# Patient Record
Sex: MALE | Race: WHITE | NOT HISPANIC OR LATINO | Employment: OTHER | URBAN - METROPOLITAN AREA
[De-identification: names, ages, dates, MRNs, and addresses within clinical notes are randomized per-mention and may not be internally consistent; named-entity substitution may affect disease eponyms.]

---

## 2017-01-11 ENCOUNTER — APPOINTMENT (OUTPATIENT)
Dept: PREADMISSION TESTING | Facility: HOSPITAL | Age: 67
End: 2017-01-11

## 2017-01-12 ENCOUNTER — OFFICE VISIT (OUTPATIENT)
Dept: INTERNAL MEDICINE | Facility: CLINIC | Age: 67
End: 2017-01-12

## 2017-01-12 VITALS
RESPIRATION RATE: 20 BRPM | WEIGHT: 216 LBS | DIASTOLIC BLOOD PRESSURE: 62 MMHG | BODY MASS INDEX: 32.84 KG/M2 | HEART RATE: 68 BPM | SYSTOLIC BLOOD PRESSURE: 115 MMHG

## 2017-01-12 DIAGNOSIS — E11.65 UNCONTROLLED TYPE 2 DIABETES MELLITUS WITH DIABETIC NEUROPATHY, UNSPECIFIED LONG TERM INSULIN USE STATUS: Primary | Chronic | ICD-10-CM

## 2017-01-12 DIAGNOSIS — E11.40 UNCONTROLLED TYPE 2 DIABETES MELLITUS WITH DIABETIC NEUROPATHY, UNSPECIFIED LONG TERM INSULIN USE STATUS: Primary | Chronic | ICD-10-CM

## 2017-01-12 DIAGNOSIS — M54.12 CERVICAL RADICULOPATHY: ICD-10-CM

## 2017-01-12 PROCEDURE — 99213 OFFICE O/P EST LOW 20 MIN: CPT | Performed by: PHYSICIAN ASSISTANT

## 2017-01-12 NOTE — PROGRESS NOTES
Subjective   Elton Funez is a 66 y.o. male.   Chief Complaint   Patient presents with   • Diabetes   • Hyperlipidemia   • Hypertension     History of Present Illness     Pt here with his wife for follow up prior to having neck surgery.  He has been scheduled for the operation 3 times and was actually scheduled for today, but he awoke yesterday with vomiting (which has now resolved), so it was cancelled.  His blood sugars have also been elevated and the surgeon wanted to have him cleared by someone else for the operation.    He follows with Dr. Colin for diabetes.  He is currently treated with lantus, humalog and tanzeum. Last A1C was 8.8% on 12/21/16.  Before that, his A1C was 8.0%.  He has a follow up scheduled with her on 1/20/17.    The following portions of the patient's history were reviewed and updated as appropriate: allergies, current medications, past family history, past medical history, past social history, past surgical history and problem list.    Review of Systems   Constitutional: Negative.    HENT: Negative.    Respiratory: Negative.    Cardiovascular: Negative.    Gastrointestinal: Positive for vomiting (resolved).   Musculoskeletal: Positive for neck pain.   Psychiatric/Behavioral: Negative.        Objective   Physical Exam   Constitutional: He appears well-developed and well-nourished.   Cardiovascular: Normal rate, regular rhythm and normal heart sounds.    Pulmonary/Chest: Effort normal and breath sounds normal. No respiratory distress.   Abdominal: Soft. Bowel sounds are normal. He exhibits no distension. There is no tenderness.   Psychiatric: He has a normal mood and affect. Judgment normal.   Vitals reviewed.      Assessment/Plan   Elton was seen today for diabetes, hyperlipidemia and hypertension.    Diagnoses and all orders for this visit:    Uncontrolled type 2 diabetes mellitus with diabetic neuropathy, unspecified long term insulin use status    Cervical radiculopathy    Patient was  instructed to follow up with Dr. Colin regarding uncontrolled diabetes and for surgical clearance.

## 2017-01-12 NOTE — MR AVS SNAPSHOT
Elton WARNER Armin   1/12/2017 9:45 AM   Office Visit    Provider:  GONZALO Parish   Department:  [facility] INTERNAL MEDICINE AND PEDIATRICS   Dept Phone:  [phone]                Your Full Care Plan              Your Updated Medication List          This list is accurate as of: 1/12/17 10:41 AM.  Always use your most recent med list.                Albiglutide 50 MG pen-injector   Commonly known as:  TANZEUM   Inject 50 mg under the skin 1 (one) time per week. Indications: didnt tolerate preferred medication Bydureon, worse glycemic control       aspirin 81 MG tablet       atenolol 50 MG tablet   Commonly known as:  TENORMIN   Take 1 tablet by mouth daily.       * B-D UF III MINI PEN NEEDLES 31G X 5 MM misc   Generic drug:  Insulin Pen Needle       * B-D UF III MINI PEN NEEDLES 31G X 5 MM misc   Generic drug:  Insulin Pen Needle   USE TWICE A DAY FOR LANTUS SOLOSTAR       * chlorhexidine 4 % external liquid   Commonly known as:  HIBICLENS   Shower each day with solution for 5 days beginning 5 days before surgery.       * chlorhexidine 4 % external liquid   Commonly known as:  HIBICLENS   Shower each day with solution for 5 days beginning 5 days before surgery.       Co Q10 100 MG capsule       colestipol 1 G tablet   Commonly known as:  COLESTID   TAKE 2 TABLETS TWICE A DAY       docusate calcium 240 MG capsule   Commonly known as:  SURFAK       Flaxseed Oil 1200 MG capsule       fluocinonide 0.05 % cream   Commonly known as:  LIDEX       fluticasone 50 MCG/ACT nasal spray   Commonly known as:  FLONASE       gemfibrozil 600 MG tablet   Commonly known as:  LOPID   TAKE 1 TABLET TWICE A DAY       glucose blood test strip   USE TO TEST BLOOD SUGAR TWICE DAILY AND AS NEEDED       HUMALOG MIX 75/25 KWIKPEN (75-25) 100 UNIT/ML suspension pen-injector   Generic drug:  Insulin Lispro Prot & Lispro       Lancets misc   1 each 2 (two) times a day.       LANTUS SOLOSTAR 100  UNIT/ML injection pen   Generic drug:  Insulin Glargine       lisinopril-hydrochlorothiazide 10-12.5 MG per tablet   Commonly known as:  PRINZIDE,ZESTORETIC   TAKE 1 TABLET DAILY       meloxicam 15 MG tablet   Commonly known as:  MOBIC   TAKE ONE TABLET BY MOUTH DAILY AS NEEDED       MULTIVITAMIN PO       omeprazole 20 MG capsule   Commonly known as:  priLOSEC       sertraline 50 MG tablet   Commonly known as:  ZOLOFT       simvastatin 40 MG tablet   Commonly known as:  ZOCOR   TAKE 1 TABLET DAILY AT BEDTIME       topiramate 25 MG tablet   Commonly known as:  TOPAMAX       traMADol 50 MG tablet   Commonly known as:  ULTRAM   TAKE ONE TABLET BY MOUTH EVERY 6 HOURS AS NEEDED       * Notice:  This list has 4 medication(s) that are the same as other medications prescribed for you. Read the directions carefully, and ask your doctor or other care provider to review them with you.            Instructions     None    Patient Instructions History      [employer] Signup     Our records indicate that you have an active [employer] account.    You can view your After Visit Summary by going to [employer] and logging in with your [employer] username and password.  If you don't have a [employer] username and password but a parent or guardian has access to your record, the parent or guardian should login with their own [employer] username and password and access your record to view the After Visit Summary.    If you have questions, you can email [email or web address] or call [phone] to talk to our [employer] staff.  Remember, [employer] is NOT to be used for urgent needs.  For medical emergencies, dial 911.               Other Info from Your Visit           Your Appointments     Jan 20, 2017 12:00 PM EST   Follow Up with Pau MACK MD   [facility] INTERNAL [facility]ICINE [facility]INOLOGY (--)    [address] [place] [id number] [phone]           Arrive 15 minutes  prior to appointment.            Mar 28, 2017  8:45 AM EDT   Follow Up with Tc Ramirez MD   [facility] INTERNAL MEDICINE AND PEDIATRICS (--)    [address] [address] [phone]           Arrive 15 minutes prior to appointment.              Allergies     Glucophage Xr [Metformin Hcl Er] Allergy Diarrhea    Glucophage XR TB24: Adverse Reaction: Severe GI issues.    Tetracyclines & Related Allergy Nausea Only    Adverse Reaction    Metformin Allergy     Other reaction(s): SEVERE INTESTIONAL ISSUES  Other reaction(s): SEVERE GI ISSUES  Glucophage XR TB24  MetFORMIN HCl TABS      Reason for Visit     Diabetes     Hyperlipidemia     Hypertension           Vital Signs     Blood Pressure Pulse Respirations Weight Body Mass Index Smoking Status    115/62 68 20 216 lb (98 kg) 32.84 kg/m2 Former Smoker

## 2017-01-17 ENCOUNTER — OFFICE VISIT (OUTPATIENT)
Dept: ENDOCRINOLOGY | Facility: CLINIC | Age: 67
End: 2017-01-17

## 2017-01-17 VITALS
SYSTOLIC BLOOD PRESSURE: 110 MMHG | WEIGHT: 217.6 LBS | BODY MASS INDEX: 32.98 KG/M2 | DIASTOLIC BLOOD PRESSURE: 70 MMHG | HEART RATE: 68 BPM | HEIGHT: 68 IN | OXYGEN SATURATION: 96 %

## 2017-01-17 DIAGNOSIS — E11.9 CONTROLLED TYPE 2 DIABETES MELLITUS WITHOUT COMPLICATION, WITH LONG-TERM CURRENT USE OF INSULIN (HCC): ICD-10-CM

## 2017-01-17 DIAGNOSIS — N18.30 CHRONIC KIDNEY DISEASE, STAGE III (MODERATE) (HCC): Chronic | ICD-10-CM

## 2017-01-17 DIAGNOSIS — Z79.4 CONTROLLED TYPE 2 DIABETES MELLITUS WITHOUT COMPLICATION, WITH LONG-TERM CURRENT USE OF INSULIN (HCC): ICD-10-CM

## 2017-01-17 DIAGNOSIS — IMO0002 UNCONTROLLED TYPE 2 DIABETES MELLITUS WITH DIABETIC NEUROPATHY, WITH LONG-TERM CURRENT USE OF INSULIN: ICD-10-CM

## 2017-01-17 DIAGNOSIS — E11.65 UNCONTROLLED TYPE 2 DIABETES MELLITUS WITH DIABETIC NEUROPATHY, UNSPECIFIED LONG TERM INSULIN USE STATUS: Primary | Chronic | ICD-10-CM

## 2017-01-17 DIAGNOSIS — E11.40 UNCONTROLLED TYPE 2 DIABETES MELLITUS WITH DIABETIC NEUROPATHY, UNSPECIFIED LONG TERM INSULIN USE STATUS: Primary | Chronic | ICD-10-CM

## 2017-01-17 PROCEDURE — 99214 OFFICE O/P EST MOD 30 MIN: CPT | Performed by: INTERNAL MEDICINE

## 2017-01-17 NOTE — LETTER
January 17, 2017     Gigi Perales MD  [address] [facility]  [address]  [facility] [address]    Patient: Elton Funez   YOB: 1950   Date of Visit: 1/17/2017       Dear Dr. Diaz MD:    Thank you for referring Elton Funez to me for evaluation. Below are the relevant portions of my assessment and plan of care.      1. Uncontrolled type 2 diabetes mellitus with diabetic neuropathy, unspecified long term insulin use status    2. Chronic kidney disease, stage III (moderate)    3. Uncontrolled type 2 diabetes mellitus with diabetic neuropathy, with long-term current use of insulin      No orders of the defined types were placed in this encounter.    Plan  -transition from premixed insulin and Lantus to U-500  Patient Instructions   Diabetes Treatment Recommendations    [unfilled]                                                                                              Elton Funez 1950       Concentrated U-500 insulin will provide a better delivery of the medication and allow to simplify your regimen.     Start insulin U-500 18 units before breakfast and 18 units before supper.        Change insulin doses every 3 - 4 days until your glucose numbers are < 150  Consistently.     For Breakfast Dose (controls blood sugar until supper)    If before supper blood sugar readings are consistently higher than 150 for 3-4 days, raise breakfast insulin dose by 2 units.    For Supper Dose (controls blood sugar until breakfast)    If before breakfast blood sugar readings are consistently higher than 150 for 3-4 days, raise supper insulin dose by 2  units.    If after 2 weeks, before meal blood sugars are not lower than 150, call the office.    If you are having frequent blood sugars lower than 70, call the office.    Pau Colin MD       -Tanzeum 50 mg was approved (better control compared to Bydureon)        Glucose goals reviewed, which need to improve considering his surgery.  Hyperglycemia is associated with higher infection risk and impaired healing.   Hypoglycemia precautions reviewed and insulin titration instructions given.   Follow-up in 3 weeks with glucose log.     His glycemic control is improved and he should be medically acceptable for surgery from diabetes standpoint.        I will repeat A1C in 3 weeks at his visit.            If you have questions, please do not hesitate to call me. I look forward to following Elton along with you.         Sincerely,        Pau Colin MD        CC: No Recipients

## 2017-01-17 NOTE — PROGRESS NOTES
Chief complaint  Diabetes (F/u for type 2 diabetes, pt stated that he was scheduled to have neck surgery before christmas but was rescheduled due to blood sugars being elevated.  Labs were completed in December 2016.)    Rufina Funez is a 66 y.o. male is here today for follow-up.  Diabetes mellitus type 2, uncontrolled dx in 2000  Diabetic complications: neuropathy  Medications: He stopped glimepiride, Januvia.   metformin in the past caused diarrhea.  Jan 2015 I have started Novolog 70/30 BID in additional Lantus.   He started Tanzeum in Oct and doing well, glucose improved.   Eye care:no retinopathy.   Hypoglycemia: 1 episode occurred during the day. He was symptomatic and treated appropriately.    Nutrition: discussed carb consistent diet 45-60 gm carb per meal. Patient is not following any diet, eats a lot of potatoes.   Monitoring: Have not checked glucose for 3 months  Exercise: recommended 30 min per day exercise. He doesn't exercise because of tingling in the feet.   Foot care and dental care: discussed.  Labs: 3/2016 reviewed      Patient had visit to the ER with right sided epistaxis and he is seeing ENT later today for cauterization. He is not checking glucose and not as compliant with the diet.  He also has severe neck pain and received steroids PO, planned for surgery. The surgery was postponed in December because of high glucose. He started checking more often and now his glucometer showed morning numbers 70-90, afternoon towards 230.      Diabetes   He presents for his follow-up diabetic visit. No MedicAlert identification noted. The initial diagnosis of diabetes was made 15 years ago. Hypoglycemia symptoms include hunger, sweats and tremors. Pertinent negatives for hypoglycemia include no confusion, dizziness, headaches, mood changes, nervousness/anxiousness, pallor, seizures, sleepiness or speech difficulty. Associated symptoms include foot paresthesias. Pertinent negatives for  diabetes include no blurred vision, no chest pain, no fatigue, no foot ulcerations, no polydipsia, no polyphagia, no polyuria, no visual change, no weakness and no weight loss. Hypoglycemia complications include nocturnal hypoglycemia. Pertinent negatives for hypoglycemia complications include no blackouts, no hospitalization, no required assistance and no required glucagon injection. Symptoms are worsening. Diabetic complications include impotence and peripheral neuropathy. Pertinent negatives for diabetic complications include no CVA, heart disease, nephropathy, PVD or retinopathy. Risk factors for coronary artery disease include dyslipidemia, family history, hypertension, obesity and sedentary lifestyle. Current diabetic treatment includes insulin injections and oral agent (triple therapy). He is compliant with treatment most of the time. He is currently taking insulin pre-breakfast and at bedtime. Insulin injections are given by patient. Rotation sites for injection include the abdominal wall, arms and thighs. His weight is fluctuating minimally. He is following a low fiber diet. He has not had a previous visit with a dietitian. He rarely participates in exercise. He monitors blood glucose at home 1-2 x per day. He monitors urine at home <1 x per month. Blood glucose monitoring compliance is inadequate. His home blood glucose trend is fluctuating minimally. His breakfast blood glucose is taken between 5-6 am. His breakfast blood glucose range is generally 70-90 mg/dl. His dinner blood glucose is taken between 6-7 pm. His dinner blood glucose range is generally 140-180 mg/dl. His highest blood glucose is >200 mg/dl. His overall blood glucose range is 180-200 mg/dl. He sees a podiatrist.Eye exam is current.       Medications    Current Outpatient Prescriptions:   •  Albiglutide (TANZEUM) 50 MG pen-injector, Inject 50 mg under the skin 1 (one) time per week. Indications: didnt tolerate preferred medication  Bydureon, worse glycemic control, Disp: 12 each, Rfl: 3  •  aspirin 81 MG tablet, Take 81 mg by mouth Daily., Disp: , Rfl:   •  atenolol (TENORMIN) 50 MG tablet, Take 1 tablet by mouth daily. (Patient taking differently: Take 50 mg by mouth Every Night.), Disp: 90 tablet, Rfl: 1  •  B-D UF III MINI PEN NEEDLES 31G X 5 MM misc, USE TWICE A DAY FOR LANTUS SOLOSTAR, Disp: 100 each, Rfl: 3  •  chlorhexidine (HIBICLENS) 4 % external liquid, Shower each day with solution for 5 days beginning 5 days before surgery., Disp: 120 mL, Rfl: 0  •  Coenzyme Q10 (CO Q10) 100 MG capsule, Take 1 capsule by mouth 2 (Two) Times a Day., Disp: , Rfl:   •  colestipol (COLESTID) 1 G tablet, TAKE 2 TABLETS TWICE A DAY, Disp: 360 tablet, Rfl: 4  •  docusate calcium (SURFAK) 240 MG capsule, Take 240 mg by mouth 2 (Two) Times a Day., Disp: , Rfl:   •  Flaxseed, Linseed, (FLAXSEED OIL) 1200 MG capsule, Take 1 capsule by mouth 2 (two) times a day., Disp: , Rfl:   •  fluocinonide (LIDEX) 0.05 % cream, Apply  topically Every 8 (Eight) Hours. Apply sparingly to affected area(s) twice daily as needed., Disp: , Rfl:   •  fluticasone (FLONASE) 50 MCG/ACT nasal spray, into each nostril As Needed. Use 2 sprays in each nostril once daily as needed., Disp: , Rfl:   •  gemfibrozil (LOPID) 600 MG tablet, TAKE 1 TABLET TWICE A DAY, Disp: 180 tablet, Rfl: 1  •  glucose blood test strip, USE TO TEST BLOOD SUGAR TWICE DAILY AND AS NEEDED, Disp: 200 each, Rfl: 2  •  Insulin Glargine (LANTUS SOLOSTAR) 100 UNIT/ML injection pen, Inject 50 Units under the skin Daily. Inject 50 units each evening., Disp: , Rfl:   •  Insulin Lispro Prot & Lispro (HUMALOG MIX 75/25 KWIKPEN) (75-25) 100 UNIT/ML suspension pen-injector, Inject 110 Units under the skin 2 (Two) Times a Day. 110 units in am and 95 units in pm., Disp: , Rfl:   •  Insulin Pen Needle (B-D UF III MINI PEN NEEDLES) 31G X 5 MM misc, , Disp: , Rfl:   •  Lancets misc, 1 each 2 (two) times a day., Disp: 200 each,  "Rfl: 2  •  lisinopril-hydrochlorothiazide (PRINZIDE,ZESTORETIC) 10-12.5 MG per tablet, TAKE 1 TABLET DAILY, Disp: 90 tablet, Rfl: 1  •  meloxicam (MOBIC) 15 MG tablet, TAKE ONE TABLET BY MOUTH DAILY AS NEEDED, Disp: 90 tablet, Rfl: 0  •  Multiple Vitamins-Minerals (MULTIVITAMIN PO), Take 1 tablet by mouth Daily., Disp: , Rfl:   •  omeprazole (PriLOSEC) 20 MG capsule, Take 20 mg by mouth Daily As Needed., Disp: , Rfl:   •  sertraline (ZOLOFT) 50 MG tablet, Take 50 mg by mouth Daily., Disp: , Rfl:   •  simvastatin (ZOCOR) 40 MG tablet, TAKE 1 TABLET DAILY AT BEDTIME, Disp: 90 tablet, Rfl: 1  •  topiramate (TOPAMAX) 25 MG tablet, Take 25 mg by mouth Daily., Disp: , Rfl:   •  traMADol (ULTRAM) 50 MG tablet, TAKE ONE TABLET BY MOUTH EVERY 6 HOURS AS NEEDED, Disp: 60 tablet, Rfl: 0    PMH  The following portions of the patient's history were reviewed and updated as appropriate: allergies, current medications, past family history, past medical history, past social history, past surgical history and problem list.    Review of systems  Review of Systems   Constitutional: Negative for fatigue and weight loss.   HENT: Positive for nosebleeds (right sided).    Eyes: Negative for blurred vision.   Cardiovascular: Negative for chest pain.   Endocrine: Negative for polydipsia, polyphagia and polyuria.   Genitourinary: Positive for impotence.   Musculoskeletal: Positive for back pain.   Skin: Negative for pallor.   Neurological: Positive for tremors and numbness (tingling of both feet, progressively getting worse, ). Negative for dizziness, seizures, speech difficulty, weakness and headaches.   Psychiatric/Behavioral: Negative for confusion. The patient is not nervous/anxious.    All other systems reviewed and are negative.      Physical exam  Objective   Blood pressure 110/70, pulse 68, height 68\" (172.7 cm), weight 217 lb 9.6 oz (98.7 kg), SpO2 96 %.   Physical Exam   Constitutional: He is oriented to person, place, and time. He " appears well-developed and well-nourished.   Eyes: Conjunctivae are normal.   Neck: No JVD present. Thyromegaly present.   Cardiovascular: Normal rate, regular rhythm and normal heart sounds.    Pulmonary/Chest: Effort normal and breath sounds normal.   Musculoskeletal: Normal range of motion. He exhibits no edema.   Lymphadenopathy:     He has no cervical adenopathy.   Neurological: He is alert and oriented to person, place, and time.   Skin: Skin is warm and dry. No rash noted.   Psychiatric: He has a normal mood and affect. Thought content normal.   Vitals reviewed.        LABS AND IMAGING  Results for orders placed or performed in visit on 12/21/16   MRSA Screen, PCR   Result Value Ref Range    MRSA, PCR Negative Negative   Hemoglobin A1c   Result Value Ref Range    Hemoglobin A1C 8.80 (H) 4.80 - 5.60 %   CBC (No diff)   Result Value Ref Range    WBC 6.59 3.50 - 10.80 10*3/mm3    RBC 4.14 (L) 4.20 - 5.76 10*6/mm3    Hemoglobin 13.8 13.1 - 17.5 g/dL    Hematocrit 38.8 (L) 38.9 - 50.9 %    MCV 93.7 80.0 - 99.0 fL    MCH 33.3 (H) 27.0 - 31.0 pg    MCHC 35.6 32.0 - 36.0 g/dL    RDW 13.1 11.3 - 14.5 %    RDW-SD 44.6 37.0 - 54.0 fl    MPV 12.1 (H) 6.0 - 12.0 fL    Platelets 139 (L) 150 - 450 10*3/mm3   Basic metabolic panel   Result Value Ref Range    Glucose 241 (H) 70 - 100 mg/dL    BUN 26 (H) 9 - 23 mg/dL    Creatinine 1.40 (H) 0.60 - 1.30 mg/dL    Sodium 135 132 - 146 mmol/L    Potassium 4.2 3.5 - 5.5 mmol/L    Chloride 101 99 - 109 mmol/L    CO2 26.0 20.0 - 31.0 mmol/L    Calcium 10.0 8.7 - 10.4 mg/dL    eGFR Non African Amer 51 (L) >60 mL/min/1.73    BUN/Creatinine Ratio 18.6 7.0 - 25.0    Anion Gap 8.0 3.0 - 11.0 mmol/L   Urinalysis w/Culture if Indicated   Result Value Ref Range    Color, UA Yellow Yellow, Straw    Appearance, UA Clear Clear    pH, UA 5.5 5.0 - 8.0    Specific Gravity, UA 1.017 1.001 - 1.030    Glucose,  mg/dL (Trace) (A) Negative    Ketones, UA Negative Negative    Bilirubin, UA  Negative Negative    Blood, UA Negative Negative    Protein, UA Negative Negative    Leuk Esterase, UA Negative Negative    Nitrite, UA Negative Negative    Urobilinogen, UA 0.2 E.U./dL 0.2 - 1.0 E.U./dL           Assessment  Assessment/Plan   1. Uncontrolled type 2 diabetes mellitus with diabetic neuropathy, unspecified long term insulin use status    2. Chronic kidney disease, stage III (moderate)    3. Uncontrolled type 2 diabetes mellitus with diabetic neuropathy, with long-term current use of insulin      No orders of the defined types were placed in this encounter.    Plan  -transition from premixed insulin and Lantus to U-500  Patient Instructions   Diabetes Treatment Recommendations    [unfilled]                                                                                              Elton Funez 1950       Concentrated U-500 insulin will provide a better delivery of the medication and allow to simplify your regimen.     Start insulin U-500 18 units before breakfast and 18 units before supper.        Change insulin doses every 3 - 4 days until your glucose numbers are < 150  Consistently.     For Breakfast Dose (controls blood sugar until supper)    If before supper blood sugar readings are consistently higher than 150 for 3-4 days, raise breakfast insulin dose by 2 units.    For Supper Dose (controls blood sugar until breakfast)    If before breakfast blood sugar readings are consistently higher than 150 for 3-4 days, raise supper insulin dose by 2  units.    If after 2 weeks, before meal blood sugars are not lower than 150, call the office.    If you are having frequent blood sugars lower than 70, call the office.    Pau Colin MD       -Tanzeum 50 mg was approved (better control compared to Bydureon)        Glucose goals reviewed, which need to improve considering his surgery. Hyperglycemia is associated with higher infection risk and impaired healing.   Hypoglycemia precautions reviewed  and insulin titration instructions given.   Follow-up in 3 weeks with glucose log.     His glycemic control is improved and he should be medically acceptable for surgery from diabetes standpoint.

## 2017-01-17 NOTE — LETTER
2017         RE: Elton Funez  :   1950    Dear   :     It was a pleasure to see Mr. Funez at [facility]. Below is my visit summary.            Elton Funez is a 66 y.o. male is here today for follow-up.  Diabetes mellitus type 2, uncontrolled dx in   Diabetic complications: neuropathy  Medications: He stopped glimepiride, Januvia.   metformin in the past caused diarrhea.  2015 I have started Novolog 70/30 BID in additional Lantus.   He started Tanzeum in Oct and doing well, glucose improved.   Eye care:no retinopathy.   Hypoglycemia: 1 episode occurred during the day. He was symptomatic and treated appropriately.    Nutrition: discussed carb consistent diet 45-60 gm carb per meal. Patient is not following any diet, eats a lot of potatoes.   Monitoring: Have not checked glucose for 3 months  Exercise: recommended 30 min per day exercise. He doesn't exercise because of tingling in the feet.   Foot care and dental care: discussed.  Labs: 3/2016 reviewed      Patient had visit to the ER with right sided epistaxis and he is seeing ENT later today for cauterization. He is not checking glucose and not as compliant with the diet.  He also has severe neck pain and received steroids PO, planned for surgery     Diabetes   He presents for his follow-up diabetic visit. No MedicAlert identification noted. The initial diagnosis of diabetes was made 15 years ago. Hypoglycemia symptoms include hunger, sweats and tremors. Pertinent negatives for hypoglycemia include no confusion, dizziness, headaches, mood changes, nervousness/anxiousness, pallor, seizures, sleepiness or speech difficulty. Associated symptoms include foot paresthesias. Pertinent negatives for diabetes include no blurred vision, no chest pain, no fatigue, no foot ulcerations, no polydipsia, no polyphagia, no polyuria, no visual change, no weakness and no weight loss.  Hypoglycemia complications include nocturnal hypoglycemia. Pertinent negatives for hypoglycemia complications include no blackouts, no hospitalization, no required assistance and no required glucagon injection. Symptoms are worsening. Diabetic complications include impotence and peripheral neuropathy. Pertinent negatives for diabetic complications include no CVA, heart disease, nephropathy, PVD or retinopathy. Risk factors for coronary artery disease include dyslipidemia, family history, hypertension, obesity and sedentary lifestyle. Current diabetic treatment includes insulin injections and oral agent (triple therapy). He is compliant with treatment most of the time. He is currently taking insulin pre-breakfast and at bedtime. Insulin injections are given by patient. Rotation sites for injection include the abdominal wall, arms and thighs. His weight is fluctuating minimally. He is following a low fiber diet. He has not had a previous visit with a dietitian. He rarely participates in exercise. He monitors blood glucose at home 1-2 x per day. He monitors urine at home <1 x per month. Blood glucose monitoring compliance is inadequate. His home blood glucose trend is fluctuating minimally. His breakfast blood glucose is taken between 5-6 am. His breakfast blood glucose range is generally 70-90 mg/dl. His dinner blood glucose is taken between 6-7 pm. His dinner blood glucose range is generally 140-180 mg/dl. His highest blood glucose is >200 mg/dl. His overall blood glucose range is 180-200 mg/dl. He sees a podiatrist.Eye exam is current.       Medications    Current Outpatient Prescriptions:   •  Albiglutide (TANZEUM) 50 MG pen-injector, Inject 50 mg under the skin 1 (one) time per week. Indications: didnt tolerate preferred medication Bydureon, worse glycemic control, Disp: 12 each, Rfl: 3  •  aspirin 81 MG tablet, Take 81 mg by mouth Daily., Disp: , Rfl:   •  atenolol (TENORMIN) 50 MG tablet, Take 1 tablet by mouth  daily. (Patient taking differently: Take 50 mg by mouth Every Night.), Disp: 90 tablet, Rfl: 1  •  B-D UF III MINI PEN NEEDLES 31G X 5 MM misc, USE TWICE A DAY FOR LANTUS SOLOSTAR, Disp: 100 each, Rfl: 3  •  chlorhexidine (HIBICLENS) 4 % external liquid, Shower each day with solution for 5 days beginning 5 days before surgery., Disp: 120 mL, Rfl: 0  •  Coenzyme Q10 (CO Q10) 100 MG capsule, Take 1 capsule by mouth 2 (Two) Times a Day., Disp: , Rfl:   •  colestipol (COLESTID) 1 G tablet, TAKE 2 TABLETS TWICE A DAY, Disp: 360 tablet, Rfl: 4  •  docusate calcium (SURFAK) 240 MG capsule, Take 240 mg by mouth 2 (Two) Times a Day., Disp: , Rfl:   •  Flaxseed, Linseed, (FLAXSEED OIL) 1200 MG capsule, Take 1 capsule by mouth 2 (two) times a day., Disp: , Rfl:   •  fluocinonide (LIDEX) 0.05 % cream, Apply  topically Every 8 (Eight) Hours. Apply sparingly to affected area(s) twice daily as needed., Disp: , Rfl:   •  fluticasone (FLONASE) 50 MCG/ACT nasal spray, into each nostril As Needed. Use 2 sprays in each nostril once daily as needed., Disp: , Rfl:   •  gemfibrozil (LOPID) 600 MG tablet, TAKE 1 TABLET TWICE A DAY, Disp: 180 tablet, Rfl: 1  •  glucose blood test strip, USE TO TEST BLOOD SUGAR TWICE DAILY AND AS NEEDED, Disp: 200 each, Rfl: 2  •  Insulin Glargine (LANTUS SOLOSTAR) 100 UNIT/ML injection pen, Inject 50 Units under the skin Daily. Inject 50 units each evening., Disp: , Rfl:   •  Insulin Lispro Prot & Lispro (HUMALOG MIX 75/25 KWIKPEN) (75-25) 100 UNIT/ML suspension pen-injector, Inject 110 Units under the skin 2 (Two) Times a Day. 110 units in am and 95 units in pm., Disp: , Rfl:   •  Insulin Pen Needle (B-D UF III MINI PEN NEEDLES) 31G X 5 MM misc, , Disp: , Rfl:   •  Lancets misc, 1 each 2 (two) times a day., Disp: 200 each, Rfl: 2  •  lisinopril-hydrochlorothiazide (PRINZIDE,ZESTORETIC) 10-12.5 MG per tablet, TAKE 1 TABLET DAILY, Disp: 90 tablet, Rfl: 1  •  meloxicam (MOBIC) 15 MG tablet, TAKE ONE TABLET BY  "MOUTH DAILY AS NEEDED, Disp: 90 tablet, Rfl: 0  •  Multiple Vitamins-Minerals (MULTIVITAMIN PO), Take 1 tablet by mouth Daily., Disp: , Rfl:   •  omeprazole (PriLOSEC) 20 MG capsule, Take 20 mg by mouth Daily As Needed., Disp: , Rfl:   •  sertraline (ZOLOFT) 50 MG tablet, Take 50 mg by mouth Daily., Disp: , Rfl:   •  simvastatin (ZOCOR) 40 MG tablet, TAKE 1 TABLET DAILY AT BEDTIME, Disp: 90 tablet, Rfl: 1  •  topiramate (TOPAMAX) 25 MG tablet, Take 25 mg by mouth Daily., Disp: , Rfl:   •  traMADol (ULTRAM) 50 MG tablet, TAKE ONE TABLET BY MOUTH EVERY 6 HOURS AS NEEDED, Disp: 60 tablet, Rfl: 0    PMH  The following portions of the patient's history were reviewed and updated as appropriate: allergies, current medications, past family history, past medical history, past social history, past surgical history and problem list.    Review of systems  Review of Systems   Constitutional: Negative for fatigue and weight loss.   HENT: Positive for nosebleeds (right sided).    Eyes: Negative for blurred vision.   Cardiovascular: Negative for chest pain.   Endocrine: Negative for polydipsia, polyphagia and polyuria.   Genitourinary: Positive for impotence.   Musculoskeletal: Positive for back pain.   Skin: Negative for pallor.   Neurological: Positive for tremors and numbness (tingling of both feet, progressively getting worse, ). Negative for dizziness, seizures, speech difficulty, weakness and headaches.   Psychiatric/Behavioral: Negative for confusion. The patient is not nervous/anxious.    All other systems reviewed and are negative.      Physical exam      Blood pressure 110/70, pulse 68, height 68\" (172.7 cm), weight 217 lb 9.6 oz (98.7 kg), SpO2 96 %.   Physical Exam   Constitutional: He is oriented to person, place, and time. He appears well-developed and well-nourished.   Eyes: Conjunctivae are normal.   Neck: No JVD present. Thyromegaly present.   Cardiovascular: Normal rate, regular rhythm and normal heart sounds.  "   Pulmonary/Chest: Effort normal and breath sounds normal.   Musculoskeletal: Normal range of motion. He exhibits no edema.    Elton had a diabetic foot exam performed today.   During the foot exam he had a monofilament test performed.    Neurological Sensory Findings -  Altered sharp/dull right ankle/foot discrimination (decresaed vibration sensation bilaterally) and altered sharp/dull left ankle/foot discrimination. No right ankle/foot altered proprioception and no left ankle/foot altered proprioception    Vascular Status -  His exam exhibits right foot vasculature abnormal and no right foot edema. His exam exhibits left foot vasculature abnormal and no left foot edema.   Skin Integrity  -  His right foot skin is intact.   Elton's left foot skin is intact. .  Lymphadenopathy:     He has no cervical adenopathy.   Neurological: He is alert and oriented to person, place, and time.   Skin: Skin is warm and dry. No rash noted.   Psychiatric: He has a normal mood and affect. Thought content normal. reviewed.        LABS AND IMAGING  Results for orders placed or performed in visit on 12/21/16   MRSA Screen, PCR   Result Value Ref Range    MRSA, PCR Negative Negative   Hemoglobin A1c   Result Value Ref Range    Hemoglobin A1C 8.80 (H) 4.80 - 5.60 %   CBC (No diff)   Result Value Ref Range    WBC 6.59 3.50 - 10.80 10*3/mm3    RBC 4.14 (L) 4.20 - 5.76 10*6/mm3    Hemoglobin 13.8 13.1 - 17.5 g/dL    Hematocrit 38.8 (L) 38.9 - 50.9 %    MCV 93.7 80.0 - 99.0 fL    MCH 33.3 (H) 27.0 - 31.0 pg    MCHC 35.6 32.0 - 36.0 g/dL    RDW 13.1 11.3 - 14.5 %    RDW-SD 44.6 37.0 - 54.0 fl    MPV 12.1 (H) 6.0 - 12.0 fL    Platelets 139 (L) 150 - 450 10*3/mm3   Basic metabolic panel   Result Value Ref Range    Glucose 241 (H) 70 - 100 mg/dL    BUN 26 (H) 9 - 23 mg/dL    Creatinine 1.40 (H) 0.60 - 1.30 mg/dL    Sodium 135 132 - 146 mmol/L    Potassium 4.2 3.5 - 5.5 mmol/L    Chloride 101 99 - 109 mmol/L    CO2 26.0 20.0 - 31.0 mmol/L     Calcium 10.0 8.7 - 10.4 mg/dL    eGFR Non African Amer 51 (L) >60 mL/min/1.73    BUN/Creatinine Ratio 18.6 7.0 - 25.0    Anion Gap 8.0 3.0 - 11.0 mmol/L   Urinalysis w/Culture if Indicated   Result Value Ref Range    Color, UA Yellow Yellow, Straw    Appearance, UA Clear Clear    pH, UA 5.5 5.0 - 8.0    Specific Gravity, UA 1.017 1.001 - 1.030    Glucose,  mg/dL (Trace) (A) Negative    Ketones, UA Negative Negative    Bilirubin, UA Negative Negative    Blood, UA Negative Negative    Protein, UA Negative Negative    Leuk Esterase, UA Negative Negative    Nitrite, UA Negative Negative    Urobilinogen, UA 0.2 E.U./dL 0.2 - 1.0 E.U./dL           Assessment      If you have any questions or concerns, please don't hesitate to contact us at [phone].         Sincerely,        Pau Colin MD    CC: No Recipients

## 2017-01-17 NOTE — PATIENT INSTRUCTIONS
Diabetes Treatment Recommendations    [unfilled]                                                                                              Elton Funez 1950       Concentrated U-500 insulin will provide a better delivery of the medication and allow to simplify your regimen.     Start insulin U-500 18 units before breakfast and 18 units before supper.        Change insulin doses every 3 - 4 days until your glucose numbers are < 150  Consistently.     For Breakfast Dose (controls blood sugar until supper)    If before supper blood sugar readings are consistently higher than 150 for 3-4 days, raise breakfast insulin dose by 2 units.    For Supper Dose (controls blood sugar until breakfast)    If before breakfast blood sugar readings are consistently higher than 150 for 3-4 days, raise supper insulin dose by 2  units.    If after 2 weeks, before meal blood sugars are not lower than 150, call the office.    If you are having frequent blood sugars lower than 70, call the office.    Pau Colin MD

## 2017-01-17 NOTE — LETTER
Patient Instructions   Diabetes Treatment Recommendations    [unfilled]                                                                                              Elton TIMMY Armin 1950       Concentrated U-500 insulin will provide a better delivery of the medication and allow to simplify your regimen.     Start insulin U-500 18 units before breakfast and 18 units before supper.        Change insulin doses every 3 - 4 days until your glucose numbers are < 150  Consistently.     For Breakfast Dose (controls blood sugar until supper)    If before supper blood sugar readings are consistently higher than 150 for 3-4 days, raise breakfast insulin dose by 2 units.    For Supper Dose (controls blood sugar until breakfast)    If before breakfast blood sugar readings are consistently higher than 150 for 3-4 days, raise supper insulin dose by 2  units.    If after 2 weeks, before meal blood sugars are not lower than 150, call the office.    If you are having frequent blood sugars lower than 70, call the office.    MD Pau George MD

## 2017-01-17 NOTE — LETTER
January 17, 2017     Gigi Perales MD  [address]  [facility] [address]    Patient: Elton Funez   YOB: 1950   Date of Visit: 1/17/2017       Dear Dr. Diaz MD:    Thank you for referring Elton Funez to me for evaluation. Below are the relevant portions of my assessment and plan of care.      1. Uncontrolled type 2 diabetes mellitus with diabetic neuropathy, unspecified long term insulin use status    2. Chronic kidney disease, stage III (moderate)    3. Uncontrolled type 2 diabetes mellitus with diabetic neuropathy, with long-term current use of insulin      No orders of the defined types were placed in this encounter.    Plan  -premixed insulin Humalog 75/25 100 units in am and 90 units at supper. Titration instructions discussed. Increase by 5 units if steroid are started. Incresae by 5 units if glucose during the day is > 150.   -Lantus 50 units.   -Tanzeum 50 mg was approved (better control compared to Bydureon)  At the next visit I will transition his regimen to concentrated insulin U500.   Glucose goals reviewed, which need to improve considering his surgery. Hyperglycemia is associated with higher infection risk and impaired healing.   Hypoglycemia precautions reviewed and insulin titration instructions given.   Follow-up in 3 months.     17   min  of  30 min face-to-face visit time spent for coordination of care and counselling regarding identified problems as outlined in the objective, assessment and discussion portions of the documentation.                If you have questions, please do not hesitate to call me. I look forward to following Elton along with you.         Sincerely,        Pau Colin MD        CC: No Recipients

## 2017-01-17 NOTE — MR AVS SNAPSHOT
Elton Funez   1/17/2017 10:45 AM   Office Visit    Dept Phone:  [phone]   Encounter #:  [id number]    Provider:  Pau MACK MD   Department:  [facility] MEDICINE AND ENDOCRINOLOGY                Your Full Care Plan              Today's Medication Changes          These changes are accurate as of: 1/17/17 11:36 AM.  If you have any questions, ask your nurse or doctor.               New Medication(s)Ordered:     Insulin Regular Human (Conc) 500 UNIT/ML solution pen-injector CONCENTRATED injection   Commonly known as:  HumuLIN R   20 units twice a day before meals.   Started by:  Pau MACK MD         Medication(s)that have changed:     chlorhexidine 4 % external liquid   Commonly known as:  HIBICLENS   Shower each day with solution for 5 days beginning 5 days before surgery.   What changed:  Another medication with the same name was removed. Continue taking this medication, and follow the directions you see here.   Changed by:  Gigi Perales MD       Insulin Pen Needle 31G X 8 MM misc   Commonly known as:  B-D ULTRAFINE III SHORT PEN   1 each 3 (Three) Times a Day.   What changed:  See the new instructions.   Changed by:  Pau MACK MD            Where to Get Your Medications      These medications were sent to [facility] - [address] [phone] [facility] [phone] [address] [address]     Phone:  [phone]     Albiglutide 50 MG pen-injector    Insulin Pen Needle 31G X 8 MM misc    Insulin Regular Human (Conc) 500 UNIT/ML solution pen-injector CONCENTRATED injection                  Your Updated Medication List          This list is accurate as of: 1/17/17 11:36 AM.  Always use your most recent med list.                Albiglutide 50 MG pen-injector   Commonly known as:  TANZEUM   Inject 50 mg under the skin 1 (One) Time Per Week. Indications: didnt tolerate  preferred medication Bydureon, worse glycemic control       aspirin 81 MG tablet       atenolol 50 MG tablet   Commonly known as:  TENORMIN   Take 1 tablet by mouth daily.       chlorhexidine 4 % external liquid   Commonly known as:  HIBICLENS   Shower each day with solution for 5 days beginning 5 days before surgery.       Co Q10 100 MG capsule       colestipol 1 G tablet   Commonly known as:  COLESTID   TAKE 2 TABLETS TWICE A DAY       docusate calcium 240 MG capsule   Commonly known as:  SURFAK       Flaxseed Oil 1200 MG capsule       fluocinonide 0.05 % cream   Commonly known as:  LIDEX       fluticasone 50 MCG/ACT nasal spray   Commonly known as:  FLONASE       gemfibrozil 600 MG tablet   Commonly known as:  LOPID   TAKE 1 TABLET TWICE A DAY       glucose blood test strip   USE TO TEST BLOOD SUGAR TWICE DAILY AND AS NEEDED       HUMALOG MIX 75/25 KWIKPEN (75-25) 100 UNIT/ML suspension pen-injector   Generic drug:  Insulin Lispro Prot & Lispro       Insulin Pen Needle 31G X 8 MM misc   Commonly known as:  B-D ULTRAFINE III SHORT PEN   1 each 3 (Three) Times a Day.       Insulin Regular Human (Conc) 500 UNIT/ML solution pen-injector CONCENTRATED injection   Commonly known as:  HumuLIN R   20 units twice a day before meals.       Lancets misc   1 each 2 (two) times a day.       LANTUS SOLOSTAR 100 UNIT/ML injection pen   Generic drug:  Insulin Glargine       lisinopril-hydrochlorothiazide 10-12.5 MG per tablet   Commonly known as:  PRINZIDE,ZESTORETIC   TAKE 1 TABLET DAILY       meloxicam 15 MG tablet   Commonly known as:  MOBIC   TAKE ONE TABLET BY MOUTH DAILY AS NEEDED       MULTIVITAMIN PO       omeprazole 20 MG capsule   Commonly known as:  priLOSEC       sertraline 50 MG tablet   Commonly known as:  ZOLOFT       simvastatin 40 MG tablet   Commonly known as:  ZOCOR   TAKE 1 TABLET DAILY AT BEDTIME       topiramate 25 MG tablet   Commonly known as:  TOPAMAX       traMADol 50 MG tablet   Commonly known as:   ULTRAM   TAKE ONE TABLET BY MOUTH EVERY 6 HOURS AS NEEDED               You Were Diagnosed With        Codes Comments    Uncontrolled type 2 diabetes mellitus with diabetic neuropathy, unspecified long term insulin use status    -  Primary ICD-10-CM: E11.40, E11.65  ICD-9-CM: 250.62, 357.2     Chronic kidney disease, stage III (moderate)     ICD-10-CM: N18.3  ICD-9-CM: 585.3     Uncontrolled type 2 diabetes mellitus with diabetic neuropathy, with long-term current use of insulin     ICD-10-CM: E11.40, Z79.4, E11.65  ICD-9-CM: 250.62, 357.2, V58.67     Controlled type 2 diabetes mellitus without complication, with long-term current use of insulin     ICD-10-CM: E11.9, Z79.4  ICD-9-CM: 250.00, V58.67       Instructions    Diabetes Treatment Recommendations    [unfilled]                                                                                              Elton Funez 1950       Concentrated U-500 insulin will provide a better delivery of the medication and allow to simplify your regimen.     Start insulin U-500 18 units before breakfast and 18 units before supper.        Change insulin doses every 3 - 4 days until your glucose numbers are < 150  Consistently.     For Breakfast Dose (controls blood sugar until supper)    If before supper blood sugar readings are consistently higher than 150 for 3-4 days, raise breakfast insulin dose by 2 units.    For Supper Dose (controls blood sugar until breakfast)    If before breakfast blood sugar readings are consistently higher than 150 for 3-4 days, raise supper insulin dose by 2  units.    If after 2 weeks, before meal blood sugars are not lower than 150, call the office.    If you are having frequent blood sugars lower than 70, call the office.    Pau Colin MD     Patient Instructions History      Upcoming Appointments     Visit Type Date Time Department    FOLLOW UP 1/17/2017 10:45 AM [facility]    FOLLOW UP 3/28/2017  8:45 AM LORIN MINER  "     MyChart Signup     Our records indicate that you have an active [facility] account.    You can view your After Visit Summary by going to [employer] and logging in with your [employer] username and password.  If you don't have a [employer] username and password but a parent or guardian has access to your record, the parent or guardian should login with their own Plumzi username and password and access your record to view the After Visit Summary.    If you have questions, you can email [email or web address] or call [phone] to talk to our Plum[employer] staff.  Remember, [employer] is NOT to be used for urgent needs.  For medical emergencies, dial 911.               Other Info from Your Visit           Your Appointments     Mar 28, 2017  8:45 AM EDT   Follow Up with Tc Ramirez MD   [facility] (--)    [address] [address] [phone]           Arrive 15 minutes prior to appointment.              Allergies     Glucophage Xr [Metformin Hcl Er] Allergy Diarrhea    Glucophage XR TB24: Adverse Reaction: Severe GI issues.    Tetracyclines & Related Allergy Nausea Only    Adverse Reaction    Metformin Allergy     Other reaction(s): SEVERE INTESTIONAL ISSUES  Other reaction(s): SEVERE GI ISSUES  Glucophage XR TB24  MetFORMIN HCl TABS      Reason for Visit     Diabetes F/u for type 2 diabetes, pt stated that he was scheduled to have neck surgery before willow but was rescheduled due to blood sugars being elevated.  Labs were completed in December 2016.      Vital Signs     Blood Pressure Pulse Height Weight Oxygen Saturation Body Mass Index    110/70 68 68\" (172.7 cm) 217 lb 9.6 oz (98.7 kg) 96% 33.09 kg/m2    Smoking Status                   Former Smoker           Problems and Diagnoses Noted     Chronic kidney disease, stage III (moderate)    Diabetes with neurologic complications    Uncontrolled " type 2 diabetes mellitus with diabetic neuropathy, with long-term current use of insulin    Controlled type 2 diabetes mellitus without complication, with long-term current use of insulin

## 2017-01-20 RX ORDER — MELOXICAM 15 MG/1
TABLET ORAL
Qty: 90 TABLET | Refills: 0 | Status: SHIPPED | OUTPATIENT
Start: 2017-01-20 | End: 2017-03-18 | Stop reason: HOSPADM

## 2017-01-25 ENCOUNTER — TELEPHONE (OUTPATIENT)
Dept: NEUROSURGERY | Facility: CLINIC | Age: 67
End: 2017-01-25

## 2017-01-25 NOTE — TELEPHONE ENCOUNTER
Provider:  Diaz  Caller:   Patient  Phone #:  [phone]  Surgery:  Trying to schedule?  Surgery Date:  Future  Last visit:   12/23/16    JOSE:         Reason for call:           ----- Message from Elton Funez sent at 1/24/2017  9:18 PM EST -----  Regarding: Non-Urgent Medical Question  Contact: [phone]  Dr. Colin sent a letter stating that you could proceed with surgery on my neck.  Just checking on when that can be rescheduled.

## 2017-01-26 ENCOUNTER — TELEPHONE (OUTPATIENT)
Dept: INTERNAL MEDICINE | Facility: CLINIC | Age: 67
End: 2017-01-26

## 2017-01-26 NOTE — TELEPHONE ENCOUNTER
Please contact Dr. Perales's office and find out what is going on with this.   The patient is anxious to get this scheduled and is having significant ongoing pain.  Tc Ramirez MD  2:02 PM  01/26/17

## 2017-01-26 NOTE — TELEPHONE ENCOUNTER
----- Message from Venita Rivera sent at 1/26/2017  1:51 PM EST -----  Contact: PATIENT   PATIENT CONTACTED DR. GALVAN TO R/S HIS SURGERY. HE HASN'T HEARD ANYTHING BACK. HE JUST WANTED TO LET YOU KNOW. THANKS

## 2017-01-27 DIAGNOSIS — E11.65 UNCONTROLLED TYPE 2 DIABETES MELLITUS WITH DIABETIC NEUROPATHY, UNSPECIFIED LONG TERM INSULIN USE STATUS: Chronic | ICD-10-CM

## 2017-01-27 DIAGNOSIS — E11.40 UNCONTROLLED TYPE 2 DIABETES MELLITUS WITH DIABETIC NEUROPATHY, UNSPECIFIED LONG TERM INSULIN USE STATUS: Chronic | ICD-10-CM

## 2017-01-27 DIAGNOSIS — M54.12 CERVICAL RADICULOPATHY: Primary | ICD-10-CM

## 2017-01-27 RX ORDER — SODIUM CHLORIDE 0.9 % (FLUSH) 0.9 %
1-10 SYRINGE (ML) INJECTION AS NEEDED
Status: CANCELLED | OUTPATIENT
Start: 2017-01-27

## 2017-01-30 RX ORDER — INSULIN GLARGINE 100 [IU]/ML
INJECTION, SOLUTION SUBCUTANEOUS
Qty: 45 ML | Refills: 2 | Status: SHIPPED | OUTPATIENT
Start: 2017-01-30 | End: 2017-07-27

## 2017-02-01 ENCOUNTER — PATIENT MESSAGE (OUTPATIENT)
Dept: NEUROSURGERY | Facility: CLINIC | Age: 67
End: 2017-02-01

## 2017-02-01 RX ORDER — TRAMADOL HYDROCHLORIDE 50 MG/1
50 TABLET ORAL EVERY 6 HOURS PRN
Qty: 60 TABLET | Refills: 0 | Status: SHIPPED | OUTPATIENT
Start: 2017-02-01 | End: 2017-03-01 | Stop reason: HOSPADM

## 2017-02-01 RX ORDER — CHLORHEXIDINE GLUCONATE 4 G/100ML
SOLUTION TOPICAL
Qty: 120 ML | Refills: 0 | Status: SHIPPED | OUTPATIENT
Start: 2017-02-01 | End: 2017-03-01 | Stop reason: HOSPADM

## 2017-02-01 NOTE — TELEPHONE ENCOUNTER
Provider:  MANDY   Caller:     Phone #: AILIN    Surgery: ACDF    Surgery Date:  12/22/16  Last visit:       JOSE:         Reason for call:  Pt requesting RF -

## 2017-02-01 NOTE — TELEPHONE ENCOUNTER
From: Elton Funez  To: Gigi Perales MD  Sent: 2/1/2017 2:22 PM EST  Subject: Prescription Question    I need the refills for TraMadol and Hibiclens from [facility].    Thank you     Elton Funez

## 2017-02-08 ENCOUNTER — TELEPHONE (OUTPATIENT)
Dept: ENDOCRINOLOGY | Facility: CLINIC | Age: 67
End: 2017-02-08

## 2017-02-08 NOTE — TELEPHONE ENCOUNTER
----- Message from Kaylie Dixon sent at 2/8/2017  9:44 AM EST -----  Contact: 30 min February cancellation list   Have you talked to him? I have him on the cancellation list but I saw something in the msg regarding his pump.   ----- Message -----     From: Pau MACK MD     Sent: 2/7/2017  12:27 PM       To: Pau MACK MD, Kaylie Dixon    Please put him on cancellation list for February       ----- Message -----     From: Meghann Mehta MA     Sent: 2/7/2017  10:47 AM       To: Pau MACK MD        ----- Message -----     From: Kaley Santamaria     Sent: 2/7/2017   8:19 AM       To: Meghann Mehta MA    RE: APPT    MR GLOVER CALLED THIS MORNING TO CANCEL HIS APPT DUE TO NAUSEA AND VOMITING. HE HAS SURGERY SCHEDULED ON 2/28 AND WOULD LIKE TO SEE DR RUVALCABA BEFORE THAT TIME. HE WOULD LIKE TO KNOW IF HE CAN BE WORKED IN TO DR RUVALCABA'S SCHEDULE BEFORE THIS SURGERY. HE IS CONCERNED WITH HIS BLOOD SUGAR BEING HIGH BEFORE SURGERY. HE ALSO STATES THAT HE COULD BRING HIS METER BY AT SOME POINT TO GET THE READINGS FROM IT. PLEASE RETURN CALL.      CALL BACK #[phone]

## 2017-02-08 NOTE — TELEPHONE ENCOUNTER
I did call pt but no answer. I left a message asking for him to return my call so that we can get his meter downloaded.

## 2017-02-15 ENCOUNTER — TELEPHONE (OUTPATIENT)
Dept: ENDOCRINOLOGY | Facility: CLINIC | Age: 67
End: 2017-02-15

## 2017-02-15 NOTE — TELEPHONE ENCOUNTER
----- Message from Pau MACK MD sent at 2/14/2017  6:03 PM EST -----  Contact: 30 min February cancellation list   Could you please check if this patient willing to come Feb 15 at 10:30 am?  Abrahan Kilpatrick is in the hospital and there is 45 min opening. This patient will need 30.     ----- Message -----     From: Pau MACK MD     Sent: 2/7/2017  12:27 PM       To: Pau MACK MD, Kaylie Dixon    Please put him on cancellation list for February       ----- Message -----     From: Meghann Mehta MA     Sent: 2/7/2017  10:47 AM       To: Pau MACK MD        ----- Message -----     From: Kaley Santamaria     Sent: 2/7/2017   8:19 AM       To: Meghann Mehta MA    RE: APPT    MR GLOVER CALLED THIS MORNING TO CANCEL HIS APPT DUE TO NAUSEA AND VOMITING. HE HAS SURGERY SCHEDULED ON 2/28 AND WOULD LIKE TO SEE DR RUVALCABA BEFORE THAT TIME. HE WOULD LIKE TO KNOW IF HE CAN BE WORKED IN TO DR RUVALCABA'S SCHEDULE BEFORE THIS SURGERY. HE IS CONCERNED WITH HIS BLOOD SUGAR BEING HIGH BEFORE SURGERY. HE ALSO STATES THAT HE COULD BRING HIS METER BY AT SOME POINT TO GET THE READINGS FROM IT. PLEASE RETURN CALL.      CALL BACK #[phone]

## 2017-02-16 ENCOUNTER — OFFICE VISIT (OUTPATIENT)
Dept: ENDOCRINOLOGY | Facility: CLINIC | Age: 67
End: 2017-02-16

## 2017-02-16 VITALS
HEIGHT: 68 IN | WEIGHT: 219 LBS | OXYGEN SATURATION: 98 % | DIASTOLIC BLOOD PRESSURE: 66 MMHG | BODY MASS INDEX: 33.19 KG/M2 | SYSTOLIC BLOOD PRESSURE: 124 MMHG | HEART RATE: 68 BPM

## 2017-02-16 DIAGNOSIS — E11.65 UNCONTROLLED TYPE 2 DIABETES MELLITUS WITH DIABETIC NEUROPATHY, UNSPECIFIED LONG TERM INSULIN USE STATUS: Chronic | ICD-10-CM

## 2017-02-16 DIAGNOSIS — IMO0002 UNCONTROLLED TYPE 2 DIABETES MELLITUS WITH DIABETIC NEUROPATHY, WITH LONG-TERM CURRENT USE OF INSULIN: Primary | ICD-10-CM

## 2017-02-16 DIAGNOSIS — E11.40 UNCONTROLLED TYPE 2 DIABETES MELLITUS WITH DIABETIC NEUROPATHY, UNSPECIFIED LONG TERM INSULIN USE STATUS: Chronic | ICD-10-CM

## 2017-02-16 DIAGNOSIS — N18.30 CHRONIC KIDNEY DISEASE, STAGE III (MODERATE) (HCC): Chronic | ICD-10-CM

## 2017-02-16 PROCEDURE — 99214 OFFICE O/P EST MOD 30 MIN: CPT | Performed by: INTERNAL MEDICINE

## 2017-02-16 NOTE — PROGRESS NOTES
Chief complaint  Diabetes (F/u for type 2 diabetes, testing 2x qd. Pt stated no new sx or concerns. )    Subjective   Elton Funez is a 66 y.o. male is here today for follow-up.  Diabetes mellitus type 2, uncontrolled dx in 2000  Diabetic complications: neuropathy  Medications: Tanzeum, U-500 insulin, Lantus.     Pat meds: Metformin caused diarrhea in the past.   Eye care:no retinopathy.   Hypoglycemia: in the morning occasionally   Monitors regularly and glucometer reviewed, glucose  is significantly improved. See scanned logbook for details    Nutrition: discussed carb consistent diet 45-60 gm carb per meal. Patient is not following any diet, eats a lot of potatoes.     Exercise: recommended 30 min per day exercise. He doesn't exercise because of tingling in the feet.   Foot care and dental care: discussed.    Patient is scheduled for surgery in Feb 28   He c/o diarrhea.      Diabetes   He presents for his follow-up diabetic visit. He has type 2 diabetes mellitus. No MedicAlert identification noted. The initial diagnosis of diabetes was made 15 years ago. Hypoglycemia symptoms include hunger, sweats and tremors. Pertinent negatives for hypoglycemia include no confusion, dizziness, headaches, mood changes, nervousness/anxiousness, pallor, seizures, sleepiness or speech difficulty. Associated symptoms include foot paresthesias. Pertinent negatives for diabetes include no blurred vision, no chest pain, no fatigue, no foot ulcerations, no polydipsia, no polyphagia, no polyuria, no visual change, no weakness and no weight loss. Hypoglycemia complications include nocturnal hypoglycemia. Pertinent negatives for hypoglycemia complications include no blackouts, no hospitalization, no required assistance and no required glucagon injection. Symptoms are worsening. Diabetic complications include impotence and peripheral neuropathy. Pertinent negatives for diabetic complications include no CVA, heart disease, nephropathy,  PVD or retinopathy. Risk factors for coronary artery disease include dyslipidemia, family history, hypertension, obesity and sedentary lifestyle. Current diabetic treatment includes insulin injections and oral agent (triple therapy). He is compliant with treatment most of the time. He is currently taking insulin pre-breakfast and at bedtime. Insulin injections are given by patient. Rotation sites for injection include the abdominal wall, arms and thighs. His weight is fluctuating minimally. He is following a low fiber diet. He has not had a previous visit with a dietitian. He rarely participates in exercise. He monitors blood glucose at home 1-2 x per day. He monitors urine at home <1 x per month. Blood glucose monitoring compliance is inadequate. His home blood glucose trend is fluctuating minimally. His breakfast blood glucose is taken between 5-6 am. His breakfast blood glucose range is generally 70-90 mg/dl. His dinner blood glucose is taken between 6-7 pm. His dinner blood glucose range is generally 140-180 mg/dl. His highest blood glucose is >200 mg/dl. His overall blood glucose range is 180-200 mg/dl. He sees a podiatrist.Eye exam is current.       Medications    Current Outpatient Prescriptions:   •  Albiglutide (TANZEUM) 50 MG pen-injector, Inject 50 mg under the skin 1 (One) Time Per Week. Indications: didnt tolerate preferred medication Bydureon, worse glycemic control, Disp: 12 each, Rfl: 3  •  aspirin 81 MG tablet, Take 81 mg by mouth Daily., Disp: , Rfl:   •  atenolol (TENORMIN) 50 MG tablet, Take 1 tablet by mouth daily. (Patient taking differently: Take 50 mg by mouth Every Night.), Disp: 90 tablet, Rfl: 1  •  chlorhexidine (HIBICLENS) 4 % external liquid, Shower each day with solution for 5 days beginning 5 days before surgery., Disp: 120 mL, Rfl: 0  •  Coenzyme Q10 (CO Q10) 100 MG capsule, Take 1 capsule by mouth 2 (Two) Times a Day., Disp: , Rfl:   •  colestipol (COLESTID) 1 G tablet, TAKE 2  TABLETS TWICE A DAY, Disp: 360 tablet, Rfl: 4  •  docusate calcium (SURFAK) 240 MG capsule, Take 240 mg by mouth 2 (Two) Times a Day., Disp: , Rfl:   •  Flaxseed, Linseed, (FLAXSEED OIL) 1200 MG capsule, Take 1 capsule by mouth 2 (two) times a day., Disp: , Rfl:   •  fluocinonide (LIDEX) 0.05 % cream, Apply  topically Every 8 (Eight) Hours. Apply sparingly to affected area(s) twice daily as needed., Disp: , Rfl:   •  fluticasone (FLONASE) 50 MCG/ACT nasal spray, into each nostril As Needed. Use 2 sprays in each nostril once daily as needed., Disp: , Rfl:   •  gemfibrozil (LOPID) 600 MG tablet, TAKE 1 TABLET TWICE A DAY, Disp: 180 tablet, Rfl: 1  •  glucose blood test strip, USE TO TEST BLOOD SUGAR TWICE DAILY AND AS NEEDED, Disp: 200 each, Rfl: 2  •  Insulin Pen Needle (B-D ULTRAFINE III SHORT PEN) 31G X 8 MM misc, 1 each 3 (Three) Times a Day., Disp: 300 each, Rfl: 3  •  Insulin Regular Human, Conc, (HumuLIN R) 500 UNIT/ML solution pen-injector CONCENTRATED injection, 90 units twice a day before meals, titrate up as indicated, Disp: 12 pen, Rfl: 3  •  Lancets misc, 1 each 2 (two) times a day., Disp: 200 each, Rfl: 2  •  LANTUS SOLOSTAR 100 UNIT/ML injection pen, INJECT 50 UNITS UNDER THE SKIN EVERY EVENING, Disp: 45 mL, Rfl: 2  •  lisinopril-hydrochlorothiazide (PRINZIDE,ZESTORETIC) 10-12.5 MG per tablet, TAKE 1 TABLET DAILY, Disp: 90 tablet, Rfl: 1  •  meloxicam (MOBIC) 15 MG tablet, TAKE ONE TABLET BY MOUTH DAILY AS NEEDED, Disp: 90 tablet, Rfl: 0  •  Multiple Vitamins-Minerals (MULTIVITAMIN PO), Take 1 tablet by mouth Daily., Disp: , Rfl:   •  omeprazole (PriLOSEC) 20 MG capsule, Take 20 mg by mouth Daily As Needed., Disp: , Rfl:   •  sertraline (ZOLOFT) 50 MG tablet, TAKE ONE TABLET BY MOUTH DAILY, Disp: 90 tablet, Rfl: 3  •  simvastatin (ZOCOR) 40 MG tablet, TAKE 1 TABLET DAILY AT BEDTIME, Disp: 90 tablet, Rfl: 1  •  topiramate (TOPAMAX) 25 MG tablet, Take 25 mg by mouth Daily., Disp: , Rfl:   •  traMADol  "(ULTRAM) 50 MG tablet, Take 1 tablet by mouth Every 6 (Six) Hours As Needed for moderate pain (4-6)., Disp: 60 tablet, Rfl: 0    PMH  The following portions of the patient's history were reviewed and updated as appropriate: allergies, current medications, past family history, past medical history, past social history, past surgical history and problem list.    Review of systems  Review of Systems   Constitutional: Negative for fatigue and weight loss.   HENT: Positive for nosebleeds (right sided).    Eyes: Negative for blurred vision.   Cardiovascular: Negative for chest pain.   Endocrine: Negative for polydipsia, polyphagia and polyuria.   Genitourinary: Positive for impotence.   Musculoskeletal: Positive for back pain.   Skin: Negative for pallor.   Neurological: Positive for tremors and numbness (tingling of both feet, progressively getting worse, ). Negative for dizziness, seizures, speech difficulty, weakness and headaches.   Psychiatric/Behavioral: Negative for confusion. The patient is not nervous/anxious.    All other systems reviewed and are negative.      Physical exam  Objective   Blood pressure 124/66, pulse 68, height 68\" (172.7 cm), weight 219 lb (99.3 kg), SpO2 98 %.   Physical Exam   Constitutional: He is oriented to person, place, and time. He appears well-developed and well-nourished.   Eyes: Conjunctivae are normal.   Neck: No JVD present. Thyromegaly present.   Cardiovascular: Normal rate, regular rhythm and normal heart sounds.    Pulmonary/Chest: Effort normal and breath sounds normal.   Musculoskeletal: Normal range of motion. He exhibits no edema.   Lymphadenopathy:     He has no cervical adenopathy.   Neurological: He is alert and oriented to person, place, and time.   Skin: Skin is warm and dry. No rash noted.   Psychiatric: He has a normal mood and affect. Thought content normal.   Vitals reviewed.        LABS AND IMAGING  Results for orders placed or performed in visit on 12/21/16   MRSA " Screen, PCR   Result Value Ref Range    MRSA, PCR Negative Negative   Hemoglobin A1c   Result Value Ref Range    Hemoglobin A1C 8.80 (H) 4.80 - 5.60 %   CBC (No diff)   Result Value Ref Range    WBC 6.59 3.50 - 10.80 10*3/mm3    RBC 4.14 (L) 4.20 - 5.76 10*6/mm3    Hemoglobin 13.8 13.1 - 17.5 g/dL    Hematocrit 38.8 (L) 38.9 - 50.9 %    MCV 93.7 80.0 - 99.0 fL    MCH 33.3 (H) 27.0 - 31.0 pg    MCHC 35.6 32.0 - 36.0 g/dL    RDW 13.1 11.3 - 14.5 %    RDW-SD 44.6 37.0 - 54.0 fl    MPV 12.1 (H) 6.0 - 12.0 fL    Platelets 139 (L) 150 - 450 10*3/mm3   Basic metabolic panel   Result Value Ref Range    Glucose 241 (H) 70 - 100 mg/dL    BUN 26 (H) 9 - 23 mg/dL    Creatinine 1.40 (H) 0.60 - 1.30 mg/dL    Sodium 135 132 - 146 mmol/L    Potassium 4.2 3.5 - 5.5 mmol/L    Chloride 101 99 - 109 mmol/L    CO2 26.0 20.0 - 31.0 mmol/L    Calcium 10.0 8.7 - 10.4 mg/dL    eGFR Non African Amer 51 (L) >60 mL/min/1.73    BUN/Creatinine Ratio 18.6 7.0 - 25.0    Anion Gap 8.0 3.0 - 11.0 mmol/L   Urinalysis w/Culture if Indicated   Result Value Ref Range    Color, UA Yellow Yellow, Straw    Appearance, UA Clear Clear    pH, UA 5.5 5.0 - 8.0    Specific Gravity, UA 1.017 1.001 - 1.030    Glucose,  mg/dL (Trace) (A) Negative    Ketones, UA Negative Negative    Bilirubin, UA Negative Negative    Blood, UA Negative Negative    Protein, UA Negative Negative    Leuk Esterase, UA Negative Negative    Nitrite, UA Negative Negative    Urobilinogen, UA 0.2 E.U./dL 0.2 - 1.0 E.U./dL           Assessment  Assessment/Plan   1. Uncontrolled type 2 diabetes mellitus with diabetic neuropathy, with long-term current use of insulin    2. Uncontrolled type 2 diabetes mellitus with diabetic neuropathy, unspecified long term insulin use status    3. Chronic kidney disease, stage III (moderate)      No orders of the defined types were placed in this encounter.    Plan  -continue Lantus and U-500 insulin.   -Tanzeum 50 mg was approved (better control  compared to Bydureon)    Patient Instructions   Diabetes Treatment Recommendations    [unfilled]                                                                                              Elton TIMMY Funez 1950       Concentrated U-500 insulin will provide a better delivery of the medication and allow to simplify your regimen.     Start insulin U-500 125 units before breakfast and 110  units before supper.   Lantus 50 units     Surgery instructions:    -continue Lantus at the same dose  -The night before procedure take U-500 insulin 15 units less (95 units)     -the morning of the procedure take 25 units less (100 units) insulin if you glucose > 120, take 35-40 units less if your glucose is <120.           If after 2 weeks, before meal blood sugars are not lower than 150, call the office.    If you are having frequent blood sugars lower than 70, call the office.    Pau Colin MD               Glucose goals reviewed and he achieved the goal glucose required for surgery. Hypoglycemia precautions reviewed and insulin titration instructions given.   Follow-up in 3months with glucose log.     His glycemic control is improved and he should be medically acceptable for surgery from diabetes standpoint.     Labs ordered for next week.

## 2017-02-16 NOTE — PATIENT INSTRUCTIONS
Diabetes Treatment Recommendations    [unfilled]                                                                                              Elton Funez 1950       Concentrated U-500 insulin will provide a better delivery of the medication and allow to simplify your regimen.     Start insulin U-500 125 units before breakfast and 110  units before supper.   Lantus 45 units     Surgery instructions:    -continue Lantus at the same dose 45  -The night before procedure take U-500 insulin 15 units less (95 units)     -the morning of the procedure take 25 units less (100 units) insulin if you glucose > 120, take 35-40 units less if your glucose is <120.           If after 2 weeks, before meal blood sugars are not lower than 150, call the office.    If you are having frequent blood sugars lower than 70, call the office.    Pau Colin MD

## 2017-02-17 ENCOUNTER — TELEPHONE (OUTPATIENT)
Dept: INTERNAL MEDICINE | Facility: CLINIC | Age: 67
End: 2017-02-17

## 2017-02-17 ENCOUNTER — OFFICE VISIT (OUTPATIENT)
Dept: INTERNAL MEDICINE | Facility: CLINIC | Age: 67
End: 2017-02-17

## 2017-02-17 VITALS
HEART RATE: 59 BPM | TEMPERATURE: 97.9 F | SYSTOLIC BLOOD PRESSURE: 118 MMHG | RESPIRATION RATE: 16 BRPM | WEIGHT: 217 LBS | BODY MASS INDEX: 32.99 KG/M2 | DIASTOLIC BLOOD PRESSURE: 68 MMHG | OXYGEN SATURATION: 99 %

## 2017-02-17 DIAGNOSIS — R19.7 DIARRHEA, UNSPECIFIED TYPE: Primary | ICD-10-CM

## 2017-02-17 PROCEDURE — 99213 OFFICE O/P EST LOW 20 MIN: CPT | Performed by: PHYSICIAN ASSISTANT

## 2017-02-17 RX ORDER — SULFAMETHOXAZOLE AND TRIMETHOPRIM 800; 160 MG/1; MG/1
1 TABLET ORAL 2 TIMES DAILY
Qty: 14 TABLET | Refills: 0 | Status: ON HOLD | OUTPATIENT
Start: 2017-02-17 | End: 2017-02-28

## 2017-02-17 NOTE — TELEPHONE ENCOUNTER
Pls let him know, Listeria can be passed without medications.  Sometimes antibiotics are required.  Is that something he was eating 3 weeks ago when these symptoms started?

## 2017-02-17 NOTE — TELEPHONE ENCOUNTER
----- Message from Kiarra Robison sent at 2/17/2017  3:05 PM EST -----  Contact: self  Pt called and stated he was seen today for diarrhea. He stated when he came home from his appt, he had a message from [facility] telling him that a cheese he had bought might contain Listeria. He stated he has already ate the whole pack and wanted to know if this might be causing his symptoms? He can be reached at [phone]      Pharmacy- [facility]

## 2017-02-17 NOTE — PROGRESS NOTES
Subjective   Elton Funez is a 66 y.o. male.   Chief Complaint   Patient presents with   • Diarrhea   • Abdominal Pain     History of Present Illness   Pt complains of diarrhea every couple of days x 3-4 weeks.  Often associated with odorous burp.  Interval constipation with gas.  He takes fiber tablets, sennacot, stool softener.  Has used probiotic.    Has mild cramping.  No hx of irritable bowel.    NO abx in the last month.  Hx of shigellosis.  Scheduled for neck surgery on 3/8.      The following portions of the patient's history were reviewed and updated as appropriate: allergies, current medications and problem list.    Review of Systems   Constitutional: Negative for chills and fever.       Objective   Physical Exam   Constitutional: He appears well-developed and well-nourished.   HENT:   Head: Normocephalic and atraumatic.   Right Ear: External ear normal.   Left Ear: External ear normal.   Eyes: Conjunctivae are normal.   Cardiovascular: Normal rate, regular rhythm and normal heart sounds.  Exam reveals no gallop and no friction rub.    No murmur heard.  Pulmonary/Chest: Effort normal and breath sounds normal.   Abdominal: Normal appearance. Bowel sounds are increased. There is no tenderness.   Psychiatric: He has a normal mood and affect.   Vitals reviewed.      Assessment/Plan   Elton was seen today for diarrhea and abdominal pain.    Diagnoses and all orders for this visit:    Diarrhea, unspecified type  -     Clostridium Difficile EIA; Future  -     Giardia Antigen; Future  -     Rotavirus Antigen, Stool; Future  -     Stool Culture; Future        Pt later called and said [facility] called to tell him that a cheese he has been eating has listeria in it.  I have sent in bactrim for this.

## 2017-02-17 NOTE — TELEPHONE ENCOUNTER
Spoke with pt and yes these symptoms started 3 weeks ago. Pt would like to start the abx. Please send to [facility] on [address].

## 2017-02-20 ENCOUNTER — LAB (OUTPATIENT)
Dept: INTERNAL MEDICINE | Facility: CLINIC | Age: 67
End: 2017-02-20

## 2017-02-20 DIAGNOSIS — R19.7 DIARRHEA, UNSPECIFIED TYPE: ICD-10-CM

## 2017-02-20 PROCEDURE — 87493 C DIFF AMPLIFIED PROBE: CPT | Performed by: PHYSICIAN ASSISTANT

## 2017-02-20 PROCEDURE — 87329 GIARDIA AG IA: CPT | Performed by: PHYSICIAN ASSISTANT

## 2017-02-20 PROCEDURE — 87046 STOOL CULTR AEROBIC BACT EA: CPT | Performed by: PHYSICIAN ASSISTANT

## 2017-02-20 PROCEDURE — 87045 FECES CULTURE AEROBIC BACT: CPT | Performed by: PHYSICIAN ASSISTANT

## 2017-02-20 PROCEDURE — 87425 ROTAVIRUS AG IA: CPT | Performed by: PHYSICIAN ASSISTANT

## 2017-02-21 LAB — RV AG STL QL IA: NEGATIVE

## 2017-02-22 LAB
BACTERIA SPEC AEROBE CULT: NORMAL
G LAMBLIA AG STL QL IA: NEGATIVE

## 2017-02-23 ENCOUNTER — APPOINTMENT (OUTPATIENT)
Dept: PREADMISSION TESTING | Facility: HOSPITAL | Age: 67
End: 2017-02-23

## 2017-02-23 VITALS — WEIGHT: 218.03 LBS | BODY MASS INDEX: 33.04 KG/M2 | HEIGHT: 68 IN

## 2017-02-23 DIAGNOSIS — E11.40 UNCONTROLLED TYPE 2 DIABETES MELLITUS WITH DIABETIC NEUROPATHY, UNSPECIFIED LONG TERM INSULIN USE STATUS: Chronic | ICD-10-CM

## 2017-02-23 DIAGNOSIS — M54.12 CERVICAL RADICULOPATHY: ICD-10-CM

## 2017-02-23 DIAGNOSIS — M54.12 CERVICAL RADICULOPATHY AT C8: ICD-10-CM

## 2017-02-23 DIAGNOSIS — E11.65 UNCONTROLLED TYPE 2 DIABETES MELLITUS WITH DIABETIC NEUROPATHY, UNSPECIFIED LONG TERM INSULIN USE STATUS: Chronic | ICD-10-CM

## 2017-02-23 LAB
ANION GAP SERPL CALCULATED.3IONS-SCNC: 7 MMOL/L (ref 3–11)
BASOPHILS # BLD AUTO: 0.02 10*3/MM3 (ref 0–0.2)
BASOPHILS NFR BLD AUTO: 0.2 % (ref 0–1)
BILIRUB UR QL STRIP: NEGATIVE
BUN BLD-MCNC: 29 MG/DL (ref 9–23)
BUN/CREAT SERPL: 19.3 (ref 7–25)
CALCIUM SPEC-SCNC: 10 MG/DL (ref 8.7–10.4)
CHLORIDE SERPL-SCNC: 106 MMOL/L (ref 99–109)
CLARITY UR: CLEAR
CO2 SERPL-SCNC: 24 MMOL/L (ref 20–31)
COLOR UR: YELLOW
CREAT BLD-MCNC: 1.5 MG/DL (ref 0.6–1.3)
DEPRECATED RDW RBC AUTO: 46.2 FL (ref 37–54)
EOSINOPHIL # BLD AUTO: 0.34 10*3/MM3 (ref 0.1–0.3)
EOSINOPHIL NFR BLD AUTO: 4.1 % (ref 0–3)
ERYTHROCYTE [DISTWIDTH] IN BLOOD BY AUTOMATED COUNT: 13.4 % (ref 11.3–14.5)
GFR SERPL CREATININE-BSD FRML MDRD: 47 ML/MIN/1.73
GLUCOSE BLD-MCNC: 118 MG/DL (ref 70–100)
GLUCOSE UR STRIP-MCNC: NEGATIVE MG/DL
HBA1C MFR BLD: 8 % (ref 4.8–5.6)
HCT VFR BLD AUTO: 43 % (ref 38.9–50.9)
HGB BLD-MCNC: 14.3 G/DL (ref 13.1–17.5)
HGB UR QL STRIP.AUTO: NEGATIVE
IMM GRANULOCYTES # BLD: 0.05 10*3/MM3 (ref 0–0.03)
IMM GRANULOCYTES NFR BLD: 0.6 % (ref 0–0.6)
KETONES UR QL STRIP: NEGATIVE
LEUKOCYTE ESTERASE UR QL STRIP.AUTO: NEGATIVE
LYMPHOCYTES # BLD AUTO: 1.96 10*3/MM3 (ref 0.6–4.8)
LYMPHOCYTES NFR BLD AUTO: 23.8 % (ref 24–44)
MCH RBC QN AUTO: 31.6 PG (ref 27–31)
MCHC RBC AUTO-ENTMCNC: 33.3 G/DL (ref 32–36)
MCV RBC AUTO: 95.1 FL (ref 80–99)
MONOCYTES # BLD AUTO: 0.68 10*3/MM3 (ref 0–1)
MONOCYTES NFR BLD AUTO: 8.3 % (ref 0–12)
NEUTROPHILS # BLD AUTO: 5.17 10*3/MM3 (ref 1.5–8.3)
NEUTROPHILS NFR BLD AUTO: 63 % (ref 41–71)
NITRITE UR QL STRIP: NEGATIVE
PH UR STRIP.AUTO: 5.5 [PH] (ref 5–8)
PLATELET # BLD AUTO: 146 10*3/MM3 (ref 150–450)
PMV BLD AUTO: 11.7 FL (ref 6–12)
POTASSIUM BLD-SCNC: 4.4 MMOL/L (ref 3.5–5.5)
PROT UR QL STRIP: NEGATIVE
RBC # BLD AUTO: 4.52 10*6/MM3 (ref 4.2–5.76)
SODIUM BLD-SCNC: 137 MMOL/L (ref 132–146)
SP GR UR STRIP: 1.02 (ref 1–1.03)
UROBILINOGEN UR QL STRIP: NORMAL
WBC NRBC COR # BLD: 8.22 10*3/MM3 (ref 3.5–10.8)

## 2017-02-23 PROCEDURE — 36415 COLL VENOUS BLD VENIPUNCTURE: CPT

## 2017-02-23 PROCEDURE — 83036 HEMOGLOBIN GLYCOSYLATED A1C: CPT | Performed by: PHYSICIAN ASSISTANT

## 2017-02-23 PROCEDURE — 85025 COMPLETE CBC W/AUTO DIFF WBC: CPT | Performed by: PHYSICIAN ASSISTANT

## 2017-02-23 PROCEDURE — 80048 BASIC METABOLIC PNL TOTAL CA: CPT | Performed by: PHYSICIAN ASSISTANT

## 2017-02-23 PROCEDURE — 81003 URINALYSIS AUTO W/O SCOPE: CPT | Performed by: NEUROLOGICAL SURGERY

## 2017-02-23 RX ORDER — SACCHAROMYCES BOULARDII 250 MG
250 CAPSULE ORAL DAILY
COMMUNITY
End: 2017-03-18 | Stop reason: HOSPADM

## 2017-02-23 RX ORDER — DOCUSATE SODIUM 100 MG/1
100 CAPSULE, LIQUID FILLED ORAL DAILY
COMMUNITY
End: 2017-12-13

## 2017-02-27 ENCOUNTER — ANESTHESIA EVENT (OUTPATIENT)
Dept: PERIOP | Facility: HOSPITAL | Age: 67
End: 2017-02-27

## 2017-02-28 ENCOUNTER — APPOINTMENT (OUTPATIENT)
Dept: GENERAL RADIOLOGY | Facility: HOSPITAL | Age: 67
End: 2017-02-28

## 2017-02-28 ENCOUNTER — ANESTHESIA (OUTPATIENT)
Dept: PERIOP | Facility: HOSPITAL | Age: 67
End: 2017-02-28

## 2017-02-28 PROCEDURE — 25010000002 FENTANYL CITRATE (PF) 100 MCG/2ML SOLUTION: Performed by: NURSE ANESTHETIST, CERTIFIED REGISTERED

## 2017-02-28 PROCEDURE — 25010000002 NEOSTIGMINE PER 0.5 MG: Performed by: NURSE ANESTHETIST, CERTIFIED REGISTERED

## 2017-02-28 PROCEDURE — 25010000002 PHENYLEPHRINE PER 1 ML: Performed by: NURSE ANESTHETIST, CERTIFIED REGISTERED

## 2017-02-28 PROCEDURE — 25010000002 DEXAMETHASONE PER 1 MG: Performed by: NURSE ANESTHETIST, CERTIFIED REGISTERED

## 2017-02-28 PROCEDURE — 76000 FLUOROSCOPY <1 HR PHYS/QHP: CPT

## 2017-02-28 PROCEDURE — 25010000002 PROPOFOL 10 MG/ML EMULSION: Performed by: NURSE ANESTHETIST, CERTIFIED REGISTERED

## 2017-02-28 PROCEDURE — 25010000002 ONDANSETRON PER 1 MG: Performed by: NURSE ANESTHETIST, CERTIFIED REGISTERED

## 2017-02-28 PROCEDURE — 76001 HC FLUORO OVER 1 HOUR: CPT

## 2017-02-28 RX ORDER — GLYCOPYRROLATE 0.2 MG/ML
INJECTION INTRAMUSCULAR; INTRAVENOUS AS NEEDED
Status: DISCONTINUED | OUTPATIENT
Start: 2017-02-28 | End: 2017-02-28 | Stop reason: SURG

## 2017-02-28 RX ORDER — DEXAMETHASONE SODIUM PHOSPHATE 4 MG/ML
INJECTION, SOLUTION INTRA-ARTICULAR; INTRALESIONAL; INTRAMUSCULAR; INTRAVENOUS; SOFT TISSUE AS NEEDED
Status: DISCONTINUED | OUTPATIENT
Start: 2017-02-28 | End: 2017-02-28 | Stop reason: SURG

## 2017-02-28 RX ORDER — LIDOCAINE HYDROCHLORIDE 10 MG/ML
INJECTION, SOLUTION INFILTRATION; PERINEURAL AS NEEDED
Status: DISCONTINUED | OUTPATIENT
Start: 2017-02-28 | End: 2017-02-28 | Stop reason: SURG

## 2017-02-28 RX ORDER — ROCURONIUM BROMIDE 10 MG/ML
INJECTION, SOLUTION INTRAVENOUS AS NEEDED
Status: DISCONTINUED | OUTPATIENT
Start: 2017-02-28 | End: 2017-02-28 | Stop reason: SURG

## 2017-02-28 RX ORDER — ONDANSETRON 2 MG/ML
INJECTION INTRAMUSCULAR; INTRAVENOUS AS NEEDED
Status: DISCONTINUED | OUTPATIENT
Start: 2017-02-28 | End: 2017-02-28 | Stop reason: SURG

## 2017-02-28 RX ORDER — PROPOFOL 10 MG/ML
VIAL (ML) INTRAVENOUS AS NEEDED
Status: DISCONTINUED | OUTPATIENT
Start: 2017-02-28 | End: 2017-02-28 | Stop reason: SURG

## 2017-02-28 RX ORDER — FENTANYL CITRATE 50 UG/ML
INJECTION, SOLUTION INTRAMUSCULAR; INTRAVENOUS AS NEEDED
Status: DISCONTINUED | OUTPATIENT
Start: 2017-02-28 | End: 2017-02-28 | Stop reason: SURG

## 2017-02-28 RX ADMIN — SODIUM CHLORIDE, POTASSIUM CHLORIDE, SODIUM LACTATE AND CALCIUM CHLORIDE: 600; 310; 30; 20 INJECTION, SOLUTION INTRAVENOUS at 11:36

## 2017-02-28 RX ADMIN — PHENYLEPHRINE HYDROCHLORIDE 100 MCG: 10 INJECTION INTRAVENOUS at 12:19

## 2017-02-28 RX ADMIN — PHENYLEPHRINE HYDROCHLORIDE 100 MCG: 10 INJECTION INTRAVENOUS at 12:11

## 2017-02-28 RX ADMIN — PHENYLEPHRINE HYDROCHLORIDE 100 MCG: 10 INJECTION INTRAVENOUS at 12:14

## 2017-02-28 RX ADMIN — PROPOFOL 200 MG: 10 INJECTION, EMULSION INTRAVENOUS at 11:39

## 2017-02-28 RX ADMIN — DEXAMETHASONE SODIUM PHOSPHATE 8 MG: 4 INJECTION, SOLUTION INTRAMUSCULAR; INTRAVENOUS at 12:44

## 2017-02-28 RX ADMIN — PHENYLEPHRINE HYDROCHLORIDE 0.5 MCG/KG/MIN: 10 INJECTION INTRAVENOUS at 12:43

## 2017-02-28 RX ADMIN — LIDOCAINE HYDROCHLORIDE 50 MG: 10 INJECTION, SOLUTION INFILTRATION; PERINEURAL at 11:39

## 2017-02-28 RX ADMIN — FENTANYL CITRATE 50 MCG: 50 INJECTION, SOLUTION INTRAMUSCULAR; INTRAVENOUS at 11:50

## 2017-02-28 RX ADMIN — Medication 3 MG: at 13:46

## 2017-02-28 RX ADMIN — FENTANYL CITRATE 50 MCG: 50 INJECTION, SOLUTION INTRAMUSCULAR; INTRAVENOUS at 14:00

## 2017-02-28 RX ADMIN — ONDANSETRON 4 MG: 2 INJECTION INTRAMUSCULAR; INTRAVENOUS at 13:13

## 2017-02-28 RX ADMIN — ROBINUL 0.2 MG: 0.2 INJECTION INTRAMUSCULAR; INTRAVENOUS at 11:37

## 2017-02-28 RX ADMIN — SODIUM CHLORIDE, POTASSIUM CHLORIDE, SODIUM LACTATE AND CALCIUM CHLORIDE: 600; 310; 30; 20 INJECTION, SOLUTION INTRAVENOUS at 13:04

## 2017-02-28 RX ADMIN — ROCURONIUM BROMIDE 10 MG: 10 INJECTION INTRAVENOUS at 13:00

## 2017-02-28 RX ADMIN — EPHEDRINE SULFATE 10 MG: 50 INJECTION INTRAMUSCULAR; INTRAVENOUS; SUBCUTANEOUS at 12:28

## 2017-02-28 RX ADMIN — ROBINUL 0.4 MG: 0.2 INJECTION INTRAMUSCULAR; INTRAVENOUS at 13:46

## 2017-02-28 RX ADMIN — EPHEDRINE SULFATE 5 MG: 50 INJECTION INTRAMUSCULAR; INTRAVENOUS; SUBCUTANEOUS at 12:34

## 2017-02-28 RX ADMIN — Medication 3 G: at 11:39

## 2017-02-28 RX ADMIN — ROCURONIUM BROMIDE 10 MG: 10 INJECTION INTRAVENOUS at 12:26

## 2017-02-28 RX ADMIN — PHENYLEPHRINE HYDROCHLORIDE 100 MCG: 10 INJECTION INTRAVENOUS at 12:34

## 2017-02-28 RX ADMIN — EPHEDRINE SULFATE 5 MG: 50 INJECTION INTRAMUSCULAR; INTRAVENOUS; SUBCUTANEOUS at 12:18

## 2017-02-28 RX ADMIN — LIDOCAINE HYDROCHLORIDE 50 MG: 10 INJECTION, SOLUTION INFILTRATION; PERINEURAL at 13:45

## 2017-02-28 RX ADMIN — FENTANYL CITRATE 100 MCG: 50 INJECTION, SOLUTION INTRAMUSCULAR; INTRAVENOUS at 11:37

## 2017-02-28 RX ADMIN — ROCURONIUM BROMIDE 30 MG: 10 INJECTION INTRAVENOUS at 11:39

## 2017-03-09 DIAGNOSIS — M54.12 CERVICAL RADICULOPATHY: ICD-10-CM

## 2017-03-09 DIAGNOSIS — M54.2 NECK PAIN ON LEFT SIDE: ICD-10-CM

## 2017-03-09 RX ORDER — CYCLOBENZAPRINE HCL 10 MG
TABLET ORAL
Qty: 90 TABLET | Refills: 0 | Status: SHIPPED | OUTPATIENT
Start: 2017-03-09 | End: 2017-04-26

## 2017-03-15 ENCOUNTER — LAB (OUTPATIENT)
Dept: LAB | Facility: HOSPITAL | Age: 67
End: 2017-03-15

## 2017-03-15 ENCOUNTER — HOSPITAL ENCOUNTER (INPATIENT)
Facility: HOSPITAL | Age: 67
LOS: 2 days | Discharge: HOME | End: 2017-03-18
Attending: EMERGENCY MEDICINE | Admitting: HOSPITALIST

## 2017-03-15 ENCOUNTER — OFFICE VISIT (OUTPATIENT)
Dept: NEUROSURGERY | Facility: CLINIC | Age: 67
End: 2017-03-15

## 2017-03-15 VITALS
HEIGHT: 68 IN | TEMPERATURE: 100.1 F | BODY MASS INDEX: 32.28 KG/M2 | DIASTOLIC BLOOD PRESSURE: 100 MMHG | WEIGHT: 213 LBS | SYSTOLIC BLOOD PRESSURE: 150 MMHG

## 2017-03-15 DIAGNOSIS — IMO0002 UNCONTROLLED TYPE 2 DIABETES MELLITUS WITH DIABETIC NEUROPATHY, WITH LONG-TERM CURRENT USE OF INSULIN: ICD-10-CM

## 2017-03-15 DIAGNOSIS — T81.40XA POST-OPERATIVE INFECTION: Primary | ICD-10-CM

## 2017-03-15 DIAGNOSIS — M54.12 CERVICAL RADICULOPATHY: ICD-10-CM

## 2017-03-15 DIAGNOSIS — M54.12 CERVICAL RADICULOPATHY AT C8: ICD-10-CM

## 2017-03-15 DIAGNOSIS — IMO0002 UNCONTROLLED TYPE 2 DIABETES MELLITUS WITH DIABETIC NEUROPATHY, WITH LONG-TERM CURRENT USE OF INSULIN: Primary | ICD-10-CM

## 2017-03-15 DIAGNOSIS — L03.221 CELLULITIS OF NECK: ICD-10-CM

## 2017-03-15 DIAGNOSIS — L02.11 NECK ABSCESS: ICD-10-CM

## 2017-03-15 LAB
BASOPHILS # BLD AUTO: 0.02 10*3/MM3 (ref 0–0.2)
BASOPHILS NFR BLD AUTO: 0.1 % (ref 0–1)
CRP SERPL-MCNC: 83.8 MG/DL (ref 0–10)
DEPRECATED RDW RBC AUTO: 47.9 FL (ref 37–54)
EOSINOPHIL # BLD AUTO: 0.22 10*3/MM3 (ref 0.1–0.3)
EOSINOPHIL NFR BLD AUTO: 1.2 % (ref 0–3)
ERYTHROCYTE [DISTWIDTH] IN BLOOD BY AUTOMATED COUNT: 13.6 % (ref 11.3–14.5)
ERYTHROCYTE [SEDIMENTATION RATE] IN BLOOD: 53 MM/HR (ref 0–20)
HBA1C MFR BLD: 7.6 % (ref 4.8–5.6)
HCT VFR BLD AUTO: 41 % (ref 38.9–50.9)
HGB BLD-MCNC: 13.3 G/DL (ref 13.1–17.5)
IMM GRANULOCYTES # BLD: 0.08 10*3/MM3 (ref 0–0.03)
IMM GRANULOCYTES NFR BLD: 0.5 % (ref 0–0.6)
LYMPHOCYTES # BLD AUTO: 1.32 10*3/MM3 (ref 0.6–4.8)
LYMPHOCYTES NFR BLD AUTO: 7.4 % (ref 24–44)
MCH RBC QN AUTO: 31.1 PG (ref 27–31)
MCHC RBC AUTO-ENTMCNC: 32.4 G/DL (ref 32–36)
MCV RBC AUTO: 96 FL (ref 80–99)
MONOCYTES # BLD AUTO: 2.1 10*3/MM3 (ref 0–1)
MONOCYTES NFR BLD AUTO: 11.8 % (ref 0–12)
NEUTROPHILS # BLD AUTO: 14 10*3/MM3 (ref 1.5–8.3)
NEUTROPHILS NFR BLD AUTO: 79 % (ref 41–71)
PLATELET # BLD AUTO: 131 10*3/MM3 (ref 150–450)
PMV BLD AUTO: 11.8 FL (ref 6–12)
RBC # BLD AUTO: 4.27 10*6/MM3 (ref 4.2–5.76)
WBC NRBC COR # BLD: 17.74 10*3/MM3 (ref 3.5–10.8)

## 2017-03-15 PROCEDURE — 99284 EMERGENCY DEPT VISIT MOD MDM: CPT

## 2017-03-15 PROCEDURE — 99024 POSTOP FOLLOW-UP VISIT: CPT | Performed by: PHYSICIAN ASSISTANT

## 2017-03-15 RX ORDER — CEPHALEXIN 500 MG/1
500 CAPSULE ORAL 2 TIMES DAILY
Qty: 20 CAPSULE | Refills: 0 | Status: SHIPPED | OUTPATIENT
Start: 2017-03-15 | End: 2017-03-15 | Stop reason: SDUPTHER

## 2017-03-15 RX ORDER — SODIUM CHLORIDE 0.9 % (FLUSH) 0.9 %
10 SYRINGE (ML) INJECTION AS NEEDED
Status: DISCONTINUED | OUTPATIENT
Start: 2017-03-15 | End: 2017-03-16 | Stop reason: SDUPTHER

## 2017-03-15 NOTE — PROGRESS NOTES
"Subjective     Chief Complaint: : Patient is a 65yo M here for his first follow up after posterior foraminotomy at C7-T1 on 2/28/17      History of Present Illness   Elton Funez is a 66 y.o. Poorly controlled diabetic with a prior ACDF who was complaining of left arm pain in the biceps of his forearm.  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  This is his first postop follow up.   He reports that he was doing very well, with complete relief of the arm pain and some change in the numbness of his hand.  Over the last three days, though, he has started having pain in the same distrubution and has felt \"shaky\" in his left hand.  He also notes that starting yesterday, he began to have a small amount of \"clear\" drainage from the top of his incision. This is a new finding.  He has not had any nausea, vomiting, fevers, or other complaints.     The following portions of the patient's history were reviewed and updated as appropriate: allergies, current medications, past family history, past medical history, past social history, past surgical history and problem list.    Family history:   Family History   Problem Relation Age of Onset   • Diabetes Father    • Heart disease Father    • Diabetes Paternal Grandmother        Social history:   Social History     Social History   • Marital status:      Spouse name: N/A   • Number of children: N/A   • Years of education: N/A     Occupational History   • Not on file.     Social History Main Topics   • Smoking status: Former Smoker     Packs/day: 1.00     Years: 5.00     Types: Cigarettes     Quit date: 1976   • Smokeless tobacco: Never Used   • Alcohol use Yes      Comment: socially   • Drug use: No   • Sexual activity: Defer     Other Topics Concern   • Not on file     Social History Narrative       Review of Systems   Constitutional: Negative for diaphoresis, fatigue and fever.   Respiratory: Negative " "for shortness of breath.    Cardiovascular: Negative for chest pain and palpitations.   Gastrointestinal: Negative for abdominal pain, nausea and vomiting.   Musculoskeletal: Positive for myalgias, neck pain and neck stiffness. Negative for back pain.   Neurological: Positive for weakness and numbness. Negative for dizziness, syncope, light-headedness and headaches.   Psychiatric/Behavioral: Negative for confusion.   All other systems reviewed and are negative.      Objective   Blood pressure 150/100, temperature 100.1 °F (37.8 °C), temperature source Temporal Artery , height 68\" (172.7 cm), weight 213 lb (96.6 kg).  Body mass index is 32.39 kg/(m^2).    Physical Exam   Constitutional: He is oriented to person, place, and time. He appears well-developed and well-nourished.   HENT:   Head: Normocephalic and atraumatic.   Eyes: EOM are normal. Pupils are equal, round, and reactive to light.   Neck:   ACDF incision is well healed. Posterior cervical incision is very well healed along the lower 3/4. There is mesh dressing in place, which was removed. The top 2 inches of the incision show erythema without separation or swelling, but a small amount of purulent fluid can be expressed with some effort.  The entire incision is intact with no separation.     Pulmonary/Chest: Effort normal. No respiratory distress.   Musculoskeletal: Normal range of motion.    strength is slightly decreased on the left compared to the right. He has a slight resting tremor of the left hand as well.    Lymphadenopathy:     He has no cervical adenopathy.   Neurological: He is alert and oriented to person, place, and time. He has normal reflexes.     Assessment/Plan  Mr. Funez was started today on Keflex 500mg BID x14 days.    We have sent him for labs today as well.  WBC count is 17.74,   ESR 53,   CRP  83.8,  HA1C is 7.6  He is not in need of pain medication refills  We will plan to see him in one week for follow up, sooner prn. "  .      Elton was seen today for post-op.    Diagnoses and all orders for this visit:    Uncontrolled type 2 diabetes mellitus with diabetic neuropathy, with long-term current use of insulin  -     Sedimentation Rate  -     C-reactive Protein  -     CBC & Differential; Future  -     Hemoglobin A1c; Future    Cervical radiculopathy  -     Sedimentation Rate  -     C-reactive Protein  -     CBC & Differential; Future  -     Hemoglobin A1c; Future    Other orders  -     Discontinue: cephalexin (KEFLEX) 500 MG capsule; Take 1 capsule by mouth 2 (Two) Times a Day.      Return in about 1 week (around 3/22/2017).

## 2017-03-16 ENCOUNTER — ANESTHESIA (OUTPATIENT)
Dept: PERIOP | Facility: HOSPITAL | Age: 67
End: 2017-03-16

## 2017-03-16 ENCOUNTER — APPOINTMENT (OUTPATIENT)
Dept: CT IMAGING | Facility: HOSPITAL | Age: 67
End: 2017-03-16

## 2017-03-16 ENCOUNTER — ANESTHESIA EVENT (OUTPATIENT)
Dept: PERIOP | Facility: HOSPITAL | Age: 67
End: 2017-03-16

## 2017-03-16 PROBLEM — D72.829 LEUKOCYTOSIS: Status: ACTIVE | Noted: 2017-03-16

## 2017-03-16 PROBLEM — L02.11 NECK ABSCESS: Status: ACTIVE | Noted: 2017-03-16

## 2017-03-16 PROBLEM — T81.40XA POST-OPERATIVE INFECTION: Status: ACTIVE | Noted: 2017-03-16

## 2017-03-16 LAB
ALBUMIN SERPL-MCNC: 3.8 G/DL (ref 3.2–4.8)
ALBUMIN SERPL-MCNC: 4.2 G/DL (ref 3.2–4.8)
ALBUMIN/GLOB SERPL: 1.2 G/DL (ref 1.5–2.5)
ALBUMIN/GLOB SERPL: 1.3 G/DL (ref 1.5–2.5)
ALP SERPL-CCNC: 61 U/L (ref 25–100)
ALP SERPL-CCNC: 74 U/L (ref 25–100)
ALT SERPL W P-5'-P-CCNC: 20 U/L (ref 7–40)
ALT SERPL W P-5'-P-CCNC: 25 U/L (ref 7–40)
ANION GAP SERPL CALCULATED.3IONS-SCNC: 10 MMOL/L (ref 3–11)
ANION GAP SERPL CALCULATED.3IONS-SCNC: 10 MMOL/L (ref 3–11)
AST SERPL-CCNC: 29 U/L (ref 0–33)
AST SERPL-CCNC: 36 U/L (ref 0–33)
BASOPHILS # BLD AUTO: 0.03 10*3/MM3 (ref 0–0.2)
BASOPHILS # BLD AUTO: 0.03 10*3/MM3 (ref 0–0.2)
BASOPHILS NFR BLD AUTO: 0.2 % (ref 0–1)
BASOPHILS NFR BLD AUTO: 0.2 % (ref 0–1)
BILIRUB SERPL-MCNC: 0.8 MG/DL (ref 0.3–1.2)
BILIRUB SERPL-MCNC: 1.1 MG/DL (ref 0.3–1.2)
BUN BLD-MCNC: 21 MG/DL (ref 9–23)
BUN BLD-MCNC: 22 MG/DL (ref 9–23)
BUN/CREAT SERPL: 15.7 (ref 7–25)
BUN/CREAT SERPL: 17.5 (ref 7–25)
CALCIUM SPEC-SCNC: 8.8 MG/DL (ref 8.7–10.4)
CALCIUM SPEC-SCNC: 9.3 MG/DL (ref 8.7–10.4)
CHLORIDE SERPL-SCNC: 101 MMOL/L (ref 99–109)
CHLORIDE SERPL-SCNC: 105 MMOL/L (ref 99–109)
CO2 SERPL-SCNC: 19 MMOL/L (ref 20–31)
CO2 SERPL-SCNC: 21 MMOL/L (ref 20–31)
CREAT BLD-MCNC: 1.2 MG/DL (ref 0.6–1.3)
CREAT BLD-MCNC: 1.4 MG/DL (ref 0.6–1.3)
D-LACTATE SERPL-SCNC: 1.7 MMOL/L (ref 0.5–2)
DEPRECATED RDW RBC AUTO: 46.6 FL (ref 37–54)
DEPRECATED RDW RBC AUTO: 47.1 FL (ref 37–54)
EOSINOPHIL # BLD AUTO: 0.23 10*3/MM3 (ref 0.1–0.3)
EOSINOPHIL # BLD AUTO: 0.27 10*3/MM3 (ref 0.1–0.3)
EOSINOPHIL NFR BLD AUTO: 1.4 % (ref 0–3)
EOSINOPHIL NFR BLD AUTO: 1.9 % (ref 0–3)
ERYTHROCYTE [DISTWIDTH] IN BLOOD BY AUTOMATED COUNT: 13.5 % (ref 11.3–14.5)
ERYTHROCYTE [DISTWIDTH] IN BLOOD BY AUTOMATED COUNT: 13.5 % (ref 11.3–14.5)
GFR SERPL CREATININE-BSD FRML MDRD: 51 ML/MIN/1.73
GFR SERPL CREATININE-BSD FRML MDRD: 60 ML/MIN/1.73
GLOBULIN UR ELPH-MCNC: 3.1 GM/DL
GLOBULIN UR ELPH-MCNC: 3.3 GM/DL
GLUCOSE BLD-MCNC: 162 MG/DL (ref 70–100)
GLUCOSE BLD-MCNC: 287 MG/DL (ref 70–100)
GLUCOSE BLDC GLUCOMTR-MCNC: 158 MG/DL (ref 70–130)
GLUCOSE BLDC GLUCOMTR-MCNC: 173 MG/DL (ref 70–130)
GLUCOSE BLDC GLUCOMTR-MCNC: 91 MG/DL (ref 70–130)
GLUCOSE BLDC GLUCOMTR-MCNC: 93 MG/DL (ref 70–130)
HCT VFR BLD AUTO: 34.1 % (ref 38.9–50.9)
HCT VFR BLD AUTO: 37.3 % (ref 38.9–50.9)
HGB BLD-MCNC: 11.2 G/DL (ref 13.1–17.5)
HGB BLD-MCNC: 12.3 G/DL (ref 13.1–17.5)
HOLD SPECIMEN: NORMAL
HOLD SPECIMEN: NORMAL
IMM GRANULOCYTES # BLD: 0.07 10*3/MM3 (ref 0–0.03)
IMM GRANULOCYTES # BLD: 0.07 10*3/MM3 (ref 0–0.03)
IMM GRANULOCYTES NFR BLD: 0.4 % (ref 0–0.6)
IMM GRANULOCYTES NFR BLD: 0.5 % (ref 0–0.6)
LYMPHOCYTES # BLD AUTO: 1.28 10*3/MM3 (ref 0.6–4.8)
LYMPHOCYTES # BLD AUTO: 1.6 10*3/MM3 (ref 0.6–4.8)
LYMPHOCYTES NFR BLD AUTO: 11.2 % (ref 24–44)
LYMPHOCYTES NFR BLD AUTO: 7.8 % (ref 24–44)
MCH RBC QN AUTO: 31.3 PG (ref 27–31)
MCH RBC QN AUTO: 31.5 PG (ref 27–31)
MCHC RBC AUTO-ENTMCNC: 32.8 G/DL (ref 32–36)
MCHC RBC AUTO-ENTMCNC: 33 G/DL (ref 32–36)
MCV RBC AUTO: 95.3 FL (ref 80–99)
MCV RBC AUTO: 95.4 FL (ref 80–99)
MONOCYTES # BLD AUTO: 1.75 10*3/MM3 (ref 0–1)
MONOCYTES # BLD AUTO: 2.01 10*3/MM3 (ref 0–1)
MONOCYTES NFR BLD AUTO: 12.2 % (ref 0–12)
MONOCYTES NFR BLD AUTO: 12.2 % (ref 0–12)
NEUTROPHILS # BLD AUTO: 10.57 10*3/MM3 (ref 1.5–8.3)
NEUTROPHILS # BLD AUTO: 12.88 10*3/MM3 (ref 1.5–8.3)
NEUTROPHILS NFR BLD AUTO: 74 % (ref 41–71)
NEUTROPHILS NFR BLD AUTO: 78 % (ref 41–71)
PLAT MORPH BLD: NORMAL
PLATELET # BLD AUTO: 114 10*3/MM3 (ref 150–450)
PLATELET # BLD AUTO: 122 10*3/MM3 (ref 150–450)
PMV BLD AUTO: 11.4 FL (ref 6–12)
PMV BLD AUTO: 11.5 FL (ref 6–12)
POTASSIUM BLD-SCNC: 3.7 MMOL/L (ref 3.5–5.5)
POTASSIUM BLD-SCNC: 4.1 MMOL/L (ref 3.5–5.5)
PROT SERPL-MCNC: 6.9 G/DL (ref 5.7–8.2)
PROT SERPL-MCNC: 7.5 G/DL (ref 5.7–8.2)
RBC # BLD AUTO: 3.58 10*6/MM3 (ref 4.2–5.76)
RBC # BLD AUTO: 3.91 10*6/MM3 (ref 4.2–5.76)
RBC MORPH BLD: NORMAL
SODIUM BLD-SCNC: 132 MMOL/L (ref 132–146)
SODIUM BLD-SCNC: 134 MMOL/L (ref 132–146)
WBC MORPH BLD: NORMAL
WBC NRBC COR # BLD: 14.29 10*3/MM3 (ref 3.5–10.8)
WBC NRBC COR # BLD: 16.5 10*3/MM3 (ref 3.5–10.8)
WHOLE BLOOD HOLD SPECIMEN: NORMAL
WHOLE BLOOD HOLD SPECIMEN: NORMAL

## 2017-03-16 PROCEDURE — 87116 MYCOBACTERIA CULTURE: CPT | Performed by: NEUROLOGICAL SURGERY

## 2017-03-16 PROCEDURE — 87205 SMEAR GRAM STAIN: CPT | Performed by: NEUROLOGICAL SURGERY

## 2017-03-16 PROCEDURE — 87040 BLOOD CULTURE FOR BACTERIA: CPT | Performed by: EMERGENCY MEDICINE

## 2017-03-16 PROCEDURE — 25010000002 DAPTOMYCIN PER 1 MG: Performed by: PHYSICIAN ASSISTANT

## 2017-03-16 PROCEDURE — 82962 GLUCOSE BLOOD TEST: CPT

## 2017-03-16 PROCEDURE — 87102 FUNGUS ISOLATION CULTURE: CPT | Performed by: NEUROLOGICAL SURGERY

## 2017-03-16 PROCEDURE — 72125 CT NECK SPINE W/O DYE: CPT

## 2017-03-16 PROCEDURE — 25010000002 VANCOMYCIN: Performed by: EMERGENCY MEDICINE

## 2017-03-16 PROCEDURE — 94799 UNLISTED PULMONARY SVC/PX: CPT

## 2017-03-16 PROCEDURE — 87077 CULTURE AEROBIC IDENTIFY: CPT | Performed by: NEUROLOGICAL SURGERY

## 2017-03-16 PROCEDURE — 25010000002 HYDROMORPHONE PER 4 MG: Performed by: NURSE ANESTHETIST, CERTIFIED REGISTERED

## 2017-03-16 PROCEDURE — 87147 CULTURE TYPE IMMUNOLOGIC: CPT | Performed by: NEUROLOGICAL SURGERY

## 2017-03-16 PROCEDURE — 87070 CULTURE OTHR SPECIMN AEROBIC: CPT | Performed by: EMERGENCY MEDICINE

## 2017-03-16 PROCEDURE — 87077 CULTURE AEROBIC IDENTIFY: CPT | Performed by: EMERGENCY MEDICINE

## 2017-03-16 PROCEDURE — 25010000002 DEXAMETHASONE PER 1 MG: Performed by: NURSE ANESTHETIST, CERTIFIED REGISTERED

## 2017-03-16 PROCEDURE — 87205 SMEAR GRAM STAIN: CPT | Performed by: EMERGENCY MEDICINE

## 2017-03-16 PROCEDURE — 22010 I&D P-SPINE C/T/CERV-THOR: CPT | Performed by: NEUROLOGICAL SURGERY

## 2017-03-16 PROCEDURE — 25010000002 FENTANYL CITRATE (PF) 100 MCG/2ML SOLUTION: Performed by: NURSE ANESTHETIST, CERTIFIED REGISTERED

## 2017-03-16 PROCEDURE — 80053 COMPREHEN METABOLIC PANEL: CPT | Performed by: NURSE PRACTITIONER

## 2017-03-16 PROCEDURE — 99223 1ST HOSP IP/OBS HIGH 75: CPT | Performed by: INTERNAL MEDICINE

## 2017-03-16 PROCEDURE — 25010000002 PROPOFOL 10 MG/ML EMULSION: Performed by: NURSE ANESTHETIST, CERTIFIED REGISTERED

## 2017-03-16 PROCEDURE — 99024 POSTOP FOLLOW-UP VISIT: CPT | Performed by: NEUROLOGICAL SURGERY

## 2017-03-16 PROCEDURE — 87015 SPECIMEN INFECT AGNT CONCNTJ: CPT | Performed by: NEUROLOGICAL SURGERY

## 2017-03-16 PROCEDURE — 87186 SC STD MICRODIL/AGAR DIL: CPT | Performed by: NEUROLOGICAL SURGERY

## 2017-03-16 PROCEDURE — 63710000001 INSULIN LISPRO (HUMAN) PER 5 UNITS: Performed by: NURSE PRACTITIONER

## 2017-03-16 PROCEDURE — 87147 CULTURE TYPE IMMUNOLOGIC: CPT | Performed by: EMERGENCY MEDICINE

## 2017-03-16 PROCEDURE — 25010000002 NEOSTIGMINE 10 MG/10ML SOLUTION: Performed by: NURSE ANESTHETIST, CERTIFIED REGISTERED

## 2017-03-16 PROCEDURE — 85007 BL SMEAR W/DIFF WBC COUNT: CPT | Performed by: EMERGENCY MEDICINE

## 2017-03-16 PROCEDURE — 25010000002 ONDANSETRON PER 1 MG: Performed by: NURSE PRACTITIONER

## 2017-03-16 PROCEDURE — 85025 COMPLETE CBC W/AUTO DIFF WBC: CPT | Performed by: EMERGENCY MEDICINE

## 2017-03-16 PROCEDURE — 87070 CULTURE OTHR SPECIMN AEROBIC: CPT | Performed by: NEUROLOGICAL SURGERY

## 2017-03-16 PROCEDURE — 87206 SMEAR FLUORESCENT/ACID STAI: CPT | Performed by: NEUROLOGICAL SURGERY

## 2017-03-16 PROCEDURE — C1713 ANCHOR/SCREW BN/BN,TIS/BN: HCPCS | Performed by: NEUROLOGICAL SURGERY

## 2017-03-16 PROCEDURE — 87075 CULTR BACTERIA EXCEPT BLOOD: CPT | Performed by: NEUROLOGICAL SURGERY

## 2017-03-16 PROCEDURE — 85025 COMPLETE CBC W/AUTO DIFF WBC: CPT | Performed by: NURSE PRACTITIONER

## 2017-03-16 PROCEDURE — 87186 SC STD MICRODIL/AGAR DIL: CPT | Performed by: EMERGENCY MEDICINE

## 2017-03-16 PROCEDURE — 0J9500Z DRAINAGE OF LEFT NECK SUBCUTANEOUS TISSUE AND FASCIA WITH DRAINAGE DEVICE, OPEN APPROACH: ICD-10-PCS | Performed by: NEUROLOGICAL SURGERY

## 2017-03-16 PROCEDURE — 25010000003 CEFAZOLIN IN DEXTROSE 2-4 GM/100ML-% SOLUTION: Performed by: NEUROLOGICAL SURGERY

## 2017-03-16 PROCEDURE — 83605 ASSAY OF LACTIC ACID: CPT | Performed by: EMERGENCY MEDICINE

## 2017-03-16 PROCEDURE — 80053 COMPREHEN METABOLIC PANEL: CPT | Performed by: EMERGENCY MEDICINE

## 2017-03-16 PROCEDURE — 25010000002 VANCOMYCIN PER 500 MG: Performed by: NEUROLOGICAL SURGERY

## 2017-03-16 PROCEDURE — 25010000003 CEFTRIAXONE PER 250 MG: Performed by: EMERGENCY MEDICINE

## 2017-03-16 RX ORDER — ONDANSETRON 2 MG/ML
4 INJECTION INTRAMUSCULAR; INTRAVENOUS EVERY 6 HOURS PRN
Status: DISCONTINUED | OUTPATIENT
Start: 2017-03-16 | End: 2017-03-16 | Stop reason: SDUPTHER

## 2017-03-16 RX ORDER — NICOTINE POLACRILEX 4 MG
15 LOZENGE BUCCAL
Status: DISCONTINUED | OUTPATIENT
Start: 2017-03-16 | End: 2017-03-18 | Stop reason: HOSPADM

## 2017-03-16 RX ORDER — NALOXONE HCL 0.4 MG/ML
0.4 VIAL (ML) INJECTION
Status: DISCONTINUED | OUTPATIENT
Start: 2017-03-16 | End: 2017-03-18 | Stop reason: HOSPADM

## 2017-03-16 RX ORDER — LIDOCAINE HYDROCHLORIDE 10 MG/ML
INJECTION, SOLUTION EPIDURAL; INFILTRATION; INTRACAUDAL; PERINEURAL AS NEEDED
Status: DISCONTINUED | OUTPATIENT
Start: 2017-03-16 | End: 2017-03-16 | Stop reason: SURG

## 2017-03-16 RX ORDER — HYDROCODONE BITARTRATE AND ACETAMINOPHEN 7.5; 325 MG/1; MG/1
1 TABLET ORAL EVERY 4 HOURS PRN
Status: DISCONTINUED | OUTPATIENT
Start: 2017-03-16 | End: 2017-03-18 | Stop reason: HOSPADM

## 2017-03-16 RX ORDER — SODIUM CHLORIDE, SODIUM LACTATE, POTASSIUM CHLORIDE, CALCIUM CHLORIDE 600; 310; 30; 20 MG/100ML; MG/100ML; MG/100ML; MG/100ML
9 INJECTION, SOLUTION INTRAVENOUS CONTINUOUS
Status: DISCONTINUED | OUTPATIENT
Start: 2017-03-16 | End: 2017-03-18 | Stop reason: HOSPADM

## 2017-03-16 RX ORDER — HYDROMORPHONE HYDROCHLORIDE 1 MG/ML
0.5 INJECTION, SOLUTION INTRAMUSCULAR; INTRAVENOUS; SUBCUTANEOUS
Status: DISCONTINUED | OUTPATIENT
Start: 2017-03-16 | End: 2017-03-18 | Stop reason: HOSPADM

## 2017-03-16 RX ORDER — PROMETHAZINE HYDROCHLORIDE 25 MG/ML
12.5 INJECTION, SOLUTION INTRAMUSCULAR; INTRAVENOUS EVERY 6 HOURS PRN
Status: DISCONTINUED | OUTPATIENT
Start: 2017-03-16 | End: 2017-03-18 | Stop reason: HOSPADM

## 2017-03-16 RX ORDER — PROPOFOL 10 MG/ML
VIAL (ML) INTRAVENOUS AS NEEDED
Status: DISCONTINUED | OUTPATIENT
Start: 2017-03-16 | End: 2017-03-16 | Stop reason: SURG

## 2017-03-16 RX ORDER — HYDROCHLOROTHIAZIDE 12.5 MG/1
12.5 TABLET ORAL DAILY
Status: DISCONTINUED | OUTPATIENT
Start: 2017-03-16 | End: 2017-03-18 | Stop reason: HOSPADM

## 2017-03-16 RX ORDER — SODIUM CHLORIDE 0.9 % (FLUSH) 0.9 %
1-10 SYRINGE (ML) INJECTION AS NEEDED
Status: DISCONTINUED | OUTPATIENT
Start: 2017-03-16 | End: 2017-03-18 | Stop reason: HOSPADM

## 2017-03-16 RX ORDER — NEOSTIGMINE METHYLSULFATE 1 MG/ML
INJECTION, SOLUTION INTRAVENOUS AS NEEDED
Status: DISCONTINUED | OUTPATIENT
Start: 2017-03-16 | End: 2017-03-16 | Stop reason: SURG

## 2017-03-16 RX ORDER — SODIUM CHLORIDE 9 MG/ML
125 INJECTION, SOLUTION INTRAVENOUS CONTINUOUS
Status: DISCONTINUED | OUTPATIENT
Start: 2017-03-16 | End: 2017-03-18 | Stop reason: HOSPADM

## 2017-03-16 RX ORDER — PANTOPRAZOLE SODIUM 40 MG/1
40 TABLET, DELAYED RELEASE ORAL
Status: DISCONTINUED | OUTPATIENT
Start: 2017-03-16 | End: 2017-03-18 | Stop reason: HOSPADM

## 2017-03-16 RX ORDER — DEXTROSE MONOHYDRATE 25 G/50ML
25 INJECTION, SOLUTION INTRAVENOUS
Status: DISCONTINUED | OUTPATIENT
Start: 2017-03-16 | End: 2017-03-16 | Stop reason: HOSPADM

## 2017-03-16 RX ORDER — CEFTRIAXONE SODIUM 1 G/50ML
1 INJECTION, SOLUTION INTRAVENOUS ONCE
Status: COMPLETED | OUTPATIENT
Start: 2017-03-16 | End: 2017-03-16

## 2017-03-16 RX ORDER — SODIUM CHLORIDE 9 MG/ML
75 INJECTION, SOLUTION INTRAVENOUS CONTINUOUS
Status: DISCONTINUED | OUTPATIENT
Start: 2017-03-16 | End: 2017-03-18 | Stop reason: HOSPADM

## 2017-03-16 RX ORDER — SENNA AND DOCUSATE SODIUM 50; 8.6 MG/1; MG/1
1 TABLET, FILM COATED ORAL NIGHTLY PRN
Status: DISCONTINUED | OUTPATIENT
Start: 2017-03-16 | End: 2017-03-18 | Stop reason: HOSPADM

## 2017-03-16 RX ORDER — SODIUM CHLORIDE 0.9 % (FLUSH) 0.9 %
1-10 SYRINGE (ML) INJECTION AS NEEDED
Status: DISCONTINUED | OUTPATIENT
Start: 2017-03-16 | End: 2017-03-16 | Stop reason: SDUPTHER

## 2017-03-16 RX ORDER — OXYCODONE HYDROCHLORIDE AND ACETAMINOPHEN 5; 325 MG/1; MG/1
1 TABLET ORAL EVERY 4 HOURS PRN
Status: DISCONTINUED | OUTPATIENT
Start: 2017-03-16 | End: 2017-03-18 | Stop reason: HOSPADM

## 2017-03-16 RX ORDER — ONDANSETRON 4 MG/1
4 TABLET, FILM COATED ORAL EVERY 6 HOURS PRN
Status: DISCONTINUED | OUTPATIENT
Start: 2017-03-16 | End: 2017-03-18 | Stop reason: HOSPADM

## 2017-03-16 RX ORDER — DEXTROSE MONOHYDRATE 25 G/50ML
25 INJECTION, SOLUTION INTRAVENOUS
Status: DISCONTINUED | OUTPATIENT
Start: 2017-03-16 | End: 2017-03-18 | Stop reason: HOSPADM

## 2017-03-16 RX ORDER — GLYCOPYRROLATE 0.2 MG/ML
INJECTION INTRAMUSCULAR; INTRAVENOUS AS NEEDED
Status: DISCONTINUED | OUTPATIENT
Start: 2017-03-16 | End: 2017-03-16 | Stop reason: SURG

## 2017-03-16 RX ORDER — FENTANYL CITRATE 50 UG/ML
50 INJECTION, SOLUTION INTRAMUSCULAR; INTRAVENOUS
Status: DISCONTINUED | OUTPATIENT
Start: 2017-03-16 | End: 2017-03-16 | Stop reason: HOSPADM

## 2017-03-16 RX ORDER — DOCUSATE SODIUM 100 MG/1
100 CAPSULE, LIQUID FILLED ORAL 2 TIMES DAILY PRN
Status: DISCONTINUED | OUTPATIENT
Start: 2017-03-16 | End: 2017-03-18 | Stop reason: HOSPADM

## 2017-03-16 RX ORDER — DOCUSATE SODIUM 100 MG/1
100 CAPSULE, LIQUID FILLED ORAL DAILY
Status: DISCONTINUED | OUTPATIENT
Start: 2017-03-16 | End: 2017-03-18 | Stop reason: HOSPADM

## 2017-03-16 RX ORDER — MAGNESIUM HYDROXIDE 1200 MG/15ML
LIQUID ORAL AS NEEDED
Status: DISCONTINUED | OUTPATIENT
Start: 2017-03-16 | End: 2017-03-16 | Stop reason: HOSPADM

## 2017-03-16 RX ORDER — LIDOCAINE HYDROCHLORIDE 10 MG/ML
1 INJECTION, SOLUTION EPIDURAL; INFILTRATION; INTRACAUDAL; PERINEURAL ONCE
Status: DISCONTINUED | OUTPATIENT
Start: 2017-03-16 | End: 2017-03-16

## 2017-03-16 RX ORDER — ATENOLOL 50 MG/1
50 TABLET ORAL DAILY
Status: DISCONTINUED | OUTPATIENT
Start: 2017-03-16 | End: 2017-03-18 | Stop reason: HOSPADM

## 2017-03-16 RX ORDER — FENTANYL CITRATE 50 UG/ML
INJECTION, SOLUTION INTRAMUSCULAR; INTRAVENOUS AS NEEDED
Status: DISCONTINUED | OUTPATIENT
Start: 2017-03-16 | End: 2017-03-16 | Stop reason: SURG

## 2017-03-16 RX ORDER — CYCLOBENZAPRINE HCL 10 MG
10 TABLET ORAL NIGHTLY
Status: DISCONTINUED | OUTPATIENT
Start: 2017-03-16 | End: 2017-03-18 | Stop reason: HOSPADM

## 2017-03-16 RX ORDER — ONDANSETRON 2 MG/ML
4 INJECTION INTRAMUSCULAR; INTRAVENOUS EVERY 6 HOURS PRN
Status: DISCONTINUED | OUTPATIENT
Start: 2017-03-16 | End: 2017-03-18 | Stop reason: HOSPADM

## 2017-03-16 RX ORDER — CEFTRIAXONE SODIUM 1 G/50ML
1 INJECTION, SOLUTION INTRAVENOUS EVERY 24 HOURS
Status: DISCONTINUED | OUTPATIENT
Start: 2017-03-17 | End: 2017-03-16

## 2017-03-16 RX ORDER — TOPIRAMATE 25 MG/1
25 TABLET ORAL NIGHTLY
Status: DISCONTINUED | OUTPATIENT
Start: 2017-03-16 | End: 2017-03-18 | Stop reason: HOSPADM

## 2017-03-16 RX ORDER — NICOTINE POLACRILEX 4 MG
15 LOZENGE BUCCAL
Status: DISCONTINUED | OUTPATIENT
Start: 2017-03-16 | End: 2017-03-16 | Stop reason: HOSPADM

## 2017-03-16 RX ORDER — DIAZEPAM 5 MG/ML
5 INJECTION, SOLUTION INTRAMUSCULAR; INTRAVENOUS EVERY 4 HOURS PRN
Status: DISCONTINUED | OUTPATIENT
Start: 2017-03-16 | End: 2017-03-18 | Stop reason: HOSPADM

## 2017-03-16 RX ORDER — ATORVASTATIN CALCIUM 40 MG/1
40 TABLET, FILM COATED ORAL DAILY
Status: DISCONTINUED | OUTPATIENT
Start: 2017-03-16 | End: 2017-03-18 | Stop reason: HOSPADM

## 2017-03-16 RX ORDER — DEXAMETHASONE SODIUM PHOSPHATE 4 MG/ML
INJECTION, SOLUTION INTRA-ARTICULAR; INTRALESIONAL; INTRAMUSCULAR; INTRAVENOUS; SOFT TISSUE AS NEEDED
Status: DISCONTINUED | OUTPATIENT
Start: 2017-03-16 | End: 2017-03-16 | Stop reason: SURG

## 2017-03-16 RX ORDER — SACCHAROMYCES BOULARDII 250 MG
250 CAPSULE ORAL DAILY
Status: DISCONTINUED | OUTPATIENT
Start: 2017-03-16 | End: 2017-03-18 | Stop reason: HOSPADM

## 2017-03-16 RX ORDER — LISINOPRIL 10 MG/1
10 TABLET ORAL
Status: DISCONTINUED | OUTPATIENT
Start: 2017-03-16 | End: 2017-03-18 | Stop reason: HOSPADM

## 2017-03-16 RX ORDER — BISACODYL 10 MG
10 SUPPOSITORY, RECTAL RECTAL DAILY PRN
Status: DISCONTINUED | OUTPATIENT
Start: 2017-03-16 | End: 2017-03-18 | Stop reason: HOSPADM

## 2017-03-16 RX ORDER — CEFTRIAXONE SODIUM 2 G/50ML
2 INJECTION, SOLUTION INTRAVENOUS EVERY 24 HOURS
Status: DISCONTINUED | OUTPATIENT
Start: 2017-03-17 | End: 2017-03-17

## 2017-03-16 RX ORDER — ROCURONIUM BROMIDE 10 MG/ML
INJECTION, SOLUTION INTRAVENOUS AS NEEDED
Status: DISCONTINUED | OUTPATIENT
Start: 2017-03-16 | End: 2017-03-16 | Stop reason: SURG

## 2017-03-16 RX ORDER — HYDROMORPHONE HYDROCHLORIDE 1 MG/ML
0.5 INJECTION, SOLUTION INTRAMUSCULAR; INTRAVENOUS; SUBCUTANEOUS
Status: DISCONTINUED | OUTPATIENT
Start: 2017-03-16 | End: 2017-03-16 | Stop reason: HOSPADM

## 2017-03-16 RX ORDER — ACETAMINOPHEN 325 MG/1
650 TABLET ORAL EVERY 4 HOURS PRN
Status: DISCONTINUED | OUTPATIENT
Start: 2017-03-16 | End: 2017-03-18 | Stop reason: HOSPADM

## 2017-03-16 RX ORDER — SODIUM CHLORIDE 0.9 % (FLUSH) 0.9 %
1-10 SYRINGE (ML) INJECTION AS NEEDED
Status: DISCONTINUED | OUTPATIENT
Start: 2017-03-16 | End: 2017-03-16 | Stop reason: HOSPADM

## 2017-03-16 RX ORDER — CEFAZOLIN SODIUM 2 G/100ML
2 INJECTION, SOLUTION INTRAVENOUS ONCE
Status: COMPLETED | OUTPATIENT
Start: 2017-03-16 | End: 2017-03-16

## 2017-03-16 RX ORDER — FAMOTIDINE 20 MG/1
20 TABLET, FILM COATED ORAL ONCE
Status: DISCONTINUED | OUTPATIENT
Start: 2017-03-16 | End: 2017-03-16

## 2017-03-16 RX ADMIN — CYCLOBENZAPRINE HYDROCHLORIDE 10 MG: 10 TABLET, FILM COATED ORAL at 04:20

## 2017-03-16 RX ADMIN — SODIUM CHLORIDE 75 ML/HR: 9 INJECTION, SOLUTION INTRAVENOUS at 18:59

## 2017-03-16 RX ADMIN — SERTRALINE HYDROCHLORIDE 50 MG: 50 TABLET ORAL at 21:06

## 2017-03-16 RX ADMIN — CEFAZOLIN SODIUM 2 G: 2 INJECTION, SOLUTION INTRAVENOUS at 13:01

## 2017-03-16 RX ADMIN — Medication 250 MG: at 09:09

## 2017-03-16 RX ADMIN — INSULIN LISPRO 2 UNITS: 100 INJECTION, SOLUTION INTRAVENOUS; SUBCUTANEOUS at 04:21

## 2017-03-16 RX ADMIN — DAPTOMYCIN 600 MG: 500 INJECTION, POWDER, LYOPHILIZED, FOR SOLUTION INTRAVENOUS at 15:41

## 2017-03-16 RX ADMIN — FENTANYL CITRATE 25 MCG: 50 INJECTION, SOLUTION INTRAMUSCULAR; INTRAVENOUS at 14:15

## 2017-03-16 RX ADMIN — Medication 10 ML: at 12:14

## 2017-03-16 RX ADMIN — FENTANYL CITRATE 25 MCG: 50 INJECTION, SOLUTION INTRAMUSCULAR; INTRAVENOUS at 14:05

## 2017-03-16 RX ADMIN — FENTANYL CITRATE 50 MCG: 50 INJECTION, SOLUTION INTRAMUSCULAR; INTRAVENOUS at 13:06

## 2017-03-16 RX ADMIN — DOCUSATE SODIUM 100 MG: 100 CAPSULE, LIQUID FILLED ORAL at 09:09

## 2017-03-16 RX ADMIN — VANCOMYCIN HYDROCHLORIDE 2000 MG: 1 INJECTION, POWDER, LYOPHILIZED, FOR SOLUTION INTRAVENOUS at 01:14

## 2017-03-16 RX ADMIN — SODIUM CHLORIDE 500 ML: 9 INJECTION, SOLUTION INTRAVENOUS at 00:44

## 2017-03-16 RX ADMIN — PROPOFOL 150 MG: 10 INJECTION, EMULSION INTRAVENOUS at 13:06

## 2017-03-16 RX ADMIN — CYCLOBENZAPRINE HYDROCHLORIDE 10 MG: 10 TABLET, FILM COATED ORAL at 21:06

## 2017-03-16 RX ADMIN — ONDANSETRON 4 MG: 2 INJECTION INTRAMUSCULAR; INTRAVENOUS at 13:55

## 2017-03-16 RX ADMIN — SODIUM CHLORIDE, POTASSIUM CHLORIDE, SODIUM LACTATE AND CALCIUM CHLORIDE 9 ML/HR: 600; 310; 30; 20 INJECTION, SOLUTION INTRAVENOUS at 12:14

## 2017-03-16 RX ADMIN — ATORVASTATIN CALCIUM 40 MG: 40 TABLET, FILM COATED ORAL at 09:09

## 2017-03-16 RX ADMIN — HYDROCODONE BITARTRATE AND ACETAMINOPHEN 1 TABLET: 7.5; 325 TABLET ORAL at 09:16

## 2017-03-16 RX ADMIN — DEXAMETHASONE SODIUM PHOSPHATE 8 MG: 4 INJECTION, SOLUTION INTRAMUSCULAR; INTRAVENOUS at 13:20

## 2017-03-16 RX ADMIN — SODIUM CHLORIDE 125 ML/HR: 9 INJECTION, SOLUTION INTRAVENOUS at 02:34

## 2017-03-16 RX ADMIN — HYDROMORPHONE HYDROCHLORIDE 0.5 MG: 1 INJECTION, SOLUTION INTRAMUSCULAR; INTRAVENOUS; SUBCUTANEOUS at 14:35

## 2017-03-16 RX ADMIN — ROCURONIUM BROMIDE 35 MG: 10 INJECTION INTRAVENOUS at 13:06

## 2017-03-16 RX ADMIN — ROBINUL 0.4 MG: 0.2 INJECTION INTRAMUSCULAR; INTRAVENOUS at 14:14

## 2017-03-16 RX ADMIN — CEFTRIAXONE SODIUM 1 G: 1 INJECTION, SOLUTION INTRAVENOUS at 00:44

## 2017-03-16 RX ADMIN — LIDOCAINE HYDROCHLORIDE 50 MG: 10 INJECTION, SOLUTION EPIDURAL; INFILTRATION; INTRACAUDAL; PERINEURAL at 13:06

## 2017-03-16 RX ADMIN — HYDROCODONE BITARTRATE AND ACETAMINOPHEN 1 TABLET: 7.5; 325 TABLET ORAL at 04:20

## 2017-03-16 RX ADMIN — HYDROMORPHONE HYDROCHLORIDE 0.5 MG: 1 INJECTION, SOLUTION INTRAMUSCULAR; INTRAVENOUS; SUBCUTANEOUS at 14:40

## 2017-03-16 RX ADMIN — HYDROCHLOROTHIAZIDE 12.5 MG: 12.5 TABLET ORAL at 09:09

## 2017-03-16 RX ADMIN — NEOSTIGMINE METHYLSULFATE 3.5 MG: 1 INJECTION, SOLUTION INTRAVENOUS at 14:14

## 2017-03-16 RX ADMIN — PANTOPRAZOLE SODIUM 40 MG: 40 TABLET, DELAYED RELEASE ORAL at 05:20

## 2017-03-16 NOTE — PROGRESS NOTES
A patient seen, examined.  Based on CT of the cervical spine, he has a deep abscess.  This required irrigation and debridement in the operating room.    I discussed the surgical plan with the patient, and obtained informed consent.  We'll proceed expeditiously with irrigation and debridement of his surgical incision.    His risk factors for developing postoperative wound infection are obesity and uncontrolled diabetes

## 2017-03-16 NOTE — PROGRESS NOTES
Continued Stay Note   Denis     Patient Name: Elton Funez  MRN: [id number]  Today's Date: 3/16/2017    Admit Date: 3/15/2017          Discharge Plan       03/16/17 0952    Case Management/Social Work Plan    Plan Home    Patient/Family In Agreement With Plan yes    Additional Comments Spoke with patient at bedside regarding discharge planning. Patient denies use of Home Health.  Patient has Raised Toilets, Grab Bars, Straight Cane and Rolling Walker.  Denies difficulty affording medications.  Patient lives with his wife in a single level full handicap accesible home.  CM to follow for discharge needs.  Patient plans to discharge home via car with family to transport when medically ready.               Discharge Codes     None        Expected Discharge Date and Time     Expected Discharge Date Expected Discharge Time    Mar 21, 2017             Nancy Pan RN

## 2017-03-16 NOTE — CONSULTS
Elton Funez  1950  [id number]    Date of Consult: 3/16/2017    Date of Admission: 3/15/2017      Requesting Provider: MELISSA Rizzo   Evaluating Physician: Eric Root MD    CC:   Chief Complaint   Patient presents with   • Post-op Problem     Patient relates spinal surgery on 2/28/2017 with fever and site drainage x 2 days.     • Fever       Reason for Consultation: Post-op wound infection of cervical spine    History of present illness:     Patient is a 66 y.o.  Yr old male with history of Diabetes mellitus on insulin, hypertension, hyperlipidemia, obesity who had a C7-T1 laminotomy procedure performed by Dr. Perales on February 28, 2017.  He was discharged the next day without immediate postoperative complications.  His wound was healing without concern until Sunday when he developed increasing right-sided neck pain and edema at the surgical site.  He was pressing on his surgical wound and developed clear fluid drainage.  His pain worsened over the next 2 days prompting evaluation at Dr. Perales's office.  He was seen by neurosurgery PA to prescribed Keflex 500 mg by mouth twice a day ×7 days and told to report to the ED with fever.  He developed fever on Wednesday prompting his evaluation.  His temperature reached 101.5°.  He developed increasing spontaneous wound drainage and neck stiffness.  Denies headaches, vision changes, difficulty moving his right upper extremity.  Patient denies any history of MRSA abscesses or lesions on the skin in the past.  He reports a drug intolerance to tetracyclines with GI upset  Since his admission, he was noted to have leukocytosis, fever, elevated inflammatory markers with acute kidney injury of a creatinine of 1.4.  He had imaging performed of the cervical thoracic spine revealing a 7 cm fluid collection with subcutaneous gas.  ID was asked to see the patient for further antibiotic recommendations.    Past Medical History   Diagnosis Date   • Abscess of arm, right     • Abscess of skin of neck    • Acute sinusitis    • ALT (SGPT) level raised    • Arthritis    • Constipation    • CPAP (continuous positive airway pressure) dependence    • Decreased platelet count    • Depression      Resolved: 2015   • Diabetes mellitus    • Elevated AST (SGOT)    • Hyperlipidemia    • Hypertension    • Jaw pain    • Left hip pain    • Migraine      Hx of   • Obesity (BMI 30.0-34.9) 10/7/2016     BMI 31.92   • Obstructive sleep apnea      Hx of   • Quadriceps muscle strain    • Shigellosis    • Sleep apnea      PT TO BRING MASK AND TUBING DAY OF SURGERY   • Wears glasses    • Wears hearing aid      BOTH EARS       Past Surgical History   Procedure Laterality Date   • Back surgery     • Cervical arthrodesis     • Colonoscopy          • Vasectomy     • Cervical laminectomy decompression posterior Left 2017     Procedure: Left C7-T1 CERVICAL FORAMINOTOMY POSTERIOR WITH METRIX;  Surgeon: Gigi Perales MD;  Location: [facility];  Service:        Social History Narrative: Patient is  and lives in [place], [place].  No alcohol, tobacco, illegal drug use.  No history of hepatitis B, C, HIV, tuberculosis.  He is part-time employed working with computers.  No recent sick exposures.  No recent travel outside the country.         family history includes Diabetes in his father and paternal grandmother; Heart disease in his father.  Mother  from complications of non-Hodgkin's lymphoma  Father with CHF    Allergies   Allergen Reactions   • Glucophage Xr [Metformin Hcl Er] Diarrhea     Glucophage XR TB24: Adverse Reaction: Severe GI issues.   • Metformin Nausea And Vomiting     Other reaction(s): SEVERE INTESTIONAL ISSUES  Other reaction(s): SEVERE GI ISSUES    Glucophage XR TB24  MetFORMIN HCl TABS   • Tetracyclines & Related Nausea Only     Adverse Reaction       Medication:  Current Facility-Administered Medications   Medication Dose Route Frequency Provider Last  Rate Last Dose   • acetaminophen (TYLENOL) tablet 650 mg  650 mg Oral Q4H PRN MELISSA Napier       • atenolol (TENORMIN) tablet 50 mg  50 mg Oral Daily MELISSA Napier   Stopped at 03/16/17 0909   • atorvastatin (LIPITOR) tablet 40 mg  40 mg Oral Daily Meghann Browning APRN   40 mg at 03/16/17 0909   • [START ON 3/17/2017] cefTRIAXone (ROCEPHIN) IVPB 2 g  2 g Intravenous Q24H GONZALO Soliman       • cyclobenzaprine (FLEXERIL) tablet 10 mg  10 mg Oral Nightly Meghann Browning APRN   10 mg at 03/16/17 0420   • DAPTOmycin (CUBICIN) 600 mg in sodium chloride 0.9 % 50 mL IVPB  6 mg/kg Intravenous Q24H GONZALO Soliman       • dextrose (D50W) solution 25 g  25 g Intravenous Q15 Min PRN MELISSA Napier       • dextrose (GLUTOSE) oral gel 15 g  15 g Oral Q15 Min PRN MELISSA Napier       • docusate sodium (COLACE) capsule 100 mg  100 mg Oral Daily MELISSA Napier   100 mg at 03/16/17 0909   • glucagon (GLUCAGEN) injection 1 mg  1 mg Subcutaneous Q15 Min PRN MELISSA Napier       • hydrochlorothiazide (HYDRODIURIL) tablet 12.5 mg  12.5 mg Oral Daily MELISSA Napier   12.5 mg at 03/16/17 0909   • HYDROcodone-acetaminophen (NORCO) 7.5-325 MG per tablet 1 tablet  1 tablet Oral Q4H PRN OrianaMELISSA De La Vega   1 tablet at 03/16/17 0916   • insulin lispro (humaLOG) injection 0-7 Units  0-7 Units Subcutaneous 4x Daily AC & at Bedtime MELISSA Napier   2 Units at 03/16/17 0421   • lisinopril (PRINIVIL,ZESTRIL) tablet 10 mg  10 mg Oral Q24H MELISSA Napier   Stopped at 03/16/17 0909   • ondansetron (ZOFRAN) tablet 4 mg  4 mg Oral Q6H PRN MELISSA Napier        Or   • ondansetron (ZOFRAN) injection 4 mg  4 mg Intravenous Q6H PRN MELISSA Napier       • pantoprazole (PROTONIX) EC tablet 40 mg  40 mg Oral Q AM MELISSA Napier   40 mg at 03/16/17 0520   • saccharomyces boulardii (FLORASTOR) capsule 250 mg  250 mg Oral Daily MELISSA Napier   250 mg at 03/16/17 0909   • sertraline  "(ZOLOFT) tablet 50 mg  50 mg Oral Nightly Meghann Nam, APRN   50 mg at 17 042   • sodium chloride 0.9 % flush 1-10 mL  1-10 mL Intravenous PRN Meghann Nam, APRN       • sodium chloride 0.9 % flush 10 mL  10 mL Intravenous PRN Rick F[place], DO       • sodium chloride 0.9 % infusion  125 mL/hr Intravenous Continuous Rick Fetters [place], DO   Stopped at 17 0503   • sodium chloride 0.9 % infusion  75 mL/hr Intravenous Continuous Meghann Nam, APRN 75 mL/hr at 17 042 75 mL/hr at 17   • topiramate (TOPAMAX) tablet 25 mg  25 mg Oral Nightly Meghann Nam, APRN   25 mg at 17       Antibiotics:  CTX 1 g IV Daily   Vanc      Review of Systems  Full 12 point review of systems reviewed and negative except for: Fever, chills, body aches, neck pain, spontaneous wound drainage from cervical site, cervical site tenderness.  Fatigue decreased appetite decreased urine output all other 12 point review systems reviewed and negative.    Physical Exam:   Vital Signs   Visit Vitals   • /66   • Pulse 64   • Temp 98.7 °F (37.1 °C) (Axillary)   • Resp 20   • Ht 68.5\" (174 cm)   • Wt 220 lb (99.8 kg)   • SpO2 95%   • BMI 32.96 kg/m2     Temp (24hrs), Av.1 °F (37.3 °C), Min:98 °F (36.7 °C), Max:101.1 °F (38.4 °C)      GENERAL: Awake and alert, in no acute distress.   HEENT: Normocephalic, atraumatic.  PERRL. EOMI. No conjunctival injection. No icterus. Oropharynx clear without evidence of thrush or exudate. No evidence of peridontal disease.  No nuchal rigidity.  NECK: Surgical incision on the posterior neck with edema at the surgical site superiorly.  Spontaneous drainage with attempted expression. Purulence noted. No odor.  Some limited range of motion of the neck however able to flex extend and rotate.   LYMPH: No cervical, axillary or inguinal lymphadenopathy. No neck masses  HEART: RRR; No murmur, rubs, gallops.   LUNGS: Clear to auscultation bilaterally without wheezing, rales, rhonchi. " Normal respiratory effort.  ABDOMEN: obese, Soft, nontender, nondistended. Positive bowel sounds. No rebound or guarding.   EXT:  No cyanosis, clubbing or edema. Good pulses throughout.  : Normal appearing genitalia without Bond catheter.  MSK: FROM without joint effusions noted  Except decreased ROM of c spine.  SKIN: Posterior cervical postoperative wound with spontaneous drainage and surrounding erythema and edema.  Indurated.   NEURO: Oriented to PPT. No focal deficits.   PSYCHIATRIC: Normal insight and judgement. Cooperative with PE  Peripheral IV clean, dry, intact.    Laboratory Data      Results from last 7 days  Lab Units 03/16/17  0426 03/16/17  0006 03/15/17  1134   WBC 10*3/mm3 14.29* 16.50* 17.74*   HEMOGLOBIN g/dL 11.2* 12.3* 13.3   HEMATOCRIT % 34.1* 37.3* 41.0   PLATELETS 10*3/mm3 114* 122* 131*       Results from last 7 days  Lab Units 03/16/17  0426   SODIUM mmol/L 134   POTASSIUM mmol/L 3.7   CHLORIDE mmol/L 105   TOTAL CO2 mmol/L 19.0*   BUN mg/dL 21   CREATININE mg/dL 1.20   GLUCOSE mg/dL 162*   CALCIUM mg/dL 8.8       Results from last 7 days  Lab Units 03/16/17  0426   ALK PHOS U/L 61   BILIRUBIN mg/dL 0.8   ALT (SGPT) U/L 20   AST (SGOT) U/L 29       Results from last 7 days  Lab Units 03/15/17  1134   SED RATE mm/hr 53*       Results from last 7 days  Lab Units 03/15/17  1134   CRP mg/dL 83.80*       Estimated Creatinine Clearance: 69 mL/min (by C-G formula based on Cr of 1.2).      Microbiology:  Blood culture: NGSF x 2  Wound culture: Pending    Radiology:  Imaging Results (last 24 hours)     Procedure Component Value Units Date/Time    CT Cervical Spine Without Contrast [[id number]]  (Abnormal) Collected:  03/16/17 0119     Updated:  03/16/17 0237    Narrative:         EXAM:  CT Cervical Spine Without Intravenous Contrast.    CLINICAL HISTORY:  66 years old, male; Cellulitis of neck; Infection following a procedure,   initial encounter; Pain; Other: See notes; Prior surgery; Surgery  date: <1   month; Additional info: Post op infection    TECHNIQUE:  Axial computed tomography images of the cervical spine without intravenous   contrast.  This CT exam was performed using one or more of the following dose   reduction techniques:  automated exposure control, adjustment of the mA and/or   kV according to patient size, and/or use of iterative reconstruction technique.    COMPARISON:  CT CERVICAL SPINE WO CONTRAST 11/9/2016 9:11:12 AM    FINDINGS:  Vertebrae:  Degenerative changes of the atlantoaxial articulation.  No acute   fracture.  Discs/spinal canal/neural foramina:  7.4 x 3.7 x 2.1 cm gas and fluid   containing collection within the deep subcutaneous tissues of the posterior   midline neck from approximately the C4-T1 levels.  Multilevel degenerative disc   disease.  Prior discectomy and fusion changes at the C6-7 level.  Soft tissues:  See above.  Vasculature:  Atherosclerotic vascular calcifications.  Lung apices:  Normal as visualized.      Impression:         1.  7.4 x 3.7 x 2.1 cm gas and fluid containing collection within the deep   subcutaneous tissues of the posterior midline neck from approximately the C4-T1   levels.  Findings likely represent soft tissue abscess. Recommend further   evaluation.    2.  Incidental/non-acute findings are described above.      THIS DOCUMENT HAS BEEN ELECTRONICALLY SIGNED BY MICHELLE GONZALES MD            PROBLEM LIST:   Sepsis present on admission  Postoperative cervical spine with cellulitis with underlying abscess with spontaneous drainage  Leukocytosis  Fever  Acute kidney injury  Diabetes mellitus, hyperlipidemia, hypertension  Thrombocytopenia  Obstructive sleep apnea    ASSESSMENT:  Patient is seen today and initial evaluation with postoperative surgical site wound drainage with imaging showing abscess and fluid collection and cervical spine postoperative site. Admitted with sepsis, fever, leukocytosis, REBECA, elevated inflammatory markers. Wound culture  pending.  Patient has been antibiotic modified prior to arrival with cephalexin orally with no improvement. No hx of MRSA.  We'll cover for skin and soft tissue infection with likely organisms being strep and staph including risk for MRSA.  CT scan showing 7.4 x 3.7 x 2.1 cm gas and fluid containing collection within the deep subcutaneous tissues of the posterior midline neck from approximately the C4-T1 levels.  MRI canceled as plans for urgent surgery noted and will review surgical findings but if worsens clinically or + blood cultures recommend MRI of c spine.  No signs or symptoms of meningeal involvement or meningitis at this time.     Agree with aggressive irrigation debridement of abscess and will need aggressive antibiotics postoperatively plan 4-6 weeks of IV therapy to avoid acute discitis or osteomyelitis and therapy will be hopefully pathogen directed based on cultures for now use daptomycin and ceftriaxone with monitoring of labs and cultures.    PLAN:  Discontinue Vancomycin with acute kidney injury and possible chronic kidney disease and diabetes mellitus  Start Dapto 6 mg/kg for MRSA coverage  Continue Rocephin 2 g IV daily   Monitor labs  Follow-up cultures  Follow-up surgical findings  Recommend MRI c spine if has + blood cultures  Will need picc once crt improves.    Dr. Eric Root saw the patient, performed the physical exam, reviewed the laboratory data and guided with the formulation of the above problem list, assessment and treatment plan.     Eric Root MD  3/16/2017

## 2017-03-16 NOTE — ANESTHESIA POSTPROCEDURE EVALUATION
Patient: Elton Funez    Procedure Summary     Date Anesthesia Start Anesthesia Stop Room / Location    03/16/17 1301  [facility] / [facility]       Procedure Diagnosis Surgeon Provider     INCISION AND DRAINAGE OF POSTERIOR CERVICAL WOUND (N/A Spine Cervical) Neck abscess; Post-operative infection  (Neck abscess [L02.11]; Post-operative infection [T81.4XXA]) MD Andry Danielson MD          Anesthesia Type: general  Last vitals  /73 (03/16/17 1420)    Temp 98.2 °F (36.8 °C) (03/16/17 1420)    Pulse 78 (03/16/17 1420)   Resp 18 (03/16/17 1420)    SpO2 95 % (03/16/17 1420)      Post Anesthesia Care and Evaluation    Patient location during evaluation: PACU  Patient participation: complete - patient participated  Level of consciousness: awake and alert  Pain score: 0  Pain management: adequate  Airway patency: patent  Anesthetic complications: No anesthetic complications  PONV Status: none  Cardiovascular status: hemodynamically stable and acceptable  Respiratory status: nonlabored ventilation, acceptable and nasal cannula  Hydration status: acceptable

## 2017-03-16 NOTE — PROGRESS NOTES
IM Update note:    Pt seen and examined briefly. History and medications/allergies reviewed. Wife at bedside.    Overall feeling better. Still febrile, Tm 101 on record here. Pain is adequately controlled. CT neck findings discussed with patient. Questions answered.    PE:  NAD  Neck incision intact, mild erythema/induration and fluctuance present at base of posterior neck.  RRR  CTA  Abd benign  LE neg edema    A/P:    66 year old male s/p left C7-T1 cervical foraminotomy posterior with metrix on 2/28/17 that presents to ED with complaints of neck and fever found to have post-op infection.     Post-op Infection/abscess  - +fever and leukocytosis, pt nontoxic  -Vancomycin and Rocephin  -Awaiting Neurosurgery evDr Diaz saini  -IVF  -Wound culture pending/ID consulted  -NPO until after Neurosurgery consult, may need surgery     Diabetes Mellitus Type 2  -FSBG ACHS  -ss insulin     Hypertension  -Well controlled  -Continue home medication     Hyperlipidemia  -Continue home medication     Chronic Kidney Disease Stage 3  -Currently at baseline      DVT prophylaxis:  -Hold pharmacologics for now pending neuro consult  -Teds/scds     Code Status:Full

## 2017-03-16 NOTE — CONSULTS
"NEUROSURGERY CONSULT    Referring Provider: No ref. provider found  Reason for Consultation: fever, wound drainage.     Patient Care Team:  cT Ramirez MD as PCP - General  Pau MACK MD as Consulting Physician (Endocrinology)  Ari Yanes MD as Consulting Physician (Otolaryngology)  Gigi Perales MD as Consulting Physician (Neurosurgery)  Tc Ramirez MD as Referring Physician (Internal Medicine)    Chief complaint: wound drainage, fever.      Subjective .     History of present illness:    Elton Funez is a 66 y.o. Poorly controlled diabetic with a prior ACDF who was complaining of left arm pain in the biceps of his forearm. 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 He was in the office yesterday for his first postop follow up. He had been doing well with good relief of his arm pain, but had been feeling \"shaky\" and had increasing pain over the three days prior.  His temperature was recorded to be 100.1 in the office.  His incision was well closed, with a very scant amount of drainage near the superior end, which was able to be covered with a regular bandage. There was minimal erythema around this area as well, but pressure could not express more than a tiny amount of drainage.   He was sent for labs, which showed WBC count of 17.74, ESR 53, CRP 83.8, HA1C was 7.6. He was started on Keflex 500mg BID and asked to follow up in one week in the office unless there were changes.  Overnight, his fever reached 101.5.  He went in to the ED and blood and wound cultures were taken. His incision began draining a copious amount of purulent fluid, though it remains well closed.  He was admitted by hospitalist.   CT of the neck shows a 7.4x3.7 gas and fluid collection in the deep subcutaneous tissues.    Today, he reports that he's actually feeling a bit better than he was yesterday.  He has no headache, has some " neck stiffness, especially turning or bending from side to side, but is able to reach chin to chest.  He has no focal weakness of extremities, denies N/V/D/C, chest pain, or shortness of breath.       The following portions of the patient's history were reviewed and updated as appropriate: allergies, current medications, past family history, past medical history, past social history, past surgical history and problem list.    Results Review:   CT neck without contrast done 3/16/17  1. 7.4 x 3.7 x 2.1 cm gas and fluid containing collection within the deep   subcutaneous tissues of the posterior midline neck from approximately the C4-T1   levels. Findings likely represent soft tissue abscess. Recommend further   evaluation.       Results from last 7 days  Lab Units 03/16/17  0426 03/16/17  0006 03/15/17  1134   WBC 10*3/mm3 14.29* 16.50* 17.74*   HEMOGLOBIN g/dL 11.2* 12.3* 13.3   HEMATOCRIT % 34.1* 37.3* 41.0   PLATELETS 10*3/mm3 114* 122* 131*       Results from last 7 days  Lab Units 03/16/17  0426 03/16/17  0006   SODIUM mmol/L 134 132   POTASSIUM mmol/L 3.7 4.1   CHLORIDE mmol/L 105 101   TOTAL CO2 mmol/L 19.0* 21.0   BUN mg/dL 21 22   CREATININE mg/dL 1.20 1.40*   GLUCOSE mg/dL 162* 287*   CALCIUM mg/dL 8.8 9.3     Wound and blood cultures are in process      Review of Systems  Pertinent items are noted in HPI, all other systems reviewed and are negative    History  Past Medical History   Diagnosis Date   • Abscess of arm, right    • Abscess of skin of neck    • Acute sinusitis    • ALT (SGPT) level raised    • Arthritis    • Constipation    • CPAP (continuous positive airway pressure) dependence    • Decreased platelet count    • Depression      Resolved: 1/28/2015   • Diabetes mellitus    • Elevated AST (SGOT)    • Hyperlipidemia    • Hypertension    • Jaw pain    • Left hip pain    • Migraine      Hx of   • Obesity (BMI 30.0-34.9) 10/7/2016     BMI 31.92   • Obstructive sleep apnea      Hx of   • Quadriceps  muscle strain    • Shigellosis    • Sleep apnea      PT TO BRING MASK AND TUBING DAY OF SURGERY   • Wears glasses    • Wears hearing aid      BOTH EARS   ,   Past Surgical History   Procedure Laterality Date   • Back surgery     • Cervical arthrodesis     • Colonoscopy       2012   • Vasectomy     • Cervical laminectomy decompression posterior Left 2/28/2017     Procedure: Left C7-T1 CERVICAL FORAMINOTOMY POSTERIOR WITH METRIX;  Surgeon: Gigi Perales MD;  Location: [facility];  Service:    ,   Family History   Problem Relation Age of Onset   • Diabetes Father    • Heart disease Father    • Diabetes Paternal Grandmother    ,   Social History   Substance Use Topics   • Smoking status: Former Smoker     Packs/day: 1.00     Years: 5.00     Types: Cigarettes     Quit date: 1976   • Smokeless tobacco: Never Used   • Alcohol use Yes      Comment: socially   ,   Prescriptions Prior to Admission   Medication Sig Dispense Refill Last Dose   • Albiglutide (TANZEUM) 50 MG pen-injector Inject 50 mg under the skin 1 (One) Time Per Week. Indications: didnt tolerate preferred medication Bydureon, worse glycemic control (Patient taking differently: Inject 50 mg under the skin 1 (One) Time Per Week. Every Sunday  Indications: didnt tolerate preferred medication Bydureon, worse glycemic control) 12 each 3 Taking   • aspirin 81 MG tablet Take 81 mg by mouth Daily. Last dose 2-19-17   Taking   • atenolol (TENORMIN) 50 MG tablet Take 1 tablet by mouth daily. (Patient taking differently: Take 50 mg by mouth Every Night.) 90 tablet 1 Taking   • Coenzyme Q10 (CO Q 10 PO) Take 200 mg by mouth 2 (Two) Times a Day.   Taking   • colestipol (COLESTID) 1 G tablet TAKE 2 TABLETS TWICE A  tablet 4 Taking   • cyclobenzaprine (FLEXERIL) 10 MG tablet TAKE ONE TABLET BY MOUTH THREE TIMES A DAY AS NEEDED FOR MUSCLE SPASMS 90 tablet 0 Taking   • docusate sodium (COLACE) 100 MG capsule Take 100 mg by mouth Daily.   Taking   •  fluocinonide (LIDEX) 0.05 % cream Apply  topically Every 8 (Eight) Hours. Apply sparingly to affected area(s) twice daily as needed.   Taking   • fluticasone (FLONASE) 50 MCG/ACT nasal spray into each nostril As Needed. Use 2 sprays in each nostril once daily as needed.   Taking   • gemfibrozil (LOPID) 600 MG tablet TAKE 1 TABLET TWICE A  tablet 1 Taking   • glucose blood test strip USE TO TEST BLOOD SUGAR TWICE DAILY AND AS NEEDED 200 each 2 Taking   • Insulin Pen Needle (B-D ULTRAFINE III SHORT PEN) 31G X 8 MM misc 1 each 3 (Three) Times a Day. 300 each 3 Taking   • insulin regular (humuLIN R) 500 UNIT/ML CONCENTRATED injection Inject 110 Units under the skin Every Night.   Taking   • Insulin Regular Human, Conc, (HumuLIN R) 500 UNIT/ML solution pen-injector CONCENTRATED injection 90 units twice a day before meals, titrate up as indicated (Patient taking differently: Inject 125 Units under the skin Every Morning. 90 units twice a day before meals, titrate up as indicated) 12 pen 3 Taking   • Lancets misc 1 each 2 (two) times a day. 200 each 2 Taking   • LANTUS SOLOSTAR 100 UNIT/ML injection pen INJECT 50 UNITS UNDER THE SKIN EVERY EVENING (Patient taking differently: INJECT 45 UNITS UNDER THE SKIN EVERY EVENING) 45 mL 2 Taking   • lisinopril-hydrochlorothiazide (PRINZIDE,ZESTORETIC) 10-12.5 MG per tablet TAKE 1 TABLET DAILY 90 tablet 1 Taking   • meloxicam (MOBIC) 15 MG tablet TAKE ONE TABLET BY MOUTH DAILY AS NEEDED 90 tablet 0 Taking   • Multiple Vitamins-Minerals (MULTIVITAMIN PO) Take 1 tablet by mouth Daily.   Taking   • omeprazole (PriLOSEC) 20 MG capsule Take 20 mg by mouth Daily As Needed (gerd).   Taking   • saccharomyces boulardii (FLORASTOR) 250 MG capsule Take 250 mg by mouth Daily.   Taking   • sertraline (ZOLOFT) 50 MG tablet TAKE ONE TABLET BY MOUTH DAILY 90 tablet 3 Taking   • simvastatin (ZOCOR) 40 MG tablet TAKE 1 TABLET DAILY AT BEDTIME 90 tablet 1 Taking   • topiramate (TOPAMAX) 25 MG  "tablet Take 25 mg by mouth Every Night.   Taking   • Flaxseed, Linseed, (FLAXSEED OIL) 1200 MG capsule Take 1 capsule by mouth 2 (two) times a day.   Taking    and Allergies:  Glucophage xr [metformin hcl er]; Metformin; and Tetracyclines & related    Objective     Vital Signs   Blood pressure 101/66, pulse 64, temperature 98.7 °F (37.1 °C), temperature source Axillary, resp. rate 20, height 68.5\" (174 cm), weight 220 lb (99.8 kg), SpO2 95 %.    Physical Exam:  Patient is awake, alert, and oriented x3. Speech is clear. He is not diaphoretic, flushed, or in distress    CV: RRR  Lungs: regular effort, no respiratory distress    NEUROLOGICAL EXAMINATION:      MENTAL STATUS:  Alert and oriented.  Speech intact.  Recent and remote memory intact.      CRANIAL NERVES:  Cranial nerve II:  Visual fields are full to confrontation.  Cranial nerves III, IV and VI:  PERRLADC.  Extraocular movements are intact.  Nystagmus is not present.  Cranial nerve V:  Facial sensation is intact to light touch.  Cranial nerve VII:  Muscles of facial expression reveal no asymmetry.  Cranial nerve VIII:  Hearing is intact to finger rub bilaterally.  Cranial nerves IX and X:  Palate elevates symmetrically.  Cranial nerve XI:  Shoulder shrug is intact.  Cranial nerve XII:  Tongue is midline without evidence of atrophy or fasciculation.    MOTOR:  Deltoid, biceps, triceps, and  strength intact.  No hand atrophy.  Hip flexion, knee extension, ankle dorsiflexion and plantar flexion intact. No tremor, spasticity, ataxia, or dysmetria.    SENSATION:  Intact to touch upper and lower extremities.  Position sense intact.    Copious purulent drainage on dressing, and pressure on tissues of neck expresses quite a bit of fluid. Incision is erythematous at the top with a small opening.  Lower edge of the incision is clean, dry, and intact with no swelling or erythema.       Assessment/Plan   S/p posterior cervical surgery with new drainage and CT " demonstrated fluid collection in poorly controlled diabetic.   Patient has been kept NPO. He will be taken to the ED today for I&D of cervical wound.    Followed by ID for antibiotics  Close blood sugar control   Hydration: Creatinine 1.4 on admission.     Nadja Bolanos PA-C  03/16/17  10:31 AM

## 2017-03-16 NOTE — OP NOTE
Preoperative diagnosis: Postoperative incisional infection  Postoperative diagnosis: Same    Indication for procedure: This is a 66-year-old man in whom I performed a posterior cervical foraminotomies several weeks ago.  He presented to my clinic yesterday for routine follow-up, where it was discovered that he had some purulent drainage from the superior aspect of his incision.  The incision at that time appeared benign, and he did not have any major constitutional complaints, so he was started on empiric antibiotics and sent home with instructions to come to the emergency department for a fever of greater than 101.5.  Last night, he developed a fever of greater than 101.5, so he represented to the emergency department.  A CT scan of the neck was obtained which demonstrated a fluid and gas collection in the subfascial space, so operative intervention is therefore indicated.    Informed consent for this procedure was obtained from the patient.  He acknowledges risk of bleeding, infection, failure of benefit of the operation, or need for additional procedures.  All questions were answered prior to beginning the procedure.  No guarantees were given or implied.  The patient elects proceed with surgery.    The patient's medical comorbidities which increased his risk of a perioperative infection or uncontrolled diabetes, obesity, and a history of soft tissue skin infections.    Procedure in detail: The patient was identified in the preoperative hold area and brought to the operating suite where he underwent the uneventful induction general endotracheal anesthesia.  Venodyne's were placed by the nursing staff.  The patient was placed in the Harden head knight with 3 points of fixation to 60 pounds of pressure.  He was then gently rotated into the prone position on gel rolls.  Pressure points were inspected and appropriately padded.  The Harden frame was affixed to the bed.  The patient's posterior cervical spine was  then prepped and draped in the usual sterile fashion.    A timeout was performed.  The superior aspect of his incision was then reopened using a #15 blade and Metzenbaum scissors.  Grossly purulent material was then seen to egress.  Superficial cultures were then sent for aerobic and anaerobic.  I then used the Metzenbaum scissors to cut the fascial sutures.  There was a large cavity which was filled with grossly.  Material which was encountered.  A separate deep cervical abscess culture was sent again for both aerobic and anaerobic cultures.  I then opened approximately the superior 50% of the surgical incision.  I explored the cavity under direct visualization and copiously irrigated the wound with bacitracin saline.  I also inserted a suction device into the drain tract which communicated with the abscess cavity.    Approximately 500 mg of vancomycin powder were then laid over the exposed soft tissue in the abscess cavity.  There did not appear to be infection active in the bone.    The fascia was closed using absorbable sutures.  The remainder of the 500 mg of vancomycin were then laid over the exposed soft tissue of the superficial wound.  The skin was closed using a 2-0 nylon suture using vertical mattress stitches.  A 7 flat HOLLEY drain was tunneled out through the previous stab incision and left in the subfascial space.    Sponge, instrument, and needle counts were all correct at the conclusion of the case.  I performed this procedure with the assistance of Nadja Bolanos, physician assistant.

## 2017-03-16 NOTE — ANESTHESIA PROCEDURE NOTES
Airway  Urgency: elective    Airway not difficult    General Information and Staff    Patient location during procedure: OR    Indications and Patient Condition  Indications for airway management: airway protection    Preoxygenated: yes  MILS not maintained throughout  Mask difficulty assessment: 1 - vent by mask    Final Airway Details  Final airway type: endotracheal airway      Successful airway: ETT  Cuffed: yes   Successful intubation technique: direct laryngoscopy  Endotracheal tube insertion site: oral  Blade: Amber  Blade size: #3  ETT size: 7.5 mm  Cormack-Lehane Classification: grade I - full view of glottis  Placement verified by: chest auscultation and capnometry   Measured from: lips  ETT to lips (cm): 22  Number of attempts at approach: 1    Additional Comments  Negative epigastric sounds, Breath sound equal bilaterally with symmetric chest rise and fall

## 2017-03-16 NOTE — ED PROVIDER NOTES
Subjective   HPI Comments: Elton Funez is a 66 y.o.male who presents to the ED with c/o a post op problem. 2 weeks ago he had a C7-T1 cervical foraminotomy posterior with metrix done by Dr. Perales. He has since been improving but 3 days ago began having increasing pain and drainage from his surgical site. 2 days ago his pain continued to worsen and he states that it was similar to when he was just post op and he began having a fever of 101. Yesterday his sx continued and he was seen by Dr. Perales who started him on keflex and told him that if he had a fever above 101.5 to come to the ED. Tonight he continued to have pain and drainage and he checked his temp which was over 101.5, prompting him to come to the ED for evaluation.     Patient is a 66 y.o. male presenting with neck pain.   Neck Pain   Pain location:  Generalized neck  Quality:  Aching  Pain severity:  Moderate  Pain is:  Unable to specify  Onset quality:  Gradual  Duration:  3 days  Timing:  Constant  Chronicity:  New  Context comment:  Surgery 2 weeks ago  Relieved by:  Nothing  Worsened by:  Nothing  Associated symptoms: fever    Associated symptoms: no leg pain, no numbness, no paresis and no tingling        Review of Systems   Constitutional: Positive for fever.   Musculoskeletal: Positive for neck pain.   Skin: Positive for wound.   Neurological: Negative for tingling and numbness.   All other systems reviewed and are negative.      Past Medical History   Diagnosis Date   • Abscess of arm, right    • Abscess of skin of neck    • Acute sinusitis    • ALT (SGPT) level raised    • Arthritis    • Constipation    • CPAP (continuous positive airway pressure) dependence    • Decreased platelet count    • Depression      Resolved: 1/28/2015   • Diabetes mellitus    • Elevated AST (SGOT)    • Hyperlipidemia    • Hypertension    • Jaw pain    • Left hip pain    • Migraine      Hx of   • Obstructive sleep apnea      Hx of   • Quadriceps muscle strain    •  Shigellosis    • Sleep apnea      PT TO BRING MASK AND TUBING DAY OF SURGERY   • Wears glasses    • Wears hearing aid      BOTH EARS       Allergies   Allergen Reactions   • Glucophage Xr [Metformin Hcl Er] Diarrhea     Glucophage XR TB24: Adverse Reaction: Severe GI issues.   • Metformin Nausea And Vomiting     Other reaction(s): SEVERE INTESTIONAL ISSUES  Other reaction(s): SEVERE GI ISSUES    Glucophage XR TB24  MetFORMIN HCl TABS   • Tetracyclines & Related Nausea Only     Adverse Reaction       Past Surgical History   Procedure Laterality Date   • Back surgery     • Cervical arthrodesis     • Colonoscopy       2012   • Vasectomy     • Cervical laminectomy decompression posterior Left 2/28/2017     Procedure: Left C7-T1 CERVICAL FORAMINOTOMY POSTERIOR WITH METRIX;  Surgeon: Gigi Perales MD;  Location: [facility] OR;  Service:        Family History   Problem Relation Age of Onset   • Diabetes Father    • Heart disease Father    • Diabetes Paternal Grandmother        Social History     Social History   • Marital status:      Spouse name: N/A   • Number of children: N/A   • Years of education: N/A     Social History Main Topics   • Smoking status: Former Smoker     Packs/day: 1.00     Years: 5.00     Types: Cigarettes     Quit date: 1976   • Smokeless tobacco: Never Used   • Alcohol use Yes      Comment: socially   • Drug use: No   • Sexual activity: Defer     Other Topics Concern   • None     Social History Narrative         Objective   Physical Exam   Constitutional: He is oriented to person, place, and time. He appears well-developed and well-nourished. No distress.   HENT:   Head: Normocephalic and atraumatic.   Eyes: Conjunctivae are normal. No scleral icterus.   Neck: Neck supple.   erythema over the superior aspect of the incision along with a thin purulent dfischarge. TTP over inflammed skin.    Cardiovascular: Normal rate, regular rhythm and normal heart sounds.    Pulmonary/Chest: Effort  normal and breath sounds normal. No respiratory distress.   Abdominal: Soft. There is no tenderness.   Musculoskeletal: Normal range of motion. He exhibits no edema.   Lymphadenopathy:     He has no cervical adenopathy.   Neurological: He is alert and oriented to person, place, and time.   Skin: Skin is warm and dry. He is not diaphoretic. There is erythema.   Psychiatric: He has a normal mood and affect. His behavior is normal.   Nursing note and vitals reviewed.      Procedures         ED Course  ED Course   Comment By Time   Neurosurgeon paged.  Hernandez Porter 03/16 0114   Dr. Roy discussed with Dr. Stallings who recommends CT C spine noncontrast alongside rocephin, vanc and admission to the hospitalist.  Hernandez Porter 03/16 0114   Dr. Roy discussed with the hospitalist who will admit.  Hernandez Porter 03/16 0125                     [facility]    Final diagnoses:   Post-operative infection   Cellulitis of neck       Documentation assistance provided by triny Porter.  Information recorded by the triny was done at my direction and has been verified and validated by me.     Hernandez Porter  03/16/17 0051       Rcik Roy DO  03/16/17 [id number]

## 2017-03-16 NOTE — H&P
[facility] Medicine Services  HISTORY AND PHYSICAL    Primary Care Physician: Tc Ramirez MD    Subjective     Chief Complaint: Neck pain, fever    History of Present Illness    66 year old male s/p left C7-T1 cervical foraminotomy posterior with metrix on 2/28/17 that presents to ED with acute onset cervical pain and fever. He states that he was doing well post-op then on Sunday he started having mild pain in his left neck and it progressively worsened over the next several days. He also notes drainage from the surgical site.Tuesday he began running a fever, and Wednesday he was seen outpt in Dr Perales's office and prescribed Keflex and instructed to come to ED if fever was greater than 101.5. He continued running a fever reaching 101.5 prompting him to report to the ED. Upon arrival, he continued to be febrile and was found to have leukocytosis. Dr Stallings was on call and requested admission. Hospital Medicine will admit and follow.       Review of Systems   Constitutional: Positive for activity change, chills and fever. Negative for appetite change.   HENT: Negative.    Respiratory: Negative for cough, shortness of breath and wheezing.    Cardiovascular: Negative for chest pain, palpitations and leg swelling.   Gastrointestinal: Negative for abdominal pain, constipation, diarrhea, nausea and vomiting.   Genitourinary: Negative for dysuria and frequency.   Musculoskeletal: Positive for arthralgias, back pain and neck pain.        Pain at left neck and across shoulders   Skin: Positive for wound. Negative for color change and pallor.        Post op cervical wound with drainage from proximal incision area   Neurological: Positive for numbness. Negative for dizziness and weakness.        Baseline numbness and tingling in right hand, no change since surgery   Psychiatric/Behavioral: Negative for confusion. The patient is not nervous/anxious.       Otherwise complete ROS reviewed and  negative except as mentioned in the HPI.      Past Medical History:   Past Medical History   Diagnosis Date   • Abscess of arm, right    • Abscess of skin of neck    • Acute sinusitis    • ALT (SGPT) level raised    • Arthritis    • Constipation    • CPAP (continuous positive airway pressure) dependence    • Decreased platelet count    • Depression      Resolved: 1/28/2015   • Diabetes mellitus    • Elevated AST (SGOT)    • Hyperlipidemia    • Hypertension    • Jaw pain    • Left hip pain    • Migraine      Hx of   • Obstructive sleep apnea      Hx of   • Quadriceps muscle strain    • Shigellosis    • Sleep apnea      PT TO BRING MASK AND TUBING DAY OF SURGERY   • Wears glasses    • Wears hearing aid      BOTH EARS       Past Surgical History:  Past Surgical History   Procedure Laterality Date   • Back surgery     • Cervical arthrodesis     • Colonoscopy       2012   • Vasectomy     • Cervical laminectomy decompression posterior Left 2/28/2017     Procedure: Left C7-T1 CERVICAL FORAMINOTOMY POSTERIOR WITH METRIX;  Surgeon: Gigi Perales MD;  Location: [facility];  Service:        Family History:   Family History   Problem Relation Age of Onset   • Diabetes Father    • Heart disease Father    • Diabetes Paternal Grandmother       Social History:   Social History     Social History   • Marital status:      Spouse name: N/A   • Number of children: N/A   • Years of education: N/A     Occupational History   • Not on file.     Social History Main Topics   • Smoking status: Former Smoker     Packs/day: 1.00     Years: 5.00     Types: Cigarettes     Quit date: 1976   • Smokeless tobacco: Never Used   • Alcohol use Yes      Comment: socially   • Drug use: No   • Sexual activity: Defer     Other Topics Concern   • Not on file     Social History Narrative       Medications:    (Not in a hospital admission)  Allergies:  Allergies   Allergen Reactions   • Glucophage Xr [Metformin Hcl Er] Diarrhea     Glucophage  "XR TB24: Adverse Reaction: Severe GI issues.   • Metformin Nausea And Vomiting     Other reaction(s): SEVERE INTESTIONAL ISSUES  Other reaction(s): SEVERE GI ISSUES    Glucophage XR TB24  MetFORMIN HCl TABS   • Tetracyclines & Related Nausea Only     Adverse Reaction       Objective     Vital Signs:   Visit Vitals   • /74   • Pulse 81   • Temp (!) 101.1 °F (38.4 °C) (Oral)   • Resp 22   • Ht 68.5\" (174 cm)   • Wt 220 lb (99.8 kg)   • SpO2 96%   • BMI 32.96 kg/m2     Physical Exam   Constitutional: He is oriented to person, place, and time. He appears well-developed and well-nourished. No distress.   HENT:   Head: Normocephalic.   Eyes: Pupils are equal, round, and reactive to light.   Neck: Neck supple. No JVD present.   Cardiovascular: Normal rate, regular rhythm, normal heart sounds and intact distal pulses.  Exam reveals no gallop and no friction rub.    No murmur heard.  Pulmonary/Chest: Effort normal and breath sounds normal. He has no wheezes. He has no rales.   Abdominal: Soft. Bowel sounds are normal. He exhibits no distension. There is no tenderness.   Musculoskeletal: Normal range of motion. He exhibits tenderness. He exhibits no edema.   Tenderness in left posterior neck, across shoulders and surrounding incisional area   Neurological: He is alert and oriented to person, place, and time.   Skin: Skin is warm and dry. He is not diaphoretic. There is erythema.   Posterior cervical surgical site with erythema and mild edema. Serosanguinous drainage at proximal incision site.    Psychiatric: He has a normal mood and affect. His behavior is normal. Judgment and thought content normal.   Vitals reviewed.            Results Reviewed:    Results from last 7 days  Lab Units 03/16/17  0006   WBC 10*3/mm3 16.50*   HEMOGLOBIN g/dL 12.3*   PLATELETS 10*3/mm3 122*       Results from last 7 days  Lab Units 03/16/17  0006   SODIUM mmol/L 132   POTASSIUM mmol/L 4.1   TOTAL CO2 mmol/L 21.0   CREATININE mg/dL 1.40* "   GLUCOSE mg/dL 287*   CALCIUM mg/dL 9.3       I have personally reviewed and interpreted the radiology studies and ECG obtained at time of admission.     Assessment / Plan      Assessment & Plan  Principal Problem:    Post-operative infection  Active Problems:    Chronic kidney disease, stage III (moderate)    Gastroesophageal reflux disease without esophagitis    Hyperlipidemia    Hypertension    DM (diabetes mellitus), type 2, uncontrolled w/neurologic complication    Leukocytosis    66 year old male s/p left C7-T1 cervical foraminotomy posterior with metrix on 2/28/17 that presents to ED with complaints of neck and fever found to have post-op infection.    PLAN:  Post-op Infection  -Vancomycin and Rocephin  -Consult Neurosurgery in AM, Dr Perales  -IVF  -Wound culture pending  -CT scan pending  -NPO until after Neurosurgery consult    Leukocytosis with Fever  -Likely secondary to above  -CBC in am    Diabetes Mellitus Type 2  -FSBG ACHS  -ss insulin    Hypertension  -Well controlled  -Continue home medication    Hyperlipidemia  -Continue home medication    Chronic Kidney Disease Stage 3  -Currently at baseline  -BMP in am     DVT prophylaxis:  -Hold pharmacologics for now pending ct scan and neuro consult  -Teds/scds    Code Status:Full     I discussed the patients findings and my recommendations with:patient and primary care team    MELISSA Collins 03/16/17 2:11 AM      Brief Attending Note   I have seen and examined the patient, performing an independent face-to-face diagnostic evaluation   The plan of care reviewed and developed with the advanced practice clinician (APC):   MELISSA Bartlett   Brief Summary Statement/HPI:    67 y/o male who had c7/t1 herniated disk repair by Diaz on 2/28 who presents now after waking up w/ c/o rt neck pain into shoulders w/ purulent discharge and redness from surgical site associated w/ f/c.  Pt placed on keflex 3/15.  No increased nu/ti; states shooting pain he had  pre-op has since subsided.      Attending Physical Exam:   gen; alert, oriented, nad  Heent; perrla, eomi, sl pasty mm  Neck; small amount of purulent discharge w/ ttp and surrounding erythema at surgical site  Ext; no cce; maee  Skin; see above  Neuro; grossly intact  Psych; mood and affect appropriate        Brief Assessment/Plan :  67 y/o male w/ hx of DM, htn, hl who is post op c7/t1 surgical repair here w/ sepsis by criteria w/ probable wound infection (CT pending)  -awaiting CT  -cont abx  -consult Perales a.m.  -diabetes control will be imperative for healing    See above for further detailed assessment and plan developed with [facility] which I have reviewed and/or edited.    I believe this patient requires  INPATIENT status due to the need for care which can only be reasonably provided in an hospital setting such as aggressive/expedited ancillary services and/or consultation services and the necessity for IV medications, close physician monitoring and/or the possible need for procedures.  In such, I feel patient’s risk for adverse outcomes and need for care warrant INPATIENT evaluation and predict the patient’s care encounter to likely last beyond 2 midnights.    Kirsten Cornelius, APRN 03/16/17 2:11 AM

## 2017-03-16 NOTE — BRIEF OP NOTE
CERVICAL DISCECTOMY POSTERIOR FUSION WITH INSTRUMENTATION 1-2 LEVELS  Procedure Note    Elton Funez  3/15/2017 - 3/16/2017    Pre-op Diagnosis:   Neck abscess [L02.11]  Post-operative infection [T81.4XXA]    Post-op Diagnosis:     Post-Op Diagnosis Codes:     * Neck abscess [L02.11]     * Post-operative infection [T81.4XXA]    Procedure/CPT® Codes:      Procedure(s):   INCISION AND DRAINAGE OF POSTERIOR CERVICAL WOUND    Surgeon(s):  Gigi Perales MD    Anesthesia: General    Staff:   Circulator: Rosalba Bullard RN; Sol Hutchinson RN  Scrub Person: Josh Mabry  Nursing Assistant: Dajuan Valentin  Assistant: Nadja Bolanos PA-C    Estimated Blood Loss: 10 mL  Urine Voided: * No values recorded between 3/16/2017  1:01 PM and 3/16/2017  2:01 PM *    Specimens:                  ID Type Source Tests Collected by Time Destination   A :  Bone Spine, Cervical TISSUE EXAM Gigi Perales MD 3/16/2017 1358          Drains:   Drain/Device Site 03/16/17 1346 posterior cervical spine collapsible closed device (Active)       [REMOVED] Drain/Device Site 02/28/17 1310 neck collapsible closed device (Removed)   Removed 03/01/17 0945   Insertion Site clean and dry;dressing intact;no hematoma 3/1/2017  8:42 AM   Drainage Characteristics/Odor serosanguineous 3/1/2017  9:00 AM   Drainage Amount small 3/1/2017  9:00 AM   General output (mL) 20 3/1/2017  9:00 AM       Findings: Grossly purulent drainage was encountered.  Deep abscess extending below the level of the fascia and inferiorly along the entire extent of the surgical exposure.  Status post successful irrigation of surgical field.  Bacitracin irrigation along with 1 g of vancomycin powder was applied    Complications: None      Gigi Perales MD     Date: 3/16/2017  Time: 2:01 PM

## 2017-03-16 NOTE — PLAN OF CARE
Problem: Patient Care Overview (Adult)  Goal: Plan of Care Review  Outcome: Ongoing (interventions implemented as appropriate)  Goal: Adult Individualization and Mutuality  Outcome: Ongoing (interventions implemented as appropriate)  Goal: Discharge Needs Assessment  Outcome: Ongoing (interventions implemented as appropriate)    Problem: Infection, Risk/Actual (Adult)  Goal: Identify Related Risk Factors and Signs and Symptoms  Outcome: Ongoing (interventions implemented as appropriate)  Goal: Infection Prevention/Resolution  Outcome: Ongoing (interventions implemented as appropriate)    Problem: Pain, Acute (Adult)  Goal: Identify Related Risk Factors and Signs and Symptoms  Outcome: Ongoing (interventions implemented as appropriate)  Goal: Acceptable Pain Control/Comfort Level  Outcome: Ongoing (interventions implemented as appropriate)    Problem: Sepsis (Adult)  Goal: Signs and Symptoms of Listed Potential Problems Will be Absent or Manageable (Sepsis)  Outcome: Ongoing (interventions implemented as appropriate)

## 2017-03-16 NOTE — ANESTHESIA PREPROCEDURE EVALUATION
Anesthesia Evaluation     Patient summary reviewed and Nursing notes reviewed   NPO Status: > 8 hours   Airway   Mallampati: III  TM distance: >3 FB  Neck ROM: full  possible difficult intubation  Dental      Pulmonary     breath sounds clear to auscultation  (+) sleep apnea,   Cardiovascular     Rhythm: regular  Rate: normal    (+) hypertension,       Neuro/Psych  (+) headaches,    GI/Hepatic/Renal/Endo    (+) obesity,  chronic renal disease, diabetes mellitus,     Musculoskeletal     Abdominal    Substance History      OB/GYN          Other   (+) arthritis                                 Anesthesia Plan    ASA 3     general     intravenous induction   Anesthetic plan and risks discussed with patient and spouse/significant other.    Plan discussed with CRNA.

## 2017-03-17 ENCOUNTER — TELEPHONE (OUTPATIENT)
Dept: INTERNAL MEDICINE | Facility: CLINIC | Age: 67
End: 2017-03-17

## 2017-03-17 LAB
ANION GAP SERPL CALCULATED.3IONS-SCNC: 4 MMOL/L (ref 3–11)
BASOPHILS # BLD AUTO: 0.01 10*3/MM3 (ref 0–0.2)
BASOPHILS NFR BLD AUTO: 0.1 % (ref 0–1)
BUN BLD-MCNC: 23 MG/DL (ref 9–23)
BUN/CREAT SERPL: 19.2 (ref 7–25)
CALCIUM SPEC-SCNC: 8.9 MG/DL (ref 8.7–10.4)
CHLORIDE SERPL-SCNC: 104 MMOL/L (ref 99–109)
CO2 SERPL-SCNC: 24 MMOL/L (ref 20–31)
CREAT BLD-MCNC: 1.2 MG/DL (ref 0.6–1.3)
DEPRECATED RDW RBC AUTO: 45.6 FL (ref 37–54)
EOSINOPHIL # BLD AUTO: 0.06 10*3/MM3 (ref 0.1–0.3)
EOSINOPHIL NFR BLD AUTO: 0.6 % (ref 0–3)
ERYTHROCYTE [DISTWIDTH] IN BLOOD BY AUTOMATED COUNT: 13.2 % (ref 11.3–14.5)
GFR SERPL CREATININE-BSD FRML MDRD: 60 ML/MIN/1.73
GLUCOSE BLD-MCNC: 246 MG/DL (ref 70–100)
GLUCOSE BLDC GLUCOMTR-MCNC: 269 MG/DL (ref 70–130)
GLUCOSE BLDC GLUCOMTR-MCNC: 271 MG/DL (ref 70–130)
GLUCOSE BLDC GLUCOMTR-MCNC: 277 MG/DL (ref 70–130)
GLUCOSE BLDC GLUCOMTR-MCNC: 280 MG/DL (ref 70–130)
GLUCOSE BLDC GLUCOMTR-MCNC: 282 MG/DL (ref 70–130)
GLUCOSE BLDC GLUCOMTR-MCNC: 289 MG/DL (ref 70–130)
HCT VFR BLD AUTO: 34 % (ref 38.9–50.9)
HGB BLD-MCNC: 11 G/DL (ref 13.1–17.5)
IMM GRANULOCYTES # BLD: 0.04 10*3/MM3 (ref 0–0.03)
IMM GRANULOCYTES NFR BLD: 0.4 % (ref 0–0.6)
LYMPHOCYTES # BLD AUTO: 0.89 10*3/MM3 (ref 0.6–4.8)
LYMPHOCYTES NFR BLD AUTO: 8.2 % (ref 24–44)
MCH RBC QN AUTO: 30.7 PG (ref 27–31)
MCHC RBC AUTO-ENTMCNC: 32.4 G/DL (ref 32–36)
MCV RBC AUTO: 95 FL (ref 80–99)
MONOCYTES # BLD AUTO: 0.81 10*3/MM3 (ref 0–1)
MONOCYTES NFR BLD AUTO: 7.5 % (ref 0–12)
NEUTROPHILS # BLD AUTO: 9.01 10*3/MM3 (ref 1.5–8.3)
NEUTROPHILS NFR BLD AUTO: 83.2 % (ref 41–71)
PLATELET # BLD AUTO: 113 10*3/MM3 (ref 150–450)
PMV BLD AUTO: 11.3 FL (ref 6–12)
POTASSIUM BLD-SCNC: 4.4 MMOL/L (ref 3.5–5.5)
RBC # BLD AUTO: 3.58 10*6/MM3 (ref 4.2–5.76)
SODIUM BLD-SCNC: 132 MMOL/L (ref 132–146)
WBC NRBC COR # BLD: 10.82 10*3/MM3 (ref 3.5–10.8)

## 2017-03-17 PROCEDURE — 02HV33Z INSERTION OF INFUSION DEVICE INTO SUPERIOR VENA CAVA, PERCUTANEOUS APPROACH: ICD-10-PCS | Performed by: INTERNAL MEDICINE

## 2017-03-17 PROCEDURE — 99024 POSTOP FOLLOW-UP VISIT: CPT | Performed by: PHYSICIAN ASSISTANT

## 2017-03-17 PROCEDURE — 80048 BASIC METABOLIC PNL TOTAL CA: CPT | Performed by: PHYSICIAN ASSISTANT

## 2017-03-17 PROCEDURE — 25010000002 DIAZEPAM PER 5 MG: Performed by: NEUROLOGICAL SURGERY

## 2017-03-17 PROCEDURE — 82962 GLUCOSE BLOOD TEST: CPT

## 2017-03-17 PROCEDURE — 94799 UNLISTED PULMONARY SVC/PX: CPT

## 2017-03-17 PROCEDURE — 25010000002 ENOXAPARIN PER 10 MG: Performed by: NEUROLOGICAL SURGERY

## 2017-03-17 PROCEDURE — 85025 COMPLETE CBC W/AUTO DIFF WBC: CPT | Performed by: PHYSICIAN ASSISTANT

## 2017-03-17 PROCEDURE — 99232 SBSQ HOSP IP/OBS MODERATE 35: CPT | Performed by: HOSPITALIST

## 2017-03-17 PROCEDURE — 63710000001 INSULIN DETEMIR PER 5 UNITS: Performed by: HOSPITALIST

## 2017-03-17 PROCEDURE — 63710000001 INSULIN LISPRO (HUMAN) PER 5 UNITS: Performed by: HOSPITALIST

## 2017-03-17 PROCEDURE — 25010000002 DAPTOMYCIN PER 1 MG: Performed by: PHYSICIAN ASSISTANT

## 2017-03-17 PROCEDURE — C1894 INTRO/SHEATH, NON-LASER: HCPCS

## 2017-03-17 RX ORDER — SODIUM CHLORIDE 0.9 % (FLUSH) 0.9 %
10 SYRINGE (ML) INJECTION AS NEEDED
Status: DISCONTINUED | OUTPATIENT
Start: 2017-03-17 | End: 2017-03-18 | Stop reason: HOSPADM

## 2017-03-17 RX ORDER — DEXTROSE MONOHYDRATE 25 G/50ML
25 INJECTION, SOLUTION INTRAVENOUS
Status: DISCONTINUED | OUTPATIENT
Start: 2017-03-17 | End: 2017-03-18 | Stop reason: HOSPADM

## 2017-03-17 RX ORDER — BISACODYL 10 MG
10 SUPPOSITORY, RECTAL RECTAL DAILY PRN
Status: DISCONTINUED | OUTPATIENT
Start: 2017-03-17 | End: 2017-03-17 | Stop reason: SDUPTHER

## 2017-03-17 RX ORDER — NICOTINE POLACRILEX 4 MG
15 LOZENGE BUCCAL
Status: DISCONTINUED | OUTPATIENT
Start: 2017-03-17 | End: 2017-03-18 | Stop reason: HOSPADM

## 2017-03-17 RX ADMIN — ATORVASTATIN CALCIUM 40 MG: 40 TABLET, FILM COATED ORAL at 09:44

## 2017-03-17 RX ADMIN — SODIUM CHLORIDE 125 ML/HR: 9 INJECTION, SOLUTION INTRAVENOUS at 20:04

## 2017-03-17 RX ADMIN — HYDROCHLOROTHIAZIDE 12.5 MG: 12.5 TABLET ORAL at 10:05

## 2017-03-17 RX ADMIN — INSULIN DETEMIR 20 UNITS: 100 INJECTION, SOLUTION SUBCUTANEOUS at 21:07

## 2017-03-17 RX ADMIN — INSULIN LISPRO 4 UNITS: 100 INJECTION, SOLUTION INTRAVENOUS; SUBCUTANEOUS at 21:07

## 2017-03-17 RX ADMIN — SERTRALINE HYDROCHLORIDE 50 MG: 50 TABLET ORAL at 21:06

## 2017-03-17 RX ADMIN — INSULIN DETEMIR 20 UNITS: 100 INJECTION, SOLUTION SUBCUTANEOUS at 09:47

## 2017-03-17 RX ADMIN — DAPTOMYCIN 600 MG: 500 INJECTION, POWDER, LYOPHILIZED, FOR SOLUTION INTRAVENOUS at 12:26

## 2017-03-17 RX ADMIN — CYCLOBENZAPRINE HYDROCHLORIDE 10 MG: 10 TABLET, FILM COATED ORAL at 21:06

## 2017-03-17 RX ADMIN — LISINOPRIL 10 MG: 10 TABLET ORAL at 10:06

## 2017-03-17 RX ADMIN — INSULIN LISPRO 4 UNITS: 100 INJECTION, SOLUTION INTRAVENOUS; SUBCUTANEOUS at 08:28

## 2017-03-17 RX ADMIN — TOPIRAMATE 25 MG: 25 TABLET, FILM COATED ORAL at 09:44

## 2017-03-17 RX ADMIN — INSULIN LISPRO 4 UNITS: 100 INJECTION, SOLUTION INTRAVENOUS; SUBCUTANEOUS at 12:25

## 2017-03-17 RX ADMIN — DIAZEPAM 5 MG: 5 INJECTION, SOLUTION INTRAMUSCULAR; INTRAVENOUS at 12:27

## 2017-03-17 RX ADMIN — DOCUSATE SODIUM 100 MG: 100 CAPSULE, LIQUID FILLED ORAL at 09:44

## 2017-03-17 RX ADMIN — INSULIN LISPRO 4 UNITS: 100 INJECTION, SOLUTION INTRAVENOUS; SUBCUTANEOUS at 18:37

## 2017-03-17 RX ADMIN — Medication 250 MG: at 10:06

## 2017-03-17 RX ADMIN — ENOXAPARIN SODIUM 40 MG: 40 INJECTION SUBCUTANEOUS at 06:00

## 2017-03-17 RX ADMIN — PANTOPRAZOLE SODIUM 40 MG: 40 TABLET, DELAYED RELEASE ORAL at 06:00

## 2017-03-17 NOTE — PROGRESS NOTES
"[facility] Progress Note    Date of Admission: 3/15/2017      Antibiotics:  Dapto and Rocephin     CC:   Chief Complaint   Patient presents with   • Post-op Problem     Patient relates spinal surgery on 2017 with fever and site drainage x 2 days.     • Fever       S: Surgery yesterday. Has some postoperative neck pain and edema.  Surgical drain in place.  Seropurulent drainage and drain.  No fever, chills, sweats.  No nausea, vomiting, diarrhea.  No flatus or bowel movement since surgery.    O:  Visit Vitals   • /64 (BP Location: Left arm, Patient Position: Lying)   • Pulse 66   • Temp 99 °F (37.2 °C) (Oral)   • Resp 18   • Ht 68.5\" (174 cm)   • Wt 220 lb (99.8 kg)   • SpO2 97%   • BMI 32.96 kg/m2     Temp (24hrs), Av.3 °F (36.8 °C), Min:97.7 °F (36.5 °C), Max:99 °F (37.2 °C)      PE:   GENERAL: Awake and alert, in no acute distress.   HEENT: Normocephalic, atraumatic. PERRL. EOMI. No conjunctival injection. No icterus. Oropharynx clear without evidence of thrush or exudate. No evidence of peridontal disease. No nuchal rigidity.  NECK: Surgical incision on the posterior neck with seropurulent drainage on the dressing and surgical drain in place. Some limited range of motion of the neck however able to flex extend and rotate.   LYMPH: No cervical, axillary or inguinal lymphadenopathy. No neck masses  HEART: RRR; No murmur, rubs, gallops.   LUNGS: Clear to auscultation bilaterally without wheezing, rales, rhonchi. Normal respiratory effort.  ABDOMEN: obese, Soft, nontender, nondistended. Positive bowel sounds. No rebound or guarding.   EXT: No cyanosis, clubbing or edema. Good pulses throughout.  : Normal appearing genitalia without Bond catheter.  MSK: FROM without joint effusions noted Except decreased ROM of c spine.  SKIN: No rash or lesions  NEURO: Oriented to PPT. No focal deficits.   PSYCHIATRIC: Normal insight and judgement. Cooperative with PE  Peripheral IV clean, dry, intact.    Laboratory " Data      Results from last 7 days  Lab Units 03/17/17  0446 03/16/17  0426 03/16/17  0006   WBC 10*3/mm3 10.82* 14.29* 16.50*   HEMOGLOBIN g/dL 11.0* 11.2* 12.3*   HEMATOCRIT % 34.0* 34.1* 37.3*   PLATELETS 10*3/mm3 113* 114* 122*       Results from last 7 days  Lab Units 03/17/17  0446   SODIUM mmol/L 132   POTASSIUM mmol/L 4.4   CHLORIDE mmol/L 104   TOTAL CO2 mmol/L 24.0   BUN mg/dL 23   CREATININE mg/dL 1.20   GLUCOSE mg/dL 246*   CALCIUM mg/dL 8.9       Results from last 7 days  Lab Units 03/16/17  0426   ALK PHOS U/L 61   BILIRUBIN mg/dL 0.8   ALT (SGPT) U/L 20   AST (SGOT) U/L 29       Results from last 7 days  Lab Units 03/15/17  1134   SED RATE mm/hr 53*       Results from last 7 days  Lab Units 03/15/17  1134   CRP mg/dL 83.80*       Estimated Creatinine Clearance: 69 mL/min (by C-G formula based on Cr of 1.2).      Microbiology:  Blood culture: NGSF x 2  Wound culture: GPC in groups; Staph aureus  Surgical culture with 3/16/17: Gram positive cocci in pairs and clusters with Staphylococcus aureus and sensitivities pending    Radiology:  Imaging Results (last 24 hours)     ** No results found for the last 24 hours. **          PROBLEM LIST:   Sepsis present on admission  Postoperative cervical spine with cellulitis with underlying abscess with spontaneous drainage   Staph aureus sp  Leukocytosis  Fever  Acute kidney injury  Diabetes mellitus, hyperlipidemia, hypertension  Thrombocytopenia  Obstructive sleep apnea     ASSESSMENT:  Patient is seen today and initial evaluation with postoperative surgical site wound drainage with imaging showing abscess and fluid collection and cervical spine postoperative site. Admitted with sepsis, fever, leukocytosis, REBECA, elevated inflammatory markers. Wound culture pending.  Patient has been antibiotic modified prior to arrival with cephalexin orally with no improvement. No hx of MRSA.  We'll cover for skin and soft tissue infection with likely organisms being strep and  staph including risk for MRSA. CT scan showing 7.4 x 3.7 x 2.1 cm gas and fluid containing collection within the deep subcutaneous tissues of the posterior midline neck from approximately the C4-T1 levels.  MRI canceled as plans for urgent surgery noted and will review surgical findings but if worsens clinically or + blood cultures recommend MRI of c spine. No signs or symptoms of meningeal involvement or meningitis at this time.      Agree with aggressive irrigation debridement of abscess and will need aggressive antibiotics postoperatively plan 6 weeks of IV therapy to avoid acute discitis or osteomyelitis and therapy. Cultures with Staph aureus and will de-escalate to monotherapy with Dapto and f/u results when available. PICC today and hopeful for d/c without drain tomorrow    PLAN:  Continue Dapto 6 mg/kg for MRSA coverage  Discontinue Rocephin 2 g IV daily    Monitor labs  Follow-up cultures  PICC placement today   Hopeful for drain removal and home tomorrow or Sunday.     UM:  Patient will be coming to [facility] for daily antibiotic infusions possible discharge on Saturday Sunday with follow-up with us the next day to begin his infusions    Dr. Eric Root saw the patient, performed the physical exam, reviewed the laboratory data and guided with the formulation of the above problem list, assessment and treatment plan.     Eric Root MD  3/17/2017

## 2017-03-17 NOTE — PROGRESS NOTES
Continued Stay Note   Denis     Patient Name: Elton Funez  MRN: [id number]  Today's Date: 3/17/2017    Admit Date: 3/15/2017          Discharge Plan       03/17/17 9791    Case Management/Social Work Plan    Plan Update discharge plan    Patient/Family In Agreement With Plan yes    Additional Comments Spoke with patient at bedside regarding discharge plan.  Patient hopng to go home over the weekend with infusions at [facility].  Patient denies discharge needs at this time.  Patient plans to discharge home with ABX infusions at [facility] when medically ready.  Patient will be transported via car with family.              Discharge Codes     None        Expected Discharge Date and Time     Expected Discharge Date Expected Discharge Time    Mar 21, 2017             Nancy Pan RN

## 2017-03-17 NOTE — PROGRESS NOTES
"NEUROSURGERY PROGRESS NOTE    Interval History:   Mr. Funez is a 65yo M who had a posterior foraminotomy on 2/28/17. He did well initially, but presented with new pain and fever on 3/16/17.  CT showed a fluid collection in deep subcutaneous tissues and he is POD 1 from a  wound I&D yesterday.  Today, he states that he is feeling much better. The shaky feeling and neck stiffness that he was experiencing are gone.  Tmax was 101.1 overnight, but he has been afebrile since midnight.  He has been up to the bathroom, is eating, and feeling well.  He has needed only muscle relaxant for pain.      Vital Signs  Blood pressure 112/69, pulse 62, temperature 97.7 °F (36.5 °C), temperature source Oral, resp. rate 18, height 68.5\" (174 cm), weight 220 lb (99.8 kg), SpO2 96 %.    Physical Exam:  Pt is awake, alert, and oriented.  He has moderate drainage on his dressing.  HOLLEY drain is in place. Output details below.  He is sitting up in chair, comfortable with no headache or complaints. He is moving all extremities     Results Review:    HOLLEY drain output has been a total of 95ml.  Currently, there is approximately 20ml of purulent, sanguinous fluid in the HOLLEY bulb    I/O last 3 completed shifts:  In: 2100 [I.V.:1550; IV Piggyback:550]  Out: 1405 [Urine:1300; Other:95; Blood:10]       ASSESSMENT/PLAN:  Patient doing well.  May be discharged to home as soon as antibiotic plan is finalized.      Will leave HOLLEY in place for the time being.  This needs to be removed under sterile conditions, please call neurosurgery PA to do this if infectious disease/hospitalist decides to send him home today    He will need a follow up appointment for suture removal on/around March 30 at neurosurgery office. We will continue to follow while he is an outpatient.     Nadja Bolanos PA-C  03/17/17  9:23 AM  "

## 2017-03-17 NOTE — PROGRESS NOTES
[facility] Medicine Services  INPATIENT PROGRESS NOTE    Date of Admission: 3/15/2017  Length of Stay: 1  Primary Care Physician: Tc Ramirez MD    Subjective-- HPI/Events overnight/ROS/CC- Hospital follow visit.  Detailed ROS not detailed as performed below      Patient ambulating around in wallace. Feeling much better after I&D yesterday. No other complaints. L fingers are tingling, but it's chronic and unchanged. Afebrile.    Objective      Temp:  [97.2 °F (36.2 °C)-98.6 °F (37 °C)] 98.2 °F (36.8 °C)  Heart Rate:  [61-80] 62  Resp:  [14-20] 16  BP: (104-130)/(60-74) 104/61    Physical Exam:  Physical Exam    General Assessment: No acute cardiopulmonary distress. Well developed and well nourished.    HEENT: NCAT, PERRL, MMM    Neck: Supple,  Dressing on back of neck intact, HOLLEY drain intact with sero-purulent dranage    CVS: RRR, S1S2 normal, no murmurs    Resp: CTAB, no adventitious sound    Abd: soft, NT, ND, normal BS, no guarding or peritoneal signs    Ext: No edema, both calves are symmetric and NTTP    Neuro: Nonfocal    Skin: W/D/I. No rash.    Psych: Affect is appropriate      Results Review:    I have reviewed the labs, radiology results and diagnostic studies.      Results from last 7 days  Lab Units 03/17/17  0446   WBC 10*3/mm3 10.82*   HEMOGLOBIN g/dL 11.0*   PLATELETS 10*3/mm3 113*       Results from last 7 days  Lab Units 03/17/17  0446   SODIUM mmol/L 132   POTASSIUM mmol/L 4.4   TOTAL CO2 mmol/L 24.0   CREATININE mg/dL 1.20   GLUCOSE mg/dL 246*       Culture Data:  Radiology Data:   Cultures:    BLOOD CULTURE   Date Value Ref Range Status   03/16/2017 No growth at 24 hours  Preliminary   03/16/2017 No growth at 24 hours  Preliminary     WOUND CULTURE   Date Value Ref Range Status   03/16/2017 Light growth (2+) Staphylococcus aureus (A)  Preliminary       I have reviewed the medications.        Assessment/Plan     Assessment/Problem List    66 year old male s/p left  C7-T1 cervical foraminotomy posterior with metrix on 2/28/17 that presents to ED with complaints of neck and fever found to have post-op infection.      Post-op Infection/abscess  - +fever and leukocytosis, pt nontoxic  - s/p I&D with HOLLEY intact, which will be remove just prior to discharge per neuro  - Vancomycin changed to Dapto for MRSA coverage due to renal insufficiency and Rocephin per ID  - Wound culture + light growth of Staph spp        Diabetes Mellitus Type 2 (A1C 7.6)  -FSBG ACHS  -ss insulin, add basal insulin 20 u BID      Hypertension  -Well controlled  -Continue home medication      Hyperlipidemia  -Continue home medication      Chronic Kidney Disease Stage 3  -Currently at baseline      Violeta Go MD   03/17/17   12:06 PM    Please note that portions of this note may have been completed with a voice recognition program. Efforts were made to edit the dictations, but occasionally words are mistranscribed.

## 2017-03-17 NOTE — PLAN OF CARE
Problem: Patient Care Overview (Adult)  Goal: Plan of Care Review  Outcome: Ongoing (interventions implemented as appropriate)  Goal: Adult Individualization and Mutuality  Outcome: Ongoing (interventions implemented as appropriate)  Goal: Discharge Needs Assessment  Outcome: Ongoing (interventions implemented as appropriate)    Problem: Infection, Risk/Actual (Adult)  Goal: Identify Related Risk Factors and Signs and Symptoms  Outcome: Ongoing (interventions implemented as appropriate)  Goal: Infection Prevention/Resolution  Outcome: Ongoing (interventions implemented as appropriate)    Problem: Pain, Acute (Adult)  Goal: Identify Related Risk Factors and Signs and Symptoms  Outcome: Ongoing (interventions implemented as appropriate)  Goal: Acceptable Pain Control/Comfort Level  Outcome: Ongoing (interventions implemented as appropriate)

## 2017-03-18 VITALS
RESPIRATION RATE: 16 BRPM | BODY MASS INDEX: 32.58 KG/M2 | SYSTOLIC BLOOD PRESSURE: 113 MMHG | DIASTOLIC BLOOD PRESSURE: 72 MMHG | OXYGEN SATURATION: 98 % | HEART RATE: 52 BPM | TEMPERATURE: 97.9 F | WEIGHT: 220 LBS | HEIGHT: 69 IN

## 2017-03-18 LAB
ANION GAP SERPL CALCULATED.3IONS-SCNC: 5 MMOL/L (ref 3–11)
BACTERIA FLD CULT: ABNORMAL
BACTERIA FLD CULT: ABNORMAL
BACTERIA SPEC AEROBE CULT: ABNORMAL
BASOPHILS # BLD AUTO: 0.02 10*3/MM3 (ref 0–0.2)
BASOPHILS NFR BLD AUTO: 0.3 % (ref 0–1)
BUN BLD-MCNC: 25 MG/DL (ref 9–23)
BUN/CREAT SERPL: 20.8 (ref 7–25)
CALCIUM SPEC-SCNC: 8.6 MG/DL (ref 8.7–10.4)
CHLORIDE SERPL-SCNC: 106 MMOL/L (ref 99–109)
CO2 SERPL-SCNC: 24 MMOL/L (ref 20–31)
CREAT BLD-MCNC: 1.2 MG/DL (ref 0.6–1.3)
DEPRECATED RDW RBC AUTO: 45.6 FL (ref 37–54)
EOSINOPHIL # BLD AUTO: 0.25 10*3/MM3 (ref 0.1–0.3)
EOSINOPHIL NFR BLD AUTO: 3.5 % (ref 0–3)
ERYTHROCYTE [DISTWIDTH] IN BLOOD BY AUTOMATED COUNT: 13.2 % (ref 11.3–14.5)
GFR SERPL CREATININE-BSD FRML MDRD: 60 ML/MIN/1.73
GLUCOSE BLD-MCNC: 165 MG/DL (ref 70–100)
GLUCOSE BLDC GLUCOMTR-MCNC: 140 MG/DL (ref 70–130)
GLUCOSE BLDC GLUCOMTR-MCNC: 228 MG/DL (ref 70–130)
GRAM STN SPEC: ABNORMAL
HCT VFR BLD AUTO: 30.6 % (ref 38.9–50.9)
HGB BLD-MCNC: 10.2 G/DL (ref 13.1–17.5)
IMM GRANULOCYTES # BLD: 0.02 10*3/MM3 (ref 0–0.03)
IMM GRANULOCYTES NFR BLD: 0.3 % (ref 0–0.6)
LYMPHOCYTES # BLD AUTO: 1.19 10*3/MM3 (ref 0.6–4.8)
LYMPHOCYTES NFR BLD AUTO: 16.8 % (ref 24–44)
MCH RBC QN AUTO: 31.4 PG (ref 27–31)
MCHC RBC AUTO-ENTMCNC: 33.3 G/DL (ref 32–36)
MCV RBC AUTO: 94.2 FL (ref 80–99)
MONOCYTES # BLD AUTO: 0.78 10*3/MM3 (ref 0–1)
MONOCYTES NFR BLD AUTO: 11 % (ref 0–12)
NEUTROPHILS # BLD AUTO: 4.84 10*3/MM3 (ref 1.5–8.3)
NEUTROPHILS NFR BLD AUTO: 68.1 % (ref 41–71)
PLATELET # BLD AUTO: 113 10*3/MM3 (ref 150–450)
PMV BLD AUTO: 11.8 FL (ref 6–12)
POTASSIUM BLD-SCNC: 3.9 MMOL/L (ref 3.5–5.5)
RBC # BLD AUTO: 3.25 10*6/MM3 (ref 4.2–5.76)
SODIUM BLD-SCNC: 135 MMOL/L (ref 132–146)
WBC NRBC COR # BLD: 7.1 10*3/MM3 (ref 3.5–10.8)

## 2017-03-18 PROCEDURE — 99239 HOSP IP/OBS DSCHRG MGMT >30: CPT | Performed by: PHYSICIAN ASSISTANT

## 2017-03-18 PROCEDURE — 94799 UNLISTED PULMONARY SVC/PX: CPT

## 2017-03-18 PROCEDURE — 80048 BASIC METABOLIC PNL TOTAL CA: CPT | Performed by: HOSPITALIST

## 2017-03-18 PROCEDURE — 82962 GLUCOSE BLOOD TEST: CPT

## 2017-03-18 PROCEDURE — 85025 COMPLETE CBC W/AUTO DIFF WBC: CPT | Performed by: HOSPITALIST

## 2017-03-18 PROCEDURE — 99024 POSTOP FOLLOW-UP VISIT: CPT | Performed by: NEUROLOGICAL SURGERY

## 2017-03-18 PROCEDURE — 25010000002 ENOXAPARIN PER 10 MG: Performed by: NEUROLOGICAL SURGERY

## 2017-03-18 PROCEDURE — 63710000001 INSULIN DETEMIR PER 5 UNITS: Performed by: HOSPITALIST

## 2017-03-18 PROCEDURE — 25010000003 CEFTRIAXONE PER 250 MG: Performed by: INTERNAL MEDICINE

## 2017-03-18 RX ORDER — CEFTRIAXONE SODIUM 2 G/50ML
2 INJECTION, SOLUTION INTRAVENOUS EVERY 24 HOURS
Refills: 0
Start: 2017-03-18 | End: 2017-04-26

## 2017-03-18 RX ORDER — ONDANSETRON 4 MG/1
4 TABLET, FILM COATED ORAL EVERY 6 HOURS PRN
Qty: 12 TABLET | Refills: 0 | Status: SHIPPED | OUTPATIENT
Start: 2017-03-18 | End: 2017-04-26

## 2017-03-18 RX ORDER — SACCHAROMYCES BOULARDII 250 MG
250 CAPSULE ORAL DAILY
Qty: 60 CAPSULE | Refills: 2 | Status: SHIPPED | OUTPATIENT
Start: 2017-03-18 | End: 2017-05-31

## 2017-03-18 RX ORDER — CEFTRIAXONE SODIUM 2 G/50ML
2 INJECTION, SOLUTION INTRAVENOUS EVERY 24 HOURS
Status: DISCONTINUED | OUTPATIENT
Start: 2017-03-18 | End: 2017-03-18 | Stop reason: HOSPADM

## 2017-03-18 RX ORDER — FAMOTIDINE 20 MG/1
20 TABLET, FILM COATED ORAL 2 TIMES DAILY
Qty: 60 TABLET | Refills: 1 | Status: SHIPPED | OUTPATIENT
Start: 2017-03-18 | End: 2017-05-31

## 2017-03-18 RX ADMIN — INSULIN LISPRO 3 UNITS: 100 INJECTION, SOLUTION INTRAVENOUS; SUBCUTANEOUS at 12:44

## 2017-03-18 RX ADMIN — ATORVASTATIN CALCIUM 40 MG: 40 TABLET, FILM COATED ORAL at 08:37

## 2017-03-18 RX ADMIN — HYDROCHLOROTHIAZIDE 12.5 MG: 12.5 TABLET ORAL at 08:37

## 2017-03-18 RX ADMIN — INSULIN DETEMIR 25 UNITS: 100 INJECTION, SOLUTION SUBCUTANEOUS at 10:08

## 2017-03-18 RX ADMIN — ENOXAPARIN SODIUM 40 MG: 40 INJECTION SUBCUTANEOUS at 05:52

## 2017-03-18 RX ADMIN — DOCUSATE SODIUM 100 MG: 100 CAPSULE, LIQUID FILLED ORAL at 08:38

## 2017-03-18 RX ADMIN — Medication 250 MG: at 08:38

## 2017-03-18 RX ADMIN — LISINOPRIL 10 MG: 10 TABLET ORAL at 08:37

## 2017-03-18 RX ADMIN — CEFTRIAXONE SODIUM 2 G: 2 INJECTION, SOLUTION INTRAVENOUS at 12:45

## 2017-03-18 RX ADMIN — SODIUM CHLORIDE 75 ML/HR: 9 INJECTION, SOLUTION INTRAVENOUS at 05:08

## 2017-03-18 RX ADMIN — OXYCODONE AND ACETAMINOPHEN 1 TABLET: 5; 325 TABLET ORAL at 06:13

## 2017-03-18 RX ADMIN — TOPIRAMATE 25 MG: 25 TABLET, FILM COATED ORAL at 08:37

## 2017-03-18 RX ADMIN — ATENOLOL 50 MG: 50 TABLET ORAL at 08:37

## 2017-03-18 RX ADMIN — PANTOPRAZOLE SODIUM 40 MG: 40 TABLET, DELAYED RELEASE ORAL at 05:52

## 2017-03-18 NOTE — PLAN OF CARE
Problem: Patient Care Overview (Adult)  Goal: Plan of Care Review  Outcome: Ongoing (interventions implemented as appropriate)  Goal: Adult Individualization and Mutuality  Outcome: Ongoing (interventions implemented as appropriate)  Goal: Discharge Needs Assessment  Outcome: Ongoing (interventions implemented as appropriate)    Problem: Infection, Risk/Actual (Adult)  Goal: Identify Related Risk Factors and Signs and Symptoms  Outcome: Ongoing (interventions implemented as appropriate)  Goal: Infection Prevention/Resolution  Outcome: Ongoing (interventions implemented as appropriate)    Problem: Pain, Acute (Adult)  Goal: Identify Related Risk Factors and Signs and Symptoms  Outcome: Ongoing (interventions implemented as appropriate)  Goal: Acceptable Pain Control/Comfort Level  Outcome: Ongoing (interventions implemented as appropriate)    Problem: Sepsis (Adult)  Goal: Signs and Symptoms of Listed Potential Problems Will be Absent or Manageable (Sepsis)  Outcome: Ongoing (interventions implemented as appropriate)    Problem: Perioperative Period (Adult)  Goal: Signs and Symptoms of Listed Potential Problems Will be Absent or Manageable (Perioperative Period)  Outcome: Ongoing (interventions implemented as appropriate)

## 2017-03-18 NOTE — NURSING NOTE
Changed PICC line dressing to right upper arm. Discharge instructions given to patient. Denies questions or concerns. Patient awaiting transport.

## 2017-03-18 NOTE — PROGRESS NOTES
This is a 66-year-old man who is postoperative day 2 status post a washout of a posterior cervical incision infection.  He reports significant subjective improvement in his constitutional symptoms following his washout.    Vitals:    17 0950   BP: 107/69   Pulse: 68   Resp: 18   Temp: 98.1 °F (36.7 °C)   SpO2: 97%     Temp (24hrs), Av.3 °F (36.8 °C), Min:97.6 °F (36.4 °C), Max:99 °F (37.2 °C)    His surgical incision is clean, dry, and intact.  He has a scant amount of serosanguineous discharge in his HOLLEY drain.    We will discontinue his drain today.  He is cleared for discharge from a neurosurgery standpoint.  My understanding is that infectious disease was waiting for cultures and sensitivities of his wound specimen to grow out.  It looks like these were ultimately reported yesterday at about 2:00, but I will defer to infectious disease in terms of clearing him for discharge.    The event that he is discharged today, I would like for him to follow up with me in clinic this week.

## 2017-03-18 NOTE — DISCHARGE SUMMARY
[facility] Medicine Services  DISCHARGE SUMMARY       Date of Admission: 3/15/2017  Date of Discharge:  3/18/2017  Primary Care Physician: Tc Ramirez MD    Discharge Diagnoses:  Active Hospital Problems (** Indicates Principal Problem)    Diagnosis Date Noted   • **Post-operative infection [T81.4XXA] 03/16/2017   • Leukocytosis [D72.829] 03/16/2017   • Neck abscess [L02.11] 03/16/2017   • DM (diabetes mellitus), type 2, uncontrolled w/neurologic complication [E11.49, E11.65] 10/07/2016   • Chronic kidney disease, stage III (moderate) [N18.3] 07/14/2016   • Gastroesophageal reflux disease without esophagitis [K21.9] 07/14/2016   • Hyperlipidemia [E78.5] 07/14/2016   • Hypertension [I10] 07/14/2016      Resolved Hospital Problems    Diagnosis Date Noted Date Resolved   No resolved problems to display.       Presenting Problem/History of Present Illness  Post-operative infection [T81.4XXA]     History of Present Illness on Day of Discharge:   Resting in bed, wants to go home. Reports feeling much better. No new complaints, denies: fever, chills, HA, dizziness, vision change, CP, SOA, cough, Abd pain, N/V/D.       Hospital Course  Patient is a 66 y.o. male  With PMH CAD and T2DM, s/p left C7-T1 posterior cervical foraminotomies 2/28/17 by MIRTA Champagne , presented to his clinic 3/14/17 with purlent drainage from superior aspect of incision. He was started on keflex and sent home with instructions to come to the emergency department for a fever of greater than 101.5. He presented to the ed 3/15/17 with fever of 101.5 was found to have leukocytosis, wbc 17.7, Cr 1.4. Noted to have postop surgical site wound w/drainage, CT of neck showed abscess with fluid collection at cervical spine postoperative site. Pt was admitted to hospitalist for further evaluation and management. Upon admit, received IV abx, supportive care, NS and ID consulted.    S/p I&D w/HOLLEY drain placement 3/16 per   [facility] Wound cx pending. Abx have been modified per ID during course of stay, currently receiving rocephin.     NS discontinued HOLLEY drain today 3/18/17. From NS standpoint pt ok for dc home.   Pt is to follow up in NS clinic with Dr. Perales this upcoming week, will need to f/u for suture removal around 3/30 per NS in their office. Activity restrictions per NS.       Dr Root, ID recommends Rocephin 2g IV daily planning 6wks IV tx to avoid discitits or OM, monitoring labs and he will f/u cultures as outpatient. Pt clinically improving. Surgery cx + MSSA w/deep space infection. PICC line placed. He recommends pt dc home today and start outpatient infusions with Rocephin tomorrow in [facility] office. Pt is to followup with Dr Root in clinic on Monday. CM has assisted with discharge planning.     T2DM managed with basal and SSI during course of stay, A1c 7.6% good control given pt age. Pt to continue home medication regimen at [place]. CKD stg III, stable, now at baseline Cr 1.2. HTN well controlled on home medications.     Pt is clinically improved, afebrile, normotensive, wbc normal, bcx remain negative.Tolerating po well.   Will give pepcid instead of PPI 2nd to abx and risk of c diff.     Follow up appts as above. Will also need follow with PCP within 1 wk of dc.     Procedures Performed  Procedure(s):   INCISION AND DRAINAGE OF POSTERIOR CERVICAL WOUND       Consults:   Consults     Date and Time Order Name Status Description    3/16/2017 1357 Inpatient Consult to Neurosurgery In process     3/16/2017 1125 Inpatient Consult to Infectious Diseases      3/16/2017 0935 Inpatient Consult to Infectious Diseases Completed           Pertinent Test Results:    Anaerobic Culture [[id number]] (Normal) Collected: 03/16/17 1347       Lab Status: Preliminary result Specimen: Body Fluid from Spine, Cervical Updated: 03/17/17 1439        Culture No anaerobes isolated        Fungus Culture [[id number]] Collected: 03/16/17 1347       Lab Status:  In process Specimen: Body Fluid from Spine, Cervical Updated: 03/16/17 1611       Body Fluid Culture [[id number]] (Abnormal)  Collected: 03/16/17 1347       Lab Status: Final result Specimen: Body Fluid from Spine, Cervical Updated: 03/18/17 1104        BF Culture Light growth (2+) Staphylococcus aureus (A)          This isolate is presumed to be clindamycin resistant based on detection of inducible clindamycin resistance.  Clindamycin may still be effective in some patients.           Gram Stain Result No WBCs or organisms seen          Susceptibility         Staphylococcus aureus         CLYDE         Ceftriaxone <=8  Susceptible         Daptomycin <=0.5  Susceptible         Erythromycin >4  Resistant         Gentamicin <=4  Susceptible         Levofloxacin <=1  Susceptible         Linezolid 4  Susceptible         Oxacillin 0.5  Susceptible         Penicillin G >8  Resistant         Quinupristin + Dalfopristin <=0.5  Susceptible         Rifampin <=1  Susceptible         Tetracycline <=4  Susceptible         Trimethoprim + Sulfamethoxazole <=0.5/9.5  Susceptible         Vancomycin 2  Susceptible                               AFB Culture [[id number]] Collected: 03/16/17 1347       Lab Status: Preliminary result Specimen: Body Fluid from Spine, Cervical Updated: 03/17/17 1237        AFB Stain           No acid fast bacilli seen on concentrated smear       Anaerobic Culture [[id number]] (Normal) Collected: 03/16/17 1347       Lab Status: Preliminary result Specimen: Body Fluid from Spine, Cervical Updated: 03/17/17 1454        Culture No anaerobes isolated        Fungus Culture [[id number]] Collected: 03/16/17 1347       Lab Status: In process Specimen: Body Fluid from Spine, Cervical Updated: 03/16/17 1624       Body Fluid Culture [[id number]] (Abnormal)  Collected: 03/16/17 1347       Lab Status: Final result Specimen: Body Fluid from Spine, Cervical Updated: 03/18/17 1105        BF Culture           Moderate growth (3+)  Staphylococcus aureus (A)         This isolate is presumed to be clindamycin resistant based on detection of inducible clindamycin resistance.  Clindamycin may still be effective in some patients.           Gram Stain Result           Many (4+) Gram positive cocci in pairs and clusters         Many (4+) WBCs seen          Moderate (3+) Red blood cells          Susceptibility         Staphylococcus aureus         CLYDE         Ceftriaxone <=8  Susceptible         Daptomycin 1  Susceptible         Erythromycin >4  Resistant         Gentamicin <=4  Susceptible         Levofloxacin <=1  Susceptible         Linezolid 4  Susceptible         Oxacillin 0.5  Susceptible         Penicillin G >8  Resistant         Quinupristin + Dalfopristin <=0.5  Susceptible         Rifampin <=1  Susceptible         Tetracycline <=4  Susceptible         Trimethoprim + Sulfamethoxazole <=0.5/9.5  Susceptible         Vancomycin 2  Susceptible                               AFB Culture [[id number]] Collected: 03/16/17 1347       Lab Status: Preliminary result Specimen: Body Fluid from Spine, Cervical Updated: 03/17/17 1237        AFB Stain           No acid fast bacilli seen on concentrated smear       Wound Culture [[id number]] (Abnormal)  Collected: 03/16/17 0121       Lab Status: Final result Specimen: Surgical Site from Back, Upper Updated: 03/18/17 1103        Wound Culture Light growth (2+) Staphylococcus aureus (A)          This isolate is presumed to be clindamycin resistant based on detection of inducible clindamycin resistance.  Clindamycin may still be effective in some patients.           Gram Stain Result Moderate (3+) WBCs seen          Rare (1+) Epithelial cells seen          Few (2+) Gram positive cocci in groups          Susceptibility         Staphylococcus aureus         CLYDE         Ceftriaxone <=8  Susceptible         Daptomycin <=0.5  Susceptible         Erythromycin >4  Resistant         Gentamicin <=4  Susceptible          Levofloxacin <=1  Susceptible         Linezolid 4  Susceptible         Oxacillin 0.5  Susceptible         Penicillin G >8  Resistant         Quinupristin + Dalfopristin <=0.5  Susceptible         Rifampin <=1  Susceptible         Tetracycline <=4  Susceptible         Trimethoprim + Sulfamethoxazole <=0.5/9.5  Susceptible         Vancomycin 2  Susceptible                               Blood Culture [[id number]] (Normal) Collected: 03/16/17 0006       Lab Status: Preliminary result Specimen: Blood from Arm, Right Updated: 03/18/17 0101        Blood Culture No growth at 2 days        Blood Culture [[id number]] (Normal) Collected: 03/16/17 0006       Lab Status: Preliminary result Specimen: Blood from Arm, Left Updated: 03/18/17 0101        Blood Culture No growth at 2 days       Results for KERVIN GLOVER (MRN [id number]) as of 3/18/2017 15:28   Ref. Range 3/18/2017 04:08   Glucose Latest Ref Range: 70 - 100 mg/dL 165 (H)   Sodium Latest Ref Range: 132 - 146 mmol/L 135   Potassium Latest Ref Range: 3.5 - 5.5 mmol/L 3.9   CO2 Latest Ref Range: 20.0 - 31.0 mmol/L 24.0   Chloride Latest Ref Range: 99 - 109 mmol/L 106   Anion Gap Latest Ref Range: 3.0 - 11.0 mmol/L 5.0   Creatinine Latest Ref Range: 0.60 - 1.30 mg/dL 1.20   BUN Latest Ref Range: 9 - 23 mg/dL 25 (H)   BUN/Creatinine Ratio Latest Ref Range: 7.0 - 25.0  20.8   Calcium Latest Ref Range: 8.7 - 10.4 mg/dL 8.6 (L)   eGFR Non African Amer Latest Ref Range: >60 mL/min/1.73 60 (L)   WBC Latest Ref Range: 3.50 - 10.80 10*3/mm3 7.10   RBC Latest Ref Range: 4.20 - 5.76 10*6/mm3 3.25 (L)   Hemoglobin Latest Ref Range: 13.1 - 17.5 g/dL 10.2 (L)   Hematocrit Latest Ref Range: 38.9 - 50.9 % 30.6 (L)   RDW Latest Ref Range: 11.3 - 14.5 % 13.2   MCV Latest Ref Range: 80.0 - 99.0 fL 94.2   MCH Latest Ref Range: 27.0 - 31.0 pg 31.4 (H)   MCHC Latest Ref Range: 32.0 - 36.0 g/dL 33.3   MPV Latest Ref Range: 6.0 - 12.0 fL 11.8   Platelets Latest Ref Range: 150 - 450 10*3/mm3 113  (L)   RDW-SD Latest Ref Range: 37.0 - 54.0 fl 45.6   Neutrophil % Latest Ref Range: 41.0 - 71.0 % 68.1   Lymphocyte % Latest Ref Range: 24.0 - 44.0 % 16.8 (L)   Monocyte % Latest Ref Range: 0.0 - 12.0 % 11.0   Eosinophil % Latest Ref Range: 0.0 - 3.0 % 3.5 (H)   Basophil % Latest Ref Range: 0.0 - 1.0 % 0.3   Immature Grans % Latest Ref Range: 0.0 - 0.6 % 0.3   Neutrophils, Absolute Latest Ref Range: 1.50 - 8.30 10*3/mm3 4.84   Lymphocytes, Absolute Latest Ref Range: 0.60 - 4.80 10*3/mm3 1.19   Monocytes, Absolute Latest Ref Range: 0.00 - 1.00 10*3/mm3 0.78   Eosinophils, Absolute Latest Ref Range: 0.10 - 0.30 10*3/mm3 0.25   Basophils, Absolute Latest Ref Range: 0.00 - 0.20 10*3/mm3 0.02   Immature Grans, Absolute Latest Ref Range: 0.00 - 0.03 10*3/mm3 0.02     Imaging Results (most recent)     Procedure Component Value Units Date/Time    CT Cervical Spine Without Contrast [[id number]]  (Abnormal) Collected:  03/16/17 0119     Updated:  03/16/17 0237    Narrative:         EXAM:  CT Cervical Spine Without Intravenous Contrast.    CLINICAL HISTORY:  66 years old, male; Cellulitis of neck; Infection following a procedure,   initial encounter; Pain; Other: See notes; Prior surgery; Surgery date: <1   month; Additional info: Post op infection    TECHNIQUE:  Axial computed tomography images of the cervical spine without intravenous   contrast.  This CT exam was performed using one or more of the following dose   reduction techniques:  automated exposure control, adjustment of the mA and/or   kV according to patient size, and/or use of iterative reconstruction technique.    COMPARISON:  CT CERVICAL SPINE WO CONTRAST 11/9/2016 9:11:12 AM    FINDINGS:  Vertebrae:  Degenerative changes of the atlantoaxial articulation.  No acute   fracture.  Discs/spinal canal/neural foramina:  7.4 x 3.7 x 2.1 cm gas and fluid   containing collection within the deep subcutaneous tissues of the posterior   midline neck from approximately the  "C4-T1 levels.  Multilevel degenerative disc   disease.  Prior discectomy and fusion changes at the C6-7 level.  Soft tissues:  See above.  Vasculature:  Atherosclerotic vascular calcifications.  Lung apices:  Normal as visualized.      Impression:         1.  7.4 x 3.7 x 2.1 cm gas and fluid containing collection within the deep   subcutaneous tissues of the posterior midline neck from approximately the C4-T1   levels.  Findings likely represent soft tissue abscess. Recommend further   evaluation.    2.  Incidental/non-acute findings are described above.      THIS DOCUMENT HAS BEEN ELECTRONICALLY SIGNED BY MICHELLE GONZALES MD        Condition on Discharge:  stable    Physical Exam on Discharge:  Visit Vitals   • /69 (BP Location: Right arm, Patient Position: Lying)   • Pulse 68   • Temp 98.1 °F (36.7 °C) (Oral)   • Resp 18   • Ht 68.5\" (174 cm)   • Wt 220 lb (99.8 kg)   • SpO2 97%   • BMI 32.96 kg/m2     Physical Exam  Temp:  [97.6 °F (36.4 °C)-98.7 °F (37.1 °C)] 97.9 °F (36.6 °C)  Heart Rate:  [52-68] 52  Resp:  [16-18] 16  BP: (102-114)/(65-72) 113/72  Constitutional: no acute distress, awake, alert  Eyes: EOM intact  sclerae anicteric, no conjunctival injection  Neck: supple,  trachea midline  Respiratory: Clear to auscultation bilaterally, nonlabored respirations   Cardiovascular: RRR, no murmurs, rubs, or gallops, palpable pedal pulses bilaterally  Gastrointestinal: Positive bowel sounds, soft, nontender, nondistended  Musculoskeletal: No bilateral ankle edema, no clubbing or cyanosis to bilateral lower extremities  Psychiatric: oriented x 3, appropriate affect, cooperative  Neurologic: Strength symmetric in all extremities, follow commands  Skin: posterior cervical incision covered with clean banadage/Dry/intact; PICC with clean dressing Right UE  Discharge Disposition  Home or Self Care    Discharge Medications   Elton Funez   Home Medication Instructions TRISTEN:[id number]    Printed on:03/18/17 1525 "   Medication Information                      Albiglutide (TANZEUM) 50 MG pen-injector  Inject 50 mg under the skin 1 (One) Time Per Week. Indications: didnt tolerate preferred medication Bydureon, worse glycemic control             aspirin 81 MG tablet  Take 1 tablet by mouth Daily. Last dose 2-19-17 may resume when okay with Dr Perales             atenolol (TENORMIN) 50 MG tablet  Take 1 tablet by mouth daily.             cefTRIAXone (ROCEPHIN) 40 MG/ML IVPB  Infuse 50 mL into a venous catheter Daily. Indications: Skin and Soft Tissue Infection             Coenzyme Q10 (CO Q 10 PO)  Take 200 mg by mouth 2 (Two) Times a Day.             colestipol (COLESTID) 1 G tablet  TAKE 2 TABLETS TWICE A DAY             cyclobenzaprine (FLEXERIL) 10 MG tablet  TAKE ONE TABLET BY MOUTH THREE TIMES A DAY AS NEEDED FOR MUSCLE SPASMS             docusate sodium (COLACE) 100 MG capsule  Take 100 mg by mouth Daily.             famotidine (PEPCID) 20 MG tablet  Take 1 tablet by mouth 2 (Two) Times a Day.             Flaxseed, Linseed, (FLAXSEED OIL) 1200 MG capsule  Take 1 capsule by mouth 2 (two) times a day.             fluocinonide (LIDEX) 0.05 % cream  Apply  topically Every 8 (Eight) Hours. Apply sparingly to affected area(s) twice daily as needed.             fluticasone (FLONASE) 50 MCG/ACT nasal spray  into each nostril As Needed. Use 2 sprays in each nostril once daily as needed.             gemfibrozil (LOPID) 600 MG tablet  TAKE 1 TABLET TWICE A DAY             glucose blood test strip  USE TO TEST BLOOD SUGAR TWICE DAILY AND AS NEEDED             Insulin Pen Needle (B-D ULTRAFINE III SHORT PEN) 31G X 8 MM misc  1 each 3 (Three) Times a Day.             Insulin Regular Human, Conc, (HumuLIN R) 500 UNIT/ML solution pen-injector CONCENTRATED injection  90 units twice a day before meals, titrate up as indicated             Lancets misc  1 each 2 (two) times a day.             LANTUS SOLOSTAR 100 UNIT/ML injection pen  INJECT  50 UNITS UNDER THE SKIN EVERY EVENING             lisinopril-hydrochlorothiazide (PRINZIDE,ZESTORETIC) 10-12.5 MG per tablet  TAKE 1 TABLET DAILY             Multiple Vitamins-Minerals (MULTIVITAMIN PO)  Take 1 tablet by mouth Daily.             ondansetron (ZOFRAN) 4 MG tablet  Take 1 tablet by mouth Every 6 (Six) Hours As Needed for Nausea or Vomiting.             saccharomyces boulardii (FLORASTOR) 250 MG capsule  Take 1 capsule by mouth Daily.             sertraline (ZOLOFT) 50 MG tablet  TAKE ONE TABLET BY MOUTH DAILY             simvastatin (ZOCOR) 40 MG tablet  TAKE 1 TABLET DAILY AT BEDTIME             topiramate (TOPAMAX) 25 MG tablet  Take 25 mg by mouth Every Night.                 Discharge Diet:   Diet Instructions     Diet: Consistent Carbohydrate, Cardiac; Thin Liquids, No Restrictions       Discharge Diet:   Consistent Carbohydrate  Cardiac      Fluid Consistency:  Thin Liquids, No Restrictions                 Discharge Care Plan / Instructions:    Activity at Discharge:   Activity Instructions     Discharge Activity Restrictions       Per Dr Perales                 Follow-up Appointments  Future Appointments  Date Time Provider Department Center   3/22/2017 8:00 AM MELISSA Arthur [facility] None   3/22/2017 11:15 AM Gigi Perales MD [facility] None   3/28/2017 8:45 AM Tc Ramirez MD [facility] None   5/3/2017 1:15 PM Pau MACK MD [facility] None     Additional Instructions for the Follow-ups that You Need to Schedule     Additional Discharge Follow-Up (Specify Provider)    As directed    To:  Dr Perales   Follow Up Details:  Keep appt scheduled Wednesday, sutures to be removed by NS near 3/30/17       Discharge Follow-Up With Specified Provider    As directed    To:  Dr Root   Follow Up Details:  Pt to go to [facility] clinic for IV rocephin daily- starting tomorrow, sees Dr Root on Monday       Discharge Follow-up with PCP    As directed    Follow Up Details:  Keep follow  up appt this Wednesday                 Test Results Pending at Discharge   Order Current Status    Fungus Culture In process    Fungus Culture In process    AFB Culture Preliminary result    AFB Culture Preliminary result    Anaerobic Culture Preliminary result    Anaerobic Culture Preliminary result    Blood Culture Preliminary result    Blood Culture Preliminary result           Casie M Mayne, PA 03/18/17 3:25 PM    Time: Discharge 40 min    Please note that portions of this note may have been completed with a voice recognition program. Efforts were made to edit the dictations, but occasionally words are mistranscribed.

## 2017-03-20 ENCOUNTER — TRANSCRIBE ORDERS (OUTPATIENT)
Dept: LAB | Facility: HOSPITAL | Age: 67
End: 2017-03-20

## 2017-03-20 ENCOUNTER — APPOINTMENT (OUTPATIENT)
Dept: LAB | Facility: HOSPITAL | Age: 67
End: 2017-03-20

## 2017-03-20 ENCOUNTER — TELEPHONE (OUTPATIENT)
Dept: INTERNAL MEDICINE | Facility: CLINIC | Age: 67
End: 2017-03-20

## 2017-03-20 DIAGNOSIS — L03.221 CELLULITIS AND ABSCESS OF NECK: ICD-10-CM

## 2017-03-20 DIAGNOSIS — L02.11 CELLULITIS AND ABSCESS OF NECK: ICD-10-CM

## 2017-03-20 DIAGNOSIS — T81.40XA OTHER POSTOPERATIVE INFECTION: Primary | ICD-10-CM

## 2017-03-20 LAB
ALBUMIN SERPL-MCNC: 4.1 G/DL (ref 3.2–4.8)
ALBUMIN/GLOB SERPL: 1.3 G/DL (ref 1.5–2.5)
ALP SERPL-CCNC: 100 U/L (ref 25–100)
ALT SERPL W P-5'-P-CCNC: 38 U/L (ref 7–40)
ANION GAP SERPL CALCULATED.3IONS-SCNC: 6 MMOL/L (ref 3–11)
AST SERPL-CCNC: 59 U/L (ref 0–33)
BASOPHILS # BLD AUTO: 0.03 10*3/MM3 (ref 0–0.2)
BASOPHILS NFR BLD AUTO: 0.3 % (ref 0–1)
BILIRUB SERPL-MCNC: 0.3 MG/DL (ref 0.3–1.2)
BUN BLD-MCNC: 25 MG/DL (ref 9–23)
BUN/CREAT SERPL: 20.8 (ref 7–25)
CALCIUM SPEC-SCNC: 9.5 MG/DL (ref 8.7–10.4)
CHLORIDE SERPL-SCNC: 107 MMOL/L (ref 99–109)
CO2 SERPL-SCNC: 25 MMOL/L (ref 20–31)
CREAT BLD-MCNC: 1.2 MG/DL (ref 0.6–1.3)
CRP SERPL-MCNC: 41.4 MG/DL (ref 0–10)
DEPRECATED RDW RBC AUTO: 47.3 FL (ref 37–54)
EOSINOPHIL # BLD AUTO: 0.31 10*3/MM3 (ref 0.1–0.3)
EOSINOPHIL NFR BLD AUTO: 3.2 % (ref 0–3)
ERYTHROCYTE [DISTWIDTH] IN BLOOD BY AUTOMATED COUNT: 13.4 % (ref 11.3–14.5)
ERYTHROCYTE [SEDIMENTATION RATE] IN BLOOD: 74 MM/HR (ref 0–20)
GFR SERPL CREATININE-BSD FRML MDRD: 60 ML/MIN/1.73
GLOBULIN UR ELPH-MCNC: 3.2 GM/DL
GLUCOSE BLD-MCNC: 43 MG/DL (ref 70–100)
HCT VFR BLD AUTO: 37.4 % (ref 38.9–50.9)
HGB BLD-MCNC: 12.1 G/DL (ref 13.1–17.5)
IMM GRANULOCYTES # BLD: 0.07 10*3/MM3 (ref 0–0.03)
IMM GRANULOCYTES NFR BLD: 0.7 % (ref 0–0.6)
LYMPHOCYTES # BLD AUTO: 1.26 10*3/MM3 (ref 0.6–4.8)
LYMPHOCYTES NFR BLD AUTO: 13.2 % (ref 24–44)
MCH RBC QN AUTO: 31.2 PG (ref 27–31)
MCHC RBC AUTO-ENTMCNC: 32.4 G/DL (ref 32–36)
MCV RBC AUTO: 96.4 FL (ref 80–99)
MONOCYTES # BLD AUTO: 0.9 10*3/MM3 (ref 0–1)
MONOCYTES NFR BLD AUTO: 9.4 % (ref 0–12)
NEUTROPHILS # BLD AUTO: 6.98 10*3/MM3 (ref 1.5–8.3)
NEUTROPHILS NFR BLD AUTO: 73.2 % (ref 41–71)
PLATELET # BLD AUTO: 150 10*3/MM3 (ref 150–450)
PMV BLD AUTO: 11.5 FL (ref 6–12)
POTASSIUM BLD-SCNC: 4.6 MMOL/L (ref 3.5–5.5)
PROT SERPL-MCNC: 7.3 G/DL (ref 5.7–8.2)
RBC # BLD AUTO: 3.88 10*6/MM3 (ref 4.2–5.76)
SODIUM BLD-SCNC: 138 MMOL/L (ref 132–146)
WBC NRBC COR # BLD: 9.55 10*3/MM3 (ref 3.5–10.8)

## 2017-03-20 PROCEDURE — 85652 RBC SED RATE AUTOMATED: CPT | Performed by: INTERNAL MEDICINE

## 2017-03-20 PROCEDURE — 85025 COMPLETE CBC W/AUTO DIFF WBC: CPT | Performed by: INTERNAL MEDICINE

## 2017-03-20 PROCEDURE — 36415 COLL VENOUS BLD VENIPUNCTURE: CPT | Performed by: INTERNAL MEDICINE

## 2017-03-20 PROCEDURE — 80053 COMPREHEN METABOLIC PANEL: CPT | Performed by: INTERNAL MEDICINE

## 2017-03-20 PROCEDURE — 86140 C-REACTIVE PROTEIN: CPT | Performed by: INTERNAL MEDICINE

## 2017-03-20 NOTE — TELEPHONE ENCOUNTER
----- Message from Lorne Castillo sent at 3/20/2017 11:55 AM EDT -----  ELECTRONIC PRIOR AUTH WAS SENT ON 03/17/2017    CYCLOBENZAPRINE 10 MG    CALLING TO CHECK STATUS    PLEASE FILL OUT AN FAX TO [facility] PAPER

## 2017-03-21 ENCOUNTER — TRANSITIONAL CARE MANAGEMENT TELEPHONE ENCOUNTER (OUTPATIENT)
Dept: INTERNAL MEDICINE | Facility: CLINIC | Age: 67
End: 2017-03-21

## 2017-03-21 ENCOUNTER — OFFICE VISIT (OUTPATIENT)
Dept: NEUROSURGERY | Facility: CLINIC | Age: 67
End: 2017-03-21

## 2017-03-21 VITALS
HEIGHT: 68 IN | SYSTOLIC BLOOD PRESSURE: 112 MMHG | BODY MASS INDEX: 32.43 KG/M2 | DIASTOLIC BLOOD PRESSURE: 74 MMHG | TEMPERATURE: 97.6 F | WEIGHT: 214 LBS

## 2017-03-21 DIAGNOSIS — M54.12 CERVICAL RADICULOPATHY: ICD-10-CM

## 2017-03-21 DIAGNOSIS — T81.40XA POST-OPERATIVE INFECTION: ICD-10-CM

## 2017-03-21 DIAGNOSIS — L02.11 NECK ABSCESS: ICD-10-CM

## 2017-03-21 DIAGNOSIS — Z51.89 VISIT FOR WOUND CHECK: Primary | ICD-10-CM

## 2017-03-21 LAB
BACTERIA SPEC AEROBE CULT: NORMAL
BACTERIA SPEC AEROBE CULT: NORMAL

## 2017-03-21 PROCEDURE — 99024 POSTOP FOLLOW-UP VISIT: CPT | Performed by: PHYSICIAN ASSISTANT

## 2017-03-21 NOTE — PROGRESS NOTES
"Patient: Elton Funez  : 1950  Chart #: [id number]    Date of Service: 2017    CHIEF COMPLAINT: postoperative incisional infection    History of Present Illness Mr. Funez is a 66-year-old gentleman with a history of ACDF in .  More recently he presented with progressive neck and left shoulder and left arm pain. 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  Initially he was doing well, then developed some increased pain and was noted to have purulent drainage from his incision.  He was empirically started on some PO antibiotics and sent home. Later that evening, he developed a fever 101.5 and went to the ER.  CT scan of the neck demonstrated a fluid and gas collection in the subfascial space and patient subsequently underwent I&D on 3/16/2017.  Cultures were positive for methicillin sensitive staph aureous.  Infectious disease is involved.  Patient is receiving IV antibiotics via PICC line and following up with Dr. Root weekly.  He is 5 days post-op today and here for wound check. He denies fevers, drainage, or flu-like symptoms. His pain is well managed; although he is quite sore and stiff especially on the right shoulder area.      The following portions of the patient's history were reviewed and updated as appropriate: allergies, current medications, past family history, past medical history, past social history, past surgical history and problem list.    Review of Systems   Musculoskeletal: Positive for neck pain and neck stiffness.   All other systems reviewed and are negative.      Objective   Vital Signs: Blood pressure 112/74, temperature 97.6 °F (36.4 °C), temperature source Temporal Artery , height 68\" (172.7 cm), weight 214 lb (97.1 kg).  Physical Exam   Skin:   Examination of posterior neck incision reveals nylon sutures in place. There is no evidence of drainage on bandage. No active drainage or dehiscence on " incision. The superior margin of the incision is a bit erythematous. Muscle tightness and some tissue swelling on the dorsum of the neck and right shoulder.  Overall very normal and expected appearance for the post-operative period.  Nylon sutures were left in place and incision was redressed with a dry bandage.     PICC line in right arm.    Vitals reviewed.    Assessment/Plan   Medical Decision Making: Mr. Funez is 5 days status post posterior cervical incision washout.  He was evaluated by infectious disease yesterday and had lab work repeated which I have reviewed. He continues with IV rocephin and weekly follow up with ID.  His incision looks normal for the post-operative period. I advised him to keep it clean and dry. I provided a few bandages.  He will need to have his sutures removed next week; however, if he develops fever or drainage before that time he will need to be evaluated sooner.                Nithya Odell PA-C  Patient Care Team:  Tc Ramirez MD as PCP - General  Pau MACK MD as Consulting Physician (Endocrinology)  Ari Yanes MD as Consulting Physician (Otolaryngology)  Gigi Perales MD as Consulting Physician (Neurosurgery)  Tc Ramirez MD as Referring Physician (Internal Medicine)

## 2017-03-22 ENCOUNTER — TRANSCRIBE ORDERS (OUTPATIENT)
Dept: LAB | Facility: HOSPITAL | Age: 67
End: 2017-03-22

## 2017-03-22 ENCOUNTER — APPOINTMENT (OUTPATIENT)
Dept: LAB | Facility: HOSPITAL | Age: 67
End: 2017-03-22

## 2017-03-22 ENCOUNTER — OFFICE VISIT (OUTPATIENT)
Dept: INTERNAL MEDICINE | Facility: CLINIC | Age: 67
End: 2017-03-22

## 2017-03-22 VITALS
HEART RATE: 76 BPM | WEIGHT: 215 LBS | TEMPERATURE: 98.6 F | DIASTOLIC BLOOD PRESSURE: 72 MMHG | BODY MASS INDEX: 32.69 KG/M2 | SYSTOLIC BLOOD PRESSURE: 124 MMHG | RESPIRATION RATE: 18 BRPM

## 2017-03-22 DIAGNOSIS — L02.11 CELLULITIS AND ABSCESS OF NECK: ICD-10-CM

## 2017-03-22 DIAGNOSIS — T81.40XA POST-OPERATIVE INFECTION: Primary | ICD-10-CM

## 2017-03-22 DIAGNOSIS — D72.823 NEUTROPHILIC LEUKEMOID REACTION: ICD-10-CM

## 2017-03-22 DIAGNOSIS — L73.9 STAPHYLOCOCCUS AUREUS SUPERFICIAL FOLLICULITIS: ICD-10-CM

## 2017-03-22 DIAGNOSIS — L03.221 CELLULITIS AND ABSCESS OF NECK: ICD-10-CM

## 2017-03-22 DIAGNOSIS — IMO0001 WOUND, SURGICAL, INFECTED, SUBSEQUENT ENCOUNTER: Primary | ICD-10-CM

## 2017-03-22 DIAGNOSIS — B95.61 STAPHYLOCOCCUS AUREUS SUPERFICIAL FOLLICULITIS: ICD-10-CM

## 2017-03-22 LAB
ALBUMIN SERPL-MCNC: 4.4 G/DL (ref 3.2–4.8)
ALBUMIN/GLOB SERPL: 1.2 G/DL (ref 1.5–2.5)
ALP SERPL-CCNC: 110 U/L (ref 25–100)
ALT SERPL W P-5'-P-CCNC: 44 U/L (ref 7–40)
ANION GAP SERPL CALCULATED.3IONS-SCNC: 8 MMOL/L (ref 3–11)
AST SERPL-CCNC: 52 U/L (ref 0–33)
BASOPHILS # BLD AUTO: 0.03 10*3/MM3 (ref 0–0.2)
BASOPHILS NFR BLD AUTO: 0.3 % (ref 0–1)
BILIRUB SERPL-MCNC: 0.4 MG/DL (ref 0.3–1.2)
BUN BLD-MCNC: 23 MG/DL (ref 9–23)
BUN/CREAT SERPL: 19.2 (ref 7–25)
CALCIUM SPEC-SCNC: 9.7 MG/DL (ref 8.7–10.4)
CHLORIDE SERPL-SCNC: 104 MMOL/L (ref 99–109)
CO2 SERPL-SCNC: 28 MMOL/L (ref 20–31)
CREAT BLD-MCNC: 1.2 MG/DL (ref 0.6–1.3)
CRP SERPL-MCNC: 19.7 MG/DL (ref 0–10)
DEPRECATED RDW RBC AUTO: 48.6 FL (ref 37–54)
EOSINOPHIL # BLD AUTO: 0.4 10*3/MM3 (ref 0.1–0.3)
EOSINOPHIL NFR BLD AUTO: 4.3 % (ref 0–3)
ERYTHROCYTE [DISTWIDTH] IN BLOOD BY AUTOMATED COUNT: 13.7 % (ref 11.3–14.5)
ERYTHROCYTE [SEDIMENTATION RATE] IN BLOOD: 94 MM/HR (ref 0–20)
GFR SERPL CREATININE-BSD FRML MDRD: 60 ML/MIN/1.73
GLOBULIN UR ELPH-MCNC: 3.6 GM/DL
GLUCOSE BLD-MCNC: 96 MG/DL (ref 70–100)
HCT VFR BLD AUTO: 40.6 % (ref 38.9–50.9)
HGB BLD-MCNC: 13 G/DL (ref 13.1–17.5)
IMM GRANULOCYTES # BLD: 0.16 10*3/MM3 (ref 0–0.03)
IMM GRANULOCYTES NFR BLD: 1.7 % (ref 0–0.6)
LYMPHOCYTES # BLD AUTO: 1.48 10*3/MM3 (ref 0.6–4.8)
LYMPHOCYTES NFR BLD AUTO: 15.9 % (ref 24–44)
MCH RBC QN AUTO: 31.1 PG (ref 27–31)
MCHC RBC AUTO-ENTMCNC: 32 G/DL (ref 32–36)
MCV RBC AUTO: 97.1 FL (ref 80–99)
MONOCYTES # BLD AUTO: 0.95 10*3/MM3 (ref 0–1)
MONOCYTES NFR BLD AUTO: 10.2 % (ref 0–12)
NEUTROPHILS # BLD AUTO: 6.31 10*3/MM3 (ref 1.5–8.3)
NEUTROPHILS NFR BLD AUTO: 67.6 % (ref 41–71)
PLATELET # BLD AUTO: 221 10*3/MM3 (ref 150–450)
PMV BLD AUTO: 11.4 FL (ref 6–12)
POTASSIUM BLD-SCNC: 4.9 MMOL/L (ref 3.5–5.5)
PROT SERPL-MCNC: 8 G/DL (ref 5.7–8.2)
RBC # BLD AUTO: 4.18 10*6/MM3 (ref 4.2–5.76)
SODIUM BLD-SCNC: 140 MMOL/L (ref 132–146)
WBC NRBC COR # BLD: 9.33 10*3/MM3 (ref 3.5–10.8)

## 2017-03-22 PROCEDURE — 85025 COMPLETE CBC W/AUTO DIFF WBC: CPT | Performed by: INTERNAL MEDICINE

## 2017-03-22 PROCEDURE — 99495 TRANSJ CARE MGMT MOD F2F 14D: CPT | Performed by: INTERNAL MEDICINE

## 2017-03-22 PROCEDURE — 80053 COMPREHEN METABOLIC PANEL: CPT | Performed by: INTERNAL MEDICINE

## 2017-03-22 PROCEDURE — 36415 COLL VENOUS BLD VENIPUNCTURE: CPT | Performed by: INTERNAL MEDICINE

## 2017-03-22 PROCEDURE — 85652 RBC SED RATE AUTOMATED: CPT | Performed by: INTERNAL MEDICINE

## 2017-03-22 PROCEDURE — 86140 C-REACTIVE PROTEIN: CPT | Performed by: INTERNAL MEDICINE

## 2017-03-23 LAB
BACTERIA SPEC ANAEROBE CULT: NORMAL
BACTERIA SPEC ANAEROBE CULT: NORMAL

## 2017-03-27 ENCOUNTER — APPOINTMENT (OUTPATIENT)
Dept: LAB | Facility: HOSPITAL | Age: 67
End: 2017-03-27

## 2017-03-27 ENCOUNTER — TRANSCRIBE ORDERS (OUTPATIENT)
Dept: LAB | Facility: HOSPITAL | Age: 67
End: 2017-03-27

## 2017-03-27 DIAGNOSIS — E16.2 HYPOGLYCEMIA, UNSPECIFIED: Primary | ICD-10-CM

## 2017-03-27 LAB
ANION GAP SERPL CALCULATED.3IONS-SCNC: 17 MMOL/L (ref 3–11)
BASOPHILS # BLD AUTO: 0.03 10*3/MM3 (ref 0–0.2)
BASOPHILS NFR BLD AUTO: 0.3 % (ref 0–1)
BUN BLD-MCNC: 29 MG/DL (ref 9–23)
BUN/CREAT SERPL: 22.3 (ref 7–25)
CALCIUM SPEC-SCNC: 10.1 MG/DL (ref 8.7–10.4)
CHLORIDE SERPL-SCNC: 102 MMOL/L (ref 99–109)
CO2 SERPL-SCNC: 25 MMOL/L (ref 20–31)
CREAT BLD-MCNC: 1.3 MG/DL (ref 0.6–1.3)
CRP SERPL-MCNC: 9 MG/DL (ref 0–10)
DEPRECATED RDW RBC AUTO: 46.3 FL (ref 37–54)
EOSINOPHIL # BLD AUTO: 0.29 10*3/MM3 (ref 0.1–0.3)
EOSINOPHIL NFR BLD AUTO: 3.1 % (ref 0–3)
ERYTHROCYTE [DISTWIDTH] IN BLOOD BY AUTOMATED COUNT: 13.5 % (ref 11.3–14.5)
ERYTHROCYTE [SEDIMENTATION RATE] IN BLOOD: 59 MM/HR (ref 0–20)
GFR SERPL CREATININE-BSD FRML MDRD: 55 ML/MIN/1.73
GLUCOSE BLD-MCNC: 130 MG/DL (ref 70–100)
HCT VFR BLD AUTO: 42.5 % (ref 38.9–50.9)
HGB BLD-MCNC: 13.7 G/DL (ref 13.1–17.5)
IMM GRANULOCYTES # BLD: 0.13 10*3/MM3 (ref 0–0.03)
IMM GRANULOCYTES NFR BLD: 1.4 % (ref 0–0.6)
LYMPHOCYTES # BLD AUTO: 1.4 10*3/MM3 (ref 0.6–4.8)
LYMPHOCYTES NFR BLD AUTO: 14.8 % (ref 24–44)
MCH RBC QN AUTO: 30.3 PG (ref 27–31)
MCHC RBC AUTO-ENTMCNC: 32.2 G/DL (ref 32–36)
MCV RBC AUTO: 94 FL (ref 80–99)
MONOCYTES # BLD AUTO: 0.87 10*3/MM3 (ref 0–1)
MONOCYTES NFR BLD AUTO: 9.2 % (ref 0–12)
NEUTROPHILS # BLD AUTO: 6.72 10*3/MM3 (ref 1.5–8.3)
NEUTROPHILS NFR BLD AUTO: 71.2 % (ref 41–71)
PLATELET # BLD AUTO: 237 10*3/MM3 (ref 150–450)
PMV BLD AUTO: 11 FL (ref 6–12)
POTASSIUM BLD-SCNC: 5 MMOL/L (ref 3.5–5.5)
RBC # BLD AUTO: 4.52 10*6/MM3 (ref 4.2–5.76)
SODIUM BLD-SCNC: 144 MMOL/L (ref 132–146)
WBC NRBC COR # BLD: 9.44 10*3/MM3 (ref 3.5–10.8)

## 2017-03-27 PROCEDURE — 86140 C-REACTIVE PROTEIN: CPT | Performed by: INTERNAL MEDICINE

## 2017-03-27 PROCEDURE — 80048 BASIC METABOLIC PNL TOTAL CA: CPT | Performed by: INTERNAL MEDICINE

## 2017-03-27 PROCEDURE — 85652 RBC SED RATE AUTOMATED: CPT | Performed by: INTERNAL MEDICINE

## 2017-03-27 PROCEDURE — 36415 COLL VENOUS BLD VENIPUNCTURE: CPT | Performed by: INTERNAL MEDICINE

## 2017-03-27 PROCEDURE — 85025 COMPLETE CBC W/AUTO DIFF WBC: CPT | Performed by: INTERNAL MEDICINE

## 2017-03-27 RX ORDER — ATENOLOL 50 MG/1
TABLET ORAL
Qty: 90 TABLET | Refills: 1 | Status: SHIPPED | OUTPATIENT
Start: 2017-03-27 | End: 2017-10-15 | Stop reason: SDUPTHER

## 2017-03-29 ENCOUNTER — OFFICE VISIT (OUTPATIENT)
Dept: NEUROSURGERY | Facility: CLINIC | Age: 67
End: 2017-03-29

## 2017-03-29 VITALS
DIASTOLIC BLOOD PRESSURE: 66 MMHG | HEIGHT: 68 IN | SYSTOLIC BLOOD PRESSURE: 118 MMHG | TEMPERATURE: 97.3 F | WEIGHT: 210 LBS | BODY MASS INDEX: 31.83 KG/M2

## 2017-03-29 DIAGNOSIS — L02.11 NECK ABSCESS: Primary | ICD-10-CM

## 2017-03-29 DIAGNOSIS — L24.A9 DRAINAGE FROM WOUND: ICD-10-CM

## 2017-03-29 PROBLEM — T14.8XXA DRAINAGE FROM WOUND: Status: ACTIVE | Noted: 2017-03-29

## 2017-03-29 PROCEDURE — 99024 POSTOP FOLLOW-UP VISIT: CPT | Performed by: PHYSICIAN ASSISTANT

## 2017-03-29 NOTE — PROGRESS NOTES
Subjective     Chief Complaint: : Elton Funez is a 66 y.o. male here for suture removal after wound I&D of infected surgical incision.     History of Present Illness  Mr. Funez is a 66-year-old gentleman with a history of ACDF in 2002. More recently he presented with progressive neck and left shoulder and left arm pain. 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 Initially he was doing well, then developed some increased pain and was noted to have purulent drainage from his incision. He was empirically started on some PO antibiotics and sent home. Later that evening, he developed a fever 101.5 and went to the ER. CT scan of the neck demonstrated a fluid and gas collection in the subfascial space and patient subsequently underwent I&D on 3/16/2017. Cultures were positive for MSSA.    Infectious disease is involved. Patient is receiving IV antibiotics via PICC line with daily infusions of daptomycin and rocephin. He is also following up with Dr. Root weekly.   He states that over the weekend, he had drainage of a significant amount of fluid from his incision.  He has a dressing that was applied yesterday and it also shows signs of drainage on it.  He denies fevers, but says he is having some mildly increased pain in his right shoulder today.     The following portions of the patient's history were reviewed and updated as appropriate: allergies, current medications, past family history, past medical history, past social history, past surgical history and problem list.    Family history:   Family History   Problem Relation Age of Onset   • Diabetes Father    • Heart disease Father    • Diabetes Paternal Grandmother        Social history:   Social History     Social History   • Marital status:      Spouse name: N/A   • Number of children: N/A   • Years of education: N/A     Occupational History   • Not on file.     Social History Main  Topics   • Smoking status: Former Smoker     Packs/day: 1.00     Years: 5.00     Types: Cigarettes     Quit date: 1976   • Smokeless tobacco: Never Used   • Alcohol use Yes      Comment: socially   • Drug use: No   • Sexual activity: Defer     Other Topics Concern   • Not on file     Social History Narrative       Review of Systems   Constitutional: Negative for activity change, appetite change, chills, diaphoresis, fatigue, fever and unexpected weight change.   HENT: Negative for congestion, dental problem, drooling, ear discharge, ear pain, facial swelling, hearing loss, mouth sores, nosebleeds, postnasal drip, rhinorrhea, sinus pressure, sneezing, sore throat, tinnitus, trouble swallowing and voice change.    Eyes: Negative for photophobia, pain, discharge, redness, itching and visual disturbance.   Respiratory: Negative for apnea, cough, choking, chest tightness, shortness of breath, wheezing and stridor.    Cardiovascular: Negative for chest pain, palpitations and leg swelling.   Gastrointestinal: Negative for abdominal distention, abdominal pain, anal bleeding, blood in stool, constipation, diarrhea, nausea, rectal pain and vomiting.   Endocrine: Negative for cold intolerance, heat intolerance, polydipsia, polyphagia and polyuria.   Genitourinary: Negative for decreased urine volume, difficulty urinating, dysuria, enuresis, flank pain, frequency, genital sores, hematuria and urgency.   Musculoskeletal: Negative for arthralgias, back pain, gait problem, joint swelling, myalgias, neck pain and neck stiffness.   Skin: Negative for color change, pallor, rash and wound.   Allergic/Immunologic: Negative for environmental allergies, food allergies and immunocompromised state.   Neurological: Positive for numbness. Negative for dizziness, tremors, seizures, syncope, facial asymmetry, speech difficulty, weakness, light-headedness and headaches.   Hematological: Negative for adenopathy. Does not bruise/bleed easily.  "  Psychiatric/Behavioral: Negative for agitation, behavioral problems, confusion, decreased concentration, dysphoric mood, hallucinations, self-injury, sleep disturbance and suicidal ideas. The patient is not nervous/anxious and is not hyperactive.    All other systems reviewed and are negative.      Objective   Blood pressure 118/66, temperature 97.3 °F (36.3 °C), height 68\" (172.7 cm), weight 210 lb (95.3 kg).  Body mass index is 31.93 kg/(m^2).    Physical Exam   Constitutional: He appears well-developed and well-nourished. No distress.   HENT:   Head: Normocephalic and atraumatic.   Eyes: EOM are normal. Pupils are equal, round, and reactive to light.   Neck: Normal range of motion.   Posterior cervical incision has nylon sutures in place. The top 1/3 of the incision has skin grown over the top of his sutures.  The skin is thin and bulbous in appearance for about 5cm of length at the superior end.  There is erythema extending about 1cm laterally. The rest of the incision is clean, dry and intact with no swelling.    Pulmonary/Chest: Effort normal. No respiratory distress.   Musculoskeletal: Normal range of motion.   Skin: He is not diaphoretic.     Sutures were removed.  About 2cm from the top of the incision, some yellow-tinged fluid was able to be expressed from the incision.  Pressure was applied in the neck muscles lateral to the incision and a copious amount of clear, yellow fluid was able to be expressed.  Pressure was continued until no more fluid was expressed.  A small, 1cm gap was present in the incision.  The entire incisional area was cleaned well with alcohol and steristrips were applied over this opening.  The wound was covered with a sterile absorbent gauze and a coverderm.       Assessment/Plan   Mr. Funez will continue IV antibiotics and weekly labs per infectious disease.   Dr. Perales will see him in the office on 410/17  He will call our office if the drainage continues  We have put in a " referral to PT wound care as well.    His pain was actually improved by removing the extra fluid; he is not in need of pain medication refill at this time.     Elton was seen today for post-op.    Diagnoses and all orders for this visit:    Neck abscess  -     PT Wound Care; Future    Drainage from wound  -     PT Wound Care; Future      Return in about 12 days (around 4/10/2017).

## 2017-03-31 ENCOUNTER — HOSPITAL ENCOUNTER (OUTPATIENT)
Dept: PHYSICAL THERAPY | Facility: HOSPITAL | Age: 67
Setting detail: THERAPIES SERIES
Discharge: HOME | End: 2017-03-31

## 2017-03-31 DIAGNOSIS — L02.11 NECK ABSCESS: ICD-10-CM

## 2017-03-31 DIAGNOSIS — L24.A9 DRAINAGE FROM WOUND: ICD-10-CM

## 2017-03-31 PROCEDURE — G8987 SELF CARE CURRENT STATUS: HCPCS

## 2017-03-31 PROCEDURE — 97597 DBRDMT OPN WND 1ST 20 CM/<: CPT

## 2017-03-31 PROCEDURE — G8988 SELF CARE GOAL STATUS: HCPCS

## 2017-03-31 PROCEDURE — 97162 PT EVAL MOD COMPLEX 30 MIN: CPT

## 2017-04-03 ENCOUNTER — HOSPITAL ENCOUNTER (OUTPATIENT)
Dept: PHYSICAL THERAPY | Facility: HOSPITAL | Age: 67
Setting detail: THERAPIES SERIES
Discharge: HOME | End: 2017-04-03

## 2017-04-03 ENCOUNTER — LAB (OUTPATIENT)
Dept: LAB | Facility: HOSPITAL | Age: 67
End: 2017-04-03

## 2017-04-03 ENCOUNTER — TRANSCRIBE ORDERS (OUTPATIENT)
Dept: LAB | Facility: HOSPITAL | Age: 67
End: 2017-04-03

## 2017-04-03 ENCOUNTER — TELEPHONE (OUTPATIENT)
Dept: NEUROSURGERY | Facility: CLINIC | Age: 67
End: 2017-04-03

## 2017-04-03 DIAGNOSIS — L02.11 CELLULITIS AND ABSCESS OF NECK: ICD-10-CM

## 2017-04-03 DIAGNOSIS — L03.221 CELLULITIS AND ABSCESS OF NECK: ICD-10-CM

## 2017-04-03 DIAGNOSIS — IMO0001 WOUND, SURGICAL, INFECTED, SUBSEQUENT ENCOUNTER: ICD-10-CM

## 2017-04-03 DIAGNOSIS — B95.61 STAPHYLOCOCCUS AUREUS SUPERFICIAL FOLLICULITIS: ICD-10-CM

## 2017-04-03 DIAGNOSIS — E16.2 HYPOGLYCEMIA, UNSPECIFIED: ICD-10-CM

## 2017-04-03 DIAGNOSIS — L73.9 STAPHYLOCOCCUS AUREUS SUPERFICIAL FOLLICULITIS: ICD-10-CM

## 2017-04-03 DIAGNOSIS — E11.39 DIABETIC OPHTHALMOPATHY (HCC): ICD-10-CM

## 2017-04-03 DIAGNOSIS — D69.59 THROMBOCYTOPENIA DUE TO DIMINISHED PLATELET PRODUCTION: ICD-10-CM

## 2017-04-03 DIAGNOSIS — T81.40XA POST-OPERATIVE INFECTION: ICD-10-CM

## 2017-04-03 DIAGNOSIS — N18.30 CHRONIC KIDNEY DISEASE, STAGE III (MODERATE) (HCC): ICD-10-CM

## 2017-04-03 DIAGNOSIS — L24.A9 DRAINAGE FROM WOUND: ICD-10-CM

## 2017-04-03 DIAGNOSIS — IMO0001 WOUND, SURGICAL, INFECTED, SUBSEQUENT ENCOUNTER: Primary | ICD-10-CM

## 2017-04-03 DIAGNOSIS — L02.11 NECK ABSCESS: Primary | ICD-10-CM

## 2017-04-03 LAB
ALBUMIN SERPL-MCNC: 4.5 G/DL (ref 3.2–4.8)
ALBUMIN/GLOB SERPL: 1.3 G/DL (ref 1.5–2.5)
ALP SERPL-CCNC: 94 U/L (ref 25–100)
ALT SERPL W P-5'-P-CCNC: 26 U/L (ref 7–40)
ANION GAP SERPL CALCULATED.3IONS-SCNC: 13 MMOL/L (ref 3–11)
AST SERPL-CCNC: 30 U/L (ref 0–33)
BASOPHILS # BLD AUTO: 0.03 10*3/MM3 (ref 0–0.2)
BASOPHILS NFR BLD AUTO: 0.4 % (ref 0–1)
BILIRUB SERPL-MCNC: 0.3 MG/DL (ref 0.3–1.2)
BUN BLD-MCNC: 31 MG/DL (ref 9–23)
BUN/CREAT SERPL: 25.8 (ref 7–25)
CALCIUM SPEC-SCNC: 10.1 MG/DL (ref 8.7–10.4)
CHLORIDE SERPL-SCNC: 104 MMOL/L (ref 99–109)
CO2 SERPL-SCNC: 22 MMOL/L (ref 20–31)
CREAT BLD-MCNC: 1.2 MG/DL (ref 0.6–1.3)
CRP SERPL-MCNC: 0.27 MG/DL (ref 0–1)
DEPRECATED RDW RBC AUTO: 48 FL (ref 37–54)
EOSINOPHIL # BLD AUTO: 0.27 10*3/MM3 (ref 0.1–0.3)
EOSINOPHIL NFR BLD AUTO: 3.2 % (ref 0–3)
ERYTHROCYTE [DISTWIDTH] IN BLOOD BY AUTOMATED COUNT: 13.7 % (ref 11.3–14.5)
ERYTHROCYTE [SEDIMENTATION RATE] IN BLOOD: 54 MM/HR (ref 0–20)
GFR SERPL CREATININE-BSD FRML MDRD: 60 ML/MIN/1.73
GLOBULIN UR ELPH-MCNC: 3.5 GM/DL
GLUCOSE BLD-MCNC: 147 MG/DL (ref 70–100)
HCT VFR BLD AUTO: 41 % (ref 38.9–50.9)
HGB BLD-MCNC: 13.2 G/DL (ref 13.1–17.5)
IMM GRANULOCYTES # BLD: 0.08 10*3/MM3 (ref 0–0.03)
IMM GRANULOCYTES NFR BLD: 0.9 % (ref 0–0.6)
LYMPHOCYTES # BLD AUTO: 1.61 10*3/MM3 (ref 0.6–4.8)
LYMPHOCYTES NFR BLD AUTO: 19 % (ref 24–44)
MCH RBC QN AUTO: 31 PG (ref 27–31)
MCHC RBC AUTO-ENTMCNC: 32.2 G/DL (ref 32–36)
MCV RBC AUTO: 96.2 FL (ref 80–99)
MONOCYTES # BLD AUTO: 1.12 10*3/MM3 (ref 0–1)
MONOCYTES NFR BLD AUTO: 13.2 % (ref 0–12)
NEUTROPHILS # BLD AUTO: 5.36 10*3/MM3 (ref 1.5–8.3)
NEUTROPHILS NFR BLD AUTO: 63.3 % (ref 41–71)
PLATELET # BLD AUTO: 219 10*3/MM3 (ref 150–450)
PMV BLD AUTO: 11.3 FL (ref 6–12)
POTASSIUM BLD-SCNC: 4.6 MMOL/L (ref 3.5–5.5)
PROT SERPL-MCNC: 8 G/DL (ref 5.7–8.2)
RBC # BLD AUTO: 4.26 10*6/MM3 (ref 4.2–5.76)
SODIUM BLD-SCNC: 139 MMOL/L (ref 132–146)
WBC NRBC COR # BLD: 8.47 10*3/MM3 (ref 3.5–10.8)

## 2017-04-03 PROCEDURE — 80053 COMPREHEN METABOLIC PANEL: CPT | Performed by: INTERNAL MEDICINE

## 2017-04-03 PROCEDURE — 36415 COLL VENOUS BLD VENIPUNCTURE: CPT

## 2017-04-03 PROCEDURE — 86140 C-REACTIVE PROTEIN: CPT | Performed by: INTERNAL MEDICINE

## 2017-04-03 PROCEDURE — 97597 DBRDMT OPN WND 1ST 20 CM/<: CPT

## 2017-04-03 PROCEDURE — 85652 RBC SED RATE AUTOMATED: CPT | Performed by: INTERNAL MEDICINE

## 2017-04-03 PROCEDURE — 85025 COMPLETE CBC W/AUTO DIFF WBC: CPT | Performed by: INTERNAL MEDICINE

## 2017-04-03 NOTE — TELEPHONE ENCOUNTER
Provider:  Diaz  Caller: Kirsten  Time of call: 9AM    Phone #:  [phone]  Surgery:  I&D  Surgery Date: 3/16/17   Last visit:   3/29/17  Next visit: 4/10/17    JOSE:         Reason for call:         Kirsten from [facility] pharmacy called to clarify a medication called in on 3/15 by Tyra (of which there is no documentation). They state that Keflex was called in as 1 po BID for 14 days, quantity 20. However this is conflicting so they want to know if this was supposed to be a 14-day supply (#28) or a 10-day supply (#20)? (Tyra is not in today to review this with)

## 2017-04-03 NOTE — PROGRESS NOTES
Chief Complaint   Patient presents with   • Follow-up     [facility] SURGERY #1 2/28/17 AND SURGERY #2 3/16/17     This patient care was coordinated using transitional care management strategy. The discharge records are available and reviewed.    History of Present Illness      The patient presents to the office for a follow-up visit from a recent hospitalization. The patient was hospitalized from 15-Mar-2017 through 18-Mar-2017 with a post-operative infection s/p left C7-T1 posterior cervical foraminotomies. The records have been received and reviewed. The hospital stay was uncomplicated. His in hospital treatment consisted of intravenous antibiotics.   Consultations were obtained from the Infectious Disease service and Neurosurgery.    The hospital discharge summary was reviewed and is as summarized:    Hospital Course   Patient is a 66 y.o. male With PMH CAD and T2DM, s/p left C7-T1 posterior cervical foraminotomies 2/28/17 by MIRTA Champagne , presented to his clinic 3/14/17 with purlent drainage from superior aspect of incision. He was started on keflex and sent home with instructions to come to the emergency department for a fever of greater than 101.5. He presented to the ed 3/15/17 with fever of 101.5 was found to have leukocytosis, wbc 17.7, Cr 1.4. Noted to have postop surgical site wound w/drainage, CT of neck showed abscess with fluid collection at cervical spine postoperative site. Pt was admitted to hospitalist for further evaluation and management. Upon admit, received IV abx, supportive care, NS and ID consulted.      S/p I&D w/HOLLEY drain placement 3/16 per Dr Perales Wound cx pending. Abx have been modified per ID during course of stay, currently receiving rocephin.       NS discontinued HOLLEY drain on 3/18/17.    ID recommended Rocephin 2g IV daily planning 6wks IV tx to avoid discitits or OM, monitoring labs and he will f/u cultures as outpatient. Pt clinically improved. Surgery cx + MSSA w/deep space infection.  PICC line placed. ID recommended pt dc home on 3/18 and [facility] Pt is to followup with [facility].          Since leaving the hospital he reports that he is improved. He denies abdominal pain, bone pain, chills, a dry cough, a wet cough, decreased appetite, diarrhea, dysuria, fever, a headache, joint pain, myalgias, nausea, neck stiffness, night sweats, a rash or vomiting. He also reports that he does not have chest pain, dyspnea, edema, syncope, blurred vision or palpitations.    Medications      Current Outpatient Prescriptions:   •  Albiglutide (TANZEUM) 50 MG pen-injector, Inject 50 mg under the skin 1 (One) Time Per Week. Indications: didnt tolerate preferred medication Bydureon, worse glycemic control (Patient taking differently: Inject 50 mg under the skin 1 (One) Time Per Week. Every Sunday  Indications: didnt tolerate preferred medication Bydureon, worse glycemic control), Disp: 12 each, Rfl: 3  •  aspirin 81 MG tablet, Take 1 tablet by mouth Daily. Last dose 2-19-17 may resume when okay with Dr Perales, Disp: , Rfl:   •  cefTRIAXone (ROCEPHIN) 40 MG/ML IVPB, Infuse 50 mL into a venous catheter Daily. Indications: Skin and Soft Tissue Infection, Disp: , Rfl: 0  •  Coenzyme Q10 (CO Q 10 PO), Take 200 mg by mouth 2 (Two) Times a Day., Disp: , Rfl:   •  colestipol (COLESTID) 1 G tablet, TAKE 2 TABLETS TWICE A DAY, Disp: 360 tablet, Rfl: 4  •  cyclobenzaprine (FLEXERIL) 10 MG tablet, TAKE ONE TABLET BY MOUTH THREE TIMES A DAY AS NEEDED FOR MUSCLE SPASMS, Disp: 90 tablet, Rfl: 0  •  famotidine (PEPCID) 20 MG tablet, Take 1 tablet by mouth 2 (Two) Times a Day., Disp: 60 tablet, Rfl: 1  •  Flaxseed, Linseed, (FLAXSEED OIL) 1200 MG capsule, Take 1 capsule by mouth 2 (two) times a day., Disp: , Rfl:   •  fluocinonide (LIDEX) 0.05 % cream, Apply  topically Every 8 (Eight) Hours. Apply sparingly to affected area(s) twice daily as needed., Disp: , Rfl:   •   fluticasone (FLONASE) 50 MCG/ACT nasal spray, into each nostril As Needed. Use 2 sprays in each nostril once daily as needed., Disp: , Rfl:   •  gemfibrozil (LOPID) 600 MG tablet, TAKE 1 TABLET TWICE A DAY, Disp: 180 tablet, Rfl: 1  •  glucose blood test strip, USE TO TEST BLOOD SUGAR TWICE DAILY AND AS NEEDED, Disp: 200 each, Rfl: 2  •  Insulin Pen Needle (B-D ULTRAFINE III SHORT PEN) 31G X 8 MM misc, 1 each 3 (Three) Times a Day., Disp: 300 each, Rfl: 3  •  Insulin Regular Human, Conc, (HumuLIN R) 500 UNIT/ML solution pen-injector CONCENTRATED injection, 90 units twice a day before meals, titrate up as indicated (Patient taking differently: Inject 125 Units under the skin Every Morning. 125 UNITS SQ IN AM  UNITS SQ EVENING), Disp: 12 pen, Rfl: 3  •  Lancets misc, 1 each 2 (two) times a day., Disp: 200 each, Rfl: 2  •  LANTUS SOLOSTAR 100 UNIT/ML injection pen, INJECT 50 UNITS UNDER THE SKIN EVERY EVENING (Patient taking differently: INJECT 45 UNITS UNDER THE SKIN EVERY EVENING), Disp: 45 mL, Rfl: 2  •  lisinopril-hydrochlorothiazide (PRINZIDE,ZESTORETIC) 10-12.5 MG per tablet, TAKE 1 TABLET DAILY, Disp: 90 tablet, Rfl: 1  •  Multiple Vitamins-Minerals (MULTIVITAMIN PO), Take 1 tablet by mouth Daily., Disp: , Rfl:   •  ondansetron (ZOFRAN) 4 MG tablet, Take 1 tablet by mouth Every 6 (Six) Hours As Needed for Nausea or Vomiting., Disp: 12 tablet, Rfl: 0  •  saccharomyces boulardii (FLORASTOR) 250 MG capsule, Take 1 capsule by mouth Daily., Disp: 60 capsule, Rfl: 2  •  sertraline (ZOLOFT) 50 MG tablet, TAKE ONE TABLET BY MOUTH DAILY, Disp: 90 tablet, Rfl: 3  •  simvastatin (ZOCOR) 40 MG tablet, TAKE 1 TABLET DAILY AT BEDTIME, Disp: 90 tablet, Rfl: 1  •  topiramate (TOPAMAX) 25 MG tablet, Take 25 mg by mouth Every Night., Disp: , Rfl:   •  atenolol (TENORMIN) 50 MG tablet, TAKE ONE TABLET BY MOUTH DAILY, Disp: 90 tablet, Rfl: 1  •  docusate sodium (COLACE) 100 MG capsule, Take 100 mg by mouth Daily., Disp: ,  Rfl:      Allergies    Allergies   Allergen Reactions   • Glucophage Xr [Metformin Hcl Er] Diarrhea     Glucophage XR TB24: Adverse Reaction: Severe GI issues.   • Metformin Nausea And Vomiting     Other reaction(s): SEVERE INTESTIONAL ISSUES  Other reaction(s): SEVERE GI ISSUES    Glucophage XR TB24  MetFORMIN HCl TABS   • Tetracyclines & Related Nausea Only     Adverse Reaction       Problem List    Patient Active Problem List   Diagnosis   • Chronic kidney disease, stage III (moderate)   • Gastroesophageal reflux disease without esophagitis   • Hyperlipidemia   • Hypertension   • Trigger finger of right hand   • DM (diabetes mellitus), type 2, uncontrolled w/neurologic complication   • Obesity, Class I, BMI 30-34.9   • Actinic keratosis   • FAVIO (obstructive sleep apnea)   • Prurigo nodularis   • History of depression   • History of migraine   • Cervical radiculopathy   • ALT (SGPT) level raised   • Elevated AST (SGOT)   • Uncontrolled type 2 diabetes mellitus with diabetic neuropathy, with long-term current use of insulin   • Post-operative infection   • Leukocytosis   • Neck abscess   • Drainage from wound       Physical Examination    /72 (BP Location: Left arm, Patient Position: Sitting, Cuff Size: Adult)  Pulse 76  Temp 98.6 °F (37 °C) (Temporal Artery )   Resp 18  Wt 215 lb (97.5 kg)  BMI 32.69 kg/m2    HEENT: Head- Normocephalic Atraumatic. Facies- Within normal limits. Pinnas- Normal texture and shape bilaterally. Canals- Normal bilaterally. TMs- Normal bilaterally. Nares- Patent bilaterally. Nasal Septum- is normal. There is no tenderness to palpation over the frontal or maxillary sinuses. Lids- Normal bilaterally. Conjunctiva- Clear bilaterally. Sclera- Anicteric bilaterally. Oropharynx- Moist with no lesions. Tonsils- No enlargement, erythema or exudate.    Neck: Thyroid- non enlarged, symmetric and has no nodules. No bruits are detected. ROM- Normal Range of Motion with no rigidity.    Lymph  Nodes: Cervical- no enlarged lymph nodes noted. Clavicular- Deferred. Axillary- Deferred. Inguinal- Deferred.    Lungs: Auscultation- Clear to auscultation bilaterally. There are no retractions, clubbing or cyanosis. The Expiratory to Inspiratory ratio is equal.    Cardiovascular: There are no carotid bruits. Heart- Normal Rate with Regular rhythm and no murmurs. There are no gallops. There are no rubs. In the lower extremities there is no edema. The upper extremities do not have edema.    Abdomen: Soft, benign, non-tender with no masses, hernias, organomegaly or scars.    Impression and Assessment    Post-Operative Infection.    Plan    Post-Operative Infection Plan: He will follow-up with infectious disease. The patient was instructed to continue the current medications.      Return to Office    The patient was instructed to return for follow-up in 1 month.    The patient was instructed to return sooner if the condition changes, worsens, or doesn't resolve.    Transitional Care Management Certification  I certify that the following are true:  1. Communication was made within 2 business days of discharge.  2. Complexity of Medical Decision Making is moderate.  3. Face to face visit occurred within 4 days.    *Note: 56536 is for high complexity patients with a face to face visit within 7 days of discharge.  19555 is for high complexity patients with a face to face on days 8-14 post discharge or medium complexity with face to face visit within 14 days post discharge.

## 2017-04-03 NOTE — TELEPHONE ENCOUNTER
This is a mute point now as rx was from 3/15/201; today is 4/3/2017.  It is recorded in [facility]'s OV note on 3/15/ that the pt was to have Keflex 500mg po BID for 14 days (so 28 pills).  Again, all of this is irrelevant as pt was hospitalized the next day and was seen by ID who now has him on IV antibiotics.  So, please notify pharmacy to disregard rx.

## 2017-04-03 NOTE — PROGRESS NOTES
Outpatient Rehabilitation - Wound/Debridement Treatment Note   Denis     Patient Name: Elton Funez  : 1950  MRN: [id number]  Today's Date: 4/3/2017                Admit Date: 4/3/2017    Visit Dx:    ICD-10-CM ICD-9-CM   1. Neck abscess L02.11 682.1   2. Drainage from wound T14.8 879.8       Patient Active Problem List   Diagnosis   • Chronic kidney disease, stage III (moderate)   • Gastroesophageal reflux disease without esophagitis   • Hyperlipidemia   • Hypertension   • Trigger finger of right hand   • DM (diabetes mellitus), type 2, uncontrolled w/neurologic complication   • Obesity, Class I, BMI 30-34.9   • Actinic keratosis   • FAVIO (obstructive sleep apnea)   • Prurigo nodularis   • History of depression   • History of migraine   • Cervical radiculopathy   • ALT (SGPT) level raised   • Elevated AST (SGOT)   • Uncontrolled type 2 diabetes mellitus with diabetic neuropathy, with long-term current use of insulin   • Post-operative infection   • Leukocytosis   • Neck abscess   • Drainage from wound        Past Medical History:   Diagnosis Date   • Abscess of arm, right    • Abscess of skin of neck    • Acute sinusitis    • ALT (SGPT) level raised    • Arthritis    • Constipation    • CPAP (continuous positive airway pressure) dependence    • Decreased platelet count    • Depression     Resolved: 2015   • Diabetes mellitus    • Elevated AST (SGOT)    • Hyperlipidemia    • Hypertension    • Jaw pain    • Left hip pain    • Migraine     Hx of   • Obesity (BMI 30.0-34.9) 10/7/2016    BMI 31.92   • Obstructive sleep apnea     Hx of   • Quadriceps muscle strain    • Shigellosis    • Sleep apnea     PT TO BRING MASK AND TUBING DAY OF SURGERY   • Wears glasses    • Wears hearing aid     BOTH EARS        Past Surgical History:   Procedure Laterality Date   • BACK SURGERY     • CERVICAL ARTHRODESIS     • CERVICAL DISCECTOMY POSTERIOR FUSION WITH INSTRUMENTATION N/A 3/16/2017    Procedure:   "INCISION AND DRAINAGE OF POSTERIOR CERVICAL WOUND;  Surgeon: Gigi Perales MD;  Location:  [facility];  Service:    • CERVICAL LAMINECTOMY DECOMPRESSION POSTERIOR Left 2/28/2017    Procedure: Left C7-T1 CERVICAL FORAMINOTOMY POSTERIOR WITH METRIX;  Surgeon: Gigi Perales MD;  Location:  [facility];  Service:    • COLONOSCOPY      2012   • VASECTOMY           EVALUATION        PT Ortho       04/03/17 1000    Subjective Comments    Subjective Comments Drainage continues to be an issue. Spouse changed dressing x3 yesterday  -ES    Pain Assessment    Pain Assessment 0-10  -ES    Pain Score 0  -ES    Post Pain Score 0  -ES    Transfers    Transfers, Sit-Stand [place] independent  -ES    Transfers, Stand-Sit [place] independent  -ES    Transfer, Comment sitting on edge of treatment table  -ES    Gait Assessment/Treatment    Gait, [place] Level independent  -ES      03/31/17 1345    Subjective Comments    Subjective Comments \"It starts to hurt and then if it is drained it doesnt bother me\"  -    Pain Assessment    Pain Assessment Campbell-Baker FACES  -    Campbell-Baker FACES Pain Rating 4  -MF    Pain Intervention(s) Repositioned  -MF    Transfers    Transfers, Sit-Stand [place] independent  -MF    Transfers, Stand-Sit [place] independent  -MF    Transfer, Comment pt seen sitting on edge of strecher.   -MF    Gait Assessment/Treatment    Gait, [place] Level independent  -MF      User Key  (r) = Recorded By, (t) = Taken By, (c) = Cosigned By    Initials Name Provider Type     Tc Javier, PT Physical Therapist    ES Trudi Mitchell, PT Physical Therapist                    [facility] Wound       04/03/17 1000          Wound 03/31/17 1345 posterior neck abscess    Wound - Properties Group Date first assessed: 03/31/17  - Time first assessed: 1345  - Orientation: posterior  -MF Location: neck  - Type: abscess  -MF    Wound WDL WDL  -ES      Dressing Appearance moist " drainage;intact  -ES      Base reddened;yellow;slough  -ES      Periwound Area redness  -ES      Edges open  -ES      Length (cm) 1.5  -ES      Width (cm) 0.5  -ES      Depth (cm) 5.5  -ES      Tunneling [depth (cm)/Location] unable to appreciate entirity of depth due to limited flex in cotton swab/tweezers. Tunneling present however, at 10:00, 2:00 and 6:00  -ES      Drainage Characteristics/Odor serosanguineous;tan;no odor  -ES      Drainage Amount copious  -ES      Wound Cleaning irrigated with;sterile normal saline  -ES      Wound Interventions debrided;other (see comments)   manual expression of drainage with massaging surrounding tissues of upper trap, and incision line  -ES      Dressing low-adherent   mepilex 4x8  -ES      Packing other (see comments)   saline moist hydrofera blue x2 piece (written on outside of dressing) in both left superior and inferior tunneling  -ES      Periwound Care cleansed with pH balanced cleanser   theraworx foam to periwound and instruction for cleaning to spouse  -ES        User Key  (r) = Recorded By, (t) = Taken By, (c) = Cosigned By    Initials Name Provider Type    MF Tc Javier, PT Physical Therapist    ES Trudi Mitchell, PT Physical Therapist            WOUND DEBRIDEMENT  Debridement Site 1  Location- Site 1: post neck   Selective Debridement- Site 1: Wound Surface <20cmsq (~2sqcm)  Instruments- Site 1: tweezers  Excised Tissue Description- Site 1: minimum, slough  Bleeding- Site 1: minimum, held pressure, 1 minute             Recommendation and Plan        PT Assessment/Plan       04/03/17 1000       PT Assessment    Functional Limitations Performance in work activities;Performance in self-care ADL  -ES     Impairments Integumentary integrity  -ES     Assessment Comments Presented with continue copious tan drainage requiring manual expression. Slough minimal though build up predominately at wound edge. The wound base that is visible is beefy red, however, based on  debridement and further assessment of wound, consideration of multiple tunnels is warranted. Will continue with hydrafera for antimicrobial intervention along with changing to a more apsorptive outer dressing to reduce need for exposure of wound daily. Will require further therapy intervention and monitor. Returns to MD office this next week for assessment.  -ES     Please refer to paper survey for additional self-reported information Yes  -ES     Rehab Potential Good  -ES     Patient/caregiver participated in establishment of treatment plan and goals Yes  -ES     Patient would benefit from skilled therapy intervention Yes  -ES     PT Plan    PT Frequency 3x/week  -ES     Physical Therapy Interventions (Optional Details) wound care;patient/family education  -ES     PT Plan Comments debridement with manual drainage, dressing assessment and assessment of tunneling  -ES       User Key  (r) = Recorded By, (t) = Taken By, (c) = Cosigned By    Initials Name Provider Type    ES Trudi Mitchell, PT Physical Therapist          Goals                   Time Calculation: Start Time: 1000    Therapy Charges for Today     Code Description Service Date Service Provider Modifiers Qty    [id number]  NANCY DEBRIDE OPEN WOUND UP TO 20CM 4/3/2017 Trudi Mitchell, PT GP 1                Trudi Mitchell, PT  4/3/2017

## 2017-04-05 ENCOUNTER — HOSPITAL ENCOUNTER (OUTPATIENT)
Dept: PHYSICAL THERAPY | Facility: HOSPITAL | Age: 67
Setting detail: THERAPIES SERIES
Discharge: HOME | End: 2017-04-05

## 2017-04-05 DIAGNOSIS — T81.40XA POST-OPERATIVE INFECTION: ICD-10-CM

## 2017-04-05 DIAGNOSIS — L24.A9 DRAINAGE FROM WOUND: ICD-10-CM

## 2017-04-05 DIAGNOSIS — L02.11 NECK ABSCESS: Primary | ICD-10-CM

## 2017-04-05 PROCEDURE — 97597 DBRDMT OPN WND 1ST 20 CM/<: CPT

## 2017-04-05 NOTE — PROGRESS NOTES
Outpatient Rehabilitation - Wound/Debridement Treatment Note   Denis     Patient Name: Elton Funez  : 1950  MRN: [id number]  Today's Date: 2017                Admit Date: 2017    Visit Dx:    ICD-10-CM ICD-9-CM   1. Neck abscess L02.11 682.1   2. Drainage from wound T14.8 879.8   3. Post-operative infection T81.4XXA 998.59       Patient Active Problem List   Diagnosis   • Chronic kidney disease, stage III (moderate)   • Gastroesophageal reflux disease without esophagitis   • Hyperlipidemia   • Hypertension   • Trigger finger of right hand   • DM (diabetes mellitus), type 2, uncontrolled w/neurologic complication   • Obesity, Class I, BMI 30-34.9   • Actinic keratosis   • FAVIO (obstructive sleep apnea)   • Prurigo nodularis   • History of depression   • History of migraine   • Cervical radiculopathy   • ALT (SGPT) level raised   • Elevated AST (SGOT)   • Uncontrolled type 2 diabetes mellitus with diabetic neuropathy, with long-term current use of insulin   • Post-operative infection   • Leukocytosis   • Neck abscess   • Drainage from wound        Past Medical History:   Diagnosis Date   • Abscess of arm, right    • Abscess of skin of neck    • Acute sinusitis    • ALT (SGPT) level raised    • Arthritis    • Constipation    • CPAP (continuous positive airway pressure) dependence    • Decreased platelet count    • Depression     Resolved: 2015   • Diabetes mellitus    • Elevated AST (SGOT)    • Hyperlipidemia    • Hypertension    • Jaw pain    • Left hip pain    • Migraine     Hx of   • Obesity (BMI 30.0-34.9) 10/7/2016    BMI 31.92   • Obstructive sleep apnea     Hx of   • Quadriceps muscle strain    • Shigellosis    • Sleep apnea     PT TO BRING MASK AND TUBING DAY OF SURGERY   • Wears glasses    • Wears hearing aid     BOTH EARS        Past Surgical History:   Procedure Laterality Date   • BACK SURGERY     • CERVICAL ARTHRODESIS     • CERVICAL DISCECTOMY POSTERIOR FUSION WITH  INSTRUMENTATION N/A 3/16/2017    Procedure:  INCISION AND DRAINAGE OF POSTERIOR CERVICAL WOUND;  Surgeon: Gigi Perales MD;  Location:  [facility];  Service:    • CERVICAL LAMINECTOMY DECOMPRESSION POSTERIOR Left 2/28/2017    Procedure: Left C7-T1 CERVICAL FORAMINOTOMY POSTERIOR WITH METRIX;  Surgeon: Gigi Perales MD;  Location:  [facility];  Service:    • COLONOSCOPY      2012   • VASECTOMY           EVALUATION        PT Ortho       04/05/17 1120    Subjective Comments    Subjective Comments Reports the bandage started to roll up, so his wife had to change it. She was able to remove the large piece of hydrofera, but could not find the smaller piece. Pt reports he had more soreness after the last visit, but it has faded.  -    Subjective Pain    Able to rate subjective pain? yes  -    Pre-Treatment Pain Level 0  -    Post-Treatment Pain Level 2  -    Pain Assessment    Pain Assessment --  -MC    Transfers    Transfers, Sit-Stand [place] independent  -    Transfers, Stand-Sit [place] independent  -    Transfer, Comment seated on treatment table for tx  -    Gait Assessment/Treatment    Gait, [place] Level independent  -      04/03/17 1000    Subjective Comments    Subjective Comments Drainage continues to be an issue. Spouse changed dressing x3 yesterday  -    Pain Assessment    Pain Assessment 0-10  -ES    Pain Score 0  -ES    Post Pain Score 0  -ES    Transfers    Transfers, Sit-Stand [place] independent  -ES    Transfers, Stand-Sit [place] independent  -ES    Transfer, Comment sitting on edge of treatment table  -    Gait Assessment/Treatment    Gait, [place] Level independent  -      User Key  (r) = Recorded By, (t) = Taken By, (c) = Cosigned By    Initials Name Provider Type     Trudi Mitchell, PT Physical Therapist     Aura Pereira, PT Physical Therapist                    [facility] Wound       04/05/17 1120          Wound 03/31/17 1345  posterior neck abscess    Wound - Properties Group Date first assessed: 03/31/17  - Time first assessed: 1345  - Orientation: posterior  - Location: neck  - Type: abscess  -MF    Wound WDL WDL  -      Dressing Appearance moist drainage;intact  -MC      Base reddened;yellow;slough;white   scant white tissue observed in depth  -      Periwound Area redness;intact;dry  -MC      Edges open  -      Drainage Characteristics/Odor serosanguineous;tan;no odor  -      Drainage Amount large  -      Wound Cleaning irrigated with;sterile normal saline;other (see comments)   phase one  -      Wound Interventions debrided;other (see comments)   massage/manual pressure to neck and incision line  -      Dressing Dressing applied;low-adherent   mepilex border secured with hypafix  -      Packing other (see comments)   thin ribbon of hydrofera blue, 1 piece  -      Periwound Care cleansed with pH balanced cleanser;dry periwound area maintained  -        User Key  (r) = Recorded By, (t) = Taken By, (c) = Cosigned By    Initials Name Provider Type     Tc Javier, PT Physical Therapist     Aura Pereira, PT Physical Therapist            WOUND DEBRIDEMENT  Debridement Site 1  Location- Site 1: post neck   Selective Debridement- Site 1: Wound Surface <20cmsq (area debrided about 1 cm2)  Instruments- Site 1: tweezers  Excised Tissue Description- Site 1: minimum, slough (white nonviable slough)  Bleeding- Site 1: minimum, held pressure, 1 minute             Recommendation and Plan        PT Assessment/Plan       04/05/17 1120       PT Assessment    Functional Limitations Performance in work activities;Performance in self-care ADL  -     Impairments Integumentary integrity  -     Assessment Comments Hydrofera blue appears to manage drainage fairly well, with one change of larger piece of hydrofera required since last visit. His wife was unable to remove the smaller piece of hydrofera due to depth  of the wound base, so PT switched to one long piece of hydrofera to attempt to pack recesses the the tunnels. Pt still with large tan/serosanguinous drainage, but appears to be improved since last visit. Depending on MD recommendations, pt may be appropriate for NPWT management of the wound. However, as this area houses extensive neurological and vascular structures, careful consideration from MD would be required before pursuing this course of care. Pt follows up with his MD Monday.  -     Rehab Potential Good  -     Patient/caregiver participated in establishment of treatment plan and goals Yes  -     Patient would benefit from skilled therapy intervention Yes  -     PT Plan    PT Frequency 3x/week  -     Predicted Duration of Therapy Intervention (days/wks) 6-8 weeks  -     Physical Therapy Interventions (Optional Details) patient/family education;wound care  -     PT Plan Comments Debridement with manual drainage, dressing assessment, and tunneling assessment. Encourage pt to inquire about wound vac at next MD appt.  -       User Key  (r) = Recorded By, (t) = Taken By, (c) = Cosigned By    Initials Name Provider Type     Aura Pereira PT Physical Therapist          Goals        PT OP Goals       04/05/17 1120       Time Calculation    PT Goal Re-Cert Due Date 04/30/17  -       User Key  (r) = Recorded By, (t) = Taken By, (c) = Cosigned By    Initials Name Provider Type     Aura Pereira PT Physical Therapist          PT Goal Re-Cert Due Date: 04/30/17            Time Calculation: Start Time: 1120    Therapy Charges for Today     Code Description Service Date Service Provider Modifiers Qty    [id number]  NANCY DEBRIDE OPEN WOUND UP TO 20CM 4/5/2017 Aura Pereira, PT GP 1    [id number]  PT THER SUPP EA 15 MIN 4/5/2017 Aura Pereira, PT GP 1                Aura Pereira PT  4/5/2017

## 2017-04-07 ENCOUNTER — HOSPITAL ENCOUNTER (OUTPATIENT)
Dept: PHYSICAL THERAPY | Facility: HOSPITAL | Age: 67
Setting detail: THERAPIES SERIES
Discharge: HOME | End: 2017-04-07

## 2017-04-07 DIAGNOSIS — L24.A9 DRAINAGE FROM WOUND: ICD-10-CM

## 2017-04-07 DIAGNOSIS — L02.11 NECK ABSCESS: Primary | ICD-10-CM

## 2017-04-07 PROCEDURE — 97110 THERAPEUTIC EXERCISES: CPT

## 2017-04-07 NOTE — PROGRESS NOTES
Outpatient Rehabilitation - Wound/Debridement Treatment Note   Denis     Patient Name: Elton Funez  : 1950  MRN: [id number]  Today's Date: 2017                Admit Date: 2017    Visit Dx:    ICD-10-CM ICD-9-CM   1. Neck abscess L02.11 682.1   2. Drainage from wound T14.8 879.8       Patient Active Problem List   Diagnosis   • Chronic kidney disease, stage III (moderate)   • Gastroesophageal reflux disease without esophagitis   • Hyperlipidemia   • Hypertension   • Trigger finger of right hand   • DM (diabetes mellitus), type 2, uncontrolled w/neurologic complication   • Obesity, Class I, BMI 30-34.9   • Actinic keratosis   • FAVIO (obstructive sleep apnea)   • Prurigo nodularis   • History of depression   • History of migraine   • Cervical radiculopathy   • ALT (SGPT) level raised   • Elevated AST (SGOT)   • Uncontrolled type 2 diabetes mellitus with diabetic neuropathy, with long-term current use of insulin   • Post-operative infection   • Leukocytosis   • Neck abscess   • Drainage from wound        Past Medical History:   Diagnosis Date   • Abscess of arm, right    • Abscess of skin of neck    • Acute sinusitis    • ALT (SGPT) level raised    • Arthritis    • Constipation    • CPAP (continuous positive airway pressure) dependence    • Decreased platelet count    • Depression     Resolved: 2015   • Diabetes mellitus    • Elevated AST (SGOT)    • Hyperlipidemia    • Hypertension    • Jaw pain    • Left hip pain    • Migraine     Hx of   • Obesity (BMI 30.0-34.9) 10/7/2016    BMI 31.92   • Obstructive sleep apnea     Hx of   • Quadriceps muscle strain    • Shigellosis    • Sleep apnea     PT TO BRING MASK AND TUBING DAY OF SURGERY   • Wears glasses    • Wears hearing aid     BOTH EARS        Past Surgical History:   Procedure Laterality Date   • BACK SURGERY     • CERVICAL ARTHRODESIS     • CERVICAL DISCECTOMY POSTERIOR FUSION WITH INSTRUMENTATION N/A 3/16/2017    Procedure:   INCISION AND DRAINAGE OF POSTERIOR CERVICAL WOUND;  Surgeon: Gigi Perales MD;  Location:  [facility];  Service:    • CERVICAL LAMINECTOMY DECOMPRESSION POSTERIOR Left 2/28/2017    Procedure: Left C7-T1 CERVICAL FORAMINOTOMY POSTERIOR WITH METRIX;  Surgeon: Gigi Perales MD;  Location:  [facility];  Service:    • COLONOSCOPY      2012   • VASECTOMY           EVALUATION        PT Ortho       04/07/17 1030    Subjective Comments    Subjective Comments Pt reports they did not have to change the bandage since last tx. Pt feels soreness in his neck, he thinks from drainage buildup.  -MC    Subjective Pain    Able to rate subjective pain? yes  -MC    Pre-Treatment Pain Level 2  -MC    Post-Treatment Pain Level 2  -MC    Transfers    Transfers, Sit-Stand [place] independent  -MC    Transfers, Stand-Sit [place] independent  -MC    Transfer, Comment seated on treatment table for tx  -MC    Gait Assessment/Treatment    Gait, [place] Level independent  -MC      04/05/17 1120    Subjective Comments    Subjective Comments Reports the bandage started to roll up, so his wife had to change it. She was able to remove the large piece of hydrofera, but could not find the smaller piece. Pt reports he had more soreness after the last visit, but it has faded.  -MC    Subjective Pain    Able to rate subjective pain? yes  -MC    Pre-Treatment Pain Level 0  -MC    Post-Treatment Pain Level 2  -MC    Pain Assessment    Pain Assessment --  -MC    Transfers    Transfers, Sit-Stand [place] independent  -MC    Transfers, Stand-Sit [place] independent  -MC    Transfer, Comment seated on treatment table for tx  -MC    Gait Assessment/Treatment    Gait, [place] Level independent  -      User Key  (r) = Recorded By, (t) = Taken By, (c) = Cosigned By    Initials Name Provider Type    BROOK Pereira, PT Physical Therapist                    [facility] Wound       04/07/17 1030          Wound 03/31/17  1345 posterior neck abscess    Wound - Properties Group Date first assessed: 03/31/17  - Time first assessed: 1345  - Orientation: posterior  - Location: neck  - Type: abscess  -MF    Wound WDL WDL  -      Dressing Appearance moist drainage;intact  -MC      Base reddened;yellow;slough;white   scant white tissue observed in depth  -      Periwound Area redness;intact;dry  -MC      Edges open  -      Drainage Characteristics/Odor serosanguineous;tan;no odor  -      Drainage Amount large  -      Wound Cleaning irrigated with;sterile normal saline  -      Wound Interventions other (see comments)   massage to express purulent drainage  -      Dressing Dressing applied;low-adherent   mepilex border secured with hypafix tape  -      Packing other (see comments)   saline-moistened hydrofera blue  -      Periwound Care cleansed with pH balanced cleanser;dry periwound area maintained  -        User Key  (r) = Recorded By, (t) = Taken By, (c) = Cosigned By    Initials Name Provider Type     Tc Javier, PT Physical Therapist     Aura Pereira, PT Physical Therapist            WOUND DEBRIDEMENT                Recommendation and Plan        PT Assessment/Plan       04/07/17 1030       PT Assessment    Functional Limitations Performance in work activities;Performance in self-care ADL  -     Impairments Integumentary integrity  -     Assessment Comments Hydrofera blue/mepilex appears to be managing drainage appropriately. Pt still with large amounts of sticky, purulent drainage PT able to express from depths of wound tunnels without apparent decrease in depth/area. Pt will continue to benefit from skilled PT wound care to continue progress. Pt to discuss treatment options with MDs on Monday.  -     Rehab Potential Good  -     Patient/caregiver participated in establishment of treatment plan and goals Yes  -     Patient would benefit from skilled therapy intervention Yes  -      PT Plan    PT Frequency 2x/week;3x/week  -     Predicted Duration of Therapy Intervention (days/wks) 6-8 weeks  -     Physical Therapy Interventions (Optional Details) patient/family education;wound care  -       User Key  (r) = Recorded By, (t) = Taken By, (c) = Cosigned By    Initials Name Provider Type     Aura Pereira, PT Physical Therapist          Goals        PT OP Goals       04/07/17 1030       Time Calculation    PT Goal Re-Cert Due Date 04/30/17  -       User Key  (r) = Recorded By, (t) = Taken By, (c) = Cosigned By    Initials Name Provider Type     Aura Pereira, PT Physical Therapist          PT Goal Re-Cert Due Date: 04/30/17            Time Calculation: Start Time: 1030  Total Timed Code Minutes- PT: 18 minute(s)    Therapy Charges for Today     Code Description Service Date Service Provider Modifiers Qty    [id number]  PT THER PROC EA 15 MIN 4/7/2017 Aura Pereira, PT GP 1    [id number] HC PT THER SUPP EA 15 MIN 4/7/2017 Aura Pereira, PT GP 1                Aura Pereira, PT  4/7/2017

## 2017-04-10 ENCOUNTER — OFFICE VISIT (OUTPATIENT)
Dept: NEUROSURGERY | Facility: CLINIC | Age: 67
End: 2017-04-10

## 2017-04-10 ENCOUNTER — TRANSCRIBE ORDERS (OUTPATIENT)
Dept: LAB | Facility: HOSPITAL | Age: 67
End: 2017-04-10

## 2017-04-10 ENCOUNTER — LAB (OUTPATIENT)
Dept: LAB | Facility: HOSPITAL | Age: 67
End: 2017-04-10

## 2017-04-10 ENCOUNTER — HOSPITAL ENCOUNTER (OUTPATIENT)
Dept: PHYSICAL THERAPY | Facility: HOSPITAL | Age: 67
Setting detail: THERAPIES SERIES
Discharge: HOME | End: 2017-04-10

## 2017-04-10 VITALS
BODY MASS INDEX: 32.43 KG/M2 | SYSTOLIC BLOOD PRESSURE: 122 MMHG | DIASTOLIC BLOOD PRESSURE: 78 MMHG | HEIGHT: 68 IN | TEMPERATURE: 97.7 F | WEIGHT: 214 LBS

## 2017-04-10 DIAGNOSIS — L03.221 CELLULITIS AND ABSCESS OF NECK: ICD-10-CM

## 2017-04-10 DIAGNOSIS — L02.11 CELLULITIS AND ABSCESS OF NECK: ICD-10-CM

## 2017-04-10 DIAGNOSIS — IMO0001 WOUND, SURGICAL, INFECTED, SUBSEQUENT ENCOUNTER: ICD-10-CM

## 2017-04-10 DIAGNOSIS — M54.12 CERVICAL RADICULOPATHY AT C8: Primary | ICD-10-CM

## 2017-04-10 DIAGNOSIS — S11.90XD OPEN WOUND OF NECK, SUBSEQUENT ENCOUNTER: Primary | ICD-10-CM

## 2017-04-10 DIAGNOSIS — G06.1 INTRASPINAL ABSCESS: ICD-10-CM

## 2017-04-10 DIAGNOSIS — E08.65 DIABETES MELLITUS DUE TO UNDERLYING CONDITION WITH HYPERGLYCEMIA, WITH LONG-TERM CURRENT USE OF INSULIN (HCC): ICD-10-CM

## 2017-04-10 DIAGNOSIS — IMO0001 WOUND, SURGICAL, INFECTED, SUBSEQUENT ENCOUNTER: Primary | ICD-10-CM

## 2017-04-10 DIAGNOSIS — Z79.4 DIABETES MELLITUS DUE TO UNDERLYING CONDITION WITH HYPERGLYCEMIA, WITH LONG-TERM CURRENT USE OF INSULIN (HCC): ICD-10-CM

## 2017-04-10 LAB
ALBUMIN SERPL-MCNC: 4.4 G/DL (ref 3.2–4.8)
ALBUMIN/GLOB SERPL: 1.3 G/DL (ref 1.5–2.5)
ALP SERPL-CCNC: 91 U/L (ref 25–100)
ALT SERPL W P-5'-P-CCNC: 31 U/L (ref 7–40)
ANION GAP SERPL CALCULATED.3IONS-SCNC: 5 MMOL/L (ref 3–11)
AST SERPL-CCNC: 46 U/L (ref 0–33)
BASOPHILS # BLD AUTO: 0.02 10*3/MM3 (ref 0–0.2)
BASOPHILS NFR BLD AUTO: 0.3 % (ref 0–1)
BILIRUB SERPL-MCNC: 0.5 MG/DL (ref 0.3–1.2)
BUN BLD-MCNC: 24 MG/DL (ref 9–23)
BUN/CREAT SERPL: 20 (ref 7–25)
CALCIUM SPEC-SCNC: 10 MG/DL (ref 8.7–10.4)
CHLORIDE SERPL-SCNC: 104 MMOL/L (ref 99–109)
CO2 SERPL-SCNC: 27 MMOL/L (ref 20–31)
CREAT BLD-MCNC: 1.2 MG/DL (ref 0.6–1.3)
CRP SERPL-MCNC: 0.25 MG/DL (ref 0–1)
DEPRECATED RDW RBC AUTO: 49.2 FL (ref 37–54)
EOSINOPHIL # BLD AUTO: 0.31 10*3/MM3 (ref 0.1–0.3)
EOSINOPHIL NFR BLD AUTO: 4.5 % (ref 0–3)
ERYTHROCYTE [DISTWIDTH] IN BLOOD BY AUTOMATED COUNT: 14.1 % (ref 11.3–14.5)
ERYTHROCYTE [SEDIMENTATION RATE] IN BLOOD: 42 MM/HR (ref 0–20)
GFR SERPL CREATININE-BSD FRML MDRD: 60 ML/MIN/1.73
GLOBULIN UR ELPH-MCNC: 3.5 GM/DL
GLUCOSE BLD-MCNC: 192 MG/DL (ref 70–100)
HCT VFR BLD AUTO: 40.6 % (ref 38.9–50.9)
HGB BLD-MCNC: 13 G/DL (ref 13.1–17.5)
IMM GRANULOCYTES # BLD: 0.05 10*3/MM3 (ref 0–0.03)
IMM GRANULOCYTES NFR BLD: 0.7 % (ref 0–0.6)
LYMPHOCYTES # BLD AUTO: 1.1 10*3/MM3 (ref 0.6–4.8)
LYMPHOCYTES NFR BLD AUTO: 16.1 % (ref 24–44)
MCH RBC QN AUTO: 30.8 PG (ref 27–31)
MCHC RBC AUTO-ENTMCNC: 32 G/DL (ref 32–36)
MCV RBC AUTO: 96.2 FL (ref 80–99)
MONOCYTES # BLD AUTO: 0.75 10*3/MM3 (ref 0–1)
MONOCYTES NFR BLD AUTO: 11 % (ref 0–12)
NEUTROPHILS # BLD AUTO: 4.59 10*3/MM3 (ref 1.5–8.3)
NEUTROPHILS NFR BLD AUTO: 67.4 % (ref 41–71)
PLATELET # BLD AUTO: 128 10*3/MM3 (ref 150–450)
PMV BLD AUTO: 11.7 FL (ref 6–12)
POTASSIUM BLD-SCNC: 4.9 MMOL/L (ref 3.5–5.5)
PROT SERPL-MCNC: 7.9 G/DL (ref 5.7–8.2)
RBC # BLD AUTO: 4.22 10*6/MM3 (ref 4.2–5.76)
SODIUM BLD-SCNC: 136 MMOL/L (ref 132–146)
WBC NRBC COR # BLD: 6.82 10*3/MM3 (ref 3.5–10.8)

## 2017-04-10 PROCEDURE — 99024 POSTOP FOLLOW-UP VISIT: CPT | Performed by: PHYSICIAN ASSISTANT

## 2017-04-10 PROCEDURE — 80053 COMPREHEN METABOLIC PANEL: CPT | Performed by: INTERNAL MEDICINE

## 2017-04-10 PROCEDURE — 36415 COLL VENOUS BLD VENIPUNCTURE: CPT

## 2017-04-10 PROCEDURE — 97597 DBRDMT OPN WND 1ST 20 CM/<: CPT

## 2017-04-10 PROCEDURE — 85025 COMPLETE CBC W/AUTO DIFF WBC: CPT | Performed by: INTERNAL MEDICINE

## 2017-04-10 PROCEDURE — 85652 RBC SED RATE AUTOMATED: CPT | Performed by: INTERNAL MEDICINE

## 2017-04-10 PROCEDURE — 86140 C-REACTIVE PROTEIN: CPT | Performed by: INTERNAL MEDICINE

## 2017-04-10 NOTE — PROGRESS NOTES
Outpatient Rehabilitation - Wound/Debridement Treatment Note   Denis     Patient Name: Elton Funez  : 1950  MRN: [id number]  Today's Date: 4/10/2017                Admit Date: 4/10/2017    Visit Dx:    ICD-10-CM ICD-9-CM   1. Open wound of neck, subsequent encounter S11.90XD V58.89     874.8       Patient Active Problem List   Diagnosis   • Chronic kidney disease, stage III (moderate)   • Gastroesophageal reflux disease without esophagitis   • Hyperlipidemia   • Hypertension   • Trigger finger of right hand   • DM (diabetes mellitus), type 2, uncontrolled w/neurologic complication   • Obesity, Class I, BMI 30-34.9   • Actinic keratosis   • FAVIO (obstructive sleep apnea)   • Prurigo nodularis   • History of depression   • History of migraine   • Cervical radiculopathy   • ALT (SGPT) level raised   • Elevated AST (SGOT)   • Uncontrolled type 2 diabetes mellitus with diabetic neuropathy, with long-term current use of insulin   • Post-operative infection   • Leukocytosis   • Neck abscess   • Drainage from wound        Past Medical History:   Diagnosis Date   • Abscess of arm, right    • Abscess of skin of neck    • Acute sinusitis    • ALT (SGPT) level raised    • Arthritis    • Constipation    • CPAP (continuous positive airway pressure) dependence    • Decreased platelet count    • Depression     Resolved: 2015   • Diabetes mellitus    • Elevated AST (SGOT)    • Hyperlipidemia    • Hypertension    • Jaw pain    • Left hip pain    • Migraine     Hx of   • Obesity (BMI 30.0-34.9) 10/7/2016    BMI 31.92   • Obstructive sleep apnea     Hx of   • Quadriceps muscle strain    • Shigellosis    • Sleep apnea     PT TO BRING MASK AND TUBING DAY OF SURGERY   • Wears glasses    • Wears hearing aid     BOTH EARS        Past Surgical History:   Procedure Laterality Date   • BACK SURGERY     • CERVICAL ARTHRODESIS     • CERVICAL DISCECTOMY POSTERIOR FUSION WITH INSTRUMENTATION N/A 3/16/2017    Procedure:   "INCISION AND DRAINAGE OF POSTERIOR CERVICAL WOUND;  Surgeon: Gigi Perales MD;  Location:  [facility];  Service:    • CERVICAL LAMINECTOMY DECOMPRESSION POSTERIOR Left 2/28/2017    Procedure: Left C7-T1 CERVICAL FORAMINOTOMY POSTERIOR WITH METRIX;  Surgeon: Gigi Perales MD;  Location:  [facility];  Service:    • COLONOSCOPY      2012   • VASECTOMY           EVALUATION        PT Ortho       04/10/17 1400    Subjective Comments    Subjective Comments Pt was seen by his surgeon and [facility] MD today.  States he was told his wound was looking better, and he is continuing on IV antibiotics for another 2 weeks.  -JM    Subjective Pain    Able to rate subjective pain? yes  -JM    Pre-Treatment Pain Level 2  -JM    Post-Treatment Pain Level 2  -JM    Transfers    Transfers, Sit-Stand [place] independent  -    Transfers, Stand-Sit [place] independent  -    Transfer, Comment seated in arjo chair for tx.  -JM    Gait Assessment/Treatment    Gait, [place] Level independent  -      User Key  (r) = Recorded By, (t) = Taken By, (c) = Cosigned By    Initials Name Provider Type    KELLEN Yi, PT Physical Therapist                    LDA Wound       04/10/17 1400          Wound 03/31/17 1345 posterior neck abscess    Wound - Properties Group Date first assessed: 03/31/17  -MF Time first assessed: 1345  - Orientation: posterior  -MF Location: neck  -MF Type: abscess  -MF    Wound WDL WDL   min periwound redness, moderate drainage  -JM      Dressing Appearance moist drainage;intact   temporary dressing s/p MD visit, 1/4\" nugauze, covaderm  -JM      Base reddened;yellow;slough;white   scant white tissue observed in depth  -JM      Periwound Area redness;intact;dry   brawny periwound  -JM      Edges open  -JM      Length (cm) 1.5  -JM      Width (cm) 0.8  -JM      Depth (cm) 2.5  -JM      Tunneling [depth (cm)/Location] 1.9cm@12:00; 2.3cm@6:00  -JM      Drainage Characteristics/Odor " "serosanguineous;tan;no odor  -      Drainage Amount large  -      Wound Cleaning cleansed with;other (see comments)   Phase One  -      Wound Interventions wound cleanser (specify)   Phase one, manual pressure to periwound to express drainage  -      Dressing Dressing changed;low-adherent   4\" optifoam gentle, hypafix tape  -      Packing other (see comments)   saline-moistened hydrofera blue cut into ribbon  -      Periwound Care cleansed with pH balanced cleanser;dry periwound area maintained  -        User Key  (r) = Recorded By, (t) = Taken By, (c) = Cosigned By    Initials Name Provider Type     Tc Javier, PT Physical Therapist     Alycia Yi, PT Physical Therapist            WOUND DEBRIDEMENT  Debridement Site 1  Location- Site 1: post neck   Selective Debridement- Site 1: Wound Surface <20cmsq (area debrided about 1 cm2)  Instruments- Site 1: tweezers  Excised Tissue Description- Site 1: scant, slough  Bleeding- Site 1: seeping             Recommendation and Plan        PT Assessment/Plan       04/10/17 1400       PT Assessment    Functional Limitations Performance in work activities;Performance in self-care ADL  -     Impairments Integumentary integrity  -     Assessment Comments Pt with less noticable thick drainage today during PT tx, but had dressing removed and re-packed at MD office twice today.  Visible superficial aspects of wound with clean beefy red tissue, and periwound minimally erythematous/brawny.  Will continue with hydrofera blue bacteriostatic dressing to reduce bacterial load.  Will continue with wound irrigation and dressing management to assist with healing of complex wound.  -     Rehab Potential Good  -     Patient/caregiver participated in establishment of treatment plan and goals Yes  -     Patient would benefit from skilled therapy intervention Yes  -     PT Plan    PT Frequency 2x/week;3x/week  -     Physical Therapy Interventions " (Optional Details) patient/family education;wound care  -       User Key  (r) = Recorded By, (t) = Taken By, (c) = Cosigned By    Initials Name Provider Type    KELLEN Yi, PT Physical Therapist          Goals        PT OP Goals       04/10/17 1400       Time Calculation    PT Goal Re-Cert Due Date 04/30/17  -       User Key  (r) = Recorded By, (t) = Taken By, (c) = Cosigned By    Initials Name Provider Type    KELLEN Yi, PT Physical Therapist          PT Goal Re-Cert Due Date: 04/30/17            Time Calculation: Start Time: 1400    Therapy Charges for Today     Code Description Service Date Service Provider Modifiers Qty    52186009411 HC NANCY DEBRIDE OPEN WOUND UP TO 20CM 4/10/2017 Alycia Yi, PT GP 1    55146994688 HC PT THER SUPP EA 15 MIN 4/10/2017 Alycia Yi, PT GP 1                Alycia iY, PT  4/10/2017

## 2017-04-10 NOTE — PROGRESS NOTES
Subjective     Chief Complaint: Elton Funez is a 66 y.o. male     History of Present Illness   Mr. Funez is a 66-year-old gentleman with a history of ACDF in 2002. More recently he presented with progressive neck and left shoulder and left arm pain. 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 Initially he was doing well, then developed some increased pain and was noted to have purulent drainage from his incision. He was empirically started on some PO antibiotics, but his fever increased and he developed some drainage. CT of the neck showed a fluid/gas collection. I&D done 3/16/17 was positive for MSSA.    Infectious disease is involved. Patient is receiving IV antibiotics via PICC line with daily infusions of daptomycin and rocephin. He is also following up with Dr. Root weekly.   He has been going to wound care for incision and they are packing it.  He has a dressing that was applied yesterday and it also shows signs of drainage on it. He denies fevers, His pain overall is better, except for some right triceps pain and persistent numbness in the two smaller fingers of his right hand.  Otherwise, he is encouraged by his progress and is starting to get back into normal activities.  He notices some weakness/muscle atrophy on the right compared to the left due to long-time disuse.      The following portions of the patient's history were reviewed and updated as appropriate: allergies, current medications, past family history, past medical history, past social history, past surgical history and problem list.    Family history:   Family History   Problem Relation Age of Onset   • Diabetes Father    • Heart disease Father    • Diabetes Paternal Grandmother        Social history:   Social History     Social History   • Marital status:      Spouse name: N/A   • Number of children: N/A   • Years of education: N/A     Occupational History   •  Not on file.     Social History Main Topics   • Smoking status: Former Smoker     Packs/day: 1.00     Years: 5.00     Types: Cigarettes     Quit date: 1976   • Smokeless tobacco: Never Used   • Alcohol use Yes      Comment: socially   • Drug use: No   • Sexual activity: Defer     Other Topics Concern   • Not on file     Social History Narrative       Review of Systems   Constitutional: Negative for activity change, appetite change, chills, diaphoresis, fatigue, fever and unexpected weight change.   HENT: Negative for congestion, dental problem, drooling, ear discharge, ear pain, facial swelling, hearing loss, mouth sores, nosebleeds, postnasal drip, rhinorrhea, sinus pressure, sneezing, sore throat, tinnitus, trouble swallowing and voice change.    Eyes: Negative for photophobia, pain, discharge, redness, itching and visual disturbance.   Respiratory: Negative for apnea, cough, choking, chest tightness, shortness of breath, wheezing and stridor.    Cardiovascular: Negative for chest pain, palpitations and leg swelling.   Gastrointestinal: Negative for abdominal distention, abdominal pain, anal bleeding, blood in stool, constipation, diarrhea, nausea, rectal pain and vomiting.   Endocrine: Negative for cold intolerance, heat intolerance, polydipsia, polyphagia and polyuria.   Genitourinary: Negative for decreased urine volume, difficulty urinating, dysuria, enuresis, flank pain, frequency, genital sores, hematuria and urgency.   Musculoskeletal: Negative for arthralgias, back pain, gait problem, joint swelling, myalgias, neck pain and neck stiffness.   Skin: Negative for color change, pallor, rash and wound.   Allergic/Immunologic: Negative for environmental allergies, food allergies and immunocompromised state.   Neurological: Positive for numbness. Negative for dizziness, tremors, seizures, syncope, facial asymmetry, speech difficulty, weakness, light-headedness and headaches.   Hematological: Negative for  "adenopathy. Does not bruise/bleed easily.   Psychiatric/Behavioral: Negative for agitation, behavioral problems, confusion, decreased concentration, dysphoric mood, hallucinations, self-injury, sleep disturbance and suicidal ideas. The patient is not nervous/anxious and is not hyperactive.    All other systems reviewed and are negative.      Objective   Blood pressure 122/78, temperature 97.7 °F (36.5 °C), height 68\" (172.7 cm), weight 214 lb (97.1 kg).  Body mass index is 32.54 kg/(m^2).    Physical Exam   Constitutional: He is oriented to person, place, and time. He appears well-developed and well-nourished. No distress.   HENT:   Head: Normocephalic and atraumatic.   Eyes: EOM are normal. Pupils are equal, round, and reactive to light.   Neck:   Posterior cervical incision is open along the top 2-3cm. There is wound packing present in the incision.  The rest is healing very well without swelling or erythema.  Gentle  Pressure on the tissues surrounding this opening produced scant serous colored fluid.     Cardiovascular: Normal rate.    Pulmonary/Chest: Effort normal. No respiratory distress.   Abdominal: Soft. Bowel sounds are normal.   Musculoskeletal: Normal range of motion.   Neurological: He is alert and oriented to person, place, and time.   Skin: He is not diaphoretic.     Labs over the last 2 weeks:    4/10/17:  WBC  6.82  ESR 42  CRP 0.25    4/3/17:  WBC 8.47  ESR 54  CRP 8.47    Assessment/Plan  Mr. uFnez is doing well.  As soon as his incision has healed adequately, he will continue with ID, weekly labs, and wound care visits.  He will see us again next week and every two weeks thereafter if he continues on this trend.      Elton was seen today for neck pain.    Diagnoses and all orders for this visit:    Cervical radiculopathy at C8    Diabetes mellitus due to underlying condition with hyperglycemia, with long-term current use of insulin         Return in about 1 week (around 4/17/2017), or next " Wednesday 4/12 with [place].

## 2017-04-12 ENCOUNTER — HOSPITAL ENCOUNTER (OUTPATIENT)
Dept: PHYSICAL THERAPY | Facility: HOSPITAL | Age: 67
Setting detail: THERAPIES SERIES
Discharge: HOME | End: 2017-04-12

## 2017-04-12 DIAGNOSIS — T81.40XA POST-OPERATIVE INFECTION: ICD-10-CM

## 2017-04-12 DIAGNOSIS — L02.11 NECK ABSCESS: ICD-10-CM

## 2017-04-12 DIAGNOSIS — L24.A9 DRAINAGE FROM WOUND: ICD-10-CM

## 2017-04-12 DIAGNOSIS — S11.90XD OPEN WOUND OF NECK, SUBSEQUENT ENCOUNTER: Primary | ICD-10-CM

## 2017-04-12 PROCEDURE — 97597 DBRDMT OPN WND 1ST 20 CM/<: CPT

## 2017-04-12 NOTE — PROGRESS NOTES
Outpatient Rehabilitation - Wound/Debridement Treatment Note   Denis     Patient Name: Elton Funez  : 1950  MRN: [id number]  Today's Date: 2017                Admit Date: 2017    Visit Dx:    ICD-10-CM ICD-9-CM   1. Open wound of neck, subsequent encounter S11.90XD V58.89     874.8   2. Neck abscess L02.11 682.1   3. Drainage from wound T14.8 879.8   4. Post-operative infection T81.4XXA 998.59       Patient Active Problem List   Diagnosis   • Chronic kidney disease, stage III (moderate)   • Gastroesophageal reflux disease without esophagitis   • Hyperlipidemia   • Hypertension   • Trigger finger of right hand   • DM (diabetes mellitus), type 2, uncontrolled w/neurologic complication   • Obesity, Class I, BMI 30-34.9   • Actinic keratosis   • FAVIO (obstructive sleep apnea)   • Prurigo nodularis   • History of depression   • History of migraine   • Cervical radiculopathy   • ALT (SGPT) level raised   • Elevated AST (SGOT)   • Uncontrolled type 2 diabetes mellitus with diabetic neuropathy, with long-term current use of insulin   • Post-operative infection   • Leukocytosis   • Neck abscess   • Drainage from wound        Past Medical History:   Diagnosis Date   • Abscess of arm, right    • Abscess of skin of neck    • Acute sinusitis    • ALT (SGPT) level raised    • Arthritis    • Constipation    • CPAP (continuous positive airway pressure) dependence    • Decreased platelet count    • Depression     Resolved: 2015   • Diabetes mellitus    • Elevated AST (SGOT)    • Hyperlipidemia    • Hypertension    • Jaw pain    • Left hip pain    • Migraine     Hx of   • Obesity (BMI 30.0-34.9) 10/7/2016    BMI 31.92   • Obstructive sleep apnea     Hx of   • Quadriceps muscle strain    • Shigellosis    • Sleep apnea     PT TO BRING MASK AND TUBING DAY OF SURGERY   • Wears glasses    • Wears hearing aid     BOTH EARS        Past Surgical History:   Procedure Laterality Date   • BACK SURGERY     •  CERVICAL ARTHRODESIS     • CERVICAL DISCECTOMY POSTERIOR FUSION WITH INSTRUMENTATION N/A 3/16/2017    Procedure:  INCISION AND DRAINAGE OF POSTERIOR CERVICAL WOUND;  Surgeon: Gigi Perales MD;  Location:  [facility];  Service:    • CERVICAL LAMINECTOMY DECOMPRESSION POSTERIOR Left 2/28/2017    Procedure: Left C7-T1 CERVICAL FORAMINOTOMY POSTERIOR WITH METRIX;  Surgeon: Gigi Perales MD;  Location:  [facility];  Service:    • COLONOSCOPY      2012   • VASECTOMY           EVALUATION        PT Ortho       04/12/17 1115    Subjective Comments    Subjective Comments Pt reports wound getting sore the longer the packing is in.   -    Subjective Pain    Able to rate subjective pain? yes  -    Pre-Treatment Pain Level 3  -    Post-Treatment Pain Level 1  -    Transfers    Transfers, Sit-Stand [place] independent  -    Transfers, Stand-Sit [place] independent  -    Transfer, Comment sitting in chair for tx.   -    Gait Assessment/Treatment    Gait, [place] Level independent  -      04/10/17 1400    Subjective Comments    Subjective Comments Pt was seen by his surgeon and JENNIFER HOLGUIN today.  States he was told his wound was looking better, and he is continuing on IV antibiotics for another 2 weeks.  -    Subjective Pain    Able to rate subjective pain? yes  -    Pre-Treatment Pain Level 2  -    Post-Treatment Pain Level 2  -    Transfers    Transfers, Sit-Stand [place] independent  -    Transfers, Stand-Sit [place] independent  -    Transfer, Comment seated in arjo chair for tx.  -    Gait Assessment/Treatment    Gait, [place] Level independent  -      User Key  (r) = Recorded By, (t) = Taken By, (c) = Cosigned By    Initials Name Provider Type     Tc Javier, PT Physical Therapist    KELLEN Yi, PT Physical Therapist                    [facility] Wound       04/12/17 1115          Wound 03/31/17 1345 posterior neck abscess    Wound -  Properties Group Date first assessed: 03/31/17  -MF Time first assessed: 1345  -MF Orientation: posterior  -MF Location: neck  -MF Type: abscess  -MF    Dressing Appearance moist drainage;intact  -MF      Base reddened;yellow;slough;white  -MF      Periwound Area redness;intact;dry  -MF      Edges open  -MF      Drainage Characteristics/Odor serosanguineous;tan;no odor  -MF      Drainage Amount moderate  -MF      Wound Cleaning irrigated with;sterile normal saline;other (see comments)   phase one  -MF      Dressing other (see comments)   mepilex border   -MF      Packing Incision packed with;other (see comments)   cutimed sorbact   -        User Key  (r) = Recorded By, (t) = Taken By, (c) = Cosigned By    Initials Name Provider Type    LUZ MARINA Javier, PT Physical Therapist            WOUND DEBRIDEMENT  Debridement Site 1  Location- Site 1: post neck   Selective Debridement- Site 1: Wound Surface <20cmsq (~2cm2 total area)  Instruments- Site 1: tweezers  Excised Tissue Description- Site 1: minimum, slough  Bleeding- Site 1: none             Recommendation and Plan        PT Assessment/Plan       04/12/17 1538       PT Assessment    Functional Limitations Performance in work activities;Performance in self-care ADL  -MF     Assessment Comments Pt cont to have moderate drainage, but drainage noted to be significantly thinner today with dressing changes.  PT changed packing to cutimed sorbact to help better wick moisture out of wound while maintaining a bacteriostatic environment.   -     Rehab Potential Good  -MF     Patient/caregiver participated in establishment of treatment plan and goals Yes  -MF     Patient would benefit from skilled therapy intervention Yes  -MF     PT Plan    PT Frequency 2x/week;3x/week  -     Physical Therapy Interventions (Optional Details) wound care;patient/family education  -     PT Plan Comments cont with light debridement prn and dressing management to optimize healing  potential.   -       User Key  (r) = Recorded By, (t) = Taken By, (c) = Cosigned By    Initials Name Provider Type     Tc Javier, PT Physical Therapist          Goals        PT OP Goals       04/12/17 1115       Time Calculation    PT Goal Re-Cert Due Date 04/30/17  -       User Key  (r) = Recorded By, (t) = Taken By, (c) = Cosigned By    Initials Name Provider Type     Tc Javier, PT Physical Therapist          PT Goal Re-Cert Due Date: 04/30/17            Time Calculation: Start Time: 1115  Total Timed Code Minutes- PT: 30 minute(s)    Therapy Charges for Today     Code Description Service Date Service Provider Modifiers Qty    15303422122 HC NANCY DEBRIDE OPEN WOUND UP TO 20CM 4/12/2017 Tc Javier, PT GP 1                Tc Javier, PT  4/12/2017

## 2017-04-13 ENCOUNTER — LAB (OUTPATIENT)
Dept: LAB | Facility: HOSPITAL | Age: 67
End: 2017-04-13

## 2017-04-13 ENCOUNTER — TRANSCRIBE ORDERS (OUTPATIENT)
Dept: LAB | Facility: HOSPITAL | Age: 67
End: 2017-04-13

## 2017-04-13 DIAGNOSIS — D69.59 THROMBOCYTOPENIA DUE TO DIMINISHED PLATELET PRODUCTION: ICD-10-CM

## 2017-04-13 DIAGNOSIS — L03.221 CELLULITIS AND ABSCESS OF NECK: ICD-10-CM

## 2017-04-13 DIAGNOSIS — B95.61 STAPHYLOCOCCUS AUREUS SUPERFICIAL FOLLICULITIS: ICD-10-CM

## 2017-04-13 DIAGNOSIS — L02.11 CELLULITIS AND ABSCESS OF NECK: ICD-10-CM

## 2017-04-13 DIAGNOSIS — IMO0001 WOUND, SURGICAL, INFECTED, SUBSEQUENT ENCOUNTER: Primary | ICD-10-CM

## 2017-04-13 DIAGNOSIS — L73.9 STAPHYLOCOCCUS AUREUS SUPERFICIAL FOLLICULITIS: ICD-10-CM

## 2017-04-13 DIAGNOSIS — IMO0001 WOUND, SURGICAL, INFECTED, SUBSEQUENT ENCOUNTER: ICD-10-CM

## 2017-04-13 LAB
BASOPHILS # BLD AUTO: 0.02 10*3/MM3 (ref 0–0.2)
BASOPHILS NFR BLD AUTO: 0.3 % (ref 0–1)
DEPRECATED RDW RBC AUTO: 50.4 FL (ref 37–54)
EOSINOPHIL # BLD AUTO: 0.32 10*3/MM3 (ref 0.1–0.3)
EOSINOPHIL NFR BLD AUTO: 4.3 % (ref 0–3)
ERYTHROCYTE [DISTWIDTH] IN BLOOD BY AUTOMATED COUNT: 14.2 % (ref 11.3–14.5)
HCT VFR BLD AUTO: 42 % (ref 38.9–50.9)
HGB BLD-MCNC: 13.1 G/DL (ref 13.1–17.5)
IMM GRANULOCYTES # BLD: 0.05 10*3/MM3 (ref 0–0.03)
IMM GRANULOCYTES NFR BLD: 0.7 % (ref 0–0.6)
LYMPHOCYTES # BLD AUTO: 1.41 10*3/MM3 (ref 0.6–4.8)
LYMPHOCYTES NFR BLD AUTO: 18.8 % (ref 24–44)
MCH RBC QN AUTO: 30.3 PG (ref 27–31)
MCHC RBC AUTO-ENTMCNC: 31.2 G/DL (ref 32–36)
MCV RBC AUTO: 97.2 FL (ref 80–99)
MONOCYTES # BLD AUTO: 0.94 10*3/MM3 (ref 0–1)
MONOCYTES NFR BLD AUTO: 12.5 % (ref 0–12)
NEUTROPHILS # BLD AUTO: 4.77 10*3/MM3 (ref 1.5–8.3)
NEUTROPHILS NFR BLD AUTO: 63.4 % (ref 41–71)
PLATELET # BLD AUTO: 152 10*3/MM3 (ref 150–450)
PMV BLD AUTO: 12.5 FL (ref 6–12)
RBC # BLD AUTO: 4.32 10*6/MM3 (ref 4.2–5.76)
WBC NRBC COR # BLD: 7.51 10*3/MM3 (ref 3.5–10.8)

## 2017-04-13 PROCEDURE — 36415 COLL VENOUS BLD VENIPUNCTURE: CPT

## 2017-04-13 PROCEDURE — 85025 COMPLETE CBC W/AUTO DIFF WBC: CPT

## 2017-04-15 ENCOUNTER — HOSPITAL ENCOUNTER (OUTPATIENT)
Dept: PHYSICAL THERAPY | Facility: HOSPITAL | Age: 67
Setting detail: THERAPIES SERIES
Discharge: HOME | End: 2017-04-15

## 2017-04-15 DIAGNOSIS — S11.90XD OPEN WOUND OF NECK, SUBSEQUENT ENCOUNTER: Primary | ICD-10-CM

## 2017-04-15 PROCEDURE — 97110 THERAPEUTIC EXERCISES: CPT

## 2017-04-15 NOTE — PROGRESS NOTES
Outpatient Rehabilitation - Wound/Debridement Treatment Note   Denis     Patient Name: Elton Funez  : 1950  MRN: [id number]  Today's Date: 4/15/2017                Admit Date: 4/15/2017    Visit Dx:    ICD-10-CM ICD-9-CM   1. Open wound of neck, subsequent encounter S11.90XD V58.89     874.8       Patient Active Problem List   Diagnosis   • Chronic kidney disease, stage III (moderate)   • Gastroesophageal reflux disease without esophagitis   • Hyperlipidemia   • Hypertension   • Trigger finger of right hand   • DM (diabetes mellitus), type 2, uncontrolled w/neurologic complication   • Obesity, Class I, BMI 30-34.9   • Actinic keratosis   • FAVIO (obstructive sleep apnea)   • Prurigo nodularis   • History of depression   • History of migraine   • Cervical radiculopathy   • ALT (SGPT) level raised   • Elevated AST (SGOT)   • Uncontrolled type 2 diabetes mellitus with diabetic neuropathy, with long-term current use of insulin   • Post-operative infection   • Leukocytosis   • Neck abscess   • Drainage from wound        Past Medical History:   Diagnosis Date   • Abscess of arm, right    • Abscess of skin of neck    • Acute sinusitis    • ALT (SGPT) level raised    • Arthritis    • Constipation    • CPAP (continuous positive airway pressure) dependence    • Decreased platelet count    • Depression     Resolved: 2015   • Diabetes mellitus    • Elevated AST (SGOT)    • Hyperlipidemia    • Hypertension    • Jaw pain    • Left hip pain    • Migraine     Hx of   • Obesity (BMI 30.0-34.9) 10/7/2016    BMI 31.92   • Obstructive sleep apnea     Hx of   • Quadriceps muscle strain    • Shigellosis    • Sleep apnea     PT TO BRING MASK AND TUBING DAY OF SURGERY   • Wears glasses    • Wears hearing aid     BOTH EARS        Past Surgical History:   Procedure Laterality Date   • BACK SURGERY     • CERVICAL ARTHRODESIS     • CERVICAL DISCECTOMY POSTERIOR FUSION WITH INSTRUMENTATION N/A 3/16/2017    Procedure:   "INCISION AND DRAINAGE OF POSTERIOR CERVICAL WOUND;  Surgeon: Gigi Perales MD;  Location:  [facility];  Service:    • CERVICAL LAMINECTOMY DECOMPRESSION POSTERIOR Left 2/28/2017    Procedure: Left C7-T1 CERVICAL FORAMINOTOMY POSTERIOR WITH METRIX;  Surgeon: Gigi Perales MD;  Location:  [facility];  Service:    • COLONOSCOPY      2012   • VASECTOMY           EVALUATION        PT Ortho       04/15/17 0845    Subjective Comments    Subjective Comments Pt reports his wife changed the bandage Thursday after he mowed the grass. He reports minor soreness.  -MC    Subjective Pain    Able to rate subjective pain? yes  -MC    Pre-Treatment Pain Level 2  -MC    Post-Treatment Pain Level 2  -MC    Transfers    Transfers, Sit-Stand [place] independent  -MC    Transfers, Stand-Sit [place] independent  -MC    Transfer, Comment sitting in chair for tx  -MC    Gait Assessment/Treatment    Gait, [place] Level independent  -MC      User Key  (r) = Recorded By, (t) = Taken By, (c) = Cosigned By    Initials Name Provider Type     Aura Pereira, PT Physical Therapist                    [facility] Wound       04/15/17 0845          Wound 03/31/17 1345 posterior neck abscess    Wound - Properties Group Date first assessed: 03/31/17  - Time first assessed: 1345  - Orientation: posterior  -MF Location: neck  -MF Type: abscess  -MF    Wound WDL ex   moderate periwound irritation under covaderm  -MC      Dressing Appearance moist drainage;intact  -MC      Base reddened;yellow;slough;white  -MC      Periwound Area redness;intact;dry  -MC      Edges open  -MC      Drainage Characteristics/Odor serosanguineous;tan;no odor  -MC      Drainage Amount small  -MC      Wound Cleaning irrigated with;sterile normal saline  -MC      Wound Interventions other (see comments)   manual pressure to express drainage  -MC      Dressing Dressing applied;other (see comments);low-adherent   small qwick pad, 4\" optifoam gentle  " -      Packing Incision packed with;other (see comments)   cutimed sorbact ribbon  -      Periwound Care cleansed with pH balanced cleanser;dry periwound area maintained  -        User Key  (r) = Recorded By, (t) = Taken By, (c) = Cosigned By    Initials Name Provider Type     Tc Javier, PT Physical Therapist     Aura Pereira, PT Physical Therapist            WOUND DEBRIDEMENT                Recommendation and Plan        PT Assessment/Plan       04/15/17 0845       PT Assessment    Functional Limitations Performance in work activities;Performance in self-care ADL  -     Impairments Integumentary integrity  -     Assessment Comments Pt with only small amt of drainage able to be expressed today from depths of wound. Drainage continues to be slightly sticky. Added qwick pad to bandages to attempt to wick moisture away from the skin and protect the periwound.  -     Rehab Potential Good  -     Patient/caregiver participated in establishment of treatment plan and goals Yes  -     Patient would benefit from skilled therapy intervention Yes  -     PT Plan    PT Frequency 2x/week;3x/week  -     Predicted Duration of Therapy Intervention (days/wks) 6-8 weeks  -     Physical Therapy Interventions (Optional Details) patient/family education;wound care  -       User Key  (r) = Recorded By, (t) = Taken By, (c) = Cosigned By    Initials Name Provider Type     Aura Pereira, PT Physical Therapist          Goals        PT OP Goals       04/15/17 0845       Time Calculation    PT Goal Re-Cert Due Date 04/30/17  -       User Key  (r) = Recorded By, (t) = Taken By, (c) = Cosigned By    Initials Name Provider Type     Aura Pereira, PT Physical Therapist          PT Goal Re-Cert Due Date: 04/30/17            Time Calculation: Start Time: 0845    Therapy Charges for Today     Code Description Service Date Service Provider Modifiers Qty    63042420723 HC PT THER PROC EA 15 MIN  4/15/2017 Aura Pereira, PT GP 1                Aura Pereira, PT  4/15/2017

## 2017-04-17 ENCOUNTER — TRANSCRIBE ORDERS (OUTPATIENT)
Dept: LAB | Facility: HOSPITAL | Age: 67
End: 2017-04-17

## 2017-04-17 ENCOUNTER — APPOINTMENT (OUTPATIENT)
Dept: LAB | Facility: HOSPITAL | Age: 67
End: 2017-04-17

## 2017-04-17 ENCOUNTER — HOSPITAL ENCOUNTER (OUTPATIENT)
Dept: PHYSICAL THERAPY | Facility: HOSPITAL | Age: 67
Setting detail: THERAPIES SERIES
Discharge: HOME | End: 2017-04-17

## 2017-04-17 ENCOUNTER — APPOINTMENT (OUTPATIENT)
Dept: PHYSICAL THERAPY | Facility: HOSPITAL | Age: 67
End: 2017-04-17

## 2017-04-17 DIAGNOSIS — N18.30 CHRONIC KIDNEY DISEASE, STAGE III (MODERATE) (HCC): ICD-10-CM

## 2017-04-17 DIAGNOSIS — G06.1 INTRASPINAL ABSCESS: ICD-10-CM

## 2017-04-17 DIAGNOSIS — L73.9 STAPHYLOCOCCUS AUREUS SUPERFICIAL FOLLICULITIS: ICD-10-CM

## 2017-04-17 DIAGNOSIS — L02.11 CELLULITIS AND ABSCESS OF NECK: ICD-10-CM

## 2017-04-17 DIAGNOSIS — B95.61 STAPHYLOCOCCUS AUREUS SUPERFICIAL FOLLICULITIS: ICD-10-CM

## 2017-04-17 DIAGNOSIS — L03.221 CELLULITIS AND ABSCESS OF NECK: ICD-10-CM

## 2017-04-17 DIAGNOSIS — S11.90XD OPEN WOUND OF NECK, SUBSEQUENT ENCOUNTER: Primary | ICD-10-CM

## 2017-04-17 DIAGNOSIS — D69.59 THROMBOCYTOPENIA DUE TO DIMINISHED PLATELET PRODUCTION: ICD-10-CM

## 2017-04-17 DIAGNOSIS — E16.2 HYPOGLYCEMIA, UNSPECIFIED: ICD-10-CM

## 2017-04-17 DIAGNOSIS — I10 ESSENTIAL HYPERTENSION, MALIGNANT: ICD-10-CM

## 2017-04-17 DIAGNOSIS — IMO0001 WOUND, SURGICAL, INFECTED, SUBSEQUENT ENCOUNTER: Primary | ICD-10-CM

## 2017-04-17 DIAGNOSIS — E11.9 DIABETES MELLITUS WITHOUT COMPLICATION (HCC): ICD-10-CM

## 2017-04-17 LAB
ALBUMIN SERPL-MCNC: 4.6 G/DL (ref 3.2–4.8)
ALBUMIN/GLOB SERPL: 1.3 G/DL (ref 1.5–2.5)
ALP SERPL-CCNC: 95 U/L (ref 25–100)
ALT SERPL W P-5'-P-CCNC: 28 U/L (ref 7–40)
ANION GAP SERPL CALCULATED.3IONS-SCNC: 8 MMOL/L (ref 3–11)
AST SERPL-CCNC: 39 U/L (ref 0–33)
BASOPHILS # BLD AUTO: 0.02 10*3/MM3 (ref 0–0.2)
BASOPHILS NFR BLD AUTO: 0.3 % (ref 0–1)
BILIRUB SERPL-MCNC: 0.4 MG/DL (ref 0.3–1.2)
BUN BLD-MCNC: 25 MG/DL (ref 9–23)
BUN/CREAT SERPL: 19.2 (ref 7–25)
CALCIUM SPEC-SCNC: 10.6 MG/DL (ref 8.7–10.4)
CHLORIDE SERPL-SCNC: 105 MMOL/L (ref 99–109)
CO2 SERPL-SCNC: 25 MMOL/L (ref 20–31)
CREAT BLD-MCNC: 1.3 MG/DL (ref 0.6–1.3)
CRP SERPL-MCNC: 0.2 MG/DL (ref 0–1)
DEPRECATED RDW RBC AUTO: 50.6 FL (ref 37–54)
EOSINOPHIL # BLD AUTO: 0.24 10*3/MM3 (ref 0.1–0.3)
EOSINOPHIL NFR BLD AUTO: 4.1 % (ref 0–3)
ERYTHROCYTE [DISTWIDTH] IN BLOOD BY AUTOMATED COUNT: 14.3 % (ref 11.3–14.5)
ERYTHROCYTE [SEDIMENTATION RATE] IN BLOOD: 37 MM/HR (ref 0–20)
GFR SERPL CREATININE-BSD FRML MDRD: 55 ML/MIN/1.73
GLOBULIN UR ELPH-MCNC: 3.6 GM/DL
GLUCOSE BLD-MCNC: 218 MG/DL (ref 70–100)
HCT VFR BLD AUTO: 39.9 % (ref 38.9–50.9)
HGB BLD-MCNC: 12.7 G/DL (ref 13.1–17.5)
IMM GRANULOCYTES # BLD: 0.05 10*3/MM3 (ref 0–0.03)
IMM GRANULOCYTES NFR BLD: 0.9 % (ref 0–0.6)
LYMPHOCYTES # BLD AUTO: 1.16 10*3/MM3 (ref 0.6–4.8)
LYMPHOCYTES NFR BLD AUTO: 19.8 % (ref 24–44)
MCH RBC QN AUTO: 30.8 PG (ref 27–31)
MCHC RBC AUTO-ENTMCNC: 31.8 G/DL (ref 32–36)
MCV RBC AUTO: 96.8 FL (ref 80–99)
MONOCYTES # BLD AUTO: 0.66 10*3/MM3 (ref 0–1)
MONOCYTES NFR BLD AUTO: 11.3 % (ref 0–12)
NEUTROPHILS # BLD AUTO: 3.73 10*3/MM3 (ref 1.5–8.3)
NEUTROPHILS NFR BLD AUTO: 63.6 % (ref 41–71)
PLATELET # BLD AUTO: 140 10*3/MM3 (ref 150–450)
PMV BLD AUTO: 11.7 FL (ref 6–12)
POTASSIUM BLD-SCNC: 5 MMOL/L (ref 3.5–5.5)
PROT SERPL-MCNC: 8.2 G/DL (ref 5.7–8.2)
RBC # BLD AUTO: 4.12 10*6/MM3 (ref 4.2–5.76)
SODIUM BLD-SCNC: 138 MMOL/L (ref 132–146)
WBC NRBC COR # BLD: 5.86 10*3/MM3 (ref 3.5–10.8)

## 2017-04-17 PROCEDURE — 86140 C-REACTIVE PROTEIN: CPT | Performed by: INTERNAL MEDICINE

## 2017-04-17 PROCEDURE — 36415 COLL VENOUS BLD VENIPUNCTURE: CPT | Performed by: INTERNAL MEDICINE

## 2017-04-17 PROCEDURE — 85025 COMPLETE CBC W/AUTO DIFF WBC: CPT | Performed by: INTERNAL MEDICINE

## 2017-04-17 PROCEDURE — 85652 RBC SED RATE AUTOMATED: CPT | Performed by: INTERNAL MEDICINE

## 2017-04-17 PROCEDURE — 80053 COMPREHEN METABOLIC PANEL: CPT | Performed by: INTERNAL MEDICINE

## 2017-04-17 PROCEDURE — 97602 WOUND(S) CARE NON-SELECTIVE: CPT

## 2017-04-17 NOTE — PROGRESS NOTES
Outpatient Rehabilitation - Wound/Debridement Treatment Note   Denis     Patient Name: Elton Funez  : 1950  MRN: [id number]  Today's Date: 2017                Admit Date: 2017    Visit Dx:    ICD-10-CM ICD-9-CM   1. Open wound of neck, subsequent encounter S11.90XD V58.89     874.8       Patient Active Problem List   Diagnosis   • Chronic kidney disease, stage III (moderate)   • Gastroesophageal reflux disease without esophagitis   • Hyperlipidemia   • Hypertension   • Trigger finger of right hand   • DM (diabetes mellitus), type 2, uncontrolled w/neurologic complication   • Obesity, Class I, BMI 30-34.9   • Actinic keratosis   • FAVIO (obstructive sleep apnea)   • Prurigo nodularis   • History of depression   • History of migraine   • Cervical radiculopathy   • ALT (SGPT) level raised   • Elevated AST (SGOT)   • Uncontrolled type 2 diabetes mellitus with diabetic neuropathy, with long-term current use of insulin   • Post-operative infection   • Leukocytosis   • Neck abscess   • Drainage from wound        Past Medical History:   Diagnosis Date   • Abscess of arm, right    • Abscess of skin of neck    • Acute sinusitis    • ALT (SGPT) level raised    • Arthritis    • Constipation    • CPAP (continuous positive airway pressure) dependence    • Decreased platelet count    • Depression     Resolved: 2015   • Diabetes mellitus    • Elevated AST (SGOT)    • Hyperlipidemia    • Hypertension    • Jaw pain    • Left hip pain    • Migraine     Hx of   • Obesity (BMI 30.0-34.9) 10/7/2016    BMI 31.92   • Obstructive sleep apnea     Hx of   • Quadriceps muscle strain    • Shigellosis    • Sleep apnea     PT TO BRING MASK AND TUBING DAY OF SURGERY   • Wears glasses    • Wears hearing aid     BOTH EARS        Past Surgical History:   Procedure Laterality Date   • BACK SURGERY     • CERVICAL ARTHRODESIS     • CERVICAL DISCECTOMY POSTERIOR FUSION WITH INSTRUMENTATION N/A 3/16/2017    Procedure:   INCISION AND DRAINAGE OF POSTERIOR CERVICAL WOUND;  Surgeon: Gigi Perales MD;  Location:  [facility];  Service:    • CERVICAL LAMINECTOMY DECOMPRESSION POSTERIOR Left 2/28/2017    Procedure: Left C7-T1 CERVICAL FORAMINOTOMY POSTERIOR WITH METRIX;  Surgeon: Gigi Perales MD;  Location:  [facility];  Service:    • COLONOSCOPY      2012   • VASECTOMY           EVALUATION        PT Ortho       04/17/17 1330    Subjective Comments    Subjective Comments Pt states he was seen by Infectious Disease today, is continuing on antibiotics for another week.  Pt states his wife reports less drainage from wound.  -    Subjective Pain    Able to rate subjective pain? yes  -    Pre-Treatment Pain Level 1  -    Post-Treatment Pain Level 1  -    Transfers    Transfers, Sit-Stand [place] independent  -    Transfers, Stand-Sit [place] independent  -    Transfer, Comment seated in arjo chair for tx.  -    Gait Assessment/Treatment    Gait, [place] Level independent  -      04/15/17 0845    Subjective Comments    Subjective Comments Pt reports his wife changed the bandage Thursday after he mowed the grass. He reports minor soreness.  -    Subjective Pain    Able to rate subjective pain? yes  -    Pre-Treatment Pain Level 2  -    Post-Treatment Pain Level 2  -    Transfers    Transfers, Sit-Stand [place] independent  -    Transfers, Stand-Sit [place] independent  -    Transfer, Comment sitting in chair for tx  -    Gait Assessment/Treatment    Gait, [place] Level independent  -      User Key  (r) = Recorded By, (t) = Taken By, (c) = Cosigned By    Initials Name Provider Type    BROOK Pereira, PT Physical Therapist    KELLEN Yi, PT Physical Therapist                    [facility] Wound       04/17/17 1330          Wound 03/31/17 1345 posterior neck abscess    Wound - Properties Group Date first assessed: 03/31/17  - Time first assessed: 1345  -  Orientation: posterior  -MF Location: neck  -MF Type: abscess  -MF    Wound WDL ex   scant erythema and edema, much improved  -      Dressing Appearance moist drainage;intact;other (see comments)   packing had been removed at MD office  -      Base reddened;yellow;slough;white;granulating  -      Periwound Area intact;pink;dry  -JM      Edges open  -      Depth (cm) 2.5  -      Drainage Characteristics/Odor serosanguineous;tan;no odor  -      Drainage Amount small  -      Wound Cleaning cleansed with;other (see comments)   phase one  -      Wound Interventions debrided  -      Dressing Dressing changed;low-adherent;foam  -JM      Packing other (see comments)   saline-moistened hydrofera blue  -      Periwound Care cleansed with pH balanced cleanser;dry periwound area maintained  -        User Key  (r) = Recorded By, (t) = Taken By, (c) = Cosigned By    Initials Name Provider Type     Tc Javier, PT Physical Therapist     Alycia Yi, PT Physical Therapist            WOUND DEBRIDEMENT  Debridement Site 1  Location- Site 1: post neck   Selective Debridement- Site 1: Wound Surface <20cmsq (non-selective with flushes and cotton swabs only)             Recommendation and Plan        PT Assessment/Plan       04/17/17 1330       PT Assessment    Functional Limitations Performance in work activities;Performance in self-care ADL  -     Impairments Integumentary integrity  -     Assessment Comments Wound with smaller area today, less drainage, less erythema.  Pt reporting less drainage pooling in wound bed.  Pt making good progress with current tx.  Will continue with follow-up 3x/week this week due to his wife having surgery and unable to assist with dressing changes this week.  -     Rehab Potential Good  -     Patient/caregiver participated in establishment of treatment plan and goals Yes  -     Patient would benefit from skilled therapy intervention Yes  -     PT Plan     PT Frequency 2x/week;3x/week  -     Physical Therapy Interventions (Optional Details) patient/family education;wound care  -       User Key  (r) = Recorded By, (t) = Taken By, (c) = Cosigned By    Initials Name Provider Type    KELLEN Yi, PT Physical Therapist          Goals        PT OP Goals       04/17/17 1330       Time Calculation    PT Goal Re-Cert Due Date 04/30/17  -       User Key  (r) = Recorded By, (t) = Taken By, (c) = Cosigned By    Initials Name Provider Type    KELLEN Yi, PT Physical Therapist          PT Goal Re-Cert Due Date: 04/30/17            Time Calculation: Start Time: 1330    Therapy Charges for Today     Code Description Service Date Service Provider Modifiers Qty    43881415753 HC NONSELECTIVE DEBRIDEMENT 4/17/2017 Alycia Yi, PT GP 1    [id number]  PT THER SUPP EA 15 MIN 4/17/2017 Alycia Yi, PT GP 1                Alycia Yi, PT  4/17/2017

## 2017-04-19 ENCOUNTER — TELEPHONE (OUTPATIENT)
Dept: INTERNAL MEDICINE | Facility: CLINIC | Age: 67
End: 2017-04-19

## 2017-04-19 ENCOUNTER — OFFICE VISIT (OUTPATIENT)
Dept: NEUROSURGERY | Facility: CLINIC | Age: 67
End: 2017-04-19

## 2017-04-19 ENCOUNTER — HOSPITAL ENCOUNTER (OUTPATIENT)
Dept: PHYSICAL THERAPY | Facility: HOSPITAL | Age: 67
Setting detail: THERAPIES SERIES
Discharge: HOME | End: 2017-04-19

## 2017-04-19 VITALS
BODY MASS INDEX: 32.43 KG/M2 | SYSTOLIC BLOOD PRESSURE: 106 MMHG | DIASTOLIC BLOOD PRESSURE: 66 MMHG | TEMPERATURE: 97.7 F | HEIGHT: 68 IN | WEIGHT: 214 LBS

## 2017-04-19 DIAGNOSIS — IMO0002 UNCONTROLLED TYPE 2 DIABETES MELLITUS WITH DIABETIC NEUROPATHY, WITH LONG-TERM CURRENT USE OF INSULIN: Primary | ICD-10-CM

## 2017-04-19 DIAGNOSIS — M54.12 CERVICAL RADICULOPATHY: ICD-10-CM

## 2017-04-19 DIAGNOSIS — S11.90XD OPEN WOUND OF NECK, SUBSEQUENT ENCOUNTER: Primary | ICD-10-CM

## 2017-04-19 DIAGNOSIS — L24.A9 DRAINAGE FROM WOUND: ICD-10-CM

## 2017-04-19 PROCEDURE — 97610 LOW FREQUENCY NON-THERMAL US: CPT

## 2017-04-19 PROCEDURE — 99024 POSTOP FOLLOW-UP VISIT: CPT | Performed by: PHYSICIAN ASSISTANT

## 2017-04-19 RX ORDER — SERTRALINE HYDROCHLORIDE 100 MG/1
100 TABLET, FILM COATED ORAL DAILY
Qty: 30 TABLET | Refills: 5 | Status: SHIPPED | OUTPATIENT
Start: 2017-04-19 | End: 2017-11-17 | Stop reason: SDUPTHER

## 2017-04-19 NOTE — PROGRESS NOTES
Outpatient Rehabilitation - Wound/Debridement Treatment Note   Denis     Patient Name: Elton Funez  : 1950  MRN: [id number]  Today's Date: 2017            Posterior neck:      Admit Date: 2017    Visit Dx:    ICD-10-CM ICD-9-CM   1. Open wound of neck, subsequent encounter S11.90XD V58.89     874.8       Patient Active Problem List   Diagnosis   • Chronic kidney disease, stage III (moderate)   • Gastroesophageal reflux disease without esophagitis   • Hyperlipidemia   • Hypertension   • Trigger finger of right hand   • DM (diabetes mellitus), type 2, uncontrolled w/neurologic complication   • Obesity, Class I, BMI 30-34.9   • Actinic keratosis   • FAVIO (obstructive sleep apnea)   • Prurigo nodularis   • History of depression   • History of migraine   • Cervical radiculopathy   • ALT (SGPT) level raised   • Elevated AST (SGOT)   • Uncontrolled type 2 diabetes mellitus with diabetic neuropathy, with long-term current use of insulin   • Post-operative infection   • Leukocytosis   • Neck abscess   • Drainage from wound        Past Medical History:   Diagnosis Date   • Abscess of arm, right    • Abscess of skin of neck    • Acute sinusitis    • ALT (SGPT) level raised    • Arthritis    • Constipation    • CPAP (continuous positive airway pressure) dependence    • Decreased platelet count    • Depression     Resolved: 2015   • Diabetes mellitus    • Elevated AST (SGOT)    • Hyperlipidemia    • Hypertension    • Jaw pain    • Left hip pain    • Migraine     Hx of   • Obesity (BMI 30.0-34.9) 10/7/2016    BMI 31.92   • Obstructive sleep apnea     Hx of   • Quadriceps muscle strain    • Shigellosis    • Sleep apnea     PT TO BRING MASK AND TUBING DAY OF SURGERY   • Wears glasses    • Wears hearing aid     BOTH EARS        Past Surgical History:   Procedure Laterality Date   • BACK SURGERY     • CERVICAL ARTHRODESIS     • CERVICAL DISCECTOMY POSTERIOR FUSION WITH INSTRUMENTATION N/A 3/16/2017     Procedure:  INCISION AND DRAINAGE OF POSTERIOR CERVICAL WOUND;  Surgeon: Gigi Perales MD;  Location:  [facility];  Service:    • CERVICAL LAMINECTOMY DECOMPRESSION POSTERIOR Left 2/28/2017    Procedure: Left C7-T1 CERVICAL FORAMINOTOMY POSTERIOR WITH METRIX;  Surgeon: Gigi Perales MD;  Location:  [facility];  Service:    • COLONOSCOPY      2012   • VASECTOMY           EVALUATION        PT Ortho       04/19/17 1330    Subjective Comments    Subjective Comments Pt states there is very little drainage from the wound now.  Reports his wife had shoulder surgery today so she will be unable to help him with dressing changes.  -    Subjective Pain    Able to rate subjective pain? yes  -    Pre-Treatment Pain Level 0  -    Post-Treatment Pain Level 0  -JM    Transfers    Transfers, Sit-Stand [place] independent  -    Transfers, Stand-Sit [place] independent  -    Transfer, Comment seated in arjo chair  -    Gait Assessment/Treatment    Gait, [place] Level independent  -      04/17/17 1330    Subjective Comments    Subjective Comments Pt states he was seen by Infectious Disease today, is continuing on antibiotics for another week.  Pt states his wife reports less drainage from wound.  -    Subjective Pain    Able to rate subjective pain? yes  -    Pre-Treatment Pain Level 1  -    Post-Treatment Pain Level 1  -JM    Transfers    Transfers, Sit-Stand [place] independent  -    Transfers, Stand-Sit [place] independent  -    Transfer, Comment seated in arjo chair for tx.  -    Gait Assessment/Treatment    Gait, [place] Level independent  -      User Key  (r) = Recorded By, (t) = Taken By, (c) = Cosigned By    Initials Name Provider Type    KELLEN Yi, PT Physical Therapist                    [facility] Wound       04/19/17 1330          Wound 03/31/17 1345 posterior neck abscess    Wound - Properties Group Date first assessed: 03/31/17  - Time  "first assessed: 1345  - Orientation: posterior  - Location: neck  - Type: abscess  -    Wound WDL ex   scant erythema and edema, much improved  -      Dressing Appearance moist drainage;intact;other (see comments)   packing had been removed at MD office  -      Base granulating;moist;reddened;yellow   scant slough around edges only  -      Periwound Area intact;pink;dry   scant residual erythema  -      Edges open  -      Length (cm) 1.2  -JM      Width (cm) 0.6  -JM      Depth (cm) 2.2  -JM      Undermining [depth (cm)/Location] resolved  -      Drainage Characteristics/Odor serosanguineous;no odor  -      Drainage Amount small  -      Picture taken yes  -      Wound Cleaning cleansed with;other (see comments)   Phase One, cotton swabs to sweep depth  -      Wound Interventions other (see comments)   MIST 8 minutes  -      Dressing Dressing changed;low-adherent;foam   4\" optifoam gentle  -      Packing other (see comments)   saline-moistened hydrofera blue  -      Periwound Care cleansed with pH balanced cleanser;dry periwound area maintained  -        User Key  (r) = Recorded By, (t) = Taken By, (c) = Cosigned By    Initials Name Provider Type     Tc Javier, PT Physical Therapist     Alycia Yi, PT Physical Therapist            WOUND DEBRIDEMENT                Recommendation and Plan        PT Assessment/Plan       04/19/17 1330       PT Assessment    Functional Limitations Performance in work activities;Performance in self-care ADL  -     Impairments Integumentary integrity  -     Assessment Comments Posterior neck wound without undermining today and with decrease in depth and area.  PT initiated MIST to stimulate wound healing, reduce bacterial load and inflammation, and to treat underlying tissues to promote wound closure.  Will continue with MIST 3x/week and dressing management.  -     Rehab Potential Good  -     Patient/caregiver participated in " establishment of treatment plan and goals Yes  -     Patient would benefit from skilled therapy intervention Yes  -     PT Plan    PT Frequency 3x/week  -     Physical Therapy Interventions (Optional Details) wound care  -     PT Plan Comments MIST, dressing management  -       User Key  (r) = Recorded By, (t) = Taken By, (c) = Cosigned By    Initials Name Provider Type    KELLEN Yi, PT Physical Therapist          Goals        PT OP Goals       04/19/17 1330       Time Calculation    PT Goal Re-Cert Due Date 04/30/17  -       User Key  (r) = Recorded By, (t) = Taken By, (c) = Cosigned By    Initials Name Provider Type    KELLEN Yi, PT Physical Therapist          PT Goal Re-Cert Due Date: 04/30/17            Time Calculation: Start Time: 1330    Therapy Charges for Today     Code Description Service Date Service Provider Modifiers Qty    [id number]  PT NLFU MIST 4/19/2017 Alycia Yi, PT GP 1    [id number]  PT THER SUPP EA 15 MIN 4/19/2017 Alycia Yi, PT GP 1                Alycia Yi, PT  4/19/2017

## 2017-04-19 NOTE — TELEPHONE ENCOUNTER
----- Message from Kaley Santamaria sent at 4/19/2017  3:05 PM EDT -----  Contact: PATIENT   RE: SERTRALINE RX    DR ANTOINE,    MR GLOVER CALLED TODAY STATING THAT HIS PHARMACY DOES NOT HAVE THE ORDER FOR SERTRALINE 100 MG. THEY STILL HAVE SERTRALINE 50 MG. MR GLOVER STATES THAT THIS DOSAGE WAS INCREASED DURING HIS LAST OV WITH YOU. [facility].    CALL BACK #[phone]

## 2017-04-19 NOTE — PROGRESS NOTES
"Subjective     Chief Complaint: : Elton Funez is a 66 y.o. male here for follow up after posterior foraminotomy and subsequent MSSA infection.      History of Present Illness  Mr. Funez is a 66-year-old gentleman with a history of ACDF in 2002. More recently he presented with progressive neck and left shoulder and left arm pain. 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 Initially he was doing well, then developed some increased pain and was noted to have purulent drainage from his incision.  I&D was done 3/16/17 was positive for MSSA.  He has been getting IV antibiotics from infectious disease and should be finishing these in the next couple of weeks.  He is being seen weekly with Dr. Root for labs, etc.  He also attends wound care for his incision, which has had an opening at the superior end.  They are packing it, and state that it is no longer tunneling and that the drainage has slowed considerably and is serosanguinous in nature.      From a pain standpoint, he is doing very well.  He has d/c all pain medication and is only very rarely having the triceps spasms on the left that he had been getting. He still has numbness in the two smallest fingers on the left hand, and there is mild muscle atrophy in the left hand and forearm compared to the right side. He has been using a squeeze ball and doing other exercises; we have not yet sent him for formal PT.  He has been working with his PCP and endocrinologist to get better control of his blood sugars. They have made some progress, but his sugars still tend to be high in the afternoon.   Overall, he is feeling hopeful.  He had been rather discouraged, but feels that there is now a \"light at the end of the tunnel\".      The following portions of the patient's history were reviewed and updated as appropriate: allergies, current medications, past family history, past medical history, past social " history, past surgical history and problem list.    Family history:   Family History   Problem Relation Age of Onset   • Diabetes Father    • Heart disease Father    • Diabetes Paternal Grandmother        Social history:   Social History     Social History   • Marital status:      Spouse name: N/A   • Number of children: N/A   • Years of education: N/A     Occupational History   • Not on file.     Social History Main Topics   • Smoking status: Former Smoker     Packs/day: 1.00     Years: 5.00     Types: Cigarettes     Quit date: 1976   • Smokeless tobacco: Never Used   • Alcohol use Yes      Comment: socially   • Drug use: No   • Sexual activity: Defer     Other Topics Concern   • Not on file     Social History Narrative       Review of Systems   Constitutional: Negative for activity change, appetite change, chills, diaphoresis, fatigue, fever and unexpected weight change.   HENT: Negative for congestion, dental problem, drooling, ear discharge, ear pain, facial swelling, hearing loss, mouth sores, nosebleeds, postnasal drip, rhinorrhea, sinus pressure, sneezing, sore throat, tinnitus, trouble swallowing and voice change.    Eyes: Negative for photophobia, pain, discharge, redness, itching and visual disturbance.   Respiratory: Negative for apnea, cough, choking, chest tightness, shortness of breath, wheezing and stridor.    Cardiovascular: Negative for chest pain, palpitations and leg swelling.   Gastrointestinal: Negative for abdominal distention, abdominal pain, anal bleeding, blood in stool, constipation, diarrhea, nausea, rectal pain and vomiting.   Endocrine: Negative for cold intolerance, heat intolerance, polydipsia, polyphagia and polyuria.   Genitourinary: Negative for decreased urine volume, difficulty urinating, dysuria, enuresis, flank pain, frequency, genital sores, hematuria and urgency.   Musculoskeletal: Negative for arthralgias, back pain, gait problem, joint swelling, myalgias, neck pain  "and neck stiffness.   Skin: Negative for color change, pallor, rash and wound.   Allergic/Immunologic: Negative for environmental allergies, food allergies and immunocompromised state.   Neurological: Positive for numbness. Negative for dizziness, tremors, seizures, syncope, facial asymmetry, speech difficulty, weakness, light-headedness and headaches.   Hematological: Negative for adenopathy. Does not bruise/bleed easily.   Psychiatric/Behavioral: Negative for agitation, behavioral problems, confusion, decreased concentration, dysphoric mood, hallucinations, self-injury, sleep disturbance and suicidal ideas. The patient is not nervous/anxious and is not hyperactive.    All other systems reviewed and are negative.      Objective   Blood pressure 106/66, temperature 97.7 °F (36.5 °C), height 68\" (172.7 cm), weight 214 lb (97.1 kg).  Body mass index is 32.54 kg/(m^2).    Physical Exam  Constitutional: He is oriented to person, place, and time. He appears well-developed and well-nourished. No distress.   HENT:   Head: Normocephalic and atraumatic.   Eyes: EOM are normal. Pupils are equal, round, and reactive to light.   Neck:   Posterior cervical incision is open along the top 2cm. There is wound packing present in the incision. The rest is healing very well without swelling or erythema.  Gentle Pressure on the tissues surrounding this opening no longer produced drainage.   Cardiovascular: Normal rate.   Pulmonary/Chest: Effort normal. No respiratory distress.   Abdominal: Soft. Bowel sounds are normal.   Musculoskeletal: Normal range of motion.   Neurological: He is alert and oriented to person, place, and time.   Skin: He is not diaphoretic.       Results from last 7 days  Lab Units 04/17/17  1050 04/13/17  1110   WBC 10*3/mm3 5.86 7.51   HEMOGLOBIN g/dL 12.7* 13.1   HEMATOCRIT % 39.9 42.0   PLATELETS 10*3/mm3 140* 152       Results from last 7 days  Lab Units 04/17/17  1050   SED RATE mm/hr 37*       Results from " last 7 days  Lab Units 04/17/17  1050   CRP mg/dL 0.20       Assessment/Plan   Mr. Funez is doing well.  We would like to get him in for PT as soon as he is done with his infusions.  He will follow up with us in 2 weeks and hopefully will be ready to begin PT at that point.  He will continue with wound care and with ID as well.      Elton was seen today for post-op.    Diagnoses and all orders for this visit:    Uncontrolled type 2 diabetes mellitus with diabetic neuropathy, with long-term current use of insulin    Cervical radiculopathy    Drainage from wound      Return in about 2 weeks (around 5/3/2017).

## 2017-04-21 ENCOUNTER — HOSPITAL ENCOUNTER (OUTPATIENT)
Dept: PHYSICAL THERAPY | Facility: HOSPITAL | Age: 67
Setting detail: THERAPIES SERIES
Discharge: HOME | End: 2017-04-21

## 2017-04-21 DIAGNOSIS — S11.90XD OPEN WOUND OF NECK, SUBSEQUENT ENCOUNTER: Primary | ICD-10-CM

## 2017-04-21 DIAGNOSIS — L02.11 NECK ABSCESS: ICD-10-CM

## 2017-04-21 PROCEDURE — 97610 LOW FREQUENCY NON-THERMAL US: CPT

## 2017-04-21 NOTE — PROGRESS NOTES
Outpatient Rehabilitation - Wound/Debridement Treatment Note   Denis     Patient Name: Elton Funez  : 1950  MRN: [id number]  Today's Date: 2017                Admit Date: 2017    Visit Dx:    ICD-10-CM ICD-9-CM   1. Open wound of neck, subsequent encounter S11.90XD V58.89     874.8   2. Neck abscess L02.11 682.1       Patient Active Problem List   Diagnosis   • Chronic kidney disease, stage III (moderate)   • Gastroesophageal reflux disease without esophagitis   • Hyperlipidemia   • Hypertension   • Trigger finger of right hand   • DM (diabetes mellitus), type 2, uncontrolled w/neurologic complication   • Obesity, Class I, BMI 30-34.9   • Actinic keratosis   • FAVIO (obstructive sleep apnea)   • Prurigo nodularis   • History of depression   • History of migraine   • Cervical radiculopathy   • ALT (SGPT) level raised   • Elevated AST (SGOT)   • Uncontrolled type 2 diabetes mellitus with diabetic neuropathy, with long-term current use of insulin   • Post-operative infection   • Leukocytosis   • Neck abscess   • Drainage from wound        Past Medical History:   Diagnosis Date   • Abscess of arm, right    • Abscess of skin of neck    • Acute sinusitis    • ALT (SGPT) level raised    • Arthritis    • Constipation    • CPAP (continuous positive airway pressure) dependence    • Decreased platelet count    • Depression     Resolved: 2015   • Diabetes mellitus    • Elevated AST (SGOT)    • Hyperlipidemia    • Hypertension    • Jaw pain    • Left hip pain    • Migraine     Hx of   • Obesity (BMI 30.0-34.9) 10/7/2016    BMI 31.92   • Obstructive sleep apnea     Hx of   • Quadriceps muscle strain    • Shigellosis    • Sleep apnea     PT TO BRING MASK AND TUBING DAY OF SURGERY   • Wears glasses    • Wears hearing aid     BOTH EARS        Past Surgical History:   Procedure Laterality Date   • BACK SURGERY     • CERVICAL ARTHRODESIS     • CERVICAL DISCECTOMY POSTERIOR FUSION WITH INSTRUMENTATION  N/A 3/16/2017    Procedure:  INCISION AND DRAINAGE OF POSTERIOR CERVICAL WOUND;  Surgeon: Gigi Perales MD;  Location:  [facility];  Service:    • CERVICAL LAMINECTOMY DECOMPRESSION POSTERIOR Left 2/28/2017    Procedure: Left C7-T1 CERVICAL FORAMINOTOMY POSTERIOR WITH METRIX;  Surgeon: Gigi Perales MD;  Location:  [facility];  Service:    • COLONOSCOPY      2012   • VASECTOMY           EVALUATION        PT Ortho       04/21/17 1040    Subjective Comments    Subjective Comments Pt reports the small optifoams tend to roll up at the tops and bottoms. He has not noted much drainage at all, except some blue/purple from the HFB.  -    Subjective Pain    Able to rate subjective pain? yes  -    Pre-Treatment Pain Level 0  -    Post-Treatment Pain Level 0  -    Transfers    Transfers, Sit-Stand [place] independent  -    Transfers, Stand-Sit [place] independent  -    Transfer, Comment seated on EOB for tx  -    Gait Assessment/Treatment    Gait, [place] Level independent  -      04/19/17 1330    Subjective Comments    Subjective Comments Pt states there is very little drainage from the wound now.  Reports his wife had shoulder surgery today so she will be unable to help him with dressing changes.  -    Subjective Pain    Able to rate subjective pain? yes  -    Pre-Treatment Pain Level 0  -    Post-Treatment Pain Level 0  -    Transfers    Transfers, Sit-Stand [place] independent  -    Transfers, Stand-Sit [place] independent  -    Transfer, Comment seated in arjo chair  -    Gait Assessment/Treatment    Gait, [place] Level independent  -      User Key  (r) = Recorded By, (t) = Taken By, (c) = Cosigned By    Initials Name Provider Type     Aura Pereira, PT Physical Therapist    KELLEN Yi, PT Physical Therapist                    [facility] Wound       04/21/17 1040          Wound 03/31/17 1345 posterior neck abscess    Wound -  "Properties Group Date first assessed: 03/31/17  - Time first assessed: 1345  - Orientation: posterior  - Location: neck  - Type: abscess  -MF    Wound WDL ex   scant erythema and edema, much improved  -      Dressing Appearance moist drainage;intact  -      Base granulating;moist;reddened   no visible slough today  -      Periwound Area intact;pink;dry   scant residual erythema  -      Edges open  -      Drainage Characteristics/Odor serosanguineous;no odor  -      Drainage Amount small  -      Wound Cleaning cleansed with;other (see comments)   phase one  -      Wound Interventions other (see comments)   MIST x8 minutes, swabs to depth  -      Dressing Dressing applied;low-adherent;foam   4\" optifoam gentle secured with hypafix  -      Packing other (see comments)   saline-moistened hydrofera blue  -      Periwound Care cleansed with pH balanced cleanser;dry periwound area maintained  -        User Key  (r) = Recorded By, (t) = Taken By, (c) = Cosigned By    Initials Name Provider Type     Tc Javier, PT Physical Therapist     Aura Pereira, PT Physical Therapist            WOUND DEBRIDEMENT                Recommendation and Plan        PT Assessment/Plan       04/21/17 1040       PT Assessment    Functional Limitations Performance in work activities;Performance in self-care ADL  -     Impairments Integumentary integrity  -     Assessment Comments No slough visible in the wound bed today. Very little drainage noted to the HFB or small optifoam apart from blue/purple from the HFB. Pt appears to be progressing well with the current POC, and will continue to benefit from skilled PT wound care to continue progress.  -     Rehab Potential Good  -     Patient/caregiver participated in establishment of treatment plan and goals Yes  -     Patient would benefit from skilled therapy intervention Yes  -MC     PT Plan    PT Frequency 3x/week  -     Predicted Duration " of Therapy Intervention (days/wks) 6-8 weeks  -     Physical Therapy Interventions (Optional Details) patient/family education;wound care  -       User Key  (r) = Recorded By, (t) = Taken By, (c) = Cosigned By    Initials Name Provider Type    BROOK Pereira, PT Physical Therapist          Goals        PT OP Goals       04/21/17 1040       Time Calculation    PT Goal Re-Cert Due Date 04/30/17  -       User Key  (r) = Recorded By, (t) = Taken By, (c) = Cosigned By    Initials Name Provider Type    BROOK Pereira, PT Physical Therapist          PT Goal Re-Cert Due Date: 04/30/17            Time Calculation: Start Time: 1040    Therapy Charges for Today     Code Description Service Date Service Provider Modifiers Qty    [id number] HC PT NLFU MIST 4/21/2017 Aura Pereira, PT GP 1                Aura Pereira, PT  4/21/2017

## 2017-04-24 ENCOUNTER — APPOINTMENT (OUTPATIENT)
Dept: LAB | Facility: HOSPITAL | Age: 67
End: 2017-04-24

## 2017-04-24 ENCOUNTER — HOSPITAL ENCOUNTER (OUTPATIENT)
Dept: PHYSICAL THERAPY | Facility: HOSPITAL | Age: 67
Setting detail: THERAPIES SERIES
Discharge: HOME | End: 2017-04-24

## 2017-04-24 ENCOUNTER — TRANSCRIBE ORDERS (OUTPATIENT)
Dept: LAB | Facility: HOSPITAL | Age: 67
End: 2017-04-24

## 2017-04-24 DIAGNOSIS — L02.11 CELLULITIS AND ABSCESS OF NECK: ICD-10-CM

## 2017-04-24 DIAGNOSIS — S11.90XD OPEN WOUND OF NECK, SUBSEQUENT ENCOUNTER: Primary | ICD-10-CM

## 2017-04-24 DIAGNOSIS — G06.1 INTRASPINAL ABSCESS: ICD-10-CM

## 2017-04-24 DIAGNOSIS — IMO0001 WOUND, SURGICAL, INFECTED, SUBSEQUENT ENCOUNTER: Primary | ICD-10-CM

## 2017-04-24 DIAGNOSIS — L03.221 CELLULITIS AND ABSCESS OF NECK: ICD-10-CM

## 2017-04-24 LAB
ALBUMIN SERPL-MCNC: 4.8 G/DL (ref 3.2–4.8)
ALBUMIN/GLOB SERPL: 1.3 G/DL (ref 1.5–2.5)
ALP SERPL-CCNC: 83 U/L (ref 25–100)
ALT SERPL W P-5'-P-CCNC: 35 U/L (ref 7–40)
ANION GAP SERPL CALCULATED.3IONS-SCNC: 9 MMOL/L (ref 3–11)
AST SERPL-CCNC: 41 U/L (ref 0–33)
BASOPHILS # BLD AUTO: 0.02 10*3/MM3 (ref 0–0.2)
BASOPHILS NFR BLD AUTO: 0.3 % (ref 0–1)
BILIRUB SERPL-MCNC: 0.7 MG/DL (ref 0.3–1.2)
BUN BLD-MCNC: 26 MG/DL (ref 9–23)
BUN/CREAT SERPL: 21.7 (ref 7–25)
CALCIUM SPEC-SCNC: 9.8 MG/DL (ref 8.7–10.4)
CHLORIDE SERPL-SCNC: 107 MMOL/L (ref 99–109)
CO2 SERPL-SCNC: 23 MMOL/L (ref 20–31)
CREAT BLD-MCNC: 1.2 MG/DL (ref 0.6–1.3)
CRP SERPL-MCNC: 0.08 MG/DL (ref 0–1)
DEPRECATED RDW RBC AUTO: 49.3 FL (ref 37–54)
EOSINOPHIL # BLD AUTO: 0.3 10*3/MM3 (ref 0.1–0.3)
EOSINOPHIL NFR BLD AUTO: 4.7 % (ref 0–3)
ERYTHROCYTE [DISTWIDTH] IN BLOOD BY AUTOMATED COUNT: 14.2 % (ref 11.3–14.5)
ERYTHROCYTE [SEDIMENTATION RATE] IN BLOOD: 46 MM/HR (ref 0–20)
GFR SERPL CREATININE-BSD FRML MDRD: 60 ML/MIN/1.73
GLOBULIN UR ELPH-MCNC: 3.6 GM/DL
GLUCOSE BLD-MCNC: 92 MG/DL (ref 70–100)
HCT VFR BLD AUTO: 44 % (ref 38.9–50.9)
HGB BLD-MCNC: 14.1 G/DL (ref 13.1–17.5)
IMM GRANULOCYTES # BLD: 0.04 10*3/MM3 (ref 0–0.03)
IMM GRANULOCYTES NFR BLD: 0.6 % (ref 0–0.6)
LYMPHOCYTES # BLD AUTO: 1.46 10*3/MM3 (ref 0.6–4.8)
LYMPHOCYTES NFR BLD AUTO: 23.1 % (ref 24–44)
MCH RBC QN AUTO: 30.7 PG (ref 27–31)
MCHC RBC AUTO-ENTMCNC: 32 G/DL (ref 32–36)
MCV RBC AUTO: 95.7 FL (ref 80–99)
MONOCYTES # BLD AUTO: 0.74 10*3/MM3 (ref 0–1)
MONOCYTES NFR BLD AUTO: 11.7 % (ref 0–12)
NEUTROPHILS # BLD AUTO: 3.76 10*3/MM3 (ref 1.5–8.3)
NEUTROPHILS NFR BLD AUTO: 59.6 % (ref 41–71)
PLATELET # BLD AUTO: 152 10*3/MM3 (ref 150–450)
PMV BLD AUTO: 11.9 FL (ref 6–12)
POTASSIUM BLD-SCNC: 4.5 MMOL/L (ref 3.5–5.5)
PROT SERPL-MCNC: 8.4 G/DL (ref 5.7–8.2)
RBC # BLD AUTO: 4.6 10*6/MM3 (ref 4.2–5.76)
SODIUM BLD-SCNC: 139 MMOL/L (ref 132–146)
WBC NRBC COR # BLD: 6.32 10*3/MM3 (ref 3.5–10.8)

## 2017-04-24 PROCEDURE — 97610 LOW FREQUENCY NON-THERMAL US: CPT | Performed by: PHYSICAL THERAPIST

## 2017-04-24 PROCEDURE — 85025 COMPLETE CBC W/AUTO DIFF WBC: CPT | Performed by: INTERNAL MEDICINE

## 2017-04-24 PROCEDURE — G8987 SELF CARE CURRENT STATUS: HCPCS | Performed by: PHYSICAL THERAPIST

## 2017-04-24 PROCEDURE — 36415 COLL VENOUS BLD VENIPUNCTURE: CPT | Performed by: INTERNAL MEDICINE

## 2017-04-24 PROCEDURE — 86140 C-REACTIVE PROTEIN: CPT | Performed by: INTERNAL MEDICINE

## 2017-04-24 PROCEDURE — 80053 COMPREHEN METABOLIC PANEL: CPT | Performed by: INTERNAL MEDICINE

## 2017-04-24 PROCEDURE — G8988 SELF CARE GOAL STATUS: HCPCS | Performed by: PHYSICAL THERAPIST

## 2017-04-24 PROCEDURE — 85652 RBC SED RATE AUTOMATED: CPT | Performed by: INTERNAL MEDICINE

## 2017-04-24 NOTE — PROGRESS NOTES
Outpatient Rehabilitation - Wound/Debridement Progress Note   Denis     Patient Name: Elton Funez  : 1950  MRN: [id number]  Today's Date: 2017                Admit Date: 2017    Visit Dx:    ICD-10-CM ICD-9-CM   1. Open wound of neck, subsequent encounter S11.90XD V58.89     874.8       Patient Active Problem List   Diagnosis   • Chronic kidney disease, stage III (moderate)   • Gastroesophageal reflux disease without esophagitis   • Hyperlipidemia   • Hypertension   • Trigger finger of right hand   • DM (diabetes mellitus), type 2, uncontrolled w/neurologic complication   • Obesity, Class I, BMI 30-34.9   • Actinic keratosis   • FAVIO (obstructive sleep apnea)   • Prurigo nodularis   • History of depression   • History of migraine   • Cervical radiculopathy   • ALT (SGPT) level raised   • Elevated AST (SGOT)   • Uncontrolled type 2 diabetes mellitus with diabetic neuropathy, with long-term current use of insulin   • Post-operative infection   • Leukocytosis   • Neck abscess   • Drainage from wound        Past Medical History:   Diagnosis Date   • Abscess of arm, right    • Abscess of skin of neck    • Acute sinusitis    • ALT (SGPT) level raised    • Arthritis    • Constipation    • CPAP (continuous positive airway pressure) dependence    • Decreased platelet count    • Depression     Resolved: 2015   • Diabetes mellitus    • Elevated AST (SGOT)    • Hyperlipidemia    • Hypertension    • Jaw pain    • Left hip pain    • Migraine     Hx of   • Obesity (BMI 30.0-34.9) 10/7/2016    BMI 31.92   • Obstructive sleep apnea     Hx of   • Quadriceps muscle strain    • Shigellosis    • Sleep apnea     PT TO BRING MASK AND TUBING DAY OF SURGERY   • Wears glasses    • Wears hearing aid     BOTH EARS        Past Surgical History:   Procedure Laterality Date   • BACK SURGERY     • CERVICAL ARTHRODESIS     • CERVICAL DISCECTOMY POSTERIOR FUSION WITH INSTRUMENTATION N/A 3/16/2017    Procedure:   "INCISION AND DRAINAGE OF POSTERIOR CERVICAL WOUND;  Surgeon: Gigi Perales MD;  Location:  [facility];  Service:    • CERVICAL LAMINECTOMY DECOMPRESSION POSTERIOR Left 2/28/2017    Procedure: Left C7-T1 CERVICAL FORAMINOTOMY POSTERIOR WITH METRIX;  Surgeon: Gigi Perales MD;  Location:  [facility];  Service:    • COLONOSCOPY      2012   • VASECTOMY                 PT Ortho       04/24/17 1035    Subjective Comments    Subjective Comments pt reports wife changed the cover dressing but not the packing.  Going to MD next.   -MW    Subjective Pain    Able to rate subjective pain? yes  -MW    Pre-Treatment Pain Level 0  -MW    Post-Treatment Pain Level 0  -MW    Transfers    Transfers, Sit-Stand [place] independent  -MW    Transfers, Stand-Sit [place] independent  -MW    Transfer, Comment seated in chair for tx   -MW    Gait Assessment/Treatment    Gait, [place] Level independent  -MW      User Key  (r) = Recorded By, (t) = Taken By, (c) = Cosigned By    Initials Name Provider Type    MW Suad Anderson, PT Physical Therapist                    [facility] Wound       04/24/17 1035          Wound 03/31/17 1345 posterior neck abscess    Wound - Properties Group Date first assessed: 03/31/17  - Time first assessed: 1345  - Orientation: posterior  -MF Location: neck  -MF Type: abscess  -MF    Wound WDL ex   scant erythema   -MW      Dressing Appearance moist drainage;intact  -MW      Base granulating;moist;reddened   beefy red side walls visible   -MW      Periwound Area intact;pink   min area of erythema at wound edge  -MW      Edges open  -MW      Drainage Characteristics/Odor serosanguineous;no odor  -MW      Drainage Amount small  -MW      Wound Cleaning irrigated with;sterile normal saline;cleansed with;other (see comments)   Phaes one and swabs to sweep depth   -MW      Wound Interventions other (see comments)   MIST x 8 min  -MW      Dressing Dressing applied;low-adherent;foam   4\" " optifoam; provided Hypafix to place after MD appt  -MW      Packing other (see comments)   saline moistened Hydrofera blue foam; lightly packed   -MW      Periwound Care cleansed with pH balanced cleanser;dry periwound area maintained  -        User Key  (r) = Recorded By, (t) = Taken By, (c) = Cosigned By    Initials Name Provider Type     Tc Javier, PT Physical Therapist    YFN Anderson, PT Physical Therapist                          Recommendation and Plan        PT Assessment/Plan       04/24/17 1035       PT Assessment    Functional Limitations Performance in work activities;Performance in self-care ADL  -MW     Impairments Integumentary integrity  -MW     Assessment Comments Pt has been seen by PT wound care for 10 visits. Wound bed is red beefy tissue, with no slough visible at this time. Tunneling is no longer appreciable and depth has decreased approximately 50%. Periwound erythema and edema have also decreased. Goals met or partially met. Expect continued progress with continued PT wound care including MIST to promote blood flow, decrease bioburden and promote healing, as well as selective debridement prn, and dressing management to promote moist, bacteriostatic healing environment.   -MW     Rehab Potential Good  -MW     Patient/caregiver participated in establishment of treatment plan and goals Yes  -MW     Patient would benefit from skilled therapy intervention Yes  -MW     PT Plan    PT Frequency 3x/week;2x/week  -MW     Predicted Duration of Therapy Intervention (days/wks) 4 weeks   -MW     Planned CPT's? PT NANCY DEBRIDE OPEN WOUND UP TO 20 CM: [id number];PT NLFU MIST: [id number]  -MW     Physical Therapy Interventions (Optional Details) wound care;patient/family education  -     PT Plan Comments MIST, dressing management; debridement prn  -MW       User Key  (r) = Recorded By, (t) = Taken By, (c) = Cosigned By    Initials Name Provider Type    YFN Anderson, PT Physical Therapist           Goals        PT OP Goals       04/24/17 1035    PT Short Term Goals    STG Date to Achieve 04/15/17  -MW    STG 1 Decrease wound drainage to minimal as evidence of decreased bacterial load.   -MW    STG 1 Progress Met  -MW    STG 2 decrease depth of wound by 2.0cm as evidence of wound healing.   -MW    STG 2 Progress Met  -MW    STG 2 Progress Comments depth decreased at least 2 cm and tunneling also decreaed.   -MW    STG 3 Pt and family able to demonstrate wound dressing change without issues to allow for independent home management of wound.   -MW    STG 3 Progress Partially Met  -MW    STG 3 Progress Comments Wife with recent shoulder surgery which limits her ability to pack wound.   -MW    Long Term Goals    LTG Date to Achieve 04/30/17  -MW    LTG 1 decrease wound size by 50% as evidence of wound healing.  -MW    LTG 1 Progress Partially Met;Progressing  -MW    LTG 2 Wound to have no s/s of infection to allow for improved wound healing potential.  -MW    LTG 2 Progress Met  -MW    LTG 3 decrease depth of wound by 4.0cm  -MW    LTG 3 Progress Ongoing;Progressing  -MW    Time Calculation    PT Goal Re-Cert Due Date 05/22/17  -MW      User Key  (r) = Recorded By, (t) = Taken By, (c) = Cosigned By    Initials Name Provider Type    YFN Anderson PT Physical Therapist            Time Calculation: Start Time: 1035    Therapy Charges for Today     Code Description Service Date Service Provider Modifiers Qty    [id number]  PT SELFCARE CURRENT 4/24/2017 Suad Anderson, PT GP,  1    [id number] HC PT SELFCARE PROJECTED 4/24/2017 Suad Anderson, PT GP,  1    [id number]  PT NLFU MIST 4/24/2017 Suad Anderson, PT GP 1          PT G-Codes  Outcome Measure Options: BWAT (Marit-Rodriguez Wound Assess Tool)  Score: 25  Functional Limitation: Self care  Self Care Current Status (): At least 20 percent but less than 40 percent impaired, limited or restricted  Self Care Goal Status (): 0 percent  impaired, limited or restricted     Suad Anderson, PT  4/24/2017

## 2017-04-26 ENCOUNTER — OFFICE VISIT (OUTPATIENT)
Dept: INTERNAL MEDICINE | Facility: CLINIC | Age: 67
End: 2017-04-26

## 2017-04-26 ENCOUNTER — HOSPITAL ENCOUNTER (OUTPATIENT)
Dept: PHYSICAL THERAPY | Facility: HOSPITAL | Age: 67
Setting detail: THERAPIES SERIES
Discharge: HOME | End: 2017-04-26

## 2017-04-26 VITALS
DIASTOLIC BLOOD PRESSURE: 70 MMHG | BODY MASS INDEX: 33.3 KG/M2 | RESPIRATION RATE: 18 BRPM | HEART RATE: 68 BPM | TEMPERATURE: 98 F | SYSTOLIC BLOOD PRESSURE: 118 MMHG | WEIGHT: 219 LBS

## 2017-04-26 DIAGNOSIS — K21.9 GASTROESOPHAGEAL REFLUX DISEASE WITHOUT ESOPHAGITIS: Chronic | ICD-10-CM

## 2017-04-26 DIAGNOSIS — S11.90XD OPEN WOUND OF NECK, SUBSEQUENT ENCOUNTER: Primary | ICD-10-CM

## 2017-04-26 DIAGNOSIS — I10 ESSENTIAL HYPERTENSION: Primary | ICD-10-CM

## 2017-04-26 DIAGNOSIS — E78.2 MIXED HYPERLIPIDEMIA: ICD-10-CM

## 2017-04-26 LAB
ALBUMIN SERPL-MCNC: 4.4 G/DL (ref 3.2–4.8)
ALBUMIN/GLOB SERPL: 1.3 G/DL (ref 1.5–2.5)
ALP SERPL-CCNC: 81 U/L (ref 25–100)
ALT SERPL W P-5'-P-CCNC: 30 U/L (ref 7–40)
ANION GAP SERPL CALCULATED.3IONS-SCNC: 11 MMOL/L (ref 3–11)
ARTICHOKE IGE QN: 53 MG/DL (ref 0–130)
AST SERPL-CCNC: 39 U/L (ref 0–33)
BILIRUB SERPL-MCNC: 0.8 MG/DL (ref 0.3–1.2)
BUN BLD-MCNC: 37 MG/DL (ref 9–23)
BUN/CREAT SERPL: 28.5 (ref 7–25)
CALCIUM SPEC-SCNC: 9.4 MG/DL (ref 8.7–10.4)
CHLORIDE SERPL-SCNC: 100 MMOL/L (ref 99–109)
CHOLEST SERPL-MCNC: 110 MG/DL (ref 0–200)
CO2 SERPL-SCNC: 22 MMOL/L (ref 20–31)
CREAT BLD-MCNC: 1.3 MG/DL (ref 0.6–1.3)
GFR SERPL CREATININE-BSD FRML MDRD: 55 ML/MIN/1.73
GLOBULIN UR ELPH-MCNC: 3.5 GM/DL
GLUCOSE BLD-MCNC: 324 MG/DL (ref 70–100)
HDLC SERPL-MCNC: 29 MG/DL (ref 40–60)
POTASSIUM BLD-SCNC: 5 MMOL/L (ref 3.5–5.5)
PROT SERPL-MCNC: 7.9 G/DL (ref 5.7–8.2)
SODIUM BLD-SCNC: 133 MMOL/L (ref 132–146)
TRIGL SERPL-MCNC: 238 MG/DL (ref 0–150)

## 2017-04-26 PROCEDURE — 97610 LOW FREQUENCY NON-THERMAL US: CPT

## 2017-04-26 PROCEDURE — 80053 COMPREHEN METABOLIC PANEL: CPT | Performed by: INTERNAL MEDICINE

## 2017-04-26 PROCEDURE — 80061 LIPID PANEL: CPT | Performed by: INTERNAL MEDICINE

## 2017-04-26 PROCEDURE — 36415 COLL VENOUS BLD VENIPUNCTURE: CPT | Performed by: INTERNAL MEDICINE

## 2017-04-26 PROCEDURE — 99214 OFFICE O/P EST MOD 30 MIN: CPT | Performed by: INTERNAL MEDICINE

## 2017-04-26 NOTE — PROGRESS NOTES
Outpatient Rehabilitation - Wound/Debridement Treatment Note   Denis     Patient Name: Elton Funez  : 1950  MRN: [id number]  Today's Date: 2017                Admit Date: 2017    Visit Dx:    ICD-10-CM ICD-9-CM   1. Open wound of neck, subsequent encounter S11.90XD V58.89     874.8       Patient Active Problem List   Diagnosis   • Chronic kidney disease, stage III (moderate)   • Gastroesophageal reflux disease without esophagitis   • Hyperlipidemia   • Hypertension   • Trigger finger of right hand   • DM (diabetes mellitus), type 2, uncontrolled w/neurologic complication   • Obesity, Class I, BMI 30-34.9   • Actinic keratosis   • FAVIO (obstructive sleep apnea)   • Prurigo nodularis   • History of depression   • History of migraine   • Cervical radiculopathy   • ALT (SGPT) level raised   • Elevated AST (SGOT)   • Uncontrolled type 2 diabetes mellitus with diabetic neuropathy, with long-term current use of insulin   • Post-operative infection   • Leukocytosis   • Neck abscess   • Drainage from wound        Past Medical History:   Diagnosis Date   • Abscess of arm, right    • Abscess of skin of neck    • Acute sinusitis    • ALT (SGPT) level raised    • Arthritis    • Constipation    • CPAP (continuous positive airway pressure) dependence    • Decreased platelet count    • Depression     Resolved: 2015   • Diabetes mellitus    • Elevated AST (SGOT)    • Hyperlipidemia    • Hypertension    • Jaw pain    • Left hip pain    • Migraine     Hx of   • Obesity (BMI 30.0-34.9) 10/7/2016    BMI 31.92   • Obstructive sleep apnea     Hx of   • Quadriceps muscle strain    • Shigellosis    • Sleep apnea     PT TO BRING MASK AND TUBING DAY OF SURGERY   • Wears glasses    • Wears hearing aid     BOTH EARS        Past Surgical History:   Procedure Laterality Date   • BACK SURGERY     • CERVICAL ARTHRODESIS     • CERVICAL DISCECTOMY POSTERIOR FUSION WITH INSTRUMENTATION N/A 3/16/2017    Procedure:   INCISION AND DRAINAGE OF POSTERIOR CERVICAL WOUND;  Surgeon: Gigi Perales MD;  Location:  [facility];  Service:    • CERVICAL LAMINECTOMY DECOMPRESSION POSTERIOR Left 2/28/2017    Procedure: Left C7-T1 CERVICAL FORAMINOTOMY POSTERIOR WITH METRIX;  Surgeon: Gigi Perales MD;  Location:  [facility];  Service:    • COLONOSCOPY      2012   • VASECTOMY           EVALUATION        PT Ortho       04/26/17 1310    Subjective Comments    Subjective Comments Pt reports not noticing any drainage except a little blue/purple from the hydrofera blue. He reports his neck soreness is almost completely gone, and the ID MD switched him to oral abx for 30 days.  -MC    Subjective Pain    Able to rate subjective pain? yes  -MC    Pre-Treatment Pain Level 0  -MC    Post-Treatment Pain Level 0  -MC    Transfers    Transfers, Sit-Stand [place] independent  -MC    Transfers, Stand-Sit [place] independent  -MC    Transfer, Comment seated in chair for tx  -MC    Gait Assessment/Treatment    Gait, [place] Level independent  -MC      04/24/17 1035    Subjective Comments    Subjective Comments pt reports wife changed the cover dressing but not the packing.  Going to MD next.   -MW    Subjective Pain    Able to rate subjective pain? yes  -MW    Pre-Treatment Pain Level 0  -MW    Post-Treatment Pain Level 0  -MW    Transfers    Transfers, Sit-Stand [place] independent  -MW    Transfers, Stand-Sit [place] independent  -MW    Transfer, Comment seated in chair for tx   -MW    Gait Assessment/Treatment    Gait, [place] Level independent  -MW      User Key  (r) = Recorded By, (t) = Taken By, (c) = Cosigned By    Initials Name Provider Type    YFN Anderson, PT Physical Therapist    BROOK Pereira, PT Physical Therapist                    [facility] Wound       04/26/17 1310          Wound 03/31/17 1345 posterior neck abscess    Wound - Properties Group Date first assessed: 03/31/17  - Time first  "assessed: 1345  - Orientation: posterior  - Location: neck  - Type: abscess  -MF    Wound WDL ex   scant erythema   -      Dressing Appearance moist drainage;intact  -      Base granulating;moist;reddened   beefy red side walls visible   -      Periwound Area intact;pink   min area of erythema at wound edge  -      Edges open  -      Length (cm) 0.8  -MC      Width (cm) 0.5  -MC      Depth (cm) 2  -      Drainage Characteristics/Odor serosanguineous;no odor  -      Drainage Amount small  -      Picture taken yes  -      Wound Cleaning irrigated with;other (see comments)   phase one  -      Wound Interventions other (see comments)   MIST 8 minutes, swabs to depth  -      Dressing Dressing applied;low-adherent;foam   4\" optifoam gentle, secured with hypafix  -      Packing other (see comments)   saline-moistened hydrofera blue, lightly packed  -      Periwound Care cleansed with pH balanced cleanser;dry periwound area maintained  -        User Key  (r) = Recorded By, (t) = Taken By, (c) = Cosigned By    Initials Name Provider Type     Tc Javier, PT Physical Therapist     Aura Pereira, PT Physical Therapist            WOUND DEBRIDEMENT                Recommendation and Plan        PT Assessment/Plan       04/26/17 1310       PT Assessment    Functional Limitations Performance in work activities;Performance in self-care ADL  -     Impairments Integumentary integrity  -     Assessment Comments Pt with decreased wound dimensions since last measurement. The walls of the wound continue to be beefy red with very little serosanguinous drainage at this time. Pt appears to be progressing well with the current POC, and will continue to benefit from skilled PT wound care to continue progress.  -     Rehab Potential Good  -     Patient/caregiver participated in establishment of treatment plan and goals Yes  -     Patient would benefit from skilled therapy intervention " Yes  -     PT Plan    PT Frequency 3x/week;2x/week  -     Predicted Duration of Therapy Intervention (days/wks) 4 weeks  -     Physical Therapy Interventions (Optional Details) patient/family education;wound care  -       User Key  (r) = Recorded By, (t) = Taken By, (c) = Cosigned By    Initials Name Provider Type    BROOK Pereira, PT Physical Therapist          Goals        PT OP Goals       04/26/17 1310       Time Calculation    PT Goal Re-Cert Due Date 05/22/17  -       User Key  (r) = Recorded By, (t) = Taken By, (c) = Cosigned By    Initials Name Provider Type    BROOK Pereira, PT Physical Therapist          PT Goal Re-Cert Due Date: 05/22/17            Time Calculation: Start Time: 1310    Therapy Charges for Today     Code Description Service Date Service Provider Modifiers Qty    60581011855  PT NLFU MIST 4/26/2017 Aura Pereira, PT GP 1    [id number]  PT THER SUPP EA 15 MIN 4/26/2017 Aura Pereira, PT GP 1                Aura Pereira, PT  4/26/2017

## 2017-04-26 NOTE — PROGRESS NOTES
Chief Complaint   Patient presents with   • Follow-up     1 MONTH       History of Present Illness      The patient presents for a follow-up related to hyperlipidemia. He is following a low fat diet. He reports that he is not exercising. He is taking gemfibrozil and simvastatin. The patient is taking his medication as prescribed. He reports no medication side effects, including muscle cramps, abdominal pain, headaches or weakness. He denies chest pain, shortness of breath, orthopnea, paroxysmal nocturnal dyspnea, dyspnea on exertion, edema, palpitations or syncope.    The patient is on famotidine for his gastroesophageal reflux. The medication is taken on a regular basis and gives complete relief of the symptoms. He reports no abdominal pain, belching, diarrhea, dysphagia, heartburn, hoarseness, nausea, rectal bleeding, vomiting or weight loss. The GERD has no known aggravating factors.    The patient presents for a follow-up related to hypertension. The patient reports that he has had no headaches or blurred vision. He states that he is taking his medication as prescribed. He is not having medication side effects. He does not check his blood pressures at home.    Medications      Current Outpatient Prescriptions:   •  Albiglutide (TANZEUM) 50 MG pen-injector, Inject 50 mg under the skin 1 (One) Time Per Week. Indications: didnt tolerate preferred medication Bydureon, worse glycemic control (Patient taking differently: Inject 50 mg under the skin 1 (One) Time Per Week. Every Sunday  Indications: didnt tolerate preferred medication Bydureon, worse glycemic control), Disp: 12 each, Rfl: 3  •  aspirin 81 MG tablet, Take 1 tablet by mouth Daily. Last dose 2-19-17 may resume when okay with Dr Perales, Disp: , Rfl:   •  atenolol (TENORMIN) 50 MG tablet, TAKE ONE TABLET BY MOUTH DAILY, Disp: 90 tablet, Rfl: 1  •  Coenzyme Q10 (CO Q 10 PO), Take 200 mg by mouth 2 (Two) Times a Day., Disp: , Rfl:   •  colestipol (COLESTID) 1  G tablet, TAKE 2 TABLETS TWICE A DAY, Disp: 360 tablet, Rfl: 4  •  dicloxacillin (DYNAPEN) 500 MG capsule, Take 1 capsule by mouth 3 (Three) Times a Day., Disp: , Rfl:   •  docusate sodium (COLACE) 100 MG capsule, Take 100 mg by mouth Daily., Disp: , Rfl:   •  famotidine (PEPCID) 20 MG tablet, Take 1 tablet by mouth 2 (Two) Times a Day., Disp: 60 tablet, Rfl: 1  •  Flaxseed, Linseed, (FLAXSEED OIL) 1200 MG capsule, Take 1 capsule by mouth 2 (two) times a day., Disp: , Rfl:   •  fluticasone (FLONASE) 50 MCG/ACT nasal spray, into each nostril As Needed. Use 2 sprays in each nostril once daily as needed., Disp: , Rfl:   •  gemfibrozil (LOPID) 600 MG tablet, TAKE 1 TABLET TWICE A DAY, Disp: 180 tablet, Rfl: 1  •  glucose blood test strip, USE TO TEST BLOOD SUGAR TWICE DAILY AND AS NEEDED, Disp: 200 each, Rfl: 2  •  Insulin Pen Needle (B-D ULTRAFINE III SHORT PEN) 31G X 8 MM misc, 1 each 3 (Three) Times a Day., Disp: 300 each, Rfl: 3  •  Lancets misc, 1 each 2 (two) times a day., Disp: 200 each, Rfl: 2  •  LANTUS SOLOSTAR 100 UNIT/ML injection pen, INJECT 50 UNITS UNDER THE SKIN EVERY EVENING (Patient taking differently: INJECT 45 UNITS UNDER THE SKIN EVERY EVENING), Disp: 45 mL, Rfl: 2  •  lisinopril-hydrochlorothiazide (PRINZIDE,ZESTORETIC) 10-12.5 MG per tablet, TAKE 1 TABLET DAILY, Disp: 90 tablet, Rfl: 1  •  Multiple Vitamins-Minerals (MULTIVITAMIN PO), Take 1 tablet by mouth Daily., Disp: , Rfl:   •  saccharomyces boulardii (FLORASTOR) 250 MG capsule, Take 1 capsule by mouth Daily., Disp: 60 capsule, Rfl: 2  •  sertraline (ZOLOFT) 100 MG tablet, Take 1 tablet by mouth Daily., Disp: 30 tablet, Rfl: 5  •  simvastatin (ZOCOR) 40 MG tablet, TAKE 1 TABLET DAILY AT BEDTIME, Disp: 90 tablet, Rfl: 1  •  topiramate (TOPAMAX) 25 MG tablet, Take 25 mg by mouth Every Night., Disp: , Rfl:   •  Insulin Regular Human, Conc, (HumuLIN R) 500 UNIT/ML solution pen-injector CONCENTRATED injection, 90 units twice a day before meals,  titrate up as indicated (Patient taking differently: Inject 125 Units under the skin Every Morning. 125 UNITS SQ IN AM  UNITS SQ EVENING), Disp: 12 pen, Rfl: 3     Allergies    Allergies   Allergen Reactions   • Glucophage Xr [Metformin Hcl Er] Diarrhea     Glucophage XR TB24: Adverse Reaction: Severe GI issues.   • Metformin Nausea And Vomiting     Other reaction(s): SEVERE INTESTIONAL ISSUES  Other reaction(s): SEVERE GI ISSUES    Glucophage XR TB24  MetFORMIN HCl TABS   • Tetracyclines & Related Nausea Only     Adverse Reaction       Problem List    Patient Active Problem List   Diagnosis   • Chronic kidney disease, stage III (moderate)   • Gastroesophageal reflux disease without esophagitis   • Hyperlipidemia   • Hypertension   • Trigger finger of right hand   • DM (diabetes mellitus), type 2, uncontrolled w/neurologic complication   • Obesity, Class I, BMI 30-34.9   • Actinic keratosis   • FAVIO (obstructive sleep apnea)   • Prurigo nodularis   • History of depression   • History of migraine   • Cervical radiculopathy   • ALT (SGPT) level raised   • Elevated AST (SGOT)   • Uncontrolled type 2 diabetes mellitus with diabetic neuropathy, with long-term current use of insulin   • Post-operative infection   • Leukocytosis   • Neck abscess   • Drainage from wound       Medications, Allergies, Problems List and Past History were reviewed and updated.    Physical Examination    /70 (BP Location: Right arm, Patient Position: Sitting, Cuff Size: Adult)  Pulse 68  Temp 98 °F (36.7 °C) (Temporal Artery )   Resp 18  Wt 219 lb (99.3 kg)  BMI 33.3 kg/m2  HEENT: Head- Normocephalic Atraumatic. Facies- Within normal limits. Pinnas- Normal texture and shape bilaterally. Canals- Normal bilaterally. TMs- Normal bilaterally. Nares- Patent bilaterally. Nasal Septum- is normal. There is no tenderness to palpation over the frontal or maxillary sinuses. Lids- Normal bilaterally. Conjunctiva- Clear bilaterally. Sclera-  Anicteric bilaterally. Oropharynx- Moist with no lesions. Tonsils- No enlargement, erythema or exudate.    Neck: Thyroid- non enlarged, symmetric and has no nodules. No bruits are detected. ROM- Normal Range of Motion with no rigidity.    Lungs: Auscultation- Clear to auscultation bilaterally. There are no retractions, clubbing or cyanosis. The Expiratory to Inspiratory ratio is equal.    Cardiovascular: There are no carotid bruits. Heart- Normal Rate with Regular rhythm and no murmurs. There are no gallops. There are no rubs. In the lower extremities there is no edema. The upper extremities do not have edema.    Abdomen: Soft, benign, non-tender with no masses, hernias, organomegaly or scars.    Impression and Assessment    Hyperlipidemia.     Gastroesophageal Reflux Disease.     Hypertension.     Plan    Gastroesophageal Reflux Disease Plan: The current plan was continued.    Hyperlipidemia Plan: He was instructed to eat a low fat diet. He was encouraged to exercise daily. Weight loss was encouraged. The patient was instructed to continue the current medications.    Hypertension Plan: The current plan was continued.    Elton was seen today for follow-up.    Diagnoses and all orders for this visit:    Essential hypertension  -     Comprehensive Metabolic Panel    Mixed hyperlipidemia  -     Comprehensive Metabolic Panel  -     Lipid Panel    Gastroesophageal reflux disease without esophagitis  -     Comprehensive Metabolic Panel          Return to Office    The patient was instructed to return for follow-up in 1 month.    The patient was instructed to return sooner if the condition changes, worsens, or doesn't resolve.

## 2017-04-27 LAB
FUNGUS WND CULT: NORMAL
FUNGUS WND CULT: NORMAL
MYCOBACTERIUM SPEC CULT: NORMAL
MYCOBACTERIUM SPEC CULT: NORMAL
NIGHT BLUE STAIN TISS: NORMAL
NIGHT BLUE STAIN TISS: NORMAL

## 2017-04-28 ENCOUNTER — HOSPITAL ENCOUNTER (OUTPATIENT)
Dept: PHYSICAL THERAPY | Facility: HOSPITAL | Age: 67
Setting detail: THERAPIES SERIES
Discharge: HOME | End: 2017-04-28

## 2017-04-28 DIAGNOSIS — S11.90XD OPEN WOUND OF NECK, SUBSEQUENT ENCOUNTER: Primary | ICD-10-CM

## 2017-04-28 DIAGNOSIS — L02.11 NECK ABSCESS: ICD-10-CM

## 2017-04-28 PROCEDURE — 97610 LOW FREQUENCY NON-THERMAL US: CPT

## 2017-04-28 NOTE — PROGRESS NOTES
Outpatient Rehabilitation - Wound/Debridement Treatment Note   Denis     Patient Name: Elton Funez  : 1950  MRN: [id number]  Today's Date: 2017                Admit Date: 2017    Visit Dx:    ICD-10-CM ICD-9-CM   1. Open wound of neck, subsequent encounter S11.90XD V58.89     874.8   2. Neck abscess L02.11 682.1       Patient Active Problem List   Diagnosis   • Chronic kidney disease, stage III (moderate)   • Gastroesophageal reflux disease without esophagitis   • Hyperlipidemia   • Hypertension   • Trigger finger of right hand   • DM (diabetes mellitus), type 2, uncontrolled w/neurologic complication   • Obesity, Class I, BMI 30-34.9   • Actinic keratosis   • FAVIO (obstructive sleep apnea)   • Prurigo nodularis   • History of depression   • History of migraine   • Cervical radiculopathy   • ALT (SGPT) level raised   • Elevated AST (SGOT)   • Uncontrolled type 2 diabetes mellitus with diabetic neuropathy, with long-term current use of insulin   • Post-operative infection   • Leukocytosis   • Neck abscess   • Drainage from wound        Past Medical History:   Diagnosis Date   • Abscess of arm, right    • Abscess of skin of neck    • Acute sinusitis    • ALT (SGPT) level raised    • Arthritis    • Constipation    • CPAP (continuous positive airway pressure) dependence    • Decreased platelet count    • Depression     Resolved: 2015   • Diabetes mellitus    • Elevated AST (SGOT)    • Hyperlipidemia    • Hypertension    • Jaw pain    • Left hip pain    • Migraine     Hx of   • Obesity (BMI 30.0-34.9) 10/7/2016    BMI 31.92   • Obstructive sleep apnea     Hx of   • Quadriceps muscle strain    • Shigellosis    • Sleep apnea     PT TO BRING MASK AND TUBING DAY OF SURGERY   • Wears glasses    • Wears hearing aid     BOTH EARS        Past Surgical History:   Procedure Laterality Date   • BACK SURGERY     • CERVICAL ARTHRODESIS     • CERVICAL DISCECTOMY POSTERIOR FUSION WITH INSTRUMENTATION  N/A 3/16/2017    Procedure:  INCISION AND DRAINAGE OF POSTERIOR CERVICAL WOUND;  Surgeon: Gigi Perales MD;  Location:  [facility];  Service:    • CERVICAL LAMINECTOMY DECOMPRESSION POSTERIOR Left 2/28/2017    Procedure: Left C7-T1 CERVICAL FORAMINOTOMY POSTERIOR WITH METRIX;  Surgeon: Gigi Perales MD;  Location:  [facility];  Service:    • COLONOSCOPY      2012   • VASECTOMY           EVALUATION        PT Ortho       04/28/17 1040    Subjective Comments    Subjective Comments No complaints or changes.  -MC    Subjective Pain    Able to rate subjective pain? yes  -MC    Pre-Treatment Pain Level 0  -MC    Post-Treatment Pain Level 0  -MC    Transfers    Transfers, Sit-Stand [place] independent  -MC    Transfers, Stand-Sit [place] independent  -MC    Transfer, Comment seated in chair for tx  -MC    Gait Assessment/Treatment    Gait, [place] Level independent  -MC      04/26/17 1310    Subjective Comments    Subjective Comments Pt reports not noticing any drainage except a little blue/purple from the hydrofera blue. He reports his neck soreness is almost completely gone, and the ID MD switched him to oral abx for 30 days.  -MC    Subjective Pain    Able to rate subjective pain? yes  -MC    Pre-Treatment Pain Level 0  -MC    Post-Treatment Pain Level 0  -MC    Transfers    Transfers, Sit-Stand [place] independent  -MC    Transfers, Stand-Sit [place] independent  -MC    Transfer, Comment seated in chair for tx  -MC    Gait Assessment/Treatment    Gait, [place] Level independent  -      User Key  (r) = Recorded By, (t) = Taken By, (c) = Cosigned By    Initials Name Provider Type    BROOK Pereira, PT Physical Therapist                    [facility] Wound       04/28/17 1040          Wound 03/31/17 1345 posterior neck abscess    Wound - Properties Group Date first assessed: 03/31/17  - Time first assessed: 1345  -MF Orientation: posterior  -MF Location: neck  -MF Type:  "abscess  -    Wound WDL ex   scant erythema   -      Dressing Appearance moist drainage;intact  -      Base granulating;moist;reddened   beefy red side walls visible   -      Periwound Area intact;pink   min area of erythema at wound edge  -      Edges open  -      Drainage Characteristics/Odor serosanguineous;no odor  -      Drainage Amount small  -      Wound Cleaning irrigated with;other (see comments)   phase one  -      Wound Interventions other (see comments)   MIST 8 minutes, swabs to depth  -      Dressing Dressing applied;low-adherent   4\" optifoam gentle, hypafix  -      Packing other (see comments)   saline-moistened hydrofera blue  -      Periwound Care cleansed with pH balanced cleanser;dry periwound area maintained  -        User Key  (r) = Recorded By, (t) = Taken By, (c) = Cosigned By    Initials Name Provider Type     Tc Javier, PT Physical Therapist     Aura Pereira, PT Physical Therapist            WOUND DEBRIDEMENT                Recommendation and Plan        PT Assessment/Plan       04/28/17 1040       PT Assessment    Functional Limitations Performance in work activities;Performance in self-care ADL  -     Impairments Integumentary integrity  -     Assessment Comments Wound bed continues to be red/beefy granulation with only serous drainage with swabs to depth today. Pt continues to progress, and will continue to benefit from skilled PT wound care to continue progress.  -     Rehab Potential Good  -     Patient/caregiver participated in establishment of treatment plan and goals Yes  -     Patient would benefit from skilled therapy intervention Yes  -     PT Plan    PT Frequency 2x/week;3x/week  -     Predicted Duration of Therapy Intervention (days/wks) 4 weeks  -     Physical Therapy Interventions (Optional Details) patient/family education;wound care  -       User Key  (r) = Recorded By, (t) = Taken By, (c) = Cosigned By    " Initials Name Provider Type     Aura Pereira, PT Physical Therapist          Goals        PT OP Goals       04/28/17 1040       Time Calculation    PT Goal Re-Cert Due Date 05/22/17  -       User Key  (r) = Recorded By, (t) = Taken By, (c) = Cosigned By    Initials Name Provider Type     Aura Pereira, PT Physical Therapist          PT Goal Re-Cert Due Date: 05/22/17            Time Calculation: Start Time: 1040    Therapy Charges for Today     Code Description Service Date Service Provider Modifiers Qty    [id number] HC PT NLFU MIST 4/28/2017 Aura Pereira, PT GP 1                Aura Pereira, PT  4/28/2017

## 2017-05-02 ENCOUNTER — HOSPITAL ENCOUNTER (OUTPATIENT)
Dept: PHYSICAL THERAPY | Facility: HOSPITAL | Age: 67
Setting detail: THERAPIES SERIES
Discharge: HOME | End: 2017-05-02

## 2017-05-02 DIAGNOSIS — L02.11 NECK ABSCESS: ICD-10-CM

## 2017-05-02 DIAGNOSIS — S11.90XD OPEN WOUND OF NECK, SUBSEQUENT ENCOUNTER: Primary | ICD-10-CM

## 2017-05-02 PROCEDURE — 97610 LOW FREQUENCY NON-THERMAL US: CPT

## 2017-05-03 ENCOUNTER — OFFICE VISIT (OUTPATIENT)
Dept: ENDOCRINOLOGY | Facility: CLINIC | Age: 67
End: 2017-05-03

## 2017-05-03 ENCOUNTER — OFFICE VISIT (OUTPATIENT)
Dept: NEUROSURGERY | Facility: CLINIC | Age: 67
End: 2017-05-03

## 2017-05-03 VITALS
SYSTOLIC BLOOD PRESSURE: 100 MMHG | TEMPERATURE: 96.7 F | WEIGHT: 223 LBS | DIASTOLIC BLOOD PRESSURE: 70 MMHG | HEIGHT: 68 IN | BODY MASS INDEX: 33.8 KG/M2

## 2017-05-03 VITALS
WEIGHT: 223.6 LBS | HEART RATE: 74 BPM | BODY MASS INDEX: 33.89 KG/M2 | HEIGHT: 68 IN | SYSTOLIC BLOOD PRESSURE: 122 MMHG | DIASTOLIC BLOOD PRESSURE: 78 MMHG | OXYGEN SATURATION: 96 %

## 2017-05-03 DIAGNOSIS — M54.12 CERVICAL RADICULOPATHY: Primary | ICD-10-CM

## 2017-05-03 DIAGNOSIS — R29.898 LEFT HAND WEAKNESS: ICD-10-CM

## 2017-05-03 DIAGNOSIS — E11.40 UNCONTROLLED TYPE 2 DIABETES MELLITUS WITH DIABETIC NEUROPATHY, UNSPECIFIED LONG TERM INSULIN USE STATUS: Primary | Chronic | ICD-10-CM

## 2017-05-03 DIAGNOSIS — E11.65 UNCONTROLLED TYPE 2 DIABETES MELLITUS WITH DIABETIC NEUROPATHY, UNSPECIFIED LONG TERM INSULIN USE STATUS: Primary | Chronic | ICD-10-CM

## 2017-05-03 DIAGNOSIS — IMO0002 UNCONTROLLED TYPE 2 DIABETES MELLITUS WITH DIABETIC NEUROPATHY, WITH LONG-TERM CURRENT USE OF INSULIN: ICD-10-CM

## 2017-05-03 PROCEDURE — 99214 OFFICE O/P EST MOD 30 MIN: CPT | Performed by: INTERNAL MEDICINE

## 2017-05-03 PROCEDURE — 99024 POSTOP FOLLOW-UP VISIT: CPT | Performed by: PHYSICIAN ASSISTANT

## 2017-05-04 ENCOUNTER — HOSPITAL ENCOUNTER (OUTPATIENT)
Dept: PHYSICAL THERAPY | Facility: HOSPITAL | Age: 67
Setting detail: THERAPIES SERIES
Discharge: HOME | End: 2017-05-04

## 2017-05-04 DIAGNOSIS — L02.11 NECK ABSCESS: ICD-10-CM

## 2017-05-04 DIAGNOSIS — S11.90XD OPEN WOUND OF NECK, SUBSEQUENT ENCOUNTER: Primary | ICD-10-CM

## 2017-05-04 PROCEDURE — 97610 LOW FREQUENCY NON-THERMAL US: CPT

## 2017-05-06 ENCOUNTER — HOSPITAL ENCOUNTER (OUTPATIENT)
Dept: PHYSICAL THERAPY | Facility: HOSPITAL | Age: 67
Setting detail: THERAPIES SERIES
Discharge: HOME | End: 2017-05-06

## 2017-05-06 DIAGNOSIS — L24.A9 DRAINAGE FROM WOUND: ICD-10-CM

## 2017-05-06 DIAGNOSIS — L02.11 NECK ABSCESS: ICD-10-CM

## 2017-05-06 DIAGNOSIS — T81.40XA POST-OPERATIVE INFECTION: ICD-10-CM

## 2017-05-06 DIAGNOSIS — S11.90XD OPEN WOUND OF NECK, SUBSEQUENT ENCOUNTER: Primary | ICD-10-CM

## 2017-05-06 PROCEDURE — 97610 LOW FREQUENCY NON-THERMAL US: CPT

## 2017-05-09 ENCOUNTER — HOSPITAL ENCOUNTER (OUTPATIENT)
Dept: PHYSICAL THERAPY | Facility: HOSPITAL | Age: 67
Setting detail: THERAPIES SERIES
Discharge: HOME | End: 2017-05-09

## 2017-05-09 DIAGNOSIS — S11.90XD OPEN WOUND OF NECK, SUBSEQUENT ENCOUNTER: Primary | ICD-10-CM

## 2017-05-09 PROCEDURE — 97610 LOW FREQUENCY NON-THERMAL US: CPT

## 2017-05-09 RX ORDER — GEMFIBROZIL 600 MG/1
TABLET, FILM COATED ORAL
Qty: 180 TABLET | Refills: 1 | Status: SHIPPED | OUTPATIENT
Start: 2017-05-09 | End: 2017-10-11 | Stop reason: SDUPTHER

## 2017-05-11 ENCOUNTER — HOSPITAL ENCOUNTER (OUTPATIENT)
Dept: PHYSICAL THERAPY | Facility: HOSPITAL | Age: 67
Setting detail: THERAPIES SERIES
Discharge: HOME | End: 2017-05-11

## 2017-05-11 DIAGNOSIS — S11.90XD OPEN WOUND OF NECK, SUBSEQUENT ENCOUNTER: Primary | ICD-10-CM

## 2017-05-11 PROCEDURE — 97610 LOW FREQUENCY NON-THERMAL US: CPT

## 2017-05-13 ENCOUNTER — APPOINTMENT (OUTPATIENT)
Dept: PHYSICAL THERAPY | Facility: HOSPITAL | Age: 67
End: 2017-05-13

## 2017-05-16 ENCOUNTER — HOSPITAL ENCOUNTER (OUTPATIENT)
Dept: PHYSICAL THERAPY | Facility: HOSPITAL | Age: 67
Setting detail: THERAPIES SERIES
Discharge: HOME | End: 2017-05-16

## 2017-05-16 ENCOUNTER — TELEPHONE (OUTPATIENT)
Dept: INTERNAL MEDICINE | Facility: CLINIC | Age: 67
End: 2017-05-16

## 2017-05-16 DIAGNOSIS — S11.90XD OPEN WOUND OF NECK, SUBSEQUENT ENCOUNTER: Primary | ICD-10-CM

## 2017-05-16 PROCEDURE — 97610 LOW FREQUENCY NON-THERMAL US: CPT

## 2017-05-16 RX ORDER — LISINOPRIL AND HYDROCHLOROTHIAZIDE 12.5; 1 MG/1; MG/1
1 TABLET ORAL DAILY
Qty: 90 TABLET | Refills: 1 | Status: SHIPPED | OUTPATIENT
Start: 2017-05-16 | End: 2018-01-14 | Stop reason: SDUPTHER

## 2017-05-23 ENCOUNTER — HOSPITAL ENCOUNTER (OUTPATIENT)
Dept: PHYSICAL THERAPY | Facility: HOSPITAL | Age: 67
Setting detail: THERAPIES SERIES
Discharge: HOME | End: 2017-05-23

## 2017-05-23 DIAGNOSIS — S11.90XD OPEN WOUND OF NECK, SUBSEQUENT ENCOUNTER: Primary | ICD-10-CM

## 2017-05-23 PROCEDURE — G8988 SELF CARE GOAL STATUS: HCPCS

## 2017-05-23 PROCEDURE — G8989 SELF CARE D/C STATUS: HCPCS

## 2017-05-23 PROCEDURE — G8987 SELF CARE CURRENT STATUS: HCPCS

## 2017-05-23 PROCEDURE — 97110 THERAPEUTIC EXERCISES: CPT

## 2017-05-31 ENCOUNTER — OFFICE VISIT (OUTPATIENT)
Dept: NEUROSURGERY | Facility: CLINIC | Age: 67
End: 2017-05-31

## 2017-05-31 VITALS
SYSTOLIC BLOOD PRESSURE: 118 MMHG | BODY MASS INDEX: 33.65 KG/M2 | HEIGHT: 68 IN | TEMPERATURE: 96 F | DIASTOLIC BLOOD PRESSURE: 75 MMHG | WEIGHT: 222 LBS

## 2017-05-31 DIAGNOSIS — M54.12 CERVICAL RADICULOPATHY: Primary | ICD-10-CM

## 2017-05-31 PROCEDURE — 99024 POSTOP FOLLOW-UP VISIT: CPT | Performed by: PHYSICIAN ASSISTANT

## 2017-06-01 ENCOUNTER — TELEPHONE (OUTPATIENT)
Dept: ENDOCRINOLOGY | Facility: CLINIC | Age: 67
End: 2017-06-01

## 2017-06-01 DIAGNOSIS — E11.40 UNCONTROLLED TYPE 2 DIABETES MELLITUS WITH DIABETIC NEUROPATHY, UNSPECIFIED LONG TERM INSULIN USE STATUS: Chronic | ICD-10-CM

## 2017-06-01 DIAGNOSIS — E11.40 UNCONTROLLED TYPE 2 DIABETES MELLITUS WITH DIABETIC NEUROPATHY, UNSPECIFIED LONG TERM INSULIN USE STATUS: Primary | Chronic | ICD-10-CM

## 2017-06-01 DIAGNOSIS — E11.65 UNCONTROLLED TYPE 2 DIABETES MELLITUS WITH DIABETIC NEUROPATHY, UNSPECIFIED LONG TERM INSULIN USE STATUS: Primary | Chronic | ICD-10-CM

## 2017-06-01 DIAGNOSIS — E11.65 UNCONTROLLED TYPE 2 DIABETES MELLITUS WITH DIABETIC NEUROPATHY, UNSPECIFIED LONG TERM INSULIN USE STATUS: Chronic | ICD-10-CM

## 2017-06-01 NOTE — TELEPHONE ENCOUNTER
----- Message from Pau MACK MD sent at 5/31/2017  7:45 AM EDT -----  Regarding: FW: Prescription Question  Contact: [phone]      ----- Message -----     From: Elton Funez     Sent: 5/31/2017   5:48 AM       To: Pau MACK MD  Subject: Prescription Question                            I need to have the Bydureon refilled for 3 months.  We were changing from the Tanzeum, because of the expense.    Thank you.    Elton

## 2017-06-12 ENCOUNTER — TELEPHONE (OUTPATIENT)
Dept: ENDOCRINOLOGY | Facility: CLINIC | Age: 67
End: 2017-06-12

## 2017-06-12 DIAGNOSIS — E11.40 UNCONTROLLED TYPE 2 DIABETES MELLITUS WITH DIABETIC NEUROPATHY, UNSPECIFIED LONG TERM INSULIN USE STATUS: Chronic | ICD-10-CM

## 2017-06-12 DIAGNOSIS — E11.65 UNCONTROLLED TYPE 2 DIABETES MELLITUS WITH DIABETIC NEUROPATHY, UNSPECIFIED LONG TERM INSULIN USE STATUS: Chronic | ICD-10-CM

## 2017-06-12 NOTE — TELEPHONE ENCOUNTER
Rx was sent to [facility] and samples have been placed to the side for him to  at his convenience. Pt was notified

## 2017-06-12 NOTE — TELEPHONE ENCOUNTER
PATIENTS BYDUREON NEEDS TO BE SENT THROUGH [facility]. IT IS TOO EXPENSIVE AT [facility]. HE ALSO NEEDS A SAMPLE TO GET THROUGH THE TIME BEFORE HE GETS HIS MEDICATION IN THE MAIL. CALL HIM @ [phone]

## 2017-06-28 RX ORDER — OMEPRAZOLE 20 MG/1
CAPSULE, DELAYED RELEASE ORAL
Qty: 90 CAPSULE | Refills: 1 | Status: SHIPPED | OUTPATIENT
Start: 2017-06-28 | End: 2018-01-15 | Stop reason: SDUPTHER

## 2017-07-17 RX ORDER — MELOXICAM 15 MG/1
TABLET ORAL
Qty: 90 TABLET | Refills: 0 | Status: SHIPPED | OUTPATIENT
Start: 2017-07-17 | End: 2017-11-20 | Stop reason: SDUPTHER

## 2017-07-27 ENCOUNTER — OFFICE VISIT (OUTPATIENT)
Dept: INTERNAL MEDICINE | Facility: CLINIC | Age: 67
End: 2017-07-27

## 2017-07-27 VITALS
SYSTOLIC BLOOD PRESSURE: 140 MMHG | WEIGHT: 226.4 LBS | DIASTOLIC BLOOD PRESSURE: 80 MMHG | BODY MASS INDEX: 34.42 KG/M2 | TEMPERATURE: 97.1 F | RESPIRATION RATE: 18 BRPM | HEART RATE: 62 BPM

## 2017-07-27 DIAGNOSIS — Z00.00 HEALTHCARE MAINTENANCE: ICD-10-CM

## 2017-07-27 DIAGNOSIS — K21.9 GASTROESOPHAGEAL REFLUX DISEASE WITHOUT ESOPHAGITIS: ICD-10-CM

## 2017-07-27 DIAGNOSIS — E78.2 MIXED HYPERLIPIDEMIA: ICD-10-CM

## 2017-07-27 DIAGNOSIS — I10 ESSENTIAL HYPERTENSION: Primary | ICD-10-CM

## 2017-07-27 LAB
ALBUMIN SERPL-MCNC: 4.4 G/DL (ref 3.2–4.8)
ALBUMIN/GLOB SERPL: 1.3 G/DL (ref 1.5–2.5)
ALP SERPL-CCNC: 86 U/L (ref 25–100)
ALT SERPL W P-5'-P-CCNC: 66 U/L (ref 7–40)
ANION GAP SERPL CALCULATED.3IONS-SCNC: 8 MMOL/L (ref 3–11)
ARTICHOKE IGE QN: 75 MG/DL (ref 0–130)
AST SERPL-CCNC: 61 U/L (ref 0–33)
BILIRUB SERPL-MCNC: 0.9 MG/DL (ref 0.3–1.2)
BUN BLD-MCNC: 24 MG/DL (ref 9–23)
BUN/CREAT SERPL: 18.5 (ref 7–25)
CALCIUM SPEC-SCNC: 9.3 MG/DL (ref 8.7–10.4)
CHLORIDE SERPL-SCNC: 103 MMOL/L (ref 99–109)
CHOLEST SERPL-MCNC: 141 MG/DL (ref 0–200)
CO2 SERPL-SCNC: 25 MMOL/L (ref 20–31)
CREAT BLD-MCNC: 1.3 MG/DL (ref 0.6–1.3)
GFR SERPL CREATININE-BSD FRML MDRD: 55 ML/MIN/1.73
GLOBULIN UR ELPH-MCNC: 3.3 GM/DL
GLUCOSE BLD-MCNC: 94 MG/DL (ref 70–100)
HCV AB SER DONR QL: NORMAL
HDLC SERPL-MCNC: 34 MG/DL (ref 40–60)
POTASSIUM BLD-SCNC: 4.3 MMOL/L (ref 3.5–5.5)
PROT SERPL-MCNC: 7.7 G/DL (ref 5.7–8.2)
SODIUM BLD-SCNC: 136 MMOL/L (ref 132–146)
TRIGL SERPL-MCNC: 249 MG/DL (ref 0–150)

## 2017-07-27 PROCEDURE — 99214 OFFICE O/P EST MOD 30 MIN: CPT | Performed by: INTERNAL MEDICINE

## 2017-07-27 PROCEDURE — 86803 HEPATITIS C AB TEST: CPT | Performed by: INTERNAL MEDICINE

## 2017-07-27 PROCEDURE — 36415 COLL VENOUS BLD VENIPUNCTURE: CPT | Performed by: INTERNAL MEDICINE

## 2017-07-27 PROCEDURE — 80061 LIPID PANEL: CPT | Performed by: INTERNAL MEDICINE

## 2017-07-27 PROCEDURE — 80053 COMPREHEN METABOLIC PANEL: CPT | Performed by: INTERNAL MEDICINE

## 2017-07-27 NOTE — PROGRESS NOTES
Chief Complaint   Patient presents with   • Follow-up     3 mo       History of Present Illness      The patient is on omeprazole for his gastroesophageal reflux. The medication is taken on a regular basis and gives complete relief of the symptoms. He reports no abdominal pain, chest pain, diarrhea, dysphagia, heartburn, hoarseness, nausea, rectal bleeding, vomiting or weight loss. The GERD has no known aggravating factors.    The patient presents for a follow-up related to hyperlipidemia. He is following a low fat diet. He reports that he is not exercising. He is taking gemfibrozil and simvastatin. The patient is taking his medication as prescribed. He reports no medication side effects, including muscle cramps, abdominal pain, headaches or weakness. He denies shortness of breath, orthopnea, paroxysmal nocturnal dyspnea, dyspnea on exertion, edema, palpitations or syncope.    The patient presents for a follow-up related to hypertension. The patient reports that he has had no headaches or blurred vision. He states that he is taking his medication as prescribed. He is not having medication side effects. He does not check his blood pressures at home.    Medications      Current Outpatient Prescriptions:   •  aspirin 81 MG tablet, Take 1 tablet by mouth Daily. Last dose 2-19-17 may resume when okay with Dr Perales, Disp: , Rfl:   •  atenolol (TENORMIN) 50 MG tablet, TAKE ONE TABLET BY MOUTH DAILY, Disp: 90 tablet, Rfl: 1  •  Coenzyme Q10 (CO Q 10 PO), Take 300 mg by mouth Daily., Disp: , Rfl:   •  colestipol (COLESTID) 1 G tablet, TAKE 2 TABLETS TWICE A DAY, Disp: 360 tablet, Rfl: 4  •  docusate sodium (COLACE) 100 MG capsule, Take 100 mg by mouth Daily., Disp: , Rfl:   •  Exenatide ER 2 MG pen-injector, Inject 2 mg under the skin 1 (One) Time Per Week., Disp: 12 each, Rfl: 1  •  Flaxseed, Linseed, (FLAXSEED OIL) 1200 MG capsule, Take 1 capsule by mouth 2 (two) times a day., Disp: , Rfl:   •  fluticasone (FLONASE) 50  MCG/ACT nasal spray, into each nostril As Needed. Use 2 sprays in each nostril once daily as needed., Disp: , Rfl:   •  gemfibrozil (LOPID) 600 MG tablet, TAKE 1 TABLET TWICE A DAY, Disp: 180 tablet, Rfl: 1  •  glucose blood test strip, USE TO TEST BLOOD SUGAR TWICE DAILY AND AS NEEDED, Disp: 200 each, Rfl: 2  •  Insulin Pen Needle (B-D ULTRAFINE III SHORT PEN) 31G X 8 MM misc, 1 each 3 (Three) Times a Day., Disp: 300 each, Rfl: 3  •  Insulin Regular Human, Conc, (HumuLIN R) 500 UNIT/ML solution pen-injector CONCENTRATED injection, 90 units twice a day before meals, titrate up as indicated (Patient taking differently: Inject 125 Units under the skin Every Morning. 135 UNITS SQ IN AM  UNITS SQ EVENING), Disp: 12 pen, Rfl: 3  •  Lancets misc, 1 each 2 (two) times a day., Disp: 200 each, Rfl: 2  •  lisinopril-hydrochlorothiazide (PRINZIDE,ZESTORETIC) 10-12.5 MG per tablet, Take 1 tablet by mouth Daily., Disp: 90 tablet, Rfl: 1  •  meloxicam (MOBIC) 15 MG tablet, TAKE ONE TABLET BY MOUTH DAILY AS NEEDED, Disp: 90 tablet, Rfl: 0  •  Multiple Vitamins-Minerals (MULTIVITAMIN PO), Take 1 tablet by mouth Daily., Disp: , Rfl:   •  omeprazole (priLOSEC) 20 MG capsule, TAKE ONE CAPSULE BY MOUTH DAILY AS NEEDED, Disp: 90 capsule, Rfl: 1  •  sertraline (ZOLOFT) 100 MG tablet, Take 1 tablet by mouth Daily., Disp: 30 tablet, Rfl: 5  •  topiramate (TOPAMAX) 25 MG tablet, Take 25 mg by mouth Every Night., Disp: , Rfl:   •  simvastatin (ZOCOR) 40 MG tablet, TAKE 1 TABLET DAILY AT BEDTIME, Disp: 90 tablet, Rfl: 1     Allergies    Allergies   Allergen Reactions   • Glucophage Xr [Metformin Hcl Er] Diarrhea     Glucophage XR TB24: Adverse Reaction: Severe GI issues.   • Metformin Nausea And Vomiting     Other reaction(s): SEVERE INTESTIONAL ISSUES  Other reaction(s): SEVERE GI ISSUES    Glucophage XR TB24  MetFORMIN HCl TABS   • Tetracyclines & Related Nausea Only     Adverse Reaction       Problem List    Patient Active Problem  List   Diagnosis   • Chronic kidney disease, stage III (moderate)   • Gastroesophageal reflux disease without esophagitis   • Hyperlipidemia   • Hypertension   • Trigger finger of right hand   • DM (diabetes mellitus), type 2, uncontrolled w/neurologic complication   • Obesity, Class I, BMI 30-34.9   • Actinic keratosis   • FAVIO (obstructive sleep apnea)   • Prurigo nodularis   • History of depression   • History of migraine   • Cervical radiculopathy   • ALT (SGPT) level raised   • Elevated AST (SGOT)   • Uncontrolled type 2 diabetes mellitus with diabetic neuropathy, with long-term current use of insulin   • Post-operative infection   • Leukocytosis   • Neck abscess   • Drainage from wound   • Left hand weakness       Medications, Allergies, Problems List and Past History were reviewed and updated.    Physical Examination    /80 (BP Location: Left arm, Patient Position: Sitting, Cuff Size: Adult)  Pulse 62  Temp 97.1 °F (36.2 °C) (Temporal Artery )   Resp 18  Wt 226 lb 6.4 oz (103 kg)  BMI 34.42 kg/m2      HEENT: Head- Normocephalic Atraumatic. Facies- Within normal limits. Pinnas- Normal texture and shape bilaterally. Canals- Normal bilaterally. TMs- Normal bilaterally. Nares- Patent bilaterally. Nasal Septum- is normal. There is no tenderness to palpation over the frontal or maxillary sinuses. Lids- Normal bilaterally. Conjunctiva- Clear bilaterally. Sclera- Anicteric bilaterally. Oropharynx- Moist with no lesions. Tonsils- No enlargement, erythema or exudate.    Neck: Thyroid- non enlarged, symmetric and has no nodules. No bruits are detected. ROM- Normal Range of Motion with no rigidity.    Lungs: Auscultation- Clear to auscultation bilaterally. There are no retractions, clubbing or cyanosis. The Expiratory to Inspiratory ratio is equal.    Cardiovascular: There are no carotid bruits. Heart- Normal Rate with Regular rhythm and no murmurs. There are no gallops. There are no rubs. In the lower  extremities there is no edema. The upper extremities do not have edema.    Abdomen: Soft, benign, non-tender with no masses, hernias, organomegaly or scars.    Impression and Assessment    Gastroesophageal Reflux Disease.    Hyperlipidemia.    Essential Hypertension.    Plan    Gastroesophageal Reflux Disease Plan: The current plan was continued.    Hyperlipidemia Plan: He was instructed to eat a low fat diet. He was encouraged to exercise daily. Weight loss was encouraged. The patient was instructed to continue the current medications.    Essential Hypertension Plan: The current plan was continued.    Elton was seen today for follow-up.    Diagnoses and all orders for this visit:    Essential hypertension  -     Comprehensive Metabolic Panel    Gastroesophageal reflux disease without esophagitis  -     Comprehensive Metabolic Panel    Mixed hyperlipidemia  -     Comprehensive Metabolic Panel  -     Lipid Panel    Healthcare maintenance  -     Hepatitis C Antibody        Return to Office    The patient was instructed to return for follow-up in 6 months. Next Visit: Physical.    The patient was instructed to return sooner if the condition changes, worsens, or doesn't resolve.

## 2017-09-27 ENCOUNTER — OFFICE VISIT (OUTPATIENT)
Dept: ENDOCRINOLOGY | Facility: CLINIC | Age: 67
End: 2017-09-27

## 2017-09-27 VITALS
WEIGHT: 221.6 LBS | OXYGEN SATURATION: 97 % | SYSTOLIC BLOOD PRESSURE: 138 MMHG | HEIGHT: 68 IN | BODY MASS INDEX: 33.59 KG/M2 | DIASTOLIC BLOOD PRESSURE: 80 MMHG | HEART RATE: 64 BPM

## 2017-09-27 DIAGNOSIS — IMO0002 UNCONTROLLED TYPE 2 DIABETES MELLITUS WITH DIABETIC NEUROPATHY, WITH LONG-TERM CURRENT USE OF INSULIN: Primary | ICD-10-CM

## 2017-09-27 LAB
GLUCOSE BLDC GLUCOMTR-MCNC: 210 MG/DL (ref 70–130)
HBA1C MFR BLD: 7.4 %

## 2017-09-27 PROCEDURE — 82947 ASSAY GLUCOSE BLOOD QUANT: CPT | Performed by: INTERNAL MEDICINE

## 2017-09-27 PROCEDURE — 83036 HEMOGLOBIN GLYCOSYLATED A1C: CPT | Performed by: INTERNAL MEDICINE

## 2017-09-27 PROCEDURE — 99213 OFFICE O/P EST LOW 20 MIN: CPT | Performed by: INTERNAL MEDICINE

## 2017-09-27 NOTE — PROGRESS NOTES
Chief complaint  Diabetes (F/u for type 2 diabetes, pt stated he has not been checking blood sugars regularly)    Subjective   Elton Funez is a 67 y.o. male is here today for follow-up.  Diabetes mellitus type 2, uncontrolled dx in 2000  Diabetic complications: neuropathy.     Medications: Bydureon, U-500 insulin.      Past meds: Metformin caused diarrhea in the past.   Eye care:no retinopathy.   Hypoglycemia: in the morning occasionally   Monitors regularly and glucometer reviewed, glucose  is significantly improved. See scanned logbook for details. He has hypoglycemia in the morning 65-90 on waking up, then glucose is higher during the day.     Nutrition: discussed carb consistent diet 45-60 gm carb per meal. Patient is not following any diet, eats a lot of potatoes.   Exercise: recommended 30 min per day exercise. He doesn't exercise because of tingling in the feet.   Foot care and dental care: discussed.    He transitioned from Lantus and now takes 135 units in am and 120 in the evening.   Glucose was  before he stopped checking. He      Diabetes   He presents for his follow-up diabetic visit. He has type 2 diabetes mellitus. No MedicAlert identification noted. The initial diagnosis of diabetes was made 15 years ago. Hypoglycemia symptoms include hunger, sweats and tremors. Pertinent negatives for hypoglycemia include no confusion, dizziness, headaches, mood changes, nervousness/anxiousness, pallor, seizures, sleepiness or speech difficulty. Associated symptoms include foot paresthesias. Pertinent negatives for diabetes include no blurred vision, no chest pain, no fatigue, no foot ulcerations, no polydipsia, no polyphagia, no polyuria, no visual change, no weakness and no weight loss. Hypoglycemia complications include nocturnal hypoglycemia. Pertinent negatives for hypoglycemia complications include no blackouts, no hospitalization, no required assistance and no required glucagon injection. Symptoms  are worsening. Diabetic complications include impotence and peripheral neuropathy. Pertinent negatives for diabetic complications include no CVA, heart disease, nephropathy, PVD or retinopathy. Risk factors for coronary artery disease include dyslipidemia, family history, hypertension, obesity and sedentary lifestyle. Current diabetic treatment includes insulin injections and oral agent (triple therapy). He is compliant with treatment most of the time. He is currently taking insulin pre-breakfast and at bedtime. Insulin injections are given by patient. Rotation sites for injection include the abdominal wall, arms and thighs. His weight is fluctuating minimally. He is following a low fiber diet. Meal planning includes avoidance of concentrated sweets. He has not had a previous visit with a dietitian. He rarely participates in exercise. He monitors blood glucose at home 1-2 x per day. He monitors urine at home <1 x per month. Blood glucose monitoring compliance is inadequate. His home blood glucose trend is fluctuating minimally. His breakfast blood glucose is taken between 5-6 am. His breakfast blood glucose range is generally 70-90 mg/dl. His dinner blood glucose is taken between 6-7 pm. His dinner blood glucose range is generally 140-180 mg/dl. His highest blood glucose is >200 mg/dl. His overall blood glucose range is 180-200 mg/dl. He sees a podiatrist.Eye exam is current.       Medications    Current Outpatient Prescriptions:   •  aspirin 81 MG tablet, Take 1 tablet by mouth Daily. Last dose 2-19-17 may resume when okay with Dr Perales, Disp: , Rfl:   •  atenolol (TENORMIN) 50 MG tablet, TAKE ONE TABLET BY MOUTH DAILY, Disp: 90 tablet, Rfl: 1  •  Coenzyme Q10 (CO Q 10 PO), Take 300 mg by mouth Daily., Disp: , Rfl:   •  colestipol (COLESTID) 1 G tablet, TAKE 2 TABLETS TWICE A DAY, Disp: 360 tablet, Rfl: 4  •  docusate sodium (COLACE) 100 MG capsule, Take 100 mg by mouth Daily., Disp: , Rfl:   •  Exenatide ER 2 MG  pen-injector, Inject 2 mg under the skin 1 (One) Time Per Week., Disp: 12 each, Rfl: 1  •  Flaxseed, Linseed, (FLAXSEED OIL) 1200 MG capsule, Take 1 capsule by mouth 2 (two) times a day., Disp: , Rfl:   •  fluticasone (FLONASE) 50 MCG/ACT nasal spray, into each nostril As Needed. Use 2 sprays in each nostril once daily as needed., Disp: , Rfl:   •  gemfibrozil (LOPID) 600 MG tablet, TAKE 1 TABLET TWICE A DAY, Disp: 180 tablet, Rfl: 1  •  glucose blood test strip, USE TO TEST BLOOD SUGAR TWICE DAILY AND AS NEEDED, Disp: 200 each, Rfl: 2  •  Insulin Pen Needle (B-D ULTRAFINE III SHORT PEN) 31G X 8 MM misc, 1 each 3 (Three) Times a Day., Disp: 300 each, Rfl: 3  •  Insulin Regular Human, Conc, (HumuLIN R) 500 UNIT/ML solution pen-injector CONCENTRATED injection, 90 units twice a day before meals, titrate up as indicated (Patient taking differently: Inject 125 Units under the skin Every Morning. 135 UNITS SQ IN AM  UNITS SQ EVENING), Disp: 12 pen, Rfl: 3  •  Lancets misc, 1 each 2 (two) times a day., Disp: 200 each, Rfl: 2  •  lisinopril-hydrochlorothiazide (PRINZIDE,ZESTORETIC) 10-12.5 MG per tablet, Take 1 tablet by mouth Daily., Disp: 90 tablet, Rfl: 1  •  meloxicam (MOBIC) 15 MG tablet, TAKE ONE TABLET BY MOUTH DAILY AS NEEDED, Disp: 90 tablet, Rfl: 0  •  Multiple Vitamins-Minerals (MULTIVITAMIN PO), Take 1 tablet by mouth Daily., Disp: , Rfl:   •  omeprazole (priLOSEC) 20 MG capsule, TAKE ONE CAPSULE BY MOUTH DAILY AS NEEDED, Disp: 90 capsule, Rfl: 1  •  ONE TOUCH ULTRA TEST test strip, USE ONE STRIP TO TEST TWICE A DAY AND AS NEEDED, Disp: 100 each, Rfl: 1  •  sertraline (ZOLOFT) 100 MG tablet, Take 1 tablet by mouth Daily., Disp: 30 tablet, Rfl: 5  •  simvastatin (ZOCOR) 40 MG tablet, TAKE 1 TABLET DAILY AT BEDTIME, Disp: 90 tablet, Rfl: 1  •  topiramate (TOPAMAX) 25 MG tablet, Take 25 mg by mouth Every Night., Disp: , Rfl:     PMH  The following portions of the patient's history were reviewed and updated  "as appropriate: allergies, current medications, past family history, past medical history, past social history, past surgical history and problem list.    Review of systems  Review of Systems   Constitutional: Negative for fatigue and weight loss.   HENT: Positive for nosebleeds (right sided).    Eyes: Negative for blurred vision.   Cardiovascular: Negative for chest pain.   Endocrine: Negative for polydipsia, polyphagia and polyuria.   Genitourinary: Positive for impotence.   Musculoskeletal: Positive for back pain.   Skin: Negative for pallor.   Neurological: Positive for tremors and numbness (tingling of both feet, progressively getting worse, ). Negative for dizziness, seizures, speech difficulty, weakness and headaches.   Psychiatric/Behavioral: Negative for confusion. The patient is not nervous/anxious.    All other systems reviewed and are negative.      Physical exam  Objective   Blood pressure 138/80, pulse 64, height 68\" (172.7 cm), weight 221 lb 9.6 oz (101 kg), SpO2 97 %.   Physical Exam   Constitutional: He is oriented to person, place, and time. He appears well-developed and well-nourished.   Eyes: Conjunctivae are normal.   Neck: No JVD present. Thyromegaly present.   Cardiovascular: Normal rate, regular rhythm and normal heart sounds.    Pulmonary/Chest: Effort normal and breath sounds normal.   Musculoskeletal: Normal range of motion. He exhibits no edema.   Lymphadenopathy:     He has no cervical adenopathy.   Neurological: He is alert and oriented to person, place, and time.   Skin: Skin is warm and dry. No rash noted.   Psychiatric: He has a normal mood and affect. Thought content normal.   Vitals reviewed.        LABS AND IMAGING  Results for orders placed or performed in visit on 09/27/17   POC Glucose Fingerstick   Result Value Ref Range    Glucose 210 (A) 70 - 130 mg/dL   POC Glycosylated Hemoglobin (Hb A1C)   Result Value Ref Range    Hemoglobin A1C 7.4 %     Lab Results   Component Value " Date    HGBA1C 7.4 09/27/2017         Assessment  Assessment/Plan   1. Uncontrolled type 2 diabetes mellitus with diabetic neuropathy, with long-term current use of insulin      Orders Placed This Encounter   Procedures   • POC Glucose Fingerstick   • POC Glycosylated Hemoglobin (Hb A1C)     Plan  - U-500 insulin 135 and 120 Units.   -Bydureon weekly.     There are no Patient Instructions on file for this visit.    Glucose goals reviewed and he achieved the goal glucose required for surgery. Hypoglycemia precautions reviewed and insulin titration instructions given.     Follow-up in 3 months with glucose log.

## 2017-10-11 RX ORDER — GEMFIBROZIL 600 MG/1
TABLET, FILM COATED ORAL
Qty: 180 TABLET | Refills: 1 | Status: SHIPPED | OUTPATIENT
Start: 2017-10-11 | End: 2018-10-20 | Stop reason: SDUPTHER

## 2017-10-16 RX ORDER — ATENOLOL 50 MG/1
TABLET ORAL
Qty: 90 TABLET | Refills: 0 | Status: SHIPPED | OUTPATIENT
Start: 2017-10-16 | End: 2018-01-17 | Stop reason: SDUPTHER

## 2017-10-20 ENCOUNTER — OFFICE VISIT (OUTPATIENT)
Dept: INTERNAL MEDICINE | Facility: CLINIC | Age: 67
End: 2017-10-20

## 2017-10-20 VITALS
WEIGHT: 225 LBS | BODY MASS INDEX: 34.21 KG/M2 | DIASTOLIC BLOOD PRESSURE: 80 MMHG | SYSTOLIC BLOOD PRESSURE: 132 MMHG | HEART RATE: 66 BPM | RESPIRATION RATE: 16 BRPM | TEMPERATURE: 98 F

## 2017-10-20 DIAGNOSIS — Z23 NEED FOR INFLUENZA VACCINATION: ICD-10-CM

## 2017-10-20 DIAGNOSIS — Z00.00 INITIAL MEDICARE ANNUAL WELLNESS VISIT: Primary | ICD-10-CM

## 2017-10-20 DIAGNOSIS — E11.8 TYPE 2 DIABETES MELLITUS WITH COMPLICATION, WITH LONG-TERM CURRENT USE OF INSULIN (HCC): ICD-10-CM

## 2017-10-20 DIAGNOSIS — Z13.6 SCREENING FOR AAA (ABDOMINAL AORTIC ANEURYSM): ICD-10-CM

## 2017-10-20 DIAGNOSIS — Z79.4 TYPE 2 DIABETES MELLITUS WITH COMPLICATION, WITH LONG-TERM CURRENT USE OF INSULIN (HCC): ICD-10-CM

## 2017-10-20 PROCEDURE — G0008 ADMIN INFLUENZA VIRUS VAC: HCPCS | Performed by: NURSE PRACTITIONER

## 2017-10-20 PROCEDURE — G0438 PPPS, INITIAL VISIT: HCPCS | Performed by: NURSE PRACTITIONER

## 2017-10-20 PROCEDURE — 90662 IIV NO PRSV INCREASED AG IM: CPT | Performed by: NURSE PRACTITIONER

## 2017-10-20 NOTE — PROGRESS NOTES
QUICK REFERENCE INFORMATION:  The ABCs of the Annual Wellness Visit    Initial Medicare Wellness Visit    HEALTH RISK ASSESSMENT    1950    Recent Hospitalizations:  Recently treated at the following:  [facility].  Neck surgery subsequent secondary infection      Current Medical Providers:  Patient Care Team:  Tc Ramirez MD as PCP - General  Pau MACK MD as Consulting Physician (Endocrinology)  Ari Yanes MD as Consulting Physician (Otolaryngology)  Gigi Perales MD as Consulting Physician (Neurosurgery)        Smoking Status:  History   Smoking Status   • Former Smoker   • Packs/day: 1.00   • Years: 5.00   • Types: Cigarettes   • Quit date: 1976   Smokeless Tobacco   • Never Used       Alcohol Consumption:  History   Alcohol Use   • Yes     Comment: Socially       Depression Screen:   PHQ-2/PHQ-9 Depression Screening 10/20/2017   Little interest or pleasure in doing things 1   Feeling down, depressed, or hopeless 0   Trouble falling or staying asleep, or sleeping too much -   Feeling tired or having little energy -   Poor appetite or overeating -   Feeling bad about yourself - or that you are a failure or have let yourself or your family down -   Trouble concentrating on things, such as reading the newspaper or watching television -   Moving or speaking so slowly that other people could have noticed. Or the opposite - being so fidgety or restless that you have been moving around a lot more than usual -   Thoughts that you would be better off dead, or of hurting yourself in some way -   Total Score 1   If you checked off any problems, how difficult have these problems made it for you to do your work, take care of things at home, or get along with other people? -       Health Habits and Functional and Cognitive Screening:  Functional & Cognitive Status 10/20/2017   Do you have difficulty preparing food and eating? No   Do you have difficulty bathing yourself? No    Do you have difficulty getting dressed? No   Do you have difficulty using the toilet? No   Do you have difficulty moving around from place to place? No   In the past year have you fallen or experienced a near fall? Yes   Do you need help using the phone?  No   Are you deaf or do you have serious difficulty hearing?  Yes   Do you need help with transportation? No   Do you need help shopping? No   Do you need help preparing meals?  No   Do you need help with housework?  No   Do you need help with laundry? No   Do you need help taking your medications? No   Do you need help managing money? No   Do you have difficulty concentrating, remembering or making decisions? No       Health Habits  Current Diet: Unhealthy Diet  Dental Exam: Up to date  Eye Exam: Not up to date  Exercise (times per week): 2 times per week  Current Exercise Activities Include: Walking      Does the patient have evidence of cognitive impairment? No    Asiprin use counseling: Taking ASA appropriately as indicated  Reviewed risk of aspirin including gastritis and gastrointestinal bleed.  Symptoms could include coffee ground emesis, dark tarry stools, and abdominal pain.  If symptoms occur was advised to contact the office.  Advised to take encteric coated  aspirin with food.       Recent Lab Results:    Visual Acuity:  No exam data present    Age-appropriate Screening Schedule:  Refer to the list below for future screening recommendations based on patient's age, sex and/or medical conditions. Orders for these recommended tests are listed in the plan section. The patient has been provided with a written plan.    Health Maintenance   Topic Date Due   • DIABETIC EYE EXAM  10/18/2016   • URINE MICROALBUMIN  10/18/2016   • DIABETIC FOOT EXAM  01/17/2018   • HEMOGLOBIN A1C  03/27/2018   • LIPID PANEL  07/27/2018   • COLONOSCOPY  10/14/2021   • TDAP/TD VACCINES (2 - Td) 09/19/2023   • INFLUENZA VACCINE  Completed   • PNEUMOCOCCAL VACCINES (65+ LOW/MEDIUM  RISK)  Completed   • ZOSTER VACCINE  Completed        Subjective   History of Present Illness    Elton Funez is a 67 y.o. male who presents for an Annual Wellness Visit.    The following portions of the patient's history were reviewed and updated as appropriate: allergies, current medications, past family history, past medical history, past social history, past surgical history and problem list.    Outpatient Medications Prior to Visit   Medication Sig Dispense Refill   • aspirin 81 MG tablet Take 1 tablet by mouth Daily. Last dose 2-19-17 may resume when okay with Dr Perales     • atenolol (TENORMIN) 50 MG tablet TAKE ONE TABLET BY MOUTH DAILY 90 tablet 0   • Coenzyme Q10 (CO Q 10 PO) Take 300 mg by mouth Daily.     • colestipol (COLESTID) 1 G tablet TAKE 2 TABLETS TWICE A  tablet 4   • docusate sodium (COLACE) 100 MG capsule Take 100 mg by mouth Daily.     • Exenatide ER 2 MG pen-injector Inject 2 mg under the skin 1 (One) Time Per Week. 12 each 1   • Flaxseed, Linseed, (FLAXSEED OIL) 1200 MG capsule Take 1 capsule by mouth 2 (two) times a day.     • fluticasone (FLONASE) 50 MCG/ACT nasal spray into each nostril As Needed. Use 2 sprays in each nostril once daily as needed.     • gemfibrozil (LOPID) 600 MG tablet TAKE 1 TABLET TWICE A  tablet 1   • glucose blood test strip USE TO TEST BLOOD SUGAR TWICE DAILY AND AS NEEDED 200 each 2   • Insulin Pen Needle (B-D ULTRAFINE III SHORT PEN) 31G X 8 MM misc 1 each 3 (Three) Times a Day. 300 each 3   • Insulin Regular Human, Conc, (HumuLIN R) 500 UNIT/ML solution pen-injector CONCENTRATED injection 90 units twice a day before meals, titrate up as indicated (Patient taking differently: Inject 125 Units under the skin Every Morning. 135 UNITS SQ IN AM  UNITS SQ EVENING) 12 pen 3   • Lancets misc 1 each 2 (two) times a day. 200 each 2   • lisinopril-hydrochlorothiazide (PRINZIDE,ZESTORETIC) 10-12.5 MG per tablet Take 1 tablet by mouth Daily. 90 tablet  1   • meloxicam (MOBIC) 15 MG tablet TAKE ONE TABLET BY MOUTH DAILY AS NEEDED 90 tablet 0   • Multiple Vitamins-Minerals (MULTIVITAMIN PO) Take 1 tablet by mouth Daily.     • omeprazole (priLOSEC) 20 MG capsule TAKE ONE CAPSULE BY MOUTH DAILY AS NEEDED 90 capsule 1   • ONE TOUCH ULTRA TEST test strip USE ONE STRIP TO TEST TWICE A DAY AND AS NEEDED 100 each 1   • sertraline (ZOLOFT) 100 MG tablet Take 1 tablet by mouth Daily. 30 tablet 5   • simvastatin (ZOCOR) 40 MG tablet TAKE 1 TABLET DAILY AT BEDTIME 90 tablet 1   • topiramate (TOPAMAX) 25 MG tablet Take 25 mg by mouth Every Night.       No facility-administered medications prior to visit.        Patient Active Problem List   Diagnosis   • Chronic kidney disease, stage III (moderate)   • Gastroesophageal reflux disease without esophagitis   • Hyperlipidemia   • Hypertension   • Trigger finger of right hand   • DM (diabetes mellitus), type 2, uncontrolled w/neurologic complication   • Obesity, Class I, BMI 30-34.9   • Actinic keratosis   • FAVIO (obstructive sleep apnea)   • Prurigo nodularis   • History of depression   • History of migraine   • Cervical radiculopathy   • ALT (SGPT) level raised   • Elevated AST (SGOT)   • Uncontrolled type 2 diabetes mellitus with diabetic neuropathy, with long-term current use of insulin   • Post-operative infection   • Leukocytosis   • Neck abscess   • Drainage from wound   • Left hand weakness   • Initial Medicare annual wellness visit   wears hearing aids  Prostate exam per pt 1/17 due again in 1/18  Advance Care Planning:  has NO advance directive - information provided to the patient today    Identification of Risk Factors:  Risk factors include: weight , unhealthy diet, inactivity, increased fall risk and polypharmacy.    Review of Systems    Compared to one year ago, the patient feels his physical health is worse.  Compared to one year ago, the patient feels his mental health is worse.  Due to recent neck surgery and  outcomes  Objective     Physical Exam     Finger Rub Hearing{Test (right ear):passed  Finger Rub Hearing{Test (left ear):passed        Vitals:    10/20/17 0917   BP: 132/80   BP Location: Right arm   Pulse: 66   Resp: 16   Temp: 98 °F (36.7 °C)   TempSrc: Temporal Artery    Weight: 225 lb (102 kg)   PainSc:   3   PainLoc: Arm       Body mass index is 34.21 kg/(m^2).  Discussed the patient's BMI with him. The BMI is above average; BMI management plan is completed.    Assessment/Plan   Patient Self-Management and Personalized Health Advice  The patient has been provided with information about: diet, exercise, weight management, fall prevention, designing advance directives and supplements and preventive services including:   · Advance directive, Exercise counseling provided, Fall Risk assessment done, Screening for AAA, referral for ultrasound placed, DM ed set up declinese PT for gait .    Visit Diagnoses:    ICD-10-CM ICD-9-CM   1. Initial Medicare annual wellness visit Z00.00 V70.0   2. Need for influenza vaccination Z23 V04.81   3. Type 2 diabetes mellitus with complication, with long-term current use of insulin E11.8 250.90    Z79.4 V58.67   4. Screening for AAA (abdominal aortic aneurysm) Z13.6 V81.2       Orders Placed This Encounter   Procedures   • US aaa screen limited     Standing Status:   Future     Standing Expiration Date:   10/20/2018     Scheduling Instructions:      Z87.89 hx of TA     Order Specific Question:   Reason for Exam:     Answer:   screen   • Flu Vaccine High Dose PF 65YR+   • Ambulatory Referral to Diabetic Education     Referral Priority:   Routine     Referral Type:   Health Education     Referral Reason:   Specialty Services Required     Requested Specialty:   Dietitian     Number of Visits Requested:   1       Outpatient Encounter Prescriptions as of 10/20/2017   Medication Sig Dispense Refill   • aspirin 81 MG tablet Take 1 tablet by mouth Daily. Last dose 2-19-17 may resume when  osmin with Dr Perales     • atenolol (TENORMIN) 50 MG tablet TAKE ONE TABLET BY MOUTH DAILY 90 tablet 0   • Coenzyme Q10 (CO Q 10 PO) Take 300 mg by mouth Daily.     • colestipol (COLESTID) 1 G tablet TAKE 2 TABLETS TWICE A  tablet 4   • docusate sodium (COLACE) 100 MG capsule Take 100 mg by mouth Daily.     • Exenatide ER 2 MG pen-injector Inject 2 mg under the skin 1 (One) Time Per Week. 12 each 1   • Flaxseed, Linseed, (FLAXSEED OIL) 1200 MG capsule Take 1 capsule by mouth 2 (two) times a day.     • fluticasone (FLONASE) 50 MCG/ACT nasal spray into each nostril As Needed. Use 2 sprays in each nostril once daily as needed.     • gemfibrozil (LOPID) 600 MG tablet TAKE 1 TABLET TWICE A  tablet 1   • glucose blood test strip USE TO TEST BLOOD SUGAR TWICE DAILY AND AS NEEDED 200 each 2   • Insulin Pen Needle (B-D ULTRAFINE III SHORT PEN) 31G X 8 MM misc 1 each 3 (Three) Times a Day. 300 each 3   • Insulin Regular Human, Conc, (HumuLIN R) 500 UNIT/ML solution pen-injector CONCENTRATED injection 90 units twice a day before meals, titrate up as indicated (Patient taking differently: Inject 125 Units under the skin Every Morning. 135 UNITS SQ IN AM  UNITS SQ EVENING) 12 pen 3   • Lancets misc 1 each 2 (two) times a day. 200 each 2   • lisinopril-hydrochlorothiazide (PRINZIDE,ZESTORETIC) 10-12.5 MG per tablet Take 1 tablet by mouth Daily. 90 tablet 1   • meloxicam (MOBIC) 15 MG tablet TAKE ONE TABLET BY MOUTH DAILY AS NEEDED 90 tablet 0   • Multiple Vitamins-Minerals (MULTIVITAMIN PO) Take 1 tablet by mouth Daily.     • omeprazole (priLOSEC) 20 MG capsule TAKE ONE CAPSULE BY MOUTH DAILY AS NEEDED 90 capsule 1   • ONE TOUCH ULTRA TEST test strip USE ONE STRIP TO TEST TWICE A DAY AND AS NEEDED 100 each 1   • sertraline (ZOLOFT) 100 MG tablet Take 1 tablet by mouth Daily. 30 tablet 5   • simvastatin (ZOCOR) 40 MG tablet TAKE 1 TABLET DAILY AT BEDTIME 90 tablet 1   • topiramate (TOPAMAX) 25 MG tablet Take 25  mg by mouth Every Night.       No facility-administered encounter medications on file as of 10/20/2017.        Reviewed use of high risk medication in the elderly: yes  Reviewed for potential of harmful drug interactions in the elderly: yes    Follow Up:  Return in about 1 year (around 10/20/2018) for Medicare Wellness subseq.     An After Visit Summary and PPPS with all of these plans were given to the patient.

## 2017-10-20 NOTE — PATIENT INSTRUCTIONS
"Basic Carbohydrate Counting for Diabetes Mellitus  Carbohydrate counting is a method for keeping track of the amount of carbohydrates you eat. Eating carbohydrates naturally increases the level of sugar (glucose) in your blood, so it is important for you to know the amount that is okay for you to have in every meal. Carbohydrate counting helps keep the level of glucose in your blood within normal limits. The amount of carbohydrates allowed is different for every person. A dietitian can help you calculate the amount that is right for you. Once you know the amount of carbohydrates you can have, you can count the carbohydrates in the foods you want to eat.  Carbohydrates are found in the following foods:  · Grains, such as breads and cereals.  · Dried beans and soy products.  · Starchy vegetables, such as potatoes, peas, and corn.  · Fruit and fruit juices.  · Milk and yogurt.  · Sweets and snack foods, such as cake, cookies, candy, chips, soft drinks, and fruit drinks.  CARBOHYDRATE COUNTING  There are two ways to count the carbohydrates in your food. You can use either of the methods or a combination of both.  Reading the \"Nutrition Facts\" on Packaged Food  The \"Nutrition Facts\" is an area that is included on the labels of almost all packaged food and beverages in the [place]. It includes the serving size of that food or beverage and information about the nutrients in each serving of the food, including the grams (g) of carbohydrate per serving.   Decide the number of servings of this food or beverage that you will be able to eat or drink. Multiply that number of servings by the number of grams of carbohydrate that is listed on the label for that serving. The total will be the amount of carbohydrates you will be having when you eat or drink this food or beverage.  Learning Standard Serving Sizes of Food  When you eat food that is not packaged or does not include \"Nutrition Facts\" on the label, you need to " measure the servings in order to count the amount of carbohydrates. A serving of most carbohydrate-rich foods contains about 15 g of carbohydrates. The following list includes serving sizes of carbohydrate-rich foods that provide 15 g of carbohydrate per serving:    · 1 slice of bread (1 oz) or 1 six-inch tortilla.    · ½ of a hamburger bun or English muffin.  · 4-6 crackers.  · ¾ cup unsweetened dry cereal.    · ½ cup hot cereal.  · ? cup rice or pasta.    · ½ cup mashed potatoes or ¼ of a large baked potato.  · 1 cup fresh fruit or one small piece of fruit.    · ½ cup canned or frozen fruit or fruit juice.  · 1 cup milk.  · ? cup plain fat-free yogurt or yogurt sweetened with artificial sweeteners.  · ½ cup cooked dried beans or starchy vegetable, such as peas, corn, or potatoes.    Decide the number of standard-size servings that you will eat. Multiply that number of servings by 15 (the grams of carbohydrates in that serving). For example, if you eat 2 cups of strawberries, you will have eaten 2 servings and 30 g of carbohydrates (2 servings x 15 g = 30 g). For foods such as soups and casseroles, in which more than one food is mixed in, you will need to count the carbohydrates in each food that is included.  EXAMPLE OF CARBOHYDRATE COUNTING  Sample Dinner   · 3 oz chicken breast.  · ? cup of brown rice.  · ½ cup of corn.  · 1 cup milk.    · 1 cup strawberries with sugar-free whipped topping.    Carbohydrate Calculation   Step 1: Identify the foods that contain carbohydrates:   · Rice.    · Corn.    · Milk.    · Strawberries.  Step 2: Calculate the number of servings eaten of each:   · 2 servings of rice.    · 1 serving of corn.    · 1 serving of milk.    · 1 serving of strawberries.  Step 3:  Multiply each of those number of servings by 15 g:   · 2 servings of rice x 15 g = 30 g.    · 1 serving of corn x 15 g = 15 g.    · 1 serving of milk x 15 g = 15 g.    · 1 serving of strawberries x 15 g = 15 g.  Step 4: Add  together all of the amounts to find the total grams of carbohydrates eaten: 30 g + 15 g + 15 g + 15 g = 75 g.     This information is not intended to replace advice given to you by your health care provider. Make sure you discuss any questions you have with your health care provider.     Document Released: 2006 Document Revised: 2016 Document Reviewed: 2014  [employer] Interactive Patient Education © [employer]  Below are four things you can do to prevent falls:   1. Begin an exercise program to improve your leg strength & balance  2. Ask your doctor or pharmacist to review your medicines   3. Get annual eye check-ups & update your eyeglasses  4. Make your home safer by:  · Removing clutter & tripping hazards  · Putting railings on all stairs & adding grab bars in the bathroom  · Having good lighting, especially on stairs    Contact your local community or [facility] for information on exercise, fall prevention programs, or options for improving home safety.  Medicare Wellness  Personal Prevention Plan of Service     Date of Office Visit:  10/20/2017  Encounter Provider:  MELISSA Vail  Place of Service:  [facility]  Patient Name: Elton Funez  :  1950    As part of the Medicare Wellness portion of your visit today, we are providing you with this personalized preventive plan of services (PPPS). This plan is based upon recommendations of the [place] Preventive Services Task Force (USPSTF) and the Advisory Committee on Immunization Practices (ACIP).    This lists the preventive care services that should be considered, and provides dates of when you are due. Items listed as completed are up-to-date and do not require any further intervention.    Health Maintenance   Topic Date Due   • AAA SCREEN (ONE-TIME)  2016   • DIABETIC EYE EXAM  10/18/2016   • URINE MICROALBUMIN  10/18/2016   • DIABETIC FOOT EXAM   01/17/2018   • HEMOGLOBIN A1C  03/27/2018   • LIPID PANEL  07/27/2018   • MEDICARE ANNUAL WELLNESS  10/20/2018   • COLONOSCOPY  10/14/2021   • TDAP/TD VACCINES (2 - Td) 09/19/2023   • HEPATITIS C SCREENING  Completed   • INFLUENZA VACCINE  Completed   • PNEUMOCOCCAL VACCINES (65+ LOW/MEDIUM RISK)  Completed   • ZOSTER VACCINE  Completed       Orders Placed This Encounter   Procedures   • US aaa screen limited     Standing Status:   Future     Standing Expiration Date:   10/20/2018     Scheduling Instructions:      Z87.89 hx of TA     Order Specific Question:   Reason for Exam:     Answer:   screen   • Flu Vaccine High Dose PF 65YR+   • Ambulatory Referral to Diabetic Education     Referral Priority:   Routine     Referral Type:   Health Education     Referral Reason:   Specialty Services Required     Requested Specialty:   Dietitian     Number of Visits Requested:   1       Return in about 1 year (around 10/20/2018) for Medicare Wellness subseq.

## 2017-10-23 ENCOUNTER — TELEPHONE (OUTPATIENT)
Dept: INTERNAL MEDICINE | Facility: CLINIC | Age: 67
End: 2017-10-23

## 2017-10-23 DIAGNOSIS — Z87.891 PERSONAL HISTORY OF SMOKING: Primary | ICD-10-CM

## 2017-10-23 PROBLEM — Z00.00 INITIAL MEDICARE ANNUAL WELLNESS VISIT: Status: ACTIVE | Noted: 2017-10-23

## 2017-10-27 ENCOUNTER — HOSPITAL ENCOUNTER (OUTPATIENT)
Dept: ULTRASOUND IMAGING | Facility: HOSPITAL | Age: 67
Discharge: HOME | End: 2017-10-27
Admitting: NURSE PRACTITIONER

## 2017-10-27 DIAGNOSIS — Z87.891 PERSONAL HISTORY OF SMOKING: ICD-10-CM

## 2017-10-27 PROCEDURE — 76706 US ABDL AORTA SCREEN AAA: CPT

## 2017-11-06 RX ORDER — SIMVASTATIN 40 MG
TABLET ORAL
Qty: 90 TABLET | Refills: 1 | Status: SHIPPED | OUTPATIENT
Start: 2017-11-06 | End: 2018-05-15 | Stop reason: SDUPTHER

## 2017-11-06 NOTE — PROGRESS NOTES
"[facility] Progress Note    Date of Admission: 3/15/2017      Antibiotics:  Dapto and Rocephin     CC:   Chief Complaint   Patient presents with   • Post-op Problem     Patient relates spinal surgery on 2017 with fever and site drainage x 2 days.     • Fever       S: Postop day #2 feeling better with less pain no fevers Surgical drain being removed today.  No nausea vomiting diarrhea    O:  Visit Vitals   • /69 (BP Location: Right arm, Patient Position: Lying)   • Pulse 68   • Temp 98.1 °F (36.7 °C) (Oral)   • Resp 18   • Ht 68.5\" (174 cm)   • Wt 220 lb (99.8 kg)   • SpO2 97%   • BMI 32.96 kg/m2     Temp (24hrs), Av.1 °F (36.7 °C), Min:97.6 °F (36.4 °C), Max:98.7 °F (37.1 °C)      PE:   GENERAL: Awake and alert, in no acute distress.   HEENT: Normocephalic, atraumatic. PERRL. EOMI. No conjunctival injection. No icterus. Oropharynx clear without evidence of thrush or exudate. No evidence of peridontal disease. No nuchal rigidity.  NECK: Surgical incision on the posterior neck with seropurulent drainage on the dressing and surgical drain in place. Some limited range of motion of the neck however able to flex extend and rotate.   LYMPH: No cervical, axillary or inguinal lymphadenopathy. No neck masses  HEART: RRR; No murmur, rubs, gallops.   LUNGS: Clear to auscultation bilaterally without wheezing, rales, rhonchi. Normal respiratory effort.  ABDOMEN: obese, Soft, nontender, nondistended. Positive bowel sounds. No rebound or guarding.   EXT: No cyanosis, clubbing or edema. Good pulses throughout.  MSK: FROM without joint effusions noted Except decreased ROM of c spine.  SKIN: No rash or lesions C-spine incision with serous and was drainage and drain in place   NEURO: Oriented to PPT. No focal deficits.   Right upper extremity PICC in place    Laboratory Data      Results from last 7 days  Lab Units 17  0408 17  0446 17  0426   WBC 10*3/mm3 7.10 10.82* 14.29*   HEMOGLOBIN g/dL 10.2* 11.0* " 11.2*   HEMATOCRIT % 30.6* 34.0* 34.1*   PLATELETS 10*3/mm3 113* 113* 114*       Results from last 7 days  Lab Units 03/18/17  0408   SODIUM mmol/L 135   POTASSIUM mmol/L 3.9   CHLORIDE mmol/L 106   TOTAL CO2 mmol/L 24.0   BUN mg/dL 25*   CREATININE mg/dL 1.20   GLUCOSE mg/dL 165*   CALCIUM mg/dL 8.6*       Results from last 7 days  Lab Units 03/16/17  0426   ALK PHOS U/L 61   BILIRUBIN mg/dL 0.8   ALT (SGPT) U/L 20   AST (SGOT) U/L 29       Results from last 7 days  Lab Units 03/15/17  1134   SED RATE mm/hr 53*       Results from last 7 days  Lab Units 03/15/17  1134   CRP mg/dL 83.80*       Estimated Creatinine Clearance: 69 mL/min (by C-G formula based on Cr of 1.2).      Microbiology:  Blood culture: NGSF x 2  Wound culture: GPC in groups; Methicillin sensitive Staph aureus  Surgical culture with 3/16/17: methicillin sensitive staph aureus  Radiology:  Imaging Results (last 24 hours)     ** No results found for the last 24 hours. **          PROBLEM LIST:   Sepsis present on admission  Postoperative cervical spine with cellulitis with underlying abscess with MSSA  Leukocytosis  Fever  Acute kidney injury  Diabetes mellitus, hyperlipidemia, hypertension  Thrombocytopenia  Obstructive sleep apnea     ASSESSMENT:  Patient is seen today and initial evaluation with postoperative surgical site wound drainage with imaging showing abscess and fluid collection and cervical spine postoperative site. Admitted with sepsis, fever, leukocytosis, REBECA, elevated inflammatory markers. Wound culture pending.  Patient has been antibiotic modified prior to arrival with cephalexin orally with no improvement. No hx of MRSA.  We'll cover for skin and soft tissue infection with likely organisms being strep and staph including risk for MRSA. CT scan showing 7.4 x 3.7 x 2.1 cm gas and fluid containing collection within the deep subcutaneous tissues of the posterior midline neck from approximately the C4-T1 levels.     Agree with  aggressive irrigation debridement of abscess and will need aggressive antibiotics postoperatively plan 6 weeks of IV therapy to avoid acute discitis or osteomyelitis and therapy.     Patient clinically improving after surgery cultures positive for methicillin sensitive staph aureus with deep space infection so will need 4-6 weeks of IV antibiotics and we'll discontinue daptomycin is no MRSA isolated switched to ceftriaxone and convert to outpatient infusion therapy    PLAN:  Rocephin 2 g IV daily    Monitor labs  Follow-up cultures  Home today and start outpatient infusion was ceftriaxone tomorrow in our office     UM:  Patient will be coming to [facility] for daily antibiotic infusions discussed with hospitalist and patient and family today   Follow-up with me on Monday       Eric Root MD  3/18/2017     no

## 2017-11-08 ENCOUNTER — OFFICE VISIT (OUTPATIENT)
Dept: NEUROSURGERY | Facility: CLINIC | Age: 67
End: 2017-11-08

## 2017-11-08 VITALS
WEIGHT: 223.2 LBS | DIASTOLIC BLOOD PRESSURE: 70 MMHG | TEMPERATURE: 96.7 F | BODY MASS INDEX: 33.83 KG/M2 | SYSTOLIC BLOOD PRESSURE: 130 MMHG | HEIGHT: 68 IN

## 2017-11-08 DIAGNOSIS — M54.12 CERVICAL RADICULOPATHY: Primary | ICD-10-CM

## 2017-11-08 PROCEDURE — 99214 OFFICE O/P EST MOD 30 MIN: CPT | Performed by: NEUROLOGICAL SURGERY

## 2017-11-20 RX ORDER — SERTRALINE HYDROCHLORIDE 100 MG/1
TABLET, FILM COATED ORAL
Qty: 90 TABLET | Refills: 4 | Status: SHIPPED | OUTPATIENT
Start: 2017-11-20 | End: 2018-03-05 | Stop reason: SDUPTHER

## 2017-11-20 RX ORDER — MELOXICAM 15 MG/1
TABLET ORAL
Qty: 90 TABLET | Refills: 0 | Status: SHIPPED | OUTPATIENT
Start: 2017-11-20 | End: 2018-02-15 | Stop reason: SDUPTHER

## 2017-11-27 ENCOUNTER — HOSPITAL ENCOUNTER (OUTPATIENT)
Dept: MRI IMAGING | Facility: HOSPITAL | Age: 67
Discharge: HOME | End: 2017-11-27
Attending: NEUROLOGICAL SURGERY | Admitting: NEUROLOGICAL SURGERY

## 2017-11-27 ENCOUNTER — OFFICE VISIT (OUTPATIENT)
Dept: NEUROSURGERY | Facility: CLINIC | Age: 67
End: 2017-11-27

## 2017-11-27 VITALS
HEIGHT: 68 IN | DIASTOLIC BLOOD PRESSURE: 60 MMHG | SYSTOLIC BLOOD PRESSURE: 120 MMHG | TEMPERATURE: 97.4 F | BODY MASS INDEX: 34.04 KG/M2 | WEIGHT: 224.6 LBS

## 2017-11-27 DIAGNOSIS — E11.40 UNCONTROLLED TYPE 2 DIABETES MELLITUS WITH DIABETIC NEUROPATHY, UNSPECIFIED LONG TERM INSULIN USE STATUS: Chronic | ICD-10-CM

## 2017-11-27 DIAGNOSIS — M54.12 CERVICAL RADICULOPATHY: ICD-10-CM

## 2017-11-27 DIAGNOSIS — E11.65 UNCONTROLLED TYPE 2 DIABETES MELLITUS WITH DIABETIC NEUROPATHY, UNSPECIFIED LONG TERM INSULIN USE STATUS: Chronic | ICD-10-CM

## 2017-11-27 DIAGNOSIS — Z86.14 HISTORY OF MRSA INFECTION: ICD-10-CM

## 2017-11-27 DIAGNOSIS — Z98.1 STATUS POST CERVICAL SPINAL FUSION: ICD-10-CM

## 2017-11-27 DIAGNOSIS — M54.12 CERVICAL RADICULOPATHY: Primary | ICD-10-CM

## 2017-11-27 LAB — CREAT BLDA-MCNC: 1.5 MG/DL (ref 0.6–1.3)

## 2017-11-27 PROCEDURE — 0 GADOBENATE DIMEGLUMINE 529 MG/ML SOLUTION: Performed by: NEUROLOGICAL SURGERY

## 2017-11-27 PROCEDURE — A9577 INJ MULTIHANCE: HCPCS | Performed by: NEUROLOGICAL SURGERY

## 2017-11-27 PROCEDURE — 82565 ASSAY OF CREATININE: CPT

## 2017-11-27 PROCEDURE — 99215 OFFICE O/P EST HI 40 MIN: CPT | Performed by: NEUROLOGICAL SURGERY

## 2017-11-27 PROCEDURE — 72156 MRI NECK SPINE W/O & W/DYE: CPT

## 2017-11-27 RX ORDER — EXENATIDE 2 MG/.65ML
INJECTION, SUSPENSION, EXTENDED RELEASE SUBCUTANEOUS
Qty: 4 EACH | Refills: 1 | Status: SHIPPED | OUTPATIENT
Start: 2017-11-27 | End: 2018-02-02 | Stop reason: SDUPTHER

## 2017-11-27 RX ADMIN — GADOBENATE DIMEGLUMINE 10 ML: 529 INJECTION, SOLUTION INTRAVENOUS at 10:00

## 2017-11-27 NOTE — PROGRESS NOTES
Subjective     Chief Complaint: Neck and left arm pain    Patient ID: Elton Funez is a 67 y.o. male is here today for follow-up.    Neurologic Problem   The patient's primary symptoms include focal sensory loss and focal weakness. The patient's pertinent negatives include no altered mental status, clumsiness, loss of balance, memory loss, near-syncope, slurred speech, syncope, visual change or weakness. This is a chronic problem. The current episode started more than 1 year ago. The neurological problem developed gradually. The problem has been gradually worsening since onset. There was left-sided and upper extremity focality noted. Associated symptoms include neck pain. Pertinent negatives include no abdominal pain, auditory change, aura, back pain, bladder incontinence, bowel incontinence, chest pain, confusion, diaphoresis, dizziness, fatigue, fever, headaches, light-headedness, nausea, palpitations, shortness of breath, vertigo or vomiting. Past treatments include bed rest, medication, neck support and position change. The treatment provided mild relief. There is no history of a bleeding disorder, a clotting disorder or a CVA.       This is a 67-year-old man-following for some recurrent cervical radiculopathy.  He had a successful posterior decompression, however after a wound infection with MRSA, his symptoms returned.  He has foraminal stenosis and compression of the C8 nerve root.    The following portions of the patient's history were reviewed and updated as appropriate: allergies, current medications, past family history, past medical history, past social history, past surgical history and problem list.    Family history:   Family History   Problem Relation Age of Onset   • Diabetes Father    • Heart disease Father    • Diabetes Paternal Grandmother    • Hearing loss Mother        Social history:   Social History     Social History   • Marital status:      Spouse name: N/A   • Number of children:  N/A   • Years of education: N/A     Occupational History   • Not on file.     Social History Main Topics   • Smoking status: Former Smoker     Packs/day: 1.00     Years: 5.00     Types: Cigarettes     Quit date: 1976   • Smokeless tobacco: Never Used   • Alcohol use Yes      Comment: Socially   • Drug use: No   • Sexual activity: Not Currently     Partners: Female     Birth control/ protection: Surgical     Other Topics Concern   • Not on file     Social History Narrative       Review of Systems   Constitutional: Negative for activity change, appetite change, chills, diaphoresis, fatigue, fever and unexpected weight change.   HENT: Negative for congestion, dental problem, drooling, ear discharge, ear pain, facial swelling, hearing loss, mouth sores, nosebleeds, postnasal drip, rhinorrhea, sinus pressure, sneezing, sore throat, tinnitus, trouble swallowing and voice change.    Eyes: Negative for photophobia, pain, discharge, redness, itching and visual disturbance.   Respiratory: Negative for apnea, cough, choking, chest tightness, shortness of breath, wheezing and stridor.    Cardiovascular: Negative for chest pain, palpitations, leg swelling and near-syncope.   Gastrointestinal: Negative for abdominal distention, abdominal pain, anal bleeding, blood in stool, bowel incontinence, constipation, diarrhea, nausea, rectal pain and vomiting.   Endocrine: Negative for cold intolerance, heat intolerance, polydipsia, polyphagia and polyuria.   Genitourinary: Negative for bladder incontinence, decreased urine volume, difficulty urinating, dysuria, enuresis, flank pain, frequency, genital sores, hematuria and urgency.   Musculoskeletal: Positive for neck pain. Negative for arthralgias, back pain, gait problem, joint swelling, myalgias and neck stiffness.   Skin: Negative for color change, pallor, rash and wound.   Allergic/Immunologic: Negative for environmental allergies, food allergies and immunocompromised state.  "  Neurological: Positive for focal weakness and numbness. Negative for dizziness, vertigo, tremors, seizures, syncope, facial asymmetry, speech difficulty, weakness, light-headedness, headaches and loss of balance.   Hematological: Negative for adenopathy. Does not bruise/bleed easily.   Psychiatric/Behavioral: Negative for agitation, behavioral problems, confusion, decreased concentration, dysphoric mood, hallucinations, memory loss, self-injury, sleep disturbance and suicidal ideas. The patient is not nervous/anxious and is not hyperactive.        Objective   Blood pressure 120/60, temperature 97.4 °F (36.3 °C), height 68\" (172.7 cm), weight 224 lb 9.6 oz (102 kg).  Body mass index is 34.15 kg/(m^2).    Physical Exam   Constitutional: He is oriented to person, place, and time. He appears well-developed.  Non-toxic appearance.   HENT:   Head: Normocephalic and atraumatic.   Right Ear: Hearing normal.   Left Ear: Hearing normal.   Nose: Nose normal.   Eyes: Conjunctivae, EOM and lids are normal. Pupils are equal, round, and reactive to light.   Neck: No JVD present. Decreased range of motion present.       Cardiovascular: Normal rate and regular rhythm.    Pulses:       Radial pulses are 2+ on the right side, and 2+ on the left side.   Pulmonary/Chest: Effort normal. No stridor. No respiratory distress. He has no wheezes.   Musculoskeletal:        Arms:  5/5 strength in the bilateral upper extremities, with the exception of the left intrinsic hand muscles, which I grade at 4/5    There is thenar and hyperthenar eminence atrophy on the left hand.  There is atrophy of the intrinsic hand muscles.   Neurological: He is alert and oriented to person, place, and time. He has normal reflexes. He displays normal reflexes. No cranial nerve deficit. He exhibits normal muscle tone. GCS eye subscore is 4. GCS verbal subscore is 5. GCS motor subscore is 6.   Reflex Scores:       Tricep reflexes are 2+ on the right side and 2+ on " the left side.       Bicep reflexes are 2+ on the right side and 2+ on the left side.       Brachioradialis reflexes are 2+ on the right side and 2+ on the left side.  Skin: Skin is warm and dry. No rash noted. No erythema.   Psychiatric: He has a normal mood and affect. His behavior is normal. Judgment and thought content normal.   Nursing note and vitals reviewed.        Assessment/Plan     Independent Review of Radiographic Studies:      Available for my review is a MRI of the cervical spine that was performed this morning.  This discloses degenerative disc disease with bilateral, right greater than left neural foraminal stenosis at C5 6.  At C7-T1, there is a large disc fragment which is occupying the lateral recess and neural foramen at C7-T1, greater on the left versus the right.    Medical Decision Making:      This is a 67-year-old man with refractory cervical radiculopathy.    He will probably need a C7 to T1 ACDF as well as a C5 6 ACDF.  This will be a redo procedure, and I advised the patient that he is at increased risk of periprocedural complications.  Specifically, I am worried about recurrent laryngeal nerve injury, esophageal injury, hoarseness, dysphagia, or difficulty speaking/swallowing.  He is having refractory pain, and his symptoms are not able to be controlled with conservative treatment.    He has a history of MRSA infection in a prior anterior and posterior cervical procedures.  We will schedule him tentatively for C5 6 and C7-T1 ACDF on an elective basis in the near future.    He will need to have his vocal cords examined prior to surgery to determine if he has a pre-existing recurrent laryngeal nerve palsy.    Elton was seen today for neck pain.    Diagnoses and all orders for this visit:    Cervical radiculopathy  -     Ambulatory Referral to ENT (Otolaryngology)        No Follow-up on file.           This document signed by FABBY Perales MD November 27, 2017 3:35 PM

## 2017-11-28 DIAGNOSIS — E11.65 UNCONTROLLED TYPE 2 DIABETES MELLITUS WITH DIABETIC NEUROPATHY, UNSPECIFIED LONG TERM INSULIN USE STATUS: Chronic | ICD-10-CM

## 2017-11-28 DIAGNOSIS — R29.898 LEFT HAND WEAKNESS: ICD-10-CM

## 2017-11-28 DIAGNOSIS — E11.40 UNCONTROLLED TYPE 2 DIABETES MELLITUS WITH DIABETIC NEUROPATHY, UNSPECIFIED LONG TERM INSULIN USE STATUS: Chronic | ICD-10-CM

## 2017-11-28 DIAGNOSIS — M54.12 CERVICAL RADICULOPATHY: Primary | ICD-10-CM

## 2017-11-28 RX ORDER — SODIUM CHLORIDE, SODIUM LACTATE, POTASSIUM CHLORIDE, CALCIUM CHLORIDE 600; 310; 30; 20 MG/100ML; MG/100ML; MG/100ML; MG/100ML
100 INJECTION, SOLUTION INTRAVENOUS CONTINUOUS
Status: CANCELLED | OUTPATIENT
Start: 2017-11-28

## 2017-11-28 RX ORDER — SODIUM CHLORIDE 0.9 % (FLUSH) 0.9 %
1-10 SYRINGE (ML) INJECTION AS NEEDED
Status: CANCELLED | OUTPATIENT
Start: 2017-11-28

## 2017-12-01 ENCOUNTER — APPOINTMENT (OUTPATIENT)
Dept: DIABETES SERVICES | Facility: HOSPITAL | Age: 67
End: 2017-12-01

## 2017-12-04 ENCOUNTER — TELEPHONE (OUTPATIENT)
Dept: NEUROSURGERY | Facility: CLINIC | Age: 67
End: 2017-12-04

## 2017-12-04 NOTE — TELEPHONE ENCOUNTER
Patient left message stating he is having surgery Dec. 14, patient said that he was to get an antibiotic prescription called in before hand.  Patient said to call the prescription in to [facility]. Patient is going out of town today.

## 2017-12-04 NOTE — TELEPHONE ENCOUNTER
The antibiotic is an IV vancomycin that he will receive on the day of the case. I spoke with the patient and let him know

## 2017-12-08 ENCOUNTER — OFFICE VISIT (OUTPATIENT)
Dept: NEUROSURGERY | Facility: CLINIC | Age: 67
End: 2017-12-08

## 2017-12-08 VITALS
WEIGHT: 224 LBS | BODY MASS INDEX: 33.95 KG/M2 | TEMPERATURE: 96.6 F | HEIGHT: 68 IN | SYSTOLIC BLOOD PRESSURE: 110 MMHG | DIASTOLIC BLOOD PRESSURE: 70 MMHG

## 2017-12-08 DIAGNOSIS — M54.12 CERVICAL RADICULOPATHY: Primary | ICD-10-CM

## 2017-12-08 PROCEDURE — 99214 OFFICE O/P EST MOD 30 MIN: CPT | Performed by: NEUROLOGICAL SURGERY

## 2017-12-08 RX ORDER — GABAPENTIN 300 MG/1
300 CAPSULE ORAL 3 TIMES DAILY
Qty: 90 CAPSULE | Refills: 5 | OUTPATIENT
Start: 2017-12-08 | End: 2018-01-29

## 2017-12-08 RX ORDER — CHLORHEXIDINE GLUCONATE 4 G/100ML
SOLUTION TOPICAL DAILY PRN
Qty: 118 ML | Refills: 0 | Status: SHIPPED | OUTPATIENT
Start: 2017-12-08 | End: 2017-12-13

## 2017-12-08 NOTE — PROGRESS NOTES
Subjective     Chief Complaint: Left sided C8 radiculopathy    Patient ID: Elton Funez is a 67 y.o. male is here today for follow-up.    Neurologic Problem   The patient's primary symptoms include clumsiness, focal sensory loss, focal weakness and a loss of balance. The patient's pertinent negatives include no altered mental status, memory loss, near-syncope, slurred speech, syncope, visual change or weakness. This is a chronic problem. The current episode started more than 1 year ago. The neurological problem developed gradually. The problem has been gradually worsening since onset. There was left-sided and upper extremity focality noted. Associated symptoms include neck pain. Pertinent negatives include no abdominal pain, auditory change, aura, back pain, bladder incontinence, bowel incontinence, chest pain, confusion, diaphoresis, dizziness, fatigue, fever, headaches, light-headedness, nausea, palpitations, shortness of breath, vertigo or vomiting. Past treatments include bed rest, position change and sleep. The treatment provided no relief. There is no history of a bleeding disorder or a clotting disorder.       This is a 67-year-old man who has a remote history of a C6 7 ACDF.  He presented to my clinic several months ago with a subacute left sided C8 radiculopathy.  He underwent a posterior foraminotomy which resulted in the resolution of his radicular symptoms, however this was complicated by a postoperative wound infection.  His risk factors were obesity and uncontrolled diabetes.  He underwent a washout, and he fortunately made a complete recovery from this episode, however his radicular symptoms have returned.  Follow-up MRI demonstrated a recurrent disc herniation at C7-T1.  He was scheduled for a C7 T1 ACDF, but I wanted to talk to him in clinic one more time to discuss the surgery and reiterate that he is at increased risk of complications due to his prior infection and prior surgeries.    The  following portions of the patient's history were reviewed and updated as appropriate: allergies, current medications, past family history, past medical history, past social history, past surgical history and problem list.    Family history:   Family History   Problem Relation Age of Onset   • Diabetes Father    • Heart disease Father    • Diabetes Paternal Grandmother    • Hearing loss Mother        Social history:   Social History     Social History   • Marital status:      Spouse name: N/A   • Number of children: N/A   • Years of education: N/A     Occupational History   • Not on file.     Social History Main Topics   • Smoking status: Former Smoker     Packs/day: 1.00     Years: 5.00     Types: Cigarettes     Quit date: 1976   • Smokeless tobacco: Never Used   • Alcohol use Yes      Comment: Socially   • Drug use: No   • Sexual activity: Not Currently     Partners: Female     Birth control/ protection: Surgical     Other Topics Concern   • Not on file     Social History Narrative       Review of Systems   Constitutional: Negative for activity change, appetite change, chills, diaphoresis, fatigue, fever and unexpected weight change.   HENT: Negative for congestion, dental problem, drooling, ear discharge, ear pain, facial swelling, hearing loss, mouth sores, nosebleeds, postnasal drip, rhinorrhea, sinus pressure, sneezing, sore throat, tinnitus, trouble swallowing and voice change.    Eyes: Negative for photophobia, pain, discharge, redness, itching and visual disturbance.   Respiratory: Negative for apnea, cough, choking, chest tightness, shortness of breath, wheezing and stridor.    Cardiovascular: Negative for chest pain, palpitations, leg swelling and near-syncope.   Gastrointestinal: Negative for abdominal distention, abdominal pain, anal bleeding, blood in stool, bowel incontinence, constipation, diarrhea, nausea, rectal pain and vomiting.   Endocrine: Negative for cold intolerance, heat intolerance,  "polydipsia, polyphagia and polyuria.   Genitourinary: Negative for bladder incontinence, decreased urine volume, difficulty urinating, dysuria, enuresis, flank pain, frequency, genital sores, hematuria and urgency.   Musculoskeletal: Positive for neck pain. Negative for arthralgias, back pain, gait problem, joint swelling, myalgias and neck stiffness.   Skin: Negative for color change, pallor, rash and wound.   Allergic/Immunologic: Negative for environmental allergies, food allergies and immunocompromised state.   Neurological: Positive for focal weakness, numbness and loss of balance. Negative for dizziness, vertigo, tremors, seizures, syncope, facial asymmetry, speech difficulty, weakness, light-headedness and headaches.   Hematological: Negative for adenopathy. Does not bruise/bleed easily.   Psychiatric/Behavioral: Negative for agitation, behavioral problems, confusion, decreased concentration, dysphoric mood, hallucinations, memory loss, self-injury, sleep disturbance and suicidal ideas. The patient is not nervous/anxious and is not hyperactive.        Objective   Blood pressure 110/70, temperature 96.6 °F (35.9 °C), height 172.7 cm (67.99\"), weight 102 kg (224 lb).  Body mass index is 34.07 kg/(m^2).    Physical Exam      Assessment/Plan     Independent Review of Radiographic Studies:      He has no new imaging for me to review.  His most recent MRI of the cervical spine demonstrates bony fusion of C6 and C7 with a large disc herniation at C7-T1 which is eccentric to the left side causing near-complete foraminal occlusion and compression of the exiting C8 nerve root.    Medical Decision Making:      This is a 67-year-old man with C8 radiculopathy.  He is a candidate for C7-T1 ACDF.    I informed him that he is at increased risk of complications because of his prior surgeries in his medical history.  Specifically, I am very concerned about a recurrent laryngeal nerve injury, and I indicated to him that I " expected him to have some dysfunction which may be permanent as a result of the operation.  He acknowledges the fact that he is at increased risk of procedural complications, and elects to proceed.  We will schedule him for an ACDF next week.    Elton was seen today for neck pain.    Diagnoses and all orders for this visit:    Cervical radiculopathy  -     gabapentin (NEURONTIN) 300 MG capsule; Take 1 capsule by mouth 3 (Three) Times a Day.  -     chlorhexidine (HIBICLENS) 4 % external liquid; Apply  topically Daily As Needed for Wound Care.        No Follow-up on file.           This document signed by FABBY Perales MD December 8, 2017 2:02 PM

## 2017-12-13 ENCOUNTER — APPOINTMENT (OUTPATIENT)
Dept: PREADMISSION TESTING | Facility: HOSPITAL | Age: 67
End: 2017-12-13

## 2017-12-13 VITALS — BODY MASS INDEX: 33.63 KG/M2 | WEIGHT: 227.07 LBS | HEIGHT: 69 IN

## 2017-12-13 DIAGNOSIS — E11.40 UNCONTROLLED TYPE 2 DIABETES MELLITUS WITH DIABETIC NEUROPATHY, UNSPECIFIED LONG TERM INSULIN USE STATUS: Chronic | ICD-10-CM

## 2017-12-13 DIAGNOSIS — E11.65 UNCONTROLLED TYPE 2 DIABETES MELLITUS WITH DIABETIC NEUROPATHY, UNSPECIFIED LONG TERM INSULIN USE STATUS: Chronic | ICD-10-CM

## 2017-12-13 DIAGNOSIS — R29.898 LEFT HAND WEAKNESS: ICD-10-CM

## 2017-12-13 DIAGNOSIS — M54.12 CERVICAL RADICULOPATHY: ICD-10-CM

## 2017-12-13 LAB
ANION GAP SERPL CALCULATED.3IONS-SCNC: 12 MMOL/L (ref 3–11)
BASOPHILS # BLD AUTO: 0.03 10*3/MM3 (ref 0–0.2)
BASOPHILS NFR BLD AUTO: 0.6 % (ref 0–1)
BUN BLD-MCNC: 31 MG/DL (ref 9–23)
BUN/CREAT SERPL: 22.1 (ref 7–25)
CALCIUM SPEC-SCNC: 9.3 MG/DL (ref 8.7–10.4)
CHLORIDE SERPL-SCNC: 101 MMOL/L (ref 99–109)
CO2 SERPL-SCNC: 22 MMOL/L (ref 20–31)
CREAT BLD-MCNC: 1.4 MG/DL (ref 0.6–1.3)
DEPRECATED RDW RBC AUTO: 47 FL (ref 37–54)
EOSINOPHIL # BLD AUTO: 0.15 10*3/MM3 (ref 0–0.3)
EOSINOPHIL NFR BLD AUTO: 3.2 % (ref 0–3)
ERYTHROCYTE [DISTWIDTH] IN BLOOD BY AUTOMATED COUNT: 13.5 % (ref 11.3–14.5)
GFR SERPL CREATININE-BSD FRML MDRD: 50 ML/MIN/1.73
GLUCOSE BLD-MCNC: 166 MG/DL (ref 70–100)
HBA1C MFR BLD: 8.2 % (ref 4.8–5.6)
HCT VFR BLD AUTO: 40.3 % (ref 38.9–50.9)
HGB BLD-MCNC: 13.2 G/DL (ref 13.1–17.5)
IMM GRANULOCYTES # BLD: 0.02 10*3/MM3 (ref 0–0.03)
IMM GRANULOCYTES NFR BLD: 0.4 % (ref 0–0.6)
LYMPHOCYTES # BLD AUTO: 1.01 10*3/MM3 (ref 0.6–4.8)
LYMPHOCYTES NFR BLD AUTO: 21.8 % (ref 24–44)
MCH RBC QN AUTO: 31.6 PG (ref 27–31)
MCHC RBC AUTO-ENTMCNC: 32.8 G/DL (ref 32–36)
MCV RBC AUTO: 96.4 FL (ref 80–99)
MONOCYTES # BLD AUTO: 0.57 10*3/MM3 (ref 0–1)
MONOCYTES NFR BLD AUTO: 12.3 % (ref 0–12)
MRSA DNA SPEC QL NAA+PROBE: POSITIVE
NEUTROPHILS # BLD AUTO: 2.86 10*3/MM3 (ref 1.5–8.3)
NEUTROPHILS NFR BLD AUTO: 61.7 % (ref 41–71)
PLATELET # BLD AUTO: 100 10*3/MM3 (ref 150–450)
PMV BLD AUTO: 12.2 FL (ref 6–12)
POTASSIUM BLD-SCNC: 5.2 MMOL/L (ref 3.5–5.5)
RBC # BLD AUTO: 4.18 10*6/MM3 (ref 4.2–5.76)
SODIUM BLD-SCNC: 135 MMOL/L (ref 132–146)
WBC NRBC COR # BLD: 4.64 10*3/MM3 (ref 3.5–10.8)

## 2017-12-13 PROCEDURE — 93010 ELECTROCARDIOGRAM REPORT: CPT | Performed by: INTERNAL MEDICINE

## 2017-12-14 ENCOUNTER — APPOINTMENT (OUTPATIENT)
Dept: GENERAL RADIOLOGY | Facility: HOSPITAL | Age: 67
End: 2017-12-14

## 2017-12-14 ENCOUNTER — HOSPITAL ENCOUNTER (INPATIENT)
Facility: HOSPITAL | Age: 67
LOS: 1 days | Discharge: HOME | End: 2017-12-15
Attending: NEUROLOGICAL SURGERY | Admitting: NEUROLOGICAL SURGERY

## 2017-12-14 ENCOUNTER — ANESTHESIA EVENT (OUTPATIENT)
Dept: PERIOP | Facility: HOSPITAL | Age: 67
End: 2017-12-14

## 2017-12-14 ENCOUNTER — ANESTHESIA (OUTPATIENT)
Dept: PERIOP | Facility: HOSPITAL | Age: 67
End: 2017-12-14

## 2017-12-14 DIAGNOSIS — M54.12 CERVICAL RADICULOPATHY: ICD-10-CM

## 2017-12-14 DIAGNOSIS — R29.898 LEFT HAND WEAKNESS: ICD-10-CM

## 2017-12-14 PROBLEM — M50.20 HERNIATED CERVICAL DISC: Status: ACTIVE | Noted: 2017-12-14

## 2017-12-14 LAB
GLUCOSE BLDC GLUCOMTR-MCNC: 76 MG/DL (ref 70–130)
GLUCOSE BLDC GLUCOMTR-MCNC: 84 MG/DL (ref 70–130)

## 2017-12-14 PROCEDURE — 76001 HC FLUORO GREATER THAN 1 HOUR: CPT

## 2017-12-14 PROCEDURE — 72040 X-RAY EXAM NECK SPINE 2-3 VW: CPT

## 2017-12-14 PROCEDURE — C1713 ANCHOR/SCREW BN/BN,TIS/BN: HCPCS | Performed by: NEUROLOGICAL SURGERY

## 2017-12-14 PROCEDURE — 0RG40A0 FUSION OF CERVICOTHORACIC VERTEBRAL JOINT WITH INTERBODY FUSION DEVICE, ANTERIOR APPROACH, ANTERIOR COLUMN, OPEN APPROACH: ICD-10-PCS | Performed by: NEUROLOGICAL SURGERY

## 2017-12-14 PROCEDURE — 25010000002 NEOSTIGMINE 10 MG/10ML SOLUTION: Performed by: NURSE ANESTHETIST, CERTIFIED REGISTERED

## 2017-12-14 PROCEDURE — 82962 GLUCOSE BLOOD TEST: CPT

## 2017-12-14 PROCEDURE — 25010000002 FENTANYL CITRATE (PF) 100 MCG/2ML SOLUTION: Performed by: NURSE ANESTHETIST, CERTIFIED REGISTERED

## 2017-12-14 PROCEDURE — C2617 STENT, NON-COR, TEM W/O DEL: HCPCS | Performed by: NEUROLOGICAL SURGERY

## 2017-12-14 PROCEDURE — 25010000002 ONDANSETRON PER 1 MG: Performed by: NURSE ANESTHETIST, CERTIFIED REGISTERED

## 2017-12-14 PROCEDURE — 0RB50ZZ EXCISION OF CERVICOTHORACIC VERTEBRAL DISC, OPEN APPROACH: ICD-10-PCS | Performed by: NEUROLOGICAL SURGERY

## 2017-12-14 PROCEDURE — 99024 POSTOP FOLLOW-UP VISIT: CPT | Performed by: NEUROLOGICAL SURGERY

## 2017-12-14 PROCEDURE — 25010000002 PROPOFOL 10 MG/ML EMULSION: Performed by: NURSE ANESTHETIST, CERTIFIED REGISTERED

## 2017-12-14 PROCEDURE — 76000 FLUOROSCOPY <1 HR PHYS/QHP: CPT

## 2017-12-14 PROCEDURE — 25010000002 VANCOMYCIN: Performed by: PHYSICIAN ASSISTANT

## 2017-12-14 DEVICE — SCRW SKYLINE VAR SD 14MM: Type: IMPLANTABLE DEVICE | Site: SPINE CERVICAL | Status: FUNCTIONAL

## 2017-12-14 DEVICE — DBX PUTTY, 5CC
Type: IMPLANTABLE DEVICE | Site: SPINE CERVICAL | Status: FUNCTIONAL
Brand: DBX®

## 2017-12-14 DEVICE — BENGAL SYSTEM LARGE IMPLANT 5MM, 7 DEGREES
Type: IMPLANTABLE DEVICE | Site: SPINE CERVICAL | Status: FUNCTIONAL
Brand: BENGAL

## 2017-12-14 DEVICE — PLT SKYLINE 1LEVEL 14MM: Type: IMPLANTABLE DEVICE | Site: SPINE CERVICAL | Status: FUNCTIONAL

## 2017-12-14 RX ORDER — MAGNESIUM HYDROXIDE 1200 MG/15ML
LIQUID ORAL AS NEEDED
Status: DISCONTINUED | OUTPATIENT
Start: 2017-12-14 | End: 2017-12-14 | Stop reason: HOSPADM

## 2017-12-14 RX ORDER — OXYCODONE AND ACETAMINOPHEN 7.5; 325 MG/1; MG/1
1 TABLET ORAL EVERY 4 HOURS PRN
Status: DISCONTINUED | OUTPATIENT
Start: 2017-12-14 | End: 2017-12-15 | Stop reason: HOSPADM

## 2017-12-14 RX ORDER — FAMOTIDINE 20 MG/1
20 TABLET, FILM COATED ORAL ONCE
Status: COMPLETED | OUTPATIENT
Start: 2017-12-14 | End: 2017-12-14

## 2017-12-14 RX ORDER — PROMETHAZINE HYDROCHLORIDE 12.5 MG/1
12.5 TABLET ORAL EVERY 6 HOURS PRN
Status: DISCONTINUED | OUTPATIENT
Start: 2017-12-14 | End: 2017-12-15 | Stop reason: HOSPADM

## 2017-12-14 RX ORDER — MONTELUKAST SODIUM 4 MG/1
TABLET, CHEWABLE ORAL
Qty: 360 TABLET | Refills: 1 | Status: SHIPPED | OUTPATIENT
Start: 2017-12-14 | End: 2018-06-18 | Stop reason: SDUPTHER

## 2017-12-14 RX ORDER — GABAPENTIN 300 MG/1
300 CAPSULE ORAL 3 TIMES DAILY
Status: DISCONTINUED | OUTPATIENT
Start: 2017-12-14 | End: 2017-12-15 | Stop reason: HOSPADM

## 2017-12-14 RX ORDER — DIAZEPAM 5 MG/ML
5 INJECTION, SOLUTION INTRAMUSCULAR; INTRAVENOUS EVERY 4 HOURS PRN
Status: DISCONTINUED | OUTPATIENT
Start: 2017-12-14 | End: 2017-12-15 | Stop reason: HOSPADM

## 2017-12-14 RX ORDER — PROMETHAZINE HYDROCHLORIDE 25 MG/ML
6.25 INJECTION, SOLUTION INTRAMUSCULAR; INTRAVENOUS ONCE AS NEEDED
Status: DISCONTINUED | OUTPATIENT
Start: 2017-12-14 | End: 2017-12-14 | Stop reason: HOSPADM

## 2017-12-14 RX ORDER — OXYCODONE HYDROCHLORIDE AND ACETAMINOPHEN 5; 325 MG/1; MG/1
1 TABLET ORAL ONCE AS NEEDED
Status: DISCONTINUED | OUTPATIENT
Start: 2017-12-14 | End: 2017-12-14 | Stop reason: HOSPADM

## 2017-12-14 RX ORDER — PANTOPRAZOLE SODIUM 40 MG/1
40 TABLET, DELAYED RELEASE ORAL EVERY MORNING
Status: DISCONTINUED | OUTPATIENT
Start: 2017-12-15 | End: 2017-12-15 | Stop reason: HOSPADM

## 2017-12-14 RX ORDER — PROMETHAZINE HYDROCHLORIDE 25 MG/1
25 SUPPOSITORY RECTAL ONCE AS NEEDED
Status: DISCONTINUED | OUTPATIENT
Start: 2017-12-14 | End: 2017-12-14 | Stop reason: HOSPADM

## 2017-12-14 RX ORDER — ONDANSETRON 2 MG/ML
4 INJECTION INTRAMUSCULAR; INTRAVENOUS ONCE AS NEEDED
Status: DISCONTINUED | OUTPATIENT
Start: 2017-12-14 | End: 2017-12-14 | Stop reason: HOSPADM

## 2017-12-14 RX ORDER — NALOXONE HCL 0.4 MG/ML
0.4 VIAL (ML) INJECTION
Status: DISCONTINUED | OUTPATIENT
Start: 2017-12-14 | End: 2017-12-15 | Stop reason: HOSPADM

## 2017-12-14 RX ORDER — SODIUM CHLORIDE 9 MG/ML
100 INJECTION, SOLUTION INTRAVENOUS CONTINUOUS
Status: DISCONTINUED | OUTPATIENT
Start: 2017-12-14 | End: 2017-12-15 | Stop reason: HOSPADM

## 2017-12-14 RX ORDER — SODIUM CHLORIDE 0.9 % (FLUSH) 0.9 %
1-10 SYRINGE (ML) INJECTION AS NEEDED
Status: DISCONTINUED | OUTPATIENT
Start: 2017-12-14 | End: 2017-12-15 | Stop reason: HOSPADM

## 2017-12-14 RX ORDER — HYDROMORPHONE HYDROCHLORIDE 1 MG/ML
0.5 INJECTION, SOLUTION INTRAMUSCULAR; INTRAVENOUS; SUBCUTANEOUS
Status: DISCONTINUED | OUTPATIENT
Start: 2017-12-14 | End: 2017-12-14 | Stop reason: HOSPADM

## 2017-12-14 RX ORDER — FENTANYL CITRATE 50 UG/ML
50 INJECTION, SOLUTION INTRAMUSCULAR; INTRAVENOUS
Status: DISCONTINUED | OUTPATIENT
Start: 2017-12-14 | End: 2017-12-14 | Stop reason: HOSPADM

## 2017-12-14 RX ORDER — IPRATROPIUM BROMIDE AND ALBUTEROL SULFATE 2.5; .5 MG/3ML; MG/3ML
3 SOLUTION RESPIRATORY (INHALATION) ONCE AS NEEDED
Status: DISCONTINUED | OUTPATIENT
Start: 2017-12-14 | End: 2017-12-14 | Stop reason: HOSPADM

## 2017-12-14 RX ORDER — ATORVASTATIN CALCIUM 10 MG/1
10 TABLET, FILM COATED ORAL DAILY
Status: DISCONTINUED | OUTPATIENT
Start: 2017-12-14 | End: 2017-12-15 | Stop reason: HOSPADM

## 2017-12-14 RX ORDER — VECURONIUM BROMIDE 1 MG/ML
INJECTION, POWDER, LYOPHILIZED, FOR SOLUTION INTRAVENOUS AS NEEDED
Status: DISCONTINUED | OUTPATIENT
Start: 2017-12-14 | End: 2017-12-14 | Stop reason: SURG

## 2017-12-14 RX ORDER — SERTRALINE HYDROCHLORIDE 100 MG/1
100 TABLET, FILM COATED ORAL NIGHTLY
Status: DISCONTINUED | OUTPATIENT
Start: 2017-12-14 | End: 2017-12-15 | Stop reason: HOSPADM

## 2017-12-14 RX ORDER — GLYCOPYRROLATE 0.2 MG/ML
INJECTION INTRAMUSCULAR; INTRAVENOUS AS NEEDED
Status: DISCONTINUED | OUTPATIENT
Start: 2017-12-14 | End: 2017-12-14 | Stop reason: SURG

## 2017-12-14 RX ORDER — TOPIRAMATE 25 MG/1
25 TABLET ORAL NIGHTLY
Status: DISCONTINUED | OUTPATIENT
Start: 2017-12-14 | End: 2017-12-15 | Stop reason: HOSPADM

## 2017-12-14 RX ORDER — SODIUM CHLORIDE 0.9 % (FLUSH) 0.9 %
1-10 SYRINGE (ML) INJECTION AS NEEDED
Status: DISCONTINUED | OUTPATIENT
Start: 2017-12-14 | End: 2017-12-14 | Stop reason: HOSPADM

## 2017-12-14 RX ORDER — ACETAMINOPHEN 325 MG/1
650 TABLET ORAL ONCE AS NEEDED
Status: DISCONTINUED | OUTPATIENT
Start: 2017-12-14 | End: 2017-12-14 | Stop reason: HOSPADM

## 2017-12-14 RX ORDER — ATENOLOL 50 MG/1
50 TABLET ORAL
Status: DISCONTINUED | OUTPATIENT
Start: 2017-12-15 | End: 2017-12-15 | Stop reason: HOSPADM

## 2017-12-14 RX ORDER — FENTANYL CITRATE 50 UG/ML
INJECTION, SOLUTION INTRAMUSCULAR; INTRAVENOUS AS NEEDED
Status: DISCONTINUED | OUTPATIENT
Start: 2017-12-14 | End: 2017-12-14 | Stop reason: SURG

## 2017-12-14 RX ORDER — ONDANSETRON 2 MG/ML
4 INJECTION INTRAMUSCULAR; INTRAVENOUS EVERY 6 HOURS PRN
Status: DISCONTINUED | OUTPATIENT
Start: 2017-12-14 | End: 2017-12-15 | Stop reason: HOSPADM

## 2017-12-14 RX ORDER — SODIUM CHLORIDE, SODIUM LACTATE, POTASSIUM CHLORIDE, CALCIUM CHLORIDE 600; 310; 30; 20 MG/100ML; MG/100ML; MG/100ML; MG/100ML
100 INJECTION, SOLUTION INTRAVENOUS CONTINUOUS
Status: DISCONTINUED | OUTPATIENT
Start: 2017-12-14 | End: 2017-12-14

## 2017-12-14 RX ORDER — LIDOCAINE HYDROCHLORIDE 10 MG/ML
INJECTION, SOLUTION EPIDURAL; INFILTRATION; INTRACAUDAL; PERINEURAL AS NEEDED
Status: DISCONTINUED | OUTPATIENT
Start: 2017-12-14 | End: 2017-12-14 | Stop reason: SURG

## 2017-12-14 RX ORDER — DEXTROSE MONOHYDRATE 25 G/50ML
25 INJECTION, SOLUTION INTRAVENOUS
Status: DISCONTINUED | OUTPATIENT
Start: 2017-12-14 | End: 2017-12-15 | Stop reason: HOSPADM

## 2017-12-14 RX ORDER — LIDOCAINE HYDROCHLORIDE 10 MG/ML
0.2 INJECTION, SOLUTION EPIDURAL; INFILTRATION; INTRACAUDAL; PERINEURAL ONCE
Status: COMPLETED | OUTPATIENT
Start: 2017-12-14 | End: 2017-12-14

## 2017-12-14 RX ORDER — ACETAMINOPHEN 650 MG/1
650 SUPPOSITORY RECTAL ONCE AS NEEDED
Status: DISCONTINUED | OUTPATIENT
Start: 2017-12-14 | End: 2017-12-14 | Stop reason: HOSPADM

## 2017-12-14 RX ORDER — PROPOFOL 10 MG/ML
VIAL (ML) INTRAVENOUS AS NEEDED
Status: DISCONTINUED | OUTPATIENT
Start: 2017-12-14 | End: 2017-12-14 | Stop reason: SURG

## 2017-12-14 RX ORDER — LIDOCAINE HYDROCHLORIDE AND EPINEPHRINE 5; 5 MG/ML; UG/ML
INJECTION, SOLUTION INFILTRATION; PERINEURAL AS NEEDED
Status: DISCONTINUED | OUTPATIENT
Start: 2017-12-14 | End: 2017-12-14 | Stop reason: HOSPADM

## 2017-12-14 RX ORDER — VANCOMYCIN/0.9 % SOD CHLORIDE 1.5G/250ML
15 PLASTIC BAG, INJECTION (ML) INTRAVENOUS EVERY 12 HOURS
Status: COMPLETED | OUTPATIENT
Start: 2017-12-15 | End: 2017-12-15

## 2017-12-14 RX ORDER — ONDANSETRON 2 MG/ML
INJECTION INTRAMUSCULAR; INTRAVENOUS AS NEEDED
Status: DISCONTINUED | OUTPATIENT
Start: 2017-12-14 | End: 2017-12-14 | Stop reason: SURG

## 2017-12-14 RX ORDER — ROCURONIUM BROMIDE 10 MG/ML
INJECTION, SOLUTION INTRAVENOUS AS NEEDED
Status: DISCONTINUED | OUTPATIENT
Start: 2017-12-14 | End: 2017-12-14 | Stop reason: SURG

## 2017-12-14 RX ORDER — NEOSTIGMINE METHYLSULFATE 1 MG/ML
INJECTION, SOLUTION INTRAVENOUS AS NEEDED
Status: DISCONTINUED | OUTPATIENT
Start: 2017-12-14 | End: 2017-12-14 | Stop reason: SURG

## 2017-12-14 RX ORDER — PROMETHAZINE HYDROCHLORIDE 25 MG/1
25 TABLET ORAL ONCE AS NEEDED
Status: DISCONTINUED | OUTPATIENT
Start: 2017-12-14 | End: 2017-12-14 | Stop reason: HOSPADM

## 2017-12-14 RX ORDER — NICOTINE POLACRILEX 4 MG
15 LOZENGE BUCCAL
Status: DISCONTINUED | OUTPATIENT
Start: 2017-12-14 | End: 2017-12-15 | Stop reason: HOSPADM

## 2017-12-14 RX ADMIN — FENTANYL CITRATE 25 MCG: 50 INJECTION, SOLUTION INTRAMUSCULAR; INTRAVENOUS at 15:23

## 2017-12-14 RX ADMIN — NEOSTIGMINE METHYLSULFATE 3 MG: 1 INJECTION, SOLUTION INTRAVENOUS at 15:22

## 2017-12-14 RX ADMIN — GLYCOPYRROLATE 0.4 MG: 0.2 INJECTION, SOLUTION INTRAMUSCULAR; INTRAVENOUS at 15:22

## 2017-12-14 RX ADMIN — SODIUM CHLORIDE, POTASSIUM CHLORIDE, SODIUM LACTATE AND CALCIUM CHLORIDE 100 ML/HR: 600; 310; 30; 20 INJECTION, SOLUTION INTRAVENOUS at 10:27

## 2017-12-14 RX ADMIN — VECURONIUM BROMIDE 2 MG: 1 INJECTION, POWDER, LYOPHILIZED, FOR SOLUTION INTRAVENOUS at 13:56

## 2017-12-14 RX ADMIN — LIDOCAINE HYDROCHLORIDE 0.2 ML: 10 INJECTION, SOLUTION EPIDURAL; INFILTRATION; INTRACAUDAL; PERINEURAL at 10:50

## 2017-12-14 RX ADMIN — ROCURONIUM BROMIDE 40 MG: 10 INJECTION INTRAVENOUS at 12:54

## 2017-12-14 RX ADMIN — FENTANYL CITRATE 25 MCG: 50 INJECTION, SOLUTION INTRAMUSCULAR; INTRAVENOUS at 15:51

## 2017-12-14 RX ADMIN — ATORVASTATIN CALCIUM 10 MG: 10 TABLET, FILM COATED ORAL at 18:00

## 2017-12-14 RX ADMIN — PROPOFOL 200 MG: 10 INJECTION, EMULSION INTRAVENOUS at 12:54

## 2017-12-14 RX ADMIN — ONDANSETRON 4 MG: 2 INJECTION INTRAMUSCULAR; INTRAVENOUS at 15:16

## 2017-12-14 RX ADMIN — FENTANYL CITRATE 50 MCG: 50 INJECTION, SOLUTION INTRAMUSCULAR; INTRAVENOUS at 12:54

## 2017-12-14 RX ADMIN — LIDOCAINE HYDROCHLORIDE 50 MG: 10 INJECTION, SOLUTION EPIDURAL; INFILTRATION; INTRACAUDAL; PERINEURAL at 12:54

## 2017-12-14 RX ADMIN — SODIUM CHLORIDE 100 ML/HR: 9 INJECTION, SOLUTION INTRAVENOUS at 21:35

## 2017-12-14 RX ADMIN — OXYCODONE HYDROCHLORIDE AND ACETAMINOPHEN 1 TABLET: 7.5; 325 TABLET ORAL at 22:13

## 2017-12-14 RX ADMIN — TOPIRAMATE 25 MG: 25 TABLET, FILM COATED ORAL at 21:33

## 2017-12-14 RX ADMIN — ROCURONIUM BROMIDE 10 MG: 10 INJECTION INTRAVENOUS at 13:16

## 2017-12-14 RX ADMIN — VANCOMYCIN HYDROCHLORIDE 1500 MG: 1 INJECTION, POWDER, LYOPHILIZED, FOR SOLUTION INTRAVENOUS at 12:35

## 2017-12-14 RX ADMIN — FAMOTIDINE 20 MG: 20 TABLET, FILM COATED ORAL at 10:50

## 2017-12-14 RX ADMIN — OXYCODONE HYDROCHLORIDE AND ACETAMINOPHEN 1 TABLET: 7.5; 325 TABLET ORAL at 18:00

## 2017-12-14 RX ADMIN — EPHEDRINE SULFATE 10 MG: 50 INJECTION INTRAMUSCULAR; INTRAVENOUS; SUBCUTANEOUS at 14:37

## 2017-12-14 RX ADMIN — GABAPENTIN 300 MG: 300 CAPSULE ORAL at 21:33

## 2017-12-14 RX ADMIN — EPHEDRINE SULFATE 10 MG: 50 INJECTION INTRAMUSCULAR; INTRAVENOUS; SUBCUTANEOUS at 14:33

## 2017-12-14 NOTE — ANESTHESIA PROCEDURE NOTES
Airway  Urgency: elective    Date/Time: 12/14/2017 12:54 PM  End Time:12/14/2017 12:57 PM  Airway not difficult    General Information and Staff    Patient location during procedure: OR  CRNA: [facility]    Indications and Patient Condition  Indications for airway management: airway protection    Preoxygenated: yes  MILS maintained throughout  Mask difficulty assessment: 2 - vent by mask + OA or adjuvant +/- NMBA    Final Airway Details  Final airway type: endotracheal airway      Successful airway: ETT  Cuffed: yes   Successful intubation technique: video laryngoscopy  Endotracheal tube insertion site: oral  Blade: Amber  Blade size: #4  ETT size: 7.5 mm  Placement verified by: chest auscultation and capnometry   Measured from: lips  ETT to lips (cm): 23  Number of attempts at approach: 1    Additional Comments  Negative epigastric sounds, Breath sound equal bilaterally with symmetric chest rise and fall    Grade I view with glidescope. Neck neutral throughout induction process

## 2017-12-14 NOTE — BRIEF OP NOTE
CERVICAL DISCECTOMY ANTERIOR WITH FUSION  Progress Note    Elton Funez  12/14/2017    Pre-op Diagnosis:   Cervical radiculopathy [M54.12]  Left hand weakness [R29.898]       Post-Op Diagnosis Codes:     * Cervical radiculopathy [M54.12]     * Left hand weakness [R29.898]    Procedure/CPT® Codes:  SC ARTHRODESIS ANT INTERBODY MIN DISCECTOMY, CERVICAL BELOW C2 [83872]    Procedure(s):  CERVICAL DISCECTOMY ANTERIOR FUSION WITH INSTRUMENTATION C7/T1    Surgeon(s):  Gigi Perales MD    Anesthesia: General    Staff:   Circulator: Coby Juan RN  Scrub Person: Sav Langley; Alexander Sanders  Vendor Representative: Justus Tobar  Nursing Assistant: Araceli Zapata  Assistant: Nadja Bolanos PA-C  Orientee: Sadia De Oliveira RN; Beverly Lopez    Estimated Blood Loss: minimal    Urine Voided: * No values recorded between 12/14/2017 12:48 PM and 12/14/2017  3:42 PM *    Specimens:                None      Drains:   Drain/Device Site 12/14/17 cervical spine collapsible closed device 1 (Active)           Findings: Solid bony fusion of the C6 and 7 vertebral bodies.  Status post successful ACDF of C7-T1, with large disc osteophyte complex removed from the left lateral recess.  Unusually difficult procedure due to the patient's body habitus, and spinal level which was instrumented.    Complications: None      Gigi Perales MD     Date: 12/14/2017  Time: 3:42 PM

## 2017-12-14 NOTE — OP NOTE
Pre-operative diagnosis: Cervical radiculopathy  Post-operative diagnosis: Same    Code 22 justification: This procedure took approximately twice as long as normal.  This was primarily due to the fact that the patient's body habitus presented visualization on x-ray the spinal level PLIF level about 66.  Additionally, the C7-T1 interspace enters the chest cavity, and I had to work around the pleura, and the exposure was extremely tedious due to the fact the patient had a prior ACDF at C6 7.  There was an extensive amount of scar tissue.    Procedure in detail: The patient was identified in the preoperative holding area and brought to the operating suite where He was transferred to the operating table. he then underwent the uneventful induction of general, endotracheal anesthesia. Venodynes were placed by the nursing staff. His head was gently extended and a shoulder roll was placed transversely under the shoulders.    The patient had a short neck and a large barrel chest with high riding shoulders.  Using lateral fluoroscopy, I was unable to visualize clearly anything below the C5 6 interspace.  Ultimately, I chose to reopen the patient's prior incision which is just above the clavicular head on the right side.      The operative field was then shaved, prepped, and draped in the usual sterile fashion. A time out was performed and intravenous antibiotics were administered.  The patient has a history of MRSA infection, so vancomycin was used. A curvilinear incision was made over the patient's prior incision just above the clavicular head.  Self-retaining retractors were placed, and sequentially advanced.  There was an extensive amount of scar tissue, and sharp dissection was required to develop the surgical plane.  A small hole was made along the lateral aspect of the jugular vein.  Copious bleeding was encountered, however this was easily controlled with thrombin-soaked Gelfoam.  I was ultimately able to identify the  hole and a pursestring suture with 6-0 Prolene was used to secure the hole and achieved hemostasis.  Dissection was then carried out down through the scar tissue plane which was very dense and adherent.  Bipolar cautery was sparingly used in an effort to protect the recurrent laryngeal nerve.  Blunt dissection was then used to sweep the midline structures off of the longus coli.  The Cloward retractor was then used to hold the esophagus across to the contralateral side.  The Bovie was then used to open the longus coli.    Because I could not visualize anything lower than the C5 6 disc space, I had to extend my exposure cephalad to identify what I thought was C5 6 disc space.  Lateral fluoroscopy confirmed that I had identified the C5 6 interspace.  I then counted down past the C6 7 fusion mass, and ultimately to the C7-T1 interspace.    I used the curettes to explore the C6 7 interspace, and I did confirm solid bony fusion of been achieved here.  The Trimline retractor blades were then placed at the C7-T1 interspace.  A third Trimline blade was used to hold the pleura out of the operative field.  Central pins were then placed in the vertebral bodies at C7 and T1.    The high-speed drill was used to resect the anterior osteophytes off of the inferior aspect of the C7 vertebral body.  A discectomy was then carried out using the high-speed drill, pituitary rongeurs, and curettes.  The discectomy was extremely tedious due to the deep operative cord or, and due to the angle of the C7-T1 disc space.  I ultimately was able to drill through the posterior osteophytes.  The PLL was opened with the sharp Oak.  I resected the PLL and the posterior osteophytes using Kerrison rongeurs.  A large disc osteophyte complex was encountered along the left lateral recess and this was resected using a Kerrison.    After trialing, a 5 mm DePuy titanium interbody device was selected.  The endplates were meticulously prepared using the  Claudia curettes.  The interbody device was packed with DBM.  The device was inserted into the disc space and direct visualization.  Fluoroscopic visualization of the disc space was not possible.  I then affixed an anterior plate with a total of 4 3.5 x 16 mm screws.  PA fluoroscopy demonstrated satisfactory position of the implanted hardware, and confirmed the operative levels.   The wound was copiously irrigated with bacitracin saline.  Several points of oozing from the operative quarter were identified and coagulated using judicious bipolar cautery.  A 7 flat HOLLEY drain was tunneled out through separate stab incision and left in the subcutaneous platysmal plane.  There was no usable platysma to reapproximate due to the extensive scar tissue.  Therefore, the skin was closed in a single layer with 2-0 Vicryl sutures in an interrupted, buried fashion.    Sponge, instrument, and needle counts were all correct at the conclusion of the case.  I performed this procedure with the assistance of Nadja Bolanos, physician assistant.

## 2017-12-14 NOTE — ANESTHESIA PREPROCEDURE EVALUATION
Anesthesia Evaluation     Patient summary reviewed and Nursing notes reviewed   no history of anesthetic complications:  NPO Solid Status: > 8 hours  NPO Liquid Status: > 8 hours     Airway   Mallampati: I  TM distance: <3 FB  Neck ROM: full  possible difficult intubation  Dental - normal exam     Pulmonary - normal exam    breath sounds clear to auscultation  (+) a smoker Former, sleep apnea,   Cardiovascular - normal exam  Exercise tolerance: good (4-7 METS)    ECG reviewed  Rhythm: regular  Rate: normal    (+) hypertension, hyperlipidemia  (-) angina, WARREN      Neuro/Psych  (+) headaches, numbness (left hand from neck; neuropathy bilateral lower extremities from DM), psychiatric history Anxiety and Depression,    GI/Hepatic/Renal/Endo    (+) obesity,  GERD well controlled, diabetes mellitus,   (-) hepatitis, liver disease, no renal disease, hypothyroidism    Musculoskeletal     (+) arthralgias, back pain, neck pain,   Abdominal   (+) obese,    Substance History - negative use     OB/GYN          Other   (+) arthritis       Other Comment: Platelets 100,000 Dr. Perales aware                                  Anesthesia Plan    ASA 3     general   (Labs/studies reviewed  glidescope)  intravenous induction   Anesthetic plan and risks discussed with patient.    Plan discussed with CRNA.

## 2017-12-14 NOTE — ANESTHESIA POSTPROCEDURE EVALUATION
Patient: Elton Funez    Procedure Summary     Date Anesthesia Start Anesthesia Stop Room / Location    12/14/17 1248  [facility] / [facility]       Procedure Diagnosis Surgeon Provider    CERVICAL DISCECTOMY ANTERIOR FUSION WITH INSTRUMENTATION C7/T1 (N/A Spine Cervical) Cervical radiculopathy; Left hand weakness  (Cervical radiculopathy [M54.12]; Left hand weakness [R29.898]) MD Clarisse Danielson MD          Anesthesia Type: general  Last vitals  BP   141/64 (12/14/17 1550)   Temp   97.6 °F (36.4 °C) (12/14/17 1550)   Pulse   74 (12/14/17 1550)   Resp   16 (12/14/17 1550)     SpO2   91 % (12/14/17 1550)     Post Anesthesia Care and Evaluation    Patient location during evaluation: PACU  Patient participation: complete - patient cannot participate  Level of consciousness: responsive to physical stimuli  Pain score: 0  Pain management: adequate  Airway patency: patent  Anesthetic complications: No anesthetic complications    Cardiovascular status: stable  Respiratory status: acceptable, nasal cannula, oral airway and spontaneous ventilation  Hydration status: stable    Comments: Pt transferred to PACU with O2. Vital signs stable. Report to PACU RN and care accepted.

## 2017-12-15 VITALS
RESPIRATION RATE: 16 BRPM | TEMPERATURE: 97 F | OXYGEN SATURATION: 94 % | HEART RATE: 72 BPM | DIASTOLIC BLOOD PRESSURE: 72 MMHG | SYSTOLIC BLOOD PRESSURE: 123 MMHG

## 2017-12-15 LAB
BASOPHILS # BLD AUTO: 0.01 10*3/MM3 (ref 0–0.2)
BASOPHILS NFR BLD AUTO: 0.1 % (ref 0–1)
DEPRECATED RDW RBC AUTO: 48.9 FL (ref 37–54)
EOSINOPHIL # BLD AUTO: 0.2 10*3/MM3 (ref 0–0.3)
EOSINOPHIL NFR BLD AUTO: 2 % (ref 0–3)
ERYTHROCYTE [DISTWIDTH] IN BLOOD BY AUTOMATED COUNT: 13.7 % (ref 11.3–14.5)
GLUCOSE BLDC GLUCOMTR-MCNC: 165 MG/DL (ref 70–130)
GLUCOSE BLDC GLUCOMTR-MCNC: 203 MG/DL (ref 70–130)
HCT VFR BLD AUTO: 40.9 % (ref 38.9–50.9)
HGB BLD-MCNC: 13 G/DL (ref 13.1–17.5)
IMM GRANULOCYTES # BLD: 0.04 10*3/MM3 (ref 0–0.03)
IMM GRANULOCYTES NFR BLD: 0.4 % (ref 0–0.6)
LYMPHOCYTES # BLD AUTO: 0.73 10*3/MM3 (ref 0.6–4.8)
LYMPHOCYTES NFR BLD AUTO: 7.3 % (ref 24–44)
MCH RBC QN AUTO: 31.2 PG (ref 27–31)
MCHC RBC AUTO-ENTMCNC: 31.8 G/DL (ref 32–36)
MCV RBC AUTO: 98.1 FL (ref 80–99)
MONOCYTES # BLD AUTO: 0.95 10*3/MM3 (ref 0–1)
MONOCYTES NFR BLD AUTO: 9.5 % (ref 0–12)
NEUTROPHILS # BLD AUTO: 8.07 10*3/MM3 (ref 1.5–8.3)
NEUTROPHILS NFR BLD AUTO: 80.7 % (ref 41–71)
PLATELET # BLD AUTO: 108 10*3/MM3 (ref 150–450)
PMV BLD AUTO: 12.2 FL (ref 6–12)
RBC # BLD AUTO: 4.17 10*6/MM3 (ref 4.2–5.76)
WBC NRBC COR # BLD: 10 10*3/MM3 (ref 3.5–10.8)

## 2017-12-15 PROCEDURE — 25010000002 VANCOMYCIN 10 G RECONSTITUTED SOLUTION: Performed by: NEUROLOGICAL SURGERY

## 2017-12-15 PROCEDURE — 63710000001 INSULIN REGULAR HUMAN PER 5 UNITS: Performed by: NEUROLOGICAL SURGERY

## 2017-12-15 PROCEDURE — 82962 GLUCOSE BLOOD TEST: CPT

## 2017-12-15 PROCEDURE — 85025 COMPLETE CBC W/AUTO DIFF WBC: CPT | Performed by: NEUROLOGICAL SURGERY

## 2017-12-15 RX ORDER — CEPHALEXIN 500 MG/1
500 CAPSULE ORAL 2 TIMES DAILY
Qty: 20 CAPSULE | Refills: 0 | Status: SHIPPED | OUTPATIENT
Start: 2017-12-15 | End: 2018-01-12

## 2017-12-15 RX ORDER — DIAZEPAM 5 MG/1
5 TABLET ORAL EVERY 8 HOURS PRN
Qty: 45 TABLET | Refills: 0 | Status: SHIPPED | OUTPATIENT
Start: 2017-12-15 | End: 2017-12-29

## 2017-12-15 RX ORDER — OXYCODONE AND ACETAMINOPHEN 7.5; 325 MG/1; MG/1
1-2 TABLET ORAL EVERY 4 HOURS PRN
Qty: 45 TABLET | Refills: 0 | Status: SHIPPED | OUTPATIENT
Start: 2017-12-15 | End: 2018-01-05

## 2017-12-15 RX ADMIN — OXYCODONE HYDROCHLORIDE AND ACETAMINOPHEN 1 TABLET: 7.5; 325 TABLET ORAL at 04:05

## 2017-12-15 RX ADMIN — ATENOLOL 50 MG: 50 TABLET ORAL at 08:20

## 2017-12-15 RX ADMIN — GABAPENTIN 300 MG: 300 CAPSULE ORAL at 08:21

## 2017-12-15 RX ADMIN — PANTOPRAZOLE SODIUM 40 MG: 40 TABLET, DELAYED RELEASE ORAL at 06:23

## 2017-12-15 RX ADMIN — HYDROCHLOROTHIAZIDE: 25 TABLET ORAL at 08:19

## 2017-12-15 RX ADMIN — INSULIN HUMAN 4 UNITS: 100 INJECTION, SOLUTION PARENTERAL at 06:23

## 2017-12-15 RX ADMIN — ATORVASTATIN CALCIUM 10 MG: 10 TABLET, FILM COATED ORAL at 08:21

## 2017-12-15 RX ADMIN — INSULIN HUMAN 2 UNITS: 100 INJECTION, SOLUTION PARENTERAL at 01:19

## 2017-12-15 RX ADMIN — VANCOMYCIN HYDROCHLORIDE 1500 MG: 10 INJECTION, POWDER, LYOPHILIZED, FOR SOLUTION INTRAVENOUS at 01:18

## 2017-12-15 RX ADMIN — SODIUM CHLORIDE 100 ML/HR: 9 INJECTION, SOLUTION INTRAVENOUS at 06:05

## 2017-12-15 NOTE — PROGRESS NOTES
Discharge Planning Assessment  [facility]     Patient Name: Elton Funez  MRN: [id number]  Today's Date: 12/15/2017    Admit Date: 12/14/2017          Discharge Needs Assessment       12/15/17 0739    Living Environment    Lives With spouse    Living Arrangements house    Home Accessibility no concerns   Pt states his home is handicap accessible    Stair Railings at Home none    Type of Financial/Environmental Concern --   Pt states he has insurance to cover his medication and he can afford  the co pays    Transportation Available car;family or friend will provide    Living Environment Comment --   Pt lives with spouse in [facility].    Living Environment    Provides Primary Care For no one    Primary Care Provided By spouse/significant other    Quality Of Family Relationships supportive    Able to Return to Prior Living Arrangements yes    Discharge Needs Assessment    Concerns To Be Addressed no discharge needs identified    Readmission Within The Last 30 Days no previous admission in last 30 days    Equipment Currently Used at Home raised toilet;grab bar;bipap/ cpap;glucometer    Equipment Needed After Discharge none    Discharge Facility/Level Of Care Needs --   Pt plans on going home at discharge.    Discharge Contact Information if Applicable Pooja Funez  (spouse)  - [phone]  - [phone]            Discharge Plan       12/15/17 0746    Case Management/Social Work Plan    Plan Home    Patient/Family In Agreement With Plan yes    Additional Comments CM met with pt in room, pt was independent with ADL's and mobility prior to this admission. Pt still works parttime. Pt states he has insurance to cover his medication and can afford the co pays, he gets his medication filled at [facility].        Discharge Placement     No information found                Demographic Summary       12/15/17 0739    Referral Information    Admission Type inpatient    Arrived From home or  self-care    Referral Source admission list    Reason For Consult discharge planning    Record Reviewed clinical discipline documentation;history and physical    Contact Information    Permission Granted to Share Information With     Primary Care Physician Information    Name Tc Ramirez            Functional Status       12/15/17 0739    Functional Status Prior    Ambulation 0-->independent    Transferring 0-->independent    Toileting 0-->independent    Bathing 0-->independent    Dressing 0-->independent    Eating 0-->independent    Communication 0-->understands/communicates without difficulty    Swallowing 0-->swallows foods/liquids without difficulty    Prior Functional Level Comment independent    IADL    Medications independent    Meal Preparation independent    Housekeeping independent    Laundry independent    Shopping independent    Oral Care independent    IADL Comments independent    Activity Tolerance    Usual Activity Tolerance excellent            Psychosocial     None            Abuse/Neglect     None            Legal     None            Substance Abuse     None            Patient Forms     None          Anna Tanner RN

## 2017-12-15 NOTE — PLAN OF CARE
Problem: Patient Care Overview (Adult)  Goal: Plan of Care Review    12/15/17 0726   Coping/Psychosocial Response Interventions   Plan Of Care Reviewed With patient   Patient Care Overview   Progress improving   Outcome Evaluation   Outcome Summary/Follow up Plan Patient is alert and oriented x4, VSS,voiding spontaneously without difficulty, c/o pain well managed with PRN PO pain medication, pt c/o numbness to left fingers, neck dressing is CDI, HOLLEY total output 20ml. Pt ambulated approx. 300ft with stand by assist x1 gait belt. Dr. Perales following patient       Goal: Discharge Needs Assessment    12/15/17 0726   Discharge Needs Assessment   Concerns To Be Addressed no discharge needs identified   Readmission Within The Last 30 Days no previous admission in last 30 days

## 2017-12-15 NOTE — PLAN OF CARE
Problem: Perioperative Period (Adult)  Goal: Signs and Symptoms of Listed Potential Problems Will be Absent or Manageable (Perioperative Period)  Outcome: Outcome(s) achieved Date Met:  12/15/17    12/15/17 0946   Perioperative Period   Problems Present (Perioperative Period) none

## 2017-12-15 NOTE — DISCHARGE SUMMARY
DISCHARGE SUMMARY    Date of Admission: 12/14/2017    Date of Discharge:  12/15/2017    Discharge Diagnosis: C8 radiculopathy    Procedures Performed:  Procedure(s):  CERVICAL DISCECTOMY ANTERIOR FUSION WITH INSTRUMENTATION C7/T1    Presenting Problem/History of Present Illness  Cervical radiculopathy [M54.12]  Left hand weakness [R29.898]  Herniated cervical disc [M50.20]     Hospital Course  This is a 67-year-old man who has a remote history of a C6 7 ACDF.  He presented earlier this year with a subacute left sided C8 radiculopathy.  He underwent a posterior foraminotomy which resulted in the resolution of his radicular symptoms, however this was complicated by a postoperative wound infection.  His risk factors were obesity and uncontrolled diabetes.  He underwent a washout, and he fortunately made a complete recovery from this episode, however his radicular symptoms have returned.  Follow-up MRI demonstrated a recurrent disc herniation at C7-T1, and he was scheduled for an ACDF at this level.  Surgery was done on 12/15/17. He did well postoperatively and his pain was well controlled.  He was discharged to home on POD 1. Due to his poorly controlled diabetes, we will have him prophylactically take keflex for 10 days postop and see him sooner than the usual 2 weeks for follow up. He will monitor his incision and blood sugar levels closely.    Discharge Medications   Elton Funez   Home Medication Instructions TRISTEN:[id number]    Printed on:12/15/17 2976   Medication Information                      aspirin 81 MG tablet  Take 1 tablet by mouth Daily. Last dose 2-19-17 may resume when okay with Dr Perales             atenolol (TENORMIN) 50 MG tablet  TAKE ONE TABLET BY MOUTH DAILY             BYDUREON 2 MG pen-injector  INJECT 2 MG UNDER THE SKIN ONE TIME PER WEEK             cephalexin (KEFLEX) 500 MG capsule  Take 1 capsule by mouth 2 (Two) Times a Day.             Coenzyme Q10 (CO Q 10 PO)  Take 300 mg by mouth  Daily.             colestipol (COLESTID) 1 g tablet  TAKE 2 TABLETS TWICE A DAY             diazePAM (VALIUM) 5 MG tablet  Take 1 tablet by mouth Every 8 (Eight) Hours As Needed for Anxiety.             Flaxseed, Linseed, (FLAXSEED OIL) 1200 MG capsule  Take 1 capsule by mouth 2 (Two) Times a Day. 1300 mg             fluticasone (FLONASE) 50 MCG/ACT nasal spray  into each nostril As Needed for Allergies. Use 2 sprays in each nostril once daily as needed.             gabapentin (NEURONTIN) 300 MG capsule  Take 1 capsule by mouth 3 (Three) Times a Day.             gemfibrozil (LOPID) 600 MG tablet  TAKE 1 TABLET TWICE A DAY             Insulin Regular Human, Conc, (HumuLIN R) 500 UNIT/ML solution pen-injector CONCENTRATED injection  90 units twice a day before meals, titrate up as indicated             lisinopril-hydrochlorothiazide (PRINZIDE,ZESTORETIC) 10-12.5 MG per tablet  Take 1 tablet by mouth Daily.             meloxicam (MOBIC) 15 MG tablet  TAKE ONE TABLET BY MOUTH DAILY AS NEEDED             Multiple Vitamins-Minerals (MULTIVITAMIN PO)  Take 1 tablet by mouth Daily.             omeprazole (priLOSEC) 20 MG capsule  TAKE ONE CAPSULE BY MOUTH DAILY AS NEEDED             oxyCODONE-acetaminophen (PERCOCET) 7.5-325 MG per tablet  Take 1-2 tablets by mouth Every 4 (Four) Hours As Needed (Pain).             Probiotic Product (PROBIOTIC ADVANCED PO)  Take 1 capsule by mouth Daily.             sertraline (ZOLOFT) 100 MG tablet  TAKE ONE TABLET BY MOUTH DAILY             simvastatin (ZOCOR) 40 MG tablet  TAKE 1 TABLET DAILY AT BEDTIME             topiramate (TOPAMAX) 25 MG tablet  Take 25 mg by mouth Every Night.               Disposition:  Discharge to home today  1) No driving for 2 weeks and no longer taking narcotics.   2) Return to school / work after discharge  3) May shower, no tub bathing or swimming  4) Do not lift / push / pull more than 20 lbs  May remove dressing in 48hrs   Keflex 500mg BID  [id number]    Nadja Bolanos PA-C  12/15/17  1:15 PM

## 2017-12-15 NOTE — PROGRESS NOTES
NEUROSURGERY PROGRESS NOTE    Interval History:   Mr. Funez is POD 1 from a cervical fusion for C8 radiculopathy. He has done well overnight and pain is controlled with oral medication.      Vital Signs  Blood pressure 123/72, pulse 72, temperature 97 °F (36.1 °C), temperature source Temporal Artery , resp. rate 16, SpO2 94 %.    Physical Exam:  Pt is awake, alert and oriented. UE strength and sensation intact. Incision has mild drainage on the dressing, but is currently dry. HOLLEY bulb has scant amount of serous drainage.       Results Review:      Results from last 7 days  Lab Units 12/15/17  0523 12/13/17  1330   WBC 10*3/mm3 10.00 4.64   HEMOGLOBIN g/dL 13.0* 13.2   HEMATOCRIT % 40.9 40.3   PLATELETS 10*3/mm3 108* 100*       Results from last 7 days  Lab Units 12/13/17  1402   SODIUM mmol/L 135   POTASSIUM mmol/L 5.2   CHLORIDE mmol/L 101   CO2 mmol/L 22.0   BUN mg/dL 31*   CREATININE mg/dL 1.40*   GLUCOSE mg/dL 166*   CALCIUM mg/dL 9.3       I/O last 3 completed shifts:  In: 2253 [I.V.:1753; IV Piggyback:500]  Out: 245 [Urine:175; Other:20; Blood:50]      Postop xrays were reviewed by Dr. Perales. Patient will not need a cervical collar at discharge.     ASSESSMENT/PLAN:   Discharge to home today  Follow up  10 days with neurosurgical PA  Rx for keflex at discharge for wound prophylaxis  Urge good blood sugar control     Nadja Bolanos PA-C  12/15/17  9:06 AM

## 2017-12-29 ENCOUNTER — OFFICE VISIT (OUTPATIENT)
Dept: NEUROSURGERY | Facility: CLINIC | Age: 67
End: 2017-12-29

## 2017-12-29 VITALS
BODY MASS INDEX: 33.5 KG/M2 | WEIGHT: 226.2 LBS | DIASTOLIC BLOOD PRESSURE: 72 MMHG | TEMPERATURE: 97.1 F | SYSTOLIC BLOOD PRESSURE: 122 MMHG | HEIGHT: 69 IN

## 2017-12-29 DIAGNOSIS — E11.40 UNCONTROLLED TYPE 2 DIABETES MELLITUS WITH DIABETIC NEUROPATHY, UNSPECIFIED LONG TERM INSULIN USE STATUS: Primary | Chronic | ICD-10-CM

## 2017-12-29 DIAGNOSIS — M54.12 CERVICAL RADICULOPATHY: ICD-10-CM

## 2017-12-29 DIAGNOSIS — E11.65 UNCONTROLLED TYPE 2 DIABETES MELLITUS WITH DIABETIC NEUROPATHY, UNSPECIFIED LONG TERM INSULIN USE STATUS: Primary | Chronic | ICD-10-CM

## 2017-12-29 PROCEDURE — 99024 POSTOP FOLLOW-UP VISIT: CPT | Performed by: PHYSICIAN ASSISTANT

## 2018-01-05 ENCOUNTER — OFFICE VISIT (OUTPATIENT)
Dept: NEUROSURGERY | Facility: CLINIC | Age: 68
End: 2018-01-05

## 2018-01-05 VITALS
BODY MASS INDEX: 33.24 KG/M2 | DIASTOLIC BLOOD PRESSURE: 70 MMHG | WEIGHT: 224.4 LBS | HEIGHT: 69 IN | TEMPERATURE: 96.8 F | SYSTOLIC BLOOD PRESSURE: 104 MMHG

## 2018-01-05 DIAGNOSIS — M50.20 HERNIATED CERVICAL DISC: ICD-10-CM

## 2018-01-05 DIAGNOSIS — E11.40 UNCONTROLLED TYPE 2 DIABETES MELLITUS WITH DIABETIC NEUROPATHY, UNSPECIFIED LONG TERM INSULIN USE STATUS: Primary | Chronic | ICD-10-CM

## 2018-01-05 DIAGNOSIS — E11.65 UNCONTROLLED TYPE 2 DIABETES MELLITUS WITH DIABETIC NEUROPATHY, UNSPECIFIED LONG TERM INSULIN USE STATUS: Primary | Chronic | ICD-10-CM

## 2018-01-05 PROCEDURE — 99024 POSTOP FOLLOW-UP VISIT: CPT | Performed by: PHYSICIAN ASSISTANT

## 2018-01-05 RX ORDER — CYCLOBENZAPRINE HCL 10 MG
10 TABLET ORAL NIGHTLY PRN
COMMUNITY
End: 2020-02-06

## 2018-01-05 NOTE — PROGRESS NOTES
Subjective     Chief Complaint:  Elton Funez is a 67 y.o. male here today for follow up after ACDF on 12/15/17    History of Present Illness  This is a 67-year-old man who has a remote history of a C6 7 ACDF.  He developed new arm pain from a C8 radiculopathy and underwent a posterior foraminotomy this year, with resolution of his pain. However, he did develop a postop wound infection that required I&D and IV antibiotics.   Earlier this fall, his radicular symptoms returned.  Follow-up MRI demonstrated a recurrent disc herniation at C7-T1, and he was scheduled for an ACDF at this level.  Surgery was done on 12/15/17. He did well postoperatively and his pain was well controlled.  He was discharged to home on POD 1. Due to his poorly controlled diabetes, he was started on prophylactic keflex for 10 days postop and he was scheduled for a one-week follow up visit to keep a close eye on his incision. At his first follow up visit last week, his incision was healing as expected.   His hand numbness has improved and he needs pain medication very rarely at this point.  He has one more day left of the Keflex  His incision has been slightly red, but is not swollen or draining.  He denies fever, chills, or increased pain.     The following portions of the patient's history were reviewed and updated as appropriate: allergies, current medications, past family history, past medical history, past social history, past surgical history and problem list.    Family history:   Family History   Problem Relation Age of Onset   • Diabetes Father    • Heart disease Father    • Diabetes Paternal Grandmother    • Hearing loss Mother        Social history:   Social History     Social History   • Marital status:      Spouse name: N/A   • Number of children: N/A   • Years of education: N/A     Occupational History   • Not on file.     Social History Main Topics   • Smoking status: Former Smoker     Packs/day: 1.00     Years: 5.00      Types: Cigarettes     Quit date: 1976   • Smokeless tobacco: Never Used   • Alcohol use Yes      Comment: Socially   • Drug use: No   • Sexual activity: Defer     Other Topics Concern   • Not on file     Social History Narrative       Review of Systems   Constitutional: Negative for activity change, appetite change, chills, diaphoresis, fatigue, fever and unexpected weight change.   HENT: Negative for congestion, dental problem, drooling, ear discharge, ear pain, facial swelling, hearing loss, mouth sores, nosebleeds, postnasal drip, rhinorrhea, sinus pressure, sneezing, sore throat, tinnitus, trouble swallowing and voice change.    Eyes: Negative for photophobia, pain, discharge, redness, itching and visual disturbance.   Respiratory: Negative for apnea, cough, choking, chest tightness, shortness of breath, wheezing and stridor.    Cardiovascular: Negative for chest pain, palpitations and leg swelling.   Gastrointestinal: Negative for abdominal distention, abdominal pain, anal bleeding, blood in stool, constipation, diarrhea, nausea, rectal pain and vomiting.   Endocrine: Negative for cold intolerance, heat intolerance, polydipsia, polyphagia and polyuria.   Genitourinary: Negative for decreased urine volume, difficulty urinating, dysuria, enuresis, flank pain, frequency, genital sores, hematuria and urgency.   Musculoskeletal: Positive for neck pain. Negative for arthralgias, back pain, gait problem, joint swelling, myalgias and neck stiffness.   Skin: Negative for color change, pallor, rash and wound.   Allergic/Immunologic: Negative for environmental allergies, food allergies and immunocompromised state.   Neurological: Positive for numbness. Negative for dizziness, tremors, seizures, syncope, facial asymmetry, speech difficulty, weakness, light-headedness and headaches.   Hematological: Negative for adenopathy. Does not bruise/bleed easily.   Psychiatric/Behavioral: Negative for agitation, behavioral problems,  "confusion, decreased concentration, dysphoric mood, hallucinations, self-injury, sleep disturbance and suicidal ideas. The patient is not nervous/anxious and is not hyperactive.    All other systems reviewed and are negative.      Objective   Blood pressure 104/70, temperature 96.8 °F (36 °C), temperature source Temporal Artery , height 174 cm (68.5\"), weight 102 kg (224 lb 6.4 oz).  Body mass index is 33.62 kg/(m^2).    Physical Exam  Constitutional: He is oriented to person, place, and time. He appears well-developed and well-nourished.   HENT:   Head: Normocephalic and atraumatic.   Eyes: EOM are normal. Pupils are equal, round, and reactive to light.   Neck:   Neck ROM is only mildly limited. Cervical incision is clean and intact. The skin edges appear somewhat dry and erythematous. There is no drainage or swelling  Musculoskeletal: Normal range of motion.   Neurological: He is alert and oriented to person, place, and time. He has normal reflexes.   Skin: Skin is warm and dry.    Assessment/Plan   Mr. Funez is doing well in terms of his arm pain. His incision looks a bit reddened, almost windburned, today, but there is no evidence of swelling, tenderness, or drainage.  We will continue with weekly wound checks for the time being. He will finish his current course of antibiotics. He is urged to check and do his best to control his blood sugars.     Elton was seen today for post-op.    Diagnoses and all orders for this visit:    Uncontrolled type 2 diabetes mellitus with diabetic neuropathy, unspecified long term insulin use status    Herniated cervical disc      Return in about 1 week (around 1/12/2018).        "

## 2018-01-11 PROBLEM — T14.8XXA DRAINAGE FROM WOUND: Status: RESOLVED | Noted: 2017-03-29 | Resolved: 2018-01-11

## 2018-01-11 PROBLEM — L24.A9 DRAINAGE FROM WOUND: Status: RESOLVED | Noted: 2017-03-29 | Resolved: 2018-01-11

## 2018-01-11 PROBLEM — L02.11 NECK ABSCESS: Status: RESOLVED | Noted: 2017-03-16 | Resolved: 2018-01-11

## 2018-01-12 ENCOUNTER — OFFICE VISIT (OUTPATIENT)
Dept: NEUROSURGERY | Facility: CLINIC | Age: 68
End: 2018-01-12

## 2018-01-12 VITALS
WEIGHT: 226 LBS | SYSTOLIC BLOOD PRESSURE: 112 MMHG | DIASTOLIC BLOOD PRESSURE: 62 MMHG | HEIGHT: 69 IN | BODY MASS INDEX: 33.47 KG/M2 | TEMPERATURE: 98.6 F

## 2018-01-12 DIAGNOSIS — R29.898 LEFT HAND WEAKNESS: Primary | ICD-10-CM

## 2018-01-12 DIAGNOSIS — M50.20 HERNIATED CERVICAL DISC: ICD-10-CM

## 2018-01-12 PROCEDURE — 99024 POSTOP FOLLOW-UP VISIT: CPT | Performed by: PHYSICIAN ASSISTANT

## 2018-01-12 NOTE — PROGRESS NOTES
Subjective     Chief Complaint:  Elton Funez is a 67 y.o. male here for follow up and wound check after ACDF on 12/15/17    History of Present Illness  This is a 67-year-old man who has a remote history of a C6 7 ACDF.  He developed new arm pain from a C8 radiculopathy and underwent a posterior foraminotomy this year, with resolution of his pain. However, he did develop a postop wound infection that required I&D and IV antibiotics.   Earlier this fall, his radicular symptoms returned.  Follow-up MRI demonstrated a recurrent disc herniation at C7-T1, and he was scheduled for an ACDF at this level.  Surgery was done on 12/15/17. He did well postoperatively and his pain was well controlled.  He was discharged to home on POD 1. Due to his poorly controlled diabetes, he was started on prophylactic keflex for 10 days postop and he was scheduled for a one-week follow up visit to keep a close eye on his incision. This is his third postop visit.   He has had good resolution of the shooting pain in his left arm. He still has a little residual numbness and tingling in the two smallest fingers on the left. He has muscle atrophy on that side from long disuse. He notes some pain along the medial border of his left scapula, which has been present for some time.     The following portions of the patient's history were reviewed and updated as appropriate: allergies, current medications, past family history, past medical history, past social history, past surgical history and problem list.    Family history:   Family History   Problem Relation Age of Onset   • Diabetes Father    • Heart disease Father    • Diabetes Paternal Grandmother    • Hearing loss Mother        Social history:   Social History     Social History   • Marital status:      Spouse name: N/A   • Number of children: N/A   • Years of education: N/A     Occupational History   • Not on file.     Social History Main Topics   • Smoking status: Former Smoker      Packs/day: 1.00     Years: 5.00     Types: Cigarettes     Quit date: 1976   • Smokeless tobacco: Never Used   • Alcohol use Yes      Comment: Socially   • Drug use: No   • Sexual activity: Defer     Other Topics Concern   • Not on file     Social History Narrative       Review of Systems   Constitutional: Negative for activity change, appetite change, chills, diaphoresis, fatigue, fever and unexpected weight change.   HENT: Negative for congestion, dental problem, drooling, ear discharge, ear pain, facial swelling, hearing loss, mouth sores, nosebleeds, postnasal drip, rhinorrhea, sinus pressure, sneezing, sore throat, tinnitus, trouble swallowing and voice change.    Eyes: Negative for photophobia, pain, discharge, redness, itching and visual disturbance.   Respiratory: Negative for apnea, cough, choking, chest tightness, shortness of breath, wheezing and stridor.    Cardiovascular: Negative for chest pain, palpitations and leg swelling.   Gastrointestinal: Negative for abdominal distention, abdominal pain, anal bleeding, blood in stool, constipation, diarrhea, nausea, rectal pain and vomiting.   Endocrine: Negative for cold intolerance, heat intolerance, polydipsia, polyphagia and polyuria.   Genitourinary: Negative for decreased urine volume, difficulty urinating, dysuria, enuresis, flank pain, frequency, genital sores, hematuria and urgency.   Musculoskeletal: Negative for arthralgias, back pain, gait problem, joint swelling, myalgias, neck pain and neck stiffness.   Skin: Negative for color change, pallor, rash and wound.   Allergic/Immunologic: Negative for environmental allergies, food allergies and immunocompromised state.   Neurological: Negative for dizziness, tremors, seizures, syncope, facial asymmetry, speech difficulty, weakness, light-headedness, numbness and headaches.   Hematological: Negative for adenopathy. Does not bruise/bleed easily.   Psychiatric/Behavioral: Negative for agitation, behavioral  "problems, confusion, decreased concentration, dysphoric mood, hallucinations, self-injury, sleep disturbance and suicidal ideas. The patient is not nervous/anxious and is not hyperactive.        Objective   Blood pressure 112/62, temperature 98.6 °F (37 °C), temperature source Temporal Artery , height 174 cm (68.5\"), weight 103 kg (226 lb).  Body mass index is 33.86 kg/(m^2).    Physical Exam  Constitutional: He is oriented to person, place, and time. He appears well-developed and well-nourished.   HENT:   Head: Normocephalic and atraumatic.   Eyes: EOM are normal. Pupils are equal, round, and reactive to light.   Neck:   Neck ROM is only mildly limited. Cervical incision is clean and intact. The skin edges appear somewhat dry and erythematous. There is no drainage or swelling  Musculoskeletal: Normal range of motion.   Neurological: He is alert and oriented to person, place, and time. He has normal reflexes.   Skin: Skin is warm and dry.    Assessment/Plan   Mr. Funez's incision is healing well.  We will refer him for some PT over the next couple of weeks for hand strengthening and for modalities for the left scapular pain.  We will plan to see him back on as-needed basis going forward.     Elton was seen today for post-op.    Diagnoses and all orders for this visit:    Left hand weakness  -     Ambulatory Referral to Physical Therapy Evaluate and treat    Herniated cervical disc  -     Ambulatory Referral to Physical Therapy Evaluate and treat      Return if symptoms worsen or fail to improve.          "

## 2018-01-15 RX ORDER — LISINOPRIL AND HYDROCHLOROTHIAZIDE 12.5; 1 MG/1; MG/1
TABLET ORAL
Qty: 90 TABLET | Refills: 0 | Status: SHIPPED | OUTPATIENT
Start: 2018-01-15 | End: 2018-04-28 | Stop reason: SDUPTHER

## 2018-01-15 RX ORDER — OMEPRAZOLE 20 MG/1
20 CAPSULE, DELAYED RELEASE ORAL DAILY
Qty: 90 CAPSULE | Refills: 1 | Status: SHIPPED | OUTPATIENT
Start: 2018-01-15 | End: 2018-08-06 | Stop reason: SDUPTHER

## 2018-01-18 RX ORDER — ATENOLOL 50 MG/1
TABLET ORAL
Qty: 90 TABLET | Refills: 1 | Status: SHIPPED | OUTPATIENT
Start: 2018-01-18 | End: 2018-07-28 | Stop reason: SDUPTHER

## 2018-01-24 ENCOUNTER — TELEPHONE (OUTPATIENT)
Dept: NEUROSURGERY | Facility: CLINIC | Age: 68
End: 2018-01-24

## 2018-01-24 ENCOUNTER — OFFICE VISIT (OUTPATIENT)
Dept: NEUROSURGERY | Facility: CLINIC | Age: 68
End: 2018-01-24

## 2018-01-24 VITALS — HEIGHT: 69 IN | WEIGHT: 226.2 LBS | TEMPERATURE: 97.6 F | BODY MASS INDEX: 33.5 KG/M2

## 2018-01-24 DIAGNOSIS — M54.12 CERVICAL RADICULOPATHY: Primary | ICD-10-CM

## 2018-01-24 DIAGNOSIS — E11.40 UNCONTROLLED TYPE 2 DIABETES MELLITUS WITH DIABETIC NEUROPATHY, UNSPECIFIED LONG TERM INSULIN USE STATUS: Chronic | ICD-10-CM

## 2018-01-24 DIAGNOSIS — E11.65 UNCONTROLLED TYPE 2 DIABETES MELLITUS WITH DIABETIC NEUROPATHY, UNSPECIFIED LONG TERM INSULIN USE STATUS: Chronic | ICD-10-CM

## 2018-01-24 PROCEDURE — 99024 POSTOP FOLLOW-UP VISIT: CPT | Performed by: PHYSICIAN ASSISTANT

## 2018-01-24 RX ORDER — SULFAMETHOXAZOLE AND TRIMETHOPRIM 800; 160 MG/1; MG/1
1 TABLET ORAL 2 TIMES DAILY
Qty: 30 TABLET | Refills: 0 | Status: SHIPPED | OUTPATIENT
Start: 2018-01-24 | End: 2018-05-31

## 2018-01-24 NOTE — PROGRESS NOTES
"Subjective     Chief Complaint:  Elton Funez is a 67 y.o. male here for follow up/wound check after ACDF at C7T1 done on 12/15/17    History of Present Illness  Mr. Funez is a 67-year-old man who has a remote history of a C6 7 ACDF.  He presented earlier this year with a subacute left sided C8 radiculopathy.  He underwent a posterior foraminotomy which resulted in the resolution of his radicular symptoms, however this was complicated by a postoperative wound infection.  His risk factors were obesity and uncontrolled diabetes.  He underwent a washout, and he fortunately made a complete recovery from this episode, however his radicular symptoms have returned.  Follow-up MRI demonstrated a recurrent disc herniation at C7-T1, and he was scheduled for an ACDF at this level.  Surgery was done on 12/15/17. He did well postoperatively and his pain was well controlled.  He was discharged to home on POD 1. Due to his poorly controlled diabetes, he was started on prophylactic keflex for 10 days postop and he was scheduled for a one-week follow up visit to keep a close eye on his incision. This had been healing well, and he was last seen on 1/12/18. At that point, his wound was healing well, and he had good pain relief. He was referred to PT for some arm strengthening, but we did feel that weekly visits were still required.    Over the last couple of days, he's noticed some swelling at the lateral end of his incision, and today, it \"broke open\" and released some purulent fluid.  He was asked to come in for a wound check.  His incision is not draining currently, but is more red. He denies fever or chills; he does not have increased neck or arm pain, or stiffness with neck movement.     The following portions of the patient's history were reviewed and updated as appropriate: allergies, current medications, past family history, past medical history, past social history, past surgical history and problem list.    Family " history:   Family History   Problem Relation Age of Onset   • Diabetes Father    • Heart disease Father    • Diabetes Paternal Grandmother    • Hearing loss Mother        Social history:   Social History     Social History   • Marital status:      Spouse name: N/A   • Number of children: N/A   • Years of education: N/A     Occupational History   • Not on file.     Social History Main Topics   • Smoking status: Former Smoker     Packs/day: 1.00     Years: 5.00     Types: Cigarettes     Quit date: 1976   • Smokeless tobacco: Never Used   • Alcohol use Yes      Comment: Socially   • Drug use: No   • Sexual activity: Defer     Other Topics Concern   • Not on file     Social History Narrative       Review of Systems   Constitutional: Negative for activity change, appetite change, chills, diaphoresis, fatigue, fever and unexpected weight change.   HENT: Negative for congestion, dental problem, drooling, ear discharge, ear pain, facial swelling, hearing loss, mouth sores, nosebleeds, postnasal drip, rhinorrhea, sinus pressure, sneezing, sore throat, tinnitus, trouble swallowing and voice change.    Eyes: Negative for photophobia, pain, discharge, redness, itching and visual disturbance.   Respiratory: Negative for apnea, cough, choking, chest tightness, shortness of breath, wheezing and stridor.    Cardiovascular: Negative for chest pain, palpitations and leg swelling.   Gastrointestinal: Negative for abdominal distention, abdominal pain, anal bleeding, blood in stool, constipation, diarrhea, nausea, rectal pain and vomiting.   Endocrine: Negative for cold intolerance, heat intolerance, polydipsia, polyphagia and polyuria.   Genitourinary: Negative for decreased urine volume, difficulty urinating, dysuria, enuresis, flank pain, frequency, genital sores, hematuria and urgency.   Musculoskeletal: Negative for arthralgias, back pain, gait problem, joint swelling, myalgias, neck pain and neck stiffness.   Skin: Positive  "for wound. Negative for color change, pallor and rash.   Allergic/Immunologic: Negative for environmental allergies, food allergies and immunocompromised state.   Neurological: Negative for dizziness, tremors, seizures, syncope, facial asymmetry, speech difficulty, weakness, light-headedness, numbness and headaches.   Hematological: Negative for adenopathy. Does not bruise/bleed easily.   Psychiatric/Behavioral: Negative for agitation, behavioral problems, confusion, decreased concentration, dysphoric mood, hallucinations, self-injury, sleep disturbance and suicidal ideas. The patient is not nervous/anxious and is not hyperactive.        Objective   Temperature 97.6 °F (36.4 °C), temperature source Temporal Artery , height 174 cm (68.5\"), weight 103 kg (226 lb 3.2 oz).  Body mass index is 33.89 kg/(m^2).    Physical Exam  The lateral 3cm of his incision is erythematous and there is a 1cm area in the middle of this that is more swollen and raised than the rest.  With some effort, a very small amount of bloody/purulent material was able to be expressed. The wound was cleaned well with alcohol and betadyne in preparation for a wound culture, but no more drainage was able to be expressed at all.  The medial half of the incision is clean, dry, intact, and very well healed.    Assessment/Plan   We will start Mr. Funez on bactrim.  No culture is able to be obtained today, but he had MSSA after his posterior cervical surgery in May of last year. We will have him follow up in one week, sooner should he begin draining more or become symptomatic.     Diagnoses and all orders for this visit:    Cervical radiculopathy    Uncontrolled type 2 diabetes mellitus with diabetic neuropathy, unspecified long term insulin use status    Other orders  -     sulfamethoxazole-trimethoprim (BACTRIM DS,SEPTRA DS) 800-160 MG per tablet; Take 1 tablet by mouth 2 (Two) Times a Day.      Return in about 1 week (around 1/31/2018).          "

## 2018-01-24 NOTE — TELEPHONE ENCOUNTER
Provider:  Diaz     Surgery:  ACDF C7-T1  Surgery Date:  12/14/17  Last visit:  01/12/18  Next visit:     JOSE:         Reason for call: pt left VM stating he had a knot on his incison that busted last night and a small amount of pus came out.    he wanted to know if Alma Bolanos PA-C wanted him to start back on antibiotic.      -I called pt to get some more info- size, fever .    Awaiting return call.

## 2018-01-29 ENCOUNTER — OFFICE VISIT (OUTPATIENT)
Dept: INTERNAL MEDICINE | Facility: CLINIC | Age: 68
End: 2018-01-29

## 2018-01-29 VITALS
BODY MASS INDEX: 33.33 KG/M2 | DIASTOLIC BLOOD PRESSURE: 74 MMHG | WEIGHT: 225 LBS | HEART RATE: 60 BPM | RESPIRATION RATE: 16 BRPM | SYSTOLIC BLOOD PRESSURE: 118 MMHG | TEMPERATURE: 97.5 F | HEIGHT: 69 IN

## 2018-01-29 DIAGNOSIS — Z12.5 PROSTATE CANCER SCREENING: ICD-10-CM

## 2018-01-29 DIAGNOSIS — Z00.00 PHYSICAL EXAM: Primary | ICD-10-CM

## 2018-01-29 DIAGNOSIS — N40.0 BENIGN PROSTATIC HYPERPLASIA WITHOUT LOWER URINARY TRACT SYMPTOMS: ICD-10-CM

## 2018-01-29 DIAGNOSIS — I10 ESSENTIAL HYPERTENSION: ICD-10-CM

## 2018-01-29 DIAGNOSIS — E11.42 DIABETIC POLYNEUROPATHY ASSOCIATED WITH TYPE 2 DIABETES MELLITUS (HCC): ICD-10-CM

## 2018-01-29 DIAGNOSIS — E78.2 MIXED HYPERLIPIDEMIA: ICD-10-CM

## 2018-01-29 DIAGNOSIS — K21.9 GASTROESOPHAGEAL REFLUX DISEASE WITHOUT ESOPHAGITIS: ICD-10-CM

## 2018-01-29 LAB
A/C: NORMAL
ALBUMIN SERPL-MCNC: 4.4 G/DL (ref 3.2–4.8)
ALBUMIN/GLOB SERPL: 1.5 G/DL (ref 1.5–2.5)
ALP SERPL-CCNC: 92 U/L (ref 25–100)
ALT SERPL W P-5'-P-CCNC: 36 U/L (ref 7–40)
ANION GAP SERPL CALCULATED.3IONS-SCNC: 7 MMOL/L (ref 3–11)
ARTICHOKE IGE QN: 47 MG/DL (ref 0–130)
AST SERPL-CCNC: 40 U/L (ref 0–33)
BILIRUB SERPL-MCNC: 0.6 MG/DL (ref 0.3–1.2)
BUN BLD-MCNC: 30 MG/DL (ref 9–23)
BUN/CREAT SERPL: 16.7 (ref 7–25)
CALCIUM SPEC-SCNC: 9.7 MG/DL (ref 8.7–10.4)
CHLORIDE SERPL-SCNC: 106 MMOL/L (ref 99–109)
CHOLEST SERPL-MCNC: 96 MG/DL (ref 0–200)
CO2 SERPL-SCNC: 24 MMOL/L (ref 20–31)
CREAT BLD-MCNC: 1.8 MG/DL (ref 0.6–1.3)
EXPIRATION DATE: NORMAL
GFR SERPL CREATININE-BSD FRML MDRD: 38 ML/MIN/1.73
GLOBULIN UR ELPH-MCNC: 3 GM/DL
GLUCOSE BLD-MCNC: 130 MG/DL (ref 70–100)
HDLC SERPL-MCNC: 27 MG/DL (ref 40–60)
Lab: NORMAL
POC CREATININE URINE: 200
POC MICROALBUMIN URINE: 30
POTASSIUM BLD-SCNC: 4.8 MMOL/L (ref 3.5–5.5)
PROT SERPL-MCNC: 7.4 G/DL (ref 5.7–8.2)
PSA SERPL-MCNC: 1.06 NG/ML (ref 0–4)
SODIUM BLD-SCNC: 137 MMOL/L (ref 132–146)
TRIGL SERPL-MCNC: 223 MG/DL (ref 0–150)

## 2018-01-29 PROCEDURE — 36415 COLL VENOUS BLD VENIPUNCTURE: CPT | Performed by: INTERNAL MEDICINE

## 2018-01-29 PROCEDURE — 99397 PER PM REEVAL EST PAT 65+ YR: CPT | Performed by: INTERNAL MEDICINE

## 2018-01-29 PROCEDURE — 99212 OFFICE O/P EST SF 10 MIN: CPT | Performed by: INTERNAL MEDICINE

## 2018-01-29 PROCEDURE — G0103 PSA SCREENING: HCPCS | Performed by: INTERNAL MEDICINE

## 2018-01-29 PROCEDURE — 80061 LIPID PANEL: CPT | Performed by: INTERNAL MEDICINE

## 2018-01-29 PROCEDURE — 82044 UR ALBUMIN SEMIQUANTITATIVE: CPT | Performed by: INTERNAL MEDICINE

## 2018-01-29 PROCEDURE — 80053 COMPREHEN METABOLIC PANEL: CPT | Performed by: INTERNAL MEDICINE

## 2018-01-29 NOTE — PROGRESS NOTES
Chief Complaint   Patient presents with   • Annual Exam       History of Present Illness      The patient presents for an established patient physical examination and health maintenance visit. The patient has had a colonoscopy.    The patient presents for a follow-up related to hypertension. The patient reports that he has had no headaches, chest pain, dyspnea, edema, syncope, blurred vision or palpitations. He states that he is taking his medication as prescribed. He is not having medication side effects. He checks his blood pressures at home. The home readings are normal.    The patient presents for a follow-up related to hyperlipidemia. He is following a low fat diet. He reports that he is exercising. He is taking simvastatin. The patient is taking his medication as prescribed. He reports no medication side effects, including muscle cramps, abdominal pain, headaches or weakness. He denies orthopnea, paroxysmal nocturnal dyspnea or dyspnea on exertion.    The patient is on omeprazole for his gastroesophageal reflux. The medication is taken on a regular basis and gives complete relief of the symptoms. He reports dysphagia but he denies abdominal pain, belching, diarrhea, early satiety, heartburn, hoarseness, nausea, odynophagia, rectal bleeding, vomiting or weight loss. The GERD has no known aggravating factors.    The patient presents for a follow-up related to Type 2 Diabetes Mellitus and reports that he checks his blood sugars at home. His sugars are checked daily. The average sugars are in the 100-150 range. He denies hypoglycemic symptoms. He reports numbness, tingling and pain in his feet. The neuropathy is to the mid-calfs. The patient takes his medication as prescribed. He is taking insulin. His injection sites are rotated appropriately. The patient does check his feet daily at home.    Review of Systems    GENERAL- Denies Fever, Chills, Sweats, Fatigue, Weakness or Malaise.    HEENT- Denies Eye Pain, Eye  Drainage, Nasal Discharge, Sore Throat, Ear Pain, Ear Drainage, Decreased Vision, Sinus Pain, Nasal Congestion, Decreased Hearing, Visual Disturbance, Diplopia or Tinnitus.    NECK- Denies Decreased Range of Motion, Stiffness, Thyroid Nodules, Enlarged Lymph Nodes or Goiter.    CARDIOVASCULAR- Denies Claudication.    PULMONARY- Denies Wheezing, Sputum Production, Cough, Hemoptysis or Pleuritic Chest Pain.    GASTROINTESTINAL- Denies Constipation.    GENITOURINARY- Denies Penile Discharge, Erectile Dysfunction, Testicular Mass, Testicular Pain, Hernia, Nocturia, Decreased Urine Stream, Incomplete Voiding, Urinary Frequency, Urinary Urgency, Dysuria or Hematuria.    ENDOCRINE- Denies Cold Intolerance, Depression, Hair Loss, Heat Intolerance, Memory Loss, Polydypsia, Polyphagia, Polyuria, Sleep Disturbance or Weight Gain.    NEUROLOGIC- Denies Seizures, Confusion or Excessive Daytime Sleepiness.    MUSCULOSKELETAL- Denies Joint Pain, Joint Stiffness, Decreased Range of Motion, Joint Swelling or Erythema of Joints.    INTEGUMENTARY- Denies Rash, Lumps, Sores, Itching, Dryness, Color Change, Changes in Hair, Brittle Nails, Discolored Nails, Thickened Nails, Growths or Alopecia.    Medications      Current Outpatient Prescriptions:   •  aspirin 81 MG tablet, Take 1 tablet by mouth Daily. Last dose 2-19-17 may resume when okay with Dr Perales (Patient taking differently: Take 81 mg by mouth Daily. LD 12/9/17 - no stents), Disp: , Rfl:   •  atenolol (TENORMIN) 50 MG tablet, TAKE ONE TABLET BY MOUTH DAILY, Disp: 90 tablet, Rfl: 1  •  BYDUREON 2 MG pen-injector, INJECT 2 MG UNDER THE SKIN ONE TIME PER WEEK (Patient taking differently: INJECT 2 MG UNDER THE SKIN ONE TIME PER WEEK- tuesday), Disp: 4 each, Rfl: 1  •  Coenzyme Q10 (CO Q 10 PO), Take 300 mg by mouth Daily., Disp: , Rfl:   •  colestipol (COLESTID) 1 g tablet, TAKE 2 TABLETS TWICE A DAY, Disp: 360 tablet, Rfl: 1  •  cyclobenzaprine (FLEXERIL) 10 MG tablet, Take 10 mg  by mouth At Night As Needed., Disp: , Rfl:   •  Flaxseed, Linseed, (FLAXSEED OIL) 1200 MG capsule, Take 1 capsule by mouth 2 (Two) Times a Day. 1300 mg, Disp: , Rfl:   •  fluticasone (FLONASE) 50 MCG/ACT nasal spray, into each nostril As Needed for Allergies. Use 2 sprays in each nostril once daily as needed., Disp: , Rfl:   •  gabapentin (NEURONTIN) 300 MG capsule, Take 1 capsule by mouth 3 (Three) Times a Day., Disp: 90 capsule, Rfl: 5  •  gemfibrozil (LOPID) 600 MG tablet, TAKE 1 TABLET TWICE A DAY, Disp: 180 tablet, Rfl: 1  •  Insulin Regular Human, Conc, (HumuLIN R) 500 UNIT/ML solution pen-injector CONCENTRATED injection, 90 units twice a day before meals, titrate up as indicated (Patient taking differently: Inject  under the skin Every Morning. 135 UNITS SQ IN AM  UNITS SQ EVENING), Disp: 12 pen, Rfl: 3  •  lisinopril-hydrochlorothiazide (PRINZIDE,ZESTORETIC) 10-12.5 MG per tablet, TAKE ONE TABLET BY MOUTH DAILY, Disp: 90 tablet, Rfl: 0  •  meloxicam (MOBIC) 15 MG tablet, TAKE ONE TABLET BY MOUTH DAILY AS NEEDED, Disp: 90 tablet, Rfl: 0  •  Multiple Vitamins-Minerals (MULTIVITAMIN PO), Take 1 tablet by mouth Daily., Disp: , Rfl:   •  omeprazole (priLOSEC) 20 MG capsule, Take 1 capsule by mouth Daily., Disp: 90 capsule, Rfl: 1  •  Probiotic Product (PROBIOTIC ADVANCED PO), Take 1 capsule by mouth Daily., Disp: , Rfl:   •  sertraline (ZOLOFT) 100 MG tablet, TAKE ONE TABLET BY MOUTH DAILY, Disp: 90 tablet, Rfl: 4  •  simvastatin (ZOCOR) 40 MG tablet, TAKE 1 TABLET DAILY AT BEDTIME, Disp: 90 tablet, Rfl: 1  •  sulfamethoxazole-trimethoprim (BACTRIM DS,SEPTRA DS) 800-160 MG per tablet, Take 1 tablet by mouth 2 (Two) Times a Day., Disp: 30 tablet, Rfl: 0  •  topiramate (TOPAMAX) 25 MG tablet, Take 25 mg by mouth Every Night., Disp: , Rfl:   •  gabapentin, once-daily, (GRALISE) 600 MG tablet tablet, Take 1 tablet by mouth Daily Before Supper., Disp: 30 tablet, Rfl: 5     Allergies    Allergies   Allergen  "Reactions   • Glucophage Xr [Metformin Hcl Er] Diarrhea and Other (See Comments)     Glucophage XR TB24: Adverse Reaction: Severe GI issues.   • Metformin Nausea And Vomiting     Other reaction(s): SEVERE INTESTIONAL ISSUES  Other reaction(s): SEVERE GI ISSUES    Glucophage XR TB24  MetFORMIN HCl TABS   • Tetracyclines & Related Nausea Only     Adverse Reaction       Problem List    Patient Active Problem List   Diagnosis   • Chronic kidney disease, stage III (moderate)   • Gastroesophageal reflux disease without esophagitis   • Hyperlipidemia   • Hypertension   • Trigger finger of right hand   • DM (diabetes mellitus), type 2, uncontrolled w/neurologic complication   • Obesity, Class I, BMI 30-34.9   • Actinic keratosis   • FAVIO (obstructive sleep apnea)   • Prurigo nodularis   • History of depression   • History of migraine   • Cervical radiculopathy   • ALT (SGPT) level raised   • Elevated AST (SGOT)   • Uncontrolled type 2 diabetes mellitus with diabetic neuropathy, with long-term current use of insulin   • Post-operative infection   • Leukocytosis   • Left hand weakness   • Initial Medicare annual wellness visit   • Herniated cervical disc       Medications, Allergies, Problems List and Past History were reviewed and updated.    Physical Examination    /74 (BP Location: Left arm, Patient Position: Sitting, Cuff Size: Adult)  Pulse 60  Temp 97.5 °F (36.4 °C) (Temporal Artery )   Resp 16  Ht 174 cm (68.5\")  Wt 102 kg (225 lb)  BMI 33.71 kg/m2    HEENT: Head- Normocephalic Atraumatic. Facies- Within normal limits. Pinnas- Normal texture and shape bilaterally. Canals- Normal bilaterally. TMs- Normal bilaterally. Nares- Patent bilaterally. Nasal Septum- is normal. There is no tenderness to palpation over the frontal or maxillary sinuses. Lids- Normal bilaterally. Conjunctiva- Clear bilaterally. Sclera- Anicteric bilaterally. Oropharynx- Moist with no lesions. Tonsils- No enlargement, erythema or " exudate.    Neck: Thyroid- non enlarged, symmetric and has no nodules. No bruits are detected. ROM- Normal Range of Motion with no rigidity.    Lymph Nodes: Cervical- no enlarged lymph nodes noted. Clavicular- Deferred. Axillary- Deferred. Inguinal- Deferred.    Lungs: Auscultation- Clear to auscultation bilaterally. There are no retractions, clubbing or cyanosis. The Expiratory to Inspiratory ratio is equal.    Cardiovascular: There are no carotid bruits. Heart- Normal Rate with Regular rhythm and no murmurs. There are no gallops. There are no rubs. In the lower extremities there is no edema. The upper extremities do not have edema.    Abdomen: Soft, benign, non-tender with no masses, hernias, organomegaly or scars.    Male Genitourinary: The penis is circumcised with testicles found in the scrotum bilaterally. Testicular Size is normal bilaterally. The penis has no anatomic abnormalities.    Rectal: reveals normal external sphincter tone with no external lesions.    Prostate: The prostate gland is enlarged diffusely with no nodules.    Feet: The feet are symmetric with normal wendy landmarks. There is no tenderness to palpation bilaterally. The feet have normal posterior tibial and dorsalis pedis pulses and normal capillary refill bilaterally. The monofilament examination reveals bilateral severe decreased sensation.      The arches are normal bilaterally. There are no skin or nail lesions present. There are no ingrown nails. There are no bunions noted.    The patient has no worrisome or suspicious skin lesions noted.    Impression and Assessment    Normal Physical Examination.    Essential Hypertension.    Hyperlipidemia.    Gastroesophageal Reflux Disease.    Type 2 Diabetes Mellitus.    Diabetic Neuropathy.    Benign Prostatic Hypertrophy.    Plan    Gastroesophageal Reflux Disease Plan: The current plan was continued.    Essential Hypertension Plan: The current plan was continued.    Hyperlipidemia Plan: He  was instructed to eat a low fat diet. He was encouraged to exercise daily. Weight loss was encouraged. The patient was instructed to continue the current medications.    Type 2 Diabetes Mellitus Plan: He will follow-up with his endocrinologist. Diagnosis specific educational materials were provided to the patient.    Benign Prostatic Hypertrophy Plan: Further plans will be made after test results are available.    Diabetic Neuropathy Plan: The medications were adjusted as noted.    Counseling was provided regarding: Dental Visits, Flossing Teeth, Heart Healthy Diet and Seat Belt Utilization.    No screening tests are indicated at this time.       Immunizations Ordered and Administered: None.    Elton was seen today for annual exam.    Diagnoses and all orders for this visit:    Physical exam    Gastroesophageal reflux disease without esophagitis  -     Comprehensive Metabolic Panel  -     Ambulatory referral for Screening EGD    Mixed hyperlipidemia  -     Comprehensive Metabolic Panel  -     Lipid Panel    Essential hypertension  -     Comprehensive Metabolic Panel    Benign prostatic hyperplasia without lower urinary tract symptoms    Diabetic polyneuropathy associated with type 2 diabetes mellitus  -     POC Microalbumin  -     gabapentin, once-daily, (GRALISE) 600 MG tablet tablet; Take 1 tablet by mouth Daily Before Supper.    Prostate cancer screening  -     PSA Screen        Return to Office    The patient was instructed to return for follow-up in 4 months. Next Visit: Follow-up.    The patient was instructed to return sooner if the condition changes, worsens, or doesn't resolve.

## 2018-01-31 ENCOUNTER — OFFICE VISIT (OUTPATIENT)
Dept: NEUROSURGERY | Facility: CLINIC | Age: 68
End: 2018-01-31

## 2018-01-31 VITALS
SYSTOLIC BLOOD PRESSURE: 140 MMHG | TEMPERATURE: 97.8 F | BODY MASS INDEX: 33.27 KG/M2 | DIASTOLIC BLOOD PRESSURE: 75 MMHG | WEIGHT: 224.6 LBS | HEIGHT: 69 IN

## 2018-01-31 DIAGNOSIS — T81.40XD POSTOPERATIVE INFECTION, SUBSEQUENT ENCOUNTER: Primary | ICD-10-CM

## 2018-01-31 PROCEDURE — 99024 POSTOP FOLLOW-UP VISIT: CPT | Performed by: PHYSICIAN ASSISTANT

## 2018-01-31 NOTE — PROGRESS NOTES
Elton Funez  1950 01/31/2018  [id number]    Chief Complaint   Patient presents with   • Post-op       HPI:  The patient is status post anterior cervical discectomy and fusion at C7-T1 on 12/14/2017.  The patient developed a stitch abscess at the lateral aspect of his incision with drainage and is currently on antibiotic therapy.  He returns today for wound evaluation.  The patient reports that he has not had any drainage since Saturday, no fever or chills or achiness.  He is scheduled to undergo an upper GI and possible esophageal dilations.    Past Medical History:   Diagnosis Date   • Abscess of arm, right    • Abscess of skin of neck    • Acute sinusitis    • Allergic Childhood,  Adult    Ragweed. 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    • GERD (gastroesophageal reflux disease)    • HL (hearing loss)    • Hyperlipidemia    • Hypertension    • Infection     MSSA lumbar surgical wound infection 3/2017   • Jaw pain    • Left hip pain    • Low back pain     Surgery 30 yrs L4-5   • Migraine     Hx of   • Obesity (BMI 30.0-34.9) 10/7/2016    BMI 31.92   • Obstructive sleep apnea     Hx of   • Quadriceps muscle strain    • Shigellosis    • Sleep apnea     PT TO BRING MASK AND TUBING DAY OF SURGERY   • 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    • Wears glasses    • Wears hearing aid     BOTH EARS   • Wears hearing aid     bilat ears       Allergies   Allergen Reactions   • Glucophage Xr [Metformin  Hcl Er] Diarrhea and Other (See Comments)     Glucophage XR TB24: Adverse Reaction: Severe GI issues.   • Metformin Nausea And Vomiting     Other reaction(s): SEVERE INTESTIONAL ISSUES  Other reaction(s): SEVERE GI ISSUES    Glucophage XR TB24  MetFORMIN HCl TABS   • Tetracyclines & Related Nausea Only     Adverse Reaction         Current Outpatient Prescriptions:   •  aspirin 81 MG tablet, Take 1 tablet by mouth Daily. Last dose 2-19-17 may resume when okay with Dr Perales (Patient taking differently: Take 81 mg by mouth Daily. LD 12/9/17 - no stents), Disp: , Rfl:   •  atenolol (TENORMIN) 50 MG tablet, TAKE ONE TABLET BY MOUTH DAILY, Disp: 90 tablet, Rfl: 1  •  BYDUREON 2 MG pen-injector, INJECT 2 MG UNDER THE SKIN ONE TIME PER WEEK (Patient taking differently: INJECT 2 MG UNDER THE SKIN ONE TIME PER WEEK- tuesday), Disp: 4 each, Rfl: 1  •  Coenzyme Q10 (CO Q 10 PO), Take 300 mg by mouth Daily., Disp: , Rfl:   •  colestipol (COLESTID) 1 g tablet, TAKE 2 TABLETS TWICE A DAY, Disp: 360 tablet, Rfl: 1  •  cyclobenzaprine (FLEXERIL) 10 MG tablet, Take 10 mg by mouth At Night As Needed., Disp: , Rfl:   •  Flaxseed, Linseed, (FLAXSEED OIL) 1200 MG capsule, Take 1 capsule by mouth 2 (Two) Times a Day. 1300 mg, Disp: , Rfl:   •  fluticasone (FLONASE) 50 MCG/ACT nasal spray, into each nostril As Needed for Allergies. Use 2 sprays in each nostril once daily as needed., Disp: , Rfl:   •  gabapentin, once-daily, (GRALISE) 600 MG tablet tablet, Take 1 tablet by mouth Daily Before Supper., Disp: 30 tablet, Rfl: 5  •  gemfibrozil (LOPID) 600 MG tablet, TAKE 1 TABLET TWICE A DAY, Disp: 180 tablet, Rfl: 1  •  Insulin Regular Human, Conc, (HumuLIN R) 500 UNIT/ML solution pen-injector CONCENTRATED injection, 90 units twice a day before meals, titrate up as indicated (Patient taking differently: Inject  under the skin Every Morning. 135 UNITS SQ IN AM  UNITS SQ EVENING), Disp: 12 pen, Rfl: 3  •  lisinopril-hydrochlorothiazide  (PRINZIDE,ZESTORETIC) 10-12.5 MG per tablet, TAKE ONE TABLET BY MOUTH DAILY, Disp: 90 tablet, Rfl: 0  •  meloxicam (MOBIC) 15 MG tablet, TAKE ONE TABLET BY MOUTH DAILY AS NEEDED, Disp: 90 tablet, Rfl: 0  •  Multiple Vitamins-Minerals (MULTIVITAMIN PO), Take 1 tablet by mouth Daily., Disp: , Rfl:   •  omeprazole (priLOSEC) 20 MG capsule, Take 1 capsule by mouth Daily., Disp: 90 capsule, Rfl: 1  •  Probiotic Product (PROBIOTIC ADVANCED PO), Take 1 capsule by mouth Daily., Disp: , Rfl:   •  sertraline (ZOLOFT) 100 MG tablet, TAKE ONE TABLET BY MOUTH DAILY, Disp: 90 tablet, Rfl: 4  •  simvastatin (ZOCOR) 40 MG tablet, TAKE 1 TABLET DAILY AT BEDTIME, Disp: 90 tablet, Rfl: 1  •  sulfamethoxazole-trimethoprim (BACTRIM DS,SEPTRA DS) 800-160 MG per tablet, Take 1 tablet by mouth 2 (Two) Times a Day., Disp: 30 tablet, Rfl: 0  •  topiramate (TOPAMAX) 25 MG tablet, Take 25 mg by mouth Every Night., Disp: , Rfl:     Social History     Social History   • Marital status:      Spouse name: N/A   • Number of children: N/A   • Years of education: N/A     Social History Main Topics   • Smoking status: Former Smoker     Packs/day: 1.00     Years: 5.00     Types: Cigarettes     Quit date: 1976   • Smokeless tobacco: Never Used   • Alcohol use Yes      Comment: Socially   • Drug use: No   • Sexual activity: Not Currently     Partners: Female     Birth control/ protection: Surgical     Other Topics Concern   • None     Social History Narrative       Family History   Problem Relation Age of Onset   • Diabetes Father    • Heart disease Father    • Diabetes Paternal Grandmother    • Hearing loss Mother    • Arthritis Mother    • Cancer Mother        Review of Systems   Constitutional: Negative for activity change, appetite change, chills, diaphoresis, fatigue, fever and unexpected weight change.   HENT: Negative for congestion, dental problem, drooling, ear discharge, ear pain, facial swelling, hearing loss, mouth sores, nosebleeds,  "postnasal drip, rhinorrhea, sinus pressure, sneezing, sore throat, tinnitus, trouble swallowing and voice change.    Eyes: Negative for photophobia, pain, discharge, redness, itching and visual disturbance.   Respiratory: Negative for apnea, cough, choking, chest tightness, shortness of breath, wheezing and stridor.    Cardiovascular: Negative for chest pain, palpitations and leg swelling.   Gastrointestinal: Negative for abdominal distention, abdominal pain, anal bleeding, blood in stool, constipation, diarrhea, nausea, rectal pain and vomiting.   Endocrine: Negative for cold intolerance, heat intolerance, polydipsia, polyphagia and polyuria.   Genitourinary: Negative for decreased urine volume, difficulty urinating, dysuria, enuresis, flank pain, frequency, genital sores, hematuria and urgency.   Musculoskeletal: Negative for arthralgias, back pain, gait problem, joint swelling, myalgias, neck pain and neck stiffness.   Skin: Negative for color change, pallor, rash and wound.   Allergic/Immunologic: Negative for environmental allergies, food allergies and immunocompromised state.   Neurological: Negative for dizziness, tremors, seizures, syncope, facial asymmetry, speech difficulty, weakness, light-headedness, numbness and headaches.   Hematological: Negative for adenopathy. Does not bruise/bleed easily.   Psychiatric/Behavioral: Negative for agitation, behavioral problems, confusion, decreased concentration, dysphoric mood, hallucinations, self-injury, sleep disturbance and suicidal ideas. The patient is not nervous/anxious and is not hyperactive.        PE:  Physical Exam  /75  Temp 97.8 °F (36.6 °C)  Ht 174 cm (68.5\")  Wt 102 kg (224 lb 9.6 oz)  BMI 33.65 kg/m2    Neurologic Exam  Intact motor, sensory with numbness and tingling in the fourth and fifth digits of the left hand.  He reports that in therapy if he puts a small pillow between his shoulder blades he gets improvement in the tingling in the " fourth and fifth digits.    MDM  The stitch abscess in the lateral cervical incision is stable without drainage for 4 days now and he is currently on antibiotic oral therapy.  He feels well without fever or chills.  I've asked him to monitor the wound that if it were to drain again please call us in the office for bacitracin ointment.  If he continues to do well our plan will be to see him back in the office in 3 weeks.  The patient continues with therapy and we'll continue to monitor how his tingling is in the fourth and fifth digits of the left hand with her future surgical intervention will be needed is problematic given his significant surgical history, whether cervical injections would be beneficial would need to be discussed in the future.    Justine Samaniego PA-C

## 2018-02-02 ENCOUNTER — OFFICE VISIT (OUTPATIENT)
Dept: ENDOCRINOLOGY | Facility: CLINIC | Age: 68
End: 2018-02-02

## 2018-02-02 VITALS
OXYGEN SATURATION: 98 % | DIASTOLIC BLOOD PRESSURE: 78 MMHG | WEIGHT: 224.4 LBS | BODY MASS INDEX: 33.24 KG/M2 | SYSTOLIC BLOOD PRESSURE: 134 MMHG | HEIGHT: 69 IN | HEART RATE: 70 BPM

## 2018-02-02 DIAGNOSIS — E11.65 UNCONTROLLED TYPE 2 DIABETES MELLITUS WITH DIABETIC NEUROPATHY, UNSPECIFIED LONG TERM INSULIN USE STATUS: Chronic | ICD-10-CM

## 2018-02-02 DIAGNOSIS — E11.40 UNCONTROLLED TYPE 2 DIABETES MELLITUS WITH DIABETIC NEUROPATHY, UNSPECIFIED LONG TERM INSULIN USE STATUS: Chronic | ICD-10-CM

## 2018-02-02 DIAGNOSIS — IMO0002 UNCONTROLLED TYPE 2 DIABETES MELLITUS WITH DIABETIC NEUROPATHY, WITH LONG-TERM CURRENT USE OF INSULIN: ICD-10-CM

## 2018-02-02 DIAGNOSIS — E78.2 MIXED HYPERLIPIDEMIA: Primary | Chronic | ICD-10-CM

## 2018-02-02 PROCEDURE — 99214 OFFICE O/P EST MOD 30 MIN: CPT | Performed by: INTERNAL MEDICINE

## 2018-02-02 NOTE — PROGRESS NOTES
Chief complaint  Diabetes (F/u for type 2 diabetes, pt stated he does not check blood sugars regularly ~ fasting readings have been ranging in the 130's)    Subjective   Elton Funez is a 67 y.o. male is here today for follow-up.  Diabetes mellitus type 2, uncontrolled dx in 2000  Diabetic complications: neuropathy.     Medications: Bydureon, U-500 insulin.      Past meds: Metformin caused diarrhea in the past.   Eye care:no retinopathy.   Hypoglycemia: in the morning occasionally   Doesn't check glucose regularly.   Had hypoglycemia episode at night.   Nutrition: discussed carb consistent diet 45-60 gm carb per meal. Patient is not following any diet, eats a lot of potatoes.   Exercise: recommended 30 min per day exercise. He doesn't exercise because of tingling in the feet.   Foot care and dental care: discussed.    He transitioned from Lantus and now takes U-500 insulin 135 units in am and 120 in the evening.        Diabetes   He presents for his follow-up diabetic visit. He has type 2 diabetes mellitus. No MedicAlert identification noted. The initial diagnosis of diabetes was made 15 years ago. Hypoglycemia symptoms include hunger, sweats and tremors. Pertinent negatives for hypoglycemia include no confusion, dizziness, headaches, mood changes, nervousness/anxiousness, pallor, seizures, sleepiness or speech difficulty. Associated symptoms include foot paresthesias. Pertinent negatives for diabetes include no blurred vision, no chest pain, no fatigue, no foot ulcerations, no polydipsia, no polyphagia, no polyuria, no visual change, no weakness and no weight loss. Hypoglycemia complications include nocturnal hypoglycemia. Pertinent negatives for hypoglycemia complications include no blackouts, no hospitalization, no required assistance and no required glucagon injection. Symptoms are worsening. Diabetic complications include impotence and peripheral neuropathy. Pertinent negatives for diabetic complications  include no CVA, heart disease, nephropathy, PVD or retinopathy. Risk factors for coronary artery disease include dyslipidemia, family history, hypertension, obesity and sedentary lifestyle. Current diabetic treatment includes insulin injections and oral agent (triple therapy). He is compliant with treatment most of the time. He is currently taking insulin pre-breakfast and at bedtime. Insulin injections are given by patient. Rotation sites for injection include the abdominal wall, arms and thighs. His weight is fluctuating minimally. He is following a low fiber diet. Meal planning includes avoidance of concentrated sweets. He has not had a previous visit with a dietitian. He rarely participates in exercise. He monitors blood glucose at home 1-2 x per day. He monitors urine at home <1 x per month. Blood glucose monitoring compliance is inadequate. His home blood glucose trend is fluctuating minimally. His breakfast blood glucose is taken between 5-6 am. His breakfast blood glucose range is generally 70-90 mg/dl. His dinner blood glucose is taken between 6-7 pm. His dinner blood glucose range is generally 140-180 mg/dl. His highest blood glucose is >200 mg/dl. His overall blood glucose range is 180-200 mg/dl. He sees a podiatrist.Eye exam is current.       Medications    Current Outpatient Prescriptions:   •  aspirin 81 MG tablet, Take 1 tablet by mouth Daily. Last dose 2-19-17 may resume when okay with Dr Perales (Patient taking differently: Take 81 mg by mouth Daily. LD 12/9/17 - no stents), Disp: , Rfl:   •  atenolol (TENORMIN) 50 MG tablet, TAKE ONE TABLET BY MOUTH DAILY, Disp: 90 tablet, Rfl: 1  •  Coenzyme Q10 (CO Q 10 PO), Take 300 mg by mouth Daily., Disp: , Rfl:   •  colestipol (COLESTID) 1 g tablet, TAKE 2 TABLETS TWICE A DAY, Disp: 360 tablet, Rfl: 1  •  cyclobenzaprine (FLEXERIL) 10 MG tablet, Take 10 mg by mouth At Night As Needed., Disp: , Rfl:   •  exenatide er (BYDUREON) 2 MG pen-injector injection,  Inject 1 pen under the skin 1 (One) Time Per Week., Disp: 12 each, Rfl: 3  •  Flaxseed, Linseed, (FLAXSEED OIL) 1200 MG capsule, Take 1 capsule by mouth 2 (Two) Times a Day. 1300 mg, Disp: , Rfl:   •  fluticasone (FLONASE) 50 MCG/ACT nasal spray, into each nostril As Needed for Allergies. Use 2 sprays in each nostril once daily as needed., Disp: , Rfl:   •  gabapentin, once-daily, (GRALISE) 600 MG tablet tablet, Take 1 tablet by mouth Daily Before Supper., Disp: 30 tablet, Rfl: 5  •  gemfibrozil (LOPID) 600 MG tablet, TAKE 1 TABLET TWICE A DAY, Disp: 180 tablet, Rfl: 1  •  Insulin Regular Human, Conc, (HumuLIN R) 500 UNIT/ML solution pen-injector CONCENTRATED injection, 135 units in the morning and 120 units in the evening., Disp: 12 pen, Rfl: 3  •  lisinopril-hydrochlorothiazide (PRINZIDE,ZESTORETIC) 10-12.5 MG per tablet, TAKE ONE TABLET BY MOUTH DAILY, Disp: 90 tablet, Rfl: 0  •  meloxicam (MOBIC) 15 MG tablet, TAKE ONE TABLET BY MOUTH DAILY AS NEEDED, Disp: 90 tablet, Rfl: 0  •  Multiple Vitamins-Minerals (MULTIVITAMIN PO), Take 1 tablet by mouth Daily., Disp: , Rfl:   •  omeprazole (priLOSEC) 20 MG capsule, Take 1 capsule by mouth Daily., Disp: 90 capsule, Rfl: 1  •  Probiotic Product (PROBIOTIC ADVANCED PO), Take 1 capsule by mouth Daily., Disp: , Rfl:   •  sertraline (ZOLOFT) 100 MG tablet, TAKE ONE TABLET BY MOUTH DAILY, Disp: 90 tablet, Rfl: 4  •  simvastatin (ZOCOR) 40 MG tablet, TAKE 1 TABLET DAILY AT BEDTIME, Disp: 90 tablet, Rfl: 1  •  sulfamethoxazole-trimethoprim (BACTRIM DS,SEPTRA DS) 800-160 MG per tablet, Take 1 tablet by mouth 2 (Two) Times a Day., Disp: 30 tablet, Rfl: 0  •  topiramate (TOPAMAX) 25 MG tablet, Take 25 mg by mouth Every Night., Disp: , Rfl:     PMH  The following portions of the patient's history were reviewed and updated as appropriate: allergies, current medications, past family history, past medical history, past social history, past surgical history and problem list.    Review  "of systems  Review of Systems   Constitutional: Negative for fatigue and weight loss.   HENT: Positive for nosebleeds (right sided).    Eyes: Negative for blurred vision.   Cardiovascular: Negative for chest pain.   Endocrine: Negative for polydipsia, polyphagia and polyuria.   Genitourinary: Positive for impotence.   Musculoskeletal: Positive for back pain.   Skin: Negative for pallor.   Neurological: Positive for tremors and numbness (tingling of both feet, progressively getting worse, ). Negative for dizziness, seizures, speech difficulty, weakness and headaches.   Psychiatric/Behavioral: Negative for confusion. The patient is not nervous/anxious.    All other systems reviewed and are negative.      Physical exam  Objective   Blood pressure 134/78, pulse 70, height 174 cm (68.5\"), weight 102 kg (224 lb 6.4 oz), SpO2 98 %.   Physical Exam   Constitutional: He is oriented to person, place, and time. He appears well-developed and well-nourished.   Eyes: Conjunctivae are normal.   Neck: No JVD present. Thyromegaly present.   Cardiovascular: Normal rate, regular rhythm and normal heart sounds.    Pulmonary/Chest: Effort normal and breath sounds normal.   Musculoskeletal: Normal range of motion. He exhibits no edema.   Lymphadenopathy:     He has no cervical adenopathy.   Neurological: He is alert and oriented to person, place, and time.   Skin: Skin is warm and dry. No rash noted.   Psychiatric: He has a normal mood and affect. Thought content normal.   Vitals reviewed.        LABS AND IMAGING  Results for orders placed or performed in visit on 01/29/18   Comprehensive Metabolic Panel   Result Value Ref Range    Glucose 130 (H) 70 - 100 mg/dL    BUN 30 (H) 9 - 23 mg/dL    Creatinine 1.80 (H) 0.60 - 1.30 mg/dL    Sodium 137 132 - 146 mmol/L    Potassium 4.8 3.5 - 5.5 mmol/L    Chloride 106 99 - 109 mmol/L    CO2 24.0 20.0 - 31.0 mmol/L    Calcium 9.7 8.7 - 10.4 mg/dL    Total Protein 7.4 5.7 - 8.2 g/dL    Albumin 4.40 " 3.20 - 4.80 g/dL    ALT (SGPT) 36 7 - 40 U/L    AST (SGOT) 40 (H) 0 - 33 U/L    Alkaline Phosphatase 92 25 - 100 U/L    Total Bilirubin 0.6 0.3 - 1.2 mg/dL    eGFR Non African Amer 38 (L) >60 mL/min/1.73    Globulin 3.0 gm/dL    A/G Ratio 1.5 1.5 - 2.5 g/dL    BUN/Creatinine Ratio 16.7 7.0 - 25.0    Anion Gap 7.0 3.0 - 11.0 mmol/L   Lipid Panel   Result Value Ref Range    Total Cholesterol 96 0 - 200 mg/dL    Triglycerides 223 (H) 0 - 150 mg/dL    HDL Cholesterol 27 (L) 40 - 60 mg/dL    LDL Cholesterol  47 0 - 130 mg/dL   PSA Screen   Result Value Ref Range    PSA 1.060 0.000 - 4.000 ng/mL   POC Microalbumin   Result Value Ref Range    Microalbumin, Urine 30     Creatinine, Urine 200     A/C <30mg/g NORMAL     Lot Number [id number]     Expiration Date 12-31-18      Lab Results   Component Value Date    HGBA1C 8.20 (H) 12/13/2017         Assessment  Assessment/Plan   1. Mixed hyperlipidemia    2. Uncontrolled type 2 diabetes mellitus with diabetic neuropathy, unspecified long term insulin use status    3. Uncontrolled type 2 diabetes mellitus with diabetic neuropathy, with long-term current use of insulin      No orders of the defined types were placed in this encounter.    Plan  - U-500 insulin 135 and 120 Units.   -Bydureon weekly.     There are no Patient Instructions on file for this visit.    Glucose goals reviewed and he achieved the goal glucose required for surgery. Hypoglycemia precautions reviewed and insulin titration instructions given.     Follow-up in 3 months with glucose log.

## 2018-02-15 RX ORDER — MELOXICAM 15 MG/1
TABLET ORAL
Qty: 90 TABLET | Refills: 0 | Status: SHIPPED | OUTPATIENT
Start: 2018-02-15 | End: 2018-05-24 | Stop reason: SDUPTHER

## 2018-02-19 RX ORDER — PEN NEEDLE, DIABETIC 31 GX5/16"
NEEDLE, DISPOSABLE MISCELLANEOUS
Qty: 270 EACH | Refills: 3 | Status: SHIPPED | OUTPATIENT
Start: 2018-02-19 | End: 2018-06-08

## 2018-02-23 ENCOUNTER — OFFICE VISIT (OUTPATIENT)
Dept: NEUROSURGERY | Facility: CLINIC | Age: 68
End: 2018-02-23

## 2018-02-23 VITALS
WEIGHT: 228 LBS | TEMPERATURE: 97.1 F | HEIGHT: 69 IN | SYSTOLIC BLOOD PRESSURE: 118 MMHG | BODY MASS INDEX: 33.77 KG/M2 | DIASTOLIC BLOOD PRESSURE: 64 MMHG

## 2018-02-23 DIAGNOSIS — M50.20 HERNIATED CERVICAL DISC: Primary | ICD-10-CM

## 2018-02-23 DIAGNOSIS — R29.898 LEFT HAND WEAKNESS: ICD-10-CM

## 2018-02-23 PROCEDURE — 99024 POSTOP FOLLOW-UP VISIT: CPT | Performed by: PHYSICIAN ASSISTANT

## 2018-02-23 NOTE — PROGRESS NOTES
Subjective     Chief Complaint:  Elton Funez is a 67 y.o. male here today for final follow up after ACDF on 12/15/17    History of Present Illness  Mr. Funez 67-year-old man who has a remote history of a C6 7 ACDF.  He developed new arm pain from a C8 radiculopathy and underwent a posterior foraminotomy this year, with resolution of his pain. However, he did develop a postop wound infection that required I&D and IV antibiotics.   Earlier this fall, his radicular symptoms returned.  Follow-up MRI demonstrated a recurrent disc herniation at C7-T1, and he was scheduled for an ACDF at this level.  Surgery was done on 12/15/17. He did well postoperatively and his pain was well controlled.  He was discharged to home on POD 1. Due to his poorly controlled diabetes, he was started on prophylactic keflex for 10 days postop and close incisional follow up. He did well initially, but developed a superficial stitch abscess that manifested around the end of January. He was started on Bactrim and had weekly visits until this resolved. His incision has healed well at this point with no further drainage or redness.  His arm and neck pain have improved considerably. He still has some residual left hand numbness and tingling, but has been working to strengthen that hand with PT and is doing better, with an increase in the muslce mass in that hand, which had atrophy compared to the right before surgery.    He has resumed his normal routine, is not taking pain medication, and is pleased, overall, with his outcome.  He denies any fever or chills.       The following portions of the patient's history were reviewed and updated as appropriate: allergies, current medications, past family history, past medical history, past social history, past surgical history and problem list.    Family history:   Family History   Problem Relation Age of Onset   • Diabetes Father    • Heart disease Father    • Diabetes Paternal Grandmother    •  Hearing loss Mother    • Arthritis Mother    • Cancer Mother        Social history:   Social History     Social History   • Marital status:      Spouse name: N/A   • Number of children: N/A   • Years of education: N/A     Occupational History   • Not on file.     Social History Main Topics   • Smoking status: Former Smoker     Packs/day: 1.00     Years: 5.00     Types: Cigarettes     Quit date: 1976   • Smokeless tobacco: Never Used   • Alcohol use Yes      Comment: Socially   • Drug use: No   • Sexual activity: Not Currently     Partners: Female     Birth control/ protection: Surgical     Other Topics Concern   • Not on file     Social History Narrative       Review of Systems   Constitutional: Negative for activity change, appetite change, chills, diaphoresis, fatigue, fever and unexpected weight change.   HENT: Negative for congestion, dental problem, drooling, ear discharge, ear pain, facial swelling, hearing loss, mouth sores, nosebleeds, postnasal drip, rhinorrhea, sinus pressure, sneezing, sore throat, tinnitus, trouble swallowing and voice change.    Eyes: Negative for photophobia, pain, discharge, redness, itching and visual disturbance.   Respiratory: Negative for apnea, cough, choking, chest tightness, shortness of breath, wheezing and stridor.    Cardiovascular: Negative for chest pain, palpitations and leg swelling.   Gastrointestinal: Negative for abdominal distention, abdominal pain, anal bleeding, blood in stool, constipation, diarrhea, nausea, rectal pain and vomiting.   Endocrine: Negative for cold intolerance, heat intolerance, polydipsia, polyphagia and polyuria.   Genitourinary: Negative for decreased urine volume, difficulty urinating, dysuria, enuresis, flank pain, frequency, genital sores, hematuria and urgency.   Musculoskeletal: Negative for arthralgias, back pain, gait problem, joint swelling, myalgias, neck pain and neck stiffness.   Skin: Negative for color change, pallor, rash  "and wound.   Allergic/Immunologic: Negative for environmental allergies, food allergies and immunocompromised state.   Neurological: Negative for dizziness, tremors, seizures, syncope, facial asymmetry, speech difficulty, weakness, light-headedness, numbness and headaches.   Hematological: Negative for adenopathy. Does not bruise/bleed easily.   Psychiatric/Behavioral: Negative for agitation, behavioral problems, confusion, decreased concentration, dysphoric mood, hallucinations, self-injury, sleep disturbance and suicidal ideas. The patient is not nervous/anxious and is not hyperactive.        Objective   Blood pressure 118/64, temperature 97.1 °F (36.2 °C), temperature source Temporal Artery , height 174 cm (68.5\"), weight 103 kg (228 lb).  Body mass index is 34.16 kg/(m^2).    Physical Exam   Constitutional: He is oriented to person, place, and time. He appears well-developed and well-nourished. No distress.   HENT:   Head: Normocephalic and atraumatic.   Neck: Normal range of motion. Neck supple.   Right anterior cervical incision is clean, dry, and intact.    Pulmonary/Chest: Effort normal. No respiratory distress.   Musculoskeletal: Normal range of motion. He exhibits no edema or deformity.   Some muscle atrophy of the left hand compared to the right. This is improving with PT and exercise.    Neurological: He is alert and oriented to person, place, and time. He has normal reflexes. He displays normal reflexes. No cranial nerve deficit. Coordination normal.       Assessment/Plan   Mr. Funez is doing well. He is released from regular follow ups at this point, though he is encouraged to work on hand strength with PT.  We will see him on an as-needed basis going forward.     Elton was seen today for post-op.    Diagnoses and all orders for this visit:    Herniated cervical disc    Left hand weakness      Return if symptoms worsen or fail to improve.      "

## 2018-03-05 RX ORDER — SERTRALINE HYDROCHLORIDE 100 MG/1
TABLET, FILM COATED ORAL
Qty: 90 TABLET | Refills: 1 | Status: SHIPPED | OUTPATIENT
Start: 2018-03-05 | End: 2018-10-09 | Stop reason: SDUPTHER

## 2018-03-16 RX ORDER — INSULIN HUMAN 500 [IU]/ML
INJECTION, SOLUTION SUBCUTANEOUS
Qty: 36 ML | Refills: 3 | OUTPATIENT
Start: 2018-03-16

## 2018-03-21 NOTE — TELEPHONE ENCOUNTER
PT WOULD LIKE TO KNOW IF WE HAVE ANY SAMPLES OF THE HUMULIN R 500 UNIT PENS. HE ID OUT AND HIS PHARMACY DOES NOT KNOW WHEN THEY WILL BE SENT OFF FOR DELIVERY.

## 2018-03-22 NOTE — TELEPHONE ENCOUNTER
Returned pt's call to see if he wanted for me to send a Rx to his local pharmacy until he received his shipment from [employer] but pt said that he received his shipment yesterday.

## 2018-04-30 RX ORDER — LISINOPRIL AND HYDROCHLOROTHIAZIDE 12.5; 1 MG/1; MG/1
TABLET ORAL
Qty: 90 TABLET | Refills: 0 | Status: SHIPPED | OUTPATIENT
Start: 2018-04-30 | End: 2018-08-23 | Stop reason: SDUPTHER

## 2018-05-15 RX ORDER — SIMVASTATIN 40 MG
TABLET ORAL
Qty: 90 TABLET | Refills: 0 | Status: SHIPPED | OUTPATIENT
Start: 2018-05-15 | End: 2018-08-18 | Stop reason: SDUPTHER

## 2018-05-24 RX ORDER — MELOXICAM 15 MG/1
TABLET ORAL
Qty: 90 TABLET | Refills: 0 | Status: SHIPPED | OUTPATIENT
Start: 2018-05-24 | End: 2018-09-03 | Stop reason: SDUPTHER

## 2018-05-29 ENCOUNTER — TELEPHONE (OUTPATIENT)
Dept: INTERNAL MEDICINE | Facility: CLINIC | Age: 68
End: 2018-05-29

## 2018-05-29 NOTE — TELEPHONE ENCOUNTER
PATIENT IS NEEDING A REFILL ON HIS exenatide er (BYDUREON) 2 MG pen-injector injection     HE STATES THERE IS A NEW VERSION OF THIS MEDS. HE THINKS ITS A B SIZE--NEEDLE. HE THINKS IT IS A SELF INJECTING PEN.     PLEASE ADVISE

## 2018-05-31 ENCOUNTER — OFFICE VISIT (OUTPATIENT)
Dept: INTERNAL MEDICINE | Facility: CLINIC | Age: 68
End: 2018-05-31

## 2018-05-31 VITALS
OXYGEN SATURATION: 98 % | WEIGHT: 229 LBS | HEIGHT: 69 IN | HEART RATE: 80 BPM | SYSTOLIC BLOOD PRESSURE: 122 MMHG | BODY MASS INDEX: 33.92 KG/M2 | DIASTOLIC BLOOD PRESSURE: 70 MMHG

## 2018-05-31 DIAGNOSIS — G47.33 OSA (OBSTRUCTIVE SLEEP APNEA): Chronic | ICD-10-CM

## 2018-05-31 DIAGNOSIS — F32.A DEPRESSION, UNSPECIFIED DEPRESSION TYPE: ICD-10-CM

## 2018-05-31 DIAGNOSIS — E78.5 HYPERLIPIDEMIA, UNSPECIFIED HYPERLIPIDEMIA TYPE: ICD-10-CM

## 2018-05-31 DIAGNOSIS — I10 ESSENTIAL HYPERTENSION: ICD-10-CM

## 2018-05-31 DIAGNOSIS — K21.9 GASTROESOPHAGEAL REFLUX DISEASE WITHOUT ESOPHAGITIS: Primary | ICD-10-CM

## 2018-05-31 LAB
ALBUMIN SERPL-MCNC: 4.5 G/DL (ref 3.2–4.8)
ALBUMIN/GLOB SERPL: 1.5 G/DL (ref 1.5–2.5)
ALP SERPL-CCNC: 116 U/L (ref 25–100)
ALT SERPL W P-5'-P-CCNC: 38 U/L (ref 7–40)
ANION GAP SERPL CALCULATED.3IONS-SCNC: 10 MMOL/L (ref 3–11)
ARTICHOKE IGE QN: 39 MG/DL (ref 0–130)
AST SERPL-CCNC: 51 U/L (ref 0–33)
BILIRUB SERPL-MCNC: 0.4 MG/DL (ref 0.3–1.2)
BUN BLD-MCNC: 31 MG/DL (ref 9–23)
BUN/CREAT SERPL: 20.7 (ref 7–25)
CALCIUM SPEC-SCNC: 9 MG/DL (ref 8.7–10.4)
CHLORIDE SERPL-SCNC: 105 MMOL/L (ref 99–109)
CHOLEST SERPL-MCNC: 99 MG/DL (ref 0–200)
CO2 SERPL-SCNC: 22 MMOL/L (ref 20–31)
CREAT BLD-MCNC: 1.5 MG/DL (ref 0.6–1.3)
GFR SERPL CREATININE-BSD FRML MDRD: 46 ML/MIN/1.73
GLOBULIN UR ELPH-MCNC: 3.1 GM/DL
GLUCOSE BLD-MCNC: 185 MG/DL (ref 70–100)
HDLC SERPL-MCNC: 25 MG/DL (ref 40–60)
POTASSIUM BLD-SCNC: 4.4 MMOL/L (ref 3.5–5.5)
PROT SERPL-MCNC: 7.6 G/DL (ref 5.7–8.2)
SODIUM BLD-SCNC: 137 MMOL/L (ref 132–146)
TRIGL SERPL-MCNC: 380 MG/DL (ref 0–150)
TSH SERPL DL<=0.05 MIU/L-ACNC: 4.46 MIU/ML (ref 0.35–5.35)

## 2018-05-31 PROCEDURE — 36415 COLL VENOUS BLD VENIPUNCTURE: CPT | Performed by: INTERNAL MEDICINE

## 2018-05-31 PROCEDURE — 80061 LIPID PANEL: CPT | Performed by: INTERNAL MEDICINE

## 2018-05-31 PROCEDURE — 99214 OFFICE O/P EST MOD 30 MIN: CPT | Performed by: INTERNAL MEDICINE

## 2018-05-31 PROCEDURE — 80053 COMPREHEN METABOLIC PANEL: CPT | Performed by: INTERNAL MEDICINE

## 2018-05-31 PROCEDURE — 84443 ASSAY THYROID STIM HORMONE: CPT | Performed by: INTERNAL MEDICINE

## 2018-05-31 RX ORDER — DULOXETIN HYDROCHLORIDE 20 MG/1
20 CAPSULE, DELAYED RELEASE ORAL DAILY
Qty: 30 CAPSULE | Refills: 1 | Status: SHIPPED | OUTPATIENT
Start: 2018-05-31 | End: 2018-07-11

## 2018-05-31 RX ORDER — GABAPENTIN 300 MG/1
300 CAPSULE ORAL 3 TIMES DAILY
Qty: 90 CAPSULE | Refills: 2 | Status: SHIPPED | OUTPATIENT
Start: 2018-05-31 | End: 2018-09-21 | Stop reason: SDUPTHER

## 2018-06-08 ENCOUNTER — OFFICE VISIT (OUTPATIENT)
Dept: ENDOCRINOLOGY | Facility: CLINIC | Age: 68
End: 2018-06-08

## 2018-06-08 VITALS
OXYGEN SATURATION: 98 % | SYSTOLIC BLOOD PRESSURE: 134 MMHG | BODY MASS INDEX: 34.16 KG/M2 | DIASTOLIC BLOOD PRESSURE: 68 MMHG | HEART RATE: 63 BPM | WEIGHT: 228 LBS

## 2018-06-08 DIAGNOSIS — IMO0002 UNCONTROLLED TYPE 2 DIABETES MELLITUS WITH DIABETIC NEUROPATHY, WITH LONG-TERM CURRENT USE OF INSULIN: Primary | ICD-10-CM

## 2018-06-08 DIAGNOSIS — N18.30 CHRONIC KIDNEY DISEASE, STAGE III (MODERATE) (HCC): Chronic | ICD-10-CM

## 2018-06-08 DIAGNOSIS — E78.5 HYPERLIPIDEMIA, UNSPECIFIED HYPERLIPIDEMIA TYPE: Chronic | ICD-10-CM

## 2018-06-08 LAB
GLUCOSE BLDC GLUCOMTR-MCNC: 142 MG/DL (ref 70–130)
HBA1C MFR BLD: 8.2 %

## 2018-06-08 PROCEDURE — 82947 ASSAY GLUCOSE BLOOD QUANT: CPT | Performed by: INTERNAL MEDICINE

## 2018-06-08 PROCEDURE — 99214 OFFICE O/P EST MOD 30 MIN: CPT | Performed by: INTERNAL MEDICINE

## 2018-06-08 PROCEDURE — 83036 HEMOGLOBIN GLYCOSYLATED A1C: CPT | Performed by: INTERNAL MEDICINE

## 2018-06-08 RX ORDER — PEN NEEDLE, DIABETIC 30 GX5/16"
1 NEEDLE, DISPOSABLE MISCELLANEOUS 2 TIMES DAILY
Qty: 200 EACH | Refills: 3
Start: 2018-06-08 | End: 2019-08-15 | Stop reason: SDUPTHER

## 2018-06-08 NOTE — PROGRESS NOTES
Chief complaint  Type 2 Diabetes Mellitus (follow up)    Subjective   Elton Funez is a 68 y.o. male is here today for follow-up.  Diabetes mellitus type 2, uncontrolled dx in 2000  Diabetic complications: neuropathy.     Past meds: Metformin caused diarrhea in the past.   Eye care:no retinopathy.   Hypoglycemia: in the morning occasionally   Doesn't check glucose regularly.   Had hypoglycemia episode at night.   Nutrition: discussed carb consistent diet 45-60 gm carb per meal. Patient is not following any diet, eats a lot of potatoes.   Exercise: recommended 30 min per day exercise. He doesn't exercise because of tingling in the feet.   Foot care and dental care: discussed.    Medications: Bydureon, U-500 insulin.    U-500 insulin 135 units in am and 120 in the evening.        Diabetes   He presents for his follow-up diabetic visit. He has type 2 diabetes mellitus. No MedicAlert identification noted. The initial diagnosis of diabetes was made 15 years ago. Hypoglycemia symptoms include hunger, sweats and tremors. Pertinent negatives for hypoglycemia include no confusion, dizziness, headaches, mood changes, nervousness/anxiousness, pallor, seizures, sleepiness or speech difficulty. Associated symptoms include foot paresthesias. Pertinent negatives for diabetes include no blurred vision, no chest pain, no fatigue, no foot ulcerations, no polydipsia, no polyphagia, no polyuria, no visual change, no weakness and no weight loss. Hypoglycemia complications include nocturnal hypoglycemia. Pertinent negatives for hypoglycemia complications include no blackouts, no hospitalization, no required assistance and no required glucagon injection. Symptoms are worsening. Diabetic complications include impotence and peripheral neuropathy. Pertinent negatives for diabetic complications include no CVA, heart disease, nephropathy, PVD or retinopathy. Risk factors for coronary artery disease include dyslipidemia, family history,  hypertension, obesity and sedentary lifestyle. Current diabetic treatment includes insulin injections and oral agent (triple therapy). He is compliant with treatment most of the time. He is currently taking insulin pre-breakfast and at bedtime. Insulin injections are given by patient. Rotation sites for injection include the abdominal wall, arms and thighs. His weight is fluctuating minimally. He is following a low fiber diet. Meal planning includes avoidance of concentrated sweets. He has not had a previous visit with a dietitian. He rarely participates in exercise. He monitors blood glucose at home 1-2 x per day. He monitors urine at home <1 x per month. Blood glucose monitoring compliance is inadequate. His home blood glucose trend is fluctuating minimally. His breakfast blood glucose is taken between 5-6 am. His breakfast blood glucose range is generally 70-90 mg/dl. His dinner blood glucose is taken between 6-7 pm. His dinner blood glucose range is generally 140-180 mg/dl. His overall blood glucose range is 180-200 mg/dl. He sees a podiatrist.Eye exam is current.       Medications    Current Outpatient Prescriptions:   •  aspirin 81 MG tablet, Take 1 tablet by mouth Daily. Last dose 2-19-17 may resume when okay with Dr Perales (Patient taking differently: Take 81 mg by mouth Daily. LD 12/9/17 - no stents), Disp: , Rfl:   •  atenolol (TENORMIN) 50 MG tablet, TAKE ONE TABLET BY MOUTH DAILY, Disp: 90 tablet, Rfl: 1  •  Coenzyme Q10 (CO Q 10 PO), Take 300 mg by mouth Daily., Disp: , Rfl:   •  colestipol (COLESTID) 1 g tablet, TAKE 2 TABLETS TWICE A DAY, Disp: 360 tablet, Rfl: 1  •  cyclobenzaprine (FLEXERIL) 10 MG tablet, Take 10 mg by mouth At Night As Needed., Disp: , Rfl:   •  DULoxetine (CYMBALTA) 20 MG capsule, Take 1 capsule by mouth Daily., Disp: 30 capsule, Rfl: 1  •  exenatide er (BYDUREON BCISE) 2 MG/0.85ML auto-injector injection, Inject 0.85 mL under the skin 1 (One) Time Per Week., Disp: 12 pen, Rfl:  "1  •  Flaxseed, Linseed, (FLAXSEED OIL) 1200 MG capsule, Take 1 capsule by mouth 2 (Two) Times a Day. 1300 mg, Disp: , Rfl:   •  fluticasone (FLONASE) 50 MCG/ACT nasal spray, into each nostril As Needed for Allergies. Use 2 sprays in each nostril once daily as needed., Disp: , Rfl:   •  gabapentin (NEURONTIN) 300 MG capsule, Take 1 capsule by mouth 3 (Three) Times a Day., Disp: 90 capsule, Rfl: 2  •  gemfibrozil (LOPID) 600 MG tablet, TAKE 1 TABLET TWICE A DAY, Disp: 180 tablet, Rfl: 1  •  Insulin Regular Human, Conc, (HumuLIN R) 500 UNIT/ML solution pen-injector CONCENTRATED injection, 135 units in the morning and 120 units in the evening., Disp: 12 pen, Rfl: 3  •  lisinopril-hydrochlorothiazide (PRINZIDE,ZESTORETIC) 10-12.5 MG per tablet, TAKE ONE TABLET BY MOUTH DAILY, Disp: 90 tablet, Rfl: 0  •  meloxicam (MOBIC) 15 MG tablet, TAKE ONE TABLET BY MOUTH DAILY AS NEEDED, Disp: 90 tablet, Rfl: 0  •  Multiple Vitamins-Minerals (MULTIVITAMIN PO), Take 1 tablet by mouth Daily., Disp: , Rfl:   •  omeprazole (priLOSEC) 20 MG capsule, Take 1 capsule by mouth Daily., Disp: 90 capsule, Rfl: 1  •  ONE TOUCH ULTRA TEST test strip, USE TO TEST TWO TIMES A DAY AND AS NEEDED, Disp: 100 each, Rfl: 5  •  Probiotic Product (PROBIOTIC ADVANCED PO), Take 1 capsule by mouth Daily., Disp: , Rfl:   •  sertraline (ZOLOFT) 100 MG tablet, TAKE ONE TABLET BY MOUTH DAILY, Disp: 90 tablet, Rfl: 1  •  simvastatin (ZOCOR) 40 MG tablet, TAKE 1 TABLET DAILY AT BEDTIME, Disp: 90 tablet, Rfl: 0  •  topiramate (TOPAMAX) 25 MG tablet, Take 25 mg by mouth Every Night., Disp: , Rfl:   •  Insulin Pen Needle (PEN NEEDLES 3/16\") 31G X 5 MM misc, 1 each 2 (Two) Times a Day., Disp: 200 each, Rfl: 3    PMH  The following portions of the patient's history were reviewed and updated as appropriate: allergies, current medications, past family history, past medical history, past social history, past surgical history and problem list.    Review of systems  Review " of Systems   Constitutional: Negative for fatigue and weight loss.   HENT: Positive for nosebleeds (right sided).    Eyes: Negative for blurred vision.   Cardiovascular: Negative for chest pain.   Endocrine: Negative for polydipsia, polyphagia and polyuria.   Genitourinary: Positive for impotence.   Musculoskeletal: Positive for back pain.   Skin: Negative for pallor.   Neurological: Positive for tremors and numbness (tingling of both feet, progressively getting worse, ). Negative for dizziness, seizures, speech difficulty, weakness and headaches.   Psychiatric/Behavioral: Negative for confusion. The patient is not nervous/anxious.    All other systems reviewed and are negative.      Physical exam  Objective   Blood pressure 134/68, pulse 63, weight 103 kg (228 lb), SpO2 98 %.   Physical Exam   Constitutional: He is oriented to person, place, and time. He appears well-developed and well-nourished.   Eyes: Conjunctivae are normal.   Neck: No JVD present. Thyromegaly present.   Cardiovascular: Normal rate, regular rhythm and normal heart sounds.    Pulmonary/Chest: Effort normal and breath sounds normal.   Musculoskeletal: Normal range of motion. He exhibits no edema.   Lymphadenopathy:     He has no cervical adenopathy.   Neurological: He is alert and oriented to person, place, and time.   Skin: Skin is warm and dry. No rash noted.   Psychiatric: He has a normal mood and affect. Thought content normal.   Vitals reviewed.        LABS AND IMAGING  Results for orders placed or performed in visit on 06/08/18   POC Glucose Fingerstick   Result Value Ref Range    Glucose 142 (A) 70 - 130 mg/dL   POC Glycosylated Hemoglobin (Hb A1C)   Result Value Ref Range    Hemoglobin A1C 8.2 %       Lab Results   Component Value Date    HGBA1C 8.2 06/08/2018    HGBA1C 8.20 (H) 12/13/2017    HGBA1C 7.4 09/27/2017     Lab Results   Component Value Date    CREATININE 1.50 (H) 05/31/2018       Assessment  Assessment/Plan   1. Uncontrolled  type 2 diabetes mellitus with diabetic neuropathy, with long-term current use of insulin    2. Hyperlipidemia, unspecified hyperlipidemia type    3. Chronic kidney disease, stage III (moderate)      Orders Placed This Encounter   Procedures   • POC Glucose Fingerstick   • POC Glycosylated Hemoglobin (Hb A1C)     Plan  - U-500 insulin 135 and 115 Units.   -Bydureon weekly, instruction on new Bydureon.     There are no Patient Instructions on file for this visit.    Glucose goals reviewed and he achieved the goal glucose required for surgery. Hypoglycemia precautions reviewed and insulin titration instructions given.   Recent labs reviewed. LDL is at goal and renal function is stable.     Uptodate with eye exam.     Follow-up in 3 months with glucose log.

## 2018-06-19 RX ORDER — MONTELUKAST SODIUM 4 MG/1
TABLET, CHEWABLE ORAL
Qty: 360 TABLET | Refills: 1 | Status: SHIPPED | OUTPATIENT
Start: 2018-06-19 | End: 2019-01-22 | Stop reason: SDUPTHER

## 2018-07-03 ENCOUNTER — HOSPITAL ENCOUNTER (OUTPATIENT)
Dept: GENERAL RADIOLOGY | Facility: HOSPITAL | Age: 68
Discharge: HOME | End: 2018-07-03
Admitting: NURSE PRACTITIONER

## 2018-07-03 ENCOUNTER — OFFICE VISIT (OUTPATIENT)
Dept: INTERNAL MEDICINE | Facility: CLINIC | Age: 68
End: 2018-07-03

## 2018-07-03 VITALS
TEMPERATURE: 97.1 F | DIASTOLIC BLOOD PRESSURE: 68 MMHG | BODY MASS INDEX: 33.44 KG/M2 | RESPIRATION RATE: 16 BRPM | SYSTOLIC BLOOD PRESSURE: 122 MMHG | HEART RATE: 70 BPM | WEIGHT: 223.2 LBS

## 2018-07-03 DIAGNOSIS — R10.32 LLQ PAIN: Primary | ICD-10-CM

## 2018-07-03 DIAGNOSIS — R10.32 LLQ PAIN: ICD-10-CM

## 2018-07-03 DIAGNOSIS — K21.9 GASTROESOPHAGEAL REFLUX DISEASE, ESOPHAGITIS PRESENCE NOT SPECIFIED: ICD-10-CM

## 2018-07-03 DIAGNOSIS — R19.7 DIARRHEA, UNSPECIFIED TYPE: ICD-10-CM

## 2018-07-03 PROCEDURE — 74018 RADEX ABDOMEN 1 VIEW: CPT

## 2018-07-03 PROCEDURE — 99213 OFFICE O/P EST LOW 20 MIN: CPT | Performed by: NURSE PRACTITIONER

## 2018-07-03 NOTE — PATIENT INSTRUCTIONS
You were provided a stool collection kit today. Please collect your sample as explained and return the kit as soon as possible.

## 2018-07-03 NOTE — PROGRESS NOTES
Chief Complaint   Patient presents with   • Diarrhea     for past week         Subjective     History of Present Illness   Diarrhea one week in past diarrhea and heartburn with hot brash would happen at same time but this time he has had buurping up that resolved then diarrhea persisted.   LLQ to lower abdomen with pain and cramping. No pmh cdiff or diverticulitis did have h pylori     He has had egd and colonoscopy and noted gastritis    No fever decreased and bg not being checked because of low PO intake.  Eating soft foods. He will go a day or two then a blowout. He is having a lot of gas.      The following portions of the patient's history were reviewed and updated as appropriate: allergies, current medications, past family history, past medical history, past social history, past surgical history and problem list.    Review of Systems   Constitutional: Negative for activity change, appetite change, chills, fatigue and fever.   HENT: Negative for congestion, postnasal drip and sore throat.    Eyes: Negative for visual disturbance.   Respiratory: Negative for cough and shortness of breath.    Cardiovascular: Negative for chest pain, palpitations and leg swelling.   Gastrointestinal: Positive for diarrhea and indigestion. Negative for abdominal pain, constipation, nausea and GERD.   Musculoskeletal: Negative for arthralgias and myalgias.   Skin: Negative for rash.   Allergic/Immunologic: Negative for environmental allergies.   Neurological: Negative for dizziness.   Psychiatric/Behavioral: Negative for sleep disturbance.   All other systems reviewed and are negative.      Objective   Physical Exam   Constitutional: He is oriented to person, place, and time. He appears well-developed and well-nourished.   Neck: No thyromegaly present.   Cardiovascular: Normal rate, regular rhythm and intact distal pulses.    Pulmonary/Chest: Effort normal and breath sounds normal.   Abdominal: Soft. Bowel sounds are normal. He  exhibits no distension and no mass. There is tenderness. There is no rebound and no guarding. No hernia.   Mild tenderness to palpate left lower quadrant.   Musculoskeletal: He exhibits no edema.   Lymphadenopathy:     He has no cervical adenopathy.   Neurological: He is alert and oriented to person, place, and time.   Skin: Skin is warm and dry. Capillary refill takes 2 to 3 seconds.   Psychiatric: He has a normal mood and affect. His behavior is normal.   Nursing note and vitals reviewed.         1. Diarrhea--study for C. difficile fecal leukocytes Giardia stool culture and follow up on his available.  KUB noted normal readings per my read will call formal report patient.  We will check for H. pylori as well.  Follow-up Center symptoms don't begin to improve or worsen.  Viral gastroenteritis likely differential with exacerbated Jadiel.  2 GERD surgery 3. left lower quadrant pain  If symptoms persist consider CT of the abdomen and pelvis.  Increase prilosec to bid 3-5 d   Return if symptoms worsen or fail to improve.  RTC/call  If symptoms worsen  Meds MOA and SE's reviewed and pt v/u

## 2018-07-09 ENCOUNTER — LAB (OUTPATIENT)
Dept: INTERNAL MEDICINE | Facility: CLINIC | Age: 68
End: 2018-07-09

## 2018-07-09 DIAGNOSIS — R10.32 LLQ PAIN: ICD-10-CM

## 2018-07-09 DIAGNOSIS — K21.9 GASTROESOPHAGEAL REFLUX DISEASE, ESOPHAGITIS PRESENCE NOT SPECIFIED: ICD-10-CM

## 2018-07-09 LAB — WBC STL QL MICRO: NORMAL

## 2018-07-09 PROCEDURE — 87209 SMEAR COMPLEX STAIN: CPT | Performed by: NURSE PRACTITIONER

## 2018-07-09 PROCEDURE — 87338 HPYLORI STOOL AG IA: CPT | Performed by: NURSE PRACTITIONER

## 2018-07-09 PROCEDURE — 87205 SMEAR GRAM STAIN: CPT | Performed by: NURSE PRACTITIONER

## 2018-07-09 PROCEDURE — 87329 GIARDIA AG IA: CPT | Performed by: NURSE PRACTITIONER

## 2018-07-09 PROCEDURE — 87177 OVA AND PARASITES SMEARS: CPT | Performed by: NURSE PRACTITIONER

## 2018-07-10 LAB — G LAMBLIA AG STL QL IA: NEGATIVE

## 2018-07-11 ENCOUNTER — OFFICE VISIT (OUTPATIENT)
Dept: INTERNAL MEDICINE | Facility: CLINIC | Age: 68
End: 2018-07-11

## 2018-07-11 VITALS
RESPIRATION RATE: 16 BRPM | DIASTOLIC BLOOD PRESSURE: 78 MMHG | TEMPERATURE: 97.4 F | WEIGHT: 225 LBS | BODY MASS INDEX: 33.71 KG/M2 | HEART RATE: 72 BPM | SYSTOLIC BLOOD PRESSURE: 132 MMHG

## 2018-07-11 DIAGNOSIS — F32.A DEPRESSION, UNSPECIFIED DEPRESSION TYPE: Primary | ICD-10-CM

## 2018-07-11 LAB — H PYLORI AG STL QL IA: NEGATIVE

## 2018-07-11 PROCEDURE — 99213 OFFICE O/P EST LOW 20 MIN: CPT | Performed by: INTERNAL MEDICINE

## 2018-07-11 RX ORDER — DULOXETIN HYDROCHLORIDE 30 MG/1
30 CAPSULE, DELAYED RELEASE ORAL DAILY
Qty: 30 CAPSULE | Refills: 1 | Status: SHIPPED | OUTPATIENT
Start: 2018-07-11 | End: 2018-09-21 | Stop reason: SDUPTHER

## 2018-07-11 NOTE — PROGRESS NOTES
Chief Complaint   Patient presents with   • Follow-up     1 MONTH FOLLOW UP        History of Present Illness    The patient presents for follow-up of depression. He denies currently having depression symptoms. He denies suicidal ideation. His energy level is good. He denies agorophobia. He sleeps well. He is not tearful. He states that his current depression symptoms are stable. He is currently taking a medication. The current medication regimen consists of Cymbalta and sertraline. The patient denies having medication side effects including nausea, headaches, anxiety, increased depression or fatigue.    Review of Systems    CARDIOVASCULAR- Denies Chest Pain, Claudication, Edema, Syncope or Palpitations.    PSYCHIATRIC- Denies Depression, Anxiety, Loneliness or Hopelessness.    Medications      Current Outpatient Prescriptions:   •  aspirin 81 MG tablet, Take 1 tablet by mouth Daily. Last dose 2-19-17 may resume when okay with Dr Perales (Patient taking differently: Take 81 mg by mouth Daily. LD 12/9/17 - no stents), Disp: , Rfl:   •  atenolol (TENORMIN) 50 MG tablet, TAKE ONE TABLET BY MOUTH DAILY, Disp: 90 tablet, Rfl: 1  •  Coenzyme Q10 (CO Q 10 PO), Take 300 mg by mouth Daily., Disp: , Rfl:   •  colestipol (COLESTID) 1 g tablet, TAKE 2 TABLETS TWICE A DAY, Disp: 360 tablet, Rfl: 1  •  cyclobenzaprine (FLEXERIL) 10 MG tablet, Take 10 mg by mouth At Night As Needed., Disp: , Rfl:   •  DULoxetine (CYMBALTA) 20 MG capsule, Take 1 capsule by mouth Daily., Disp: 30 capsule, Rfl: 1  •  exenatide er (BYDUREON BCISE) 2 MG/0.85ML auto-injector injection, Inject 0.85 mL under the skin 1 (One) Time Per Week., Disp: 12 pen, Rfl: 1  •  Flaxseed, Linseed, (FLAXSEED OIL) 1200 MG capsule, Take 1 capsule by mouth 2 (Two) Times a Day. 1300 mg, Disp: , Rfl:   •  fluticasone (FLONASE) 50 MCG/ACT nasal spray, into each nostril As Needed for Allergies. Use 2 sprays in each nostril once daily as needed., Disp: , Rfl:   •  gabapentin  "(NEURONTIN) 300 MG capsule, Take 1 capsule by mouth 3 (Three) Times a Day., Disp: 90 capsule, Rfl: 2  •  gemfibrozil (LOPID) 600 MG tablet, TAKE 1 TABLET TWICE A DAY, Disp: 180 tablet, Rfl: 1  •  Insulin Pen Needle (PEN NEEDLES 3/16\") 31G X 5 MM misc, 1 each 2 (Two) Times a Day., Disp: 200 each, Rfl: 3  •  Insulin Regular Human, Conc, (HumuLIN R) 500 UNIT/ML solution pen-injector CONCENTRATED injection, 135 units in the morning and 120 units in the evening., Disp: 12 pen, Rfl: 3  •  lisinopril-hydrochlorothiazide (PRINZIDE,ZESTORETIC) 10-12.5 MG per tablet, TAKE ONE TABLET BY MOUTH DAILY, Disp: 90 tablet, Rfl: 0  •  meloxicam (MOBIC) 15 MG tablet, TAKE ONE TABLET BY MOUTH DAILY AS NEEDED, Disp: 90 tablet, Rfl: 0  •  Multiple Vitamins-Minerals (MULTIVITAMIN PO), Take 1 tablet by mouth Daily., Disp: , Rfl:   •  omeprazole (priLOSEC) 20 MG capsule, Take 1 capsule by mouth Daily. (Patient taking differently: Take 20 mg by mouth 2 (Two) Times a Day.), Disp: 90 capsule, Rfl: 1  •  ONE TOUCH ULTRA TEST test strip, USE TO TEST TWO TIMES A DAY AND AS NEEDED, Disp: 100 each, Rfl: 5  •  Probiotic Product (PROBIOTIC ADVANCED PO), Take 1 capsule by mouth Daily., Disp: , Rfl:   •  sertraline (ZOLOFT) 100 MG tablet, TAKE ONE TABLET BY MOUTH DAILY, Disp: 90 tablet, Rfl: 1  •  simvastatin (ZOCOR) 40 MG tablet, TAKE 1 TABLET DAILY AT BEDTIME, Disp: 90 tablet, Rfl: 0  •  topiramate (TOPAMAX) 25 MG tablet, Take 25 mg by mouth Every Night., Disp: , Rfl:      Allergies    Allergies   Allergen Reactions   • Glucophage Xr [Metformin Hcl Er] Diarrhea and Other (See Comments)     Glucophage XR TB24: Adverse Reaction: Severe GI issues.   • Metformin Nausea And Vomiting     Other reaction(s): SEVERE INTESTIONAL ISSUES  Other reaction(s): SEVERE GI ISSUES    Glucophage XR TB24  MetFORMIN HCl TABS   • Tetracyclines & Related Nausea Only     Adverse Reaction       Problem List    Patient Active Problem List   Diagnosis   • Chronic kidney disease, " stage III (moderate)   • Gastroesophageal reflux disease without esophagitis   • Hyperlipidemia   • Hypertension   • Trigger finger of right hand   • DM (diabetes mellitus), type 2, uncontrolled w/neurologic complication (CMS/HCC)   • Obesity, Class I, BMI 30-34.9   • Actinic keratosis   • FAVIO (obstructive sleep apnea)   • Prurigo nodularis   • History of depression   • History of migraine   • Cervical radiculopathy   • ALT (SGPT) level raised   • Elevated AST (SGOT)   • Uncontrolled type 2 diabetes mellitus with diabetic neuropathy, with long-term current use of insulin (CMS/[facility])   • Post-operative infection   • Leukocytosis   • Left hand weakness   • Initial Medicare annual wellness visit   • Herniated cervical disc       Medications, Allergies, Problems List and Past History were reviewed and updated.    Physical Examination    /78 (BP Location: Left arm, Patient Position: Sitting, Cuff Size: Adult)   Pulse 72   Temp 97.4 °F (36.3 °C) (Temporal Artery )   Resp 16   Wt 102 kg (225 lb)   BMI 33.71 kg/m²       Psychiatric Examination: The patient appears appropriate, clean and tidy with a level of consciousness that is appropriate and alert. The facial expression is appropriate and he makes good eye contact. The patient is talkative and the speech volume is appropriate. The words are articulated clearly with a normal flow. There are no circumlocutions and the patient does not paraphrase.       The mood is appropriate. There are no abnormal thought processes and the thought content is normal. There are no delusions or hallucinations noted.    Impression and Assessment    Major Depressive Disorder Single Episode Unspecified.    Plan    Major Depressive Disorder Single Episode Unspecified Plan: Will titrate cymbalta to 30 mg daily.    Elton was seen today for follow-up.    Diagnoses and all orders for this visit:    Depression, unspecified depression type  -     DULoxetine (CYMBALTA) 30 MG capsule; Take 1  capsule by mouth Daily.        Return to Office    The patient was instructed to return for follow-up in 1 month.    The patient was instructed to return sooner if the condition changes, worsens, or doesn't resolve.

## 2018-07-12 LAB
O+P SPEC MICRO: NORMAL
O+P STL TRI STN: NORMAL

## 2018-07-17 ENCOUNTER — TELEPHONE (OUTPATIENT)
Dept: INTERNAL MEDICINE | Facility: CLINIC | Age: 68
End: 2018-07-17

## 2018-07-17 NOTE — TELEPHONE ENCOUNTER
----- Message from Brianna Miranda sent at 7/17/2018  9:19 AM EDT -----  [phone]    PTS SYMPTOMS OF DIARRHEA STARTED BACK UP AGAIN TODAY.  WHAT CAN HE DO?    TEMI RAOMN

## 2018-07-18 NOTE — TELEPHONE ENCOUNTER
Patient states that he will return to clinic to  supplies to do the stool culture and will collect it.

## 2018-07-23 ENCOUNTER — CONSULT (OUTPATIENT)
Dept: SLEEP MEDICINE | Facility: HOSPITAL | Age: 68
End: 2018-07-23

## 2018-07-23 VITALS
DIASTOLIC BLOOD PRESSURE: 73 MMHG | SYSTOLIC BLOOD PRESSURE: 139 MMHG | HEIGHT: 69 IN | BODY MASS INDEX: 32.85 KG/M2 | WEIGHT: 221.8 LBS | OXYGEN SATURATION: 96 % | HEART RATE: 77 BPM

## 2018-07-23 DIAGNOSIS — G47.33 OSA (OBSTRUCTIVE SLEEP APNEA): Primary | ICD-10-CM

## 2018-07-23 DIAGNOSIS — G47.21 CIRCADIAN RHYTHM SLEEP DISORDER, DELAYED SLEEP PHASE TYPE: ICD-10-CM

## 2018-07-23 DIAGNOSIS — E66.9 OBESITY, CLASS I, BMI 30-34.9: ICD-10-CM

## 2018-07-23 PROCEDURE — 99204 OFFICE O/P NEW MOD 45 MIN: CPT | Performed by: INTERNAL MEDICINE

## 2018-07-24 NOTE — PROGRESS NOTES
"Subjective   Elton Funez is a 68 y.o. male is being seen for consultation today at the request of Tc Ramirez MD for the evaluation of Difficulties getting to sleep and staying asleep.    History of Present Illness  Patient complains of having \"his days and nights mixed up\" he says he sleeps during the day and stays up at night.  He often doesn't go to sleep until 3 or 4  AM.  He'll nap later in the day.he does some contract computer training.  His next job requires him to keep more standard hours and he wants to change his schedule.he also had a sleep study in 1998 and was told he had sleep apnea.  He has been on CPAP since then.  He had a repeat study in 2013.  He says his pressure is on an 18 cm.  He says machine is working well.  He can't sleep without it.  He does have occasional mask leak.  He's using a fullface mask.  He denies feeling sleepy on awakening he uses it.    With the mask he has no snoring and feels rested.  He has reflux symptoms on medications.  He denies hypnagogic hallucinations sleep paralysis or cataplexy.  He denies kicking or jerking his legs at night.  He says his weight is up about 10 pounds in the past year.    He states he arises between 8:30 and 10 AM.  He will then have breakfast and does computer work at home until he has lunch between noon and 1 PM.  He again works on the computer until about 5 PM he may taken nap then for 2-3 hours and then has dinner at 7 or 8 PM.  He then watches TV or is on his computer in the evenings until he goes to bed at 2 AM to 4 AM.  To sleep fairly quickly.he thinks he awakens about once during the night.  He thinks he gets about 6 hours of sleep  Allergies   Allergen Reactions   • Glucophage Xr [Metformin Hcl Er] Diarrhea and Other (See Comments)     Glucophage XR TB24: Adverse Reaction: Severe GI issues.   • Metformin Nausea And Vomiting     Other reaction(s): SEVERE INTESTIONAL ISSUES  Other reaction(s): SEVERE GI ISSUES    Glucophage XR " "TB24  MetFORMIN HCl TABS   • Tetracyclines & Related Nausea Only     Adverse Reaction          Current Outpatient Prescriptions:   •  aspirin 81 MG tablet, Take 1 tablet by mouth Daily. Last dose 2-19-17 may resume when okay with Dr Perales (Patient taking differently: Take 81 mg by mouth Daily. LD 12/9/17 - no stents), Disp: , Rfl:   •  atenolol (TENORMIN) 50 MG tablet, TAKE ONE TABLET BY MOUTH DAILY, Disp: 90 tablet, Rfl: 1  •  Coenzyme Q10 (CO Q 10 PO), Take 300 mg by mouth Daily., Disp: , Rfl:   •  colestipol (COLESTID) 1 g tablet, TAKE 2 TABLETS TWICE A DAY, Disp: 360 tablet, Rfl: 1  •  cyclobenzaprine (FLEXERIL) 10 MG tablet, Take 10 mg by mouth At Night As Needed., Disp: , Rfl:   •  DULoxetine (CYMBALTA) 30 MG capsule, Take 1 capsule by mouth Daily., Disp: 30 capsule, Rfl: 1  •  exenatide er (BYDUREON BCISE) 2 MG/0.85ML auto-injector injection, Inject 0.85 mL under the skin 1 (One) Time Per Week., Disp: 12 pen, Rfl: 1  •  Flaxseed, Linseed, (FLAXSEED OIL) 1200 MG capsule, Take 1 capsule by mouth 2 (Two) Times a Day. 1300 mg, Disp: , Rfl:   •  fluticasone (FLONASE) 50 MCG/ACT nasal spray, into each nostril As Needed for Allergies. Use 2 sprays in each nostril once daily as needed., Disp: , Rfl:   •  gabapentin (NEURONTIN) 300 MG capsule, Take 1 capsule by mouth 3 (Three) Times a Day., Disp: 90 capsule, Rfl: 2  •  gemfibrozil (LOPID) 600 MG tablet, TAKE 1 TABLET TWICE A DAY, Disp: 180 tablet, Rfl: 1  •  Insulin Pen Needle (PEN NEEDLES 3/16\") 31G X 5 MM misc, 1 each 2 (Two) Times a Day., Disp: 200 each, Rfl: 3  •  Insulin Regular Human, Conc, (HumuLIN R) 500 UNIT/ML solution pen-injector CONCENTRATED injection, 135 units in the morning and 120 units in the evening., Disp: 12 pen, Rfl: 3  •  lisinopril-hydrochlorothiazide (PRINZIDE,ZESTORETIC) 10-12.5 MG per tablet, TAKE ONE TABLET BY MOUTH DAILY, Disp: 90 tablet, Rfl: 0  •  meloxicam (MOBIC) 15 MG tablet, TAKE ONE TABLET BY MOUTH DAILY AS NEEDED, Disp: 90 tablet, " Rfl: 0  •  Multiple Vitamins-Minerals (MULTIVITAMIN PO), Take 1 tablet by mouth Daily., Disp: , Rfl:   •  omeprazole (priLOSEC) 20 MG capsule, Take 1 capsule by mouth Daily. (Patient taking differently: Take 20 mg by mouth 2 (Two) Times a Day.), Disp: 90 capsule, Rfl: 1  •  ONE TOUCH ULTRA TEST test strip, USE TO TEST TWO TIMES A DAY AND AS NEEDED, Disp: 100 each, Rfl: 5  •  Probiotic Product (PROBIOTIC ADVANCED PO), Take 1 capsule by mouth Daily., Disp: , Rfl:   •  sertraline (ZOLOFT) 100 MG tablet, TAKE ONE TABLET BY MOUTH DAILY, Disp: 90 tablet, Rfl: 1  •  simvastatin (ZOCOR) 40 MG tablet, TAKE 1 TABLET DAILY AT BEDTIME, Disp: 90 tablet, Rfl: 0  •  topiramate (TOPAMAX) 25 MG tablet, Take 25 mg by mouth Every Night., Disp: , Rfl:     Smoking status: Former Smoker                                                              Packs/day: 1.00      Years: 5.00         Types: Cigarettes     Quit date: 1976  Smokeless tobacco: Never Used                           History   Alcohol Use   • Yes     Comment: Socially  He estimates one drink per week       Caffeine: He drinks 2 decaf teas per day and may have one cola per week    Past Medical History:   Diagnosis Date   • Abscess of arm, right    • Abscess of skin of neck    • Acute sinusitis    • Allergic Childhood,  Adult    Ragweed. 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    • GERD (gastroesophageal reflux disease)    • HL (hearing loss)    •  Hyperlipidemia    • Hypertension    • Infection     MSSA lumbar surgical wound infection 3/2017   • Jaw pain    • Left hip pain    • Low back pain     Surgery 29 yrs L4-5   • Migraine     Hx of   • Obesity (BMI 30.0-34.9) 10/7/2016    BMI 31.92   • Obstructive sleep apnea     Hx of   • Quadriceps muscle strain    • Shigellosis    • Sleep apnea     PT TO BRING MASK AND TUBING DAY OF SURGERY   • 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    • Wears glasses    • Wears hearing aid     BOTH EARS   • Wears hearing aid     bilat ears       Past Surgical History:   Procedure Laterality Date   • ANTERIOR CERVICAL DISCECTOMY W/ FUSION N/A 12/14/2017    Procedure: CERVICAL DISCECTOMY ANTERIOR FUSION WITH INSTRUMENTATION C7/T1;  Surgeon: Gigi Perales MD;  Location:  [facility];  Service:    • BACK SURGERY     • CERVICAL ARTHRODESIS     • CERVICAL DISCECTOMY POSTERIOR FUSION WITH INSTRUMENTATION N/A 3/16/2017    Procedure:  INCISION AND DRAINAGE OF POSTERIOR CERVICAL WOUND;  Surgeon: Gigi Perales MD;  Location:  [facility];  Service:    • CERVICAL LAMINECTOMY DECOMPRESSION POSTERIOR Left 2/28/2017    Procedure: Left C7-T1 CERVICAL FORAMINOTOMY POSTERIOR WITH METRIX;  Surgeon: Gigi Perales MD;  Location:  [facility];  Service:    • COLONOSCOPY      2012   • ENDOSCOPY     • SPINE SURGERY  29 yrs L4-5    9 yrs C6-7 fused   • VASECTOMY     • WISDOM TOOTH EXTRACTION         Family History   Problem Relation Age of Onset   • Diabetes Father    • Heart disease Father    • Diabetes Paternal Grandmother    • Hearing loss Mother    • Arthritis Mother    • Cancer Mother        The following portions of the patient's history were reviewed and updated as appropriate: allergies, current medications, past family history, past medical history, past social history, past surgical history and problem list.    Review of Systems   Constitutional: Negative.    HENT: Positive for hearing loss, postnasal drip, sinus  "pressure and tinnitus.    Eyes: Negative.    Respiratory: Negative.    Cardiovascular: Negative.    Gastrointestinal: Positive for diarrhea.        He complains of change in bowel habits problems swallowing and frequent heartburn   Endocrine: Positive for polydipsia.   Genitourinary: Negative.    Musculoskeletal: Positive for gait problem and myalgias.   Skin: Negative.    Allergic/Immunologic: Negative.    Neurological: Positive for numbness.   Hematological: Negative.    Psychiatric/Behavioral: Positive for dysphoric mood.   [place] scores 15/24    Objective     /73   Pulse 77   Ht 175.3 cm (69\")   Wt 101 kg (221 lb 12.8 oz)   SpO2 96%   BMI 32.75 kg/m²      Physical Exam   Constitutional: He is oriented to person, place, and time. He appears well-developed and well-nourished.   He is obese.   HENT:   Head: Normocephalic and atraumatic.   He has nasal airway narrowing and Mallampati class to anatomy.   Eyes: Pupils are equal, round, and reactive to light. EOM are normal.   Neck: Normal range of motion.   Cardiovascular: Normal rate, regular rhythm and normal heart sounds.    Pulmonary/Chest: Effort normal and breath sounds normal.   Abdominal: Soft.   Musculoskeletal: Normal range of motion. He exhibits no edema.   Neurological: He is alert and oriented to person, place, and time.   Skin: Skin is warm and dry.   Psychiatric: He has a normal mood and affect. His behavior is normal.   patient is sleep study in November 2013 and titrated to CPAP of 18.  His weight at that time was 223 pounds.    Download from his machine for the past 6 months shows used it percent of the time.  He's used it 8 hours 28 minutes per day.  His CPAP is 18.  His AHI is normal at 0.6.      Assessment/Plan   Elton was seen today for sleeping problem.    Diagnoses and all orders for this visit:    FAVIO (obstructive sleep apnea)  -     Generic Auto PAP Order    Obesity, Class I, BMI 30-34.9    Circadian rhythm sleep disorder, delayed " sleep phase type    patient does have sleep apnea and has his respiratory events under good control with his current pressure settings.  He is happy with his machine we will renew his supplies.we'll continue on these pressures.  He is encouraged to lose weight.  He is encouraged to avoid alcohol and sedatives close to bedtime.  He is encouraged practice lateral position sleep.    The patient also has delayed sleep phase syndrome.  We've discussed measures to improve his sleep hygiene and  methods he can employ to try to advance his bedtime and rise time.  He is to work on these measures.we will plan to see him back in 4 months and we'll reassess situation.  He is to contact us earlier symptoms worsen.         Gelacio Mo MD [facility]  Sleep Medicine  Pulmonary and Critical Care Medicine

## 2018-07-30 ENCOUNTER — TELEPHONE (OUTPATIENT)
Dept: INTERNAL MEDICINE | Facility: CLINIC | Age: 68
End: 2018-07-30

## 2018-07-30 RX ORDER — ATENOLOL 50 MG/1
TABLET ORAL
Qty: 90 TABLET | Refills: 0 | Status: SHIPPED | OUTPATIENT
Start: 2018-07-30 | End: 2018-10-29 | Stop reason: SDUPTHER

## 2018-07-30 NOTE — TELEPHONE ENCOUNTER
----- Message from Josiane Mariano sent at 7/30/2018  2:01 PM EDT -----  Contact: SELF  KERVIN GLOVER HAD A 1M FUP SCHEDULED FOR 8/7/18 BUT IS UNABLE TO MAKE THAT. HE WANTS TO KNOW IF YOU COULD FIT HIM IN SOMETIME ON 8/13/18 OR 8/23/18. HE CAN BE REACHED -[phone]

## 2018-08-06 ENCOUNTER — TELEPHONE (OUTPATIENT)
Dept: INTERNAL MEDICINE | Facility: CLINIC | Age: 68
End: 2018-08-06

## 2018-08-06 RX ORDER — OMEPRAZOLE 20 MG/1
20 CAPSULE, DELAYED RELEASE ORAL 2 TIMES DAILY
Qty: 180 CAPSULE | Refills: 1 | Status: SHIPPED | OUTPATIENT
Start: 2018-08-06 | End: 2018-08-16 | Stop reason: SDUPTHER

## 2018-08-06 NOTE — TELEPHONE ENCOUNTER
Please call in 6 month supply (Either 1 month with RF 5 or 3 months with RF 1).  Tc Ramirez MD  1:08 PM  08/06/18

## 2018-08-06 NOTE — TELEPHONE ENCOUNTER
----- Message from Brianna Miranda sent at 8/6/2018  9:48 AM EDT -----  WIFE-BETITO GLOVERXYAEAMN-[phone]    NEEDS REFILL OF OMEPRAZOLE 20MG 2XDAY.  THIS HAS BEEN INCREASED TO 2X DAY AND HE WAS ONLY TAKING IT 1X DAY.    TEMI RAMON

## 2018-08-07 ENCOUNTER — PRIOR AUTHORIZATION (OUTPATIENT)
Dept: INTERNAL MEDICINE | Facility: CLINIC | Age: 68
End: 2018-08-07

## 2018-08-16 RX ORDER — OMEPRAZOLE 40 MG/1
40 CAPSULE, DELAYED RELEASE ORAL DAILY
Qty: 90 CAPSULE | Refills: 1 | Status: SHIPPED | OUTPATIENT
Start: 2018-08-16 | End: 2018-08-20

## 2018-08-19 ENCOUNTER — TELEPHONE (OUTPATIENT)
Dept: INTERNAL MEDICINE | Facility: CLINIC | Age: 68
End: 2018-08-19

## 2018-08-20 RX ORDER — OMEPRAZOLE 40 MG/1
40 CAPSULE, DELAYED RELEASE ORAL DAILY
Qty: 90 CAPSULE | Refills: 1 | Status: SHIPPED | OUTPATIENT
Start: 2018-08-20 | End: 2019-02-20 | Stop reason: SDUPTHER

## 2018-08-20 RX ORDER — OMEPRAZOLE 40 MG/1
40 CAPSULE, DELAYED RELEASE ORAL DAILY
Qty: 30 CAPSULE | Refills: 5 | Status: SHIPPED | OUTPATIENT
Start: 2018-08-20 | End: 2018-08-20 | Stop reason: SDUPTHER

## 2018-08-20 RX ORDER — SIMVASTATIN 40 MG
TABLET ORAL
Qty: 90 TABLET | Refills: 0 | Status: SHIPPED | OUTPATIENT
Start: 2018-08-20 | End: 2018-10-20 | Stop reason: SDUPTHER

## 2018-08-20 NOTE — TELEPHONE ENCOUNTER
----- Message from Melonie Cornelius sent at 8/16/2018  3:54 PM EDT -----  PATIENT'S WIFE DROPPED FORM OFF STATED THAT OMEPRAZOLE WAS DECLINED BY [facility]. SHE STATED THAT IF IT WAS JUST 1 40 MG TABLET A DAY IT WOULD BE APPROVED.     PLEASE CALL PATIENT AT : [phone]    THANK YOU

## 2018-08-20 NOTE — TELEPHONE ENCOUNTER
Rx only available in system as capsules. I added note to pharmacist that per physician ok to change to tablets. Sent to [facility], called and cancelled as Rx needed to go to [facility]. Resent [facility] to [facility].

## 2018-08-23 ENCOUNTER — OFFICE VISIT (OUTPATIENT)
Dept: INTERNAL MEDICINE | Facility: CLINIC | Age: 68
End: 2018-08-23

## 2018-08-23 VITALS
HEART RATE: 64 BPM | WEIGHT: 225 LBS | BODY MASS INDEX: 33.23 KG/M2 | TEMPERATURE: 97.1 F | DIASTOLIC BLOOD PRESSURE: 76 MMHG | RESPIRATION RATE: 18 BRPM | SYSTOLIC BLOOD PRESSURE: 128 MMHG

## 2018-08-23 DIAGNOSIS — I10 ESSENTIAL HYPERTENSION: Primary | ICD-10-CM

## 2018-08-23 DIAGNOSIS — E11.8 TYPE 2 DIABETES MELLITUS WITH COMPLICATION, WITH LONG-TERM CURRENT USE OF INSULIN (HCC): ICD-10-CM

## 2018-08-23 DIAGNOSIS — K21.9 GASTROESOPHAGEAL REFLUX DISEASE WITHOUT ESOPHAGITIS: ICD-10-CM

## 2018-08-23 DIAGNOSIS — Z79.4 TYPE 2 DIABETES MELLITUS WITH COMPLICATION, WITH LONG-TERM CURRENT USE OF INSULIN (HCC): ICD-10-CM

## 2018-08-23 DIAGNOSIS — E78.5 HYPERLIPIDEMIA, UNSPECIFIED HYPERLIPIDEMIA TYPE: ICD-10-CM

## 2018-08-23 LAB
ALBUMIN SERPL-MCNC: 4.3 G/DL (ref 3.2–4.8)
ALBUMIN/GLOB SERPL: 1.4 G/DL (ref 1.5–2.5)
ALP SERPL-CCNC: 90 U/L (ref 25–100)
ALT SERPL W P-5'-P-CCNC: 60 U/L (ref 7–40)
ANION GAP SERPL CALCULATED.3IONS-SCNC: 10 MMOL/L (ref 3–11)
ARTICHOKE IGE QN: 63 MG/DL (ref 0–130)
AST SERPL-CCNC: 72 U/L (ref 0–33)
BILIRUB SERPL-MCNC: 0.7 MG/DL (ref 0.3–1.2)
BUN BLD-MCNC: 26 MG/DL (ref 9–23)
BUN/CREAT SERPL: 17.7 (ref 7–25)
CALCIUM SPEC-SCNC: 9 MG/DL (ref 8.7–10.4)
CHLORIDE SERPL-SCNC: 102 MMOL/L (ref 99–109)
CHOLEST SERPL-MCNC: 113 MG/DL (ref 0–200)
CO2 SERPL-SCNC: 22 MMOL/L (ref 20–31)
CREAT BLD-MCNC: 1.47 MG/DL (ref 0.6–1.3)
GFR SERPL CREATININE-BSD FRML MDRD: 47 ML/MIN/1.73
GLOBULIN UR ELPH-MCNC: 3 GM/DL
GLUCOSE BLD-MCNC: 370 MG/DL (ref 70–100)
HDLC SERPL-MCNC: 31 MG/DL (ref 40–60)
POTASSIUM BLD-SCNC: 4.8 MMOL/L (ref 3.5–5.5)
PROT SERPL-MCNC: 7.3 G/DL (ref 5.7–8.2)
SODIUM BLD-SCNC: 134 MMOL/L (ref 132–146)
TRIGL SERPL-MCNC: 156 MG/DL (ref 0–150)

## 2018-08-23 PROCEDURE — 80061 LIPID PANEL: CPT | Performed by: INTERNAL MEDICINE

## 2018-08-23 PROCEDURE — 80053 COMPREHEN METABOLIC PANEL: CPT | Performed by: INTERNAL MEDICINE

## 2018-08-23 PROCEDURE — 36415 COLL VENOUS BLD VENIPUNCTURE: CPT | Performed by: INTERNAL MEDICINE

## 2018-08-23 PROCEDURE — 99214 OFFICE O/P EST MOD 30 MIN: CPT | Performed by: INTERNAL MEDICINE

## 2018-08-23 RX ORDER — LISINOPRIL AND HYDROCHLOROTHIAZIDE 12.5; 1 MG/1; MG/1
TABLET ORAL
Qty: 90 TABLET | Refills: 0 | Status: SHIPPED | OUTPATIENT
Start: 2018-08-23 | End: 2018-12-18 | Stop reason: SDUPTHER

## 2018-08-23 NOTE — PATIENT INSTRUCTIONS
Fall Prevention in the Home  Falls can cause injuries. They can happen to people of all ages. There are many things you can do to make your home safe and to help prevent falls.  What can I do on the outside of my home?  · Regularly fix the edges of walkways and driveways and fix any cracks.  · Remove anything that might make you trip as you walk through a door, such as a raised step or threshold.  · Trim any bushes or trees on the path to your home.  · Use bright outdoor lighting.  · Clear any walking paths of anything that might make someone trip, such as rocks or tools.  · Regularly check to see if handrails are loose or broken. Make sure that both sides of any steps have handrails.  · Any raised decks and porches should have guardrails on the edges.  · Have any leaves, snow, or ice cleared regularly.  · Use sand or salt on walking paths during winter.  · Clean up any spills in your garage right away. This includes oil or grease spills.  What can I do in the bathroom?  · Use night lights.  · Install grab bars by the toilet and in the tub and shower. Do not use towel bars as grab bars.  · Use non-skid mats or decals in the tub or shower.  · If you need to sit down in the shower, use a plastic, non-slip stool.  · Keep the floor dry. Clean up any water that spills on the floor as soon as it happens.  · Remove soap buildup in the tub or shower regularly.  · Attach bath mats securely with double-sided non-slip rug tape.  · Do not have throw rugs and other things on the floor that can make you trip.  What can I do in the bedroom?  · Use night lights.  · Make sure that you have a light by your bed that is easy to reach.  · Do not use any sheets or blankets that are too big for your bed. They should not hang down onto the floor.  · Have a firm chair that has side arms. You can use this for support while you get dressed.  · Do not have throw rugs and other things on the floor that can make you trip.  What can I do in the  kitchen?  · Clean up any spills right away.  · Avoid walking on wet floors.  · Keep items that you use a lot in easy-to-reach places.  · If you need to reach something above you, use a strong step stool that has a grab bar.  · Keep electrical cords out of the way.  · Do not use floor polish or wax that makes floors slippery. If you must use wax, use non-skid floor wax.  · Do not have throw rugs and other things on the floor that can make you trip.  What can I do with my stairs?  · Do not leave any items on the stairs.  · Make sure that there are handrails on both sides of the stairs and use them. Fix handrails that are broken or loose. Make sure that handrails are as long as the stairways.  · Check any carpeting to make sure that it is firmly attached to the stairs. Fix any carpet that is loose or worn.  · Avoid having throw rugs at the top or bottom of the stairs. If you do have throw rugs, attach them to the floor with carpet tape.  · Make sure that you have a light switch at the top of the stairs and the bottom of the stairs. If you do not have them, ask someone to add them for you.  What else can I do to help prevent falls?  · Wear shoes that:  ? Do not have high heels.  ? Have rubber bottoms.  ? Are comfortable and fit you well.  ? Are closed at the toe. Do not wear sandals.  · If you use a stepladder:  ? Make sure that it is fully opened. Do not climb a closed stepladder.  ? Make sure that both sides of the stepladder are locked into place.  ? Ask someone to hold it for you, if possible.  · Clearly caio and make sure that you can see:  ? Any grab bars or handrails.  ? First and last steps.  ? Where the edge of each step is.  · Use tools that help you move around (mobility aids) if they are needed. These include:  ? Canes.  ? Walkers.  ? Scooters.  ? Crutches.  · Turn on the lights when you go into a dark area. Replace any light bulbs as soon as they burn out.  · Set up your furniture so you have a clear path.  Avoid moving your furniture around.  · If any of your floors are uneven, fix them.  · If there are any pets around you, be aware of where they are.  · Review your medicines with your doctor. Some medicines can make you feel dizzy. This can increase your chance of falling.  Ask your doctor what other things that you can do to help prevent falls.  This information is not intended to replace advice given to you by your health care provider. Make sure you discuss any questions you have with your health care provider.  Document Released: 10/14/2010 Document Revised: 05/25/2017 Document Reviewed: 01/22/2016  [employer]vier Interactive Patient Education © 2018 [employer]

## 2018-08-23 NOTE — PROGRESS NOTES
Chief Complaint   Patient presents with   • Follow-up     FOLLOW UP CHRONIC MEDICAL PROBLEMS       History of Present Illness      The patient presents for a follow-up related to hyperlipidemia. He is following a low fat diet. He reports that he is exercising. He is taking gemfibrozil and simvastatin. The patient is taking his medication as prescribed. He reports no medication side effects, including muscle cramps, abdominal pain, headaches or weakness. He denies chest pain, shortness of breath, orthopnea, paroxysmal nocturnal dyspnea, dyspnea on exertion, edema, palpitations or syncope.    The patient presents for a follow-up related to hypertension. The patient reports that he has had no headaches or blurred vision. He states that he is taking his medication as prescribed. He is not having medication side effects.    The patient presents for a follow-up related to GERD. The patient is on omeprazole for his gastroesophageal reflux. The medication is taken on a regular basis and gives complete relief of the symptoms. He reports no abdominal pain, belching, diarrhea, dysphagia, early satiety, heartburn, hoarseness, nausea, odynophagia, rectal bleeding, vomiting or weight loss. The GERD has no known aggravating factors.    The patient presents for a follow-up related to Type 2 Diabetes Mellitus and reports that he checks his blood sugars at home. Followed by endocrinology.    Review of Systems    GENERAL/CONSTITUTIONAL- Denies Fever, Chills, Sweats, Fatigue, Weakness or Malaise.    CARDIOVASCULAR- Denies Claudication.    GASTROINTESTINAL- Denies Constipation.    PULMONARY- Denies Wheezing, Sputum Production, Cough, Hemoptysis or Pleuritic Chest Pain.    Medications      Current Outpatient Prescriptions:   •  aspirin 81 MG tablet, Take 1 tablet by mouth Daily. Last dose 2-19-17 may resume when okay with Dr Perales (Patient taking differently: Take 81 mg by mouth Daily. LD 12/9/17 - no stents), Disp: , Rfl:   •  atenolol  "(TENORMIN) 50 MG tablet, TAKE ONE TABLET BY MOUTH DAILY, Disp: 90 tablet, Rfl: 0  •  Coenzyme Q10 (CO Q 10 PO), Take 300 mg by mouth Daily., Disp: , Rfl:   •  colestipol (COLESTID) 1 g tablet, TAKE 2 TABLETS TWICE A DAY, Disp: 360 tablet, Rfl: 1  •  cyclobenzaprine (FLEXERIL) 10 MG tablet, Take 10 mg by mouth At Night As Needed., Disp: , Rfl:   •  DULoxetine (CYMBALTA) 30 MG capsule, Take 1 capsule by mouth Daily., Disp: 30 capsule, Rfl: 1  •  exenatide er (BYDUREON BCISE) 2 MG/0.85ML auto-injector injection, Inject 0.85 mL under the skin 1 (One) Time Per Week., Disp: 12 pen, Rfl: 1  •  Flaxseed, Linseed, (FLAXSEED OIL) 1200 MG capsule, Take 1 capsule by mouth 2 (Two) Times a Day. 1300 mg, Disp: , Rfl:   •  fluticasone (FLONASE) 50 MCG/ACT nasal spray, into each nostril As Needed for Allergies. Use 2 sprays in each nostril once daily as needed., Disp: , Rfl:   •  gabapentin (NEURONTIN) 300 MG capsule, Take 1 capsule by mouth 3 (Three) Times a Day., Disp: 90 capsule, Rfl: 2  •  gemfibrozil (LOPID) 600 MG tablet, TAKE 1 TABLET TWICE A DAY, Disp: 180 tablet, Rfl: 1  •  Insulin Pen Needle (PEN NEEDLES 3/16\") 31G X 5 MM misc, 1 each 2 (Two) Times a Day., Disp: 200 each, Rfl: 3  •  Insulin Regular Human, Conc, (HumuLIN R) 500 UNIT/ML solution pen-injector CONCENTRATED injection, 135 units in the morning and 120 units in the evening., Disp: 12 pen, Rfl: 3  •  lisinopril-hydrochlorothiazide (PRINZIDE,ZESTORETIC) 10-12.5 MG per tablet, TAKE ONE TABLET BY MOUTH DAILY, Disp: 90 tablet, Rfl: 0  •  meloxicam (MOBIC) 15 MG tablet, TAKE ONE TABLET BY MOUTH DAILY AS NEEDED, Disp: 90 tablet, Rfl: 0  •  Multiple Vitamins-Minerals (MULTIVITAMIN PO), Take 1 tablet by mouth Daily., Disp: , Rfl:   •  omeprazole (priLOSEC) 40 MG capsule, Take 1 capsule by mouth Daily., Disp: 90 capsule, Rfl: 1  •  ONE TOUCH ULTRA TEST test strip, USE TO TEST TWO TIMES A DAY AND AS NEEDED, Disp: 100 each, Rfl: 5  •  Probiotic Product (PROBIOTIC ADVANCED " PO), Take 1 capsule by mouth Daily., Disp: , Rfl:   •  sertraline (ZOLOFT) 100 MG tablet, TAKE ONE TABLET BY MOUTH DAILY, Disp: 90 tablet, Rfl: 1  •  simvastatin (ZOCOR) 40 MG tablet, TAKE 1 TABLET DAILY AT BEDTIME, Disp: 90 tablet, Rfl: 0  •  topiramate (TOPAMAX) 25 MG tablet, Take 25 mg by mouth Every Night., Disp: , Rfl:      Allergies    Allergies   Allergen Reactions   • Glucophage Xr [Metformin Hcl Er] Diarrhea and Other (See Comments)     Glucophage XR TB24: Adverse Reaction: Severe GI issues.   • Metformin Nausea And Vomiting     Other reaction(s): SEVERE INTESTIONAL ISSUES  Other reaction(s): SEVERE GI ISSUES    Glucophage XR TB24  MetFORMIN HCl TABS   • Tetracyclines & Related Nausea Only     Adverse Reaction       Problem List    Patient Active Problem List   Diagnosis   • Chronic kidney disease, stage III (moderate)   • Gastroesophageal reflux disease without esophagitis   • Hyperlipidemia   • Hypertension   • Trigger finger of right hand   • DM (diabetes mellitus), type 2, uncontrolled w/neurologic complication (CMS/HCC)   • Obesity, Class I, BMI 30-34.9   • Actinic keratosis   • FAVIO (obstructive sleep apnea)   • Prurigo nodularis   • History of depression   • History of migraine   • Cervical radiculopathy   • ALT (SGPT) level raised   • Elevated AST (SGOT)   • Uncontrolled type 2 diabetes mellitus with diabetic neuropathy, with long-term current use of insulin (CMS/[facility], [facility])   • Post-operative infection   • Leukocytosis   • Left hand weakness   • Initial Medicare annual wellness visit   • Herniated cervical disc   • Circadian rhythm sleep disorder, delayed sleep phase type       Medications, Allergies, Problems List and Past History were reviewed and updated.    Physical Examination    /76 (BP Location: Right arm, Patient Position: Sitting, Cuff Size: Adult)   Pulse 64   Temp 97.1 °F (36.2 °C) (Temporal Artery )   Resp 18   Wt 102 kg (225 lb)   BMI 33.23 kg/m²     HEENT: Head- Normocephalic  Atraumatic. Facies- Within normal limits. Pinnas- Normal texture and shape bilaterally. Canals- Normal bilaterally. TMs- Normal bilaterally. Nares- Patent bilaterally. Nasal Septum- is normal. There is no tenderness to palpation over the frontal or maxillary sinuses. Lids- Normal bilaterally. Conjunctiva- Clear bilaterally. Sclera- Anicteric bilaterally. Oropharynx- Moist with no lesions. Tonsils- No enlargement, erythema or exudate.    Neck: Thyroid- non enlarged, symmetric and has no nodules. No bruits are detected.    Lungs: Auscultation- Clear to auscultation bilaterally. There are no retractions, clubbing or cyanosis. The Expiratory to Inspiratory ratio is equal.    Cardiovascular: There are no carotid bruits. Heart- Normal Rate with Regular rhythm and no murmurs. There are no gallops. There are no rubs. In the lower extremities there is no edema. The upper extremities do not have edema.    Abdomen: Soft, benign, non-tender with no masses, hernias, organomegaly or scars.    Impression and Assessment    Hyperlipidemia.    Essential Hypertension.    Gastroesophageal Reflux Disease.    Type 2 Diabetes Mellitus.    Plan    Gastroesophageal Reflux Disease Plan: The current plan was continued.    Hyperlipidemia Plan: The patient was instructed to exercise daily, eat a low fat diet and continue his medications.    Essential Hypertension Plan: The current plan was continued.    Type 2 Diabetes Mellitus Plan: He will follow-up with his endocrinologist.    Elton was seen today for follow-up.    Diagnoses and all orders for this visit:    Essential hypertension  -     Comprehensive Metabolic Panel    Hyperlipidemia, unspecified hyperlipidemia type  -     Comprehensive Metabolic Panel  -     Lipid Panel    Gastroesophageal reflux disease without esophagitis  -     Comprehensive Metabolic Panel    Type 2 diabetes mellitus with complication, with long-term current use of insulin (CMS/[facility])        Return to Office    The  patient was instructed to return for follow-up in 6 months.    The patient was instructed to return sooner if the condition changes, worsens, or doesn't resolve.

## 2018-09-04 RX ORDER — MELOXICAM 15 MG/1
TABLET ORAL
Qty: 90 TABLET | Refills: 0 | Status: SHIPPED | OUTPATIENT
Start: 2018-09-04 | End: 2018-12-03 | Stop reason: SDUPTHER

## 2018-09-21 DIAGNOSIS — F32.A DEPRESSION, UNSPECIFIED DEPRESSION TYPE: ICD-10-CM

## 2018-09-21 NOTE — TELEPHONE ENCOUNTER
----- Message from [place] sent at 9/21/2018  9:53 AM EDT -----  PATIENT IS REQUESTING REFILL ON GABAPENTIN WITH CHANGES FROM 300MG TO 600MG FOR 90 DAY SUPPLY. PATIENT ALSO REQUESTING REFILL FOR 90 DAY SUPPLY OF CYMBALTA.    PHARMACY: [facility]    CONTACT: [phone]

## 2018-09-23 RX ORDER — GABAPENTIN 300 MG/1
300 CAPSULE ORAL 3 TIMES DAILY
Qty: 90 CAPSULE | Refills: 2 | Status: SHIPPED | OUTPATIENT
Start: 2018-09-23 | End: 2018-09-24

## 2018-09-23 RX ORDER — DULOXETIN HYDROCHLORIDE 30 MG/1
30 CAPSULE, DELAYED RELEASE ORAL DAILY
Qty: 30 CAPSULE | Refills: 1 | Status: SHIPPED | OUTPATIENT
Start: 2018-09-23 | End: 2018-09-24 | Stop reason: SDUPTHER

## 2018-09-24 RX ORDER — GABAPENTIN 400 MG/1
400 CAPSULE ORAL 3 TIMES DAILY
Qty: 270 CAPSULE | Refills: 0 | Status: SHIPPED | OUTPATIENT
Start: 2018-09-24 | End: 2019-01-20 | Stop reason: SDUPTHER

## 2018-09-24 RX ORDER — DULOXETIN HYDROCHLORIDE 30 MG/1
30 CAPSULE, DELAYED RELEASE ORAL DAILY
Qty: 90 CAPSULE | Refills: 1 | Status: SHIPPED | OUTPATIENT
Start: 2018-09-24 | End: 2018-12-03 | Stop reason: SDUPTHER

## 2018-09-24 NOTE — TELEPHONE ENCOUNTER
Spoke with patient, he stated that [facility] supply instead and wondered if the gabapentin could be increased to 600mg? Please advuse.

## 2018-09-24 NOTE — TELEPHONE ENCOUNTER
Sent.  Increased gabapentin to 400 mg TID to start.  600 mg would be a really large increase.  Tc Ramirez MD  8:19 AM  09/24/18

## 2018-10-09 RX ORDER — SERTRALINE HYDROCHLORIDE 100 MG/1
TABLET, FILM COATED ORAL
Qty: 90 TABLET | Refills: 0 | Status: SHIPPED | OUTPATIENT
Start: 2018-10-09 | End: 2019-01-22 | Stop reason: SDUPTHER

## 2018-10-12 ENCOUNTER — OFFICE VISIT (OUTPATIENT)
Dept: INTERNAL MEDICINE | Facility: CLINIC | Age: 68
End: 2018-10-12

## 2018-10-12 VITALS
RESPIRATION RATE: 15 BRPM | DIASTOLIC BLOOD PRESSURE: 72 MMHG | OXYGEN SATURATION: 99 % | HEART RATE: 57 BPM | TEMPERATURE: 97.4 F | BODY MASS INDEX: 33.46 KG/M2 | WEIGHT: 226.6 LBS | SYSTOLIC BLOOD PRESSURE: 120 MMHG

## 2018-10-12 DIAGNOSIS — Z23 NEED FOR INFLUENZA VACCINATION: ICD-10-CM

## 2018-10-12 DIAGNOSIS — J06.9 ACUTE URI: Primary | ICD-10-CM

## 2018-10-12 PROCEDURE — 90662 IIV NO PRSV INCREASED AG IM: CPT | Performed by: PHYSICIAN ASSISTANT

## 2018-10-12 PROCEDURE — 99213 OFFICE O/P EST LOW 20 MIN: CPT | Performed by: PHYSICIAN ASSISTANT

## 2018-10-12 PROCEDURE — G0008 ADMIN INFLUENZA VIRUS VAC: HCPCS | Performed by: PHYSICIAN ASSISTANT

## 2018-10-12 NOTE — PROGRESS NOTES
Chief Complaint   Patient presents with   • URI     x1 week left ear pain, cough, congestion, sneezing       Subjective       History of Present Illness     Elton Funez is a 68 y.o. male. He presents with 1 week history of sore throat and left ear pain. He also has productive cough with mild white mucous, nasal congestion and sneezing. He is taking zyrtec daily. He is not taking flonase. No other meds tried for this issue. Symptoms worsening. He denies fever, chills, itchy or watery eyes, abdominal pain, N/V/D.       The following portions of the patient's history were reviewed and updated as appropriate: allergies, current medications, past social history and problem list.    Allergies   Allergen Reactions   • Glucophage Xr [Metformin Hcl Er] Diarrhea and Other (See Comments)     Glucophage XR TB24: Adverse Reaction: Severe GI issues.   • Metformin Nausea And Vomiting     Other reaction(s): SEVERE INTESTIONAL ISSUES  Other reaction(s): SEVERE GI ISSUES    Glucophage XR TB24  MetFORMIN HCl TABS   • Tetracyclines & Related Nausea Only     Adverse Reaction     Social History   Substance Use Topics   • Smoking status: Former Smoker     Packs/day: 1.00     Years: 5.00     Types: Cigarettes     Quit date: 1976   • Smokeless tobacco: Never Used   • Alcohol use Yes      Comment: Socially         Current Outpatient Prescriptions:   •  aspirin 81 MG tablet, Take 1 tablet by mouth Daily. Last dose 2-19-17 may resume when okay with Dr Perales (Patient taking differently: Take 81 mg by mouth Daily. LD 12/9/17 - no stents), Disp: , Rfl:   •  atenolol (TENORMIN) 50 MG tablet, TAKE ONE TABLET BY MOUTH DAILY, Disp: 90 tablet, Rfl: 0  •  Coenzyme Q10 (CO Q 10 PO), Take 300 mg by mouth Daily., Disp: , Rfl:   •  colestipol (COLESTID) 1 g tablet, TAKE 2 TABLETS TWICE A DAY, Disp: 360 tablet, Rfl: 1  •  cyclobenzaprine (FLEXERIL) 10 MG tablet, Take 10 mg by mouth At Night As Needed., Disp: , Rfl:   •  DULoxetine (CYMBALTA) 30 MG  "capsule, Take 1 capsule by mouth Daily., Disp: 90 capsule, Rfl: 1  •  exenatide er (BYDUREON BCISE) 2 MG/0.85ML auto-injector injection, Inject 0.85 mL under the skin 1 (One) Time Per Week., Disp: 12 pen, Rfl: 1  •  Flaxseed, Linseed, (FLAXSEED OIL) 1200 MG capsule, Take 1 capsule by mouth 2 (Two) Times a Day. 1300 mg, Disp: , Rfl:   •  fluticasone (FLONASE) 50 MCG/ACT nasal spray, into each nostril As Needed for Allergies. Use 2 sprays in each nostril once daily as needed., Disp: , Rfl:   •  gabapentin (NEURONTIN) 400 MG capsule, Take 1 capsule by mouth 3 (Three) Times a Day., Disp: 270 capsule, Rfl: 0  •  gemfibrozil (LOPID) 600 MG tablet, TAKE 1 TABLET TWICE A DAY, Disp: 180 tablet, Rfl: 1  •  Insulin Pen Needle (PEN NEEDLES 3/16\") 31G X 5 MM misc, 1 each 2 (Two) Times a Day., Disp: 200 each, Rfl: 3  •  Insulin Regular Human, Conc, (HumuLIN R) 500 UNIT/ML solution pen-injector CONCENTRATED injection, 135 units in the morning and 120 units in the evening., Disp: 12 pen, Rfl: 3  •  lisinopril-hydrochlorothiazide (PRINZIDE,ZESTORETIC) 10-12.5 MG per tablet, TAKE ONE TABLET BY MOUTH DAILY, Disp: 90 tablet, Rfl: 0  •  meloxicam (MOBIC) 15 MG tablet, TAKE ONE TABLET BY MOUTH DAILY AS NEEDED, Disp: 90 tablet, Rfl: 0  •  Multiple Vitamins-Minerals (MULTIVITAMIN PO), Take 1 tablet by mouth Daily., Disp: , Rfl:   •  omeprazole (priLOSEC) 40 MG capsule, Take 1 capsule by mouth Daily., Disp: 90 capsule, Rfl: 1  •  ONE TOUCH ULTRA TEST test strip, USE TO TEST TWO TIMES A DAY AND AS NEEDED, Disp: 100 each, Rfl: 5  •  Probiotic Product (PROBIOTIC ADVANCED PO), Take 1 capsule by mouth Daily., Disp: , Rfl:   •  sertraline (ZOLOFT) 100 MG tablet, TAKE ONE TABLET BY MOUTH DAILY, Disp: 90 tablet, Rfl: 0  •  simvastatin (ZOCOR) 40 MG tablet, TAKE 1 TABLET DAILY AT BEDTIME, Disp: 90 tablet, Rfl: 0  •  topiramate (TOPAMAX) 25 MG tablet, Take 25 mg by mouth Every Night., Disp: , Rfl:     Review of Systems   Constitutional: Negative for " chills, fatigue and fever.   HENT: Positive for congestion, ear pain, sneezing and sore throat. Negative for trouble swallowing.    Eyes: Negative for pain.   Respiratory: Positive for cough. Negative for shortness of breath and wheezing.    Cardiovascular: Negative for chest pain and palpitations.   Gastrointestinal: Negative for abdominal pain, diarrhea, nausea and vomiting.   Genitourinary: Negative for dysuria and hematuria.   Musculoskeletal: Negative for back pain.   Skin: Negative for rash.   Neurological: Negative for dizziness, syncope, weakness and headache.   Hematological: Does not bruise/bleed easily.   Psychiatric/Behavioral: Negative for depressed mood. The patient is not nervous/anxious.        Objective   Vitals:    10/12/18 0959   BP: 120/72   Pulse: 57   Resp: 15   Temp: 97.4 °F (36.3 °C)   SpO2: 99%     Physical Exam   Constitutional: He appears well-developed and well-nourished.   HENT:   Head: Normocephalic and atraumatic.   Right Ear: Tympanic membrane, external ear and ear canal normal.   Left Ear: Tympanic membrane, external ear and ear canal normal.   Nose: Nose normal.   Mouth/Throat: Mucous membranes are normal. No oral lesions. No posterior oropharyngeal edema or posterior oropharyngeal erythema.   +mild cobblestoning appearance of posterior oropharynx   Eyes: Pupils are equal, round, and reactive to light. Conjunctivae are normal.   Neck: Normal range of motion. Neck supple.   Cardiovascular: Normal rate, regular rhythm and intact distal pulses.    No murmur heard.  Pulmonary/Chest: Effort normal and breath sounds normal. He has no wheezes. He has no rales.   Lymphadenopathy:     He has no cervical adenopathy.   Skin: No rash noted.   Psychiatric: He has a normal mood and affect. His behavior is normal.             Assessment/Plan   Elton was seen today for uri.    Diagnoses and all orders for this visit:    Acute URI    Need for influenza vaccination  -     Fluzone High Dose  =>65Years      Pt advised to begin flonase daily. Has at home.  Advised to take mucinex 1-2x/daily with large glass of water.   Rest and fluids.          Return if symptoms worsen or fail to improve.

## 2018-10-22 RX ORDER — SIMVASTATIN 40 MG
TABLET ORAL
Qty: 90 TABLET | Refills: 1 | Status: SHIPPED | OUTPATIENT
Start: 2018-10-22 | End: 2019-05-25 | Stop reason: SDUPTHER

## 2018-10-22 RX ORDER — GEMFIBROZIL 600 MG/1
TABLET, FILM COATED ORAL
Qty: 180 TABLET | Refills: 1 | Status: SHIPPED | OUTPATIENT
Start: 2018-10-22 | End: 2019-05-11 | Stop reason: SDUPTHER

## 2018-10-23 ENCOUNTER — OFFICE VISIT (OUTPATIENT)
Dept: ENDOCRINOLOGY | Facility: CLINIC | Age: 68
End: 2018-10-23

## 2018-10-23 VITALS
DIASTOLIC BLOOD PRESSURE: 76 MMHG | HEART RATE: 69 BPM | HEIGHT: 69 IN | SYSTOLIC BLOOD PRESSURE: 130 MMHG | OXYGEN SATURATION: 99 % | BODY MASS INDEX: 41.15 KG/M2 | WEIGHT: 277.8 LBS

## 2018-10-23 DIAGNOSIS — IMO0002 DM (DIABETES MELLITUS), TYPE 2, UNCONTROLLED W/NEUROLOGIC COMPLICATION: Chronic | ICD-10-CM

## 2018-10-23 DIAGNOSIS — N18.30 CHRONIC KIDNEY DISEASE, STAGE III (MODERATE) (HCC): Chronic | ICD-10-CM

## 2018-10-23 DIAGNOSIS — IMO0002 UNCONTROLLED TYPE 2 DIABETES MELLITUS WITH DIABETIC NEUROPATHY, WITH LONG-TERM CURRENT USE OF INSULIN: Primary | ICD-10-CM

## 2018-10-23 LAB
GLUCOSE BLDC GLUCOMTR-MCNC: 264 MG/DL (ref 70–130)
HBA1C MFR BLD: 9.7 %

## 2018-10-23 PROCEDURE — 82962 GLUCOSE BLOOD TEST: CPT | Performed by: INTERNAL MEDICINE

## 2018-10-23 PROCEDURE — 99214 OFFICE O/P EST MOD 30 MIN: CPT | Performed by: INTERNAL MEDICINE

## 2018-10-23 PROCEDURE — 83036 HEMOGLOBIN GLYCOSYLATED A1C: CPT | Performed by: INTERNAL MEDICINE

## 2018-10-23 NOTE — PATIENT INSTRUCTIONS
Results for orders placed or performed in visit on 10/23/18   POC Glucose Fingerstick   Result Value Ref Range    Glucose 264 (A) 70 - 130 mg/dL   POC Glycosylated Hemoglobin (Hb A1C)   Result Value Ref Range    Hemoglobin A1C 9.7 %     Change insulin to 85 units with breakfast and lunch, 90 Units with dinner.

## 2018-10-23 NOTE — PROGRESS NOTES
Chief complaint  Diabetes (Follow UP Blood Sugars have been avg 120-200)    Subjective   Elton Funez is a 68 y.o. male is here today for follow-up.  Diabetes mellitus type 2, uncontrolled dx in 2000  Diabetic complications: neuropathy.   Past meds: Metformin caused diarrhea in the past.   Eye care:no retinopathy.   Hypoglycemia: in the morning occasionally   Doesn't check glucose regularly.   Had hypoglycemia episode at night.   Nutrition: discussed carb consistent diet 45-60 gm carb per meal. Patient is not following any diet, eats a lot of potatoes.   Exercise: recommended 30 min per day exercise. He doesn't exercise because of tingling in the feet.   Foot care and dental care: discussed.    Medications: Bydureon, U-500 insulin.    U-500 insulin 135 units in am and 120 in the evening.        Diabetes   He presents for his follow-up diabetic visit. He has type 2 diabetes mellitus. No MedicAlert identification noted. The initial diagnosis of diabetes was made 15 years ago. Hypoglycemia symptoms include hunger, sweats and tremors. Pertinent negatives for hypoglycemia include no confusion, dizziness, headaches, mood changes, nervousness/anxiousness, pallor, seizures, sleepiness or speech difficulty. Associated symptoms include foot paresthesias. Pertinent negatives for diabetes include no blurred vision, no chest pain, no fatigue, no foot ulcerations, no polydipsia, no polyphagia, no polyuria, no visual change, no weakness and no weight loss. Hypoglycemia complications include nocturnal hypoglycemia. Pertinent negatives for hypoglycemia complications include no blackouts, no hospitalization, no required assistance and no required glucagon injection. Symptoms are worsening. Diabetic complications include impotence and peripheral neuropathy. Pertinent negatives for diabetic complications include no CVA, heart disease, nephropathy, PVD or retinopathy. Risk factors for coronary artery disease include dyslipidemia,  family history, hypertension, obesity and sedentary lifestyle. Current diabetic treatment includes insulin injections and oral agent (triple therapy). He is compliant with treatment most of the time. He is currently taking insulin pre-breakfast and at bedtime. Insulin injections are given by patient. Rotation sites for injection include the abdominal wall, arms and thighs. His weight is fluctuating minimally. He is following a low fiber diet. Meal planning includes avoidance of concentrated sweets. He has not had a previous visit with a dietitian. He rarely participates in exercise. He monitors blood glucose at home 1-2 x per day. He monitors urine at home <1 x per month. Blood glucose monitoring compliance is inadequate. His home blood glucose trend is fluctuating minimally. His breakfast blood glucose is taken between 5-6 am. His breakfast blood glucose range is generally 70-90 mg/dl. His dinner blood glucose is taken between 6-7 pm. His dinner blood glucose range is generally 140-180 mg/dl. His highest blood glucose is 180-200 mg/dl. His overall blood glucose range is 180-200 mg/dl. He sees a podiatrist.Eye exam is current.       Medications    Current Outpatient Prescriptions:   •  aspirin 81 MG tablet, Take 1 tablet by mouth Daily. Last dose 2-19-17 may resume when okay with Dr Perales (Patient taking differently: Take 81 mg by mouth Daily. LD 12/9/17 - no stents), Disp: , Rfl:   •  atenolol (TENORMIN) 50 MG tablet, TAKE ONE TABLET BY MOUTH DAILY, Disp: 90 tablet, Rfl: 0  •  Coenzyme Q10 (CO Q 10 PO), Take 300 mg by mouth Daily., Disp: , Rfl:   •  colestipol (COLESTID) 1 g tablet, TAKE 2 TABLETS TWICE A DAY, Disp: 360 tablet, Rfl: 1  •  cyclobenzaprine (FLEXERIL) 10 MG tablet, Take 10 mg by mouth At Night As Needed., Disp: , Rfl:   •  DULoxetine (CYMBALTA) 30 MG capsule, Take 1 capsule by mouth Daily., Disp: 90 capsule, Rfl: 1  •  exenatide er (BYDUREON BCISE) 2 MG/0.85ML auto-injector injection, Inject 0.85 mL  "under the skin 1 (One) Time Per Week., Disp: 12 pen, Rfl: 1  •  Flaxseed, Linseed, (FLAXSEED OIL) 1200 MG capsule, Take 1 capsule by mouth 2 (Two) Times a Day. 1300 mg, Disp: , Rfl:   •  fluticasone (FLONASE) 50 MCG/ACT nasal spray, into each nostril As Needed for Allergies. Use 2 sprays in each nostril once daily as needed., Disp: , Rfl:   •  gabapentin (NEURONTIN) 400 MG capsule, Take 1 capsule by mouth 3 (Three) Times a Day., Disp: 270 capsule, Rfl: 0  •  gemfibrozil (LOPID) 600 MG tablet, TAKE 1 TABLET TWICE A DAY, Disp: 180 tablet, Rfl: 1  •  Insulin Pen Needle (PEN NEEDLES 3/16\") 31G X 5 MM misc, 1 each 2 (Two) Times a Day., Disp: 200 each, Rfl: 3  •  Insulin Regular Human, Conc, (HumuLIN R) 500 UNIT/ML solution pen-injector CONCENTRATED injection, 135 units in the morning and 120 units in the evening., Disp: 12 pen, Rfl: 3  •  lisinopril-hydrochlorothiazide (PRINZIDE,ZESTORETIC) 10-12.5 MG per tablet, TAKE ONE TABLET BY MOUTH DAILY, Disp: 90 tablet, Rfl: 0  •  meloxicam (MOBIC) 15 MG tablet, TAKE ONE TABLET BY MOUTH DAILY AS NEEDED, Disp: 90 tablet, Rfl: 0  •  Multiple Vitamins-Minerals (MULTIVITAMIN PO), Take 1 tablet by mouth Daily., Disp: , Rfl:   •  omeprazole (priLOSEC) 40 MG capsule, Take 1 capsule by mouth Daily., Disp: 90 capsule, Rfl: 1  •  ONE TOUCH ULTRA TEST test strip, USE TO TEST TWO TIMES A DAY AND AS NEEDED, Disp: 100 each, Rfl: 5  •  Probiotic Product (PROBIOTIC ADVANCED PO), Take 1 capsule by mouth Daily., Disp: , Rfl:   •  sertraline (ZOLOFT) 100 MG tablet, TAKE ONE TABLET BY MOUTH DAILY, Disp: 90 tablet, Rfl: 0  •  simvastatin (ZOCOR) 40 MG tablet, TAKE 1 TABLET DAILY AT BEDTIME, Disp: 90 tablet, Rfl: 1  •  topiramate (TOPAMAX) 25 MG tablet, Take 25 mg by mouth Every Night., Disp: , Rfl:     PMH  The following portions of the patient's history were reviewed and updated as appropriate: allergies, current medications, past family history, past medical history, past social history, past " "surgical history and problem list.    Review of systems  Review of Systems   Constitutional: Negative for fatigue and weight loss.   HENT: Positive for nosebleeds (right sided).    Eyes: Negative for blurred vision.   Cardiovascular: Negative for chest pain.   Endocrine: Negative for polydipsia, polyphagia and polyuria.   Genitourinary: Positive for impotence.   Musculoskeletal: Positive for back pain.   Skin: Negative for pallor.   Neurological: Positive for tremors and numbness (tingling of both feet, progressively getting worse, ). Negative for dizziness, seizures, speech difficulty, weakness and headaches.   Psychiatric/Behavioral: Negative for confusion. The patient is not nervous/anxious.    All other systems reviewed and are negative.      Physical exam  Objective   Blood pressure 130/76, pulse 69, height 175.3 cm (69\"), weight 126 kg (277 lb 12.8 oz), SpO2 99 %.   Physical Exam   Constitutional: He is oriented to person, place, and time. He appears well-developed and well-nourished.   Eyes: Conjunctivae are normal.   Neck: No JVD present. Thyromegaly present.   Cardiovascular: Normal rate, regular rhythm and normal heart sounds.    Pulmonary/Chest: Effort normal and breath sounds normal.   Musculoskeletal: Normal range of motion. He exhibits no edema.   Lymphadenopathy:     He has no cervical adenopathy.   Neurological: He is alert and oriented to person, place, and time.   Skin: Skin is warm and dry. No rash noted.   Psychiatric: He has a normal mood and affect. Thought content normal.   Vitals reviewed.        LABS AND IMAGING  Results for orders placed or performed in visit on 10/23/18   POC Glucose Fingerstick   Result Value Ref Range    Glucose 264 (A) 70 - 130 mg/dL   POC Glycosylated Hemoglobin (Hb A1C)   Result Value Ref Range    Hemoglobin A1C 9.7 %       Lab Results   Component Value Date    HGBA1C 9.7 10/23/2018    HGBA1C 8.2 06/08/2018    HGBA1C 8.20 (H) 12/13/2017     Lab Results   Component " Value Date    CREATININE 1.47 (H) 08/23/2018       Assessment  Assessment/Plan   1. Uncontrolled type 2 diabetes mellitus with diabetic neuropathy, with long-term current use of insulin (CMS/[facility])    2. DM (diabetes mellitus), type 2, uncontrolled w/neurologic complication (CMS/[facility])      Orders Placed This Encounter   Procedures   • POC Glucose Fingerstick   • POC Glycosylated Hemoglobin (Hb A1C)     Plan  - U-500 insulin 135 and 120 Units--> CHANGE TO tid 85 units at breakfast and lunch and 90 Units at supper.   -Bydureon weekly, instruction on new Bydureon.    -Discussed dietary change sin details: he was not compliant with diet during his travel   He gained some weight and most likely this is a reason for increased A1C    Patient Instructions     Results for orders placed or performed in visit on 10/23/18   POC Glucose Fingerstick   Result Value Ref Range    Glucose 264 (A) 70 - 130 mg/dL   POC Glycosylated Hemoglobin (Hb A1C)   Result Value Ref Range    Hemoglobin A1C 9.7 %     Change insulin to 85 units with breakfast and lunch, 90 Units with dinner. .           Glucose goals reviewed and he achieved the goal glucose required for surgery. Hypoglycemia precautions reviewed and insulin titration instructions given.        Follow-up in 3 months with glucose log.     17     min  of  25 min face-to-face visit time spent for coordination of care and counselling regarding identified problems as outlined in the objective, assessment and discussion portions of the documentation.

## 2018-10-25 ENCOUNTER — TELEPHONE (OUTPATIENT)
Dept: INTERNAL MEDICINE | Facility: CLINIC | Age: 68
End: 2018-10-25

## 2018-10-25 RX ORDER — FLUTICASONE PROPIONATE 50 MCG
SPRAY, SUSPENSION (ML) NASAL
Qty: 16 G | Refills: 1 | Status: SHIPPED | OUTPATIENT
Start: 2018-10-25 | End: 2019-03-11 | Stop reason: SDUPTHER

## 2018-10-25 NOTE — TELEPHONE ENCOUNTER
----- Message from [place] sent at 10/25/2018 12:18 PM EDT -----  PATIENT IS REQUESTING REFILL ON fluticasone (FLONASE) 50 MCG/ACT nasal spray.    PHARMACY: [facility]    THANK YOU.

## 2018-10-30 RX ORDER — ATENOLOL 50 MG/1
TABLET ORAL
Qty: 90 TABLET | Refills: 1 | Status: SHIPPED | OUTPATIENT
Start: 2018-10-30 | End: 2019-05-02 | Stop reason: SDUPTHER

## 2018-11-06 ENCOUNTER — TELEPHONE (OUTPATIENT)
Dept: INTERNAL MEDICINE | Facility: CLINIC | Age: 68
End: 2018-11-06

## 2018-11-06 ENCOUNTER — OFFICE VISIT (OUTPATIENT)
Dept: INTERNAL MEDICINE | Facility: CLINIC | Age: 68
End: 2018-11-06

## 2018-11-06 VITALS
DIASTOLIC BLOOD PRESSURE: 68 MMHG | HEART RATE: 72 BPM | WEIGHT: 227.4 LBS | SYSTOLIC BLOOD PRESSURE: 120 MMHG | TEMPERATURE: 97.5 F | HEIGHT: 68 IN | RESPIRATION RATE: 20 BRPM | BODY MASS INDEX: 34.46 KG/M2

## 2018-11-06 DIAGNOSIS — R26.81 UNSTABLE GAIT: ICD-10-CM

## 2018-11-06 DIAGNOSIS — Z00.00 MEDICARE ANNUAL WELLNESS VISIT, SUBSEQUENT: Primary | ICD-10-CM

## 2018-11-06 DIAGNOSIS — Z12.11 COLON CANCER SCREENING: ICD-10-CM
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
## 2018-11-06 DIAGNOSIS — IMO0002 DM (DIABETES MELLITUS), TYPE 2, UNCONTROLLED W/NEUROLOGIC COMPLICATION: Chronic | ICD-10-CM

## 2018-11-06 PROCEDURE — G0439 PPPS, SUBSEQ VISIT: HCPCS | Performed by: NURSE PRACTITIONER

## 2018-11-06 PROCEDURE — 99213 OFFICE O/P EST LOW 20 MIN: CPT | Performed by: NURSE PRACTITIONER

## 2018-11-06 NOTE — PROGRESS NOTES
QUICK REFERENCE INFORMATION:  The ABCs of the Annual Wellness Visit    Subsequent Medicare Wellness Visit    HEALTH RISK ASSESSMENT    1950    Recent Hospitalizations:  No hospitalization(s) within the last year..            Current Medical Providers:  Patient Care Team:  Tc Ramirez MD as PCP - Pau Morgan MD as Consulting Physician (Endocrinology)  Ari Yanes MD as Consulting Physician (Otolaryngology)  Gigi Perales MD as Consulting Physician (Neurosurgery)        Smoking Status:  Social History     Tobacco Use   Smoking Status Former Smoker   • Packs/day: 1.00   • Years: 5.00   • Pack years: 5.00   • Types: Cigarettes   • Last attempt to quit:    • Years since quittin.9   Smokeless Tobacco Never Used       Alcohol Consumption:  Social History     Substance and Sexual Activity   Alcohol Use Yes    Comment: Socially       Depression Screen:   PHQ-2/PHQ-9 Depression Screening 2018   Little interest or pleasure in doing things 0   Feeling down, depressed, or hopeless 0   Trouble falling or staying asleep, or sleeping too much -   Feeling tired or having little energy -   Poor appetite or overeating -   Feeling bad about yourself - or that you are a failure or have let yourself or your family down -   Trouble concentrating on things, such as reading the newspaper or watching television -   Moving or speaking so slowly that other people could have noticed. Or the opposite - being so fidgety or restless that you have been moving around a lot more than usual -   Thoughts that you would be better off dead, or of hurting yourself in some way -   Total Score 0   If you checked off any problems, how difficult have these problems made it for you to do your work, take care of things at home, or get along with other people? -       Health Habits and Functional and Cognitive Screening:  Functional & Cognitive Status 2018   Do you have difficulty preparing  food and eating? No   Do you have difficulty bathing yourself, getting dressed or grooming yourself? No   Do you have difficulty using the toilet? No   Do you have difficulty moving around from place to place? Yes   Do you have trouble with steps or getting out of a bed or a chair? No   In the past year have you fallen or experienced a near fall? Yes   Current Diet Unhealthy Diet   Dental Exam Up to date   Eye Exam Up to date   Exercise (times per week) 0 times per week   Current Exercise Activities Include Walking   Do you need help using the phone?  No   Are you deaf or do you have serious difficulty hearing?  Yes   Do you need help with transportation? No   Do you need help shopping? No   Do you need help preparing meals?  No   Do you need help with housework?  No   Do you need help with laundry? No   Do you need help taking your medications? No   Do you need help managing money? No   Do you ever drive or ride in a car without wearing a seat belt? No   Have you felt unusual stress, anger or loneliness in the last month? No   Who do you live with? Spouse   If you need help, do you have trouble finding someone available to you? No   Have you been bothered in the last four weeks by sexual problems? Yes   Do you have difficulty concentrating, remembering or making decisions? No           Does the patient have evidence of cognitive impairment? No    Aspirin use counseling: Taking ASA appropriately as indicated  Reviewed risk of aspirin including gastritis and gastrointestinal bleed.  Symptoms could include coffee ground emesis, dark tarry stools, and abdominal pain.  If symptoms occur was advised to contact the office.  Advised to take encteric coated  aspirin with food.       Recent Lab Results:  CMP:  Lab Results   Component Value Date     (H) 07/14/2015    BUN 26 (H) 08/23/2018    CREATININE 1.47 (H) 08/23/2018    EGFRIFNONA 47 (L) 08/23/2018    EGFRIFAFRI 66 07/14/2015    BCR 17.7 08/23/2018      08/23/2018    K 4.8 08/23/2018    CO2 22.0 08/23/2018    CALCIUM 9.0 08/23/2018    PROTENTOTREF 7.7 07/14/2015    ALBUMIN 4.30 08/23/2018    LABGLOBREF 3.1 07/14/2015    LABIL2 1.5 07/14/2015    BILITOT 0.7 08/23/2018    ALKPHOS 90 08/23/2018    AST 72 (H) 08/23/2018    ALT 60 (H) 08/23/2018     Lipid Panel:  Lab Results   Component Value Date    CHOL 113 08/23/2018    TRIG 156 (H) 08/23/2018    HDL 31 (L) 08/23/2018    LDLHDL 1.50 07/27/2016     HbA1c:  Lab Results   Component Value Date    HGBA1C 9.7 10/23/2018       Visual Acuity:  No exam data present    Age-appropriate Screening Schedule:  Refer to the list below for future screening recommendations based on patient's age, sex and/or medical conditions. Orders for these recommended tests are listed in the plan section. The patient has been provided with a written plan.    Health Maintenance   Topic Date Due   • ZOSTER VACCINE (2 of 3) 12/10/2015   • DIABETIC EYE EXAM  01/04/2019   • DIABETIC FOOT EXAM  01/29/2019   • URINE MICROALBUMIN  01/29/2019   • HEMOGLOBIN A1C  04/23/2019   • LIPID PANEL  08/23/2019   • COLONOSCOPY  10/14/2021   • TDAP/TD VACCINES (2 - Td) 09/19/2023   • INFLUENZA VACCINE  Completed   • PNEUMOCOCCAL VACCINES (65+ LOW/MEDIUM RISK)  Completed        Subjective   History of Present Illness    Elton Funez is a 68 y.o. male who presents for an Subsequent Wellness Visit.  Losing balance and turns falls gait issue. Does have trifocals which  May interfered. Fells last week over a flag and was trying to hold onto 2 cars and thinks he needs gait therapy for falls.     The following portions of the patient's history were reviewed and updated as appropriate: allergies, current medications, past family history, past medical history, past social history, past surgical history and problem list.    Outpatient Medications Prior to Visit   Medication Sig Dispense Refill   • aspirin 81 MG tablet Take 1 tablet by mouth Daily. Last dose 2-19-17 may  "resume when okay with Dr Perales (Patient taking differently: Take 81 mg by mouth Daily. LD 12/9/17 - no stents)     • atenolol (TENORMIN) 50 MG tablet TAKE ONE TABLET BY MOUTH DAILY 90 tablet 1   • Coenzyme Q10 (CO Q 10 PO) Take 300 mg by mouth Daily.     • colestipol (COLESTID) 1 g tablet TAKE 2 TABLETS TWICE A  tablet 1   • cyclobenzaprine (FLEXERIL) 10 MG tablet Take 10 mg by mouth At Night As Needed.     • DULoxetine (CYMBALTA) 30 MG capsule Take 1 capsule by mouth Daily. 90 capsule 1   • exenatide er (BYDUREON BCISE) 2 MG/0.85ML auto-injector injection Inject 0.85 mL under the skin 1 (One) Time Per Week. 12 pen 1   • Flaxseed, Linseed, (FLAXSEED OIL) 1200 MG capsule Take 1 capsule by mouth 2 (Two) Times a Day. 1300 mg     • fluticasone (FLONASE) 50 MCG/ACT nasal spray Use 2 sprays in each nostril once daily as needed. 16 g 1   • gabapentin (NEURONTIN) 400 MG capsule Take 1 capsule by mouth 3 (Three) Times a Day. 270 capsule 0   • gemfibrozil (LOPID) 600 MG tablet TAKE 1 TABLET TWICE A  tablet 1   • Insulin Pen Needle (PEN NEEDLES 3/16\") 31G X 5 MM misc 1 each 2 (Two) Times a Day. 200 each 3   • Insulin Regular Human, Conc, (HumuLIN R) 500 UNIT/ML solution pen-injector CONCENTRATED injection 135 units in the morning and 120 units in the evening. (Patient taking differently: 90 units in the morning, 95 at lunch,  and 90 units in the evening.) 12 pen 3   • lisinopril-hydrochlorothiazide (PRINZIDE,ZESTORETIC) 10-12.5 MG per tablet TAKE ONE TABLET BY MOUTH DAILY 90 tablet 0   • meloxicam (MOBIC) 15 MG tablet TAKE ONE TABLET BY MOUTH DAILY AS NEEDED 90 tablet 0   • Multiple Vitamins-Minerals (MULTIVITAMIN PO) Take 1 tablet by mouth Daily.     • omeprazole (priLOSEC) 40 MG capsule Take 1 capsule by mouth Daily. 90 capsule 1   • ONE TOUCH ULTRA TEST test strip USE TO TEST TWO TIMES A DAY AND AS NEEDED 100 each 5   • Probiotic Product (PROBIOTIC ADVANCED PO) Take 1 capsule by mouth Daily.     • sertraline " (ZOLOFT) 100 MG tablet TAKE ONE TABLET BY MOUTH DAILY 90 tablet 0   • simvastatin (ZOCOR) 40 MG tablet TAKE 1 TABLET DAILY AT BEDTIME 90 tablet 1   • topiramate (TOPAMAX) 25 MG tablet Take 25 mg by mouth Every Night.       No facility-administered medications prior to visit.        Patient Active Problem List   Diagnosis   • Chronic kidney disease, stage III (moderate) (CMS/[facility])   • Gastroesophageal reflux disease without esophagitis   • Hyperlipidemia   • Hypertension   • Trigger finger of right hand   • DM (diabetes mellitus), type 2, uncontrolled w/neurologic complication (CMS/[facility])   • Obesity, Class I, BMI 30-34.9   • Actinic keratosis   • FAVIO (obstructive sleep apnea)   • Prurigo nodularis   • History of depression   • History of migraine   • Cervical radiculopathy   • ALT (SGPT) level raised   • Elevated AST (SGOT)   • Uncontrolled type 2 diabetes mellitus with diabetic neuropathy, with long-term current use of insulin (CMS/[facility])   • Post-operative infection   • Leukocytosis   • Left hand weakness   • Initial Medicare annual wellness visit   • Herniated cervical disc   • Circadian rhythm sleep disorder, delayed sleep phase type       Advance Care Planning:  has NO advance directive - information provided to the patient today    Identification of Risk Factors:  Risk factors include: weight , increased fall risk and polypharmacy.    Review of Systems   Constitutional: Negative for activity change, appetite change, chills, diaphoresis, fatigue and fever.   HENT: Negative for congestion, sinus pressure, sore throat and trouble swallowing.    Gastrointestinal: Negative for abdominal pain, constipation, nausea and vomiting.   Endocrine: Negative for polydipsia, polyphagia and polyuria.   Genitourinary: Negative for difficulty urinating, dysuria and flank pain.   Musculoskeletal: Negative for arthralgias.   Skin: Negative for rash.   Allergic/Immunologic: Negative for environmental allergies.   Neurological: Negative  "for dizziness and headaches.        Complaint of unsteady gait   Psychiatric/Behavioral: Negative for sleep disturbance.   All other systems reviewed and are negative.      Compared to one year ago, the patient feels his physical health is the same.  Compared to one year ago, the patient feels his mental health is better.    Objective     Physical Exam   Constitutional: He is oriented to person, place, and time. He appears well-developed and well-nourished.   HENT:   Head: Normocephalic and atraumatic.   TMs clear   Neck: No thyromegaly present.   Cardiovascular: Normal rate and regular rhythm.   Pulmonary/Chest: Effort normal and breath sounds normal.   Musculoskeletal: He exhibits no edema.   Lymphadenopathy:     He has no cervical adenopathy.   Neurological: He is alert and oriented to person, place, and time.   Skin: Skin is warm and dry. Capillary refill takes 2 to 3 seconds.   Psychiatric: He has a normal mood and affect. His behavior is normal.   Nursing note and vitals reviewed.      Vitals:    11/06/18 0925   BP: 120/68   BP Location: Right arm   Patient Position: Sitting   Cuff Size: Adult   Pulse: 72   Resp: 20   Temp: 97.5 °F (36.4 °C)   TempSrc: Temporal Artery    Weight: 103 kg (227 lb 6.4 oz)   Height: 172.7 cm (68\")       Patient's Body mass index is 34.58 kg/m². BMI is above normal parameters. Recommendations include: educational material and exercise counseling.    Finger Rub Hearing{Test (right ear):failed  Finger Rub Hearing{Test (left ear):failed  Aids for hearing in place today        Assessment/Plan   Patient Self-Management and Personalized Health Advice  The patient has been provided with information about: diet, fall prevention, designing advance directives and supplements and preventive services including:   · Colorectal cancer screening, colonoscopy referral placed, Exercise counseling provided, Fall Risk assessment done, Fall Risk plan of care done, Zostavax vaccine (Herpes " Zoster).    Visit Diagnoses:    ICD-10-CM ICD-9-CM   1. Medicare annual wellness visit, subsequent Z00.00 V70.0   2. DM (diabetes mellitus), type 2, uncontrolled w/neurologic complication (CMS/HCC) E11.49 250.62    E11.65    3. Colon cancer screening Z12.11 V76.51   4. Unstable gait R26.81 781.2   5. Fall, initial encounter W19.XXXA E888.9       Orders Placed This Encounter   Procedures   • Ambulatory Referral For Screening Colonoscopy     Referral Priority:   Routine     Referral Type:   Diagnostic Medical     Referral Reason:   Specialty Services Required     Number of Visits Requested:   1   • Ambulatory Referral to Diabetic Education     Referral Priority:   Routine     Referral Type:   Health Education     Referral Reason:   Specialty Services Required     Requested Specialty:   Dietitian     Number of Visits Requested:   1   • Ambulatory Referral to Physical Therapy Evaluate and treat     Referral Priority:   Routine     Referral Type:   Therapy     Referral Reason:   Specialty Services Required     Requested Specialty:   Physical Therapy     Number of Visits Requested:   1       Outpatient Encounter Medications as of 11/6/2018   Medication Sig Dispense Refill   • aspirin 81 MG tablet Take 1 tablet by mouth Daily. Last dose 2-19-17 may resume when okay with Dr Perales (Patient taking differently: Take 81 mg by mouth Daily. LD 12/9/17 - no stents)     • atenolol (TENORMIN) 50 MG tablet TAKE ONE TABLET BY MOUTH DAILY 90 tablet 1   • Coenzyme Q10 (CO Q 10 PO) Take 300 mg by mouth Daily.     • colestipol (COLESTID) 1 g tablet TAKE 2 TABLETS TWICE A  tablet 1   • cyclobenzaprine (FLEXERIL) 10 MG tablet Take 10 mg by mouth At Night As Needed.     • DULoxetine (CYMBALTA) 30 MG capsule Take 1 capsule by mouth Daily. 90 capsule 1   • exenatide er (BYDUREON BCISE) 2 MG/0.85ML auto-injector injection Inject 0.85 mL under the skin 1 (One) Time Per Week. 12 pen 1   • Flaxseed, Linseed, (FLAXSEED OIL) 1200 MG capsule  "Take 1 capsule by mouth 2 (Two) Times a Day. 1300 mg     • fluticasone (FLONASE) 50 MCG/ACT nasal spray Use 2 sprays in each nostril once daily as needed. 16 g 1   • gabapentin (NEURONTIN) 400 MG capsule Take 1 capsule by mouth 3 (Three) Times a Day. 270 capsule 0   • gemfibrozil (LOPID) 600 MG tablet TAKE 1 TABLET TWICE A  tablet 1   • Insulin Pen Needle (PEN NEEDLES 3/16\") 31G X 5 MM misc 1 each 2 (Two) Times a Day. 200 each 3   • Insulin Regular Human, Conc, (HumuLIN R) 500 UNIT/ML solution pen-injector CONCENTRATED injection 135 units in the morning and 120 units in the evening. (Patient taking differently: 90 units in the morning, 95 at lunch,  and 90 units in the evening.) 12 pen 3   • lisinopril-hydrochlorothiazide (PRINZIDE,ZESTORETIC) 10-12.5 MG per tablet TAKE ONE TABLET BY MOUTH DAILY 90 tablet 0   • meloxicam (MOBIC) 15 MG tablet TAKE ONE TABLET BY MOUTH DAILY AS NEEDED 90 tablet 0   • Multiple Vitamins-Minerals (MULTIVITAMIN PO) Take 1 tablet by mouth Daily.     • omeprazole (priLOSEC) 40 MG capsule Take 1 capsule by mouth Daily. 90 capsule 1   • ONE TOUCH ULTRA TEST test strip USE TO TEST TWO TIMES A DAY AND AS NEEDED 100 each 5   • Probiotic Product (PROBIOTIC ADVANCED PO) Take 1 capsule by mouth Daily.     • sertraline (ZOLOFT) 100 MG tablet TAKE ONE TABLET BY MOUTH DAILY 90 tablet 0   • simvastatin (ZOCOR) 40 MG tablet TAKE 1 TABLET DAILY AT BEDTIME 90 tablet 1   • topiramate (TOPAMAX) 25 MG tablet Take 25 mg by mouth Every Night.       No facility-administered encounter medications on file as of 11/6/2018.    Falls can be multi-factorial including decreased senses such as decrease in his hearing.  Physical therapy to assist in regards to unsteady gait.    Reviewed use of high risk medication in the elderly: yes  Reviewed for potential of harmful drug interactions in the elderly: yes    Follow Up:  Return in about 1 year (around 11/6/2019) for Medicare Wellness subseq.     An After Visit " Summary and PPPS with all of these plans were given to the patient.

## 2018-11-06 NOTE — PATIENT INSTRUCTIONS
MyPlate from [employer]  The general, healthful diet is based on the 2010 Dietary Guidelines for Americans. The amount of food you need to eat from each food group depends on your age, sex, and level of physical activity and can be individualized by a dietitian. Go to ChooseMyPlate.gov for more information.  What do I need to know about the MyPlate plan?  · Enjoy your food, but eat less.  · Avoid oversized portions.  ? ½ of your plate should include fruits and vegetables.  ? ¼ of your plate should be grains.  ? ¼ of your plate should be protein.  Grains  · Make at least half of your grains whole grains.  · For a 2,000 calorie daily food plan, eat 6 oz every day.  · 1 oz is about 1 slice bread, 1 cup cereal, or ½ cup cooked rice, cereal, or pasta.  Vegetables  · Make half your plate fruits and vegetables.  · For a 2,000 calorie daily food plan, eat 2½ cups every day.  · 1 cup is about 1 cup raw or cooked vegetables or vegetable juice or 2 cups raw leafy greens.  Fruits  · Make half your plate fruits and vegetables.  · For a 2,000 calorie daily food plan, eat 2 cups every day.  · 1 cup is about 1 cup fruit or 100% fruit juice or ½ cup dried fruit.  Protein  · For a 2,000 calorie daily food plan, eat 5½ oz every day.  · 1 oz is about 1 oz meat, poultry, or fish, ¼ cup cooked beans, 1 egg, 1 Tbsp peanut butter, or ½ oz nuts or seeds.  Dairy  · Switch to fat-free or low-fat (1%) milk.  · For a 2,000 calorie daily food plan, eat 3 cups every day.  · 1 cup is about 1 cup milk or yogurt or soy milk (soy beverage), 1½ oz natural cheese, or 2 oz processed cheese.  Fats, Oils, and Empty Calories  · Only small amounts of oils are recommended.  · Empty calories are calories from solid fats or added sugars.  · Compare sodium in foods like soup, bread, and frozen meals. Choose the foods with lower numbers.  · Drink water instead of sugary drinks.  What foods can I eat?  Grains  Whole grains such as whole wheat, quinoa, millet, and  bulgur. Bread, rolls, and pasta made from whole grains. Brown or wild rice. Hot or cold cereals made from whole grains and without added sugar.  Vegetables  All fresh vegetables, especially fresh red, dark green, or orange vegetables. Peas and beans. Low-sodium frozen or canned vegetables prepared without added salt. Low-sodium vegetable juices.  Fruits  All fresh, frozen, and dried fruits. Canned fruit packed in water or fruit juice without added sugar. Fruit juices without added sugar.  Meats and Other Protein Sources  Boiled, baked, or grilled lean meat trimmed of fat. Skinless poultry. Fresh seafood and shellfish. Canned seafood packed in water. Unsalted nuts and unsalted nut butters. Tofu. Dried beans and pea. Eggs.  Dairy  Low-fat or fat-free milk, yogurt, and cheeses.  Sweets and Desserts  Frozen desserts made from low-fat milk.  Fats and Oils  Olive, peanut, and canola oils and margarine. Salad dressing and mayonnaise made from these oils.  Other  Soups and casseroles made from allowed ingredients and without added fat or salt.  The items listed above may not be a complete list of recommended foods or beverages. Contact your dietitian for more options.  What foods are not recommended?  Grains  Sweetened, low-fiber cereals. Packaged baked goods. Snack crackers and chips. Cheese crackers, butter crackers, and biscuits. Frozen waffles, sweet breads, doughnuts, pastries, packaged baking mixes, pancakes, cakes, and cookies.  Vegetables  Regular canned or frozen vegetables or vegetables prepared with salt. Canned tomatoes. Canned tomato sauce. Fried vegetables. Vegetables in cream sauce or cheese sauce.  Fruits  Fruits packed in syrup or made with added sugar.  Meats and Other Protein Sources  Marbled or fatty meats such as ribs. Poultry with skin. Fried meats, poultry, eggs, or fish. Sausages, hot dogs, and deli meats such as pastrami, bologna, or salami.  Dairy  Whole milk, cream, cheeses made from whole milk,  sour cream. Ice cream or yogurt made from whole milk or with added sugar.  Beverages  For adults, no more than one alcoholic drink per day. Regular soft drinks or other sugary beverages. Juice drinks.  Sweets and Desserts  Sugary or fatty desserts, candy, and other sweets.  Fats and Oils  Solid shortening or partially hydrogenated oils. Solid margarine. Margarine that contains trans fats. Butter.  The items listed above may not be a complete list of foods and beverages to avoid. Contact your dietitian for more information.  This information is not intended to replace advice given to you by your health care provider. Make sure you discuss any questions you have with your health care provider.  Document Released: 2009 Document Revised: 2017 Document Reviewed: 2014  [employer] Interactive Patient Education © 2018 [employer]    Medicare Wellness  Personal Prevention Plan of Service     Date of Office Visit:  2018  Encounter Provider:  MELISSA Vail  Place of Service:  [facility]  Patient Name: Elton Funez  :  1950    As part of the Medicare Wellness portion of your visit today, we are providing you with this personalized preventive plan of services (PPPS). This plan is based upon recommendations of the [place] Preventive Services Task Force (USPSTF) and the Advisory Committee on Immunization Practices (ACIP).    This lists the preventive care services that should be considered, and provides dates of when you are due. Items listed as completed are up-to-date and do not require any further intervention.    Health Maintenance   Topic Date Due   • ZOSTER VACCINE (2 of 3) 12/10/2015   • DIABETIC EYE EXAM  2019   • DIABETIC FOOT EXAM  2019   • URINE MICROALBUMIN  2019   • HEMOGLOBIN A1C  2019   • LIPID PANEL  2019   • MEDICARE ANNUAL WELLNESS  2019   • COLONOSCOPY  10/14/2021   • TDAP/TD  VACCINES (2 - Td) 09/19/2023   • HEPATITIS C SCREENING  Completed   • INFLUENZA VACCINE  Completed   • PNEUMOCOCCAL VACCINES (65+ LOW/MEDIUM RISK)  Completed   • AAA SCREEN (ONE-TIME)  Completed       Orders Placed This Encounter   Procedures   • Ambulatory Referral For Screening Colonoscopy     Referral Priority:   Routine     Referral Type:   Diagnostic Medical     Referral Reason:   Specialty Services Required     Number of Visits Requested:   1   • Ambulatory Referral to Diabetic Education     Referral Priority:   Routine     Referral Type:   Health Education     Referral Reason:   Specialty Services Required     Requested Specialty:   Dietitian     Number of Visits Requested:   1   • Ambulatory Referral to Physical Therapy Evaluate and treat     Referral Priority:   Routine     Referral Type:   Therapy     Referral Reason:   Specialty Services Required     Requested Specialty:   Physical Therapy     Number of Visits Requested:   1       Return in about 1 year (around 11/6/2019) for Medicare Wellness subseq.

## 2018-11-26 ENCOUNTER — OFFICE VISIT (OUTPATIENT)
Dept: SLEEP MEDICINE | Facility: HOSPITAL | Age: 68
End: 2018-11-26

## 2018-11-26 VITALS
HEIGHT: 68 IN | WEIGHT: 233 LBS | OXYGEN SATURATION: 93 % | BODY MASS INDEX: 35.31 KG/M2 | SYSTOLIC BLOOD PRESSURE: 146 MMHG | DIASTOLIC BLOOD PRESSURE: 74 MMHG | HEART RATE: 104 BPM

## 2018-11-26 DIAGNOSIS — G47.33 OSA (OBSTRUCTIVE SLEEP APNEA): Primary | ICD-10-CM

## 2018-11-26 DIAGNOSIS — G47.21 CIRCADIAN RHYTHM SLEEP DISORDER, DELAYED SLEEP PHASE TYPE: ICD-10-CM

## 2018-11-26 PROCEDURE — 99213 OFFICE O/P EST LOW 20 MIN: CPT | Performed by: NURSE PRACTITIONER

## 2018-11-26 NOTE — PATIENT INSTRUCTIONS

## 2018-11-26 NOTE — PROGRESS NOTES
Subjective: Sleeping Problem        Chief Complaint:   Chief Complaint   Patient presents with   • Sleeping Problem       HPI:    Elton Funez is a 68 y.o. male here for follow-up of sleep disturbance.  Patient was seen here for consult with difficulty getting and staying to sleep.  He did have a sleep study in 1998 that showed obstructive sleep apnea and did initiate CPAP at that time.  2013 he underwent a repeat sleep study and started BiPAP therapy.  He does well with this.  He feels rested upon awakening and goes through the day doing daily tasks and working part-time from home.  His [place] score is 14/24.  Last visit he was taught some sleep hygiene and methods to advance his sleep time.  His main problem is he gets up in the morning feels refreshed goes all day long and comes home at night and at 6 PM takes a 4 hour nap.  He will then not go back to sleep until 2 or 3:00 in the morning.        Current medications are:   Current Outpatient Medications:   •  aspirin 81 MG tablet, Take 1 tablet by mouth Daily. Last dose 2-19-17 may resume when okay with Dr Perales (Patient taking differently: Take 81 mg by mouth Daily. LD 12/9/17 - no stents), Disp: , Rfl:   •  atenolol (TENORMIN) 50 MG tablet, TAKE ONE TABLET BY MOUTH DAILY, Disp: 90 tablet, Rfl: 1  •  Coenzyme Q10 (CO Q 10 PO), Take 300 mg by mouth Daily., Disp: , Rfl:   •  colestipol (COLESTID) 1 g tablet, TAKE 2 TABLETS TWICE A DAY, Disp: 360 tablet, Rfl: 1  •  cyclobenzaprine (FLEXERIL) 10 MG tablet, Take 10 mg by mouth At Night As Needed., Disp: , Rfl:   •  DULoxetine (CYMBALTA) 30 MG capsule, Take 1 capsule by mouth Daily., Disp: 90 capsule, Rfl: 1  •  exenatide er (BYDUREON BCISE) 2 MG/0.85ML auto-injector injection, Inject 0.85 mL under the skin 1 (One) Time Per Week., Disp: 12 pen, Rfl: 1  •  Flaxseed, Linseed, (FLAXSEED OIL) 1200 MG capsule, Take 1 capsule by mouth 2 (Two) Times a Day. 1300 mg, Disp: , Rfl:   •  fluticasone (FLONASE) 50 MCG/ACT  "nasal spray, Use 2 sprays in each nostril once daily as needed., Disp: 16 g, Rfl: 1  •  gabapentin (NEURONTIN) 400 MG capsule, Take 1 capsule by mouth 3 (Three) Times a Day., Disp: 270 capsule, Rfl: 0  •  gemfibrozil (LOPID) 600 MG tablet, TAKE 1 TABLET TWICE A DAY, Disp: 180 tablet, Rfl: 1  •  Insulin Pen Needle (PEN NEEDLES 3/16\") 31G X 5 MM misc, 1 each 2 (Two) Times a Day., Disp: 200 each, Rfl: 3  •  Insulin Regular Human, Conc, (HumuLIN R) 500 UNIT/ML solution pen-injector CONCENTRATED injection, 135 units in the morning and 120 units in the evening. (Patient taking differently: 275 Units. 90 units in the morning, 95 at lunch,  and 90 units in the evening.), Disp: 12 pen, Rfl: 3  •  lisinopril-hydrochlorothiazide (PRINZIDE,ZESTORETIC) 10-12.5 MG per tablet, TAKE ONE TABLET BY MOUTH DAILY, Disp: 90 tablet, Rfl: 0  •  meloxicam (MOBIC) 15 MG tablet, TAKE ONE TABLET BY MOUTH DAILY AS NEEDED, Disp: 90 tablet, Rfl: 0  •  Multiple Vitamins-Minerals (MULTIVITAMIN PO), Take 1 tablet by mouth Daily., Disp: , Rfl:   •  omeprazole (priLOSEC) 40 MG capsule, Take 1 capsule by mouth Daily., Disp: 90 capsule, Rfl: 1  •  ONE TOUCH ULTRA TEST test strip, USE TO TEST TWO TIMES A DAY AND AS NEEDED, Disp: 100 each, Rfl: 5  •  Probiotic Product (PROBIOTIC ADVANCED PO), Take 1 capsule by mouth Daily., Disp: , Rfl:   •  sertraline (ZOLOFT) 100 MG tablet, TAKE ONE TABLET BY MOUTH DAILY, Disp: 90 tablet, Rfl: 0  •  simvastatin (ZOCOR) 40 MG tablet, TAKE 1 TABLET DAILY AT BEDTIME, Disp: 90 tablet, Rfl: 1  •  topiramate (TOPAMAX) 25 MG tablet, Take 25 mg by mouth Every Night., Disp: , Rfl: .      The patient's relevant past medical, surgical, family and social history were reviewed and updated in Epic as appropriate.       Review of Systems   HENT: Positive for hearing loss, postnasal drip, sinus pressure, tinnitus and trouble swallowing.    Respiratory: Positive for apnea.    Gastrointestinal: Positive for diarrhea.        Reflux "   Endocrine: Positive for polydipsia.   Musculoskeletal: Positive for gait problem and myalgias.   Neurological: Positive for numbness.   Psychiatric/Behavioral: Positive for dysphoric mood and sleep disturbance.   All other systems reviewed and are negative.        Objective:    Physical Exam   Constitutional: He is oriented to person, place, and time. He appears well-developed and well-nourished.   HENT:   Head: Normocephalic and atraumatic.   Mouth/Throat: Oropharynx is clear and moist.   Mallampati 2 anatomy   Eyes: Conjunctivae are normal.   Neck: Neck supple. No thyromegaly present.   Cardiovascular: Normal rate and regular rhythm.   Pulmonary/Chest: Effort normal and breath sounds normal.   Lymphadenopathy:     He has no cervical adenopathy.   Neurological: He is alert and oriented to person, place, and time.   Skin: Skin is warm and dry.   Psychiatric: He has a normal mood and affect. His behavior is normal. Judgment and thought content normal.   Nursing note and vitals reviewed.    Compliance has been reviewed with patient.  His minimum EPAP at 12 maximum EPAP of 12 no pressure support.  His greater than 4 hour use at 88.9%.  AHI controlled 2.5.    ASSESSMENT/PLAN    Elton was seen today for sleeping problem.    Diagnoses and all orders for this visit:    FAVIO (obstructive sleep apnea)  -     CPAP Therapy    Circadian rhythm sleep disorder, delayed sleep phase type            1. Counseled patient regarding multimodal approach with healthy nutrition, healthy sleep, regular physical activity, social activities, counseling, and medications. Encouraged to practice lateral sleep position. Avoid alcohol and sedatives close to bedtime.  2. Have spoke in depth to patient regarding his nap around 5 or 6:00 PM daily.  Until we get this stopped he will never get his sleep pattern under control.  Instead of sitting and watching television I have encourage patient to do some form of physical activity even though this may  be slight.  He can walk around his house or go to the mall and walk around it several times.  As before, his sleep will not improve until this nap is cut from his day.  He does verbalize understanding.  3.   4.   I Put him back in one year with the advice to please return sooner if his sleep problem does not get better after cutting out the nap.    I have reviewed the results of my evaluation and impression and discussed my recommendations in detail with the patient.      Signed by  MELISSA Muniz    November 26, 2018      CC: Tc Ramirez MD          No ref. provider found

## 2018-11-30 ENCOUNTER — HOSPITAL ENCOUNTER (OUTPATIENT)
Dept: DIABETES SERVICES | Facility: HOSPITAL | Age: 68
Setting detail: RECURRING SERIES
Discharge: HOME | End: 2018-11-30

## 2018-11-30 PROCEDURE — G0109 DIAB MANAGE TRN IND/GROUP: HCPCS | Performed by: DIETITIAN, REGISTERED

## 2018-12-03 DIAGNOSIS — F32.A DEPRESSION, UNSPECIFIED DEPRESSION TYPE: ICD-10-CM

## 2018-12-03 RX ORDER — DULOXETIN HYDROCHLORIDE 30 MG/1
30 CAPSULE, DELAYED RELEASE ORAL DAILY
Qty: 90 CAPSULE | Refills: 1 | Status: SHIPPED | OUTPATIENT
Start: 2018-12-03 | End: 2019-11-07 | Stop reason: SDUPTHER

## 2018-12-04 RX ORDER — EXENATIDE 2 MG/.85ML
INJECTION, SUSPENSION, EXTENDED RELEASE SUBCUTANEOUS
Qty: 4 PEN | Refills: 3 | Status: SHIPPED | OUTPATIENT
Start: 2018-12-04 | End: 2019-05-01 | Stop reason: SDUPTHER

## 2018-12-04 RX ORDER — MELOXICAM 15 MG/1
TABLET ORAL
Qty: 90 TABLET | Refills: 1 | Status: SHIPPED | OUTPATIENT
Start: 2018-12-04 | End: 2019-06-09 | Stop reason: SDUPTHER

## 2018-12-18 ENCOUNTER — TELEPHONE (OUTPATIENT)
Dept: INTERNAL MEDICINE | Facility: CLINIC | Age: 68
End: 2018-12-18

## 2018-12-18 RX ORDER — LISINOPRIL AND HYDROCHLOROTHIAZIDE 12.5; 1 MG/1; MG/1
TABLET ORAL
Qty: 90 TABLET | Refills: 1 | Status: SHIPPED | OUTPATIENT
Start: 2018-12-18 | End: 2019-06-25 | Stop reason: SDUPTHER

## 2018-12-18 NOTE — TELEPHONE ENCOUNTER
PATIENT IS SCHEDULED TO HAVE COLONOSCOPY ON Friday. HE HAS TO GO WITHOUT FOOD ON Thursday. HE IS CALLING TO SEE WHAT HE IS SUPPOSE TO DO ABOUT HIS INSULIN MEDICATIONS DURING THIS TIME OF PREPARING FOR COLONOSCOPY AND DAY OF COLONOSCOPY. YOU CAN REACH PATIENT BACK [phone]

## 2018-12-20 NOTE — TELEPHONE ENCOUNTER
Take 20 units less (65 and lunch and breakfast and 70 units at supper). Take 1/2 a dose on the morning of colonoscopy. Call if glucose is low or too high

## 2019-01-04 RX ORDER — INSULIN HUMAN 500 [IU]/ML
INJECTION, SOLUTION SUBCUTANEOUS
Qty: 36 ML | Refills: 3 | Status: SHIPPED | OUTPATIENT
Start: 2019-01-04 | End: 2019-07-12 | Stop reason: SDUPTHER

## 2019-01-07 RX ORDER — PEN NEEDLE, DIABETIC 30 GX3/16"
1 NEEDLE, DISPOSABLE MISCELLANEOUS 3 TIMES DAILY
Qty: 300 EACH | Refills: 1 | Status: SHIPPED | OUTPATIENT
Start: 2019-01-07 | End: 2019-02-25

## 2019-01-10 ENCOUNTER — APPOINTMENT (OUTPATIENT)
Dept: DIABETES SERVICES | Facility: HOSPITAL | Age: 69
End: 2019-01-10

## 2019-01-21 RX ORDER — GABAPENTIN 400 MG/1
400 CAPSULE ORAL 3 TIMES DAILY
Qty: 270 CAPSULE | Refills: 0 | Status: SHIPPED | OUTPATIENT
Start: 2019-01-21 | End: 2019-04-23 | Stop reason: SDUPTHER

## 2019-01-22 RX ORDER — SERTRALINE HYDROCHLORIDE 100 MG/1
TABLET, FILM COATED ORAL
Qty: 90 TABLET | Refills: 1 | Status: SHIPPED | OUTPATIENT
Start: 2019-01-22 | End: 2019-07-28 | Stop reason: SDUPTHER

## 2019-01-22 RX ORDER — MONTELUKAST SODIUM 4 MG/1
TABLET, CHEWABLE ORAL
Qty: 360 TABLET | Refills: 1 | Status: SHIPPED | OUTPATIENT
Start: 2019-01-22 | End: 2019-07-28 | Stop reason: SDUPTHER

## 2019-01-28 RX ORDER — OMEPRAZOLE 40 MG/1
CAPSULE, DELAYED RELEASE ORAL
Qty: 90 CAPSULE | Refills: 1 | OUTPATIENT
Start: 2019-01-28

## 2019-02-13 ENCOUNTER — HOSPITAL ENCOUNTER (OUTPATIENT)
Dept: DIABETES SERVICES | Facility: HOSPITAL | Age: 69
Setting detail: RECURRING SERIES
Discharge: HOME | End: 2019-02-13

## 2019-02-20 ENCOUNTER — TELEPHONE (OUTPATIENT)
Dept: INTERNAL MEDICINE | Facility: CLINIC | Age: 69
End: 2019-02-20

## 2019-02-20 RX ORDER — OMEPRAZOLE 40 MG/1
40 CAPSULE, DELAYED RELEASE ORAL DAILY
Qty: 90 CAPSULE | Refills: 1 | Status: SHIPPED | OUTPATIENT
Start: 2019-02-20 | End: 2019-08-13 | Stop reason: SDUPTHER

## 2019-02-25 ENCOUNTER — OFFICE VISIT (OUTPATIENT)
Dept: INTERNAL MEDICINE | Facility: CLINIC | Age: 69
End: 2019-02-25

## 2019-02-25 VITALS
BODY MASS INDEX: 34.4 KG/M2 | RESPIRATION RATE: 16 BRPM | HEART RATE: 68 BPM | HEIGHT: 68 IN | SYSTOLIC BLOOD PRESSURE: 142 MMHG | DIASTOLIC BLOOD PRESSURE: 76 MMHG | WEIGHT: 227 LBS | TEMPERATURE: 97.3 F

## 2019-02-25 DIAGNOSIS — Z00.00 PHYSICAL EXAM: Primary | ICD-10-CM

## 2019-02-25 DIAGNOSIS — K21.9 GASTROESOPHAGEAL REFLUX DISEASE WITHOUT ESOPHAGITIS: ICD-10-CM

## 2019-02-25 DIAGNOSIS — E78.5 HYPERLIPIDEMIA, UNSPECIFIED HYPERLIPIDEMIA TYPE: ICD-10-CM

## 2019-02-25 DIAGNOSIS — Z12.5 PROSTATE CANCER SCREENING: ICD-10-CM

## 2019-02-25 DIAGNOSIS — I10 ESSENTIAL HYPERTENSION: ICD-10-CM

## 2019-02-25 LAB
ALBUMIN SERPL-MCNC: 4.6 G/DL (ref 3.2–4.8)
ALBUMIN/GLOB SERPL: 1.7 G/DL (ref 1.5–2.5)
ALP SERPL-CCNC: 113 U/L (ref 25–100)
ALT SERPL W P-5'-P-CCNC: 41 U/L (ref 7–40)
ANION GAP SERPL CALCULATED.3IONS-SCNC: 7 MMOL/L (ref 3–11)
ARTICHOKE IGE QN: 60 MG/DL (ref 0–130)
AST SERPL-CCNC: 41 U/L (ref 0–33)
BASOPHILS # BLD AUTO: 0.02 10*3/MM3 (ref 0–0.2)
BASOPHILS NFR BLD AUTO: 0.4 % (ref 0–1)
BILIRUB BLD-MCNC: NEGATIVE MG/DL
BILIRUB SERPL-MCNC: 0.6 MG/DL (ref 0.3–1.2)
BUN BLD-MCNC: 23 MG/DL (ref 9–23)
BUN/CREAT SERPL: 16.2 (ref 7–25)
CALCIUM SPEC-SCNC: 9.7 MG/DL (ref 8.7–10.4)
CHLORIDE SERPL-SCNC: 105 MMOL/L (ref 99–109)
CHOLEST SERPL-MCNC: 120 MG/DL (ref 0–200)
CLARITY, POC: CLEAR
CO2 SERPL-SCNC: 25 MMOL/L (ref 20–31)
COLOR UR: YELLOW
CREAT BLD-MCNC: 1.42 MG/DL (ref 0.6–1.3)
DEPRECATED RDW RBC AUTO: 46.8 FL (ref 37–54)
EOSINOPHIL # BLD AUTO: 0.16 10*3/MM3 (ref 0–0.3)
EOSINOPHIL NFR BLD AUTO: 3 % (ref 0–3)
ERYTHROCYTE [DISTWIDTH] IN BLOOD BY AUTOMATED COUNT: 13.3 % (ref 11.3–14.5)
EXPIRATION DATE: ABNORMAL
GFR SERPL CREATININE-BSD FRML MDRD: 49 ML/MIN/1.73
GLOBULIN UR ELPH-MCNC: 2.7 GM/DL
GLUCOSE BLD-MCNC: 151 MG/DL (ref 70–100)
GLUCOSE UR STRIP-MCNC: ABNORMAL MG/DL
HCT VFR BLD AUTO: 38.2 % (ref 38.9–50.9)
HDLC SERPL-MCNC: 29 MG/DL (ref 40–60)
HGB BLD-MCNC: 12.5 G/DL (ref 13.1–17.5)
IMM GRANULOCYTES # BLD AUTO: 0.03 10*3/MM3 (ref 0–0.05)
IMM GRANULOCYTES NFR BLD AUTO: 0.6 % (ref 0–0.6)
KETONES UR QL: ABNORMAL
LEUKOCYTE EST, POC: NEGATIVE
LYMPHOCYTES # BLD AUTO: 1.37 10*3/MM3 (ref 0.6–4.8)
LYMPHOCYTES NFR BLD AUTO: 25.7 % (ref 24–44)
Lab: ABNORMAL
MCH RBC QN AUTO: 31.8 PG (ref 27–31)
MCHC RBC AUTO-ENTMCNC: 32.7 G/DL (ref 32–36)
MCV RBC AUTO: 97.2 FL (ref 80–99)
MONOCYTES # BLD AUTO: 0.77 10*3/MM3 (ref 0–1)
MONOCYTES NFR BLD AUTO: 14.4 % (ref 0–12)
NEUTROPHILS # BLD AUTO: 2.98 10*3/MM3 (ref 1.5–8.3)
NEUTROPHILS NFR BLD AUTO: 55.9 % (ref 41–71)
NITRITE UR-MCNC: NEGATIVE MG/ML
PH UR: 6 [PH] (ref 5–8)
PLATELET # BLD AUTO: 114 10*3/MM3 (ref 150–450)
PMV BLD AUTO: 12.5 FL (ref 6–12)
POTASSIUM BLD-SCNC: 4.4 MMOL/L (ref 3.5–5.5)
PROT SERPL-MCNC: 7.3 G/DL (ref 5.7–8.2)
PROT UR STRIP-MCNC: ABNORMAL MG/DL
PSA SERPL-MCNC: 1.65 NG/ML (ref 0–4)
RBC # BLD AUTO: 3.93 10*6/MM3 (ref 4.2–5.76)
RBC # UR STRIP: NEGATIVE /UL
SODIUM BLD-SCNC: 137 MMOL/L (ref 132–146)
SP GR UR: 1.03 (ref 1–1.03)
TRIGL SERPL-MCNC: 175 MG/DL (ref 0–150)
TSH SERPL DL<=0.05 MIU/L-ACNC: 3.78 MIU/ML (ref 0.35–5.35)
UROBILINOGEN UR QL: NORMAL
WBC NRBC COR # BLD: 5.33 10*3/MM3 (ref 3.5–10.8)

## 2019-02-25 PROCEDURE — 80061 LIPID PANEL: CPT | Performed by: INTERNAL MEDICINE

## 2019-02-25 PROCEDURE — 84443 ASSAY THYROID STIM HORMONE: CPT | Performed by: INTERNAL MEDICINE

## 2019-02-25 PROCEDURE — 80053 COMPREHEN METABOLIC PANEL: CPT | Performed by: INTERNAL MEDICINE

## 2019-02-25 PROCEDURE — G0103 PSA SCREENING: HCPCS | Performed by: INTERNAL MEDICINE

## 2019-02-25 PROCEDURE — 99397 PER PM REEVAL EST PAT 65+ YR: CPT | Performed by: INTERNAL MEDICINE

## 2019-02-25 PROCEDURE — 81003 URINALYSIS AUTO W/O SCOPE: CPT | Performed by: INTERNAL MEDICINE

## 2019-02-25 PROCEDURE — 85025 COMPLETE CBC W/AUTO DIFF WBC: CPT | Performed by: INTERNAL MEDICINE

## 2019-02-25 PROCEDURE — 36415 COLL VENOUS BLD VENIPUNCTURE: CPT | Performed by: INTERNAL MEDICINE

## 2019-02-25 NOTE — PROGRESS NOTES
Chief Complaint   Patient presents with   • Annual Exam       History of Present Illness    The patient presents for an established patient physical examination and health maintenance visit.    The patient presents for a follow-up related to hyperlipidemia. He is following a low fat diet. He reports that he is exercising. He is taking gemfibrozil and simvastatin. The patient is taking his medication as prescribed. He reports no medication side effects, including muscle cramps, abdominal pain, headaches or weakness. He denies chest pain, shortness of breath, orthopnea, paroxysmal nocturnal dyspnea, dyspnea on exertion, edema, palpitations or syncope.    The patient presents for a follow-up related to GERD. The patient is on omeprazole for his gastroesophageal reflux. The medication is taken on a regular basis and gives complete relief of the symptoms. He reports no abdominal pain, belching, diarrhea, dysphagia, early satiety, heartburn, hoarseness, nausea, odynophagia, rectal bleeding, vomiting or weight loss. The GERD has no known aggravating factors.    The patient presents for a follow-up related to hypertension. The patient reports that he has had no headaches or blurred vision. He states that he is taking his medication as prescribed. He is not having medication side effects.    Review of Systems    CONSTITUTIONAL- Denies Fever, Chills, Sweats, Fatigue, Weakness or Malaise.    NECK- Denies Decreased Range of Motion, Stiffness, Thyroid Nodules, Enlarged Lymph Nodes or Goiter.    EYES- Denies Eye Pain, Eye Drainage, Eye Redness, Eye Discharge, Decreased Vision, Visual Disturbance, Diplopia, Visual Loss or Swollen Eyelid.    HENT- Denies Nasal Discharge, Sore Throat, Ear Pain, Ear Drainage, Sinus Pain, Nasal Congestion, Decreased Hearing or Tinnitus.    PULMONARY- Denies Wheezing, Sputum Production, Cough, Hemoptysis or Pleuritic Chest Pain.    GASTROINTESTINAL- Denies Constipation.    GENITOURINARY- Denies Penile  Discharge, Erectile Dysfunction, Testicular Mass, Testicular Pain, Hernia, Nocturia, Decreased Urine Stream, Incomplete Voiding, Urinary Frequency, Urinary Urgency, Dysuria or Hematuria.    CARDIOVASCULAR- Denies Claudication or Irregular Heart Beat.    ENDOCRINE- Denies Cold Intolerance, Depression, Hair Loss, Heat Intolerance, Memory Loss, Polydypsia, Polyphagia, Polyuria, Sleep Disturbance or Weight Gain.    NEUROLOGIC- Denies Seizures, Confusion or Excessive Daytime Sleepiness.    MUSCULOSKELETAL- Denies Joint Pain, Joint Stiffness, Decreased Range of Motion, Joint Swelling or Erythema of Joints.    INTEGUMENTARY- Denies Rash, Lumps, Sores, Itching, Dryness, Color Change, Changes in Hair, Brittle Nails, Discolored Nails, Thickened Nails, Growths or Alopecia.    Medications      Current Outpatient Medications:   •  aspirin 81 MG tablet, Take 1 tablet by mouth Daily. Last dose 2-19-17 may resume when okay with Dr Perales (Patient taking differently: Take 81 mg by mouth Daily.), Disp: , Rfl:   •  atenolol (TENORMIN) 50 MG tablet, TAKE ONE TABLET BY MOUTH DAILY, Disp: 90 tablet, Rfl: 1  •  BYDUREON BCISE 2 MG/0.85ML auto-injector injection, INJECT 2 MG (0.85 ML) UNDER THE SKIN ONE TIME PER WEEK, Disp: 4 pen, Rfl: 3  •  Coenzyme Q10 (CO Q 10 PO), Take 300 mg by mouth Daily., Disp: , Rfl:   •  colestipol (COLESTID) 1 g tablet, TAKE 2 TABLETS TWICE A DAY, Disp: 360 tablet, Rfl: 1  •  cyclobenzaprine (FLEXERIL) 10 MG tablet, Take 10 mg by mouth At Night As Needed., Disp: , Rfl:   •  DULoxetine (CYMBALTA) 30 MG capsule, Take 1 capsule by mouth Daily., Disp: 90 capsule, Rfl: 1  •  Flaxseed, Linseed, (FLAXSEED OIL) 1200 MG capsule, Take 1 capsule by mouth 2 (Two) Times a Day. 1300 mg, Disp: , Rfl:   •  fluticasone (FLONASE) 50 MCG/ACT nasal spray, Use 2 sprays in each nostril once daily as needed., Disp: 16 g, Rfl: 1  •  gabapentin (NEURONTIN) 400 MG capsule, Take 1 capsule by mouth 3 (Three) Times a Day., Disp: 270  "capsule, Rfl: 0  •  gemfibrozil (LOPID) 600 MG tablet, TAKE 1 TABLET TWICE A DAY, Disp: 180 tablet, Rfl: 1  •  HUMULIN R U-500 KWIKPEN 500 UNIT/ML solution pen-injector CONCENTRATED injection, INJECT 135 UNITS IN THE MORNING  UNITS IN THE EVENING (Patient taking differently: TAKE 90 UNITS SQ IN AM, 90 UNITS AT LUNCH AND 95 UNITS AT HS.), Disp: 36 mL, Rfl: 3  •  Insulin Pen Needle (PEN NEEDLES 3/16\") 31G X 5 MM misc, 1 each 2 (Two) Times a Day., Disp: 200 each, Rfl: 3  •  lisinopril-hydrochlorothiazide (PRINZIDE,ZESTORETIC) 10-12.5 MG per tablet, TAKE ONE TABLET BY MOUTH DAILY, Disp: 90 tablet, Rfl: 1  •  meloxicam (MOBIC) 15 MG tablet, TAKE ONE TABLET BY MOUTH DAILY AS NEEDED, Disp: 90 tablet, Rfl: 1  •  Multiple Vitamins-Minerals (MULTIVITAMIN PO), Take 1 tablet by mouth Daily., Disp: , Rfl:   •  omeprazole (priLOSEC) 40 MG capsule, Take 1 capsule by mouth Daily., Disp: 90 capsule, Rfl: 1  •  ONE TOUCH ULTRA TEST test strip, USE TO TEST TWO TIMES A DAY AND AS NEEDED, Disp: 100 each, Rfl: 5  •  Probiotic Product (PROBIOTIC ADVANCED PO), Take 1 capsule by mouth Daily., Disp: , Rfl:   •  sertraline (ZOLOFT) 100 MG tablet, TAKE ONE TABLET BY MOUTH DAILY, Disp: 90 tablet, Rfl: 1  •  simvastatin (ZOCOR) 40 MG tablet, TAKE 1 TABLET DAILY AT BEDTIME, Disp: 90 tablet, Rfl: 1  •  topiramate (TOPAMAX) 25 MG tablet, Take 25 mg by mouth Every Night., Disp: , Rfl:      Allergies    Allergies   Allergen Reactions   • Glucophage Xr [Metformin Hcl Er] Diarrhea and Other (See Comments)     Glucophage XR TB24: Adverse Reaction: Severe GI issues.   • Metformin Nausea And Vomiting     Other reaction(s): SEVERE INTESTIONAL ISSUES  Other reaction(s): SEVERE GI ISSUES    Glucophage XR TB24  MetFORMIN HCl TABS   • Tetracyclines & Related Nausea Only     Adverse Reaction       Problem List    Patient Active Problem List   Diagnosis   • Chronic kidney disease, stage III (moderate) (CMS/HCC)   • Gastroesophageal reflux disease without " "esophagitis   • Hyperlipidemia   • Hypertension   • Trigger finger of right hand   • DM (diabetes mellitus), type 2, uncontrolled w/neurologic complication (CMS/HCC)   • Obesity, Class I, BMI 30-34.9   • Actinic keratosis   • FAVIO (obstructive sleep apnea)   • Prurigo nodularis   • History of depression   • History of migraine   • Cervical radiculopathy   • ALT (SGPT) level raised   • Elevated AST (SGOT)   • Uncontrolled type 2 diabetes mellitus with diabetic neuropathy, with long-term current use of insulin (CMS/[facility])   • Post-operative infection   • Leukocytosis   • Left hand weakness   • Initial Medicare annual wellness visit   • Herniated cervical disc   • Circadian rhythm sleep disorder, delayed sleep phase type       Medications, Allergies, Problems List and Past History were reviewed and updated.    Physical Examination    /76 (BP Location: Left arm, Patient Position: Sitting, Cuff Size: Adult)   Pulse 68   Temp 97.3 °F (36.3 °C) (Temporal)   Resp 16   Ht 172.7 cm (68\")   Wt 103 kg (227 lb)   BMI 34.52 kg/m²     HEENT: Head- Normocephalic Atraumatic. Facies- Within normal limits. Pinnas- Normal texture and shape bilaterally. Canals- Normal bilaterally. TMs- Normal bilaterally. Nares- Patent bilaterally. Nasal Septum- is normal. There is no tenderness to palpation over the frontal or maxillary sinuses. Lids- Normal bilaterally. Conjunctiva- Clear bilaterally. Sclera- Anicteric bilaterally. Oropharynx- Moist with no lesions. Tonsils- No enlargement, erythema or exudate.    Neck: Thyroid- non enlarged, symmetric and has no nodules. No bruits are detected. ROM- Normal Range of Motion with no rigidity.    Lungs: Auscultation- Clear to auscultation bilaterally. There are no retractions, clubbing or cyanosis. The Expiratory to Inspiratory ratio is equal.    Lymph Nodes: Cervical- no enlarged lymph nodes noted. Clavicular- Deferred. Axillary- Deferred. Inguinal- Deferred.    Cardiovascular: There are no " carotid bruits. Heart- Normal Rate with Regular rhythm and no murmurs. There are no gallops. There are no rubs. In the lower extremities there is no edema. The upper extremities do not have edema.    Abdomen: Soft, benign, non-tender with no masses, hernias, organomegaly or scars.    Male Genitourinary: The penis is circumcised with testicles found in the scrotum bilaterally. Testicular Size is normal bilaterally. The penis has no anatomic abnormalities.    Rectal: reveals normal external sphincter tone with no external lesions.    Prostate: The prostate gland is symmetric and smooth with no nodularity.    Dermatologic: The patient has no worrisome or suspicious skin lesions noted.    Impression and Assessment    Normal Physical Examination.    Encounter for Screening for Malignant Neoplasm of the Prostate.    Hyperlipidemia.    Gastroesophageal Reflux Disease.    Essential Hypertension.    Plan    Gastroesophageal Reflux Disease Plan: The current plan was continued.    Hyperlipidemia Plan: The patient was instructed to exercise daily, eat a low fat diet and continue his medications.    Essential Hypertension Plan: The current plan was continued.    Counseling was provided regarding: Adequate Aerobic Exercise, Dental Visits, Flossing Teeth and Heart Healthy Diet.    The following was ordered for screening and health maintenance: PSA.       Immunizations Ordered and Administered: None.    Elton was seen today for annual exam.    Diagnoses and all orders for this visit:    Physical exam    Gastroesophageal reflux disease without esophagitis  -     Comprehensive Metabolic Panel  -     CBC & Differential    Essential hypertension  -     Comprehensive Metabolic Panel  -     TSH  -     POC Urinalysis Dipstick, Automated    Hyperlipidemia, unspecified hyperlipidemia type  -     Comprehensive Metabolic Panel  -     Lipid Panel    Prostate cancer screening  -     PSA Screen        Return to Office    The patient was  instructed to return for follow-up in 6 months.    The patient was instructed to return sooner if the condition changes, worsens, or does not resolve.

## 2019-03-11 ENCOUNTER — TELEPHONE (OUTPATIENT)
Dept: ENDOCRINOLOGY | Facility: CLINIC | Age: 69
End: 2019-03-11

## 2019-03-11 RX ORDER — FLUTICASONE PROPIONATE 50 MCG
SPRAY, SUSPENSION (ML) NASAL
Qty: 16 G | Refills: 1 | Status: SHIPPED | OUTPATIENT
Start: 2019-03-11 | End: 2020-05-13 | Stop reason: SDUPTHER

## 2019-03-11 NOTE — TELEPHONE ENCOUNTER
PATIENT IS HAVING TO CX APPT FOR 3/19, HE HAS TO GO OUT OF TOWN, YOU HAVE NOTHING AVAILABLE UNTIL AUGUST. DO YOU THINK YOU CAN FIT HIM IN SOMEWHERE, PLEASE LET PT KNOW. [phone]

## 2019-04-23 RX ORDER — GABAPENTIN 400 MG/1
400 CAPSULE ORAL 3 TIMES DAILY
Qty: 270 CAPSULE | Refills: 0 | Status: SHIPPED | OUTPATIENT
Start: 2019-04-23 | End: 2019-07-18

## 2019-05-02 DIAGNOSIS — F32.A DEPRESSION, UNSPECIFIED DEPRESSION TYPE: ICD-10-CM

## 2019-05-02 RX ORDER — ATENOLOL 50 MG/1
TABLET ORAL
Qty: 90 TABLET | Refills: 1 | Status: SHIPPED | OUTPATIENT
Start: 2019-05-02 | End: 2019-11-07 | Stop reason: SDUPTHER

## 2019-05-02 RX ORDER — DULOXETIN HYDROCHLORIDE 30 MG/1
CAPSULE, DELAYED RELEASE ORAL
Qty: 90 CAPSULE | Refills: 1 | Status: SHIPPED | OUTPATIENT
Start: 2019-05-02 | End: 2019-07-12 | Stop reason: SDUPTHER

## 2019-05-13 RX ORDER — GEMFIBROZIL 600 MG/1
TABLET, FILM COATED ORAL
Qty: 180 TABLET | Refills: 1 | Status: SHIPPED | OUTPATIENT
Start: 2019-05-13 | End: 2019-12-02 | Stop reason: SDUPTHER

## 2019-05-28 RX ORDER — SIMVASTATIN 40 MG
TABLET ORAL
Qty: 90 TABLET | Refills: 1 | Status: SHIPPED | OUTPATIENT
Start: 2019-05-28 | End: 2019-12-02 | Stop reason: SDUPTHER

## 2019-06-10 RX ORDER — MELOXICAM 15 MG/1
TABLET ORAL
Qty: 90 TABLET | Refills: 1 | Status: SHIPPED | OUTPATIENT
Start: 2019-06-10 | End: 2019-12-19

## 2019-06-26 RX ORDER — LISINOPRIL AND HYDROCHLOROTHIAZIDE 12.5; 1 MG/1; MG/1
TABLET ORAL
Qty: 90 TABLET | Refills: 1 | Status: SHIPPED | OUTPATIENT
Start: 2019-06-26 | End: 2020-01-22

## 2019-06-28 PROBLEM — E11.3292 MILD NONPROLIFERATIVE DIABETIC RETINOPATHY OF LEFT EYE WITHOUT MACULAR EDEMA ASSOCIATED WITH TYPE 2 DIABETES MELLITUS: Status: ACTIVE | Noted: 2019-06-28

## 2019-07-08 ENCOUNTER — TELEPHONE (OUTPATIENT)
Dept: INTERNAL MEDICINE | Facility: CLINIC | Age: 69
End: 2019-07-08

## 2019-07-08 NOTE — TELEPHONE ENCOUNTER
PATIENT WILL BE OUT OF TOWN DURING HIS APPT AND WOULD LIKE TO KNOW IF THERE IS ANY WAY HE CAN MOVE THIS APPT UP AS WILL BE OUT OF TOWN FOR AN EXTENDED TIME. HE WILL BE LEAVING July 29.     CALL BACK [phone]

## 2019-07-12 ENCOUNTER — OFFICE VISIT (OUTPATIENT)
Dept: ENDOCRINOLOGY | Facility: CLINIC | Age: 69
End: 2019-07-12

## 2019-07-12 VITALS
DIASTOLIC BLOOD PRESSURE: 80 MMHG | HEART RATE: 67 BPM | BODY MASS INDEX: 34.67 KG/M2 | SYSTOLIC BLOOD PRESSURE: 120 MMHG | OXYGEN SATURATION: 98 % | WEIGHT: 228 LBS

## 2019-07-12 DIAGNOSIS — E11.42 DIABETIC PERIPHERAL NEUROPATHY ASSOCIATED WITH TYPE 2 DIABETES MELLITUS (HCC): ICD-10-CM

## 2019-07-12 DIAGNOSIS — E11.65 UNCONTROLLED TYPE 2 DIABETES MELLITUS WITH HYPERGLYCEMIA (HCC): Primary | ICD-10-CM

## 2019-07-12 LAB
ALBUMIN SERPL-MCNC: 4.4 G/DL (ref 3.5–5.2)
ALBUMIN UR-MCNC: 21 MG/L
ALBUMIN/GLOB SERPL: 1.2 G/DL
ALP SERPL-CCNC: 91 U/L (ref 39–117)
ALT SERPL W P-5'-P-CCNC: 40 U/L (ref 1–41)
ANION GAP SERPL CALCULATED.3IONS-SCNC: 16.8 MMOL/L (ref 5–15)
AST SERPL-CCNC: 54 U/L (ref 1–40)
BASOPHILS # BLD AUTO: 0.05 10*3/MM3 (ref 0–0.2)
BASOPHILS NFR BLD AUTO: 0.7 % (ref 0–1.5)
BILIRUB SERPL-MCNC: 0.6 MG/DL (ref 0.2–1.2)
BUN BLD-MCNC: 29 MG/DL (ref 8–23)
BUN/CREAT SERPL: 20.7 (ref 7–25)
CALCIUM SPEC-SCNC: 10.8 MG/DL (ref 8.6–10.5)
CHLORIDE SERPL-SCNC: 100 MMOL/L (ref 98–107)
CHOLEST SERPL-MCNC: 111 MG/DL (ref 0–200)
CO2 SERPL-SCNC: 20.2 MMOL/L (ref 22–29)
CREAT BLD-MCNC: 1.4 MG/DL (ref 0.76–1.27)
CREAT UR-MCNC: 129.2 MG/DL
DEPRECATED RDW RBC AUTO: 51.4 FL (ref 37–54)
EOSINOPHIL # BLD AUTO: 0.16 10*3/MM3 (ref 0–0.4)
EOSINOPHIL NFR BLD AUTO: 2.2 % (ref 0.3–6.2)
ERYTHROCYTE [DISTWIDTH] IN BLOOD BY AUTOMATED COUNT: 14.3 % (ref 12.3–15.4)
GFR SERPL CREATININE-BSD FRML MDRD: 50 ML/MIN/1.73
GLOBULIN UR ELPH-MCNC: 3.7 GM/DL
GLUCOSE BLD-MCNC: 149 MG/DL (ref 65–99)
GLUCOSE BLDC GLUCOMTR-MCNC: 155 MG/DL (ref 70–130)
HBA1C MFR BLD: 8.3 %
HCT VFR BLD AUTO: 38.9 % (ref 37.5–51)
HDLC SERPL-MCNC: 31 MG/DL (ref 40–60)
HGB BLD-MCNC: 12.1 G/DL (ref 13–17.7)
LDLC SERPL CALC-MCNC: 35 MG/DL (ref 0–100)
LDLC/HDLC SERPL: 1.12 {RATIO}
LYMPHOCYTES # BLD AUTO: 1.27 10*3/MM3 (ref 0.7–3.1)
LYMPHOCYTES NFR BLD AUTO: 17.6 % (ref 19.6–45.3)
MCH RBC QN AUTO: 31.5 PG (ref 26.6–33)
MCHC RBC AUTO-ENTMCNC: 31.1 G/DL (ref 31.5–35.7)
MCV RBC AUTO: 101.3 FL (ref 79–97)
MICROALBUMIN/CREAT UR: 162.5 MG/G
MONOCYTES # BLD AUTO: 1.08 10*3/MM3 (ref 0.1–0.9)
MONOCYTES NFR BLD AUTO: 15 % (ref 5–12)
NEUTROPHILS # BLD AUTO: 4.58 10*3/MM3 (ref 1.7–7)
NEUTROPHILS NFR BLD AUTO: 63.7 % (ref 42.7–76)
PLATELET # BLD AUTO: 104 10*3/MM3 (ref 140–450)
PMV BLD AUTO: 13.3 FL (ref 6–12)
POTASSIUM BLD-SCNC: 4.7 MMOL/L (ref 3.5–5.2)
PROT SERPL-MCNC: 8.1 G/DL (ref 6–8.5)
RBC # BLD AUTO: 3.84 10*6/MM3 (ref 4.14–5.8)
SODIUM BLD-SCNC: 137 MMOL/L (ref 136–145)
TRIGL SERPL-MCNC: 227 MG/DL (ref 0–150)
TSH SERPL DL<=0.05 MIU/L-ACNC: 3.65 MIU/ML (ref 0.27–4.2)
VLDLC SERPL-MCNC: 45.4 MG/DL (ref 5–40)
WBC NRBC COR # BLD: 7.2 10*3/MM3 (ref 3.4–10.8)

## 2019-07-12 PROCEDURE — 83036 HEMOGLOBIN GLYCOSYLATED A1C: CPT | Performed by: PHYSICIAN ASSISTANT

## 2019-07-12 PROCEDURE — 80061 LIPID PANEL: CPT | Performed by: PHYSICIAN ASSISTANT

## 2019-07-12 PROCEDURE — 85025 COMPLETE CBC W/AUTO DIFF WBC: CPT | Performed by: PHYSICIAN ASSISTANT

## 2019-07-12 PROCEDURE — 80053 COMPREHEN METABOLIC PANEL: CPT | Performed by: PHYSICIAN ASSISTANT

## 2019-07-12 PROCEDURE — 82570 ASSAY OF URINE CREATININE: CPT | Performed by: PHYSICIAN ASSISTANT

## 2019-07-12 PROCEDURE — 82947 ASSAY GLUCOSE BLOOD QUANT: CPT | Performed by: PHYSICIAN ASSISTANT

## 2019-07-12 PROCEDURE — 99214 OFFICE O/P EST MOD 30 MIN: CPT | Performed by: PHYSICIAN ASSISTANT

## 2019-07-12 PROCEDURE — 84443 ASSAY THYROID STIM HORMONE: CPT | Performed by: PHYSICIAN ASSISTANT

## 2019-07-12 PROCEDURE — 82043 UR ALBUMIN QUANTITATIVE: CPT | Performed by: PHYSICIAN ASSISTANT

## 2019-07-12 NOTE — PROGRESS NOTES
Chief complaint  Follow-up (DM 2)    Subjective   Elton Funez is a 69 y.o. male is here today for follow-up.  Diabetes mellitus type 2, uncontrolled dx in 2000  Diabetic complications: neuropathy.   Past meds: Metformin caused diarrhea in the past.   Eye care:no retinopathy.   Hypoglycemia: in the morning occasionally   Doesn't check glucose regularly.   Had hypoglycemia episode at night.   Nutrition: discussed carb consistent diet 45-60 gm carb per meal. Patient is not following any diet, eats a lot of potatoes.   Exercise: recommended 30 min per day exercise. He doesn't exercise because of tingling in the feet.   Foot care and dental care: discussed.    Medications: Bydureon, U-500 insulin.      Not seen since 10/2018.  He has increased his U-500 insulin to 100U TID AC.   Check BS about once a week with reported -300.  He denies sx of hypoglycemia.   Neuropathy- mostly controlled with gabapentin, rx by PCP       Diabetes   He presents for his follow-up diabetic visit. He has type 2 diabetes mellitus. No MedicAlert identification noted. The initial diagnosis of diabetes was made 15 years ago. Hypoglycemia symptoms include hunger, sweats and tremors. Pertinent negatives for hypoglycemia include no confusion, dizziness, headaches, mood changes, nervousness/anxiousness, pallor, seizures, sleepiness or speech difficulty. Associated symptoms include foot paresthesias. Pertinent negatives for diabetes include no blurred vision, no chest pain, no fatigue, no foot ulcerations, no polydipsia, no polyphagia, no polyuria, no visual change, no weakness and no weight loss. Hypoglycemia complications include nocturnal hypoglycemia. Pertinent negatives for hypoglycemia complications include no blackouts, no hospitalization, no required assistance and no required glucagon injection. Symptoms are worsening. Diabetic complications include impotence and peripheral neuropathy. Pertinent negatives for diabetic complications  include no CVA, heart disease, nephropathy, PVD or retinopathy. Risk factors for coronary artery disease include dyslipidemia, family history, hypertension, obesity and sedentary lifestyle. Current diabetic treatment includes insulin injections and oral agent (triple therapy). He is compliant with treatment most of the time. He is currently taking insulin pre-breakfast and at bedtime. Insulin injections are given by patient. Rotation sites for injection include the abdominal wall, arms and thighs. His weight is fluctuating minimally. He is following a low fiber diet. Meal planning includes avoidance of concentrated sweets. He has not had a previous visit with a dietitian. He rarely participates in exercise. He monitors blood glucose at home 1-2 x per day. He monitors urine at home <1 x per month. Blood glucose monitoring compliance is inadequate. His home blood glucose trend is fluctuating minimally. His breakfast blood glucose is taken between 5-6 am. His breakfast blood glucose range is generally 70-90 mg/dl. His dinner blood glucose is taken between 6-7 pm. His dinner blood glucose range is generally 140-180 mg/dl. His overall blood glucose range is 180-200 mg/dl. He sees a podiatrist.Eye exam is current.       Medications    Current Outpatient Medications:   •  aspirin 81 MG tablet, Take 1 tablet by mouth Daily. Last dose 2-19-17 may resume when okay with Dr Perales (Patient taking differently: Take 81 mg by mouth Daily.), Disp: , Rfl:   •  atenolol (TENORMIN) 50 MG tablet, TAKE ONE TABLET BY MOUTH DAILY, Disp: 90 tablet, Rfl: 1  •  Coenzyme Q10 (CO Q 10 PO), Take 300 mg by mouth Daily., Disp: , Rfl:   •  colestipol (COLESTID) 1 g tablet, TAKE 2 TABLETS TWICE A DAY, Disp: 360 tablet, Rfl: 1  •  cyclobenzaprine (FLEXERIL) 10 MG tablet, Take 10 mg by mouth At Night As Needed., Disp: , Rfl:   •  DULoxetine (CYMBALTA) 30 MG capsule, Take 1 capsule by mouth Daily., Disp: 90 capsule, Rfl: 1  •  exenatide er (BYDUREON  "BCISE) 2 MG/0.85ML auto-injector injection, Inject 0.85 under skin once per week, Disp: 12 pen, Rfl: 3  •  Flaxseed, Linseed, (FLAXSEED OIL) 1200 MG capsule, Take 1 capsule by mouth 2 (Two) Times a Day. 1300 mg, Disp: , Rfl:   •  fluticasone (FLONASE) 50 MCG/ACT nasal spray, USE 2 SPRAYS IN EACH NOSTRIL ONCE DAILY AS NEEDED, Disp: 16 g, Rfl: 1  •  gabapentin (NEURONTIN) 400 MG capsule, Take 1 capsule by mouth 3 (Three) Times a Day., Disp: 270 capsule, Rfl: 0  •  gemfibrozil (LOPID) 600 MG tablet, TAKE 1 TABLET TWICE A DAY, Disp: 180 tablet, Rfl: 1  •  Insulin Pen Needle (PEN NEEDLES 3/16\") 31G X 5 MM misc, 1 each 2 (Two) Times a Day., Disp: 200 each, Rfl: 3  •  Insulin Regular Human, Conc, (HUMULIN R U-500 KWIKPEN) 500 UNIT/ML solution pen-injector CONCENTRATED injection, Inject sc 100u before breakfast/lunch and 105u before dinner, Disp: 20 pen, Rfl: 3  •  lisinopril-hydrochlorothiazide (PRINZIDE,ZESTORETIC) 10-12.5 MG per tablet, TAKE ONE TABLET BY MOUTH DAILY, Disp: 90 tablet, Rfl: 1  •  meloxicam (MOBIC) 15 MG tablet, TAKE ONE TABLET BY MOUTH DAILY AS NEEDED, Disp: 90 tablet, Rfl: 1  •  Multiple Vitamins-Minerals (MULTIVITAMIN PO), Take 1 tablet by mouth Daily., Disp: , Rfl:   •  omeprazole (priLOSEC) 40 MG capsule, Take 1 capsule by mouth Daily., Disp: 90 capsule, Rfl: 1  •  ONE TOUCH ULTRA TEST test strip, USE TWO TIMES A DAY AS NEEDED, Disp: 200 each, Rfl: 3  •  Probiotic Product (PROBIOTIC ADVANCED PO), Take 1 capsule by mouth Daily., Disp: , Rfl:   •  sertraline (ZOLOFT) 100 MG tablet, TAKE ONE TABLET BY MOUTH DAILY, Disp: 90 tablet, Rfl: 1  •  simvastatin (ZOCOR) 40 MG tablet, TAKE 1 TABLET DAILY AT BEDTIME, Disp: 90 tablet, Rfl: 1  •  DULoxetine (CYMBALTA) 30 MG capsule, TAKE 1 CAPSULE DAILY, Disp: 90 capsule, Rfl: 1    PMH  The following portions of the patient's history were reviewed and updated as appropriate: allergies, current medications, past family history, past medical history, past social " history, past surgical history and problem list.    Review of systems  Review of Systems   Constitutional: Negative for fatigue and weight loss.   HENT: Positive for nosebleeds (right sided).    Eyes: Negative for blurred vision.   Cardiovascular: Negative for chest pain.   Endocrine: Negative for polydipsia, polyphagia and polyuria.   Genitourinary: Positive for impotence.   Musculoskeletal: Positive for back pain.   Skin: Negative for pallor.   Neurological: Positive for tremors and numbness (tingling of both feet, progressively getting worse, ). Negative for dizziness, seizures, speech difficulty, weakness and headaches.   Psychiatric/Behavioral: Negative for confusion. The patient is not nervous/anxious.    All other systems reviewed and are negative.      Physical exam  Objective   Blood pressure 120/80, pulse 67, weight 103 kg (228 lb), SpO2 98 %.   Physical Exam   Constitutional: He is oriented to person, place, and time. He appears well-developed and well-nourished.   Eyes: Conjunctivae are normal.   Neck: No JVD present. Thyromegaly present.   Cardiovascular: Normal rate, regular rhythm and normal heart sounds.   Pulmonary/Chest: Effort normal and breath sounds normal.   Musculoskeletal: Normal range of motion. He exhibits no edema.    Elton had a diabetic foot exam performed today.   During the foot exam he had a monofilament test performed.    Neurological Sensory Findings -  Altered sharp/dull right ankle/foot discrimination and altered sharp/dull left ankle/foot discrimination.  Vascular Status -  His right foot exhibits normal foot vasculature  and no edema. His left foot exhibits normal foot vasculature  and no edema.  Skin Integrity  -  His right foot skin is intact. He has callous right foot.  He has no right foot onychomycosis and no right foot ulcer.His left foot skin is intact.He has callous left foot. He has no left foot onychomycosis and no left foot ulcer..  Lymphadenopathy:     He has no  cervical adenopathy.   Neurological: He is alert and oriented to person, place, and time.   Skin: Skin is warm and dry. No rash noted.   Psychiatric: He has a normal mood and affect. Thought content normal.   Vitals reviewed.        LABS AND IMAGING  Results for orders placed or performed in visit on 07/12/19   POC Glucose Fingerstick   Result Value Ref Range    Glucose 155 (A) 70 - 130 mg/dL   POC Glycosylated Hemoglobin (Hb A1C)   Result Value Ref Range    Hemoglobin A1C 8.3 %       Lab Results   Component Value Date    HGBA1C 8.3 07/12/2019    HGBA1C 9.7 10/23/2018    HGBA1C 8.2 06/08/2018     Lab Results   Component Value Date    CREATININE 1.42 (H) 02/25/2019       Assessment  Assessment/Plan   1. Uncontrolled type 2 diabetes mellitus with hyperglycemia (CMS/[facility])    2. Diabetic peripheral neuropathy associated with type 2 diabetes mellitus (CMS/[facility])      Orders Placed This Encounter   Procedures   • Microalbumin / Creatinine Urine Ratio - Urine, Clean Catch   • Comprehensive Metabolic Panel   • Lipid Panel   • TSH   • CBC Auto Differential   • POC Glucose Fingerstick   • POC Glycosylated Hemoglobin (Hb A1C)   • CBC & Differential     A1C 8.3, down from 9.7 10/2018    Plan  -Continue U-500 insulin 100U before breakfast and lunch, increase to 105u before dinner (biggest meal)  -Discussed difficult to adjust insulin without bs readings.   -Bydureon weekly- continue  -Discussed dietary changes  -Discussed CGM- would need BS log with QID checks for insurance to improve  -Continue gabapentin for neuropathy, rx by PCP    There are no Patient Instructions on file for this visit.     Follow-up in 3-4 months with Dr. Colin with glucose log.     17     min  of  25 min face-to-face visit time spent for coordination of care and counselling regarding identified problems as outlined in the objective, assessment and discussion portions of the documentation.

## 2019-07-17 ENCOUNTER — TELEPHONE (OUTPATIENT)
Dept: INTERNAL MEDICINE | Facility: CLINIC | Age: 69
End: 2019-07-17

## 2019-07-17 DIAGNOSIS — E11.65 UNCONTROLLED TYPE 2 DIABETES MELLITUS WITH HYPERGLYCEMIA (HCC): ICD-10-CM

## 2019-07-17 NOTE — PROGRESS NOTES
I have reviewed the notes, assessments, and/or procedures performed by Portillo Nuñez PA-C, I concur with her/his documentation of Elton Funez

## 2019-07-17 NOTE — TELEPHONE ENCOUNTER
PATIENT CALLED STATING THAT BYDUREON AND HUMULIN NEED TO BE SENT TO [facility] INSTEAD OF LOCAL PHARMACY.

## 2019-07-18 ENCOUNTER — TELEPHONE (OUTPATIENT)
Dept: INTERNAL MEDICINE | Facility: CLINIC | Age: 69
End: 2019-07-18

## 2019-07-18 RX ORDER — GABAPENTIN 800 MG/1
800 TABLET ORAL 2 TIMES DAILY
Qty: 180 TABLET | Refills: 1 | Status: SHIPPED | OUTPATIENT
Start: 2019-07-18 | End: 2019-10-28 | Stop reason: SDUPTHER

## 2019-07-18 NOTE — TELEPHONE ENCOUNTER
Regarding: Prescription Question  Contact: [phone]  ----- Message from Alanis Garner MA sent at 7/18/2019  8:07 AM EDT -----       ----- Message from Elton Funez to Tc Ramirez MD sent at 7/18/2019  1:07 AM -----   I am taking 400 mg of Gabapentin 3 times a day.  Can my prescription be changed to 800 mg 2 times a day?  I am feeling a little more pain in my feet due to diabetic neuropathy and wonder if the extra might help.  If so please send a new script to [facility], otherwise renew my old prescription to them.  My refill is due soon.    Thanks  Elton Funez

## 2019-07-29 ENCOUNTER — TELEPHONE (OUTPATIENT)
Dept: URGENT CARE | Facility: CLINIC | Age: 69
End: 2019-07-29

## 2019-07-29 RX ORDER — SERTRALINE HYDROCHLORIDE 100 MG/1
TABLET, FILM COATED ORAL
Qty: 90 TABLET | Refills: 1 | Status: SHIPPED | OUTPATIENT
Start: 2019-07-29 | End: 2020-03-20

## 2019-07-29 RX ORDER — FLURBIPROFEN SODIUM 0.3 MG/ML
SOLUTION/ DROPS OPHTHALMIC
Refills: 1 | OUTPATIENT
Start: 2019-07-29

## 2019-07-29 RX ORDER — MONTELUKAST SODIUM 4 MG/1
TABLET, CHEWABLE ORAL
Qty: 360 TABLET | Refills: 1 | Status: SHIPPED | OUTPATIENT
Start: 2019-07-29 | End: 2019-09-03 | Stop reason: SDUPTHER

## 2019-07-31 ENCOUNTER — TELEPHONE (OUTPATIENT)
Dept: URGENT CARE | Facility: CLINIC | Age: 69
End: 2019-07-31

## 2019-08-01 ENCOUNTER — TELEPHONE (OUTPATIENT)
Dept: URGENT CARE | Facility: CLINIC | Age: 69
End: 2019-08-01

## 2019-08-13 RX ORDER — OMEPRAZOLE 40 MG/1
CAPSULE, DELAYED RELEASE ORAL
Qty: 90 CAPSULE | Refills: 1 | Status: SHIPPED | OUTPATIENT
Start: 2019-08-13 | End: 2020-04-06

## 2019-08-15 ENCOUNTER — TELEPHONE (OUTPATIENT)
Dept: INTERNAL MEDICINE | Facility: CLINIC | Age: 69
End: 2019-08-15

## 2019-08-15 RX ORDER — PEN NEEDLE, DIABETIC 30 GX5/16"
1 NEEDLE, DISPOSABLE MISCELLANEOUS 2 TIMES DAILY
Qty: 200 EACH | Refills: 3
Start: 2019-08-15 | End: 2019-08-23 | Stop reason: SDUPTHER

## 2019-08-23 ENCOUNTER — TELEPHONE (OUTPATIENT)
Dept: INTERNAL MEDICINE | Facility: CLINIC | Age: 69
End: 2019-08-23

## 2019-08-23 DIAGNOSIS — E78.5 HYPERLIPIDEMIA, UNSPECIFIED HYPERLIPIDEMIA TYPE: ICD-10-CM

## 2019-08-23 DIAGNOSIS — Z79.4 TYPE 2 DIABETES MELLITUS WITH COMPLICATION, WITH LONG-TERM CURRENT USE OF INSULIN (HCC): ICD-10-CM

## 2019-08-23 DIAGNOSIS — E11.8 TYPE 2 DIABETES MELLITUS WITH COMPLICATION, WITH LONG-TERM CURRENT USE OF INSULIN (HCC): ICD-10-CM

## 2019-08-23 DIAGNOSIS — I10 ESSENTIAL HYPERTENSION: Primary | ICD-10-CM

## 2019-08-23 RX ORDER — PEN NEEDLE, DIABETIC 30 GX5/16"
1 NEEDLE, DISPOSABLE MISCELLANEOUS 2 TIMES DAILY
Qty: 200 EACH | Refills: 3
Start: 2019-08-23 | End: 2019-09-17 | Stop reason: SDUPTHER

## 2019-08-23 NOTE — TELEPHONE ENCOUNTER
Patient rescheduled his 6 month follow up appointment with fasting labs to 10/8/19 at 2:00pm and wants to come in that morning for fasting labs. Asked that Dr. Ramirez go ahead and put in order for those labs. Thank you.

## 2019-09-03 RX ORDER — MONTELUKAST SODIUM 4 MG/1
2 TABLET, CHEWABLE ORAL 2 TIMES DAILY
Qty: 360 TABLET | Refills: 1 | Status: SHIPPED | OUTPATIENT
Start: 2019-09-03 | End: 2020-06-29

## 2019-09-17 RX ORDER — PEN NEEDLE, DIABETIC 30 GX5/16"
NEEDLE, DISPOSABLE MISCELLANEOUS
Qty: 300 EACH | Refills: 0
Start: 2019-09-17 | End: 2019-09-19 | Stop reason: SDUPTHER

## 2019-09-17 NOTE — TELEPHONE ENCOUNTER
RX Refill request from [facility] for pen needles. Rx E-Scripted with notice. Last refill without appt

## 2019-09-19 RX ORDER — PEN NEEDLE, DIABETIC 30 GX5/16"
NEEDLE, DISPOSABLE MISCELLANEOUS
Qty: 300 EACH | Refills: 2
Start: 2019-09-19 | End: 2019-10-02 | Stop reason: SDUPTHER

## 2019-10-02 ENCOUNTER — TELEPHONE (OUTPATIENT)
Dept: INTERNAL MEDICINE | Facility: CLINIC | Age: 69
End: 2019-10-02

## 2019-10-02 RX ORDER — PEN NEEDLE, DIABETIC 30 GX5/16"
NEEDLE, DISPOSABLE MISCELLANEOUS
Qty: 300 EACH | Refills: 2
Start: 2019-10-02 | End: 2019-10-02 | Stop reason: SDUPTHER

## 2019-10-02 RX ORDER — PEN NEEDLE, DIABETIC 30 GX5/16"
NEEDLE, DISPOSABLE MISCELLANEOUS
Qty: 300 EACH | Refills: 2 | Status: SHIPPED | OUTPATIENT
Start: 2019-10-02 | End: 2020-06-29

## 2019-10-02 NOTE — TELEPHONE ENCOUNTER
"Attempted to contact no answer, no voicemail. Rx has now been sent to [facility], the computer was set on \"no print\" instead on \"normal\" therefore it truly never got sent  "

## 2019-10-02 NOTE — TELEPHONE ENCOUNTER
PATIENT RETURNED CALL. CONFIRMED WITH PATIENT THAT THIS RX REQUEST HAS BEEN SENT TODAY TO [facility].

## 2019-10-29 RX ORDER — GABAPENTIN 800 MG/1
800 TABLET ORAL 2 TIMES DAILY
Qty: 180 TABLET | Refills: 1 | Status: SHIPPED | OUTPATIENT
Start: 2019-10-29 | End: 2020-02-04 | Stop reason: SDUPTHER

## 2019-11-07 ENCOUNTER — OFFICE VISIT (OUTPATIENT)
Dept: INTERNAL MEDICINE | Facility: CLINIC | Age: 69
End: 2019-11-07

## 2019-11-07 VITALS
HEART RATE: 64 BPM | TEMPERATURE: 97.5 F | HEIGHT: 69 IN | SYSTOLIC BLOOD PRESSURE: 120 MMHG | OXYGEN SATURATION: 93 % | WEIGHT: 231 LBS | DIASTOLIC BLOOD PRESSURE: 68 MMHG | BODY MASS INDEX: 34.21 KG/M2 | RESPIRATION RATE: 16 BRPM

## 2019-11-07 DIAGNOSIS — F32.A DEPRESSION, UNSPECIFIED DEPRESSION TYPE: ICD-10-CM

## 2019-11-07 DIAGNOSIS — Z23 IMMUNIZATION DUE: ICD-10-CM

## 2019-11-07 DIAGNOSIS — Z00.00 MEDICARE ANNUAL WELLNESS VISIT, SUBSEQUENT: Primary | ICD-10-CM

## 2019-11-07 PROCEDURE — G0439 PPPS, SUBSEQ VISIT: HCPCS | Performed by: NURSE PRACTITIONER

## 2019-11-07 PROCEDURE — G0008 ADMIN INFLUENZA VIRUS VAC: HCPCS | Performed by: NURSE PRACTITIONER

## 2019-11-07 PROCEDURE — 90653 IIV ADJUVANT VACCINE IM: CPT | Performed by: NURSE PRACTITIONER

## 2019-11-07 RX ORDER — DULOXETIN HYDROCHLORIDE 30 MG/1
CAPSULE, DELAYED RELEASE ORAL
Qty: 90 CAPSULE | Refills: 1 | Status: SHIPPED | OUTPATIENT
Start: 2019-11-07 | End: 2020-05-14

## 2019-11-07 RX ORDER — ATENOLOL 50 MG/1
TABLET ORAL
Qty: 90 TABLET | Refills: 1 | Status: SHIPPED | OUTPATIENT
Start: 2019-11-07 | End: 2020-06-01

## 2019-11-21 ENCOUNTER — OFFICE VISIT (OUTPATIENT)
Dept: ENDOCRINOLOGY | Facility: CLINIC | Age: 69
End: 2019-11-21

## 2019-11-21 VITALS
HEART RATE: 64 BPM | DIASTOLIC BLOOD PRESSURE: 76 MMHG | BODY MASS INDEX: 34.11 KG/M2 | WEIGHT: 230.3 LBS | SYSTOLIC BLOOD PRESSURE: 122 MMHG | HEIGHT: 69 IN | OXYGEN SATURATION: 98 %

## 2019-11-21 DIAGNOSIS — E11.65 UNCONTROLLED TYPE 2 DIABETES MELLITUS WITH HYPERGLYCEMIA (HCC): ICD-10-CM

## 2019-11-21 DIAGNOSIS — E11.42 DIABETIC PERIPHERAL NEUROPATHY ASSOCIATED WITH TYPE 2 DIABETES MELLITUS (HCC): ICD-10-CM

## 2019-11-21 DIAGNOSIS — Z79.4 LONG TERM CURRENT USE OF INSULIN (HCC): ICD-10-CM

## 2019-11-21 DIAGNOSIS — IMO0002 DM (DIABETES MELLITUS), TYPE 2, UNCONTROLLED W/NEUROLOGIC COMPLICATION: Primary | Chronic | ICD-10-CM

## 2019-11-21 LAB
ALBUMIN SERPL-MCNC: 4.5 G/DL (ref 3.5–5.2)
ALBUMIN/GLOB SERPL: 1.2 G/DL
ALP SERPL-CCNC: 68 U/L (ref 39–117)
ALT SERPL W P-5'-P-CCNC: 35 U/L (ref 1–41)
ANION GAP SERPL CALCULATED.3IONS-SCNC: 15.3 MMOL/L (ref 5–15)
AST SERPL-CCNC: 52 U/L (ref 1–40)
BILIRUB SERPL-MCNC: 0.5 MG/DL (ref 0.2–1.2)
BUN BLD-MCNC: 29 MG/DL (ref 8–23)
BUN/CREAT SERPL: 20.7 (ref 7–25)
CALCIUM SPEC-SCNC: 9.5 MG/DL (ref 8.6–10.5)
CHLORIDE SERPL-SCNC: 102 MMOL/L (ref 98–107)
CHOLEST SERPL-MCNC: 113 MG/DL (ref 0–200)
CO2 SERPL-SCNC: 23.7 MMOL/L (ref 22–29)
CREAT BLD-MCNC: 1.4 MG/DL (ref 0.76–1.27)
GFR SERPL CREATININE-BSD FRML MDRD: 50 ML/MIN/1.73
GLOBULIN UR ELPH-MCNC: 3.9 GM/DL
GLUCOSE BLD-MCNC: 88 MG/DL (ref 65–99)
GLUCOSE BLDC GLUCOMTR-MCNC: 93 MG/DL (ref 70–130)
HBA1C MFR BLD: 8 %
HDLC SERPL-MCNC: 30 MG/DL (ref 40–60)
LDLC SERPL CALC-MCNC: 46 MG/DL (ref 0–100)
LDLC/HDLC SERPL: 1.55 {RATIO}
POTASSIUM BLD-SCNC: 4.8 MMOL/L (ref 3.5–5.2)
PROT SERPL-MCNC: 8.4 G/DL (ref 6–8.5)
SODIUM BLD-SCNC: 141 MMOL/L (ref 136–145)
TRIGL SERPL-MCNC: 183 MG/DL (ref 0–150)
VLDLC SERPL-MCNC: 36.6 MG/DL (ref 5–40)

## 2019-11-21 PROCEDURE — 80061 LIPID PANEL: CPT | Performed by: INTERNAL MEDICINE

## 2019-11-21 PROCEDURE — 80053 COMPREHEN METABOLIC PANEL: CPT | Performed by: INTERNAL MEDICINE

## 2019-11-21 PROCEDURE — 83036 HEMOGLOBIN GLYCOSYLATED A1C: CPT | Performed by: INTERNAL MEDICINE

## 2019-11-21 PROCEDURE — 82947 ASSAY GLUCOSE BLOOD QUANT: CPT | Performed by: INTERNAL MEDICINE

## 2019-11-21 PROCEDURE — 99214 OFFICE O/P EST MOD 30 MIN: CPT | Performed by: INTERNAL MEDICINE

## 2019-11-21 RX ORDER — FLASH GLUCOSE SCANNING READER
1 EACH MISCELLANEOUS ONCE
Qty: 1 DEVICE | Refills: 0 | Status: SHIPPED | OUTPATIENT
Start: 2019-11-21 | End: 2019-11-21

## 2019-11-21 RX ORDER — FLASH GLUCOSE SENSOR
1 KIT MISCELLANEOUS
Qty: 2 EACH | Refills: 6 | Status: SHIPPED | OUTPATIENT
Start: 2019-11-21 | End: 2022-05-18 | Stop reason: ALTCHOICE

## 2019-11-21 NOTE — PATIENT INSTRUCTIONS
Results for orders placed or performed in visit on 11/21/19   POC Glucose Fingerstick   Result Value Ref Range    Glucose 93 70 - 130 mg/dL   POC Glycosylated Hemoglobin (Hb A1C)   Result Value Ref Range    Hemoglobin A1C 8.0 %     Change insulin to 130 units with breakfast 130 units supper and  90 Units with evening snack.     Check glucose before your afternoon insulin dose and if its > 300, move this dose to before lunch and move your evening dose to before dinner: 100 -130-130.

## 2019-11-22 NOTE — PROGRESS NOTES
Chief complaint  Diabetes (Follow UP)    Subjective   Elton Funez is a 69 y.o. male is here today for follow-up  Diabetes mellitus type 2, uncontrolled dx in 2000  Diabetic complications: neuropathy.   Past meds: Metformin caused diarrhea in the past.   Eye care:no retinopathy.   Hypoglycemia: in the morning occasionally   Started testing 4 times daily   Had hypoglycemia episode at night.   Nutrition: discussed carb consistent diet 45-60 gm carb per meal. Patient is not following any diet, eats a lot of potatoes.   Exercise: recommended 30 min per day exercise. He doesn't exercise because of tingling in the feet.   Foot care and dental care: discussed.     Medications: Bydureon, U-500 insulin TID      He has changed insulin administration time and he feels that it is working better. Log book reviewed: morning numbers are lower, he gives first dose at breakfast, 2nd between lunch and dinner, 3d at night. He has frequent morning lows, variable sugars otherwise.     Medications    Current Outpatient Medications:   •  atenolol (TENORMIN) 50 MG tablet, TAKE ONE TABLET BY MOUTH DAILY, Disp: 90 tablet, Rfl: 1  •  Coenzyme Q10 (CO Q 10 PO), Take 300 mg by mouth Daily., Disp: , Rfl:   •  colestipol (COLESTID) 1 g tablet, Take 2 tablets by mouth 2 (Two) Times a Day., Disp: 360 tablet, Rfl: 1  •  cyclobenzaprine (FLEXERIL) 10 MG tablet, Take 10 mg by mouth At Night As Needed., Disp: , Rfl:   •  DULoxetine (CYMBALTA) 30 MG capsule, TAKE 1 CAPSULE DAILY, Disp: 90 capsule, Rfl: 1  •  exenatide er (BYDUREON BCISE) 2 MG/0.85ML auto-injector injection, Inject 0.85 under skin once per week, Disp: 12 pen, Rfl: 3  •  Flaxseed, Linseed, (FLAXSEED OIL) 1200 MG capsule, Take 1 capsule by mouth 2 (Two) Times a Day. 1300 mg, Disp: , Rfl:   •  fluticasone (FLONASE) 50 MCG/ACT nasal spray, USE 2 SPRAYS IN EACH NOSTRIL ONCE DAILY AS NEEDED, Disp: 16 g, Rfl: 1  •  gabapentin (NEURONTIN) 800 MG tablet, Take 1 tablet by mouth 2 (Two) Times a  "Day., Disp: 180 tablet, Rfl: 1  •  Insulin Pen Needle (PEN NEEDLES 3/16\") 31G X 5 MM misc, Test three times daily, Disp: 300 each, Rfl: 2  •  Insulin Regular Human, Conc, (HUMULIN R U-500 KWIKPEN) 500 UNIT/ML solution pen-injector CONCENTRATED injection, Inject sc 130u before breakfast and 130 in the afternoon  and 90 u before bedtime snack, Disp: 20 pen, Rfl: 3  •  lisinopril-hydrochlorothiazide (PRINZIDE,ZESTORETIC) 10-12.5 MG per tablet, TAKE ONE TABLET BY MOUTH DAILY, Disp: 90 tablet, Rfl: 1  •  meloxicam (MOBIC) 15 MG tablet, TAKE ONE TABLET BY MOUTH DAILY AS NEEDED, Disp: 90 tablet, Rfl: 1  •  Multiple Vitamins-Minerals (MULTIVITAMIN PO), Take 1 tablet by mouth Daily., Disp: , Rfl:   •  omeprazole (priLOSEC) 40 MG capsule, TAKE ONE CAPSULE BY MOUTH DAILY, Disp: 90 capsule, Rfl: 1  •  Probiotic Product (PROBIOTIC ADVANCED PO), Take 1 capsule by mouth Daily., Disp: , Rfl:   •  sertraline (ZOLOFT) 100 MG tablet, TAKE ONE TABLET BY MOUTH DAILY, Disp: 90 tablet, Rfl: 1  •  aspirin 81 MG tablet, Take 1 tablet by mouth Daily. Last dose 2-19-17 may resume when okay with Dr Perales (Patient taking differently: Take 81 mg by mouth Daily.), Disp: , Rfl:   •  Continuous Blood Gluc Sensor (FREESTYLE MESFIN 14 DAY SENSOR) [facility] [place] 1 each Every 14 (Fourteen) Days., Disp: 2 each, Rfl: 6  •  gemfibrozil (LOPID) 600 MG tablet, TAKE 1 TABLET TWICE A DAY, Disp: 180 tablet, Rfl: 1  •  glucose blood (ONE TOUCH ULTRA TEST) test strip, Test FOUR times daily, Disp: 200 each, Rfl: 3  •  simvastatin (ZOCOR) 40 MG tablet, TAKE 1 TABLET DAILY AT BEDTIME, Disp: 90 tablet, Rfl: 1  •  traZODone (DESYREL) 50 MG tablet, 1-2 po qhs prn, Disp: 60 tablet, Rfl: 3    PMH  The following portions of the patient's history were reviewed and updated as appropriate: allergies, current medications, past family history, past medical history, past social history, past surgical history and problem list.    Review of systems  Review of Systems   Constitutional: " "Positive for fatigue.   Musculoskeletal: Positive for back pain.   Neurological: Positive for numbness (and tingling).   All other systems reviewed and are negative.      Physical exam  Objective   Blood pressure 122/76, pulse 64, height 175.3 cm (69\"), weight 104 kg (230 lb 4.8 oz), SpO2 98 %. Body mass index is 34.01 kg/m².  Physical Exam   Constitutional: He is oriented to person, place, and time. He appears well-developed and well-nourished.   HENT:   Head: Normocephalic and atraumatic.   Eyes: Conjunctivae are normal.   Neck: No thyromegaly present.   Cardiovascular: Normal rate, regular rhythm, normal heart sounds and intact distal pulses.   Pulmonary/Chest: Effort normal and breath sounds normal.   Musculoskeletal: He exhibits no edema.   Lymphadenopathy:     He has no cervical adenopathy.   Neurological: He is alert and oriented to person, place, and time.   Psychiatric: He has a normal mood and affect. Thought content normal.         LABS AND IMAGING  Office Visit on 11/21/2019   Component Date Value Ref Range Status   • Glucose 11/21/2019 93  70 - 130 mg/dL Final   • Hemoglobin A1C 11/21/2019 8.0  % Final   • Glucose 11/21/2019 88  65 - 99 mg/dL Final   • BUN 11/21/2019 29* 8 - 23 mg/dL Final   • Creatinine 11/21/2019 1.40* 0.76 - 1.27 mg/dL Final   • Sodium 11/21/2019 141  136 - 145 mmol/L Final   • Potassium 11/21/2019 4.8  3.5 - 5.2 mmol/L Final   • Chloride 11/21/2019 102  98 - 107 mmol/L Final   • CO2 11/21/2019 23.7  22.0 - 29.0 mmol/L Final   • Calcium 11/21/2019 9.5  8.6 - 10.5 mg/dL Final   • Total Protein 11/21/2019 8.4  6.0 - 8.5 g/dL Final   • Albumin 11/21/2019 4.50  3.50 - 5.20 g/dL Final   • ALT (SGPT) 11/21/2019 35  1 - 41 U/L Final   • AST (SGOT) 11/21/2019 52* 1 - 40 U/L Final   • Alkaline Phosphatase 11/21/2019 68  39 - 117 U/L Final   • Total Bilirubin 11/21/2019 0.5  0.2 - 1.2 mg/dL Final   • eGFR Non African Amer 11/21/2019 50* >60 mL/min/1.73 Final   • Globulin 11/21/2019 3.9  gm/dL " Final   • A/G Ratio 11/21/2019 1.2  g/dL Final   • BUN/Creatinine Ratio 11/21/2019 20.7  7.0 - 25.0 Final   • Anion Gap 11/21/2019 15.3* 5.0 - 15.0 mmol/L Final   • Total Cholesterol 11/21/2019 113  0 - 200 mg/dL Final   • Triglycerides 11/21/2019 183* 0 - 150 mg/dL Final   • HDL Cholesterol 11/21/2019 30* 40 - 60 mg/dL Final   • LDL Cholesterol  11/21/2019 46  0 - 100 mg/dL Final   • VLDL Cholesterol 11/21/2019 36.6  5 - 40 mg/dL Final   • LDL/HDL Ratio 11/21/2019 1.55   Final           Assessment  Assessment/Plan   1. DM (diabetes mellitus), type 2, uncontrolled w/neurologic complication (CMS/[facility])    2. Uncontrolled type 2 diabetes mellitus with hyperglycemia (CMS/[facility])    3. Diabetic peripheral neuropathy associated with type 2 diabetes mellitus (CMS/[facility])    4. Long term current use of insulin (CMS/[facility])        Plan  Patient Instructions     Change insulin to 130 units with breakfast 130 units supper and  90 Units with evening snack.     Check glucose before your afternoon insulin dose and if its > 200, move this dose to before lunch and move your evening dose to before dinner: 100 -130-130.    Discussed CGM and he might be agood candidate for Freestyle Peggy. Referral sent.     Fasting labs today - LDL is at goal, TG elevated. Renal function is mildly abnormal. GFR is 50.

## 2019-11-26 ENCOUNTER — OFFICE VISIT (OUTPATIENT)
Dept: SLEEP MEDICINE | Facility: HOSPITAL | Age: 69
End: 2019-11-26

## 2019-11-26 VITALS
SYSTOLIC BLOOD PRESSURE: 116 MMHG | WEIGHT: 224 LBS | DIASTOLIC BLOOD PRESSURE: 66 MMHG | HEIGHT: 69 IN | OXYGEN SATURATION: 96 % | HEART RATE: 73 BPM | BODY MASS INDEX: 33.18 KG/M2

## 2019-11-26 DIAGNOSIS — G47.33 OSA (OBSTRUCTIVE SLEEP APNEA): Primary | ICD-10-CM

## 2019-11-26 DIAGNOSIS — F51.04 PSYCHOPHYSIOLOGICAL INSOMNIA: ICD-10-CM

## 2019-11-26 PROCEDURE — 99213 OFFICE O/P EST LOW 20 MIN: CPT | Performed by: NURSE PRACTITIONER

## 2019-11-26 RX ORDER — TRAZODONE HYDROCHLORIDE 50 MG/1
TABLET ORAL
Qty: 60 TABLET | Refills: 3 | Status: SHIPPED | OUTPATIENT
Start: 2019-11-26 | End: 2020-03-22

## 2019-11-26 NOTE — PROGRESS NOTES
Chief Complaint:   Chief Complaint   Patient presents with   • Follow-up       HPI:    Elton Funez is a 69 y.o. male here for follow-up of sleep apnea.  Patient was last seen 11/26/2018.  Patient states he is doing well with BiPAP therapy.  Patient is sleeping 7 hours nightly and is refreshed upon awakening.  Patient states he is occasionally tired but not so much so that he will need a nap.  [place] score is 12/24.  Patient has no concerns today he does wish to continue.  Patient states he does have his days and nights mixed up.  Patient will go to sleep at 4:30 AM and will awaken around noon.  Patient does wish to get on a regular sleep schedule as this is becoming annoying 2 PM and his wife.  Patient would like to try medication.      Current medications are:   Current Outpatient Medications:   •  aspirin 81 MG tablet, Take 1 tablet by mouth Daily. Last dose 2-19-17 may resume when okay with Dr Perales (Patient taking differently: Take 81 mg by mouth Daily.), Disp: , Rfl:   •  atenolol (TENORMIN) 50 MG tablet, TAKE ONE TABLET BY MOUTH DAILY, Disp: 90 tablet, Rfl: 1  •  Coenzyme Q10 (CO Q 10 PO), Take 300 mg by mouth Daily., Disp: , Rfl:   •  colestipol (COLESTID) 1 g tablet, Take 2 tablets by mouth 2 (Two) Times a Day., Disp: 360 tablet, Rfl: 1  •  Continuous Blood Gluc Sensor (FREESTYLE MESFIN 14 DAY SENSOR) misc, 1 each Every 14 (Fourteen) Days., Disp: 2 each, Rfl: 6  •  cyclobenzaprine (FLEXERIL) 10 MG tablet, Take 10 mg by mouth At Night As Needed., Disp: , Rfl:   •  DULoxetine (CYMBALTA) 30 MG capsule, TAKE 1 CAPSULE DAILY, Disp: 90 capsule, Rfl: 1  •  exenatide er (BYDUREON BCISE) 2 MG/0.85ML auto-injector injection, Inject 0.85 under skin once per week, Disp: 12 pen, Rfl: 3  •  Flaxseed, Linseed, (FLAXSEED OIL) 1200 MG capsule, Take 1 capsule by mouth 2 (Two) Times a Day. 1300 mg, Disp: , Rfl:   •  fluticasone (FLONASE) 50 MCG/ACT nasal spray, USE 2 SPRAYS IN EACH NOSTRIL ONCE DAILY AS NEEDED, Disp:  "16 g, Rfl: 1  •  gabapentin (NEURONTIN) 800 MG tablet, Take 1 tablet by mouth 2 (Two) Times a Day., Disp: 180 tablet, Rfl: 1  •  gemfibrozil (LOPID) 600 MG tablet, TAKE 1 TABLET TWICE A DAY, Disp: 180 tablet, Rfl: 1  •  Insulin Pen Needle (PEN NEEDLES 3/16\") 31G X 5 MM misc, Test three times daily, Disp: 300 each, Rfl: 2  •  Insulin Regular Human, Conc, (HUMULIN R U-500 KWIKPEN) 500 UNIT/ML solution pen-injector CONCENTRATED injection, Inject sc 130u before breakfast and 130 in the afternoon  and 90 u before bedtime snack, Disp: 20 pen, Rfl: 3  •  lisinopril-hydrochlorothiazide (PRINZIDE,ZESTORETIC) 10-12.5 MG per tablet, TAKE ONE TABLET BY MOUTH DAILY, Disp: 90 tablet, Rfl: 1  •  meloxicam (MOBIC) 15 MG tablet, TAKE ONE TABLET BY MOUTH DAILY AS NEEDED, Disp: 90 tablet, Rfl: 1  •  Multiple Vitamins-Minerals (MULTIVITAMIN PO), Take 1 tablet by mouth Daily., Disp: , Rfl:   •  omeprazole (priLOSEC) 40 MG capsule, TAKE ONE CAPSULE BY MOUTH DAILY, Disp: 90 capsule, Rfl: 1  •  ONE TOUCH ULTRA TEST test strip, USE TWO TIMES A DAY AS NEEDED, Disp: 200 each, Rfl: 3  •  Probiotic Product (PROBIOTIC ADVANCED PO), Take 1 capsule by mouth Daily., Disp: , Rfl:   •  sertraline (ZOLOFT) 100 MG tablet, TAKE ONE TABLET BY MOUTH DAILY, Disp: 90 tablet, Rfl: 1  •  simvastatin (ZOCOR) 40 MG tablet, TAKE 1 TABLET DAILY AT BEDTIME, Disp: 90 tablet, Rfl: 1  •  traZODone (DESYREL) 50 MG tablet, 1-2 po qhs prn, Disp: 60 tablet, Rfl: 3.      The patient's relevant past medical, surgical, family and social history were reviewed and updated in Epic as appropriate.       Review of Systems   HENT: Positive for tinnitus and trouble swallowing.    Eyes: Positive for visual disturbance.   Respiratory: Positive for apnea.    Gastrointestinal:        Heartburn   Endocrine: Positive for polydipsia.   Musculoskeletal: Positive for arthralgias, gait problem, myalgias and neck pain.   Neurological: Positive for numbness.   Psychiatric/Behavioral: Positive " for dysphoric mood and sleep disturbance.   All other systems reviewed and are negative.        Objective:    Physical Exam   Constitutional: He is oriented to person, place, and time. He appears well-developed and well-nourished.   HENT:   Head: Normocephalic and atraumatic.   Mouth/Throat: Oropharynx is clear and moist.   Class 2 airway   Eyes: Conjunctivae are normal.   Neck: Neck supple. No thyromegaly present.   Cardiovascular: Normal rate and regular rhythm.   Pulmonary/Chest: Effort normal and breath sounds normal.   Lymphadenopathy:     He has no cervical adenopathy.   Neurological: He is alert and oriented to person, place, and time.   Skin: Skin is warm and dry.   Psychiatric: He has a normal mood and affect. His behavior is normal. Judgment and thought content normal.   Nursing note and vitals reviewed.  86/90 days of use.  Greater than 4-hour use 91.2%.  9% EPAP 12.  Average breath rate 12.4.  Average minute ventilation 6.7.  AHI 2.7.  Download reviewed with patient.      ASSESSMENT/PLAN    Elton was seen today for follow-up.    Diagnoses and all orders for this visit:    FAVIO (obstructive sleep apnea)  -     CPAP Therapy    Psychophysiological insomnia  -     traZODone (DESYREL) 50 MG tablet; 1-2 po qhs prn            1. Counseled patient regarding multimodal approach with healthy nutrition, healthy sleep, regular physical activity, social activities, counseling, and medications. Encouraged to practice lateral  sleep position. Avoid alcohol and sedatives close to bedtime.  2. Refill supplies x1 year.  3. Trazodone 50 mg 1-2 p.o. nightly as needed #60 with 3 refills.  I will see patient back in 8 weeks to reassess.    I have reviewed the results of my evaluation and impression and discussed my recommendations in detail with the patient.      Signed by  Elina Alarcon, APRN    November 26, 2019      CC: Tc Ramirez MD          No ref. provider found

## 2019-12-02 RX ORDER — SIMVASTATIN 40 MG
TABLET ORAL
Qty: 90 TABLET | Refills: 1 | Status: SHIPPED | OUTPATIENT
Start: 2019-12-02 | End: 2020-06-08

## 2019-12-02 RX ORDER — GEMFIBROZIL 600 MG/1
TABLET, FILM COATED ORAL
Qty: 180 TABLET | Refills: 1 | Status: SHIPPED | OUTPATIENT
Start: 2019-12-02 | End: 2020-06-08

## 2019-12-04 PROBLEM — Z79.4 LONG TERM CURRENT USE OF INSULIN: Status: ACTIVE | Noted: 2019-12-04

## 2019-12-04 PROBLEM — Z79.4 ENCOUNTER FOR LONG-TERM (CURRENT) USE OF INSULIN: Status: ACTIVE | Noted: 2017-10-23

## 2019-12-05 ENCOUNTER — TELEPHONE (OUTPATIENT)
Dept: INTERNAL MEDICINE | Facility: CLINIC | Age: 69
End: 2019-12-05

## 2019-12-05 NOTE — TELEPHONE ENCOUNTER
Retruned [facility] call. They need Dr. Colin to physically sign her office note. Once signed I will refax back to them

## 2019-12-05 NOTE — TELEPHONE ENCOUNTER
Simeon gaspar/ [facility] is needing to speak with you about the request for the anibal that you faxed over. He is needing further information in order to get the anibal approved by medicare.      [phone]

## 2019-12-19 RX ORDER — MELOXICAM 15 MG/1
TABLET ORAL
Qty: 90 TABLET | Refills: 0 | Status: SHIPPED | OUTPATIENT
Start: 2019-12-19 | End: 2020-03-20

## 2020-01-22 RX ORDER — LISINOPRIL AND HYDROCHLOROTHIAZIDE 12.5; 1 MG/1; MG/1
TABLET ORAL
Qty: 90 TABLET | Refills: 1 | Status: SHIPPED | OUTPATIENT
Start: 2020-01-22 | End: 2020-08-24

## 2020-02-05 RX ORDER — GABAPENTIN 800 MG/1
800 TABLET ORAL 2 TIMES DAILY
Qty: 180 TABLET | Refills: 1 | Status: SHIPPED | OUTPATIENT
Start: 2020-02-05 | End: 2020-08-22 | Stop reason: SDUPTHER

## 2020-02-06 ENCOUNTER — OFFICE VISIT (OUTPATIENT)
Dept: INTERNAL MEDICINE | Facility: CLINIC | Age: 70
End: 2020-02-06

## 2020-02-06 ENCOUNTER — HOSPITAL ENCOUNTER (OUTPATIENT)
Dept: GENERAL RADIOLOGY | Facility: HOSPITAL | Age: 70
Discharge: HOME | End: 2020-02-06
Admitting: INTERNAL MEDICINE

## 2020-02-06 VITALS
TEMPERATURE: 98.8 F | DIASTOLIC BLOOD PRESSURE: 72 MMHG | RESPIRATION RATE: 18 BRPM | WEIGHT: 233 LBS | SYSTOLIC BLOOD PRESSURE: 138 MMHG | HEART RATE: 72 BPM | BODY MASS INDEX: 34.41 KG/M2

## 2020-02-06 DIAGNOSIS — R06.02 SHORT OF BREATH ON EXERTION: ICD-10-CM

## 2020-02-06 DIAGNOSIS — R07.9 CHEST PAIN, UNSPECIFIED TYPE: ICD-10-CM

## 2020-02-06 DIAGNOSIS — M54.2 NECK PAIN: ICD-10-CM

## 2020-02-06 DIAGNOSIS — G89.29 CHRONIC LEFT SHOULDER PAIN: ICD-10-CM

## 2020-02-06 DIAGNOSIS — I10 ESSENTIAL HYPERTENSION: ICD-10-CM

## 2020-02-06 DIAGNOSIS — M25.512 CHRONIC LEFT SHOULDER PAIN: ICD-10-CM

## 2020-02-06 DIAGNOSIS — R20.2 PARESTHESIA: ICD-10-CM

## 2020-02-06 DIAGNOSIS — E78.5 HYPERLIPIDEMIA, UNSPECIFIED HYPERLIPIDEMIA TYPE: ICD-10-CM

## 2020-02-06 DIAGNOSIS — Z79.4 TYPE 2 DIABETES MELLITUS WITH COMPLICATION, WITH LONG-TERM CURRENT USE OF INSULIN (HCC): Primary | ICD-10-CM

## 2020-02-06 DIAGNOSIS — R26.81 GAIT INSTABILITY: ICD-10-CM

## 2020-02-06 DIAGNOSIS — E11.8 TYPE 2 DIABETES MELLITUS WITH COMPLICATION, WITH LONG-TERM CURRENT USE OF INSULIN (HCC): Primary | ICD-10-CM

## 2020-02-06 LAB
A/C: NORMAL
BILIRUB BLD-MCNC: NEGATIVE MG/DL
CLARITY, POC: CLEAR
COLOR UR: YELLOW
EXPIRATION DATE: ABNORMAL
EXPIRATION DATE: NORMAL
GLUCOSE UR STRIP-MCNC: ABNORMAL MG/DL
KETONES UR QL: NEGATIVE
LEUKOCYTE EST, POC: NEGATIVE
Lab: ABNORMAL
Lab: NORMAL
NITRITE UR-MCNC: NEGATIVE MG/ML
PH UR: 5 [PH] (ref 5–8)
POC CREATININE URINE: NORMAL
POC MICROALBUMIN URINE: NORMAL
PROT UR STRIP-MCNC: NEGATIVE MG/DL
RBC # UR STRIP: NEGATIVE /UL
SP GR UR: 1.02 (ref 1–1.03)
UROBILINOGEN UR QL: NORMAL

## 2020-02-06 PROCEDURE — 36415 COLL VENOUS BLD VENIPUNCTURE: CPT | Performed by: INTERNAL MEDICINE

## 2020-02-06 PROCEDURE — 73030 X-RAY EXAM OF SHOULDER: CPT

## 2020-02-06 PROCEDURE — 71046 X-RAY EXAM CHEST 2 VIEWS: CPT

## 2020-02-06 PROCEDURE — 80061 LIPID PANEL: CPT | Performed by: INTERNAL MEDICINE

## 2020-02-06 PROCEDURE — 72040 X-RAY EXAM NECK SPINE 2-3 VW: CPT

## 2020-02-06 PROCEDURE — 81003 URINALYSIS AUTO W/O SCOPE: CPT | Performed by: INTERNAL MEDICINE

## 2020-02-06 PROCEDURE — 80053 COMPREHEN METABOLIC PANEL: CPT | Performed by: INTERNAL MEDICINE

## 2020-02-06 PROCEDURE — 84443 ASSAY THYROID STIM HORMONE: CPT | Performed by: INTERNAL MEDICINE

## 2020-02-06 PROCEDURE — 85025 COMPLETE CBC W/AUTO DIFF WBC: CPT | Performed by: INTERNAL MEDICINE

## 2020-02-06 PROCEDURE — 84439 ASSAY OF FREE THYROXINE: CPT | Performed by: INTERNAL MEDICINE

## 2020-02-06 PROCEDURE — 82044 UR ALBUMIN SEMIQUANTITATIVE: CPT | Performed by: INTERNAL MEDICINE

## 2020-02-06 PROCEDURE — 99214 OFFICE O/P EST MOD 30 MIN: CPT | Performed by: INTERNAL MEDICINE

## 2020-02-06 PROCEDURE — 83036 HEMOGLOBIN GLYCOSYLATED A1C: CPT | Performed by: INTERNAL MEDICINE

## 2020-02-07 LAB
ALBUMIN SERPL-MCNC: 4.4 G/DL (ref 3.5–5.2)
ALBUMIN/GLOB SERPL: 1.4 G/DL
ALP SERPL-CCNC: 74 U/L (ref 39–117)
ALT SERPL W P-5'-P-CCNC: 33 U/L (ref 1–41)
ANION GAP SERPL CALCULATED.3IONS-SCNC: 13 MMOL/L (ref 5–15)
AST SERPL-CCNC: 43 U/L (ref 1–40)
BASOPHILS # BLD AUTO: 0.04 10*3/MM3 (ref 0–0.2)
BASOPHILS NFR BLD AUTO: 0.8 % (ref 0–1.5)
BILIRUB SERPL-MCNC: 0.3 MG/DL (ref 0.2–1.2)
BUN BLD-MCNC: 30 MG/DL (ref 8–23)
BUN/CREAT SERPL: 21.1 (ref 7–25)
CALCIUM SPEC-SCNC: 9.5 MG/DL (ref 8.6–10.5)
CHLORIDE SERPL-SCNC: 100 MMOL/L (ref 98–107)
CHOLEST SERPL-MCNC: 112 MG/DL (ref 0–200)
CO2 SERPL-SCNC: 23 MMOL/L (ref 22–29)
CREAT BLD-MCNC: 1.42 MG/DL (ref 0.76–1.27)
DEPRECATED RDW RBC AUTO: 43.5 FL (ref 37–54)
EOSINOPHIL # BLD AUTO: 0.19 10*3/MM3 (ref 0–0.4)
EOSINOPHIL NFR BLD AUTO: 3.6 % (ref 0.3–6.2)
ERYTHROCYTE [DISTWIDTH] IN BLOOD BY AUTOMATED COUNT: 12.9 % (ref 12.3–15.4)
GFR SERPL CREATININE-BSD FRML MDRD: 49 ML/MIN/1.73
GLOBULIN UR ELPH-MCNC: 3.1 GM/DL
GLUCOSE BLD-MCNC: 198 MG/DL (ref 65–99)
HBA1C MFR BLD: 7.8 % (ref 4.8–5.6)
HCT VFR BLD AUTO: 31.4 % (ref 37.5–51)
HDLC SERPL-MCNC: 32 MG/DL (ref 40–60)
HGB BLD-MCNC: 10.6 G/DL (ref 13–17.7)
IMM GRANULOCYTES # BLD AUTO: 0.04 10*3/MM3 (ref 0–0.05)
IMM GRANULOCYTES NFR BLD AUTO: 0.8 % (ref 0–0.5)
LDLC SERPL CALC-MCNC: 43 MG/DL (ref 0–100)
LDLC/HDLC SERPL: 1.34 {RATIO}
LYMPHOCYTES # BLD AUTO: 0.9 10*3/MM3 (ref 0.7–3.1)
LYMPHOCYTES NFR BLD AUTO: 17.1 % (ref 19.6–45.3)
MCH RBC QN AUTO: 31.5 PG (ref 26.6–33)
MCHC RBC AUTO-ENTMCNC: 33.8 G/DL (ref 31.5–35.7)
MCV RBC AUTO: 93.2 FL (ref 79–97)
MONOCYTES # BLD AUTO: 0.63 10*3/MM3 (ref 0.1–0.9)
MONOCYTES NFR BLD AUTO: 12 % (ref 5–12)
NEUTROPHILS # BLD AUTO: 3.45 10*3/MM3 (ref 1.7–7)
NEUTROPHILS NFR BLD AUTO: 65.7 % (ref 42.7–76)
NRBC BLD AUTO-RTO: 0 /100 WBC (ref 0–0.2)
PLATELET # BLD AUTO: 101 10*3/MM3 (ref 140–450)
PMV BLD AUTO: 13.4 FL (ref 6–12)
POTASSIUM BLD-SCNC: 5.2 MMOL/L (ref 3.5–5.2)
PROT SERPL-MCNC: 7.5 G/DL (ref 6–8.5)
RBC # BLD AUTO: 3.37 10*6/MM3 (ref 4.14–5.8)
SODIUM BLD-SCNC: 136 MMOL/L (ref 136–145)
T4 FREE SERPL-MCNC: 0.7 NG/DL (ref 0.93–1.7)
TRIGL SERPL-MCNC: 185 MG/DL (ref 0–150)
TSH SERPL DL<=0.05 MIU/L-ACNC: 4.57 UIU/ML (ref 0.27–4.2)
VLDLC SERPL-MCNC: 37 MG/DL (ref 5–40)
WBC NRBC COR # BLD: 5.25 10*3/MM3 (ref 3.4–10.8)

## 2020-02-11 ENCOUNTER — HOSPITAL ENCOUNTER (OUTPATIENT)
Dept: MRI IMAGING | Facility: HOSPITAL | Age: 70
Discharge: HOME | End: 2020-02-11
Admitting: INTERNAL MEDICINE

## 2020-02-11 DIAGNOSIS — M54.2 NECK PAIN: ICD-10-CM

## 2020-02-11 DIAGNOSIS — R20.2 PARESTHESIA: ICD-10-CM

## 2020-02-11 PROCEDURE — A9577 INJ MULTIHANCE: HCPCS | Performed by: INTERNAL MEDICINE

## 2020-02-11 PROCEDURE — 72156 MRI NECK SPINE W/O & W/DYE: CPT

## 2020-02-11 PROCEDURE — 0 GADOBENATE DIMEGLUMINE 529 MG/ML SOLUTION: Performed by: INTERNAL MEDICINE

## 2020-02-11 RX ADMIN — GADOBENATE DIMEGLUMINE 20 ML: 529 INJECTION, SOLUTION INTRAVENOUS at 14:17

## 2020-02-13 ENCOUNTER — TELEPHONE (OUTPATIENT)
Dept: INTERNAL MEDICINE | Facility: CLINIC | Age: 70
End: 2020-02-13

## 2020-02-13 DIAGNOSIS — R07.9 CHEST PAIN, UNSPECIFIED TYPE: ICD-10-CM

## 2020-02-13 DIAGNOSIS — R06.02 SHORT OF BREATH ON EXERTION: Primary | ICD-10-CM

## 2020-02-13 NOTE — TELEPHONE ENCOUNTER
VALE WITH [facility] CALLED AND STATED THAT THEY CAN DO A PEER TO PEER TO SEE IF ANYTHING ELSE CAN BE DONE TO APPROVE THE PT'S STRESS TEST. VALE CAN BE REACHED -[phone] OPTION 3. REFERENCE NUMBER FOR PEER TO PEER IS [id number]

## 2020-02-14 NOTE — TELEPHONE ENCOUNTER
Ms. Hylton called on behave of [facility]-they are requesting clinical documentation to support the request of this procedure [phone] Option 3. Fax: [phone] Case #: [id number] Ms. Hylton states this will need to be completed on 2-

## 2020-02-16 PROCEDURE — 93000 ELECTROCARDIOGRAM COMPLETE: CPT | Performed by: INTERNAL MEDICINE

## 2020-02-17 NOTE — TELEPHONE ENCOUNTER
reached pt at [phone]-updated him on denial and cardio referral    See referral for further communications

## 2020-02-18 RX ORDER — LEVOTHYROXINE SODIUM 0.05 MG/1
50 TABLET ORAL DAILY
Qty: 90 TABLET | Refills: 1 | Status: SHIPPED | OUTPATIENT
Start: 2020-02-18 | End: 2020-03-26 | Stop reason: SDUPTHER

## 2020-02-19 DIAGNOSIS — R20.2 PARESTHESIA: ICD-10-CM

## 2020-02-19 DIAGNOSIS — M48.02 SPINAL STENOSIS OF CERVICAL REGION: ICD-10-CM

## 2020-02-19 DIAGNOSIS — M54.2 NECK PAIN: Primary | ICD-10-CM

## 2020-03-20 DIAGNOSIS — F51.04 PSYCHOPHYSIOLOGICAL INSOMNIA: ICD-10-CM

## 2020-03-20 RX ORDER — MELOXICAM 15 MG/1
TABLET ORAL
Qty: 90 TABLET | Refills: 0 | Status: SHIPPED | OUTPATIENT
Start: 2020-03-20 | End: 2020-06-29

## 2020-03-20 RX ORDER — SERTRALINE HYDROCHLORIDE 100 MG/1
TABLET, FILM COATED ORAL
Qty: 90 TABLET | Refills: 0 | Status: SHIPPED | OUTPATIENT
Start: 2020-03-20 | End: 2020-07-30

## 2020-03-22 RX ORDER — TRAZODONE HYDROCHLORIDE 50 MG/1
TABLET ORAL
Qty: 60 TABLET | Refills: 2 | Status: SHIPPED | OUTPATIENT
Start: 2020-03-22 | End: 2021-02-25

## 2020-03-26 ENCOUNTER — TELEMEDICINE (OUTPATIENT)
Dept: ENDOCRINOLOGY | Facility: CLINIC | Age: 70
End: 2020-03-26

## 2020-03-26 DIAGNOSIS — E11.65 UNCONTROLLED TYPE 2 DIABETES MELLITUS WITH HYPERGLYCEMIA (HCC): ICD-10-CM

## 2020-03-26 PROCEDURE — 99213 OFFICE O/P EST LOW 20 MIN: CPT | Performed by: INTERNAL MEDICINE

## 2020-03-26 RX ORDER — LEVOTHYROXINE SODIUM 0.05 MG/1
50 TABLET ORAL DAILY
Qty: 90 TABLET | Refills: 1 | Status: SHIPPED | OUTPATIENT
Start: 2020-03-26 | End: 2021-02-25 | Stop reason: SDUPTHER

## 2020-03-26 NOTE — PROGRESS NOTES
You have chosen to receive care through a telehealth visit.  Do you consent to use a video/audio connection for your medical care today? Yes    Chief complaint  Diabetes mellitus type 2.     Subjective   Elton Funez is a 69 y.o. male is here today for follow-up  Diabetes mellitus type 2, uncontrolled dx in 2000  Diabetic complications: neuropathy.   Past meds: Metformin caused diarrhea in the past.   Eye care:no retinopathy.   Hypoglycemia: in the morning occasionally   Started testing 4 times daily.   Had hypoglycemia episode at night.   Nutrition: discussed carb consistent diet 45-60 gm carb per meal. Patient is not following any diet, eats a lot of potatoes.   Exercise: recommended 30 min per day exercise. He doesn't exercise because of tingling in the feet.   Foot care and dental care: discussed.     Medications: Bydureon, U-500 insulin TID  . His sensor summary was reviewed over the video call.   He has frequent hypoglycemia in the morning. After breakfast numbers are better.         Medications    Current Outpatient Medications:   •  amoxicillin-clavulanate (Augmentin) 875-125 MG per tablet, Take 1 tablet by mouth 2 (Two) Times a Day., Disp: 20 tablet, Rfl: 0  •  aspirin 81 MG tablet, Take 1 tablet by mouth Daily. Last dose 2-19-17 may resume when okay with Dr Perales (Patient taking differently: Take 81 mg by mouth Daily.), Disp: , Rfl:   •  atenolol (TENORMIN) 50 MG tablet, TAKE ONE TABLET BY MOUTH DAILY, Disp: 90 tablet, Rfl: 1  •  Coenzyme Q10 (CO Q 10 PO), Take 300 mg by mouth Daily., Disp: , Rfl:   •  colestipol (COLESTID) 1 g tablet, Take 2 tablets by mouth 2 (Two) Times a Day., Disp: 360 tablet, Rfl: 1  •  Continuous Blood Gluc Sensor (FREESTYLE MESFIN 14 DAY SENSOR) misc, 1 each Every 14 (Fourteen) Days., Disp: 2 each, Rfl: 6  •  DULoxetine (CYMBALTA) 30 MG capsule, TAKE 1 CAPSULE DAILY, Disp: 90 capsule, Rfl: 1  •  exenatide er (Bydureon BCise) 2 MG/0.85ML auto-injector injection, Inject 0.85 mL  "under the skin into the appropriate area as directed 1 (One) Time Per Week. Inject 0.85 under skin once per week, Disp: 12 pen, Rfl: 3  •  Flaxseed, Linseed, (FLAXSEED OIL) 1200 MG capsule, Take 1 capsule by mouth 2 (Two) Times a Day. 1300 mg, Disp: , Rfl:   •  fluticasone (FLONASE) 50 MCG/ACT nasal spray, USE 2 SPRAYS IN EACH NOSTRIL ONCE DAILY AS NEEDED, Disp: 16 g, Rfl: 1  •  gabapentin (NEURONTIN) 800 MG tablet, Take 1 tablet by mouth 2 (Two) Times a Day., Disp: 180 tablet, Rfl: 1  •  gemfibrozil (LOPID) 600 MG tablet, TAKE 1 TABLET TWICE A DAY, Disp: 180 tablet, Rfl: 1  •  glucose blood (ONE TOUCH ULTRA TEST) test strip, Test FOUR times daily, Disp: 200 each, Rfl: 3  •  Insulin Pen Needle (PEN NEEDLES 3/16\") 31G X 5 MM misc, Test three times daily, Disp: 300 each, Rfl: 2  •  Insulin Regular Human, Conc, (HumuLIN R U-500 KwikPen) 500 UNIT/ML solution pen-injector CONCENTRATED injection, Inject sc 140 u before breakfast and 130 in the afternoon  and 40-60 u before bedtime snack, Disp: 20 pen, Rfl: 3  •  levothyroxine (SYNTHROID, LEVOTHROID) 50 MCG tablet, Take 1 tablet by mouth Daily., Disp: 90 tablet, Rfl: 1  •  lisinopril-hydrochlorothiazide (PRINZIDE,ZESTORETIC) 10-12.5 MG per tablet, TAKE ONE TABLET BY MOUTH DAILY, Disp: 90 tablet, Rfl: 1  •  meloxicam (MOBIC) 15 MG tablet, TAKE ONE TABLET BY MOUTH DAILY AS NEEDED, Disp: 90 tablet, Rfl: 0  •  Multiple Vitamins-Minerals (MULTIVITAMIN PO), Take 1 tablet by mouth Daily., Disp: , Rfl:   •  omeprazole (priLOSEC) 40 MG capsule, TAKE ONE CAPSULE BY MOUTH DAILY, Disp: 90 capsule, Rfl: 1  •  sertraline (ZOLOFT) 100 MG tablet, TAKE ONE TABLET BY MOUTH DAILY, Disp: 90 tablet, Rfl: 0  •  simvastatin (ZOCOR) 40 MG tablet, TAKE 1 TABLET DAILY AT BEDTIME, Disp: 90 tablet, Rfl: 1  •  traZODone (DESYREL) 50 MG tablet, TAKE ONE TO TWO TABLETS BY MOUTH EVERY NIGHT AT BEDTIME AS NEEDED, Disp: 60 tablet, Rfl: 2    PMH  The following portions of the patient's history were reviewed " and updated as appropriate: allergies, current medications, past family history, past medical history, past social history, past surgical history and problem list.    Review of systems  Review of Systems   Constitutional: Positive for fatigue.   Musculoskeletal: Positive for back pain.   Neurological: Positive for numbness (and tingling).   All other systems reviewed and are negative.            LABS AND IMAGING  No visits with results within 1 Month(s) from this visit.   Latest known visit with results is:   Office Visit on 02/06/2020   Component Date Value Ref Range Status   • Glucose 02/06/2020 198* 65 - 99 mg/dL Final   • BUN 02/06/2020 30* 8 - 23 mg/dL Final   • Creatinine 02/06/2020 1.42* 0.76 - 1.27 mg/dL Final   • Sodium 02/06/2020 136  136 - 145 mmol/L Final   • Potassium 02/06/2020 5.2  3.5 - 5.2 mmol/L Final   • Chloride 02/06/2020 100  98 - 107 mmol/L Final   • CO2 02/06/2020 23.0  22.0 - 29.0 mmol/L Final   • Calcium 02/06/2020 9.5  8.6 - 10.5 mg/dL Final   • Total Protein 02/06/2020 7.5  6.0 - 8.5 g/dL Final   • Albumin 02/06/2020 4.40  3.50 - 5.20 g/dL Final   • ALT (SGPT) 02/06/2020 33  1 - 41 U/L Final   • AST (SGOT) 02/06/2020 43* 1 - 40 U/L Final   • Alkaline Phosphatase 02/06/2020 74  39 - 117 U/L Final   • Total Bilirubin 02/06/2020 0.3  0.2 - 1.2 mg/dL Final   • eGFR Non African Amer 02/06/2020 49* >60 mL/min/1.73 Final   • Globulin 02/06/2020 3.1  gm/dL Final   • A/G Ratio 02/06/2020 1.4  g/dL Final   • BUN/Creatinine Ratio 02/06/2020 21.1  7.0 - 25.0 Final   • Anion Gap 02/06/2020 13.0  5.0 - 15.0 mmol/L Final   • Hemoglobin A1C 02/06/2020 7.80* 4.80 - 5.60 % Final   • Total Cholesterol 02/06/2020 112  0 - 200 mg/dL Final   • Triglycerides 02/06/2020 185* 0 - 150 mg/dL Final   • HDL Cholesterol 02/06/2020 32* 40 - 60 mg/dL Final   • LDL Cholesterol  02/06/2020 43  0 - 100 mg/dL Final   • VLDL Cholesterol 02/06/2020 37  5 - 40 mg/dL Final   • LDL/HDL Ratio 02/06/2020 1.34   Final   • TSH  02/06/2020 4.570* 0.270 - 4.200 uIU/mL Final   • Free T4 02/06/2020 0.70* 0.93 - 1.70 ng/dL Final   • Color 02/06/2020 Yellow  Yellow, Straw, Dark Yellow, Venita Final   • Clarity, UA 02/06/2020 Clear  Clear Final   • Specific Gravity  02/06/2020 1.020  1.005 - 1.030 Final   • pH, Urine 02/06/2020 5.0  5.0 - 8.0 Final   • Leukocytes 02/06/2020 Negative  Negative Final   • Nitrite, UA 02/06/2020 Negative  Negative Final   • Protein, POC 02/06/2020 Negative  Negative mg/dL Final   • Glucose, UA 02/06/2020 100 mg/dL* Negative, 1000 mg/dL (3+) mg/dL Final   • Ketones, UA 02/06/2020 Negative  Negative Final   • Urobilinogen, UA 02/06/2020 Normal  Normal Final   • Bilirubin 02/06/2020 Negative  Negative Final   • Blood, UA 02/06/2020 Negative  Negative Final   • Lot Number 02/06/2020 [id number]   Final   • Expiration Date 02/06/2020 09/30/2020   Final   • Microalbumin, Urine 02/06/2020 80mg/l   Final   • Creatinine, Urine 02/06/2020 50mg/dl   Final   • A/C 02/06/2020 30 - 300 mg/g   Final    Abnormal   • Lot Number 02/06/2020 [id number]   Final   • Expiration Date 02/06/2020 10/31/2020   Final   • WBC 02/06/2020 5.25  3.40 - 10.80 10*3/mm3 Final   • RBC 02/06/2020 3.37* 4.14 - 5.80 10*6/mm3 Final   • Hemoglobin 02/06/2020 10.6* 13.0 - 17.7 g/dL Final   • Hematocrit 02/06/2020 31.4* 37.5 - 51.0 % Final   • MCV 02/06/2020 93.2  79.0 - 97.0 fL Final   • MCH 02/06/2020 31.5  26.6 - 33.0 pg Final   • MCHC 02/06/2020 33.8  31.5 - 35.7 g/dL Final   • RDW 02/06/2020 12.9  12.3 - 15.4 % Final   • RDW-SD 02/06/2020 43.5  37.0 - 54.0 fl Final   • MPV 02/06/2020 13.4* 6.0 - 12.0 fL Final   • Platelets 02/06/2020 101* 140 - 450 10*3/mm3 Final   • Neutrophil % 02/06/2020 65.7  42.7 - 76.0 % Final   • Lymphocyte % 02/06/2020 17.1* 19.6 - 45.3 % Final   • Monocyte % 02/06/2020 12.0  5.0 - 12.0 % Final   • Eosinophil % 02/06/2020 3.6  0.3 - 6.2 % Final   • Basophil % 02/06/2020 0.8  0.0 - 1.5 % Final   • Immature Grans % 02/06/2020 0.8* 0.0 -  0.5 % Final   • Neutrophils, Absolute 02/06/2020 3.45  1.70 - 7.00 10*3/mm3 Final   • Lymphocytes, Absolute 02/06/2020 0.90  0.70 - 3.10 10*3/mm3 Final   • Monocytes, Absolute 02/06/2020 0.63  0.10 - 0.90 10*3/mm3 Final   • Eosinophils, Absolute 02/06/2020 0.19  0.00 - 0.40 10*3/mm3 Final   • Basophils, Absolute 02/06/2020 0.04  0.00 - 0.20 10*3/mm3 Final   • Immature Grans, Absolute 02/06/2020 0.04  0.00 - 0.05 10*3/mm3 Final   • nRBC 02/06/2020 0.0  0.0 - 0.2 /100 WBC Final           Assessment  Assessment/Plan   1. Uncontrolled type 2 diabetes mellitus with hyperglycemia (CMS/[facility])        Plan  Patient Instructions     Change insulin to 130 units with breakfast at 1 pm. 130 units supper 6-8 pm  and  40-60 Units with evening snack.     Continue Freestyle anibal.     Recent labs reviewed.     Follow-up in 3-4 months

## 2020-03-27 ENCOUNTER — TELEPHONE (OUTPATIENT)
Dept: ENDOCRINOLOGY | Facility: CLINIC | Age: 70
End: 2020-03-27

## 2020-03-31 ENCOUNTER — TELEPHONE (OUTPATIENT)
Dept: INTERNAL MEDICINE | Facility: CLINIC | Age: 70
End: 2020-03-31

## 2020-03-31 RX ORDER — AMOXICILLIN AND CLAVULANATE POTASSIUM 875; 125 MG/1; MG/1
1 TABLET, FILM COATED ORAL 2 TIMES DAILY
Qty: 20 TABLET | Refills: 0 | Status: SHIPPED | OUTPATIENT
Start: 2020-03-31 | End: 2020-06-02

## 2020-03-31 NOTE — TELEPHONE ENCOUNTER
Let him know that I've sent in augmentin.  If symptoms worsen, don't improve, develops a fever needs to be seen.   Tc Ramirez MD  13:23  03/31/20

## 2020-03-31 NOTE — TELEPHONE ENCOUNTER
PT CALLED AND STATED HE IS HAVING AN SINUS INFECTION. HE STATED THAT THE ROOF OF THIS MOUTH IS BURNING, EAR DRAINAGE. HE WOULD LIKE TO SEE IF HE CAN GET SOMETHING CALLED IN TO THE PHARMACY.     SENT TO THE [facility] IN CHART.     PLEASE ADVISE PT -[phone].

## 2020-04-06 RX ORDER — OMEPRAZOLE 40 MG/1
CAPSULE, DELAYED RELEASE ORAL
Qty: 90 CAPSULE | Refills: 1 | Status: SHIPPED | OUTPATIENT
Start: 2020-04-06 | End: 2020-12-02

## 2020-05-13 DIAGNOSIS — F32.A DEPRESSION, UNSPECIFIED DEPRESSION TYPE: ICD-10-CM

## 2020-05-14 RX ORDER — FLUTICASONE PROPIONATE 50 MCG
SPRAY, SUSPENSION (ML) NASAL
Qty: 16 G | Refills: 1 | Status: SHIPPED | OUTPATIENT
Start: 2020-05-14 | End: 2021-05-13

## 2020-05-14 RX ORDER — DULOXETIN HYDROCHLORIDE 30 MG/1
CAPSULE, DELAYED RELEASE ORAL
Qty: 90 CAPSULE | Refills: 3 | Status: SHIPPED | OUTPATIENT
Start: 2020-05-14 | End: 2021-06-09

## 2020-06-01 RX ORDER — ATENOLOL 50 MG/1
TABLET ORAL
Qty: 90 TABLET | Refills: 1 | Status: SHIPPED | OUTPATIENT
Start: 2020-06-01 | End: 2021-01-12 | Stop reason: SDUPTHER

## 2020-06-02 ENCOUNTER — OFFICE VISIT (OUTPATIENT)
Dept: INTERNAL MEDICINE | Facility: CLINIC | Age: 70
End: 2020-06-02

## 2020-06-02 VITALS
TEMPERATURE: 97.7 F | HEART RATE: 72 BPM | DIASTOLIC BLOOD PRESSURE: 64 MMHG | WEIGHT: 222 LBS | BODY MASS INDEX: 32.78 KG/M2 | SYSTOLIC BLOOD PRESSURE: 128 MMHG | RESPIRATION RATE: 18 BRPM

## 2020-06-02 DIAGNOSIS — E78.5 HYPERLIPIDEMIA, UNSPECIFIED HYPERLIPIDEMIA TYPE: ICD-10-CM

## 2020-06-02 DIAGNOSIS — R53.83 FATIGUE, UNSPECIFIED TYPE: ICD-10-CM

## 2020-06-02 DIAGNOSIS — E03.9 HYPOTHYROIDISM, UNSPECIFIED TYPE: ICD-10-CM

## 2020-06-02 DIAGNOSIS — I10 ESSENTIAL HYPERTENSION: Primary | ICD-10-CM

## 2020-06-02 DIAGNOSIS — K21.9 GASTROESOPHAGEAL REFLUX DISEASE WITHOUT ESOPHAGITIS: ICD-10-CM

## 2020-06-02 LAB
ALBUMIN SERPL-MCNC: 4.5 G/DL (ref 3.5–5.2)
ALBUMIN/GLOB SERPL: 1.4 G/DL
ALP SERPL-CCNC: 71 U/L (ref 39–117)
ALT SERPL W P-5'-P-CCNC: 26 U/L (ref 1–41)
ANION GAP SERPL CALCULATED.3IONS-SCNC: 12.1 MMOL/L (ref 5–15)
AST SERPL-CCNC: 31 U/L (ref 1–40)
BASOPHILS # BLD AUTO: 0.03 10*3/MM3 (ref 0–0.2)
BASOPHILS NFR BLD AUTO: 0.6 % (ref 0–1.5)
BILIRUB SERPL-MCNC: 0.4 MG/DL (ref 0.2–1.2)
BUN BLD-MCNC: 22 MG/DL (ref 8–23)
BUN/CREAT SERPL: 14.8 (ref 7–25)
CALCIUM SPEC-SCNC: 9.2 MG/DL (ref 8.6–10.5)
CHLORIDE SERPL-SCNC: 104 MMOL/L (ref 98–107)
CHOLEST SERPL-MCNC: 94 MG/DL (ref 0–200)
CO2 SERPL-SCNC: 21.9 MMOL/L (ref 22–29)
CREAT BLD-MCNC: 1.49 MG/DL (ref 0.76–1.27)
DEPRECATED RDW RBC AUTO: 44.1 FL (ref 37–54)
EOSINOPHIL # BLD AUTO: 0.12 10*3/MM3 (ref 0–0.4)
EOSINOPHIL NFR BLD AUTO: 2.4 % (ref 0.3–6.2)
ERYTHROCYTE [DISTWIDTH] IN BLOOD BY AUTOMATED COUNT: 13.4 % (ref 12.3–15.4)
GFR SERPL CREATININE-BSD FRML MDRD: 46 ML/MIN/1.73
GLOBULIN UR ELPH-MCNC: 3.2 GM/DL
GLUCOSE BLD-MCNC: 112 MG/DL (ref 65–99)
HCT VFR BLD AUTO: 31.6 % (ref 37.5–51)
HDLC SERPL-MCNC: 29 MG/DL (ref 40–60)
HGB BLD-MCNC: 10.8 G/DL (ref 13–17.7)
IMM GRANULOCYTES # BLD AUTO: 0.04 10*3/MM3 (ref 0–0.05)
IMM GRANULOCYTES NFR BLD AUTO: 0.8 % (ref 0–0.5)
LDLC SERPL CALC-MCNC: 11 MG/DL (ref 0–100)
LDLC/HDLC SERPL: 0.39 {RATIO}
LYMPHOCYTES # BLD AUTO: 1.03 10*3/MM3 (ref 0.7–3.1)
LYMPHOCYTES NFR BLD AUTO: 20.3 % (ref 19.6–45.3)
MCH RBC QN AUTO: 30.9 PG (ref 26.6–33)
MCHC RBC AUTO-ENTMCNC: 34.2 G/DL (ref 31.5–35.7)
MCV RBC AUTO: 90.3 FL (ref 79–97)
MONOCYTES # BLD AUTO: 0.62 10*3/MM3 (ref 0.1–0.9)
MONOCYTES NFR BLD AUTO: 12.2 % (ref 5–12)
NEUTROPHILS # BLD AUTO: 3.23 10*3/MM3 (ref 1.7–7)
NEUTROPHILS NFR BLD AUTO: 63.7 % (ref 42.7–76)
NRBC BLD AUTO-RTO: 0 /100 WBC (ref 0–0.2)
PLATELET # BLD AUTO: 127 10*3/MM3 (ref 140–450)
PMV BLD AUTO: 12.6 FL (ref 6–12)
POTASSIUM BLD-SCNC: 4.5 MMOL/L (ref 3.5–5.2)
PROT SERPL-MCNC: 7.7 G/DL (ref 6–8.5)
RBC # BLD AUTO: 3.5 10*6/MM3 (ref 4.14–5.8)
SODIUM BLD-SCNC: 138 MMOL/L (ref 136–145)
T4 FREE SERPL-MCNC: 1.05 NG/DL (ref 0.93–1.7)
TRIGL SERPL-MCNC: 268 MG/DL (ref 0–150)
TSH SERPL DL<=0.05 MIU/L-ACNC: 1.45 UIU/ML (ref 0.27–4.2)
VLDLC SERPL-MCNC: 53.6 MG/DL (ref 5–40)
WBC NRBC COR # BLD: 5.07 10*3/MM3 (ref 3.4–10.8)

## 2020-06-02 PROCEDURE — 80061 LIPID PANEL: CPT | Performed by: INTERNAL MEDICINE

## 2020-06-02 PROCEDURE — 99214 OFFICE O/P EST MOD 30 MIN: CPT | Performed by: INTERNAL MEDICINE

## 2020-06-02 PROCEDURE — 84439 ASSAY OF FREE THYROXINE: CPT | Performed by: INTERNAL MEDICINE

## 2020-06-02 PROCEDURE — 84443 ASSAY THYROID STIM HORMONE: CPT | Performed by: INTERNAL MEDICINE

## 2020-06-02 PROCEDURE — 85025 COMPLETE CBC W/AUTO DIFF WBC: CPT | Performed by: INTERNAL MEDICINE

## 2020-06-02 PROCEDURE — 36415 COLL VENOUS BLD VENIPUNCTURE: CPT | Performed by: INTERNAL MEDICINE

## 2020-06-02 PROCEDURE — 80053 COMPREHEN METABOLIC PANEL: CPT | Performed by: INTERNAL MEDICINE

## 2020-06-02 NOTE — PROGRESS NOTES
Chief Complaint   Patient presents with   • Follow-up     4 month follow up chronic medical problems       History of Present Illness      The patient presents for a follow-up related to hypertension. The patient reports that he has had no headaches, chest pain, dyspnea, edema, syncope, blurred vision or palpitations. He states that he is taking his medication as prescribed. He is not having medication side effects.    The patient presents for a follow-up related to hyperlipidemia. He is not following a low fat diet. He reports that he is not exercising. He is taking simvastatin. The patient is taking his medication as prescribed. He reports no medication side effects, including muscle cramps, abdominal pain, headaches or weakness. He denies orthopnea or paroxysmal nocturnal dyspnea.    The patient presents for a follow-up related to hypothyroidism. He reports that he is fatigued. He reports no hair loss, heat intolerance, cold intolerance, diarrhea, constipation or sweats. He is taking his medication as prescribed.    The patient reports that his fatigue is stable. The patient reports no dry cough, wet cough, wheezing, fever, facial pain, eye drainage, ear pain, ear drainage, nausea, vomiting, myalgias, sore throat, abdominal pain, decreased appetite, chills or rectal bleeding. He denies polyuria, polydypsia or polyphagia. He does not exercise regularly.    Review of Systems    CONSTITUTIONAL- Denies Unexplained Weight Loss, Sweats or Weakness.    HENT- Denies Sinus Pain, Nasal Congestion, Decreased Hearing or Tinnitus.    GASTROINTESTINAL- Denies Heartburn.    PULMONARY- Denies Sputum Production, Cough, Hemoptysis or Pleuritic Chest Pain.    CARDIOVASCULAR- Denies Claudication or Irregular Heart Beat.    Medications      Current Outpatient Medications:   •  aspirin 81 MG tablet, Take 1 tablet by mouth Daily. Last dose 2-19-17 may resume when okay with Dr Perales (Patient taking differently: Take 81 mg by mouth  "Daily.), Disp: , Rfl:   •  atenolol (TENORMIN) 50 MG tablet, TAKE ONE TABLET BY MOUTH DAILY, Disp: 90 tablet, Rfl: 1  •  Coenzyme Q10 (CO Q 10 PO), Take 300 mg by mouth Daily., Disp: , Rfl:   •  colestipol (COLESTID) 1 g tablet, Take 2 tablets by mouth 2 (Two) Times a Day., Disp: 360 tablet, Rfl: 1  •  Continuous Blood Gluc Sensor (FREESTYLE MESFIN 14 DAY SENSOR) misc, 1 each Every 14 (Fourteen) Days., Disp: 2 each, Rfl: 6  •  DULoxetine (CYMBALTA) 30 MG capsule, TAKE 1 CAPSULE DAILY, Disp: 90 capsule, Rfl: 3  •  exenatide er (Bydureon BCise) 2 MG/0.85ML auto-injector injection, Inject 0.85 mL under the skin into the appropriate area as directed 1 (One) Time Per Week. Inject 0.85 under skin once per week, Disp: 12 pen, Rfl: 3  •  Flaxseed, Linseed, (FLAXSEED OIL) 1200 MG capsule, Take 1 capsule by mouth 2 (Two) Times a Day. 1300 mg, Disp: , Rfl:   •  fluticasone (FLONASE) 50 MCG/ACT nasal spray, Use daily PRN, Disp: 16 g, Rfl: 1  •  gabapentin (NEURONTIN) 800 MG tablet, Take 1 tablet by mouth 2 (Two) Times a Day., Disp: 180 tablet, Rfl: 1  •  gemfibrozil (LOPID) 600 MG tablet, TAKE 1 TABLET TWICE A DAY, Disp: 180 tablet, Rfl: 1  •  glucose blood (ONE TOUCH ULTRA TEST) test strip, Test FOUR times daily, Disp: 200 each, Rfl: 3  •  Insulin Pen Needle (PEN NEEDLES 3/16\") 31G X 5 MM misc, Test three times daily, Disp: 300 each, Rfl: 2  •  Insulin Regular Human, Conc, (HumuLIN R U-500 KwikPen) 500 UNIT/ML solution pen-injector CONCENTRATED injection, Inject sc 140 u before breakfast and 130 in the afternoon  and 40-60 u before bedtime snack, Disp: 20 pen, Rfl: 3  •  levothyroxine (SYNTHROID, LEVOTHROID) 50 MCG tablet, Take 1 tablet by mouth Daily., Disp: 90 tablet, Rfl: 1  •  lisinopril-hydrochlorothiazide (PRINZIDE,ZESTORETIC) 10-12.5 MG per tablet, TAKE ONE TABLET BY MOUTH DAILY, Disp: 90 tablet, Rfl: 1  •  meloxicam (MOBIC) 15 MG tablet, TAKE ONE TABLET BY MOUTH DAILY AS NEEDED, Disp: 90 tablet, Rfl: 0  •  Multiple " Vitamins-Minerals (MULTIVITAMIN PO), Take 1 tablet by mouth Daily., Disp: , Rfl:   •  omeprazole (priLOSEC) 40 MG capsule, TAKE ONE CAPSULE BY MOUTH DAILY, Disp: 90 capsule, Rfl: 1  •  sertraline (ZOLOFT) 100 MG tablet, TAKE ONE TABLET BY MOUTH DAILY, Disp: 90 tablet, Rfl: 0  •  simvastatin (ZOCOR) 40 MG tablet, TAKE 1 TABLET DAILY AT BEDTIME, Disp: 90 tablet, Rfl: 1  •  traZODone (DESYREL) 50 MG tablet, TAKE ONE TO TWO TABLETS BY MOUTH EVERY NIGHT AT BEDTIME AS NEEDED, Disp: 60 tablet, Rfl: 2     Allergies    Allergies   Allergen Reactions   • Glucophage Xr [Metformin Hcl Er] Diarrhea and Other (See Comments)     Glucophage XR TB24: Adverse Reaction: Severe GI issues.   • Metformin Nausea And Vomiting     Other reaction(s): SEVERE INTESTIONAL ISSUES  Other reaction(s): SEVERE GI ISSUES    Glucophage XR TB24  MetFORMIN HCl TABS   • Tetracyclines & Related Nausea Only     Adverse Reaction       Problem List    Patient Active Problem List   Diagnosis   • Chronic kidney disease, stage III (moderate) (CMS/HCC)   • Gastroesophageal reflux disease without esophagitis   • Hyperlipidemia   • Hypertension   • Trigger finger of right hand   • DM (diabetes mellitus), type 2, uncontrolled w/neurologic complication (CMS/HCC)   • Obesity, Class I, BMI 30-34.9   • Actinic keratosis   • FAVIO (obstructive sleep apnea)   • Prurigo nodularis   • History of depression   • History of migraine   • Cervical radiculopathy   • ALT (SGPT) level raised   • Elevated AST (SGOT)   • Uncontrolled type 2 diabetes mellitus with diabetic neuropathy, with long-term current use of insulin (CMS/HCC)   • Post-operative infection   • Leukocytosis   • Left hand weakness   • Encounter for long-term (current) use of insulin (CMS/HCC)   • Herniated cervical disc   • Circadian rhythm sleep disorder, delayed sleep phase type   • 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    Physical Examination    /64 (BP Location: Left arm, Patient Position: Sitting, Cuff Size: Adult)   Pulse 72   Temp 97.7 °F (36.5 °C) (Temporal)   Resp 18   Wt 101 kg (222 lb)   BMI 32.78 kg/m²     HEENT: Head- Normocephalic Atraumatic. Facies- Within normal limits. Pinnas- Normal texture and shape bilaterally. Canals- Normal bilaterally. TMs- Normal bilaterally. Nares- Patent bilaterally. Nasal Septum- is normal. There is no tenderness to palpation over the frontal or maxillary sinuses. Lids- Normal bilaterally. Conjunctiva- Clear bilaterally. Sclera- Anicteric bilaterally. Oropharynx- Moist with no lesions.    Neck: Thyroid- non enlarged, symmetric and has no nodules. No bruits are detected.    Lungs: Auscultation- Clear to auscultation bilaterally. There are no retractions, clubbing or cyanosis. The Expiratory to Inspiratory ratio is equal.    Lymph Nodes: Cervical- no enlarged lymph nodes noted.    Cardiovascular: There are no carotid bruits. Heart- Normal Rate with Regular rhythm and no murmurs. There are no gallops. There are no rubs. In the lower extremities there is no edema. The upper extremities do not have edema.    Abdomen: Soft, benign, non-tender with no masses, hernias, organomegaly or scars.    Impression and Assessment    Essential Hypertension.    Hyperlipidemia.    Hypothyroidism.    Fatigue.    Plan    Essential Hypertension Plan: The patient was instructed to exercise daily, eat a low fat diet and continue his medications.    Hyperlipidemia Plan: The patient was instructed to exercise daily, eat a low fat diet and continue his medications.    Hypothyroidism Plan: The current plan was continued.    Fatigue Plan:  The condition is stable. No change is needed in the current plan.    Elton was seen today for follow-up.    Diagnoses and all orders for this visit:    Essential hypertension  -     CBC & Differential  -     Comprehensive Metabolic Panel  -     CBC Auto Differential    Hyperlipidemia, unspecified hyperlipidemia type  -     Comprehensive Metabolic Panel  -     Lipid Panel    Gastroesophageal reflux disease without esophagitis    Hypothyroidism, unspecified type  -     TSH  -     T4, Free    Fatigue, unspecified type  -     CBC & Differential  -     Comprehensive Metabolic Panel  -     CBC Auto Differential        Return to Office    The patient was instructed to return for follow-up in 4 months.    The patient was instructed to return sooner if the condition changes, worsens, or does not resolve.

## 2020-06-08 RX ORDER — SIMVASTATIN 40 MG
TABLET ORAL
Qty: 90 TABLET | Refills: 3 | Status: SHIPPED | OUTPATIENT
Start: 2020-06-08 | End: 2021-07-06

## 2020-06-08 RX ORDER — GEMFIBROZIL 600 MG/1
TABLET, FILM COATED ORAL
Qty: 180 TABLET | Refills: 3 | Status: SHIPPED | OUTPATIENT
Start: 2020-06-08 | End: 2021-08-18

## 2020-06-29 RX ORDER — MONTELUKAST SODIUM 4 MG/1
TABLET, CHEWABLE ORAL
Qty: 360 TABLET | Refills: 1 | Status: SHIPPED | OUTPATIENT
Start: 2020-06-29 | End: 2021-02-08

## 2020-06-29 RX ORDER — MELOXICAM 15 MG/1
TABLET ORAL
Qty: 90 TABLET | Refills: 0 | Status: SHIPPED | OUTPATIENT
Start: 2020-06-29 | End: 2020-11-06

## 2020-07-08 ENCOUNTER — OFFICE VISIT (OUTPATIENT)
Dept: ENDOCRINOLOGY | Facility: CLINIC | Age: 70
End: 2020-07-08

## 2020-07-08 VITALS
BODY MASS INDEX: 32.76 KG/M2 | SYSTOLIC BLOOD PRESSURE: 120 MMHG | WEIGHT: 221.2 LBS | OXYGEN SATURATION: 99 % | HEART RATE: 69 BPM | HEIGHT: 69 IN | DIASTOLIC BLOOD PRESSURE: 64 MMHG

## 2020-07-08 DIAGNOSIS — IMO0002 DM (DIABETES MELLITUS), TYPE 2, UNCONTROLLED W/NEUROLOGIC COMPLICATION: Primary | Chronic | ICD-10-CM

## 2020-07-08 DIAGNOSIS — E78.5 HYPERLIPIDEMIA, UNSPECIFIED HYPERLIPIDEMIA TYPE: Chronic | ICD-10-CM

## 2020-07-08 DIAGNOSIS — Z79.4 LONG TERM CURRENT USE OF INSULIN (HCC): ICD-10-CM

## 2020-07-08 DIAGNOSIS — E11.3292 MILD NONPROLIFERATIVE DIABETIC RETINOPATHY OF LEFT EYE WITHOUT MACULAR EDEMA ASSOCIATED WITH TYPE 2 DIABETES MELLITUS (HCC): ICD-10-CM

## 2020-07-08 DIAGNOSIS — I10 ESSENTIAL HYPERTENSION: Chronic | ICD-10-CM

## 2020-07-08 LAB
EXPIRATION DATE: ABNORMAL
EXPIRATION DATE: NORMAL
GLUCOSE BLDC GLUCOMTR-MCNC: 157 MG/DL (ref 70–130)
HBA1C MFR BLD: 7.6 %
Lab: ABNORMAL
Lab: NORMAL

## 2020-07-08 PROCEDURE — 99214 OFFICE O/P EST MOD 30 MIN: CPT | Performed by: INTERNAL MEDICINE

## 2020-07-08 PROCEDURE — 83036 HEMOGLOBIN GLYCOSYLATED A1C: CPT | Performed by: INTERNAL MEDICINE

## 2020-07-08 PROCEDURE — 95251 CONT GLUC MNTR ANALYSIS I&R: CPT | Performed by: INTERNAL MEDICINE

## 2020-07-08 RX ORDER — IPRATROPIUM BROMIDE 42 UG/1
2 SPRAY, METERED NASAL DAILY
COMMUNITY
Start: 2020-06-24 | End: 2021-09-28 | Stop reason: SDUPTHER

## 2020-07-08 RX ORDER — SIMVASTATIN 10 MG
TABLET ORAL
COMMUNITY
End: 2020-07-08 | Stop reason: DRUGHIGH

## 2020-07-08 RX ORDER — MELOXICAM 10 MG/1
CAPSULE ORAL
COMMUNITY
End: 2020-07-08 | Stop reason: DRUGHIGH

## 2020-07-08 NOTE — PROGRESS NOTES
Chief complaint  Diabetes (Follow Up:  Recent neck surgery)    Subjective   Elton Funez is a 70 y.o. male is here today for follow-up  Diabetes mellitus type 2, uncontrolled dx in 2000  Diabetic complications: neuropathy.   Past meds: Metformin caused diarrhea in the past.   Eye care:no retinopathy.   Hypoglycemia: continue to have hypoglycemia at 4-5 am.   Monitoring - freestyle anibal reviewed.   Nutrition: discussed carb consistent diet 45-60 gm carb per meal. Patient is not following any diet, eats a lot of potatoes, pasta meal at 11 pm.  Exercise: recommended 30 min per day exercise. He doesn't exercise because of tingling in the feet.   Foot care and dental care: discussed.   Had recent neck surgery. Doing well.    Medications: Bydureon, U-500 insulin TID.       Medications    Current Outpatient Medications:   •  aspirin 81 MG tablet, Take 1 tablet by mouth Daily. Last dose 2-19-17 may resume when okay with Dr Perales (Patient taking differently: Take 81 mg by mouth Daily.), Disp: , Rfl:   •  atenolol (TENORMIN) 50 MG tablet, TAKE ONE TABLET BY MOUTH DAILY, Disp: 90 tablet, Rfl: 1  •  Coenzyme Q10 (CO Q 10 PO), Take 300 mg by mouth Daily., Disp: , Rfl:   •  colestipol (COLESTID) 1 g tablet, TAKE 2 TABLETS TWICE A DAY, Disp: 360 tablet, Rfl: 1  •  Continuous Blood Gluc Sensor (FREESTYLE ANIBAL 14 DAY SENSOR) misc, 1 each Every 14 (Fourteen) Days., Disp: 2 each, Rfl: 6  •  DULoxetine (CYMBALTA) 30 MG capsule, TAKE 1 CAPSULE DAILY, Disp: 90 capsule, Rfl: 3  •  exenatide er (Bydureon BCise) 2 MG/0.85ML auto-injector injection, Inject 0.85 mL under the skin into the appropriate area as directed 1 (One) Time Per Week. Inject 0.85 under skin once per week, Disp: 12 pen, Rfl: 3  •  Flaxseed, Linseed, (FLAXSEED OIL) 1200 MG capsule, Take 1 capsule by mouth 2 (Two) Times a Day. 1300 mg, Disp: , Rfl:   •  fluticasone (FLONASE) 50 MCG/ACT nasal spray, Use daily PRN, Disp: 16 g, Rfl: 1  •  gabapentin (NEURONTIN) 800 MG  tablet, Take 1 tablet by mouth 2 (Two) Times a Day., Disp: 180 tablet, Rfl: 1  •  gemfibrozil (LOPID) 600 MG tablet, TAKE 1 TABLET TWICE A DAY, Disp: 180 tablet, Rfl: 3  •  Insulin Pen Needle (B-D UF III MINI PEN NEEDLES) 31G X 5 MM misc, USE THREE TIMES A DAY, Disp: 300 each, Rfl: 1  •  Insulin Regular Human, Conc, (HumuLIN R U-500 KwikPen) 500 UNIT/ML solution pen-injector CONCENTRATED injection, Inject sc 140 u before breakfast and 130 in the afternoon  and 40-60 u before bedtime snack, Disp: 20 pen, Rfl: 3  •  ipratropium (ATROVENT) 0.06 % nasal spray, , Disp: , Rfl:   •  levothyroxine (SYNTHROID, LEVOTHROID) 50 MCG tablet, Take 1 tablet by mouth Daily., Disp: 90 tablet, Rfl: 1  •  lisinopril-hydrochlorothiazide (PRINZIDE,ZESTORETIC) 10-12.5 MG per tablet, TAKE ONE TABLET BY MOUTH DAILY, Disp: 90 tablet, Rfl: 1  •  meloxicam (MOBIC) 15 MG tablet, TAKE ONE TABLET BY MOUTH DAILY AS NEEDED, Disp: 90 tablet, Rfl: 0  •  Multiple Vitamins-Minerals (MULTIVITAMIN PO), Take 1 tablet by mouth Daily., Disp: , Rfl:   •  omeprazole (priLOSEC) 40 MG capsule, TAKE ONE CAPSULE BY MOUTH DAILY, Disp: 90 capsule, Rfl: 1  •  sertraline (ZOLOFT) 100 MG tablet, TAKE ONE TABLET BY MOUTH DAILY, Disp: 90 tablet, Rfl: 0  •  simvastatin (ZOCOR) 40 MG tablet, TAKE 1 TABLET DAILY AT BEDTIME, Disp: 90 tablet, Rfl: 3  •  traZODone (DESYREL) 50 MG tablet, TAKE ONE TO TWO TABLETS BY MOUTH EVERY NIGHT AT BEDTIME AS NEEDED, Disp: 60 tablet, Rfl: 2  •  glucose blood (ONE TOUCH ULTRA TEST) test strip, Test FOUR times daily, Disp: 200 each, Rfl: 3    PMH  The following portions of the patient's history were reviewed and updated as appropriate: allergies, current medications, past family history, past medical history, past social history, past surgical history and problem list.    Review of systems  Review of Systems   Constitutional: Positive for fatigue.   Musculoskeletal: Positive for neck pain.   All other systems reviewed and are  "negative.      Physical exam  Objective   Blood pressure 120/64, pulse 69, height 175.3 cm (69\"), weight 100 kg (221 lb 3.2 oz), SpO2 99 %. Body mass index is 32.67 kg/m².  Physical Exam   Constitutional: He is oriented to person, place, and time. He appears well-developed and well-nourished.   HENT:   Head: Normocephalic and atraumatic.   Eyes: Conjunctivae are normal.   Cardiovascular: Normal rate, regular rhythm, normal heart sounds and intact distal pulses.   Pulmonary/Chest: Effort normal and breath sounds normal.   Musculoskeletal: He exhibits no edema.   Neurological: He is alert and oriented to person, place, and time.   Psychiatric: He has a normal mood and affect. Thought content normal.         LABS AND IMAGING  Office Visit on 07/08/2020   Component Date Value Ref Range Status   • Glucose 07/08/2020 157* 70 - 130 mg/dL Final   • Lot Number 07/08/2020 [id number]   Final   • Expiration Date 07/08/2020 12/24/2020   Final   • Hemoglobin A1C 07/08/2020 7.6  % Final   • Lot Number 07/08/2020 [id number]   Final   • Expiration Date 07/08/2020 01/27/2022   Final           Assessment  Assessment/Plan   1. DM (diabetes mellitus), type 2, uncontrolled w/neurologic complication (CMS/[facility])    2. Mild nonproliferative diabetic retinopathy of left eye without macular edema associated with type 2 diabetes mellitus (CMS/[facility])    3. Hyperlipidemia, unspecified hyperlipidemia type    4. Essential hypertension    5. Long term current use of insulin (CMS/[facility])        Plan  Patient Instructions    I have advised to decresae the dose of late night meal insulin. CGM analyzed and he has hyperglycemia after 6 pm meal and then hypoglycemia at 4 am. Thi is related to his midnight insulin dose.   Change insulin to 130 units with breakfast at 1 pm. 150 units supper 6-8 pm  and  up to 20 Units with evening snack.   Hypoglycemia precautions reviewed.   Recent lab results from the hospital discussed with pt      Continue Freestyle anibal. "      Recent labs reviewed.      Follow-up in 3-4 months

## 2020-07-30 RX ORDER — SERTRALINE HYDROCHLORIDE 100 MG/1
TABLET, FILM COATED ORAL
Qty: 90 TABLET | Refills: 1 | Status: SHIPPED | OUTPATIENT
Start: 2020-07-30 | End: 2021-04-14

## 2020-08-23 RX ORDER — GABAPENTIN 800 MG/1
800 TABLET ORAL 2 TIMES DAILY
Qty: 180 TABLET | Refills: 1 | Status: SHIPPED | OUTPATIENT
Start: 2020-08-23 | End: 2020-11-11

## 2020-08-24 RX ORDER — LISINOPRIL AND HYDROCHLOROTHIAZIDE 12.5; 1 MG/1; MG/1
TABLET ORAL
Qty: 90 TABLET | Refills: 1 | Status: SHIPPED | OUTPATIENT
Start: 2020-08-24 | End: 2021-03-08

## 2020-10-21 ENCOUNTER — FLU SHOT (OUTPATIENT)
Dept: INTERNAL MEDICINE | Facility: CLINIC | Age: 70
End: 2020-10-21

## 2020-10-21 DIAGNOSIS — Z23 NEED FOR INFLUENZA VACCINATION: ICD-10-CM

## 2020-10-21 PROCEDURE — G0008 ADMIN INFLUENZA VIRUS VAC: HCPCS | Performed by: INTERNAL MEDICINE

## 2020-10-21 PROCEDURE — 90694 VACC AIIV4 NO PRSRV 0.5ML IM: CPT | Performed by: INTERNAL MEDICINE

## 2020-11-05 ENCOUNTER — TELEPHONE (OUTPATIENT)
Dept: ENDOCRINOLOGY | Facility: CLINIC | Age: 70
End: 2020-11-05

## 2020-11-05 NOTE — TELEPHONE ENCOUNTER
Returned [employer] called advised that document was faxed on 10/20/20  She asked that I refax to: [phone]

## 2020-11-05 NOTE — TELEPHONE ENCOUNTER
[facility] [facility] IS CALLING ABOUT A REQUEST THEY SENT TO US FOR A PRESCRIPTION FOR FREESTYLE MESFIN SENSORS. THEY ARE CALLING TO GET STATUS AND THE PRESCRIPTION FOR MESFIN SENSORS. PHONE NUMBER -[phone]

## 2020-11-06 RX ORDER — MELOXICAM 15 MG/1
TABLET ORAL
Qty: 90 TABLET | Refills: 0 | Status: SHIPPED | OUTPATIENT
Start: 2020-11-06 | End: 2021-02-26

## 2020-11-10 ENCOUNTER — OFFICE VISIT (OUTPATIENT)
Dept: ENDOCRINOLOGY | Facility: CLINIC | Age: 70
End: 2020-11-10

## 2020-11-10 VITALS
HEART RATE: 61 BPM | BODY MASS INDEX: 33.61 KG/M2 | WEIGHT: 226.9 LBS | HEIGHT: 69 IN | SYSTOLIC BLOOD PRESSURE: 110 MMHG | DIASTOLIC BLOOD PRESSURE: 64 MMHG | OXYGEN SATURATION: 98 %

## 2020-11-10 DIAGNOSIS — IMO0002 UNCONTROLLED TYPE 2 DIABETES MELLITUS WITH DIABETIC NEUROPATHY, WITH LONG-TERM CURRENT USE OF INSULIN: ICD-10-CM

## 2020-11-10 DIAGNOSIS — E03.9 HYPOTHYROIDISM, ADULT: ICD-10-CM

## 2020-11-10 DIAGNOSIS — IMO0002 DM (DIABETES MELLITUS), TYPE 2, UNCONTROLLED W/NEUROLOGIC COMPLICATION: Primary | Chronic | ICD-10-CM

## 2020-11-10 DIAGNOSIS — E11.3292 MILD NONPROLIFERATIVE DIABETIC RETINOPATHY OF LEFT EYE WITHOUT MACULAR EDEMA ASSOCIATED WITH TYPE 2 DIABETES MELLITUS (HCC): ICD-10-CM

## 2020-11-10 LAB
EXPIRATION DATE: NORMAL
EXPIRATION DATE: NORMAL
GLUCOSE BLDC GLUCOMTR-MCNC: 105 MG/DL (ref 70–130)
HBA1C MFR BLD: 8.6 %
Lab: NORMAL
Lab: NORMAL

## 2020-11-10 PROCEDURE — 83036 HEMOGLOBIN GLYCOSYLATED A1C: CPT | Performed by: INTERNAL MEDICINE

## 2020-11-10 PROCEDURE — 99214 OFFICE O/P EST MOD 30 MIN: CPT | Performed by: INTERNAL MEDICINE

## 2020-11-10 PROCEDURE — 95251 CONT GLUC MNTR ANALYSIS I&R: CPT | Performed by: INTERNAL MEDICINE

## 2020-11-10 NOTE — PROGRESS NOTES
Chief complaint  Diabetes (Follow up)    Subjective   Elton Funez is a 70 y.o. male is here today for follow-up  Diabetes mellitus type 2, uncontrolled dx in 2000  Diabetic complications: neuropathy.   Past meds: Metformin caused diarrhea in the past.   Eye care:no retinopathy.   Hypoglycemia: continue to have hypoglycemia at 4-5 am. Midnight numbers are 300-400    Monitoring - freestyle anibal reviewed. He is staying up at night and sleeps from 4 am till 1 pm.   He gives the dose of insulin at 1 pm before first meal and a dose at 8 pm before dinner meal. Then he has a snacking at night - pretzel and sandwiches.   Nutrition: discussed carb consistent diet 45-60 gm carb per meal. Patient is not following any diet, eats a lot of potatoes, pasta meal at 11 pm.  Exercise: recommended 30 min per day exercise. He doesn't exercise because of tingling in the feet.   Foot care and dental care: discussed.   Had recent neck surgery. Doing well.    Medications: Bydureon, U-500 insulin BID (1 pm and 8 pm)      Medications    Current Outpatient Medications:   •  aspirin 81 MG tablet, Take 1 tablet by mouth Daily. Last dose 2-19-17 may resume when okay with Dr Perales (Patient taking differently: Take 81 mg by mouth Daily.), Disp: , Rfl:   •  atenolol (TENORMIN) 50 MG tablet, TAKE ONE TABLET BY MOUTH DAILY, Disp: 90 tablet, Rfl: 1  •  Coenzyme Q10 (CO Q 10 PO), Take 300 mg by mouth Daily., Disp: , Rfl:   •  colestipol (COLESTID) 1 g tablet, TAKE 2 TABLETS TWICE A DAY, Disp: 360 tablet, Rfl: 1  •  Continuous Blood Gluc Sensor (FREESTYLE ANIBAL 14 DAY SENSOR) [facility] [place] [facility], 1 each Every 14 (Fourteen) Days., Disp: 2 each, Rfl: 6  •  DULoxetine (CYMBALTA) 30 MG capsule, TAKE 1 CAPSULE DAILY, Disp: 90 capsule, Rfl: 3  •  exenatide er (Bydureon BCise) 2 MG/0.85ML auto-injector injection, Inject 0.85 mL under the skin into the appropriate area as directed 1 (One) Time Per Week. Inject 0.85 under skin once per week, Disp: 12 pen, Rfl: 3  •   Flaxseed, Linseed, (FLAXSEED OIL) 1200 MG capsule, Take 1 capsule by mouth 2 (Two) Times a Day. 1300 mg, Disp: , Rfl:   •  fluticasone (FLONASE) 50 MCG/ACT nasal spray, Use daily PRN, Disp: 16 g, Rfl: 1  •  gabapentin (Neurontin) 800 MG tablet, Take 1 tablet by mouth 2 (Two) Times a Day., Disp: 180 tablet, Rfl: 1  •  gemfibrozil (LOPID) 600 MG tablet, TAKE 1 TABLET TWICE A DAY, Disp: 180 tablet, Rfl: 3  •  glucose blood (ONE TOUCH ULTRA TEST) test strip, Test FOUR times daily, Disp: 200 each, Rfl: 3  •  Insulin Pen Needle (B-D UF III MINI PEN NEEDLES) 31G X 5 MM misc, USE THREE TIMES A DAY, Disp: 300 each, Rfl: 1  •  Insulin Regular Human, Conc, (HumuLIN R U-500 KwikPen) 500 UNIT/ML solution pen-injector CONCENTRATED injection, Inject sc 140 u before breakfast and 130 in the afternoon  and 40-60 u before bedtime snack, Disp: 20 pen, Rfl: 3  •  ipratropium (ATROVENT) 0.06 % nasal spray, , Disp: , Rfl:   •  levothyroxine (SYNTHROID, LEVOTHROID) 50 MCG tablet, Take 1 tablet by mouth Daily., Disp: 90 tablet, Rfl: 1  •  lisinopril-hydrochlorothiazide (PRINZIDE,ZESTORETIC) 10-12.5 MG per tablet, TAKE ONE TABLET BY MOUTH DAILY, Disp: 90 tablet, Rfl: 1  •  meloxicam (MOBIC) 15 MG tablet, TAKE ONE TABLET BY MOUTH DAILY AS NEEDED, Disp: 90 tablet, Rfl: 0  •  Multiple Vitamins-Minerals (MULTIVITAMIN PO), Take 1 tablet by mouth Daily., Disp: , Rfl:   •  omeprazole (priLOSEC) 40 MG capsule, TAKE ONE CAPSULE BY MOUTH DAILY, Disp: 90 capsule, Rfl: 1  •  sertraline (ZOLOFT) 100 MG tablet, TAKE ONE TABLET BY MOUTH DAILY, Disp: 90 tablet, Rfl: 1  •  simvastatin (ZOCOR) 40 MG tablet, TAKE 1 TABLET DAILY AT BEDTIME, Disp: 90 tablet, Rfl: 3  •  traZODone (DESYREL) 50 MG tablet, TAKE ONE TO TWO TABLETS BY MOUTH EVERY NIGHT AT BEDTIME AS NEEDED, Disp: 60 tablet, Rfl: 2    PMH  The following portions of the patient's history were reviewed and updated as appropriate: allergies, current medications, past family history, past medical history,  "past social history, past surgical history and problem list.    Review of systems  Review of Systems   Constitutional: Positive for fatigue.   Musculoskeletal: Positive for neck pain.   Neurological: Positive for numbness.   All other systems reviewed and are negative.      Physical exam  Objective   Blood pressure 110/64, pulse 61, height 175.3 cm (69\"), weight 103 kg (226 lb 14.4 oz), SpO2 98 %. Body mass index is 33.51 kg/m².  Physical Exam   Constitutional: He is oriented to person, place, and time. He appears well-developed and well-nourished.   HENT:   Head: Normocephalic and atraumatic.   Eyes: Conjunctivae are normal.   Cardiovascular: Normal rate, regular rhythm, normal heart sounds and normal pulses.   Pulmonary/Chest: Effort normal and breath sounds normal.    Elton had a diabetic foot exam performed today.   During the foot exam he had a monofilament test performed.    Neurological Sensory Findings -  Altered sharp/dull right ankle/foot discrimination and altered sharp/dull left ankle/foot discrimination.  Vascular Status -  His right foot exhibits normal foot vasculature  and no edema. His left foot exhibits normal foot vasculature  and no edema.  Skin Integrity  -  His right foot skin is intact.His left foot skin is intact..  Neurological: He is alert and oriented to person, place, and time.   Psychiatric: Thought content normal.         LABS AND IMAGING  Office Visit on 11/10/2020   Component Date Value Ref Range Status   • Glucose 11/10/2020 105  70 - 130 mg/dL Final   • Lot Number 11/10/2020 [id number]   Final   • Expiration Date 11/10/2020 06/02/2021   Final   • Hemoglobin A1C 11/10/2020 8.6  % Final   • Lot Number 11/10/2020 [id number]   Final   • Expiration Date 11/10/2020 08/17/2022   Final           Assessment  Assessment/Plan   1. DM (diabetes mellitus), type 2, uncontrolled w/neurologic complication (CMS/[facility])    2. Uncontrolled type 2 diabetes mellitus with diabetic neuropathy, with long-term " current use of insulin (CMS/[facility])    3. 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 Hypothyroidism, adult        Plan  Patient Instructions    CGM analyzed and he has hyperglycemia after 6 pm meal and then hypoglycemia at 4 am. He has discontinued midnight insulin dose and still has hypoglycemia. I have advised to watch the carb content of mighttime snacks and change pretzels to the healthier options.   Change insulin to 150 units with breakfast at 1 pm. 160 units supper 6-8 pm  if hyperglycemia after dinner continued.   Hypoglycemia precautions reviewed.      Continue Freestyle anibal.    Neuropathy sx uncotnrolled, advised to increase gabapentin dose to TID. He will discuss with Dr Ramirez tomorrow.     Cont levothyroxine 50, labs are scheduled for tomorrow.      Follow-up in 3-4 months

## 2020-11-10 NOTE — PATIENT INSTRUCTIONS
1. Try to change nighttime snacks to less carb options - popcorn, cheese or open sandwich/ham and cheese would be better than pretzel.   2. If midnight numbers are still 300-400 after the snack change, increase your evening dose of insulin by 10 units (160 units).  3. Call the office if you still have a low glucose at 4 am   Results for orders placed or performed in visit on 11/10/20   POC Glucose, Blood    Specimen: Blood   Result Value Ref Range    Glucose 105 70 - 130 mg/dL    Lot Number [id number]     Expiration Date 06/02/2021    POC Glycosylated Hemoglobin (Hb A1C)    Specimen: Blood   Result Value Ref Range    Hemoglobin A1C 8.6 %    Lot Number [id number]     Expiration Date 08/17/2022

## 2020-11-11 ENCOUNTER — OFFICE VISIT (OUTPATIENT)
Dept: INTERNAL MEDICINE | Facility: CLINIC | Age: 70
End: 2020-11-11

## 2020-11-11 VITALS
HEART RATE: 68 BPM | BODY MASS INDEX: 34.71 KG/M2 | HEIGHT: 68 IN | TEMPERATURE: 96.9 F | SYSTOLIC BLOOD PRESSURE: 138 MMHG | RESPIRATION RATE: 20 BRPM | DIASTOLIC BLOOD PRESSURE: 76 MMHG | WEIGHT: 229 LBS

## 2020-11-11 DIAGNOSIS — E03.9 HYPOTHYROIDISM, UNSPECIFIED TYPE: ICD-10-CM

## 2020-11-11 DIAGNOSIS — K21.9 GASTROESOPHAGEAL REFLUX DISEASE WITHOUT ESOPHAGITIS: ICD-10-CM

## 2020-11-11 DIAGNOSIS — I10 ESSENTIAL HYPERTENSION: ICD-10-CM

## 2020-11-11 DIAGNOSIS — E11.42 DIABETIC PERIPHERAL NEUROPATHY ASSOCIATED WITH TYPE 2 DIABETES MELLITUS (HCC): ICD-10-CM

## 2020-11-11 DIAGNOSIS — E78.5 HYPERLIPIDEMIA, UNSPECIFIED HYPERLIPIDEMIA TYPE: ICD-10-CM

## 2020-11-11 DIAGNOSIS — Z00.00 MEDICARE ANNUAL WELLNESS VISIT, SUBSEQUENT: Primary | ICD-10-CM

## 2020-11-11 LAB
ALBUMIN SERPL-MCNC: 4.5 G/DL (ref 3.5–5.2)
ALBUMIN/GLOB SERPL: 1.3 G/DL
ALP SERPL-CCNC: 77 U/L (ref 39–117)
ALT SERPL W P-5'-P-CCNC: 35 U/L (ref 1–41)
ANION GAP SERPL CALCULATED.3IONS-SCNC: 7.8 MMOL/L (ref 5–15)
AST SERPL-CCNC: 49 U/L (ref 1–40)
BASOPHILS # BLD AUTO: 0.03 10*3/MM3 (ref 0–0.2)
BASOPHILS NFR BLD AUTO: 0.5 % (ref 0–1.5)
BILIRUB SERPL-MCNC: 0.5 MG/DL (ref 0–1.2)
BUN SERPL-MCNC: 35 MG/DL (ref 8–23)
BUN/CREAT SERPL: 20.8 (ref 7–25)
CALCIUM SPEC-SCNC: 8.8 MG/DL (ref 8.6–10.5)
CHLORIDE SERPL-SCNC: 103 MMOL/L (ref 98–107)
CHOLEST SERPL-MCNC: 110 MG/DL (ref 0–200)
CO2 SERPL-SCNC: 24.2 MMOL/L (ref 22–29)
CREAT SERPL-MCNC: 1.68 MG/DL (ref 0.76–1.27)
DEPRECATED RDW RBC AUTO: 47.5 FL (ref 37–54)
EOSINOPHIL # BLD AUTO: 0.17 10*3/MM3 (ref 0–0.4)
EOSINOPHIL NFR BLD AUTO: 2.8 % (ref 0.3–6.2)
ERYTHROCYTE [DISTWIDTH] IN BLOOD BY AUTOMATED COUNT: 14.4 % (ref 12.3–15.4)
GFR SERPL CREATININE-BSD FRML MDRD: 40 ML/MIN/1.73
GLOBULIN UR ELPH-MCNC: 3.5 GM/DL
GLUCOSE SERPL-MCNC: 147 MG/DL (ref 65–99)
HCT VFR BLD AUTO: 31.6 % (ref 37.5–51)
HDLC SERPL-MCNC: 31 MG/DL (ref 40–60)
HGB BLD-MCNC: 10.2 G/DL (ref 13–17.7)
LDLC SERPL CALC-MCNC: 39 MG/DL (ref 0–100)
LDLC/HDLC SERPL: 0.85 {RATIO}
LYMPHOCYTES # BLD AUTO: 1.08 10*3/MM3 (ref 0.7–3.1)
LYMPHOCYTES NFR BLD AUTO: 17.9 % (ref 19.6–45.3)
MCH RBC QN AUTO: 29.1 PG (ref 26.6–33)
MCHC RBC AUTO-ENTMCNC: 32.3 G/DL (ref 31.5–35.7)
MCV RBC AUTO: 90 FL (ref 79–97)
MONOCYTES # BLD AUTO: 0.74 10*3/MM3 (ref 0.1–0.9)
MONOCYTES NFR BLD AUTO: 12.3 % (ref 5–12)
NEUTROPHILS NFR BLD AUTO: 3.97 10*3/MM3 (ref 1.7–7)
NEUTROPHILS NFR BLD AUTO: 66 % (ref 42.7–76)
PLATELET # BLD AUTO: 110 10*3/MM3 (ref 140–450)
PMV BLD AUTO: 12.9 FL (ref 6–12)
POTASSIUM SERPL-SCNC: 4.6 MMOL/L (ref 3.5–5.2)
PROT SERPL-MCNC: 8 G/DL (ref 6–8.5)
RBC # BLD AUTO: 3.51 10*6/MM3 (ref 4.14–5.8)
SODIUM SERPL-SCNC: 135 MMOL/L (ref 136–145)
T4 FREE SERPL-MCNC: 0.72 NG/DL (ref 0.93–1.7)
TRIGL SERPL-MCNC: 263 MG/DL (ref 0–150)
TSH SERPL DL<=0.05 MIU/L-ACNC: 2.88 UIU/ML (ref 0.27–4.2)
VLDLC SERPL-MCNC: 40 MG/DL (ref 5–40)
WBC # BLD AUTO: 6.02 10*3/MM3 (ref 3.4–10.8)

## 2020-11-11 PROCEDURE — 84443 ASSAY THYROID STIM HORMONE: CPT | Performed by: INTERNAL MEDICINE

## 2020-11-11 PROCEDURE — 84439 ASSAY OF FREE THYROXINE: CPT | Performed by: INTERNAL MEDICINE

## 2020-11-11 PROCEDURE — G0439 PPPS, SUBSEQ VISIT: HCPCS | Performed by: INTERNAL MEDICINE

## 2020-11-11 PROCEDURE — 36415 COLL VENOUS BLD VENIPUNCTURE: CPT | Performed by: INTERNAL MEDICINE

## 2020-11-11 PROCEDURE — 80061 LIPID PANEL: CPT | Performed by: INTERNAL MEDICINE

## 2020-11-11 PROCEDURE — 99213 OFFICE O/P EST LOW 20 MIN: CPT | Performed by: INTERNAL MEDICINE

## 2020-11-11 PROCEDURE — 80053 COMPREHEN METABOLIC PANEL: CPT | Performed by: INTERNAL MEDICINE

## 2020-11-11 PROCEDURE — 85025 COMPLETE CBC W/AUTO DIFF WBC: CPT | Performed by: INTERNAL MEDICINE

## 2020-11-11 RX ORDER — GABAPENTIN 800 MG/1
800 TABLET ORAL 3 TIMES DAILY
Qty: 270 TABLET | Refills: 1 | Status: SHIPPED | OUTPATIENT
Start: 2020-11-11 | End: 2021-02-08

## 2020-11-11 NOTE — PROGRESS NOTES
Chief Complaint   Patient presents with   • Medicare Wellness-subsequent       History of Present Illness      The patient presents for a follow-up related to hypertension. The patient reports that he has had no headaches, chest pain, dyspnea, edema, syncope, blurred vision or palpitations. He states that he is taking his medication as prescribed. He is not having medication side effects.    The patient presents for a follow-up related to hyperlipidemia. He is not following a low fat diet. He reports that he is not exercising. He is taking gemfibrozil and simvastatin. The patient is taking his medication as prescribed. He reports no medication side effects, including muscle cramps, abdominal pain, headaches or weakness. He denies orthopnea, paroxysmal nocturnal dyspnea or dyspnea on exertion.    The patient presents for a follow-up related to GERD. The patient is on omeprazole for his gastroesophageal reflux. The medication is taken on a regular basis and gives complete relief of the symptoms. He reports no abdominal pain, belching, diarrhea, dysphagia, early satiety, heartburn, hoarseness, nausea, odynophagia, rectal bleeding, vomiting or weight loss. The GERD has no known aggravating factors.    The patient presents for a follow-up related to hypothyroidism. He reports that he does not have as much energy as usual. He reports no hair loss, heat intolerance, cold intolerance, constipation or sweats. He is taking his medication as prescribed.    The patient presents for a follow-up of peripheral neuropathy which is manifested by numbness, tingling and pain of the feet. The symptoms have been present for longer than one year.       The neuropathy is worsened. He is taking gabapentin which is partially effective at relieving the symptoms. The medication has been well tolerated.    Review of Systems    CONSTITUTIONAL- Denies Sweats, Weakness or Malaise.    HENT- Denies Sinus Pain, Nasal Congestion, Decreased Hearing or  Tinnitus.    PULMONARY- Denies Sputum Production, Cough, Hemoptysis or Pleuritic Chest Pain.    CARDIOVASCULAR- Denies Claudication or Irregular Heart Beat.    Medications      Current Outpatient Medications:   •  aspirin 81 MG tablet, Take 1 tablet by mouth Daily. Last dose 2-19-17 may resume when okay with Dr Perales (Patient taking differently: Take 81 mg by mouth Daily.), Disp: , Rfl:   •  atenolol (TENORMIN) 50 MG tablet, TAKE ONE TABLET BY MOUTH DAILY, Disp: 90 tablet, Rfl: 1  •  Coenzyme Q10 (CO Q 10 PO), Take 300 mg by mouth Daily., Disp: , Rfl:   •  colestipol (COLESTID) 1 g tablet, TAKE 2 TABLETS TWICE A DAY, Disp: 360 tablet, Rfl: 1  •  Continuous Blood Gluc Sensor (FREESTYLE MESFIN 14 DAY SENSOR) misc, 1 each Every 14 (Fourteen) Days., Disp: 2 each, Rfl: 6  •  DULoxetine (CYMBALTA) 30 MG capsule, TAKE 1 CAPSULE DAILY, Disp: 90 capsule, Rfl: 3  •  exenatide er (Bydureon BCise) 2 MG/0.85ML auto-injector injection, Inject 0.85 mL under the skin into the appropriate area as directed 1 (One) Time Per Week. Inject 0.85 under skin once per week, Disp: 12 pen, Rfl: 3  •  Flaxseed, Linseed, (FLAXSEED OIL) 1200 MG capsule, Take 1 capsule by mouth 2 (Two) Times a Day. 1300 mg, Disp: , Rfl:   •  fluticasone (FLONASE) 50 MCG/ACT nasal spray, Use daily PRN, Disp: 16 g, Rfl: 1  •  gabapentin (Neurontin) 800 MG tablet, Take 1 tablet by mouth 3 (Three) Times a Day., Disp: 270 tablet, Rfl: 1  •  gemfibrozil (LOPID) 600 MG tablet, TAKE 1 TABLET TWICE A DAY, Disp: 180 tablet, Rfl: 3  •  glucose blood (ONE TOUCH ULTRA TEST) test strip, Test FOUR times daily, Disp: 200 each, Rfl: 3  •  Insulin Pen Needle (B-D UF III MINI PEN NEEDLES) 31G X 5 MM misc, USE THREE TIMES A DAY, Disp: 300 each, Rfl: 1  •  Insulin Regular Human, Conc, (HumuLIN R U-500 KwikPen) 500 UNIT/ML solution pen-injector CONCENTRATED injection, Inject sc 140 u before breakfast and 130 in the afternoon  and 40-60 u before bedtime snack (Patient taking  differently: Inject sc 150u before breakfast and the afternoon  and 40-60 u before bedtime snack), Disp: 20 pen, Rfl: 3  •  ipratropium (ATROVENT) 0.06 % nasal spray, 2 sprays into the nostril(s) as directed by provider Daily., Disp: , Rfl:   •  levothyroxine (SYNTHROID, LEVOTHROID) 50 MCG tablet, Take 1 tablet by mouth Daily., Disp: 90 tablet, Rfl: 1  •  lisinopril-hydrochlorothiazide (PRINZIDE,ZESTORETIC) 10-12.5 MG per tablet, TAKE ONE TABLET BY MOUTH DAILY, Disp: 90 tablet, Rfl: 1  •  meloxicam (MOBIC) 15 MG tablet, TAKE ONE TABLET BY MOUTH DAILY AS NEEDED, Disp: 90 tablet, Rfl: 0  •  Multiple Vitamins-Minerals (MULTIVITAMIN PO), Take 1 tablet by mouth Daily., Disp: , Rfl:   •  omeprazole (priLOSEC) 40 MG capsule, TAKE ONE CAPSULE BY MOUTH DAILY, Disp: 90 capsule, Rfl: 1  •  sertraline (ZOLOFT) 100 MG tablet, TAKE ONE TABLET BY MOUTH DAILY, Disp: 90 tablet, Rfl: 1  •  simvastatin (ZOCOR) 40 MG tablet, TAKE 1 TABLET DAILY AT BEDTIME, Disp: 90 tablet, Rfl: 3  •  traZODone (DESYREL) 50 MG tablet, TAKE ONE TO TWO TABLETS BY MOUTH EVERY NIGHT AT BEDTIME AS NEEDED, Disp: 60 tablet, Rfl: 2     Allergies    Allergies   Allergen Reactions   • Glucophage Xr [Metformin Hcl Er] Diarrhea and Other (See Comments)     Glucophage XR TB24: Adverse Reaction: Severe GI issues.   • Metformin Nausea And Vomiting     Other reaction(s): SEVERE INTESTIONAL ISSUES  Other reaction(s): SEVERE GI ISSUES    Glucophage XR TB24  MetFORMIN HCl TABS   • Tetracyclines & Related Nausea Only     Adverse Reaction       Problem List    Patient Active Problem List   Diagnosis   • Chronic kidney disease, stage III (moderate)   • Gastroesophageal reflux disease without esophagitis   • Hyperlipidemia   • Hypertension   • Trigger finger of right hand   • DM (diabetes mellitus), type 2, uncontrolled w/neurologic complication (CMS/HCC)   • Obesity, Class I, BMI 30-34.9   • Actinic keratosis   • FAVIO (obstructive sleep apnea)   • Prurigo nodularis   •  "History of depression   • History of migraine   • Cervical radiculopathy   • ALT (SGPT) level raised   • Elevated AST (SGOT)   • Uncontrolled type 2 diabetes mellitus with diabetic neuropathy, with long-term current use of insulin (CMS/HCC)   • Post-operative infection   • Leukocytosis   • Left hand weakness   • Encounter for long-term (current) use of insulin (CMS/HCC)   • Herniated cervical disc   • Circadian rhythm sleep disorder, delayed sleep phase type   • 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    Physical Examination    /76 (BP Location: Right arm, Patient Position: Sitting, Cuff Size: Adult)   Pulse 68   Temp 96.9 °F (36.1 °C) (Infrared)   Resp 20   Ht 172.7 cm (68\")   Wt 104 kg (229 lb)   BMI 34.82 kg/m²     HEENT: Head- Normocephalic Atraumatic. Facies- Within normal limits. Lids- Normal bilaterally. Conjunctiva- Clear bilaterally. Sclera- Anicteric bilaterally. Oropharynx- Moist with no lesions. Tonsils- No enlargement, erythema or exudate.    Neck: Thyroid- non enlarged, symmetric and has no nodules. No bruits are detected. ROM- Normal Range of Motion with no rigidity.    Lungs: Auscultation- Clear to auscultation bilaterally. There are no retractions, clubbing or cyanosis. The Expiratory to Inspiratory ratio is equal.    Lymph Nodes: Cervical- no enlarged lymph nodes noted.    Cardiovascular: There are no carotid bruits. Heart- Normal Rate with Regular rhythm and no murmurs. There are no gallops. There are no rubs. In the lower extremities there is no edema. The upper extremities do not have " edema.    Abdomen: Soft, benign, non-tender with no masses, hernias, organomegaly or scars.    Impression and Assessment    Essential Hypertension.    Hyperlipidemia.    Gastroesophageal Reflux Disease.    Hypothyroidism.    Diabetic Neuropathy.    Plan    Gastroesophageal Reflux Disease Plan: The current plan was continued.    Essential Hypertension Plan: The current plan was continued.    Hyperlipidemia Plan: He was instructed to eat a low fat diet. He was encouraged to exercise daily. Weight loss was encouraged. The patient was instructed to continue the current medications.    Hypothyroidism Plan: The current plan was continued.    Diabetic Neuropathy Plan: The medications were changed as noted.    Diagnoses and all orders for this visit:    1. Medicare annual wellness visit, subsequent (Primary)    2. Essential hypertension  -     Comprehensive Metabolic Panel  -     CBC & Differential  -     CBC Auto Differential    3. Hyperlipidemia, unspecified hyperlipidemia type  -     Comprehensive Metabolic Panel  -     Lipid Panel    4. Gastroesophageal reflux disease without esophagitis  -     CBC & Differential  -     CBC Auto Differential    5. Hypothyroidism, unspecified type  -     TSH  -     T4, Free    6. Diabetic peripheral neuropathy associated with type 2 diabetes mellitus (CMS/HCC)  -     gabapentin (Neurontin) 800 MG tablet; Take 1 tablet by mouth 3 (Three) Times a Day.  Dispense: 270 tablet; Refill: 1        Return to Office    The patient was instructed to return for follow-up in 6 months.    The patient was instructed to return sooner if the condition changes, worsens, or does not resolve.

## 2020-11-11 NOTE — PROGRESS NOTES
The ABCs of the Annual Wellness Visit  Subsequent Medicare Wellness Visit    Chief Complaint   Patient presents with   • Medicare Wellness-subsequent       Subjective   History of Present Illness:  Elton Funez is a 70 y.o. male who presents for a Subsequent Medicare Wellness Visit.    HEALTH RISK ASSESSMENT    Recent Hospitalizations:  Recently treated at the following:  Other: [facility]    Current Medical Providers:  Patient Care Team:  Tc Ramirez MD as PCP - General  Pau Colin MD as Consulting Physician (Endocrinology)  Ari Yanes MD as Consulting Physician (Otolaryngology)  Gigi Perales MD as Consulting Physician (Neurosurgery)    Smoking Status:  Social History     Tobacco Use   Smoking Status Former Smoker   • Packs/day: 1.00   • Years: 5.00   • Pack years: 5.00   • Types: Cigarettes   • Quit date:    • Years since quittin.8   Smokeless Tobacco Never Used       Alcohol Consumption:  Social History     Substance and Sexual Activity   Alcohol Use Yes   • Frequency: 2-4 times a month    Comment: Socially       Depression Screen:   PHQ-2/PHQ-9 Depression Screening 2020   Little interest or pleasure in doing things 0   Feeling down, depressed, or hopeless 0   Trouble falling or staying asleep, or sleeping too much -   Feeling tired or having little energy -   Poor appetite or overeating -   Feeling bad about yourself - or that you are a failure or have let yourself or your family down -   Trouble concentrating on things, such as reading the newspaper or watching television -   Moving or speaking so slowly that other people could have noticed. Or the opposite - being so fidgety or restless that you have been moving around a lot more than usual -   Thoughts that you would be better off dead, or of hurting yourself in some way -   Total Score 0   If you checked off any problems, how difficult have these problems made it for you to do your work, take  care of things at home, or get along with other people? -       Fall Risk Screen:  MORGAN Fall Risk Assessment was completed, and patient is at HIGH risk for falls. Assessment completed on:11/11/2020    Health Habits and Functional and Cognitive Screening:  Functional & Cognitive Status 11/11/2020   Do you have difficulty preparing food and eating? No   Do you have difficulty bathing yourself, getting dressed or grooming yourself? No   Do you have difficulty using the toilet? No   Do you have difficulty moving around from place to place? No   Do you have trouble with steps or getting out of a bed or a chair? No   Current Diet Diabetic Diet   Dental Exam Not up to date   Eye Exam Up to date   Exercise (times per week) 0 times per week   Current Exercise Activities Include None   Do you need help using the phone?  No   Are you deaf or do you have serious difficulty hearing?  Yes   Do you need help with transportation? No   Do you need help shopping? No   Do you need help preparing meals?  No   Do you need help with housework?  No   Do you need help with laundry? No   Do you need help taking your medications? No   Do you need help managing money? No   Do you ever drive or ride in a car without wearing a seat belt? No   Have you felt unusual stress, anger or loneliness in the last month? No   Who do you live with? Spouse   If you need help, do you have trouble finding someone available to you? No   Have you been bothered in the last four weeks by sexual problems? No   Do you have difficulty concentrating, remembering or making decisions? No         Does the patient have evidence of cognitive impairment? No    Asprin use counseling:Taking ASA appropriately as indicated    Age-appropriate Screening Schedule:  Refer to the list below for future screening recommendations based on patient's age, sex and/or medical conditions. Orders for these recommended tests are listed in the plan section. The patient has been provided  with a written plan.    Health Maintenance   Topic Date Due   • DIABETIC EYE EXAM  01/02/2021   • URINE MICROALBUMIN  02/06/2021   • HEMOGLOBIN A1C  05/10/2021   • LIPID PANEL  06/02/2021   • DIABETIC FOOT EXAM  11/10/2021   • TDAP/TD VACCINES (3 - Td) 09/19/2023   • COLONOSCOPY  01/08/2029   • INFLUENZA VACCINE  Completed   • ZOSTER VACCINE  Completed          The following portions of the patient's history were reviewed and updated as appropriate: allergies, current medications, past family history, past medical history, past social history, past surgical history and problem list.    Outpatient Medications Prior to Visit   Medication Sig Dispense Refill   • aspirin 81 MG tablet Take 1 tablet by mouth Daily. Last dose 2-19-17 may resume when okay with Dr Perales (Patient taking differently: Take 81 mg by mouth Daily.)     • atenolol (TENORMIN) 50 MG tablet TAKE ONE TABLET BY MOUTH DAILY 90 tablet 1   • Coenzyme Q10 (CO Q 10 PO) Take 300 mg by mouth Daily.     • colestipol (COLESTID) 1 g tablet TAKE 2 TABLETS TWICE A  tablet 1   • Continuous Blood Gluc Sensor (FREESTYLE MESFIN 14 DAY SENSOR) misc 1 each Every 14 (Fourteen) Days. 2 each 6   • DULoxetine (CYMBALTA) 30 MG capsule TAKE 1 CAPSULE DAILY 90 capsule 3   • exenatide er (Bydureon BCise) 2 MG/0.85ML auto-injector injection Inject 0.85 mL under the skin into the appropriate area as directed 1 (One) Time Per Week. Inject 0.85 under skin once per week 12 pen 3   • Flaxseed, Linseed, (FLAXSEED OIL) 1200 MG capsule Take 1 capsule by mouth 2 (Two) Times a Day. 1300 mg     • fluticasone (FLONASE) 50 MCG/ACT nasal spray Use daily PRN 16 g 1   • gabapentin (Neurontin) 800 MG tablet Take 1 tablet by mouth 2 (Two) Times a Day. 180 tablet 1   • gemfibrozil (LOPID) 600 MG tablet TAKE 1 TABLET TWICE A  tablet 3   • glucose blood (ONE TOUCH ULTRA TEST) test strip Test FOUR times daily 200 each 3   • Insulin Pen Needle (B-D UF III MINI PEN NEEDLES) 31G X 5 MM misc  USE THREE TIMES A  each 1   • Insulin Regular Human, Conc, (HumuLIN R U-500 KwikPen) 500 UNIT/ML solution pen-injector CONCENTRATED injection Inject sc 140 u before breakfast and 130 in the afternoon  and 40-60 u before bedtime snack (Patient taking differently: Inject sc 150u before breakfast and the afternoon  and 40-60 u before bedtime snack) 20 pen 3   • ipratropium (ATROVENT) 0.06 % nasal spray 2 sprays into the nostril(s) as directed by provider Daily.     • levothyroxine (SYNTHROID, LEVOTHROID) 50 MCG tablet Take 1 tablet by mouth Daily. 90 tablet 1   • lisinopril-hydrochlorothiazide (PRINZIDE,ZESTORETIC) 10-12.5 MG per tablet TAKE ONE TABLET BY MOUTH DAILY 90 tablet 1   • meloxicam (MOBIC) 15 MG tablet TAKE ONE TABLET BY MOUTH DAILY AS NEEDED 90 tablet 0   • Multiple Vitamins-Minerals (MULTIVITAMIN PO) Take 1 tablet by mouth Daily.     • omeprazole (priLOSEC) 40 MG capsule TAKE ONE CAPSULE BY MOUTH DAILY 90 capsule 1   • sertraline (ZOLOFT) 100 MG tablet TAKE ONE TABLET BY MOUTH DAILY 90 tablet 1   • simvastatin (ZOCOR) 40 MG tablet TAKE 1 TABLET DAILY AT BEDTIME 90 tablet 3   • traZODone (DESYREL) 50 MG tablet TAKE ONE TO TWO TABLETS BY MOUTH EVERY NIGHT AT BEDTIME AS NEEDED 60 tablet 2     No facility-administered medications prior to visit.        Patient Active Problem List   Diagnosis   • Chronic kidney disease, stage III (moderate)   • Gastroesophageal reflux disease without esophagitis   • Hyperlipidemia   • Hypertension   • Trigger finger of right hand   • DM (diabetes mellitus), type 2, uncontrolled w/neurologic complication (CMS/HCC)   • Obesity, Class I, BMI 30-34.9   • Actinic keratosis   • FAVIO (obstructive sleep apnea)   • Prurigo nodularis   • History of depression   • History of migraine   • Cervical radiculopathy   • ALT (SGPT) level raised   • Elevated AST (SGOT)   • Uncontrolled type 2 diabetes mellitus with diabetic neuropathy, with long-term current use of insulin (CMS/HCC)   •  "Post-operative infection   • Leukocytosis   • Left hand weakness   • Encounter for long-term (current) use of insulin (CMS/[facility])   • Herniated cervical disc   • Circadian rhythm sleep disorder, delayed sleep phase type   • Mild nonproliferative diabetic retinopathy of left eye without macular edema associated with type 2 diabetes mellitus (CMS/[facility])   • Diabetic peripheral neuropathy associated with type 2 diabetes mellitus (CMS/[facility])   • Long term current use of insulin (CMS/[facility])   • Type 2 diabetes mellitus without complication (CMS/[facility])   • Hypothyroidism, adult       Advanced Care Planning:  ACP discussion was held with the patient during this visit. Patient does not have an advance directive, information provided.    Review of Systems    Compared to one year ago, the patient feels his physical health is the same.  Compared to one year ago, the patient feels his mental health is the same.    Reviewed chart for potential of high risk medication in the elderly: yes  Reviewed chart for potential of harmful drug interactions in the elderly:yes    Objective         Vitals:    11/11/20 0920   BP: 138/76   BP Location: Right arm   Patient Position: Sitting   Cuff Size: Adult   Pulse: 68   Resp: 20   Temp: 96.9 °F (36.1 °C)   TempSrc: Infrared   Weight: 104 kg (229 lb)   Height: 172.7 cm (68\")   PainSc:   2   PainLoc: Neck       Body mass index is 34.82 kg/m².  Discussed the patient's BMI with him. The BMI is above average; BMI management plan is completed.    Physical Exam     Finger Rub Hearing{Test (right ear):passed  Finger Rub Hearing{Test (left ear):failed        Lab Results   Component Value Date    HGBA1C 8.6 11/10/2020        Assessment/Plan   Medicare Risks and Personalized Health Plan  CMS Preventative Services Quick Reference  Advance Directive Discussion  Obesity/Overweight     The above risks/problems have been discussed with the patient.  Pertinent information has been shared with the patient in the After " Visit Summary.  Follow up plans and orders are seen below in the Assessment/Plan Section.    Diagnoses and all orders for this visit:    1. Medicare annual wellness visit, subsequent (Primary)    2. Essential hypertension  -     Comprehensive Metabolic Panel  -     CBC & Differential  -     CBC Auto Differential    3. Hyperlipidemia, unspecified hyperlipidemia type  -     Comprehensive Metabolic Panel  -     Lipid Panel    4. Gastroesophageal reflux disease without esophagitis  -     CBC & Differential  -     CBC Auto Differential    5. Hypothyroidism, unspecified type  -     TSH  -     T4, Free    6. Diabetic peripheral neuropathy associated with type 2 diabetes mellitus (CMS/[facility])  -     gabapentin (Neurontin) 800 MG tablet; Take 1 tablet by mouth 3 (Three) Times a Day.  Dispense: 270 tablet; Refill: 1            An After Visit Summary and PPPS were given to the patient.

## 2020-11-11 NOTE — PATIENT INSTRUCTIONS
Medicare Wellness  Personal Prevention Plan of Service     Date of Office Visit:  2020  Encounter Provider:  Tc Ramirez MD  Place of Service:  [facility]  Patient Name: Elton Funez  :  1950    As part of the Medicare Wellness portion of your visit today, we are providing you with this personalized preventive plan of services (PPPS). This plan is based upon recommendations of the [place] Preventive Services Task Force (USPSTF) and the Advisory Committee on Immunization Practices (ACIP).    This lists the preventive care services that should be considered, and provides dates of when you are due. Items listed as completed are up-to-date and do not require any further intervention.    Health Maintenance   Topic Date Due   • ANNUAL WELLNESS VISIT  2020   • DIABETIC EYE EXAM  2021   • URINE MICROALBUMIN  2021   • HEMOGLOBIN A1C  05/10/2021   • LIPID PANEL  2021   • DIABETIC FOOT EXAM  11/10/2021   • TDAP/TD VACCINES (3 - Td) 2023   • COLONOSCOPY  2029   • HEPATITIS C SCREENING  Completed   • INFLUENZA VACCINE  Completed   • Pneumococcal Vaccine 65+  Completed   • AAA SCREEN (ONE-TIME)  Completed   • ZOSTER VACCINE  Completed       No orders of the defined types were placed in this encounter.      No follow-ups on file.          Advance Care Planning and Advance Directives     You make decisions on a daily basis - decisions about where you want to live, your career, your home, your life. Perhaps one of the most important decisions you face is your choice for future medical care. Take time to talk with your family and your healthcare team and start planning today.  Advance Care Planning is a process that can help you:  · Understand possible future healthcare decisions in light of your own experiences  · Reflect on those decision in light of your goals and values  · Discuss your decisions with those  closest to you and the healthcare professionals that care for you  · Make a plan by creating a document that reflects your wishes    Surrogate Decision Maker  In the event of a medical emergency, which has left you unable to communicate or to make your own decisions, you would need someone to make decisions for you.  It is important to discuss your preferences for medical treatment with this person while you are in good health.     Qualities of a surrogate decision maker:  • Willing to take on this role and responsibility  • Knows what you want for future medical care  • Willing to follow your wishes even if they don't agree with them  • Able to make difficult medical decisions under stressful circumstances    Advance Directives  These are legal documents you can create that will guide your healthcare team and decision maker(s) when needed. These documents can be stored in the electronic medical record.    · Living Will - a legal document to guide your care if you have a terminal condition or a serious illness and are unable to communicate. States vary by statute in document names/types, but most forms may include one or more of the following:        -  Directions regarding life-prolonging treatments        -  Directions regarding artificially provided nutrition/hydration        -  Choosing a healthcare decision maker        -  Direction regarding organ/tissue donation    · Durable Power of  for Healthcare - this document names an -in-fact to make medical decisions for you, but it may also allow this person to make personal and financial decisions for you. Please seek the advice of an  if you need this type of document.    **Advance Directives are not required and no one may discriminate against you if you do not sign one.    Medical Orders  Many states allow specific forms/orders signed by your physician to record your wishes for medical treatment in your current state of health. This form,  signed in personal communication with your physician, addresses resuscitation and other medical interventions that you may or may not want.      For more information or to schedule a time with a [facility] Advance Care Planning Facilitator contact: [facility] or call [phone] and someone will contact you directly.    Fall Prevention in the Home, Adult  Falls can cause injuries. They can happen to people of all ages. There are many things you can do to make your home safe and to help prevent falls. Ask for help when making these changes, if needed.  What actions can I take to prevent falls?  General Instructions  · Use good lighting in all rooms. Replace any light bulbs that burn out.  · Turn on the lights when you go into a dark area. Use night-lights.  · Keep items that you use often in easy-to-reach places. Lower the shelves around your home if necessary.  · Set up your furniture so you have a clear path. Avoid moving your furniture around.  · Do not have throw rugs and other things on the floor that can make you trip.  · Avoid walking on wet floors.  · If any of your floors are uneven, fix them.  · Add color or contrast paint or tape to clearly caio and help you see:  ? Any grab bars or handrails.  ? First and last steps of stairways.  ? Where the edge of each step is.  · If you use a stepladder:  ? Make sure that it is fully opened. Do not climb a closed stepladder.  ? Make sure that both sides of the stepladder are locked into place.  ? Ask someone to hold the stepladder for you while you use it.  · If there are any pets around you, be aware of where they are.  What can I do in the bathroom?         · Keep the floor dry. Clean up any water that spills onto the floor as soon as it happens.  · Remove soap buildup in the tub or shower regularly.  · Use non-skid mats or decals on the floor of the tub or shower.  · Attach bath mats securely with double-sided, non-slip rug tape.  · If you need to  sit down in the shower, use a plastic, non-slip stool.  · Install grab bars by the toilet and in the tub and shower. Do not use towel bars as grab bars.  What can I do in the bedroom?  · Make sure that you have a light by your bed that is easy to reach.  · Do not use any sheets or blankets that are too big for your bed. They should not hang down onto the floor.  · Have a firm chair that has side arms. You can use this for support while you get dressed.  What can I do in the kitchen?  · Clean up any spills right away.  · If you need to reach something above you, use a strong step stool that has a grab bar.  · Keep electrical cords out of the way.  · Do not use floor polish or wax that makes floors slippery. If you must use wax, use non-skid floor wax.  What can I do with my stairs?  · Do not leave any items on the stairs.  · Make sure that you have a light switch at the top of the stairs and the bottom of the stairs. If you do not have them, ask someone to add them for you.  · Make sure that there are handrails on both sides of the stairs, and use them. Fix handrails that are broken or loose. Make sure that handrails are as long as the stairways.  · Install non-slip stair treads on all stairs in your home.  · Avoid having throw rugs at the top or bottom of the stairs. If you do have throw rugs, attach them to the floor with carpet tape.  · Choose a carpet that does not hide the edge of the steps on the stairway.  · Check any carpeting to make sure that it is firmly attached to the stairs. Fix any carpet that is loose or worn.  What can I do on the outside of my home?  · Use bright outdoor lighting.  · Regularly fix the edges of walkways and driveways and fix any cracks.  · Remove anything that might make you trip as you walk through a door, such as a raised step or threshold.  · Trim any bushes or trees on the path to your home.  · Regularly check to see if handrails are loose or broken. Make sure that both sides of  any steps have handrails.  · Install guardrails along the edges of any raised decks and porches.  · Clear walking paths of anything that might make someone trip, such as tools or rocks.  · Have any leaves, snow, or ice cleared regularly.  · Use sand or salt on walking paths during winter.  · Clean up any spills in your garage right away. This includes grease or oil spills.  What other actions can I take?  · Wear shoes that:  ? Have a low heel. Do not wear high heels.  ? Have rubber bottoms.  ? Are comfortable and fit you well.  ? Are closed at the toe. Do not wear open-toe sandals.  · Use tools that help you move around (mobility aids) if they are needed. These include:  ? Canes.  ? Walkers.  ? Scooters.  ? Crutches.  · Review your medicines with your doctor. Some medicines can make you feel dizzy. This can increase your chance of falling.  Ask your doctor what other things you can do to help prevent falls.  Where to find more information  · Centers for Disease Control and Prevention, STEADI: https://cdc.gov  · National [place] on Aging: https://co5tylc.valdemar.nih.gov  Contact a doctor if:  · You are afraid of falling at home.  · You feel weak, drowsy, or dizzy at home.  · You fall at home.  Summary  · There are many simple things that you can do to make your home safe and to help prevent falls.  · Ways to make your home safe include removing tripping hazards and installing grab bars in the bathroom.  · Ask for help when making these changes in your home.  This information is not intended to replace advice given to you by your health care provider. Make sure you discuss any questions you have with your health care provider.  Document Released: 10/14/2010 Document Revised: 04/09/2020 Document Reviewed: 08/02/2018  Elsevier Patient Education © 2020 Elsevier Inc.      Heart-Healthy Eating Plan  Heart-healthy meal planning includes:  · Eating less unhealthy fats.  · Eating more healthy fats.  · Making other changes in  your diet.  Talk with your doctor or a diet specialist (dietitian) to create an eating plan that is right for you.  What is my plan?  Your doctor may recommend an eating plan that includes:  · Total fat: ______% or less of total calories a day.  · Saturated fat: ______% or less of total calories a day.  · Cholesterol: less than _________mg a day.  What are tips for following this plan?  Cooking  Avoid frying your food. Try to bake, boil, grill, or broil it instead. You can also reduce fat by:  · Removing the skin from poultry.  · Removing all visible fats from meats.  · Steaming vegetables in water or broth.  Meal planning    · At meals, divide your plate into four equal parts:  ? Fill one-half of your plate with vegetables and green salads.  ? Fill one-fourth of your plate with whole grains.  ? Fill one-fourth of your plate with lean protein foods.  · Eat 4-5 servings of vegetables per day. A serving of vegetables is:  ? 1 cup of raw or cooked vegetables.  ? 2 cups of raw leafy greens.  · Eat 4-5 servings of fruit per day. A serving of fruit is:  ? 1 medium whole fruit.  ? ¼ cup of dried fruit.  ? ½ cup of fresh, frozen, or canned fruit.  ? ½ cup of 100% fruit juice.  · Eat more foods that have soluble fiber. These are apples, broccoli, carrots, beans, peas, and barley. Try to get 20-30 g of fiber per day.  · Eat 4-5 servings of nuts, legumes, and seeds per week:  ? 1 serving of dried beans or legumes equals ½ cup after being cooked.  ? 1 serving of nuts is ¼ cup.  ? 1 serving of seeds equals 1 tablespoon.  General information  · Eat more home-cooked food. Eat less restaurant, buffet, and fast food.  · Limit or avoid alcohol.  · Limit foods that are high in starch and sugar.  · Avoid fried foods.  · Lose weight if you are overweight.  · Keep track of how much salt (sodium) you eat. This is important if you have high blood pressure. Ask your doctor to tell you more about this.  · Try to add vegetarian meals each  week.  Fats  · Choose healthy fats. These include olive oil and canola oil, flaxseeds, walnuts, almonds, and seeds.  · Eat more omega-3 fats. These include salmon, mackerel, sardines, tuna, flaxseed oil, and ground flaxseeds. Try to eat fish at least 2 times each week.  · Check food labels. Avoid foods with trans fats or high amounts of saturated fat.  · Limit saturated fats.  ? These are often found in animal products, such as meats, butter, and cream.  ? These are also found in plant foods, such as palm oil, palm kernel oil, and coconut oil.  · Avoid foods with partially hydrogenated oils in them. These have trans fats. Examples are stick margarine, some tub margarines, cookies, crackers, and other baked goods.  What foods can I eat?  Fruits  All fresh, canned (in natural juice), or frozen fruits.  Vegetables  Fresh or frozen vegetables (raw, steamed, roasted, or grilled). Green salads.  Grains  Most grains. Choose whole wheat and whole grains most of the time. Rice and pasta, including brown rice and pastas made with whole wheat.  Meats and other proteins  Lean, well-trimmed beef, veal, pork, and lamb. Chicken and turkey without skin. All fish and shellfish. Wild duck, rabbit, pheasant, and venison. Egg whites or low-cholesterol egg substitutes. Dried beans, peas, lentils, and tofu. Seeds and most nuts.  Dairy  Low-fat or nonfat cheeses, including ricotta and mozzarella. Skim or 1% milk that is liquid, powdered, or evaporated. Buttermilk that is made with low-fat milk. Nonfat or low-fat yogurt.  Fats and oils  Non-hydrogenated (trans-free) margarines. Vegetable oils, including soybean, sesame, sunflower, olive, peanut, safflower, corn, canola, and cottonseed. Salad dressings or mayonnaise made with a vegetable oil.  Beverages  Mineral water. Coffee and tea. Diet carbonated beverages.  Sweets and desserts  Sherbet, gelatin, and fruit ice. Small amounts of dark chocolate.  Limit all sweets and desserts.  Seasonings  and condiments  All seasonings and condiments.  The items listed above may not be a complete list of foods and drinks you can eat. Contact a dietitian for more options.  What foods should I avoid?  Fruits  Canned fruit in heavy syrup. Fruit in cream or butter sauce. Fried fruit. Limit coconut.  Vegetables  Vegetables cooked in cheese, cream, or butter sauce. Fried vegetables.  Grains  Breads that are made with saturated or trans fats, oils, or whole milk. Croissants. Sweet rolls. Donuts. High-fat crackers, such as cheese crackers.  Meats and other proteins  Fatty meats, such as hot dogs, ribs, sausage, villar, rib-eye roast or steak. High-fat deli meats, such as salami and bologna. Caviar. Domestic duck and goose. Organ meats, such as liver.  Dairy  Cream, sour cream, cream cheese, and creamed cottage cheese. Whole-milk cheeses. Whole or 2% milk that is liquid, evaporated, or condensed. Whole buttermilk. Cream sauce or high-fat cheese sauce. Yogurt that is made from whole milk.  Fats and oils  Meat fat, or shortening. Cocoa butter, hydrogenated oils, palm oil, coconut oil, palm kernel oil. Solid fats and shortenings, including villar fat, salt pork, lard, and butter. Nondairy cream substitutes. Salad dressings with cheese or sour cream.  Beverages  Regular sodas and juice drinks with added sugar.  Sweets and desserts  Frosting. Pudding. Cookies. Cakes. Pies. Milk chocolate or white chocolate. Buttered syrups. Full-fat ice cream or ice cream drinks.  The items listed above may not be a complete list of foods and drinks to avoid. Contact a dietitian for more information.  Summary  · Heart-healthy meal planning includes eating less unhealthy fats, eating more healthy fats, and making other changes in your diet.  · Eat a balanced diet. This includes fruits and vegetables, low-fat or nonfat dairy, lean protein, nuts and legumes, whole grains, and heart-healthy oils and fats.  This information is not intended to replace  advice given to you by your health care provider. Make sure you discuss any questions you have with your health care provider.  Document Released: 06/18/2013 Document Revised: 02/21/2019 Document Reviewed: 01/25/2019  Elsevier Patient Education © 2020 Elsevier Inc.

## 2020-12-02 RX ORDER — OMEPRAZOLE 40 MG/1
CAPSULE, DELAYED RELEASE ORAL
Qty: 90 CAPSULE | Refills: 1 | Status: SHIPPED | OUTPATIENT
Start: 2020-12-02 | End: 2021-06-09

## 2021-01-12 RX ORDER — ATENOLOL 50 MG/1
50 TABLET ORAL DAILY
Qty: 90 TABLET | Refills: 1 | Status: SHIPPED | OUTPATIENT
Start: 2021-01-12 | End: 2021-09-03 | Stop reason: HOSPADM

## 2021-01-12 NOTE — TELEPHONE ENCOUNTER
Caller: [facility] [address] - [phone] [place] [phone] FX    Relationship: Pharmacy    Best call back number: [phone]    Medication needed:   Requested Prescriptions     Pending Prescriptions Disp Refills   • atenolol (TENORMIN) 50 MG tablet 90 tablet 1     Sig: Take 1 tablet by mouth Daily.       When do you need the refill by:01/12/2021  What details did the patient provide when requesting the medication: Pharmacy is having system issues and no longer has the refill request. They are asking for the request to be resent  Does the patient have less than a 3 day supply:  [x] Yes  [] No    What is the patient's [facility] [address] [phone] [facility] [phone] FX

## 2021-02-05 DIAGNOSIS — E11.42 DIABETIC PERIPHERAL NEUROPATHY ASSOCIATED WITH TYPE 2 DIABETES MELLITUS (HCC): ICD-10-CM

## 2021-02-08 RX ORDER — MONTELUKAST SODIUM 4 MG/1
TABLET, CHEWABLE ORAL
Qty: 360 TABLET | Refills: 3 | Status: SHIPPED | OUTPATIENT
Start: 2021-02-08 | End: 2022-03-16 | Stop reason: SDUPTHER

## 2021-02-08 RX ORDER — GABAPENTIN 800 MG/1
TABLET ORAL
Qty: 180 TABLET | Refills: 1 | Status: SHIPPED | OUTPATIENT
Start: 2021-02-08 | End: 2021-04-27 | Stop reason: SDUPTHER

## 2021-02-10 ENCOUNTER — OFFICE VISIT (OUTPATIENT)
Dept: INTERNAL MEDICINE | Facility: CLINIC | Age: 71
End: 2021-02-10

## 2021-02-10 VITALS
HEART RATE: 84 BPM | TEMPERATURE: 98.2 F | SYSTOLIC BLOOD PRESSURE: 146 MMHG | RESPIRATION RATE: 18 BRPM | BODY MASS INDEX: 35.12 KG/M2 | WEIGHT: 231 LBS | DIASTOLIC BLOOD PRESSURE: 78 MMHG

## 2021-02-10 DIAGNOSIS — L72.3 SEBACEOUS CYST: Primary | ICD-10-CM

## 2021-02-10 PROCEDURE — 99213 OFFICE O/P EST LOW 20 MIN: CPT | Performed by: INTERNAL MEDICINE

## 2021-02-10 RX ORDER — MOXIFLOXACIN 5 MG/ML
SOLUTION/ DROPS OPHTHALMIC DAILY
COMMUNITY
Start: 2021-01-12 | End: 2021-02-25

## 2021-02-10 RX ORDER — PREDNISOLONE ACETATE 10 MG/ML
SUSPENSION/ DROPS OPHTHALMIC DAILY
COMMUNITY
Start: 2021-01-04 | End: 2021-02-25

## 2021-02-10 RX ORDER — DOXYCYCLINE 100 MG/1
1 TABLET ORAL DAILY
COMMUNITY
Start: 2021-02-03 | End: 2021-02-25

## 2021-02-10 RX ORDER — ERYTHROMYCIN 5 MG/G
OINTMENT OPHTHALMIC NIGHTLY
COMMUNITY
Start: 2021-02-06 | End: 2021-02-25

## 2021-02-10 RX ORDER — SULFAMETHOXAZOLE AND TRIMETHOPRIM 800; 160 MG/1; MG/1
1 TABLET ORAL 2 TIMES DAILY
Qty: 20 TABLET | Refills: 0 | Status: SHIPPED | OUTPATIENT
Start: 2021-02-10 | End: 2021-02-25

## 2021-02-10 RX ORDER — VALACYCLOVIR HYDROCHLORIDE 500 MG/1
1 TABLET, FILM COATED ORAL DAILY
COMMUNITY
Start: 2021-01-19 | End: 2021-05-13

## 2021-02-10 RX ORDER — TIZANIDINE 4 MG/1
1 TABLET ORAL 2 TIMES DAILY PRN
COMMUNITY
Start: 2021-01-14 | End: 2023-02-27 | Stop reason: SDUPTHER

## 2021-02-13 NOTE — PROGRESS NOTES
Chief Complaint   Patient presents with   • Abscess on chest wall       History of Present Illness    He presents for an initial evaluation with a boil on the right chest. This has been present for one month. The condition is painful, enlarging and oozing. There are no exposures to people with similar lesions. No prior treatment has been administered.    Review of Systems    CONSTITUTIONAL- Denies Unexplained Weight Loss, Fever, Chills, Sweats, Fatigue, Weakness or Malaise.    PULMONARY- Denies Wheezing, Dyspnea, Sputum Production, Cough, Hemoptysis or Pleuritic Chest Pain.    CARDIOVASCULAR- Denies Chest Pain, Claudication, Edema, Syncope, Palpitations or Irregular Heart Beat.    Medications      Current Outpatient Medications:   •  aspirin 81 MG tablet, Take 1 tablet by mouth Daily. Last dose 2-19-17 may resume when okay with Dr Perales (Patient taking differently: Take 81 mg by mouth Daily.), Disp: , Rfl:   •  atenolol (TENORMIN) 50 MG tablet, Take 1 tablet by mouth Daily., Disp: 90 tablet, Rfl: 1  •  Coenzyme Q10 (CO Q 10 PO), Take 300 mg by mouth Daily., Disp: , Rfl:   •  colestipol (COLESTID) 1 g tablet, TAKE 2 TABLETS TWICE A DAY, Disp: 360 tablet, Rfl: 3  •  Continuous Blood Gluc Sensor (FREESTYLE MESFIN 14 DAY SENSOR) mis, 1 each Every 14 (Fourteen) Days., Disp: 2 each, Rfl: 6  •  doxycycline (ADOXA) 100 MG tablet, Take 1 tablet by mouth Daily., Disp: , Rfl:   •  DULoxetine (CYMBALTA) 30 MG capsule, TAKE 1 CAPSULE DAILY, Disp: 90 capsule, Rfl: 3  •  erythromycin (ROMYCIN) 5 MG/GM ophthalmic ointment, Administer  into the left eye Every Night., Disp: , Rfl:   •  exenatide er (Bydureon BCise) 2 MG/0.85ML auto-injector injection, Inject 0.85 mL under the skin into the appropriate area as directed 1 (One) Time Per Week. Inject 0.85 under skin once per week, Disp: 12 pen, Rfl: 3  •  Flaxseed, Linseed, (FLAXSEED OIL) 1200 MG capsule, Take 1 capsule by mouth 2 (Two) Times a Day. 1300 mg, Disp: , Rfl:   •   fluticasone (FLONASE) 50 MCG/ACT nasal spray, Use daily PRN, Disp: 16 g, Rfl: 1  •  gabapentin (NEURONTIN) 800 MG tablet, TAKE 1 TABLET TWICE A DAY (Patient taking differently: Take 800 mg by mouth 3 (Three) Times a Day.), Disp: 180 tablet, Rfl: 1  •  gemfibrozil (LOPID) 600 MG tablet, TAKE 1 TABLET TWICE A DAY, Disp: 180 tablet, Rfl: 3  •  glucose blood (ONE TOUCH ULTRA TEST) test strip, Test FOUR times daily, Disp: 200 each, Rfl: 3  •  Insulin Pen Needle (B-D UF III MINI PEN NEEDLES) 31G X 5 MM misc, USE THREE TIMES A DAY, Disp: 300 each, Rfl: 1  •  Insulin Regular Human, Conc, (HumuLIN R U-500 KwikPen) 500 UNIT/ML solution pen-injector CONCENTRATED injection, Inject sc 140 u before breakfast and 130 in the afternoon  and 40-60 u before bedtime snack (Patient taking differently: Inject sc 150u before breakfast and the afternoon  and 40-60 u before bedtime snack), Disp: 20 pen, Rfl: 3  •  ipratropium (ATROVENT) 0.06 % nasal spray, 2 sprays into the nostril(s) as directed by provider Daily., Disp: , Rfl:   •  levothyroxine (SYNTHROID, LEVOTHROID) 50 MCG tablet, Take 1 tablet by mouth Daily., Disp: 90 tablet, Rfl: 1  •  lisinopril-hydrochlorothiazide (PRINZIDE,ZESTORETIC) 10-12.5 MG per tablet, TAKE ONE TABLET BY MOUTH DAILY, Disp: 90 tablet, Rfl: 1  •  meloxicam (MOBIC) 15 MG tablet, TAKE ONE TABLET BY MOUTH DAILY AS NEEDED, Disp: 90 tablet, Rfl: 0  •  moxifloxacin (VIGAMOX) 0.5 % ophthalmic solution, Administer  into the left eye Daily., Disp: , Rfl:   •  Multiple Vitamins-Minerals (MULTIVITAMIN PO), Take 1 tablet by mouth Daily., Disp: , Rfl:   •  omeprazole (priLOSEC) 40 MG capsule, TAKE ONE CAPSULE BY MOUTH DAILY, Disp: 90 capsule, Rfl: 1  •  prednisoLONE acetate (PRED FORTE) 1 % ophthalmic suspension, Administer  into the left eye Daily., Disp: , Rfl:   •  sertraline (ZOLOFT) 100 MG tablet, TAKE ONE TABLET BY MOUTH DAILY, Disp: 90 tablet, Rfl: 1  •  simvastatin (ZOCOR) 40 MG tablet, TAKE 1 TABLET DAILY AT  BEDTIME, Disp: 90 tablet, Rfl: 3  •  tiZANidine (ZANAFLEX) 4 MG tablet, Take 1 tablet by mouth 2 (Two) Times a Day As Needed., Disp: , Rfl:   •  valACYclovir (VALTREX) 500 MG tablet, Take 1 tablet by mouth Daily., Disp: , Rfl:   •  traZODone (DESYREL) 50 MG tablet, TAKE ONE TO TWO TABLETS BY MOUTH EVERY NIGHT AT BEDTIME AS NEEDED, Disp: 60 tablet, Rfl: 2     Allergies    Allergies   Allergen Reactions   • Glucophage Xr [Metformin Hcl Er] Diarrhea and Other (See Comments)     Glucophage XR TB24: Adverse Reaction: Severe GI issues.   • Metformin Nausea And Vomiting     Other reaction(s): SEVERE INTESTIONAL ISSUES  Other reaction(s): SEVERE GI ISSUES    Glucophage XR TB24  MetFORMIN HCl TABS   • Tetracyclines & Related Nausea Only     Adverse Reaction       Problem List    Patient Active Problem List   Diagnosis   • Chronic kidney disease, stage III (moderate) (CMS/HCC)   • Gastroesophageal reflux disease without esophagitis   • Hyperlipidemia   • Hypertension   • Trigger finger of right hand   • DM (diabetes mellitus), type 2, uncontrolled w/neurologic complication (CMS/HCC)   • Obesity, Class I, BMI 30-34.9   • Actinic keratosis   • FAVIO (obstructive sleep apnea)   • Prurigo nodularis   • History of depression   • History of migraine   • Cervical radiculopathy   • ALT (SGPT) level raised   • Elevated AST (SGOT)   • Uncontrolled type 2 diabetes mellitus with diabetic neuropathy, with long-term current use of insulin (CMS/HCC)   • Post-operative infection   • Leukocytosis   • Left hand weakness   • Encounter for long-term (current) use of insulin (CMS/HCC)   • Herniated cervical disc   • Circadian rhythm sleep disorder, delayed sleep phase type   • 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    Physical Examination    /78 (BP Location: Left arm, Patient Position: Sitting, Cuff Size: Adult)   Pulse 84   Temp 98.2 °F (36.8 °C) (Infrared)   Resp 18   Wt 105 kg (231 lb)   BMI 35.12 kg/m²       Dermatologic: The patient has a boil on the right chest. The boil is deep red and purple. The affected skin is nodular and the condition is noted to be annular.    Impression and Assessment    Sebaceous Cyst.    Plan    Sebaceous Cyst Plan: Skin care was discussed with the patient. A medication will be added as noted below. Recheck in 1-2 weeks.    Diagnoses and all orders for this visit:    1. Sebaceous cyst (Primary)  -     sulfamethoxazole-trimethoprim (Bactrim DS) 800-160 MG per tablet; Take 1 tablet by mouth 2 (Two) Times a Day.  Dispense: 20 tablet; Refill: 0  -     mupirocin (Bactroban) 2 % ointment; Apply  topically to the appropriate area as directed 3 (Three) Times a Day.  Dispense: 30 g; Refill: 0        Return to Office    The patient was instructed to return for follow-up in 2 weeks. The patient was instructed to return sooner if the condition changes, worsens, or does not resolve.

## 2021-02-24 NOTE — TELEPHONE ENCOUNTER
Last Office Visit: 02/10/2021  Next Office Visit: 03/03/2021    Labs completed in past 6 months? yes  Labs completed in past year? yes    Last Refill Date: 11/06/2020  Quantity: 90  Refills: 0     Pharmacy:   [facility] [address] [place] [address] - [phone]  - [phone] FX   Phone:  [phone]   Fax:  [phone]   Address:  [address] [place] [address]

## 2021-02-25 ENCOUNTER — OFFICE VISIT (OUTPATIENT)
Dept: ENDOCRINOLOGY | Facility: CLINIC | Age: 71
End: 2021-02-25

## 2021-02-25 VITALS
TEMPERATURE: 97.7 F | WEIGHT: 230.3 LBS | BODY MASS INDEX: 34.9 KG/M2 | OXYGEN SATURATION: 98 % | DIASTOLIC BLOOD PRESSURE: 76 MMHG | HEIGHT: 68 IN | SYSTOLIC BLOOD PRESSURE: 130 MMHG | HEART RATE: 84 BPM

## 2021-02-25 DIAGNOSIS — E11.65 UNCONTROLLED TYPE 2 DIABETES MELLITUS WITH HYPERGLYCEMIA (HCC): ICD-10-CM

## 2021-02-25 DIAGNOSIS — IMO0002 DM (DIABETES MELLITUS), TYPE 2, UNCONTROLLED W/NEUROLOGIC COMPLICATION: Primary | Chronic | ICD-10-CM

## 2021-02-25 DIAGNOSIS — E03.9 HYPOTHYROIDISM, ADULT: ICD-10-CM

## 2021-02-25 LAB
EXPIRATION DATE: ABNORMAL
EXPIRATION DATE: NORMAL
GLUCOSE BLDC GLUCOMTR-MCNC: 158 MG/DL (ref 70–130)
HBA1C MFR BLD: 8.5 %
Lab: ABNORMAL
Lab: NORMAL

## 2021-02-25 PROCEDURE — 99214 OFFICE O/P EST MOD 30 MIN: CPT | Performed by: INTERNAL MEDICINE

## 2021-02-25 PROCEDURE — 95251 CONT GLUC MNTR ANALYSIS I&R: CPT | Performed by: INTERNAL MEDICINE

## 2021-02-25 PROCEDURE — 83036 HEMOGLOBIN GLYCOSYLATED A1C: CPT | Performed by: INTERNAL MEDICINE

## 2021-02-25 RX ORDER — LEVOTHYROXINE SODIUM 0.05 MG/1
50 TABLET ORAL DAILY
Qty: 90 TABLET | Refills: 3 | Status: SHIPPED | OUTPATIENT
Start: 2021-02-25 | End: 2022-02-02 | Stop reason: SDUPTHER

## 2021-02-25 RX ORDER — INSULIN HUMAN 500 [IU]/ML
INJECTION, SOLUTION SUBCUTANEOUS
Qty: 20 PEN | Refills: 3 | Status: SHIPPED | OUTPATIENT
Start: 2021-02-25 | End: 2022-02-02 | Stop reason: SDUPTHER

## 2021-02-26 RX ORDER — MELOXICAM 15 MG/1
TABLET ORAL
Qty: 90 TABLET | Refills: 0 | Status: SHIPPED | OUTPATIENT
Start: 2021-02-26 | End: 2021-05-28

## 2021-03-03 ENCOUNTER — OFFICE VISIT (OUTPATIENT)
Dept: INTERNAL MEDICINE | Facility: CLINIC | Age: 71
End: 2021-03-03

## 2021-03-03 VITALS
DIASTOLIC BLOOD PRESSURE: 88 MMHG | RESPIRATION RATE: 18 BRPM | WEIGHT: 230 LBS | BODY MASS INDEX: 34.97 KG/M2 | SYSTOLIC BLOOD PRESSURE: 156 MMHG | TEMPERATURE: 98 F | HEART RATE: 72 BPM

## 2021-03-03 DIAGNOSIS — L72.3 SEBACEOUS CYST: Primary | ICD-10-CM

## 2021-03-03 PROCEDURE — 99212 OFFICE O/P EST SF 10 MIN: CPT | Performed by: INTERNAL MEDICINE

## 2021-03-03 NOTE — PROGRESS NOTES
Chief Complaint   Patient presents with   • Follow-up     2 WEEK FOLLOW UP CYST ON CHEST       History of Present Illness    He presents for a follow-up with a boil on his chest. This has been present for three months. The condition is painful and improved. There are no exposures to people with similar lesions. Treatment has been administered at our office. The prior treatment consisted of oral antibiotics. There is no evidence of infection.    Review of Systems    CONSTITUTIONAL- Denies Unexplained Weight Loss, Fever, Chills, Sweats, Fatigue, Weakness or Malaise.    Medications      Current Outpatient Medications:   •  aspirin 81 MG tablet, Take 1 tablet by mouth Daily. Last dose 2-19-17 may resume when okay with Dr Perales (Patient taking differently: Take 81 mg by mouth Daily.), Disp: , Rfl:   •  atenolol (TENORMIN) 50 MG tablet, Take 1 tablet by mouth Daily., Disp: 90 tablet, Rfl: 1  •  Coenzyme Q10 (CO Q 10 PO), Take 300 mg by mouth Daily., Disp: , Rfl:   •  colestipol (COLESTID) 1 g tablet, TAKE 2 TABLETS TWICE A DAY, Disp: 360 tablet, Rfl: 3  •  Continuous Blood Gluc Sensor (FREESTYLE MESFIN 14 DAY SENSOR) misc, 1 each Every 14 (Fourteen) Days., Disp: 2 each, Rfl: 6  •  DULoxetine (CYMBALTA) 30 MG capsule, TAKE 1 CAPSULE DAILY, Disp: 90 capsule, Rfl: 3  •  Flaxseed, Linseed, (FLAXSEED OIL) 1200 MG capsule, Take 1 capsule by mouth 2 (Two) Times a Day. 1300 mg, Disp: , Rfl:   •  fluticasone (FLONASE) 50 MCG/ACT nasal spray, Use daily PRN, Disp: 16 g, Rfl: 1  •  gabapentin (NEURONTIN) 800 MG tablet, TAKE 1 TABLET TWICE A DAY (Patient taking differently: Take 800 mg by mouth 3 (Three) Times a Day.), Disp: 180 tablet, Rfl: 1  •  gemfibrozil (LOPID) 600 MG tablet, TAKE 1 TABLET TWICE A DAY, Disp: 180 tablet, Rfl: 3  •  glucose blood (ONE TOUCH ULTRA TEST) test strip, Test FOUR times daily, Disp: 200 each, Rfl: 3  •  Insulin Pen Needle (B-D UF III MINI PEN NEEDLES) 31G X 5 MM misc, USE THREE TIMES A DAY, Disp: 300  each, Rfl: 1  •  Insulin Regular Human, Conc, (HumuLIN R U-500 KwikPen) 500 UNIT/ML solution pen-injector CONCENTRATED injection, Inject sc 150 u before breakfast at 1 pm and 150 at dinner, Disp: 20 pen, Rfl: 3  •  ipratropium (ATROVENT) 0.06 % nasal spray, 2 sprays into the nostril(s) as directed by provider Daily., Disp: , Rfl:   •  levothyroxine (SYNTHROID, LEVOTHROID) 50 MCG tablet, Take 1 tablet by mouth Daily., Disp: 90 tablet, Rfl: 3  •  lisinopril-hydrochlorothiazide (PRINZIDE,ZESTORETIC) 10-12.5 MG per tablet, TAKE ONE TABLET BY MOUTH DAILY, Disp: 90 tablet, Rfl: 1  •  meloxicam (MOBIC) 15 MG tablet, TAKE ONE TABLET BY MOUTH DAILY AS NEEDED, Disp: 90 tablet, Rfl: 0  •  Multiple Vitamins-Minerals (MULTIVITAMIN PO), Take 1 tablet by mouth Daily., Disp: , Rfl:   •  mupirocin (Bactroban) 2 % ointment, Apply  topically to the appropriate area as directed 3 (Three) Times a Day., Disp: 30 g, Rfl: 0  •  omeprazole (priLOSEC) 40 MG capsule, TAKE ONE CAPSULE BY MOUTH DAILY, Disp: 90 capsule, Rfl: 1  •  sertraline (ZOLOFT) 100 MG tablet, TAKE ONE TABLET BY MOUTH DAILY, Disp: 90 tablet, Rfl: 1  •  simvastatin (ZOCOR) 40 MG tablet, TAKE 1 TABLET DAILY AT BEDTIME, Disp: 90 tablet, Rfl: 3  •  tiZANidine (ZANAFLEX) 4 MG tablet, Take 1 tablet by mouth 2 (Two) Times a Day As Needed., Disp: , Rfl:   •  valACYclovir (VALTREX) 500 MG tablet, Take 1 tablet by mouth Daily., Disp: , Rfl:      Allergies    Allergies   Allergen Reactions   • Glucophage Xr [Metformin Hcl Er] Diarrhea and Other (See Comments)     Glucophage XR TB24: Adverse Reaction: Severe GI issues.   • Metformin Nausea And Vomiting     Other reaction(s): SEVERE INTESTIONAL ISSUES  Other reaction(s): SEVERE GI ISSUES    Glucophage XR TB24  MetFORMIN HCl TABS   • Tetracyclines & Related Nausea Only     Adverse Reaction       Problem List    Patient Active Problem List   Diagnosis   • Chronic kidney disease, stage III (moderate) (CMS/HCC)   • Gastroesophageal reflux  disease without esophagitis   • Hyperlipidemia   • Hypertension   • Trigger finger of right hand   • DM (diabetes mellitus), type 2, uncontrolled w/neurologic complication (CMS/HCC)   • Obesity, Class I, BMI 30-34.9   • Actinic keratosis   • FAVIO (obstructive sleep apnea)   • Prurigo nodularis   • History of depression   • History of migraine   • Cervical radiculopathy   • ALT (SGPT) level raised   • Elevated AST (SGOT)   • Uncontrolled type 2 diabetes mellitus with diabetic neuropathy, with long-term current use of insulin (CMS/HCC)   • Post-operative infection   • Leukocytosis   • Left hand weakness   • Encounter for long-term (current) use of insulin (CMS/HCC)   • Herniated cervical disc   • Circadian rhythm sleep disorder, delayed sleep phase type   • 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    Physical Examination    /88 (BP Location: Left arm, Patient Position: Sitting, Cuff Size: Adult)   Pulse 72   Temp 98 °F (36.7 °C) (Infrared)   Resp 18   Wt 104 kg (230 lb)   BMI 34.97 kg/m²     Dermatologic: The patient has a boil on his chest. The boil is deep red and purple. The affected skin is nodular and the condition is noted to be well demarcated. It is 6x7 cm.    Impression and Assessment    Sebaceous Cyst.    Plan    Sebaceous Cyst Plan: He was referred to surgery.    Diagnoses and all orders for this visit:    1. Sebaceous cyst (Primary)  -     Ambulatory Referral to General Surgery        Return to Office    The patient was instructed to return for follow-up at the next scheduled visit. The patient was instructed  to return sooner if the condition changes, worsens, or does not resolve.

## 2021-03-08 RX ORDER — LISINOPRIL AND HYDROCHLOROTHIAZIDE 12.5; 1 MG/1; MG/1
TABLET ORAL
Qty: 90 TABLET | Refills: 1 | Status: SHIPPED | OUTPATIENT
Start: 2021-03-08 | End: 2021-09-03 | Stop reason: HOSPADM

## 2021-04-14 RX ORDER — SERTRALINE HYDROCHLORIDE 100 MG/1
TABLET, FILM COATED ORAL
Qty: 90 TABLET | Refills: 1 | Status: SHIPPED | OUTPATIENT
Start: 2021-04-14 | End: 2021-11-01

## 2021-04-19 ENCOUNTER — APPOINTMENT (OUTPATIENT)
Dept: PREADMISSION TESTING | Facility: HOSPITAL | Age: 71
End: 2021-04-19

## 2021-04-19 ENCOUNTER — PRE-ADMISSION TESTING (OUTPATIENT)
Dept: PREADMISSION TESTING | Facility: HOSPITAL | Age: 71
End: 2021-04-19

## 2021-04-19 LAB
ANION GAP SERPL CALCULATED.3IONS-SCNC: 11 MMOL/L (ref 5–15)
BUN SERPL-MCNC: 35 MG/DL (ref 8–23)
BUN/CREAT SERPL: 22.2 (ref 7–25)
CALCIUM SPEC-SCNC: 9 MG/DL (ref 8.6–10.5)
CHLORIDE SERPL-SCNC: 104 MMOL/L (ref 98–107)
CO2 SERPL-SCNC: 21 MMOL/L (ref 22–29)
CREAT SERPL-MCNC: 1.58 MG/DL (ref 0.76–1.27)
DEPRECATED RDW RBC AUTO: 52.2 FL (ref 37–54)
ERYTHROCYTE [DISTWIDTH] IN BLOOD BY AUTOMATED COUNT: 14.7 % (ref 12.3–15.4)
GFR SERPL CREATININE-BSD FRML MDRD: 43 ML/MIN/1.73
GLUCOSE SERPL-MCNC: 208 MG/DL (ref 65–99)
HCT VFR BLD AUTO: 28.8 % (ref 37.5–51)
HGB BLD-MCNC: 9 G/DL (ref 13–17.7)
MCH RBC QN AUTO: 30.1 PG (ref 26.6–33)
MCHC RBC AUTO-ENTMCNC: 31.3 G/DL (ref 31.5–35.7)
MCV RBC AUTO: 96.3 FL (ref 79–97)
PLATELET # BLD AUTO: 96 10*3/MM3 (ref 140–450)
PMV BLD AUTO: 12.7 FL (ref 6–12)
POTASSIUM SERPL-SCNC: 4.3 MMOL/L (ref 3.5–5.2)
QT INTERVAL: 358 MS
QTC INTERVAL: 423 MS
RBC # BLD AUTO: 2.99 10*6/MM3 (ref 4.14–5.8)
SARS-COV-2 RNA PNL SPEC NAA+PROBE: NOT DETECTED
SODIUM SERPL-SCNC: 136 MMOL/L (ref 136–145)
WBC # BLD AUTO: 5.1 10*3/MM3 (ref 3.4–10.8)

## 2021-04-19 PROCEDURE — U0004 COV-19 TEST NON-CDC HGH THRU: HCPCS

## 2021-04-19 PROCEDURE — 85027 COMPLETE CBC AUTOMATED: CPT

## 2021-04-19 PROCEDURE — 93005 ELECTROCARDIOGRAM TRACING: CPT

## 2021-04-19 PROCEDURE — 36415 COLL VENOUS BLD VENIPUNCTURE: CPT

## 2021-04-19 PROCEDURE — C9803 HOPD COVID-19 SPEC COLLECT: HCPCS

## 2021-04-19 PROCEDURE — 80048 BASIC METABOLIC PNL TOTAL CA: CPT

## 2021-04-19 PROCEDURE — 93010 ELECTROCARDIOGRAM REPORT: CPT | Performed by: INTERNAL MEDICINE

## 2021-04-19 NOTE — PAT
An arrival time for procedure was not given during PAT visit. If patient had any questions or concerns about their arrival time, they were instructed to contact their surgeon/physician.  Additionally, if the patient referred to an arrival time that was acquired from their my chart account, patient was encouraged to verify that time with their surgeon/physician.  NO arrival times given in Pre Admission Testing Department.    Patient was not told what to do about his am insulin on Wednesday.  Instructed him to ask his surgeon's office when they call tomorrow regarding his surgery arrival time.  If they did not know what to do about his insulin they I instructed him to hold that am of surgery unless the surgeon's office says differently.    Patient sees Dr Patel every six months. Cardiac clearance requested but no necessary since patient is having a local/mac.

## 2021-04-21 ENCOUNTER — LAB REQUISITION (OUTPATIENT)
Dept: LAB | Facility: HOSPITAL | Age: 71
End: 2021-04-21

## 2021-04-21 DIAGNOSIS — L72.3 SEBACEOUS CYST: ICD-10-CM

## 2021-04-21 PROCEDURE — 88304 TISSUE EXAM BY PATHOLOGIST: CPT | Performed by: SURGERY

## 2021-04-22 LAB
CYTO UR: NORMAL
LAB AP CASE REPORT: NORMAL
LAB AP CLINICAL INFORMATION: NORMAL
PATH REPORT.FINAL DX SPEC: NORMAL
PATH REPORT.GROSS SPEC: NORMAL

## 2021-04-27 DIAGNOSIS — E11.42 DIABETIC PERIPHERAL NEUROPATHY ASSOCIATED WITH TYPE 2 DIABETES MELLITUS (HCC): ICD-10-CM

## 2021-04-27 RX ORDER — GABAPENTIN 800 MG/1
800 TABLET ORAL 3 TIMES DAILY
Qty: 270 TABLET | Refills: 1 | Status: SHIPPED | OUTPATIENT
Start: 2021-04-27 | End: 2021-11-30 | Stop reason: SDUPTHER

## 2021-05-13 ENCOUNTER — OFFICE VISIT (OUTPATIENT)
Dept: INTERNAL MEDICINE | Facility: CLINIC | Age: 71
End: 2021-05-13

## 2021-05-13 VITALS
HEART RATE: 84 BPM | TEMPERATURE: 97.3 F | DIASTOLIC BLOOD PRESSURE: 74 MMHG | WEIGHT: 232 LBS | BODY MASS INDEX: 35.16 KG/M2 | SYSTOLIC BLOOD PRESSURE: 132 MMHG | HEIGHT: 68 IN

## 2021-05-13 DIAGNOSIS — I10 ESSENTIAL HYPERTENSION: ICD-10-CM

## 2021-05-13 DIAGNOSIS — E11.8 TYPE 2 DIABETES MELLITUS WITH COMPLICATION, WITH LONG-TERM CURRENT USE OF INSULIN (HCC): ICD-10-CM

## 2021-05-13 DIAGNOSIS — E78.5 HYPERLIPIDEMIA, UNSPECIFIED HYPERLIPIDEMIA TYPE: ICD-10-CM

## 2021-05-13 DIAGNOSIS — E03.9 HYPOTHYROIDISM, UNSPECIFIED TYPE: ICD-10-CM

## 2021-05-13 DIAGNOSIS — K21.9 GASTROESOPHAGEAL REFLUX DISEASE WITHOUT ESOPHAGITIS: ICD-10-CM

## 2021-05-13 DIAGNOSIS — Z00.00 PHYSICAL EXAM: Primary | ICD-10-CM

## 2021-05-13 DIAGNOSIS — Z79.4 TYPE 2 DIABETES MELLITUS WITH COMPLICATION, WITH LONG-TERM CURRENT USE OF INSULIN (HCC): ICD-10-CM

## 2021-05-13 LAB
A/C: NORMAL
BASOPHILS # BLD AUTO: 0.02 10*3/MM3 (ref 0–0.2)
BASOPHILS NFR BLD AUTO: 0.4 % (ref 0–1.5)
BILIRUB BLD-MCNC: NEGATIVE MG/DL
CHOLEST SERPL-MCNC: 118 MG/DL (ref 0–200)
CLARITY, POC: CLEAR
COLOR UR: YELLOW
DEPRECATED RDW RBC AUTO: 47 FL (ref 37–54)
EOSINOPHIL # BLD AUTO: 0.2 10*3/MM3 (ref 0–0.4)
EOSINOPHIL NFR BLD AUTO: 3.8 % (ref 0.3–6.2)
ERYTHROCYTE [DISTWIDTH] IN BLOOD BY AUTOMATED COUNT: 13.7 % (ref 12.3–15.4)
EXPIRATION DATE: ABNORMAL
EXPIRATION DATE: NORMAL
GLUCOSE UR STRIP-MCNC: ABNORMAL MG/DL
HBA1C MFR BLD: 8.77 % (ref 4.8–5.6)
HCT VFR BLD AUTO: 28.9 % (ref 37.5–51)
HDLC SERPL-MCNC: 28 MG/DL (ref 40–60)
HGB BLD-MCNC: 9.1 G/DL (ref 13–17.7)
KETONES UR QL: NEGATIVE
LDLC SERPL CALC-MCNC: 47 MG/DL (ref 0–100)
LDLC/HDLC SERPL: 1.24 {RATIO}
LEUKOCYTE EST, POC: NEGATIVE
LYMPHOCYTES # BLD AUTO: 1.02 10*3/MM3 (ref 0.7–3.1)
LYMPHOCYTES NFR BLD AUTO: 19.4 % (ref 19.6–45.3)
Lab: ABNORMAL
Lab: NORMAL
MCH RBC QN AUTO: 29.6 PG (ref 26.6–33)
MCHC RBC AUTO-ENTMCNC: 31.5 G/DL (ref 31.5–35.7)
MCV RBC AUTO: 94.1 FL (ref 79–97)
MONOCYTES # BLD AUTO: 0.54 10*3/MM3 (ref 0.1–0.9)
MONOCYTES NFR BLD AUTO: 10.2 % (ref 5–12)
NEUTROPHILS NFR BLD AUTO: 3.47 10*3/MM3 (ref 1.7–7)
NEUTROPHILS NFR BLD AUTO: 65.8 % (ref 42.7–76)
NITRITE UR-MCNC: NEGATIVE MG/ML
PH UR: 5 [PH] (ref 5–8)
PLATELET # BLD AUTO: 111 10*3/MM3 (ref 140–450)
PMV BLD AUTO: 13.3 FL (ref 6–12)
POC CREATININE URINE: 100
POC MICROALBUMIN URINE: 150
PROT UR STRIP-MCNC: NEGATIVE MG/DL
RBC # BLD AUTO: 3.07 10*6/MM3 (ref 4.14–5.8)
RBC # UR STRIP: NEGATIVE /UL
SP GR UR: 1.02 (ref 1–1.03)
TRIGL SERPL-MCNC: 276 MG/DL (ref 0–150)
UROBILINOGEN UR QL: NORMAL
VLDLC SERPL-MCNC: 43 MG/DL (ref 5–40)
WBC # BLD AUTO: 5.27 10*3/MM3 (ref 3.4–10.8)

## 2021-05-13 PROCEDURE — 80053 COMPREHEN METABOLIC PANEL: CPT | Performed by: INTERNAL MEDICINE

## 2021-05-13 PROCEDURE — 99397 PER PM REEVAL EST PAT 65+ YR: CPT | Performed by: INTERNAL MEDICINE

## 2021-05-13 PROCEDURE — 82044 UR ALBUMIN SEMIQUANTITATIVE: CPT | Performed by: INTERNAL MEDICINE

## 2021-05-13 PROCEDURE — 84439 ASSAY OF FREE THYROXINE: CPT | Performed by: INTERNAL MEDICINE

## 2021-05-13 PROCEDURE — 36415 COLL VENOUS BLD VENIPUNCTURE: CPT | Performed by: INTERNAL MEDICINE

## 2021-05-13 PROCEDURE — 80061 LIPID PANEL: CPT | Performed by: INTERNAL MEDICINE

## 2021-05-13 PROCEDURE — 84443 ASSAY THYROID STIM HORMONE: CPT | Performed by: INTERNAL MEDICINE

## 2021-05-13 PROCEDURE — 81003 URINALYSIS AUTO W/O SCOPE: CPT | Performed by: INTERNAL MEDICINE

## 2021-05-13 PROCEDURE — 85025 COMPLETE CBC W/AUTO DIFF WBC: CPT | Performed by: INTERNAL MEDICINE

## 2021-05-13 PROCEDURE — 83036 HEMOGLOBIN GLYCOSYLATED A1C: CPT | Performed by: INTERNAL MEDICINE

## 2021-05-13 NOTE — PROGRESS NOTES
Chief Complaint   Patient presents with   • Annual Exam       History of Present Illness      The patient presents for an established patient physical examination and health maintenance visit.    The patient presents for a follow-up related to Type 2 Diabetes Mellitus. He checks his blood sugars at home. His sugars are checked daily. The average sugars are in the 100-150 range. He denies hypoglycemic symptoms. The patient denies polyuria, polydypsia or polyphagia. He reports no symptoms of neuropathy. The patient takes his medication as prescribed. He is taking insulin. His injection sites are rotated appropriately. The patient does check his feet daily at home. He denies chest pain, shortness of breath, orthopnea, paroxysmal nocturnal dyspnea, dyspnea on exertion, edema, palpitations or syncope.    The patient presents for a follow-up related to hyperlipidemia. He is following a low fat diet. He reports that he is exercising. He is taking gemfibrozil and simvastatin. The patient is taking his medication as prescribed. He reports no medication side effects, including muscle cramps, abdominal pain, headaches or weakness.    The patient presents for a follow-up related to hypertension. The patient reports that he has had no headaches or blurred vision. He states that he is taking his medication as prescribed. He is not having medication side effects.    The patient presents for a follow-up related to GERD. The patient is on omeprazole for his gastroesophageal reflux. The medication is taken on a regular basis and gives complete relief of the symptoms. He reports no abdominal pain, belching, diarrhea, dysphagia, early satiety, heartburn, hoarseness, nausea, odynophagia, rectal bleeding, vomiting or weight loss. The GERD has no known aggravating factors.    The patient presents for a follow-up related to hypothyroidism. He reports a good energy level. He reports no hair loss, heat intolerance, cold intolerance,  constipation or sweats. He is taking his medication as prescribed.    Review of Systems    CONSTITUTIONAL- Denies Fever, Chills, Sweats, Weakness or Malaise.    NECK- Denies Decreased Range of Motion, Stiffness, Thyroid Nodules, Enlarged Lymph Nodes or Goiter.    EYES- Denies Eye Pain, Eye Drainage, Eye Redness, Eye Discharge, Decreased Vision, Visual Disturbance, Diplopia, Visual Loss or Swollen Eyelid.    HENT- Denies Nasal Discharge, Sore Throat, Ear Pain, Ear Drainage, Sinus Pain, Nasal Congestion, Decreased Hearing or Tinnitus.    PULMONARY- Denies Wheezing, Sputum Production, Cough, Hemoptysis or Pleuritic Chest Pain.    GENITOURINARY- Denies Penile Discharge, Erectile Dysfunction, Testicular Mass, Testicular Pain, Hernia, Nocturia, Decreased Urine Stream, Incomplete Voiding, Urinary Frequency, Urinary Urgency, Dysuria or Hematuria.    CARDIOVASCULAR- Denies Claudication or Irregular Heart Beat.    ENDOCRINE- Denies Depression, Memory Loss, Sleep Disturbance or Weight Gain.    NEUROLOGIC- Denies Seizures, Confusion or Excessive Daytime Sleepiness.    MUSCULOSKELETAL- Denies Joint Pain, Joint Stiffness, Decreased Range of Motion, Joint Swelling or Erythema of Joints.    INTEGUMENTARY- Denies Rash, Lumps, Sores, Itching, Dryness, Color Change, Changes in Hair, Brittle Nails, Discolored Nails, Thickened Nails, Growths or Alopecia.    Medications      Current Outpatient Medications:   •  aspirin 81 MG tablet, Take 1 tablet by mouth Daily. Last dose 2-19-17 may resume when okay with Dr Perales (Patient taking differently: Take 81 mg by mouth Daily.), Disp: , Rfl:   •  atenolol (TENORMIN) 50 MG tablet, Take 1 tablet by mouth Daily., Disp: 90 tablet, Rfl: 1  •  Coenzyme Q10 (CO Q 10 PO), Take 300 mg by mouth Daily., Disp: , Rfl:   •  colestipol (COLESTID) 1 g tablet, TAKE 2 TABLETS TWICE A DAY, Disp: 360 tablet, Rfl: 3  •  Continuous Blood Gluc Sensor (FREESTYLE MESFIN 14 DAY SENSOR) [facility], 1 each Every 14 (Fourteen)  Days., Disp: 2 each, Rfl: 6  •  DULoxetine (CYMBALTA) 30 MG capsule, TAKE 1 CAPSULE DAILY, Disp: 90 capsule, Rfl: 3  •  Flaxseed, Linseed, (FLAXSEED OIL) 1200 MG capsule, Take 1 capsule by mouth 2 (Two) Times a Day. 1300 mg, Disp: , Rfl:   •  gabapentin (NEURONTIN) 800 MG tablet, Take 1 tablet by mouth 3 (Three) Times a Day., Disp: 270 tablet, Rfl: 1  •  gemfibrozil (LOPID) 600 MG tablet, TAKE 1 TABLET TWICE A DAY, Disp: 180 tablet, Rfl: 3  •  glucose blood (ONE TOUCH ULTRA TEST) test strip, Test FOUR times daily, Disp: 200 each, Rfl: 3  •  Insulin Pen Needle (B-D UF III MINI PEN NEEDLES) 31G X 5 MM misc, USE THREE TIMES A DAY, Disp: 300 each, Rfl: 1  •  Insulin Regular Human, Conc, (HumuLIN R U-500 KwikPen) 500 UNIT/ML solution pen-injector CONCENTRATED injection, Inject sc 150 u before breakfast at 1 pm and 150 at dinner, Disp: 20 pen, Rfl: 3  •  ipratropium (ATROVENT) 0.06 % nasal spray, 2 sprays into the nostril(s) as directed by provider Daily., Disp: , Rfl:   •  levothyroxine (SYNTHROID, LEVOTHROID) 50 MCG tablet, Take 1 tablet by mouth Daily., Disp: 90 tablet, Rfl: 3  •  lisinopril-hydrochlorothiazide (PRINZIDE,ZESTORETIC) 10-12.5 MG per tablet, TAKE ONE TABLET BY MOUTH DAILY, Disp: 90 tablet, Rfl: 1  •  meloxicam (MOBIC) 15 MG tablet, TAKE ONE TABLET BY MOUTH DAILY AS NEEDED, Disp: 90 tablet, Rfl: 0  •  Multiple Vitamins-Minerals (MULTIVITAMIN PO), Take 1 tablet by mouth Daily., Disp: , Rfl:   •  omeprazole (priLOSEC) 40 MG capsule, TAKE ONE CAPSULE BY MOUTH DAILY, Disp: 90 capsule, Rfl: 1  •  sertraline (ZOLOFT) 100 MG tablet, TAKE ONE TABLET BY MOUTH DAILY, Disp: 90 tablet, Rfl: 1  •  simvastatin (ZOCOR) 40 MG tablet, TAKE 1 TABLET DAILY AT BEDTIME, Disp: 90 tablet, Rfl: 3  •  tiZANidine (ZANAFLEX) 4 MG tablet, Take 1 tablet by mouth 2 (Two) Times a Day As Needed., Disp: , Rfl:      Allergies    Allergies   Allergen Reactions   • Glucophage Xr [Metformin Hcl Er] Diarrhea and Other (See Comments)      "Glucophage XR TB24: Adverse Reaction: Severe GI issues.   • Metformin Nausea And Vomiting     Other reaction(s): SEVERE INTESTIONAL ISSUES  Other reaction(s): SEVERE GI ISSUES    Glucophage XR TB24  MetFORMIN HCl TABS   • Tetracyclines & Related Nausea Only     Adverse Reaction       Problem List    Patient Active Problem List   Diagnosis   • Chronic kidney disease, stage III (moderate) (CMS/HCC)   • Gastroesophageal reflux disease without esophagitis   • Hyperlipidemia   • Hypertension   • Trigger finger of right hand   • DM (diabetes mellitus), type 2, uncontrolled w/neurologic complication (CMS/HCC)   • Obesity, Class I, BMI 30-34.9   • Actinic keratosis   • FAVIO (obstructive sleep apnea)   • Prurigo nodularis   • History of depression   • History of migraine   • Cervical radiculopathy   • ALT (SGPT) level raised   • Elevated AST (SGOT)   • Uncontrolled type 2 diabetes mellitus with diabetic neuropathy, with long-term current use of insulin (CMS/HCC)   • Post-operative infection   • Leukocytosis   • Left hand weakness   • Encounter for long-term (current) use of insulin (CMS/HCC)   • Herniated cervical disc   • Circadian rhythm sleep disorder, delayed sleep phase type   • 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    Physical Examination    /74   Pulse 84   Temp 97.3 °F (36.3 °C) (Infrared)   Ht 172.7 cm (68\")   Wt 105 kg (232 lb)   BMI 35.28 kg/m²     HEENT: Head- Normocephalic Atraumatic. Facies- Within normal limits. Pinnas- Normal texture and shape bilaterally. Canals- Normal bilaterally. TMs- Normal bilaterally. " Nares- Patent bilaterally. Nasal Septum- is normal. There is no tenderness to palpation over the frontal or maxillary sinuses. Lids- Normal bilaterally. Conjunctiva- Clear bilaterally. Sclera- Anicteric bilaterally. Oropharynx- Moist with no lesions. Tonsils- No enlargement, erythema or exudate.    Neck: Thyroid- non enlarged, symmetric and has no nodules. No bruits are detected. ROM- Normal Range of Motion with no rigidity.    Lungs: Auscultation- Clear to auscultation bilaterally. There are no retractions, clubbing or cyanosis. The Expiratory to Inspiratory ratio is equal.    Lymph Nodes: Cervical- no enlarged lymph nodes noted. Clavicular- no enlarged supraclavicular lymph nodes noted. Axillary- no enlarged axillary lymph nodes noted. Inguinal- no enlarged inguinal lymph nodes noted.    Cardiovascular: There are no carotid bruits. Heart- Normal Rate with Regular rhythm and no murmurs. There are no gallops. There are no rubs. In the lower extremities there is no edema. The upper extremities do not have edema. He has a large wound in his mid chest from a sebaceous cyst removal.    Abdomen: Soft, benign, non-tender with no masses, hernias, organomegaly or scars.    Male Genitourinary: The penis is circumcised with testicles found in the scrotum bilaterally. Testicular Size is normal bilaterally. The penis has no anatomic abnormalities.    Rectal: reveals normal external sphincter tone with no external lesions.    Prostate: The prostate gland is symmetric and smooth with no nodularity.    Feet: The feet are symmetric with normal wendy landmarks. There is no tenderness to palpation bilaterally. The feet have normal posterior tibial and dorsalis pedis pulses and normal capillary refill bilaterally. The arches are normal bilaterally. There are no skin or nail lesions present. There are no ingrown nails. There are no bunions noted.    Dermatologic: The patient has no worrisome or suspicious skin lesions  noted.    Impression and Assessment    Normal Physical Examination.    Encounter for Screening for Malignant Neoplasm of the Prostate.    Type 2 Diabetes Mellitus without complication with insulin usage.    Hyperlipidemia.    Essential Hypertension.    Gastroesophageal Reflux Disease.    Hypothyroidism.    Plan    Gastroesophageal Reflux Disease Plan: The patient was instructed to continue the current medications.    Hyperlipidemia Plan: The patient was instructed to exercise daily, eat a low fat diet and continue his medications.    Essential Hypertension Plan: The current plan was continued.    Type 2 Diabetes Mellitus without complication with insulin usage Plan: The patient was instructed to continue the current medications.    Hypothyroidism Plan: The patient was instructed to continue the current medications.    Counseling was provided regarding: Adequate Aerobic Exercise, Dental Visits, Flossing Teeth and Heart Healthy Diet.    The following was ordered for screening and health maintenance: CBC w Automated Diff, CMP, Lipid Profile, PSA, TSH and U/A.       Immunizations Ordered and Administered: None.    Diagnoses and all orders for this visit:    1. Physical exam (Primary)    2. Type 2 diabetes mellitus with complication, with long-term current use of insulin (CMS/[facility])  -     Comprehensive Metabolic Panel  -     Hemoglobin A1c  -     POC Microalbumin    3. Essential hypertension  -     CBC & Differential  -     Comprehensive Metabolic Panel  -     POC Urinalysis Dipstick, Automated    4. Hyperlipidemia, unspecified hyperlipidemia type  -     Comprehensive Metabolic Panel  -     Lipid Panel    5. Gastroesophageal reflux disease without esophagitis    6. Hypothyroidism, unspecified type  -     TSH  -     T4, Free        Return to Office    The patient was instructed to return for follow-up in 4 months. The patient was instructed to return sooner if the condition changes, worsens, or does not resolve.

## 2021-05-14 LAB
ALBUMIN SERPL-MCNC: 4.2 G/DL (ref 3.5–5.2)
ALBUMIN/GLOB SERPL: 1.3 G/DL
ALP SERPL-CCNC: 94 U/L (ref 39–117)
ALT SERPL W P-5'-P-CCNC: 37 U/L (ref 1–41)
ANION GAP SERPL CALCULATED.3IONS-SCNC: 12.3 MMOL/L (ref 5–15)
AST SERPL-CCNC: 42 U/L (ref 1–40)
BILIRUB SERPL-MCNC: 0.6 MG/DL (ref 0–1.2)
BUN SERPL-MCNC: 33 MG/DL (ref 8–23)
BUN/CREAT SERPL: 21.7 (ref 7–25)
CALCIUM SPEC-SCNC: 9.5 MG/DL (ref 8.6–10.5)
CHLORIDE SERPL-SCNC: 100 MMOL/L (ref 98–107)
CO2 SERPL-SCNC: 21.7 MMOL/L (ref 22–29)
CREAT SERPL-MCNC: 1.52 MG/DL (ref 0.76–1.27)
GFR SERPL CREATININE-BSD FRML MDRD: 45 ML/MIN/1.73
GLOBULIN UR ELPH-MCNC: 3.3 GM/DL
GLUCOSE SERPL-MCNC: 298 MG/DL (ref 65–99)
POTASSIUM SERPL-SCNC: 4.9 MMOL/L (ref 3.5–5.2)
PROT SERPL-MCNC: 7.5 G/DL (ref 6–8.5)
SODIUM SERPL-SCNC: 134 MMOL/L (ref 136–145)
T4 FREE SERPL-MCNC: 1.29 NG/DL (ref 0.93–1.7)
TSH SERPL DL<=0.05 MIU/L-ACNC: 6.92 UIU/ML (ref 0.27–4.2)

## 2021-05-20 ENCOUNTER — OFFICE VISIT (OUTPATIENT)
Dept: ENDOCRINOLOGY | Facility: CLINIC | Age: 71
End: 2021-05-20

## 2021-05-20 VITALS
BODY MASS INDEX: 35.64 KG/M2 | HEART RATE: 72 BPM | OXYGEN SATURATION: 98 % | SYSTOLIC BLOOD PRESSURE: 130 MMHG | HEIGHT: 68 IN | WEIGHT: 235.2 LBS | DIASTOLIC BLOOD PRESSURE: 72 MMHG

## 2021-05-20 DIAGNOSIS — E03.9 HYPOTHYROIDISM, ADULT: ICD-10-CM

## 2021-05-20 DIAGNOSIS — IMO0002 DM (DIABETES MELLITUS), TYPE 2, UNCONTROLLED W/NEUROLOGIC COMPLICATION: Primary | ICD-10-CM

## 2021-05-20 DIAGNOSIS — E11.42 DIABETIC PERIPHERAL NEUROPATHY ASSOCIATED WITH TYPE 2 DIABETES MELLITUS (HCC): ICD-10-CM

## 2021-05-20 LAB
EXPIRATION DATE: ABNORMAL
GLUCOSE BLDC GLUCOMTR-MCNC: 136 MG/DL (ref 70–130)
Lab: ABNORMAL

## 2021-05-20 PROCEDURE — 99214 OFFICE O/P EST MOD 30 MIN: CPT | Performed by: INTERNAL MEDICINE

## 2021-05-20 PROCEDURE — 95251 CONT GLUC MNTR ANALYSIS I&R: CPT | Performed by: INTERNAL MEDICINE

## 2021-05-20 NOTE — PROGRESS NOTES
.Chief complaint  Diabetes (Follow UP) and Hypothyroidism    Subjective   Elton Funez is a 70 y.o. male is here today for follow-up  Diabetes mellitus type 2, uncontrolled dx in 2000  Diabetic complications: neuropathy.   Past meds: Metformin caused diarrhea in the past.   Eye care:no retinopathy.   Hypoglycemia: continue to have hypoglycemia at 4-5 am. Midnight numbers are 300-400  Monitoring - freestyle anibal reviewed. He is staying up at night and sleeps from 4 am till 1 pm.   He gives the dose of insulin at 1 pm before first meal and a dose at 6 pm before dinner meal.   Nutrition: discussed carb consistent diet 45-60 gm carb per meal. Patient is not following any diet, eats a lot of potatoes, pasta meal at 11 pm.  Exercise: recommended 30 min per day exercise. He doesn't exercise because of tingling in the feet.   Foot care and dental care: discussed.     Medications: Bydureon, U-500 insulin BID (1 pm and 8 pm)    Hypothyroidism - he often forgets to take levothyroxine frequently. Last TSH is 6.9       Medications    Current Outpatient Medications:   •  aspirin 81 MG tablet, Take 1 tablet by mouth Daily. Last dose 2-19-17 may resume when okay with Dr Perales (Patient taking differently: Take 81 mg by mouth Daily.), Disp: , Rfl:   •  atenolol (TENORMIN) 50 MG tablet, Take 1 tablet by mouth Daily., Disp: 90 tablet, Rfl: 1  •  Coenzyme Q10 (CO Q 10 PO), Take 300 mg by mouth Daily., Disp: , Rfl:   •  colestipol (COLESTID) 1 g tablet, TAKE 2 TABLETS TWICE A DAY, Disp: 360 tablet, Rfl: 3  •  Continuous Blood Gluc Sensor (FREESTYLE ANIBAL 14 DAY SENSOR) misc, 1 each Every 14 (Fourteen) Days., Disp: 2 each, Rfl: 6  •  DULoxetine (CYMBALTA) 30 MG capsule, TAKE 1 CAPSULE DAILY, Disp: 90 capsule, Rfl: 3  •  Flaxseed, Linseed, (FLAXSEED OIL) 1200 MG capsule, Take 1 capsule by mouth 2 (Two) Times a Day. 1300 mg, Disp: , Rfl:   •  gabapentin (NEURONTIN) 800 MG tablet, Take 1 tablet by mouth 3 (Three) Times a Day., Disp: 270  "tablet, Rfl: 1  •  gemfibrozil (LOPID) 600 MG tablet, TAKE 1 TABLET TWICE A DAY, Disp: 180 tablet, Rfl: 3  •  Insulin Pen Needle (B-D UF III MINI PEN NEEDLES) 31G X 5 MM misc, USE THREE TIMES A DAY, Disp: 300 each, Rfl: 1  •  Insulin Regular Human, Conc, (HumuLIN R U-500 KwikPen) 500 UNIT/ML solution pen-injector CONCENTRATED injection, Inject sc 150 u before breakfast at 1 pm and 150 at dinner, Disp: 20 pen, Rfl: 3  •  ipratropium (ATROVENT) 0.06 % nasal spray, 2 sprays into the nostril(s) as directed by provider Daily., Disp: , Rfl:   •  levothyroxine (SYNTHROID, LEVOTHROID) 50 MCG tablet, Take 1 tablet by mouth Daily., Disp: 90 tablet, Rfl: 3  •  lisinopril-hydrochlorothiazide (PRINZIDE,ZESTORETIC) 10-12.5 MG per tablet, TAKE ONE TABLET BY MOUTH DAILY, Disp: 90 tablet, Rfl: 1  •  meloxicam (MOBIC) 15 MG tablet, TAKE ONE TABLET BY MOUTH DAILY AS NEEDED, Disp: 90 tablet, Rfl: 0  •  Multiple Vitamins-Minerals (MULTIVITAMIN PO), Take 1 tablet by mouth Daily., Disp: , Rfl:   •  omeprazole (priLOSEC) 40 MG capsule, TAKE ONE CAPSULE BY MOUTH DAILY, Disp: 90 capsule, Rfl: 1  •  sertraline (ZOLOFT) 100 MG tablet, TAKE ONE TABLET BY MOUTH DAILY, Disp: 90 tablet, Rfl: 1  •  simvastatin (ZOCOR) 40 MG tablet, TAKE 1 TABLET DAILY AT BEDTIME, Disp: 90 tablet, Rfl: 3  •  tiZANidine (ZANAFLEX) 4 MG tablet, Take 1 tablet by mouth 2 (Two) Times a Day As Needed., Disp: , Rfl:     Review of systems  Review of Systems   Constitutional: Positive for fatigue.   Musculoskeletal: Positive for neck pain.   Neurological: Positive for numbness.   All other systems reviewed and are negative.      Physical exam  Objective   Blood pressure 130/72, pulse 72, height 172.7 cm (68\"), weight 107 kg (235 lb 3.2 oz), SpO2 98 %. Body mass index is 35.76 kg/m².  Physical Exam   Constitutional: He is oriented to person, place, and time. He appears well-developed and well-nourished.   HENT:   Head: Normocephalic and atraumatic.   Eyes: Conjunctivae are " normal.   Neck: No thyromegaly present.   Cardiovascular: Normal rate, regular rhythm and normal heart sounds.   Pulmonary/Chest: Effort normal and breath sounds normal.   Lymphadenopathy:     He has no cervical adenopathy.   Neurological: He is alert and oriented to person, place, and time.   Psychiatric: Thought content normal.         LABS AND IMAGING  Office Visit on 05/20/2021   Component Date Value Ref Range Status   • Glucose 05/20/2021 136* 70 - 130 mg/dL Final   • Lot Number 05/20/2021 [id number]   Final   • Expiration Date 05/20/2021 12/15/2021   Final   Office Visit on 05/13/2021   Component Date Value Ref Range Status   • Glucose 05/13/2021 298* 65 - 99 mg/dL Final   • BUN 05/13/2021 33* 8 - 23 mg/dL Final   • Creatinine 05/13/2021 1.52* 0.76 - 1.27 mg/dL Final   • Sodium 05/13/2021 134* 136 - 145 mmol/L Final   • Potassium 05/13/2021 4.9  3.5 - 5.2 mmol/L Final   • Chloride 05/13/2021 100  98 - 107 mmol/L Final   • CO2 05/13/2021 21.7* 22.0 - 29.0 mmol/L Final   • Calcium 05/13/2021 9.5  8.6 - 10.5 mg/dL Final   • Total Protein 05/13/2021 7.5  6.0 - 8.5 g/dL Final   • Albumin 05/13/2021 4.20  3.50 - 5.20 g/dL Final   • ALT (SGPT) 05/13/2021 37  1 - 41 U/L Final   • AST (SGOT) 05/13/2021 42* 1 - 40 U/L Final   • Alkaline Phosphatase 05/13/2021 94  39 - 117 U/L Final   • Total Bilirubin 05/13/2021 0.6  0.0 - 1.2 mg/dL Final   • eGFR Non African Amer 05/13/2021 45* >60 mL/min/1.73 Final   • Globulin 05/13/2021 3.3  gm/dL Final   • A/G Ratio 05/13/2021 1.3  g/dL Final   • BUN/Creatinine Ratio 05/13/2021 21.7  7.0 - 25.0 Final   • Anion Gap 05/13/2021 12.3  5.0 - 15.0 mmol/L Final   • Hemoglobin A1C 05/13/2021 8.77* 4.80 - 5.60 % Final   • Total Cholesterol 05/13/2021 118  0 - 200 mg/dL Final   • Triglycerides 05/13/2021 276* 0 - 150 mg/dL Final   • HDL Cholesterol 05/13/2021 28* 40 - 60 mg/dL Final   • LDL Cholesterol  05/13/2021 47  0 - 100 mg/dL Final   • VLDL Cholesterol 05/13/2021 43* 5 - 40 mg/dL Final    • LDL/HDL Ratio 05/13/2021 1.24   Final   • TSH 05/13/2021 6.920* 0.270 - 4.200 uIU/mL Final   • Free T4 05/13/2021 1.29  0.93 - 1.70 ng/dL Final   • Color 05/13/2021 Yellow  Yellow, Straw, Dark Yellow, Venita Final   • Clarity, UA 05/13/2021 Clear  Clear Final   • Specific Gravity  05/13/2021 1.020  1.005 - 1.030 Final   • pH, Urine 05/13/2021 5.0  5.0 - 8.0 Final   • Leukocytes 05/13/2021 Negative  Negative Final   • Nitrite, UA 05/13/2021 Negative  Negative Final   • Protein, POC 05/13/2021 Negative  Negative mg/dL Final   • Glucose, UA 05/13/2021 250 mg/dL* Negative, 1000 mg/dL (3+) mg/dL Final   • Ketones, UA 05/13/2021 Negative  Negative Final   • Urobilinogen, UA 05/13/2021 Normal  Normal Final   • Bilirubin 05/13/2021 Negative  Negative Final   • Blood, UA 05/13/2021 Negative  Negative Final   • Lot Number 05/13/2021 [id number]   Final   • Expiration Date 05/13/2021 11-30-21   Final   • Microalbumin, Urine 05/13/2021 150   Final   • Creatinine, Urine 05/13/2021 100   Final   • A/C 05/13/2021 >300mg/g HIGH ABNORMAL   Final   • Lot Number 05/13/2021 [id number]   Final   • Expiration Date 05/13/2021 5-31-22   Final   • WBC 05/13/2021 5.27  3.40 - 10.80 10*3/mm3 Final   • RBC 05/13/2021 3.07* 4.14 - 5.80 10*6/mm3 Final   • Hemoglobin 05/13/2021 9.1* 13.0 - 17.7 g/dL Final   • Hematocrit 05/13/2021 28.9* 37.5 - 51.0 % Final   • MCV 05/13/2021 94.1  79.0 - 97.0 fL Final   • MCH 05/13/2021 29.6  26.6 - 33.0 pg Final   • MCHC 05/13/2021 31.5  31.5 - 35.7 g/dL Final   • RDW 05/13/2021 13.7  12.3 - 15.4 % Final   • RDW-SD 05/13/2021 47.0  37.0 - 54.0 fl Final   • MPV 05/13/2021 13.3* 6.0 - 12.0 fL Final   • Platelets 05/13/2021 111* 140 - 450 10*3/mm3 Final   • Neutrophil % 05/13/2021 65.8  42.7 - 76.0 % Final   • Lymphocyte % 05/13/2021 19.4* 19.6 - 45.3 % Final   • Monocyte % 05/13/2021 10.2  5.0 - 12.0 % Final   • Eosinophil % 05/13/2021 3.8  0.3 - 6.2 % Final   • Basophil % 05/13/2021 0.4  0.0 - 1.5 % Final   •  Neutrophils, Absolute 05/13/2021 3.47  1.70 - 7.00 10*3/mm3 Final   • Lymphocytes, Absolute 05/13/2021 1.02  0.70 - 3.10 10*3/mm3 Final   • Monocytes, Absolute 05/13/2021 0.54  0.10 - 0.90 10*3/mm3 Final   • Eosinophils, Absolute 05/13/2021 0.20  0.00 - 0.40 10*3/mm3 Final   • Basophils, Absolute 05/13/2021 0.02  0.00 - 0.20 10*3/mm3 Final   Lab Requisition on 04/21/2021   Component Date Value Ref Range Status   • Case Report 04/21/2021    Final                    Value:Surgical Pathology Report                         Case: [id number]                                  Authorizing Provider:  Nam Amato MD Collected:           04/21/2021 11:37 AM          Ordering Location:     [facility]   Received:            04/21/2021 01:51 PM                                 LABORATORY                                                                   Pathologist:           Audi Gilbert MD                                                       Specimen:    Chest, right chest                                                                        • Clinical Information 04/21/2021    Final                    Value:This result contains rich text formatting which cannot be displayed here.   • Final Diagnosis 04/21/2021    Final                    Value:This result contains rich text formatting which cannot be displayed here.   • Gross Description 04/21/2021    Final                    Value:This result contains rich text formatting which cannot be displayed here.   • Microscopic Description 04/21/2021    Final                    Value:This result contains rich text formatting which cannot be displayed here.           Assessment  Assessment/Plan   1. DM (diabetes mellitus), type 2, uncontrolled w/neurologic complication (CMS/HCC)    2. Hypothyroidism, adult    3. Diabetic peripheral neuropathy associated with type 2 diabetes mellitus (CMS/[facility])        Plan      CGM analyzed and he has hyperglycemia after 6  pm meal and then hypoglycemia at 4 -6 am. He has discontinued midnight insulin dose and still has hypoglycemia. Change insulin to 150 units with breakfast at 1 pm. 155 units supper 6-8 pm  if hyperglycemia after dinner continued. Advised to have protein snack instead of carbohydrate at bedtime.    Hypoglycemia precautions reviewed.      Continue Freestyle anibal.    Neuropathy sx uncotnrolled, advised to increase gabapentin dose to TID.   Cont levothyroxine 50, TSH is 6.9, likely due to skipping pills.   - continue same dose and encourage compliance.   Follow-up in 3-4 months

## 2021-05-28 RX ORDER — MELOXICAM 15 MG/1
TABLET ORAL
Qty: 90 TABLET | Refills: 0 | Status: SHIPPED | OUTPATIENT
Start: 2021-05-28 | End: 2021-09-03 | Stop reason: HOSPADM

## 2021-06-09 DIAGNOSIS — F32.A DEPRESSION, UNSPECIFIED DEPRESSION TYPE: ICD-10-CM

## 2021-06-10 RX ORDER — OMEPRAZOLE 40 MG/1
CAPSULE, DELAYED RELEASE ORAL
Qty: 90 CAPSULE | Refills: 1 | Status: SHIPPED | OUTPATIENT
Start: 2021-06-10 | End: 2021-10-04 | Stop reason: SDUPTHER

## 2021-06-10 RX ORDER — DULOXETIN HYDROCHLORIDE 30 MG/1
CAPSULE, DELAYED RELEASE ORAL
Qty: 90 CAPSULE | Refills: 1 | Status: SHIPPED | OUTPATIENT
Start: 2021-06-10 | End: 2021-09-03 | Stop reason: HOSPADM

## 2021-06-25 DIAGNOSIS — E11.65 UNCONTROLLED TYPE 2 DIABETES MELLITUS WITH HYPERGLYCEMIA (HCC): ICD-10-CM

## 2021-06-27 RX ORDER — EXENATIDE 2 MG/.85ML
INJECTION, SUSPENSION, EXTENDED RELEASE SUBCUTANEOUS
Qty: 10.2 ML | Refills: 3 | Status: SHIPPED | OUTPATIENT
Start: 2021-06-27 | End: 2022-11-14

## 2021-06-27 NOTE — TELEPHONE ENCOUNTER
LOV for chronic condition  1/13/2021  NOV 9/14/2021     Problem: Bleeding:  Goal: Will show no signs and symptoms of excessive bleeding  Description: Will show no signs and symptoms of excessive bleeding  Outcome: Met This Shift     Problem: Pain:  Goal: Control of acute pain  Description: Control of acute pain  Outcome: Met This Shift

## 2021-07-06 RX ORDER — SIMVASTATIN 40 MG
TABLET ORAL
Qty: 90 TABLET | Refills: 1 | Status: SHIPPED | OUTPATIENT
Start: 2021-07-06 | End: 2022-01-04

## 2021-07-08 NOTE — INTERVAL H&P NOTE
Pre-Op H&P (See Recent Office Note Attached)    Chief complaint: LUE numbness/tingling/pain    Review of Systems:  General ROS:  no fever, chills, + neck rashes from surgical scrub (+pruritis/burning), No change since last office visit  Cardiovascular ROS: no chest pain or dyspnea on exertion  Respiratory ROS: no cough, shortness of breath, or wheezing      Immunization History:   Influenza:  Yes 2017  Pneumococcal:  Yes < 5 years  Tetanus:  unknown    Vital Signs:  /86 (BP Location: Right arm, Patient Position: Lying)  Pulse 60  Temp 97.8 °F (36.6 °C) (Temporal Artery )   Resp 16  SpO2 96%    Physical Exam:    CV:  S1S2 regular rate and rhythm, no murmur               Resp:  Clear to auscultation; respirations regular, even and unlabored               Skin:  Scattered maculopapular rash on anterior neck area.  No drainage    Results Review:    I reviewed the patient's new clinical results.     Plts in PAT were 100.      Cancer Staging (if applicable)  Cancer Patient: __ yes _x_no __unknown; If yes, clinical stage T:__ N:__M:__, stage group or __N/A    Suad Stokes, APRN  12/14/2017   10:31 AM       normal...

## 2021-07-29 ENCOUNTER — TELEPHONE (OUTPATIENT)
Dept: ENDOCRINOLOGY | Facility: CLINIC | Age: 71
End: 2021-07-29

## 2021-07-29 NOTE — TELEPHONE ENCOUNTER
[employer] CALLED STATING THAT THE OFFICE NOTES THAT WERE RECEIVED WERE NOT SIGNED BY THE PROVIDER. PLEASE SIGN AND RETURN AS SOON AS POSSIBLE.

## 2021-08-18 RX ORDER — GEMFIBROZIL 600 MG/1
TABLET, FILM COATED ORAL
Qty: 180 TABLET | Refills: 3 | Status: SHIPPED | OUTPATIENT
Start: 2021-08-18 | End: 2022-08-30

## 2021-08-29 ENCOUNTER — APPOINTMENT (OUTPATIENT)
Dept: GENERAL RADIOLOGY | Facility: HOSPITAL | Age: 71
End: 2021-08-29

## 2021-08-29 ENCOUNTER — HOSPITAL ENCOUNTER (INPATIENT)
Facility: HOSPITAL | Age: 71
LOS: 4 days | Discharge: HOME | End: 2021-09-03
Attending: EMERGENCY MEDICINE | Admitting: FAMILY MEDICINE

## 2021-08-29 DIAGNOSIS — E11.42 DIABETIC PERIPHERAL NEUROPATHY ASSOCIATED WITH TYPE 2 DIABETES MELLITUS (HCC): ICD-10-CM

## 2021-08-29 DIAGNOSIS — N18.9 ACUTE ON CHRONIC RENAL INSUFFICIENCY: ICD-10-CM

## 2021-08-29 DIAGNOSIS — E11.3292 MILD NONPROLIFERATIVE DIABETIC RETINOPATHY OF LEFT EYE WITHOUT MACULAR EDEMA ASSOCIATED WITH TYPE 2 DIABETES MELLITUS (HCC): ICD-10-CM

## 2021-08-29 DIAGNOSIS — M54.12 CERVICAL RADICULOPATHY: ICD-10-CM

## 2021-08-29 DIAGNOSIS — R29.898 LEFT HAND WEAKNESS: ICD-10-CM

## 2021-08-29 DIAGNOSIS — M65.321 TRIGGER INDEX FINGER OF RIGHT HAND: ICD-10-CM

## 2021-08-29 DIAGNOSIS — R42 LIGHTHEADEDNESS: ICD-10-CM

## 2021-08-29 DIAGNOSIS — R06.00 DYSPNEA, UNSPECIFIED TYPE: ICD-10-CM

## 2021-08-29 DIAGNOSIS — N28.9 ACUTE ON CHRONIC RENAL INSUFFICIENCY: ICD-10-CM

## 2021-08-29 DIAGNOSIS — E66.9 OBESITY, CLASS I, BMI 30-34.9: ICD-10-CM

## 2021-08-29 DIAGNOSIS — K92.2 ACUTE GI BLEEDING: Primary | ICD-10-CM

## 2021-08-29 DIAGNOSIS — D64.9 SYMPTOMATIC ANEMIA: ICD-10-CM

## 2021-08-29 DIAGNOSIS — K62.5 RECTAL BLEEDING: ICD-10-CM

## 2021-08-29 DIAGNOSIS — D50.0 CHRONIC BLOOD LOSS ANEMIA: ICD-10-CM

## 2021-08-29 DIAGNOSIS — D64.9 ANEMIA, UNSPECIFIED TYPE: ICD-10-CM

## 2021-08-29 DIAGNOSIS — E87.5 ACUTE HYPERKALEMIA: ICD-10-CM

## 2021-08-29 LAB
ALBUMIN SERPL-MCNC: 3.9 G/DL (ref 3.5–5.2)
ALBUMIN/GLOB SERPL: 1.2 G/DL
ALP SERPL-CCNC: 99 U/L (ref 39–117)
ALT SERPL W P-5'-P-CCNC: 17 U/L (ref 1–41)
ANION GAP SERPL CALCULATED.3IONS-SCNC: 12 MMOL/L (ref 5–15)
AST SERPL-CCNC: 22 U/L (ref 1–40)
BASOPHILS # BLD AUTO: 0.02 10*3/MM3 (ref 0–0.2)
BASOPHILS NFR BLD AUTO: 0.3 % (ref 0–1.5)
BILIRUB SERPL-MCNC: 0.4 MG/DL (ref 0–1.2)
BUN SERPL-MCNC: 46 MG/DL (ref 8–23)
BUN/CREAT SERPL: 25.1 (ref 7–25)
CALCIUM SPEC-SCNC: 8.6 MG/DL (ref 8.6–10.5)
CHLORIDE SERPL-SCNC: 103 MMOL/L (ref 98–107)
CO2 SERPL-SCNC: 17 MMOL/L (ref 22–29)
CREAT SERPL-MCNC: 1.83 MG/DL (ref 0.76–1.27)
DEPRECATED RDW RBC AUTO: 52 FL (ref 37–54)
EOSINOPHIL # BLD AUTO: 0.19 10*3/MM3 (ref 0–0.4)
EOSINOPHIL NFR BLD AUTO: 2.6 % (ref 0.3–6.2)
ERYTHROCYTE [DISTWIDTH] IN BLOOD BY AUTOMATED COUNT: 15.9 % (ref 12.3–15.4)
GFR SERPL CREATININE-BSD FRML MDRD: 37 ML/MIN/1.73
GLOBULIN UR ELPH-MCNC: 3.2 GM/DL
GLUCOSE SERPL-MCNC: 331 MG/DL (ref 65–99)
HCT VFR BLD AUTO: 19.7 % (ref 37.5–51)
HGB BLD-MCNC: 5.7 G/DL (ref 13–17.7)
HOLD SPECIMEN: NORMAL
HOLD SPECIMEN: NORMAL
IMM GRANULOCYTES # BLD AUTO: 0.06 10*3/MM3 (ref 0–0.05)
IMM GRANULOCYTES NFR BLD AUTO: 0.8 % (ref 0–0.5)
LYMPHOCYTES # BLD AUTO: 0.74 10*3/MM3 (ref 0.7–3.1)
LYMPHOCYTES NFR BLD AUTO: 10.3 % (ref 19.6–45.3)
MCH RBC QN AUTO: 26.3 PG (ref 26.6–33)
MCHC RBC AUTO-ENTMCNC: 28.9 G/DL (ref 31.5–35.7)
MCV RBC AUTO: 90.8 FL (ref 79–97)
MONOCYTES # BLD AUTO: 0.77 10*3/MM3 (ref 0.1–0.9)
MONOCYTES NFR BLD AUTO: 10.7 % (ref 5–12)
NEUTROPHILS NFR BLD AUTO: 5.4 10*3/MM3 (ref 1.7–7)
NEUTROPHILS NFR BLD AUTO: 75.3 % (ref 42.7–76)
NRBC BLD AUTO-RTO: 0 /100 WBC (ref 0–0.2)
NT-PROBNP SERPL-MCNC: 352.9 PG/ML (ref 0–900)
PLATELET # BLD AUTO: 97 10*3/MM3 (ref 140–450)
PMV BLD AUTO: 12.4 FL (ref 6–12)
POTASSIUM SERPL-SCNC: 5.8 MMOL/L (ref 3.5–5.2)
PROT SERPL-MCNC: 7.1 G/DL (ref 6–8.5)
RBC # BLD AUTO: 2.17 10*6/MM3 (ref 4.14–5.8)
SODIUM SERPL-SCNC: 132 MMOL/L (ref 136–145)
WBC # BLD AUTO: 7.18 10*3/MM3 (ref 3.4–10.8)
WHOLE BLOOD HOLD SPECIMEN: NORMAL
WHOLE BLOOD HOLD SPECIMEN: NORMAL

## 2021-08-29 PROCEDURE — 83540 ASSAY OF IRON: CPT | Performed by: NURSE PRACTITIONER

## 2021-08-29 PROCEDURE — 82728 ASSAY OF FERRITIN: CPT | Performed by: NURSE PRACTITIONER

## 2021-08-29 PROCEDURE — 86706 HEP B SURFACE ANTIBODY: CPT | Performed by: PHYSICIAN ASSISTANT

## 2021-08-29 PROCEDURE — 93005 ELECTROCARDIOGRAM TRACING: CPT

## 2021-08-29 PROCEDURE — 85025 COMPLETE CBC W/AUTO DIFF WBC: CPT

## 2021-08-29 PROCEDURE — 82105 ALPHA-FETOPROTEIN SERUM: CPT | Performed by: PHYSICIAN ASSISTANT

## 2021-08-29 PROCEDURE — 99284 EMERGENCY DEPT VISIT MOD MDM: CPT

## 2021-08-29 PROCEDURE — 84484 ASSAY OF TROPONIN QUANT: CPT | Performed by: EMERGENCY MEDICINE

## 2021-08-29 PROCEDURE — 93005 ELECTROCARDIOGRAM TRACING: CPT | Performed by: EMERGENCY MEDICINE

## 2021-08-29 PROCEDURE — 85045 AUTOMATED RETICULOCYTE COUNT: CPT | Performed by: NURSE PRACTITIONER

## 2021-08-29 PROCEDURE — 83605 ASSAY OF LACTIC ACID: CPT | Performed by: INTERNAL MEDICINE

## 2021-08-29 PROCEDURE — 71045 X-RAY EXAM CHEST 1 VIEW: CPT

## 2021-08-29 PROCEDURE — 84466 ASSAY OF TRANSFERRIN: CPT | Performed by: NURSE PRACTITIONER

## 2021-08-29 PROCEDURE — 83880 ASSAY OF NATRIURETIC PEPTIDE: CPT

## 2021-08-29 PROCEDURE — 82010 KETONE BODYS QUAN: CPT | Performed by: INTERNAL MEDICINE

## 2021-08-29 PROCEDURE — 80053 COMPREHEN METABOLIC PANEL: CPT

## 2021-08-29 PROCEDURE — 82607 VITAMIN B-12: CPT | Performed by: NURSE PRACTITIONER

## 2021-08-29 PROCEDURE — 82746 ASSAY OF FOLIC ACID SERUM: CPT | Performed by: NURSE PRACTITIONER

## 2021-08-29 RX ORDER — SODIUM CHLORIDE 0.9 % (FLUSH) 0.9 %
10 SYRINGE (ML) INJECTION AS NEEDED
Status: DISCONTINUED | OUTPATIENT
Start: 2021-08-29 | End: 2021-09-03 | Stop reason: HOSPADM

## 2021-08-30 ENCOUNTER — ANESTHESIA (OUTPATIENT)
Dept: TELEMETRY | Facility: HOSPITAL | Age: 71
End: 2021-08-30

## 2021-08-30 ENCOUNTER — APPOINTMENT (OUTPATIENT)
Dept: CARDIOLOGY | Facility: HOSPITAL | Age: 71
End: 2021-08-30

## 2021-08-30 ENCOUNTER — APPOINTMENT (OUTPATIENT)
Dept: CT IMAGING | Facility: HOSPITAL | Age: 71
End: 2021-08-30

## 2021-08-30 ENCOUNTER — ANESTHESIA EVENT (OUTPATIENT)
Dept: TELEMETRY | Facility: HOSPITAL | Age: 71
End: 2021-08-30

## 2021-08-30 ENCOUNTER — APPOINTMENT (OUTPATIENT)
Dept: GENERAL RADIOLOGY | Facility: HOSPITAL | Age: 71
End: 2021-08-30

## 2021-08-30 PROBLEM — R77.8 ELEVATED TROPONIN: Status: ACTIVE | Noted: 2021-08-30

## 2021-08-30 PROBLEM — R79.89 ELEVATED TROPONIN: Status: ACTIVE | Noted: 2021-08-30

## 2021-08-30 PROBLEM — K92.2 ACUTE GI BLEEDING: Status: ACTIVE | Noted: 2021-08-30

## 2021-08-30 PROBLEM — E87.5 HYPERKALEMIA: Status: ACTIVE | Noted: 2021-08-30

## 2021-08-30 PROBLEM — D64.9 SYMPTOMATIC ANEMIA: Status: ACTIVE | Noted: 2021-08-30

## 2021-08-30 LAB
ABO GROUP BLD: NORMAL
ABO GROUP BLD: NORMAL
ALPHA-FETOPROTEIN: 2.38 NG/ML (ref 0–8.3)
ANION GAP SERPL CALCULATED.3IONS-SCNC: 12 MMOL/L (ref 5–15)
APTT PPP: 26.7 SECONDS (ref 22–39)
B-OH-BUTYR SERPL-SCNC: 0.18 MMOL/L (ref 0.02–0.27)
BASOPHILS # BLD AUTO: 0.02 10*3/MM3 (ref 0–0.2)
BASOPHILS NFR BLD AUTO: 0.3 % (ref 0–1.5)
BH CV ECHO MEAS - AO ROOT AREA (BSA CORRECTED): 1.4
BH CV ECHO MEAS - AO ROOT AREA: 7 CM^2
BH CV ECHO MEAS - AO ROOT DIAM: 3 CM
BH CV ECHO MEAS - ASC AORTA: 3.5 CM
BH CV ECHO MEAS - BSA(HAYCOCK): 2.3 M^2
BH CV ECHO MEAS - BSA: 2.2 M^2
BH CV ECHO MEAS - BZI_BMI: 36.5 KILOGRAMS/M^2
BH CV ECHO MEAS - BZI_METRIC_HEIGHT: 172 CM
BH CV ECHO MEAS - BZI_METRIC_WEIGHT: 108 KG
BH CV ECHO MEAS - EDV(CUBED): 125 ML
BH CV ECHO MEAS - EDV(MOD-SP2): 131 ML
BH CV ECHO MEAS - EDV(MOD-SP4): 169 ML
BH CV ECHO MEAS - EDV(TEICH): 118.2 ML
BH CV ECHO MEAS - EF(CUBED): 79 %
BH CV ECHO MEAS - EF(MOD-BP): 56.8 %
BH CV ECHO MEAS - EF(MOD-SP2): 49 %
BH CV ECHO MEAS - EF(MOD-SP4): 62.5 %
BH CV ECHO MEAS - EF(TEICH): 71.1 %
BH CV ECHO MEAS - ESV(CUBED): 26.2 ML
BH CV ECHO MEAS - ESV(MOD-SP2): 66.8 ML
BH CV ECHO MEAS - ESV(MOD-SP4): 63.4 ML
BH CV ECHO MEAS - ESV(TEICH): 34.1 ML
BH CV ECHO MEAS - FS: 40.6 %
BH CV ECHO MEAS - IVS/LVPW: 0.8
BH CV ECHO MEAS - IVSD: 0.92 CM
BH CV ECHO MEAS - LA DIMENSION: 3.5 CM
BH CV ECHO MEAS - LA/AO: 1.2
BH CV ECHO MEAS - LAD MAJOR: 5.5 CM
BH CV ECHO MEAS - LAT PEAK E' VEL: 11.6 CM/SEC
BH CV ECHO MEAS - LATERAL E/E' RATIO: 10.7
BH CV ECHO MEAS - LV DIASTOLIC VOL/BSA (35-75): 77 ML/M^2
BH CV ECHO MEAS - LV MASS(C)D: 191.3 GRAMS
BH CV ECHO MEAS - LV MASS(C)DI: 87.2 GRAMS/M^2
BH CV ECHO MEAS - LV MAX PG: 5.2 MMHG
BH CV ECHO MEAS - LV MEAN PG: 2.1 MMHG
BH CV ECHO MEAS - LV SYSTOLIC VOL/BSA (12-30): 28.9 ML/M^2
BH CV ECHO MEAS - LV V1 MAX: 113.8 CM/SEC
BH CV ECHO MEAS - LV V1 MEAN: 65.3 CM/SEC
BH CV ECHO MEAS - LV V1 VTI: 27.8 CM
BH CV ECHO MEAS - LVIDD: 5 CM
BH CV ECHO MEAS - LVIDS: 3 CM
BH CV ECHO MEAS - LVLD AP2: 9.8 CM
BH CV ECHO MEAS - LVLD AP4: 9.4 CM
BH CV ECHO MEAS - LVLS AP2: 8.6 CM
BH CV ECHO MEAS - LVLS AP4: 8.4 CM
BH CV ECHO MEAS - LVOT AREA (M): 2.3 CM^2
BH CV ECHO MEAS - LVOT AREA: 2.2 CM^2
BH CV ECHO MEAS - LVOT DIAM: 1.7 CM
BH CV ECHO MEAS - LVPWD: 1.2 CM
BH CV ECHO MEAS - MED PEAK E' VEL: 9 CM/SEC
BH CV ECHO MEAS - MEDIAL E/E' RATIO: 13.7
BH CV ECHO MEAS - MV A MAX VEL: 80.7 CM/SEC
BH CV ECHO MEAS - MV DEC SLOPE: 666 CM/SEC^2
BH CV ECHO MEAS - MV DEC TIME: 0.18 SEC
BH CV ECHO MEAS - MV E MAX VEL: 124 CM/SEC
BH CV ECHO MEAS - MV E/A: 1.5
BH CV ECHO MEAS - MV MAX PG: 9.1 MMHG
BH CV ECHO MEAS - MV MEAN PG: 2.9 MMHG
BH CV ECHO MEAS - MV P1/2T MAX VEL: 156.9 CM/SEC
BH CV ECHO MEAS - MV P1/2T: 69 MSEC
BH CV ECHO MEAS - MV V2 MAX: 150.8 CM/SEC
BH CV ECHO MEAS - MV V2 MEAN: 77 CM/SEC
BH CV ECHO MEAS - MV V2 VTI: 41.8 CM
BH CV ECHO MEAS - MVA P1/2T LCG: 1.4 CM^2
BH CV ECHO MEAS - MVA(P1/2T): 3.2 CM^2
BH CV ECHO MEAS - MVA(VTI): 1.4 CM^2
BH CV ECHO MEAS - PA ACC TIME: 0.17 SEC
BH CV ECHO MEAS - PA PR(ACCEL): 2.9 MMHG
BH CV ECHO MEAS - SI(CUBED): 45 ML/M^2
BH CV ECHO MEAS - SI(LVOT): 27.6 ML/M^2
BH CV ECHO MEAS - SI(MOD-SP2): 29.3 ML/M^2
BH CV ECHO MEAS - SI(MOD-SP4): 48.1 ML/M^2
BH CV ECHO MEAS - SI(TEICH): 38.3 ML/M^2
BH CV ECHO MEAS - SV(CUBED): 98.8 ML
BH CV ECHO MEAS - SV(LVOT): 60.5 ML
BH CV ECHO MEAS - SV(MOD-SP2): 64.2 ML
BH CV ECHO MEAS - SV(MOD-SP4): 105.6 ML
BH CV ECHO MEAS - SV(TEICH): 84.1 ML
BH CV ECHO MEAS - TAPSE (>1.6): 2.6 CM
BH CV ECHO MEAS - TR MAX PG: 34 MMHG
BH CV ECHO MEAS - TR MAX VEL: 291.1 CM/SEC
BH CV ECHO MEASUREMENTS AVERAGE E/E' RATIO: 12.04
BH CV VAS BP LEFT ARM: NORMAL MMHG
BH CV XLRA - RV BASE: 4 CM
BH CV XLRA - RV LENGTH: 7.5 CM
BH CV XLRA - RV MID: 2.5 CM
BH CV XLRA - TDI S': 16.8 CM/SEC
BILIRUB UR QL STRIP: NEGATIVE
BLD GP AB SCN SERPL QL: NEGATIVE
BUN SERPL-MCNC: 45 MG/DL (ref 8–23)
BUN/CREAT SERPL: 25.7 (ref 7–25)
CALCIUM SPEC-SCNC: 8.8 MG/DL (ref 8.6–10.5)
CHLORIDE SERPL-SCNC: 106 MMOL/L (ref 98–107)
CLARITY UR: CLEAR
CO2 SERPL-SCNC: 17 MMOL/L (ref 22–29)
COLOR UR: ABNORMAL
CREAT SERPL-MCNC: 1.75 MG/DL (ref 0.76–1.27)
D-LACTATE SERPL-SCNC: 1.6 MMOL/L (ref 0.5–2)
D-LACTATE SERPL-SCNC: 2.5 MMOL/L (ref 0.5–2)
DEPRECATED RDW RBC AUTO: 55 FL (ref 37–54)
DEVELOPER EXPIRATION DATE: 0
DEVELOPER EXPIRATION DATE: 224
DEVELOPER LOT NUMBER: 0
DEVELOPER LOT NUMBER: NORMAL
EOSINOPHIL # BLD AUTO: 0.17 10*3/MM3 (ref 0–0.4)
EOSINOPHIL NFR BLD AUTO: 2.6 % (ref 0.3–6.2)
ERYTHROCYTE [DISTWIDTH] IN BLOOD BY AUTOMATED COUNT: 17.6 % (ref 12.3–15.4)
EXPIRATION DATE: 224
EXPIRATION DATE: ABNORMAL
FECAL OCCULT BLOOD SCREEN, POC: NEGATIVE
FECAL OCCULT BLOOD SCREEN, POC: POSITIVE
FERRITIN SERPL-MCNC: 8.69 NG/ML (ref 30–400)
FOLATE SERPL-MCNC: >20 NG/ML (ref 4.78–24.2)
GFR SERPL CREATININE-BSD FRML MDRD: 39 ML/MIN/1.73
GLUCOSE BLDC GLUCOMTR-MCNC: 199 MG/DL (ref 70–130)
GLUCOSE BLDC GLUCOMTR-MCNC: 254 MG/DL (ref 70–130)
GLUCOSE BLDC GLUCOMTR-MCNC: 256 MG/DL (ref 70–130)
GLUCOSE BLDC GLUCOMTR-MCNC: 275 MG/DL (ref 70–130)
GLUCOSE BLDC GLUCOMTR-MCNC: 363 MG/DL (ref 70–130)
GLUCOSE SERPL-MCNC: 220 MG/DL (ref 65–99)
GLUCOSE UR STRIP-MCNC: ABNORMAL MG/DL
HBA1C MFR BLD: 7.6 % (ref 4.8–5.6)
HBV SURFACE AB SER RIA-ACNC: NORMAL
HCT VFR BLD AUTO: 22.2 % (ref 37.5–51)
HCT VFR BLD AUTO: 23.1 % (ref 37.5–51)
HCT VFR BLD AUTO: 23.9 % (ref 37.5–51)
HCT VFR BLD AUTO: 26.4 % (ref 37.5–51)
HGB BLD-MCNC: 6.5 G/DL (ref 13–17.7)
HGB BLD-MCNC: 7 G/DL (ref 13–17.7)
HGB BLD-MCNC: 7.1 G/DL (ref 13–17.7)
HGB BLD-MCNC: 7.6 G/DL (ref 13–17.7)
HGB UR QL STRIP.AUTO: NEGATIVE
HOLD SPECIMEN: NORMAL
IMM GRANULOCYTES # BLD AUTO: 0.06 10*3/MM3 (ref 0–0.05)
IMM GRANULOCYTES NFR BLD AUTO: 0.9 % (ref 0–0.5)
INR PPP: 1.14 (ref 0.85–1.16)
IRON 24H UR-MRATE: 27 MCG/DL (ref 59–158)
IRON 24H UR-MRATE: 27 MCG/DL (ref 59–158)
IRON SATN MFR SERPL: 4 % (ref 20–50)
KETONES UR QL STRIP: NEGATIVE
LEFT ATRIUM VOLUME INDEX: 22.8 ML/M^2
LEFT ATRIUM VOLUME: 50.1 ML
LEUKOCYTE ESTERASE UR QL STRIP.AUTO: NEGATIVE
LYMPHOCYTES # BLD AUTO: 0.81 10*3/MM3 (ref 0.7–3.1)
LYMPHOCYTES NFR BLD AUTO: 12.3 % (ref 19.6–45.3)
Lab: ABNORMAL
Lab: NORMAL
MAXIMAL PREDICTED HEART RATE: 148 BPM
MCH RBC QN AUTO: 25.4 PG (ref 26.6–33)
MCHC RBC AUTO-ENTMCNC: 29.3 G/DL (ref 31.5–35.7)
MCV RBC AUTO: 86.7 FL (ref 79–97)
MONOCYTES # BLD AUTO: 0.94 10*3/MM3 (ref 0.1–0.9)
MONOCYTES NFR BLD AUTO: 14.2 % (ref 5–12)
NEGATIVE CONTROL: NEGATIVE
NEGATIVE CONTROL: NEGATIVE
NEUTROPHILS NFR BLD AUTO: 4.6 10*3/MM3 (ref 1.7–7)
NEUTROPHILS NFR BLD AUTO: 69.7 % (ref 42.7–76)
NITRITE UR QL STRIP: NEGATIVE
NRBC BLD AUTO-RTO: 0 /100 WBC (ref 0–0.2)
NT-PROBNP SERPL-MCNC: 487.2 PG/ML (ref 0–900)
PH UR STRIP.AUTO: 5.5 [PH] (ref 5–8)
PLATELET # BLD AUTO: 89 10*3/MM3 (ref 140–450)
PMV BLD AUTO: 12.4 FL (ref 6–12)
POSITIVE CONTROL: POSITIVE
POSITIVE CONTROL: POSITIVE
POTASSIUM SERPL-SCNC: 5.5 MMOL/L (ref 3.5–5.2)
POTASSIUM SERPL-SCNC: 5.9 MMOL/L (ref 3.5–5.2)
PROT UR QL STRIP: ABNORMAL
PROTHROMBIN TIME: 14.2 SECONDS (ref 11.4–14.4)
RBC # BLD AUTO: 2.56 10*6/MM3 (ref 4.14–5.8)
RETICS # AUTO: 0.09 10*6/MM3 (ref 0.02–0.13)
RETICS/RBC NFR AUTO: 4.3 % (ref 0.7–1.9)
RH BLD: NEGATIVE
RH BLD: NEGATIVE
SARS-COV-2 RDRP RESP QL NAA+PROBE: NORMAL
SODIUM SERPL-SCNC: 135 MMOL/L (ref 136–145)
SP GR UR STRIP: 1.01 (ref 1–1.03)
STRESS TARGET HR: 126 BPM
T&S EXPIRATION DATE: NORMAL
TIBC SERPL-MCNC: 754 MCG/DL (ref 298–536)
TRANSFERRIN SERPL-MCNC: 506 MG/DL (ref 200–360)
TROPONIN T SERPL-MCNC: 0.03 NG/ML (ref 0–0.03)
UROBILINOGEN UR QL STRIP: ABNORMAL
VIT B12 BLD-MCNC: 614 PG/ML (ref 211–946)
WBC # BLD AUTO: 6.6 10*3/MM3 (ref 3.4–10.8)

## 2021-08-30 PROCEDURE — 86923 COMPATIBILITY TEST ELECTRIC: CPT

## 2021-08-30 PROCEDURE — 83036 HEMOGLOBIN GLYCOSYLATED A1C: CPT | Performed by: NURSE PRACTITIONER

## 2021-08-30 PROCEDURE — P9016 RBC LEUKOCYTES REDUCED: HCPCS

## 2021-08-30 PROCEDURE — 93306 TTE W/DOPPLER COMPLETE: CPT

## 2021-08-30 PROCEDURE — 85610 PROTHROMBIN TIME: CPT | Performed by: NURSE PRACTITIONER

## 2021-08-30 PROCEDURE — 82962 GLUCOSE BLOOD TEST: CPT

## 2021-08-30 PROCEDURE — 99222 1ST HOSP IP/OBS MODERATE 55: CPT | Performed by: PHYSICIAN ASSISTANT

## 2021-08-30 PROCEDURE — 86900 BLOOD TYPING SEROLOGIC ABO: CPT

## 2021-08-30 PROCEDURE — 82270 OCCULT BLOOD FECES: CPT | Performed by: EMERGENCY MEDICINE

## 2021-08-30 PROCEDURE — 85730 THROMBOPLASTIN TIME PARTIAL: CPT | Performed by: NURSE PRACTITIONER

## 2021-08-30 PROCEDURE — 99223 1ST HOSP IP/OBS HIGH 75: CPT | Performed by: INTERNAL MEDICINE

## 2021-08-30 PROCEDURE — 87635 SARS-COV-2 COVID-19 AMP PRB: CPT | Performed by: EMERGENCY MEDICINE

## 2021-08-30 PROCEDURE — 63710000001 INSULIN REGULAR HUMAN PER 5 UNITS: Performed by: INTERNAL MEDICINE

## 2021-08-30 PROCEDURE — 86850 RBC ANTIBODY SCREEN: CPT | Performed by: EMERGENCY MEDICINE

## 2021-08-30 PROCEDURE — 85025 COMPLETE CBC W/AUTO DIFF WBC: CPT | Performed by: NURSE PRACTITIONER

## 2021-08-30 PROCEDURE — 74176 CT ABD & PELVIS W/O CONTRAST: CPT

## 2021-08-30 PROCEDURE — 83605 ASSAY OF LACTIC ACID: CPT | Performed by: INTERNAL MEDICINE

## 2021-08-30 PROCEDURE — 36430 TRANSFUSION BLD/BLD COMPNT: CPT

## 2021-08-30 PROCEDURE — 63710000001 INSULIN LISPRO (HUMAN) PER 5 UNITS: Performed by: NURSE PRACTITIONER

## 2021-08-30 PROCEDURE — 94640 AIRWAY INHALATION TREATMENT: CPT

## 2021-08-30 PROCEDURE — 85014 HEMATOCRIT: CPT | Performed by: NURSE PRACTITIONER

## 2021-08-30 PROCEDURE — 94799 UNLISTED PULMONARY SVC/PX: CPT

## 2021-08-30 PROCEDURE — 25010000002 FUROSEMIDE PER 20 MG: Performed by: INTERNAL MEDICINE

## 2021-08-30 PROCEDURE — 86900 BLOOD TYPING SEROLOGIC ABO: CPT | Performed by: EMERGENCY MEDICINE

## 2021-08-30 PROCEDURE — 80048 BASIC METABOLIC PNL TOTAL CA: CPT | Performed by: NURSE PRACTITIONER

## 2021-08-30 PROCEDURE — 81003 URINALYSIS AUTO W/O SCOPE: CPT | Performed by: INTERNAL MEDICINE

## 2021-08-30 PROCEDURE — 86901 BLOOD TYPING SEROLOGIC RH(D): CPT | Performed by: EMERGENCY MEDICINE

## 2021-08-30 PROCEDURE — 63710000001 INSULIN DETEMIR PER 5 UNITS: Performed by: INTERNAL MEDICINE

## 2021-08-30 PROCEDURE — 84132 ASSAY OF SERUM POTASSIUM: CPT | Performed by: INTERNAL MEDICINE

## 2021-08-30 PROCEDURE — 85018 HEMOGLOBIN: CPT | Performed by: NURSE PRACTITIONER

## 2021-08-30 PROCEDURE — 83880 ASSAY OF NATRIURETIC PEPTIDE: CPT | Performed by: INTERNAL MEDICINE

## 2021-08-30 PROCEDURE — 71045 X-RAY EXAM CHEST 1 VIEW: CPT

## 2021-08-30 PROCEDURE — 84484 ASSAY OF TROPONIN QUANT: CPT | Performed by: NURSE PRACTITIONER

## 2021-08-30 PROCEDURE — 86901 BLOOD TYPING SEROLOGIC RH(D): CPT

## 2021-08-30 RX ORDER — DIPHENOXYLATE HYDROCHLORIDE AND ATROPINE SULFATE 2.5; .025 MG/1; MG/1
1 TABLET ORAL DAILY
Status: DISCONTINUED | OUTPATIENT
Start: 2021-08-30 | End: 2021-08-31

## 2021-08-30 RX ORDER — IPRATROPIUM BROMIDE AND ALBUTEROL SULFATE 2.5; .5 MG/3ML; MG/3ML
3 SOLUTION RESPIRATORY (INHALATION)
Status: DISCONTINUED | OUTPATIENT
Start: 2021-08-30 | End: 2021-09-02

## 2021-08-30 RX ORDER — ATENOLOL 50 MG/1
50 TABLET ORAL DAILY
Status: DISCONTINUED | OUTPATIENT
Start: 2021-08-30 | End: 2021-09-02

## 2021-08-30 RX ORDER — SODIUM CHLORIDE 9 MG/ML
75 INJECTION, SOLUTION INTRAVENOUS CONTINUOUS
Status: DISCONTINUED | OUTPATIENT
Start: 2021-08-30 | End: 2021-08-30

## 2021-08-30 RX ORDER — DULOXETIN HYDROCHLORIDE 30 MG/1
30 CAPSULE, DELAYED RELEASE ORAL DAILY
Status: DISCONTINUED | OUTPATIENT
Start: 2021-08-30 | End: 2021-09-03

## 2021-08-30 RX ORDER — PANTOPRAZOLE SODIUM 40 MG/1
40 TABLET, DELAYED RELEASE ORAL
Status: DISCONTINUED | OUTPATIENT
Start: 2021-08-30 | End: 2021-08-30

## 2021-08-30 RX ORDER — SODIUM CHLORIDE 0.9 % (FLUSH) 0.9 %
10 SYRINGE (ML) INJECTION AS NEEDED
Status: DISCONTINUED | OUTPATIENT
Start: 2021-08-30 | End: 2021-09-03 | Stop reason: HOSPADM

## 2021-08-30 RX ORDER — ACETAMINOPHEN 325 MG/1
650 TABLET ORAL EVERY 4 HOURS PRN
Status: DISCONTINUED | OUTPATIENT
Start: 2021-08-30 | End: 2021-08-30

## 2021-08-30 RX ORDER — DEXTROSE MONOHYDRATE 25 G/50ML
25 INJECTION, SOLUTION INTRAVENOUS
Status: DISCONTINUED | OUTPATIENT
Start: 2021-08-30 | End: 2021-09-03 | Stop reason: HOSPADM

## 2021-08-30 RX ORDER — IPRATROPIUM BROMIDE 42 UG/1
2 SPRAY, METERED NASAL DAILY
Status: DISCONTINUED | OUTPATIENT
Start: 2021-08-30 | End: 2021-09-03 | Stop reason: HOSPADM

## 2021-08-30 RX ORDER — LEVOTHYROXINE SODIUM 0.05 MG/1
50 TABLET ORAL
Status: DISCONTINUED | OUTPATIENT
Start: 2021-08-30 | End: 2021-09-03 | Stop reason: HOSPADM

## 2021-08-30 RX ORDER — NICOTINE POLACRILEX 4 MG
15 LOZENGE BUCCAL
Status: DISCONTINUED | OUTPATIENT
Start: 2021-08-30 | End: 2021-09-03 | Stop reason: HOSPADM

## 2021-08-30 RX ORDER — HYDROXYZINE HYDROCHLORIDE 25 MG/1
25 TABLET, FILM COATED ORAL 3 TIMES DAILY PRN
Status: DISCONTINUED | OUTPATIENT
Start: 2021-08-30 | End: 2021-09-03 | Stop reason: HOSPADM

## 2021-08-30 RX ORDER — ATORVASTATIN CALCIUM 20 MG/1
20 TABLET, FILM COATED ORAL DAILY
Status: DISCONTINUED | OUTPATIENT
Start: 2021-08-30 | End: 2021-09-03 | Stop reason: HOSPADM

## 2021-08-30 RX ORDER — GABAPENTIN 300 MG/1
300 CAPSULE ORAL EVERY 8 HOURS SCHEDULED
Status: DISCONTINUED | OUTPATIENT
Start: 2021-08-30 | End: 2021-09-03 | Stop reason: HOSPADM

## 2021-08-30 RX ORDER — GUAIFENESIN/DEXTROMETHORPHAN 100-10MG/5
5 SYRUP ORAL EVERY 4 HOURS PRN
Status: DISCONTINUED | OUTPATIENT
Start: 2021-08-30 | End: 2021-09-03 | Stop reason: HOSPADM

## 2021-08-30 RX ORDER — ACETAMINOPHEN 650 MG/1
650 SUPPOSITORY RECTAL EVERY 4 HOURS PRN
Status: DISCONTINUED | OUTPATIENT
Start: 2021-08-30 | End: 2021-08-30

## 2021-08-30 RX ORDER — FUROSEMIDE 10 MG/ML
20 INJECTION INTRAMUSCULAR; INTRAVENOUS ONCE
Status: COMPLETED | OUTPATIENT
Start: 2021-08-30 | End: 2021-08-30

## 2021-08-30 RX ORDER — SERTRALINE HYDROCHLORIDE 100 MG/1
100 TABLET, FILM COATED ORAL DAILY
Status: DISCONTINUED | OUTPATIENT
Start: 2021-08-30 | End: 2021-09-03 | Stop reason: HOSPADM

## 2021-08-30 RX ORDER — SODIUM CHLORIDE 0.9 % (FLUSH) 0.9 %
10 SYRINGE (ML) INJECTION EVERY 12 HOURS SCHEDULED
Status: DISCONTINUED | OUTPATIENT
Start: 2021-08-30 | End: 2021-09-03 | Stop reason: HOSPADM

## 2021-08-30 RX ORDER — ACETAMINOPHEN 160 MG/5ML
650 SOLUTION ORAL EVERY 4 HOURS PRN
Status: DISCONTINUED | OUTPATIENT
Start: 2021-08-30 | End: 2021-08-30

## 2021-08-30 RX ORDER — ASPIRIN 81 MG/1
81 TABLET, CHEWABLE ORAL DAILY
COMMUNITY

## 2021-08-30 RX ORDER — GABAPENTIN 400 MG/1
800 CAPSULE ORAL EVERY 8 HOURS SCHEDULED
Status: DISCONTINUED | OUTPATIENT
Start: 2021-08-30 | End: 2021-08-30

## 2021-08-30 RX ADMIN — GUAIFENESIN AND DEXTROMETHORPHAN 5 ML: 100; 10 SYRUP ORAL at 19:17

## 2021-08-30 RX ADMIN — GABAPENTIN 300 MG: 300 CAPSULE ORAL at 14:56

## 2021-08-30 RX ADMIN — FUROSEMIDE 20 MG: 10 INJECTION, SOLUTION INTRAMUSCULAR; INTRAVENOUS at 14:55

## 2021-08-30 RX ADMIN — INSULIN LISPRO 2 UNITS: 100 INJECTION, SOLUTION INTRAVENOUS; SUBCUTANEOUS at 12:23

## 2021-08-30 RX ADMIN — HYDROXYZINE HYDROCHLORIDE 25 MG: 25 TABLET, FILM COATED ORAL at 14:56

## 2021-08-30 RX ADMIN — INSULIN DETEMIR 10 UNITS: 100 INJECTION, SOLUTION SUBCUTANEOUS at 12:22

## 2021-08-30 RX ADMIN — INSULIN LISPRO 4 UNITS: 100 INJECTION, SOLUTION INTRAVENOUS; SUBCUTANEOUS at 18:02

## 2021-08-30 RX ADMIN — SODIUM CHLORIDE 1000 ML: 9 INJECTION, SOLUTION INTRAVENOUS at 01:28

## 2021-08-30 RX ADMIN — INSULIN LISPRO 2 UNITS: 100 INJECTION, SOLUTION INTRAVENOUS; SUBCUTANEOUS at 09:39

## 2021-08-30 RX ADMIN — MULTIVITAMIN TABLET 1 TABLET: TABLET at 09:26

## 2021-08-30 RX ADMIN — INSULIN DETEMIR 10 UNITS: 100 INJECTION, SOLUTION SUBCUTANEOUS at 20:13

## 2021-08-30 RX ADMIN — INSULIN HUMAN 7 UNITS: 100 INJECTION, SOLUTION PARENTERAL at 02:42

## 2021-08-30 RX ADMIN — ATENOLOL 50 MG: 50 TABLET ORAL at 09:26

## 2021-08-30 RX ADMIN — LEVOTHYROXINE SODIUM 50 MCG: 50 TABLET ORAL at 05:08

## 2021-08-30 RX ADMIN — GABAPENTIN 300 MG: 300 CAPSULE ORAL at 20:12

## 2021-08-30 RX ADMIN — IPRATROPIUM BROMIDE AND ALBUTEROL SULFATE 3 ML: 2.5; .5 SOLUTION RESPIRATORY (INHALATION) at 15:51

## 2021-08-30 RX ADMIN — SERTRALINE HYDROCHLORIDE 100 MG: 100 TABLET ORAL at 09:26

## 2021-08-30 RX ADMIN — ATORVASTATIN CALCIUM 20 MG: 20 TABLET, FILM COATED ORAL at 09:26

## 2021-08-30 RX ADMIN — SODIUM ZIRCONIUM CYCLOSILICATE 10 G: 10 POWDER, FOR SUSPENSION ORAL at 15:02

## 2021-08-30 RX ADMIN — SODIUM BICARBONATE 25 MEQ: 84 INJECTION INTRAVENOUS at 02:43

## 2021-08-30 RX ADMIN — GABAPENTIN 300 MG: 300 CAPSULE ORAL at 05:08

## 2021-08-30 RX ADMIN — DULOXETINE HYDROCHLORIDE 30 MG: 30 CAPSULE, DELAYED RELEASE ORAL at 09:26

## 2021-08-30 RX ADMIN — IPRATROPIUM BROMIDE AND ALBUTEROL SULFATE 3 ML: 2.5; .5 SOLUTION RESPIRATORY (INHALATION) at 19:31

## 2021-08-30 RX ADMIN — SODIUM CHLORIDE, PRESERVATIVE FREE 10 ML: 5 INJECTION INTRAVENOUS at 20:12

## 2021-08-31 ENCOUNTER — ANESTHESIA EVENT (OUTPATIENT)
Dept: GASTROENTEROLOGY | Facility: HOSPITAL | Age: 71
End: 2021-08-31

## 2021-08-31 ENCOUNTER — ANESTHESIA (OUTPATIENT)
Dept: GASTROENTEROLOGY | Facility: HOSPITAL | Age: 71
End: 2021-08-31

## 2021-08-31 PROBLEM — D50.0 CHRONIC BLOOD LOSS ANEMIA: Status: ACTIVE | Noted: 2021-08-29

## 2021-08-31 PROBLEM — K62.5 RECTAL BLEEDING: Status: ACTIVE | Noted: 2021-08-29

## 2021-08-31 LAB
ANION GAP SERPL CALCULATED.3IONS-SCNC: 10 MMOL/L (ref 5–15)
BH BB BLOOD EXPIRATION DATE: NORMAL
BH BB BLOOD TYPE BARCODE: 9500
BH BB DISPENSE STATUS: NORMAL
BH BB PRODUCT CODE: NORMAL
BH BB UNIT NUMBER: NORMAL
BUN SERPL-MCNC: 36 MG/DL (ref 8–23)
BUN/CREAT SERPL: 21.4 (ref 7–25)
CALCIUM SPEC-SCNC: 8.6 MG/DL (ref 8.6–10.5)
CHLORIDE SERPL-SCNC: 106 MMOL/L (ref 98–107)
CO2 SERPL-SCNC: 21 MMOL/L (ref 22–29)
CREAT SERPL-MCNC: 1.68 MG/DL (ref 0.76–1.27)
CROSSMATCH INTERPRETATION: NORMAL
DEPRECATED RDW RBC AUTO: 62.2 FL (ref 37–54)
ERYTHROCYTE [DISTWIDTH] IN BLOOD BY AUTOMATED COUNT: 20 % (ref 12.3–15.4)
GFR SERPL CREATININE-BSD FRML MDRD: 40 ML/MIN/1.73
GLUCOSE BLDC GLUCOMTR-MCNC: 206 MG/DL (ref 70–130)
GLUCOSE BLDC GLUCOMTR-MCNC: 207 MG/DL (ref 70–130)
GLUCOSE BLDC GLUCOMTR-MCNC: 213 MG/DL (ref 70–130)
GLUCOSE BLDC GLUCOMTR-MCNC: 222 MG/DL (ref 70–130)
GLUCOSE BLDC GLUCOMTR-MCNC: 247 MG/DL (ref 70–130)
GLUCOSE BLDC GLUCOMTR-MCNC: 252 MG/DL (ref 70–130)
GLUCOSE BLDC GLUCOMTR-MCNC: 260 MG/DL (ref 70–130)
GLUCOSE BLDC GLUCOMTR-MCNC: 299 MG/DL (ref 70–130)
GLUCOSE BLDC GLUCOMTR-MCNC: 322 MG/DL (ref 70–130)
GLUCOSE SERPL-MCNC: 252 MG/DL (ref 65–99)
HAV IGM SERPL QL IA: NORMAL
HBV CORE IGM SERPL QL IA: NORMAL
HBV SURFACE AG SERPL QL IA: NORMAL
HCT VFR BLD AUTO: 23.1 % (ref 37.5–51)
HCT VFR BLD AUTO: 24.3 % (ref 37.5–51)
HCT VFR BLD AUTO: 27 % (ref 37.5–51)
HCV AB SER DONR QL: NORMAL
HGB BLD-MCNC: 6.7 G/DL (ref 13–17.7)
HGB BLD-MCNC: 7.2 G/DL (ref 13–17.7)
HGB BLD-MCNC: 7.7 G/DL (ref 13–17.7)
MCH RBC QN AUTO: 25 PG (ref 26.6–33)
MCHC RBC AUTO-ENTMCNC: 29 G/DL (ref 31.5–35.7)
MCV RBC AUTO: 86.2 FL (ref 79–97)
NT-PROBNP SERPL-MCNC: 621.6 PG/ML (ref 0–900)
PLATELET # BLD AUTO: 84 10*3/MM3 (ref 140–450)
PMV BLD AUTO: 12.1 FL (ref 6–12)
POTASSIUM SERPL-SCNC: 5.3 MMOL/L (ref 3.5–5.2)
POTASSIUM SERPL-SCNC: 5.4 MMOL/L (ref 3.5–5.2)
RBC # BLD AUTO: 2.68 10*6/MM3 (ref 4.14–5.8)
SODIUM SERPL-SCNC: 137 MMOL/L (ref 136–145)
UNIT  ABO: NORMAL
UNIT  RH: NORMAL
WBC # BLD AUTO: 7.97 10*3/MM3 (ref 3.4–10.8)

## 2021-08-31 PROCEDURE — 0DJ08ZZ INSPECTION OF UPPER INTESTINAL TRACT, VIA NATURAL OR ARTIFICIAL OPENING ENDOSCOPIC: ICD-10-PCS | Performed by: INTERNAL MEDICINE

## 2021-08-31 PROCEDURE — 36430 TRANSFUSION BLD/BLD COMPNT: CPT

## 2021-08-31 PROCEDURE — 80048 BASIC METABOLIC PNL TOTAL CA: CPT | Performed by: INTERNAL MEDICINE

## 2021-08-31 PROCEDURE — 85027 COMPLETE CBC AUTOMATED: CPT | Performed by: INTERNAL MEDICINE

## 2021-08-31 PROCEDURE — 25010000002 FUROSEMIDE PER 20 MG: Performed by: INTERNAL MEDICINE

## 2021-08-31 PROCEDURE — 86708 HEPATITIS A ANTIBODY: CPT | Performed by: PHYSICIAN ASSISTANT

## 2021-08-31 PROCEDURE — 94799 UNLISTED PULMONARY SVC/PX: CPT

## 2021-08-31 PROCEDURE — 99232 SBSQ HOSP IP/OBS MODERATE 35: CPT | Performed by: INTERNAL MEDICINE

## 2021-08-31 PROCEDURE — 82962 GLUCOSE BLOOD TEST: CPT

## 2021-08-31 PROCEDURE — 63710000001 INSULIN LISPRO (HUMAN) PER 5 UNITS: Performed by: NURSE PRACTITIONER

## 2021-08-31 PROCEDURE — 63710000001 INSULIN DETEMIR PER 5 UNITS: Performed by: INTERNAL MEDICINE

## 2021-08-31 PROCEDURE — 86900 BLOOD TYPING SEROLOGIC ABO: CPT

## 2021-08-31 PROCEDURE — 84132 ASSAY OF SERUM POTASSIUM: CPT | Performed by: INTERNAL MEDICINE

## 2021-08-31 PROCEDURE — 63710000001 INSULIN REGULAR HUMAN PER 5 UNITS: Performed by: INTERNAL MEDICINE

## 2021-08-31 PROCEDURE — 85014 HEMATOCRIT: CPT | Performed by: NURSE PRACTITIONER

## 2021-08-31 PROCEDURE — 80074 ACUTE HEPATITIS PANEL: CPT | Performed by: PHYSICIAN ASSISTANT

## 2021-08-31 PROCEDURE — 43235 EGD DIAGNOSTIC BRUSH WASH: CPT | Performed by: INTERNAL MEDICINE

## 2021-08-31 PROCEDURE — 97162 PT EVAL MOD COMPLEX 30 MIN: CPT

## 2021-08-31 PROCEDURE — P9016 RBC LEUKOCYTES REDUCED: HCPCS

## 2021-08-31 PROCEDURE — 63710000001 INSULIN LISPRO (HUMAN) PER 5 UNITS: Performed by: INTERNAL MEDICINE

## 2021-08-31 PROCEDURE — 85018 HEMOGLOBIN: CPT | Performed by: INTERNAL MEDICINE

## 2021-08-31 PROCEDURE — 83880 ASSAY OF NATRIURETIC PEPTIDE: CPT | Performed by: INTERNAL MEDICINE

## 2021-08-31 PROCEDURE — 25010000002 PROPOFOL 10 MG/ML EMULSION: Performed by: NURSE ANESTHETIST, CERTIFIED REGISTERED

## 2021-08-31 PROCEDURE — 85014 HEMATOCRIT: CPT | Performed by: INTERNAL MEDICINE

## 2021-08-31 PROCEDURE — 85018 HEMOGLOBIN: CPT | Performed by: NURSE PRACTITIONER

## 2021-08-31 PROCEDURE — 97116 GAIT TRAINING THERAPY: CPT

## 2021-08-31 RX ORDER — PEG-3350, SODIUM SULFATE, SODIUM CHLORIDE, POTASSIUM CHLORIDE, SODIUM ASCORBATE AND ASCORBIC ACID 7.5-2.691G
1000 KIT ORAL
Status: COMPLETED | OUTPATIENT
Start: 2021-08-31 | End: 2021-08-31

## 2021-08-31 RX ORDER — PROPOFOL 10 MG/ML
VIAL (ML) INTRAVENOUS AS NEEDED
Status: DISCONTINUED | OUTPATIENT
Start: 2021-08-31 | End: 2021-08-31 | Stop reason: SURG

## 2021-08-31 RX ORDER — ONDANSETRON 2 MG/ML
4 INJECTION INTRAMUSCULAR; INTRAVENOUS ONCE AS NEEDED
Status: DISCONTINUED | OUTPATIENT
Start: 2021-08-31 | End: 2021-08-31 | Stop reason: HOSPADM

## 2021-08-31 RX ORDER — FUROSEMIDE 10 MG/ML
20 INJECTION INTRAMUSCULAR; INTRAVENOUS ONCE
Status: COMPLETED | OUTPATIENT
Start: 2021-08-31 | End: 2021-08-31

## 2021-08-31 RX ORDER — IPRATROPIUM BROMIDE AND ALBUTEROL SULFATE 2.5; .5 MG/3ML; MG/3ML
3 SOLUTION RESPIRATORY (INHALATION) ONCE AS NEEDED
Status: DISCONTINUED | OUTPATIENT
Start: 2021-08-31 | End: 2021-08-31 | Stop reason: HOSPADM

## 2021-08-31 RX ORDER — PANTOPRAZOLE SODIUM 40 MG/10ML
40 INJECTION, POWDER, LYOPHILIZED, FOR SOLUTION INTRAVENOUS 2 TIMES DAILY
Status: DISCONTINUED | OUTPATIENT
Start: 2021-08-31 | End: 2021-09-03

## 2021-08-31 RX ORDER — SODIUM CHLORIDE 9 MG/ML
9 INJECTION, SOLUTION INTRAVENOUS CONTINUOUS
Status: DISCONTINUED | OUTPATIENT
Start: 2021-08-31 | End: 2021-09-03 | Stop reason: HOSPADM

## 2021-08-31 RX ORDER — SODIUM BICARBONATE 650 MG/1
650 TABLET ORAL 2 TIMES DAILY
Status: DISCONTINUED | OUTPATIENT
Start: 2021-08-31 | End: 2021-09-03 | Stop reason: HOSPADM

## 2021-08-31 RX ORDER — LIDOCAINE HYDROCHLORIDE 10 MG/ML
INJECTION, SOLUTION EPIDURAL; INFILTRATION; INTRACAUDAL; PERINEURAL AS NEEDED
Status: DISCONTINUED | OUTPATIENT
Start: 2021-08-31 | End: 2021-08-31 | Stop reason: SURG

## 2021-08-31 RX ORDER — PEG-3350, SODIUM SULFATE, SODIUM CHLORIDE, POTASSIUM CHLORIDE, SODIUM ASCORBATE AND ASCORBIC ACID 7.5-2.691G
1000 KIT ORAL
Status: COMPLETED | OUTPATIENT
Start: 2021-09-01 | End: 2021-09-01

## 2021-08-31 RX ADMIN — INSULIN HUMAN 5 UNITS: 100 INJECTION, SOLUTION PARENTERAL at 11:46

## 2021-08-31 RX ADMIN — SODIUM CHLORIDE, PRESERVATIVE FREE 10 ML: 5 INJECTION INTRAVENOUS at 20:44

## 2021-08-31 RX ADMIN — DULOXETINE HYDROCHLORIDE 30 MG: 30 CAPSULE, DELAYED RELEASE ORAL at 08:05

## 2021-08-31 RX ADMIN — FUROSEMIDE 20 MG: 10 INJECTION INTRAMUSCULAR; INTRAVENOUS at 08:19

## 2021-08-31 RX ADMIN — ATORVASTATIN CALCIUM 20 MG: 20 TABLET, FILM COATED ORAL at 08:06

## 2021-08-31 RX ADMIN — ATENOLOL 50 MG: 50 TABLET ORAL at 08:06

## 2021-08-31 RX ADMIN — INSULIN LISPRO 5 UNITS: 100 INJECTION, SOLUTION INTRAVENOUS; SUBCUTANEOUS at 19:08

## 2021-08-31 RX ADMIN — PANTOPRAZOLE SODIUM 40 MG: 40 INJECTION, POWDER, FOR SOLUTION INTRAVENOUS at 14:38

## 2021-08-31 RX ADMIN — SODIUM CHLORIDE 9 ML/HR: 9 INJECTION, SOLUTION INTRAVENOUS at 14:40

## 2021-08-31 RX ADMIN — LEVOTHYROXINE SODIUM 50 MCG: 50 TABLET ORAL at 05:17

## 2021-08-31 RX ADMIN — INSULIN DETEMIR 10 UNITS: 100 INJECTION, SOLUTION SUBCUTANEOUS at 08:07

## 2021-08-31 RX ADMIN — PANTOPRAZOLE SODIUM 40 MG: 40 INJECTION, POWDER, FOR SOLUTION INTRAVENOUS at 20:45

## 2021-08-31 RX ADMIN — GABAPENTIN 300 MG: 300 CAPSULE ORAL at 05:17

## 2021-08-31 RX ADMIN — POLYETHYLENE GLYCOL 3350, SODIUM SULFATE, SODIUM CHLORIDE, POTASSIUM CHLORIDE, ASCORBIC ACID, SODIUM ASCORBATE 1000 ML: KIT at 20:25

## 2021-08-31 RX ADMIN — LIDOCAINE HYDROCHLORIDE 100 MG: 10 INJECTION, SOLUTION EPIDURAL; INFILTRATION; INTRACAUDAL; PERINEURAL at 15:28

## 2021-08-31 RX ADMIN — SERTRALINE HYDROCHLORIDE 100 MG: 100 TABLET ORAL at 08:06

## 2021-08-31 RX ADMIN — INSULIN DETEMIR 15 UNITS: 100 INJECTION, SOLUTION SUBCUTANEOUS at 20:45

## 2021-08-31 RX ADMIN — GABAPENTIN 300 MG: 300 CAPSULE ORAL at 20:44

## 2021-08-31 RX ADMIN — SODIUM ZIRCONIUM CYCLOSILICATE 10 G: 10 POWDER, FOR SUSPENSION ORAL at 20:45

## 2021-08-31 RX ADMIN — IPRATROPIUM BROMIDE AND ALBUTEROL SULFATE 3 ML: 2.5; .5 SOLUTION RESPIRATORY (INHALATION) at 19:07

## 2021-08-31 RX ADMIN — SODIUM BICARBONATE 650 MG TABLET 650 MG: at 20:44

## 2021-08-31 RX ADMIN — SODIUM ZIRCONIUM CYCLOSILICATE 10 G: 10 POWDER, FOR SUSPENSION ORAL at 06:19

## 2021-08-31 RX ADMIN — PROPOFOL 150 MG: 10 INJECTION, EMULSION INTRAVENOUS at 15:28

## 2021-08-31 RX ADMIN — INSULIN LISPRO 4 UNITS: 100 INJECTION, SOLUTION INTRAVENOUS; SUBCUTANEOUS at 20:45

## 2021-08-31 RX ADMIN — SODIUM CHLORIDE, PRESERVATIVE FREE 10 ML: 5 INJECTION INTRAVENOUS at 14:39

## 2021-08-31 NOTE — ANESTHESIA PREPROCEDURE EVALUATION
Anesthesia Evaluation     Patient summary reviewed and Nursing notes reviewed                Airway   Mallampati: I  TM distance: >3 FB  Neck ROM: full  No difficulty expected  Dental - normal exam     Pulmonary - normal exam   (+) sleep apnea,   Cardiovascular - normal exam    (+) hypertension, hyperlipidemia,       Neuro/Psych  (+) headaches, numbness, psychiatric history Depression,     GI/Hepatic/Renal/Endo    (+) obesity,  GERD, GI bleeding , renal disease CRI, diabetes mellitus,     Musculoskeletal     (+) neck pain,   Abdominal  - normal exam    Bowel sounds: normal.   Substance History - negative use     OB/GYN negative ob/gyn ROS         Other   arthritis,                      Anesthesia Plan    ASA 3     general   (Propofol)  intravenous induction     Anesthetic plan, all risks, benefits, and alternatives have been provided, discussed and informed consent has been obtained with: patient.    Plan discussed with CRNA.

## 2021-09-01 ENCOUNTER — ANESTHESIA (OUTPATIENT)
Dept: GASTROENTEROLOGY | Facility: HOSPITAL | Age: 71
End: 2021-09-01

## 2021-09-01 LAB
ANION GAP SERPL CALCULATED.3IONS-SCNC: 12 MMOL/L (ref 5–15)
BASOPHILS # BLD AUTO: 0.02 10*3/MM3 (ref 0–0.2)
BASOPHILS NFR BLD AUTO: 0.3 % (ref 0–1.5)
BH BB BLOOD EXPIRATION DATE: NORMAL
BH BB BLOOD TYPE BARCODE: 9500
BH BB DISPENSE STATUS: NORMAL
BH BB PRODUCT CODE: NORMAL
BH BB UNIT NUMBER: NORMAL
BUN SERPL-MCNC: 28 MG/DL (ref 8–23)
BUN/CREAT SERPL: 16.9 (ref 7–25)
CALCIUM SPEC-SCNC: 9.2 MG/DL (ref 8.6–10.5)
CHLORIDE SERPL-SCNC: 103 MMOL/L (ref 98–107)
CO2 SERPL-SCNC: 21 MMOL/L (ref 22–29)
CREAT SERPL-MCNC: 1.66 MG/DL (ref 0.76–1.27)
CROSSMATCH INTERPRETATION: NORMAL
DEPRECATED RDW RBC AUTO: 58.6 FL (ref 37–54)
EOSINOPHIL # BLD AUTO: 0.13 10*3/MM3 (ref 0–0.4)
EOSINOPHIL NFR BLD AUTO: 2 % (ref 0.3–6.2)
ERYTHROCYTE [DISTWIDTH] IN BLOOD BY AUTOMATED COUNT: 19.3 % (ref 12.3–15.4)
GFR SERPL CREATININE-BSD FRML MDRD: 41 ML/MIN/1.73
GLUCOSE BLDC GLUCOMTR-MCNC: 181 MG/DL (ref 70–130)
GLUCOSE BLDC GLUCOMTR-MCNC: 212 MG/DL (ref 70–130)
GLUCOSE BLDC GLUCOMTR-MCNC: 223 MG/DL (ref 70–130)
GLUCOSE BLDC GLUCOMTR-MCNC: 225 MG/DL (ref 70–130)
GLUCOSE BLDC GLUCOMTR-MCNC: 227 MG/DL (ref 70–130)
GLUCOSE BLDC GLUCOMTR-MCNC: 266 MG/DL (ref 70–130)
GLUCOSE SERPL-MCNC: 230 MG/DL (ref 65–99)
HAV AB SER QL IA: POSITIVE
HCT VFR BLD AUTO: 25 % (ref 37.5–51)
HCT VFR BLD AUTO: 27 % (ref 37.5–51)
HCT VFR BLD AUTO: 28.1 % (ref 37.5–51)
HGB BLD-MCNC: 7.5 G/DL (ref 13–17.7)
HGB BLD-MCNC: 7.8 G/DL (ref 13–17.7)
HGB BLD-MCNC: 8.3 G/DL (ref 13–17.7)
IMM GRANULOCYTES # BLD AUTO: 0.04 10*3/MM3 (ref 0–0.05)
IMM GRANULOCYTES NFR BLD AUTO: 0.6 % (ref 0–0.5)
LYMPHOCYTES # BLD AUTO: 0.63 10*3/MM3 (ref 0.7–3.1)
LYMPHOCYTES NFR BLD AUTO: 9.8 % (ref 19.6–45.3)
MCH RBC QN AUTO: 25.3 PG (ref 26.6–33)
MCHC RBC AUTO-ENTMCNC: 29.5 G/DL (ref 31.5–35.7)
MCV RBC AUTO: 85.7 FL (ref 79–97)
MONOCYTES # BLD AUTO: 0.9 10*3/MM3 (ref 0.1–0.9)
MONOCYTES NFR BLD AUTO: 13.9 % (ref 5–12)
NEUTROPHILS NFR BLD AUTO: 4.74 10*3/MM3 (ref 1.7–7)
NEUTROPHILS NFR BLD AUTO: 73.4 % (ref 42.7–76)
NRBC BLD AUTO-RTO: 0 /100 WBC (ref 0–0.2)
NT-PROBNP SERPL-MCNC: 447.6 PG/ML (ref 0–900)
PLATELET # BLD AUTO: 86 10*3/MM3 (ref 140–450)
PMV BLD AUTO: 11.9 FL (ref 6–12)
POTASSIUM SERPL-SCNC: 4.3 MMOL/L (ref 3.5–5.2)
POTASSIUM SERPL-SCNC: 5.1 MMOL/L (ref 3.5–5.2)
RBC # BLD AUTO: 3.28 10*6/MM3 (ref 4.14–5.8)
SODIUM SERPL-SCNC: 136 MMOL/L (ref 136–145)
UNIT  ABO: NORMAL
UNIT  RH: NORMAL
WBC # BLD AUTO: 6.46 10*3/MM3 (ref 3.4–10.8)

## 2021-09-01 PROCEDURE — 83880 ASSAY OF NATRIURETIC PEPTIDE: CPT | Performed by: INTERNAL MEDICINE

## 2021-09-01 PROCEDURE — 94799 UNLISTED PULMONARY SVC/PX: CPT

## 2021-09-01 PROCEDURE — 85025 COMPLETE CBC W/AUTO DIFF WBC: CPT | Performed by: INTERNAL MEDICINE

## 2021-09-01 PROCEDURE — 63710000001 INSULIN DETEMIR PER 5 UNITS: Performed by: INTERNAL MEDICINE

## 2021-09-01 PROCEDURE — 25010000002 PROPOFOL 10 MG/ML EMULSION

## 2021-09-01 PROCEDURE — 85018 HEMOGLOBIN: CPT | Performed by: INTERNAL MEDICINE

## 2021-09-01 PROCEDURE — 0DJD8ZZ INSPECTION OF LOWER INTESTINAL TRACT, VIA NATURAL OR ARTIFICIAL OPENING ENDOSCOPIC: ICD-10-PCS | Performed by: INTERNAL MEDICINE

## 2021-09-01 PROCEDURE — 82962 GLUCOSE BLOOD TEST: CPT

## 2021-09-01 PROCEDURE — 99232 SBSQ HOSP IP/OBS MODERATE 35: CPT | Performed by: INTERNAL MEDICINE

## 2021-09-01 PROCEDURE — 84132 ASSAY OF SERUM POTASSIUM: CPT | Performed by: ANESTHESIOLOGY

## 2021-09-01 PROCEDURE — 45378 DIAGNOSTIC COLONOSCOPY: CPT | Performed by: INTERNAL MEDICINE

## 2021-09-01 PROCEDURE — 63710000001 INSULIN LISPRO (HUMAN) PER 5 UNITS: Performed by: INTERNAL MEDICINE

## 2021-09-01 PROCEDURE — 80048 BASIC METABOLIC PNL TOTAL CA: CPT | Performed by: INTERNAL MEDICINE

## 2021-09-01 PROCEDURE — 85014 HEMATOCRIT: CPT | Performed by: INTERNAL MEDICINE

## 2021-09-01 RX ORDER — LIDOCAINE HYDROCHLORIDE 10 MG/ML
INJECTION, SOLUTION EPIDURAL; INFILTRATION; INTRACAUDAL; PERINEURAL AS NEEDED
Status: DISCONTINUED | OUTPATIENT
Start: 2021-09-01 | End: 2021-09-01 | Stop reason: SURG

## 2021-09-01 RX ORDER — SODIUM CHLORIDE, SODIUM LACTATE, POTASSIUM CHLORIDE, CALCIUM CHLORIDE 600; 310; 30; 20 MG/100ML; MG/100ML; MG/100ML; MG/100ML
INJECTION, SOLUTION INTRAVENOUS CONTINUOUS PRN
Status: DISCONTINUED | OUTPATIENT
Start: 2021-09-01 | End: 2021-09-01 | Stop reason: SURG

## 2021-09-01 RX ORDER — MIDAZOLAM HYDROCHLORIDE 1 MG/ML
0.5 INJECTION INTRAMUSCULAR; INTRAVENOUS
Status: DISCONTINUED | OUTPATIENT
Start: 2021-09-01 | End: 2021-09-03

## 2021-09-01 RX ORDER — SODIUM CHLORIDE 0.9 % (FLUSH) 0.9 %
10 SYRINGE (ML) INJECTION AS NEEDED
Status: DISCONTINUED | OUTPATIENT
Start: 2021-09-01 | End: 2021-09-03

## 2021-09-01 RX ORDER — LIDOCAINE HYDROCHLORIDE 10 MG/ML
0.5 INJECTION, SOLUTION EPIDURAL; INFILTRATION; INTRACAUDAL; PERINEURAL ONCE AS NEEDED
Status: DISCONTINUED | OUTPATIENT
Start: 2021-09-01 | End: 2021-09-03

## 2021-09-01 RX ORDER — ONDANSETRON 2 MG/ML
4 INJECTION INTRAMUSCULAR; INTRAVENOUS ONCE AS NEEDED
Status: DISCONTINUED | OUTPATIENT
Start: 2021-09-01 | End: 2021-09-01 | Stop reason: HOSPADM

## 2021-09-01 RX ORDER — SODIUM CHLORIDE, SODIUM LACTATE, POTASSIUM CHLORIDE, CALCIUM CHLORIDE 600; 310; 30; 20 MG/100ML; MG/100ML; MG/100ML; MG/100ML
9 INJECTION, SOLUTION INTRAVENOUS CONTINUOUS
Status: DISCONTINUED | OUTPATIENT
Start: 2021-09-01 | End: 2021-09-03

## 2021-09-01 RX ORDER — IPRATROPIUM BROMIDE AND ALBUTEROL SULFATE 2.5; .5 MG/3ML; MG/3ML
3 SOLUTION RESPIRATORY (INHALATION) ONCE AS NEEDED
Status: DISCONTINUED | OUTPATIENT
Start: 2021-09-01 | End: 2021-09-01 | Stop reason: HOSPADM

## 2021-09-01 RX ORDER — PROPOFOL 10 MG/ML
VIAL (ML) INTRAVENOUS AS NEEDED
Status: DISCONTINUED | OUTPATIENT
Start: 2021-09-01 | End: 2021-09-01 | Stop reason: SURG

## 2021-09-01 RX ORDER — SODIUM CHLORIDE 0.9 % (FLUSH) 0.9 %
10 SYRINGE (ML) INJECTION EVERY 12 HOURS SCHEDULED
Status: DISCONTINUED | OUTPATIENT
Start: 2021-09-01 | End: 2021-09-03

## 2021-09-01 RX ADMIN — POLYETHYLENE GLYCOL 3350, SODIUM SULFATE, SODIUM CHLORIDE, POTASSIUM CHLORIDE, ASCORBIC ACID, SODIUM ASCORBATE 1000 ML: KIT at 03:52

## 2021-09-01 RX ADMIN — IPRATROPIUM BROMIDE AND ALBUTEROL SULFATE 3 ML: 2.5; .5 SOLUTION RESPIRATORY (INHALATION) at 14:16

## 2021-09-01 RX ADMIN — SODIUM CHLORIDE, PRESERVATIVE FREE 10 ML: 5 INJECTION INTRAVENOUS at 10:58

## 2021-09-01 RX ADMIN — PANTOPRAZOLE SODIUM 40 MG: 40 INJECTION, POWDER, FOR SOLUTION INTRAVENOUS at 08:04

## 2021-09-01 RX ADMIN — SODIUM BICARBONATE 650 MG TABLET 650 MG: at 10:56

## 2021-09-01 RX ADMIN — INSULIN LISPRO 3 UNITS: 100 INJECTION, SOLUTION INTRAVENOUS; SUBCUTANEOUS at 18:11

## 2021-09-01 RX ADMIN — SODIUM BICARBONATE 650 MG TABLET 650 MG: at 21:06

## 2021-09-01 RX ADMIN — INSULIN DETEMIR 15 UNITS: 100 INJECTION, SOLUTION SUBCUTANEOUS at 21:04

## 2021-09-01 RX ADMIN — PANTOPRAZOLE SODIUM 40 MG: 40 INJECTION, POWDER, FOR SOLUTION INTRAVENOUS at 21:07

## 2021-09-01 RX ADMIN — SODIUM CHLORIDE, POTASSIUM CHLORIDE, SODIUM LACTATE AND CALCIUM CHLORIDE: 600; 310; 30; 20 INJECTION, SOLUTION INTRAVENOUS at 09:18

## 2021-09-01 RX ADMIN — GABAPENTIN 300 MG: 300 CAPSULE ORAL at 21:07

## 2021-09-01 RX ADMIN — LIDOCAINE HYDROCHLORIDE 50 MG: 10 INJECTION, SOLUTION EPIDURAL; INFILTRATION; INTRACAUDAL; PERINEURAL at 09:19

## 2021-09-01 RX ADMIN — IPRATROPIUM BROMIDE AND ALBUTEROL SULFATE 3 ML: 2.5; .5 SOLUTION RESPIRATORY (INHALATION) at 06:35

## 2021-09-01 RX ADMIN — DULOXETINE HYDROCHLORIDE 30 MG: 30 CAPSULE, DELAYED RELEASE ORAL at 10:56

## 2021-09-01 RX ADMIN — IPRATROPIUM BROMIDE AND ALBUTEROL SULFATE 3 ML: 2.5; .5 SOLUTION RESPIRATORY (INHALATION) at 19:42

## 2021-09-01 RX ADMIN — GABAPENTIN 300 MG: 300 CAPSULE ORAL at 15:24

## 2021-09-01 RX ADMIN — SERTRALINE HYDROCHLORIDE 100 MG: 100 TABLET ORAL at 10:56

## 2021-09-01 RX ADMIN — SODIUM CHLORIDE, PRESERVATIVE FREE 10 ML: 5 INJECTION INTRAVENOUS at 21:07

## 2021-09-01 RX ADMIN — ATENOLOL 50 MG: 50 TABLET ORAL at 10:56

## 2021-09-01 RX ADMIN — INSULIN DETEMIR 15 UNITS: 100 INJECTION, SOLUTION SUBCUTANEOUS at 10:51

## 2021-09-01 RX ADMIN — SODIUM CHLORIDE, PRESERVATIVE FREE 10 ML: 5 INJECTION INTRAVENOUS at 21:08

## 2021-09-01 RX ADMIN — PROPOFOL 50 MG: 10 INJECTION, EMULSION INTRAVENOUS at 09:22

## 2021-09-01 RX ADMIN — INSULIN LISPRO 4 UNITS: 100 INJECTION, SOLUTION INTRAVENOUS; SUBCUTANEOUS at 21:05

## 2021-09-01 RX ADMIN — INSULIN LISPRO 2 UNITS: 100 INJECTION, SOLUTION INTRAVENOUS; SUBCUTANEOUS at 12:45

## 2021-09-01 RX ADMIN — PROPOFOL 100 MCG/KG/MIN: 10 INJECTION, EMULSION INTRAVENOUS at 09:22

## 2021-09-01 RX ADMIN — ATORVASTATIN CALCIUM 20 MG: 20 TABLET, FILM COATED ORAL at 10:56

## 2021-09-01 NOTE — PLAN OF CARE
Problem: Obstructive Sleep Apnea Risk or Actual (Comorbidity Management)  Goal: Unobstructed Breathing During Sleep  Outcome: Ongoing, Progressing     Problem: Adult Inpatient Plan of Care  Goal: Plan of Care Review  Outcome: Ongoing, Progressing  Goal: Patient-Specific Goal (Individualized)  Outcome: Ongoing, Progressing  Goal: Absence of Hospital-Acquired Illness or Injury  Outcome: Ongoing, Progressing  Goal: Optimal Comfort and Wellbeing  Outcome: Ongoing, Progressing  Goal: Readiness for Transition of Care  Outcome: Ongoing, Progressing   Goal Outcome Evaluation:

## 2021-09-01 NOTE — PLAN OF CARE
Goal Outcome Evaluation:  Plan of Care Reviewed With: patient        Progress: no change  Outcome Summary: VSS. back and forth between 2L NC and 6L O2 on CPAP. several liquid BMs after taking bowel prep. pt transferring to bedside commode. POC reviewed

## 2021-09-01 NOTE — ANESTHESIA POSTPROCEDURE EVALUATION
Patient: Elton Funez    Procedure Summary     Date: 09/01/21 Room / Location:  [facility]    Anesthesia Start: 0919 Anesthesia Stop:     Procedure: COLONOSCOPY (N/A ) Diagnosis:       Chronic blood loss anemia      Rectal bleeding      (Chronic blood loss anemia [D50.0])      (Rectal bleeding [K62.5])    Surgeons: Sharif García MD Provider: Abrahan Mccullough MD    Anesthesia Type: general ASA Status: 3          Anesthesia Type: general    Vitals  Vitals Value Taken Time   /64 09/01/21 0948   Temp 98.1 °F (36.7 °C) 09/01/21 0948   Pulse 71 09/01/21 0948   Resp 20 09/01/21 0948   SpO2 98 % 09/01/21 0948           Post Anesthesia Care and Evaluation    Patient location during evaluation: PACU  Patient participation: complete - patient participated  Level of consciousness: awake and alert  Pain management: adequate  Airway patency: patent  Anesthetic complications: No anesthetic complications  PONV Status: none  Cardiovascular status: hemodynamically stable and acceptable  Respiratory status: nonlabored ventilation, acceptable and nasal cannula  Hydration status: acceptable

## 2021-09-02 LAB
ANION GAP SERPL CALCULATED.3IONS-SCNC: 11 MMOL/L (ref 5–15)
BUN SERPL-MCNC: 31 MG/DL (ref 8–23)
BUN/CREAT SERPL: 19.9 (ref 7–25)
CALCIUM SPEC-SCNC: 8.7 MG/DL (ref 8.6–10.5)
CHLORIDE SERPL-SCNC: 101 MMOL/L (ref 98–107)
CO2 SERPL-SCNC: 21 MMOL/L (ref 22–29)
CREAT SERPL-MCNC: 1.56 MG/DL (ref 0.76–1.27)
DEPRECATED RDW RBC AUTO: 58.4 FL (ref 37–54)
ERYTHROCYTE [DISTWIDTH] IN BLOOD BY AUTOMATED COUNT: 19 % (ref 12.3–15.4)
GFR SERPL CREATININE-BSD FRML MDRD: 44 ML/MIN/1.73
GLUCOSE BLDC GLUCOMTR-MCNC: 158 MG/DL (ref 70–130)
GLUCOSE BLDC GLUCOMTR-MCNC: 220 MG/DL (ref 70–130)
GLUCOSE BLDC GLUCOMTR-MCNC: 229 MG/DL (ref 70–130)
GLUCOSE BLDC GLUCOMTR-MCNC: 248 MG/DL (ref 70–130)
GLUCOSE SERPL-MCNC: 193 MG/DL (ref 65–99)
HCT VFR BLD AUTO: 25.4 % (ref 37.5–51)
HGB BLD-MCNC: 7.4 G/DL (ref 13–17.7)
IRON 24H UR-MRATE: 36 MCG/DL (ref 59–158)
IRON SATN MFR SERPL: 6 % (ref 20–50)
MCH RBC QN AUTO: 25.6 PG (ref 26.6–33)
MCHC RBC AUTO-ENTMCNC: 29.1 G/DL (ref 31.5–35.7)
MCV RBC AUTO: 87.9 FL (ref 79–97)
PLATELET # BLD AUTO: 77 10*3/MM3 (ref 140–450)
PMV BLD AUTO: 12.1 FL (ref 6–12)
POTASSIUM SERPL-SCNC: 4.1 MMOL/L (ref 3.5–5.2)
RBC # BLD AUTO: 2.89 10*6/MM3 (ref 4.14–5.8)
SODIUM SERPL-SCNC: 133 MMOL/L (ref 136–145)
TIBC SERPL-MCNC: 557 MCG/DL (ref 298–536)
TRANSFERRIN SERPL-MCNC: 374 MG/DL (ref 200–360)
WBC # BLD AUTO: 4.82 10*3/MM3 (ref 3.4–10.8)

## 2021-09-02 PROCEDURE — 97110 THERAPEUTIC EXERCISES: CPT

## 2021-09-02 PROCEDURE — 83540 ASSAY OF IRON: CPT | Performed by: INTERNAL MEDICINE

## 2021-09-02 PROCEDURE — 99232 SBSQ HOSP IP/OBS MODERATE 35: CPT | Performed by: INTERNAL MEDICINE

## 2021-09-02 PROCEDURE — 85027 COMPLETE CBC AUTOMATED: CPT | Performed by: INTERNAL MEDICINE

## 2021-09-02 PROCEDURE — 80048 BASIC METABOLIC PNL TOTAL CA: CPT | Performed by: INTERNAL MEDICINE

## 2021-09-02 PROCEDURE — 84466 ASSAY OF TRANSFERRIN: CPT | Performed by: INTERNAL MEDICINE

## 2021-09-02 PROCEDURE — 63710000001 INSULIN DETEMIR PER 5 UNITS: Performed by: INTERNAL MEDICINE

## 2021-09-02 PROCEDURE — 25010000002 NA FERRIC GLUC CPLX PER 12.5 MG: Performed by: INTERNAL MEDICINE

## 2021-09-02 PROCEDURE — 63710000001 INSULIN LISPRO (HUMAN) PER 5 UNITS: Performed by: INTERNAL MEDICINE

## 2021-09-02 PROCEDURE — 82962 GLUCOSE BLOOD TEST: CPT

## 2021-09-02 PROCEDURE — 97116 GAIT TRAINING THERAPY: CPT

## 2021-09-02 PROCEDURE — 99232 SBSQ HOSP IP/OBS MODERATE 35: CPT | Performed by: NURSE PRACTITIONER

## 2021-09-02 RX ORDER — CARVEDILOL 3.12 MG/1
3.12 TABLET ORAL EVERY 12 HOURS SCHEDULED
Status: DISCONTINUED | OUTPATIENT
Start: 2021-09-03 | End: 2021-09-03 | Stop reason: HOSPADM

## 2021-09-02 RX ORDER — IPRATROPIUM BROMIDE AND ALBUTEROL SULFATE 2.5; .5 MG/3ML; MG/3ML
3 SOLUTION RESPIRATORY (INHALATION) EVERY 6 HOURS PRN
Status: DISCONTINUED | OUTPATIENT
Start: 2021-09-02 | End: 2021-09-03 | Stop reason: HOSPADM

## 2021-09-02 RX ADMIN — ATORVASTATIN CALCIUM 20 MG: 20 TABLET, FILM COATED ORAL at 09:05

## 2021-09-02 RX ADMIN — SODIUM BICARBONATE 650 MG TABLET 650 MG: at 20:15

## 2021-09-02 RX ADMIN — SODIUM BICARBONATE 650 MG TABLET 650 MG: at 09:05

## 2021-09-02 RX ADMIN — ATENOLOL 50 MG: 50 TABLET ORAL at 09:05

## 2021-09-02 RX ADMIN — SODIUM CHLORIDE, PRESERVATIVE FREE 10 ML: 5 INJECTION INTRAVENOUS at 20:15

## 2021-09-02 RX ADMIN — INSULIN LISPRO 5 UNITS: 100 INJECTION, SOLUTION INTRAVENOUS; SUBCUTANEOUS at 12:31

## 2021-09-02 RX ADMIN — INSULIN DETEMIR 20 UNITS: 100 INJECTION, SOLUTION SUBCUTANEOUS at 10:46

## 2021-09-02 RX ADMIN — PANTOPRAZOLE SODIUM 40 MG: 40 INJECTION, POWDER, FOR SOLUTION INTRAVENOUS at 20:14

## 2021-09-02 RX ADMIN — SERTRALINE HYDROCHLORIDE 100 MG: 100 TABLET ORAL at 09:05

## 2021-09-02 RX ADMIN — INSULIN LISPRO 3 UNITS: 100 INJECTION, SOLUTION INTRAVENOUS; SUBCUTANEOUS at 12:31

## 2021-09-02 RX ADMIN — INSULIN LISPRO 5 UNITS: 100 INJECTION, SOLUTION INTRAVENOUS; SUBCUTANEOUS at 09:06

## 2021-09-02 RX ADMIN — DULOXETINE HYDROCHLORIDE 30 MG: 30 CAPSULE, DELAYED RELEASE ORAL at 09:05

## 2021-09-02 RX ADMIN — GABAPENTIN 300 MG: 300 CAPSULE ORAL at 05:47

## 2021-09-02 RX ADMIN — INSULIN LISPRO 2 UNITS: 100 INJECTION, SOLUTION INTRAVENOUS; SUBCUTANEOUS at 18:43

## 2021-09-02 RX ADMIN — INSULIN DETEMIR 20 UNITS: 100 INJECTION, SOLUTION SUBCUTANEOUS at 20:15

## 2021-09-02 RX ADMIN — INSULIN LISPRO 2 UNITS: 100 INJECTION, SOLUTION INTRAVENOUS; SUBCUTANEOUS at 09:07

## 2021-09-02 RX ADMIN — IPRATROPIUM BROMIDE 2 SPRAY: 42 SPRAY NASAL at 09:07

## 2021-09-02 RX ADMIN — LEVOTHYROXINE SODIUM 50 MCG: 50 TABLET ORAL at 05:47

## 2021-09-02 RX ADMIN — INSULIN LISPRO 3 UNITS: 100 INJECTION, SOLUTION INTRAVENOUS; SUBCUTANEOUS at 20:15

## 2021-09-02 RX ADMIN — GABAPENTIN 300 MG: 300 CAPSULE ORAL at 12:31

## 2021-09-02 RX ADMIN — SODIUM CHLORIDE 125 MG: 9 INJECTION, SOLUTION INTRAVENOUS at 23:20

## 2021-09-02 RX ADMIN — PANTOPRAZOLE SODIUM 40 MG: 40 INJECTION, POWDER, FOR SOLUTION INTRAVENOUS at 09:05

## 2021-09-02 RX ADMIN — INSULIN LISPRO 5 UNITS: 100 INJECTION, SOLUTION INTRAVENOUS; SUBCUTANEOUS at 18:44

## 2021-09-02 RX ADMIN — GABAPENTIN 300 MG: 300 CAPSULE ORAL at 21:42

## 2021-09-02 NOTE — PROGRESS NOTES
"GI Daily Progress Note  Subjective:    Chief Complaint: Follow-up anemia    Patient resting in bed no acute distress.  Does not endorse any further evidence of gastrointestinal blood loss.  No new or worsening GI symptoms reported.  Denies pain.    Objective:    /67   Pulse 71   Temp 98.2 °F (36.8 °C) (Oral)   Resp 18   Ht 172.7 cm (68\")   Wt 105 kg (230 lb 12.8 oz)   SpO2 91%   BMI 35.09 kg/m²     Physical Exam  Vitals and nursing note reviewed.   Constitutional:       General: He is not in acute distress.     Appearance: Normal appearance. He is obese. He is not ill-appearing or toxic-appearing.   HENT:      Head: Normocephalic and atraumatic.   Eyes:      General: No scleral icterus.     Extraocular Movements: Extraocular movements intact.      Conjunctiva/sclera: Conjunctivae normal.      Pupils: Pupils are equal, round, and reactive to light.   Cardiovascular:      Rate and Rhythm: Normal rate and regular rhythm.      Pulses: Normal pulses.      Heart sounds: Normal heart sounds.   Pulmonary:      Effort: Pulmonary effort is normal. No respiratory distress.      Breath sounds: Normal breath sounds.   Abdominal:      General: Abdomen is flat. Bowel sounds are normal. There is no distension.      Palpations: Abdomen is soft. There is no mass.      Tenderness: There is no abdominal tenderness. There is no guarding or rebound.      Hernia: No hernia is present.   Skin:     General: Skin is warm and dry.      Capillary Refill: Capillary refill takes less than 2 seconds.      Coloration: Skin is pale. Skin is not jaundiced.   Neurological:      General: No focal deficit present.      Mental Status: He is alert and oriented to person, place, and time.   Psychiatric:         Mood and Affect: Mood normal.         Behavior: Behavior normal.         Thought Content: Thought content normal.         Judgment: Judgment normal.         Lab  I have personally reviewed most recent cardiac tracings, lab results and " radiology images and interpretations and agree with findings.    Lab Results   Component Value Date    WBC 4.82 09/02/2021    HGB 7.4 (L) 09/02/2021    HGB 7.8 (L) 09/01/2021    HGB 8.3 (L) 09/01/2021    MCV 87.9 09/02/2021    PLT 77 (L) 09/02/2021    INR 1.14 08/30/2021       Lab Results   Component Value Date    GLUCOSE 193 (H) 09/02/2021    BUN 31 (H) 09/02/2021    CREATININE 1.56 (H) 09/02/2021    EGFRIFNONA 44 (L) 09/02/2021    EGFRIFAFRI 66 07/14/2015    BCR 19.9 09/02/2021     (L) 09/02/2021    K 4.1 09/02/2021    CO2 21.0 (L) 09/02/2021    CALCIUM 8.7 09/02/2021    PROTENTOTREF 7.7 07/14/2015    ALBUMIN 3.90 08/29/2021    ALKPHOS 99 08/29/2021    BILITOT 0.4 08/29/2021    ALT 17 08/29/2021    AST 22 08/29/2021     Colonoscopy on 9/1/2021  >>> Colonoscopy: Exam to cecum normal.  Noted internal and external hemorrhoids.    Esophagogastroduodenoscopy on 8/31/2021  >>> EGD shows 4 columns of small grade 2 varices without high risk stigmata of bleeding.  There is mild portal gastropathy appreciated.  Normal duodenum.  Results for KERVIN GLOVER (MRN [id number]) as of 9/2/2021 15:27   Ref. Range 9/2/2021 05:13   Iron Latest Ref Range: 59 - 158 mcg/dL 36 (L)   Iron Saturation Latest Ref Range: 20 - 50 % 6 (L)   Transferrin Latest Ref Range: 200 - 360 mg/dL 374 (H)   TIBC Latest Ref Range: 298 - 536 mcg/dL 557 (H)   Results for KERVIN GLOVER (MRN [id number]) as of 9/2/2021 15:27   Ref. Range 8/31/2021 06:21   Hep A Total Ab Latest Ref Range: Negative  Positive (A)     Assessment:      Symptomatic anemia    Chronic kidney disease, stage III (moderate) (CMS/HCC)    Gastroesophageal reflux disease without esophagitis    Hyperlipidemia    Hypertension    DM (diabetes mellitus), type 2, uncontrolled w/neurologic complication (CMS/HCC)    FAVIO (obstructive sleep apnea)    Hypothyroidism, adult    Hyperkalemia    Elevated troponin    Acute GI bleeding    Chronic blood loss anemia    Rectal bleeding    1. Acute GI  bleed with melena, related to portal hypertensive gastropathy, hemoglobin stable at 7.4  2. Acute blood loss anemia, stable  3. Liver cirrhosis without ascites.  MELD-Na is 19.   Newly diagnosed.   Suspect JEROME.     4. Thrombocytopenia   5. Iron deficiency   6. REBECA on CKD   7. Hyperkalemia, improved    Plan:  Patient doing well following colonoscopy yesterday.  Suspect cause of patient's anemia and melanotic stools is portal hypertensive gastropathy which was appreciated on recent EGD.  >>> Patient is currently on atenolol; will transition to Coreg to decrease portal pressures given portal hypertensive gastropathy.  >>> Discontinue Cymbalta in setting of advanced liver disease  >>> Discontinue meloxicam in setting of gastrointestinal bleeding   -May use Tylenol for mild to moderate pain  >>> Instructed to follow a high-protein/low salt diet.  >>> Is immune to hepatitis A; hep B serology pending  >>> AFP pending    Patient will need to follow-up in outpatient gastroenterology office for establishment of care and further management of his liver cirrhosis; tentatively for 4 to 6 weeks after discharge.    Deniz Lindsey, MELISSA  09/02/21  15:24 EDT

## 2021-09-02 NOTE — CASE MANAGEMENT/SOCIAL WORK
Continued Stay Note  [facility]     Patient Name: Elton Funez  MRN: [id number]  Today's Date: 9/2/2021    Admit Date: 8/29/2021    Discharge Plan     Row Name 09/02/21 1723       Plan    Plan  Home with Family    Patient/Family in Agreement with Plan  unable to assess    Plan Comments  Mr. Funez is sleeping when I stopped by his room today.  Per MDR, he is approaching medical readiness for discharge likely in the next 1-2 days.  PT/OT recommend discharge home with assistance and possibly outpatient PT.  CM will cont to follow his plan of care and discuss OPPT with Mr. Funez in the morning.    Final Discharge Disposition Code  01 - home or self-care        Discharge Codes    No documentation.       Expected Discharge Date and Time     Expected Discharge Date Expected Discharge Time    Sep 3, 2021             Nely Henderson RN

## 2021-09-02 NOTE — PROGRESS NOTES
[facility] Medicine Services  PROGRESS NOTE    Patient Name: Elton Funez  : 1950  MRN: [id number]    Date of Admission: 2021  Primary Care Physician: Tc Ramirez MD    Subjective   Subjective     CC:  Shortness of breath    HPI:  Breathing and strength improved. Nosebleed earlier, now abated. Eating well.    ROS:  Gen- No fevers, chills  CV- No chest pain, palpitations  Resp- No cough, dyspnea  GI- No N/V/D, abd pain        Objective   Objective     Vital Signs:   Temp:  [97.7 °F (36.5 °C)-98.4 °F (36.9 °C)] 98.3 °F (36.8 °C)  Heart Rate:  [57-89] 60  Resp:  [14-20] 20  BP: (116-137)/(50-64) 116/59  Flow (L/min):  [2-6] 2     Physical Exam:  Constitutional - no acute distress, nontoxic, sitting up on the edge of the bed eating  HEENT-NCAT, mucous membranes moist  CV-RRR, S1 S2 normal, no m/r/g  Resp-CTAB, no wheezes, rhonchi or rales  Abd-soft, nontender, nondistended, normoactive bowel sounds  Ext-No lower extremity cyanosis, clubbing or edema bilaterally  Neuro-alert and oriented, speech clear, moves all extremities   Psych-normal affect   Skin- No rash on exposed UE or LE bilaterally      Results Reviewed:  LAB RESULTS:      Lab 21  1743 21  0612 21  2334 21  1837 21  0751 21  0619 21  0619 21  1513 21  0958 21  0405 21  0307 21  2229 21   WBC  --  6.46  --   --   --   --  7.97  --  6.60  --   --   --  7.18   HEMOGLOBIN 7.8* 8.3* 7.5* 7.7* 7.2*   < > 6.7*   < > 6.5*  --   --    < > 5.7*   HEMATOCRIT 27.0* 28.1* 25.0* 27.0* 24.3*   < > 23.1*   < > 22.2*  --   --    < > 19.7*   PLATELETS  --  86*  --   --   --   --  84*  --  89*  --   --   --  97*   NEUTROS ABS  --  4.74  --   --   --   --   --   --  4.60  --   --   --  5.40   IMMATURE GRANS (ABS)  --  0.04  --   --   --   --   --   --  0.06*  --   --   --  0.06*   LYMPHS ABS  --  0.63*  --   --   --   --   --   --  0.81  --   --    --  0.74   MONOS ABS  --  0.90  --   --   --   --   --   --  0.94*  --   --   --  0.77   EOS ABS  --  0.13  --   --   --   --   --   --  0.17  --   --   --  0.19   MCV  --  85.7  --   --   --   --  86.2  --  86.7  --   --   --  90.8   LACTATE  --   --   --   --   --   --   --   --   --  1.6  --   --  2.5*   PROTIME  --   --   --   --   --   --   --   --   --   --  14.2  --   --    APTT  --   --   --   --   --   --   --   --   --   --  26.7  --   --     < > = values in this interval not displayed.         Lab 09/01/21  1038 09/01/21  0613 08/31/21  1201 08/31/21  0619 08/30/21  1931 08/30/21  0958 08/30/21  0958 08/29/21 2229 08/29/21 2229   SODIUM  --  136  --  137  --   --  135*  --  132*   POTASSIUM 4.3 5.1 5.3* 5.4* 5.5*   < > 5.9*   < > 5.8*   CHLORIDE  --  103  --  106  --   --  106  --  103   CO2  --  21.0*  --  21.0*  --   --  17.0*  --  17.0*   ANION GAP  --  12.0  --  10.0  --   --  12.0  --  12.0   BUN  --  28*  --  36*  --   --  45*  --  46*   CREATININE  --  1.66*  --  1.68*  --   --  1.75*  --  1.83*   GLUCOSE  --  230*  --  252*  --   --  220*  --  331*   CALCIUM  --  9.2  --  8.6  --   --  8.8  --  8.6   HEMOGLOBIN A1C  --   --   --   --   --   --  7.60*  --   --     < > = values in this interval not displayed.         Lab 08/29/21  2229   TOTAL PROTEIN 7.1   ALBUMIN 3.90   GLOBULIN 3.2   ALT (SGPT) 17   AST (SGOT) 22   BILIRUBIN 0.4   ALK PHOS 99         Lab 09/01/21  0613 08/31/21  0619 08/30/21  1513 08/30/21  0958 08/30/21  0307 08/29/21  2229   PROBNP 447.6 621.6  --  487.2  --  352.9   TROPONIN T  --   --  0.031* 0.026  --  0.033*   PROTIME  --   --   --   --  14.2  --    INR  --   --   --   --  1.14  --              Lab 08/30/21  0053 08/30/21  0012 08/30/21  0012 08/29/21 2229   IRON  --   --   --  27*  27*   IRON SATURATION  --   --   --  4*   TIBC  --   --   --  754*   TRANSFERRIN  --   --   --  506*   FERRITIN  --   --   --  8.69*   FOLATE  --   --   --  >20.00   VITAMIN B 12  --   --    --  614   ABO TYPING O   < > O  --    RH TYPING Negative   < > Negative  --    ANTIBODY SCREEN  --   --  Negative  --     < > = values in this interval not displayed.         Brief Urine Lab Results  (Last result in the past 365 days)      Color   Clarity   Blood   Leuk Est   Nitrite   Protein   CREAT   Urine HCG        08/30/21 0411 Dark Yellow Clear Negative Negative Negative Trace               Microbiology Results Abnormal     Procedure Component Value - Date/Time    COVID PRE-OP / PRE-PROCEDURE SCREENING ORDER (NO ISOLATION) - Swab, Nasopharynx [[id number]]  (Normal) Collected: 08/30/21 0016    Lab Status: Final result Specimen: Swab from Nasopharynx Updated: 08/30/21 0109    Narrative:      The following orders were created for panel order COVID PRE-OP / PRE-PROCEDURE SCREENING ORDER (NO ISOLATION) - Swab, Nasopharynx.  Procedure                               Abnormality         Status                     ---------                               -----------         ------                     COVID-19, [facility]BBOTT IN-HOUS...[[id number]]  Normal              Final result                 Please view results for these tests on the individual orders.    COVID-19, [facility]BBOTT IN-HOUSE,NASAL Swab (NO TRANSPORT MEDIA) 2 HR TAT - Swab, Nasopharynx [[id number]]  (Normal) Collected: 08/30/21 0016    Lab Status: Final result Specimen: Swab from Nasopharynx Updated: 08/30/21 0109     COVID19 Presumptive Negative    Narrative:      Fact sheet for providers: https://www.fda.gov/media/066822/download     Fact sheet for patients: https://www.fda.gov/media/309390/download    Test performed by PCR.  If inconsistent with clinical signs and symptoms patient should be tested with different authorized molecular test.          No radiology results from the last 24 hrs    Results for orders placed during the hospital encounter of 08/29/21    Adult Transthoracic Echo Complete W/ Cont if Necessary Per Protocol    Interpretation Summary  · Left  ventricular ejection fraction appears to be 56 - 60%.  · Left ventricular diastolic dysfunction is noted.  · No pericardial effusion      I have reviewed the medications:  Scheduled Meds:atenolol, 50 mg, Oral, Daily  atorvastatin, 20 mg, Oral, Daily  DULoxetine, 30 mg, Oral, Daily  gabapentin, 300 mg, Oral, Q8H  [START ON 9/2/2021] insulin detemir, 20 Units, Subcutaneous, Q12H  insulin detemir, 5 Units, Subcutaneous, Once  insulin lispro, 0-7 Units, Subcutaneous, 4x Daily With Meals & Nightly  [START ON 9/2/2021] insulin lispro, 5 Units, Subcutaneous, TID With Meals  ipratropium, 2 spray, Each Nare, Daily  ipratropium-albuterol, 3 mL, Nebulization, 4x Daily - RT  levothyroxine, 50 mcg, Oral, Q AM  pantoprazole, 40 mg, Intravenous, BID  sertraline, 100 mg, Oral, Daily  sodium bicarbonate, 650 mg, Oral, BID  sodium chloride, 10 mL, Intravenous, Q12H  sodium chloride, 10 mL, Intravenous, Q12H      Continuous Infusions:lactated ringers, 9 mL/hr  sodium chloride, 9 mL/hr, Last Rate: Stopped (09/01/21 0957)      PRN Meds:.dextrose  •  dextrose  •  glucagon (human recombinant)  •  guaiFENesin-dextromethorphan  •  hydrOXYzine  •  lidocaine PF 1%  •  midazolam  •  sodium chloride  •  sodium chloride  •  sodium chloride    Assessment/Plan   Assessment & Plan     Active Hospital Problems    Diagnosis  POA   • **Symptomatic anemia [D64.9]  Yes   • Hyperkalemia [E87.5]  Yes   • Elevated troponin [R77.8]  Yes   • Acute GI bleeding [K92.2]  Yes   • Chronic blood loss anemia [D50.0]  Unknown   • Rectal bleeding [K62.5]  Unknown   • Hypothyroidism, adult [E03.9]  Yes   • DM (diabetes mellitus), type 2, uncontrolled w/neurologic complication (CMS/HCC) [E11.49, E11.65]  Yes   • FAVIO (obstructive sleep apnea) [G47.33]  Yes   • Chronic kidney disease, stage III (moderate) (CMS/HCC) [N18.30]  Yes   • Gastroesophageal reflux disease without esophagitis [K21.9]  Yes   • Hyperlipidemia [E78.5]  Yes   • Hypertension [I10]  Yes      Resolved  Hospital Problems   No resolved problems to display.        Brief Hospital Course to date:  Elton Funez is a 71 y.o. male with history of DMII, depression, peripheral neuropatly, hypothyroid, HTN, Dyslipidemia, Chronc pain, CKD, GERD, anemia and obesity presents with worsening exertional dyspnea. Mr Funez has been suffering from shortness of breath for over a year, but this acutely worsened over the past several days. Concurrent dark stools noted with worsening shortness of breath.    Shortness of breath  - acute worsening likely secondary to profound anemia.   - Echo reveals normal EF with diastolic dsyfunction  - patient may need further workup for dyspnea if this persists after anemia corrected  - lasix prn    Anemia, iron deficient  - iron sat 4%, hemoglobin 5.7 at admission, 6.7 this am -- transfuse again this morning with lasix x 1  - PPI  - received 3 units PRBCs total  - hold NSAIDS, discussed discontinuation with patient (taking meloxicam at home)  - upper endoscopy with no active bleeding, colonoscopy showed internal and external hemorrhoids -- suspect bleeding due to portal gastropathy  ----begin coreg  ----continue PPI  ----oral iron supp at dc    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 Patient says he doesn't regularly follow a diabetic diet.  A1c 7.6  - increase levemir to 20 units bid, add prandial insulin scheduled  - check 2 am glucose    FAVIO  - home cpap at bedside    Neuropathy    Hypothyroid    HTN  - holding ACEi due to hyperkalemia    CKDIII  - bmp am    Lung nodule  - 5mm, discussed with patient and family, followup imaging with PCP per Fleishner criteria    DVT prophylaxis: mechanical, mobilize  Mechanical DVT prophylaxis orders are present.       AM-PAC 6 Clicks Score (PT): 18 (09/01/21  1400)    Disposition: I expect the patient to be discharged TBD    CODE STATUS:   Code Status and Medical Interventions:   Ordered at: 08/30/21 0213     Level Of Support Discussed With:    Patient     Code Status:    CPR     Medical Interventions (Level of Support Prior to Arrest):    Full       Wilfredo Pepper MD  09/01/21

## 2021-09-02 NOTE — THERAPY TREATMENT NOTE
Patient Name: Elton Funez  : 1950    MRN: [id number]                              Today's Date: 2021       Admit Date: 2021    Visit Dx:     ICD-10-CM ICD-9-CM   1. Acute GI bleeding  K92.2 578.9   2. Anemia, unspecified type  D64.9 285.9   3. Lightheadedness  R42 780.4   4. Dyspnea, unspecified type  R06.00 786.09   5. Acute on chronic renal insufficiency  N28.9 593.9    N18.9 585.9   6. Acute hyperkalemia  E87.5 276.7   7. Symptomatic anemia  D64.9 285.9   8. Chronic blood loss anemia  D50.0 280.0   9. Rectal bleeding  K62.5 569.3     Patient Active Problem List   Diagnosis   • Chronic kidney disease, stage III (moderate) (CMS/HCC)   • Gastroesophageal reflux disease without esophagitis   • Hyperlipidemia   • Hypertension   • Trigger finger of right hand   • DM (diabetes mellitus), type 2, uncontrolled w/neurologic complication (CMS/HCC)   • Obesity, Class I, BMI 30-34.9   • Actinic keratosis   • FAVIO (obstructive sleep apnea)   • Prurigo nodularis   • History of depression   • History of migraine   • Cervical radiculopathy   • ALT (SGPT) level raised   • Elevated AST (SGOT)   • Uncontrolled type 2 diabetes mellitus with diabetic neuropathy, with long-term current use of insulin (CMS/HCC)   • Post-operative infection   • Leukocytosis   • Left hand weakness   • Encounter for long-term (current) use of insulin (CMS/HCC)   • Herniated cervical disc   • Circadian rhythm sleep disorder, delayed sleep phase type   • Mild nonproliferative diabetic retinopathy of left eye without macular edema associated with type 2 diabetes mellitus (CMS/HCC)   • Diabetic peripheral neuropathy associated with type 2 diabetes mellitus (CMS/HCC)   • Long term current use of insulin (CMS/HCC)   • Type 2 diabetes mellitus without complication (CMS/HCC)   • Hypothyroidism, adult   • Symptomatic anemia   • Hyperkalemia   • Elevated troponin   • Acute GI bleeding   • Chronic blood loss anemia   • Rectal bleeding     Past  Medical History:   Diagnosis Date   • Abscess of arm, right    • Abscess of skin of neck    • Acute sinusitis    • Allergic Childhood,  Adult    Ragweed. 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    • GERD (gastroesophageal reflux disease)    • HL (hearing loss)    • Hyperlipidemia    • Hypertension    • Hypothyroidism, adult 11/10/2020   • Infection     MSSA lumbar surgical wound infection 3/2017   • Jaw pain    • Left hip pain    • Low back pain     Surgery 30 yrs L4-5   • Migraine     Hx of   • Obesity 10/7/2016   • Obesity (BMI 30.0-34.9) 10/7/2016    BMI 31.92   • Obstructive sleep apnea     Hx of   • Quadriceps muscle strain    • Shigellosis    • Shigellosis    • Sleep apnea     PT TO BRING MASK AND TUBING DAY OF SURGERY   • Symptomatic anemia 8/30/2021   • 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    • Wears glasses    • Wears hearing aid     BOTH EARS   • Wears hearing aid     bilat ears     Past Surgical History:   Procedure Laterality Date   • ANTERIOR CERVICAL DISCECTOMY W/ FUSION N/A 12/14/2017    Procedure: CERVICAL DISCECTOMY ANTERIOR FUSION WITH INSTRUMENTATION C7/T1;  Surgeon: Gigi Perales MD;  Location: [facility] OR;  Service:    • BACK SURGERY     • CERVICAL ARTHRODESIS     • CERVICAL DISCECTOMY POSTERIOR FUSION WITH INSTRUMENTATION N/A 3/16/2017    Procedure:  INCISION AND DRAINAGE OF POSTERIOR CERVICAL WOUND;  Surgeon: Gigi Perales MD;  Location:   [facility] OR;  Service:    • CERVICAL LAMINECTOMY DECOMPRESSION POSTERIOR Left 2/28/2017    Procedure: Left C7-T1 CERVICAL FORAMINOTOMY POSTERIOR WITH METRIX;  Surgeon: Gigi Perales MD;  Location:  [facility] OR;  Service:    • COLONOSCOPY      2012   • COLONOSCOPY N/A 9/1/2021    Procedure: COLONOSCOPY;  Surgeon: Sharif García MD;  Location:  [facility];  Service: Gastroenterology;  Laterality: N/A;   • CYST REMOVAL  04/2021   • ENDOSCOPY     • ENDOSCOPY N/A 8/31/2021    Procedure: ESOPHAGOGASTRODUODENOSCOPY;  Surgeon: Brunner, Mark I, MD;  Location:  [facility];  Service: Gastroenterology;  Laterality: N/A;   • SPINE SURGERY  29 yrs L4-5    9 yrs C6-7 fused   • VASECTOMY     • WISDOM TOOTH EXTRACTION       General Information     Row Name 09/02/21 1531          Physical Therapy Time and Intention    Document Type  therapy note (daily note)  -     Mode of Treatment  physical therapy  -     Row Name 09/02/21 1531          General Information    Patient Profile Reviewed  yes  -     Existing Precautions/Restrictions  fall;oxygen therapy device and L/min  -     Row Name 09/02/21 1531          Cognition    Orientation Status (Cognition)  oriented x 3  -     Row Name 09/02/21 1531          Safety Issues, Functional Mobility    Impairments Affecting Function (Mobility)  balance;endurance/activity tolerance;shortness of breath  -       User Key  (r) = Recorded By, (t) = Taken By, (c) = Cosigned By    Initials Name Provider Type     Sonia Sawant PT Physical Therapist        Mobility     Row Name 09/02/21 1531          Bed Mobility    Bed Mobility  supine-sit;sit-supine  -HP     Supine-Sit Person (Bed Mobility)  modified independence  -HP     Sit-Supine Person (Bed Mobility)  modified independence  -HP     Assistive Device (Bed Mobility)  head of bed elevated;bed rails  -     Comment (Bed Mobility)  Pt performed bed mobility Mod I with use of bed rails and HOB elevated.  -      Row Name 09/02/21 1531          Transfers    Comment (Transfers)  Pt performed STS x2 with CGA for safety and SPC  -     Row Name 09/02/21 1531          Sit-Stand Transfer    Sit-Stand Rowan (Transfers)  contact guard;1 person assist  -     Assistive Device (Sit-Stand Transfers)  cane, straight  -HP     Row Name 09/02/21 1531          Gait/Stairs (Locomotion)    Rowan Level (Gait)  contact guard;1 person assist  -HP     Assistive Device (Gait)  cane, straight  -HP     Distance in Feet (Gait)  350  -     Deviations/Abnormal Patterns (Gait)  bilateral deviations;base of support, wide;slava decreased;gait speed decreased;stride length decreased  -     Comment (Gait/Stairs)  Pt amb 230'+120' with SPC, CGA, on RA, and with one sitting rest break. O2 saturation dropped to 89% on RA and recovered to 95% within one minute of static sitting. Pt reported one episode of lightheadedness and mild LOB requiring a standing rest break for approximately one minute to recover.  -       User Key  (r) = Recorded By, (t) = Taken By, (c) = Cosigned By    Initials Name Provider Type     Sonia Sawant, JOIE Physical Therapist        Obj/Interventions     Row Name 09/02/21 1542          Motor Skills    Motor Skills  therapeutic exercise  -     Therapeutic Exercise  hip;knee;ankle  -[facility] Name 09/02/21 1542          Hip (Therapeutic Exercise)    Hip (Therapeutic Exercise)  strengthening exercise  -     Hip Strengthening (Therapeutic Exercise)  bilateral;marching while seated;10 repetitions  -[facility] Name 09/02/21 1542          Knee (Therapeutic Exercise)    Knee (Therapeutic Exercise)  strengthening exercise  -     Knee Strengthening (Therapeutic Exercise)  bilateral;LAQ (long arc quad);10 repetitions  -[facility] Name 09/02/21 1542          Ankle (Therapeutic Exercise)    Ankle (Therapeutic Exercise)  strengthening exercise  -     Ankle Strengthening (Therapeutic Exercise)   bilateral;dorsiflexion;plantarflexion;10 repetitions  -     Row Name 09/02/21 1542          Balance    Balance Assessment  sitting static balance;sitting dynamic balance;standing static balance;standing dynamic balance  -     Static Sitting Balance  WFL;unsupported;sitting, edge of bed  -     Dynamic Sitting Balance  WFL;unsupported;sitting, edge of bed  -     Static Standing Balance  WFL;standing;supported  -     Dynamic Standing Balance  mild impairment;supported;standing  -     Balance Interventions  occupation based/functional task;sitting;standing;sit to stand  -     Comment, Balance  one episode of mild LOB and lightheadedness while ambulating  -       User Key  (r) = Recorded By, (t) = Taken By, (c) = Cosigned By    Initials Name Provider Type    Sonia Grace PT Physical Therapist        Goals/Plan    No documentation.       Clinical Impression     [facility] Name 09/02/21 1543          Pain    Additional Documentation  Pain Scale: Numbers Pre/Post-Treatment (Group)  -[facility] Name 09/02/21 1543          Pain Scale: Numbers Pre/Post-Treatment    Pretreatment Pain Rating  0/10 - no pain  -     Posttreatment Pain Rating  0/10 - no pain  -[facility] Name 09/02/21 1543          Plan of Care Review    Plan of Care Reviewed With  patient;spouse  -     Progress  improving  -     Outcome Summary  Pt demonstrated improved progress with increased ambulation distance this session. Pt amb 230'+120' with SPC, CGA, on RA, and with one sitting rest break. O2 saturation dropped to 88% on RA and recovered to 95% within one minute of static sitting. Pt reported one episode of lightheadedness and mild LOB requiring a standing rest break for approximately one minute to recover. Continue with PT POC as pt tolerates.  -     Row Name 09/02/21 1543          Vital Signs    Pre Systolic BP Rehab  128  -HP     Pre Treatment Diastolic BP  86  -HP     Pretreatment Heart Rate (beats/min)  61  -HP     Posttreatment  Heart Rate (beats/min)  60  -HP     Pre SpO2 (%)  96  -HP     O2 Delivery Pre Treatment  other (see comments) CPAP @ 2L  -HP     Intra SpO2 (%)  87 range % on RA  -HP     O2 Delivery Intra Treatment  room air  -HP     Post SpO2 (%)  92  -HP     O2 Delivery Post Treatment  room air  -HP     Pre Patient Position  Supine  -HP     Intra Patient Position  Standing  -HP     Post Patient Position  Supine  -HP     Row Name 09/02/21 1543          Positioning and Restraints    Pre-Treatment Position  in bed  -HP     Post Treatment Position  bed  -HP     In Bed  supine;fowlers;call light within reach;encouraged to call for assist;with family/caregiver;legs elevated  -HP       User Key  (r) = Recorded By, (t) = Taken By, (c) = Cosigned By    Initials Name Provider Type    Sonia Grace PT Physical Therapist        Outcome Measures     Row Name 09/02/21 1546          How much help from another person do you currently need...    Turning from your back to your side while in flat bed without using bedrails?  4  -HP     Moving from lying on back to sitting on the side of a flat bed without bedrails?  4  -HP     Moving to and from a bed to a chair (including a wheelchair)?  3  -HP     Standing up from a chair using your arms (e.g., wheelchair, bedside chair)?  3  -HP     Climbing 3-5 steps with a railing?  3  -HP     To walk in hospital room?  3  -HP     AM-PAC 6 Clicks Score (PT)  20  -     Row Name 09/02/21 1546          Functional Assessment    Outcome Measure Options  AM-PAC 6 Clicks Basic Mobility (PT)  -       User Key  (r) = Recorded By, (t) = Taken By, (c) = Cosigned By    Initials Name Provider Type    Sonia Grace PT Physical Therapist                       Physical Therapy Education                 Title: PT OT SLP Therapies (Done)     Topic: Physical Therapy (Done)     Point: Mobility training (Done)     Learning Progress Summary           Patient Acceptance, E,D, VU,NR by  at 9/2/2021 5607     Acceptance, E,D, VU,NR by HP at 8/31/2021 1147   Family Acceptance, E,D, VU,NR by HP at 9/2/2021 1546    Acceptance, E,D, VU,NR by HP at 8/31/2021 1147                   Point: Home exercise program (Done)     Learning Progress Summary           Patient Acceptance, E,D, VU,NR by HP at 9/2/2021 1546   Family Acceptance, E,D, VU,NR by HP at 9/2/2021 1546                   Point: Body mechanics (Done)     Learning Progress Summary           Patient Acceptance, E,D, VU,NR by HP at 9/2/2021 1546    Acceptance, E,D, VU,NR by HP at 8/31/2021 1147   Family Acceptance, E,D, VU,NR by HP at 9/2/2021 1546    Acceptance, E,D, VU,NR by HP at 8/31/2021 1147                   Point: Precautions (Done)     Learning Progress Summary           Patient Acceptance, E,D, VU,NR by HP at 9/2/2021 1546    Acceptance, E,D, VU,NR by HP at 8/31/2021 1147   Family Acceptance, E,D, VU,NR by HP at 9/2/2021 1546    Acceptance, E,D, VU,NR by HP at 8/31/2021 1147                               User Key     Initials Effective Dates Name Provider Type Discipline     06/01/21 -  Sonia Sawant, PT Physical Therapist PT              PT Recommendation and Plan  Planned Therapy Interventions (PT): balance training, bed mobility training, gait training, home exercise program, patient/family education, strengthening, stretching, transfer training  Plan of Care Reviewed With: patient, spouse  Progress: improving  Outcome Summary: Pt demonstrated improved progress with increased ambulation distance this session. Pt amb 230'+120' with SPC, CGA, on RA, and with one sitting rest break. O2 saturation dropped to 88% on RA and recovered to 95% within one minute of static sitting. Pt reported one episode of lightheadedness and mild LOB requiring a standing rest break for approximately one minute to recover. Continue with PT POC as pt tolerates.     Time Calculation:   PT Charges     Row Name 09/02/21 1500             Time Calculation    Start Time  1500  -       PT Received On  09/02/21  -HP         Time Calculation- PT    Total Timed Code Minutes- PT  26 minute(s)  -HP         Timed Charges    44407 - PT Therapeutic Exercise Minutes  10  -HP      73458 - Gait Training Minutes   16  -HP         Total Minutes    Timed Charges Total Minutes  26  -HP       Total Minutes  26  -HP        User Key  (r) = Recorded By, (t) = Taken By, (c) = Cosigned By    Initials Name Provider Type     Sonia Sawant, PT Physical Therapist        Therapy Charges for Today     Code Description Service Date Service Provider Modifiers Qty    10413869784 HC PT THER PROC EA 15 MIN 9/2/2021 Sonia Sawant, PT GP 1    76702895078 HC GAIT TRAINING EA 15 MIN 9/2/2021 Sonia Sawant, PT GP 1          PT G-Codes  Outcome Measure Options: AM-PAC 6 Clicks Basic Mobility (PT)  AM-PAC 6 Clicks Score (PT): 20    Sonia Sawant, PT  9/2/2021

## 2021-09-02 NOTE — PLAN OF CARE
Goal Outcome Evaluation:  Plan of Care Reviewed With: patient, spouse        Progress: improving  Outcome Summary: Pt demonstrated improved progress with increased ambulation distance this session. Pt amb 230'+120' with SPC, CGA, on RA, and with one sitting rest break. O2 saturation dropped to 88% on RA and recovered to 95% within one minute of static sitting. Pt reported one episode of lightheadedness and mild LOB requiring a standing rest break for approximately one minute to recover. Continue with PT POC as pt tolerates.

## 2021-09-02 NOTE — PLAN OF CARE
VSS, NSR, 2l NC, SiPAP at night. UOP adequate. Last BM 8/31. No complaints of pain or n/v. Patient slept well throughout the night. Will continue to monitor.     Problem: Obstructive Sleep Apnea Risk or Actual (Comorbidity Management)  Goal: Unobstructed Breathing During Sleep  Outcome: Ongoing, Progressing     Problem: Adult Inpatient Plan of Care  Goal: Plan of Care Review  Outcome: Ongoing, Progressing  Goal: Patient-Specific Goal (Individualized)  Outcome: Ongoing, Progressing  Goal: Absence of Hospital-Acquired Illness or Injury  Outcome: Ongoing, Progressing  Intervention: Identify and Manage Fall Risk  Recent Flowsheet Documentation  Taken 9/2/2021 0400 by Vianca Villa, RN  Safety Promotion/Fall Prevention:   activity supervised   assistive device/personal items within reach   clutter free environment maintained   fall prevention program maintained   nonskid shoes/slippers when out of bed   room organization consistent   safety round/check completed  Taken 9/2/2021 0200 by Vianca Villa, RN  Safety Promotion/Fall Prevention:   activity supervised   assistive device/personal items within reach   clutter free environment maintained   fall prevention program maintained   nonskid shoes/slippers when out of bed   room organization consistent   safety round/check completed  Taken 9/2/2021 0000 by Vianca Villa, RN  Safety Promotion/Fall Prevention:   activity supervised   assistive device/personal items within reach   clutter free environment maintained   fall prevention program maintained   nonskid shoes/slippers when out of bed   safety round/check completed   room organization consistent  Taken 9/1/2021 2200 by Vianca Villa, RN  Safety Promotion/Fall Prevention:   activity supervised   assistive device/personal items within reach   clutter free environment maintained   fall prevention program maintained   nonskid shoes/slippers when out of bed   room organization consistent   safety round/check completed  Taken  9/1/2021 2000 by Vianca Villa RN  Safety Promotion/Fall Prevention:   activity supervised   assistive device/personal items within reach   clutter free environment maintained   fall prevention program maintained   nonskid shoes/slippers when out of bed   room organization consistent   safety round/check completed  Intervention: Prevent Skin Injury  Recent Flowsheet Documentation  Taken 9/2/2021 0400 by Vianca Villa RN  Body Position: position changed independently  Skin Protection:   incontinence pads utilized   transparent dressing maintained   tubing/devices free from skin contact  Taken 9/2/2021 0200 by Vianca Villa RN  Body Position: position changed independently  Skin Protection:   adhesive use limited   incontinence pads utilized   transparent dressing maintained   tubing/devices free from skin contact  Taken 9/2/2021 0000 by Vianca Villa RN  Body Position: position changed independently  Skin Protection:   adhesive use limited   incontinence pads utilized   transparent dressing maintained   tubing/devices free from skin contact  Taken 9/1/2021 2200 by Vianca Villa RN  Body Position: position changed independently  Skin Protection:   incontinence pads utilized   adhesive use limited   tubing/devices free from skin contact   transparent dressing maintained  Taken 9/1/2021 2000 by Vianca Villa RN  Body Position: position changed independently  Skin Protection:   adhesive use limited   incontinence pads utilized   transparent dressing maintained   tubing/devices free from skin contact  Intervention: Prevent Infection  Recent Flowsheet Documentation  Taken 9/2/2021 0400 by Vianca Villa RN  Infection Prevention:   rest/sleep promoted   personal protective equipment utilized   hand hygiene promoted   equipment surfaces disinfected  Taken 9/2/2021 0200 by Vianca Villa RN  Infection Prevention:   rest/sleep promoted   personal protective equipment utilized   hand hygiene promoted   equipment surfaces  disinfected  Taken 9/2/2021 0000 by Vianca Villa, RN  Infection Prevention:   rest/sleep promoted   equipment surfaces disinfected   hand hygiene promoted   personal protective equipment utilized  Taken 9/1/2021 2200 by Vianca Villa RN  Infection Prevention:   rest/sleep promoted   personal protective equipment utilized   hand hygiene promoted   equipment surfaces disinfected  Taken 9/1/2021 2000 by Vianca Villa, RN  Infection Prevention:   rest/sleep promoted   personal protective equipment utilized   hand hygiene promoted   equipment surfaces disinfected  Goal: Optimal Comfort and Wellbeing  Outcome: Ongoing, Progressing  Intervention: Provide Person-Centered Care  Recent Flowsheet Documentation  Taken 9/1/2021 2000 by Vianca Villa, RN  Trust Relationship/Rapport:   care explained   choices provided   emotional support provided   empathic listening provided   questions answered   reassurance provided   thoughts/feelings acknowledged   questions encouraged  Goal: Readiness for Transition of Care  Outcome: Ongoing, Progressing   Goal Outcome Evaluation:

## 2021-09-03 ENCOUNTER — READMISSION MANAGEMENT (OUTPATIENT)
Dept: CALL CENTER | Facility: HOSPITAL | Age: 71
End: 2021-09-03

## 2021-09-03 VITALS
BODY MASS INDEX: 34.17 KG/M2 | DIASTOLIC BLOOD PRESSURE: 65 MMHG | WEIGHT: 225.5 LBS | TEMPERATURE: 98 F | OXYGEN SATURATION: 96 % | HEART RATE: 56 BPM | SYSTOLIC BLOOD PRESSURE: 133 MMHG | HEIGHT: 68 IN | RESPIRATION RATE: 18 BRPM

## 2021-09-03 PROBLEM — K75.81 LIVER CIRRHOSIS SECONDARY TO NASH: Chronic | Status: ACTIVE | Noted: 2021-09-03

## 2021-09-03 PROBLEM — K76.6 PORTAL HYPERTENSIVE GASTROPATHY: Chronic | Status: ACTIVE | Noted: 2021-09-03

## 2021-09-03 PROBLEM — R77.8 ELEVATED TROPONIN: Status: RESOLVED | Noted: 2021-08-30 | Resolved: 2021-09-03

## 2021-09-03 PROBLEM — E87.5 HYPERKALEMIA: Status: RESOLVED | Noted: 2021-08-30 | Resolved: 2021-09-03

## 2021-09-03 PROBLEM — K74.60 CIRRHOSIS OF LIVER: Chronic | Status: ACTIVE | Noted: 2021-09-03

## 2021-09-03 PROBLEM — R79.89 ELEVATED TROPONIN: Status: RESOLVED | Noted: 2021-08-30 | Resolved: 2021-09-03

## 2021-09-03 PROBLEM — D62 ACUTE BLOOD LOSS ANEMIA: Status: RESOLVED | Noted: 2021-09-03 | Resolved: 2021-09-03

## 2021-09-03 PROBLEM — K31.89 PORTAL HYPERTENSIVE GASTROPATHY: Status: ACTIVE | Noted: 2021-09-03

## 2021-09-03 PROBLEM — K75.81 LIVER CIRRHOSIS SECONDARY TO NASH (HCC): Status: ACTIVE | Noted: 2021-09-03

## 2021-09-03 PROBLEM — K92.2 ACUTE GI BLEEDING: Status: RESOLVED | Noted: 2021-08-30 | Resolved: 2021-09-03

## 2021-09-03 PROBLEM — K31.89 PORTAL HYPERTENSIVE GASTROPATHY: Chronic | Status: ACTIVE | Noted: 2021-09-03

## 2021-09-03 PROBLEM — E03.9 HYPOTHYROIDISM, ADULT: Chronic | Status: ACTIVE | Noted: 2020-11-10

## 2021-09-03 PROBLEM — K74.60 CIRRHOSIS OF LIVER: Status: ACTIVE | Noted: 2021-09-03

## 2021-09-03 PROBLEM — D62 ACUTE BLOOD LOSS ANEMIA: Status: ACTIVE | Noted: 2021-09-03

## 2021-09-03 PROBLEM — K76.6 PORTAL HYPERTENSIVE GASTROPATHY: Status: ACTIVE | Noted: 2021-09-03

## 2021-09-03 PROBLEM — D64.9 SYMPTOMATIC ANEMIA: Status: RESOLVED | Noted: 2021-08-30 | Resolved: 2021-09-03

## 2021-09-03 PROBLEM — D50.0 CHRONIC BLOOD LOSS ANEMIA: Chronic | Status: ACTIVE | Noted: 2021-08-29

## 2021-09-03 LAB
ANION GAP SERPL CALCULATED.3IONS-SCNC: 12 MMOL/L (ref 5–15)
BUN SERPL-MCNC: 30 MG/DL (ref 8–23)
BUN/CREAT SERPL: 20.5 (ref 7–25)
CALCIUM SPEC-SCNC: 8.8 MG/DL (ref 8.6–10.5)
CHLORIDE SERPL-SCNC: 104 MMOL/L (ref 98–107)
CO2 SERPL-SCNC: 19 MMOL/L (ref 22–29)
CREAT SERPL-MCNC: 1.46 MG/DL (ref 0.76–1.27)
DEPRECATED RDW RBC AUTO: 57.7 FL (ref 37–54)
ERYTHROCYTE [DISTWIDTH] IN BLOOD BY AUTOMATED COUNT: 19.2 % (ref 12.3–15.4)
GFR SERPL CREATININE-BSD FRML MDRD: 47 ML/MIN/1.73
GLUCOSE BLDC GLUCOMTR-MCNC: 173 MG/DL (ref 70–130)
GLUCOSE BLDC GLUCOMTR-MCNC: 258 MG/DL (ref 70–130)
GLUCOSE SERPL-MCNC: 165 MG/DL (ref 65–99)
HCT VFR BLD AUTO: 28.1 % (ref 37.5–51)
HGB BLD-MCNC: 8 G/DL (ref 13–17.7)
MCH RBC QN AUTO: 25.3 PG (ref 26.6–33)
MCHC RBC AUTO-ENTMCNC: 28.5 G/DL (ref 31.5–35.7)
MCV RBC AUTO: 88.9 FL (ref 79–97)
PLATELET # BLD AUTO: 75 10*3/MM3 (ref 140–450)
PMV BLD AUTO: 11.3 FL (ref 6–12)
POTASSIUM SERPL-SCNC: 4.2 MMOL/L (ref 3.5–5.2)
QT INTERVAL: 388 MS
QTC INTERVAL: 412 MS
RBC # BLD AUTO: 3.16 10*6/MM3 (ref 4.14–5.8)
SODIUM SERPL-SCNC: 135 MMOL/L (ref 136–145)
WBC # BLD AUTO: 4.44 10*3/MM3 (ref 3.4–10.8)

## 2021-09-03 PROCEDURE — 85027 COMPLETE CBC AUTOMATED: CPT | Performed by: INTERNAL MEDICINE

## 2021-09-03 PROCEDURE — 82962 GLUCOSE BLOOD TEST: CPT

## 2021-09-03 PROCEDURE — 99232 SBSQ HOSP IP/OBS MODERATE 35: CPT | Performed by: PHYSICIAN ASSISTANT

## 2021-09-03 PROCEDURE — 99239 HOSP IP/OBS DSCHRG MGMT >30: CPT | Performed by: FAMILY MEDICINE

## 2021-09-03 PROCEDURE — 63710000001 INSULIN DETEMIR PER 5 UNITS: Performed by: INTERNAL MEDICINE

## 2021-09-03 PROCEDURE — 63710000001 INSULIN LISPRO (HUMAN) PER 5 UNITS: Performed by: INTERNAL MEDICINE

## 2021-09-03 PROCEDURE — 80048 BASIC METABOLIC PNL TOTAL CA: CPT | Performed by: INTERNAL MEDICINE

## 2021-09-03 RX ORDER — PANTOPRAZOLE SODIUM 40 MG/1
40 TABLET, DELAYED RELEASE ORAL
Status: DISCONTINUED | OUTPATIENT
Start: 2021-09-04 | End: 2021-09-03 | Stop reason: HOSPADM

## 2021-09-03 RX ORDER — DULOXETIN HYDROCHLORIDE 20 MG/1
20 CAPSULE, DELAYED RELEASE ORAL DAILY
Status: DISCONTINUED | OUTPATIENT
Start: 2021-09-04 | End: 2021-09-03 | Stop reason: HOSPADM

## 2021-09-03 RX ORDER — DULOXETIN HYDROCHLORIDE 20 MG/1
20 CAPSULE, DELAYED RELEASE ORAL DAILY
Qty: 14 CAPSULE | Refills: 0 | Status: SHIPPED | OUTPATIENT
Start: 2021-09-04 | End: 2021-12-15

## 2021-09-03 RX ORDER — SPIRONOLACTONE 25 MG/1
25 TABLET ORAL DAILY
Qty: 30 TABLET | Refills: 1 | Status: SHIPPED | OUTPATIENT
Start: 2021-09-03 | End: 2021-10-04 | Stop reason: SDUPTHER

## 2021-09-03 RX ORDER — CARVEDILOL 6.25 MG/1
6.25 TABLET ORAL EVERY 12 HOURS SCHEDULED
Qty: 60 TABLET | Refills: 1 | Status: SHIPPED | OUTPATIENT
Start: 2021-09-03 | End: 2021-09-28 | Stop reason: SDUPTHER

## 2021-09-03 RX ORDER — LISINOPRIL 5 MG/1
5 TABLET ORAL DAILY
Qty: 30 TABLET | Refills: 1 | Status: SHIPPED | OUTPATIENT
Start: 2021-09-03 | End: 2021-09-28 | Stop reason: SDUPTHER

## 2021-09-03 RX ADMIN — LEVOTHYROXINE SODIUM 50 MCG: 50 TABLET ORAL at 05:17

## 2021-09-03 RX ADMIN — INSULIN LISPRO 5 UNITS: 100 INJECTION, SOLUTION INTRAVENOUS; SUBCUTANEOUS at 12:25

## 2021-09-03 RX ADMIN — ATORVASTATIN CALCIUM 20 MG: 20 TABLET, FILM COATED ORAL at 08:03

## 2021-09-03 RX ADMIN — DULOXETINE HYDROCHLORIDE 30 MG: 30 CAPSULE, DELAYED RELEASE ORAL at 08:03

## 2021-09-03 RX ADMIN — GABAPENTIN 300 MG: 300 CAPSULE ORAL at 05:18

## 2021-09-03 RX ADMIN — SODIUM CHLORIDE, PRESERVATIVE FREE 10 ML: 5 INJECTION INTRAVENOUS at 08:03

## 2021-09-03 RX ADMIN — INSULIN LISPRO 2 UNITS: 100 INJECTION, SOLUTION INTRAVENOUS; SUBCUTANEOUS at 08:03

## 2021-09-03 RX ADMIN — CARVEDILOL 3.12 MG: 3.12 TABLET, FILM COATED ORAL at 08:03

## 2021-09-03 RX ADMIN — SERTRALINE HYDROCHLORIDE 100 MG: 100 TABLET ORAL at 08:03

## 2021-09-03 RX ADMIN — SODIUM CHLORIDE, PRESERVATIVE FREE 10 ML: 5 INJECTION INTRAVENOUS at 08:07

## 2021-09-03 RX ADMIN — SODIUM BICARBONATE 650 MG TABLET 650 MG: at 08:03

## 2021-09-03 RX ADMIN — INSULIN LISPRO 5 UNITS: 100 INJECTION, SOLUTION INTRAVENOUS; SUBCUTANEOUS at 08:03

## 2021-09-03 RX ADMIN — PANTOPRAZOLE SODIUM 40 MG: 40 INJECTION, POWDER, FOR SOLUTION INTRAVENOUS at 08:03

## 2021-09-03 RX ADMIN — IPRATROPIUM BROMIDE 2 SPRAY: 42 SPRAY NASAL at 08:06

## 2021-09-03 RX ADMIN — INSULIN LISPRO 4 UNITS: 100 INJECTION, SOLUTION INTRAVENOUS; SUBCUTANEOUS at 12:25

## 2021-09-03 RX ADMIN — INSULIN DETEMIR 20 UNITS: 100 INJECTION, SOLUTION SUBCUTANEOUS at 08:04

## 2021-09-03 NOTE — PROGRESS NOTES
"GI Daily Progress Note  Subjective:    Chief Complaint:  Follow up GI bleed     Mr. Funez denies any further melena.   No acute complaints.      Objective:    /66 (BP Location: Right arm, Patient Position: Sitting)   Pulse 62   Temp 98.4 °F (36.9 °C) (Oral)   Resp 16   Ht 172.7 cm (68\")   Wt 102 kg (225 lb 8 oz)   SpO2 98%   BMI 34.29 kg/m²     Physical Exam  Constitutional:       General: He is not in acute distress.  Cardiovascular:      Rate and Rhythm: Normal rate and regular rhythm.   Pulmonary:      Effort: Pulmonary effort is normal.   Abdominal:      General: Bowel sounds are normal.      Palpations: Abdomen is soft.      Tenderness: There is no abdominal tenderness.   Neurological:      Mental Status: He is alert and oriented to person, place, and time.   Psychiatric:         Behavior: Behavior normal.         Lab  Lab Results   Component Value Date    WBC 4.44 09/03/2021    HGB 8.0 (L) 09/03/2021    HGB 7.4 (L) 09/02/2021    HGB 7.8 (L) 09/01/2021    MCV 88.9 09/03/2021    PLT 75 (L) 09/03/2021    INR 1.14 08/30/2021       Lab Results   Component Value Date    GLUCOSE 165 (H) 09/03/2021    BUN 30 (H) 09/03/2021    CREATININE 1.46 (H) 09/03/2021    EGFRIFNONA 47 (L) 09/03/2021    EGFRIFAFRI 66 07/14/2015    BCR 20.5 09/03/2021     (L) 09/03/2021    K 4.2 09/03/2021    CO2 19.0 (L) 09/03/2021    CALCIUM 8.8 09/03/2021    PROTENTOTREF 7.7 07/14/2015    ALBUMIN 3.90 08/29/2021    ALKPHOS 99 08/29/2021    BILITOT 0.4 08/29/2021    ALT 17 08/29/2021    AST 22 08/29/2021      Ref. Range 8/29/2021 22:29   ALPHA-FETOPROTEIN Latest Ref Range: 0 - 8.3 ng/mL 2.38      Ref. Range 8/31/2021 06:21   Hep A Total Ab Latest Ref Range: Negative  Positive (A)   He is immune to hepatitis A.     Assessment:    1. Acute GI bleed with melena, related to portal hypertensive gastropathy, hemoglobin stable.  2. Acute blood loss anemia, stable  3. Liver cirrhosis without ascites.  MELD-Na is 19.   Newly " diagnosed.   Suspect JEROME.     4. Thrombocytopenia   5. Iron deficiency   6. REBECA on CKD     Plan:    >>> Increase Coreg to 6.25 mg BID   >>> Change IV Protonix to once daily PO   >>> Cymbalta is contraindicated in cirrhosis.   He is currently on 30 mg daily for the last year.   Recommend decreasing dose to 20 mg daily for gradual taper to discontinuation over the next 4 weeks.    >>> Encourage high protein diet (1.2 gm/kg).  His goal is 120 gm protein daily     Okay from a GI standpoint for discharge home today.   Outpatient follow up with [facility] in 4-6 weeks.      GONZALO Machuca  09/03/21  09:03 EDT

## 2021-09-03 NOTE — CASE MANAGEMENT/SOCIAL WORK
Case Management Discharge Note      Final Note: I have met with Mr. Funez ang his wife to discuss today's possible discharge.  He states that he will be returning home and that his wife will provide his transportation.  Mr. Funez is agreeable to outpatient PT and requests a referral be submitted to [facility].  I have submitted this referral and verified with [facility] the fax # [phone].  Mr. Funez denies having any other discharge needs at this time.         Selected Continued Care - Admitted Since 8/29/2021     Destination    No services have been selected for the patient.              Durable Medical Equipment    No services have been selected for the patient.              Dialysis/Infusion    No services have been selected for the patient.              Home Medical Care    No services have been selected for the patient.              Therapy     Service Provider Selected Services Address Phone Fax Patient Preferred    [facility] [place]  [facility] [address] [place] [facility] [address] [phone] --          Community Resources    No services have been selected for the patient.              Community & DME    No services have been selected for the patient.                  Transportation Services  Private: Car    Final Discharge Disposition Code: 01 - home or self-care

## 2021-09-03 NOTE — OUTREACH NOTE
Prep Survey      Responses   [facility] facility patient discharged from?  [place]   Is LACE score < 7 ?  No   Emergency Room discharge w/ pulse ox?  No   Eligibility  [facility]   Date of Admission  08/29/21   Date of Discharge  09/03/21   Discharge Disposition  Home or Self Care   Discharge diagnosis  Symptomatic anemia  Acute GI bleeding   Does the patient have one of the following disease processes/diagnoses(primary or secondary)?  Other   Does the patient have Home health ordered?  No   Is there a DME ordered?  No   Prep survey completed?  Yes          Floridalma Siegel RN

## 2021-09-03 NOTE — PLAN OF CARE
Problem: Obstructive Sleep Apnea Risk or Actual (Comorbidity Management)  Goal: Unobstructed Breathing During Sleep  Outcome: Ongoing, Progressing  Intervention: Monitor and Manage Obstructive Sleep Apnea  Description: Use a validated screening tool to identify risk for obstructive sleep apnea (FAVIO).  Implement continuous pulse oximetry to detect apnea-related oxygen desaturation events.  Encourage a nonsupine sleep position to prevent airway collapse (e.g., side-lying, head of bed elevated).  Minimize use of sedative, opioid and benzodiazepine medication that may contribute to respiratory depression.  Provide oxygen therapy during sleep to reduce hypoxemia.  Continue use of home prescribed continuous positive airway pressure during sleep; utilize home device and settings if available.  Implement continuous positive airway pressure during sleep for high-risk obstructive sleep apnea patients or patients not compliant with home therapy (auto-titrating may improve tolerance).  Facilitate referral following discharge if diagnosis has not been confirmed by polysomnograph.  Flowsheets (Taken 9/3/2021 0042)  NPPV/CPAP Maintenance: home PAP equipment/settings used     Problem: Adult Inpatient Plan of Care  Goal: Absence of Hospital-Acquired Illness or Injury  Intervention: Identify and Manage Fall Risk  Description: Perform standard risk assessment with a validated tool or comprehensive approach appropriate to the patient on admission; reassess fall risk frequently, with change in status or transfer to another level of care.  Communicate fall injury risk to interprofessional healthcare team.  Determine need for increased observation, equipment and environmental modification, such as low bed and signage, as well as supportive, nonskid footwear.  Adjust safety measures to individual developmental age, stage and identified risk factors.  Reinforce the importance of safety and physical activity with patient and  family.  Perform regular intentional rounding to assess need for position change, pain assessment, personal needs, including assistance with toileting.  Recent Flowsheet Documentation  Taken 9/3/2021 0000 by Josh Castillo RN  Safety Promotion/Fall Prevention:   activity supervised   assistive device/personal items within reach   clutter free environment maintained   elopement precautions   fall prevention program maintained   nonskid shoes/slippers when out of bed   safety round/check completed  Taken 9/2/2021 2200 by Josh Castillo RN  Safety Promotion/Fall Prevention:   activity supervised   assistive device/personal items within reach   clutter free environment maintained   elopement precautions   fall prevention program maintained   nonskid shoes/slippers when out of bed   safety round/check completed  Taken 9/2/2021 2000 by Josh Castillo RN  Safety Promotion/Fall Prevention:   activity supervised   assistive device/personal items within reach   clutter free environment maintained   elopement precautions   fall prevention program maintained   nonskid shoes/slippers when out of bed   safety round/check completed  Intervention: Prevent Skin Injury  Description: Assess skin risk on admission and at regular intervals throughout hospital stay.  Keep all areas of skin (especially folds) clean and dry.  Maintain adequate skin hydration.  Relieve and redistribute pressure and protect bony prominences; implement measures based on patient-specific risk factors.  Match turning and repositioning schedule to clinical condition.  Encourage weight shift frequently; assist with reposition if unable to complete independently.  Float heels off bed. Avoid pressure on the Achilles tendon.  Keep skin free from extended contact with medical devices.  Use aids (e.g., slide boards, mechanical lift) during transfer.  Recent Flowsheet Documentation  Taken 9/3/2021 0000 by Josh Castillo RN  Body Position: position changed independently  Taken  9/2/2021 2200 by Josh Castillo RN  Body Position: position changed independently  Taken 9/2/2021 2000 by Josh Castillo RN  Body Position: position changed independently  Skin Protection:   adhesive use limited   incontinence pads utilized   protective footwear used   skin-to-skin areas padded   tubing/devices free from skin contact  Intervention: Prevent and Manage VTE (venous thromboembolism) Risk  Description: Assess for VTE risk.  Encourage/assist with early ambulation.  Initiate and maintain compression or other therapy, as indicated based on identified risk in accordance with organizational protocol/provider order.  Encourage both active and passive leg exercises while in bed, if unable to ambulate.  Recent Flowsheet Documentation  Taken 9/2/2021 2000 by Josh Castillo RN  VTE Prevention/Management:   bilateral   dorsiflexion/plantar flexion performed   bleeding risk factor(s) identified  Intervention: Prevent Infection  Description: Maintain skin and mucous membrane integrity; promote hand, oral and pulmonary hygiene.  Optimize fluid balance, nutrition, sleep and glycemic control to maximize infection resistance.  Identify potential sources of infection early to prevent or mitigate progression of infection (e.g., wound, lines, devices).  Evaluate ongoing need for invasive devices; remove promptly when no longer indicated.  Recent Flowsheet Documentation  Taken 9/2/2021 2000 by Josh Castillo RN  Infection Prevention:   environmental surveillance performed   hand hygiene promoted   rest/sleep promoted   single patient room provided  Goal: Optimal Comfort and Wellbeing  Intervention: Provide Person-Centered Care  Description: Use a family-focused approach to care.  Develop trust and rapport by proactively providing information, encouraging questions, addressing concerns and offering reassurance.  Acknowledge emotional response to hospitalization.  Recognize and utilize personal coping strategies.  Honor spiritual and  cultural preferences.  Recent Flowsheet Documentation  Taken 9/2/2021 2000 by Josh Castillo, RN  Trust Relationship/Rapport:   care explained   choices provided   emotional support provided   empathic listening provided   questions answered   questions encouraged   reassurance provided   thoughts/feelings acknowledged   Goal Outcome Evaluation:

## 2021-09-03 NOTE — PROGRESS NOTES
[facility] Medicine Services  PROGRESS NOTE    Patient Name: Elton Funez  : 1950  MRN: [id number]    Date of Admission: 2021  Primary Care Physician: Tc Ramirez MD    Subjective   Subjective     CC:  Shortness of breath    HPI:  Feeling better, no further melena    ROS:  Gen- No fevers, chills  CV- No chest pain, palpitations  Resp- No cough, dyspnea  GI- No N/V/D, abd pain          Objective   Objective     Vital Signs:   Temp:  [98.2 °F (36.8 °C)-98.5 °F (36.9 °C)] 98.5 °F (36.9 °C)  Heart Rate:  [60-71] 66  Resp:  [18] 18  BP: (113-129)/(58-72) 129/72  Flow (L/min):  [2-6] 2     Physical Exam:  Constitutional - no acute distress, nontoxic, in bed  HEENT-NCAT, mucous membranes moist  CV-RRR, S1 S2 normal, no m/r/g  Resp-CTAB, no wheezes, rhonchi or rales  Abd-soft, nontender, nondistended, normoactive bowel sounds  Ext-No lower extremity cyanosis, clubbing or edema bilaterally  Neuro-alert and oriented, speech clear, moves all extremities   Psych-normal affect   Skin- No rash on exposed UE or LE bilaterally        Results Reviewed:  LAB RESULTS:      Lab 21  0513 21  1743 21  0612 21  2334 21  1837 21  0751 21  0619 21  1513 21  0958 21  0405 21  0307 21  2229 21  2229   WBC 4.82  --  6.46  --   --   --  7.97  --  6.60  --   --   --  7.18   HEMOGLOBIN 7.4* 7.8* 8.3* 7.5* 7.7*   < > 6.7*   < > 6.5*  --   --    < > 5.7*   HEMATOCRIT 25.4* 27.0* 28.1* 25.0* 27.0*   < > 23.1*   < > 22.2*  --   --    < > 19.7*   PLATELETS 77*  --  86*  --   --   --  84*  --  89*  --   --   --  97*   NEUTROS ABS  --   --  4.74  --   --   --   --   --  4.60  --   --   --  5.40   IMMATURE GRANS (ABS)  --   --  0.04  --   --   --   --   --  0.06*  --   --   --  0.06*   LYMPHS ABS  --   --  0.63*  --   --   --   --   --  0.81  --   --   --  0.74   MONOS ABS  --   --  0.90  --   --   --   --   --  0.94*  --   --    --  0.77   EOS ABS  --   --  0.13  --   --   --   --   --  0.17  --   --   --  0.19   MCV 87.9  --  85.7  --   --   --  86.2  --  86.7  --   --   --  90.8   LACTATE  --   --   --   --   --   --   --   --   --  1.6  --   --  2.5*   PROTIME  --   --   --   --   --   --   --   --   --   --  14.2  --   --    APTT  --   --   --   --   --   --   --   --   --   --  26.7  --   --     < > = values in this interval not displayed.         Lab 09/02/21  0513 09/01/21  1038 09/01/21  0613 08/31/21  1201 08/31/21  0619 08/30/21  1931 08/30/21  0958 08/29/21 2229 08/29/21 2229   SODIUM 133*  --  136  --  137  --  135*  --  132*   POTASSIUM 4.1 4.3 5.1 5.3* 5.4*   < > 5.9*   < > 5.8*   CHLORIDE 101  --  103  --  106  --  106  --  103   CO2 21.0*  --  21.0*  --  21.0*  --  17.0*  --  17.0*   ANION GAP 11.0  --  12.0  --  10.0  --  12.0  --  12.0   BUN 31*  --  28*  --  36*  --  45*  --  46*   CREATININE 1.56*  --  1.66*  --  1.68*  --  1.75*  --  1.83*   GLUCOSE 193*  --  230*  --  252*  --  220*  --  331*   CALCIUM 8.7  --  9.2  --  8.6  --  8.8  --  8.6   HEMOGLOBIN A1C  --   --   --   --   --   --  7.60*  --   --     < > = values in this interval not displayed.         Lab 08/29/21 2229   TOTAL PROTEIN 7.1   ALBUMIN 3.90   GLOBULIN 3.2   ALT (SGPT) 17   AST (SGOT) 22   BILIRUBIN 0.4   ALK PHOS 99         Lab 09/01/21  0613 08/31/21  0619 08/30/21  1513 08/30/21  0958 08/30/21  0307 08/29/21 2229   PROBNP 447.6 621.6  --  487.2  --  352.9   TROPONIN T  --   --  0.031* 0.026  --  0.033*   PROTIME  --   --   --   --  14.2  --    INR  --   --   --   --  1.14  --              Lab 09/02/21  0513 08/30/21  0053 08/30/21  0012 08/30/21  0012 08/29/21 2229 08/29/21 2229   IRON 36*  --   --   --    < > 27*  27*   IRON SATURATION 6*  --   --   --    < > 4*   TIBC 557*  --   --   --    < > 754*   TRANSFERRIN 374*  --   --   --    < > 506*   FERRITIN  --   --   --   --   --  8.69*   FOLATE  --   --   --   --   --  >20.00   VITAMIN B  12  --   --   --   --   --  614   ABO TYPING  --  O   < > O  --   --    RH TYPING  --  Negative   < > Negative  --   --    ANTIBODY SCREEN  --   --   --  Negative  --   --     < > = values in this interval not displayed.         Brief Urine Lab Results  (Last result in the past 365 days)      Color   Clarity   Blood   Leuk Est   Nitrite   Protein   CREAT   Urine HCG        08/30/21 0411 Dark Yellow Clear Negative Negative Negative Trace               Microbiology Results Abnormal     Procedure Component Value - Date/Time    COVID PRE-OP / PRE-PROCEDURE SCREENING ORDER (NO ISOLATION) - Swab, Nasopharynx [[id number]]  (Normal) Collected: 08/30/21 0016    Lab Status: Final result Specimen: Swab from Nasopharynx Updated: 08/30/21 0109    Narrative:      The following orders were created for panel order COVID PRE-OP / PRE-PROCEDURE SCREENING ORDER (NO ISOLATION) - Swab, Nasopharynx.  Procedure                               Abnormality         Status                     ---------                               -----------         ------                     COVID-19, ABBOTT IN-HOUS...[[id number]]  Normal              Final result                 Please view results for these tests on the individual orders.    COVID-19, ABBOTT IN-HOUSE,NASAL Swab (NO TRANSPORT MEDIA) 2 HR TAT - Swab, Nasopharynx [[id number]]  (Normal) Collected: 08/30/21 0016    Lab Status: Final result Specimen: Swab from Nasopharynx Updated: 08/30/21 0109     COVID19 Presumptive Negative    Narrative:      Fact sheet for providers: https://www.fda.gov/media/624347/download     Fact sheet for patients: https://www.fda.gov/media/811388/download    Test performed by PCR.  If inconsistent with clinical signs and symptoms patient should be tested with different authorized molecular test.          No radiology results from the last 24 hrs    Results for orders placed during the hospital encounter of 08/29/21    Adult Transthoracic Echo Complete W/ Cont if Necessary  Per Protocol    Interpretation Summary  · Left ventricular ejection fraction appears to be 56 - 60%.  · Left ventricular diastolic dysfunction is noted.  · No pericardial effusion      I have reviewed the medications:  Scheduled Meds:atenolol, 50 mg, Oral, Daily  atorvastatin, 20 mg, Oral, Daily  DULoxetine, 30 mg, Oral, Daily  gabapentin, 300 mg, Oral, Q8H  insulin detemir, 20 Units, Subcutaneous, Q12H  insulin detemir, 5 Units, Subcutaneous, Once  insulin lispro, 0-7 Units, Subcutaneous, 4x Daily With Meals & Nightly  insulin lispro, 5 Units, Subcutaneous, TID With Meals  ipratropium, 2 spray, Each Nare, Daily  levothyroxine, 50 mcg, Oral, Q AM  pantoprazole, 40 mg, Intravenous, BID  sertraline, 100 mg, Oral, Daily  sodium bicarbonate, 650 mg, Oral, BID  sodium chloride, 10 mL, Intravenous, Q12H  sodium chloride, 10 mL, Intravenous, Q12H      Continuous Infusions:lactated ringers, 9 mL/hr  sodium chloride, 9 mL/hr, Last Rate: Stopped (09/01/21 0957)      PRN Meds:.dextrose  •  dextrose  •  glucagon (human recombinant)  •  guaiFENesin-dextromethorphan  •  hydrOXYzine  •  ipratropium-albuterol  •  lidocaine PF 1%  •  midazolam  •  sodium chloride  •  sodium chloride  •  sodium chloride    Assessment/Plan   Assessment & Plan     Active Hospital Problems    Diagnosis  POA   • **Symptomatic anemia [D64.9]  Yes   • Hyperkalemia [E87.5]  Yes   • Elevated troponin [R77.8]  Yes   • Acute GI bleeding [K92.2]  Yes   • Chronic blood loss anemia [D50.0]  Unknown   • Rectal bleeding [K62.5]  Unknown   • Hypothyroidism, adult [E03.9]  Yes   • DM (diabetes mellitus), type 2, uncontrolled w/neurologic complication (CMS/HCC) [E11.49, E11.65]  Yes   • FAVIO (obstructive sleep apnea) [G47.33]  Yes   • Chronic kidney disease, stage III (moderate) (CMS/[facility]) [N18.30]  Yes   • Gastroesophageal reflux disease without esophagitis [K21.9]  Yes   • Hyperlipidemia [E78.5]  Yes   • Hypertension [I10]  Yes      Resolved Hospital Problems   No  resolved problems to display.        Brief Hospital Course to date:  Elton Funez is a 71 y.o. male with history of DMII, depression, peripheral neuropatly, hypothyroid, HTN, Dyslipidemia, Chronc pain, CKD, GERD, anemia and obesity presents with worsening exertional dyspnea. Mr Funez has been suffering from shortness of breath for over a year, but this acutely worsened over the past several days. Concurrent dark stools noted with worsening shortness of breath.    Shortness of breath  - acute worsening likely secondary to profound anemia.   - Echo reveals normal EF with diastolic dsyfunction  - patient may need further workup for dyspnea if this persists after anemia corrected  - lasix prn    Anemia, iron deficient  - iron sat 6%, will give IV iron  - PPI  - received 3 units PRBCs total  - hold NSAIDS, discussed discontinuation with patient (taking meloxicam at home)  - upper endoscopy with no active bleeding, colonoscopy showed internal and external hemorrhoids -- suspect bleeding due to portal gastropathy  ----stop atenolol, begin coreg 3.125 BID and titrate up as tolerated to decrease portal pressures  ----continue PPI  ----oral iron supp at dc    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 Patient says he doesn't regularly follow a diabetic diet.  A1c 7.6  - levemir to 20 units bid, prandial insulin scheduled    FAVIO  - home cpap    Neuropathy    Hypothyroid    HTN  - holding ACEi due to hyperkalemia and borderline BP    CKDIII    Lung nodule  - 5mm, discussed with patient and family, followup imaging with PCP per Fleishner criteria    DVT prophylaxis: mechanical, mobilize  Mechanical DVT prophylaxis orders are present.       AM-PAC 6 Clicks Score (PT): 20 (09/02/21 2000)    Disposition: I expect the patient to be discharged  home likely am    CODE STATUS:   Code Status and Medical Interventions:   Ordered at: 08/30/21 0213     Level Of Support Discussed With:    Patient     Code Status:    CPR     Medical Interventions (Level of Support Prior to Arrest):    Full       Wilfredo Pepper MD  09/02/21

## 2021-09-03 NOTE — ED PROVIDER NOTES
Subjective   71-year-old male presents for evaluation of lightheadedness and shortness of breath.  The patient reports that he has slowly developed increased lightheadedness and generalized fatigue for the last 3 days.  He also reports feeling very short of breath and having chest pain with any activity.  He also states that he has had black appearing stool for the last 3 days.  The patient states he had a previous stress test that was nonrevealing.  He denies fever, body aches, or chills.  He denies any cough in association with the shortness of breath.  Activity exacerbates the shortness of breath and he reported chest pain.  He denies abdominal pain.  No reported diarrhea.  He feels his oral fluid intake has been good.  He denies any new or different medications.  No previous history of GI bleed in the past.  Does not take any anticoagulation.  No other acute complaints.          Review of Systems   Constitutional: Positive for fatigue. Negative for chills and fever.   HENT: Negative for congestion, ear pain, postnasal drip, sinus pressure and sore throat.    Eyes: Negative for pain, redness and visual disturbance.   Respiratory: Positive for shortness of breath. Negative for cough and chest tightness.    Cardiovascular: Positive for chest pain. Negative for palpitations and leg swelling.   Gastrointestinal: Positive for blood in stool. Negative for abdominal pain, anal bleeding, diarrhea, nausea and vomiting.   Endocrine: Negative for polydipsia and polyuria.   Genitourinary: Negative for difficulty urinating, dysuria, frequency and urgency.   Musculoskeletal: Negative for arthralgias, back pain and neck pain.   Skin: Negative for pallor and rash.   Allergic/Immunologic: Negative for environmental allergies and immunocompromised state.   Neurological: Positive for light-headedness. Negative for dizziness, weakness and headaches.   Hematological: Negative for adenopathy.   Psychiatric/Behavioral: Negative for  confusion, self-injury and suicidal ideas. The patient is not nervous/anxious.    All other systems reviewed and are negative.      Past Medical History:   Diagnosis Date   • Abscess of arm, right    • Abscess of skin of neck    • Acute sinusitis    • Allergic Childhood,  Adult    Ragweed. 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    • GERD (gastroesophageal reflux disease)    • HL (hearing loss)    • Hyperlipidemia    • Hypertension    • Hypothyroidism, adult 11/10/2020   • Infection     MSSA lumbar surgical wound infection 3/2017   • Jaw pain    • Left hip pain    • Low back pain     Surgery 29 yrs L4-5   • Migraine     Hx of   • Obesity 10/7/2016   • Obesity (BMI 30.0-34.9) 10/7/2016    BMI 31.92   • Obstructive sleep apnea     Hx of   • Quadriceps muscle strain    • Shigellosis    • Shigellosis    • Sleep apnea     PT TO BRING MASK AND TUBING DAY OF SURGERY   • Symptomatic anemia 8/30/2021   • 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    • Wears glasses    • Wears hearing aid     BOTH EARS   • Wears hearing aid     bilat ears       Allergies   Allergen Reactions   • Glucophage Xr [Metformin Hcl Er] Diarrhea and Other (See Comments)     Glucophage XR TB24: Adverse Reaction: Severe GI issues.   • Metformin Nausea And Vomiting     Other reaction(s): SEVERE INTESTIONAL ISSUES  Other reaction(s): SEVERE GI ISSUES    Glucophage XR TB24  MetFORMIN HCl TABS   • Tetracyclines & Related Nausea  Only     Adverse Reaction       Past Surgical History:   Procedure Laterality Date   • ANTERIOR CERVICAL DISCECTOMY W/ FUSION N/A 2017    Procedure: CERVICAL DISCECTOMY ANTERIOR FUSION WITH INSTRUMENTATION C7/T1;  Surgeon: Gigi Perales MD;  Location:  [facility];  Service:    • BACK SURGERY     • CERVICAL ARTHRODESIS     • CERVICAL DISCECTOMY POSTERIOR FUSION WITH INSTRUMENTATION N/A 3/16/2017    Procedure:  INCISION AND DRAINAGE OF POSTERIOR CERVICAL WOUND;  Surgeon: Gigi Perales MD;  Location:  [facility];  Service:    • CERVICAL LAMINECTOMY DECOMPRESSION POSTERIOR Left 2017    Procedure: Left C7-T1 CERVICAL FORAMINOTOMY POSTERIOR WITH METRIX;  Surgeon: Gigi Perales MD;  Location:  [facility];  Service:    • COLONOSCOPY         • COLONOSCOPY N/A 2021    Procedure: COLONOSCOPY;  Surgeon: Sharif García MD;  Location:  [facility];  Service: Gastroenterology;  Laterality: N/A;   • CYST REMOVAL  2021   • ENDOSCOPY     • ENDOSCOPY N/A 2021    Procedure: ESOPHAGOGASTRODUODENOSCOPY;  Surgeon: Brunner, Mark I, MD;  Location:  [facility];  Service: Gastroenterology;  Laterality: N/A;   • SPINE SURGERY  29 yrs L4-5    9 yrs C6-7 fused   • VASECTOMY     • WISDOM TOOTH EXTRACTION         Family History   Problem Relation Age of Onset   • Diabetes Father    • Heart disease Father    • Hyperlipidemia Father    • Stroke Father    • Diabetes Paternal Grandmother    • Hearing loss Mother    • Arthritis Mother    • Cancer Mother        Social History     Socioeconomic History   • Marital status:      Spouse name: Not on file   • Number of children: Not on file   • Years of education: Not on file   • Highest education level: Not on file   Tobacco Use   • Smoking status: Former Smoker     Packs/day: 1.00     Years: 5.00     Pack years: 5.00     Types: Cigarettes     Quit date:      Years since quittin.7   • Smokeless tobacco: Never Used   Substance  and Sexual Activity   • Alcohol use: Yes     Comment: Socially   • Drug use: No   • Sexual activity: Not Currently     Partners: Female     Birth control/protection: Surgical           Objective   Physical Exam  Vitals and nursing note reviewed.   Constitutional:       General: He is not in acute distress.     Appearance: Normal appearance. He is well-developed. He is ill-appearing. He is not toxic-appearing or diaphoretic.   HENT:      Head: Normocephalic and atraumatic.      Right Ear: External ear normal.      Left Ear: External ear normal.      Nose: Nose normal.   Eyes:      General: Lids are normal.      Pupils: Pupils are equal, round, and reactive to light.   Neck:      Trachea: No tracheal deviation.   Cardiovascular:      Rate and Rhythm: Normal rate and regular rhythm.      Pulses: No decreased pulses.      Heart sounds: Normal heart sounds. No murmur heard.   No friction rub. No gallop.    Pulmonary:      Effort: Pulmonary effort is normal. No respiratory distress.      Breath sounds: Normal breath sounds. No decreased breath sounds, wheezing, rhonchi or rales.   Abdominal:      General: Bowel sounds are normal.      Palpations: Abdomen is soft.      Tenderness: There is no abdominal tenderness. There is no guarding or rebound.   Genitourinary:     Rectum: Guaiac result positive. External hemorrhoid (no bleeding) present. No tenderness or anal fissure.       Musculoskeletal:         General: No deformity. Normal range of motion.      Cervical back: Normal range of motion and neck supple.   Lymphadenopathy:      Cervical: No cervical adenopathy.   Skin:     General: Skin is warm and dry.      Coloration: Skin is pale.      Findings: No rash.   Neurological:      Mental Status: He is alert and oriented to person, place, and time.      Cranial Nerves: No cranial nerve deficit.      Sensory: No sensory deficit.   Psychiatric:         Speech: Speech normal.         Behavior: Behavior normal.         Thought  Content: Thought content normal.         Judgment: Judgment normal.         Procedures           ED Course                                           MDM  Number of Diagnoses or Management Options  Acute GI bleeding: new and requires workup  Acute hyperkalemia: new and requires workup  Acute on chronic renal insufficiency: new and requires workup  Anemia, unspecified type: new and requires workup  Dyspnea, unspecified type: new and requires workup  Lightheadedness: new and requires workup  Diagnosis management comments: The patient is noted to be anemic in association with an acute GI bleed.  He also has an elevated troponin and has symptoms including chest pain or shortness of breath in association with the anemia.    Type and screen ordered, 1 unit of packed red blood cells will be transfused.    I discussed the patient with the hospitalist, who will admit.           Amount and/or Complexity of Data Reviewed  Clinical lab tests: ordered and reviewed  Tests in the radiology section of CPT®: reviewed and ordered  Decide to obtain previous medical records or to obtain history from someone other than the patient: yes  Review and summarize past medical records: yes  Discuss the patient with other providers: yes  Independent visualization of images, tracings, or specimens: yes        Final diagnoses:   Acute GI bleeding   Anemia, unspecified type   Lightheadedness   Dyspnea, unspecified type   Acute on chronic renal insufficiency   Acute hyperkalemia       ED Disposition  ED Disposition     ED Disposition Condition Comment    Decision to Admit  Level of Care: Telemetry [5]   Diagnosis: Acute GI bleeding [506100]   Admitting Physician: STACI MYERS [[id number]]   Attending Physician: STACI MYERS [[id number]]   Isolate for COVID?: No [0]   Certification: I Certify That Inpatient Hospital Services Are Medically Necessary For Greater Than 2 Midnights            No follow-up provider specified.       Medication List      ASK your  doctor about these medications    aspirin 81 MG chewable tablet  Ask about: Which instructions should I use?             Misty Song MD  09/03/21 [id number]

## 2021-09-03 NOTE — DISCHARGE PLACEMENT REQUEST
"Case management  [phone]    Elton Glover (71 y.o. 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)   Code Status: CPR    Ht: 172.7 cm (68\")   Wt: 102 kg (225 lb 8 oz)    Admission Cmt: None   Principal Problem: Symptomatic anemia [D64.9]                 Active Insurance as of 2021     Primary Coverage     Payor Plan Insurance Group Employer/Plan Group    UNITED HEALTHCARE MEDICARE REPLACEMENT UNITED [employer]ALTHCARE MEDICARE REPLACEMENT [id number]     Payor Plan Address Payor Plan Phone Number Payor Plan Fax Number Effective Dates    [address] [address]   2016 - None Entered    [facility] [id number]       Subscriber Name Subscriber Birth Date Member ID       ELTON GLOVER 1950 [id number]                 Emergency Contacts      (Rel.) Home Phone Work Phone Mobile Phone    Pooja Glover (Spouse) [phone] -- [phone]        [address] [address]  Phone:  [phone]  Fax:  [phone] Date: Sep 3, 2021      Ambulatory Referral to Physical Therapy Evaluate and treat     Patient:  Elton Glover MRN:  [id number]   [address] [id number] :  1950  SSN:    Phone: [phone] Sex:  M      INSURANCE PAYOR PLAN GROUP # SUBSCRIBER ID   Primary:    [place] [employer] MEDICARE REPLACEMENT [id number] "      Referring Provider Information:  LEVI ESTRADA Phone: [phone] Fax: [phone]      Referral Information:   # Visits:  1 Referral Type: Physical Therapy [AE1]   Urgency:  Routine Referral Reason: Specialty Services Required   Start Date: Sep 3, 2021 End Date:  To be determined by Insurer   Diagnosis: Diabetic peripheral neuropathy associated with type 2 diabetes mellitus (CMS/[facility]) (E11.42 [ICD-10-CM] 250.60,357.2 [ICD-9-CM])  Mild nonproliferative diabetic retinopathy of left eye without macular edema associated with type 2 diabetes mellitus (CMS/HCC) (E11.3292 [ICD-10-CM] 250.50,362.04 [ICD-9-CM])  Left hand weakness (R29.898 [ICD-10-CM] 728.87 [ICD-9-CM])  Cervical radiculopathy (M54.12 [ICD-10-CM] 723.4 [ICD-9-CM])  Obesity, Class I, BMI 30-34.9 (E66.9 [ICD-10-CM] 278.00 [ICD-9-CM])  Trigger index finger of right hand (M65.321 [ICD-10-CM] 727.03 [ICD-9-CM])      Refer to Dept:   Refer to Provider:   Refer to Facility:       Specialty needed: Evaluate and treat     This document serves as a request of services and does not constitute Insurance authorization or approval of services.  To determine eligibility, please contact the members Insurance carrier to verify and review coverage.     If you have medical questions regarding this request for services. Please contact [address] at [phone] during normal business hours.       Authorizing Provider:Levi Estrada MD  Authorizing Provider's NPI: [id number]  Order Entered By: Nely Henderson RN 9/3/2021 12:03 PM     Electronically signed by: Levi Estrada MD 9/3/2021 12:03 PM               History & Physical      Cari Harkins MD at 21 0147              [facility] Medicine Services  HISTORY AND PHYSICAL    Patient Name: Elton Funez  : 1950  MRN: [id number]  Primary Care Physician: Tc Ramirez MD  Date of admission: 2021      Subjective   Subjective     Chief Complaint:  Worsening  exertional dyspnea.    HPI:  Elton Funez is a 71 y.o. male  to ED who has somewhat chronic exertional dyspnea, presented to ED with  -Worsening exertional dyspnea over last 4 days along with some lightheadedness fatigue.  -Denies any associated complaint of chest pain, pedal edema or PND symptoms.  -Does complain of neda bleeding per rectum-at least 2 times over last 4 days, likely secondary to his hemorrhoids along with some dark stools.  -Does not complain of any overt abdominal pain or reflux symptoms.  -Has been on aspirin 81 mg and meloxicam 50 mg daily.  No known history of GI bleed, last endoscope and colonoscopy approximately 2 years ago.    -Does have a dry cough, do not complain of any wheezing, no complaint of exertional chest pain,    No complaints of orthopnea, PND, chest pain,cough, wheezing.  No complaints of fever, chills, sore throat,   No complaints of bleeding per  , or dysuria or decreased urine output.  Denies constipation, diarrhea, N/V, change in appetite.  No new joint pain, or skin rash.  No focal weakness, speech changes or vision changes.  No dysuria.        In ed pt got NS 1 liter,  Current COVID Risks are:  [] Fever []  Cough [] Shortness of breath [] Fatigue [] Change in taste or smell    [] Exposure to COVID positive patient  [] High risk facility   []  NONE  COVID VACCINE status    The patient has a COVID HM Topic on their chart, and they are fully vaccinated.      Review of Systems   Complete ROS done, which is negative except as above and HPI.  Old records reviewed and summarized in PM hx      Personal History     Past Medical History:   Diagnosis Date     Sleep apnea  Mood ds-Zoloft 100 mg p.o. daily, Cymbalta 30 mg p.o. daily  DM2-takes aspirin 81 mg for primary prophylaxis, Humulin R 500 units/mL, 150 units before breakfast, 150 units before dinner  Neuropathy-Neurontin 800 mg every 8  Hypothyroid-Synthroid 50 mcg  HLP-Zocor, Lopid  HTN-lisinopril/HCTZ 10--12.5 mg p.o.  daily, atenolol 50 mg p.o. daily  Chronic pain/back pain/neck pain-Mobic 15 mg as needed, Zanaflex 4 mg every 12  Morbidly [id number]-13  CKD  Anemia  GERD-omeprazole 40 mg p.o. daily    Past Surgical History:   Procedure Laterality Date   • ANTERIOR CERVICAL DISCECTOMY W/ FUSION N/A 12/14/2017    Procedure: CERVICAL DISCECTOMY ANTERIOR FUSION WITH INSTRUMENTATION C7/T1;  Surgeon: Gigi Perales MD;  Location:  [facility];  Service:    • BACK SURGERY     • CERVICAL ARTHRODESIS     • CERVICAL DISCECTOMY POSTERIOR FUSION WITH INSTRUMENTATION N/A 3/16/2017    Procedure:  INCISION AND DRAINAGE OF POSTERIOR CERVICAL WOUND;  Surgeon: Gigi Perales MD;  Location:  [facility];  Service:    • CERVICAL LAMINECTOMY DECOMPRESSION POSTERIOR Left 2/28/2017    Procedure: Left C7-T1 CERVICAL FORAMINOTOMY POSTERIOR WITH METRIX;  Surgeon: Gigi Perales MD;  Location:  [facility];  Service:    • COLONOSCOPY      2012   • CYST REMOVAL  04/2021   • ENDOSCOPY     • SPINE SURGERY  30 yrs L4-5    9 yrs C6-7 fused   • VASECTOMY     • WISDOM TOOTH EXTRACTION         Family History: family history includes Arthritis in his mother; Cancer in his mother; Diabetes in his father and paternal grandmother; Hearing loss in his mother; Heart disease in his father; Hyperlipidemia in his father; Stroke in his father. Otherwise pertinent FHx was reviewed and unremarkable.     Social History:  reports that he quit smoking about 45 years ago. His smoking use included cigarettes. He has a 5.00 pack-year smoking history. He has never used smokeless tobacco. He reports current alcohol use. He reports that he does not use drugs.      Medications:  Available home medication information reviewed.  (Not in a hospital admission)      Allergies   Allergen Reactions   • Glucophage Xr [Metformin Hcl Er] Diarrhea and Other (See Comments)     Glucophage XR TB24: Adverse Reaction: Severe GI issues.   • Metformin Nausea And Vomiting     Other  reaction(s): SEVERE INTESTIONAL ISSUES  Other reaction(s): SEVERE GI ISSUES    Glucophage XR TB24  MetFORMIN HCl TABS   • Tetracyclines & Related Nausea Only     Adverse Reaction       Objective   Objective     Vital Signs:   Temp:  [98.2 °F (36.8 °C)] 98.2 °F (36.8 °C)  Heart Rate:  [61-70] 61  Resp:  [20] 20-92% on room air, 100% on 4 L nasal cannula.  BP: (116-124)/(41-67) 119/65        Physical Exam     GENERAL-slightly distressed, obese  RS- CTA-BL, ,  No wheezing , no crackles, good effort.  CVS- s1s2 Regular, no murmur.  ABD- soft, nontender,  distended, no organomegaly.  EXT- no edema.  NEURO- AAO-3, power 5/5 in all ext, no gross sensory deficit, cranial nerves intact.  EYES- Conjunctivae are normal. Pupils are equal, round, and reactive to light. No scleral icterus.   ENT- no external ear nose lesions, mucosa dry.  NECK- No JVD present. No tracheal deviation present. No thyromegaly present,No cervical lymphadenopathy.  JOINTS/MSK- no deformity, no swelling.  SKIN- no rash , warm to touch.  PSYCHIATRIC- Normal mood and affect. Behavior is normal. Thought content normal.     Results Reviewed:  I have personally reviewed current lab and radiology data.    Results from last 7 days   Lab Units 08/29/21  2229   WBC 10*3/mm3 7.18   HEMOGLOBIN g/dL 5.7*   HEMATOCRIT % 19.7*   PLATELETS 10*3/mm3 97*     Results from last 7 days   Lab Units 08/29/21  2229   SODIUM mmol/L 132*   POTASSIUM mmol/L 5.8*   CHLORIDE mmol/L 103   CO2 mmol/L 17.0*   BUN mg/dL 46*   CREATININE mg/dL 1.83*   GLUCOSE mg/dL 331*   CALCIUM mg/dL 8.6   ALT (SGPT) U/L 17   AST (SGOT) U/L 22   TROPONIN T ng/mL 0.033*   PROBNP pg/mL 352.9     Estimated Creatinine Clearance: 41.8 mL/min (A) (by C-G formula based on SCr of 1.83 mg/dL (H)).  Brief Urine Lab Results  (Last result in the past 365 days)      Color   Clarity   Blood   Leuk Est   Nitrite   Protein   CREAT   Urine HCG        05/13/21 1128             100       05/13/21 1128 Yellow Clear  Negative Negative Negative Negative             Imaging Results (Last 24 Hours)     Procedure Component Value Units Date/Time    CT Abdomen Pelvis Without Contrast [[id number]] Collected: 08/30/21 0143     Updated: 08/30/21 0145    Narrative:      CT Abdomen Pelvis WO    INDICATION:   GI bleed and anemia.    TECHNIQUE:   CT of the abdomen and pelvis without IV contrast. Coronal and sagittal reconstructions were obtained.  Radiation dose reduction techniques included automated exposure control or exposure modulation based on body size. Count of known CT and cardiac nuc  med studies performed in previous 12 months: 0.     COMPARISON:   None available.    FINDINGS:  Abdomen: Included lung bases are clear. 5 mm subpleural noncalcified nodule in the right middle lobe on series 2 image 4. Fleischner recommendations below.    There is no effusion. Mild atherosclerotic change of the aorta. The spleen is enlarged and measures nearly 17 cm. Adrenal glands are unremarkable and the pancreas demonstrates a few punctate calcifications likely reflecting sequela of prior bouts of  pancreatitis. Trace fluid adjacent to the tail the pancreas but no additional stigmata of acute pancreatitis. Laboratory data would be complimentary. Uncomplicated cholelithiasis. The liver is cirrhotic. The kidneys are nonobstructed. There is no  radiopaque stone. Stomach not well distended or evaluated. No adenopathy. Trace fluid in the paracolic gutters bilaterally.    Pelvis: Negative bladder and prostate gland is negative. No drainable fluid collection. Minimal fluid tracking towards the pelvis. The bowel is nonobstructed. Normal appendix. There is no inguinal adenopathy or fluid collection. No suspicious bone  lesion.  negative.      Impression:        1. Cirrhosis and splenomegaly. There is a trace amount of fluid in the paracolic gutters tracking towards the pelvis, some of which is adjacent to the pancreatic tail. This is favored to be  related to cirrhosis rather than acute pancreatitis but should  be correlated with laboratory data.  2. Uncomplicated cholelithiasis.  3. Normal appendix.  4. 5 mm noncalcified nodule in the right middle lobe. Fleischner recommendations below.    Management recommendation: Based on current published guidelines, uncomplicated pulmonary nodules less than 6 mm do not require CT follow-up. In high risk patients, follow-up CT in 12 months is optional. (Fleischner Society guidelines, 2017).          Signer Name: Audi William MD   Signed: 8/30/2021 1:43 AM   Workstation Name: [id number]    [facility]    XR Chest 1 View [[id number]] Collected: 08/30/21 0007     Updated: 08/30/21 0009    Narrative:      CR Chest 1 Vw    INDICATION:   Shortness of air today.     COMPARISON:    Chest 2/6/2020    FINDINGS:  Single portable AP view(s) of the chest.  Stable cardiomegaly. Mediastinal contours are normal. The lungs are clear. No pneumothorax or pleural effusion.      Impression:      Stable cardiomegaly. No other acute chest findings.    Signer Name: Doron Morris MD   Signed: 8/30/2021 12:07 AM   Workstation Name: [employer]    [facility]        Results for orders placed in visit on 02/25/20    SCANNED - ECHOCARDIOGRAM        Cxr-cardiomegaly, no acute changes.    Assessment/Plan   Active Hospital Problems    Diagnosis  POA   • **Symptomatic anemia [D64.9]  Yes   • Hyperkalemia [E87.5]  Yes   • Elevated troponin [R77.8]  Yes   • Hypothyroidism, adult [E03.9]  Yes   • DM (diabetes mellitus), type 2, uncontrolled w/neurologic complication (CMS/HCC) [E11.49, E11.65]  Yes     Increase premixed insulin Humalog 75/25 100 units in am and 90 units at supper. Titration instructions discussed.  Lantus 46 units. titrating down instructions given by 2-5 units depending on the am sugars.   Bydureon restarted. CMP showed normal CR Cl.   Hypoglycemia precautions reviewed and insulin  titration instructions given.   A1C 7.5History of diabetes mellitus    After much deliberation, we have finally found that Insurance will cover: Vicoza, Bygardenia, & Byruyrean. Is changing to Humalog also, and NOT novolog. PCP did A1c. was 7.5 Fasting have been below 100   iabetes mellitus type 2, uncontrolled dx in 2000  Diabetic complications: neuropathy  Medications: He stopped glimepiride, Januvia.   metformin in the past caused diarrhea.  Jan 2015 I have started Novolog 70/30 BID. He continued Lantus.   He started Tanzeum in Oct and doing well, glucose improved.   Eye care:no retinopathy.   Hypoglycemia: none  Nutrition: discussed carb consistent diet 45-60 gm carb per meal. Patient is not following any diet, eats a lot of potatoes.   Monitoring: meter downloaded and scanned to chart.   Exercise: recommended 30 min per day exercise. He doesn’t exercise because of tingling in the feet.   Foot care and dental care: discussed.  Labs:     Glucose 100-200 improved significantly, he has lost 15 lbs. Started exercising on the bike. C/o tinglin and numbness of the feet and hands.        • FAVIO (obstructive sleep apnea) [G47.33]  Yes     Hx of.     • Chronic kidney disease, stage III (moderate) (CMS/HCC) [N18.30]  Yes   • Gastroesophageal reflux disease without esophagitis [K21.9]  Yes   • Hyperlipidemia [E78.5]  Yes     Test Name Result Flag Reference   Cholesterol 106     HDL 30     Triglycerides 204     LDL 35     LDL/HDL Ratio 1.2     History of hyperlipidemia      • Hypertension [I10]  Yes      History of hypertension         Assessment & Plan     Symptomatic anemia-with complaint of dark stools/hemorrhoidal bleed  -Hemoglobin 6, MCV 90, RDW 16-we'll transfuse blood, anemia work-up added. Getting 2 units prbc.  -?  Upper GI bleed/stool occult +, no active bleeding hemorrhoids noted per Ed attending rectal exam.-Protonix 40 twice daily, GI consult  -CT abdomen pelvis-cirrhosis/splenomegaly, uncomplicated cholelithiasis, 5  mm lung nodule which needs to be followed up.  -Liver cirrhosis on the CT scan likely new diagnosis, hopefully GI can give us further input.    Chronic thrombocytopenia-with platelets of 97, will add on coags.  -Likely secondary to underlying liver disease/splenomegaly  -Continue to monitor.    Elevated troponin-0.03, proBNP 352, EKG-sinus rhythm 68 bpm, Q waves in lead III, aVF,  poor R wave progression in anterior leads similar to old EKG. recent stress test with Dr. Patel which was negative, will trend of the troponin    Sleep apnea-continue CPAP, advised patient to bring home CPAP.      Mood ds-Zoloft 100 mg p.o. daily, Cymbalta 30 mg p.o. daily    DM2-takes aspirin 81 mg for primary prophylaxis, Humulin R 500 units/mL, 150 units before breakfast, 150 units before dinner  Blood sugar-331, bicarb 17, anion gap around 17  -Fingerstick coverage, patient is n.p.o. therefore long-acting insulin is not added currently, got 7 units regular insulin for hyperkalemia-close fingerstick monitoring, as far patient can tell lately he's blood sugar has been running more on the lower side no overt hypoglycemia episode    Neuropathy-Neurontin 800 mg every 8-dose renally    Hypothyroid-Synthroid 50 mcg    HLP-Zocor, Lopid, colestipol    HTN-lisinopril/HCTZ 10--12.5 mg p.o. daily, atenolol 50 mg p.o. daily  -Hold lisinopril/HCTZ secondary to REBECA, borderline blood pressure.    Chronic pain/back pain/neck pain-Mobic 15 mg as needed, Zanaflex 4 mg every 12-DC Mobic    Morbidly utike-HYR-32    CKD stage III, baseline creatinine around 1.6, BUN/creatinine today 46/1.8  -Already got 1 L of fluid, recheck labs in a.m.    Hyponatremia-corrected sodium 136, already got 1 L of fluid.    Hyperkalemia-without any EKG changes, will treat with insulin, bicarb, , repeat the labs    Anemia-baseline hemoglobin of 10-as above    GERD-omeprazole 40 mg p.o. daily-as above    DVT prophylaxis:    -TEDs/SCDs  -Lovenox -will hold 2nd to GI  bleed.    CODE STATUS:    Code Status and Medical Interventions:   Ordered at: 21 0213     Level Of Support Discussed With:    Patient     Code Status:    CPR     Medical Interventions (Level of Support Prior to Arrest):    Full         Admission Status:  I believe this patient meets inpatient criteria secondary to GI bleed.        Electronically signed by Cari Harkins MD at 21 0602          Physical Therapy Notes (most recent note)      Sonia Sawant, PT at 21 1500  Version 1 of          Patient Name: Elton Funez  : 1950    MRN: [id number]                              Today's Date: 2021       Admit Date: 2021    Visit Dx:     ICD-10-CM ICD-9-CM   1. Acute GI bleeding  K92.2 578.9   2. Anemia, unspecified type  D64.9 285.9   3. Lightheadedness  R42 780.4   4. Dyspnea, unspecified type  R06.00 786.09   5. Acute on chronic renal insufficiency  N28.9 593.9    N18.9 585.9   6. Acute hyperkalemia  E87.5 276.7   7. Symptomatic anemia  D64.9 285.9   8. Chronic blood loss anemia  D50.0 280.0   9. Rectal bleeding  K62.5 569.3     Patient Active Problem List   Diagnosis   • Chronic kidney disease, stage III (moderate) (CMS/HCC)   • Gastroesophageal reflux disease without esophagitis   • Hyperlipidemia   • Hypertension   • Trigger finger of right hand   • DM (diabetes mellitus), type 2, uncontrolled w/neurologic complication (CMS/HCC)   • Obesity, Class I, BMI 30-34.9   • Actinic keratosis   • FAVIO (obstructive sleep apnea)   • Prurigo nodularis   • History of depression   • History of migraine   • Cervical radiculopathy   • ALT (SGPT) level raised   • Elevated AST (SGOT)   • Uncontrolled type 2 diabetes mellitus with diabetic neuropathy, with long-term current use of insulin (CMS/HCC)   • Post-operative infection   • Leukocytosis   • Left hand weakness   • Encounter for long-term (current) use of insulin (CMS/HCC)   • Herniated cervical disc   • Circadian rhythm sleep disorder,  delayed sleep phase type   • Mild nonproliferative diabetic retinopathy of left eye without macular edema associated with type 2 diabetes mellitus (CMS/[facility])   • Diabetic peripheral neuropathy associated with type 2 diabetes mellitus (CMS/HCC)   • Long term current use of insulin (CMS/[facility])   • Type 2 diabetes mellitus without complication (CMS/[facility])   • Hypothyroidism, adult   • Symptomatic anemia   • Hyperkalemia   • Elevated troponin   • Acute GI bleeding   • Chronic blood loss anemia   • Rectal bleeding     Past Medical History:   Diagnosis Date   • Abscess of arm, right    • Abscess of skin of neck    • Acute sinusitis    • Allergic Childhood,  Adult    Ragweed. 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    • GERD (gastroesophageal reflux disease)    • HL (hearing loss)    • Hyperlipidemia    • Hypertension    • Hypothyroidism, adult 11/10/2020   • Infection     MSSA lumbar surgical wound infection 3/2017   • Jaw pain    • Left hip pain    • Low back pain     Surgery 29 yrs L4-5   • Migraine     Hx of   • Obesity 10/7/2016   • Obesity (BMI 30.0-34.9) 10/7/2016    BMI 31.92   • Obstructive sleep apnea     Hx of   • Quadriceps muscle strain    • Shigellosis    • Shigellosis    • Sleep apnea     PT TO BRING MASK AND TUBING DAY OF SURGERY   • Symptomatic anemia 8/30/2021   • 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    • Wears glasses    • Wears hearing aid     BOTH EARS   • Wears hearing aid      bilat ears     Past Surgical History:   Procedure Laterality Date   • ANTERIOR CERVICAL DISCECTOMY W/ FUSION N/A 12/14/2017    Procedure: CERVICAL DISCECTOMY ANTERIOR FUSION WITH INSTRUMENTATION C7/T1;  Surgeon: Gigi Perales MD;  Location:  [facility];  Service:    • BACK SURGERY     • CERVICAL ARTHRODESIS     • CERVICAL DISCECTOMY POSTERIOR FUSION WITH INSTRUMENTATION N/A 3/16/2017    Procedure:  INCISION AND DRAINAGE OF POSTERIOR CERVICAL WOUND;  Surgeon: Gigi Perales MD;  Location:  [facility];  Service:    • CERVICAL LAMINECTOMY DECOMPRESSION POSTERIOR Left 2/28/2017    Procedure: Left C7-T1 CERVICAL FORAMINOTOMY POSTERIOR WITH METRIX;  Surgeon: Gigi Perales MD;  Location:  [facility];  Service:    • COLONOSCOPY      2012   • COLONOSCOPY N/A 9/1/2021    Procedure: COLONOSCOPY;  Surgeon: Sharif García MD;  Location:  [facility];  Service: Gastroenterology;  Laterality: N/A;   • CYST REMOVAL  04/2021   • ENDOSCOPY     • ENDOSCOPY N/A 8/31/2021    Procedure: ESOPHAGOGASTRODUODENOSCOPY;  Surgeon: Brunner, Mark I, MD;  Location:  [facility];  Service: Gastroenterology;  Laterality: N/A;   • SPINE SURGERY  29 yrs L4-5    9 yrs C6-7 fused   • VASECTOMY     • WISDOM TOOTH EXTRACTION       General Information     Row Name 09/02/21 1531          Physical Therapy Time and Intention    Document Type  therapy note (daily note)  -     Mode of Treatment  physical therapy  -     Row Name 09/02/21 1531          General Information    Patient Profile Reviewed  yes  -     Existing Precautions/Restrictions  fall;oxygen therapy device and L/min  -     Row Name 09/02/21 1531          Cognition    Orientation Status (Cognition)  oriented x 3  -     Row Name 09/02/21 1531          Safety Issues, Functional Mobility    Impairments Affecting Function (Mobility)  balance;endurance/activity tolerance;shortness of breath  -       User Key  (r) = Recorded By, (t) = Taken By, (c) =  Cosigned By    Initials Name Provider Type     Sonia Sawant PT Physical Therapist        Mobility     Row Name 09/02/21 1531          Bed Mobility    Bed Mobility  supine-sit;sit-supine  -     Supine-Sit [place] (Bed Mobility)  modified independence  -HP     Sit-Supine [place] (Bed Mobility)  modified independence  -HP     Assistive Device (Bed Mobility)  head of bed elevated;bed rails  -     Comment (Bed Mobility)  Pt performed bed mobility Mod I with use of bed rails and HOB elevated.  -     Row Name 09/02/21 1531          Transfers    Comment (Transfers)  Pt performed STS x2 with CGA for safety and SPC  -     Row Name 09/02/21 1531          Sit-Stand Transfer    Sit-Stand [place] (Transfers)  contact guard;1 person assist  -     Assistive Device (Sit-Stand Transfers)  cane, straight  -     Row Name 09/02/21 1531          Gait/Stairs (Locomotion)    [place] Level (Gait)  contact guard;1 person assist  -     Assistive Device (Gait)  cane, straight  -     Distance in Feet (Gait)  350  -     Deviations/Abnormal Patterns (Gait)  bilateral deviations;base of support, wide;slava decreased;gait speed decreased;stride length decreased  -     Comment (Gait/Stairs)  Pt amb 230'+120' with SPC, CGA, on RA, and with one sitting rest break. O2 saturation dropped to 89% on RA and recovered to 95% within one minute of static sitting. Pt reported one episode of lightheadedness and mild LOB requiring a standing rest break for approximately one minute to recover.  -       User Key  (r) = Recorded By, (t) = Taken By, (c) = Cosigned By    Initials Name Provider Type     Sonia Sawant PT Physical Therapist        Obj/Interventions     Row Name 09/02/21 1542          Motor Skills    Motor Skills  therapeutic exercise  -     Therapeutic Exercise  hip;knee;ankle  -     Row Name 09/02/21 1542          Hip (Therapeutic Exercise)    Hip (Therapeutic Exercise)  strengthening exercise   -     Hip Strengthening (Therapeutic Exercise)  bilateral;marching while seated;10 repetitions  -[facility] Name 09/02/21 1542          Knee (Therapeutic Exercise)    Knee (Therapeutic Exercise)  strengthening exercise  -     Knee Strengthening (Therapeutic Exercise)  bilateral;LAQ (long arc quad);10 repetitions  -[facility] Name 09/02/21 1542          Ankle (Therapeutic Exercise)    Ankle (Therapeutic Exercise)  strengthening exercise  -     Ankle Strengthening (Therapeutic Exercise)  bilateral;dorsiflexion;plantarflexion;10 repetitions  -[facility] Name 09/02/21 1542          Balance    Balance Assessment  sitting static balance;sitting dynamic balance;standing static balance;standing dynamic balance  -     Static Sitting Balance  WFL;unsupported;sitting, edge of bed  -     Dynamic Sitting Balance  WFL;unsupported;sitting, edge of bed  -     Static Standing Balance  WFL;standing;supported  -     Dynamic Standing Balance  mild impairment;supported;standing  -     Balance Interventions  occupation based/functional task;sitting;standing;sit to stand  -     Comment, Balance  one episode of mild LOB and lightheadedness while ambulating  -       User Key  (r) = Recorded By, (t) = Taken By, (c) = Cosigned By    Initials Name Provider Type     Sonia Sawant, PT Physical Therapist        Goals/Plan    No documentation.       Clinical Impression     [facility] Name 09/02/21 1543          Pain    Additional Documentation  Pain Scale: Numbers Pre/Post-Treatment (Group)  -[facility] Name 09/02/21 1548          Pain Scale: Numbers Pre/Post-Treatment    Pretreatment Pain Rating  0/10 - no pain  -     Posttreatment Pain Rating  0/10 - no pain  -[facility] Name 09/02/21 1541          Plan of Care Review    Plan of Care Reviewed With  patient;spouse  -     Progress  improving  -     Outcome Summary  Pt demonstrated improved progress with increased ambulation distance this session. Pt amb 230'+120' with SPC,  CGA, on RA, and with one sitting rest break. O2 saturation dropped to 88% on RA and recovered to 95% within one minute of static sitting. Pt reported one episode of lightheadedness and mild LOB requiring a standing rest break for approximately one minute to recover. Continue with PT POC as pt tolerates.  -HP     Row Name 09/02/21 1543          Vital Signs    Pre Systolic BP Rehab  128  -HP     Pre Treatment Diastolic BP  86  -HP     Pretreatment Heart Rate (beats/min)  61  -HP     Posttreatment Heart Rate (beats/min)  60  -HP     Pre SpO2 (%)  96  -HP     O2 Delivery Pre Treatment  other (see comments) CPAP @ 2L  -HP     Intra SpO2 (%)  87 range % on RA  -HP     O2 Delivery Intra Treatment  room air  -HP     Post SpO2 (%)  92  -HP     O2 Delivery Post Treatment  room air  -HP     Pre Patient Position  Supine  -HP     Intra Patient Position  Standing  -HP     Post Patient Position  Supine  -HP     Row Name 09/02/21 1543          Positioning and Restraints    Pre-Treatment Position  in bed  -HP     Post Treatment Position  bed  -HP     In Bed  supine;fowlers;call light within reach;encouraged to call for assist;with family/caregiver;legs elevated  -HP       User Key  (r) = Recorded By, (t) = Taken By, (c) = Cosigned By    Initials Name Provider Type    HP Sonia Sawant, PT Physical Therapist        Outcome Measures     Row Name 09/02/21 1546          How much help from another person do you currently need...    Turning from your back to your side while in flat bed without using bedrails?  4  -HP     Moving from lying on back to sitting on the side of a flat bed without bedrails?  4  -HP     Moving to and from a bed to a chair (including a wheelchair)?  3  -HP     Standing up from a chair using your arms (e.g., wheelchair, bedside chair)?  3  -HP     Climbing 3-5 steps with a railing?  3  -HP     To walk in hospital room?  3  -HP     AM-PAC 6 Clicks Score (PT)  20  -HP     Row Name 09/02/21 154           Functional Assessment    Outcome Measure Options  AM-PAC 6 Clicks Basic Mobility (PT)  -HP       User Key  (r) = Recorded By, (t) = Taken By, (c) = Cosigned By    Initials Name Provider Type     Sonia Sawant PT Physical Therapist                       Physical Therapy Education                 Title: PT OT SLP Therapies (Done)     Topic: Physical Therapy (Done)     Point: Mobility training (Done)     Learning Progress Summary           Patient Acceptance, E,D, VU,NR by  at 9/2/2021 1546    Acceptance, E,D, VU,NR by  at 8/31/2021 1147   Family Acceptance, E,D, VU,NR by  at 9/2/2021 1546    Acceptance, E,D, VU,NR by  at 8/31/2021 1147                   Point: Home exercise program (Done)     Learning Progress Summary           Patient Acceptance, E,D, VU,NR by  at 9/2/2021 1546   Family Acceptance, E,D, VU,NR by  at 9/2/2021 1546                   Point: Body mechanics (Done)     Learning Progress Summary           Patient Acceptance, E,D, VU,NR by  at 9/2/2021 1546    Acceptance, E,D, VU,NR by  at 8/31/2021 1147   Family Acceptance, E,D, VU,NR by  at 9/2/2021 1546    Acceptance, E,D, VU,NR by  at 8/31/2021 1147                   Point: Precautions (Done)     Learning Progress Summary           Patient Acceptance, E,D, VU,NR by  at 9/2/2021 1546    Acceptance, E,D, VU,NR by  at 8/31/2021 1147   Family Acceptance, E,D, VU,NR by  at 9/2/2021 1546    Acceptance, E,D, VU,NR by  at 8/31/2021 1147                               User Key     Initials Effective Dates Name Provider Type Discipline     06/01/21 -  Sonia Sawant PT Physical Therapist PT              PT Recommendation and Plan  Planned Therapy Interventions (PT): balance training, bed mobility training, gait training, home exercise program, patient/family education, strengthening, stretching, transfer training  Plan of Care Reviewed With: patient, spouse  Progress: improving  Outcome Summary: Pt demonstrated improved progress  with increased ambulation distance this session. Pt amb 230'+120' with SPC, CGA, on RA, and with one sitting rest break. O2 saturation dropped to 88% on RA and recovered to 95% within one minute of static sitting. Pt reported one episode of lightheadedness and mild LOB requiring a standing rest break for approximately one minute to recover. Continue with PT POC as pt tolerates.     Time Calculation:   PT Charges     Row Name 09/02/21 1500             Time Calculation    Start Time  1500  -HP      PT Received On  09/02/21  -HP         Time Calculation- PT    Total Timed Code Minutes- PT  26 minute(s)  -HP         Timed Charges    34290 - PT Therapeutic Exercise Minutes  10  -HP      88128 - Gait Training Minutes   16  -HP         Total Minutes    Timed Charges Total Minutes  26  -HP       Total Minutes  26  -HP        User Key  (r) = Recorded By, (t) = Taken By, (c) = Cosigned By    Initials Name Provider Type     Sonia Sawant, PT Physical Therapist        Therapy Charges for Today     Code Description Service Date Service Provider Modifiers Qty    37697652200 HC PT THER PROC EA 15 MIN 9/2/2021 Sonia Sawant, PT GP 1    31191694383 HC GAIT TRAINING EA 15 MIN 9/2/2021 Sonia Sawant, PT GP 1          PT G-Codes  Outcome Measure Options: AM-PAC 6 Clicks Basic Mobility (PT)  AM-PAC 6 Clicks Score (PT): 20    Sonia Sawant PT  9/2/2021      Electronically signed by Sonia Sawant PT at 09/02/21 8247       Occupational Therapy Notes (most recent note)    No notes exist for this encounter.

## 2021-09-03 NOTE — PLAN OF CARE
Problem: Obstructive Sleep Apnea Risk or Actual (Comorbidity Management)  Goal: Unobstructed Breathing During Sleep  Outcome: Adequate for Care Transition     Problem: Adult Inpatient Plan of Care  Goal: Plan of Care Review  Outcome: Adequate for Care Transition  Goal: Patient-Specific Goal (Individualized)  Outcome: Adequate for Care Transition  Goal: Absence of Hospital-Acquired Illness or Injury  Outcome: Adequate for Care Transition  Intervention: Identify and Manage Fall Risk  Recent Flowsheet Documentation  Taken 9/3/2021 1213 by Bianca Larsen RN  Safety Promotion/Fall Prevention:   activity supervised   fall prevention program maintained   nonskid shoes/slippers when out of bed   room organization consistent   safety round/check completed   toileting scheduled  Taken 9/3/2021 1002 by Bianca Larsen RN  Safety Promotion/Fall Prevention:   activity supervised   fall prevention program maintained   nonskid shoes/slippers when out of bed   room organization consistent   safety round/check completed   toileting scheduled  Taken 9/3/2021 0800 by Bianca Larsen RN  Safety Promotion/Fall Prevention:   activity supervised   fall prevention program maintained   nonskid shoes/slippers when out of bed   room organization consistent   safety round/check completed   toileting scheduled  Intervention: Prevent Skin Injury  Recent Flowsheet Documentation  Taken 9/3/2021 0800 by Bianca Larsen, RN  Skin Protection:   adhesive use limited   incontinence pads utilized  Goal: Optimal Comfort and Wellbeing  Outcome: Adequate for Care Transition  Intervention: Provide Person-Centered Care  Recent Flowsheet Documentation  Taken 9/3/2021 0800 by Bianca Larsen RN  Trust Relationship/Rapport:   care explained   choices provided   emotional support provided   empathic listening provided   questions answered   questions encouraged   reassurance provided   thoughts/feelings acknowledged  Goal: Readiness for Transition of  Care  Outcome: Adequate for Care Transition   Goal Outcome Evaluation:

## 2021-09-03 NOTE — DISCHARGE SUMMARY
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 (CMS/HCC) [K75.81, K74.60]  Yes   • Portal hypertensive gastropathy (CMS/HCC) [K76.6, K31.89]  Yes   • Chronic blood loss anemia [D50.0]  Yes   • Hypothyroidism, adult [E03.9]  Yes   • DM (diabetes mellitus), type 2, uncontrolled w/neurologic complication (CMS/HCC) [E11.49, E11.65]  Yes   • FAVIO (obstructive sleep apnea) [G47.33]  Yes   • Chronic kidney disease, stage III (moderate) (CMS/HCC) [N18.30]  Yes   • Gastroesophageal reflux disease without esophagitis [K21.9]  Yes   • Hyperlipidemia [E78.5]  Yes   • Hypertension [I10]  Yes      Resolved Hospital Problems    Diagnosis Date Resolved POA   • **Symptomatic anemia [D64.9] 2021 Yes   • Acute blood loss anemia [D62] 2021 Yes   • Hyperkalemia [E87.5] 2021 Yes   • Elevated troponin [R77.8] 2021 Yes   • Acute GI bleeding [K92.2] 2021 Yes          Hospital Course:  Elton Funez is a 71 y.o. male with history of DMII, depression, peripheral neuropatly, hypothyroid, HTN, Dyslipidemia, Chronc pain, CKD, GERD, anemia and obesity presents with worsening exertional dyspnea. Mr Funez has been suffering from shortness of breath for over a year, but this acutely worsened over the past several days. Concurrent dark stools noted with worsening shortness of breath.     Symptomatic anemia (Shortness of breath)  ABL anemia due to UGIB  Portal HTN gastropathy  - Echo reveals normal EF with diastolic  dsyfunction  - EGD = no active bleeding -- suspect bleeding due to portal gastropathy  - Colonoscopy = nonbleeding internal and external hemorrhoids  - stopped atenolol, begin coreg 3.125 BID and titrate up as tolerated to decrease portal pressures  - continue PPI     Chronic anemia, iron deficient  - iron sat 6%, s/p IV iron  - PPI  - received 3 units PRBCs total  - NO NSAIDS, discussed discontinuation with patient (D/C home meloxicam)  - oral iron supplement Rx at [facility]    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 Patient says he doesn't regularly follow a diabetic diet.  A1c 7.6     Lung nodule  - 5mm, discussed with patient and family, followup imaging with PCP per Fleishner criteria         Day of Discharge     HPI:   Guillermina PO well, no further GIB, no presyncope/SOA.  Hgb stable.      Vital Signs:   Temp:  [98 °F (36.7 °C)-98.5 °F (36.9 °C)] 98 °F (36.7 °C)  Heart Rate:  [54-70] 56  Resp:  [16-18] 18  BP: (129-169)/(65-83) 133/65     Physical Exam:  Constitutional: No acute distress, awake, alert, nontoxic, centrally overweight body habitus  Respiratory: Clear to auscultation bilaterally, good effort, nonlabored respirations   Cardiovascular: RRR  Gastrointestinal: Soft, nondistended  Musculoskeletal: No peripheral edema, normal muscle tone for age  Psychiatric: Appropriate affect, good insight and judgement, cooperative      Pertinent  and/or Most Recent Results     LAB RESULTS:      Lab 09/03/21  0502 09/02/21  0513 09/01/21  1743 09/01/21  0612 08/31/21  2334 08/31/21  0751 08/31/21  0619 08/30/21  1513 08/30/21  0958 08/30/21  0405 08/30/21  0307 08/29/21  2229 08/29/21  2229   WBC 4.44 4.82  --  6.46  --   --  7.97  --  6.60  --   --    < > 7.18   HEMOGLOBIN 8.0* 7.4* 7.8* 8.3* 7.5*   < > 6.7*   < > 6.5*  --   --    < > 5.7*    HEMATOCRIT 28.1* 25.4* 27.0* 28.1* 25.0*   < > 23.1*   < > 22.2*  --   --    < > 19.7*   PLATELETS 75* 77*  --  86*  --   --  84*  --  89*  --   --    < > 97*   NEUTROS ABS  --   --   --  4.74  --   --   --   --  4.60  --   --   --  5.40   IMMATURE GRANS (ABS)  --   --   --  0.04  --   --   --   --  0.06*  --   --   --  0.06*   LYMPHS ABS  --   --   --  0.63*  --   --   --   --  0.81  --   --   --  0.74   MONOS ABS  --   --   --  0.90  --   --   --   --  0.94*  --   --   --  0.77   EOS ABS  --   --   --  0.13  --   --   --   --  0.17  --   --   --  0.19   MCV 88.9 87.9  --  85.7  --   --  86.2  --  86.7  --   --    < > 90.8   LACTATE  --   --   --   --   --   --   --   --   --  1.6  --   --  2.5*   PROTIME  --   --   --   --   --   --   --   --   --   --  14.2  --   --    APTT  --   --   --   --   --   --   --   --   --   --  26.7  --   --     < > = values in this interval not displayed.         Lab 09/03/21  0502 09/02/21  0513 09/01/21  1038 09/01/21  0613 08/31/21  1201 08/31/21  0619 08/31/21  0619 08/30/21  1931 08/30/21  0958   SODIUM 135* 133*  --  136  --   --  137  --  135*   POTASSIUM 4.2 4.1 4.3 5.1 5.3*   < > 5.4*   < > 5.9*   CHLORIDE 104 101  --  103  --   --  106  --  106   CO2 19.0* 21.0*  --  21.0*  --   --  21.0*  --  17.0*   ANION GAP 12.0 11.0  --  12.0  --   --  10.0  --  12.0   BUN 30* 31*  --  28*  --   --  36*  --  45*   CREATININE 1.46* 1.56*  --  1.66*  --   --  1.68*  --  1.75*   GLUCOSE 165* 193*  --  230*  --   --  252*  --  220*   CALCIUM 8.8 8.7  --  9.2  --   --  8.6  --  8.8   HEMOGLOBIN A1C  --   --   --   --   --   --   --   --  7.60*    < > = values in this interval not displayed.         Lab 08/29/21 2229   TOTAL PROTEIN 7.1   ALBUMIN 3.90   GLOBULIN 3.2   ALT (SGPT) 17   AST (SGOT) 22   BILIRUBIN 0.4   ALK PHOS 99         Lab 09/01/21  0613 08/31/21  0619 08/30/21  1513 08/30/21  0958 08/30/21  0307 08/29/21  2229   PROBNP 447.6 621.6  --  487.2  --  352.9   TROPONIN T  --    --  0.031* 0.026  --  0.033*   PROTIME  --   --   --   --  14.2  --    INR  --   --   --   --  1.14  --              Lab 09/02/21  0513 08/30/21  0053 08/30/21  0012 08/30/21  0012 08/29/21 2229 08/29/21 2229   IRON 36*  --   --   --    < > 27*  27*   IRON SATURATION 6*  --   --   --    < > 4*   TIBC 557*  --   --   --    < > 754*   TRANSFERRIN 374*  --   --   --    < > 506*   FERRITIN  --   --   --   --   --  8.69*   FOLATE  --   --   --   --   --  >20.00   VITAMIN B 12  --   --   --   --   --  614   ABO TYPING  --  O   < > O  --   --    RH TYPING  --  Negative   < > Negative  --   --    ANTIBODY SCREEN  --   --   --  Negative  --   --     < > = values in this interval not displayed.         Brief Urine Lab Results  (Last result in the past 365 days)      Color   Clarity   Blood   Leuk Est   Nitrite   Protein   CREAT   Urine HCG        08/30/21 0411 Dark Yellow Clear Negative Negative Negative Trace             Microbiology Results (last 10 days)     Procedure Component Value - Date/Time    COVID PRE-OP / PRE-PROCEDURE SCREENING ORDER (NO ISOLATION) - Swab, Nasopharynx [[id number]]  (Normal) Collected: 08/30/21 0016    Lab Status: Final result Specimen: Swab from Nasopharynx Updated: 08/30/21 0109    Narrative:      The following orders were created for panel order COVID PRE-OP / PRE-PROCEDURE SCREENING ORDER (NO ISOLATION) - Swab, Nasopharynx.  Procedure                               Abnormality         Status                     ---------                               -----------         ------                     COVID-19, ABBOTT IN-HOUS...[[id number]]  Normal              Final result                 Please view results for these tests on the individual orders.    COVID-19, ABBOTT IN-HOUSE,NASAL Swab (NO TRANSPORT MEDIA) 2 HR TAT - Swab, Nasopharynx [[id number]]  (Normal) Collected: 08/30/21 0016    Lab Status: Final result Specimen: Swab from Nasopharynx Updated: 08/30/21 0109     COVID19 Presumptive Negative     Narrative:      Fact sheet for providers: https://www.fda.gov/media/822915/download     Fact sheet for patients: https://www.fda.gov/media/693298/download    Test performed by PCR.  If inconsistent with clinical signs and symptoms patient should be tested with different authorized molecular test.          Adult Transthoracic Echo Complete W/ Cont if Necessary Per Protocol    Result Date: 8/30/2021  · Left ventricular ejection fraction appears to be 56 - 60%. · Left ventricular diastolic dysfunction is noted. · No pericardial effusion      CT Abdomen Pelvis Without Contrast    Result Date: 8/30/2021  CT Abdomen Pelvis WO INDICATION: GI bleed and anemia. TECHNIQUE: CT of the abdomen and pelvis without IV contrast. Coronal and sagittal reconstructions were obtained.  Radiation dose reduction techniques included automated exposure control or exposure modulation based on body size. Count of known CT and cardiac nuc med studies performed in previous 12 months: 0. COMPARISON: None available. FINDINGS: Abdomen: Included lung bases are clear. 5 mm subpleural noncalcified nodule in the right middle lobe on series 2 image 4. Fleischner recommendations below. There is no effusion. Mild atherosclerotic change of the aorta. The spleen is enlarged and measures nearly 17 cm. Adrenal glands are unremarkable and the pancreas demonstrates a few punctate calcifications likely reflecting sequela of prior bouts of pancreatitis. Trace fluid adjacent to the tail the pancreas but no additional stigmata of acute pancreatitis. Laboratory data would be complimentary. Uncomplicated cholelithiasis. The liver is cirrhotic. The kidneys are nonobstructed. There is no radiopaque stone. Stomach not well distended or evaluated. No adenopathy. Trace fluid in the paracolic gutters bilaterally. Pelvis: Negative bladder and prostate gland is negative. No drainable fluid collection. Minimal fluid tracking towards the pelvis. The bowel is nonobstructed.  Normal appendix. There is no inguinal adenopathy or fluid collection. No suspicious bone lesion.  negative.     1. Cirrhosis and splenomegaly. There is a trace amount of fluid in the paracolic gutters tracking towards the pelvis, some of which is adjacent to the pancreatic tail. This is favored to be related to cirrhosis rather than acute pancreatitis but should be correlated with laboratory data. 2. Uncomplicated cholelithiasis. 3. Normal appendix. 4. 5 mm noncalcified nodule in the right middle lobe. Fleischner recommendations below. Management recommendation: Based on current published guidelines, uncomplicated pulmonary nodules less than 6 mm do not require CT follow-up. In high risk patients, follow-up CT in 12 months is optional. (Fleischner Society guidelines, 2017). Signer Name: Audi William MD  Signed: 8/30/2021 1:43 AM  Workstation Name: [email or web address]  [facility]    XR Chest 1 View    Result Date: 9/1/2021  EXAMINATION: XR CHEST 1 VW-08/30/2021:  INDICATION: SOA; K92.2-Gastrointestinal hemorrhage, unspecified; D64.9-Anemia, unspecified; R42-Dizziness and giddiness; R06.00-Dyspnea, unspecified; N28.9-Disorder of kidney and ureter, unspecified; N18.9-Chronic kidney disease, unspecified; E87.5-Hyperkalemia; D64.9-Anemia, unspecified.  COMPARISON: 08/29/2021.  FINDINGS: Portable chest reveals the heart to be enlarged with increased pulmonary vascularity bilaterally. The pulmonary vascularity has worsened in the interval. Degenerative changes seen within the spine. No definite pleural effusion or pneumothorax.      Enlargement of the heart with increased pulmonary vascularity bilaterally.  D:  08/30/2021 E:  08/30/2021  This report was finalized on 9/1/2021 5:01 PM by Dr. Pauly Triplett MD.      XR Chest 1 View    Result Date: 8/30/2021  CR Chest 1 Vw INDICATION: Shortness of air today. COMPARISON:  Chest 2/6/2020 FINDINGS: Single portable AP view(s) of the chest.  Stable  cardiomegaly. Mediastinal contours are normal. The lungs are clear. No pneumothorax or pleural effusion.     Stable cardiomegaly. No other acute chest findings. Signer Name: Doron Morris MD  Signed: 8/30/2021 12:07 AM  Workstation Name: [id number]  [facility]      Results for orders placed in visit on 02/25/20    SCANNED VASCULAR STUDIES      Results for orders placed in visit on 02/25/20    SCANNED VASCULAR STUDIES      Results for orders placed during the hospital encounter of 08/29/21    Adult Transthoracic Echo Complete W/ Cont if Necessary Per Protocol    Interpretation Summary  · Left ventricular ejection fraction appears to be 56 - 60%.  · Left ventricular diastolic dysfunction is noted.  · No pericardial effusion      Discharge Details        Discharge Medications      New Medications      Instructions Start Date   carvedilol 6.25 MG tablet  Commonly known as: COREG   6.25 mg, Oral, Every 12 Hours Scheduled      lisinopril 5 MG tablet  Commonly known as: PRINIVIL,ZESTRIL   5 mg, Oral, Daily      spironolactone 25 MG tablet  Commonly known as: Aldactone   25 mg, Oral, Daily         Changes to Medications      Instructions Start Date   DULoxetine 20 MG capsule  Commonly known as: CYMBALTA  What changed:   medication strength  how much to take   20 mg, Oral, Daily   Start Date: September 4, 2021        Continue These Medications      Instructions Start Date   aspirin 81 MG chewable tablet   81 mg, Oral, Daily      Bydureon BCise 2 MG/0.85ML auto-injector injection  Generic drug: exenatide er   INJECT 0.85 ML UNDER THE SKIN INTO THE APPROPRIATE AREA AS DIRECTED ONE TIME PER WEEK.      CO Q 10 PO   300 mg, Oral, Daily      colestipol 1 g tablet  Commonly known as: COLESTID   TAKE 2 TABLETS TWICE A DAY      Flaxseed Oil 1200 MG capsule   1 capsule, Oral, 2 Times Daily, 1300 mg      FreeStyle Winston 14 Day Sensor misc   1 each, Does not apply, Every 14 Days      gabapentin 800 MG  tablet  Commonly known as: NEURONTIN   800 mg, Oral, 3 Times Daily      gemfibrozil 600 MG tablet  Commonly known as: LOPID   TAKE 1 TABLET TWICE A DAY      HumuLIN R U-500 KwikPen 500 UNIT/ML solution pen-injector CONCENTRATED injection  Generic drug: Insulin Regular Human (Conc)   Inject sc 150 u before breakfast at 1 pm and 150 at dinner      Insulin Pen Needle 31G X 5 MM misc  Commonly known as: B-D UF III MINI PEN NEEDLES   USE THREE TIMES A DAY      ipratropium 0.06 % nasal spray  Commonly known as: ATROVENT   2 sprays, Nasal, Daily      levothyroxine 50 MCG tablet  Commonly known as: SYNTHROID, LEVOTHROID   50 mcg, Oral, Daily      multivitamin tablet tablet  Commonly known as: THERAGRAN   1 tablet, Oral, Daily      omeprazole 40 MG capsule  Commonly known as: priLOSEC   TAKE ONE CAPSULE BY MOUTH DAILY      sertraline 100 MG tablet  Commonly known as: ZOLOFT   TAKE ONE TABLET BY MOUTH DAILY      simvastatin 40 MG tablet  Commonly known as: ZOCOR   TAKE 1 TABLET DAILY AT BEDTIME      tiZANidine 4 MG tablet  Commonly known as: ZANAFLEX   1 tablet, Oral, 2 Times Daily PRN         Stop These Medications    atenolol 50 MG tablet  Commonly known as: TENORMIN     lisinopril-hydrochlorothiazide 10-12.5 MG per tablet  Commonly known as: PRINZIDE,ZESTORETIC     meloxicam 15 MG tablet  Commonly known as: MOBIC            Allergies   Allergen Reactions   • Glucophage Xr [Metformin Hcl Er] Diarrhea and Other (See Comments)     Glucophage XR TB24: Adverse Reaction: Severe GI issues.   • Metformin Nausea And Vomiting     Other reaction(s): SEVERE INTESTIONAL ISSUES  Other reaction(s): SEVERE GI ISSUES    Glucophage XR TB24  MetFORMIN HCl TABS   • Tetracyclines & Related Nausea Only     Adverse Reaction         Discharge Disposition:  Home or Self Care    Diet:  Hospital:  Diet Order   Procedures   • Diet Regular; Low Sodium; 2,000 mg Na          CODE STATUS:    Code Status and Medical Interventions:   Ordered at: 08/30/21  0213     Level Of Support Discussed With:    Patient     Code Status:    CPR     Medical Interventions (Level of Support Prior to Arrest):    Full       Future Appointments   Date Time Provider Department Center   9/14/2021 10:00 AM Tc Ramirez MD [facility]   9/22/2021  9:30 AM Pau Colin MD [facility]       Additional Instructions for the Follow-ups that You Need to Schedule     Ambulatory Referral to Physical Therapy Evaluate and treat   As directed      Specialty needed: Evaluate and treat         Discharge Follow-up with PCP   As directed       Currently Documented PCP:    Tc Ramirez MD    PCP Phone Number:    [phone]     Follow Up Details: within 2 weeks -- Transiton of Care -- new dx JEROME cirrhosis, new meds, s/p EMPERATRIZ Estrada MD  09/03/21      Time Spent on Discharge:  I spent  40  minutes on this discharge activity which included: face-to-face encounter with the patient, reviewing the data in the system, coordination of the care with the nursing staff as well as consultants, documentation, and entering orders.

## 2021-09-06 ENCOUNTER — TRANSITIONAL CARE MANAGEMENT TELEPHONE ENCOUNTER (OUTPATIENT)
Dept: CALL CENTER | Facility: HOSPITAL | Age: 71
End: 2021-09-06

## 2021-09-06 NOTE — OUTREACH NOTE
Call Center TCM Note      Responses   [facility] patient discharged from?  [place]   Does the patient have one of the following disease processes/diagnoses(primary or secondary)?  Other   TCM attempt successful?  No [Verbal release Pooja and Jada ]   Unsuccessful attempts  Attempt 1          Venita Carr RN    9/6/2021, 09:50 EDT

## 2021-09-06 NOTE — OUTREACH NOTE
Call Center TCM Note      Responses   [facility] patient discharged from?  [place]   Does the patient have one of the following disease processes/diagnoses(primary or secondary)?  Other   TCM attempt successful?  Yes   Call start time  0959   Call end time  1001   Discharge diagnosis  Symptomatic anemia  Acute GI bleeding   Is patient permission given to speak with other caregiver?  Yes   List who call center can speak with  daughter    Person spoke with today (if not patient) and relationship  daughter    Meds reviewed with patient/caregiver?  Yes   Is the patient having any side effects they believe may be caused by any medication additions or changes?  No   Does the patient have all medications ordered at discharge?  Yes   Is the patient taking all medications as directed (includes completed medication regime)?  Yes   Does the patient have a primary care provider?   Yes   Does the patient have an appointment with their PCP within 7 days of discharge?  Greater than 7 days   Comments regarding PCP  hosp dc [facility] apt on 9/14/21 with PCP    What is preventing the patient from scheduling follow up appointments within 7 days of discharge?  Earlier appointment not available   Nursing Interventions  Verified appointment date/time/provider   Psychosocial issues?  No   Did the patient receive a copy of their discharge instructions?  Yes   Nursing interventions  Reviewed instructions with patient   What is the patient's perception of their health status since discharge?  Improving   Is the patient/caregiver able to teach back signs and symptoms related to disease process for when to call PCP?  Yes   Is the patient/caregiver able to teach back signs and symptoms related to disease process for when to call 911?  Yes   Is the patient/caregiver able to teach back the hierarchy of who to call/visit for symptoms/problems? PCP, Specialist, Home health nurse, Urgent Care, ED, 911  Yes   If the patient is a current smoker, are  they able to teach back resources for cessation?  Not a smoker   TCM call completed?  Yes          Venita Carr RN    9/6/2021, 10:01 EDT

## 2021-09-09 ENCOUNTER — TELEPHONE (OUTPATIENT)
Dept: INTERNAL MEDICINE | Facility: CLINIC | Age: 71
End: 2021-09-09

## 2021-09-09 NOTE — TELEPHONE ENCOUNTER
S/W pt wife, Pooja, states pt has hospital follow up on 9/14 but that she feels that his blood count is dropping and really didn't want to wait til Tuesday.  States he is just weak and pale but that he did get up and go out to eat tonight. Expl Dr Rodriguez is gone for the day but will forward message. Verb apprec.

## 2021-09-09 NOTE — TELEPHONE ENCOUNTER
Caller: Pooja Funez    Relationship: Emergency Contact    Best call back number: [phone]     What orders are you requesting (i.e. lab or imaging): LABS    In what timeframe would the patient need to come in: TOMORROW    Where will you receive your lab/imaging services: [facility]    Additional notes: PATIENT WIFE STATES THAT SHE WOULD LIKE TO HAVE HIS LABS DRAWN TOMORROW, IF POSSIBLE, TO SEE HOW HIS LEVELS ARE DOING.            GROUP THERAPY PROGRESS NOTE    The patient Trace marie 49 y.o. male is participating in Reflections Group    Group time: 30 minutes    Personal goal for participation: To discuss the daily goals    Goal orientation: personal    Group therapy participation: passive    Therapeutic interventions reviewed and discussed:  Yes    Impression of participation: [address]  1/6/2018  9:46 PM

## 2021-09-10 ENCOUNTER — DOCUMENTATION (OUTPATIENT)
Dept: INTERNAL MEDICINE | Facility: CLINIC | Age: 71
End: 2021-09-10

## 2021-09-10 ENCOUNTER — LAB (OUTPATIENT)
Dept: INTERNAL MEDICINE | Facility: CLINIC | Age: 71
End: 2021-09-10

## 2021-09-10 ENCOUNTER — LAB (OUTPATIENT)
Dept: LAB | Facility: HOSPITAL | Age: 71
End: 2021-09-10

## 2021-09-10 DIAGNOSIS — D64.9 ANEMIA, UNSPECIFIED TYPE: ICD-10-CM

## 2021-09-10 DIAGNOSIS — D64.9 ANEMIA, UNSPECIFIED TYPE: Primary | ICD-10-CM

## 2021-09-10 LAB
BASOPHILS # BLD AUTO: 0.02 10*3/MM3 (ref 0–0.2)
BASOPHILS NFR BLD AUTO: 0.4 % (ref 0–1.5)
DEPRECATED RDW RBC AUTO: 55.8 FL (ref 37–54)
EOSINOPHIL # BLD AUTO: 0.17 10*3/MM3 (ref 0–0.4)
EOSINOPHIL NFR BLD AUTO: 3.3 % (ref 0.3–6.2)
ERYTHROCYTE [DISTWIDTH] IN BLOOD BY AUTOMATED COUNT: 17.8 % (ref 12.3–15.4)
HCT VFR BLD AUTO: 28.9 % (ref 37.5–51)
HGB BLD-MCNC: 8.8 G/DL (ref 13–17.7)
IMM GRANULOCYTES # BLD AUTO: 0.05 10*3/MM3 (ref 0–0.05)
IMM GRANULOCYTES NFR BLD AUTO: 1 % (ref 0–0.5)
LYMPHOCYTES # BLD AUTO: 0.76 10*3/MM3 (ref 0.7–3.1)
LYMPHOCYTES NFR BLD AUTO: 14.5 % (ref 19.6–45.3)
MCH RBC QN AUTO: 26.1 PG (ref 26.6–33)
MCHC RBC AUTO-ENTMCNC: 30.4 G/DL (ref 31.5–35.7)
MCV RBC AUTO: 85.8 FL (ref 79–97)
MONOCYTES # BLD AUTO: 0.43 10*3/MM3 (ref 0.1–0.9)
MONOCYTES NFR BLD AUTO: 8.2 % (ref 5–12)
NEUTROPHILS NFR BLD AUTO: 3.8 10*3/MM3 (ref 1.7–7)
NEUTROPHILS NFR BLD AUTO: 72.6 % (ref 42.7–76)
NRBC BLD AUTO-RTO: 0 /100 WBC (ref 0–0.2)
PLATELET # BLD AUTO: 121 10*3/MM3 (ref 140–450)
PMV BLD AUTO: 12.8 FL (ref 6–12)
RBC # BLD AUTO: 3.37 10*6/MM3 (ref 4.14–5.8)
WBC # BLD AUTO: 5.23 10*3/MM3 (ref 3.4–10.8)

## 2021-09-10 PROCEDURE — 85025 COMPLETE CBC W/AUTO DIFF WBC: CPT | Performed by: INTERNAL MEDICINE

## 2021-09-13 ENCOUNTER — READMISSION MANAGEMENT (OUTPATIENT)
Dept: CALL CENTER | Facility: HOSPITAL | Age: 71
End: 2021-09-13

## 2021-09-13 NOTE — OUTREACH NOTE
Medical Week 2 Survey      Responses   [facility] patient discharged from?  [place]   Does the patient have one of the following disease processes/diagnoses(primary or secondary)?  Other   Week 2 attempt successful?  No   Unsuccessful attempts  Attempt 1          Sherri Beavers RN

## 2021-09-14 ENCOUNTER — LAB (OUTPATIENT)
Dept: LAB | Facility: HOSPITAL | Age: 71
End: 2021-09-14

## 2021-09-14 ENCOUNTER — OFFICE VISIT (OUTPATIENT)
Dept: INTERNAL MEDICINE | Facility: CLINIC | Age: 71
End: 2021-09-14

## 2021-09-14 VITALS
SYSTOLIC BLOOD PRESSURE: 138 MMHG | DIASTOLIC BLOOD PRESSURE: 62 MMHG | WEIGHT: 221 LBS | HEART RATE: 76 BPM | BODY MASS INDEX: 33.6 KG/M2 | TEMPERATURE: 97.5 F | RESPIRATION RATE: 18 BRPM

## 2021-09-14 DIAGNOSIS — D50.9 IRON DEFICIENCY ANEMIA, UNSPECIFIED IRON DEFICIENCY ANEMIA TYPE: ICD-10-CM

## 2021-09-14 DIAGNOSIS — I10 ESSENTIAL HYPERTENSION: Primary | ICD-10-CM

## 2021-09-14 DIAGNOSIS — K21.9 GASTROESOPHAGEAL REFLUX DISEASE WITHOUT ESOPHAGITIS: ICD-10-CM

## 2021-09-14 DIAGNOSIS — E78.5 HYPERLIPIDEMIA, UNSPECIFIED HYPERLIPIDEMIA TYPE: ICD-10-CM

## 2021-09-14 DIAGNOSIS — E03.9 HYPOTHYROIDISM, UNSPECIFIED TYPE: ICD-10-CM

## 2021-09-14 DIAGNOSIS — Z12.11 SCREENING FOR COLON CANCER: Primary | ICD-10-CM

## 2021-09-14 LAB
ALBUMIN SERPL-MCNC: 4.5 G/DL (ref 3.5–5.2)
ALBUMIN/GLOB SERPL: 1.3 G/DL
ALP SERPL-CCNC: 100 U/L (ref 39–117)
ALT SERPL W P-5'-P-CCNC: 23 U/L (ref 1–41)
ANION GAP SERPL CALCULATED.3IONS-SCNC: 14.4 MMOL/L (ref 5–15)
AST SERPL-CCNC: 31 U/L (ref 1–40)
BASOPHILS # BLD AUTO: 0.06 10*3/MM3 (ref 0–0.2)
BASOPHILS NFR BLD AUTO: 0.7 % (ref 0–1.5)
BILIRUB SERPL-MCNC: 0.5 MG/DL (ref 0–1.2)
BUN SERPL-MCNC: 34 MG/DL (ref 8–23)
BUN/CREAT SERPL: 20.4 (ref 7–25)
CALCIUM SPEC-SCNC: 9.6 MG/DL (ref 8.6–10.5)
CHLORIDE SERPL-SCNC: 99 MMOL/L (ref 98–107)
CO2 SERPL-SCNC: 20.6 MMOL/L (ref 22–29)
CREAT SERPL-MCNC: 1.67 MG/DL (ref 0.76–1.27)
DEPRECATED RDW RBC AUTO: 56.6 FL (ref 37–54)
EOSINOPHIL # BLD AUTO: 0.26 10*3/MM3 (ref 0–0.4)
EOSINOPHIL NFR BLD AUTO: 3.2 % (ref 0.3–6.2)
ERYTHROCYTE [DISTWIDTH] IN BLOOD BY AUTOMATED COUNT: 18.3 % (ref 12.3–15.4)
FERRITIN SERPL-MCNC: 68.8 NG/ML (ref 30–400)
GFR SERPL CREATININE-BSD FRML MDRD: 41 ML/MIN/1.73
GLOBULIN UR ELPH-MCNC: 3.6 GM/DL
GLUCOSE SERPL-MCNC: 258 MG/DL (ref 65–99)
HCT VFR BLD AUTO: 32.3 % (ref 37.5–51)
HGB BLD-MCNC: 9.8 G/DL (ref 13–17.7)
IRON 24H UR-MRATE: 107 MCG/DL (ref 59–158)
IRON SATN MFR SERPL: 17 % (ref 20–50)
LYMPHOCYTES # BLD AUTO: 1.32 10*3/MM3 (ref 0.7–3.1)
LYMPHOCYTES NFR BLD AUTO: 16 % (ref 19.6–45.3)
MCH RBC QN AUTO: 26 PG (ref 26.6–33)
MCHC RBC AUTO-ENTMCNC: 30.3 G/DL (ref 31.5–35.7)
MCV RBC AUTO: 85.7 FL (ref 79–97)
MONOCYTES # BLD AUTO: 0.75 10*3/MM3 (ref 0.1–0.9)
MONOCYTES NFR BLD AUTO: 9.1 % (ref 5–12)
NEUTROPHILS NFR BLD AUTO: 5.7 10*3/MM3 (ref 1.7–7)
NEUTROPHILS NFR BLD AUTO: 69.3 % (ref 42.7–76)
PLATELET # BLD AUTO: 229 10*3/MM3 (ref 140–450)
PMV BLD AUTO: 11.3 FL (ref 6–12)
POTASSIUM SERPL-SCNC: 5.5 MMOL/L (ref 3.5–5.2)
PROT SERPL-MCNC: 8.1 G/DL (ref 6–8.5)
RBC # BLD AUTO: 3.77 10*6/MM3 (ref 4.14–5.8)
SODIUM SERPL-SCNC: 134 MMOL/L (ref 136–145)
T4 FREE SERPL-MCNC: 0.91 NG/DL (ref 0.93–1.7)
TIBC SERPL-MCNC: 647 MCG/DL (ref 298–536)
TRANSFERRIN SERPL-MCNC: 434 MG/DL (ref 200–360)
TSH SERPL DL<=0.05 MIU/L-ACNC: 1.91 UIU/ML (ref 0.27–4.2)
WBC # BLD AUTO: 8.23 10*3/MM3 (ref 3.4–10.8)

## 2021-09-14 PROCEDURE — 80053 COMPREHEN METABOLIC PANEL: CPT | Performed by: INTERNAL MEDICINE

## 2021-09-14 PROCEDURE — 99214 OFFICE O/P EST MOD 30 MIN: CPT | Performed by: INTERNAL MEDICINE

## 2021-09-14 PROCEDURE — 83540 ASSAY OF IRON: CPT | Performed by: INTERNAL MEDICINE

## 2021-09-14 PROCEDURE — 84439 ASSAY OF FREE THYROXINE: CPT | Performed by: INTERNAL MEDICINE

## 2021-09-14 PROCEDURE — 84466 ASSAY OF TRANSFERRIN: CPT | Performed by: INTERNAL MEDICINE

## 2021-09-14 PROCEDURE — 84443 ASSAY THYROID STIM HORMONE: CPT | Performed by: INTERNAL MEDICINE

## 2021-09-14 PROCEDURE — 82728 ASSAY OF FERRITIN: CPT | Performed by: INTERNAL MEDICINE

## 2021-09-14 PROCEDURE — 85025 COMPLETE CBC W/AUTO DIFF WBC: CPT | Performed by: INTERNAL MEDICINE

## 2021-09-14 RX ORDER — SODIUM, POTASSIUM,MAG SULFATES 17.5-3.13G
1 SOLUTION, RECONSTITUTED, ORAL ORAL TAKE AS DIRECTED
Qty: 354 ML | Refills: 0 | Status: SHIPPED | OUTPATIENT
Start: 2021-09-14 | End: 2021-09-14

## 2021-09-14 NOTE — PROGRESS NOTES
Chief Complaint   Patient presents with   • Hospital Follow Up Visit     [facility] 8/29-9/3, GI BLEED TRANSFUSED WITH 3 UNITS        History of Present Illness    The patient presents for a follow-up related to hypertension. The patient reports that he has had no headaches, chest pain, dyspnea, edema, syncope, blurred vision or palpitations. He states that he is taking his medication as prescribed. He is not having medication side effects.    The patient presents for a follow-up related to GERD. The patient is on omeprazole for his gastroesophageal reflux. The medication is taken on a regular basis and gives complete relief of the symptoms. He reports no abdominal pain, belching, diarrhea, dysphagia, early satiety, heartburn, hoarseness, nausea, odynophagia, rectal bleeding, vomiting or weight loss. The GERD has no known aggravating factors.    The patient presents for a follow-up related to hypothyroidism. He reports a good energy level. He reports no hair loss, heat intolerance, cold intolerance, constipation or sweats. He is taking his medication as prescribed.    The patient presents for a follow-up related to hyperlipidemia. He is following a low fat diet. He reports that he is exercising. He is taking gemfibrozil and simvastatin. The patient is taking his medication as prescribed. He reports no medication side effects, including muscle cramps, abdominal pain, headaches or weakness. He denies orthopnea, paroxysmal nocturnal dyspnea or dyspnea on exertion.    The patient presents for a follow-up related to iron deficiency anemia. There are no reports of blood loss. The patient has no symptoms of a dry cough, a wet cough, wheezing, fever, myalgias, sweats, decreased appetite, chills or paresthesias. The patient's energy level is normal.    Review of Systems    CONSTITUTIONAL- Denies Weakness or Malaise.    PULMONARY- Denies Sputum Production, Cough, Hemoptysis or Pleuritic Chest Pain.    CARDIOVASCULAR- Denies  Claudication or Irregular Heart Beat.    Medications      Current Outpatient Medications:   •  aspirin 81 MG chewable tablet, Chew 81 mg Daily., Disp: , Rfl:   •  Bydureon BCise 2 MG/0.85ML auto-injector injection, INJECT 0.85 ML UNDER THE SKIN INTO THE APPROPRIATE AREA AS DIRECTED ONE TIME PER WEEK., Disp: 10.2 mL, Rfl: 3  •  carvedilol (COREG) 6.25 MG tablet, Take 1 tablet by mouth Every 12 (Twelve) Hours., Disp: 60 tablet, Rfl: 1  •  Coenzyme Q10 (CO Q 10 PO), Take 300 mg by mouth Daily., Disp: , Rfl:   •  colestipol (COLESTID) 1 g tablet, TAKE 2 TABLETS TWICE A DAY, Disp: 360 tablet, Rfl: 3  •  Continuous Blood Gluc Sensor (FREESTYLE MESFIN 14 DAY SENSOR) misc, 1 each Every 14 (Fourteen) Days., Disp: 2 each, Rfl: 6  •  DULoxetine (CYMBALTA) 20 MG capsule, Take 1 capsule by mouth Daily for 14 days., Disp: 14 capsule, Rfl: 0  •  Flaxseed, Linseed, (FLAXSEED OIL) 1200 MG capsule, Take 1 capsule by mouth 2 (Two) Times a Day. 1300 mg, Disp: , Rfl:   •  gabapentin (NEURONTIN) 800 MG tablet, Take 1 tablet by mouth 3 (Three) Times a Day., Disp: 270 tablet, Rfl: 1  •  gemfibrozil (LOPID) 600 MG tablet, TAKE 1 TABLET TWICE A DAY, Disp: 180 tablet, Rfl: 3  •  Insulin Pen Needle (B-D UF III MINI PEN NEEDLES) 31G X 5 MM misc, USE THREE TIMES A DAY, Disp: 300 each, Rfl: 1  •  Insulin Regular Human, Conc, (HumuLIN R U-500 KwikPen) 500 UNIT/ML solution pen-injector CONCENTRATED injection, Inject sc 150 u before breakfast at 1 pm and 150 at dinner (Patient taking differently: Inject sc 150 u BETWEEN breakfast and1 pm and 150 u  at dinner with glucose check and about 30-50 units at hs.), Disp: 20 pen, Rfl: 3  •  ipratropium (ATROVENT) 0.06 % nasal spray, 2 sprays into the nostril(s) as directed by provider Daily., Disp: , Rfl:   •  levothyroxine (SYNTHROID, LEVOTHROID) 50 MCG tablet, Take 1 tablet by mouth Daily., Disp: 90 tablet, Rfl: 3  •  lisinopril (PRINIVIL,ZESTRIL) 5 MG tablet, Take 1 tablet by mouth Daily., Disp: 30 tablet,  Rfl: 1  •  Multiple Vitamins-Minerals (MULTIVITAMIN PO), Take 1 tablet by mouth Daily., Disp: , Rfl:   •  omeprazole (priLOSEC) 40 MG capsule, TAKE ONE CAPSULE BY MOUTH DAILY, Disp: 90 capsule, Rfl: 1  •  sertraline (ZOLOFT) 100 MG tablet, TAKE ONE TABLET BY MOUTH DAILY, Disp: 90 tablet, Rfl: 1  •  simvastatin (ZOCOR) 40 MG tablet, TAKE 1 TABLET DAILY AT BEDTIME, Disp: 90 tablet, Rfl: 1  •  spironolactone (Aldactone) 25 MG tablet, Take 1 tablet by mouth Daily., Disp: 30 tablet, Rfl: 1  •  tiZANidine (ZANAFLEX) 4 MG tablet, Take 1 tablet by mouth 2 (Two) Times a Day As Needed., Disp: , Rfl:   •  Iron, Ferrous Sulfate, 325 (65 Fe) MG tablet, Take 325 mg by mouth 2 (two) times a day., Disp: 30 tablet, Rfl: 2     Allergies    Allergies   Allergen Reactions   • Glucophage Xr [Metformin Hcl Er] Diarrhea and Other (See Comments)     Glucophage XR TB24: Adverse Reaction: Severe GI issues.   • Metformin Nausea And Vomiting     Other reaction(s): SEVERE INTESTIONAL ISSUES  Other reaction(s): SEVERE GI ISSUES    Glucophage XR TB24  MetFORMIN HCl TABS   • Tetracyclines & Related Nausea Only     Adverse Reaction       Problem List    Patient Active Problem List   Diagnosis   • Chronic kidney disease, stage III (moderate) (CMS/HCC)   • Gastroesophageal reflux disease without esophagitis   • Hyperlipidemia   • Hypertension   • Trigger finger of right hand   • DM (diabetes mellitus), type 2, uncontrolled w/neurologic complication (CMS/HCC)   • Obesity, Class I, BMI 30-34.9   • Actinic keratosis   • FAVIO (obstructive sleep apnea)   • Prurigo nodularis   • History of depression   • History of migraine   • Cervical radiculopathy   • ALT (SGPT) level raised   • Elevated AST (SGOT)   • Uncontrolled type 2 diabetes mellitus with diabetic neuropathy, with long-term current use of insulin (CMS/HCC)   • Post-operative infection   • Leukocytosis   • Left hand weakness   • Encounter for long-term (current) use of insulin (CMS/HCC)   •  Herniated cervical disc   • Circadian rhythm sleep disorder, delayed sleep phase type   • Mild nonproliferative diabetic retinopathy of left eye without macular edema associated with type 2 diabetes mellitus (CMS/HCC)   • Diabetic peripheral neuropathy associated with type 2 diabetes mellitus (CMS/HCC)   • Long term current use of insulin (CMS/HCC)   • Type 2 diabetes mellitus without complication (CMS/HCC)   • Hypothyroidism, adult   • Chronic blood loss anemia   • 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    Physical Examination    /62 (BP Location: Right arm, Patient Position: Sitting, Cuff Size: Adult)   Pulse 76   Temp 97.5 °F (36.4 °C) (Infrared)   Resp 18   Wt 100 kg (221 lb)   BMI 33.60 kg/m²     HEENT: Facies- Within normal limits. Lids- Normal bilaterally. Conjunctiva- Clear bilaterally. Sclera- Anicteric bilaterally.    Neck: Thyroid- non enlarged, symmetric and has no nodules. No bruits are detected.    Lungs: Auscultation- Clear to auscultation bilaterally. There are no retractions, clubbing or cyanosis. The Expiratory to Inspiratory ratio is equal.    Lymph Nodes: Cervical- no enlarged lymph nodes noted. Clavicular- Deferred. Axillary- Deferred. Inguinal- Deferred.    Cardiovascular: There are no carotid bruits. Heart- Normal Rate with Regular rhythm and no murmurs. There are no gallops. There are no rubs. In the lower extremities there is no edema. The upper extremities do not have edema.    Abdomen: Soft, benign, non-tender with no masses, hernias, organomegaly or scars.    Impression and Assessment    Essential Hypertension.    Gastroesophageal Reflux Disease.    Hypothyroidism.    Hyperlipidemia.    Iron Deficiency Anemia.    Plan    Gastroesophageal Reflux Disease Plan: The patient was instructed to continue the current medications.    Essential  Hypertension Plan: The current plan was continued.    Hyperlipidemia Plan: The patient was instructed to exercise daily, eat a low fat diet and continue his medications.    Hypothyroidism Plan: The patient was instructed to continue the current medications.    Iron Deficiency Anemia Plan: Medication was added as noted.    Diagnoses and all orders for this visit:    1. Essential hypertension (Primary)  -     Comprehensive Metabolic Panel    2. Hyperlipidemia, unspecified hyperlipidemia type  -     Comprehensive Metabolic Panel    3. Gastroesophageal reflux disease without esophagitis    4. Hypothyroidism, unspecified type  -     TSH  -     T4, Free    5. Iron deficiency anemia, unspecified iron deficiency anemia type  -     CBC & Differential  -     Ferritin  -     Iron Profile  -     Iron, Ferrous Sulfate, 325 (65 Fe) MG tablet; Take 325 mg by mouth 2 (two) times a day.  Dispense: 30 tablet; Refill: 2        Return to Office    The patient was instructed to return for follow-up in 3 months. The patient was instructed to return sooner if the condition changes, worsens, or does not resolve.

## 2021-09-16 ENCOUNTER — READMISSION MANAGEMENT (OUTPATIENT)
Dept: CALL CENTER | Facility: HOSPITAL | Age: 71
End: 2021-09-16

## 2021-09-16 NOTE — OUTREACH NOTE
Medical Week 2 Survey      Responses   [facility] patient discharged from?  [place]   Does the patient have one of the following disease processes/diagnoses(primary or secondary)?  Other   Week 2 attempt successful?  Yes   Call start time  1406   Discharge diagnosis  Symptomatic anemia  Acute GI bleeding   Call end time  1410   Person spoke with today (if not patient) and relationship  patient   Meds reviewed with patient/caregiver?  Yes   Is the patient having any side effects they believe may be caused by any medication additions or changes?  No   Is the patient taking all medications as directed (includes completed medication regime)?  Yes   Does the patient have a primary care provider?   Yes   Does the patient have an appointment with their PCP within 7 days of discharge?  Yes   Comments regarding PCP  [facility] on 9/14/21 with PCP    Has the patient kept scheduled appointments due by today?  Yes   Comments  patient has had labwork done   Psychosocial issues?  No   Did the patient receive a copy of their discharge instructions?  Yes   Nursing interventions  Reviewed instructions with patient   What is the patient's perception of their health status since discharge?  Improving   Is the patient/caregiver able to teach back signs and symptoms related to disease process for when to call PCP?  Yes   Is the patient/caregiver able to teach back signs and symptoms related to disease process for when to call 911?  Yes   Is the patient/caregiver able to teach back the hierarchy of who to call/visit for symptoms/problems? PCP, Specialist, Home health nurse, Urgent Care, ED, 911  Yes   If the patient is a current smoker, are they able to teach back resources for cessation?  Not a smoker   Additional teach back comments  Pt is checking BP daily reports WNL   Week 2 Call Completed?  Yes   Wrap up additional comments  Pt reports he is doing very well.  He denies needs.          Katerin Root RN

## 2021-09-28 RX ORDER — CARVEDILOL 6.25 MG/1
6.25 TABLET ORAL EVERY 12 HOURS SCHEDULED
Qty: 180 TABLET | Refills: 1 | Status: SHIPPED | OUTPATIENT
Start: 2021-09-28 | End: 2022-04-06

## 2021-09-28 RX ORDER — LISINOPRIL 5 MG/1
5 TABLET ORAL DAILY
Qty: 90 TABLET | Refills: 1 | Status: SHIPPED | OUTPATIENT
Start: 2021-09-28 | End: 2021-12-15

## 2021-09-28 RX ORDER — IPRATROPIUM BROMIDE 42 UG/1
2 SPRAY, METERED NASAL DAILY
Qty: 45 ML | Refills: 1 | Status: SHIPPED | OUTPATIENT
Start: 2021-09-28 | End: 2022-05-19

## 2021-09-29 ENCOUNTER — OFFICE VISIT (OUTPATIENT)
Dept: ENDOCRINOLOGY | Facility: CLINIC | Age: 71
End: 2021-09-29

## 2021-09-29 ENCOUNTER — LAB (OUTPATIENT)
Dept: LAB | Facility: HOSPITAL | Age: 71
End: 2021-09-29

## 2021-09-29 VITALS
WEIGHT: 221.5 LBS | HEIGHT: 68 IN | DIASTOLIC BLOOD PRESSURE: 80 MMHG | OXYGEN SATURATION: 99 % | BODY MASS INDEX: 33.57 KG/M2 | SYSTOLIC BLOOD PRESSURE: 122 MMHG | HEART RATE: 89 BPM

## 2021-09-29 DIAGNOSIS — E87.5 HYPERKALEMIA: ICD-10-CM

## 2021-09-29 DIAGNOSIS — E03.9 HYPOTHYROIDISM, ADULT: Chronic | ICD-10-CM

## 2021-09-29 DIAGNOSIS — E11.65 TYPE 2 DIABETES MELLITUS WITH HYPERGLYCEMIA, UNSPECIFIED WHETHER LONG TERM INSULIN USE (HCC): ICD-10-CM

## 2021-09-29 DIAGNOSIS — IMO0002 DM (DIABETES MELLITUS), TYPE 2, UNCONTROLLED W/NEUROLOGIC COMPLICATION: Primary | Chronic | ICD-10-CM

## 2021-09-29 LAB
ANION GAP SERPL CALCULATED.3IONS-SCNC: 13.6 MMOL/L (ref 5–15)
BUN SERPL-MCNC: 23 MG/DL (ref 8–23)
BUN/CREAT SERPL: 15.1 (ref 7–25)
CALCIUM SPEC-SCNC: 9.5 MG/DL (ref 8.6–10.5)
CHLORIDE SERPL-SCNC: 106 MMOL/L (ref 98–107)
CO2 SERPL-SCNC: 19.4 MMOL/L (ref 22–29)
CREAT SERPL-MCNC: 1.52 MG/DL (ref 0.76–1.27)
EXPIRATION DATE: ABNORMAL
EXPIRATION DATE: NORMAL
GFR SERPL CREATININE-BSD FRML MDRD: 45 ML/MIN/1.73
GLUCOSE BLDC GLUCOMTR-MCNC: 186 MG/DL (ref 70–130)
GLUCOSE SERPL-MCNC: 167 MG/DL (ref 65–99)
HBA1C MFR BLD: 7.1 %
Lab: ABNORMAL
Lab: NORMAL
POTASSIUM SERPL-SCNC: 5.6 MMOL/L (ref 3.5–5.2)
SODIUM SERPL-SCNC: 139 MMOL/L (ref 136–145)

## 2021-09-29 PROCEDURE — 95251 CONT GLUC MNTR ANALYSIS I&R: CPT | Performed by: INTERNAL MEDICINE

## 2021-09-29 PROCEDURE — 83036 HEMOGLOBIN GLYCOSYLATED A1C: CPT | Performed by: INTERNAL MEDICINE

## 2021-09-29 PROCEDURE — 3051F HG A1C>EQUAL 7.0%<8.0%: CPT | Performed by: INTERNAL MEDICINE

## 2021-09-29 PROCEDURE — 99214 OFFICE O/P EST MOD 30 MIN: CPT | Performed by: INTERNAL MEDICINE

## 2021-09-29 PROCEDURE — 80048 BASIC METABOLIC PNL TOTAL CA: CPT | Performed by: INTERNAL MEDICINE

## 2021-10-04 RX ORDER — OMEPRAZOLE 40 MG/1
40 CAPSULE, DELAYED RELEASE ORAL DAILY
Qty: 90 CAPSULE | Refills: 1 | Status: SHIPPED | OUTPATIENT
Start: 2021-10-04 | End: 2022-04-06

## 2021-10-04 RX ORDER — SPIRONOLACTONE 25 MG/1
25 TABLET ORAL DAILY
Qty: 90 TABLET | Refills: 1 | Status: SHIPPED | OUTPATIENT
Start: 2021-10-04 | End: 2022-04-06

## 2021-10-04 RX ORDER — FLURBIPROFEN SODIUM 0.3 MG/ML
SOLUTION/ DROPS OPHTHALMIC
Qty: 300 EACH | Refills: 3 | Status: SHIPPED | OUTPATIENT
Start: 2021-10-04 | End: 2022-10-25

## 2021-10-05 ENCOUNTER — TELEPHONE (OUTPATIENT)
Dept: ENDOCRINOLOGY | Facility: CLINIC | Age: 71
End: 2021-10-05

## 2021-10-05 DIAGNOSIS — IMO0002 DM (DIABETES MELLITUS), TYPE 2, UNCONTROLLED W/NEUROLOGIC COMPLICATION: ICD-10-CM

## 2021-10-05 DIAGNOSIS — N18.31 STAGE 3A CHRONIC KIDNEY DISEASE (HCC): Primary | ICD-10-CM

## 2021-10-13 ENCOUNTER — OFFICE VISIT (OUTPATIENT)
Dept: INTERNAL MEDICINE | Facility: CLINIC | Age: 71
End: 2021-10-13

## 2021-10-13 ENCOUNTER — LAB (OUTPATIENT)
Dept: LAB | Facility: HOSPITAL | Age: 71
End: 2021-10-13

## 2021-10-13 VITALS
BODY MASS INDEX: 34.36 KG/M2 | RESPIRATION RATE: 18 BRPM | SYSTOLIC BLOOD PRESSURE: 132 MMHG | HEART RATE: 72 BPM | TEMPERATURE: 97.8 F | WEIGHT: 226 LBS | DIASTOLIC BLOOD PRESSURE: 68 MMHG

## 2021-10-13 DIAGNOSIS — I10 ESSENTIAL HYPERTENSION: ICD-10-CM

## 2021-10-13 DIAGNOSIS — D50.9 IRON DEFICIENCY ANEMIA, UNSPECIFIED IRON DEFICIENCY ANEMIA TYPE: Primary | ICD-10-CM

## 2021-10-13 DIAGNOSIS — Z23 IMMUNIZATION DUE: ICD-10-CM

## 2021-10-13 DIAGNOSIS — D50.9 IRON DEFICIENCY ANEMIA, UNSPECIFIED IRON DEFICIENCY ANEMIA TYPE: ICD-10-CM

## 2021-10-13 LAB
ANION GAP SERPL CALCULATED.3IONS-SCNC: 11.9 MMOL/L (ref 5–15)
BASOPHILS # BLD AUTO: 0.02 10*3/MM3 (ref 0–0.2)
BASOPHILS NFR BLD AUTO: 0.4 % (ref 0–1.5)
BUN SERPL-MCNC: 24 MG/DL (ref 8–23)
BUN/CREAT SERPL: 16.8 (ref 7–25)
CALCIUM SPEC-SCNC: 9.2 MG/DL (ref 8.6–10.5)
CHLORIDE SERPL-SCNC: 105 MMOL/L (ref 98–107)
CO2 SERPL-SCNC: 20.1 MMOL/L (ref 22–29)
CREAT SERPL-MCNC: 1.43 MG/DL (ref 0.76–1.27)
DEPRECATED RDW RBC AUTO: 68.4 FL (ref 37–54)
EOSINOPHIL # BLD AUTO: 0.23 10*3/MM3 (ref 0–0.4)
EOSINOPHIL NFR BLD AUTO: 4.8 % (ref 0.3–6.2)
ERYTHROCYTE [DISTWIDTH] IN BLOOD BY AUTOMATED COUNT: 20.9 % (ref 12.3–15.4)
FERRITIN SERPL-MCNC: 62.3 NG/ML (ref 30–400)
GFR SERPL CREATININE-BSD FRML MDRD: 49 ML/MIN/1.73
GLUCOSE SERPL-MCNC: 169 MG/DL (ref 65–99)
HCT VFR BLD AUTO: 28.3 % (ref 37.5–51)
HGB BLD-MCNC: 9 G/DL (ref 13–17.7)
IMM GRANULOCYTES # BLD AUTO: 0.05 10*3/MM3 (ref 0–0.05)
IMM GRANULOCYTES NFR BLD AUTO: 1 % (ref 0–0.5)
IRON 24H UR-MRATE: 120 MCG/DL (ref 59–158)
IRON SATN MFR SERPL: 20 % (ref 20–50)
LYMPHOCYTES # BLD AUTO: 0.67 10*3/MM3 (ref 0.7–3.1)
LYMPHOCYTES NFR BLD AUTO: 13.8 % (ref 19.6–45.3)
MCH RBC QN AUTO: 29.1 PG (ref 26.6–33)
MCHC RBC AUTO-ENTMCNC: 31.8 G/DL (ref 31.5–35.7)
MCV RBC AUTO: 91.6 FL (ref 79–97)
MONOCYTES # BLD AUTO: 0.58 10*3/MM3 (ref 0.1–0.9)
MONOCYTES NFR BLD AUTO: 12 % (ref 5–12)
NEUTROPHILS NFR BLD AUTO: 3.29 10*3/MM3 (ref 1.7–7)
NEUTROPHILS NFR BLD AUTO: 68 % (ref 42.7–76)
NRBC BLD AUTO-RTO: 0 /100 WBC (ref 0–0.2)
PLATELET # BLD AUTO: 105 10*3/MM3 (ref 140–450)
PMV BLD AUTO: 12.1 FL (ref 6–12)
POTASSIUM SERPL-SCNC: 5.2 MMOL/L (ref 3.5–5.2)
RBC # BLD AUTO: 3.09 10*6/MM3 (ref 4.14–5.8)
SODIUM SERPL-SCNC: 137 MMOL/L (ref 136–145)
TIBC SERPL-MCNC: 608 MCG/DL (ref 298–536)
TRANSFERRIN SERPL-MCNC: 408 MG/DL (ref 200–360)
WBC # BLD AUTO: 4.84 10*3/MM3 (ref 3.4–10.8)

## 2021-10-13 PROCEDURE — 85025 COMPLETE CBC W/AUTO DIFF WBC: CPT

## 2021-10-13 PROCEDURE — 80048 BASIC METABOLIC PNL TOTAL CA: CPT

## 2021-10-13 PROCEDURE — 83540 ASSAY OF IRON: CPT

## 2021-10-13 PROCEDURE — 82728 ASSAY OF FERRITIN: CPT

## 2021-10-13 PROCEDURE — G0008 ADMIN INFLUENZA VIRUS VAC: HCPCS | Performed by: INTERNAL MEDICINE

## 2021-10-13 PROCEDURE — 99214 OFFICE O/P EST MOD 30 MIN: CPT | Performed by: INTERNAL MEDICINE

## 2021-10-13 PROCEDURE — 36415 COLL VENOUS BLD VENIPUNCTURE: CPT | Performed by: INTERNAL MEDICINE

## 2021-10-13 PROCEDURE — 90662 IIV NO PRSV INCREASED AG IM: CPT | Performed by: INTERNAL MEDICINE

## 2021-10-13 PROCEDURE — 84466 ASSAY OF TRANSFERRIN: CPT

## 2021-10-13 NOTE — PROGRESS NOTES
Chief Complaint   Patient presents with   • Follow-up     1 MONTH FOLLOW UP HYPERTENSION       History of Present Illness      The patient presents for a follow-up related to anemia. There is a history of blood loss from the GI tract. The patient has fatigue but does not have a dry cough, a wet cough, wheezing, fever, a headache, nausea, vomiting, diarrhea, myalgias, abdominal pain, sweats, weight loss, decreased appetite or chills. The patient's energy level is decreased. There are no reports of chest pain, shortness of breath, palpitations or syncope.    The patient presents for a follow-up related to hypertension. The patient reports that he has had no edema or blurred vision. He states that he is taking his medication as prescribed. He is not having medication side effects.    Review of Systems    CONSTITUTIONAL- Denies Weakness.    GASTROINTESTINAL- Denies Blood per Rectum or Heartburn.    PULMONARY- Denies Sputum Production, Cough, Hemoptysis or Pleuritic Chest Pain.    CARDIOVASCULAR- Denies Claudication or Irregular Heart Beat.    Medications      Current Outpatient Medications:   •  aspirin 81 MG chewable tablet, Chew 81 mg Daily., Disp: , Rfl:   •  Bydureon BCise 2 MG/0.85ML auto-injector injection, INJECT 0.85 ML UNDER THE SKIN INTO THE APPROPRIATE AREA AS DIRECTED ONE TIME PER WEEK., Disp: 10.2 mL, Rfl: 3  •  carvedilol (COREG) 6.25 MG tablet, Take 1 tablet by mouth Every 12 (Twelve) Hours., Disp: 180 tablet, Rfl: 1  •  Coenzyme Q10 (CO Q 10 PO), Take 300 mg by mouth Daily., Disp: , Rfl:   •  colestipol (COLESTID) 1 g tablet, TAKE 2 TABLETS TWICE A DAY, Disp: 360 tablet, Rfl: 3  •  Continuous Blood Gluc Sensor (FREESTYLE MESFIN 14 DAY SENSOR) misc, 1 each Every 14 (Fourteen) Days., Disp: 2 each, Rfl: 6  •  Flaxseed, Linseed, (FLAXSEED OIL) 1200 MG capsule, Take 1 capsule by mouth 2 (Two) Times a Day. 1300 mg, Disp: , Rfl:   •  gabapentin (NEURONTIN) 800 MG tablet, Take 1 tablet by mouth 3 (Three) Times a  Day., Disp: 270 tablet, Rfl: 1  •  gemfibrozil (LOPID) 600 MG tablet, TAKE 1 TABLET TWICE A DAY, Disp: 180 tablet, Rfl: 3  •  Insulin Pen Needle (B-D UF III MINI PEN NEEDLES) 31G X 5 MM misc, USE THREE TIMES A DAY, Disp: 300 each, Rfl: 3  •  Insulin Regular Human, Conc, (HumuLIN R U-500 KwikPen) 500 UNIT/ML solution pen-injector CONCENTRATED injection, Inject sc 150 u before breakfast at 1 pm and 150 at dinner (Patient taking differently: Inject sc 150 u BETWEEN breakfast and1 pm and 150 u  at dinner with glucose check and about 30-50 units at hs.), Disp: 20 pen, Rfl: 3  •  ipratropium (ATROVENT) 0.06 % nasal spray, 2 sprays into the nostril(s) as directed by provider Daily., Disp: 45 mL, Rfl: 1  •  Iron, Ferrous Sulfate, 325 (65 Fe) MG tablet, Take 325 mg by mouth 2 (two) times a day., Disp: 30 tablet, Rfl: 2  •  levothyroxine (SYNTHROID, LEVOTHROID) 50 MCG tablet, Take 1 tablet by mouth Daily., Disp: 90 tablet, Rfl: 3  •  Multiple Vitamins-Minerals (MULTIVITAMIN PO), Take 1 tablet by mouth Daily., Disp: , Rfl:   •  omeprazole (priLOSEC) 40 MG capsule, Take 1 capsule by mouth Daily., Disp: 90 capsule, Rfl: 1  •  sertraline (ZOLOFT) 100 MG tablet, TAKE ONE TABLET BY MOUTH DAILY, Disp: 90 tablet, Rfl: 1  •  simvastatin (ZOCOR) 40 MG tablet, TAKE 1 TABLET DAILY AT BEDTIME, Disp: 90 tablet, Rfl: 1  •  spironolactone (Aldactone) 25 MG tablet, Take 1 tablet by mouth Daily., Disp: 90 tablet, Rfl: 1  •  tiZANidine (ZANAFLEX) 4 MG tablet, Take 1 tablet by mouth 2 (Two) Times a Day As Needed., Disp: , Rfl:   •  DULoxetine (CYMBALTA) 20 MG capsule, Take 1 capsule by mouth Daily for 14 days., Disp: 14 capsule, Rfl: 0  •  lisinopril (PRINIVIL,ZESTRIL) 5 MG tablet, Take 1 tablet by mouth Daily., Disp: 90 tablet, Rfl: 1     Allergies    Allergies   Allergen Reactions   • Glucophage Xr [Metformin Hcl Er] Diarrhea and Other (See Comments)     Glucophage XR TB24: Adverse Reaction: Severe GI issues.   • Metformin Nausea And Vomiting      Other reaction(s): SEVERE INTESTIONAL ISSUES  Other reaction(s): SEVERE GI ISSUES    Glucophage XR TB24  MetFORMIN HCl TABS   • Tetracyclines & Related Nausea Only     Adverse Reaction       Problem List    Patient Active Problem List   Diagnosis   • Chronic kidney disease, stage III (moderate) (CMS/HCC)   • Gastroesophageal reflux disease without esophagitis   • Hyperlipidemia   • Hypertension   • Trigger finger of right hand   • DM (diabetes mellitus), type 2, uncontrolled w/neurologic complication (HCC)   • Obesity, Class I, BMI 30-34.9   • Actinic keratosis   • FAVIO (obstructive sleep apnea)   • Prurigo nodularis   • History of depression   • History of migraine   • Cervical radiculopathy   • ALT (SGPT) level raised   • Elevated AST (SGOT)   • Uncontrolled type 2 diabetes mellitus with diabetic neuropathy, with long-term current use of insulin (HCC)   • Post-operative infection   • Leukocytosis   • Left hand weakness   • Encounter for long-term (current) use of insulin (HCC)   • Herniated cervical disc   • Circadian rhythm sleep disorder, delayed sleep phase type   • Mild nonproliferative diabetic retinopathy of left eye without macular edema associated with type 2 diabetes mellitus (HCC)   • Diabetic peripheral neuropathy associated with type 2 diabetes mellitus (HCC)   • Long term current use of insulin (HCC)   • Type 2 diabetes mellitus with hyperglycemia (HCC)   • Hypothyroidism, adult   • Chronic blood loss anemia   • 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    Physical Examination    /68 (BP Location: Right arm, Patient Position: Sitting, Cuff Size: Adult)   Pulse 72   Temp 97.8 °F (36.6 °C) (Infrared)   Resp 18   Wt 103 kg (226 lb)   BMI 34.36 kg/m²     HEENT: Facies- Within normal limits. Lids- Normal bilaterally. Conjunctiva- Clear bilaterally. Sclera- Anicteric  bilaterally.    Neck: Thyroid- non enlarged, symmetric and has no nodules. No bruits are detected.    Lungs: Auscultation- Clear to auscultation bilaterally. There are no retractions, clubbing or cyanosis. The Expiratory to Inspiratory ratio is equal.    Cardiovascular: There are no carotid bruits. Heart- Normal Rate with Regular rhythm and no murmurs. There are no gallops. There are no rubs. In the lower extremities there is no edema. The upper extremities do not have edema.    Abdomen: Soft, benign, non-tender with no masses, hernias, organomegaly or scars.    Impression and Assessment    Iron Deficiency Anemia.    Essential Hypertension.    Plan    Essential Hypertension Plan: The patient was instructed to continue the current medications.    Iron Deficiency Anemia Plan: The patient was instructed to continue the current medications.    Diagnoses and all orders for this visit:    1. Iron deficiency anemia, unspecified iron deficiency anemia type (Primary)  -     CBC & Differential; Future  -     Ferritin; Future  -     Iron Profile; Future    2. Essential hypertension  -     Basic Metabolic Panel; Future    3. Immunization due  -     Fluzone High-Dose 65+yrs          Return to Office    The patient was instructed to return for follow-up in 2 months. The patient was instructed to return sooner if the condition changes, worsens, or does not resolve.

## 2021-10-31 DIAGNOSIS — D50.9 IRON DEFICIENCY ANEMIA, UNSPECIFIED IRON DEFICIENCY ANEMIA TYPE: ICD-10-CM

## 2021-11-01 RX ORDER — FERROUS SULFATE 325(65) MG
TABLET ORAL
Qty: 180 TABLET | Refills: 1 | Status: SHIPPED | OUTPATIENT
Start: 2021-11-01 | End: 2022-05-16

## 2021-11-01 RX ORDER — SERTRALINE HYDROCHLORIDE 100 MG/1
TABLET, FILM COATED ORAL
Qty: 90 TABLET | Refills: 1 | Status: SHIPPED | OUTPATIENT
Start: 2021-11-01 | End: 2022-07-01

## 2021-11-05 ENCOUNTER — TELEPHONE (OUTPATIENT)
Dept: ENDOCRINOLOGY | Facility: CLINIC | Age: 71
End: 2021-11-05

## 2021-11-05 DIAGNOSIS — N18.31 STAGE 3A CHRONIC KIDNEY DISEASE (HCC): Primary | ICD-10-CM

## 2021-11-05 DIAGNOSIS — IMO0002 DM (DIABETES MELLITUS), TYPE 2, UNCONTROLLED W/NEUROLOGIC COMPLICATION: ICD-10-CM

## 2021-11-05 NOTE — TELEPHONE ENCOUNTER
PT CALLED STATING HE IS TO HAVE LABS DRAWN AFTER SOPPING A MED. HE REQUESTED WE ADD LAB ORDERS. PLEASE AND THANK YOU

## 2021-11-11 ENCOUNTER — LAB (OUTPATIENT)
Dept: ENDOCRINOLOGY | Facility: CLINIC | Age: 71
End: 2021-11-11

## 2021-11-11 ENCOUNTER — LAB (OUTPATIENT)
Dept: LAB | Facility: HOSPITAL | Age: 71
End: 2021-11-11

## 2021-11-11 DIAGNOSIS — IMO0002 DM (DIABETES MELLITUS), TYPE 2, UNCONTROLLED W/NEUROLOGIC COMPLICATION: ICD-10-CM

## 2021-11-11 DIAGNOSIS — N18.31 STAGE 3A CHRONIC KIDNEY DISEASE (HCC): ICD-10-CM

## 2021-11-11 PROCEDURE — 36415 COLL VENOUS BLD VENIPUNCTURE: CPT

## 2021-11-11 PROCEDURE — 80048 BASIC METABOLIC PNL TOTAL CA: CPT

## 2021-11-12 LAB
ANION GAP SERPL CALCULATED.3IONS-SCNC: 11.1 MMOL/L (ref 5–15)
BUN SERPL-MCNC: 26 MG/DL (ref 8–23)
BUN/CREAT SERPL: 18.6 (ref 7–25)
CALCIUM SPEC-SCNC: 9.6 MG/DL (ref 8.6–10.5)
CHLORIDE SERPL-SCNC: 102 MMOL/L (ref 98–107)
CO2 SERPL-SCNC: 22.9 MMOL/L (ref 22–29)
CREAT SERPL-MCNC: 1.4 MG/DL (ref 0.76–1.27)
GFR SERPL CREATININE-BSD FRML MDRD: 50 ML/MIN/1.73
GLUCOSE SERPL-MCNC: 338 MG/DL (ref 65–99)
POTASSIUM SERPL-SCNC: 4.7 MMOL/L (ref 3.5–5.2)
SODIUM SERPL-SCNC: 136 MMOL/L (ref 136–145)

## 2021-11-29 DIAGNOSIS — E11.42 DIABETIC PERIPHERAL NEUROPATHY ASSOCIATED WITH TYPE 2 DIABETES MELLITUS (HCC): ICD-10-CM

## 2021-11-30 DIAGNOSIS — E11.42 DIABETIC PERIPHERAL NEUROPATHY ASSOCIATED WITH TYPE 2 DIABETES MELLITUS (HCC): ICD-10-CM

## 2021-11-30 RX ORDER — GABAPENTIN 800 MG/1
TABLET ORAL
Qty: 270 TABLET | Refills: 1 | OUTPATIENT
Start: 2021-11-30

## 2021-11-30 RX ORDER — GABAPENTIN 800 MG/1
800 TABLET ORAL 3 TIMES DAILY
Qty: 270 TABLET | Refills: 1 | Status: SHIPPED | OUTPATIENT
Start: 2021-11-30 | End: 2022-05-18 | Stop reason: SDUPTHER

## 2021-12-15 ENCOUNTER — OFFICE VISIT (OUTPATIENT)
Dept: INTERNAL MEDICINE | Facility: CLINIC | Age: 71
End: 2021-12-15

## 2021-12-15 VITALS
SYSTOLIC BLOOD PRESSURE: 130 MMHG | DIASTOLIC BLOOD PRESSURE: 70 MMHG | WEIGHT: 222 LBS | BODY MASS INDEX: 33.75 KG/M2 | RESPIRATION RATE: 18 BRPM | HEART RATE: 68 BPM | TEMPERATURE: 98.2 F

## 2021-12-15 DIAGNOSIS — K21.9 GASTROESOPHAGEAL REFLUX DISEASE WITHOUT ESOPHAGITIS: ICD-10-CM

## 2021-12-15 DIAGNOSIS — I10 ESSENTIAL HYPERTENSION: Primary | ICD-10-CM

## 2021-12-15 DIAGNOSIS — D50.9 IRON DEFICIENCY ANEMIA, UNSPECIFIED IRON DEFICIENCY ANEMIA TYPE: ICD-10-CM

## 2021-12-15 DIAGNOSIS — E78.5 HYPERLIPIDEMIA, UNSPECIFIED HYPERLIPIDEMIA TYPE: ICD-10-CM

## 2021-12-15 DIAGNOSIS — E03.9 HYPOTHYROIDISM, UNSPECIFIED TYPE: ICD-10-CM

## 2021-12-15 LAB
ALBUMIN SERPL-MCNC: 4.5 G/DL (ref 3.5–5.2)
ALBUMIN/GLOB SERPL: 1.1 G/DL
ALP SERPL-CCNC: 78 U/L (ref 39–117)
ALT SERPL W P-5'-P-CCNC: 35 U/L (ref 1–41)
ANION GAP SERPL CALCULATED.3IONS-SCNC: 14.4 MMOL/L (ref 5–15)
AST SERPL-CCNC: 50 U/L (ref 1–40)
BILIRUB SERPL-MCNC: 0.6 MG/DL (ref 0–1.2)
BUN SERPL-MCNC: 23 MG/DL (ref 8–23)
BUN/CREAT SERPL: 19.3 (ref 7–25)
CALCIUM SPEC-SCNC: 10.2 MG/DL (ref 8.6–10.5)
CHLORIDE SERPL-SCNC: 100 MMOL/L (ref 98–107)
CO2 SERPL-SCNC: 22.6 MMOL/L (ref 22–29)
CREAT SERPL-MCNC: 1.19 MG/DL (ref 0.76–1.27)
GFR SERPL CREATININE-BSD FRML MDRD: 60 ML/MIN/1.73
GLOBULIN UR ELPH-MCNC: 4.1 GM/DL
GLUCOSE SERPL-MCNC: 141 MG/DL (ref 65–99)
IRON 24H UR-MRATE: 179 MCG/DL (ref 59–158)
IRON SATN MFR SERPL: 27 % (ref 20–50)
POTASSIUM SERPL-SCNC: 4.6 MMOL/L (ref 3.5–5.2)
PROT SERPL-MCNC: 8.6 G/DL (ref 6–8.5)
SODIUM SERPL-SCNC: 137 MMOL/L (ref 136–145)
TIBC SERPL-MCNC: 662 MCG/DL (ref 298–536)
TRANSFERRIN SERPL-MCNC: 444 MG/DL (ref 200–360)

## 2021-12-15 PROCEDURE — 83540 ASSAY OF IRON: CPT | Performed by: INTERNAL MEDICINE

## 2021-12-15 PROCEDURE — 84439 ASSAY OF FREE THYROXINE: CPT | Performed by: INTERNAL MEDICINE

## 2021-12-15 PROCEDURE — 82728 ASSAY OF FERRITIN: CPT | Performed by: INTERNAL MEDICINE

## 2021-12-15 PROCEDURE — 36415 COLL VENOUS BLD VENIPUNCTURE: CPT | Performed by: INTERNAL MEDICINE

## 2021-12-15 PROCEDURE — 84466 ASSAY OF TRANSFERRIN: CPT | Performed by: INTERNAL MEDICINE

## 2021-12-15 PROCEDURE — 85025 COMPLETE CBC W/AUTO DIFF WBC: CPT | Performed by: INTERNAL MEDICINE

## 2021-12-15 PROCEDURE — 99214 OFFICE O/P EST MOD 30 MIN: CPT | Performed by: INTERNAL MEDICINE

## 2021-12-15 PROCEDURE — 80053 COMPREHEN METABOLIC PANEL: CPT | Performed by: INTERNAL MEDICINE

## 2021-12-15 PROCEDURE — 84443 ASSAY THYROID STIM HORMONE: CPT | Performed by: INTERNAL MEDICINE

## 2021-12-15 NOTE — PROGRESS NOTES
Chief Complaint   Patient presents with   • Follow-up     2 month follow up chronic medical problems       History of Present Illness      The patient presents for a follow-up related to hypertension. The patient reports that he has had no headaches, chest pain, dyspnea, edema, syncope, blurred vision or palpitations. He states that he is taking his medication as prescribed. He is not having medication side effects.    The patient presents for a follow-up related to hyperlipidemia. He is following a low fat diet. He reports that he is not exercising. He is taking gemfibrozil and simvastatin. The patient is taking his medication as prescribed. He reports no medication side effects, including muscle cramps, abdominal pain, headaches or weakness. He denies orthopnea, paroxysmal nocturnal dyspnea or dyspnea on exertion.    The patient presents for a follow-up related to GERD. The patient is on omeprazole for his gastroesophageal reflux. The medication is taken on a regular basis and gives complete relief of the symptoms. He reports no abdominal pain, belching, diarrhea, dysphagia, early satiety, heartburn, hoarseness, nausea, odynophagia, rectal bleeding, vomiting or weight loss. The GERD has no known aggravating factors.    The patient presents for a follow-up related to iron deficiency anemia. There are no reports of blood loss. The patient has no symptoms of a dry cough, a wet cough, wheezing, fever, myalgias, sweats, decreased appetite, chills, fatigue or paresthesias. The patient's energy level is normal.    The patient presents for a follow-up related to hypothyroidism. He reports a good energy level. He reports no hair loss, heat intolerance, cold intolerance, constipation or sweats. He is taking his medication as prescribed.    Review of Systems    CONSTITUTIONAL- Denies Weakness or Malaise.    PULMONARY- Denies Sputum Production, Cough, Hemoptysis or Pleuritic Chest Pain.    CARDIOVASCULAR- Denies Claudication  or Irregular Heart Beat.    Medications      Current Outpatient Medications:   •  aspirin 81 MG chewable tablet, Chew 81 mg Daily., Disp: , Rfl:   •  Bydureon BCise 2 MG/0.85ML auto-injector injection, INJECT 0.85 ML UNDER THE SKIN INTO THE APPROPRIATE AREA AS DIRECTED ONE TIME PER WEEK., Disp: 10.2 mL, Rfl: 3  •  carvedilol (COREG) 6.25 MG tablet, Take 1 tablet by mouth Every 12 (Twelve) Hours., Disp: 180 tablet, Rfl: 1  •  Coenzyme Q10 (CO Q 10 PO), Take 300 mg by mouth Daily., Disp: , Rfl:   •  colestipol (COLESTID) 1 g tablet, TAKE 2 TABLETS TWICE A DAY, Disp: 360 tablet, Rfl: 3  •  Continuous Blood Gluc Sensor (FREESTYLE MESFIN 14 DAY SENSOR) mis, 1 each Every 14 (Fourteen) Days., Disp: 2 each, Rfl: 6  •  FeroSul 325 (65 Fe) MG tablet, TAKE ONE TABLET BY MOUTH TWICE A DAY, Disp: 180 tablet, Rfl: 1  •  Flaxseed, Linseed, (FLAXSEED OIL) 1200 MG capsule, Take 1 capsule by mouth 2 (Two) Times a Day. 1300 mg, Disp: , Rfl:   •  gabapentin (NEURONTIN) 800 MG tablet, Take 1 tablet by mouth 3 (Three) Times a Day., Disp: 270 tablet, Rfl: 1  •  gemfibrozil (LOPID) 600 MG tablet, TAKE 1 TABLET TWICE A DAY, Disp: 180 tablet, Rfl: 3  •  Insulin Pen Needle (B-D UF III MINI PEN NEEDLES) 31G X 5 MM misc, USE THREE TIMES A DAY, Disp: 300 each, Rfl: 3  •  Insulin Regular Human, Conc, (HumuLIN R U-500 KwikPen) 500 UNIT/ML solution pen-injector CONCENTRATED injection, Inject sc 150 u before breakfast at 1 pm and 150 at dinner (Patient taking differently: Inject sc 150 u BETWEEN breakfast and1 pm and 150 u  at dinner with glucose check and about 30-50 units at hs.), Disp: 20 pen, Rfl: 3  •  ipratropium (ATROVENT) 0.06 % nasal spray, 2 sprays into the nostril(s) as directed by provider Daily., Disp: 45 mL, Rfl: 1  •  levothyroxine (SYNTHROID, LEVOTHROID) 50 MCG tablet, Take 1 tablet by mouth Daily., Disp: 90 tablet, Rfl: 3  •  Multiple Vitamins-Minerals (MULTIVITAMIN PO), Take 1 tablet by mouth Daily., Disp: , Rfl:   •  omeprazole  (priLOSEC) 40 MG capsule, Take 1 capsule by mouth Daily., Disp: 90 capsule, Rfl: 1  •  sertraline (ZOLOFT) 100 MG tablet, TAKE ONE TABLET BY MOUTH DAILY, Disp: 90 tablet, Rfl: 1  •  simvastatin (ZOCOR) 40 MG tablet, TAKE 1 TABLET DAILY AT BEDTIME, Disp: 90 tablet, Rfl: 1  •  spironolactone (Aldactone) 25 MG tablet, Take 1 tablet by mouth Daily., Disp: 90 tablet, Rfl: 1  •  tiZANidine (ZANAFLEX) 4 MG tablet, Take 1 tablet by mouth 2 (Two) Times a Day As Needed., Disp: , Rfl:      Allergies    Allergies   Allergen Reactions   • Glucophage Xr [Metformin Hcl Er] Diarrhea and Other (See Comments)     Glucophage XR TB24: Adverse Reaction: Severe GI issues.   • Metformin Nausea And Vomiting     Other reaction(s): SEVERE INTESTIONAL ISSUES  Other reaction(s): SEVERE GI ISSUES    Glucophage XR TB24  MetFORMIN HCl TABS   • Tetracyclines & Related Nausea Only     Adverse Reaction       Problem List    Patient Active Problem List   Diagnosis   • Chronic kidney disease, stage III (moderate) (CMS/HCC)   • Gastroesophageal reflux disease without esophagitis   • Hyperlipidemia   • Hypertension   • Trigger finger of right hand   • DM (diabetes mellitus), type 2, uncontrolled w/neurologic complication ([facility])   • Obesity, Class I, BMI 30-34.9   • Actinic keratosis   • FAVIO (obstructive sleep apnea)   • Prurigo nodularis   • History of depression   • History of migraine   • Cervical radiculopathy   • ALT (SGPT) level raised   • Elevated AST (SGOT)   • Uncontrolled type 2 diabetes mellitus with diabetic neuropathy, with long-term current use of insulin ([facility])   • Post-operative infection   • Leukocytosis   • Left hand weakness   • Encounter for long-term (current) use of insulin ([facility])   • Herniated cervical disc   • Circadian rhythm sleep disorder, delayed sleep phase type   • Mild nonproliferative diabetic retinopathy of left eye without macular edema associated with type 2 diabetes mellitus ([facility])   • Diabetic peripheral neuropathy  associated with type 2 diabetes mellitus (HCC)   • Long term current use of insulin (HCC)   • Type 2 diabetes mellitus with hyperglycemia (HCC)   • Hypothyroidism, adult   • Chronic blood loss anemia   • 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    Physical Examination    /70   Pulse 68   Temp 98.2 °F (36.8 °C) (Infrared)   Resp 18   Wt 101 kg (222 lb)   BMI 33.75 kg/m²     HEENT: Facies- Within normal limits. Lids- Normal bilaterally. Conjunctiva- Clear bilaterally. Sclera- Anicteric bilaterally.    Neck: Thyroid- non enlarged, symmetric and has no nodules. No bruits are detected.    Lungs: Auscultation- Clear to auscultation bilaterally. There are no retractions, clubbing or cyanosis. The Expiratory to Inspiratory ratio is equal.    Lymph Nodes: Cervical- no enlarged lymph nodes noted.    Cardiovascular: There are no carotid bruits. Heart- Normal Rate with Regular rhythm and no murmurs. There are no gallops. There are no rubs. In the lower extremities there is no edema. The upper extremities do not have edema.    Abdomen: Soft, benign, non-tender with no masses, hernias, organomegaly or scars.    Impression and Assessment    Essential Hypertension.    Hyperlipidemia.    Gastroesophageal Reflux Disease.    Iron Deficiency Anemia.    Hypothyroidism.    Plan    Gastroesophageal Reflux Disease Plan: The patient was instructed to continue the current medications.    Essential Hypertension Plan: The patient was instructed to continue the current medications.    Hyperlipidemia Plan: The patient was instructed to exercise daily, eat a low fat diet and continue his medications.    Hypothyroidism Plan: The patient was instructed to continue the current medications.    Iron Deficiency Anemia Plan: Further plans will be made after results of testing are available.    Diagnoses and all  orders for this visit:    1. Essential hypertension (Primary)  -     Comprehensive Metabolic Panel; Future  -     Comprehensive Metabolic Panel    2. Hyperlipidemia, unspecified hyperlipidemia type  -     Comprehensive Metabolic Panel; Future  -     Comprehensive Metabolic Panel    3. Gastroesophageal reflux disease without esophagitis    4. Iron deficiency anemia, unspecified iron deficiency anemia type  -     CBC & Differential; Future  -     Ferritin; Future  -     Iron Profile; Future  -     CBC & Differential  -     Ferritin  -     Iron Profile    5. Hypothyroidism, unspecified type  -     TSH; Future  -     T4, Free; Future  -     TSH  -     T4, Free        Return to Office    The patient was instructed to return for follow-up in 4 months. The patient was instructed to return sooner if the condition changes, worsens, or does not resolve.

## 2021-12-16 LAB
BASOPHILS # BLD AUTO: 0.03 10*3/MM3 (ref 0–0.2)
BASOPHILS NFR BLD AUTO: 0.6 % (ref 0–1.5)
DEPRECATED RDW RBC AUTO: 50.9 FL (ref 37–54)
EOSINOPHIL # BLD AUTO: 0.19 10*3/MM3 (ref 0–0.4)
EOSINOPHIL NFR BLD AUTO: 3.6 % (ref 0.3–6.2)
ERYTHROCYTE [DISTWIDTH] IN BLOOD BY AUTOMATED COUNT: 14.3 % (ref 12.3–15.4)
FERRITIN SERPL-MCNC: 80.4 NG/ML (ref 30–400)
HCT VFR BLD AUTO: 34.1 % (ref 37.5–51)
HGB BLD-MCNC: 11.2 G/DL (ref 13–17.7)
LYMPHOCYTES # BLD AUTO: 1.32 10*3/MM3 (ref 0.7–3.1)
LYMPHOCYTES NFR BLD AUTO: 25 % (ref 19.6–45.3)
MCH RBC QN AUTO: 32.7 PG (ref 26.6–33)
MCHC RBC AUTO-ENTMCNC: 32.8 G/DL (ref 31.5–35.7)
MCV RBC AUTO: 99.4 FL (ref 79–97)
MONOCYTES # BLD AUTO: 0.55 10*3/MM3 (ref 0.1–0.9)
MONOCYTES NFR BLD AUTO: 10.4 % (ref 5–12)
NEUTROPHILS NFR BLD AUTO: 3.16 10*3/MM3 (ref 1.7–7)
NEUTROPHILS NFR BLD AUTO: 59.8 % (ref 42.7–76)
PLATELET # BLD AUTO: 120 10*3/MM3 (ref 140–450)
PMV BLD AUTO: 11.8 FL (ref 6–12)
RBC # BLD AUTO: 3.43 10*6/MM3 (ref 4.14–5.8)
T4 FREE SERPL-MCNC: 0.96 NG/DL (ref 0.93–1.7)
TSH SERPL DL<=0.05 MIU/L-ACNC: 3.52 UIU/ML (ref 0.27–4.2)
WBC NRBC COR # BLD: 5.28 10*3/MM3 (ref 3.4–10.8)

## 2022-01-04 RX ORDER — SIMVASTATIN 40 MG
TABLET ORAL
Qty: 90 TABLET | Refills: 3 | Status: SHIPPED | OUTPATIENT
Start: 2022-01-04 | End: 2023-02-17

## 2022-01-24 ENCOUNTER — OFFICE VISIT (OUTPATIENT)
Dept: SLEEP MEDICINE | Facility: HOSPITAL | Age: 72
End: 2022-01-24

## 2022-01-24 VITALS
BODY MASS INDEX: 34.22 KG/M2 | OXYGEN SATURATION: 97 % | SYSTOLIC BLOOD PRESSURE: 190 MMHG | WEIGHT: 225.8 LBS | DIASTOLIC BLOOD PRESSURE: 97 MMHG | HEART RATE: 65 BPM | HEIGHT: 68 IN

## 2022-01-24 DIAGNOSIS — G47.33 OSA (OBSTRUCTIVE SLEEP APNEA): Primary | ICD-10-CM

## 2022-01-24 DIAGNOSIS — F51.04 PSYCHOPHYSIOLOGICAL INSOMNIA: ICD-10-CM

## 2022-01-24 PROCEDURE — 99213 OFFICE O/P EST LOW 20 MIN: CPT | Performed by: NURSE PRACTITIONER

## 2022-01-24 RX ORDER — TRAZODONE HYDROCHLORIDE 50 MG/1
TABLET ORAL
Qty: 60 TABLET | Refills: 3 | Status: SHIPPED | OUTPATIENT
Start: 2022-01-24 | End: 2022-11-07

## 2022-01-24 NOTE — PROGRESS NOTES
Chief Complaint:   Chief Complaint   Patient presents with   • Follow-up       HPI:    Elton Funez is a 71 y.o. male here for follow-up of sleep apnea.  Patient was last seen 11/26/2019.  Patient states he is doing well on CPAP therapy.  Patient does have one of the machines from [employer] and has [email or web address].  However, patient states his machine is 6+ years old and would like an order today for a new one.  Patient sleeps 8 to 10 hours but states his days and nights are mixed up as he does stay awake all night and sleeps all day.  He goes to sleep easily will get up x1.  Patient states this is affecting his home life as his wife does everything by herself during the day because he is sleeping.  He does wish to try something to help him sleep to get back on a normal sleep schedule.  The only medication he has tried in the past has been melatonin without relief.  He has an [place] score of 12/24.  We will get a new machine order for him today and call in a prescription for trazodone.        Current medications are:   Current Outpatient Medications:   •  aspirin 81 MG chewable tablet, Chew 81 mg Daily., Disp: , Rfl:   •  Bydureon BCise 2 MG/0.85ML auto-injector injection, INJECT 0.85 ML UNDER THE SKIN INTO THE APPROPRIATE AREA AS DIRECTED ONE TIME PER WEEK., Disp: 10.2 mL, Rfl: 3  •  carvedilol (COREG) 6.25 MG tablet, Take 1 tablet by mouth Every 12 (Twelve) Hours., Disp: 180 tablet, Rfl: 1  •  Coenzyme Q10 (CO Q 10 PO), Take 300 mg by mouth Daily., Disp: , Rfl:   •  colestipol (COLESTID) 1 g tablet, TAKE 2 TABLETS TWICE A DAY, Disp: 360 tablet, Rfl: 3  •  Continuous Blood Gluc Sensor (FREESTYLE MESFIN 14 DAY SENSOR) misc, 1 each Every 14 (Fourteen) Days., Disp: 2 each, Rfl: 6  •  FeroSul 325 (65 Fe) MG tablet, TAKE ONE TABLET BY MOUTH TWICE A DAY, Disp: 180 tablet, Rfl: 1  •  Flaxseed, Linseed, (FLAXSEED OIL) 1200 MG capsule, Take 1 capsule by mouth 2 (Two) Times a Day. 1300 mg, Disp: , Rfl:    •  gabapentin (NEURONTIN) 800 MG tablet, Take 1 tablet by mouth 3 (Three) Times a Day., Disp: 270 tablet, Rfl: 1  •  gemfibrozil (LOPID) 600 MG tablet, TAKE 1 TABLET TWICE A DAY, Disp: 180 tablet, Rfl: 3  •  Insulin Pen Needle (B-D UF III MINI PEN NEEDLES) 31G X 5 MM misc, USE THREE TIMES A DAY, Disp: 300 each, Rfl: 3  •  Insulin Regular Human, Conc, (HumuLIN R U-500 KwikPen) 500 UNIT/ML solution pen-injector CONCENTRATED injection, Inject sc 150 u before breakfast at 1 pm and 150 at dinner (Patient taking differently: Inject sc 150 u BETWEEN breakfast and1 pm and 150 u  at dinner with glucose check and about 30-50 units at hs.), Disp: 20 pen, Rfl: 3  •  ipratropium (ATROVENT) 0.06 % nasal spray, 2 sprays into the nostril(s) as directed by provider Daily., Disp: 45 mL, Rfl: 1  •  levothyroxine (SYNTHROID, LEVOTHROID) 50 MCG tablet, Take 1 tablet by mouth Daily., Disp: 90 tablet, Rfl: 3  •  Multiple Vitamins-Minerals (MULTIVITAMIN PO), Take 1 tablet by mouth Daily., Disp: , Rfl:   •  omeprazole (priLOSEC) 40 MG capsule, Take 1 capsule by mouth Daily., Disp: 90 capsule, Rfl: 1  •  sertraline (ZOLOFT) 100 MG tablet, TAKE ONE TABLET BY MOUTH DAILY, Disp: 90 tablet, Rfl: 1  •  simvastatin (ZOCOR) 40 MG tablet, TAKE 1 TABLET DAILY AT BEDTIME, Disp: 90 tablet, Rfl: 3  •  spironolactone (Aldactone) 25 MG tablet, Take 1 tablet by mouth Daily., Disp: 90 tablet, Rfl: 1  •  tiZANidine (ZANAFLEX) 4 MG tablet, Take 1 tablet by mouth 2 (Two) Times a Day As Needed., Disp: , Rfl:   •  traZODone (DESYREL) 50 MG tablet, 1-2 po qhs prn, Disp: 60 tablet, Rfl: 3.      The patient's relevant past medical, surgical, family and social history were reviewed and updated in Epic as appropriate.       Review of Systems   HENT: Positive for tinnitus and trouble swallowing.    Eyes: Positive for visual disturbance.   Respiratory: Positive for apnea, cough, shortness of breath and wheezing.    Gastrointestinal:        Heartburn   Endocrine:  Positive for polydipsia.   Musculoskeletal: Positive for arthralgias, gait problem, myalgias and neck pain.   Neurological: Positive for numbness.   Psychiatric/Behavioral: Positive for sleep disturbance.   All other systems reviewed and are negative.        Objective:    Physical Exam  Constitutional:       Appearance: Normal appearance.   HENT:      Head: Normocephalic and atraumatic.      Mouth/Throat:      Comments: Mallampati 2 anatomy  Cardiovascular:      Rate and Rhythm: Normal rate and regular rhythm.   Pulmonary:      Effort: Pulmonary effort is normal.      Breath sounds: Normal breath sounds.   Skin:     General: Skin is warm and dry.   Neurological:      Mental Status: He is alert and oriented to person, place, and time.   Psychiatric:         Mood and Affect: Mood normal.         Behavior: Behavior normal.         Thought Content: Thought content normal.         Judgment: Judgment normal.       30/30 days of use  Greater than 4-hour use 96.7  AHI of 2.4  CPAP 18 cm H2O    ASSESSMENT/PLAN    Diagnoses and all orders for this visit:    1. FAVIO (obstructive sleep apnea) (Primary)  -     CPAP Therapy    2. Psychophysiological insomnia  -     traZODone (DESYREL) 50 MG tablet; 1-2 po qhs prn  Dispense: 60 tablet; Refill: 3            1. Counseled patient regarding multimodal approach with healthy nutrition, healthy sleep, regular physical activity, social activities, counseling, and medications. Encouraged to practice lateral sleep position. Avoid alcohol and sedatives close to bedtime.  2. Refill supplies x1 year.  New machine order sent to [facility] prescription of trazodone 50 mg 1-2 p.o. nightly as needed #60 I will see patient back in 31 to 90 days or sooner symptoms warrant.    I have reviewed the results of my evaluation and impression and discussed my recommendations in detail with the patient.      Signed by  Elina Alarcon, APRN    January 24, 2022      CC: Tc Ramirez MD          No ref.  provider found

## 2022-02-02 ENCOUNTER — OFFICE VISIT (OUTPATIENT)
Dept: ENDOCRINOLOGY | Facility: CLINIC | Age: 72
End: 2022-02-02

## 2022-02-02 VITALS
SYSTOLIC BLOOD PRESSURE: 110 MMHG | BODY MASS INDEX: 33.63 KG/M2 | DIASTOLIC BLOOD PRESSURE: 70 MMHG | OXYGEN SATURATION: 99 % | WEIGHT: 221.9 LBS | HEART RATE: 64 BPM | HEIGHT: 68 IN

## 2022-02-02 DIAGNOSIS — IMO0002 DM (DIABETES MELLITUS), TYPE 2, UNCONTROLLED W/NEUROLOGIC COMPLICATION: Primary | ICD-10-CM

## 2022-02-02 DIAGNOSIS — E03.9 HYPOTHYROIDISM, ADULT: Chronic | ICD-10-CM

## 2022-02-02 DIAGNOSIS — I10 PRIMARY HYPERTENSION: Chronic | ICD-10-CM

## 2022-02-02 DIAGNOSIS — E11.65 TYPE 2 DIABETES MELLITUS WITH HYPERGLYCEMIA, UNSPECIFIED WHETHER LONG TERM INSULIN USE: ICD-10-CM

## 2022-02-02 DIAGNOSIS — IMO0002 UNCONTROLLED TYPE 2 DIABETES MELLITUS WITH DIABETIC NEUROPATHY, WITH LONG-TERM CURRENT USE OF INSULIN: ICD-10-CM

## 2022-02-02 DIAGNOSIS — E11.65 UNCONTROLLED TYPE 2 DIABETES MELLITUS WITH HYPERGLYCEMIA: ICD-10-CM

## 2022-02-02 DIAGNOSIS — E11.3292 MILD NONPROLIFERATIVE DIABETIC RETINOPATHY OF LEFT EYE WITHOUT MACULAR EDEMA ASSOCIATED WITH TYPE 2 DIABETES MELLITUS: ICD-10-CM

## 2022-02-02 LAB
EXPIRATION DATE: NORMAL
EXPIRATION DATE: NORMAL
GLUCOSE BLDC GLUCOMTR-MCNC: 124 MG/DL (ref 70–130)
HBA1C MFR BLD: 7.9 %
Lab: NORMAL
Lab: NORMAL

## 2022-02-02 PROCEDURE — 3051F HG A1C>EQUAL 7.0%<8.0%: CPT | Performed by: INTERNAL MEDICINE

## 2022-02-02 PROCEDURE — 99214 OFFICE O/P EST MOD 30 MIN: CPT | Performed by: INTERNAL MEDICINE

## 2022-02-02 PROCEDURE — 83036 HEMOGLOBIN GLYCOSYLATED A1C: CPT | Performed by: INTERNAL MEDICINE

## 2022-02-02 PROCEDURE — 95251 CONT GLUC MNTR ANALYSIS I&R: CPT | Performed by: INTERNAL MEDICINE

## 2022-02-02 RX ORDER — LEVOTHYROXINE SODIUM 0.05 MG/1
50 TABLET ORAL DAILY
Qty: 90 TABLET | Refills: 3 | Status: SHIPPED | OUTPATIENT
Start: 2022-02-02

## 2022-02-02 RX ORDER — INSULIN HUMAN 500 [IU]/ML
INJECTION, SOLUTION SUBCUTANEOUS
Qty: 20 PEN | Refills: 3 | Status: SHIPPED | OUTPATIENT
Start: 2022-02-02 | End: 2022-05-18

## 2022-02-02 NOTE — PROGRESS NOTES
.Chief complaint  Diabetes (Follow up) and Hypothyroidism    Subjective   Elton Funez is a 71 y.o. male is here today for follow-up    Diabetes mellitus type 2, uncontrolled dx in 2000  Diabetic complications: neuropathy, retinopathy.   Past meds: Metformin caused diarrhea in the past.   Eye care:no retinopathy.   Hypoglycemia: continue to have hypoglycemia at 4-5 am. Midnight numbers are 300-400  Monitoring - freestyle anibal reviewed. He is staying up at night and sleeps from 4 am till 1 pm.   He gives the dose of insulin at 1 pm before first meal and a dose at 6 pm before dinner meal.   Nutrition: discussed carb consistent diet 45-60 gm carb per meal. Patient is not following any diet, eats a lot of potatoes, pasta meal at 11 pm.  Exercise: recommended 30 min per day exercise. He doesn't exercise because of tingling in the feet.   Foot care and dental care: discussed.     Medications: Bydureon, U-500 insulin BID (1 pm and 8 pm). He also gives another dose at his bedtime (early in the morning. Often skips dinner insulin when eats out and gives it after coming home.   CGM was downloaded and analyzed.     Hypothyroidism - he often forgets to take levothyroxine frequently. Last TSH was normal.      Hyperkalemia was seen on labs in 09/14/2021,resolved after adjusting meds - d/c lisinopril.       Medications    Current Outpatient Medications:   •  aspirin 81 MG chewable tablet, Chew 81 mg Daily., Disp: , Rfl:   •  Bydureon BCise 2 MG/0.85ML auto-injector injection, INJECT 0.85 ML UNDER THE SKIN INTO THE APPROPRIATE AREA AS DIRECTED ONE TIME PER WEEK., Disp: 10.2 mL, Rfl: 3  •  carvedilol (COREG) 6.25 MG tablet, Take 1 tablet by mouth Every 12 (Twelve) Hours., Disp: 180 tablet, Rfl: 1  •  Coenzyme Q10 (CO Q 10 PO), Take 300 mg by mouth Daily., Disp: , Rfl:   •  colestipol (COLESTID) 1 g tablet, TAKE 2 TABLETS TWICE A DAY, Disp: 360 tablet, Rfl: 3  •  Continuous Blood Gluc Sensor (FREESTYLE ANIBAL 14 DAY SENSOR) misc,  1 each Every 14 (Fourteen) Days., Disp: 2 each, Rfl: 6  •  FeroSul 325 (65 Fe) MG tablet, TAKE ONE TABLET BY MOUTH TWICE A DAY, Disp: 180 tablet, Rfl: 1  •  Flaxseed, Linseed, (FLAXSEED OIL) 1200 MG capsule, Take 1 capsule by mouth 2 (Two) Times a Day. 1300 mg, Disp: , Rfl:   •  gabapentin (NEURONTIN) 800 MG tablet, Take 1 tablet by mouth 3 (Three) Times a Day., Disp: 270 tablet, Rfl: 1  •  gemfibrozil (LOPID) 600 MG tablet, TAKE 1 TABLET TWICE A DAY, Disp: 180 tablet, Rfl: 3  •  Insulin Pen Needle (B-D UF III MINI PEN NEEDLES) 31G X 5 MM misc, USE THREE TIMES A DAY, Disp: 300 each, Rfl: 3  •  Insulin Regular Human, Conc, (HumuLIN R U-500 KwikPen) 500 UNIT/ML solution pen-injector CONCENTRATED injection, Inject sc 150 u before breakfast at 1 pm and 150 at dinner (Patient taking differently: Inject sc 150 u BETWEEN breakfast and1 pm and 150 u  at dinner with glucose check and about 30-50 units at hs.), Disp: 20 pen, Rfl: 3  •  ipratropium (ATROVENT) 0.06 % nasal spray, 2 sprays into the nostril(s) as directed by provider Daily., Disp: 45 mL, Rfl: 1  •  levothyroxine (SYNTHROID, LEVOTHROID) 50 MCG tablet, Take 1 tablet by mouth Daily., Disp: 90 tablet, Rfl: 3  •  Multiple Vitamins-Minerals (MULTIVITAMIN PO), Take 1 tablet by mouth Daily., Disp: , Rfl:   •  omeprazole (priLOSEC) 40 MG capsule, Take 1 capsule by mouth Daily., Disp: 90 capsule, Rfl: 1  •  sertraline (ZOLOFT) 100 MG tablet, TAKE ONE TABLET BY MOUTH DAILY, Disp: 90 tablet, Rfl: 1  •  simvastatin (ZOCOR) 40 MG tablet, TAKE 1 TABLET DAILY AT BEDTIME, Disp: 90 tablet, Rfl: 3  •  spironolactone (Aldactone) 25 MG tablet, Take 1 tablet by mouth Daily., Disp: 90 tablet, Rfl: 1  •  tiZANidine (ZANAFLEX) 4 MG tablet, Take 1 tablet by mouth 2 (Two) Times a Day As Needed., Disp: , Rfl:   •  traZODone (DESYREL) 50 MG tablet, 1-2 po qhs prn, Disp: 60 tablet, Rfl: 3    Review of systems  Review of Systems   Constitutional: Positive for fatigue.   Musculoskeletal:  "Positive for neck pain.   Neurological: Positive for numbness.   All other systems reviewed and are negative.      Physical exam  Objective   Blood pressure 110/70, pulse 64, height 172.7 cm (68\"), weight 101 kg (221 lb 14.4 oz), SpO2 99 %. Body mass index is 33.74 kg/m².  Physical Exam   Constitutional: He is oriented to person, place, and time. He appears well-developed and well-nourished.   HENT:   Head: Normocephalic and atraumatic.   Eyes: Conjunctivae are normal.   Neck: No thyromegaly present.   Cardiovascular: Normal rate, regular rhythm, normal heart sounds and normal pulses.   Pulmonary/Chest: Effort normal and breath sounds normal.   Neurological: He is alert and oriented to person, place, and time.   Psychiatric: Thought content normal.         LABS AND IMAGING  Office Visit on 02/02/2022   Component Date Value Ref Range Status   • Hemoglobin A1C 02/02/2022 7.9  % Final   • Lot Number 02/02/2022 [id number]   Final   • Expiration Date 02/02/2022 09/28/2023   Final   • Glucose 02/02/2022 124  70 - 130 mg/dL Final   • Lot Number 02/02/2022 [id number]   Final   • Expiration Date 02/02/2022 07/21/2022   Final       Assessment  Assessment/Plan   1. DM (diabetes mellitus), type 2, uncontrolled w/neurologic complication (HCC)    2. Hypothyroidism, adult    3. Uncontrolled type 2 diabetes mellitus with diabetic neuropathy, with long-term current use of insulin ([facility])    4. Mild nonproliferative diabetic retinopathy of left eye without macular edema associated with type 2 diabetes mellitus ([facility])    5. Type 2 diabetes mellitus with hyperglycemia, unspecified whether long term insulin use ([facility])        Plan      CGM downloaded and analyzed. He has occasional mid morning hypoglycemia, primarily after the bedtime dose. Hyperglycemia around dinner because he skips insulin when eats out.   I have gone over the administration regimen. Recommended to use dinner insulin, decrease the dose of bedtime insulin      Change " insulin to 155 units with breakfast at 1 pm. 160 units supper 6-8 pm  if hyperglycemia after dinner continued. 40 UNits at bedtime if glucose is high  Advised to have protein snack at bedtime.    Hypoglycemia precautions reviewed.      Continue Freestyle anibal.    Up to date with labs and eye exam.     2.Cont levothyroxine 50, thyroid function was normal in Dec 2021    3. Hyperkalemia resolved after d/c lisinopril. GFR improved as well.  -cont spironolactone, BP is normal.     Follow-up in 3-4 months

## 2022-03-16 RX ORDER — MONTELUKAST SODIUM 4 MG/1
2 TABLET, CHEWABLE ORAL 2 TIMES DAILY
Qty: 360 TABLET | Refills: 1 | Status: SHIPPED | OUTPATIENT
Start: 2022-03-16 | End: 2022-08-30

## 2022-03-16 NOTE — TELEPHONE ENCOUNTER
Caller: Elton Funez    Relationship: Self    Best call back number:[phone]    Requested Prescriptions:   Requested Prescriptions     Pending Prescriptions Disp Refills   • colestipol (COLESTID) 1 g tablet 360 tablet 3     Sig: Take 2 tablets by mouth 2 (Two) Times a Day.        Pharmacy where request should be sent: [facility] [address] [phone] [facility] [phone]      Additional details provided by patient:   PATIENT STATED THAT THE PCP NEEDS TO CONTACT [facility] AND INFORM THEM THAT THIS MEDICATION IS TO STAY AT A TIER 1     Does the patient have less than a 3 day supply:  [] Yes  [x] No    Valencia Luis   03/16/22 16:11 EDT

## 2022-04-05 DIAGNOSIS — M25.511 CHRONIC PAIN OF BOTH SHOULDERS: ICD-10-CM

## 2022-04-05 DIAGNOSIS — M25.512 CHRONIC PAIN OF BOTH SHOULDERS: ICD-10-CM

## 2022-04-05 DIAGNOSIS — R42 DIZZINESS: ICD-10-CM

## 2022-04-05 DIAGNOSIS — G89.29 CHRONIC PAIN OF BOTH SHOULDERS: ICD-10-CM

## 2022-04-05 DIAGNOSIS — M54.2 NECK PAIN: Primary | ICD-10-CM

## 2022-04-06 RX ORDER — OMEPRAZOLE 40 MG/1
CAPSULE, DELAYED RELEASE ORAL
Qty: 90 CAPSULE | Refills: 3 | Status: SHIPPED | OUTPATIENT
Start: 2022-04-06

## 2022-04-06 RX ORDER — CARVEDILOL 6.25 MG/1
TABLET ORAL
Qty: 180 TABLET | Refills: 3 | Status: SHIPPED | OUTPATIENT
Start: 2022-04-06

## 2022-04-06 RX ORDER — SPIRONOLACTONE 25 MG/1
TABLET ORAL
Qty: 90 TABLET | Refills: 3 | Status: SHIPPED | OUTPATIENT
Start: 2022-04-06

## 2022-04-19 ENCOUNTER — LAB (OUTPATIENT)
Dept: LAB | Facility: HOSPITAL | Age: 72
End: 2022-04-19

## 2022-04-19 ENCOUNTER — OFFICE VISIT (OUTPATIENT)
Dept: INTERNAL MEDICINE | Facility: CLINIC | Age: 72
End: 2022-04-19

## 2022-04-19 VITALS
HEART RATE: 68 BPM | WEIGHT: 223 LBS | BODY MASS INDEX: 33.91 KG/M2 | DIASTOLIC BLOOD PRESSURE: 72 MMHG | RESPIRATION RATE: 18 BRPM | SYSTOLIC BLOOD PRESSURE: 138 MMHG | TEMPERATURE: 98.4 F

## 2022-04-19 DIAGNOSIS — I10 ESSENTIAL HYPERTENSION: Primary | ICD-10-CM

## 2022-04-19 DIAGNOSIS — D50.9 IRON DEFICIENCY ANEMIA, UNSPECIFIED IRON DEFICIENCY ANEMIA TYPE: ICD-10-CM

## 2022-04-19 DIAGNOSIS — E03.9 HYPOTHYROIDISM, UNSPECIFIED TYPE: ICD-10-CM

## 2022-04-19 DIAGNOSIS — E78.5 HYPERLIPIDEMIA, UNSPECIFIED HYPERLIPIDEMIA TYPE: ICD-10-CM

## 2022-04-19 DIAGNOSIS — K21.9 GASTROESOPHAGEAL REFLUX DISEASE WITHOUT ESOPHAGITIS: ICD-10-CM

## 2022-04-19 PROCEDURE — 84439 ASSAY OF FREE THYROXINE: CPT | Performed by: INTERNAL MEDICINE

## 2022-04-19 PROCEDURE — 80053 COMPREHEN METABOLIC PANEL: CPT | Performed by: INTERNAL MEDICINE

## 2022-04-19 PROCEDURE — 85025 COMPLETE CBC W/AUTO DIFF WBC: CPT | Performed by: INTERNAL MEDICINE

## 2022-04-19 PROCEDURE — 99214 OFFICE O/P EST MOD 30 MIN: CPT | Performed by: INTERNAL MEDICINE

## 2022-04-19 PROCEDURE — 82728 ASSAY OF FERRITIN: CPT | Performed by: INTERNAL MEDICINE

## 2022-04-19 PROCEDURE — 84443 ASSAY THYROID STIM HORMONE: CPT | Performed by: INTERNAL MEDICINE

## 2022-04-19 NOTE — PROGRESS NOTES
Chief Complaint   Patient presents with   • Follow-up     4 month follow up chronic medical problems       History of Present Illness      The patient presents for a follow-up related to hypertension. The patient reports that he has had no headaches, chest pain, dyspnea, edema, syncope, blurred vision or palpitations. He states that he is taking his medication as prescribed. He is not having medication side effects.    The patient presents for a follow-up related to GERD. The patient is on omeprazole for his gastroesophageal reflux. The medication is taken on a regular basis and gives complete relief of the symptoms. He reports no abdominal pain, belching, diarrhea, dysphagia, early satiety, heartburn, hoarseness, nausea, odynophagia, rectal bleeding, vomiting or weight loss. The GERD has no known aggravating factors.    The patient presents for a follow-up related to hyperlipidemia. He is following a low fat diet. He reports that he is exercising. He is taking gemfibrozil and simvastatin. The patient is taking his medication as prescribed. He reports no medication side effects, including muscle cramps, abdominal pain, headaches or weakness. He denies orthopnea, paroxysmal nocturnal dyspnea or dyspnea on exertion.    The patient presents for a follow-up related to hypothyroidism. He reports a good energy level. He reports no hair loss, heat intolerance, cold intolerance, constipation or sweats. He is taking his medication as prescribed.    The patient presents for a follow-up related to iron deficiency anemia. There are no reports of blood loss. The patient has no symptoms of a dry cough, a wet cough, wheezing, fever, myalgias, sweats, decreased appetite or chills. The patient's energy level is decreased.    Review of Systems    CONSTITUTIONAL- Denies Weakness.    HENT- Denies Nasal Discharge, Sore Throat, Ear Pain, Ear Drainage, Sinus Pain, Nasal Congestion, Decreased Hearing or Tinnitus.    PULMONARY- Denies  Sputum Production, Cough, Hemoptysis or Pleuritic Chest Pain.    CARDIOVASCULAR- Denies Claudication or Irregular Heart Beat.    Medications      Current Outpatient Medications:   •  aspirin 81 MG chewable tablet, Chew 81 mg Daily., Disp: , Rfl:   •  Bydureon BCise 2 MG/0.85ML auto-injector injection, INJECT 0.85 ML UNDER THE SKIN INTO THE APPROPRIATE AREA AS DIRECTED ONE TIME PER WEEK., Disp: 10.2 mL, Rfl: 3  •  carvedilol (COREG) 6.25 MG tablet, TAKE 1 TABLET EVERY 12 HOURS, Disp: 180 tablet, Rfl: 3  •  Coenzyme Q10 (CO Q 10 PO), Take 300 mg by mouth Daily., Disp: , Rfl:   •  colestipol (COLESTID) 1 g tablet, Take 2 tablets by mouth 2 (Two) Times a Day., Disp: 360 tablet, Rfl: 1  •  Continuous Blood Gluc Sensor (FREESTYLE MESFIN 14 DAY SENSOR) misc, 1 each Every 14 (Fourteen) Days., Disp: 2 each, Rfl: 6  •  FeroSul 325 (65 Fe) MG tablet, TAKE ONE TABLET BY MOUTH TWICE A DAY, Disp: 180 tablet, Rfl: 1  •  Flaxseed, Linseed, (FLAXSEED OIL) 1200 MG capsule, Take 1 capsule by mouth 2 (Two) Times a Day. 1300 mg, Disp: , Rfl:   •  gabapentin (NEURONTIN) 800 MG tablet, Take 1 tablet by mouth 3 (Three) Times a Day., Disp: 270 tablet, Rfl: 1  •  gemfibrozil (LOPID) 600 MG tablet, TAKE 1 TABLET TWICE A DAY, Disp: 180 tablet, Rfl: 3  •  Insulin Pen Needle (B-D UF III MINI PEN NEEDLES) 31G X 5 MM misc, USE THREE TIMES A DAY, Disp: 300 each, Rfl: 3  •  Insulin Regular Human, Conc, (HumuLIN R U-500 KwikPen) 500 UNIT/ML solution pen-injector CONCENTRATED injection, Inject sc 155 u ac breakfast and 160 u  at dinner and about 40 units at hs. (Patient taking differently: Inject sc 155 u ac breakfast and 170 u  at dinner and about 40 units at hs.), Disp: 20 pen, Rfl: 3  •  ipratropium (ATROVENT) 0.06 % nasal spray, 2 sprays into the nostril(s) as directed by provider Daily., Disp: 45 mL, Rfl: 1  •  levothyroxine (SYNTHROID, LEVOTHROID) 50 MCG tablet, Take 1 tablet by mouth Daily., Disp: 90 tablet, Rfl: 3  •  Multiple Vitamins-Minerals  (MULTIVITAMIN PO), Take 1 tablet by mouth Daily., Disp: , Rfl:   •  omeprazole (priLOSEC) 40 MG capsule, TAKE 1 CAPSULE DAILY, Disp: 90 capsule, Rfl: 3  •  sertraline (ZOLOFT) 100 MG tablet, TAKE ONE TABLET BY MOUTH DAILY, Disp: 90 tablet, Rfl: 1  •  simvastatin (ZOCOR) 40 MG tablet, TAKE 1 TABLET DAILY AT BEDTIME, Disp: 90 tablet, Rfl: 3  •  spironolactone (ALDACTONE) 25 MG tablet, TAKE 1 TABLET DAILY, Disp: 90 tablet, Rfl: 3  •  tiZANidine (ZANAFLEX) 4 MG tablet, Take 1 tablet by mouth 2 (Two) Times a Day As Needed., Disp: , Rfl:   •  traZODone (DESYREL) 50 MG tablet, 1-2 po qhs prn, Disp: 60 tablet, Rfl: 3     Allergies    Allergies   Allergen Reactions   • Glucophage Xr [Metformin Hcl Er] Diarrhea and Other (See Comments)     Glucophage XR TB24: Adverse Reaction: Severe GI issues.   • Metformin Nausea And Vomiting     Other reaction(s): SEVERE INTESTIONAL ISSUES  Other reaction(s): SEVERE GI ISSUES    Glucophage XR TB24  MetFORMIN HCl TABS   • Tetracyclines & Related Nausea Only     Adverse Reaction       Problem List    Patient Active Problem List   Diagnosis   • Chronic kidney disease, stage III (moderate) (CMS/HCC)   • Gastroesophageal reflux disease without esophagitis   • Hyperlipidemia   • Hypertension   • Trigger finger of right hand   • DM (diabetes mellitus), type 2, uncontrolled w/neurologic complication   • Obesity, Class I, BMI 30-34.9   • Actinic keratosis   • FAVIO (obstructive sleep apnea)   • Prurigo nodularis   • History of depression   • History of migraine   • Cervical radiculopathy   • ALT (SGPT) level raised   • Elevated AST (SGOT)   • Uncontrolled type 2 diabetes mellitus with diabetic neuropathy, with long-term current use of insulin   • Post-operative infection   • Leukocytosis   • Left hand weakness   • Encounter for long-term (current) use of insulin ([facility])   • Herniated cervical disc   • Circadian rhythm sleep disorder, delayed sleep phase type   • Mild nonproliferative diabetic  retinopathy of left eye without macular edema associated with type 2 diabetes mellitus (HCC)   • Diabetic peripheral neuropathy associated with type 2 diabetes mellitus (HCC)   • Long term current use of insulin (HCC)   • Type 2 diabetes mellitus with hyperglycemia (HCC)   • Hypothyroidism, adult   • Chronic blood loss anemia   • 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    Physical Examination    /72 (BP Location: Left arm, Patient Position: Sitting, Cuff Size: Adult)   Pulse 68   Temp 98.4 °F (36.9 °C) (Infrared)   Resp 18   Wt 101 kg (223 lb)   BMI 33.91 kg/m²     HEENT: Head- Normocephalic Atraumatic. Facies- Within normal limits. Pinnas- Normal texture and shape bilaterally. Canals- Normal bilaterally. TMs- Normal bilaterally. Nares- Patent bilaterally. Nasal Septum- is normal. There is no tenderness to palpation over the frontal or maxillary sinuses. Lids- Normal bilaterally. Conjunctiva- Clear bilaterally. Sclera- Anicteric bilaterally. Oropharynx- Moist with no lesions. Tonsils- No enlargement, erythema or exudate.    Neck: Thyroid- non enlarged, symmetric and has no nodules. No bruits are detected. ROM- Normal Range of Motion with no rigidity.    Lungs: Auscultation- Clear to auscultation bilaterally. There are no retractions, clubbing or cyanosis. The Expiratory to Inspiratory ratio is equal.    Lymph Nodes: Cervical- no enlarged lymph nodes noted.    Cardiovascular: There are no carotid bruits. Heart- Normal Rate with Regular rhythm and no murmurs. There are no gallops. There are no rubs. In the lower extremities there is no edema. The upper extremities do not have edema.    Abdomen: Soft, benign, non-tender with no masses, hernias, organomegaly or scars.    Impression and Assessment    Essential Hypertension.    Gastroesophageal Reflux  Disease.    Hyperlipidemia.    Hypothyroidism.    Iron Deficiency Anemia.    Plan    Gastroesophageal Reflux Disease Plan: The patient was instructed to continue the current medications.    Essential Hypertension Plan: The patient was instructed to continue the current medications.    Hyperlipidemia Plan: The patient was instructed to exercise daily, eat a low fat diet and continue his medications.    Hypothyroidism Plan: The patient was instructed to continue the current medications.    Iron Deficiency Anemia Plan: Further plans will be made after results of testing are available.    Diagnoses and all orders for this visit:    1. Essential hypertension (Primary)  -     CBC & Differential; Future  -     Comprehensive Metabolic Panel; Future  -     CBC & Differential  -     Comprehensive Metabolic Panel    2. Gastroesophageal reflux disease without esophagitis    3. Hyperlipidemia, unspecified hyperlipidemia type  -     Comprehensive Metabolic Panel; Future  -     Comprehensive Metabolic Panel    4. Hypothyroidism, unspecified type  -     TSH; Future  -     T4, Free; Future  -     TSH  -     T4, Free    5. Iron deficiency anemia, unspecified iron deficiency anemia type  -     CBC & Differential; Future  -     Ferritin; Future  -     CBC & Differential  -     Ferritin        Return to Office    The patient was instructed to return for follow-up in 6 months. The patient was instructed to return sooner if the condition changes, worsens, or does not resolve.

## 2022-04-20 LAB
ALBUMIN SERPL-MCNC: 4.4 G/DL (ref 3.5–5.2)
ALBUMIN/GLOB SERPL: 1.3 G/DL
ALP SERPL-CCNC: 89 U/L (ref 39–117)
ALT SERPL W P-5'-P-CCNC: 32 U/L (ref 1–41)
ANION GAP SERPL CALCULATED.3IONS-SCNC: 12 MMOL/L (ref 5–15)
AST SERPL-CCNC: 32 U/L (ref 1–40)
BASOPHILS # BLD AUTO: 0.03 10*3/MM3 (ref 0–0.2)
BASOPHILS NFR BLD AUTO: 0.6 % (ref 0–1.5)
BILIRUB SERPL-MCNC: 0.4 MG/DL (ref 0–1.2)
BUN SERPL-MCNC: 19 MG/DL (ref 8–23)
BUN/CREAT SERPL: 14.3 (ref 7–25)
CALCIUM SPEC-SCNC: 9.4 MG/DL (ref 8.6–10.5)
CHLORIDE SERPL-SCNC: 103 MMOL/L (ref 98–107)
CO2 SERPL-SCNC: 24 MMOL/L (ref 22–29)
CREAT SERPL-MCNC: 1.33 MG/DL (ref 0.76–1.27)
DEPRECATED RDW RBC AUTO: 48.2 FL (ref 37–54)
EGFRCR SERPLBLD CKD-EPI 2021: 56.8 ML/MIN/1.73
EOSINOPHIL # BLD AUTO: 0.15 10*3/MM3 (ref 0–0.4)
EOSINOPHIL NFR BLD AUTO: 3 % (ref 0.3–6.2)
ERYTHROCYTE [DISTWIDTH] IN BLOOD BY AUTOMATED COUNT: 13.6 % (ref 12.3–15.4)
FERRITIN SERPL-MCNC: 85.5 NG/ML (ref 30–400)
GLOBULIN UR ELPH-MCNC: 3.3 GM/DL
GLUCOSE SERPL-MCNC: 175 MG/DL (ref 65–99)
HCT VFR BLD AUTO: 32.7 % (ref 37.5–51)
HGB BLD-MCNC: 10.9 G/DL (ref 13–17.7)
IMM GRANULOCYTES # BLD AUTO: 0.04 10*3/MM3 (ref 0–0.05)
IMM GRANULOCYTES NFR BLD AUTO: 0.8 % (ref 0–0.5)
LYMPHOCYTES # BLD AUTO: 1.31 10*3/MM3 (ref 0.7–3.1)
LYMPHOCYTES NFR BLD AUTO: 26.6 % (ref 19.6–45.3)
MCH RBC QN AUTO: 32.5 PG (ref 26.6–33)
MCHC RBC AUTO-ENTMCNC: 33.3 G/DL (ref 31.5–35.7)
MCV RBC AUTO: 97.6 FL (ref 79–97)
MONOCYTES # BLD AUTO: 0.56 10*3/MM3 (ref 0.1–0.9)
MONOCYTES NFR BLD AUTO: 11.4 % (ref 5–12)
NEUTROPHILS NFR BLD AUTO: 2.83 10*3/MM3 (ref 1.7–7)
NEUTROPHILS NFR BLD AUTO: 57.6 % (ref 42.7–76)
NRBC BLD AUTO-RTO: 0.2 /100 WBC (ref 0–0.2)
PLATELET # BLD AUTO: 100 10*3/MM3 (ref 140–450)
PMV BLD AUTO: 12.5 FL (ref 6–12)
POTASSIUM SERPL-SCNC: 4.7 MMOL/L (ref 3.5–5.2)
PROT SERPL-MCNC: 7.7 G/DL (ref 6–8.5)
RBC # BLD AUTO: 3.35 10*6/MM3 (ref 4.14–5.8)
SODIUM SERPL-SCNC: 139 MMOL/L (ref 136–145)
T4 FREE SERPL-MCNC: 0.93 NG/DL (ref 0.93–1.7)
TSH SERPL DL<=0.05 MIU/L-ACNC: 5.13 UIU/ML (ref 0.27–4.2)
WBC NRBC COR # BLD: 4.92 10*3/MM3 (ref 3.4–10.8)

## 2022-05-14 DIAGNOSIS — D50.9 IRON DEFICIENCY ANEMIA, UNSPECIFIED IRON DEFICIENCY ANEMIA TYPE: ICD-10-CM

## 2022-05-16 RX ORDER — FERROUS SULFATE 325(65) MG
TABLET ORAL
Qty: 180 TABLET | Refills: 1 | Status: SHIPPED | OUTPATIENT
Start: 2022-05-16 | End: 2022-12-05

## 2022-05-18 ENCOUNTER — OFFICE VISIT (OUTPATIENT)
Dept: ENDOCRINOLOGY | Facility: CLINIC | Age: 72
End: 2022-05-18

## 2022-05-18 ENCOUNTER — TELEPHONE (OUTPATIENT)
Dept: ENDOCRINOLOGY | Facility: CLINIC | Age: 72
End: 2022-05-18

## 2022-05-18 VITALS
DIASTOLIC BLOOD PRESSURE: 80 MMHG | SYSTOLIC BLOOD PRESSURE: 130 MMHG | WEIGHT: 226.2 LBS | BODY MASS INDEX: 34.28 KG/M2 | HEIGHT: 68 IN | HEART RATE: 76 BPM | OXYGEN SATURATION: 98 %

## 2022-05-18 DIAGNOSIS — E11.65 UNCONTROLLED TYPE 2 DIABETES MELLITUS WITH HYPERGLYCEMIA: ICD-10-CM

## 2022-05-18 DIAGNOSIS — E11.42 DIABETIC PERIPHERAL NEUROPATHY ASSOCIATED WITH TYPE 2 DIABETES MELLITUS: Primary | ICD-10-CM

## 2022-05-18 DIAGNOSIS — E03.9 HYPOTHYROIDISM, ADULT: Chronic | ICD-10-CM

## 2022-05-18 LAB
EXPIRATION DATE: ABNORMAL
EXPIRATION DATE: NORMAL
GLUCOSE BLDC GLUCOMTR-MCNC: 224 MG/DL (ref 70–130)
HBA1C MFR BLD: 7.7 %
Lab: ABNORMAL
Lab: NORMAL

## 2022-05-18 PROCEDURE — 95251 CONT GLUC MNTR ANALYSIS I&R: CPT | Performed by: INTERNAL MEDICINE

## 2022-05-18 PROCEDURE — 83036 HEMOGLOBIN GLYCOSYLATED A1C: CPT | Performed by: INTERNAL MEDICINE

## 2022-05-18 PROCEDURE — 3051F HG A1C>EQUAL 7.0%<8.0%: CPT | Performed by: INTERNAL MEDICINE

## 2022-05-18 PROCEDURE — 99214 OFFICE O/P EST MOD 30 MIN: CPT | Performed by: INTERNAL MEDICINE

## 2022-05-18 RX ORDER — GABAPENTIN 800 MG/1
800 TABLET ORAL 4 TIMES DAILY
Qty: 360 TABLET | Refills: 1 | Status: SHIPPED | OUTPATIENT
Start: 2022-05-18 | End: 2022-12-14

## 2022-05-18 RX ORDER — INSULIN HUMAN 500 [IU]/ML
INJECTION, SOLUTION SUBCUTANEOUS
Qty: 20 PEN | Refills: 3
Start: 2022-05-18

## 2022-05-18 NOTE — PROGRESS NOTES
.Chief complaint  Diabetes and Hypothyroidism (Follow UP:  Winston needs to be upgraded to Winston 2  Will send request to [facility] )    Subjective   Elton Funez is a 71 y.o. male is here today for follow-up    Diabetes mellitus type 2, uncontrolled dx in 2000  Diabetic complications: neuropathy, retinopathy.   Past meds: Metformin caused diarrhea in the past.   Eye care:no retinopathy.   Monitoring - freestyle winston reviewed.   Foot care and dental care: discussed.     Medications: Bydureon, U-500 insulin BID. He has change dthe schedule eto getting up in the morning and sleeping at night, changed his schedule and gives insulin at 8-9 am and at 6 pm.   CGM was downloaded and analyzed.     Hypothyroidism - he often forgets to take levothyroxine. Last TSH was 5.1     Hyperkalemia was seen on labs in 09/14/2021,resolved after adjusting meds - d/c lisinopril.     Patient reported severe numbness and pain in the feet, gabapentin is not controlling the symptoms well.     Medications    Current Outpatient Medications:   •  aspirin 81 MG chewable tablet, Chew 81 mg Daily., Disp: , Rfl:   •  Bydureon BCise 2 MG/0.85ML auto-injector injection, INJECT 0.85 ML UNDER THE SKIN INTO THE APPROPRIATE AREA AS DIRECTED ONE TIME PER WEEK., Disp: 10.2 mL, Rfl: 3  •  carvedilol (COREG) 6.25 MG tablet, TAKE 1 TABLET EVERY 12 HOURS, Disp: 180 tablet, Rfl: 3  •  Coenzyme Q10 (CO Q 10 PO), Take 300 mg by mouth Daily., Disp: , Rfl:   •  colestipol (COLESTID) 1 g tablet, Take 2 tablets by mouth 2 (Two) Times a Day., Disp: 360 tablet, Rfl: 1  •  Continuous Blood Gluc  (FreeStyle Winston 2 Cuthbert) device, 1 Device Every 14 (Fourteen) Days., Disp: 1 each, Rfl: 0  •  Continuous Blood Gluc Sensor (FreeStyle Winston 2 Sensor) misc, 1 each Every 14 (Fourteen) Days., Disp: 6 each, Rfl: 3  •  FeroSul 325 (65 Fe) MG tablet, TAKE ONE TABLET BY MOUTH TWICE A DAY, Disp: 180 tablet, Rfl: 1  •  Flaxseed, Linseed, (FLAXSEED OIL) 1200 MG capsule, Take 1  "capsule by mouth 2 (Two) Times a Day. 1300 mg, Disp: , Rfl:   •  gabapentin (NEURONTIN) 800 MG tablet, Take 1 tablet by mouth 3 (Three) Times a Day., Disp: 270 tablet, Rfl: 1  •  gemfibrozil (LOPID) 600 MG tablet, TAKE 1 TABLET TWICE A DAY, Disp: 180 tablet, Rfl: 3  •  Insulin Pen Needle (B-D UF III MINI PEN NEEDLES) 31G X 5 MM misc, USE THREE TIMES A DAY, Disp: 300 each, Rfl: 3  •  Insulin Regular Human, Conc, (HumuLIN R U-500 KwikPen) 500 UNIT/ML solution pen-injector CONCENTRATED injection, Inject sc 155 u ac breakfast and 160 u  at dinner and about 40 units at hs. (Patient taking differently: Inject sc 155 u ac breakfast and 170 u  at dinner and about 40 units at hs.), Disp: 20 pen, Rfl: 3  •  ipratropium (ATROVENT) 0.06 % nasal spray, 2 sprays into the nostril(s) as directed by provider Daily., Disp: 45 mL, Rfl: 1  •  levothyroxine (SYNTHROID, LEVOTHROID) 50 MCG tablet, Take 1 tablet by mouth Daily., Disp: 90 tablet, Rfl: 3  •  Multiple Vitamins-Minerals (MULTIVITAMIN PO), Take 1 tablet by mouth Daily., Disp: , Rfl:   •  omeprazole (priLOSEC) 40 MG capsule, TAKE 1 CAPSULE DAILY, Disp: 90 capsule, Rfl: 3  •  sertraline (ZOLOFT) 100 MG tablet, TAKE ONE TABLET BY MOUTH DAILY, Disp: 90 tablet, Rfl: 1  •  simvastatin (ZOCOR) 40 MG tablet, TAKE 1 TABLET DAILY AT BEDTIME, Disp: 90 tablet, Rfl: 3  •  spironolactone (ALDACTONE) 25 MG tablet, TAKE 1 TABLET DAILY, Disp: 90 tablet, Rfl: 3  •  tiZANidine (ZANAFLEX) 4 MG tablet, Take 1 tablet by mouth 2 (Two) Times a Day As Needed., Disp: , Rfl:   •  traZODone (DESYREL) 50 MG tablet, 1-2 po qhs prn, Disp: 60 tablet, Rfl: 3    Review of systems  Review of Systems   Constitutional: Positive for fatigue.   Musculoskeletal: Positive for arthralgias, back pain and neck pain.   Neurological: Positive for numbness.   All other systems reviewed and are negative.      Physical exam  Objective   Blood pressure 130/80, pulse 76, height 172.7 cm (68\"), weight 103 kg (226 lb 3.2 oz), " SpO2 98 %. Body mass index is 34.39 kg/m².  Physical Exam   Constitutional: He is oriented to person, place, and time. He appears well-developed and well-nourished.   HENT:   Head: Normocephalic and atraumatic.   Eyes: Conjunctivae are normal.   Neck: No thyromegaly present.   Cardiovascular: Normal rate, regular rhythm, normal heart sounds and normal pulses.   Pulmonary/Chest: Effort normal and breath sounds normal.   Neurological: He is alert and oriented to person, place, and time.   Psychiatric: Thought content normal.         LABS AND IMAGING  Office Visit on 05/18/2022   Component Date Value Ref Range Status   • Hemoglobin A1C 05/18/2022 7.7  % Final   • Lot Number 05/18/2022 [id number]   Final   • Expiration Date 05/18/2022 01/17/2024   Final   • Glucose 05/18/2022 224 (A) 70 - 130 mg/dL Final   • Lot Number 05/18/2022 [id number]   Final   • Expiration Date 05/18/2022 11/10/2022   Final   Office Visit on 04/19/2022   Component Date Value Ref Range Status   • Glucose 04/19/2022 175 (A) 65 - 99 mg/dL Final   • BUN 04/19/2022 19  8 - 23 mg/dL Final   • Creatinine 04/19/2022 1.33 (A) 0.76 - 1.27 mg/dL Final   • Sodium 04/19/2022 139  136 - 145 mmol/L Final   • Potassium 04/19/2022 4.7  3.5 - 5.2 mmol/L Final   • Chloride 04/19/2022 103  98 - 107 mmol/L Final   • CO2 04/19/2022 24.0  22.0 - 29.0 mmol/L Final   • Calcium 04/19/2022 9.4  8.6 - 10.5 mg/dL Final   • Total Protein 04/19/2022 7.7  6.0 - 8.5 g/dL Final   • Albumin 04/19/2022 4.40  3.50 - 5.20 g/dL Final   • ALT (SGPT) 04/19/2022 32  1 - 41 U/L Final   • AST (SGOT) 04/19/2022 32  1 - 40 U/L Final   • Alkaline Phosphatase 04/19/2022 89  39 - 117 U/L Final   • Total Bilirubin 04/19/2022 0.4  0.0 - 1.2 mg/dL Final   • Globulin 04/19/2022 3.3  gm/dL Final   • A/G Ratio 04/19/2022 1.3  g/dL Final   • BUN/Creatinine Ratio 04/19/2022 14.3  7.0 - 25.0 Final   • Anion Gap 04/19/2022 12.0  5.0 - 15.0 mmol/L Final   • eGFR 04/19/2022 56.8 (A) >60.0 mL/min/1.73 Final     National Kidney Foundation and American Society of Nephrology (ASN) Task Force recommended calculation based on the Chronic Kidney Disease Epidemiology Collaboration (CKD-EPI) equation refit without adjustment for race.   • TSH 04/19/2022 5.130 (A) 0.270 - 4.200 uIU/mL Final   • Free T4 04/19/2022 0.93  0.93 - 1.70 ng/dL Final   • Ferritin 04/19/2022 85.50  30.00 - 400.00 ng/mL Final   • WBC 04/19/2022 4.92  3.40 - 10.80 10*3/mm3 Final   • RBC 04/19/2022 3.35 (A) 4.14 - 5.80 10*6/mm3 Final   • Hemoglobin 04/19/2022 10.9 (A) 13.0 - 17.7 g/dL Final   • Hematocrit 04/19/2022 32.7 (A) 37.5 - 51.0 % Final   • MCV 04/19/2022 97.6 (A) 79.0 - 97.0 fL Final   • MCH 04/19/2022 32.5  26.6 - 33.0 pg Final   • MCHC 04/19/2022 33.3  31.5 - 35.7 g/dL Final   • RDW 04/19/2022 13.6  12.3 - 15.4 % Final   • RDW-SD 04/19/2022 48.2  37.0 - 54.0 fl Final   • MPV 04/19/2022 12.5 (A) 6.0 - 12.0 fL Final   • Platelets 04/19/2022 100 (A) 140 - 450 10*3/mm3 Final   • Neutrophil % 04/19/2022 57.6  42.7 - 76.0 % Final   • Lymphocyte % 04/19/2022 26.6  19.6 - 45.3 % Final   • Monocyte % 04/19/2022 11.4  5.0 - 12.0 % Final   • Eosinophil % 04/19/2022 3.0  0.3 - 6.2 % Final   • Basophil % 04/19/2022 0.6  0.0 - 1.5 % Final   • Immature Grans % 04/19/2022 0.8 (A) 0.0 - 0.5 % Final   • Neutrophils, Absolute 04/19/2022 2.83  1.70 - 7.00 10*3/mm3 Final   • Lymphocytes, Absolute 04/19/2022 1.31  0.70 - 3.10 10*3/mm3 Final   • Monocytes, Absolute 04/19/2022 0.56  0.10 - 0.90 10*3/mm3 Final   • Eosinophils, Absolute 04/19/2022 0.15  0.00 - 0.40 10*3/mm3 Final   • Basophils, Absolute 04/19/2022 0.03  0.00 - 0.20 10*3/mm3 Final   • Immature Grans, Absolute 04/19/2022 0.04  0.00 - 0.05 10*3/mm3 Final   • nRBC 04/19/2022 0.2  0.0 - 0.2 /100 WBC Final       Assessment  Assessment & Plan   1. Diabetic peripheral neuropathy associated with type 2 diabetes mellitus (HCC)    2. Hypothyroidism, adult        Plan      CGM downloaded and analyzed. He has morning  hyperglycemia, afternoon hypoglycemia. Insulin dose adjusted to match the new schedule.      Patient Instructions     Insulin U-500  1.Change insulin to 160 units in the morning, 150 units in the evening.  Continue to use 40 units at late night snack.     2. If you continue to have low glucose in the evening and high middle of the day -- change to 165 u in the morning 145 units in the evening.     Results for orders placed or performed in visit on 05/18/22   POC Glycosylated Hemoglobin (Hb A1C)    Specimen: Blood   Result Value Ref Range    Hemoglobin A1C 7.7 %    Lot Number [id number]     Expiration Date 01/17/2024    POC Glucose, Blood    Specimen: Blood   Result Value Ref Range    Glucose 224 (A) 70 - 130 mg/dL    Lot Number [id number]     Expiration Date 11/10/2022      Hypoglycemia precautions reviewed.      Continue Freestyle anibal.    Up to date with labs and eye exam.     2.Cont levothyroxine 50, thyroid function was normal in 4/2022    3. BP is normal.    -cont spironolactone, BP is normal.     Follow-up in 3-4 months

## 2022-05-18 NOTE — TELEPHONE ENCOUNTER
Faxed documents to [facility] advising that PT needs to be upgraded to Freestyle Winston 2

## 2022-05-18 NOTE — PATIENT INSTRUCTIONS
Insulin U-500  1.Change insulin to 160 units in the morning, 150 units in the evening.  Continue to use 40 units at late night snack.     2. If you continue to have low glucose in the evening and high middle of the day -- change to 165 u in the morning 145 units in the evening.     Results for orders placed or performed in visit on 05/18/22   POC Glycosylated Hemoglobin (Hb A1C)    Specimen: Blood   Result Value Ref Range    Hemoglobin A1C 7.7 %    Lot Number [id number]     Expiration Date 01/17/2024    POC Glucose, Blood    Specimen: Blood   Result Value Ref Range    Glucose 224 (A) 70 - 130 mg/dL    Lot Number [id number]     Expiration Date 11/10/2022

## 2022-05-19 RX ORDER — IPRATROPIUM BROMIDE 42 UG/1
SPRAY, METERED NASAL
Qty: 45 ML | Refills: 1 | Status: SHIPPED | OUTPATIENT
Start: 2022-05-19

## 2022-06-30 ENCOUNTER — TELEPHONE (OUTPATIENT)
Dept: INTERNAL MEDICINE | Facility: CLINIC | Age: 72
End: 2022-06-30

## 2022-06-30 DIAGNOSIS — R06.02 SHORTNESS OF BREATH: ICD-10-CM

## 2022-06-30 DIAGNOSIS — R20.2 PARESTHESIA: Primary | ICD-10-CM

## 2022-07-01 RX ORDER — SERTRALINE HYDROCHLORIDE 100 MG/1
TABLET, FILM COATED ORAL
Qty: 90 TABLET | Refills: 1 | Status: SHIPPED | OUTPATIENT
Start: 2022-07-01 | End: 2023-02-27 | Stop reason: SDUPTHER

## 2022-07-11 NOTE — TELEPHONE ENCOUNTER
He states he is having shortness of breath is is wondering if his CBC and hemoglobin is low and could he have that blood drawn also .  He states he feels like he felt the last time his labs were abnormal    Notified for him to come in for B6/ B12 and folate also

## 2022-07-13 ENCOUNTER — LAB (OUTPATIENT)
Dept: INTERNAL MEDICINE | Facility: CLINIC | Age: 72
End: 2022-07-13

## 2022-07-13 ENCOUNTER — LAB (OUTPATIENT)
Dept: LAB | Facility: HOSPITAL | Age: 72
End: 2022-07-13

## 2022-07-13 DIAGNOSIS — R20.2 PARESTHESIA: ICD-10-CM

## 2022-07-13 DIAGNOSIS — R06.02 SHORTNESS OF BREATH: ICD-10-CM

## 2022-07-13 LAB
BASOPHILS # BLD AUTO: 0.02 10*3/MM3 (ref 0–0.2)
BASOPHILS NFR BLD AUTO: 0.4 % (ref 0–1.5)
DEPRECATED RDW RBC AUTO: 49.7 FL (ref 37–54)
EOSINOPHIL # BLD AUTO: 0.16 10*3/MM3 (ref 0–0.4)
EOSINOPHIL NFR BLD AUTO: 3.3 % (ref 0.3–6.2)
ERYTHROCYTE [DISTWIDTH] IN BLOOD BY AUTOMATED COUNT: 13.5 % (ref 12.3–15.4)
HCT VFR BLD AUTO: 33.5 % (ref 37.5–51)
HGB BLD-MCNC: 11.2 G/DL (ref 13–17.7)
IMM GRANULOCYTES # BLD AUTO: 0.02 10*3/MM3 (ref 0–0.05)
IMM GRANULOCYTES NFR BLD AUTO: 0.4 % (ref 0–0.5)
LYMPHOCYTES # BLD AUTO: 0.77 10*3/MM3 (ref 0.7–3.1)
LYMPHOCYTES NFR BLD AUTO: 16 % (ref 19.6–45.3)
MCH RBC QN AUTO: 33.7 PG (ref 26.6–33)
MCHC RBC AUTO-ENTMCNC: 33.4 G/DL (ref 31.5–35.7)
MCV RBC AUTO: 100.9 FL (ref 79–97)
MONOCYTES # BLD AUTO: 0.46 10*3/MM3 (ref 0.1–0.9)
MONOCYTES NFR BLD AUTO: 9.6 % (ref 5–12)
NEUTROPHILS NFR BLD AUTO: 3.38 10*3/MM3 (ref 1.7–7)
NEUTROPHILS NFR BLD AUTO: 70.3 % (ref 42.7–76)
NRBC BLD AUTO-RTO: 0 /100 WBC (ref 0–0.2)
PLATELET # BLD AUTO: 92 10*3/MM3 (ref 140–450)
PMV BLD AUTO: 12.4 FL (ref 6–12)
RBC # BLD AUTO: 3.32 10*6/MM3 (ref 4.14–5.8)
WBC NRBC COR # BLD: 4.81 10*3/MM3 (ref 3.4–10.8)

## 2022-07-13 PROCEDURE — 82746 ASSAY OF FOLIC ACID SERUM: CPT | Performed by: INTERNAL MEDICINE

## 2022-07-13 PROCEDURE — 82607 VITAMIN B-12: CPT | Performed by: INTERNAL MEDICINE

## 2022-07-13 PROCEDURE — 84207 ASSAY OF VITAMIN B-6: CPT | Performed by: INTERNAL MEDICINE

## 2022-07-13 PROCEDURE — 85025 COMPLETE CBC W/AUTO DIFF WBC: CPT | Performed by: INTERNAL MEDICINE

## 2022-07-14 LAB
FOLATE SERPL-MCNC: 17.2 NG/ML (ref 4.78–24.2)
VIT B12 BLD-MCNC: 643 PG/ML (ref 211–946)

## 2022-07-18 LAB — VIT B6 SERPL-MCNC: 12.8 UG/L (ref 3.4–65.2)

## 2022-08-08 ENCOUNTER — LAB (OUTPATIENT)
Dept: LAB | Facility: HOSPITAL | Age: 72
End: 2022-08-08

## 2022-08-08 ENCOUNTER — TRANSCRIBE ORDERS (OUTPATIENT)
Dept: LAB | Facility: HOSPITAL | Age: 72
End: 2022-08-08

## 2022-08-08 DIAGNOSIS — E55.9 AVITAMINOSIS D: ICD-10-CM

## 2022-08-08 DIAGNOSIS — G60.8 HEREDITARY SENSORY NEUROPATHY: Primary | ICD-10-CM

## 2022-08-08 DIAGNOSIS — G60.8 HEREDITARY SENSORY NEUROPATHY: ICD-10-CM

## 2022-08-08 DIAGNOSIS — E51.9 THIAMIN DEFICIENCY: ICD-10-CM

## 2022-08-08 PROCEDURE — 84425 ASSAY OF VITAMIN B-1: CPT

## 2022-08-08 PROCEDURE — 36415 COLL VENOUS BLD VENIPUNCTURE: CPT

## 2022-08-08 PROCEDURE — 82306 VITAMIN D 25 HYDROXY: CPT

## 2022-08-09 LAB — 25(OH)D3 SERPL-MCNC: 25.3 NG/ML (ref 30–100)

## 2022-08-12 LAB — VIT B1 BLD-SCNC: 104.2 NMOL/L (ref 66.5–200)

## 2022-08-30 RX ORDER — MONTELUKAST SODIUM 4 MG/1
TABLET, CHEWABLE ORAL
Qty: 360 TABLET | Refills: 3 | Status: SHIPPED | OUTPATIENT
Start: 2022-08-30

## 2022-08-30 RX ORDER — GEMFIBROZIL 600 MG/1
TABLET, FILM COATED ORAL
Qty: 180 TABLET | Refills: 3 | Status: SHIPPED | OUTPATIENT
Start: 2022-08-30

## 2022-09-22 ENCOUNTER — OFFICE VISIT (OUTPATIENT)
Dept: ENDOCRINOLOGY | Facility: CLINIC | Age: 72
End: 2022-09-22

## 2022-09-22 VITALS
DIASTOLIC BLOOD PRESSURE: 72 MMHG | OXYGEN SATURATION: 96 % | HEART RATE: 61 BPM | HEIGHT: 68 IN | BODY MASS INDEX: 34.54 KG/M2 | WEIGHT: 227.9 LBS | SYSTOLIC BLOOD PRESSURE: 124 MMHG

## 2022-09-22 DIAGNOSIS — E11.65 TYPE 2 DIABETES MELLITUS WITH HYPERGLYCEMIA, UNSPECIFIED WHETHER LONG TERM INSULIN USE: Primary | ICD-10-CM

## 2022-09-22 DIAGNOSIS — E03.9 HYPOTHYROIDISM, ADULT: Chronic | ICD-10-CM

## 2022-09-22 LAB
EXPIRATION DATE: ABNORMAL
EXPIRATION DATE: NORMAL
GLUCOSE BLDC GLUCOMTR-MCNC: 168 MG/DL (ref 70–130)
HBA1C MFR BLD: 7.6 %
Lab: ABNORMAL
Lab: NORMAL

## 2022-09-22 PROCEDURE — 95251 CONT GLUC MNTR ANALYSIS I&R: CPT | Performed by: INTERNAL MEDICINE

## 2022-09-22 PROCEDURE — 99214 OFFICE O/P EST MOD 30 MIN: CPT | Performed by: INTERNAL MEDICINE

## 2022-09-22 PROCEDURE — 3051F HG A1C>EQUAL 7.0%<8.0%: CPT | Performed by: INTERNAL MEDICINE

## 2022-09-22 PROCEDURE — 83036 HEMOGLOBIN GLYCOSYLATED A1C: CPT | Performed by: INTERNAL MEDICINE

## 2022-09-22 NOTE — PROGRESS NOTES
.Chief complaint  Diabetes and Hypothyroidism (Follow UP)    Subjective   Elton Funez is a 72 y.o. male is here today for follow-up    Diabetes mellitus type 2, uncontrolled dx in 2000  Diabetic complications: neuropathy, retinopathy.   Past meds: Metformin caused diarrhea in the past.   Eye care:no retinopathy.   Monitoring - freestyle winston reviewed.   Foot care and dental care: discussed.     Medications: Bydureon, U-500 insulin BID. He has an unusual schedule and stays up at night and sleeps in the afternoon.   CGM was downloaded and analyzed.     Hypothyroidism - he often forgets to take levothyroxine.      Hyperkalemia was seen on labs in 09/14/2021,resolved after adjusting meds - d/c lisinopril.     Patient reported severe numbness and pain in the feet, gabapentin is not controlling the symptoms well.  Podiatrist started alpha lipoic acid and B1 vitamin.     Medications    Current Outpatient Medications:   •  aspirin 81 MG chewable tablet, Chew 81 mg Daily., Disp: , Rfl:   •  Bydureon BCise 2 MG/0.85ML auto-injector injection, INJECT 0.85 ML UNDER THE SKIN INTO THE APPROPRIATE AREA AS DIRECTED ONE TIME PER WEEK., Disp: 10.2 mL, Rfl: 3  •  carvedilol (COREG) 6.25 MG tablet, TAKE 1 TABLET EVERY 12 HOURS, Disp: 180 tablet, Rfl: 3  •  Coenzyme Q10 (CO Q 10 PO), Take 300 mg by mouth Daily., Disp: , Rfl:   •  colestipol (COLESTID) 1 g tablet, TAKE 2 TABLETS TWICE A DAY, Disp: 360 tablet, Rfl: 3  •  Continuous Blood Gluc  (FreeStyle Winston 2 [place]) device, 1 Device Every 14 (Fourteen) Days., Disp: 1 each, Rfl: 0  •  Continuous Blood Gluc Sensor (FreeStyle Winston 2 Sensor) misc, 1 each Every 14 (Fourteen) Days., Disp: 6 each, Rfl: 3  •  FeroSul 325 (65 Fe) MG tablet, TAKE ONE TABLET BY MOUTH TWICE A DAY, Disp: 180 tablet, Rfl: 1  •  Flaxseed, Linseed, (FLAXSEED OIL) 1200 MG capsule, Take 1 capsule by mouth 2 (Two) Times a Day. 1300 mg, Disp: , Rfl:   •  gabapentin (NEURONTIN) 800 MG tablet, Take 1 tablet  "by mouth 4 (Four) Times a Day., Disp: 360 tablet, Rfl: 1  •  gemfibrozil (LOPID) 600 MG tablet, TAKE 1 TABLET TWICE A DAY, Disp: 180 tablet, Rfl: 3  •  Insulin Pen Needle (B-D UF III MINI PEN NEEDLES) 31G X 5 MM misc, USE THREE TIMES A DAY, Disp: 300 each, Rfl: 3  •  Insulin Regular Human, Conc, (HumuLIN R U-500 KwikPen) 500 UNIT/ML solution pen-injector CONCENTRATED injection, Inject sc 160 u ac breakfast and 150 u  at dinner and about 40 units at hs., Disp: 20 pen, Rfl: 3  •  ipratropium (ATROVENT) 0.06 % nasal spray, USE 2 SPRAYS IN EACH NOSTRIL AS DIRECTED BY PROVIDER DAILY, Disp: 45 mL, Rfl: 1  •  levothyroxine (SYNTHROID, LEVOTHROID) 50 MCG tablet, Take 1 tablet by mouth Daily., Disp: 90 tablet, Rfl: 3  •  Multiple Vitamins-Minerals (MULTIVITAMIN PO), Take 1 tablet by mouth Daily., Disp: , Rfl:   •  omeprazole (priLOSEC) 40 MG capsule, TAKE 1 CAPSULE DAILY, Disp: 90 capsule, Rfl: 3  •  sertraline (ZOLOFT) 100 MG tablet, TAKE ONE TABLET BY MOUTH DAILY, Disp: 90 tablet, Rfl: 1  •  simvastatin (ZOCOR) 40 MG tablet, TAKE 1 TABLET DAILY AT BEDTIME, Disp: 90 tablet, Rfl: 3  •  spironolactone (ALDACTONE) 25 MG tablet, TAKE 1 TABLET DAILY, Disp: 90 tablet, Rfl: 3  •  tiZANidine (ZANAFLEX) 4 MG tablet, Take 1 tablet by mouth 2 (Two) Times a Day As Needed., Disp: , Rfl:   •  traZODone (DESYREL) 50 MG tablet, 1-2 po qhs prn, Disp: 60 tablet, Rfl: 3    Review of systems  Review of Systems   Constitutional: Positive for fatigue.   Musculoskeletal: Positive for arthralgias, back pain and neck pain.   Neurological: Positive for numbness.   All other systems reviewed and are negative.      Physical exam  Objective   Blood pressure 124/72, pulse 61, height 172.7 cm (68\"), weight 103 kg (227 lb 14.4 oz), SpO2 96 %. Body mass index is 34.65 kg/m².  Physical Exam   Constitutional: He is oriented to person, place, and time. He appears well-developed and well-nourished.   HENT:   Head: Normocephalic and atraumatic.   Eyes: " Conjunctivae are normal.   Neck: No thyromegaly present.   Cardiovascular: Normal rate, regular rhythm, normal heart sounds and normal pulses.   Pulmonary/Chest: Effort normal and breath sounds normal.   Neurological: He is alert and oriented to person, place, and time.   Psychiatric: Thought content normal.         LABS AND IMAGING  Office Visit on 09/22/2022   Component Date Value Ref Range Status   • Hemoglobin A1C 09/22/2022 7.6  % Final   • Lot Number 09/22/2022 [id number]   Final   • Expiration Date 09/22/2022 06/14/2024   Final   • Glucose 09/22/2022 168 (A) 70 - 130 mg/dL Final   • Lot Number 09/22/2022 [id number]   Final   • Expiration Date 09/22/2022 03/28/2023   Final       Assessment  Assessment & Plan   1. Type 2 diabetes mellitus with hyperglycemia, unspecified whether long term insulin use (HCC)    2. Hypothyroidism, adult        Plan      CGM downloaded and analyzed. He has variable glucose levels.  Insulin dose adjusted to match the new schedule.      Patient Instructions     Insulin U-500  1.Change insulin to 180 units in the morning, 150 units in the evening.        Results for orders placed or performed in visit on 09/22/22   POC Glycosylated Hemoglobin (Hb A1C)    Specimen: Blood   Result Value Ref Range    Hemoglobin A1C 7.6 %    Lot Number [id number]     Expiration Date 06/14/2024    POC Glucose, Blood    Specimen: Blood   Result Value Ref Range    Glucose 168 (A) 70 - 130 mg/dL    Lot Number [id number]     Expiration Date 03/28/2023      Hypoglycemia precautions reviewed.      Continue Freestyle anibal.    Up to date with labs and eye exam.     2.Cont levothyroxine 50, thyroid function was normal in 4/2022. Repeat with next lab draw.     3. BP is normal.    -cont spironolactone, BP is normal.     Follow-up in 3-4 months

## 2022-09-22 NOTE — PATIENT INSTRUCTIONS
Insulin U-500  1.Change insulin to 180 units in the afternoon, 150 units at night.        Results for orders placed or performed in visit on 09/22/22   POC Glycosylated Hemoglobin (Hb A1C)    Specimen: Blood   Result Value Ref Range    Hemoglobin A1C 7.6 %    Lot Number [id number]     Expiration Date 06/14/2024    POC Glucose, Blood    Specimen: Blood   Result Value Ref Range    Glucose 168 (A) 70 - 130 mg/dL    Lot Number [id number]     Expiration Date 03/28/2023

## 2022-10-05 ENCOUNTER — OFFICE VISIT (OUTPATIENT)
Dept: SLEEP MEDICINE | Facility: HOSPITAL | Age: 72
End: 2022-10-05

## 2022-10-05 VITALS
OXYGEN SATURATION: 96 % | HEART RATE: 68 BPM | SYSTOLIC BLOOD PRESSURE: 141 MMHG | WEIGHT: 231.6 LBS | HEIGHT: 68 IN | DIASTOLIC BLOOD PRESSURE: 75 MMHG | BODY MASS INDEX: 35.1 KG/M2

## 2022-10-05 DIAGNOSIS — G47.21 CIRCADIAN RHYTHM SLEEP DISORDER, DELAYED SLEEP PHASE TYPE: Primary | ICD-10-CM

## 2022-10-05 DIAGNOSIS — G47.33 OSA (OBSTRUCTIVE SLEEP APNEA): Chronic | ICD-10-CM

## 2022-10-05 PROCEDURE — 99214 OFFICE O/P EST MOD 30 MIN: CPT | Performed by: NURSE PRACTITIONER

## 2022-10-05 RX ORDER — ERGOCALCIFEROL 1.25 MG/1
CAPSULE ORAL
COMMUNITY
Start: 2022-09-22

## 2022-10-05 RX ORDER — TEMAZEPAM 15 MG/1
15 CAPSULE ORAL NIGHTLY PRN
Qty: 30 CAPSULE | Refills: 1 | Status: SHIPPED | OUTPATIENT
Start: 2022-10-05 | End: 2022-12-05

## 2022-10-05 NOTE — PROGRESS NOTES
Chief Complaint:   Chief Complaint   Patient presents with   • Follow-up       HPI:    Elton Funez is a 72 y.o. male here for follow-up of sleep apnea and insomnia.  Patient was last seen 1/24/2022.  Patient's machine at that time was 6+ years old and did need an order for a new machine which we did.  Patient's insurance requires a new sleep study before that can be received.  Will order the HST today as well as new machine order.  He states he does use CPAP nightly and we were unable to get a download today.  He sleeps 8 to 10 hours a day and then is awake most of the night.  Patient does wish to get his sleep back to normal so he can spend time with his wife.  Patient has tried melatonin to help go to sleep in the past as well as trazodone without relief.  We will move forward today with Restoril.  He has an [place] score of 13/24.  Patient is to stay awake all day and take Restoril approximately 1 hour before bedtime.        Current medications are:   Current Outpatient Medications:   •  aspirin 81 MG chewable tablet, Chew 81 mg Daily., Disp: , Rfl:   •  Bydureon BCise 2 MG/0.85ML auto-injector injection, INJECT 0.85 ML UNDER THE SKIN INTO THE APPROPRIATE AREA AS DIRECTED ONE TIME PER WEEK., Disp: 10.2 mL, Rfl: 3  •  carvedilol (COREG) 6.25 MG tablet, TAKE 1 TABLET EVERY 12 HOURS, Disp: 180 tablet, Rfl: 3  •  Coenzyme Q10 (CO Q 10 PO), Take 300 mg by mouth Daily., Disp: , Rfl:   •  colestipol (COLESTID) 1 g tablet, TAKE 2 TABLETS TWICE A DAY, Disp: 360 tablet, Rfl: 3  •  Continuous Blood Gluc  (FreeStyle Winston 2 [place]) device, 1 Device Every 14 (Fourteen) Days., Disp: 1 each, Rfl: 0  •  Continuous Blood Gluc Sensor (FreeStyle Winston 2 Sensor) misc, 1 each Every 14 (Fourteen) Days., Disp: 6 each, Rfl: 3  •  FeroSul 325 (65 Fe) MG tablet, TAKE ONE TABLET BY MOUTH TWICE A DAY, Disp: 180 tablet, Rfl: 1  •  Flaxseed, Linseed, (FLAXSEED OIL) 1200 MG capsule, Take 1 capsule by mouth 2 (Two) Times a Day. 1300  mg, Disp: , Rfl:   •  gabapentin (NEURONTIN) 800 MG tablet, Take 1 tablet by mouth 4 (Four) Times a Day., Disp: 360 tablet, Rfl: 1  •  gemfibrozil (LOPID) 600 MG tablet, TAKE 1 TABLET TWICE A DAY, Disp: 180 tablet, Rfl: 3  •  Insulin Pen Needle (B-D UF III MINI PEN NEEDLES) 31G X 5 MM misc, USE THREE TIMES A DAY, Disp: 300 each, Rfl: 3  •  Insulin Regular Human, Conc, (HumuLIN R U-500 KwikPen) 500 UNIT/ML solution pen-injector CONCENTRATED injection, Inject sc 160 u ac breakfast and 150 u  at dinner and about 40 units at hs., Disp: 20 pen, Rfl: 3  •  ipratropium (ATROVENT) 0.06 % nasal spray, USE 2 SPRAYS IN EACH NOSTRIL AS DIRECTED BY PROVIDER DAILY, Disp: 45 mL, Rfl: 1  •  levothyroxine (SYNTHROID, LEVOTHROID) 50 MCG tablet, Take 1 tablet by mouth Daily., Disp: 90 tablet, Rfl: 3  •  Multiple Vitamins-Minerals (MULTIVITAMIN PO), Take 1 tablet by mouth Daily., Disp: , Rfl:   •  omeprazole (priLOSEC) 40 MG capsule, TAKE 1 CAPSULE DAILY, Disp: 90 capsule, Rfl: 3  •  sertraline (ZOLOFT) 100 MG tablet, TAKE ONE TABLET BY MOUTH DAILY, Disp: 90 tablet, Rfl: 1  •  simvastatin (ZOCOR) 40 MG tablet, TAKE 1 TABLET DAILY AT BEDTIME, Disp: 90 tablet, Rfl: 3  •  spironolactone (ALDACTONE) 25 MG tablet, TAKE 1 TABLET DAILY, Disp: 90 tablet, Rfl: 3  •  tiZANidine (ZANAFLEX) 4 MG tablet, Take 1 tablet by mouth 2 (Two) Times a Day As Needed., Disp: , Rfl:   •  traZODone (DESYREL) 50 MG tablet, 1-2 po qhs prn, Disp: 60 tablet, Rfl: 3  •  vitamin D (ERGOCALCIFEROL) 1.25 MG (19944 UT) capsule capsule, , Disp: , Rfl:   •  temazepam (RESTORIL) 15 MG capsule, Take 1 capsule by mouth At Night As Needed for Sleep., Disp: 30 capsule, Rfl: 1.      The patient's relevant past medical, surgical, family and social history were reviewed and updated in Epic as appropriate.       Review of Systems   HENT: Positive for tinnitus and trouble swallowing.    Eyes: Positive for visual disturbance.   Respiratory: Positive for apnea, cough, shortness of  "breath and wheezing.    Gastrointestinal:        Heartburn   Endocrine: Positive for polydipsia.   Musculoskeletal: Positive for arthralgias, gait problem, myalgias and neck pain.   Neurological: Positive for numbness.   Psychiatric/Behavioral: Positive for sleep disturbance.   All other systems reviewed and are negative.        Objective:  /75   Pulse 68   Ht 172.7 cm (68\")   Wt 105 kg (231 lb 9.6 oz)   SpO2 96%   BMI 35.21 kg/m²     Physical Exam  Vitals reviewed.   Constitutional:       Appearance: Normal appearance.   HENT:      Head: Normocephalic and atraumatic.      Mouth/Throat:      Comments: Mallampati 2 anatomy  Cardiovascular:      Rate and Rhythm: Bradycardia present.   Pulmonary:      Effort: Pulmonary effort is normal.      Breath sounds: Normal breath sounds.   Skin:     General: Skin is warm and dry.   Neurological:      Mental Status: He is oriented to person, place, and time.   Psychiatric:         Mood and Affect: Mood normal.         Behavior: Behavior normal.         Thought Content: Thought content normal.         Judgment: Judgment normal.         CPAP Report  No report today    The patient continues to use and benefit from CPAP therapy.    ASSESSMENT/PLAN    Diagnoses and all orders for this visit:    1. Circadian rhythm sleep disorder, delayed sleep phase type (Primary)  -     temazepam (RESTORIL) 15 MG capsule; Take 1 capsule by mouth At Night As Needed for Sleep.  Dispense: 30 capsule; Refill: 1    2. FAVIO (obstructive sleep apnea)  -     PAP Therapy  -     Home Sleep Study; Future        1. Counseled patient regarding multimodal approach with healthy nutrition, healthy sleep, regular physical activity, social activities, counseling, and medications. Encouraged to practice lateral sleep position. Avoid alcohol and sedatives close to bedtime.  2. Stevie is up-to-date and compliant we will try Restoril 15 mg and see patient back in 8 weeks.  If he is going to stay on this " medication he will need a controlled substance contract as well as UDS.  Refill supplies today with order for new machine as well as order for HST.  The day patient receives his new machine he will call us for 31 to 90-day appointment.      I have reviewed the results of my evaluation and impression and discussed my recommendations in detail with the patient.      Signed by  Elina Alarcon, MELISSA    October 5, 2022      CC: Tc Ramirez MD         No ref. provider found

## 2022-10-25 RX ORDER — FLURBIPROFEN SODIUM 0.3 MG/ML
SOLUTION/ DROPS OPHTHALMIC
Qty: 270 EACH | Refills: 1 | Status: SHIPPED | OUTPATIENT
Start: 2022-10-25

## 2022-11-07 ENCOUNTER — OFFICE VISIT (OUTPATIENT)
Dept: INTERNAL MEDICINE | Facility: CLINIC | Age: 72
End: 2022-11-07

## 2022-11-07 ENCOUNTER — LAB (OUTPATIENT)
Dept: LAB | Facility: HOSPITAL | Age: 72
End: 2022-11-07

## 2022-11-07 VITALS
SYSTOLIC BLOOD PRESSURE: 134 MMHG | BODY MASS INDEX: 35.16 KG/M2 | HEART RATE: 76 BPM | WEIGHT: 232 LBS | HEIGHT: 68 IN | DIASTOLIC BLOOD PRESSURE: 76 MMHG | TEMPERATURE: 97.8 F | RESPIRATION RATE: 20 BRPM

## 2022-11-07 DIAGNOSIS — I10 ESSENTIAL HYPERTENSION: ICD-10-CM

## 2022-11-07 DIAGNOSIS — K21.9 GASTROESOPHAGEAL REFLUX DISEASE WITHOUT ESOPHAGITIS: ICD-10-CM

## 2022-11-07 DIAGNOSIS — Z00.00 PHYSICAL EXAM: ICD-10-CM

## 2022-11-07 DIAGNOSIS — Z12.5 PROSTATE CANCER SCREENING: ICD-10-CM

## 2022-11-07 DIAGNOSIS — E78.5 HYPERLIPIDEMIA, UNSPECIFIED HYPERLIPIDEMIA TYPE: ICD-10-CM

## 2022-11-07 DIAGNOSIS — E03.9 HYPOTHYROIDISM, UNSPECIFIED TYPE: ICD-10-CM

## 2022-11-07 DIAGNOSIS — Z23 IMMUNIZATION DUE: ICD-10-CM

## 2022-11-07 DIAGNOSIS — Z00.00 MEDICARE ANNUAL WELLNESS VISIT, SUBSEQUENT: Primary | ICD-10-CM

## 2022-11-07 LAB
BACTERIA UR QL AUTO: NORMAL /HPF
BILIRUB UR QL STRIP: NEGATIVE
CHOLEST SERPL-MCNC: 120 MG/DL (ref 0–200)
CLARITY UR: CLEAR
COLOR UR: ABNORMAL
GLUCOSE UR STRIP-MCNC: ABNORMAL MG/DL
HDLC SERPL-MCNC: 32 MG/DL (ref 40–60)
HGB UR QL STRIP.AUTO: NEGATIVE
HYALINE CASTS UR QL AUTO: NORMAL /LPF
KETONES UR QL STRIP: NEGATIVE
LDLC SERPL CALC-MCNC: 55 MG/DL (ref 0–100)
LDLC/HDLC SERPL: 1.5 {RATIO}
LEUKOCYTE ESTERASE UR QL STRIP.AUTO: NEGATIVE
NITRITE UR QL STRIP: NEGATIVE
PH UR STRIP.AUTO: 5.5 [PH] (ref 5–8)
PROT UR QL STRIP: ABNORMAL
PSA SERPL-MCNC: 1.66 NG/ML (ref 0–4)
RBC # UR STRIP: NORMAL /HPF
REF LAB TEST METHOD: NORMAL
SP GR UR STRIP: 1.02 (ref 1–1.03)
SQUAMOUS #/AREA URNS HPF: NORMAL /HPF
TRIGL SERPL-MCNC: 200 MG/DL (ref 0–150)
UROBILINOGEN UR QL STRIP: ABNORMAL
VLDLC SERPL-MCNC: 33 MG/DL (ref 5–40)
WBC # UR STRIP: NORMAL /HPF

## 2022-11-07 PROCEDURE — G0103 PSA SCREENING: HCPCS

## 2022-11-07 PROCEDURE — 80061 LIPID PANEL: CPT

## 2022-11-07 PROCEDURE — 90662 IIV NO PRSV INCREASED AG IM: CPT | Performed by: INTERNAL MEDICINE

## 2022-11-07 PROCEDURE — 85025 COMPLETE CBC W/AUTO DIFF WBC: CPT

## 2022-11-07 PROCEDURE — G0008 ADMIN INFLUENZA VIRUS VAC: HCPCS | Performed by: INTERNAL MEDICINE

## 2022-11-07 PROCEDURE — 1170F FXNL STATUS ASSESSED: CPT | Performed by: INTERNAL MEDICINE

## 2022-11-07 PROCEDURE — 99397 PER PM REEVAL EST PAT 65+ YR: CPT | Performed by: INTERNAL MEDICINE

## 2022-11-07 PROCEDURE — G0439 PPPS, SUBSEQ VISIT: HCPCS | Performed by: INTERNAL MEDICINE

## 2022-11-07 PROCEDURE — 81001 URINALYSIS AUTO W/SCOPE: CPT

## 2022-11-07 PROCEDURE — 1159F MED LIST DOCD IN RCRD: CPT | Performed by: INTERNAL MEDICINE

## 2022-11-07 PROCEDURE — 80053 COMPREHEN METABOLIC PANEL: CPT

## 2022-11-07 PROCEDURE — 0124A PR ADM SARSCOV2 30MCG/0.3ML BST: CPT | Performed by: INTERNAL MEDICINE

## 2022-11-07 PROCEDURE — 1126F AMNT PAIN NOTED NONE PRSNT: CPT | Performed by: INTERNAL MEDICINE

## 2022-11-07 PROCEDURE — 91312 COVID-19 (PFIZER) BIVALENT BOOSTER 12+YRS: CPT | Performed by: INTERNAL MEDICINE

## 2022-11-07 NOTE — PROGRESS NOTES
Chief Complaint   Patient presents with   • Medicare Wellness-subsequent       History of Present Illness    The patient presents for an established patient physical examination and health maintenance visit.    The patient presents for a follow-up related to hypertension. The patient reports that he has had no headaches, chest pain, dyspnea, edema, syncope, blurred vision or palpitations. He states that he is taking his medication as prescribed. He is not having medication side effects.    The patient presents for a follow-up related to hypothyroidism. He reports a good energy level. He reports no hair loss, heat intolerance, cold intolerance, diarrhea, constipation or sweats. He is taking his medication as prescribed.    The patient presents for a follow-up related to GERD. The patient is on omeprazole for his gastroesophageal reflux. The medication is taken on a regular basis and gives complete relief of the symptoms. He reports no abdominal pain, belching, dysphagia, early satiety, heartburn, hoarseness, nausea, odynophagia, rectal bleeding, vomiting or weight loss. The GERD has no known aggravating factors.    The patient presents for a follow-up related to hyperlipidemia. He is following a low fat diet. He reports that he is not exercising. He is taking simvastatin. The patient is taking his medication as prescribed. He reports no medication side effects, including muscle cramps, abdominal pain, headaches or weakness. He denies orthopnea, paroxysmal nocturnal dyspnea or dyspnea on exertion.    Review of Systems    CONSTITUTIONAL- Denies Fever, Chills, Sweats, Weakness or Malaise.    NECK- Denies Decreased Range of Motion, Stiffness, Thyroid Nodules, Enlarged Lymph Nodes or Goiter.    EYES- Denies Eye Pain, Eye Drainage, Eye Redness, Eye Discharge, Decreased Vision, Visual Disturbance, Diplopia, Visual Loss or Swollen Eyelid.    HENT- Denies Nasal Discharge, Sore Throat, Ear Pain, Ear Drainage, Sinus Pain, Nasal  Congestion, Decreased Hearing or Tinnitus.    PULMONARY- Denies Wheezing, Sputum Production, Cough, Hemoptysis or Pleuritic Chest Pain.    GENITOURINARY- Denies Penile Discharge, Erectile Dysfunction, Testicular Mass, Testicular Pain, Hernia, Nocturia, Decreased Urine Stream, Incomplete Voiding, Urinary Frequency, Urinary Urgency, Dysuria or Hematuria.    CARDIOVASCULAR- Denies Claudication or Irregular Heart Beat.    ENDOCRINE- Denies Depression, Memory Loss, Polydypsia, Polyphagia, Polyuria, Sleep Disturbance or Weight Gain.    NEUROLOGIC- Denies Seizures, Confusion or Excessive Daytime Sleepiness.    MUSCULOSKELETAL- Denies Joint Pain, Joint Stiffness, Decreased Range of Motion, Joint Swelling or Erythema of Joints.    INTEGUMENTARY- Denies Rash, Lumps, Sores, Itching, Dryness, Color Change, Changes in Hair, Brittle Nails, Discolored Nails, Thickened Nails, Growths or Alopecia.    Medications      Current Outpatient Medications:   •  Alpha-Lipoic Acid 600 MG tablet, Take  by mouth Daily., Disp: , Rfl:   •  aspirin 81 MG chewable tablet, Chew 81 mg Daily., Disp: , Rfl:   •  B Complex Vitamins (VITAMIN B COMPLEX PO), Take  by mouth Daily., Disp: , Rfl:   •  B-D UF III MINI PEN NEEDLES 31G X 5 MM misc, USE THREE TIMES A DAY, Disp: 270 each, Rfl: 1  •  Bydureon BCise 2 MG/0.85ML auto-injector injection, INJECT 0.85 ML UNDER THE SKIN INTO THE APPROPRIATE AREA AS DIRECTED ONE TIME PER WEEK., Disp: 10.2 mL, Rfl: 3  •  carvedilol (COREG) 6.25 MG tablet, TAKE 1 TABLET EVERY 12 HOURS, Disp: 180 tablet, Rfl: 3  •  Coenzyme Q10 (CO Q 10 PO), Take 300 mg by mouth Daily., Disp: , Rfl:   •  colestipol (COLESTID) 1 g tablet, TAKE 2 TABLETS TWICE A DAY, Disp: 360 tablet, Rfl: 3  •  Continuous Blood Gluc  (FreeStyle Winston 2 [place]) device, 1 Device Every 14 (Fourteen) Days., Disp: 1 each, Rfl: 0  •  Continuous Blood Gluc Sensor (FreeStyle Winston 2 Sensor) misc, 1 each Every 14 (Fourteen) Days., Disp: 6 each, Rfl: 3  •   FeroSul 325 (65 Fe) MG tablet, TAKE ONE TABLET BY MOUTH TWICE A DAY, Disp: 180 tablet, Rfl: 1  •  Flaxseed, Linseed, (FLAXSEED OIL) 1200 MG capsule, Take 1 capsule by mouth 2 (Two) Times a Day. 1300 mg, Disp: , Rfl:   •  gabapentin (NEURONTIN) 800 MG tablet, Take 1 tablet by mouth 4 (Four) Times a Day., Disp: 360 tablet, Rfl: 1  •  gemfibrozil (LOPID) 600 MG tablet, TAKE 1 TABLET TWICE A DAY, Disp: 180 tablet, Rfl: 3  •  Insulin Regular Human, Conc, (HumuLIN R U-500 KwikPen) 500 UNIT/ML solution pen-injector CONCENTRATED injection, Inject sc 160 u ac breakfast and 150 u  at dinner and about 40 units at hs. (Patient taking differently: Inject sc 160 u ac breakfast and 180 u  at dinner and about 40 units at hs.), Disp: 20 pen, Rfl: 3  •  ipratropium (ATROVENT) 0.06 % nasal spray, USE 2 SPRAYS IN EACH NOSTRIL AS DIRECTED BY PROVIDER DAILY, Disp: 45 mL, Rfl: 1  •  levothyroxine (SYNTHROID, LEVOTHROID) 50 MCG tablet, Take 1 tablet by mouth Daily., Disp: 90 tablet, Rfl: 3  •  Multiple Vitamins-Minerals (MULTIVITAMIN PO), Take 1 tablet by mouth Daily., Disp: , Rfl:   •  omeprazole (priLOSEC) 40 MG capsule, TAKE 1 CAPSULE DAILY, Disp: 90 capsule, Rfl: 3  •  sertraline (ZOLOFT) 100 MG tablet, TAKE ONE TABLET BY MOUTH DAILY, Disp: 90 tablet, Rfl: 1  •  simvastatin (ZOCOR) 40 MG tablet, TAKE 1 TABLET DAILY AT BEDTIME, Disp: 90 tablet, Rfl: 3  •  spironolactone (ALDACTONE) 25 MG tablet, TAKE 1 TABLET DAILY, Disp: 90 tablet, Rfl: 3  •  temazepam (RESTORIL) 15 MG capsule, Take 1 capsule by mouth At Night As Needed for Sleep., Disp: 30 capsule, Rfl: 1  •  tiZANidine (ZANAFLEX) 4 MG tablet, Take 1 tablet by mouth 2 (Two) Times a Day As Needed., Disp: , Rfl:   •  vitamin D (ERGOCALCIFEROL) 1.25 MG (33645 UT) capsule capsule, Take  by mouth Every 7 (Seven) Days., Disp: , Rfl:      Allergies    Allergies   Allergen Reactions   • Glucophage Xr [Metformin Hcl Er] Diarrhea and Other (See Comments)     Glucophage XR TB24: Adverse  "Reaction: Severe GI issues.   • Metformin Nausea And Vomiting     Other reaction(s): SEVERE INTESTIONAL ISSUES  Other reaction(s): SEVERE GI ISSUES    Glucophage XR TB24  MetFORMIN HCl TABS   • Tetracyclines & Related Nausea Only     Adverse Reaction       Problem List    Patient Active Problem List   Diagnosis   • Chronic kidney disease, stage III (moderate) (CMS/HCC)   • Gastroesophageal reflux disease without esophagitis   • Hyperlipidemia   • Hypertension   • Trigger finger of right hand   • DM (diabetes mellitus), type 2, uncontrolled w/neurologic complication   • Obesity, Class I, BMI 30-34.9   • Actinic keratosis   • FAVIO (obstructive sleep apnea)   • Prurigo nodularis   • History of depression   • History of migraine   • Cervical radiculopathy   • ALT (SGPT) level raised   • Elevated AST (SGOT)   • Uncontrolled type 2 diabetes mellitus with diabetic neuropathy, with long-term current use of insulin   • Post-operative infection   • Leukocytosis   • Left hand weakness   • Encounter for long-term (current) use of insulin (HCC)   • Herniated cervical disc   • Circadian rhythm sleep disorder, delayed sleep phase type   • Mild nonproliferative diabetic retinopathy of left eye without macular edema associated with type 2 diabetes mellitus (HCC)   • Diabetic peripheral neuropathy associated with type 2 diabetes mellitus (HCC)   • Long term current use of insulin (HCC)   • Type 2 diabetes mellitus with hyperglycemia (HCC)   • Hypothyroidism, adult   • Chronic blood loss anemia   • 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    Physical Examination    /76   Pulse 76   Temp 97.8 °F (36.6 °C) (Infrared)   Resp 20   Ht 173.4 cm (68.25\")   Wt 105 kg (232 lb)   BMI 35.02 kg/m²     HEENT: Head- Normocephalic Atraumatic. Facies- Within normal limits. Pinnas- Normal texture and shape " bilaterally. Canals- Normal bilaterally. TMs- Normal bilaterally. Nares- Patent bilaterally. Nasal Septum- is normal. There is no tenderness to palpation over the frontal or maxillary sinuses. Lids- Normal bilaterally. Conjunctiva- Clear bilaterally. Sclera- Anicteric bilaterally. Oropharynx- Moist with no lesions. Tonsils- No enlargement, erythema or exudate.    Neck: Thyroid- non enlarged, symmetric and has no nodules. No bruits are detected. ROM- Normal Range of Motion with no rigidity.    Lungs: Auscultation- Clear to auscultation bilaterally. There are no retractions, clubbing or cyanosis. The Expiratory to Inspiratory ratio is equal.    Lymph Nodes: Cervical- no enlarged lymph nodes noted. Clavicular- no enlarged supraclavicular lymph nodes noted. Axillary- no enlarged axillary lymph nodes noted. Inguinal- no enlarged inguinal lymph nodes noted.    Cardiovascular: There are no carotid bruits. Heart- Normal Rate with Regular rhythm and no murmurs. There are no gallops. There are no rubs. In the lower extremities there is no edema. The upper extremities do not have edema.    Abdomen: Soft, benign, non-tender with no masses, hernias, organomegaly or scars.    Male Genitourinary: The penis is circumcised with testicles found in the scrotum bilaterally. Testicular Size is normal bilaterally. The penis has no anatomic abnormalities.    Rectal: reveals normal external sphincter tone with no external lesions.    Prostate: The prostate gland is symmetric and smooth with no nodularity.    Dermatologic: The patient has no worrisome or suspicious skin lesions noted.    Impression and Assessment    Normal Physical Examination.    Encounter for Screening for Malignant Neoplasm of the Prostate.    Encounter for Immunization Administration.    Essential Hypertension.    Hypothyroidism.    Gastroesophageal Reflux Disease.    Hyperlipidemia.    Plan    Gastroesophageal Reflux Disease Plan: The patient was instructed to continue  the current medications.    Essential Hypertension Plan: The patient was instructed to continue the current medications.    Hyperlipidemia Plan: The patient was instructed to exercise daily, eat a low fat diet and continue his medications.    Hypothyroidism Plan: The patient was instructed to continue the current medications.    Counseling was provided regarding: Adequate Aerobic Exercise, Dental Visits, Flossing Teeth, Heart Healthy Diet and Weight Lose.    The following was ordered for screening and health maintenance: PSA.    Counseled regarding immunizations and applicable VIS given.    Immunizations Ordered and Administered: Fluzone High Dose 65+ years and Covid 19.    Diagnoses and all orders for this visit:    1. Medicare annual wellness visit, subsequent (Primary)    2. Physical exam    3. Essential hypertension  -     CBC & Differential; Future  -     Comprehensive Metabolic Panel; Future  -     Urinalysis With Microscopic If Indicated (No Culture) - Urine, Clean Catch; Future    4. Gastroesophageal reflux disease without esophagitis    5. Hypothyroidism, unspecified type    6. Hyperlipidemia, unspecified hyperlipidemia type  -     Comprehensive Metabolic Panel; Future  -     Lipid Panel; Future    7. Prostate cancer screening  -     PSA Screen; Future    8. Immunization due  -     Fluzone High-Dose 65+yrs  -     COVID-19 Bivalent Booster (Pfizer) 12+yrs        Return to Office     The patient was instructed to return for follow-up in 6 months. The patient was instructed to return sooner if the condition changes, worsens, or does not resolve.

## 2022-11-07 NOTE — PROGRESS NOTES
The ABCs of the Annual Wellness Visit  Subsequent Medicare Wellness Visit    Chief Complaint   Patient presents with   • Medicare Wellness-subsequent      Subjective    History of Present Illness:  Elton Funez is a 72 y.o. male who presents for a Subsequent Medicare Wellness Visit.    The following portions of the patient's history were reviewed and   updated as appropriate: allergies, current medications, past family history, past medical history, past social history, past surgical history and problem list.    Compared to one year ago, the patient feels his physical   health is the same.    Compared to one year ago, the patient feels his mental   health is the same.    Recent Hospitalizations:  He was not admitted to the hospital during the last year.       Current Medical Providers:  Patient Care Team:  Tc Ramirez MD as PCP - General  Pau Colin MD as Consulting Physician (Endocrinology)  Ari Yanes MD as Consulting Physician (Otolaryngology)  Gigi Perales MD as Consulting Physician (Neurosurgery)    Outpatient Medications Prior to Visit   Medication Sig Dispense Refill   • Alpha-Lipoic Acid 600 MG tablet Take  by mouth Daily.     • aspirin 81 MG chewable tablet Chew 81 mg Daily.     • B Complex Vitamins (VITAMIN B COMPLEX PO) Take  by mouth Daily.     • B-D UF III MINI PEN NEEDLES 31G X 5 MM misc USE THREE TIMES A  each 1   • Bydureon BCise 2 MG/0.85ML auto-injector injection INJECT 0.85 ML UNDER THE SKIN INTO THE APPROPRIATE AREA AS DIRECTED ONE TIME PER WEEK. 10.2 mL 3   • carvedilol (COREG) 6.25 MG tablet TAKE 1 TABLET EVERY 12 HOURS 180 tablet 3   • Coenzyme Q10 (CO Q 10 PO) Take 300 mg by mouth Daily.     • colestipol (COLESTID) 1 g tablet TAKE 2 TABLETS TWICE A  tablet 3   • Continuous Blood Gluc  (FreeStyle Winston 2 Peosta) device 1 Device Every 14 (Fourteen) Days. 1 each 0   • Continuous Blood Gluc Sensor (FreeStyle Winston 2 Sensor) misc 1  each Every 14 (Fourteen) Days. 6 each 3   • FeroSul 325 (65 Fe) MG tablet TAKE ONE TABLET BY MOUTH TWICE A  tablet 1   • Flaxseed, Linseed, (FLAXSEED OIL) 1200 MG capsule Take 1 capsule by mouth 2 (Two) Times a Day. 1300 mg     • gabapentin (NEURONTIN) 800 MG tablet Take 1 tablet by mouth 4 (Four) Times a Day. 360 tablet 1   • gemfibrozil (LOPID) 600 MG tablet TAKE 1 TABLET TWICE A  tablet 3   • Insulin Regular Human, Conc, (HumuLIN R U-500 KwikPen) 500 UNIT/ML solution pen-injector CONCENTRATED injection Inject sc 160 u ac breakfast and 150 u  at dinner and about 40 units at hs. (Patient taking differently: Inject sc 160 u ac breakfast and 180 u  at dinner and about 40 units at hs.) 20 pen 3   • ipratropium (ATROVENT) 0.06 % nasal spray USE 2 SPRAYS IN EACH NOSTRIL AS DIRECTED BY PROVIDER DAILY 45 mL 1   • levothyroxine (SYNTHROID, LEVOTHROID) 50 MCG tablet Take 1 tablet by mouth Daily. 90 tablet 3   • Multiple Vitamins-Minerals (MULTIVITAMIN PO) Take 1 tablet by mouth Daily.     • omeprazole (priLOSEC) 40 MG capsule TAKE 1 CAPSULE DAILY 90 capsule 3   • sertraline (ZOLOFT) 100 MG tablet TAKE ONE TABLET BY MOUTH DAILY 90 tablet 1   • simvastatin (ZOCOR) 40 MG tablet TAKE 1 TABLET DAILY AT BEDTIME 90 tablet 3   • spironolactone (ALDACTONE) 25 MG tablet TAKE 1 TABLET DAILY 90 tablet 3   • temazepam (RESTORIL) 15 MG capsule Take 1 capsule by mouth At Night As Needed for Sleep. 30 capsule 1   • tiZANidine (ZANAFLEX) 4 MG tablet Take 1 tablet by mouth 2 (Two) Times a Day As Needed.     • vitamin D (ERGOCALCIFEROL) 1.25 MG (21078 UT) capsule capsule Take  by mouth Every 7 (Seven) Days.     • traZODone (DESYREL) 50 MG tablet 1-2 po qhs prn 60 tablet 3     No facility-administered medications prior to visit.       No opioid medication identified on active medication list. I have reviewed chart for other potential  high risk medication/s and harmful drug interactions in the elderly.          Aspirin is on  active medication list. Aspirin use is indicated based on review of current medical condition/s. Pros and cons of this therapy have been discussed today. Benefits of this medication outweigh potential harm.  Patient has been encouraged to continue taking this medication.  .      Patient Active Problem List   Diagnosis   • Chronic kidney disease, stage III (moderate) (CMS/HCC)   • Gastroesophageal reflux disease without esophagitis   • Hyperlipidemia   • Hypertension   • Trigger finger of right hand   • DM (diabetes mellitus), type 2, uncontrolled w/neurologic complication   • Obesity, Class I, BMI 30-34.9   • Actinic keratosis   • FAVIO (obstructive sleep apnea)   • Prurigo nodularis   • History of depression   • History of migraine   • Cervical radiculopathy   • ALT (SGPT) level raised   • Elevated AST (SGOT)   • Uncontrolled type 2 diabetes mellitus with diabetic neuropathy, with long-term current use of insulin   • Post-operative infection   • Leukocytosis   • Left hand weakness   • Encounter for long-term (current) use of insulin (HCC)   • Herniated cervical disc   • Circadian rhythm sleep disorder, delayed sleep phase type   • Mild nonproliferative diabetic retinopathy of left eye without macular edema associated with type 2 diabetes mellitus (HCC)   • Diabetic peripheral neuropathy associated with type 2 diabetes mellitus (HCC)   • Long term current use of insulin (HCC)   • Type 2 diabetes mellitus with hyperglycemia (HCC)   • Hypothyroidism, adult   • Chronic blood loss anemia   • 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  ACP discussion was held with the patient during this visit. Patient has an advance directive (not in EMR), copy requested.          Objective    Vitals:    11/07/22 1120   BP: 148/76   BP Location: Left arm   Patient Position: Sitting   Cuff Size: Adult   Pulse: 76   Resp: 20   Temp: 97.8 °F  "(36.6 °C)   TempSrc: Infrared   Weight: 105 kg (232 lb)   Height: 173.4 cm (68.25\")   PainSc: 0-No pain     Estimated body mass index is 35.02 kg/m² as calculated from the following:    Height as of this encounter: 173.4 cm (68.25\").    Weight as of this encounter: 105 kg (232 lb).    Class 2 Severe Obesity (BMI >=35 and <=39.9). Obesity-related health conditions include the following: hypertension, diabetes mellitus and dyslipidemias. Obesity is unchanged. BMI is is above average; BMI management plan is completed. We discussed portion control and increasing exercise.      Does the patient have evidence of cognitive impairment? No    Physical Exam     Finger Rub Hearing{Test (right ear):passed  Finger Rub Hearing{Test (left ear):passed      Lab Results   Component Value Date    HGBA1C 7.6 2022            HEALTH RISK ASSESSMENT    Smoking Status:  Social History     Tobacco Use   Smoking Status Former   • Packs/day: 1.00   • Years: 5.00   • Pack years: 5.00   • Types: Cigarettes   • Quit date:    • Years since quittin.8   Smokeless Tobacco Never     Alcohol Consumption:  Social History     Substance and Sexual Activity   Alcohol Use Yes    Comment: Socially     Fall Risk Screen:    STEADI Fall Risk Assessment was completed, and patient is at HIGH risk for falls. Assessment completed on:2022    Depression Screening:  PHQ-2/PHQ-9 Depression Screening 2022   Retired Total Score -   Little Interest or Pleasure in Doing Things 0-->not at all   Feeling Down, Depressed or Hopeless 0-->not at all   PHQ-9: Brief Depression Severity Measure Score 0       Health Habits and Functional and Cognitive Screening:  Functional & Cognitive Status 2022   Do you have difficulty preparing food and eating? No   Do you have difficulty bathing yourself, getting dressed or grooming yourself? No   Do you have difficulty using the toilet? No   Do you have difficulty moving around from place to place? No   Do you " have trouble with steps or getting out of a bed or a chair? Yes   Current Diet Diabetic Diet   Dental Exam Up to date   Eye Exam Up to date   Exercise (times per week) 0 times per week   Current Exercises Include No Regular Exercise   Current Exercise Activities Include -   Do you need help using the phone?  No   Are you deaf or do you have serious difficulty hearing?  Yes   Do you need help with transportation? No   Do you need help shopping? No   Do you need help preparing meals?  No   Do you need help with housework?  Yes   Do you need help with laundry? Yes   Do you need help taking your medications? No   Do you need help managing money? No   Do you ever drive or ride in a car without wearing a seat belt? No   Have you felt unusual stress, anger or loneliness in the last month? No   Who do you live with? Spouse   If you need help, do you have trouble finding someone available to you? No   Have you been bothered in the last four weeks by sexual problems? Yes   Do you have difficulty concentrating, remembering or making decisions? Yes       Age-appropriate Screening Schedule:  Refer to the list below for future screening recommendations based on patient's age, sex and/or medical conditions. Orders for these recommended tests are listed in the plan section. The patient has been provided with a written plan.    Health Maintenance   Topic Date Due   • DIABETIC FOOT EXAM  02/25/2022   • LIPID PANEL  05/13/2022   • URINE MICROALBUMIN  05/13/2022   • INFLUENZA VACCINE  08/01/2022   • HEMOGLOBIN A1C  03/22/2023   • DIABETIC EYE EXAM  06/23/2023   • TDAP/TD VACCINES (4 - Td or Tdap) 09/19/2023   • ZOSTER VACCINE  Completed              Assessment & Plan   CMS Preventative Services Quick Reference  Risk Factors Identified During Encounter  Immunizations Discussed/Encouraged (specific Immunizations; Hepatitis B Vaccine/Series, Influenza and COVID19  Obesity/Overweight   The above risks/problems have been discussed with the  patient.  Follow up actions/plans if indicated are seen below in the Assessment/Plan Section.  Pertinent information has been shared with the patient in the After Visit Summary.    Diagnoses and all orders for this visit:    1. Medicare annual wellness visit, subsequent (Primary)    2. Physical exam    3. Essential hypertension  -     CBC & Differential; Future  -     Comprehensive Metabolic Panel; Future  -     Urinalysis With Microscopic If Indicated (No Culture) - Urine, Clean Catch; Future    4. Gastroesophageal reflux disease without esophagitis    5. Hypothyroidism, unspecified type    6. Hyperlipidemia, unspecified hyperlipidemia type  -     Comprehensive Metabolic Panel; Future  -     Lipid Panel; Future    7. Prostate cancer screening  -     PSA Screen; Future    8. Immunization due  -     Fluzone High-Dose 65+yrs  -     COVID-19 Bivalent Booster (Pfizer) 12+yrs

## 2022-11-07 NOTE — PATIENT INSTRUCTIONS
Medicare Wellness  Personal Prevention Plan of Service     Date of Office Visit:    Encounter Provider:  Tc Ramirez MD  Place of Service:  [facility]  Patient Name: Elton Funez  :  1950    As part of the Medicare Wellness portion of your visit today, we are providing you with this personalized preventive plan of services (PPPS). This plan is based upon recommendations of the [place] Preventive Services Task Force (USPSTF) and the Advisory Committee on Immunization Practices (ACIP).    This lists the preventive care services that should be considered, and provides dates of when you are due. Items listed as completed are up-to-date and do not require any further intervention.    Health Maintenance   Topic Date Due    Hepatitis B (1 of 3 - Risk 3-dose series) Never done    ANNUAL WELLNESS VISIT  2021    LIPID PANEL  2022    URINE MICROALBUMIN  2022    INFLUENZA VACCINE  2022    COVID-19 Vaccine (5 - Booster for Pfizer series) 2022    HEMOGLOBIN A1C  2023    DIABETIC EYE EXAM  2023    TDAP/TD VACCINES (4 - Td or Tdap) 2023    DIABETIC FOOT EXAM  2023    COLORECTAL CANCER SCREENING  2031    HEPATITIS C SCREENING  Completed    Pneumococcal Vaccine 65+  Completed    AAA SCREEN (ONE-TIME)  Completed    ZOSTER VACCINE  Completed       Orders Placed This Encounter   Procedures    Fluzone High-Dose 65+yrs    COVID-19 Bivalent Booster (Pfizer) 12+yrs    Comprehensive Metabolic Panel     Standing Status:   Future     Standing Expiration Date:   2023     Order Specific Question:   Release to patient     Answer:   Routine Release    Lipid Panel     Standing Status:   Future     Standing Expiration Date:   2023    Urinalysis With Microscopic If Indicated (No Culture) - Urine, Clean Catch     Standing Status:   Future     Standing Expiration Date:   2023     Order Specific  Question:   Release to patient     Answer:   Routine Release    PSA Screen     Standing Status:   Future     Standing Expiration Date:   11/8/2023     Order Specific Question:   Release to patient     Answer:   Routine Release    CBC & Differential     Standing Status:   Future     Standing Expiration Date:   11/8/2023     Order Specific Question:   Manual Differential     Answer:   No       Return in about 6 months (around 5/7/2023) for Follow-up.        Heart-Healthy Eating Plan  Heart-healthy meal planning includes:  Eating less unhealthy fats.  Eating more healthy fats.  Making other changes in your diet.  Talk with your doctor or a diet specialist (dietitian) to create an eating plan that is right for you.  What is my plan?  Your doctor may recommend an eating plan that includes:  Total fat: ______% or less of total calories a day.  Saturated fat: ______% or less of total calories a day.  Cholesterol: less than _________mg a day.  What are tips for following this plan?  Cooking  Avoid frying your food. Try to bake, boil, grill, or broil it instead. You can also reduce fat by:  Removing the skin from poultry.  Removing all visible fats from meats.  Steaming vegetables in water or broth.  Meal planning    At meals, divide your plate into four equal parts:  Fill one-half of your plate with vegetables and green salads.  Fill one-fourth of your plate with whole grains.  Fill one-fourth of your plate with lean protein foods.  Eat 4-5 servings of vegetables per day. A serving of vegetables is:  1 cup of raw or cooked vegetables.  2 cups of raw leafy greens.  Eat 4-5 servings of fruit per day. A serving of fruit is:  1 medium whole fruit.  ¼ cup of dried fruit.  ½ cup of fresh, frozen, or canned fruit.  ½ cup of 100% fruit juice.  Eat more foods that have soluble fiber. These are apples, broccoli, carrots, beans, peas, and barley. Try to get 20-30 g of fiber per day.  Eat 4-5 servings of nuts, legumes, and seeds per  week:  1 serving of dried beans or legumes equals ½ cup after being cooked.  1 serving of nuts is ¼ cup.  1 serving of seeds equals 1 tablespoon.  General information  Eat more home-cooked food. Eat less restaurant, buffet, and fast food.  Limit or avoid alcohol.  Limit foods that are high in starch and sugar.  Avoid fried foods.  Lose weight if you are overweight.  Keep track of how much salt (sodium) you eat. This is important if you have high blood pressure. Ask your doctor to tell you more about this.  Try to add vegetarian meals each week.  Fats  Choose healthy fats. These include olive oil and canola oil, flaxseeds, walnuts, almonds, and seeds.  Eat more omega-3 fats. These include salmon, mackerel, sardines, tuna, flaxseed oil, and ground flaxseeds. Try to eat fish at least 2 times each week.  Check food labels. Avoid foods with trans fats or high amounts of saturated fat.  Limit saturated fats.  These are often found in animal products, such as meats, butter, and cream.  These are also found in plant foods, such as palm oil, palm kernel oil, and coconut oil.  Avoid foods with partially hydrogenated oils in them. These have trans fats. Examples are stick margarine, some tub margarines, cookies, crackers, and other baked goods.  What foods can I eat?  Fruits  All fresh, canned (in natural juice), or frozen fruits.  Vegetables  Fresh or frozen vegetables (raw, steamed, roasted, or grilled). Green salads.  Grains  Most grains. Choose whole wheat and whole grains most of the time. Rice and pasta, including brown rice and pastas made with whole wheat.  Meats and other proteins  Lean, well-trimmed beef, veal, pork, and lamb. Chicken and turkey without skin. All fish and shellfish. Wild duck, rabbit, pheasant, and venison. Egg whites or low-cholesterol egg substitutes. Dried beans, peas, lentils, and tofu. Seeds and most nuts.  Dairy  Low-fat or nonfat cheeses, including ricotta and mozzarella. Skim or 1% milk that  is liquid, powdered, or evaporated. Buttermilk that is made with low-fat milk. Nonfat or low-fat yogurt.  Fats and oils  Non-hydrogenated (trans-free) margarines. Vegetable oils, including soybean, sesame, sunflower, olive, peanut, safflower, corn, canola, and cottonseed. Salad dressings or mayonnaise made with a vegetable oil.  Beverages  Mineral water. Coffee and tea. Diet carbonated beverages.  Sweets and desserts  Sherbet, gelatin, and fruit ice. Small amounts of dark chocolate.  Limit all sweets and desserts.  Seasonings and condiments  All seasonings and condiments.  The items listed above may not be a complete list of foods and drinks you can eat. Contact a dietitian for more options.  What foods should I avoid?  Fruits  Canned fruit in heavy syrup. Fruit in cream or butter sauce. Fried fruit. Limit coconut.  Vegetables  Vegetables cooked in cheese, cream, or butter sauce. Fried vegetables.  Grains  Breads that are made with saturated or trans fats, oils, or whole milk. Croissants. Sweet rolls. Donuts. High-fat crackers, such as cheese crackers.  Meats and other proteins  Fatty meats, such as hot dogs, ribs, sausage, villar, rib-eye roast or steak. High-fat deli meats, such as salami and bologna. Caviar. Domestic duck and goose. Organ meats, such as liver.  Dairy  Cream, sour cream, cream cheese, and creamed cottage cheese. Whole-milk cheeses. Whole or 2% milk that is liquid, evaporated, or condensed. Whole buttermilk. Cream sauce or high-fat cheese sauce. Yogurt that is made from whole milk.  Fats and oils  Meat fat, or shortening. Cocoa butter, hydrogenated oils, palm oil, coconut oil, palm kernel oil. Solid fats and shortenings, including villar fat, salt pork, lard, and butter. Nondairy cream substitutes. Salad dressings with cheese or sour cream.  Beverages  Regular sodas and juice drinks with added sugar.  Sweets and desserts  Frosting. Pudding. Cookies. Cakes. Pies. Milk chocolate or white chocolate.  Buttered syrups. Full-fat ice cream or ice cream drinks.  The items listed above may not be a complete list of foods and drinks to avoid. Contact a dietitian for more information.  Summary  Heart-healthy meal planning includes eating less unhealthy fats, eating more healthy fats, and making other changes in your diet.  Eat a balanced diet. This includes fruits and vegetables, low-fat or nonfat dairy, lean protein, nuts and legumes, whole grains, and heart-healthy oils and fats.  This information is not intended to replace advice given to you by your health care provider. Make sure you discuss any questions you have with your health care provider.  Document Revised: 04/28/2022 Document Reviewed: 04/28/2022  [email or web address] Patient Education © 2022 [employer]    Advance Care Planning and Advance Directives     You make decisions on a daily basis - decisions about where you want to live, your career, your home, your life. Perhaps one of the most important decisions you face is your choice for future medical care. Take time to talk with your family and your healthcare team and start planning today.  Advance Care Planning is a process that can help you:  Understand possible future healthcare decisions in light of your own experiences  Reflect on those decision in light of your goals and values  Discuss your decisions with those closest to you and the healthcare professionals that care for you  Make a plan by creating a document that reflects your wishes    Surrogate Decision Maker  In the event of a medical emergency, which has left you unable to communicate or to make your own decisions, you would need someone to make decisions for you.  It is important to discuss your preferences for medical treatment with this person while you are in good health.     Qualities of a surrogate decision maker:  Willing to take on this role and responsibility  Knows what you want for future medical care  Willing to follow your wishes even if  they don't agree with them  Able to make difficult medical decisions under stressful circumstances    Advance Directives  These are legal documents you can create that will guide your healthcare team and decision maker(s) when needed. These documents can be stored in the electronic medical record.    Living Will - a legal document to guide your care if you have a terminal condition or a serious illness and are unable to communicate. States vary by statute in document names/types, but most forms may include one or more of the following:        -  Directions regarding life-prolonging treatments        -  Directions regarding artificially provided nutrition/hydration        -  Choosing a healthcare decision maker        -  Direction regarding organ/tissue donation    Durable Power of  for Healthcare - this document names an -in-fact to make medical decisions for you, but it may also allow this person to make personal and financial decisions for you. Please seek the advice of an  if you need this type of document.    **Advance Directives are not required and no one may discriminate against you if you do not sign one.    Medical Orders  Many states allow specific forms/orders signed by your physician to record your wishes for medical treatment in your current state of health. This form, signed in personal communication with your physician, addresses resuscitation and other medical interventions that you may or may not want.      For more information or to schedule a time with a [facility] Advance Care Planning Facilitator contact: [email or web address] or call [phone] and someone will contact you directly.

## 2022-11-08 LAB
ALBUMIN SERPL-MCNC: 4.3 G/DL (ref 3.5–5.2)
ALBUMIN/GLOB SERPL: 1.2 G/DL
ALP SERPL-CCNC: 71 U/L (ref 39–117)
ALT SERPL W P-5'-P-CCNC: 35 U/L (ref 1–41)
ANION GAP SERPL CALCULATED.3IONS-SCNC: 14.7 MMOL/L (ref 5–15)
AST SERPL-CCNC: 39 U/L (ref 1–40)
BASOPHILS # BLD AUTO: 0.02 10*3/MM3 (ref 0–0.2)
BASOPHILS NFR BLD AUTO: 0.4 % (ref 0–1.5)
BILIRUB SERPL-MCNC: 0.5 MG/DL (ref 0–1.2)
BUN SERPL-MCNC: 31 MG/DL (ref 8–23)
BUN/CREAT SERPL: 20.1 (ref 7–25)
CALCIUM SPEC-SCNC: 9.9 MG/DL (ref 8.6–10.5)
CHLORIDE SERPL-SCNC: 100 MMOL/L (ref 98–107)
CO2 SERPL-SCNC: 21.3 MMOL/L (ref 22–29)
CREAT SERPL-MCNC: 1.54 MG/DL (ref 0.76–1.27)
DEPRECATED RDW RBC AUTO: 45.8 FL (ref 37–54)
EGFRCR SERPLBLD CKD-EPI 2021: 47.3 ML/MIN/1.73
EOSINOPHIL # BLD AUTO: 0.16 10*3/MM3 (ref 0–0.4)
EOSINOPHIL NFR BLD AUTO: 3.2 % (ref 0.3–6.2)
ERYTHROCYTE [DISTWIDTH] IN BLOOD BY AUTOMATED COUNT: 12.8 % (ref 12.3–15.4)
GLOBULIN UR ELPH-MCNC: 3.6 GM/DL
GLUCOSE SERPL-MCNC: 170 MG/DL (ref 65–99)
HCT VFR BLD AUTO: 33.3 % (ref 37.5–51)
HGB BLD-MCNC: 11.2 G/DL (ref 13–17.7)
IMM GRANULOCYTES # BLD AUTO: 0.02 10*3/MM3 (ref 0–0.05)
IMM GRANULOCYTES NFR BLD AUTO: 0.4 % (ref 0–0.5)
LYMPHOCYTES # BLD AUTO: 0.91 10*3/MM3 (ref 0.7–3.1)
LYMPHOCYTES NFR BLD AUTO: 18.3 % (ref 19.6–45.3)
MCH RBC QN AUTO: 32.5 PG (ref 26.6–33)
MCHC RBC AUTO-ENTMCNC: 33.6 G/DL (ref 31.5–35.7)
MCV RBC AUTO: 96.5 FL (ref 79–97)
MONOCYTES # BLD AUTO: 0.63 10*3/MM3 (ref 0.1–0.9)
MONOCYTES NFR BLD AUTO: 12.7 % (ref 5–12)
NEUTROPHILS NFR BLD AUTO: 3.24 10*3/MM3 (ref 1.7–7)
NEUTROPHILS NFR BLD AUTO: 65 % (ref 42.7–76)
NRBC BLD AUTO-RTO: 0 /100 WBC (ref 0–0.2)
PLATELET # BLD AUTO: 76 10*3/MM3 (ref 140–450)
PMV BLD AUTO: 13 FL (ref 6–12)
POTASSIUM SERPL-SCNC: 4.5 MMOL/L (ref 3.5–5.2)
PROT SERPL-MCNC: 7.9 G/DL (ref 6–8.5)
RBC # BLD AUTO: 3.45 10*6/MM3 (ref 4.14–5.8)
SODIUM SERPL-SCNC: 136 MMOL/L (ref 136–145)
WBC NRBC COR # BLD: 4.98 10*3/MM3 (ref 3.4–10.8)

## 2022-11-10 ENCOUNTER — HOSPITAL ENCOUNTER (OUTPATIENT)
Dept: SLEEP MEDICINE | Facility: HOSPITAL | Age: 72
Discharge: HOME | End: 2022-11-10
Admitting: NURSE PRACTITIONER

## 2022-11-10 VITALS — BODY MASS INDEX: 35.16 KG/M2 | WEIGHT: 232 LBS | HEIGHT: 68 IN

## 2022-11-10 DIAGNOSIS — G47.33 OSA (OBSTRUCTIVE SLEEP APNEA): ICD-10-CM

## 2022-11-10 DIAGNOSIS — E11.65 UNCONTROLLED TYPE 2 DIABETES MELLITUS WITH HYPERGLYCEMIA: ICD-10-CM

## 2022-11-10 PROCEDURE — 95800 SLP STDY UNATTENDED: CPT | Performed by: INTERNAL MEDICINE

## 2022-11-10 PROCEDURE — 95800 SLP STDY UNATTENDED: CPT

## 2022-11-14 RX ORDER — EXENATIDE 2 MG/.85ML
INJECTION, SUSPENSION, EXTENDED RELEASE SUBCUTANEOUS
Qty: 10.2 ML | Refills: 3 | Status: SHIPPED | OUTPATIENT
Start: 2022-11-14

## 2022-11-15 DIAGNOSIS — G47.33 OSA (OBSTRUCTIVE SLEEP APNEA): Primary | Chronic | ICD-10-CM

## 2022-11-26 NOTE — PROGRESS NOTES
.Chief complaint  Diabetes (Follow UP) and Hypothyroidism    Subjective   Elton Funez is a 71 y.o. male is here today for follow-up  Diabetes mellitus type 2, uncontrolled dx in 2000  Diabetic complications: neuropathy.   Past meds: Metformin caused diarrhea in the past.   Eye care:no retinopathy.   Hypoglycemia: continue to have hypoglycemia at 4-5 am. Midnight numbers are 300-400  Monitoring - freestyle anibal reviewed. He is staying up at night and sleeps from 4 am till 1 pm.   He gives the dose of insulin at 1 pm before first meal and a dose at 6 pm before dinner meal.   Nutrition: discussed carb consistent diet 45-60 gm carb per meal. Patient is not following any diet, eats a lot of potatoes, pasta meal at 11 pm.  Exercise: recommended 30 min per day exercise. He doesn't exercise because of tingling in the feet.   Foot care and dental care: discussed.     Medications: Bydureon, U-500 insulin BID (1 pm and 8 pm)    Hypothyroidism - he often forgets to take levothyroxine frequently. Last TSH was normal.      Hyperkalemia was seen on labs in 09/14/2021, it was elevated in the hospital before.   He was admitted for anemia and received blood transfusion     Medications    Current Outpatient Medications:   •  aspirin 81 MG chewable tablet, Chew 81 mg Daily., Disp: , Rfl:   •  Bydureon BCise 2 MG/0.85ML auto-injector injection, INJECT 0.85 ML UNDER THE SKIN INTO THE APPROPRIATE AREA AS DIRECTED ONE TIME PER WEEK., Disp: 10.2 mL, Rfl: 3  •  carvedilol (COREG) 6.25 MG tablet, Take 1 tablet by mouth Every 12 (Twelve) Hours., Disp: 180 tablet, Rfl: 1  •  Coenzyme Q10 (CO Q 10 PO), Take 300 mg by mouth Daily., Disp: , Rfl:   •  colestipol (COLESTID) 1 g tablet, TAKE 2 TABLETS TWICE A DAY, Disp: 360 tablet, Rfl: 3  •  Continuous Blood Gluc Sensor (FREESTYLE ANIBAL 14 DAY SENSOR) misc, 1 each Every 14 (Fourteen) Days., Disp: 2 each, Rfl: 6  •  Flaxseed, Linseed, (FLAXSEED OIL) 1200 MG capsule, Take 1 capsule by mouth 2  Patient still comfortable per RN, rare contractions.  Will start oral misoprostol for cervical ripening.    MD Delano   (Two) Times a Day. 1300 mg, Disp: , Rfl:   •  gabapentin (NEURONTIN) 800 MG tablet, Take 1 tablet by mouth 3 (Three) Times a Day., Disp: 270 tablet, Rfl: 1  •  gemfibrozil (LOPID) 600 MG tablet, TAKE 1 TABLET TWICE A DAY, Disp: 180 tablet, Rfl: 3  •  Insulin Pen Needle (B-D UF III MINI PEN NEEDLES) 31G X 5 MM misc, USE THREE TIMES A DAY, Disp: 300 each, Rfl: 1  •  Insulin Regular Human, Conc, (HumuLIN R U-500 KwikPen) 500 UNIT/ML solution pen-injector CONCENTRATED injection, Inject sc 150 u before breakfast at 1 pm and 150 at dinner (Patient taking differently: Inject sc 150 u BETWEEN breakfast and1 pm and 150 u  at dinner with glucose check and about 30-50 units at hs.), Disp: 20 pen, Rfl: 3  •  ipratropium (ATROVENT) 0.06 % nasal spray, 2 sprays into the nostril(s) as directed by provider Daily., Disp: 45 mL, Rfl: 1  •  Iron, Ferrous Sulfate, 325 (65 Fe) MG tablet, Take 325 mg by mouth 2 (two) times a day., Disp: 30 tablet, Rfl: 2  •  levothyroxine (SYNTHROID, LEVOTHROID) 50 MCG tablet, Take 1 tablet by mouth Daily., Disp: 90 tablet, Rfl: 3  •  lisinopril (PRINIVIL,ZESTRIL) 5 MG tablet, Take 1 tablet by mouth Daily., Disp: 90 tablet, Rfl: 1  •  Multiple Vitamins-Minerals (MULTIVITAMIN PO), Take 1 tablet by mouth Daily., Disp: , Rfl:   •  omeprazole (priLOSEC) 40 MG capsule, TAKE ONE CAPSULE BY MOUTH DAILY, Disp: 90 capsule, Rfl: 1  •  sertraline (ZOLOFT) 100 MG tablet, TAKE ONE TABLET BY MOUTH DAILY, Disp: 90 tablet, Rfl: 1  •  simvastatin (ZOCOR) 40 MG tablet, TAKE 1 TABLET DAILY AT BEDTIME, Disp: 90 tablet, Rfl: 1  •  spironolactone (Aldactone) 25 MG tablet, Take 1 tablet by mouth Daily., Disp: 30 tablet, Rfl: 1  •  tiZANidine (ZANAFLEX) 4 MG tablet, Take 1 tablet by mouth 2 (Two) Times a Day As Needed., Disp: , Rfl:   •  DULoxetine (CYMBALTA) 20 MG capsule, Take 1 capsule by mouth Daily for 14 days., Disp: 14 capsule, Rfl: 0    Review of systems  Review of Systems   Constitutional: Positive for fatigue.  "  Musculoskeletal: Positive for neck pain.   Neurological: Positive for numbness.   All other systems reviewed and are negative.      Physical exam  Objective   Blood pressure 122/80, pulse 89, height 172.7 cm (68\"), weight 100 kg (221 lb 8 oz), SpO2 99 %. Body mass index is 33.68 kg/m².  Physical Exam   Constitutional: He is oriented to person, place, and time. He appears well-developed and well-nourished.   HENT:   Head: Normocephalic and atraumatic.   Eyes: Conjunctivae are normal.   Neck: No thyromegaly present.   Cardiovascular: Normal rate, regular rhythm and normal heart sounds.   Pulmonary/Chest: Effort normal and breath sounds normal.   Lymphadenopathy:     He has no cervical adenopathy.   Neurological: He is alert and oriented to person, place, and time.   Psychiatric: Thought content normal.         LABS AND IMAGING  Office Visit on 09/29/2021   Component Date Value Ref Range Status   • Hemoglobin A1C 09/29/2021 7.1  % Final   • Lot Number 09/29/2021 [id number]   Final   • Expiration Date 09/29/2021 06/01/2023   Final   • Glucose 09/29/2021 186* 70 - 130 mg/dL Final   • Lot Number 09/29/2021 [id number]   Final   • Expiration Date 09/29/2021 04/14/2022   Final   Office Visit on 09/14/2021   Component Date Value Ref Range Status   • Glucose 09/14/2021 258* 65 - 99 mg/dL Final   • BUN 09/14/2021 34* 8 - 23 mg/dL Final   • Creatinine 09/14/2021 1.67* 0.76 - 1.27 mg/dL Final   • Sodium 09/14/2021 134* 136 - 145 mmol/L Final   • Potassium 09/14/2021 5.5* 3.5 - 5.2 mmol/L Final   • Chloride 09/14/2021 99  98 - 107 mmol/L Final   • CO2 09/14/2021 20.6* 22.0 - 29.0 mmol/L Final   • Calcium 09/14/2021 9.6  8.6 - 10.5 mg/dL Final   • Total Protein 09/14/2021 8.1  6.0 - 8.5 g/dL Final   • Albumin 09/14/2021 4.50  3.50 - 5.20 g/dL Final   • ALT (SGPT) 09/14/2021 23  1 - 41 U/L Final   • AST (SGOT) 09/14/2021 31  1 - 40 U/L Final   • Alkaline Phosphatase 09/14/2021 100  39 - 117 U/L Final   • Total Bilirubin 09/14/2021 " 0.5  0.0 - 1.2 mg/dL Final   • eGFR Non  Amer 09/14/2021 41* >60 mL/min/1.73 Final   • Globulin 09/14/2021 3.6  gm/dL Final   • A/G Ratio 09/14/2021 1.3  g/dL Final   • BUN/Creatinine Ratio 09/14/2021 20.4  7.0 - 25.0 Final   • Anion Gap 09/14/2021 14.4  5.0 - 15.0 mmol/L Final   • Ferritin 09/14/2021 68.80  30.00 - 400.00 ng/mL Final   • Iron 09/14/2021 107  59 - 158 mcg/dL Final   • Iron Saturation 09/14/2021 17* 20 - 50 % Final   • Transferrin 09/14/2021 434* 200 - 360 mg/dL Final   • TIBC 09/14/2021 647* 298 - 536 mcg/dL Final   • TSH 09/14/2021 1.910  0.270 - 4.200 uIU/mL Final   • Free T4 09/14/2021 0.91* 0.93 - 1.70 ng/dL Final   • WBC 09/14/2021 8.23  3.40 - 10.80 10*3/mm3 Final   • RBC 09/14/2021 3.77* 4.14 - 5.80 10*6/mm3 Final   • Hemoglobin 09/14/2021 9.8* 13.0 - 17.7 g/dL Final   • Hematocrit 09/14/2021 32.3* 37.5 - 51.0 % Final   • MCV 09/14/2021 85.7  79.0 - 97.0 fL Final   • MCH 09/14/2021 26.0* 26.6 - 33.0 pg Final   • MCHC 09/14/2021 30.3* 31.5 - 35.7 g/dL Final   • RDW 09/14/2021 18.3* 12.3 - 15.4 % Final   • RDW-SD 09/14/2021 56.6* 37.0 - 54.0 fl Final   • MPV 09/14/2021 11.3  6.0 - 12.0 fL Final   • Platelets 09/14/2021 229  140 - 450 10*3/mm3 Final   • Neutrophil % 09/14/2021 69.3  42.7 - 76.0 % Final   • Lymphocyte % 09/14/2021 16.0* 19.6 - 45.3 % Final   • Monocyte % 09/14/2021 9.1  5.0 - 12.0 % Final   • Eosinophil % 09/14/2021 3.2  0.3 - 6.2 % Final   • Basophil % 09/14/2021 0.7  0.0 - 1.5 % Final   • Neutrophils, Absolute 09/14/2021 5.70  1.70 - 7.00 10*3/mm3 Final   • Lymphocytes, Absolute 09/14/2021 1.32  0.70 - 3.10 10*3/mm3 Final   • Monocytes, Absolute 09/14/2021 0.75  0.10 - 0.90 10*3/mm3 Final   • Eosinophils, Absolute 09/14/2021 0.26  0.00 - 0.40 10*3/mm3 Final   • Basophils, Absolute 09/14/2021 0.06  0.00 - 0.20 10*3/mm3 Final   Lab on 09/10/2021   Component Date Value Ref Range Status   • WBC 09/10/2021 5.23  3.40 - 10.80 10*3/mm3 Final   • RBC 09/10/2021 3.37* 4.14 - 5.80  10*6/mm3 Final   • Hemoglobin 09/10/2021 8.8* 13.0 - 17.7 g/dL Final   • Hematocrit 09/10/2021 28.9* 37.5 - 51.0 % Final   • MCV 09/10/2021 85.8  79.0 - 97.0 fL Final   • MCH 09/10/2021 26.1* 26.6 - 33.0 pg Final   • MCHC 09/10/2021 30.4* 31.5 - 35.7 g/dL Final   • RDW 09/10/2021 17.8* 12.3 - 15.4 % Final   • RDW-SD 09/10/2021 55.8* 37.0 - 54.0 fl Final   • MPV 09/10/2021 12.8* 6.0 - 12.0 fL Final   • Platelets 09/10/2021 121* 140 - 450 10*3/mm3 Final   • Neutrophil % 09/10/2021 72.6  42.7 - 76.0 % Final   • Lymphocyte % 09/10/2021 14.5* 19.6 - 45.3 % Final   • Monocyte % 09/10/2021 8.2  5.0 - 12.0 % Final   • Eosinophil % 09/10/2021 3.3  0.3 - 6.2 % Final   • Basophil % 09/10/2021 0.4  0.0 - 1.5 % Final   • Immature Grans % 09/10/2021 1.0* 0.0 - 0.5 % Final   • Neutrophils, Absolute 09/10/2021 3.80  1.70 - 7.00 10*3/mm3 Final   • Lymphocytes, Absolute 09/10/2021 0.76  0.70 - 3.10 10*3/mm3 Final   • Monocytes, Absolute 09/10/2021 0.43  0.10 - 0.90 10*3/mm3 Final   • Eosinophils, Absolute 09/10/2021 0.17  0.00 - 0.40 10*3/mm3 Final   • Basophils, Absolute 09/10/2021 0.02  0.00 - 0.20 10*3/mm3 Final   • Immature Grans, Absolute 09/10/2021 0.05  0.00 - 0.05 10*3/mm3 Final   • nRBC 09/10/2021 0.0  0.0 - 0.2 /100 WBC Final   No results displayed because visit has over 200 results.              Assessment  Assessment/Plan   1. DM (diabetes mellitus), type 2, uncontrolled w/neurologic complication (CMS/[facility])    2. Hypothyroidism, adult    3. Type 2 diabetes mellitus with hyperglycemia, unspecified whether long term insulin use ([facility])    4. Hyperkalemia        Plan      CGM downloaded and analyzed     Change insulin to 155 units with breakfast at 1 pm. 160 units supper 6-8 pm  if hyperglycemia after dinner continued.   Advised to have protein snack at bedtime.    Hypoglycemia precautions reviewed.      Continue Freestyle anibal.    Neuropathy sx uncotnrolled, advised to increase gabapentin dose to TID.     2.Cont levothyroxine  50, thyroid function is normal.     3. Hyperkalemia on recent labs. Repeat BMP today   -consider holding lisinopril or spironolactone if high potassium persists     Follow-up in 3-4 months

## 2022-12-05 DIAGNOSIS — D50.9 IRON DEFICIENCY ANEMIA, UNSPECIFIED IRON DEFICIENCY ANEMIA TYPE: ICD-10-CM

## 2022-12-05 DIAGNOSIS — G47.21 CIRCADIAN RHYTHM SLEEP DISORDER, DELAYED SLEEP PHASE TYPE: ICD-10-CM

## 2022-12-05 RX ORDER — FERROUS SULFATE 325(65) MG
TABLET ORAL
Qty: 180 TABLET | Refills: 1 | Status: SHIPPED | OUTPATIENT
Start: 2022-12-05 | End: 2023-02-27 | Stop reason: SDUPTHER

## 2022-12-05 RX ORDER — TEMAZEPAM 15 MG/1
CAPSULE ORAL
Qty: 30 CAPSULE | Refills: 2 | Status: SHIPPED | OUTPATIENT
Start: 2022-12-05 | End: 2023-01-06

## 2022-12-13 DIAGNOSIS — E11.42 DIABETIC PERIPHERAL NEUROPATHY ASSOCIATED WITH TYPE 2 DIABETES MELLITUS: ICD-10-CM

## 2022-12-14 RX ORDER — GABAPENTIN 800 MG/1
TABLET ORAL
Qty: 360 TABLET | Refills: 1 | Status: SHIPPED | OUTPATIENT
Start: 2022-12-14

## 2023-01-01 NOTE — ANESTHESIA PREPROCEDURE EVALUATION
Anesthesia Evaluation                  Airway   Mallampati: I  TM distance: >3 FB  Neck ROM: full  No difficulty expected  Dental      Pulmonary    (+) sleep apnea,   Cardiovascular     ECG reviewed    (+) hypertension, hyperlipidemia,       Neuro/Psych  (+) numbness, psychiatric history,     GI/Hepatic/Renal/Endo    (+) obesity,  GERD, GI bleeding , renal disease, diabetes mellitus,     Musculoskeletal     (+) neck pain,   Abdominal    Substance History      OB/GYN          Other   arthritis,                      Anesthesia Plan    ASA 3     general     intravenous induction     Anesthetic plan, all risks, benefits, and alternatives have been provided, discussed and informed consent has been obtained with: patient.    Plan discussed with CRNA.      
Statement Selected

## 2023-01-06 ENCOUNTER — TELEPHONE (OUTPATIENT)
Dept: INTERNAL MEDICINE | Facility: CLINIC | Age: 73
End: 2023-01-06
Payer: MEDICARE

## 2023-01-06 DIAGNOSIS — U07.1 COVID-19 VIRUS INFECTION: Primary | ICD-10-CM

## 2023-01-06 RX ORDER — NIRMATRELVIR AND RITONAVIR 150-100 MG
2 KIT ORAL 2 TIMES DAILY
Qty: 20 TABLET | Refills: 0 | Status: SHIPPED | OUTPATIENT
Start: 2023-01-06 | End: 2023-01-11

## 2023-01-06 NOTE — TELEPHONE ENCOUNTER
Caller: Pooja Funez    Relationship to patient: Emergency Contact    Best call back number: [phone]    Date of exposure: N/A    Date of positive COVID19 test: 01/05/23    Date if possible COVID19 exposure: N/A    COVID19 symptoms: SORE THROAT, FATIGUE    Date of initial quarantine: 01/05/23    Additional information or concerns:     What is the patients preferred pharmacy: [facility]

## 2023-01-06 NOTE — TELEPHONE ENCOUNTER
Called patient to discuss recent COVID-19 infection.  His wife was sick earlier in the week and diagnosed with COVID.  He began having symptoms yesterday of headache, congestion and fatigue.  He tested positive with at home testing yesterday.  He denies any trouble breathing and reports that his pulse ox at home has been greater than 95%.    I reviewed his most recent labs from 11/7/2022 notable for GFR of 47.3.  We discussed the risk and benefits of treatment with Paxlovid patient agreed to start this medication.  We will dose reduce him to 150 mg / 100 mg of Paxlovid twice daily for 5 days. Counseled patient to hold simvastatin while taking this medication and not resume until 1 week after completing therapy.  Patient voiced understanding.    We discussed red flag symptoms and return precautions.    Dr. Eisenberg

## 2023-02-06 ENCOUNTER — OFFICE VISIT (OUTPATIENT)
Dept: SLEEP MEDICINE | Facility: HOSPITAL | Age: 73
End: 2023-02-06
Payer: MEDICARE

## 2023-02-06 VITALS
OXYGEN SATURATION: 99 % | DIASTOLIC BLOOD PRESSURE: 72 MMHG | HEIGHT: 68 IN | HEART RATE: 62 BPM | WEIGHT: 230.1 LBS | BODY MASS INDEX: 34.87 KG/M2 | SYSTOLIC BLOOD PRESSURE: 164 MMHG

## 2023-02-06 DIAGNOSIS — G47.33 OSA (OBSTRUCTIVE SLEEP APNEA): Primary | ICD-10-CM

## 2023-02-06 DIAGNOSIS — F51.04 PSYCHOPHYSIOLOGICAL INSOMNIA: ICD-10-CM

## 2023-02-06 DIAGNOSIS — G47.21 CIRCADIAN RHYTHM SLEEP DISORDER, DELAYED SLEEP PHASE TYPE: ICD-10-CM

## 2023-02-06 PROCEDURE — 99213 OFFICE O/P EST LOW 20 MIN: CPT | Performed by: NURSE PRACTITIONER

## 2023-02-06 RX ORDER — TEMAZEPAM 15 MG/1
15 CAPSULE ORAL NIGHTLY PRN
Qty: 30 CAPSULE | Refills: 2 | Status: SHIPPED | OUTPATIENT
Start: 2023-02-06

## 2023-02-06 NOTE — PROGRESS NOTES
Chief Complaint:   Chief Complaint   Patient presents with   • Follow-up       HPI:    Elton Funez is a 72 y.o. male here for follow-up of sleep apnea.  Patient was last seen 10/5/2022.  Patient did need a new machine at that time and did need to have a new study to requalify.  Patient had a sleep study 11/10/2022 that showed severe obstructive sleep apnea and he did receive his new machine.  Patient states he is doing very well.  He continues to do well with Restoril 15 mg.  Patient will take this and be able to sleep within 1 hour he has been able to get 6 to 8 hours of sleep getting up anywhere from 0-1 time in the night.  Patient has an [place] score of 11/24.  Patient has no side effects from his medication and does need a refill.  Patient has no complaints or concerns regarding CPAP and wishes to continue therapy.        Current medications are:   Current Outpatient Medications:   •  Alpha-Lipoic Acid 600 MG tablet, Take  by mouth Daily., Disp: , Rfl:   •  aspirin 81 MG chewable tablet, Chew 81 mg Daily., Disp: , Rfl:   •  B Complex Vitamins (VITAMIN B COMPLEX PO), Take  by mouth Daily., Disp: , Rfl:   •  B-D UF III MINI PEN NEEDLES 31G X 5 MM misc, USE THREE TIMES A DAY, Disp: 270 each, Rfl: 1  •  Bydureon BCise 2 MG/0.85ML auto-injector injection, INJECT 0.85 ML UNDER THE SKIN INTO THE APPROPRIATE AREA AS DIRECTED ONE TIME PER WEEK., Disp: 10.2 mL, Rfl: 3  •  carvedilol (COREG) 6.25 MG tablet, TAKE 1 TABLET EVERY 12 HOURS, Disp: 180 tablet, Rfl: 3  •  Coenzyme Q10 (CO Q 10 PO), Take 300 mg by mouth Daily., Disp: , Rfl:   •  colestipol (COLESTID) 1 g tablet, TAKE 2 TABLETS TWICE A DAY, Disp: 360 tablet, Rfl: 3  •  Continuous Blood Gluc  (FreeStyle [place] 2 [place]) device, 1 Device Every 14 (Fourteen) Days., Disp: 1 each, Rfl: 0  •  Continuous Blood Gluc Sensor (FreeStyle Winston 2 Sensor) misc, 1 each Every 14 (Fourteen) Days., Disp: 6 each, Rfl: 3  •  FeroSul 325 (65 Fe) MG tablet, TAKE ONE TABLET  BY MOUTH TWICE A DAY, Disp: 180 tablet, Rfl: 1  •  Flaxseed, Linseed, (FLAXSEED OIL) 1200 MG capsule, Take 1 capsule by mouth 2 (Two) Times a Day. 1300 mg, Disp: , Rfl:   •  gabapentin (NEURONTIN) 800 MG tablet, TAKE 1 TABLET FOUR TIMES A DAY, Disp: 360 tablet, Rfl: 1  •  gemfibrozil (LOPID) 600 MG tablet, TAKE 1 TABLET TWICE A DAY, Disp: 180 tablet, Rfl: 3  •  Insulin Regular Human, Conc, (HumuLIN R U-500 KwikPen) 500 UNIT/ML solution pen-injector CONCENTRATED injection, Inject sc 160 u ac breakfast and 150 u  at dinner and about 40 units at hs. (Patient taking differently: Inject sc 160 u ac breakfast and 180 u  at dinner and about 40 units at hs.), Disp: 20 pen, Rfl: 3  •  ipratropium (ATROVENT) 0.06 % nasal spray, USE 2 SPRAYS IN EACH NOSTRIL AS DIRECTED BY PROVIDER DAILY, Disp: 45 mL, Rfl: 1  •  levothyroxine (SYNTHROID, LEVOTHROID) 50 MCG tablet, Take 1 tablet by mouth Daily., Disp: 90 tablet, Rfl: 3  •  Multiple Vitamins-Minerals (MULTIVITAMIN PO), Take 1 tablet by mouth Daily., Disp: , Rfl:   •  omeprazole (priLOSEC) 40 MG capsule, TAKE 1 CAPSULE DAILY, Disp: 90 capsule, Rfl: 3  •  sertraline (ZOLOFT) 100 MG tablet, TAKE ONE TABLET BY MOUTH DAILY, Disp: 90 tablet, Rfl: 1  •  simvastatin (ZOCOR) 40 MG tablet, TAKE 1 TABLET DAILY AT BEDTIME, Disp: 90 tablet, Rfl: 3  •  spironolactone (ALDACTONE) 25 MG tablet, TAKE 1 TABLET DAILY, Disp: 90 tablet, Rfl: 3  •  tiZANidine (ZANAFLEX) 4 MG tablet, Take 1 tablet by mouth 2 (Two) Times a Day As Needed., Disp: , Rfl:   •  vitamin D (ERGOCALCIFEROL) 1.25 MG (88893 UT) capsule capsule, Take  by mouth Every 7 (Seven) Days., Disp: , Rfl:   •  temazepam (Restoril) 15 MG capsule, Take 1 capsule by mouth At Night As Needed for Sleep., Disp: 30 capsule, Rfl: 2.      The patient's relevant past medical, surgical, family and social history were reviewed and updated in Epic as appropriate.       Review of Systems   HENT: Positive for tinnitus and trouble swallowing.    Eyes:  "Positive for visual disturbance.   Respiratory: Positive for apnea, cough, shortness of breath and wheezing.    Gastrointestinal:        Heartburn   Musculoskeletal: Positive for arthralgias, gait problem, myalgias and neck pain.   Neurological: Positive for numbness.   All other systems reviewed and are negative.        Objective:    Physical Exam  Constitutional:       Appearance: Normal appearance.   HENT:      Head: Normocephalic and atraumatic.      Mouth/Throat:      Comments: Class II airway  Cardiovascular:      Rate and Rhythm: Regular rhythm. Bradycardia present.   Pulmonary:      Effort: Pulmonary effort is normal.      Breath sounds: Normal breath sounds.   Skin:     General: Skin is warm and dry.   Neurological:      Mental Status: He is alert and oriented to person, place, and time.   Psychiatric:         Mood and Affect: Mood normal.         Behavior: Behavior normal.         Thought Content: Thought content normal.         Judgment: Judgment normal.     /72   Pulse 62   Ht 172.7 cm (68\")   Wt 104 kg (230 lb 1.6 oz)   SpO2 99%   BMI 34.99 kg/m²       CPAP Report    66/66 dates of use  Greater than 4-hour use 97%  Setting 8-18  AHI of 2.5  95th percentile pressure 14.1  The patient continues to use and benefit from CPAP therapy.    ASSESSMENT/PLAN    Diagnoses and all orders for this visit:    1. FAVIO (obstructive sleep apnea) (Primary)  -     PAP Therapy    2. Circadian rhythm sleep disorder, delayed sleep phase type    3. Psychophysiological insomnia  -     temazepam (Restoril) 15 MG capsule; Take 1 capsule by mouth At Night As Needed for Sleep.  Dispense: 30 capsule; Refill: 2        1. Counseled patient regarding multimodal approach with healthy nutrition, healthy sleep, regular physical activity, social activities, counseling, and medications. Encouraged to practice lateral sleep position. Avoid alcohol and sedatives close to bedtime.  2.     Refill supplies x1 year.  Restoril 15 mg at " bedtime as needed #30 with 2 refills Stevie is up-to-date and compliant I will see patient back in 4 months or sooner if symptoms warrant.  I have reviewed the results of my evaluation and impression and discussed my recommendations in detail with the patient.      Signed by  Elina Alarcon, MELISSA    February 6, 2023      CC: Tc Ramirez MD         No ref. provider found

## 2023-02-07 ENCOUNTER — OFFICE VISIT (OUTPATIENT)
Dept: ENDOCRINOLOGY | Facility: CLINIC | Age: 73
End: 2023-02-07
Payer: MEDICARE

## 2023-02-07 VITALS
BODY MASS INDEX: 34.97 KG/M2 | WEIGHT: 230 LBS | OXYGEN SATURATION: 97 % | HEART RATE: 72 BPM | SYSTOLIC BLOOD PRESSURE: 132 MMHG | DIASTOLIC BLOOD PRESSURE: 80 MMHG

## 2023-02-07 DIAGNOSIS — E11.65 TYPE 2 DIABETES MELLITUS WITH HYPERGLYCEMIA, UNSPECIFIED WHETHER LONG TERM INSULIN USE: Primary | ICD-10-CM

## 2023-02-07 DIAGNOSIS — E03.9 HYPOTHYROIDISM, ADULT: Chronic | ICD-10-CM

## 2023-02-07 LAB
EXPIRATION DATE: ABNORMAL
EXPIRATION DATE: NORMAL
GLUCOSE BLDC GLUCOMTR-MCNC: 171 MG/DL (ref 70–130)
HBA1C MFR BLD: 7.4 %
Lab: ABNORMAL
Lab: NORMAL

## 2023-02-07 PROCEDURE — 3051F HG A1C>EQUAL 7.0%<8.0%: CPT | Performed by: INTERNAL MEDICINE

## 2023-02-07 PROCEDURE — 99214 OFFICE O/P EST MOD 30 MIN: CPT | Performed by: INTERNAL MEDICINE

## 2023-02-07 PROCEDURE — 83036 HEMOGLOBIN GLYCOSYLATED A1C: CPT | Performed by: INTERNAL MEDICINE

## 2023-02-07 PROCEDURE — 95251 CONT GLUC MNTR ANALYSIS I&R: CPT | Performed by: INTERNAL MEDICINE

## 2023-02-07 NOTE — PATIENT INSTRUCTIONS
Results for orders placed or performed in visit on 02/07/23   POC Glycosylated Hemoglobin (Hb A1C)    Specimen: Blood   Result Value Ref Range    Hemoglobin A1C 7.4 %    Lot Number [id number]     Expiration Date 11/03/2024    POC Glucose, Blood    Specimen: Blood   Result Value Ref Range    Glucose 171 (A) 70 - 130 mg/dL    Lot Number [id number]     Expiration Date 08/25/2023      Please check if Ozempic, Mounjaro or Trulicity is covered. I would like to switch Bydureon to one of those.

## 2023-02-07 NOTE — PROGRESS NOTES
.Chief complaint  Diabetes and Hypothyroidism (Follow Up)    Subjective   Elton Funez is a 72 y.o. male is here today for follow-up    Diabetes mellitus type 2, uncontrolled dx in 2000  Diabetic complications: neuropathy, retinopathy.   Past meds: Metformin caused diarrhea in the past.   Eye care:no retinopathy.   Monitoring - freestyle winston reviewed.   Foot care and dental care: discussed.     Medications: Bydureon, U-500 insulin BID-TID. He has an unusual schedule and stays up at night and sleeps in the afternoon. CGM was downloaded and analyzed.     Hypothyroidism - on levothyroxine.      Glucose is increased     Medications    Current Outpatient Medications:   •  Alpha-Lipoic Acid 600 MG tablet, Take  by mouth Daily., Disp: , Rfl:   •  aspirin 81 MG chewable tablet, Chew 81 mg Daily., Disp: , Rfl:   •  B Complex Vitamins (VITAMIN B COMPLEX PO), Take  by mouth Daily., Disp: , Rfl:   •  B-D UF III MINI PEN NEEDLES 31G X 5 MM misc, USE THREE TIMES A DAY, Disp: 270 each, Rfl: 1  •  Bydureon BCise 2 MG/0.85ML auto-injector injection, INJECT 0.85 ML UNDER THE SKIN INTO THE APPROPRIATE AREA AS DIRECTED ONE TIME PER WEEK., Disp: 10.2 mL, Rfl: 3  •  carvedilol (COREG) 6.25 MG tablet, TAKE 1 TABLET EVERY 12 HOURS, Disp: 180 tablet, Rfl: 3  •  Coenzyme Q10 (CO Q 10 PO), Take 300 mg by mouth Daily., Disp: , Rfl:   •  colestipol (COLESTID) 1 g tablet, TAKE 2 TABLETS TWICE A DAY, Disp: 360 tablet, Rfl: 3  •  Continuous Blood Gluc  (FreeStyle Winston 2 [place]) device, 1 Device Every 14 (Fourteen) Days., Disp: 1 each, Rfl: 0  •  Continuous Blood Gluc Sensor (FreeStyle Winston 2 Sensor) misc, 1 each Every 14 (Fourteen) Days., Disp: 6 each, Rfl: 3  •  FeroSul 325 (65 Fe) MG tablet, TAKE ONE TABLET BY MOUTH TWICE A DAY, Disp: 180 tablet, Rfl: 1  •  Flaxseed, Linseed, (FLAXSEED OIL) 1200 MG capsule, Take 1 capsule by mouth 2 (Two) Times a Day. 1300 mg, Disp: , Rfl:   •  gabapentin (NEURONTIN) 800 MG tablet, TAKE 1 TABLET  FOUR TIMES A DAY, Disp: 360 tablet, Rfl: 1  •  gemfibrozil (LOPID) 600 MG tablet, TAKE 1 TABLET TWICE A DAY, Disp: 180 tablet, Rfl: 3  •  Insulin Regular Human, Conc, (HumuLIN R U-500 KwikPen) 500 UNIT/ML solution pen-injector CONCENTRATED injection, Inject sc 160 u ac breakfast and 150 u  at dinner and about 40 units at hs. (Patient taking differently: Inject sc 160 u ac breakfast and 180 u  at dinner and about 40 units at hs.), Disp: 20 pen, Rfl: 3  •  ipratropium (ATROVENT) 0.06 % nasal spray, USE 2 SPRAYS IN EACH NOSTRIL AS DIRECTED BY PROVIDER DAILY, Disp: 45 mL, Rfl: 1  •  levothyroxine (SYNTHROID, LEVOTHROID) 50 MCG tablet, Take 1 tablet by mouth Daily., Disp: 90 tablet, Rfl: 3  •  Multiple Vitamins-Minerals (MULTIVITAMIN PO), Take 1 tablet by mouth Daily., Disp: , Rfl:   •  omeprazole (priLOSEC) 40 MG capsule, TAKE 1 CAPSULE DAILY, Disp: 90 capsule, Rfl: 3  •  sertraline (ZOLOFT) 100 MG tablet, TAKE ONE TABLET BY MOUTH DAILY, Disp: 90 tablet, Rfl: 1  •  simvastatin (ZOCOR) 40 MG tablet, TAKE 1 TABLET DAILY AT BEDTIME, Disp: 90 tablet, Rfl: 3  •  spironolactone (ALDACTONE) 25 MG tablet, TAKE 1 TABLET DAILY, Disp: 90 tablet, Rfl: 3  •  temazepam (Restoril) 15 MG capsule, Take 1 capsule by mouth At Night As Needed for Sleep., Disp: 30 capsule, Rfl: 2  •  tiZANidine (ZANAFLEX) 4 MG tablet, Take 1 tablet by mouth 2 (Two) Times a Day As Needed., Disp: , Rfl:   •  vitamin D (ERGOCALCIFEROL) 1.25 MG (84094 UT) capsule capsule, Take  by mouth Every 7 (Seven) Days., Disp: , Rfl:     Review of systems  Review of Systems   Constitutional: Positive for fatigue.   Musculoskeletal: Positive for arthralgias, back pain and neck pain.   Neurological: Positive for numbness.   All other systems reviewed and are negative.      Physical exam  Objective   Blood pressure 132/80, pulse 72, weight 104 kg (230 lb), SpO2 97 %. Body mass index is 34.97 kg/m².  Physical Exam   Constitutional: He is oriented to person, place, and time. He  appears well-developed and well-nourished.   HENT:   Head: Normocephalic and atraumatic.   Eyes: Conjunctivae are normal.   Neck: No thyromegaly present.   Cardiovascular: Normal rate, regular rhythm, normal heart sounds and normal pulses.   Pulmonary/Chest: Effort normal and breath sounds normal.   Neurological: He is alert and oriented to person, place, and time.   Psychiatric: Thought content normal.         LABS AND IMAGING  Office Visit on 02/07/2023   Component Date Value Ref Range Status   • Hemoglobin A1C 02/07/2023 7.4  % Final   • Lot Number 02/07/2023 [id number]   Final   • Expiration Date 02/07/2023 11/03/2024   Final   • Glucose 02/07/2023 171 (A)  70 - 130 mg/dL Final   • Lot Number 02/07/2023 [id number]   Final   • Expiration Date 02/07/2023 08/25/2023   Final       Assessment  Assessment & Plan   1. Type 2 diabetes mellitus with hyperglycemia, unspecified whether long term insulin use (HCC)    2. Hypothyroidism, adult        Plan      CGM downloaded and analyzed. He has variable glucose levels with hyperglycemia at night. He stays up at night and sleeps during the day.  Insulin dose adjusted to match the new schedule.      Patient Instructions       Results for orders placed or performed in visit on 02/07/23   POC Glycosylated Hemoglobin (Hb A1C)    Specimen: Blood   Result Value Ref Range    Hemoglobin A1C 7.4 %    Lot Number [id number]     Expiration Date 11/03/2024    POC Glucose, Blood    Specimen: Blood   Result Value Ref Range    Glucose 171 (A) 70 - 130 mg/dL    Lot Number [id number]     Expiration Date 08/25/2023      Please check if Ozempic, Mounjaro or Trulicity is covered. I would like to switch Bydureon to one of those.     Hypoglycemia precautions reviewed.     I would like to make a change in GLP-1 agonist. Change to ozempic or Mounjaro may result in improved insulin resistance.      2.Cont levothyroxine 50, thyroid function ordered with next lab draw.     3. BP is normal.    -cont  spironolactone, BP is normal.     Follow-up in 3-4 months

## 2023-02-17 RX ORDER — SIMVASTATIN 40 MG
TABLET ORAL
Qty: 90 TABLET | Refills: 3 | Status: SHIPPED | OUTPATIENT
Start: 2023-02-17

## 2023-02-21 ENCOUNTER — HOSPITAL ENCOUNTER (OUTPATIENT)
Dept: GENERAL RADIOLOGY | Facility: HOSPITAL | Age: 73
Discharge: HOME | End: 2023-02-21
Admitting: STUDENT IN AN ORGANIZED HEALTH CARE EDUCATION/TRAINING PROGRAM
Payer: MEDICARE

## 2023-02-21 ENCOUNTER — OFFICE VISIT (OUTPATIENT)
Dept: INTERNAL MEDICINE | Facility: CLINIC | Age: 73
End: 2023-02-21
Payer: MEDICARE

## 2023-02-21 VITALS
SYSTOLIC BLOOD PRESSURE: 130 MMHG | BODY MASS INDEX: 35.6 KG/M2 | RESPIRATION RATE: 20 BRPM | DIASTOLIC BLOOD PRESSURE: 70 MMHG | OXYGEN SATURATION: 98 % | HEART RATE: 92 BPM | TEMPERATURE: 98.4 F | WEIGHT: 234.13 LBS

## 2023-02-21 DIAGNOSIS — N60.01 SOLITARY CYST OF RIGHT BREAST: ICD-10-CM

## 2023-02-21 DIAGNOSIS — N18.31 STAGE 3A CHRONIC KIDNEY DISEASE: Chronic | ICD-10-CM

## 2023-02-21 DIAGNOSIS — N63.0 BREAST MASS IN MALE: ICD-10-CM

## 2023-02-21 DIAGNOSIS — U09.9 POST-COVID CHRONIC SHORTNESS OF BREATH: ICD-10-CM

## 2023-02-21 DIAGNOSIS — R06.02 POST-COVID CHRONIC SHORTNESS OF BREATH: ICD-10-CM

## 2023-02-21 DIAGNOSIS — I35.0 AORTIC VALVE STENOSIS, ETIOLOGY OF CARDIAC VALVE DISEASE UNSPECIFIED: ICD-10-CM

## 2023-02-21 DIAGNOSIS — R06.02 SHORTNESS OF BREATH: ICD-10-CM

## 2023-02-21 DIAGNOSIS — R06.02 SHORTNESS OF BREATH: Primary | ICD-10-CM

## 2023-02-21 LAB
ALBUMIN SERPL-MCNC: 4.3 G/DL (ref 3.5–5.2)
ANION GAP SERPL CALCULATED.3IONS-SCNC: 12.1 MMOL/L (ref 5–15)
BASOPHILS # BLD AUTO: 0.02 10*3/MM3 (ref 0–0.2)
BASOPHILS NFR BLD AUTO: 0.5 % (ref 0–1.5)
BUN SERPL-MCNC: 22 MG/DL (ref 8–23)
BUN/CREAT SERPL: 14.8 (ref 7–25)
CALCIUM SPEC-SCNC: 9.3 MG/DL (ref 8.6–10.5)
CHLORIDE SERPL-SCNC: 107 MMOL/L (ref 98–107)
CO2 SERPL-SCNC: 19.9 MMOL/L (ref 22–29)
CREAT SERPL-MCNC: 1.49 MG/DL (ref 0.76–1.27)
DEPRECATED RDW RBC AUTO: 46.4 FL (ref 37–54)
EGFRCR SERPLBLD CKD-EPI 2021: 49.2 ML/MIN/1.73
EOSINOPHIL # BLD AUTO: 0.12 10*3/MM3 (ref 0–0.4)
EOSINOPHIL NFR BLD AUTO: 2.9 % (ref 0.3–6.2)
ERYTHROCYTE [DISTWIDTH] IN BLOOD BY AUTOMATED COUNT: 13.5 % (ref 12.3–15.4)
GLUCOSE SERPL-MCNC: 169 MG/DL (ref 65–99)
HCT VFR BLD AUTO: 31.2 % (ref 37.5–51)
HGB BLD-MCNC: 10.6 G/DL (ref 13–17.7)
IMM GRANULOCYTES # BLD AUTO: 0.02 10*3/MM3 (ref 0–0.05)
IMM GRANULOCYTES NFR BLD AUTO: 0.5 % (ref 0–0.5)
LYMPHOCYTES # BLD AUTO: 0.7 10*3/MM3 (ref 0.7–3.1)
LYMPHOCYTES NFR BLD AUTO: 16.7 % (ref 19.6–45.3)
MCH RBC QN AUTO: 32.3 PG (ref 26.6–33)
MCHC RBC AUTO-ENTMCNC: 34 G/DL (ref 31.5–35.7)
MCV RBC AUTO: 95.1 FL (ref 79–97)
MONOCYTES # BLD AUTO: 0.49 10*3/MM3 (ref 0.1–0.9)
MONOCYTES NFR BLD AUTO: 11.7 % (ref 5–12)
NEUTROPHILS NFR BLD AUTO: 2.85 10*3/MM3 (ref 1.7–7)
NEUTROPHILS NFR BLD AUTO: 67.7 % (ref 42.7–76)
NRBC BLD AUTO-RTO: 0 /100 WBC (ref 0–0.2)
NT-PROBNP SERPL-MCNC: 69.7 PG/ML (ref 0–900)
PHOSPHATE SERPL-MCNC: 4 MG/DL (ref 2.5–4.5)
PLATELET # BLD AUTO: 91 10*3/MM3 (ref 140–450)
PMV BLD AUTO: 13 FL (ref 6–12)
POTASSIUM SERPL-SCNC: 4.2 MMOL/L (ref 3.5–5.2)
RBC # BLD AUTO: 3.28 10*6/MM3 (ref 4.14–5.8)
SODIUM SERPL-SCNC: 139 MMOL/L (ref 136–145)
WBC NRBC COR # BLD: 4.2 10*3/MM3 (ref 3.4–10.8)

## 2023-02-21 PROCEDURE — 3051F HG A1C>EQUAL 7.0%<8.0%: CPT | Performed by: STUDENT IN AN ORGANIZED HEALTH CARE EDUCATION/TRAINING PROGRAM

## 2023-02-21 PROCEDURE — 85025 COMPLETE CBC W/AUTO DIFF WBC: CPT | Performed by: STUDENT IN AN ORGANIZED HEALTH CARE EDUCATION/TRAINING PROGRAM

## 2023-02-21 PROCEDURE — 71046 X-RAY EXAM CHEST 2 VIEWS: CPT

## 2023-02-21 PROCEDURE — 83880 ASSAY OF NATRIURETIC PEPTIDE: CPT | Performed by: STUDENT IN AN ORGANIZED HEALTH CARE EDUCATION/TRAINING PROGRAM

## 2023-02-21 PROCEDURE — 80069 RENAL FUNCTION PANEL: CPT | Performed by: STUDENT IN AN ORGANIZED HEALTH CARE EDUCATION/TRAINING PROGRAM

## 2023-02-21 PROCEDURE — 36415 COLL VENOUS BLD VENIPUNCTURE: CPT | Performed by: STUDENT IN AN ORGANIZED HEALTH CARE EDUCATION/TRAINING PROGRAM

## 2023-02-21 PROCEDURE — 99214 OFFICE O/P EST MOD 30 MIN: CPT | Performed by: STUDENT IN AN ORGANIZED HEALTH CARE EDUCATION/TRAINING PROGRAM

## 2023-02-21 NOTE — PROGRESS NOTES
"    Follow Up Office Visit      Date: 2023   Patient Name: Elton Funez  : 1950   MRN: [id number]     Chief Complaint:    Chief Complaint   Patient presents with   • Breathing Problem       History of Present Illness: Elton Funez is a 72 y.o. male with complex past medical history as below who is here today for \"breathing issues\". Has history of FAVIO.    HPI  The patient reports he has been having trouble breathing intermittently. He states it occurs sometimes when he lays down or sitting up. He notes it happens during activity. He reports in 2021 having difficulty breathing requiring hospitalization. He reports being diagnosed with anemia and  he received blood transfusions. He denies acute bleeding except for the occasional hemorrhoids will bleed. He denies coughing and chest pain. No exertional chest pain. The patient states he has occasional wheezing. The patient notes he had a increase in severity since having COVID-19 other then wheezing. He denies waking in the middle of the night with shortness of breath. The patient reports he does wear a CPAP at night. He denies swelling to his legs. He states being a smoker from [phone]. He denies having COPD, asthma or lung problems other then allergies to ragweed. He denies noticing anything making it worse. He denies using a inhaler other than a nose spray. He notes having a colonoscopy and endoscopy. He denies having a pulmonary function test.    Breast mass  The denies family history of breast cancer. The wife states the patient had a mammogram before. He notes having mastitis previously.      Subjective      Review of Systems:   Review of Systems    I have reviewed the patients family history, social history, past medical history, past surgical history and have updated it as appropriate.     Medications:     Current Outpatient Medications:   •  Alpha-Lipoic Acid 600 MG tablet, Take  by mouth Daily., Disp: , Rfl:   •  aspirin 81 MG chewable " tablet, Chew 81 mg Daily., Disp: , Rfl:   •  B Complex Vitamins (VITAMIN B COMPLEX PO), Take  by mouth Daily., Disp: , Rfl:   •  B-D UF III MINI PEN NEEDLES 31G X 5 MM misc, USE THREE TIMES A DAY, Disp: 270 each, Rfl: 1  •  Bydureon BCise 2 MG/0.85ML auto-injector injection, INJECT 0.85 ML UNDER THE SKIN INTO THE APPROPRIATE AREA AS DIRECTED ONE TIME PER WEEK., Disp: 10.2 mL, Rfl: 3  •  carvedilol (COREG) 6.25 MG tablet, TAKE 1 TABLET EVERY 12 HOURS, Disp: 180 tablet, Rfl: 3  •  Coenzyme Q10 (CO Q 10 PO), Take 300 mg by mouth Daily., Disp: , Rfl:   •  colestipol (COLESTID) 1 g tablet, TAKE 2 TABLETS TWICE A DAY, Disp: 360 tablet, Rfl: 3  •  Continuous Blood Gluc  (FreeStyle [place] 2 [place]) device, 1 Device Every 14 (Fourteen) Days., Disp: 1 each, Rfl: 0  •  Continuous Blood Gluc Sensor (FreeStyle Winston 2 Sensor) misc, 1 each Every 14 (Fourteen) Days., Disp: 6 each, Rfl: 3  •  FeroSul 325 (65 Fe) MG tablet, TAKE ONE TABLET BY MOUTH TWICE A DAY, Disp: 180 tablet, Rfl: 1  •  Flaxseed, Linseed, (FLAXSEED OIL) 1200 MG capsule, Take 1 capsule by mouth 2 (Two) Times a Day. 1300 mg, Disp: , Rfl:   •  gabapentin (NEURONTIN) 800 MG tablet, TAKE 1 TABLET FOUR TIMES A DAY, Disp: 360 tablet, Rfl: 1  •  gemfibrozil (LOPID) 600 MG tablet, TAKE 1 TABLET TWICE A DAY, Disp: 180 tablet, Rfl: 3  •  Insulin Regular Human, Conc, (HumuLIN R U-500 KwikPen) 500 UNIT/ML solution pen-injector CONCENTRATED injection, Inject sc 160 u ac breakfast and 150 u  at dinner and about 40 units at hs. (Patient taking differently: Inject sc 160 u ac breakfast and 180 u  at dinner and about 40 units at hs.), Disp: 20 pen, Rfl: 3  •  ipratropium (ATROVENT) 0.06 % nasal spray, USE 2 SPRAYS IN EACH NOSTRIL AS DIRECTED BY PROVIDER DAILY, Disp: 45 mL, Rfl: 1  •  levothyroxine (SYNTHROID, LEVOTHROID) 50 MCG tablet, Take 1 tablet by mouth Daily., Disp: 90 tablet, Rfl: 3  •  Multiple Vitamins-Minerals (MULTIVITAMIN PO), Take 1 tablet by mouth Daily.,  Disp: , Rfl:   •  omeprazole (priLOSEC) 40 MG capsule, TAKE 1 CAPSULE DAILY, Disp: 90 capsule, Rfl: 3  •  sertraline (ZOLOFT) 100 MG tablet, TAKE ONE TABLET BY MOUTH DAILY, Disp: 90 tablet, Rfl: 1  •  simvastatin (ZOCOR) 40 MG tablet, TAKE 1 TABLET DAILY AT BEDTIME, Disp: 90 tablet, Rfl: 3  •  spironolactone (ALDACTONE) 25 MG tablet, TAKE 1 TABLET DAILY, Disp: 90 tablet, Rfl: 3  •  temazepam (Restoril) 15 MG capsule, Take 1 capsule by mouth At Night As Needed for Sleep., Disp: 30 capsule, Rfl: 2  •  tiZANidine (ZANAFLEX) 4 MG tablet, Take 1 tablet by mouth 2 (Two) Times a Day As Needed., Disp: , Rfl:   •  vitamin D (ERGOCALCIFEROL) 1.25 MG (63282 UT) capsule capsule, Take  by mouth Every 7 (Seven) Days., Disp: , Rfl:     Allergies:   Allergies   Allergen Reactions   • Glucophage Xr [Metformin Hcl Er] Diarrhea and Other (See Comments)     Glucophage XR TB24: Adverse Reaction: Severe GI issues.   • Metformin Nausea And Vomiting     Other reaction(s): SEVERE INTESTIONAL ISSUES  Other reaction(s): SEVERE GI ISSUES    Glucophage XR TB24  MetFORMIN HCl TABS   • Tetracyclines & Related Nausea Only     Adverse Reaction       Objective     Physical Exam: Please see above  Vital Signs:   Vitals:    02/21/23 1044   BP: 130/70   BP Location: Left arm   Patient Position: Sitting   Cuff Size: Adult   Pulse: 92   Resp: 20   Temp: 98.4 °F (36.9 °C)   TempSrc: Temporal   SpO2: 98%   Weight: 106 kg (234 lb 2 oz)   PainSc: 0-No pain     Body mass index is 35.6 kg/m².    Physical Exam  Vitals reviewed.   Constitutional:       General: He is not in acute distress.     Appearance: Normal appearance. He is obese. He is not ill-appearing or toxic-appearing.   HENT:      Head: Normocephalic and atraumatic.      Right Ear: External ear normal.      Left Ear: External ear normal.   Eyes:      General: No scleral icterus.        Right eye: No discharge.         Left eye: No discharge.      Extraocular Movements: Extraocular movements intact.       Pupils: Pupils are equal, round, and reactive to light.   Neck:      Comments: No JVD or hepatojugular reflux. Murmur radiates to right carotid  Cardiovascular:      Rate and Rhythm: Normal rate and regular rhythm.      Pulses: Normal pulses.      Heart sounds: Murmur (3/6 systolic flow murmur presetn at RUSB crescendo decrescendo) heard.   Pulmonary:      Effort: Pulmonary effort is normal. No respiratory distress.      Breath sounds: Normal breath sounds. No wheezing, rhonchi or rales.   Chest:   Breasts:     Right: Mass (2 cm well circumscribed cyst over right breast) and tenderness present. No bleeding, inverted nipple, nipple discharge or skin change.      Left: No bleeding, inverted nipple, nipple discharge or skin change.       Abdominal:      General: Abdomen is flat. Bowel sounds are normal. There is no distension.      Palpations: Abdomen is soft.      Tenderness: There is no abdominal tenderness. There is no guarding.   Musculoskeletal:         General: No swelling or deformity. Normal range of motion.      Cervical back: Normal range of motion and neck supple. No rigidity or tenderness.   Lymphadenopathy:      Cervical: No cervical adenopathy.   Skin:     General: Skin is warm and dry.      Capillary Refill: Capillary refill takes less than 2 seconds.      Coloration: Skin is not jaundiced or pale.   Neurological:      General: No focal deficit present.      Mental Status: He is alert and oriented to person, place, and time. Mental status is at baseline.   Psychiatric:         Mood and Affect: Mood normal.         Behavior: Behavior normal.         Judgment: Judgment normal.         Procedures    Results:   Labs:   Hemoglobin A1C   Date Value Ref Range Status   02/07/2023 7.4 % Final   08/30/2021 7.60 (H) 4.80 - 5.60 % Final     TSH   Date Value Ref Range Status   04/19/2022 5.130 (H) 0.270 - 4.200 uIU/mL Final        Imaging:   No valid procedures specified.     Assessment / Plan       Assessment/Plan:   Problem List Items Addressed This Visit        Cardiac and Vasculature    Aortic valve stenosis    Overview     Notable 3/6 murmur appreciated at RUSB concerning for AS (not noted on prior echo). Having symptoms of exertional dyspnea which raises concern for symptomatic AS         Current Assessment & Plan      Ordered echocardiogram.         Relevant Orders    Adult Transthoracic Echo Complete W/ Cont if Necessary Per Protocol       Genitourinary and Reproductive     Chronic kidney disease, stage III (moderate) (CMS/HCC) (Chronic)    Relevant Orders    Renal Function Panel    Breast mass in male    Overview     Reports normal mammogram in past. Has history of mastitis         Current Assessment & Plan     Exam with well circumscribed 2-2.5cm mass located at approximately 11 o'clock in the breast tissue. No skin changes, nipple discharge, erythema or warmth. Will obtain mammogram to evaluate         Relevant Orders    Mammo diagnostic digital tomosynthesis left w CAD       Pulmonary and Pneumonias    Shortness of breath - Primary    Overview     Unclear eitology. Previously similar symptoms with symptomatic anemia. Has history of HFpEF (diastolic dysfunction) but no signs of fluid overload. No crackles or wheezing on exam. Murmur raising suspicion for symptomatic AS. Also recent covid raises concern for post covid syndrome.         Current Assessment & Plan     Will r/o anemia with CBC, obtain BNP to eval for fluid overload. CXR to evaluate lung fields. Echo pending to determine eitology of murmur and see if there are signs of worsening heart failure.         Relevant Orders    CBC w AUTO Differential    BNP    XR Chest PA & Lateral    Adult Transthoracic Echo Complete W/ Cont if Necessary Per Protocol   Other Visit Diagnoses     Post-COVID chronic shortness of breath        Solitary cyst of right breast        Relevant Orders    Mammo diagnostic digital tomosynthesis left w CAD      If  symptoms do not improve and work-up above is without abnormality will consider PFT's or CT chest in future.      Follow Up:   Return if symptoms worsen or fail to improve.    Gume Eisenberg MD  [facility]    Transcribed from ambient dictation for Gume Eisenberg MD by Dahlia Snell.  02/21/23   12:54 EST    Patient or patient representative verbalized consent to the visit recording.  I have personally performed the services described in this document as transcribed by the above individual, and it is both accurate and complete.

## 2023-02-21 NOTE — ASSESSMENT & PLAN NOTE
Will r/o anemia with CBC, obtain BNP to eval for fluid overload. CXR to evaluate lung fields. Echo pending to determine eitology of murmur and see if there are signs of worsening heart failure.

## 2023-02-21 NOTE — ASSESSMENT & PLAN NOTE
Exam with well circumscribed 2-2.5cm mass located at approximately 11 o'clock in the breast tissue. No skin changes, nipple discharge, erythema or warmth. Will obtain mammogram to evaluate

## 2023-02-21 NOTE — PROGRESS NOTES
"Chief Complaint  Breathing Problem    Subjective    {Problem List  Visit Diagnosis   Encounters  Notes  Medications  Labs  Result Review Imaging  Media :23}    Elton Funez presents to [facility]  History of Present Illness       The patient reports he has been having trouble breathing intermittently. He states it occurs sometimes when he lays down or sitting up. He notes it happens during activity. He reports in 9/2021 having difficulty breathing requiring hospitalization. He reports being diagnosed with anemia and  he received blood. He denies bleeding except for the occasional hemorrhoids will bleed. He denies coughing and chest pain. The patient states he has occasional wheezing. The patient notes he had a increase in severity since having COVID-19 other then wheezing. He denies waking in the middle of the night with shortness of breath. The patient reports he does wear a CPAP at night. He denies swelling to his legs. He states being a smoker from [id number]. He denies having COPD, asthma or lung problems other then allergies to ragweed. He denies noticing anything making it worse. He denies using a inhaler other than a nose spray. He notes having a colonoscopy and endoscopy. He denies having a pulmonary function test.    Breast mass  The denies family history of breast cancer. The wife states the patient had a mammogram before. He notes having mastitis previously.        Objective   Vital Signs:  /70 (BP Location: Left arm, Patient Position: Sitting, Cuff Size: Adult)   Pulse 92   Temp 98.4 °F (36.9 °C) (Temporal)   Resp 20   Wt 106 kg (234 lb 2 oz)   SpO2 98%   BMI 35.60 kg/m²   Estimated body mass index is 35.6 kg/m² as calculated from the following:    Height as of 2/6/23: 172.7 cm (68\").    Weight as of this encounter: 106 kg (234 lb 2 oz).       {Class 2 Severe Obesity (BMI >=35 and <=39.9. (Optional):27810}      Physical " Exam  Cardiovascular:      Rate and Rhythm: Normal rate and regular rhythm.      Heart sounds: Murmur heard.   Pulmonary:      Breath sounds: Normal breath sounds. No wheezing.   Musculoskeletal:         General: No swelling.        Result Review :{Labs  Result Review  Imaging  Med Tab  Media  Procedures  :23}  {The following data was reviewed by (Optional):50602}  {Ambulatory Labs (Optional):33915}  {Data reviewed (Optional):68845:::1}             Assessment and Plan {CC Problem List  Visit Diagnosis   ROS  Review (Popup)  Health Maintenance  Quality  BestPractice  Medications  SmartSets  SnapShot Encounters  Media :23}  Diagnoses and all orders for this visit:    1. Shortness of breath (Primary)  -     CBC w AUTO Differential; Future  -     BNP; Future  -     XR Chest PA & Lateral; Future  -     Adult Transthoracic Echo Complete W/ Cont if Necessary Per Protocol; Future  -     CBC w AUTO Differential  -     BNP    2. Stage 3a chronic kidney disease (HCC)  -     Renal Function Panel; Future  -     Renal Function Panel    3. Post-COVID chronic shortness of breath    4. Aortic valve stenosis, etiology of cardiac valve disease unspecified  -     Adult Transthoracic Echo Complete W/ Cont if Necessary Per Protocol; Future    5. Breast mass in male  -     Mammo diagnostic digital tomosynthesis left w CAD; Future    6. Solitary cyst of right breast  -     Mammo diagnostic digital tomosynthesis left w CAD; Future      Advised patient if symptoms worsen to include dizziness, increased shortness of breath, or chest pain to go to      the ER.      {Time Spent (Optional):37209}  Follow Up {Instructions Charge Capture  Follow-up Communications :23}  Return if symptoms worsen or fail to improve.  Patient was given instructions and counseling regarding his condition or for health maintenance advice. Please see specific information pulled into the AVS if appropriate.     Transcribed from ambient dictation for  Gume Eisenberg MD by Dahlia Snell.  02/21/23   12:54 EST    Patient or patient representative verbalized consent to the visit recording.  I have personally performed the services described in this document as transcribed by the above individual, and it is both accurate and complete.

## 2023-02-27 DIAGNOSIS — N63.0 BREAST MASS IN MALE: Primary | ICD-10-CM

## 2023-02-27 DIAGNOSIS — N60.01 SOLITARY CYST OF RIGHT BREAST: ICD-10-CM

## 2023-02-27 DIAGNOSIS — D50.9 IRON DEFICIENCY ANEMIA, UNSPECIFIED IRON DEFICIENCY ANEMIA TYPE: ICD-10-CM

## 2023-02-27 RX ORDER — SERTRALINE HYDROCHLORIDE 100 MG/1
100 TABLET, FILM COATED ORAL DAILY
Qty: 90 TABLET | Refills: 1 | Status: SHIPPED | OUTPATIENT
Start: 2023-02-27

## 2023-02-27 RX ORDER — TIZANIDINE 4 MG/1
4 TABLET ORAL 2 TIMES DAILY PRN
Qty: 180 TABLET | Refills: 0 | Status: SHIPPED | OUTPATIENT
Start: 2023-02-27

## 2023-02-27 RX ORDER — FERROUS SULFATE 325(65) MG
1 TABLET ORAL 2 TIMES DAILY
Qty: 180 TABLET | Refills: 1 | Status: SHIPPED | OUTPATIENT
Start: 2023-02-27

## 2023-03-06 DIAGNOSIS — N63.0 BREAST MASS IN MALE: Primary | ICD-10-CM

## 2023-03-06 DIAGNOSIS — N60.01 SOLITARY CYST OF RIGHT BREAST: ICD-10-CM

## 2023-03-20 ENCOUNTER — HOSPITAL ENCOUNTER (OUTPATIENT)
Dept: CARDIOLOGY | Facility: HOSPITAL | Age: 73
Discharge: HOME | End: 2023-03-20
Admitting: STUDENT IN AN ORGANIZED HEALTH CARE EDUCATION/TRAINING PROGRAM
Payer: MEDICARE

## 2023-03-20 VITALS
BODY MASS INDEX: 35.42 KG/M2 | HEIGHT: 68 IN | WEIGHT: 233.69 LBS | SYSTOLIC BLOOD PRESSURE: 133 MMHG | DIASTOLIC BLOOD PRESSURE: 74 MMHG

## 2023-03-20 DIAGNOSIS — R06.02 SHORTNESS OF BREATH: ICD-10-CM

## 2023-03-20 DIAGNOSIS — I35.0 AORTIC VALVE STENOSIS, ETIOLOGY OF CARDIAC VALVE DISEASE UNSPECIFIED: ICD-10-CM

## 2023-03-20 LAB
ASCENDING AORTA: 3.7 CM
BH CV ECHO MEAS - AO MAX PG: 9.3 MMHG
BH CV ECHO MEAS - AO MEAN PG: 5 MMHG
BH CV ECHO MEAS - AO ROOT AREA (BSA CORRECTED): 1.7 CM2
BH CV ECHO MEAS - AO ROOT DIAM: 3.6 CM
BH CV ECHO MEAS - AO V2 MAX: 152 CM/SEC
BH CV ECHO MEAS - AO V2 VTI: 27.7 CM
BH CV ECHO MEAS - AVA(I,D): 2.7 CM2
BH CV ECHO MEAS - EDV(CUBED): 127.1 ML
BH CV ECHO MEAS - EDV(MOD-SP2): 117 ML
BH CV ECHO MEAS - EDV(MOD-SP4): 128 ML
BH CV ECHO MEAS - EF(MOD-BP): 60.9 %
BH CV ECHO MEAS - EF(MOD-SP2): 62.6 %
BH CV ECHO MEAS - EF(MOD-SP4): 58.4 %
BH CV ECHO MEAS - ESV(CUBED): 14.2 ML
BH CV ECHO MEAS - ESV(MOD-SP2): 43.7 ML
BH CV ECHO MEAS - ESV(MOD-SP4): 53.2 ML
BH CV ECHO MEAS - FS: 51.8 %
BH CV ECHO MEAS - IVS/LVPW: 0.92 CM
BH CV ECHO MEAS - IVSD: 0.87 CM
BH CV ECHO MEAS - LA DIMENSION: 3.6 CM
BH CV ECHO MEAS - LAT PEAK E' VEL: 9.5 CM/SEC
BH CV ECHO MEAS - LV MASS(C)D: 159.9 GRAMS
BH CV ECHO MEAS - LV MAX PG: 7.2 MMHG
BH CV ECHO MEAS - LV MEAN PG: 3.5 MMHG
BH CV ECHO MEAS - LV V1 MAX: 134.5 CM/SEC
BH CV ECHO MEAS - LV V1 VTI: 25.2 CM
BH CV ECHO MEAS - LVIDD: 5 CM
BH CV ECHO MEAS - LVIDS: 2.42 CM
BH CV ECHO MEAS - LVOT AREA: 2.9 CM2
BH CV ECHO MEAS - LVOT DIAM: 1.93 CM
BH CV ECHO MEAS - LVPWD: 0.94 CM
BH CV ECHO MEAS - MED PEAK E' VEL: 6.9 CM/SEC
BH CV ECHO MEAS - MV A MAX VEL: 112.3 CM/SEC
BH CV ECHO MEAS - MV DEC SLOPE: 459.4 CM/SEC2
BH CV ECHO MEAS - MV DEC TIME: 0.31 MSEC
BH CV ECHO MEAS - MV E MAX VEL: 101.5 CM/SEC
BH CV ECHO MEAS - MV E/A: 0.9
BH CV ECHO MEAS - MV P1/2T: 76.1 MSEC
BH CV ECHO MEAS - MVA(P1/2T): 2.9 CM2
BH CV ECHO MEAS - PA ACC TIME: 0.12 SEC
BH CV ECHO MEAS - PA PR(ACCEL): 26.7 MMHG
BH CV ECHO MEAS - RAP SYSTOLE: 3 MMHG
BH CV ECHO MEAS - RVSP: 16 MMHG
BH CV ECHO MEAS - SV(LVOT): 73.9 ML
BH CV ECHO MEAS - SV(MOD-SP2): 73.3 ML
BH CV ECHO MEAS - SV(MOD-SP4): 74.8 ML
BH CV ECHO MEAS - TAPSE (>1.6): 3.6 CM
BH CV ECHO MEAS - TR MAX PG: 13.4 MMHG
BH CV ECHO MEAS - TR MAX VEL: 183.2 CM/SEC
BH CV ECHO MEASUREMENTS AVERAGE E/E' RATIO: 12.38
BH CV XLRA - RV BASE: 3.4 CM
BH CV XLRA - RV LENGTH: 6.8 CM
BH CV XLRA - RV MID: 2.5 CM
BH CV XLRA - TDI S': 15 CM/SEC
LEFT ATRIUM VOLUME INDEX: 31.7 ML/M2
MAXIMAL PREDICTED HEART RATE: 147 BPM
STRESS TARGET HR: 125 BPM

## 2023-03-20 PROCEDURE — 93306 TTE W/DOPPLER COMPLETE: CPT | Performed by: INTERNAL MEDICINE

## 2023-03-20 PROCEDURE — 93306 TTE W/DOPPLER COMPLETE: CPT

## 2023-03-28 ENCOUNTER — OFFICE VISIT (OUTPATIENT)
Dept: INTERNAL MEDICINE | Facility: CLINIC | Age: 73
End: 2023-03-28
Payer: MEDICARE

## 2023-03-28 VITALS
SYSTOLIC BLOOD PRESSURE: 140 MMHG | WEIGHT: 229.13 LBS | HEART RATE: 70 BPM | TEMPERATURE: 98.4 F | OXYGEN SATURATION: 95 % | RESPIRATION RATE: 20 BRPM | BODY MASS INDEX: 34.85 KG/M2 | DIASTOLIC BLOOD PRESSURE: 74 MMHG

## 2023-03-28 DIAGNOSIS — J01.90 ACUTE BACTERIAL SINUSITIS: Primary | ICD-10-CM

## 2023-03-28 DIAGNOSIS — B96.89 ACUTE BACTERIAL SINUSITIS: Primary | ICD-10-CM

## 2023-03-28 DIAGNOSIS — J02.9 SORE THROAT: ICD-10-CM

## 2023-03-28 DIAGNOSIS — H66.002 NON-RECURRENT ACUTE SUPPURATIVE OTITIS MEDIA OF LEFT EAR WITHOUT SPONTANEOUS RUPTURE OF TYMPANIC MEMBRANE: ICD-10-CM

## 2023-03-28 LAB
EXPIRATION DATE: NORMAL
EXPIRATION DATE: NORMAL
FLUAV AG UPPER RESP QL IA.RAPID: NOT DETECTED
FLUBV AG UPPER RESP QL IA.RAPID: NOT DETECTED
INTERNAL CONTROL: NORMAL
INTERNAL CONTROL: NORMAL
Lab: NORMAL
Lab: NORMAL
S PYO AG THROAT QL: NEGATIVE
SARS-COV-2 AG UPPER RESP QL IA.RAPID: NOT DETECTED

## 2023-03-28 PROCEDURE — 87880 STREP A ASSAY W/OPTIC: CPT | Performed by: STUDENT IN AN ORGANIZED HEALTH CARE EDUCATION/TRAINING PROGRAM

## 2023-03-28 PROCEDURE — 1159F MED LIST DOCD IN RCRD: CPT | Performed by: STUDENT IN AN ORGANIZED HEALTH CARE EDUCATION/TRAINING PROGRAM

## 2023-03-28 PROCEDURE — 99213 OFFICE O/P EST LOW 20 MIN: CPT | Performed by: STUDENT IN AN ORGANIZED HEALTH CARE EDUCATION/TRAINING PROGRAM

## 2023-03-28 PROCEDURE — 3078F DIAST BP <80 MM HG: CPT | Performed by: STUDENT IN AN ORGANIZED HEALTH CARE EDUCATION/TRAINING PROGRAM

## 2023-03-28 PROCEDURE — 1160F RVW MEDS BY RX/DR IN RCRD: CPT | Performed by: STUDENT IN AN ORGANIZED HEALTH CARE EDUCATION/TRAINING PROGRAM

## 2023-03-28 PROCEDURE — 3077F SYST BP >= 140 MM HG: CPT | Performed by: STUDENT IN AN ORGANIZED HEALTH CARE EDUCATION/TRAINING PROGRAM

## 2023-03-28 PROCEDURE — 3051F HG A1C>EQUAL 7.0%<8.0%: CPT | Performed by: STUDENT IN AN ORGANIZED HEALTH CARE EDUCATION/TRAINING PROGRAM

## 2023-03-28 PROCEDURE — 87428 SARSCOV & INF VIR A&B AG IA: CPT | Performed by: STUDENT IN AN ORGANIZED HEALTH CARE EDUCATION/TRAINING PROGRAM

## 2023-03-28 RX ORDER — AMOXICILLIN AND CLAVULANATE POTASSIUM 875; 125 MG/1; MG/1
1 TABLET, FILM COATED ORAL 2 TIMES DAILY
Qty: 7 TABLET | Refills: 0 | Status: SHIPPED | OUTPATIENT
Start: 2023-03-28 | End: 2023-03-30 | Stop reason: SDUPTHER

## 2023-03-28 NOTE — PROGRESS NOTES
Follow Up Office Visit      Date: 2023   Patient Name: Elton Funez  : 1950   MRN: [id number]     Chief Complaint:    Chief Complaint   Patient presents with   • Sore Throat   • Earache   • Sinus Problem       History of Present Illness: Elton Funez is a 72 y.o. male who is here today for sore throat, earache, and sinus congestion.     HPI    Earache  He reports that his left ear started bothering him on 2023. He endorses intermittent pain. He denies any drainage. He wears hearing aids. He denies any recent swimming.     Sinus congestion  He has left sided sinus congestion that has been present for a while. Occasionally, he has pain under his left eye. He reports that he feels pain in his gums. He denies any fever, chills, or being around anyone who is sick. He endorses sinus drainage that is light green or yellow in color. He is currently using a nasal spray.     Sore throat  He endorses left sided throat pain. He saw the dentist on 2023 for a cleaning. He denies any dyspnea, productive cough, nausea, or vomiting. He has mild diarrhea. He has taken amoxicillin in the past. He reports having similar symptoms every fall or spring. He denies currently taking anything containing decongestants. His blood pressure is 140/74 mmHg today.     Subjective      Review of Systems:   Review of Systems    I have reviewed the patients family history, social history, past medical history, past surgical history and have updated it as appropriate.     Medications:     Current Outpatient Medications:   •  Alpha-Lipoic Acid 600 MG tablet, Take  by mouth Daily., Disp: , Rfl:   •  aspirin 81 MG chewable tablet, Chew 1 tablet Daily., Disp: , Rfl:   •  B Complex Vitamins (VITAMIN B COMPLEX PO), Take  by mouth Daily., Disp: , Rfl:   •  B-D UF III MINI PEN NEEDLES 31G X 5 MM misc, USE THREE TIMES A DAY, Disp: 270 each, Rfl: 1  •  Bydureon BCise 2 MG/0.85ML auto-injector injection, INJECT 0.85 ML UNDER THE  SKIN INTO THE APPROPRIATE AREA AS DIRECTED ONE TIME PER WEEK., Disp: 10.2 mL, Rfl: 3  •  carvedilol (COREG) 6.25 MG tablet, TAKE 1 TABLET EVERY 12 HOURS, Disp: 180 tablet, Rfl: 3  •  Coenzyme Q10 (CO Q 10 PO), Take 300 mg by mouth Daily., Disp: , Rfl:   •  colestipol (COLESTID) 1 g tablet, TAKE 2 TABLETS TWICE A DAY, Disp: 360 tablet, Rfl: 3  •  Continuous Blood Gluc  (FreeStyle [place] 2 [place]) device, 1 Device Every 14 (Fourteen) Days., Disp: 1 each, Rfl: 0  •  Continuous Blood Gluc Sensor (FreeStyle [place] 2 Sensor) misc, 1 each Every 14 (Fourteen) Days., Disp: 6 each, Rfl: 3  •  ferrous sulfate (FeroSul) 325 (65 FE) MG tablet, Take 1 tablet by mouth 2 (Two) Times a Day., Disp: 180 tablet, Rfl: 1  •  Flaxseed, Linseed, (FLAXSEED OIL) 1200 MG capsule, Take 1 capsule by mouth 2 (Two) Times a Day. 1300 mg, Disp: , Rfl:   •  gabapentin (NEURONTIN) 800 MG tablet, TAKE 1 TABLET FOUR TIMES A DAY, Disp: 360 tablet, Rfl: 1  •  gemfibrozil (LOPID) 600 MG tablet, TAKE 1 TABLET TWICE A DAY, Disp: 180 tablet, Rfl: 3  •  Insulin Regular Human, Conc, (HumuLIN R U-500 KwikPen) 500 UNIT/ML solution pen-injector CONCENTRATED injection, Inject sc 160 u ac breakfast and 150 u  at dinner and about 40 units at hs. (Patient taking differently: Inject sc 160 u ac breakfast and 180 u  at dinner and about 40 units at hs.), Disp: 20 pen, Rfl: 3  •  ipratropium (ATROVENT) 0.06 % nasal spray, USE 2 SPRAYS IN EACH NOSTRIL AS DIRECTED BY PROVIDER DAILY, Disp: 45 mL, Rfl: 1  •  levothyroxine (SYNTHROID, LEVOTHROID) 50 MCG tablet, Take 1 tablet by mouth Daily., Disp: 90 tablet, Rfl: 3  •  Multiple Vitamins-Minerals (MULTIVITAMIN PO), Take 1 tablet by mouth Daily., Disp: , Rfl:   •  omeprazole (priLOSEC) 40 MG capsule, TAKE 1 CAPSULE DAILY, Disp: 90 capsule, Rfl: 3  •  sertraline (ZOLOFT) 100 MG tablet, Take 1 tablet by mouth Daily., Disp: 90 tablet, Rfl: 1  •  simvastatin (ZOCOR) 40 MG tablet, TAKE 1 TABLET DAILY AT BEDTIME, Disp: 90  tablet, Rfl: 3  •  spironolactone (ALDACTONE) 25 MG tablet, TAKE 1 TABLET DAILY, Disp: 90 tablet, Rfl: 3  •  temazepam (Restoril) 15 MG capsule, Take 1 capsule by mouth At Night As Needed for Sleep., Disp: 30 capsule, Rfl: 2  •  tiZANidine (ZANAFLEX) 4 MG tablet, Take 1 tablet by mouth 2 (Two) Times a Day As Needed for Muscle Spasms., Disp: 180 tablet, Rfl: 0  •  vitamin D (ERGOCALCIFEROL) 1.25 MG (98604 UT) capsule capsule, Take  by mouth Every 7 (Seven) Days., Disp: , Rfl:   •  amoxicillin-clavulanate (Augmentin) 875-125 MG per tablet, Take 1 tablet by mouth 2 (Two) Times a Day., Disp: 7 tablet, Rfl: 0    Allergies:   Allergies   Allergen Reactions   • Glucophage Xr [Metformin Hcl Er] Diarrhea and Other (See Comments)     Glucophage XR TB24: Adverse Reaction: Severe GI issues.   • Metformin Nausea And Vomiting     Other reaction(s): SEVERE INTESTIONAL ISSUES  Other reaction(s): SEVERE GI ISSUES    Glucophage XR TB24  MetFORMIN HCl TABS   • Tetracyclines & Related Nausea Only     Adverse Reaction       Objective     Physical Exam: Please see above  Vital Signs:   Vitals:    03/28/23 1339 03/28/23 1413   BP: 150/90 140/74   BP Location: Left arm Left arm   Patient Position: Sitting Sitting   Cuff Size: Adult Large Adult   Pulse: 70    Resp: 20    Temp: 98.4 °F (36.9 °C)    TempSrc: Temporal    SpO2: 95%    Weight: 104 kg (229 lb 2 oz)    PainSc:   4    PainLoc: Ear      Body mass index is 34.85 kg/m².    Physical Exam  Vitals reviewed.   Constitutional:       General: He is not in acute distress.     Appearance: Normal appearance. He is not ill-appearing or toxic-appearing.   HENT:      Head: Normocephalic and atraumatic.      Right Ear: Tympanic membrane and external ear normal.      Left Ear: External ear normal.      Ears:      Comments: Left tm erythematous and distorted light reflex. Pain with manipulation of tragus. No discharge or significant erythema of external canal     Nose: Congestion present.       Comments: Pain to palpation of left maxillary sinus     Mouth/Throat:      Mouth: Mucous membranes are moist.      Pharynx: No oropharyngeal exudate or posterior oropharyngeal erythema.      Comments: Post nasal drip  Eyes:      General: No scleral icterus.        Right eye: No discharge.         Left eye: No discharge.      Extraocular Movements: Extraocular movements intact.      Pupils: Pupils are equal, round, and reactive to light.   Cardiovascular:      Rate and Rhythm: Normal rate and regular rhythm.      Pulses: Normal pulses.      Heart sounds: Normal heart sounds.   Pulmonary:      Effort: Pulmonary effort is normal. No respiratory distress.      Breath sounds: Normal breath sounds.   Abdominal:      General: Abdomen is flat. There is no distension.      Palpations: Abdomen is soft.   Musculoskeletal:      Cervical back: Normal range of motion. No rigidity or tenderness.   Lymphadenopathy:      Cervical: No cervical adenopathy.   Skin:     General: Skin is warm and dry.      Capillary Refill: Capillary refill takes less than 2 seconds.   Neurological:      General: No focal deficit present.      Mental Status: He is alert and oriented to person, place, and time. Mental status is at baseline.   Psychiatric:         Mood and Affect: Mood normal.         Behavior: Behavior normal.         Judgment: Judgment normal.         Procedures    Results:   Labs:   Hemoglobin A1C   Date Value Ref Range Status   02/07/2023 7.4 % Final   08/30/2021 7.60 (H) 4.80 - 5.60 % Final     TSH   Date Value Ref Range Status   04/19/2022 5.130 (H) 0.270 - 4.200 uIU/mL Final        Imaging:   No valid procedures specified.     Assessment / Plan      Assessment/Plan:   Problem List Items Addressed This Visit    None  Visit Diagnoses     Acute bacterial sinusitis    -  Primary    Relevant Medications    amoxicillin-clavulanate (Augmentin) 875-125 MG per tablet    Sore throat        Relevant Orders    POCT SARS-CoV-2 Antigen ELISE + Flu  (Completed)    POC Rapid Strep A (Completed)    Non-recurrent acute suppurative otitis media of left ear without spontaneous rupture of tympanic membrane        Relevant Medications    amoxicillin-clavulanate (Augmentin) 875-125 MG per tablet            Follow Up:   Return if symptoms worsen or fail to improve.    Gume Eisenberg MD  [facility] Sh[facility] Rivas    Transcribed from ambient dictation for Gume Eisenberg MD by Winnie Estrada.  03/28/23   14:40 EDT    Patient or patient representative verbalized consent to the visit recording.  I have personally performed the services described in this document as transcribed by the above individual, and it is both accurate and complete.

## 2023-03-30 DIAGNOSIS — H66.002 NON-RECURRENT ACUTE SUPPURATIVE OTITIS MEDIA OF LEFT EAR WITHOUT SPONTANEOUS RUPTURE OF TYMPANIC MEMBRANE: ICD-10-CM

## 2023-03-30 DIAGNOSIS — J01.90 ACUTE BACTERIAL SINUSITIS: ICD-10-CM

## 2023-03-30 DIAGNOSIS — B96.89 ACUTE BACTERIAL SINUSITIS: ICD-10-CM

## 2023-03-30 RX ORDER — AMOXICILLIN AND CLAVULANATE POTASSIUM 875; 125 MG/1; MG/1
1 TABLET, FILM COATED ORAL 2 TIMES DAILY
Qty: 20 TABLET | Refills: 0 | Status: SHIPPED | OUTPATIENT
Start: 2023-03-30

## 2023-04-17 DIAGNOSIS — E11.65 UNCONTROLLED TYPE 2 DIABETES MELLITUS WITH HYPERGLYCEMIA: ICD-10-CM

## 2023-04-18 RX ORDER — INSULIN HUMAN 500 [IU]/ML
INJECTION, SOLUTION SUBCUTANEOUS
Qty: 60 ML | Refills: 0 | Status: SHIPPED | OUTPATIENT
Start: 2023-04-18

## 2023-04-18 RX ORDER — LEVOTHYROXINE SODIUM 0.05 MG/1
TABLET ORAL
Qty: 90 TABLET | Refills: 0 | Status: SHIPPED | OUTPATIENT
Start: 2023-04-18

## 2023-04-19 ENCOUNTER — HOSPITAL ENCOUNTER (OUTPATIENT)
Dept: ULTRASOUND IMAGING | Facility: HOSPITAL | Age: 73
Discharge: HOME | End: 2023-04-19
Payer: MEDICARE

## 2023-04-19 ENCOUNTER — HOSPITAL ENCOUNTER (OUTPATIENT)
Dept: MAMMOGRAPHY | Facility: HOSPITAL | Age: 73
Discharge: HOME | End: 2023-04-19
Payer: MEDICARE

## 2023-04-19 DIAGNOSIS — N63.0 BREAST MASS IN MALE: ICD-10-CM

## 2023-04-19 DIAGNOSIS — N60.01 SOLITARY CYST OF RIGHT BREAST: ICD-10-CM

## 2023-04-19 PROCEDURE — A4648 IMPLANTABLE TISSUE MARKER: HCPCS

## 2023-04-19 PROCEDURE — 0 LIDOCAINE 1 % SOLUTION: Performed by: RADIOLOGY

## 2023-04-19 PROCEDURE — 77066 DX MAMMO INCL CAD BI: CPT

## 2023-04-19 PROCEDURE — 76642 ULTRASOUND BREAST LIMITED: CPT

## 2023-04-19 PROCEDURE — 88305 TISSUE EXAM BY PATHOLOGIST: CPT | Performed by: STUDENT IN AN ORGANIZED HEALTH CARE EDUCATION/TRAINING PROGRAM

## 2023-04-19 PROCEDURE — G0279 TOMOSYNTHESIS, MAMMO: HCPCS

## 2023-04-19 PROCEDURE — 88360 TUMOR IMMUNOHISTOCHEM/MANUAL: CPT | Performed by: STUDENT IN AN ORGANIZED HEALTH CARE EDUCATION/TRAINING PROGRAM

## 2023-04-19 RX ORDER — LIDOCAINE HYDROCHLORIDE 10 MG/ML
5 INJECTION, SOLUTION INFILTRATION; PERINEURAL ONCE
Status: COMPLETED | OUTPATIENT
Start: 2023-04-19 | End: 2023-04-19

## 2023-04-19 RX ORDER — LIDOCAINE HYDROCHLORIDE AND EPINEPHRINE 10; 10 MG/ML; UG/ML
5 INJECTION, SOLUTION INFILTRATION; PERINEURAL ONCE
Status: COMPLETED | OUTPATIENT
Start: 2023-04-19 | End: 2023-04-19

## 2023-04-19 RX ADMIN — Medication 1 ML: at 12:18

## 2023-04-19 RX ADMIN — LIDOCAINE HYDROCHLORIDE,EPINEPHRINE BITARTRATE 1 ML: 10; .01 INJECTION, SOLUTION INFILTRATION; PERINEURAL at 12:18

## 2023-04-19 NOTE — PROGRESS NOTES
Alert and orientated. Denies discomfort, no active bleeding, steri-strips intact, gauze dressing applied., ACE wrap used for support. Cold packs given. Verbalizes and demonstrates understanding of post-care instructions, written copy given.

## 2023-04-21 ENCOUNTER — TELEPHONE (OUTPATIENT)
Dept: MAMMOGRAPHY | Facility: HOSPITAL | Age: 73
End: 2023-04-21
Payer: MEDICARE

## 2023-04-21 LAB
CYTO UR: NORMAL
LAB AP CASE REPORT: NORMAL
LAB AP CLINICAL INFORMATION: NORMAL
LAB AP DIAGNOSIS COMMENT: NORMAL
LAB AP SPECIAL STAINS: NORMAL
PATH REPORT.FINAL DX SPEC: NORMAL
PATH REPORT.GROSS SPEC: NORMAL

## 2023-04-21 NOTE — TELEPHONE ENCOUNTER
Attempted to notify patient of pathology results and recommendation.  A message was left on his voicemail to return my call.

## 2023-04-24 ENCOUNTER — TELEPHONE (OUTPATIENT)
Dept: MAMMOGRAPHY | Facility: HOSPITAL | Age: 73
End: 2023-04-24
Payer: MEDICARE

## 2023-04-24 ENCOUNTER — PATIENT OUTREACH (OUTPATIENT)
Dept: OTHER | Facility: HOSPITAL | Age: 73
End: 2023-04-24
Payer: MEDICARE

## 2023-04-24 DIAGNOSIS — C50.921 MALIGNANT NEOPLASM OF RIGHT BREAST IN MALE, ESTROGEN RECEPTOR POSITIVE, UNSPECIFIED SITE OF BREAST: Primary | ICD-10-CM

## 2023-04-24 DIAGNOSIS — C50.929 MALIGNANT NEOPLASM OF MALE BREAST, UNSPECIFIED ESTROGEN RECEPTOR STATUS, UNSPECIFIED LATERALITY, UNSPECIFIED SITE OF BREAST: Primary | ICD-10-CM

## 2023-04-24 DIAGNOSIS — Z17.0 MALIGNANT NEOPLASM OF RIGHT BREAST IN MALE, ESTROGEN RECEPTOR POSITIVE, UNSPECIFIED SITE OF BREAST: Primary | ICD-10-CM

## 2023-04-24 NOTE — TELEPHONE ENCOUNTER
Patient notified of surgical consult appointment with Dr TIMMY Ramirez on 5/3/23 @ 7567. Patient given office contact & location information; told to bring photo ID, insurance information, list of prescription & OTC medications. Patient may be accompanied by family member or friend for support.

## 2023-04-24 NOTE — TELEPHONE ENCOUNTER
Referring provider notified via Epic basket message  pathology returned as cancer and patient will be notified. Patient notified of pathology results and recommendation. Verbalizes understanding. Denies discomfort.. Denies signs and symptoms of infection.     Patient desires Dr TIMMY Ramirez for surgical consult. Patient will be notified of appointment. Patient verbalized understanding.    Reviewed what would be discussed at surgical consult visit, including detailed explanation of pathology report & imaging reports; treatment options & pros/cons, availability of breast nurse navigator. Patient encouraged to call back or contact breast nurse navigator with any questions or concerns. Patient verbalized understanding.    Patient information sent to genetics pool/navigator for evaluation & possible referral for genetic counseling. Breast cancer information packet offered and accepted.

## 2023-04-26 ENCOUNTER — TELEPHONE (OUTPATIENT)
Dept: INTERNAL MEDICINE | Facility: CLINIC | Age: 73
End: 2023-04-26
Payer: MEDICARE

## 2023-04-26 NOTE — TELEPHONE ENCOUNTER
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 No answer, left hippa compliant voice message offering our assistance in any way possible and encouraging to call with any questions or concerns. Patient has follow up with PCP, Dr. Ramirez on 5/8/23 and has miah scheduled with [facility] on 5/2/23 per [facility] messages.    Dr. Eisenberg

## 2023-05-02 ENCOUNTER — TRANSCRIBE ORDERS (OUTPATIENT)
Dept: PHYSICAL THERAPY | Facility: HOSPITAL | Age: 73
End: 2023-05-02
Payer: MEDICARE

## 2023-05-02 DIAGNOSIS — C50.922 MALIGNANT NEOPLASM OF LEFT MALE BREAST, UNSPECIFIED ESTROGEN RECEPTOR STATUS, UNSPECIFIED SITE OF BREAST: Primary | ICD-10-CM

## 2023-05-03 ENCOUNTER — HOSPITAL ENCOUNTER (OUTPATIENT)
Dept: PHYSICAL THERAPY | Facility: HOSPITAL | Age: 73
Setting detail: THERAPIES SERIES
Discharge: HOME | End: 2023-05-03
Payer: MEDICARE

## 2023-05-03 ENCOUNTER — LAB (OUTPATIENT)
Dept: LAB | Facility: HOSPITAL | Age: 73
End: 2023-05-03
Payer: MEDICARE

## 2023-05-03 ENCOUNTER — CLINICAL SUPPORT (OUTPATIENT)
Dept: GENETICS | Facility: HOSPITAL | Age: 73
End: 2023-05-03
Payer: MEDICARE

## 2023-05-03 VITALS — HEIGHT: 68 IN | BODY MASS INDEX: 34.71 KG/M2 | WEIGHT: 229 LBS

## 2023-05-03 DIAGNOSIS — Z17.0 MALIGNANT NEOPLASM OF BREAST IN MALE, ESTROGEN RECEPTOR POSITIVE, UNSPECIFIED LATERALITY, UNSPECIFIED SITE OF BREAST: Primary | ICD-10-CM

## 2023-05-03 DIAGNOSIS — N63.0 BREAST MASS IN MALE: ICD-10-CM

## 2023-05-03 DIAGNOSIS — C50.921 MALIGNANT NEOPLASM OF RIGHT MALE BREAST, UNSPECIFIED ESTROGEN RECEPTOR STATUS, UNSPECIFIED SITE OF BREAST: Primary | ICD-10-CM

## 2023-05-03 DIAGNOSIS — Z13.79 GENETIC TESTING: Primary | ICD-10-CM

## 2023-05-03 DIAGNOSIS — C50.929 MALIGNANT NEOPLASM OF BREAST IN MALE, ESTROGEN RECEPTOR POSITIVE, UNSPECIFIED LATERALITY, UNSPECIFIED SITE OF BREAST: Primary | ICD-10-CM

## 2023-05-03 PROCEDURE — 96040: CPT | Performed by: GENETIC COUNSELOR, MS

## 2023-05-03 NOTE — PROGRESS NOTES
Elton Funez, a 72-year-old male, was seen for genetic counseling due to a personal and family history of breast cancer.  Mr. Funez was recently diagnosed with an ER/AR+, HER2- breast cancer at age 72.  Mr. Funez is scheduled to meet with Dr. Ramirez today for surgical consultation. Mr. Funez was interested in discussing his risks for a hereditary cancer syndrome. Mr. Funez opted to pursue comprehensive testing via the CancerNext panel ordered through [employer] which includes BRCA1/2 and 34 additional genes associated with an increased risk of breast cancer or other cancers (APC, MARCUS, AXIN2, BARD1, BMPR1A, BRCA1, BRCA2, BRIP1, CDH1, CDK4, CDKN2A, CHEK2, DICER1, EPCAM, GREM1, HOXB13, MLH1, MRE11A, MSH2, MSH3, MSH6, MUTYH, NBN, NF1, NTHL1, PALB2, PMS2, POLD1, POLE, PTEN, RAD51C, RAD51D, RECQL, SMAD4, SMARCA4, STK11, and TP53). Results from the high/moderate risk breast cancer genes are expected in 10 days, and results from the remainder of the panel are expected in 2-3 weeks.  Blood draw was coordinated at [facility] today.     PERTINENT FAMILY HISTORY: (See attached pedigree)     Mother: Lymphoma, late 70s  Pat. Aunt: Unknown type of cancer    RISK ASSESSMENT:  Mr. Funez’s personal history of male breast cancer raises the possibility of a hereditary cancer syndrome.  He clearly meets NCCN guidelines criteria for BRCA1/2 testing given his personal history of male breast cancer.  We discussed that testing is typically completed via a multi-gene panel testing which tests for multiple genes associated with hereditary cancer risk.  The additional genes included have varying degrees of risk associated, and some have also been associated with increased risks for other cancers.      GENETIC COUNSELING (35 minutes):  We reviewed the family history information in detail.  Cases of cancer follow three general patterns: sporadic, familial, and hereditary.  While most breast cancer is sporadic, some cases  appear to occur in family clusters.  These cases are said to be familial and account for 10-20% of breast cancer cases.  Familial cases may be due to a combination of shared genes and environmental factors among family members.  In even fewer cases (5-10%), the risk for cancer is inherited, and the genes responsible for the increased cancer risk are known.      Family histories typical of hereditary cancer syndromes usually include multiple first- and second-degree relatives diagnosed with cancer types that define a syndrome.  These cases tend to be diagnosed at younger-than-expected ages and can be bilateral or multifocal.  The cancer in these families follows an autosomal dominant inheritance pattern, which indicates the likely presence of a mutation in a cancer susceptibility gene.  Children and siblings of an individual known to carry a mutation have a 50% chance of inheriting that mutation, thereby inheriting the increased risk to develop cancer.  These mutations can be passed down from the maternal or the paternal lineage.    Hereditary breast cancer accounts for 5-10% of all cases of breast cancer.  A significant proportion of hereditary breast cancer can be attributed to mutations in the BRCA1 and BRCA2 genes.  Mutations in these genes confer an increased risk for breast cancer, ovarian cancer, male breast cancer, prostate cancer and pancreatic cancer.  There are other genes that contribute to risk for breast cancer and other cancers, and genetic testing is typically completed through a multigene panel.  In order to get as much information as possible regarding potential risks to himself and his relatives, Mr. Funez opted to pursue testing through a comprehensive multigene panel evaluating 36 genes associated with hereditary cancer risk.        Genetic Testing:  The risks, benefits and limitations of genetic testing and implications for clinical management following testing were reviewed.  DNA test results  can influence decisions regarding screening, prevention and surgical management.  Genetic testing can have significant psychological implications for both individuals and families.  Also discussed was the possibility of employment and insurance discrimination based on genetic test results and the laws in place to prevent this (JOES), as well as the limitations of these laws.     The implications of a positive or negative test result were discussed.  We also discussed the possibility that, in some cases, genetic test results may be ambiguous due to the identification of a genetic variant of uncertain significance (VUS).  These variants may or may not be associated with an increased cancer risk. The limited value of negative test results was emphasized, particularly given the significant population risk for breast cancer and the existence of other cancer susceptibility genes.  Given the family history, a negative test result would not eliminate all breast cancer risk to his family members, although the risk would not be as high as it would with positive genetic testing.      PLAN:  Mr. Funez will be contacted by telephone as soon as results are available. In the meantime, he is welcome to contact us with any questions or concerns.       Ebonie Louie MS, [facility], [facility]  Licensed Certified Genetic Counselor

## 2023-05-04 ENCOUNTER — PATIENT OUTREACH (OUTPATIENT)
Dept: ONCOLOGY | Facility: CLINIC | Age: 73
End: 2023-05-04
Payer: MEDICARE

## 2023-05-04 DIAGNOSIS — Z17.0 MALIGNANT NEOPLASM OF RIGHT BREAST IN FEMALE, ESTROGEN RECEPTOR POSITIVE, UNSPECIFIED SITE OF BREAST: Primary | ICD-10-CM

## 2023-05-04 DIAGNOSIS — C50.911 MALIGNANT NEOPLASM OF RIGHT BREAST IN FEMALE, ESTROGEN RECEPTOR POSITIVE, UNSPECIFIED SITE OF BREAST: Primary | ICD-10-CM

## 2023-05-08 ENCOUNTER — OFFICE VISIT (OUTPATIENT)
Dept: INTERNAL MEDICINE | Facility: CLINIC | Age: 73
End: 2023-05-08
Payer: MEDICARE

## 2023-05-08 VITALS
WEIGHT: 225 LBS | BODY MASS INDEX: 34.21 KG/M2 | HEART RATE: 84 BPM | SYSTOLIC BLOOD PRESSURE: 124 MMHG | RESPIRATION RATE: 18 BRPM | TEMPERATURE: 98.6 F | DIASTOLIC BLOOD PRESSURE: 70 MMHG

## 2023-05-08 DIAGNOSIS — C50.121 MALIGNANT NEOPLASM OF CENTRAL PORTION OF RIGHT BREAST IN MALE, ESTROGEN RECEPTOR POSITIVE: ICD-10-CM

## 2023-05-08 DIAGNOSIS — E11.42 TYPE 2 DIABETES MELLITUS WITH DIABETIC POLYNEUROPATHY, WITH LONG-TERM CURRENT USE OF INSULIN: ICD-10-CM

## 2023-05-08 DIAGNOSIS — I10 PRIMARY HYPERTENSION: ICD-10-CM

## 2023-05-08 DIAGNOSIS — Z79.4 TYPE 2 DIABETES MELLITUS WITH DIABETIC POLYNEUROPATHY, WITH LONG-TERM CURRENT USE OF INSULIN: ICD-10-CM

## 2023-05-08 DIAGNOSIS — N18.31 STAGE 3A CHRONIC KIDNEY DISEASE: Primary | ICD-10-CM

## 2023-05-08 DIAGNOSIS — Z17.0 MALIGNANT NEOPLASM OF CENTRAL PORTION OF RIGHT BREAST IN MALE, ESTROGEN RECEPTOR POSITIVE: ICD-10-CM

## 2023-05-08 DIAGNOSIS — D63.8 ANEMIA OF CHRONIC DISEASE: Primary | ICD-10-CM

## 2023-05-08 DIAGNOSIS — N18.31 STAGE 3A CHRONIC KIDNEY DISEASE: ICD-10-CM

## 2023-05-08 PROBLEM — M50.20 HERNIATED CERVICAL DISC: Status: RESOLVED | Noted: 2017-12-14 | Resolved: 2023-05-08

## 2023-05-08 PROBLEM — R06.02 SHORTNESS OF BREATH: Status: RESOLVED | Noted: 2023-02-21 | Resolved: 2023-05-08

## 2023-05-08 PROBLEM — D72.829 LEUKOCYTOSIS: Status: RESOLVED | Noted: 2017-03-16 | Resolved: 2023-05-08

## 2023-05-08 PROBLEM — H25.813 COMBINED FORMS OF AGE-RELATED CATARACT OF BOTH EYES: Status: ACTIVE | Noted: 2017-12-20

## 2023-05-08 PROBLEM — I51.89 GRADE I DIASTOLIC DYSFUNCTION: Status: ACTIVE | Noted: 2023-02-21

## 2023-05-08 PROBLEM — C50.912 BREAST CANCER, LEFT BREAST: Status: ACTIVE | Noted: 2023-05-08

## 2023-05-08 PROBLEM — R29.898 LEFT HAND WEAKNESS: Status: RESOLVED | Noted: 2017-05-03 | Resolved: 2023-05-08

## 2023-05-08 PROBLEM — T81.40XA POST-OPERATIVE INFECTION: Status: RESOLVED | Noted: 2017-03-16 | Resolved: 2023-05-08

## 2023-05-08 PROBLEM — F32.5 MAJOR DEPRESSIVE DISORDER WITH SINGLE EPISODE, IN FULL REMISSION: Status: ACTIVE | Noted: 2023-05-08

## 2023-05-08 PROBLEM — N63.0 BREAST MASS IN MALE: Status: RESOLVED | Noted: 2023-02-21 | Resolved: 2023-05-08

## 2023-05-08 PROBLEM — H25.819 COMBINED FORM OF SENILE CATARACT: Status: ACTIVE | Noted: 2017-12-20

## 2023-05-08 RX ORDER — IPRATROPIUM BROMIDE 42 UG/1
SPRAY, METERED NASAL
Qty: 45 ML | Refills: 1 | Status: SHIPPED | OUTPATIENT
Start: 2023-05-08

## 2023-05-08 RX ORDER — CARVEDILOL 6.25 MG/1
TABLET ORAL
Qty: 180 TABLET | Refills: 1 | Status: SHIPPED | OUTPATIENT
Start: 2023-05-08

## 2023-05-08 NOTE — PROGRESS NOTES
Subjective   Chief complaint: follow up on chronic conditions  Independent historian(s): Patient.   used: N/A.    HPI:  Mr Funez is here for routine follow up.    Mr Funez recently had a biopsy confirmatory for ER+ breast cancer of the right breast. He states he was told he is stage II because the mass is >2 cm. He is scheduled for surgery on the 23rd after which he will then see an oncologist for likely radiation and chemotherapy. He has good support system with his family in the area. He states the painfulness of the lump is no longer there but he can still easily feel the hard lump. Denies breast discoloration or nipple discharge.    He continues to take his insulin. He takes 160 units in the morning, 180 units at dinner time, and 40 units in the evening. He reports his AM fasting sugars have ranged in 170s which for him is lower than it has been in the past. He denies unintentional weight loss or appetite changes. He sees endocrinology in 2 days. He also has an upcoming appointment with his podiatrist. He continues to use his cane for fall prevention especially as he cannot feel his feet.    Pt continues to take his omeprazole. He reports a pill getting stuck in his throat the other day so took a couple extra tums which resolved his throat pain. Back to usual daily omeprazole. Denies nausea, vomiting, abdominal pain, chest pain, palpitations, diarrhea, constipation.    ROS:  Refer to HPI above for pertinent positives and negatives.    Personally reviewed past medical history, allergies, medications, surgical history, social history, and family history. Any pertinent findings or changes can be found in the HPI above.     Objective   /70 (BP Location: Left arm, Patient Position: Sitting, Cuff Size: Adult)   Pulse 84   Temp 98.6 °F (37 °C) (Infrared)   Resp 18   Wt 102 kg (225 lb)   BMI 34.21 kg/m²   Physical exam:  General: well-appearing, obese  Neuro: awake, alert  HEENT: normocephalic,  atraumatic, conjunctivae moist and pink  Cardiac: audible S1 and S2, soft systolic murmur, RRR  Respiratory: no accessory muscle use, lungs CTAB  GI/abdomen: soft, not distended, non tender, normoactive BS  MSK: no lower extremity edema, hard fixed 12 o'clock nontender right breast mass  Skin: warm, dry, no rash; feet are clean without any wounds     Assessment & Plan   Summary: Elton Funez is a 72 y.o. male with IDDM2 with neuropathy/retinopathy/nephropathy, CKD IIIa, anemia of chronic disease, HTN, HLD, NAFLD, hypothyroidism, and recently diagnosed ER+ right-sided stage II breast cancer.    Newly diagnosed stage II ER+ right-sided breast cancer  - Surgical intervention scheduled for May 23.  - Tentative plan for post-op radiation and hormone therapy with oncology  - Had echo in February 2023. Grade I diastolic dysfunction.    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 Last A1c 7.4%, goal <7.0%. No hypoglycemic episodes.  PLAN  - Has endocrinology appointment in 2 days.  - Advised on discussing insulin plan with them especially given possible chemo effects.  - Eye exam up to date.  - Foot exam showed no lesions this encounter. Has podiatry appointment later this week.  - Continue gabapentin. Continue insulin per endocrinology management.    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 Stable. Controlled. Baseline Scr 1.3-1.4, Hgb 10-11.  PLAN  - Referral to nephrology for preventative care, possible IV iron infusion vs epo.  - Continue oral iron.  - Avoid NSAIDs.    Obesity  BMI 33. Class I.  PLAN  - Weight loss counseling provided. Challenged him to start daily walks and to cut out refined sugars from diet. Advised on benefits to blood sugar and cancer recovery.    Chronic, stable, controlled conditions  # Dyslipidemia: at goal LDL <70, continue simvastatin, gemfibrozil, colestipol, baby aspirin  #  GERD: continue omeprazole  # Depression: continue sertraline  # Hypothyroidism: continue levothyroxine  # Essential HTN: at goal <120/80, continue carvedilol      Gabby Kahler, MD  Med Peds PGY3    Attending Note:   Patient was personally seen and examined by me.  I obtained and corroborated the history and examination.  Discussed with patient in detail.  Encouraged diet and exercise.    Diagnoses and all orders for this visit:    1. Anemia of chronic disease (Primary)  -     Ambulatory Referral to Nephrology    2. Type 2 diabetes mellitus with diabetic polyneuropathy, with long-term current use of insulin    3. Stage 3a chronic kidney disease  -     Ambulatory Referral to Nephrology    4. Malignant neoplasm of central portion of right breast in male, estrogen receptor positive    5. Primary hypertension    BMI is >= 30 and <35. (Class 1 Obesity). The following options were offered after discussion;: weight loss educational material (shared in after visit summary), exercise counseling/recommendations and nutrition counseling/recommendations

## 2023-05-09 ENCOUNTER — TRANSCRIBE ORDERS (OUTPATIENT)
Dept: ADMINISTRATIVE | Facility: HOSPITAL | Age: 73
End: 2023-05-09
Payer: MEDICARE

## 2023-05-09 DIAGNOSIS — C50.821 MALIGNANT NEOPLASM OF OVERLAPPING SITES OF RIGHT MALE BREAST, UNSPECIFIED ESTROGEN RECEPTOR STATUS: Primary | ICD-10-CM

## 2023-05-10 ENCOUNTER — OFFICE VISIT (OUTPATIENT)
Dept: ENDOCRINOLOGY | Facility: CLINIC | Age: 73
End: 2023-05-10
Payer: MEDICARE

## 2023-05-10 VITALS
OXYGEN SATURATION: 95 % | HEIGHT: 68 IN | WEIGHT: 223 LBS | SYSTOLIC BLOOD PRESSURE: 130 MMHG | HEART RATE: 76 BPM | DIASTOLIC BLOOD PRESSURE: 72 MMHG | BODY MASS INDEX: 33.8 KG/M2

## 2023-05-10 DIAGNOSIS — E11.65 UNCONTROLLED TYPE 2 DIABETES MELLITUS WITH HYPERGLYCEMIA: Primary | ICD-10-CM

## 2023-05-10 DIAGNOSIS — E78.5 DYSLIPIDEMIA, GOAL LDL BELOW 70: ICD-10-CM

## 2023-05-10 LAB
EXPIRATION DATE: ABNORMAL
EXPIRATION DATE: NORMAL
GLUCOSE BLDC GLUCOMTR-MCNC: 234 MG/DL (ref 70–130)
HBA1C MFR BLD: 7.8 %
Lab: ABNORMAL
Lab: NORMAL

## 2023-05-10 PROCEDURE — 1159F MED LIST DOCD IN RCRD: CPT | Performed by: INTERNAL MEDICINE

## 2023-05-10 PROCEDURE — 3075F SYST BP GE 130 - 139MM HG: CPT | Performed by: INTERNAL MEDICINE

## 2023-05-10 PROCEDURE — 1160F RVW MEDS BY RX/DR IN RCRD: CPT | Performed by: INTERNAL MEDICINE

## 2023-05-10 PROCEDURE — 3078F DIAST BP <80 MM HG: CPT | Performed by: INTERNAL MEDICINE

## 2023-05-10 PROCEDURE — 3051F HG A1C>EQUAL 7.0%<8.0%: CPT | Performed by: INTERNAL MEDICINE

## 2023-05-10 RX ORDER — SEMAGLUTIDE 1.34 MG/ML
1 INJECTION, SOLUTION SUBCUTANEOUS WEEKLY
Qty: 9 ML | Refills: 3 | Status: SHIPPED | OUTPATIENT
Start: 2023-05-10

## 2023-05-10 NOTE — PROGRESS NOTES
Chief complaint  Diabetes    Subjective   Elton Funez is a 72 y.o. male is here today for follow-up    Diabetes mellitus type 2, uncontrolled dx in 2000  Diabetic complications: neuropathy, retinopathy.   Past meds: Metformin caused diarrhea in the past.   Eye care:no retinopathy.   Monitoring - freestyle winston reviewed.   Foot care and dental care: discussed.     Medications: Bydureon, U-500 insulin BID-TID. He has an unusual schedule and stays up at night and sleeps in the afternoon. CGM was downloaded and analyzed.     Hypothyroidism - on levothyroxine.      Patient was recently diagnosed with the breast cancer - planned for the surgery on May 23.       Medications    Current Outpatient Medications:   •  Alpha-Lipoic Acid 600 MG tablet, Take  by mouth Daily., Disp: , Rfl:   •  aspirin 81 MG chewable tablet, Chew 1 tablet Daily., Disp: , Rfl:   •  B Complex Vitamins (VITAMIN B COMPLEX PO), Take  by mouth Daily., Disp: , Rfl:   •  B-D UF III MINI PEN NEEDLES 31G X 5 MM misc, USE THREE TIMES A DAY, Disp: 270 each, Rfl: 1  •  carvedilol (COREG) 6.25 MG tablet, TAKE 1 TABLET EVERY 12 HOURS, Disp: 180 tablet, Rfl: 1  •  Coenzyme Q10 (CO Q 10 PO), Take 300 mg by mouth Daily., Disp: , Rfl:   •  colestipol (COLESTID) 1 g tablet, TAKE 2 TABLETS TWICE A DAY, Disp: 360 tablet, Rfl: 3  •  Continuous Blood Gluc  (FreeStyle Winston 2 [place] Plane) device, 1 Device Every 14 (Fourteen) Days., Disp: 1 each, Rfl: 0  •  Continuous Blood Gluc Sensor (FreeStyle Winston 2 Sensor) misc, 1 each Every 14 (Fourteen) Days., Disp: 6 each, Rfl: 3  •  ferrous sulfate (FeroSul) 325 (65 FE) MG tablet, Take 1 tablet by mouth 2 (Two) Times a Day., Disp: 180 tablet, Rfl: 1  •  Flaxseed, Linseed, (FLAXSEED OIL) 1200 MG capsule, Take 1 capsule by mouth 2 (Two) Times a Day. 1300 mg, Disp: , Rfl:   •  gabapentin (NEURONTIN) 800 MG tablet, TAKE 1 TABLET FOUR TIMES A DAY, Disp: 360 tablet, Rfl: 1  •  gemfibrozil (LOPID) 600 MG tablet, TAKE 1 TABLET  "TWICE A DAY, Disp: 180 tablet, Rfl: 3  •  Insulin Regular Human, Conc, (HumuLIN R U-500 KwikPen) 500 UNIT/ML solution pen-injector CONCENTRATED injection, Inject sc 160 u ac breakfast and 180 u  at dinner and about 40 units at hs., Disp: 60 mL, Rfl: 0  •  ipratropium (ATROVENT) 0.06 % nasal spray, USE 2 SPRAYS IN EACH NOSTRIL AS DIRECTED BY PROVIDER DAILY, Disp: 45 mL, Rfl: 1  •  levothyroxine (SYNTHROID, LEVOTHROID) 50 MCG tablet, TAKE 1 TABLET DAILY, Disp: 90 tablet, Rfl: 0  •  Multiple Vitamins-Minerals (MULTIVITAMIN PO), Take 1 tablet by mouth Daily., Disp: , Rfl:   •  omeprazole (priLOSEC) 40 MG capsule, TAKE 1 CAPSULE DAILY, Disp: 90 capsule, Rfl: 3  •  sertraline (ZOLOFT) 100 MG tablet, Take 1 tablet by mouth Daily., Disp: 90 tablet, Rfl: 1  •  simvastatin (ZOCOR) 40 MG tablet, TAKE 1 TABLET DAILY AT BEDTIME, Disp: 90 tablet, Rfl: 3  •  spironolactone (ALDACTONE) 25 MG tablet, TAKE 1 TABLET DAILY, Disp: 90 tablet, Rfl: 3  •  temazepam (Restoril) 15 MG capsule, Take 1 capsule by mouth At Night As Needed for Sleep., Disp: 30 capsule, Rfl: 2  •  tiZANidine (ZANAFLEX) 4 MG tablet, Take 1 tablet by mouth 2 (Two) Times a Day As Needed for Muscle Spasms., Disp: 180 tablet, Rfl: 0  •  vitamin D (ERGOCALCIFEROL) 1.25 MG (24761 UT) capsule capsule, Take  by mouth Every 7 (Seven) Days., Disp: , Rfl:   •  Semaglutide, 1 MG/DOSE, (Ozempic, 1 MG/DOSE,) 4 MG/3ML solution pen-injector, Inject 1 mg under the skin into the appropriate area as directed 1 (One) Time Per Week., Disp: 9 mL, Rfl: 3    Review of systems  Review of Systems   Constitutional: Positive for fatigue.   Musculoskeletal: Positive for arthralgias, back pain and neck pain.   Neurological: Positive for numbness.   All other systems reviewed and are negative.      Physical exam  Objective   Blood pressure 130/72, pulse 76, height 172.7 cm (68\"), weight 101 kg (223 lb), SpO2 95 %. Body mass index is 33.91 kg/m².  Physical Exam   Constitutional: He is oriented to " person, place, and time. He appears well-developed and well-nourished.   HENT:   Head: Normocephalic and atraumatic.   Eyes: Conjunctivae are normal.   Neck: No thyromegaly present.   Cardiovascular: Normal rate, regular rhythm, normal heart sounds and normal pulses.   Pulmonary/Chest: Effort normal and breath sounds normal.   Neurological: He is alert and oriented to person, place, and time.   Psychiatric: Thought content normal.         LABS AND IMAGING  Office Visit on 05/10/2023   Component Date Value Ref Range Status   • Hemoglobin A1C 05/10/2023 7.8  % Final   • Lot Number 05/10/2023 [id number]   Final   • Expiration Date 05/10/2023 01-   Final   • Glucose 05/10/2023 234 (A)  70 - 130 mg/dL Final   • Lot Number 05/10/2023 [id number]   Final   • Expiration Date 05/10/2023 11-   Final   Hospital Outpatient Visit on 04/19/2023   Component Date Value Ref Range Status   • Case Report 04/19/2023    Final                    Value:Surgical Pathology Report                         Case: [id number]                                  Authorizing Provider:  Bertha Bean MD           Collected:           04/19/2023 12:03 PM          Ordering Location:     [facility]   Received:            04/19/2023 01:03 PM                                 [id number]                                                                                  ULTRASOUND                                                                   Pathologist:           Harley Marin MD                                                        Specimen:    Breast, Right, right 12:00 palpable worrisome mass, male patient                          • Clinical Information 04/19/2023    Final                    Value:This result contains rich text formatting which cannot be displayed here.   • Final Diagnosis 04/19/2023    Final                    Value:This result contains rich text formatting which cannot be displayed here.   •  Comment 04/19/2023    Final                    Value:This result contains rich text formatting which cannot be displayed here.   • Gross Description 04/19/2023    Final                    Value:This result contains rich text formatting which cannot be displayed here.   • Special Stains 04/19/2023    Final                    Value:This result contains rich text formatting which cannot be displayed here.   • Microscopic Description 04/19/2023    Final                    Value:This result contains rich text formatting which cannot be displayed here.       Assessment  Assessment & Plan   1. Uncontrolled type 2 diabetes mellitus with hyperglycemia    2. Dyslipidemia, goal LDL below 70        Plan      CGM downloaded and analyzed. He has variable glucose levels with hyperglycemia after meals. He stays up at night and sleeps during the day.  Insulin dose adjusted to match the new schedule.   He has high insulin requirements: on average takes 250-300 units per day. He may need a higher done of insulin inpatient if he has to spend longer time in the hospital   I would recommend inpatient regimen as follows:    Basal insulin 100 Units nightly  Meal insulin 50 Units tid  Correction 5 units for every 50 points over 150.        There are no Patient Instructions on file for this visit.  Hypoglycemia precautions reviewed.     I would like to make a change in GLP-1 agonist. He is currently on Bydureon and it is not as effective. Will change to Ozempic and increase the dose to 1 mg and 2 mg in a few months.     2.Cont levothyroxine 50 mcg, thyroid function ordered with next lab draw.     3. BP is normal.    -cont spironolactone, BP is normal.     Follow-up in 3-4 months

## 2023-05-12 ENCOUNTER — TELEPHONE (OUTPATIENT)
Dept: GENETICS | Facility: HOSPITAL | Age: 73
End: 2023-05-12
Payer: MEDICARE

## 2023-05-15 ENCOUNTER — TELEPHONE (OUTPATIENT)
Dept: ENDOCRINOLOGY | Facility: CLINIC | Age: 73
End: 2023-05-15
Payer: MEDICARE

## 2023-05-15 ENCOUNTER — DOCUMENTATION (OUTPATIENT)
Dept: GENETICS | Facility: HOSPITAL | Age: 73
End: 2023-05-15
Payer: MEDICARE

## 2023-05-15 RX ORDER — SPIRONOLACTONE 25 MG/1
TABLET ORAL
Qty: 90 TABLET | Refills: 3 | Status: SHIPPED | OUTPATIENT
Start: 2023-05-15

## 2023-05-15 NOTE — TELEPHONE ENCOUNTER
Patient returned my call and I disclosed his negative CancerNext genetic testing results. Informed patient these results would be on [employer] and sent to Dr. MURILLO. Patient would also like a copy mailed to him.

## 2023-05-15 NOTE — PROGRESS NOTES
Elton Funez, a 72-year-old male, was seen for genetic counseling due to a personal history of breast cancer.  Mr. Funez was recently diagnosed with an ER/NM+, HER2- breast cancer at age 72.  Mr. Funez was interested in discussing his risks for a hereditary cancer syndrome. Mr. Funez opted to pursue comprehensive testing via the CancerNext panel ordered through [employer] which includes BRCA1/2 and 34 additional genes associated with an increased risk of breast cancer or other cancers (APC, MARCUS, AXIN2, BARD1, BMPR1A, BRCA1, BRCA2, BRIP1, CDH1, CDK4, CDKN2A, CHEK2, DICER1, EPCAM, GREM1, HOXB13, MLH1, MRE11A, MSH2, MSH3, MSH6, MUTYH, NBN, NF1, NTHL1, PALB2, PMS2, POLD1, POLE, PTEN, RAD51C, RAD51D, RECQL, SMAD4, SMARCA4, STK11, and TP53). Genetic testing was negative by DNA sequencing and rearrangement testing for pathogenic germline mutations in the BRCA1/2 genes and 34 additional genes included on the CancerNext Panel (see attached results). These normal results were discussed with Mr. Funez by telephone on 5/15/23.    PERTINENT FAMILY HISTORY: (See attached pedigree)     Mother: Lymphoma, late 70s  Pat. Aunt: Unknown type of cancer    RISK ASSESSMENT:  Mr. Funez’s personal history of male breast cancer raises the possibility of a hereditary cancer syndrome.  He clearly meets NCCN guidelines criteria for BRCA1/2 testing given his personal history of male breast cancer.  We discussed that testing is typically completed via a multi-gene panel testing which tests for multiple genes associated with hereditary cancer risk. The additional genes included have varying degrees of risk associated, and some have also been associated with increased risks for other cancers.      GENETIC COUNSELING:  We reviewed the family history information in detail.  Cases of cancer follow three general patterns: sporadic, familial, and hereditary.  While most breast cancer is sporadic, some cases appear to occur in family  clusters.  These cases are said to be familial and account for 10-20% of breast cancer cases.  Familial cases may be due to a combination of shared genes and environmental factors among family members.  In even fewer cases (5-10%), the risk for cancer is inherited, and the genes responsible for the increased cancer risk are known.      Family histories typical of hereditary cancer syndromes usually include multiple first- and second-degree relatives diagnosed with cancer types that define a syndrome.  These cases tend to be diagnosed at younger-than-expected ages and can be bilateral or multifocal.  The cancer in these families follows an autosomal dominant inheritance pattern, which indicates the likely presence of a mutation in a cancer susceptibility gene.  Children and siblings of an individual known to carry a mutation have a 50% chance of inheriting that mutation, thereby inheriting the increased risk to develop cancer.  These mutations can be passed down from the maternal or the paternal lineage.    Hereditary breast cancer accounts for 5-10% of all cases of breast cancer.  A significant proportion of hereditary breast cancer can be attributed to mutations in the BRCA1 and BRCA2 genes.  Mutations in these genes confer an increased risk for breast cancer, ovarian cancer, male breast cancer, prostate cancer and pancreatic cancer.  There are other genes that contribute to risk for breast cancer and other cancers, and genetic testing is typically completed through a multigene panel.  In order to get as much information as possible regarding potential risks to himself and his relatives, Mr. Funez opted to pursue testing through a comprehensive multigene panel evaluating 36 genes associated with hereditary cancer risk.        Genetic Testing:  The risks, benefits and limitations of genetic testing and implications for clinical management following testing were reviewed.  DNA test results can influence decisions  regarding screening, prevention and surgical management.  Genetic testing can have significant psychological implications for both individuals and families.  Also discussed was the possibility of employment and insurance discrimination based on genetic test results and the laws in place to prevent this (JOSE), as well as the limitations of these laws.     The implications of a positive or negative test result were discussed.  We also discussed the possibility that, in some cases, genetic test results may be ambiguous due to the identification of a genetic variant of uncertain significance (VUS).  These variants may or may not be associated with an increased cancer risk. The limited value of negative test results was emphasized, particularly given the significant population risk for breast cancer and the existence of other cancer susceptibility genes.  Given the family history, a negative test result would not eliminate all breast cancer risk to his family members, although the risk would not be as high as it would with positive genetic testing.      TEST RESULTS:  Genetic testing was negative by sequencing, deletion/duplication testing for germline mutations in BRCA1/2 and the additional 34 genes on the CancerNext panel.  The negative result greatly lowers the risk of a hereditary cancer syndrome.      CANCER PREVENTION:  Despite the negative genetic test results, Mr. Funez’s female relatives may have a somewhat increased lifetime risk for breast cancer based on family history.  Female relatives could have a risk assessment performed using a family history-based model, such as the Tyrer-Cuzick model, to determine their individual risks.  According to an American Cancer Society expert panel, annual breast MRI should be offered to women whose lifetime risk of breast cancer is 20-25 percent or more, also starting by age 40 or earlier if indicated by family history.      PLAN:  Genetic counseling remains available for  Mr. Funez.  If Mr. Funez has any questions or concerns he is welcome to call us at [phone].    Ebonie Louie MS, [facility] [place], [facility]  Licensed Certified Genetic Counselor     Cc: Elton Ramirez MD

## 2023-05-15 NOTE — TELEPHONE ENCOUNTER
[facility] STATES THAT OZEMPIC IS NOT COVERED, BYDUREON AND TRULICITY ARE COVERED BUT PATIENT WAS CHANGED TO OZEMPIC FROM BYDUREON DUE TO INEFFECTIVENESS.     CALL BACK [phone] REF# [id number]

## 2023-05-16 ENCOUNTER — TELEPHONE (OUTPATIENT)
Dept: ENDOCRINOLOGY | Facility: CLINIC | Age: 73
End: 2023-05-16
Payer: MEDICARE

## 2023-05-16 NOTE — TELEPHONE ENCOUNTER
BENSON WITH [facility] IS CALLING ABOUT OZEMPIC 4 MG PEN, THERE IS A COVERAGE ISSUES WITH PATIENTS INSURANCE WHICH IS NOT COVERING OZEMPIC. THE ALTERNATIVES ARE METFORMIN HCL ER TABS, BYETTA PENS OR TRULICITY. THEY NEED TO SEE WHAT DR. RUVALCABA WANTS TO DO ABOUT COVERAGE ISSUES. PHONE NUMBER [phone] REF#[id number]

## 2023-05-17 ENCOUNTER — APPOINTMENT (OUTPATIENT)
Dept: GENERAL RADIOLOGY | Facility: HOSPITAL | Age: 73
End: 2023-05-17
Payer: MEDICARE

## 2023-05-17 ENCOUNTER — APPOINTMENT (OUTPATIENT)
Dept: CT IMAGING | Facility: HOSPITAL | Age: 73
End: 2023-05-17
Payer: MEDICARE

## 2023-05-17 ENCOUNTER — HOSPITAL ENCOUNTER (EMERGENCY)
Facility: HOSPITAL | Age: 73
Discharge: HOME | End: 2023-05-18
Attending: EMERGENCY MEDICINE
Payer: MEDICARE

## 2023-05-17 DIAGNOSIS — K75.81 LIVER CIRRHOSIS SECONDARY TO NASH: ICD-10-CM

## 2023-05-17 DIAGNOSIS — Z86.39 HISTORY OF HYPOTHYROIDISM: ICD-10-CM

## 2023-05-17 DIAGNOSIS — K74.60 LIVER CIRRHOSIS SECONDARY TO NASH: ICD-10-CM

## 2023-05-17 DIAGNOSIS — Z86.39 HISTORY OF HYPERLIPIDEMIA: ICD-10-CM

## 2023-05-17 DIAGNOSIS — Z86.39 HISTORY OF DIABETES MELLITUS: ICD-10-CM

## 2023-05-17 DIAGNOSIS — Z87.19 HISTORY OF GASTROESOPHAGEAL REFLUX (GERD): ICD-10-CM

## 2023-05-17 DIAGNOSIS — Z87.448 HISTORY OF CHRONIC KIDNEY DISEASE: ICD-10-CM

## 2023-05-17 DIAGNOSIS — Z85.3 HISTORY OF BREAST CANCER: ICD-10-CM

## 2023-05-17 DIAGNOSIS — Z86.79 HISTORY OF HYPERTENSION: ICD-10-CM

## 2023-05-17 DIAGNOSIS — R10.9 RIGHT SIDED ABDOMINAL PAIN: Primary | ICD-10-CM

## 2023-05-17 LAB
ALBUMIN SERPL-MCNC: 4.3 G/DL (ref 3.5–5.2)
ALBUMIN/GLOB SERPL: 1.1 G/DL
ALP SERPL-CCNC: 82 U/L (ref 39–117)
ALT SERPL W P-5'-P-CCNC: 26 U/L (ref 1–41)
ANION GAP SERPL CALCULATED.3IONS-SCNC: 15 MMOL/L (ref 5–15)
AST SERPL-CCNC: 35 U/L (ref 1–40)
B PARAPERT DNA SPEC QL NAA+PROBE: NOT DETECTED
B PERT DNA SPEC QL NAA+PROBE: NOT DETECTED
BASOPHILS # BLD AUTO: 0.01 10*3/MM3 (ref 0–0.2)
BASOPHILS NFR BLD AUTO: 0.1 % (ref 0–1.5)
BILIRUB SERPL-MCNC: 0.8 MG/DL (ref 0–1.2)
BUN SERPL-MCNC: 28 MG/DL (ref 8–23)
BUN/CREAT SERPL: 17.7 (ref 7–25)
C PNEUM DNA NPH QL NAA+NON-PROBE: NOT DETECTED
CALCIUM SPEC-SCNC: 8.8 MG/DL (ref 8.6–10.5)
CHLORIDE SERPL-SCNC: 102 MMOL/L (ref 98–107)
CO2 SERPL-SCNC: 19 MMOL/L (ref 22–29)
CREAT SERPL-MCNC: 1.58 MG/DL (ref 0.76–1.27)
D-LACTATE SERPL-SCNC: 2 MMOL/L (ref 0.5–2)
DEPRECATED RDW RBC AUTO: 49.5 FL (ref 37–54)
EGFRCR SERPLBLD CKD-EPI 2021: 45.9 ML/MIN/1.73
EOSINOPHIL # BLD AUTO: 0.05 10*3/MM3 (ref 0–0.4)
EOSINOPHIL NFR BLD AUTO: 0.5 % (ref 0.3–6.2)
ERYTHROCYTE [DISTWIDTH] IN BLOOD BY AUTOMATED COUNT: 13.7 % (ref 12.3–15.4)
FLUAV SUBTYP SPEC NAA+PROBE: NOT DETECTED
FLUBV RNA ISLT QL NAA+PROBE: NOT DETECTED
GLOBULIN UR ELPH-MCNC: 3.8 GM/DL
GLUCOSE SERPL-MCNC: 239 MG/DL (ref 65–99)
HADV DNA SPEC NAA+PROBE: NOT DETECTED
HCOV 229E RNA SPEC QL NAA+PROBE: NOT DETECTED
HCOV HKU1 RNA SPEC QL NAA+PROBE: NOT DETECTED
HCOV NL63 RNA SPEC QL NAA+PROBE: NOT DETECTED
HCOV OC43 RNA SPEC QL NAA+PROBE: NOT DETECTED
HCT VFR BLD AUTO: 38.9 % (ref 37.5–51)
HGB BLD-MCNC: 12.3 G/DL (ref 13–17.7)
HMPV RNA NPH QL NAA+NON-PROBE: NOT DETECTED
HOLD SPECIMEN: NORMAL
HPIV1 RNA ISLT QL NAA+PROBE: NOT DETECTED
HPIV2 RNA SPEC QL NAA+PROBE: NOT DETECTED
HPIV3 RNA NPH QL NAA+PROBE: NOT DETECTED
HPIV4 P GENE NPH QL NAA+PROBE: NOT DETECTED
IMM GRANULOCYTES # BLD AUTO: 0.05 10*3/MM3 (ref 0–0.05)
IMM GRANULOCYTES NFR BLD AUTO: 0.5 % (ref 0–0.5)
LIPASE SERPL-CCNC: 65 U/L (ref 13–60)
LYMPHOCYTES # BLD AUTO: 0.8 10*3/MM3 (ref 0.7–3.1)
LYMPHOCYTES NFR BLD AUTO: 7.3 % (ref 19.6–45.3)
M PNEUMO IGG SER IA-ACNC: NOT DETECTED
MCH RBC QN AUTO: 31.4 PG (ref 26.6–33)
MCHC RBC AUTO-ENTMCNC: 31.6 G/DL (ref 31.5–35.7)
MCV RBC AUTO: 99.2 FL (ref 79–97)
MONOCYTES # BLD AUTO: 1.09 10*3/MM3 (ref 0.1–0.9)
MONOCYTES NFR BLD AUTO: 10 % (ref 5–12)
NEUTROPHILS NFR BLD AUTO: 8.93 10*3/MM3 (ref 1.7–7)
NEUTROPHILS NFR BLD AUTO: 81.6 % (ref 42.7–76)
NRBC BLD AUTO-RTO: 0 /100 WBC (ref 0–0.2)
NT-PROBNP SERPL-MCNC: 101.6 PG/ML (ref 0–900)
PLATELET # BLD AUTO: 100 10*3/MM3 (ref 140–450)
PMV BLD AUTO: 12.4 FL (ref 6–12)
POTASSIUM SERPL-SCNC: 4.9 MMOL/L (ref 3.5–5.2)
PROCALCITONIN SERPL-MCNC: 0.34 NG/ML (ref 0–0.25)
PROT SERPL-MCNC: 8.1 G/DL (ref 6–8.5)
RBC # BLD AUTO: 3.92 10*6/MM3 (ref 4.14–5.8)
RHINOVIRUS RNA SPEC NAA+PROBE: NOT DETECTED
RSV RNA NPH QL NAA+NON-PROBE: NOT DETECTED
SARS-COV-2 RNA NPH QL NAA+NON-PROBE: NOT DETECTED
SODIUM SERPL-SCNC: 136 MMOL/L (ref 136–145)
TROPONIN T SERPL HS-MCNC: 34 NG/L
TROPONIN T SERPL HS-MCNC: 40 NG/L
WBC NRBC COR # BLD: 10.93 10*3/MM3 (ref 3.4–10.8)
WHOLE BLOOD HOLD COAG: NORMAL
WHOLE BLOOD HOLD SPECIMEN: NORMAL

## 2023-05-17 PROCEDURE — 83605 ASSAY OF LACTIC ACID: CPT | Performed by: PHYSICIAN ASSISTANT

## 2023-05-17 PROCEDURE — 83880 ASSAY OF NATRIURETIC PEPTIDE: CPT | Performed by: EMERGENCY MEDICINE

## 2023-05-17 PROCEDURE — 84145 PROCALCITONIN (PCT): CPT | Performed by: PHYSICIAN ASSISTANT

## 2023-05-17 PROCEDURE — 80053 COMPREHEN METABOLIC PANEL: CPT

## 2023-05-17 PROCEDURE — 84484 ASSAY OF TROPONIN QUANT: CPT | Performed by: PHYSICIAN ASSISTANT

## 2023-05-17 PROCEDURE — 0202U NFCT DS 22 TRGT SARS-COV-2: CPT | Performed by: PHYSICIAN ASSISTANT

## 2023-05-17 PROCEDURE — 74176 CT ABD & PELVIS W/O CONTRAST: CPT

## 2023-05-17 PROCEDURE — 84484 ASSAY OF TROPONIN QUANT: CPT | Performed by: EMERGENCY MEDICINE

## 2023-05-17 PROCEDURE — 36415 COLL VENOUS BLD VENIPUNCTURE: CPT

## 2023-05-17 PROCEDURE — 93005 ELECTROCARDIOGRAM TRACING: CPT

## 2023-05-17 PROCEDURE — 93005 ELECTROCARDIOGRAM TRACING: CPT | Performed by: EMERGENCY MEDICINE

## 2023-05-17 PROCEDURE — 71045 X-RAY EXAM CHEST 1 VIEW: CPT

## 2023-05-17 PROCEDURE — 99284 EMERGENCY DEPT VISIT MOD MDM: CPT

## 2023-05-17 PROCEDURE — 85025 COMPLETE CBC W/AUTO DIFF WBC: CPT

## 2023-05-17 PROCEDURE — 83690 ASSAY OF LIPASE: CPT | Performed by: PHYSICIAN ASSISTANT

## 2023-05-17 RX ORDER — SODIUM CHLORIDE 0.9 % (FLUSH) 0.9 %
10 SYRINGE (ML) INJECTION AS NEEDED
Status: DISCONTINUED | OUTPATIENT
Start: 2023-05-17 | End: 2023-05-18 | Stop reason: HOSPADM

## 2023-05-18 ENCOUNTER — PRIOR AUTHORIZATION (OUTPATIENT)
Dept: ENDOCRINOLOGY | Facility: CLINIC | Age: 73
End: 2023-05-18
Payer: MEDICARE

## 2023-05-18 VITALS
HEART RATE: 65 BPM | RESPIRATION RATE: 22 BRPM | TEMPERATURE: 99 F | OXYGEN SATURATION: 99 % | DIASTOLIC BLOOD PRESSURE: 85 MMHG | BODY MASS INDEX: 33.34 KG/M2 | WEIGHT: 220 LBS | HEIGHT: 68 IN | SYSTOLIC BLOOD PRESSURE: 160 MMHG

## 2023-05-18 LAB
QT INTERVAL: 356 MS
QTC INTERVAL: 386 MS

## 2023-05-18 NOTE — TELEPHONE ENCOUNTER
He was on alternative meds before and is changed to Ozempic which worked better for him with no side effects. I would attempt to complete PA

## 2023-05-19 RX ORDER — DULAGLUTIDE 4.5 MG/.5ML
4.5 INJECTION, SOLUTION SUBCUTANEOUS WEEKLY
Qty: 6 ML | Refills: 1 | Status: SHIPPED | OUTPATIENT
Start: 2023-05-19

## 2023-05-19 NOTE — TELEPHONE ENCOUNTER
He wa son Bydureon and it was not effective. We can send Trulicity 4.5 mg weekly and please let patient know that we have to switch because of insurance coverage.

## 2023-05-22 ENCOUNTER — ANESTHESIA EVENT (OUTPATIENT)
Dept: PERIOP | Facility: HOSPITAL | Age: 73
End: 2023-05-22
Payer: MEDICARE

## 2023-05-22 RX ORDER — SODIUM CHLORIDE 9 MG/ML
40 INJECTION, SOLUTION INTRAVENOUS AS NEEDED
Status: CANCELLED | OUTPATIENT
Start: 2023-05-22

## 2023-05-22 RX ORDER — FAMOTIDINE 10 MG/ML
20 INJECTION, SOLUTION INTRAVENOUS ONCE
Status: CANCELLED | OUTPATIENT
Start: 2023-05-22 | End: 2023-05-22

## 2023-05-22 RX ORDER — SODIUM CHLORIDE 0.9 % (FLUSH) 0.9 %
10 SYRINGE (ML) INJECTION EVERY 12 HOURS SCHEDULED
Status: CANCELLED | OUTPATIENT
Start: 2023-05-22

## 2023-05-22 RX ORDER — SODIUM CHLORIDE 0.9 % (FLUSH) 0.9 %
10 SYRINGE (ML) INJECTION AS NEEDED
Status: CANCELLED | OUTPATIENT
Start: 2023-05-22

## 2023-05-22 NOTE — TELEPHONE ENCOUNTER
. Unfortunately, Ozempic Inj 4mg/3ml is not covered by your plan benefits.  Your Medicare Advantage (MA) plan’s drug coverage is limited to only the drugs covered under your  Part B medical benefit. This plan does not include Part D prescription drug coverage. Please refer to  your Evidence of Coverage (EOC) for more information.  If you have a separate commercial pharmacy benefit or Medicare Part D benefit managed by another  plan, please contact them directly for information about how to get your prescription drug benefit

## 2023-05-23 ENCOUNTER — ANESTHESIA (OUTPATIENT)
Dept: PERIOP | Facility: HOSPITAL | Age: 73
End: 2023-05-23
Payer: MEDICARE

## 2023-05-23 ENCOUNTER — HOSPITAL ENCOUNTER (OUTPATIENT)
Dept: NUCLEAR MEDICINE | Facility: HOSPITAL | Age: 73
Discharge: HOME | End: 2023-05-23
Payer: MEDICARE

## 2023-05-23 ENCOUNTER — ANESTHESIA EVENT CONVERTED (OUTPATIENT)
Dept: ANESTHESIOLOGY | Facility: HOSPITAL | Age: 73
End: 2023-05-23
Payer: MEDICARE

## 2023-05-23 ENCOUNTER — HOSPITAL ENCOUNTER (OUTPATIENT)
Facility: HOSPITAL | Age: 73
Discharge: HOME | End: 2023-05-24
Attending: SURGERY | Admitting: SURGERY
Payer: MEDICARE

## 2023-05-23 DIAGNOSIS — C50.821 MALIGNANT NEOPLASM OF OVERLAPPING SITES OF RIGHT BREAST IN MALE, ESTROGEN RECEPTOR POSITIVE: Primary | ICD-10-CM

## 2023-05-23 DIAGNOSIS — Z17.0 MALIGNANT NEOPLASM OF OVERLAPPING SITES OF RIGHT BREAST IN MALE, ESTROGEN RECEPTOR POSITIVE: Primary | ICD-10-CM

## 2023-05-23 DIAGNOSIS — C50.821 MALIGNANT NEOPLASM OF OVERLAPPING SITES OF RIGHT MALE BREAST, UNSPECIFIED ESTROGEN RECEPTOR STATUS: ICD-10-CM

## 2023-05-23 DIAGNOSIS — C80.1 DUCTAL CARCINOMA: ICD-10-CM

## 2023-05-23 PROBLEM — C50.811 MALIGNANT NEOPLASM OF OVERLAPPING SITES OF RIGHT FEMALE BREAST: Status: ACTIVE | Noted: 2023-05-23

## 2023-05-23 LAB
GLUCOSE BLDC GLUCOMTR-MCNC: 228 MG/DL (ref 70–130)
GLUCOSE BLDC GLUCOMTR-MCNC: 250 MG/DL (ref 70–130)
GLUCOSE BLDC GLUCOMTR-MCNC: 258 MG/DL (ref 70–130)
GLUCOSE BLDC GLUCOMTR-MCNC: 387 MG/DL (ref 70–130)
GLUCOSE BLDC GLUCOMTR-MCNC: 430 MG/DL (ref 70–130)

## 2023-05-23 PROCEDURE — 82948 REAGENT STRIP/BLOOD GLUCOSE: CPT

## 2023-05-23 PROCEDURE — 25010000002 ONDANSETRON PER 1 MG: Performed by: ANESTHESIOLOGY

## 2023-05-23 PROCEDURE — 63710000001 SPIRONOLACTONE 25 MG TABLET: Performed by: SURGERY

## 2023-05-23 PROCEDURE — 63710000001 SERTRALINE 100 MG TABLET: Performed by: SURGERY

## 2023-05-23 PROCEDURE — 94660 CPAP INITIATION&MGMT: CPT

## 2023-05-23 PROCEDURE — A9270 NON-COVERED ITEM OR SERVICE: HCPCS | Performed by: SURGERY

## 2023-05-23 PROCEDURE — A9270 NON-COVERED ITEM OR SERVICE: HCPCS

## 2023-05-23 PROCEDURE — 63710000001 INSULIN DETEMIR PER 5 UNITS: Performed by: SURGERY

## 2023-05-23 PROCEDURE — 88307 TISSUE EXAM BY PATHOLOGIST: CPT | Performed by: SURGERY

## 2023-05-23 PROCEDURE — 63710000001 GABAPENTIN 400 MG CAPSULE: Performed by: SURGERY

## 2023-05-23 PROCEDURE — 25010000002 CEFAZOLIN IN DEXTROSE 2-4 GM/100ML-% SOLUTION: Performed by: SURGERY

## 2023-05-23 PROCEDURE — 88331 PATH CONSLTJ SURG 1 BLK 1SPC: CPT | Performed by: SURGERY

## 2023-05-23 PROCEDURE — 63710000001 ACETAMINOPHEN 500 MG TABLET: Performed by: SURGERY

## 2023-05-23 PROCEDURE — 25010000002 HYDROMORPHONE 1 MG/ML SOLUTION: Performed by: SURGERY

## 2023-05-23 PROCEDURE — 88333 PATH CONSLTJ SURG CYTO XM 1: CPT | Performed by: SURGERY

## 2023-05-23 PROCEDURE — 38792 RA TRACER ID OF SENTINL NODE: CPT

## 2023-05-23 PROCEDURE — 63710000001 INSULIN LISPRO (HUMAN) PER 5 UNITS

## 2023-05-23 PROCEDURE — 63710000001 OXYCODONE 5 MG TABLET

## 2023-05-23 PROCEDURE — 0 TECHNETIUM FILTERED SULFUR COLLOID: Performed by: SURGERY

## 2023-05-23 PROCEDURE — 88334 PATH CONSLTJ SURG CYTO XM EA: CPT | Performed by: SURGERY

## 2023-05-23 PROCEDURE — A9541 TC99M SULFUR COLLOID: HCPCS | Performed by: SURGERY

## 2023-05-23 PROCEDURE — 25010000002 DEXAMETHASONE SODIUM PHOSPHATE 10 MG/ML SOLUTION: Performed by: ANESTHESIOLOGY

## 2023-05-23 PROCEDURE — 25010000002 FENTANYL CITRATE (PF) 50 MCG/ML SOLUTION

## 2023-05-23 PROCEDURE — 63710000001 CARVEDILOL 6.25 MG TABLET: Performed by: SURGERY

## 2023-05-23 PROCEDURE — 63710000001 OXYCODONE 5 MG TABLET: Performed by: SURGERY

## 2023-05-23 PROCEDURE — 25010000002 SUGAMMADEX 200 MG/2ML SOLUTION: Performed by: ANESTHESIOLOGY

## 2023-05-23 PROCEDURE — 63710000001 INSULIN LISPRO (HUMAN) PER 5 UNITS: Performed by: SURGERY

## 2023-05-23 PROCEDURE — 25010000002 PROPOFOL 10 MG/ML EMULSION: Performed by: ANESTHESIOLOGY

## 2023-05-23 PROCEDURE — 94799 UNLISTED PULMONARY SVC/PX: CPT

## 2023-05-23 PROCEDURE — 25010000002 FENTANYL CITRATE (PF) 100 MCG/2ML SOLUTION: Performed by: ANESTHESIOLOGY

## 2023-05-23 RX ORDER — ACETAMINOPHEN 500 MG
1000 TABLET ORAL EVERY 6 HOURS
Status: DISCONTINUED | OUTPATIENT
Start: 2023-05-23 | End: 2023-05-24 | Stop reason: HOSPADM

## 2023-05-23 RX ORDER — TEMAZEPAM 15 MG/1
15 CAPSULE ORAL NIGHTLY PRN
Status: DISCONTINUED | OUTPATIENT
Start: 2023-05-23 | End: 2023-05-24 | Stop reason: HOSPADM

## 2023-05-23 RX ORDER — ENALAPRILAT 2.5 MG/2ML
1.25 INJECTION INTRAVENOUS EVERY 6 HOURS PRN
Status: DISCONTINUED | OUTPATIENT
Start: 2023-05-23 | End: 2023-05-24 | Stop reason: HOSPADM

## 2023-05-23 RX ORDER — CARVEDILOL 6.25 MG/1
6.25 TABLET ORAL EVERY 12 HOURS
Status: DISCONTINUED | OUTPATIENT
Start: 2023-05-23 | End: 2023-05-24 | Stop reason: HOSPADM

## 2023-05-23 RX ORDER — OXYCODONE HYDROCHLORIDE 5 MG/1
TABLET ORAL
Status: COMPLETED
Start: 2023-05-23 | End: 2023-05-23

## 2023-05-23 RX ORDER — PANTOPRAZOLE SODIUM 40 MG/1
40 TABLET, DELAYED RELEASE ORAL
Status: DISCONTINUED | OUTPATIENT
Start: 2023-05-24 | End: 2023-05-24 | Stop reason: HOSPADM

## 2023-05-23 RX ORDER — FENTANYL CITRATE 50 UG/ML
50 INJECTION, SOLUTION INTRAMUSCULAR; INTRAVENOUS
Status: DISCONTINUED | OUTPATIENT
Start: 2023-05-23 | End: 2023-05-23 | Stop reason: HOSPADM

## 2023-05-23 RX ORDER — SODIUM CHLORIDE, SODIUM LACTATE, POTASSIUM CHLORIDE, CALCIUM CHLORIDE 600; 310; 30; 20 MG/100ML; MG/100ML; MG/100ML; MG/100ML
INJECTION, SOLUTION INTRAVENOUS CONTINUOUS PRN
Status: DISCONTINUED | OUTPATIENT
Start: 2023-05-23 | End: 2023-05-23 | Stop reason: SURG

## 2023-05-23 RX ORDER — EPHEDRINE SULFATE 50 MG/ML
5 INJECTION, SOLUTION INTRAVENOUS ONCE AS NEEDED
Status: DISCONTINUED | OUTPATIENT
Start: 2023-05-23 | End: 2023-05-23 | Stop reason: HOSPADM

## 2023-05-23 RX ORDER — INSULIN LISPRO 100 [IU]/ML
3 INJECTION, SOLUTION INTRAVENOUS; SUBCUTANEOUS ONCE
Status: COMPLETED | OUTPATIENT
Start: 2023-05-23 | End: 2023-05-23

## 2023-05-23 RX ORDER — INSULIN LISPRO 100 [IU]/ML
1-200 INJECTION, SOLUTION INTRAVENOUS; SUBCUTANEOUS AS NEEDED
Status: DISCONTINUED | OUTPATIENT
Start: 2023-05-23 | End: 2023-05-24 | Stop reason: HOSPADM

## 2023-05-23 RX ORDER — FENTANYL CITRATE 50 UG/ML
INJECTION, SOLUTION INTRAMUSCULAR; INTRAVENOUS
Status: COMPLETED
Start: 2023-05-23 | End: 2023-05-23

## 2023-05-23 RX ORDER — NICOTINE POLACRILEX 4 MG
15 LOZENGE BUCCAL
Status: DISCONTINUED | OUTPATIENT
Start: 2023-05-23 | End: 2023-05-24 | Stop reason: HOSPADM

## 2023-05-23 RX ORDER — ROCURONIUM BROMIDE 10 MG/ML
INJECTION, SOLUTION INTRAVENOUS AS NEEDED
Status: DISCONTINUED | OUTPATIENT
Start: 2023-05-23 | End: 2023-05-23 | Stop reason: SURG

## 2023-05-23 RX ORDER — SODIUM CHLORIDE, SODIUM LACTATE, POTASSIUM CHLORIDE, CALCIUM CHLORIDE 600; 310; 30; 20 MG/100ML; MG/100ML; MG/100ML; MG/100ML
9 INJECTION, SOLUTION INTRAVENOUS CONTINUOUS
Status: DISCONTINUED | OUTPATIENT
Start: 2023-05-23 | End: 2023-05-24 | Stop reason: HOSPADM

## 2023-05-23 RX ORDER — ONDANSETRON 2 MG/ML
4 INJECTION INTRAMUSCULAR; INTRAVENOUS ONCE AS NEEDED
Status: DISCONTINUED | OUTPATIENT
Start: 2023-05-23 | End: 2023-05-23 | Stop reason: HOSPADM

## 2023-05-23 RX ORDER — MELOXICAM 15 MG/1
15 TABLET ORAL ONCE
Status: COMPLETED | OUTPATIENT
Start: 2023-05-23 | End: 2023-05-23

## 2023-05-23 RX ORDER — NALOXONE HCL 0.4 MG/ML
0.1 VIAL (ML) INJECTION
Status: DISCONTINUED | OUTPATIENT
Start: 2023-05-23 | End: 2023-05-24 | Stop reason: HOSPADM

## 2023-05-23 RX ORDER — DEXAMETHASONE SODIUM PHOSPHATE 10 MG/ML
INJECTION, SOLUTION INTRAMUSCULAR; INTRAVENOUS
Status: COMPLETED | OUTPATIENT
Start: 2023-05-23 | End: 2023-05-23

## 2023-05-23 RX ORDER — PROPOFOL 10 MG/ML
VIAL (ML) INTRAVENOUS AS NEEDED
Status: DISCONTINUED | OUTPATIENT
Start: 2023-05-23 | End: 2023-05-23 | Stop reason: SURG

## 2023-05-23 RX ORDER — MELOXICAM 15 MG/1
15 TABLET ORAL DAILY
Status: DISCONTINUED | OUTPATIENT
Start: 2023-05-24 | End: 2023-05-24 | Stop reason: HOSPADM

## 2023-05-23 RX ORDER — SPIRONOLACTONE 25 MG/1
25 TABLET ORAL DAILY
Status: DISCONTINUED | OUTPATIENT
Start: 2023-05-23 | End: 2023-05-24 | Stop reason: HOSPADM

## 2023-05-23 RX ORDER — INSULIN LISPRO 100 [IU]/ML
INJECTION, SOLUTION INTRAVENOUS; SUBCUTANEOUS
Status: COMPLETED
Start: 2023-05-23 | End: 2023-05-23

## 2023-05-23 RX ORDER — ONDANSETRON 2 MG/ML
4 INJECTION INTRAMUSCULAR; INTRAVENOUS EVERY 6 HOURS PRN
Status: DISCONTINUED | OUTPATIENT
Start: 2023-05-23 | End: 2023-05-24 | Stop reason: HOSPADM

## 2023-05-23 RX ORDER — LIDOCAINE HYDROCHLORIDE 10 MG/ML
INJECTION, SOLUTION EPIDURAL; INFILTRATION; INTRACAUDAL; PERINEURAL AS NEEDED
Status: DISCONTINUED | OUTPATIENT
Start: 2023-05-23 | End: 2023-05-23 | Stop reason: SURG

## 2023-05-23 RX ORDER — NALOXONE HCL 0.4 MG/ML
0.4 VIAL (ML) INJECTION AS NEEDED
Status: DISCONTINUED | OUTPATIENT
Start: 2023-05-23 | End: 2023-05-23 | Stop reason: HOSPADM

## 2023-05-23 RX ORDER — OXYCODONE HYDROCHLORIDE 5 MG/1
5 TABLET ORAL EVERY 4 HOURS PRN
Status: DISCONTINUED | OUTPATIENT
Start: 2023-05-23 | End: 2023-05-24 | Stop reason: HOSPADM

## 2023-05-23 RX ORDER — FENTANYL CITRATE 50 UG/ML
INJECTION, SOLUTION INTRAMUSCULAR; INTRAVENOUS AS NEEDED
Status: DISCONTINUED | OUTPATIENT
Start: 2023-05-23 | End: 2023-05-23 | Stop reason: SURG

## 2023-05-23 RX ORDER — ACETAMINOPHEN 500 MG
1000 TABLET ORAL ONCE
Status: COMPLETED | OUTPATIENT
Start: 2023-05-23 | End: 2023-05-23

## 2023-05-23 RX ORDER — EPHEDRINE SULFATE 50 MG/ML
INJECTION INTRAVENOUS AS NEEDED
Status: DISCONTINUED | OUTPATIENT
Start: 2023-05-23 | End: 2023-05-23 | Stop reason: SURG

## 2023-05-23 RX ORDER — CEFAZOLIN SODIUM 2 G/100ML
2 INJECTION, SOLUTION INTRAVENOUS ONCE
Status: COMPLETED | OUTPATIENT
Start: 2023-05-23 | End: 2023-05-23

## 2023-05-23 RX ORDER — SERTRALINE HYDROCHLORIDE 100 MG/1
100 TABLET, FILM COATED ORAL DAILY
Status: DISCONTINUED | OUTPATIENT
Start: 2023-05-23 | End: 2023-05-24 | Stop reason: HOSPADM

## 2023-05-23 RX ORDER — SODIUM CHLORIDE, SODIUM LACTATE, POTASSIUM CHLORIDE, CALCIUM CHLORIDE 600; 310; 30; 20 MG/100ML; MG/100ML; MG/100ML; MG/100ML
100 INJECTION, SOLUTION INTRAVENOUS CONTINUOUS
Status: DISCONTINUED | OUTPATIENT
Start: 2023-05-23 | End: 2023-05-23

## 2023-05-23 RX ORDER — MIDAZOLAM HYDROCHLORIDE 1 MG/ML
0.5 INJECTION INTRAMUSCULAR; INTRAVENOUS
Status: DISCONTINUED | OUTPATIENT
Start: 2023-05-23 | End: 2023-05-23 | Stop reason: HOSPADM

## 2023-05-23 RX ORDER — LEVOTHYROXINE SODIUM 0.05 MG/1
50 TABLET ORAL DAILY
Status: DISCONTINUED | OUTPATIENT
Start: 2023-05-24 | End: 2023-05-24 | Stop reason: HOSPADM

## 2023-05-23 RX ORDER — PREGABALIN 75 MG/1
75 CAPSULE ORAL ONCE
Status: COMPLETED | OUTPATIENT
Start: 2023-05-23 | End: 2023-05-23

## 2023-05-23 RX ORDER — DIPHENHYDRAMINE HYDROCHLORIDE 50 MG/ML
12.5 INJECTION INTRAMUSCULAR; INTRAVENOUS EVERY 6 HOURS PRN
Status: DISCONTINUED | OUTPATIENT
Start: 2023-05-23 | End: 2023-05-24 | Stop reason: HOSPADM

## 2023-05-23 RX ORDER — PROMETHAZINE HYDROCHLORIDE 12.5 MG/1
6.25 SUPPOSITORY RECTAL EVERY 6 HOURS PRN
Status: DISCONTINUED | OUTPATIENT
Start: 2023-05-23 | End: 2023-05-24 | Stop reason: HOSPADM

## 2023-05-23 RX ORDER — CEFAZOLIN SODIUM 2 G/100ML
2 INJECTION, SOLUTION INTRAVENOUS EVERY 8 HOURS
Status: COMPLETED | OUTPATIENT
Start: 2023-05-23 | End: 2023-05-24

## 2023-05-23 RX ORDER — INSULIN LISPRO 100 [IU]/ML
1-200 INJECTION, SOLUTION INTRAVENOUS; SUBCUTANEOUS
Status: DISCONTINUED | OUTPATIENT
Start: 2023-05-23 | End: 2023-05-24 | Stop reason: HOSPADM

## 2023-05-23 RX ORDER — PROMETHAZINE HYDROCHLORIDE 12.5 MG/1
6.25 TABLET ORAL EVERY 6 HOURS PRN
Status: DISCONTINUED | OUTPATIENT
Start: 2023-05-23 | End: 2023-05-24 | Stop reason: HOSPADM

## 2023-05-23 RX ORDER — MAGNESIUM HYDROXIDE 1200 MG/15ML
LIQUID ORAL AS NEEDED
Status: DISCONTINUED | OUTPATIENT
Start: 2023-05-23 | End: 2023-05-23 | Stop reason: HOSPADM

## 2023-05-23 RX ORDER — ONDANSETRON 2 MG/ML
INJECTION INTRAMUSCULAR; INTRAVENOUS AS NEEDED
Status: DISCONTINUED | OUTPATIENT
Start: 2023-05-23 | End: 2023-05-23 | Stop reason: SURG

## 2023-05-23 RX ORDER — LIDOCAINE HYDROCHLORIDE 10 MG/ML
0.5 INJECTION, SOLUTION EPIDURAL; INFILTRATION; INTRACAUDAL; PERINEURAL ONCE AS NEEDED
Status: COMPLETED | OUTPATIENT
Start: 2023-05-23 | End: 2023-05-23

## 2023-05-23 RX ORDER — GABAPENTIN 400 MG/1
800 CAPSULE ORAL EVERY 8 HOURS SCHEDULED
Status: DISCONTINUED | OUTPATIENT
Start: 2023-05-23 | End: 2023-05-24 | Stop reason: HOSPADM

## 2023-05-23 RX ORDER — DEXTROSE MONOHYDRATE 25 G/50ML
10-50 INJECTION, SOLUTION INTRAVENOUS
Status: DISCONTINUED | OUTPATIENT
Start: 2023-05-23 | End: 2023-05-24 | Stop reason: HOSPADM

## 2023-05-23 RX ORDER — BUPIVACAINE HYDROCHLORIDE 2.5 MG/ML
INJECTION, SOLUTION EPIDURAL; INFILTRATION; INTRACAUDAL
Status: COMPLETED | OUTPATIENT
Start: 2023-05-23 | End: 2023-05-23

## 2023-05-23 RX ORDER — SODIUM CHLORIDE 9 MG/ML
50 INJECTION, SOLUTION INTRAVENOUS CONTINUOUS
Status: DISCONTINUED | OUTPATIENT
Start: 2023-05-23 | End: 2023-05-24

## 2023-05-23 RX ORDER — DEXAMETHASONE SODIUM PHOSPHATE 10 MG/ML
INJECTION, SOLUTION INTRAMUSCULAR; INTRAVENOUS AS NEEDED
Status: DISCONTINUED | OUTPATIENT
Start: 2023-05-23 | End: 2023-05-23 | Stop reason: SURG

## 2023-05-23 RX ORDER — FAMOTIDINE 20 MG/1
20 TABLET, FILM COATED ORAL ONCE
Status: COMPLETED | OUTPATIENT
Start: 2023-05-23 | End: 2023-05-23

## 2023-05-23 RX ADMIN — CEFAZOLIN SODIUM 2 G: 2 INJECTION, SOLUTION INTRAVENOUS at 12:17

## 2023-05-23 RX ADMIN — SUGAMMADEX 200 MG: 100 INJECTION, SOLUTION INTRAVENOUS at 13:09

## 2023-05-23 RX ADMIN — INSULIN LISPRO 7 UNITS: 100 INJECTION, SOLUTION INTRAVENOUS; SUBCUTANEOUS at 20:33

## 2023-05-23 RX ADMIN — FAMOTIDINE 20 MG: 20 TABLET, FILM COATED ORAL at 11:17

## 2023-05-23 RX ADMIN — SODIUM CHLORIDE, POTASSIUM CHLORIDE, SODIUM LACTATE AND CALCIUM CHLORIDE: 600; 310; 30; 20 INJECTION, SOLUTION INTRAVENOUS at 12:08

## 2023-05-23 RX ADMIN — OXYCODONE 5 MG: 5 TABLET ORAL at 15:09

## 2023-05-23 RX ADMIN — SPIRONOLACTONE 25 MG: 25 TABLET ORAL at 17:02

## 2023-05-23 RX ADMIN — SODIUM CHLORIDE 50 ML/HR: 9 INJECTION, SOLUTION INTRAVENOUS at 17:03

## 2023-05-23 RX ADMIN — OXYCODONE 5 MG: 5 TABLET ORAL at 21:47

## 2023-05-23 RX ADMIN — TECHNETIUM TC 99M SULFUR COLLOID 1 DOSE: KIT at 12:20

## 2023-05-23 RX ADMIN — SERTRALINE 100 MG: 100 TABLET, FILM COATED ORAL at 17:02

## 2023-05-23 RX ADMIN — HYDROMORPHONE HYDROCHLORIDE 0.3 MG: 1 INJECTION, SOLUTION INTRAMUSCULAR; INTRAVENOUS; SUBCUTANEOUS at 17:02

## 2023-05-23 RX ADMIN — PROPOFOL 25 MCG/KG/MIN: 10 INJECTION, EMULSION INTRAVENOUS at 12:17

## 2023-05-23 RX ADMIN — ONDANSETRON 4 MG: 2 INJECTION INTRAMUSCULAR; INTRAVENOUS at 13:03

## 2023-05-23 RX ADMIN — FENTANYL CITRATE 100 MCG: 50 INJECTION, SOLUTION INTRAMUSCULAR; INTRAVENOUS at 12:12

## 2023-05-23 RX ADMIN — LIDOCAINE HYDROCHLORIDE 0.5 ML: 10 INJECTION, SOLUTION EPIDURAL; INFILTRATION; INTRACAUDAL; PERINEURAL at 11:10

## 2023-05-23 RX ADMIN — INSULIN LISPRO 3 UNITS: 100 INJECTION, SOLUTION INTRAVENOUS; SUBCUTANEOUS at 15:22

## 2023-05-23 RX ADMIN — MELOXICAM 15 MG: 15 TABLET ORAL at 11:17

## 2023-05-23 RX ADMIN — BUPIVACAINE HYDROCHLORIDE 30 ML: 2.5 INJECTION, SOLUTION EPIDURAL; INFILTRATION; INTRACAUDAL; PERINEURAL at 12:10

## 2023-05-23 RX ADMIN — FENTANYL CITRATE 50 MCG: 50 INJECTION, SOLUTION INTRAMUSCULAR; INTRAVENOUS at 14:01

## 2023-05-23 RX ADMIN — ACETAMINOPHEN 1000 MG: 500 TABLET ORAL at 11:10

## 2023-05-23 RX ADMIN — EPHEDRINE SULFATE 10 MG: 50 INJECTION INTRAVENOUS at 12:29

## 2023-05-23 RX ADMIN — INSULIN LISPRO 7 UNITS: 100 INJECTION, SOLUTION INTRAVENOUS; SUBCUTANEOUS at 18:56

## 2023-05-23 RX ADMIN — DEXAMETHASONE SODIUM PHOSPHATE 8 MG: 10 INJECTION, SOLUTION INTRAMUSCULAR; INTRAVENOUS at 12:16

## 2023-05-23 RX ADMIN — EPHEDRINE SULFATE 10 MG: 50 INJECTION INTRAVENOUS at 12:54

## 2023-05-23 RX ADMIN — PREGABALIN 75 MG: 75 CAPSULE ORAL at 11:10

## 2023-05-23 RX ADMIN — ROCURONIUM BROMIDE 50 MG: 10 SOLUTION INTRAVENOUS at 12:12

## 2023-05-23 RX ADMIN — GABAPENTIN 800 MG: 400 CAPSULE ORAL at 17:02

## 2023-05-23 RX ADMIN — PROPOFOL 300 MG: 10 INJECTION, EMULSION INTRAVENOUS at 12:12

## 2023-05-23 RX ADMIN — ACETAMINOPHEN 1000 MG: 500 TABLET ORAL at 23:51

## 2023-05-23 RX ADMIN — INSULIN DETEMIR 26 UNITS: 100 INJECTION, SOLUTION SUBCUTANEOUS at 20:33

## 2023-05-23 RX ADMIN — ACETAMINOPHEN 1000 MG: 500 TABLET ORAL at 18:56

## 2023-05-23 RX ADMIN — DEXAMETHASONE SODIUM PHOSPHATE 2 MG: 10 INJECTION, SOLUTION INTRAMUSCULAR; INTRAVENOUS at 12:10

## 2023-05-23 RX ADMIN — ENALAPRILAT 1.25 MG: 1.25 INJECTION INTRAVENOUS at 21:07

## 2023-05-23 RX ADMIN — SODIUM CHLORIDE, POTASSIUM CHLORIDE, SODIUM LACTATE AND CALCIUM CHLORIDE 9 ML/HR: 600; 310; 30; 20 INJECTION, SOLUTION INTRAVENOUS at 11:17

## 2023-05-23 RX ADMIN — GABAPENTIN 800 MG: 400 CAPSULE ORAL at 21:47

## 2023-05-23 RX ADMIN — LIDOCAINE HYDROCHLORIDE 50 MG: 10 INJECTION, SOLUTION EPIDURAL; INFILTRATION; INTRACAUDAL; PERINEURAL at 12:12

## 2023-05-23 RX ADMIN — CEFAZOLIN SODIUM 2 G: 2 INJECTION, SOLUTION INTRAVENOUS at 20:01

## 2023-05-23 RX ADMIN — CARVEDILOL 6.25 MG: 6.25 TABLET, FILM COATED ORAL at 20:01

## 2023-05-23 NOTE — BRIEF OP NOTE
BREAST MASTECTOMY WITH SENTINEL NODE BIOPSY  Progress Note    Elton Funez  5/23/2023    Pre-op Diagnosis:   Right breast cancer at 12 o'clock position       Post-Op Diagnosis Codes:     * Malignant neoplasm of overlapping sites of right female breast [C50.811]    Procedure/CPT® Codes:        Procedure(s):  RIGHT BREAST MASTECTOMY WITH RIGHT SENTINEL NODE BIOPSY              Surgeon(s):  Joseph Ramirez MD    Anesthesia: General    Staff:   Circulator: Tay Cordova RN; Maggie De Leon RN  Scrub Person: Prerna Santa RN; Dorina Kasper  Nursing Assistant: Iman Gregg PCT  Assistant: Tuan Potter PA  Assistant: Tuan Potter PA      Estimated Blood Loss: 25 mL    Urine Voided: * No values recorded between 5/23/2023 12:08 PM and 5/23/2023  1:13 PM *    Specimens:                Specimens     ID Source Type Tests Collected By Collected At Frozen?    A [place] Lymph Node Tissue · TISSUE PATHOLOGY EXAM   Joseph Ramirez MD 5/23/23 1239 Yes    Description: Right [place] Lymph Node    B Breast, Right Tissue · TISSUE PATHOLOGY EXAM   Joseph Ramirez MD 5/23/23 1240 No    Description: suture is lateral    This specimen was not marked as sent.                Drains:   Closed/Suction Drain 1 Right Breast Bulb 15 Fr. (Active)       Findings: 2 right sentinel lymph nodes negative for macrometastases        Complications: None    Assistant: Tuan Potter PA  was responsible for performing the following activities: Retraction, Suction, Suturing, Closing and Placing Dressing and their skilled assistance was necessary for the success of this case.    Joseph Ramirez MD     Date: 5/23/2023  Time: 13:16 EDT

## 2023-05-23 NOTE — ANESTHESIA POSTPROCEDURE EVALUATION
Patient: Elton Funez    Procedure Summary     Date: 05/23/23 Room / Location:  [facility] /  [facility]    Anesthesia Start: 1207 Anesthesia Stop: 1329    Procedure: RIGHT BREAST MASTECTOMY WITH RIGHT SENTINEL NODE BIOPSY (Right: Breast) Diagnosis: Malignant neoplasm of overlapping sites of right female breast    Surgeons: Joseph Ramirez MD Provider: Arnaldo Luciano MD    Anesthesia Type: general ASA Status: 3          Anesthesia Type: general    Vitals  Vitals Value Taken Time   /49    Temp 97.5    Pulse 65 05/23/23 1328   Resp 15    SpO2 94 % 05/23/23 1328   Vitals shown include unvalidated device data.        Post Anesthesia Care and Evaluation    Patient location during evaluation: PACU  Patient participation: complete - patient participated  Level of consciousness: sleepy but conscious  Pain score: 0  Pain management: adequate    Airway patency: patent  Anesthetic complications: No anesthetic complications  PONV Status: none  Cardiovascular status: hemodynamically stable and acceptable  Respiratory status: nonlabored ventilation, acceptable, nasal cannula and oral airway  Hydration status: acceptable

## 2023-05-23 NOTE — INTERVAL H&P NOTE
"[facility] Pre-op    Full history and physical note from office is attached.    /81 (BP Location: Right arm, Patient Position: Lying)   Pulse 59   Temp 97.2 °F (36.2 °C) (Temporal)   Resp 18   Ht 172.7 cm (68\")   Wt 102 kg (224 lb)   SpO2 97%   BMI 34.06 kg/m²       LAB Results:  Lab Results   Component Value Date    WBC 10.93 (H) 05/17/2023    HGB 12.3 (L) 05/17/2023    HCT 38.9 05/17/2023    MCV 99.2 (H) 05/17/2023     (L) 05/17/2023    NEUTROABS 8.93 (H) 05/17/2023    GLUCOSE 239 (H) 05/17/2023    BUN 28 (H) 05/17/2023    CREATININE 1.58 (H) 05/17/2023    EGFRIFNONA 60 (L) 12/15/2021    EGFRIFAFRI 66 07/14/2015     05/17/2023    K 4.9 05/17/2023     05/17/2023    CO2 19.0 (L) 05/17/2023    PHOS 4.0 02/21/2023    CALCIUM 8.8 05/17/2023    ALBUMIN 4.3 05/17/2023    AST 35 05/17/2023    ALT 26 05/17/2023    BILITOT 0.8 05/17/2023    PTT 26.7 08/30/2021    INR 1.14 08/30/2021     Final Diagnosis   RIGHT BREAST, 12:00, ULTRASOUND-GUIDED BIOPSY:  Invasive ductal carcinoma, intermediate grade.  Estrogen receptor and progesterone receptor positive by immunohistochemistry.  HER2/saturnino negative by immunohistochemistry.   Electronically signed by Harley Marin MD on 4/21/2023 at 1029   Comment    Biopsy show an invasive tumor with minimal tubule formation (3), moderate nuclear pleomorphism (2) and low mitotic activity (1) for a modified Trinh-Garcia score 6/9.   Gross Description    1. Breast, Right.  Received in formalin labeled right breast ultrasound biopsy is a 0.8 x 0.6 x 0.2 cm aggregate of yellow-pink fibrofatty breast tissue fragments placed in formalin at 1203 on 4/19/2023, now submitted in block 1A.  The cold ischemic time is less than 60 minutes, total time in formalin is greater than 6 and less than 72 hours.  HDM      Special Stains    IMMUNOHISTOCHEMICAL RESULTS (IHC):  Estrogen Receptor            RESULT: Positive        90%          3+ (INTENSITY SCORE) "   Progesterone Receptor     RESULT: Positive        66%          3+ (INTENSITY SCORE)   Her2/León oncoprotein       RESULT: Negative        33%          1+ (INTENSITY SCORE)     Cancer Staging (if applicable)  Cancer Patient: _x_ yes __no __unknown__N/A; If yes, clinical stage II, T:_2_ N:_0_M:_0_, stage group or __N/A      Impression: Stage II (T2, N0, M0) IDC right breast; ER/HI+ HER2-      Plan: RIGHT BREAST MASTECTOMY WITH RIGHT SENTINEL NODE BIOPSY      Amy Marion, APRN   5/23/2023   10:51 EDT

## 2023-05-23 NOTE — ANESTHESIA PROCEDURE NOTES
Peripheral Block      Patient reassessed immediately prior to procedure    Patient location during procedure: OR  Reason for block: at surgeon's request and post-op pain management  Performed by  Anesthesiologist: Arnaldo Luciano MD  Preanesthetic Checklist  Completed: patient identified, IV checked, site marked, risks and benefits discussed, surgical consent, monitors and equipment checked, pre-op evaluation and timeout performed  Prep:  Pt Position: supine  Sterile barriers:cap, gloves, mask and washed/disinfected hands  Prep: ChloraPrep  Patient monitoring: blood pressure monitoring, continuous pulse oximetry and EKG  Procedure  Performed under: general  Guidance:ultrasound guided and landmark technique  Images:still images obtained, printed/placed on chart    Laterality:right  Block Type:PECS I and PECS II  Injection Technique:single-shot  Needle Type:short-bevel  Needle Gauge:20 G  Resistance on Injection: none    Medications Used: bupivacaine PF (MARCAINE) 0.25 % injection - Injection   30 mL - 5/23/2023 12:10:00 PM  dexamethasone sodium phosphate injection - Injection   2 mg - 5/23/2023 12:10:00 PM      Medications  Preservative Free Saline:10ml  Comment:Block Injection:         Post Assessment  Injection Assessment: negative aspiration for heme, incremental injection and no paresthesia on injection  Patient Tolerance:comfortable throughout block  Complications:no  Additional Notes  Interpectoral-Pectoserratus Plane   A high-frequency linear transducer, with sterile cover, was placed medial to the coracoid process in the paramedian sagittal plane. The transducer was moved caudally to the 4th rib and rotated slightly to allow an in-plane needle trajectory from medial to lateral. Pectoralis Major Muscle (PMM), Pectoralis Minor Muscle (PmM), Thoracoacromial Artery, Ribs, and Pleura were identified under ultrasound. The insertion site was prepped in sterile fashion and then localized with 2-5 ml of 1%  "Lidocaine. Using ultrasound-guidance, a 20-gauge B-Patel 4\" Ultraplex 360 non-stimulating echogenic needle was advanced in plane until the tip of the needle was in the fascial plane between the PMM and PmM, lateral to the Thoracoacromial Artery. 1-3ml of preservative free normal saline was used to hydro-dissect the fascial planes. After the fascial plane was verified, 10ml local anesthetic (LA) was injected for Interpectoral fascial plane block. The needle was continued along the same path to the level of the 4th rib below PmM.  Initially preservative free normal saline was used to confirm needle position and then 20 ml of LA was injected for Pectoserratus fascial plane block. Aspiration every 5 ml to prevent intravascular injection. Injection was completed with negative aspiration of blood and negative intravascular injection. Injection pressures were normal with minimal resistance.             "

## 2023-05-23 NOTE — ED PROVIDER NOTES
EMERGENCY DEPARTMENT ENCOUNTER    Pt Name: Elton Fnuez  MRN: [id number]  Pt :   1950  Room Number:    Date of encounter:  2023  PCP: Tc Ramirez MD  ED Provider: GONZALO Lane    Historian:  Patient    HPI:  Chief Complaint: Abdominal Pain    Context: Elton Funez is a 72 y.o. male who presents to the ED c/o pain in his epigastric region that radiates to his ribs.  He reports the pain is worse on his right side.  He states that he has been somewhat nauseous.  He denies vomiting.  He shares that he is eating normally.  He is having regular bowel movements.  He denies any urinary complaints.  He shares that he is scheduled for an upcoming breast maxillectomy with the lymph node biopsy and wanted to make sure that his symptoms warrant something else that would prevent him from having his surgical procedure.  He denies any specific aggravating or alleviating factors.  Reports no additional associated symptoms on exam.  HPI     REVIEW OF SYSTEMS  A chief complaint appropriate review of systems was completed and is negative except as noted in the HPI.     PAST MEDICAL HISTORY  Past Medical History:   Diagnosis Date   • Abdominal pain 2023    RUQ; SEEN IN ED AT [facility]; DC'D HOME   • Abscess of arm, right    • Abscess of skin of neck    • Acute sinusitis    • ALT (SGPT) level raised    • Arthritis    • BPH (benign prostatic hypertrophy)    • Breast cancer 2023    right   • Cirrhosis of liver 2021    JEROME   • CKD (chronic kidney disease)    • Colon polyp    • Constipation    • DDD (degenerative disc disease), cervical    • Decreased platelet count    • Depression     Resolved: 2015   • Elevated AST (SGOT)    • Erectile dysfunction    • GERD (gastroesophageal reflux disease)    • History of transfusion     [facility]; 3 UNITS; NO REACTION   • Hyperlipidemia    • Hypertension    • Hypothyroidism, adult 11/10/2020   • Infection     MSSA lumbar surgical wound infection 3/2017    • Jaw pain    • Left hip pain    • Low back pain     Surgery 30 yrs L4-5   • Migraine     Hx of   • Obesity (BMI 30.0-34.9) 10/07/2016    BMI 31.92   • Obstructive sleep apnea     CPAP   • Portal hypertensive gastropathy 09/03/2021   • Shigellosis    • Sleep apnea     PT TO BRING MASK AND TUBING DAY OF SURGERY   • Symptomatic anemia 08/30/2021   • Type 2 diabetes mellitus    • 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    • Wears glasses    • Wears hearing aid     BOTH EARS       PAST SURGICAL HISTORY  Past Surgical History:   Procedure Laterality Date   • ANTERIOR CERVICAL DISCECTOMY W/ FUSION N/A 12/14/2017    Procedure: CERVICAL DISCECTOMY ANTERIOR FUSION WITH INSTRUMENTATION C7/T1;  Surgeon: Gigi Perales MD;  Location:  [facility];  Service:    • CERVICAL ARTHRODESIS     • CERVICAL DISCECTOMY POSTERIOR FUSION WITH INSTRUMENTATION N/A 03/16/2017    Procedure:  INCISION AND DRAINAGE OF POSTERIOR CERVICAL WOUND;  Surgeon: Gigi Perales MD;  Location:  [facility];  Service:    • CERVICAL LAMINECTOMY DECOMPRESSION POSTERIOR Left 02/28/2017    Procedure: Left C7-T1 CERVICAL FORAMINOTOMY POSTERIOR WITH METRIX;  Surgeon: Gigi Perales MD;  Location:  [facility];  Service:    • COLONOSCOPY N/A 09/01/2021    Procedure: COLONOSCOPY;  Surgeon: Sharif García MD;  Location:  [facility];  Service: Gastroenterology;  Laterality: N/A;   • CYST REMOVAL  04/2021   • ENDOSCOPY N/A 08/31/2021    Procedure: ESOPHAGOGASTRODUODENOSCOPY;  Surgeon: Brunner, Mark I, MD;  Location:  [facility];  Service: Gastroenterology;  Laterality: N/A;   • VASECTOMY     • WISDOM TOOTH EXTRACTION         FAMILY HISTORY  Family History   Problem Relation Age of Onset   • Diabetes Father    • Heart disease Father         Cardiomyopathy   • Hyperlipidemia Father    • Stroke Father    • Hypertension Father    • Diabetes Paternal Grandmother    • Hearing loss Mother    • Arthritis Mother    • Cancer Mother    • Heart  disease Mother    • Thyroid disease Mother        SOCIAL HISTORY  Social History     Socioeconomic History   • Marital status:    Tobacco Use   • Smoking status: Former     Packs/day: 0.50     Years: 4.00     Pack years: 2.00     Types: Cigarettes     Quit date: 1976     Years since quittin.4   • Smokeless tobacco: Never   Vaping Use   • Vaping Use: Never used   Substance and Sexual Activity   • Alcohol use: Yes     Comment: Socially   • Drug use: Never   • Sexual activity: Not Currently     Partners: Female     Birth control/protection: Vasectomy       ALLERGIES  Glucophage xr [metformin hcl er], Metformin, Tetracyclines & related, and Chlorhexidine    PHYSICAL EXAM  Physical Exam  GENERAL:   Appears in no acute distress.  HENT: Nares patent.  EYES: No scleral icterus.  CV: Regular rhythm, regular rate.  RESPIRATORY: Normal effort.  No audible wheezes, rales or rhonchi.  ABDOMEN: Soft, upper right-sided abdominal tenderness.  No rebound or guarding.  No peritoneal signs.  Nonacute abdomen.  MUSCULOSKELETAL: No deformities.   NEURO: Alert, moves all extremities, follows commands.  SKIN: Warm, dry, no rash visualized.    I have reviewed the triage vital signs and nursing notes.    LAB RESULTS  Results for orders placed or performed during the hospital encounter of 23   Respiratory Panel PCR w/COVID-19(SARS-CoV-2) [facility] In-House, NP Swab in UTM/VTM, 3-4 HR TAT - Swab, Nasopharynx    Specimen: Nasopharynx; Swab   Result Value Ref Range    ADENOVIRUS, PCR Not Detected Not Detected    Coronavirus 229E Not Detected Not Detected    Coronavirus HKU1 Not Detected Not Detected    Coronavirus NL63 Not Detected Not Detected    Coronavirus OC43 Not Detected Not Detected    COVID19 Not Detected Not Detected - Ref. Range    Human Metapneumovirus Not Detected Not Detected    Human Rhinovirus/Enterovirus Not Detected Not Detected    Influenza A PCR Not Detected Not Detected    Influenza B  PCR Not Detected Not Detected    Parainfluenza Virus 1 Not Detected Not Detected    Parainfluenza Virus 2 Not Detected Not Detected    Parainfluenza Virus 3 Not Detected Not Detected    Parainfluenza Virus 4 Not Detected Not Detected    RSV, PCR Not Detected Not Detected    Bordetella pertussis pcr Not Detected Not Detected    Bordetella parapertussis PCR Not Detected Not Detected    Chlamydophila pneumoniae PCR Not Detected Not Detected    Mycoplasma pneumo by PCR Not Detected Not Detected   Comprehensive Metabolic Panel    Specimen: Blood   Result Value Ref Range    Glucose 239 (H) 65 - 99 mg/dL    BUN 28 (H) 8 - 23 mg/dL    Creatinine 1.58 (H) 0.76 - 1.27 mg/dL    Sodium 136 136 - 145 mmol/L    Potassium 4.9 3.5 - 5.2 mmol/L    Chloride 102 98 - 107 mmol/L    CO2 19.0 (L) 22.0 - 29.0 mmol/L    Calcium 8.8 8.6 - 10.5 mg/dL    Total Protein 8.1 6.0 - 8.5 g/dL    Albumin 4.3 3.5 - 5.2 g/dL    ALT (SGPT) 26 1 - 41 U/L    AST (SGOT) 35 1 - 40 U/L    Alkaline Phosphatase 82 39 - 117 U/L    Total Bilirubin 0.8 0.0 - 1.2 mg/dL    Globulin 3.8 gm/dL    A/G Ratio 1.1 g/dL    BUN/Creatinine Ratio 17.7 7.0 - 25.0    Anion Gap 15.0 5.0 - 15.0 mmol/L    eGFR 45.9 (L) >60.0 mL/min/1.73   BNP    Specimen: Blood   Result Value Ref Range    proBNP 101.6 0.0 - 900.0 pg/mL   Single High Sensitivity Troponin T    Specimen: Blood   Result Value Ref Range    HS Troponin T 40 (H) <15 ng/L   CBC Auto Differential    Specimen: Blood   Result Value Ref Range    WBC 10.93 (H) 3.40 - 10.80 10*3/mm3    RBC 3.92 (L) 4.14 - 5.80 10*6/mm3    Hemoglobin 12.3 (L) 13.0 - 17.7 g/dL    Hematocrit 38.9 37.5 - 51.0 %    MCV 99.2 (H) 79.0 - 97.0 fL    MCH 31.4 26.6 - 33.0 pg    MCHC 31.6 31.5 - 35.7 g/dL    RDW 13.7 12.3 - 15.4 %    RDW-SD 49.5 37.0 - 54.0 fl    MPV 12.4 (H) 6.0 - 12.0 fL    Platelets 100 (L) 140 - 450 10*3/mm3    Neutrophil % 81.6 (H) 42.7 - 76.0 %    Lymphocyte % 7.3 (L) 19.6 - 45.3 %    Monocyte % 10.0 5.0 - 12.0 %    Eosinophil %  0.5 0.3 - 6.2 %    Basophil % 0.1 0.0 - 1.5 %    Immature Grans % 0.5 0.0 - 0.5 %    Neutrophils, Absolute 8.93 (H) 1.70 - 7.00 10*3/mm3    Lymphocytes, Absolute 0.80 0.70 - 3.10 10*3/mm3    Monocytes, Absolute 1.09 (H) 0.10 - 0.90 10*3/mm3    Eosinophils, Absolute 0.05 0.00 - 0.40 10*3/mm3    Basophils, Absolute 0.01 0.00 - 0.20 10*3/mm3    Immature Grans, Absolute 0.05 0.00 - 0.05 10*3/mm3    nRBC 0.0 0.0 - 0.2 /100 WBC   Lactic Acid, Plasma    Specimen: Blood   Result Value Ref Range    Lactate 2.0 0.5 - 2.0 mmol/L   Procalcitonin    Specimen: Blood   Result Value Ref Range    Procalcitonin 0.34 (H) 0.00 - 0.25 ng/mL   Single High Sensitivity Troponin T    Specimen: Arm, Right; Blood   Result Value Ref Range    HS Troponin T 34 (H) <15 ng/L   Lipase    Specimen: Blood   Result Value Ref Range    Lipase 65 (H) 13 - 60 U/L   ECG 12 Lead ED Triage Standing Order; SOA   Result Value Ref Range    QT Interval 356 ms    QTC Interval 386 ms   Green Top (Gel)   Result Value Ref Range    Extra Tube Hold for add-ons.    Lavender Top   Result Value Ref Range    Extra Tube hold for add-on    Gold Top - SST   Result Value Ref Range    Extra Tube Hold for add-ons.    Gray Top   Result Value Ref Range    Extra Tube Hold for add-ons.    Light Blue Top   Result Value Ref Range    Extra Tube Hold for add-ons.        If labs were ordered, I independently reviewed the results and considered them in treating the patient.    RADIOLOGY  CT Abdomen Pelvis Without Contrast   Final Result   Impression:      1. No acute findings are seen within the abdomen or pelvis.   2. The gallbladder is mildly distended, which could be related to a fasting state. No pericholecystic inflammation is seen. Question single tiny gallstone. Gallbladder ultrasound may prove helpful for further evaluation.   3. Advanced cirrhotic liver morphology, and hepatosplenomegaly, similar to the 8/30/2021 examination. No ascites is seen.   4. Noncalcified nodules in the  right middle lobe and right lower lobe are stable since 8/30/2021, most in keeping with benign findings.   5. Dense coronary artery calcifications. Correlate with cardiac history.                  Electronically Signed: Bertha Jones     5/17/2023 10:53 PM EDT     Workstation ID: [id number]      XR Chest 1 View   Final Result   Impression:   No acute cardiopulmonary findings.         Electronically Signed: Kiel Ann     5/17/2023 7:59 PM EDT     Workstation ID: [id number]        [x] Radiologist's Report Reviewed:  I ordered and independently reviewed the above noted radiographic studies.  See radiologist's dictation for official interpretation.      PROCEDURES    Procedures    ECG 12 Lead ED Triage Standing Order; SOA   Final Result   Test Reason : EDMD   Blood Pressure :   */*   mmHG   Vent. Rate :  71 BPM     Atrial Rate :  71 BPM      P-R Int : 188 ms          QRS Dur :  82 ms       QT Int : 356 ms       P-R-T Axes :  29 -19  24 degrees      QTc Int : 386 ms      Sinus rhythm with premature atrial complexes   Septal infarct (cited on or before 29-AUG-2021)   Abnormal ECG   When compared with ECG of 29-AUG-2021 22:32,   premature atrial complexes are now present   Confirmed by NELY HILL MD ([id number]) on 5/18/2023 12:06:35 AM      Referred By:            Confirmed By: NELY HILL MD          MEDICATIONS GIVEN IN ER    Medications - No data to display    MEDICAL DECISION MAKING, PROGRESS, and CONSULTS   Medical Decision Making  History of breast cancer: chronic illness or injury  History of chronic kidney disease: chronic illness or injury  History of diabetes mellitus: chronic illness or injury  History of gastroesophageal reflux (GERD): chronic illness or injury  History of hyperlipidemia: chronic illness or injury  History of hypertension: chronic illness or injury  History of hypothyroidism: chronic illness or injury  Liver cirrhosis secondary to JEROME: chronic illness or injury  Right sided abdominal pain:  complicated acute illness or injury  Amount and/or Complexity of Data Reviewed  Labs: ordered.  Radiology: ordered.  ECG/medicine tests: ordered.          All labs have been independently reviewed by me.  All radiology studies have been reviewed by me and the radiologist dictating the report.  All EKG's have been independently viewed by me.    [] Discussed with radiology regarding test interpretation:    Discussion below represents my analysis of pertinent findings related to patient's condition, differential diagnosis, treatment plan and final disposition.    Differential diagnosis:  The differential diagnosis associated with the patient's presentation includes: Dyspepsia, viral gastroenteritis, constipation, medication side effect, irritable bowel syndrome, abdominal wall pain, gallbladder disease, pancreatitis, diverticulitis, appendicitis, kidney stone, UTI, hernia, DKA, food poisoning, pneumonia, mesenteric lymphadenitis, hepatitis, bowel obstruction, testicular torsion, orchitis, epididymitis, inguinal hernia or other emergent cause.    Additional sources  Discussed/ obtained information from independent historians:   [x] Spouse  [] Parent  [] Family member  [] Friend  [] EMS   [] Other:  External (non-ED) record review:   [] Inpatient record:   [x] Office record: Oncology notes from April 24, 2023 reviewed demonstrating patient's diagnosis of malignant neoplasm of the right breast   [] Outpatient record:   [] Prior Outpatient labs:   [] Prior Outpatient radiology:   [x] Primary Care record: Primary care visit from May 8, 2023 confirming patient's past medical history of anemia, diabetes, chronic kidney disease, hypertension and breast cancer   [] Outside ED record:   [] Other:   Patient's care impacted by:   [x] Diabetes  [x] Hypertension  [x] Hyperlipidemia  [x] Hypothyroidism   [] Coronary Artery Disease   [] COPD   [x] Cancer   [x] Obesity  [] GERD   [] Tobacco Abuse   [] Substance Abuse    [] Anxiety   []  Depression   [] Other:   Care significantly affected by Social Determinants of Health (housing and economic circumstances, unemployment)    [] Yes     [x] No   If yes, Patient's care significantly limited by  Social Determinants of Health including:   [] Inadequate housing   [] Low income   [] Alcoholism and drug addiction in family   [] Problems related to primary support group   [] Unemployment   [] Problems related to employment   [] Other Social Determinants of Health:     Shared decision making: I had a discussion with the patient/family regarding diagnosis, diagnostic results, treatment plan, and medications.  The patient/family indicated understanding of these instructions.  I spent adequate time at the bedside preceding discharge necessary to personally discuss the aftercare instructions, giving patient education, providing explanations of the results of our evaluations/findings, and my decision making to assure that the patient/family understand the plan of care.  Time was allotted to answer questions at that time and throughout the ED course.  Emphasis was placed on timely follow-up after discharge.  I also discussed the potential for the development of an acute emergent condition requiring further evaluation, admission, or even surgical intervention. I discussed that we found nothing during the visit today indicating the need for further workup, admission, or the presence of an unstable medical condition.  I encouraged the patient to return to the emergency department immediately for ANY concerns, worsening, new complaints, or if symptoms persist and unable to seek follow-up in a timely fashion.  The patient/family expressed understanding and agreement with this plan.  The patient will follow-up with primary care for reevaluation.     Orders placed during this visit:  Orders Placed This Encounter   Procedures   • COVID PRE-OP / PRE-PROCEDURE SCREENING ORDER (NO ISOLATION) - Swab, Nasopharynx   •  Respiratory Panel PCR w/COVID-19(SARS-CoV-2) [facility] In-House, NP Swab in UTM/VTM, 3-4 HR TAT - Swab, Nasopharynx   • XR Chest 1 View   • CT Abdomen Pelvis Without Contrast   • Ackerly Draw   • Comprehensive Metabolic Panel   • BNP   • Single High Sensitivity Troponin T   • CBC Auto Differential   • Lactic Acid, Plasma   • Procalcitonin   • Single High Sensitivity Troponin T   • Lipase   • Undress & Gown   • Continuous Pulse Oximetry   • ECG 12 Lead ED Triage Standing Order; SOA   • CBC & Differential   • Green Top (Gel)   • Lavender Top   • Gold Top - SST   • Gray Top   • Light Blue Top       ED Course:    ED Course as of 05/23/23 1602   Thu May 18, 2023   0007 Case reviewed with Dr. Celaya who was in agreement with disposition plan of discharge and follow up as scheduled.  [JG]   0010 Patient presents to ED with complaints of abdominal pain. Abdominal exam without peritoneal signs. No evidence of acute abdomen at this time. Well appearing. Low suspicion for acute hepatobiliary disease (includng acute cholecystitis), acute pancreatitis, PUD (including perforation), acute infectious processes (pneumonia, hepatitis, pyelonephritis), acute appendicitis, bowel obstruction or viscus perforation. Presentation not consistent with other acute, emergent causes of abdominal pain at this time. Labs/urinalysis obtained and reviewed demonstrate no acute or emergent abnormalities.  Nasopharyngeal respiratory panel negative.Imaging of chest demonstrated no acute cardiopulmonary process.  CT scan of abdomen and pelvis demonstrates no acute findings in the abdomen or pelvis.  Work-up reviewed with attending Dr. Celaya, disposition plan reviewed with patient and family at bedside who are agreeable to plan of care.  At time of discharge disposition patient is afebrile, nontoxic appearing, vital signs stable and able to maintain O2 sats of 99% on room air. Patient will be discharged home with symptomatic care and  outpatient follow up.  [JG]      ED Course User Index  [JG] Audi Box PA            DIAGNOSIS  Final diagnoses:   Right sided abdominal pain   History of breast cancer   Liver cirrhosis secondary to JEROME   History of chronic kidney disease   History of gastroesophageal reflux (GERD)   History of hypertension   History of hyperlipidemia   History of hypothyroidism   History of diabetes mellitus       DISPOSITION    ED Disposition     ED Disposition   Discharge    Condition   Stable    Comment   --             Please note that portions of this document were completed with voice recognition software.      Audi Box PA  05/23/23 [id number]

## 2023-05-23 NOTE — ANESTHESIA PROCEDURE NOTES
Airway  Urgency: elective    Date/Time: 5/23/2023 12:13 PM  Airway not difficult    General Information and Staff    Patient location during procedure: OR  SRNA: Meka Deleon SRNA  Indications and Patient Condition  Indications for airway management: airway protection    Preoxygenated: yes  MILS not maintained throughout  Mask difficulty assessment: 1 - vent by mask    Final Airway Details  Final airway type: endotracheal airway      Successful airway: ETT  Cuffed: yes   Successful intubation technique: direct laryngoscopy  Endotracheal tube insertion site: oral  Blade: Patel  Blade size: 3  ETT size (mm): 7.5  Cormack-Lehane Classification: grade I - full view of glottis  Placement verified by: chest auscultation and capnometry   Cuff volume (mL): 6  Measured from: lips  ETT/EBT  to lips (cm): 21  Number of attempts at approach: 1  Assessment: lips, teeth, and gum same as pre-op and atraumatic intubation    Additional Comments  Negative epigastric sounds, Breath sound equal bilaterally with symmetric chest rise and fall

## 2023-05-23 NOTE — OP NOTE
Operative Note    Elton Funez  [id number]   1950     Date of Surgery:  5/23/2023    Pre-Operative Diagnosis: Right breast cancer at 12 o'clock position    Post-Operative Diagnosis: Same    Procedure: Right sentinel lymph node injection                       Right mastectomy                       Right deep subfascial sentinel lymph node biopsy    Anesthesia:  General     Lenore Node Biopsy for Breast Cancer - Right  Operation performed with curative intent. Yes   Tracer(s) used to identify sentinel nodes in the upfront surgery (non-neoadjuvant) setting (select all that apply). Radioactive tracer   Tracer(s) used to identify sentinel nodes in the neoadjuvant setting (select all that apply). N/A   All nodes (colored or non-colored) present at the end of a dye-filled lymphatic channel were removed. N/A   All significantly radioactive nodes were removed. Yes   All palpably suspicious nodes were removed. N/A   Biopsy-proven positive nodes marked with clips prior to chemotherapy were identified and removed. N/A             Surgeon:  Joseph Ramirez MD    Circulator: Tay Cordova RN; Maggie De Leon RN  Scrub Person: Prerna Santa RN; Dorina Kasper  Nursing Assistant: Iman Gregg PCT  Assistant: Tuan Potter PA     Assistant: Tuan Potter PA    Estimated Blood Loss: Very minimal    Findings: 2 right sentinel lymph nodes negative for macrometastases    Complications: None      Indication for Procedure: Mr. Fuenz is a pleasant 72-year-old gentleman who presented with a palpable mass in the right breast at the 12 o'clock position with work-up remarkable for invasive ductal carcinoma.  He presents today for the above procedure.    Procedure: Patient was taken to the operating by anesthesia and placed supine on the table.  Following induction of general endotracheal esthesia, SCDs were placed.  He received 2 g of Kefzol IV.  550 mCi of radioactive technetium was injected in  the right subareolar region.  Anesthesia placed ultrasound-guided right pectoralis nerve block.  The breasts, chest, axillas and upper arms were then prepped and draped in a sterile fashion.  Timeout was observed.  A transverse elliptical incision was made encompassing the areola and nipple complex as well as the palpable mass.  A superior to inferior flaps were raised dissecting the breast tissue away from the flaps down to where the muscle was exposed, maintaining adequate thickness of the flaps.  Once in the axilla, we identified 2 deep subfascial sentinel lymph nodes that had high radioactive count.  These were excised and sent to pathology for frozen section and noted to be negative for macrometastases.  No other radioactivity was appreciated in the axilla.  The breast was then removed off of the underlying pectoralis major muscle taking the fascia along with the specimen.  Larger vessels of the breast were ligated with 3-0 silk suture.  The breast was removed as a whole, marked with a silk suture laterally, weighed and sent to pathology for permanent section.  The wound was then irrigated with warm water with clear return of fluid noted.  15 [place] round Kevin-Huff drain was then placed percutaneously and anchored to the skin with 2-0 silk suture.  Subcutaneous tissue was then approximated with interrupted 3-0 Vicryl sutures and the skin incision was covered with the Exofin fusion Premiere dressing.  ABD with an Ace bandage was then applied.  The patient tolerated procedure well with no complications.  He was extubated and taken to the recovery room in a stable condition.  Sponge count and needle count were correct at the end of the procedure.    Assistant: Tuan Potter PA  was responsible for performing the following activities: Retraction, Suction, Suturing, Closing and Placing Dressing and their skilled assistance was necessary for the success of this case.      Joseph Ramirez,  MD  05/23/23  16:08 EDT

## 2023-05-23 NOTE — PLAN OF CARE
Goal Outcome Evaluation:           Progress: improving  Outcome Evaluation: VSS. Received from PACU. Pain controlled with PRN dilaudid. RA. Ambulated to bathroom. No complaints of nausea. HOLLEY draining moderate sangenous fluid. Glucommander initiated. Awaiting dinner tray. NS 50mL/hr. Family at bedside. No further needs identified.

## 2023-05-23 NOTE — ANESTHESIA PREPROCEDURE EVALUATION
Anesthesia Evaluation     Patient summary reviewed and Nursing notes reviewed                Airway   Mallampati: II  TM distance: >3 FB  Neck ROM: full  No difficulty expected  Dental - normal exam     Pulmonary - normal exam   (+) sleep apnea on CPAP,   Cardiovascular - normal exam    (+) hypertension, hyperlipidemia,     ROS comment:   Echo 3/23: normal EF, no significant valve disease     Neuro/Psych- negative ROS  GI/Hepatic/Renal/Endo    (+) obesity,  GERD,  hepatitis (JEROME), liver disease cirrhosis, renal disease CRI, diabetes mellitus, thyroid problem hypothyroidism    ROS Comment: semaglutide tx    Musculoskeletal (-) negative ROS    Abdominal  - normal exam    Bowel sounds: normal.   Substance History - negative use     OB/GYN negative ob/gyn ROS         Other          Other Comment: Thrombocytopenia PLT 100K                  Anesthesia Plan    ASA 3     general     (PECS block)  intravenous induction     Anesthetic plan, risks, benefits, and alternatives have been provided, discussed and informed consent has been obtained with: patient.    Plan discussed with CRNA.        CODE STATUS:

## 2023-05-24 VITALS
SYSTOLIC BLOOD PRESSURE: 160 MMHG | OXYGEN SATURATION: 96 % | TEMPERATURE: 97.1 F | HEIGHT: 68 IN | HEART RATE: 89 BPM | DIASTOLIC BLOOD PRESSURE: 80 MMHG | RESPIRATION RATE: 18 BRPM | BODY MASS INDEX: 33.95 KG/M2 | WEIGHT: 224 LBS

## 2023-05-24 LAB — GLUCOSE BLDC GLUCOMTR-MCNC: 257 MG/DL (ref 70–130)

## 2023-05-24 PROCEDURE — 63710000001 ACETAMINOPHEN 500 MG TABLET: Performed by: SURGERY

## 2023-05-24 PROCEDURE — A9270 NON-COVERED ITEM OR SERVICE: HCPCS | Performed by: SURGERY

## 2023-05-24 PROCEDURE — 82948 REAGENT STRIP/BLOOD GLUCOSE: CPT

## 2023-05-24 PROCEDURE — 25010000002 CEFAZOLIN IN DEXTROSE 2-4 GM/100ML-% SOLUTION: Performed by: SURGERY

## 2023-05-24 PROCEDURE — 63710000001 SPIRONOLACTONE 25 MG TABLET: Performed by: SURGERY

## 2023-05-24 PROCEDURE — 63710000001 MELOXICAM 15 MG TABLET: Performed by: SURGERY

## 2023-05-24 PROCEDURE — 63710000001 PANTOPRAZOLE 40 MG TABLET DELAYED-RELEASE: Performed by: SURGERY

## 2023-05-24 PROCEDURE — 63710000001 LEVOTHYROXINE 50 MCG TABLET: Performed by: SURGERY

## 2023-05-24 PROCEDURE — 63710000001 SERTRALINE 100 MG TABLET: Performed by: SURGERY

## 2023-05-24 PROCEDURE — 63710000001 GABAPENTIN 400 MG CAPSULE: Performed by: SURGERY

## 2023-05-24 PROCEDURE — 63710000001 CARVEDILOL 6.25 MG TABLET: Performed by: SURGERY

## 2023-05-24 PROCEDURE — 63710000001 INSULIN LISPRO (HUMAN) PER 5 UNITS: Performed by: SURGERY

## 2023-05-24 PROCEDURE — 63710000001 OXYCODONE 5 MG TABLET: Performed by: SURGERY

## 2023-05-24 RX ORDER — OXYCODONE HYDROCHLORIDE 5 MG/1
5 TABLET ORAL EVERY 8 HOURS PRN
Qty: 7 TABLET | Refills: 0 | Status: SHIPPED | OUTPATIENT
Start: 2023-05-24 | End: 2023-05-27

## 2023-05-24 RX ADMIN — ACETAMINOPHEN 1000 MG: 500 TABLET ORAL at 11:50

## 2023-05-24 RX ADMIN — CARVEDILOL 6.25 MG: 6.25 TABLET, FILM COATED ORAL at 09:07

## 2023-05-24 RX ADMIN — GABAPENTIN 800 MG: 400 CAPSULE ORAL at 05:25

## 2023-05-24 RX ADMIN — MELOXICAM 15 MG: 15 TABLET ORAL at 09:07

## 2023-05-24 RX ADMIN — OXYCODONE 5 MG: 5 TABLET ORAL at 11:49

## 2023-05-24 RX ADMIN — CEFAZOLIN SODIUM 2 G: 2 INJECTION, SOLUTION INTRAVENOUS at 04:02

## 2023-05-24 RX ADMIN — LEVOTHYROXINE SODIUM 50 MCG: 0.05 TABLET ORAL at 09:06

## 2023-05-24 RX ADMIN — PANTOPRAZOLE SODIUM 40 MG: 40 TABLET, DELAYED RELEASE ORAL at 05:25

## 2023-05-24 RX ADMIN — SPIRONOLACTONE 25 MG: 25 TABLET ORAL at 09:07

## 2023-05-24 RX ADMIN — INSULIN LISPRO 9 UNITS: 100 INJECTION, SOLUTION INTRAVENOUS; SUBCUTANEOUS at 09:07

## 2023-05-24 RX ADMIN — SERTRALINE 100 MG: 100 TABLET, FILM COATED ORAL at 09:07

## 2023-05-24 RX ADMIN — ACETAMINOPHEN 1000 MG: 500 TABLET ORAL at 05:25

## 2023-05-24 NOTE — PLAN OF CARE
Goal Outcome Evaluation:  Plan of Care Reviewed With: patient, spouse                   IV removed. Prn given for pain. Education taught to both spouse and pt, both demonstrated and verbalized understanding. Ad nusrat. Tolerating diet. Supplies were given for care at home. D/c home w/ spouse.

## 2023-05-24 NOTE — PLAN OF CARE
Goal Outcome Evaluation:  Plan of Care Reviewed With: patient        Progress: improving  Outcome Evaluation: Pt rested well throughout shift. PRN vasotec for BP systolic in the 180s. Pt given PRN roxicodone for pain. No complaints of nausea. Ambulating to the bathroom and voiding well. Will contiue to monitor.

## 2023-05-24 NOTE — CASE MANAGEMENT/SOCIAL WORK
Continued Stay Note  [facility]     Patient Name: Elton Funez  MRN: [id number]  Today's Date: 5/24/2023    Admit Date: 5/23/2023    Plan: Home   Discharge Plan     Row Name 05/24/23 1015       Plan    Plan Home    Plan Comments Anticipate discharge home today.  Alycia Tinajero, Ext. [phone]    Final Discharge Disposition Code 01 - home or self-care               Discharge Codes    No documentation.               Expected Discharge Date and Time     Expected Discharge Date Expected Discharge Time    May 24, 2023             AVMSHI Hamilton

## 2023-05-24 NOTE — PROGRESS NOTES
"Elton Funez  1950  [id number]    Surgery Progress Note    Date of visit: 5/24/2023    Subjective: Comfortable with no complaints of pain  Ambulating    Objective:    /80 (BP Location: Left arm, Patient Position: Lying)   Pulse 89   Temp 97.1 °F (36.2 °C) (Temporal)   Resp 18   Ht 172.7 cm (68\")   Wt 102 kg (224 lb)   SpO2 96%   BMI 34.06 kg/m²     Intake/Output Summary (Last 24 hours) at 5/24/2023 0836  Last data filed at 5/24/2023 0600  Gross per 24 hour   Intake 2469 ml   Output 1125 ml   Net 1344 ml       CV: Regular rate and rhythm  L: normal air entry  BREAST: Right mastectomy incisions intact and healing well  Flaps are viable with no swelling  Adequate Kevin-Huff drainage      LABS:    Results from last 7 days   Lab Units 05/17/23  1905   WBC 10*3/mm3 10.93*   HEMOGLOBIN g/dL 12.3*   HEMATOCRIT % 38.9   PLATELETS 10*3/mm3 100*     Results from last 7 days   Lab Units 05/17/23  1905   SODIUM mmol/L 136   POTASSIUM mmol/L 4.9   CHLORIDE mmol/L 102   CO2 mmol/L 19.0*   BUN mg/dL 28*   CREATININE mg/dL 1.58*   CALCIUM mg/dL 8.8   BILIRUBIN mg/dL 0.8   ALK PHOS U/L 82   ALT (SGPT) U/L 26   AST (SGOT) U/L 35   GLUCOSE mg/dL 239*     Results from last 7 days   Lab Units 05/17/23  1905   SODIUM mmol/L 136   POTASSIUM mmol/L 4.9   CHLORIDE mmol/L 102   CO2 mmol/L 19.0*   BUN mg/dL 28*   CREATININE mg/dL 1.58*   GLUCOSE mg/dL 239*   CALCIUM mg/dL 8.8     Lab Results   Lab Value Date/Time    LIPASE 65 (H) 05/17/2023 1905         Assessment/ Plan: Stable course status post right mastectomy and sentinel lymph node biopsy  Patient is progressing well  Instructions were given to him and his family  Home today  Tylenol 1000 mg p.o. 3 times daily for 4 days  Oxycodone as needed  Control blood sugar closely   follow-up in the cancer clinic in 1 week      Problem List Items Addressed This Visit    None  Visit Diagnoses     Ductal carcinoma        Relevant Orders    Tissue Pathology Exam            Ricoid " HANSEL Ramirez MD  5/24/2023  08:36 EDT

## 2023-05-25 LAB
CYTO UR: NORMAL
LAB AP CASE REPORT: NORMAL
LAB AP CLINICAL INFORMATION: NORMAL
LAB AP DIAGNOSIS COMMENT: NORMAL
Lab: NORMAL
PATH REPORT.FINAL DX SPEC: NORMAL
PATH REPORT.GROSS SPEC: NORMAL

## 2023-05-30 NOTE — PROGRESS NOTES
Subjective     PROBLEM LIST:  1. [id number] ER+ (90%, 3+), VT+ (66%, 3+), Her2 negative (1+) invasive ductal carcinoma of the right breast.    A) right mastectomy on 5/23/23 showed a 2.2 cm intermediate grade IDC.  0/2 SLN involved.  Genetic testing negative.  2. DM2  3. Hypertension  4. Hypothyroidism  5. FAVIO  6. HL  7. Anxiety/depression  8. GERD  9. JEROME cirrhosis  10. CKD    CHIEF COMPLAINT: breast cancer      HISTORY OF PRESENT ILLNESS:  The patient is a 73 y.o. male, referred for evaluation of a recently diagnosed breast cancer.    He had noticed a mass in his right breast for about a year.  His 4-year-old granddaughter was climbing on him and elbowed him in the chest which caused severe pain.  After this he decided to go get it checked out.    He has had an right mastectomy.  He says he is doing okay but does have some soreness especially under the arm.  Drain is still in place.    REVIEW OF SYSTEMS:  A 14 point review of systems was performed and is negative except as noted above.    Past Medical History:   Diagnosis Date   • Abdominal pain 05/17/2023    RUQ; SEEN IN ED AT [facility]; DC'D HOME   • Abscess of arm, right    • Abscess of skin of neck    • Acute sinusitis    • ALT (SGPT) level raised    • Arthritis    • BPH (benign prostatic hypertrophy)    • Breast cancer 05/2023    right   • Cirrhosis of liver 09/03/2021    JEROME   • CKD (chronic kidney disease)    • Colon polyp    • Constipation    • DDD (degenerative disc disease), cervical    • Decreased platelet count    • Depression     Resolved: 1/28/2015   • Elevated AST (SGOT)    • Erectile dysfunction    • GERD (gastroesophageal reflux disease)    • History of transfusion 2021    [facility]; 3 UNITS; NO REACTION   • Hyperlipidemia    • Hypertension    • Hypothyroidism, adult 11/10/2020   • Infection     MSSA lumbar surgical wound infection 3/2017   • Jaw pain    • Left hip pain    • Low back pain     Surgery 30 yrs L4-5   • Migraine     Hx of   • Obesity (BMI  "30.0-34.9) 10/07/2016    BMI 31.92   • Obstructive sleep apnea     CPAP   • Portal hypertensive gastropathy 09/03/2021   • Shigellosis    • Sleep apnea     PT TO BRING MASK AND TUBING DAY OF SURGERY   • Symptomatic anemia 08/30/2021   • Type 2 diabetes mellitus    • 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    • Wears glasses    • Wears hearing aid     BOTH EARS           Objective     /73 Comment: LUE  Pulse 71   Temp 97.3 °F (36.3 °C) (Infrared)   Resp 18   Ht 172.7 cm (67.99\")   Wt 101 kg (223 lb)   SpO2 95%   BMI 33.92 kg/m²   Performance Status:  ECOG score: 0           General: well appearing male in no acute distress  Neuro: alert and oriented  HEENT: sclerae anicteric, oropharynx clear  Extremities: no lower extremity edema  Skin: no rashes, lesions, bruising, or petechiae  Psych: mood and affect appropriate    Lab Results   Component Value Date    WBC 10.93 (H) 05/17/2023    HGB 12.3 (L) 05/17/2023    HCT 38.9 05/17/2023    MCV 99.2 (H) 05/17/2023     (L) 05/17/2023     Lab Results   Component Value Date    GLUCOSE 239 (H) 05/17/2023    BUN 28 (H) 05/17/2023    CREATININE 1.58 (H) 05/17/2023    EGFRIFNONA 60 (L) 12/15/2021    EGFRIFAFRI 66 07/14/2015    BCR 17.7 05/17/2023    K 4.9 05/17/2023    CO2 19.0 (L) 05/17/2023    CALCIUM 8.8 05/17/2023    PROTENTOTREF 7.7 07/14/2015    ALBUMIN 4.3 05/17/2023    LABIL2 1.5 07/14/2015    AST 35 05/17/2023    ALT 26 05/17/2023                 ASSESSMENT AND PLAN:     Elton Funez is a 73 y.o. male with a T2N0 ER+ Her2 negative IDC of the right breast.    Because of his comorbidities of cirrhosis, chronic kidney disease, and significant peripheral neuropathy, I do not think he is a good candidate for IV chemotherapy.  I do recommend adjuvant tamoxifen.  We discussed potential side effects of tamoxifen treatment including an increased risk of blood clots, and increased risk of uterine cancer, hot flashes, and sleep or mood disturbance.    He will " follow-up in about 2 months to see how he is tolerating this.  He is doing well then we will see him every 6 months.         A total greater than 60 mins minutes was spent in face to face patient time, examination, counseling, charting, reviewing test results, and reviewing outside records.    Danika Archer MD    5/31/2023

## 2023-05-31 ENCOUNTER — CONSULT (OUTPATIENT)
Dept: ONCOLOGY | Facility: CLINIC | Age: 73
End: 2023-05-31

## 2023-05-31 VITALS
TEMPERATURE: 97.3 F | BODY MASS INDEX: 33.8 KG/M2 | OXYGEN SATURATION: 95 % | RESPIRATION RATE: 18 BRPM | WEIGHT: 223 LBS | HEART RATE: 71 BPM | SYSTOLIC BLOOD PRESSURE: 152 MMHG | DIASTOLIC BLOOD PRESSURE: 73 MMHG | HEIGHT: 68 IN

## 2023-05-31 DIAGNOSIS — C50.121 MALIGNANT NEOPLASM OF CENTRAL PORTION OF RIGHT BREAST IN MALE, ESTROGEN RECEPTOR POSITIVE: Primary | ICD-10-CM

## 2023-05-31 DIAGNOSIS — Z17.0 MALIGNANT NEOPLASM OF CENTRAL PORTION OF RIGHT BREAST IN MALE, ESTROGEN RECEPTOR POSITIVE: Primary | ICD-10-CM

## 2023-05-31 RX ORDER — TAMOXIFEN CITRATE 20 MG/1
20 TABLET ORAL DAILY
Qty: 90 TABLET | Refills: 3 | Status: SHIPPED | OUTPATIENT
Start: 2023-05-31

## 2023-05-31 NOTE — LETTER
May 31, 2023     Joseph Ramirez MD  [address] [address]  [place] [id number]    Patient: Elton Funez   YOB: 1950   Date of Visit: 5/31/2023       Dear Joseph Ramirez MD    Elton Funez was in my office today. Below is a copy of my note.    If you have questions, please do not hesitate to call me. I look forward to following Elton along with you.         Sincerely,        Danika Archer MD        CC: Tc Ramirez MD      Subjective      PROBLEM LIST:  1. [id number] ER+ (90%, 3+), NM+ (66%, 3+), Her2 negative (1+) invasive ductal carcinoma of the right breast.    A) right mastectomy on 5/23/23 showed a 2.2 cm intermediate grade IDC.  0/2 SLN involved.  Genetic testing negative.  2. DM2  3. Hypertension  4. Hypothyroidism  5. FAVIO  6. HL  7. Anxiety/depression  8. GERD  9. JEROME cirrhosis  10. CKD    CHIEF COMPLAINT: breast cancer      HISTORY OF PRESENT ILLNESS:  The patient is a 73 y.o. male, referred for evaluation of a recently diagnosed breast cancer.    He had noticed a mass in his right breast for about a year.  His 4-year-old granddaughter was climbing on him and elbowed him in the chest which caused severe pain.  After this he decided to go get it checked out.    He has had an right mastectomy.  He says he is doing okay but does have some soreness especially under the arm.  Drain is still in place.    REVIEW OF SYSTEMS:  A 14 point review of systems was performed and is negative except as noted above.    Past Medical History:   Diagnosis Date   • Abdominal pain 05/17/2023    RUQ; SEEN IN ED AT [facility]; DC'D HOME   • Abscess of arm, right    • Abscess of skin of neck    • Acute sinusitis    • ALT (SGPT) level raised    • Arthritis    • BPH (benign prostatic hypertrophy)    • Breast cancer 05/2023    right   • Cirrhosis of liver 09/03/2021    JEROME   • CKD (chronic kidney disease)    • Colon polyp    • Constipation    • DDD (degenerative disc disease), cervical    •  "Decreased platelet count    • Depression     Resolved: 1/28/2015   • Elevated AST (SGOT)    • Erectile dysfunction    • GERD (gastroesophageal reflux disease)    • History of transfusion 2021    [facility]; 3 UNITS; NO REACTION   • Hyperlipidemia    • Hypertension    • Hypothyroidism, adult 11/10/2020   • Infection     MSSA lumbar surgical wound infection 3/2017   • Jaw pain    • Left hip pain    • Low back pain     Surgery 30 yrs L4-5   • Migraine     Hx of   • Obesity (BMI 30.0-34.9) 10/07/2016    BMI 31.92   • Obstructive sleep apnea     CPAP   • Portal hypertensive gastropathy 09/03/2021   • Shigellosis    • Sleep apnea     PT TO BRING MASK AND TUBING DAY OF SURGERY   • Symptomatic anemia 08/30/2021   • Type 2 diabetes mellitus    • 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    • Wears glasses    • Wears hearing aid     BOTH EARS           Objective      /73 Comment: LUE  Pulse 71   Temp 97.3 °F (36.3 °C) (Infrared)   Resp 18   Ht 172.7 cm (67.99\")   Wt 101 kg (223 lb)   SpO2 95%   BMI 33.92 kg/m²   Performance Status:  ECOG score: 0           General: well appearing male in no acute distress  Neuro: alert and oriented  HEENT: sclerae anicteric, oropharynx clear  Extremities: no lower extremity edema  Skin: no rashes, lesions, bruising, or petechiae  Psych: mood and affect appropriate    Lab Results   Component Value Date    WBC 10.93 (H) 05/17/2023    HGB 12.3 (L) 05/17/2023    HCT 38.9 05/17/2023    MCV 99.2 (H) 05/17/2023     (L) 05/17/2023     Lab Results   Component Value Date    GLUCOSE 239 (H) 05/17/2023    BUN 28 (H) 05/17/2023    CREATININE 1.58 (H) 05/17/2023    EGFRIFNONA 60 (L) 12/15/2021    EGFRIFAFRI 66 07/14/2015    BCR 17.7 05/17/2023    K 4.9 05/17/2023    CO2 19.0 (L) 05/17/2023    CALCIUM 8.8 05/17/2023    PROTENTOTREF 7.7 07/14/2015    ALBUMIN 4.3 05/17/2023    LABIL2 1.5 07/14/2015    AST 35 05/17/2023    ALT 26 05/17/2023                 ASSESSMENT AND PLAN:     Elton Funez " is a 73 y.o. male with a T2N0 ER+ Her2 negative IDC of the right breast.    Because of his comorbidities of cirrhosis, chronic kidney disease, and significant peripheral neuropathy, I do not think he is a good candidate for IV chemotherapy.  I do recommend adjuvant tamoxifen.  We discussed potential side effects of tamoxifen treatment including an increased risk of blood clots, and increased risk of uterine cancer, hot flashes, and sleep or mood disturbance.    He will follow-up in about 2 months to see how he is tolerating this.  He is doing well then we will see him every 6 months.        A total greater than 60 mins minutes was spent in face to face patient time, examination, counseling, charting, reviewing test results, and reviewing outside records.    Danika Archer MD    5/31/2023

## 2023-06-01 RX ORDER — OMEPRAZOLE 40 MG/1
CAPSULE, DELAYED RELEASE ORAL
Qty: 90 CAPSULE | Refills: 3 | Status: SHIPPED | OUTPATIENT
Start: 2023-06-01

## 2023-06-05 ENCOUNTER — OFFICE VISIT (OUTPATIENT)
Dept: SLEEP MEDICINE | Facility: HOSPITAL | Age: 73
End: 2023-06-05
Payer: MEDICARE

## 2023-06-05 VITALS
OXYGEN SATURATION: 95 % | HEART RATE: 69 BPM | DIASTOLIC BLOOD PRESSURE: 60 MMHG | BODY MASS INDEX: 33.65 KG/M2 | WEIGHT: 222 LBS | SYSTOLIC BLOOD PRESSURE: 117 MMHG | HEIGHT: 68 IN

## 2023-06-05 DIAGNOSIS — F51.04 PSYCHOPHYSIOLOGICAL INSOMNIA: ICD-10-CM

## 2023-06-05 DIAGNOSIS — G47.33 OSA (OBSTRUCTIVE SLEEP APNEA): Primary | ICD-10-CM

## 2023-06-05 PROCEDURE — 3078F DIAST BP <80 MM HG: CPT | Performed by: NURSE PRACTITIONER

## 2023-06-05 PROCEDURE — 3074F SYST BP LT 130 MM HG: CPT | Performed by: NURSE PRACTITIONER

## 2023-06-05 PROCEDURE — 99213 OFFICE O/P EST LOW 20 MIN: CPT | Performed by: NURSE PRACTITIONER

## 2023-06-05 RX ORDER — TEMAZEPAM 15 MG/1
15 CAPSULE ORAL NIGHTLY PRN
Qty: 30 CAPSULE | Refills: 2 | Status: ON HOLD | OUTPATIENT
Start: 2023-06-05

## 2023-06-05 NOTE — PROGRESS NOTES
Chief Complaint:   Chief Complaint   Patient presents with    Follow-up       HPI:    Elton Funez is a 73 y.o. male here for follow-up of sleep apnea and insomnia.  Patient was last seen 2/6/2023.  Patient continues to take Restoril 15 mg most nights.  He will take his medication and go to sleep within 1 hour.  Patient will sleep 6 to 8 hours nightly and will get up anywhere between 0 and 1 time a night.  Patient states most nights he does have his days and nights mixed up.  I have asked patient to please try staying a wake with a full day of activity several days in a row before laying down to go to sleep early evening around 7 to 8 PM.  Patient states he is trying to do this to get his sleep schedule back as normal as possible        Current medications are:   Current Outpatient Medications:     Alpha-Lipoic Acid 600 MG tablet, Take 1 tablet by mouth Daily., Disp: , Rfl:     B Complex Vitamins (VITAMIN B COMPLEX PO), Take 1 tablet by mouth Daily., Disp: , Rfl:     B-D UF III MINI PEN NEEDLES 31G X 5 MM misc, USE THREE TIMES A DAY, Disp: 270 each, Rfl: 1    carvedilol (COREG) 6.25 MG tablet, TAKE 1 TABLET EVERY 12 HOURS (Patient taking differently: Take 1 tablet by mouth 2 (Two) Times a Day With Meals.), Disp: 180 tablet, Rfl: 1    Coenzyme Q10 (Co Q 10) 100 MG capsule, Take 300 mg by mouth Daily., Disp: , Rfl:     colestipol (COLESTID) 1 g tablet, TAKE 2 TABLETS TWICE A DAY (Patient taking differently: Take 2 tablets by mouth 2 (Two) Times a Day.), Disp: 360 tablet, Rfl: 3    Continuous Blood Gluc  (FreeStyle Winston 2 Cartwright) device, 1 Device Every 14 (Fourteen) Days., Disp: 1 each, Rfl: 0    Continuous Blood Gluc Sensor (FreeStyle Winston 2 Sensor) misc, 1 each Every 14 (Fourteen) Days., Disp: 6 each, Rfl: 3    Dulaglutide (Trulicity) 4.5 MG/0.5ML solution pen-injector, Inject 0.5 mL under the skin into the appropriate area as directed 1 (One) Time Per Week., Disp: 6 mL, Rfl: 1    ferrous sulfate  (FeroSul) 325 (65 FE) MG tablet, Take 1 tablet by mouth 2 (Two) Times a Day., Disp: 180 tablet, Rfl: 1    Flaxseed, Linseed, (Flaxseed Oil) 1400 MG capsule, Take 1 capsule by mouth 2 (Two) Times a Day., Disp: , Rfl:     gabapentin (NEURONTIN) 800 MG tablet, TAKE 1 TABLET FOUR TIMES A DAY (Patient taking differently: Take 1 tablet by mouth 4 (Four) Times a Day.), Disp: 360 tablet, Rfl: 1    gemfibrozil (LOPID) 600 MG tablet, TAKE 1 TABLET TWICE A DAY (Patient taking differently: Take 1 tablet by mouth 2 (Two) Times a Day.), Disp: 180 tablet, Rfl: 3    Insulin Regular Human, Conc, (HumuLIN R U-500 KwikPen) 500 UNIT/ML solution pen-injector CONCENTRATED injection, Inject sc 160 u ac breakfast and 180 u  at dinner and about 40 units at hs. (Patient taking differently: Inject  under the skin into the appropriate area as directed. Inject sc 160 u ac breakfast and 180 u  at dinner and about 40 units at hs.), Disp: 60 mL, Rfl: 0    ipratropium (ATROVENT) 0.06 % nasal spray, USE 2 SPRAYS IN EACH NOSTRIL AS DIRECTED BY PROVIDER DAILY (Patient taking differently: 2 sprays into the nostril(s) as directed by provider Daily.), Disp: 45 mL, Rfl: 1    levothyroxine (SYNTHROID, LEVOTHROID) 50 MCG tablet, TAKE 1 TABLET DAILY (Patient taking differently: Take 1 tablet by mouth Daily.), Disp: 90 tablet, Rfl: 0    Multiple Vitamins-Minerals (MULTIVITAMIN PO), Take 1 tablet by mouth Daily., Disp: , Rfl:     omeprazole (priLOSEC) 40 MG capsule, TAKE 1 CAPSULE DAILY, Disp: 90 capsule, Rfl: 3    sertraline (ZOLOFT) 100 MG tablet, Take 1 tablet by mouth Daily., Disp: 90 tablet, Rfl: 1    simvastatin (ZOCOR) 40 MG tablet, TAKE 1 TABLET DAILY AT BEDTIME (Patient taking differently: Take 1 tablet by mouth Every Night.), Disp: 90 tablet, Rfl: 3    spironolactone (ALDACTONE) 25 MG tablet, TAKE 1 TABLET DAILY (Patient taking differently: Take 1 tablet by mouth Daily.), Disp: 90 tablet, Rfl: 3    tamoxifen (NOLVADEX) 20 MG chemo tablet, Take 1  tablet by mouth Daily., Disp: 90 tablet, Rfl: 3    temazepam (Restoril) 15 MG capsule, Take 1 capsule by mouth At Night As Needed for Sleep., Disp: 30 capsule, Rfl: 2    tiZANidine (ZANAFLEX) 4 MG tablet, Take 1 tablet by mouth 2 (Two) Times a Day As Needed for Muscle Spasms., Disp: 180 tablet, Rfl: 0    vitamin D (ERGOCALCIFEROL) 1.25 MG (55757 UT) capsule capsule, Take 1 capsule by mouth Every 7 (Seven) Days. FRIDAY, Disp: , Rfl:     exenatide er (BYDUREON) 2 MG/0.85ML auto-injector injection, Inject 2 mg under the skin into the appropriate area as directed 1 (One) Time Per Week. SATURDAY, Disp: , Rfl:     Semaglutide, 1 MG/DOSE, (Ozempic, 1 MG/DOSE,) 4 MG/3ML solution pen-injector, Inject 1 mg under the skin into the appropriate area as directed 1 (One) Time Per Week., Disp: 9 mL, Rfl: 3.      The patient's relevant past medical, surgical, family and social history were reviewed and updated in Epic as appropriate.       Review of Systems   HENT:  Positive for tinnitus and trouble swallowing.    Eyes:  Positive for visual disturbance.   Respiratory:  Positive for apnea, cough, shortness of breath and wheezing.    Gastrointestinal:         Heartburn   Musculoskeletal:  Positive for arthralgias, gait problem, myalgias and neck pain.   Neurological:  Positive for numbness.   Psychiatric/Behavioral:  Positive for sleep disturbance.    All other systems reviewed and are negative.      Objective:    Physical Exam  Constitutional:       Appearance: Normal appearance.   HENT:      Head: Normocephalic and atraumatic.      Mouth/Throat:      Comments: Class 2 airway  Cardiovascular:      Rate and Rhythm: Normal rate and regular rhythm.   Pulmonary:      Effort: Pulmonary effort is normal.      Breath sounds: Normal breath sounds.   Skin:     General: Skin is warm and dry.   Neurological:      Mental Status: He is alert and oriented to person, place, and time.   Psychiatric:         Mood and Affect: Mood normal.          Behavior: Behavior normal.         Thought Content: Thought content normal.         Judgment: Judgment normal.       CPAP Report    89/90 8 suffused  Greater than 4-hour use 81%  Settings 818  95th percentile pressure 12.4  HI 3.1  The patient continues to use and benefit from CPAP therapy.    ASSESSMENT/PLAN    Diagnoses and all orders for this visit:    1. FAVIO (obstructive sleep apnea) (Primary)    2. Psychophysiological insomnia  -     temazepam (Restoril) 15 MG capsule; Take 1 capsule by mouth At Night As Needed for Sleep.  Dispense: 30 capsule; Refill: 2        Counseled patient regarding multimodal approach with healthy nutrition, healthy sleep, regular physical activity, social activities, counseling, and medications. Encouraged to practice lateral sleep position. Avoid alcohol and sedatives close to bedtime.    Restoril 15 mg 1 p.o. nightly as needed #30 with 2 refills patient will try to stay awake with full activity throughout the day and go to sleep normally at night.  I will see him back in 4 months or sooner if symptoms warrant.    I have reviewed the results of my evaluation and impression and discussed my recommendations in detail with the patient.      Signed by  MELISSA Muniz    June 5, 2023      CC: Tc Ramirez MD         No ref. provider found

## 2023-06-11 ENCOUNTER — APPOINTMENT (OUTPATIENT)
Dept: ULTRASOUND IMAGING | Facility: HOSPITAL | Age: 73
End: 2023-06-11
Payer: MEDICARE

## 2023-06-11 ENCOUNTER — HOSPITAL ENCOUNTER (INPATIENT)
Facility: HOSPITAL | Age: 73
LOS: 4 days | Discharge: HOME | End: 2023-06-15
Attending: EMERGENCY MEDICINE | Admitting: INTERNAL MEDICINE
Payer: MEDICARE

## 2023-06-11 ENCOUNTER — APPOINTMENT (OUTPATIENT)
Dept: GENERAL RADIOLOGY | Facility: HOSPITAL | Age: 73
End: 2023-06-11
Payer: MEDICARE

## 2023-06-11 ENCOUNTER — APPOINTMENT (OUTPATIENT)
Dept: CT IMAGING | Facility: HOSPITAL | Age: 73
End: 2023-06-11
Payer: MEDICARE

## 2023-06-11 DIAGNOSIS — C50.921 MALIGNANT NEOPLASM OF RIGHT MALE BREAST, UNSPECIFIED ESTROGEN RECEPTOR STATUS, UNSPECIFIED SITE OF BREAST: ICD-10-CM

## 2023-06-11 DIAGNOSIS — F51.04 PSYCHOPHYSIOLOGICAL INSOMNIA: ICD-10-CM

## 2023-06-11 DIAGNOSIS — R79.89 ELEVATED LFTS: ICD-10-CM

## 2023-06-11 DIAGNOSIS — K85.90 ACUTE PANCREATITIS, UNSPECIFIED COMPLICATION STATUS, UNSPECIFIED PANCREATITIS TYPE: ICD-10-CM

## 2023-06-11 DIAGNOSIS — K80.70 CHOLELITHIASIS WITH CHOLEDOCHOLITHIASIS: ICD-10-CM

## 2023-06-11 DIAGNOSIS — A41.9 SEPSIS, DUE TO UNSPECIFIED ORGANISM, UNSPECIFIED WHETHER ACUTE ORGAN DYSFUNCTION PRESENT: Primary | ICD-10-CM

## 2023-06-11 LAB
ALBUMIN SERPL-MCNC: 3.9 G/DL (ref 3.5–5.2)
ALBUMIN/GLOB SERPL: 1.2 G/DL
ALP SERPL-CCNC: 204 U/L (ref 39–117)
ALT SERPL W P-5'-P-CCNC: 44 U/L (ref 1–41)
ANION GAP SERPL CALCULATED.3IONS-SCNC: 12 MMOL/L (ref 5–15)
AST SERPL-CCNC: 94 U/L (ref 1–40)
B PARAPERT DNA SPEC QL NAA+PROBE: NOT DETECTED
B PERT DNA SPEC QL NAA+PROBE: NOT DETECTED
BACTERIA UR QL AUTO: NORMAL /HPF
BASOPHILS # BLD AUTO: 0.01 10*3/MM3 (ref 0–0.2)
BASOPHILS NFR BLD AUTO: 0.1 % (ref 0–1.5)
BILIRUB SERPL-MCNC: 1.8 MG/DL (ref 0–1.2)
BILIRUB UR QL STRIP: NEGATIVE
BUN SERPL-MCNC: 27 MG/DL (ref 8–23)
BUN/CREAT SERPL: 15.7 (ref 7–25)
C PNEUM DNA NPH QL NAA+NON-PROBE: NOT DETECTED
CALCIUM SPEC-SCNC: 8.9 MG/DL (ref 8.6–10.5)
CHLORIDE SERPL-SCNC: 104 MMOL/L (ref 98–107)
CLARITY UR: CLEAR
CO2 SERPL-SCNC: 21 MMOL/L (ref 22–29)
COLOR UR: YELLOW
CREAT SERPL-MCNC: 1.72 MG/DL (ref 0.76–1.27)
D-LACTATE SERPL-SCNC: 2 MMOL/L (ref 0.5–2)
DEPRECATED RDW RBC AUTO: 48.4 FL (ref 37–54)
EGFRCR SERPLBLD CKD-EPI 2021: 41.2 ML/MIN/1.73
EOSINOPHIL # BLD AUTO: 0.07 10*3/MM3 (ref 0–0.4)
EOSINOPHIL NFR BLD AUTO: 0.8 % (ref 0.3–6.2)
ERYTHROCYTE [DISTWIDTH] IN BLOOD BY AUTOMATED COUNT: 13.9 % (ref 12.3–15.4)
FLUAV RNA RESP QL NAA+PROBE: NOT DETECTED
FLUAV SUBTYP SPEC NAA+PROBE: NOT DETECTED
FLUBV RNA ISLT QL NAA+PROBE: NOT DETECTED
FLUBV RNA RESP QL NAA+PROBE: NOT DETECTED
GLOBULIN UR ELPH-MCNC: 3.3 GM/DL
GLUCOSE BLDC GLUCOMTR-MCNC: 223 MG/DL (ref 70–130)
GLUCOSE SERPL-MCNC: 118 MG/DL (ref 65–99)
GLUCOSE UR STRIP-MCNC: NEGATIVE MG/DL
HADV DNA SPEC NAA+PROBE: NOT DETECTED
HBA1C MFR BLD: 8.4 % (ref 4.8–5.6)
HCOV 229E RNA SPEC QL NAA+PROBE: NOT DETECTED
HCOV HKU1 RNA SPEC QL NAA+PROBE: NOT DETECTED
HCOV NL63 RNA SPEC QL NAA+PROBE: NOT DETECTED
HCOV OC43 RNA SPEC QL NAA+PROBE: NOT DETECTED
HCT VFR BLD AUTO: 34.6 % (ref 37.5–51)
HGB BLD-MCNC: 11.2 G/DL (ref 13–17.7)
HGB UR QL STRIP.AUTO: NEGATIVE
HMPV RNA NPH QL NAA+NON-PROBE: NOT DETECTED
HPIV1 RNA ISLT QL NAA+PROBE: NOT DETECTED
HPIV2 RNA SPEC QL NAA+PROBE: NOT DETECTED
HPIV3 RNA NPH QL NAA+PROBE: NOT DETECTED
HPIV4 P GENE NPH QL NAA+PROBE: NOT DETECTED
HYALINE CASTS UR QL AUTO: NORMAL /LPF
IMM GRANULOCYTES # BLD AUTO: 0.03 10*3/MM3 (ref 0–0.05)
IMM GRANULOCYTES NFR BLD AUTO: 0.3 % (ref 0–0.5)
KETONES UR QL STRIP: ABNORMAL
LEUKOCYTE ESTERASE UR QL STRIP.AUTO: NEGATIVE
LIPASE SERPL-CCNC: >3000 U/L (ref 13–60)
LYMPHOCYTES # BLD AUTO: 0.18 10*3/MM3 (ref 0.7–3.1)
LYMPHOCYTES NFR BLD AUTO: 2 % (ref 19.6–45.3)
M PNEUMO IGG SER IA-ACNC: NOT DETECTED
MCH RBC QN AUTO: 31.2 PG (ref 26.6–33)
MCHC RBC AUTO-ENTMCNC: 32.4 G/DL (ref 31.5–35.7)
MCV RBC AUTO: 96.4 FL (ref 79–97)
MONOCYTES # BLD AUTO: 0.22 10*3/MM3 (ref 0.1–0.9)
MONOCYTES NFR BLD AUTO: 2.5 % (ref 5–12)
NEUTROPHILS NFR BLD AUTO: 8.46 10*3/MM3 (ref 1.7–7)
NEUTROPHILS NFR BLD AUTO: 94.3 % (ref 42.7–76)
NITRITE UR QL STRIP: NEGATIVE
NRBC BLD AUTO-RTO: 0 /100 WBC (ref 0–0.2)
PH UR STRIP.AUTO: 5.5 [PH] (ref 5–8)
PLATELET # BLD AUTO: 97 10*3/MM3 (ref 140–450)
PMV BLD AUTO: 11.5 FL (ref 6–12)
POTASSIUM SERPL-SCNC: 4.3 MMOL/L (ref 3.5–5.2)
PROCALCITONIN SERPL-MCNC: 2.03 NG/ML (ref 0–0.25)
PROT SERPL-MCNC: 7.2 G/DL (ref 6–8.5)
PROT UR QL STRIP: ABNORMAL
RBC # BLD AUTO: 3.59 10*6/MM3 (ref 4.14–5.8)
RBC # UR STRIP: NORMAL /HPF
REF LAB TEST METHOD: NORMAL
RHINOVIRUS RNA SPEC NAA+PROBE: NOT DETECTED
RSV RNA NPH QL NAA+NON-PROBE: NOT DETECTED
SARS-COV-2 RNA NPH QL NAA+NON-PROBE: NOT DETECTED
SARS-COV-2 RNA RESP QL NAA+PROBE: NORMAL
SODIUM SERPL-SCNC: 137 MMOL/L (ref 136–145)
SP GR UR STRIP: 1.02 (ref 1–1.03)
SQUAMOUS #/AREA URNS HPF: NORMAL /HPF
UROBILINOGEN UR QL STRIP: ABNORMAL
WBC # UR STRIP: NORMAL /HPF
WBC NRBC COR # BLD: 8.97 10*3/MM3 (ref 3.4–10.8)

## 2023-06-11 PROCEDURE — 87040 BLOOD CULTURE FOR BACTERIA: CPT | Performed by: EMERGENCY MEDICINE

## 2023-06-11 PROCEDURE — 81001 URINALYSIS AUTO W/SCOPE: CPT | Performed by: EMERGENCY MEDICINE

## 2023-06-11 PROCEDURE — 74177 CT ABD & PELVIS W/CONTRAST: CPT

## 2023-06-11 PROCEDURE — 76705 ECHO EXAM OF ABDOMEN: CPT

## 2023-06-11 PROCEDURE — 85025 COMPLETE CBC W/AUTO DIFF WBC: CPT | Performed by: EMERGENCY MEDICINE

## 2023-06-11 PROCEDURE — 87636 SARSCOV2 & INF A&B AMP PRB: CPT | Performed by: EMERGENCY MEDICINE

## 2023-06-11 PROCEDURE — 63710000001 INSULIN LISPRO (HUMAN) PER 5 UNITS: Performed by: FAMILY MEDICINE

## 2023-06-11 PROCEDURE — 0202U NFCT DS 22 TRGT SARS-COV-2: CPT | Performed by: INTERNAL MEDICINE

## 2023-06-11 PROCEDURE — 25010000002 PIPERACILLIN SOD-TAZOBACTAM PER 1 G: Performed by: FAMILY MEDICINE

## 2023-06-11 PROCEDURE — 94799 UNLISTED PULMONARY SVC/PX: CPT

## 2023-06-11 PROCEDURE — 80053 COMPREHEN METABOLIC PANEL: CPT | Performed by: EMERGENCY MEDICINE

## 2023-06-11 PROCEDURE — 83036 HEMOGLOBIN GLYCOSYLATED A1C: CPT | Performed by: FAMILY MEDICINE

## 2023-06-11 PROCEDURE — 82948 REAGENT STRIP/BLOOD GLUCOSE: CPT

## 2023-06-11 PROCEDURE — 25010000002 KETOROLAC TROMETHAMINE PER 15 MG: Performed by: EMERGENCY MEDICINE

## 2023-06-11 PROCEDURE — 94660 CPAP INITIATION&MGMT: CPT

## 2023-06-11 PROCEDURE — 25010000002 PIPERACILLIN SOD-TAZOBACTAM PER 1 G: Performed by: EMERGENCY MEDICINE

## 2023-06-11 PROCEDURE — 25010000002 ONDANSETRON PER 1 MG: Performed by: EMERGENCY MEDICINE

## 2023-06-11 PROCEDURE — 25010000002 VANCOMYCIN 10 G RECONSTITUTED SOLUTION: Performed by: EMERGENCY MEDICINE

## 2023-06-11 PROCEDURE — 99285 EMERGENCY DEPT VISIT HI MDM: CPT

## 2023-06-11 PROCEDURE — 87077 CULTURE AEROBIC IDENTIFY: CPT | Performed by: EMERGENCY MEDICINE

## 2023-06-11 PROCEDURE — 84145 PROCALCITONIN (PCT): CPT | Performed by: EMERGENCY MEDICINE

## 2023-06-11 PROCEDURE — 87186 SC STD MICRODIL/AGAR DIL: CPT | Performed by: EMERGENCY MEDICINE

## 2023-06-11 PROCEDURE — 83605 ASSAY OF LACTIC ACID: CPT | Performed by: EMERGENCY MEDICINE

## 2023-06-11 PROCEDURE — 87150 DNA/RNA AMPLIFIED PROBE: CPT | Performed by: EMERGENCY MEDICINE

## 2023-06-11 PROCEDURE — 25510000001 IOPAMIDOL 61 % SOLUTION: Performed by: EMERGENCY MEDICINE

## 2023-06-11 PROCEDURE — 83690 ASSAY OF LIPASE: CPT | Performed by: EMERGENCY MEDICINE

## 2023-06-11 PROCEDURE — 36415 COLL VENOUS BLD VENIPUNCTURE: CPT

## 2023-06-11 PROCEDURE — 71045 X-RAY EXAM CHEST 1 VIEW: CPT

## 2023-06-11 RX ORDER — ONDANSETRON 4 MG/1
4 TABLET, FILM COATED ORAL EVERY 6 HOURS PRN
Status: DISCONTINUED | OUTPATIENT
Start: 2023-06-11 | End: 2023-06-15 | Stop reason: HOSPADM

## 2023-06-11 RX ORDER — ONDANSETRON 2 MG/ML
4 INJECTION INTRAMUSCULAR; INTRAVENOUS EVERY 6 HOURS PRN
Status: DISCONTINUED | OUTPATIENT
Start: 2023-06-11 | End: 2023-06-15 | Stop reason: HOSPADM

## 2023-06-11 RX ORDER — IBUPROFEN 600 MG/1
1 TABLET ORAL
Status: DISCONTINUED | OUTPATIENT
Start: 2023-06-11 | End: 2023-06-15 | Stop reason: HOSPADM

## 2023-06-11 RX ORDER — NALOXONE HCL 0.4 MG/ML
0.4 VIAL (ML) INJECTION
Status: DISCONTINUED | OUTPATIENT
Start: 2023-06-11 | End: 2023-06-15 | Stop reason: HOSPADM

## 2023-06-11 RX ORDER — KETOROLAC TROMETHAMINE 15 MG/ML
15 INJECTION, SOLUTION INTRAMUSCULAR; INTRAVENOUS ONCE
Status: COMPLETED | OUTPATIENT
Start: 2023-06-11 | End: 2023-06-11

## 2023-06-11 RX ORDER — SODIUM CHLORIDE 0.9 % (FLUSH) 0.9 %
10 SYRINGE (ML) INJECTION AS NEEDED
Status: DISCONTINUED | OUTPATIENT
Start: 2023-06-11 | End: 2023-06-15 | Stop reason: HOSPADM

## 2023-06-11 RX ORDER — POLYETHYLENE GLYCOL 3350 17 G/17G
17 POWDER, FOR SOLUTION ORAL DAILY PRN
Status: DISCONTINUED | OUTPATIENT
Start: 2023-06-11 | End: 2023-06-15 | Stop reason: HOSPADM

## 2023-06-11 RX ORDER — BISACODYL 5 MG/1
5 TABLET, DELAYED RELEASE ORAL DAILY PRN
Status: DISCONTINUED | OUTPATIENT
Start: 2023-06-11 | End: 2023-06-15 | Stop reason: HOSPADM

## 2023-06-11 RX ORDER — SODIUM CHLORIDE 0.9 % (FLUSH) 0.9 %
10 SYRINGE (ML) INJECTION EVERY 12 HOURS SCHEDULED
Status: DISCONTINUED | OUTPATIENT
Start: 2023-06-11 | End: 2023-06-15 | Stop reason: HOSPADM

## 2023-06-11 RX ORDER — DEXTROSE MONOHYDRATE 25 G/50ML
25 INJECTION, SOLUTION INTRAVENOUS
Status: DISCONTINUED | OUTPATIENT
Start: 2023-06-11 | End: 2023-06-15 | Stop reason: HOSPADM

## 2023-06-11 RX ORDER — NICOTINE POLACRILEX 4 MG
15 LOZENGE BUCCAL
Status: DISCONTINUED | OUTPATIENT
Start: 2023-06-11 | End: 2023-06-15 | Stop reason: HOSPADM

## 2023-06-11 RX ORDER — ONDANSETRON 2 MG/ML
4 INJECTION INTRAMUSCULAR; INTRAVENOUS ONCE
Status: COMPLETED | OUTPATIENT
Start: 2023-06-11 | End: 2023-06-11

## 2023-06-11 RX ORDER — ACETAMINOPHEN 500 MG
1000 TABLET ORAL ONCE
Status: COMPLETED | OUTPATIENT
Start: 2023-06-11 | End: 2023-06-11

## 2023-06-11 RX ORDER — AMOXICILLIN 250 MG
2 CAPSULE ORAL 2 TIMES DAILY
Status: DISCONTINUED | OUTPATIENT
Start: 2023-06-11 | End: 2023-06-15 | Stop reason: HOSPADM

## 2023-06-11 RX ORDER — SODIUM CHLORIDE 9 MG/ML
40 INJECTION, SOLUTION INTRAVENOUS AS NEEDED
Status: DISCONTINUED | OUTPATIENT
Start: 2023-06-11 | End: 2023-06-15 | Stop reason: HOSPADM

## 2023-06-11 RX ORDER — MORPHINE SULFATE 2 MG/ML
1 INJECTION, SOLUTION INTRAMUSCULAR; INTRAVENOUS EVERY 4 HOURS PRN
Status: DISCONTINUED | OUTPATIENT
Start: 2023-06-11 | End: 2023-06-15 | Stop reason: HOSPADM

## 2023-06-11 RX ORDER — VANCOMYCIN 2 GRAM/500 ML IN 0.9 % SODIUM CHLORIDE INTRAVENOUS
20 ONCE
Status: COMPLETED | OUTPATIENT
Start: 2023-06-11 | End: 2023-06-11

## 2023-06-11 RX ORDER — SODIUM CHLORIDE, SODIUM LACTATE, POTASSIUM CHLORIDE, CALCIUM CHLORIDE 600; 310; 30; 20 MG/100ML; MG/100ML; MG/100ML; MG/100ML
150 INJECTION, SOLUTION INTRAVENOUS CONTINUOUS
Status: DISCONTINUED | OUTPATIENT
Start: 2023-06-11 | End: 2023-06-14

## 2023-06-11 RX ORDER — BISACODYL 10 MG
10 SUPPOSITORY, RECTAL RECTAL DAILY PRN
Status: DISCONTINUED | OUTPATIENT
Start: 2023-06-11 | End: 2023-06-15 | Stop reason: HOSPADM

## 2023-06-11 RX ORDER — INSULIN LISPRO 100 [IU]/ML
2-9 INJECTION, SOLUTION INTRAVENOUS; SUBCUTANEOUS
Status: DISCONTINUED | OUTPATIENT
Start: 2023-06-11 | End: 2023-06-15

## 2023-06-11 RX ADMIN — SENNOSIDES AND DOCUSATE SODIUM 2 TABLET: 50; 8.6 TABLET ORAL at 21:09

## 2023-06-11 RX ADMIN — KETOROLAC TROMETHAMINE 15 MG: 15 INJECTION, SOLUTION INTRAMUSCULAR; INTRAVENOUS at 08:50

## 2023-06-11 RX ADMIN — VANCOMYCIN HYDROCHLORIDE 2000 MG: 10 INJECTION, POWDER, LYOPHILIZED, FOR SOLUTION INTRAVENOUS at 12:04

## 2023-06-11 RX ADMIN — ACETAMINOPHEN 1000 MG: 500 TABLET ORAL at 08:50

## 2023-06-11 RX ADMIN — ONDANSETRON 4 MG: 2 INJECTION INTRAMUSCULAR; INTRAVENOUS at 08:49

## 2023-06-11 RX ADMIN — TAZOBACTAM SODIUM AND PIPERACILLIN SODIUM 3.38 G: 375; 3 INJECTION, SOLUTION INTRAVENOUS at 23:55

## 2023-06-11 RX ADMIN — IOPAMIDOL 89 ML: 612 INJECTION, SOLUTION INTRAVENOUS at 09:33

## 2023-06-11 RX ADMIN — Medication 10 ML: at 21:09

## 2023-06-11 RX ADMIN — SENNOSIDES AND DOCUSATE SODIUM 2 TABLET: 50; 8.6 TABLET ORAL at 12:13

## 2023-06-11 RX ADMIN — TAZOBACTAM SODIUM AND PIPERACILLIN SODIUM 3.38 G: 375; 3 INJECTION, SOLUTION INTRAVENOUS at 10:23

## 2023-06-11 RX ADMIN — INSULIN LISPRO 4 UNITS: 100 INJECTION, SOLUTION INTRAVENOUS; SUBCUTANEOUS at 21:08

## 2023-06-11 RX ADMIN — Medication 10 ML: at 12:04

## 2023-06-11 RX ADMIN — SODIUM CHLORIDE, POTASSIUM CHLORIDE, SODIUM LACTATE AND CALCIUM CHLORIDE 2052 ML: 600; 310; 30; 20 INJECTION, SOLUTION INTRAVENOUS at 08:27

## 2023-06-11 RX ADMIN — SODIUM CHLORIDE, POTASSIUM CHLORIDE, SODIUM LACTATE AND CALCIUM CHLORIDE 150 ML/HR: 600; 310; 30; 20 INJECTION, SOLUTION INTRAVENOUS at 12:13

## 2023-06-11 RX ADMIN — TAZOBACTAM SODIUM AND PIPERACILLIN SODIUM 3.38 G: 375; 3 INJECTION, SOLUTION INTRAVENOUS at 18:39

## 2023-06-11 NOTE — PLAN OF CARE
Goal Outcome Evaluation:      Pt. Resting in bed. Pt says he is typically awake at nights and sleeps during the day. No episodes of N/V/D since arriving to the floor. VSS on 2L o2. C/o pain in abdomen. Will continue to monitor.

## 2023-06-11 NOTE — ED PROVIDER NOTES
Subjective   History of Present Illness  Patient is a 72 yo male with a history of breast cancer on the R s/p mastectomy 1 week ago who presents with N/V since 04:30 this AM. Denies any new exposures or sick contacts. Denies hematemesis or chest pain. Received 4 mg Zofran and 150 cc LR per EMS en route, was feeling short of breath in en route and was placed on 2L NC.     History provided by:  Patient, EMS personnel and medical records    Review of Systems    Past Medical History:   Diagnosis Date    Abdominal pain 05/17/2023    RUQ; SEEN IN ED AT [facility]; DC'D HOME    Abscess of arm, right     Abscess of skin of neck     Acute sinusitis     ALT (SGPT) level raised     Arthritis     BPH (benign prostatic hypertrophy)     Breast cancer 05/2023    right    Cirrhosis of liver 09/03/2021    [facility]    CKD (chronic kidney disease)     Colon polyp     Constipation     DDD (degenerative disc disease), cervical     Decreased platelet count     Depression     Resolved: 1/28/2015    Elevated AST (SGOT)     Erectile dysfunction     GERD (gastroesophageal reflux disease)     History of transfusion 2021    [facility]; 3 UNITS; NO REACTION    Hyperlipidemia     Hypertension     Hypothyroidism, adult 11/10/2020    Infection     MSSA lumbar surgical wound infection 3/2017    Jaw pain     Left hip pain     Low back pain     Surgery 30 yrs L4-5    Migraine     Hx of    Obesity (BMI 30.0-34.9) 10/07/2016    BMI 31.92    Obstructive sleep apnea     CPAP    Portal hypertensive gastropathy 09/03/2021    Shigellosis     Sleep apnea     PT TO BRING MASK AND TUBING DAY OF SURGERY    Symptomatic anemia 08/30/2021    Type 2 diabetes mellitus     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    Metformin Nausea And Vomiting     Other  reaction(s): SEVERE INTESTIONAL ISSUES  Other reaction(s): SEVERE GI ISSUES    Glucophage XR TB24  MetFORMIN HCl TABS    Tetracyclines & Related Nausea Only     Adverse Reaction    Chlorhexidine Rash       Past Surgical History:   Procedure Laterality Date    ANTERIOR CERVICAL DISCECTOMY W/ FUSION N/A 12/14/2017    Procedure: CERVICAL DISCECTOMY ANTERIOR FUSION WITH INSTRUMENTATION C7/T1;  Surgeon: Gigi Perales MD;  Location:  [facility];  Service:     CERVICAL ARTHRODESIS      CERVICAL DISCECTOMY POSTERIOR FUSION WITH INSTRUMENTATION N/A 03/16/2017    Procedure:  INCISION AND DRAINAGE OF POSTERIOR CERVICAL WOUND;  Surgeon: Gigi Perales MD;  Location:  [facility];  Service:     CERVICAL LAMINECTOMY DECOMPRESSION POSTERIOR Left 02/28/2017    Procedure: Left C7-T1 CERVICAL FORAMINOTOMY POSTERIOR WITH METRIX;  Surgeon: Gigi Perales MD;  Location:  [facility];  Service:     COLONOSCOPY N/A 09/01/2021    Procedure: COLONOSCOPY;  Surgeon: Sharif García MD;  Location:  [facility];  Service: Gastroenterology;  Laterality: N/A;    CYST REMOVAL  04/2021    ENDOSCOPY N/A 08/31/2021    Procedure: ESOPHAGOGASTRODUODENOSCOPY;  Surgeon: Brunner, Mark I, MD;  Location:  [facility];  Service: Gastroenterology;  Laterality: N/A;    MASTECTOMY W/ SENTINEL NODE BIOPSY Right 5/23/2023    Procedure: BREAST MASTECTOMY WITH SENTINEL NODE BIOPSY RIGHT;  Surgeon: Joseph Ramirez MD;  Location:  [facility];  Service: General;  Laterality: Right;    VASECTOMY      WISDOM TOOTH EXTRACTION         Family History   Problem Relation Age of Onset    Diabetes Father     Heart disease Father         Cardiomyopathy    Hyperlipidemia Father     Stroke Father     Hypertension Father     Diabetes Paternal Grandmother     Hearing loss Mother     Arthritis Mother     Cancer Mother     Heart disease Mother     Thyroid disease Mother        Social History     Socioeconomic History    Marital status:     Tobacco Use    Smoking status: Former     Packs/day: 0.50     Years: 4.00     Pack years: 2.00     Types: Cigarettes     Quit date: 1976     Years since quittin.4    Smokeless tobacco: Never   Vaping Use    Vaping Use: Never used   Substance and Sexual Activity    Alcohol use: Yes     Comment: Socially    Drug use: Never    Sexual activity: Not Currently     Partners: Female     Birth control/protection: Vasectomy           Objective   Physical Exam  Vitals and nursing note reviewed.   HENT:      Head: Normocephalic and atraumatic.   Cardiovascular:      Rate and Rhythm: Normal rate and regular rhythm.      Heart sounds: Normal heart sounds.   Pulmonary:      Effort: Pulmonary effort is normal.      Breath sounds: Normal breath sounds.   Abdominal:      General: Abdomen is flat. Bowel sounds are normal.      Palpations: Abdomen is soft.      Tenderness: There is no abdominal tenderness.   Skin:     General: Skin is warm and dry.      Coloration: Skin is pale.      Comments: Well healing mastectomy scar to the R breast area.   Neurological:      Mental Status: He is alert and oriented to person, place, and time.   Psychiatric:         Mood and Affect: Mood normal.         Behavior: Behavior normal.       Procedures           ED Course  ED Course as of 23 1339   Sun 2023   0644 I reviewed the patient's chart and he has a history of breast cancer on the right and is status post right mastectomy by Dr. MURILLO.  See that documentation for details. [RS]   0713 Temp(!): 100.6 °F (38.1 °C)  Patient febrile on arrival. [RS]   0713 BP(!): 81/41  Patient hypotensive on arrival. [RS]   0741 BP: 108/64  Repeat blood pressure does demonstrate interval improvement. [RS]   0758 XR Chest 1 View  I personally reviewed the single view of the chest.  My interpretation there is no focal lobar infiltrate.  See report from radiology for details. [RS]   0915 Lipase(!): >3,000  Significant elevation of lipase consistent  with acute pancreatitis. [RS]   0915 Creatinine(!): 1.72  Creatinine in the high end of his prior values with mild chronic kidney disease. [RS]      ED Course User Index  [RS] Kiel Vicente MD                                           Medical Decision Making  Problems Addressed:  Acute pancreatitis, unspecified complication status, unspecified pancreatitis type: complicated acute illness or injury  Elevated LFTs: complicated acute illness or injury  Malignant neoplasm of right male breast, unspecified estrogen receptor status, unspecified site of breast: complicated acute illness or injury  Sepsis, due to unspecified organism, unspecified whether acute organ dysfunction present: complicated acute illness or injury    Amount and/or Complexity of Data Reviewed  Labs: ordered. Decision-making details documented in ED Course.  Radiology: ordered. Decision-making details documented in ED Course.    Risk  OTC drugs.  Prescription drug management.  Decision regarding hospitalization.        Final diagnoses:   Sepsis, due to unspecified organism, unspecified whether acute organ dysfunction present   Acute pancreatitis, unspecified complication status, unspecified pancreatitis type   Elevated LFTs   Malignant neoplasm of right male breast, unspecified estrogen receptor status, unspecified site of breast       ED Disposition  ED Disposition       ED Disposition   Decision to Admit    Condition   --    Comment   Level of Care: Telemetry [5]   Diagnosis: Sepsis, due to unspecified organism, unspecified whether acute organ dysfunction present [[id number]]                 No follow-up provider specified.       Medication List      No changes were made to your prescriptions during this visit.            Kiel Vicente MD  06/11/23 [id number]

## 2023-06-11 NOTE — Clinical Note
Level of Care: Telemetry [5]   Diagnosis: Sepsis, due to unspecified organism, unspecified whether acute organ dysfunction present [[id number]]   Certification: I Certify That Inpatient Hospital Services Are Medically Necessary For Greater Than 2 Midnights

## 2023-06-11 NOTE — H&P
"    [facility] Medicine Services  HISTORY AND PHYSICAL    Patient Name: Elton Funez  : 1950  MRN: [id number]  Primary Care Physician: Tc Ramirez MD  Date of admission: 2023      Subjective   Subjective     Chief Complaint:  N/v     HPI:  Elton Funez is a 73 y.o. male with PMH DM II, FAVIO (CPAP), JEROME cirrhosis, DDD, HTN, HLD, hypothyroidism and breast cancer s/p mastectomy (2 weeks ago) that presented with nausea, vomiting and mild abdominal pain that began early this morning. He states he had similar pain prior to his surgery and was seen in the ED, he was told \"nothing is wrong and it is ok to have surgery.\" Today he woke up with severe nausea and vomiting. Per family he was shaking a lot. He denies any diarrhea. He states he is currently constipated and has hemorrhoids but no blood in stool. The pain is 4/10, epigastric and radiate to right side and back.  He denies any alcohol use. He has started tamoxifen 4 days ago. He has recently been started on Trulicity but started it after his first episode of abdominal pain. Currently he is not nauseated and asked for ice chips.   In the ED, lipase >3000, Ct shows acute pancreatitis, cirrhosis. U/s shows no acute cholecystitis.   He will be admitted to the hospitalist service for further treatment and evaluation.       Review of Systems   Gen- No fevers, chills  CV- No chest pain, palpitations  Resp- No cough, dyspnea  GI- +  N/V/ no D, abd pain      Personal History     Past Medical History:   Diagnosis Date    Abdominal pain 2023    RUQ; SEEN IN ED AT [facility]; DC'D HOME    Abscess of arm, right     Abscess of skin of neck     Acute sinusitis     ALT (SGPT) level raised     Arthritis     BPH (benign prostatic hypertrophy)     Breast cancer 2023    right    Cirrhosis of liver 2021    JEROME    CKD (chronic kidney disease)     Colon polyp     Constipation     DDD (degenerative disc disease), cervical     " Decreased platelet count     Depression     Resolved: 1/28/2015    Elevated AST (SGOT)     Erectile dysfunction     GERD (gastroesophageal reflux disease)     History of transfusion 2021    [facility]; 3 UNITS; NO REACTION    Hyperlipidemia     Hypertension     Hypothyroidism, adult 11/10/2020    Infection     MSSA lumbar surgical wound infection 3/2017    Jaw pain     Left hip pain     Low back pain     Surgery 30 yrs L4-5    Migraine     Hx of    Obesity (BMI 30.0-34.9) 10/07/2016    BMI 31.92    Obstructive sleep apnea     CPAP    Portal hypertensive gastropathy 09/03/2021    Shigellosis     Sleep apnea     PT TO BRING MASK AND TUBING DAY OF SURGERY    Symptomatic anemia 08/30/2021    Type 2 diabetes mellitus     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     Wears glasses     Wears hearing aid     BOTH EARS         Oncology Problem List:  Malignant neoplasm of overlapping sites of right female breast (05/23/ 2023; Status: Active)  Malignant neoplasm of central portion of right breast in male,   estrogen receptor positive (05/08/2023; Status: Active)       Past Surgical History:   Procedure Laterality Date    ANTERIOR CERVICAL DISCECTOMY W/ FUSION N/A 12/14/2017    Procedure: CERVICAL DISCECTOMY ANTERIOR FUSION WITH INSTRUMENTATION C7/T1;  Surgeon: Gigi Perales MD;  Location:  [facility];  Service:     CERVICAL ARTHRODESIS      CERVICAL DISCECTOMY POSTERIOR FUSION WITH INSTRUMENTATION N/A 03/16/2017    Procedure:  INCISION AND DRAINAGE OF POSTERIOR CERVICAL WOUND;  Surgeon: Gigi Perales MD;  Location:  [facility];  Service:     CERVICAL LAMINECTOMY DECOMPRESSION POSTERIOR Left 02/28/2017    Procedure: Left C7-T1 CERVICAL FORAMINOTOMY POSTERIOR WITH METRIX;  Surgeon: Gigi Perales MD;  Location:  [facility];  Service:     COLONOSCOPY N/A 09/01/2021    Procedure: COLONOSCOPY;  Surgeon: Sharif García MD;  Location:  [facility];  Service: Gastroenterology;  Laterality: N/A;    CYST  REMOVAL  04/2021    ENDOSCOPY N/A 08/31/2021    Procedure: ESOPHAGOGASTRODUODENOSCOPY;  Surgeon: Brunner, Mark I, MD;  Location:  [facility];  Service: Gastroenterology;  Laterality: N/A;    MASTECTOMY W/ SENTINEL NODE BIOPSY Right 5/23/2023    Procedure: BREAST MASTECTOMY WITH SENTINEL NODE BIOPSY RIGHT;  Surgeon: Joseph Ramirez MD;  Location:  [facility];  Service: General;  Laterality: Right;    VASECTOMY      WISDOM TOOTH EXTRACTION         Family History: family history includes Arthritis in his mother; Cancer in his mother; Diabetes in his father and paternal grandmother; Hearing loss in his mother; Heart disease in his father and mother; Hyperlipidemia in his father; Hypertension in his father; Stroke in his father; Thyroid disease in his mother.     Social History:  reports that he quit smoking about 47 years ago. His smoking use included cigarettes. He has a 2.00 pack-year smoking history. He has never used smokeless tobacco. He reports current alcohol use. He reports that he does not use drugs.  Social History     Social History Narrative    Not on file       Medications:  Available home medication information reviewed.  Medications Prior to Admission   Medication Sig Dispense Refill Last Dose    Alpha-Lipoic Acid 600 MG tablet Take 1 tablet by mouth Daily.       B Complex Vitamins (VITAMIN B COMPLEX PO) Take 1 tablet by mouth Daily.       B-D UF III MINI PEN NEEDLES 31G X 5 MM misc USE THREE TIMES A  each 1     carvedilol (COREG) 6.25 MG tablet TAKE 1 TABLET EVERY 12 HOURS (Patient taking differently: Take 1 tablet by mouth 2 (Two) Times a Day With Meals.) 180 tablet 1     Coenzyme Q10 (Co Q 10) 100 MG capsule Take 300 mg by mouth Daily.       colestipol (COLESTID) 1 g tablet TAKE 2 TABLETS TWICE A DAY (Patient taking differently: Take 2 tablets by mouth 2 (Two) Times a Day.) 360 tablet 3     Continuous Blood Gluc  (FreeStyle Winston 2 Sugarcreek) device 1 Device Every 14 (Fourteen)  Days. 1 each 0     Continuous Blood Gluc Sensor (FreeStyle Winston 2 Sensor) misc 1 each Every 14 (Fourteen) Days. 6 each 3     Dulaglutide (Trulicity) 4.5 MG/0.5ML solution pen-injector Inject 0.5 mL under the skin into the appropriate area as directed 1 (One) Time Per Week. 6 mL 1     exenatide er (BYDUREON) 2 MG/0.85ML auto-injector injection Inject 2 mg under the skin into the appropriate area as directed 1 (One) Time Per Week. SATURDAY       ferrous sulfate (FeroSul) 325 (65 FE) MG tablet Take 1 tablet by mouth 2 (Two) Times a Day. 180 tablet 1     Flaxseed, Linseed, (Flaxseed Oil) 1400 MG capsule Take 1 capsule by mouth 2 (Two) Times a Day.       gabapentin (NEURONTIN) 800 MG tablet TAKE 1 TABLET FOUR TIMES A DAY (Patient taking differently: Take 1 tablet by mouth 4 (Four) Times a Day.) 360 tablet 1     gemfibrozil (LOPID) 600 MG tablet TAKE 1 TABLET TWICE A DAY (Patient taking differently: Take 1 tablet by mouth 2 (Two) Times a Day.) 180 tablet 3     Insulin Regular Human, Conc, (HumuLIN R U-500 KwikPen) 500 UNIT/ML solution pen-injector CONCENTRATED injection Inject sc 160 u ac breakfast and 180 u  at dinner and about 40 units at hs. (Patient taking differently: Inject  under the skin into the appropriate area as directed. Inject sc 160 u ac breakfast and 180 u  at dinner and about 40 units at hs.) 60 mL 0     ipratropium (ATROVENT) 0.06 % nasal spray USE 2 SPRAYS IN EACH NOSTRIL AS DIRECTED BY PROVIDER DAILY (Patient taking differently: 2 sprays into the nostril(s) as directed by provider Daily.) 45 mL 1     levothyroxine (SYNTHROID, LEVOTHROID) 50 MCG tablet TAKE 1 TABLET DAILY (Patient taking differently: Take 1 tablet by mouth Daily.) 90 tablet 0     Multiple Vitamins-Minerals (MULTIVITAMIN PO) Take 1 tablet by mouth Daily.       omeprazole (priLOSEC) 40 MG capsule TAKE 1 CAPSULE DAILY 90 capsule 3     Semaglutide, 1 MG/DOSE, (Ozempic, 1 MG/DOSE,) 4 MG/3ML solution pen-injector Inject 1 mg under the skin  into the appropriate area as directed 1 (One) Time Per Week. 9 mL 3     sertraline (ZOLOFT) 100 MG tablet Take 1 tablet by mouth Daily. 90 tablet 1     simvastatin (ZOCOR) 40 MG tablet TAKE 1 TABLET DAILY AT BEDTIME (Patient taking differently: Take 1 tablet by mouth Every Night.) 90 tablet 3     spironolactone (ALDACTONE) 25 MG tablet TAKE 1 TABLET DAILY (Patient taking differently: Take 1 tablet by mouth Daily.) 90 tablet 3     tamoxifen (NOLVADEX) 20 MG chemo tablet Take 1 tablet by mouth Daily. 90 tablet 3     temazepam (Restoril) 15 MG capsule Take 1 capsule by mouth At Night As Needed for Sleep. 30 capsule 2     tiZANidine (ZANAFLEX) 4 MG tablet Take 1 tablet by mouth 2 (Two) Times a Day As Needed for Muscle Spasms. 180 tablet 0     vitamin D (ERGOCALCIFEROL) 1.25 MG (41199 UT) capsule capsule Take 1 capsule by mouth Every 7 (Seven) Days. FRIDAY          Allergies   Allergen Reactions    Glucophage Xr [Metformin Hcl Er] Diarrhea and Other (See Comments)     Glucophage XR TB24: Adverse Reaction: Severe GI issues.    Metformin Nausea And Vomiting     Other reaction(s): SEVERE INTESTIONAL ISSUES  Other reaction(s): SEVERE GI ISSUES    Glucophage XR TB24  MetFORMIN HCl TABS    Tetracyclines & Related Nausea Only     Adverse Reaction    Chlorhexidine Rash       Objective   Objective     Vital Signs:   Temp:  [98 °F (36.7 °C)-100.6 °F (38.1 °C)] 98 °F (36.7 °C)  Heart Rate:  [84-94] 84  Resp:  [16-18] 18  BP: ()/(41-64) 100/64       Physical Exam   Constitutional: Awake, alert  Eyes: PERRLA, sclerae anicteric, no conjunctival injection  HENT: NCAT, mucous membranes moist  Neck: Supple, no thyromegaly, no lymphadenopathy, trachea midline  Respiratory: Clear to auscultation bilaterally, nonlabored respirations   Cardiovascular: RRR, no murmurs, rubs, or gallops, palpable pedal pulses bilaterally  Gastrointestinal: Positive bowel sounds, soft, slight epigastric tenderness  nondistended  Musculoskeletal: No  bilateral ankle edema,   Psychiatric: Appropriate affect, cooperative  Neurologic: Oriented x 3, speech clear  Skin: No rashes      Result Review:  I have personally reviewed the results from the time of this admission to 6/11/2023 16:10 EDT and agree with these findings:  [x]  Laboratory list / accordion  []  Microbiology  [x]  Radiology  []  EKG/Telemetry   []  Cardiology/Vascular   []  Pathology  []  Old records  []  Other:  Most notable findings include:         LAB RESULTS:      Lab 06/11/23  0830   WBC 8.97   HEMOGLOBIN 11.2*   HEMATOCRIT 34.6*   PLATELETS 97*   NEUTROS ABS 8.46*   IMMATURE GRANS (ABS) 0.03   LYMPHS ABS 0.18*   MONOS ABS 0.22   EOS ABS 0.07   MCV 96.4   PROCALCITONIN 2.03*   LACTATE 2.0         Lab 06/11/23  0830   SODIUM 137   POTASSIUM 4.3   CHLORIDE 104   CO2 21.0*   ANION GAP 12.0   BUN 27*   CREATININE 1.72*   EGFR 41.2*   GLUCOSE 118*   CALCIUM 8.9         Lab 06/11/23  0830   TOTAL PROTEIN 7.2   ALBUMIN 3.9   GLOBULIN 3.3   ALT (SGPT) 44*   AST (SGOT) 94*   BILIRUBIN 1.8*   ALK PHOS 204*   LIPASE >3,000*                     UA          11/7/2022    13:42 6/11/2023    09:00   Urinalysis   Squamous Epithelial Cells, UA 0-2  0-2    Specific Gravity, UA 1.020  1.019    Ketones, UA Negative  Trace    Blood, UA Negative  Negative    Leukocytes, UA Negative  Negative    Nitrite, UA Negative  Negative    RBC, UA 0-2  0-2    WBC, UA 0-2  0-2    Bacteria, UA None Seen  None Seen        Microbiology Results (last 10 days)       Procedure Component Value - Date/Time    Respiratory Panel PCR w/COVID-19(SARS-CoV-2) [facility] In-House, NP Swab in UTM/VTM, 3-4 HR TAT - Swab, Nasopharynx [[id number]]  (Normal) Collected: 06/11/23 1053    Lab Status: Final result Specimen: Swab from Nasopharynx Updated: 06/11/23 1206     ADENOVIRUS, PCR Not Detected     Coronavirus 229E Not Detected     Coronavirus HKU1 Not Detected     Coronavirus NL63 Not Detected     Coronavirus OC43 Not Detected      COVID19 Not Detected     Human Metapneumovirus Not Detected     Human Rhinovirus/Enterovirus Not Detected     Influenza A PCR Not Detected     Influenza B PCR Not Detected     Parainfluenza Virus 1 Not Detected     Parainfluenza Virus 2 Not Detected     Parainfluenza Virus 3 Not Detected     Parainfluenza Virus 4 Not Detected     RSV, PCR Not Detected     Bordetella pertussis pcr Not Detected     Bordetella parapertussis PCR Not Detected     Chlamydophila pneumoniae PCR Not Detected     Mycoplasma pneumo by PCR Not Detected    Narrative:      In the setting of a positive respiratory panel with a viral infection PLUS a negative procalcitonin without other underlying concern for bacterial infection, consider observing off antibiotics or discontinuation of antibiotics and continue supportive care. If the respiratory panel is positive for atypical bacterial infection (Bordetella pertussis, Chlamydophila pneumoniae, or Mycoplasma pneumoniae), consider antibiotic de-escalation to target atypical bacterial infection.    COVID PRE-OP / PRE-PROCEDURE SCREENING ORDER (NO ISOLATION) - Swab, Nasopharynx [[id number]]  (Normal) Collected: 06/11/23 0851    Lab Status: Final result Specimen: Swab from Nasopharynx Updated: 06/11/23 1052    Narrative:      The following orders were created for panel order COVID PRE-OP / PRE-PROCEDURE SCREENING ORDER (NO ISOLATION) - Swab, Nasopharynx.  Procedure                               Abnormality         Status                     ---------                               -----------         ------                     COVID-19 and FLU A/B PCR...[[id number]]  Normal              Final result                 Please view results for these tests on the individual orders.    COVID-19 and FLU A/B PCR - Swab, Nasopharynx [[id number]]  (Normal) Collected: 06/11/23 0851    Lab Status: Final result Specimen: Swab from Nasopharynx Updated: 06/11/23 1052     COVID19 Presumptive Negative     Influenza A PCR Not  Detected     Influenza B PCR Not Detected    Narrative:      Fact sheet for providers: https://www.fda.gov/media/314535/download    Fact sheet for patients: https://www.fda.gov/media/968235/download    Test performed by PCR.            CT Abdomen Pelvis With Contrast    Result Date: 6/11/2023  CT ABDOMEN PELVIS W CONTRAST Date of Exam: 6/11/2023 9:32 AM EDT Indication: Acute pancreatitis and elevated LFTs w/ fever/sepsis. Comparison: 5/17/2023 Technique: Axial CT images were obtained of the abdomen and pelvis following the uneventful intravenous administration of 89 cc Isovue-300. Reconstructed coronal and sagittal images were also obtained. Automated exposure control and iterative construction methods were used. Findings: Lower Chest: Granulomas in the lung bases Organs: Peripancreatic inflammatory stranding with no walled off focal fluid collection. Pancreas enhances diffusely. No pancreatic duct dilatation. Patent splenic vein. Normal sized gallbladder with no convincing pericholecystic stranding. Bile ducts normal in caliber. Nodular contour of the liver with no focal lesion or intrahepatic biliary duct dilatation. Stable mild splenomegaly. Kidneys, adrenal glands unremarkable Gastrointestinal: No acute abnormality Pelvis: No abnormal fluid collection. Urinary bladder unremarkable Peritoneum/Retroperitoneum: No ascites or pneumoperitoneum. Normal caliber aorta Bones/Soft Tissues: No acute bony abnormality     Impression: 1. Findings of acute pancreatitis, with no evidence of pancreatic necrosis, pseudocyst, or pancreatic duct obstruction 2. Cirrhosis with stable mild splenomegaly and no ascites Electronically Signed: Leopoldo Stafford  6/11/2023 9:49 AM EDT  Workstation ID: [id number]    US Gallbladder    Result Date: 6/11/2023  US GALLBLADDER Date of Exam: 6/11/2023 11:32 AM EDT Indication: Elevated LFTs w/ acute pancreatitis and fever. Comparison: No comparisons available. Technique: Grayscale and color Doppler  ultrasound evaluation of the right upper quadrant was performed. Findings: No peripancreatic fluid collection or pancreatic duct dilatation Liver demonstrates coarsened echotexture. No focal lesion demonstrated. No intrahepatic biliary duct dilatation. Hepatopetal flow demonstrated in the main portal vein. Patent interrogated hepatic vein Gallbladder normal in size and wall thickness. No pericholecystic fluid. No shadowing stones in the gallbladder lumen. Some low-level echoes in the gallbladder lumen, mostly in the neck, compatible with sludge. Common duct normal at 4 mm Right kidney normal in echogenicity with no hydronephrosis     Impression: Impression: 1. No evidence of cholelithiasis, cholecystitis, or biliary obstruction. There is sludge in the gallbladder lumen 2. Cirrhotic liver echotexture with hepatopetal portal venous flow 3. No abnormal peripancreatic fluid collection 4. Normal ultrasound appearance of the right kidney Electronically Signed: Leopoldo Stafford  6/11/2023 11:52 AM EDT  Workstation ID: [id number]    XR Chest 1 View    Result Date: 6/11/2023  XR CHEST 1 VW Date of Exam: 6/11/2023 7:07 AM EDT Indication: N/V and chills Comparison: May 17, 2023, August 30, 2021 FINDINGS: No definite focal or diffuse pulmonary infiltrate is identified.  No pneumothorax or significant pleural effusion.  Heart size and mediastinal contour appear unchanged. Fusion hardware is seen in the lower cervical and upper thoracic spine, as before.     Impression: No radiographic findings of acute cardiopulmonary abnormality. Electronically Signed: Bob Leavitt  6/11/2023 7:55 AM EDT  Workstation ID: [id number]     Results for orders placed during the hospital encounter of 03/20/23    Adult Transthoracic Echo Complete W/ Cont if Necessary Per Protocol    Interpretation Summary    Left ventricular ejection fraction appears to be 61 - 65%.    Left ventricular diastolic function is consistent with (grade I) impaired relaxation.     Calculated right ventricular systolic pressure from tricuspid regurgitation is 16 mmHg.      Assessment & Plan   Assessment & Plan     Active Hospital Problems    Diagnosis  POA    **Sepsis, due to unspecified organism, unspecified whether acute organ dysfunction present [A41.9]  Yes       74 yo with DM, HTN, HLD, FAVIO and breast CA with recent mastectomy ( 2 weeks ago) that presented with n/v found to have acute pancreatitis.       Acute Pancreatitis   -CT shows acute pancreatitis with no necrosis or duct obstruction  -gall bladder u/s shows no evidence of cholelithiasis cholecystitis or obtsrution, there is sludge   -lipase >3000  -from recent start of tomixfen or trulicity?   -LR @ 150  -clear liquid diet   -Slightly elevated, bili 1.8    Febrile Illness  -procal is elevated at 2 but wbc is not   -cont zosyn for now     Elevated Creatinine  -cr 1.72 baseline around 1.5  -IVF and check in am     FAVIO  -Bipap    DM II  -A1c pending  -SSI     Breast CA s/p mastectomy  -Dr MURILLO performed mastectomy  -Dr Archer oncology    JEROME Cirrhosis  -stable   -LFT slightly elevated     HTN  HLD       DVT prophylaxis:       CODE STATUS:    Code Status and Medical Interventions:   Ordered at: 06/11/23 1136     Code Status (Patient has no pulse and is not breathing):    CPR (Attempt to Resuscitate)     Medical Interventions (Patient has pulse or is breathing):    Full Support       Expected Dischargeclick hyperlink to enter date then refresh the note)  Expected Discharge Date: 6/13/2023; Expected Discharge Time:      Brigid Sellers,   06/11/23

## 2023-06-12 ENCOUNTER — APPOINTMENT (OUTPATIENT)
Dept: MRI IMAGING | Facility: HOSPITAL | Age: 73
End: 2023-06-12
Payer: MEDICARE

## 2023-06-12 PROBLEM — B96.1 BACTEREMIA DUE TO KLEBSIELLA PNEUMONIAE: Status: ACTIVE | Noted: 2023-06-12

## 2023-06-12 PROBLEM — R78.81 BACTEREMIA DUE TO KLEBSIELLA PNEUMONIAE: Status: ACTIVE | Noted: 2023-06-12

## 2023-06-12 PROBLEM — K85.00 IDIOPATHIC ACUTE PANCREATITIS: Status: ACTIVE | Noted: 2023-06-12

## 2023-06-12 LAB
ALBUMIN SERPL-MCNC: 3.2 G/DL (ref 3.5–5.2)
ALBUMIN/GLOB SERPL: 1.1 G/DL
ALP SERPL-CCNC: 139 U/L (ref 39–117)
ALT SERPL W P-5'-P-CCNC: 91 U/L (ref 1–41)
ANION GAP SERPL CALCULATED.3IONS-SCNC: 12 MMOL/L (ref 5–15)
AST SERPL-CCNC: 143 U/L (ref 1–40)
BACTERIA BLD CULT: ABNORMAL
BASOPHILS # BLD AUTO: 0.02 10*3/MM3 (ref 0–0.2)
BASOPHILS NFR BLD AUTO: 0.3 % (ref 0–1.5)
BILIRUB SERPL-MCNC: 4.1 MG/DL (ref 0–1.2)
BUN SERPL-MCNC: 29 MG/DL (ref 8–23)
BUN/CREAT SERPL: 17.5 (ref 7–25)
CALCIUM SPEC-SCNC: 8.3 MG/DL (ref 8.6–10.5)
CHLORIDE SERPL-SCNC: 104 MMOL/L (ref 98–107)
CO2 SERPL-SCNC: 20 MMOL/L (ref 22–29)
CREAT SERPL-MCNC: 1.66 MG/DL (ref 0.76–1.27)
DEPRECATED RDW RBC AUTO: 51.4 FL (ref 37–54)
EGFRCR SERPLBLD CKD-EPI 2021: 43 ML/MIN/1.73
EOSINOPHIL # BLD AUTO: 0.28 10*3/MM3 (ref 0–0.4)
EOSINOPHIL NFR BLD AUTO: 3.8 % (ref 0.3–6.2)
ERYTHROCYTE [DISTWIDTH] IN BLOOD BY AUTOMATED COUNT: 14.5 % (ref 12.3–15.4)
GLOBULIN UR ELPH-MCNC: 3 GM/DL
GLUCOSE BLDC GLUCOMTR-MCNC: 175 MG/DL (ref 70–130)
GLUCOSE BLDC GLUCOMTR-MCNC: 206 MG/DL (ref 70–130)
GLUCOSE BLDC GLUCOMTR-MCNC: 241 MG/DL (ref 70–130)
GLUCOSE SERPL-MCNC: 162 MG/DL (ref 65–99)
HCT VFR BLD AUTO: 28.8 % (ref 37.5–51)
HGB BLD-MCNC: 9.2 G/DL (ref 13–17.7)
IMM GRANULOCYTES # BLD AUTO: 0.05 10*3/MM3 (ref 0–0.05)
IMM GRANULOCYTES NFR BLD AUTO: 0.7 % (ref 0–0.5)
LYMPHOCYTES # BLD AUTO: 0.29 10*3/MM3 (ref 0.7–3.1)
LYMPHOCYTES NFR BLD AUTO: 4 % (ref 19.6–45.3)
MCH RBC QN AUTO: 31.3 PG (ref 26.6–33)
MCHC RBC AUTO-ENTMCNC: 31.9 G/DL (ref 31.5–35.7)
MCV RBC AUTO: 98 FL (ref 79–97)
MONOCYTES # BLD AUTO: 0.56 10*3/MM3 (ref 0.1–0.9)
MONOCYTES NFR BLD AUTO: 7.7 % (ref 5–12)
NEUTROPHILS NFR BLD AUTO: 6.11 10*3/MM3 (ref 1.7–7)
NEUTROPHILS NFR BLD AUTO: 83.5 % (ref 42.7–76)
NRBC BLD AUTO-RTO: 0 /100 WBC (ref 0–0.2)
PLATELET # BLD AUTO: 60 10*3/MM3 (ref 140–450)
PMV BLD AUTO: 12 FL (ref 6–12)
POTASSIUM SERPL-SCNC: 4.6 MMOL/L (ref 3.5–5.2)
PROT SERPL-MCNC: 6.2 G/DL (ref 6–8.5)
RBC # BLD AUTO: 2.94 10*6/MM3 (ref 4.14–5.8)
SODIUM SERPL-SCNC: 136 MMOL/L (ref 136–145)
WBC NRBC COR # BLD: 7.31 10*3/MM3 (ref 3.4–10.8)

## 2023-06-12 PROCEDURE — 25010000002 PIPERACILLIN SOD-TAZOBACTAM PER 1 G: Performed by: FAMILY MEDICINE

## 2023-06-12 PROCEDURE — 63710000001 INSULIN LISPRO (HUMAN) PER 5 UNITS: Performed by: FAMILY MEDICINE

## 2023-06-12 PROCEDURE — 74181 MRI ABDOMEN W/O CONTRAST: CPT

## 2023-06-12 PROCEDURE — 82948 REAGENT STRIP/BLOOD GLUCOSE: CPT

## 2023-06-12 PROCEDURE — 97161 PT EVAL LOW COMPLEX 20 MIN: CPT | Performed by: PHYSICAL THERAPIST

## 2023-06-12 PROCEDURE — 94799 UNLISTED PULMONARY SVC/PX: CPT

## 2023-06-12 PROCEDURE — 80053 COMPREHEN METABOLIC PANEL: CPT | Performed by: FAMILY MEDICINE

## 2023-06-12 PROCEDURE — 85025 COMPLETE CBC W/AUTO DIFF WBC: CPT | Performed by: FAMILY MEDICINE

## 2023-06-12 PROCEDURE — 94660 CPAP INITIATION&MGMT: CPT

## 2023-06-12 RX ORDER — GABAPENTIN 400 MG/1
800 CAPSULE ORAL 4 TIMES DAILY
Status: DISCONTINUED | OUTPATIENT
Start: 2023-06-12 | End: 2023-06-15 | Stop reason: HOSPADM

## 2023-06-12 RX ORDER — SERTRALINE HYDROCHLORIDE 100 MG/1
100 TABLET, FILM COATED ORAL DAILY
Status: DISCONTINUED | OUTPATIENT
Start: 2023-06-12 | End: 2023-06-15 | Stop reason: HOSPADM

## 2023-06-12 RX ORDER — ACETAMINOPHEN 325 MG/1
650 TABLET ORAL EVERY 6 HOURS PRN
Status: DISCONTINUED | OUTPATIENT
Start: 2023-06-12 | End: 2023-06-15 | Stop reason: HOSPADM

## 2023-06-12 RX ORDER — ATORVASTATIN CALCIUM 20 MG/1
20 TABLET, FILM COATED ORAL DAILY
Status: DISCONTINUED | OUTPATIENT
Start: 2023-06-12 | End: 2023-06-15 | Stop reason: HOSPADM

## 2023-06-12 RX ORDER — MELATONIN
5000 DAILY
Status: DISCONTINUED | OUTPATIENT
Start: 2023-06-12 | End: 2023-06-12

## 2023-06-12 RX ORDER — PANTOPRAZOLE SODIUM 40 MG/1
40 TABLET, DELAYED RELEASE ORAL DAILY
Status: DISCONTINUED | OUTPATIENT
Start: 2023-06-12 | End: 2023-06-15 | Stop reason: HOSPADM

## 2023-06-12 RX ORDER — FERROUS SULFATE 325(65) MG
325 TABLET ORAL 2 TIMES DAILY
Status: DISCONTINUED | OUTPATIENT
Start: 2023-06-12 | End: 2023-06-15 | Stop reason: HOSPADM

## 2023-06-12 RX ORDER — TEMAZEPAM 7.5 MG/1
7.5 CAPSULE ORAL NIGHTLY PRN
Status: DISCONTINUED | OUTPATIENT
Start: 2023-06-12 | End: 2023-06-15 | Stop reason: HOSPADM

## 2023-06-12 RX ORDER — LEVOTHYROXINE SODIUM 0.05 MG/1
50 TABLET ORAL
Status: DISCONTINUED | OUTPATIENT
Start: 2023-06-12 | End: 2023-06-15 | Stop reason: HOSPADM

## 2023-06-12 RX ORDER — ERGOCALCIFEROL 1.25 MG/1
50000 CAPSULE ORAL WEEKLY
Status: DISCONTINUED | OUTPATIENT
Start: 2023-06-16 | End: 2023-06-15 | Stop reason: HOSPADM

## 2023-06-12 RX ADMIN — SODIUM CHLORIDE, POTASSIUM CHLORIDE, SODIUM LACTATE AND CALCIUM CHLORIDE 150 ML/HR: 600; 310; 30; 20 INJECTION, SOLUTION INTRAVENOUS at 14:49

## 2023-06-12 RX ADMIN — ATORVASTATIN CALCIUM 20 MG: 20 TABLET, FILM COATED ORAL at 08:17

## 2023-06-12 RX ADMIN — INSULIN LISPRO 2 UNITS: 100 INJECTION, SOLUTION INTRAVENOUS; SUBCUTANEOUS at 08:16

## 2023-06-12 RX ADMIN — INSULIN LISPRO 4 UNITS: 100 INJECTION, SOLUTION INTRAVENOUS; SUBCUTANEOUS at 11:51

## 2023-06-12 RX ADMIN — FERROUS SULFATE TAB 325 MG (65 MG ELEMENTAL FE) 325 MG: 325 (65 FE) TAB at 08:17

## 2023-06-12 RX ADMIN — SENNOSIDES AND DOCUSATE SODIUM 2 TABLET: 50; 8.6 TABLET ORAL at 08:17

## 2023-06-12 RX ADMIN — GABAPENTIN 800 MG: 400 CAPSULE ORAL at 08:17

## 2023-06-12 RX ADMIN — POLYETHYLENE GLYCOL 3350 17 G: 17 POWDER, FOR SOLUTION ORAL at 08:16

## 2023-06-12 RX ADMIN — LEVOTHYROXINE SODIUM 50 MCG: 0.05 TABLET ORAL at 08:17

## 2023-06-12 RX ADMIN — TAZOBACTAM SODIUM AND PIPERACILLIN SODIUM 3.38 G: 375; 3 INJECTION, SOLUTION INTRAVENOUS at 23:21

## 2023-06-12 RX ADMIN — SODIUM CHLORIDE, POTASSIUM CHLORIDE, SODIUM LACTATE AND CALCIUM CHLORIDE 150 ML/HR: 600; 310; 30; 20 INJECTION, SOLUTION INTRAVENOUS at 23:21

## 2023-06-12 RX ADMIN — FERROUS SULFATE TAB 325 MG (65 MG ELEMENTAL FE) 325 MG: 325 (65 FE) TAB at 21:16

## 2023-06-12 RX ADMIN — TAZOBACTAM SODIUM AND PIPERACILLIN SODIUM 3.38 G: 375; 3 INJECTION, SOLUTION INTRAVENOUS at 08:16

## 2023-06-12 RX ADMIN — Medication 10 ML: at 08:18

## 2023-06-12 RX ADMIN — INSULIN LISPRO 4 UNITS: 100 INJECTION, SOLUTION INTRAVENOUS; SUBCUTANEOUS at 21:16

## 2023-06-12 RX ADMIN — SODIUM CHLORIDE, POTASSIUM CHLORIDE, SODIUM LACTATE AND CALCIUM CHLORIDE 150 ML/HR: 600; 310; 30; 20 INJECTION, SOLUTION INTRAVENOUS at 08:16

## 2023-06-12 RX ADMIN — TEMAZEPAM 7.5 MG: 7.5 CAPSULE ORAL at 00:33

## 2023-06-12 RX ADMIN — ACETAMINOPHEN 650 MG: 325 TABLET ORAL at 03:59

## 2023-06-12 RX ADMIN — SERTRALINE HYDROCHLORIDE 100 MG: 100 TABLET ORAL at 08:17

## 2023-06-12 RX ADMIN — SENNOSIDES AND DOCUSATE SODIUM 2 TABLET: 50; 8.6 TABLET ORAL at 21:16

## 2023-06-12 RX ADMIN — GABAPENTIN 800 MG: 400 CAPSULE ORAL at 11:51

## 2023-06-12 RX ADMIN — GABAPENTIN 800 MG: 400 CAPSULE ORAL at 21:16

## 2023-06-12 RX ADMIN — TEMAZEPAM 7.5 MG: 7.5 CAPSULE ORAL at 23:21

## 2023-06-12 RX ADMIN — PANTOPRAZOLE SODIUM 40 MG: 40 TABLET, DELAYED RELEASE ORAL at 08:17

## 2023-06-12 NOTE — CASE MANAGEMENT/SOCIAL WORK
Discharge Planning Assessment  [facility]     Patient Name: Elton Funez  MRN: [id number]  Today's Date: 6/12/2023    Admit Date: 6/11/2023    Plan: Initial Discharge Plan Assessment   Discharge Needs Assessment     Row Name 06/12/23 1357       Living Environment    People in Home spouse    Current Living Arrangements home    Potentially Unsafe Housing Conditions none    Primary Care Provided by self    Provides Primary Care For no one    Family Caregiver if Needed spouse    Quality of Family Relationships helpful;involved;supportive    Able to Return to Prior Arrangements yes       Transition Planning    Patient/Family Anticipates Transition to home with family    Transportation Anticipated family or friend will provide               Discharge Plan     Row Name 06/12/23 1357       Plan    Plan Initial Discharge Plan Assessment    Patient/Family in Agreement with Plan yes    Plan Comments I spoke with Mr. Funez and his wife at the bedside. Pt lives with wife in [facility]. Independent with ADL's, uses a straight cane to ambulate. Pt uses a cpap machine at night. No other DME/HH/OPPT. PCP-Dr. Tc Ramirez. Confirmed [employer] Medicare and prescriptions filled at [facility]. No needs voiced or identified. Pt is scheduled for a MRI today.  will continue to follow.    Final Discharge Disposition Code 01 - home or self-care              Continued Care and Services - Admitted Since 6/11/2023    Coordination has not been started for this encounter.       Expected Discharge Date and Time     Expected Discharge Date Expected Discharge Time    Ben 15, 2023          Demographic Summary     Row Name 06/12/23 1356       General Information    Admission Type inpatient    Reason for Consult discharge planning       Contact Information    Permission Granted to Share Info With                Functional Status     Row Name 06/12/23 1356       Functional Status    Usual Activity  Tolerance good       Functional Status, IADL    Medications independent    Meal Preparation independent    Housekeeping independent    Laundry independent    Shopping independent               Psychosocial    No documentation.                Abuse/Neglect    No documentation.                Legal    No documentation.                Substance Abuse    No documentation.                Patient Forms    No documentation.                   Nancy Grissom RN

## 2023-06-12 NOTE — PLAN OF CARE
Goal Outcome Evaluation:  Patient has had a BM today, tolerated meals. Sat up in the chair today. Visited with family. IV fluids infusing as ordered. Carlos light within reach.            Progress: improving

## 2023-06-12 NOTE — PROGRESS NOTES
[facility] Medicine Services  PROGRESS NOTE    Patient Name: Elton Funez  : 1950  MRN: [id number]    Date of Admission: 2023  Primary Care Physician: Tc Ramirez MD    Subjective   Subjective     CC:  sepsis    HPI:  Feeling better today, but still having some epigastric pain- now radiating to back instead of RUQ which is where it was radiating previously.     ROS:  Gen- No fevers, chills  CV- No chest pain, palpitations  Resp- No cough, dyspnea  GI- as above        Objective   Objective     Vital Signs:   Temp:  [96.4 °F (35.8 °C)-100.6 °F (38.1 °C)] 99.1 °F (37.3 °C)  Heart Rate:  [62-94] 77  Resp:  [16-18] 18  BP: ()/(41-69) 109/60  Flow (L/min):  [2] 2     Physical Exam:  Constitutional: No acute distress, awake, alert  HENT: NCAT, mucous membranes moist  Respiratory: Clear to auscultation bilaterally, respiratory effort normal   Cardiovascular: RRR, no murmurs, rubs, or gallops  Gastrointestinal: Positive bowel sounds, soft, nontender, nondistended  Musculoskeletal: No bilateral ankle edema  Psychiatric: Appropriate affect, cooperative  Neurologic: Oriented x 3, strength symmetric in all extremities, Cranial Nerves grossly intact to confrontation, speech clear  Skin: No rashes    Results Reviewed:  LAB RESULTS:      Lab 23  03523  0830   WBC 7.31 8.97   HEMOGLOBIN 9.2* 11.2*   HEMATOCRIT 28.8* 34.6*   PLATELETS 60* 97*   NEUTROS ABS 6.11 8.46*   IMMATURE GRANS (ABS) 0.05 0.03   LYMPHS ABS 0.29* 0.18*   MONOS ABS 0.56 0.22   EOS ABS 0.28 0.07   MCV 98.0* 96.4   PROCALCITONIN  --  2.03*   LACTATE  --  2.0         Lab 23  0359 23  1756 23  0830   SODIUM 136  --  137   POTASSIUM 4.6  --  4.3   CHLORIDE 104  --  104   CO2 20.0*  --  21.0*   ANION GAP 12.0  --  12.0   BUN 29*  --  27*   CREATININE 1.66*  --  1.72*   EGFR 43.0*  --  41.2*   GLUCOSE 162*  --  118*   CALCIUM 8.3*  --  8.9   HEMOGLOBIN A1C  --  8.40*  --           Lab 06/12/23  0359 06/11/23  0830   TOTAL PROTEIN 6.2 7.2   ALBUMIN 3.2* 3.9   GLOBULIN 3.0 3.3   ALT (SGPT) 91* 44*   AST (SGOT) 143* 94*   BILIRUBIN 4.1* 1.8*   ALK PHOS 139* 204*   LIPASE  --  >3,000*                     Brief Urine Lab Results  (Last result in the past 365 days)      Color   Clarity   Blood   Leuk Est   Nitrite   Protein   CREAT   Urine HCG        06/11/23 0900 Yellow   Clear   Negative   Negative   Negative   >=300 mg/dL (3+)                 Microbiology Results Abnormal     Procedure Component Value - Date/Time    Respiratory Panel PCR w/COVID-19(SARS-CoV-2) [facility] In-House, NP Swab in UTM/VTM, 3-4 HR TAT - Swab, Nasopharynx [[id number]]  (Normal) Collected: 06/11/23 1053    Lab Status: Final result Specimen: Swab from Nasopharynx Updated: 06/11/23 1206     ADENOVIRUS, PCR Not Detected     Coronavirus 229E Not Detected     Coronavirus HKU1 Not Detected     Coronavirus NL63 Not Detected     Coronavirus OC43 Not Detected     COVID19 Not Detected     Human Metapneumovirus Not Detected     Human Rhinovirus/Enterovirus Not Detected     Influenza A PCR Not Detected     Influenza B PCR Not Detected     Parainfluenza Virus 1 Not Detected     Parainfluenza Virus 2 Not Detected     Parainfluenza Virus 3 Not Detected     Parainfluenza Virus 4 Not Detected     RSV, PCR Not Detected     Bordetella pertussis pcr Not Detected     Bordetella parapertussis PCR Not Detected     Chlamydophila pneumoniae PCR Not Detected     Mycoplasma pneumo by PCR Not Detected    Narrative:      In the setting of a positive respiratory panel with a viral infection PLUS a negative procalcitonin without other underlying concern for bacterial infection, consider observing off antibiotics or discontinuation of antibiotics and continue supportive care. If the respiratory panel is positive for atypical bacterial infection (Bordetella pertussis, Chlamydophila pneumoniae, or Mycoplasma pneumoniae), consider  antibiotic de-escalation to target atypical bacterial infection.    COVID PRE-OP / PRE-PROCEDURE SCREENING ORDER (NO ISOLATION) - Swab, Nasopharynx [[id number]]  (Normal) Collected: 06/11/23 0851    Lab Status: Final result Specimen: Swab from Nasopharynx Updated: 06/11/23 1052    Narrative:      The following orders were created for panel order COVID PRE-OP / PRE-PROCEDURE SCREENING ORDER (NO ISOLATION) - Swab, Nasopharynx.  Procedure                               Abnormality         Status                     ---------                               -----------         ------                     COVID-19 and FLU A/B PCR...[[id number]]  Normal              Final result                 Please view results for these tests on the individual orders.    COVID-19 and FLU A/B PCR - Swab, Nasopharynx [[id number]]  (Normal) Collected: 06/11/23 0851    Lab Status: Final result Specimen: Swab from Nasopharynx Updated: 06/11/23 1052     COVID19 Presumptive Negative     Influenza A PCR Not Detected     Influenza B PCR Not Detected    Narrative:      Fact sheet for providers: https://www.fda.gov/media/577599/download    Fact sheet for patients: https://www.fda.gov/media/936197/download    Test performed by PCR.          CT Abdomen Pelvis With Contrast    Result Date: 6/11/2023  CT ABDOMEN PELVIS W CONTRAST Date of Exam: 6/11/2023 9:32 AM EDT Indication: Acute pancreatitis and elevated LFTs w/ fever/sepsis. Comparison: 5/17/2023 Technique: Axial CT images were obtained of the abdomen and pelvis following the uneventful intravenous administration of 89 cc Isovue-300. Reconstructed coronal and sagittal images were also obtained. Automated exposure control and iterative construction methods were used. Findings: Lower Chest: Granulomas in the lung bases Organs: Peripancreatic inflammatory stranding with no walled off focal fluid collection. Pancreas enhances diffusely. No pancreatic duct dilatation. Patent splenic vein. Normal sized  gallbladder with no convincing pericholecystic stranding. Bile ducts normal in caliber. Nodular contour of the liver with no focal lesion or intrahepatic biliary duct dilatation. Stable mild splenomegaly. Kidneys, adrenal glands unremarkable Gastrointestinal: No acute abnormality Pelvis: No abnormal fluid collection. Urinary bladder unremarkable Peritoneum/Retroperitoneum: No ascites or pneumoperitoneum. Normal caliber aorta Bones/Soft Tissues: No acute bony abnormality     Impression: 1. Findings of acute pancreatitis, with no evidence of pancreatic necrosis, pseudocyst, or pancreatic duct obstruction 2. Cirrhosis with stable mild splenomegaly and no ascites Electronically Signed: Leopoldo Stafford  6/11/2023 9:49 AM EDT  Workstation ID: [id number]    US Gallbladder    Result Date: 6/11/2023  US GALLBLADDER Date of Exam: 6/11/2023 11:32 AM EDT Indication: Elevated LFTs w/ acute pancreatitis and fever. Comparison: No comparisons available. Technique: Grayscale and color Doppler ultrasound evaluation of the right upper quadrant was performed. Findings: No peripancreatic fluid collection or pancreatic duct dilatation Liver demonstrates coarsened echotexture. No focal lesion demonstrated. No intrahepatic biliary duct dilatation. Hepatopetal flow demonstrated in the main portal vein. Patent interrogated hepatic vein Gallbladder normal in size and wall thickness. No pericholecystic fluid. No shadowing stones in the gallbladder lumen. Some low-level echoes in the gallbladder lumen, mostly in the neck, compatible with sludge. Common duct normal at 4 mm Right kidney normal in echogenicity with no hydronephrosis     Impression: Impression: 1. No evidence of cholelithiasis, cholecystitis, or biliary obstruction. There is sludge in the gallbladder lumen 2. Cirrhotic liver echotexture with hepatopetal portal venous flow 3. No abnormal peripancreatic fluid collection 4. Normal ultrasound appearance of the right kidney Electronically  Signed: Leopoldo Stafford  6/11/2023 11:52 AM EDT  Workstation ID: [id number]    XR Chest 1 View    Result Date: 6/11/2023  XR CHEST 1 VW Date of Exam: 6/11/2023 7:07 AM EDT Indication: N/V and chills Comparison: May 17, 2023, August 30, 2021 FINDINGS: No definite focal or diffuse pulmonary infiltrate is identified.  No pneumothorax or significant pleural effusion.  Heart size and mediastinal contour appear unchanged. Fusion hardware is seen in the lower cervical and upper thoracic spine, as before.     Impression: No radiographic findings of acute cardiopulmonary abnormality. Electronically Signed: Bob Leavitt  6/11/2023 7:55 AM EDT  Workstation ID: [id number]      Results for orders placed during the hospital encounter of 03/20/23    Adult Transthoracic Echo Complete W/ Cont if Necessary Per Protocol    Interpretation Summary  •  Left ventricular ejection fraction appears to be 61 - 65%.  •  Left ventricular diastolic function is consistent with (grade I) impaired relaxation.  •  Calculated right ventricular systolic pressure from tricuspid regurgitation is 16 mmHg.      Current medications:  Scheduled Meds:atorvastatin, 20 mg, Oral, Daily  ferrous sulfate, 325 mg, Oral, BID  gabapentin, 800 mg, Oral, 4x Daily  insulin lispro, 2-9 Units, Subcutaneous, 4x Daily AC & at Bedtime  levothyroxine, 50 mcg, Oral, Q AM  pantoprazole, 40 mg, Oral, Daily  piperacillin-tazobactam, 3.375 g, Intravenous, Q8H  senna-docusate sodium, 2 tablet, Oral, BID  sertraline, 100 mg, Oral, Daily  sodium chloride, 10 mL, Intravenous, Q12H  [START ON 6/16/2023] Vitamin D (Ergocalciferol), 50,000 Units, Oral, Weekly      Continuous Infusions:lactated ringers, 150 mL/hr, Last Rate: 150 mL/hr (06/11/23 1213)      PRN Meds:.•  acetaminophen  •  senna-docusate sodium **AND** polyethylene glycol **AND** bisacodyl **AND** bisacodyl  •  dextrose  •  dextrose  •  glucagon (human recombinant)  •  Morphine **AND** naloxone  •  ondansetron **OR**  ondansetron  •  [COMPLETED] Insert Peripheral IV **AND** sodium chloride  •  sodium chloride  •  sodium chloride  •  temazepam    Assessment & Plan   Assessment & Plan     Active Hospital Problems    Diagnosis  POA   • **Sepsis, due to unspecified organism, unspecified whether acute organ dysfunction present [A41.9]  Yes   • Idiopathic acute pancreatitis [K85.00]  Unknown     Priority: High   • Bacteremia due to Klebsiella pneumoniae [R78.81, B96.1]  Unknown     Priority: High   • Malignant neoplasm of central portion of right breast in male, estrogen receptor positive [C50.121, Z17.0]  Not Applicable   • Grade I diastolic dysfunction [I51.89]  Yes   • Liver cirrhosis secondary to JEROME [K75.81, K74.60]  Yes   • Anemia of chronic disease [D63.8]  Yes   • Acquired hypothyroidism [E03.9]  Yes   • Type 2 diabetes mellitus with diabetic polyneuropathy, with long-term current use of insulin [E11.42, Z79.4]  Not Applicable   • FAVIO (obstructive sleep apnea) [G47.33]  Yes   • Stage 3a chronic kidney disease [N18.31]  Yes   • Gastroesophageal reflux disease without esophagitis [K21.9]  Yes      Resolved Hospital Problems   No resolved problems to display.        Brief Hospital Course to date:  Elton Funez is a 73 y.o. male with DM, HTN, HLD, FAVIO, CKD, JEROME cirrhosis and breast CA with recent mastectomy ( 2 weeks ago) who presented with n/v found to have acute pancreatitis. Patient's blood cultures are now growing Klebsiella.    Acute Pancreatitis   -CT shows acute pancreatitis with no necrosis nor duct obstruction  -gall bladder u/s shows no evidence of cholelithiasis cholecystitis or obtsrution, there is sludge   -lipase >3000  -suspect from recent initiation of Trulicity  -LR @ 150  -clear liquids, advance as tolerated  -Slightly elevated bili 1.8     Sepsis due to Klebsiella bacteremia  -procal is elevated at 2 but wbc is not, Tmax overnight 100.6F  -- blood cultures thus far with Klebsiella from one set.    -- consult  ID  -- appreciate their assistance  -cont zosyn for now   -- given elevated total bili today, ? Whether pt has gall stone pancreatitis as biliary source of bacteremia seems to make the most sense.  CT A/P from yesterday did not show any evidence of obstructing stone nor ductal dilatation, no GB inflammation was seen on CT nor on GB U/S.  For now, will start with MRCP. Consider GI vs General Surgery consult depending on results.       Elevated Creatinine on CKD  -cr 1.72 on admission baseline around 1.5  -continue IVFs     FAVIO  -Bipap      DM II  -A1c 8.6%  -SSI      Breast CA s/p mastectomy  -Dr MURILLO performed mastectomy  -follows with Dr Archer oncology  -- hold Tamoxifen     JEROME Cirrhosis  Hyperbilirubinemia   -stable   -LFT slightly elevated, T bili now at 4.1.       HTN  HLD   -- hold Spironolactone and Coreg due to borderline BP  -- continue formulary equivalent of statin    TCP  -- likely related to cirrhosis, monitor       Total time spent: Time Spent: Time Spent: 60 minutes  Time spent includes time reviewing chart, face-to-face time, counseling patient/family/caregiver, ordering medications/tests/procedures, communicating with other health care professionals, documenting clinical information in the electronic health record, and coordination of care.     Expected Discharge Location and Transportation: home  Expected Discharge   Expected Discharge Date: 6/15/2023; Expected Discharge Time:      DVT prophylaxis:  Mechanical DVT prophylaxis orders are present.     AM-PAC 6 Clicks Score (PT): 21 (06/11/23 1200)    CODE STATUS:   Code Status and Medical Interventions:   Ordered at: 06/11/23 1136     Code Status (Patient has no pulse and is not breathing):    CPR (Attempt to Resuscitate)     Medical Interventions (Patient has pulse or is breathing):    Full Support       Laverne Hurley MD  06/12/23

## 2023-06-12 NOTE — THERAPY EVALUATION
Patient Name: Elton Funez  : 1950    MRN: [id number]                              Today's Date: 2023       Admit Date: 2023    Visit Dx:     ICD-10-CM ICD-9-CM   1. Sepsis, due to unspecified organism, unspecified whether acute organ dysfunction present  A41.9 038.9     995.91   2. Acute pancreatitis, unspecified complication status, unspecified pancreatitis type  K85.90 577.0   3. Elevated LFTs  R79.89 790.6   4. 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 without macular edema associated with type 2 diabetes mellitus    Acquired hypothyroidism    Anemia of chronic disease    Liver cirrhosis secondary to JEROME    Portal hypertensive gastropathy    Grade I diastolic dysfunction    Combined forms of age-related cataract of both eyes    Major depressive disorder with single episode, in full remission    Malignant neoplasm of central portion of right breast in male, estrogen receptor positive    Malignant neoplasm of overlapping sites of right female breast    Sepsis, due to unspecified organism, unspecified whether acute organ dysfunction present    Idiopathic acute pancreatitis    Bacteremia due to Klebsiella pneumoniae     Past Medical History:   Diagnosis Date    Abdominal pain 2023    RUQ; SEEN IN ED AT [facility]; DC'D HOME    Abscess of arm, right     Abscess of skin of neck     Acute sinusitis     ALT (SGPT) level raised      Arthritis     BPH (benign prostatic hypertrophy)     Breast cancer 05/2023    right    Cirrhosis of liver 09/03/2021    JEROME    CKD (chronic kidney disease)     Colon polyp     Constipation     DDD (degenerative disc disease), cervical     Decreased platelet count     Depression     Resolved: 1/28/2015    Elevated AST (SGOT)     Erectile dysfunction     GERD (gastroesophageal reflux disease)     History of transfusion 2021    [facility]; 3 UNITS; NO REACTION    Hyperlipidemia     Hypertension     Hypothyroidism, adult 11/10/2020    Infection     MSSA lumbar surgical wound infection 3/2017    Jaw pain     Left hip pain     Low back pain     Surgery 30 yrs L4-5    Migraine     Hx of    Obesity (BMI 30.0-34.9) 10/07/2016    BMI 31.92    Obstructive sleep apnea     CPAP    Portal hypertensive gastropathy 09/03/2021    Shigellosis     Sleep apnea     PT TO BRING MASK AND TUBING DAY OF SURGERY    Symptomatic anemia 08/30/2021    Type 2 diabetes mellitus     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     Wears glasses     Wears hearing aid     BOTH EARS     Past Surgical History:   Procedure Laterality Date    ANTERIOR CERVICAL DISCECTOMY W/ FUSION N/A 12/14/2017    Procedure: CERVICAL DISCECTOMY ANTERIOR FUSION WITH INSTRUMENTATION C7/T1;  Surgeon: Gigi Perales MD;  Location:  [facility];  Service:     CERVICAL ARTHRODESIS      CERVICAL DISCECTOMY POSTERIOR FUSION WITH INSTRUMENTATION N/A 03/16/2017    Procedure:  INCISION AND DRAINAGE OF POSTERIOR CERVICAL WOUND;  Surgeon: Gigi Perales MD;  Location:  [facility];  Service:     CERVICAL LAMINECTOMY DECOMPRESSION POSTERIOR Left 02/28/2017    Procedure: Left C7-T1 CERVICAL FORAMINOTOMY POSTERIOR WITH METRIX;  Surgeon: Gigi Perales MD;  Location:  [facility];  Service:     COLONOSCOPY N/A 09/01/2021    Procedure: COLONOSCOPY;  Surgeon: Sharif García MD;  Location:  [facility];  Service: Gastroenterology;  Laterality: N/A;    CYST REMOVAL   04/2021    ENDOSCOPY N/A 08/31/2021    Procedure: ESOPHAGOGASTRODUODENOSCOPY;  Surgeon: Brunner, Mark I, MD;  Location: [facility] END[facility]COPY;  Service: Gastroenterology;  Laterality: N/A;    MASTECTOMY W/ SENTINEL NODE BIOPSY Right 5/23/2023    Procedure: BREAST MASTECTOMY WITH SENTINEL NODE BIOPSY RIGHT;  Surgeon: Joseph Ramirez MD;  Location:  [facility];  Service: General;  Laterality: Right;    VASECTOMY      WISDOM TOOTH EXTRACTION        General Information       Row Name 06/12/23 0957          Physical Therapy Time and Intention    Document Type discharge evaluation/summary  -LM     Mode of Treatment individual therapy;physical therapy  -LM       Row Name 06/12/23 0957          General Information    Patient Profile Reviewed yes  -LM     Prior Level of Function independent:;all household mobility;gait;ADL's  Uses SC in public;  No AD used in house  -LM     Existing Precautions/Restrictions no known precautions/restrictions  -LM     Barriers to Rehab none identified  -LM       Row Name 06/12/23 0957          Living Environment    People in Home spouse  -LM       Row Name 06/12/23 0957          Home Main Entrance    Number of Stairs, Main Entrance --  Entry Ramp  -LM       Row Name 06/12/23 0957          Stairs Within Home, Primary    Number of Stairs, Within Home, Primary none  -LM       Row Name 06/12/23 0957          Cognition    Orientation Status (Cognition) oriented x 4  -LM               User Key  (r) = Recorded By, (t) = Taken By, (c) = Cosigned By      Initials Name Provider Type    LM Bertha Oropeza PT Physical Therapist                   Mobility       Row Name 06/12/23 0958          Bed Mobility    Bed Mobility supine-sit;sit-supine  -LM     Supine-Sit Esmeralda (Bed Mobility) modified independence  -LM     Sit-Supine Esmeralda (Bed Mobility) independent  -LM       Row Name 06/12/23 0958          Sit-Stand Transfer    Sit-Stand Esmeralda (Transfers) independent  -LM       Row Name 06/12/23  0958          Gait/Stairs (Locomotion)    [place] Level (Gait) modified independence  -LM     Assistive Device (Gait) cane, straight  -LM     Distance in Feet (Gait) 350  -LM     Comment, (Gait/Stairs) No unsteadiness or gait abnormalities noted.  Ambulated on RA - O2 checked twice during gait - stable each time.  Pt reports he feels at baseline re: mobility/safety and has no concerns at d/c.  -LM               User Key  (r) = Recorded By, (t) = Taken By, (c) = Cosigned By      Initials Name Provider Type    Bertha Norris, JOIE Physical Therapist                   Obj/Interventions       Row Name 06/12/23 0959          Range of Motion Comprehensive    General Range of Motion bilateral lower extremity ROM WFL  -LM       Row Name 06/12/23 0959          Strength Comprehensive (MMT)    General Manual Muscle Testing (MMT) Assessment no strength deficits identified  BLEs  -LM       Row Name 06/12/23 0959          Balance    Balance Assessment sitting static balance;standing static balance;standing dynamic balance;sitting dynamic balance  -LM     Static Sitting Balance independent  -LM     Dynamic Sitting Balance independent  -LM     Position, Sitting Balance unsupported;sitting edge of bed  -LM     Static Standing Balance modified independence  -LM     Dynamic Standing Balance modified independence  -LM     Position/Device Used, Standing Balance supported;cane, straight  -LM       Row Name 06/12/23 0959          Sensory Assessment (Somatosensory)    Bilateral LE Sensory Assessment impaired  Impaired light touch sensation to the L great toe  -LM               User Key  (r) = Recorded By, (t) = Taken By, (c) = Cosigned By      Initials Name Provider Type    Bertha Norris PT Physical Therapist                   Goals/Plan    No documentation.                  Clinical Impression       Row Name 06/12/23 1000          Pain    Pretreatment Pain Rating 4/10  -LM     Posttreatment Pain Rating 0/10 - no pain  -LM      Pain Location lower  -LM     Pain Location - chest  -LM     Pain Intervention(s) Repositioned;Ambulation/increased activity  -LM       Row Name 06/12/23 1000          Plan of Care Review    Plan of Care Reviewed With patient;spouse  -LM     Outcome Evaluation PT evaluation completed.  Pt demonstrated independence with bed mobility, transfers, and 350 feet of gait using AD - O2 stable throughout on RA.  Pt reports he feels at baseline re: mobility/safety and has no concerns at d/c.  Recommend home.  PT will sign off.  -LM       Row Name 06/12/23 1000          Therapy Assessment/Plan (PT)    Criteria for Skilled Interventions Met (PT) no;no problems identified which require skilled intervention  -LM     Therapy Frequency (PT) evaluation only  -LM       Row Name 06/12/23 1000          Vital Signs    Pre Systolic BP Rehab 108  -LM     Pre Treatment Diastolic BP 56  -LM     Pretreatment Heart Rate (beats/min) 76  -LM     Posttreatment Heart Rate (beats/min) 92  -LM     Pre SpO2 (%) 97  -LM     O2 Delivery Pre Treatment room air  -LM     Intra SpO2 (%) 96  -LM     O2 Delivery Intra Treatment room air  -LM     Post SpO2 (%) 98  -LM     O2 Delivery Post Treatment room air  -LM     Pre Patient Position Supine  -LM     Intra Patient Position Standing  -LM     Post Patient Position Supine  -LM       Row Name 06/12/23 1000          Positioning and Restraints    Pre-Treatment Position in bed  -LM     Post Treatment Position bed  -LM     In Bed supine;call light within reach;encouraged to call for assist;with family/caregiver;notified nsg  -LM               User Key  (r) = Recorded By, (t) = Taken By, (c) = Cosigned By      Initials Name Provider Type    LM Bertha Oropeza, PT Physical Therapist                   Outcome Measures       Row Name 06/12/23 1002 06/12/23 0815       How much help from another person do you currently need...    Turning from your back to your side while in flat bed without using bedrails? 4  -LM 4  -RW     Moving from lying on back to sitting on the side of a flat bed without bedrails? 4  -LM 4  -RW    Moving to and from a bed to a chair (including a wheelchair)? 4  -LM 4  -RW    Standing up from a chair using your arms (e.g., wheelchair, bedside chair)? 4  -LM 4  -RW    Climbing 3-5 steps with a railing? 4  -LM 3  -RW    To walk in hospital room? 4  -LM 3  -RW    AM-PAC 6 Clicks Score (PT) 24  -LM 22  -RW    Highest level of mobility 8 --> Walked 250 feet or more  -LM 7 --> Walked 25 feet or more  -RW      Row Name 06/12/23 1002          Functional Assessment    Outcome Measure Options AM-PAC 6 Clicks Basic Mobility (PT)  -               User Key  (r) = Recorded By, (t) = Taken By, (c) = Cosigned By      Initials Name Provider Type    LM Bertha Oropeza, PT Physical Therapist    Luana Richter RN Registered Nurse                                 Physical Therapy Education       Title: PT OT SLP Therapies (In Progress)       Topic: Physical Therapy (In Progress)       Point: Mobility training (Done)       Learning Progress Summary             Patient Acceptance, E, VU,DU by  at 6/12/2023 1002                         Point: Home exercise program (Not Started)       Learner Progress:  Not documented in this visit.              Point: Precautions (Done)       Learning Progress Summary             Patient Acceptance, E, VU,DU by  at 6/12/2023 1002                                         User Key       Initials Effective Dates Name Provider Type Discipline     05/31/23 -  Bertha Oropeza, PT Physical Therapist PT                  PT Recommendation and Plan     Plan of Care Reviewed With: patient, spouse  Outcome Evaluation: PT evaluation completed.  Pt demonstrated independence with bed mobility, transfers, and 350 feet of gait using AD - O2 stable throughout on RA.  Pt reports he feels at baseline re: mobility/safety and has no concerns at d/c.  Recommend home.  PT will sign off.     Time Calculation:    PT  Charges       Row Name 06/12/23 1003             Time Calculation    Start Time 0932  -LM      PT Received On 06/12/23  -LM         Untimed Charges    PT Eval/Re-eval Minutes 46  -LM         Total Minutes    Untimed Charges Total Minutes 46  -LM       Total Minutes 46  -LM                User Key  (r) = Recorded By, (t) = Taken By, (c) = Cosigned By      Initials Name Provider Type    LM Bertha Oropeza, PT Physical Therapist                  Therapy Charges for Today       Code Description Service Date Service Provider Modifiers Qty    [id number] HC PT EVAL LOW COMPLEXITY 4 6/12/2023 Bertha Oropeza, PT GP 1            PT G-Codes  Outcome Measure Options: AM-PAC 6 Clicks Basic Mobility (PT)  AM-PAC 6 Clicks Score (PT): 24  PT Discharge Summary  Anticipated Discharge Disposition (PT): home    Bertha Oropeza PT  6/12/2023

## 2023-06-12 NOTE — CONSULTS
INFECTIOUS DISEASES  INPATIENT CONSULT NOTE / INITIAL HOSPITAL VISIT      PATIENT NAME:  Elton Funez  YOB: 1950  MEDICAL RECORD NUMBER:  [id number]    Date of Admission:  6/11/2023  Date of Consult: 6/12/2023    Requesting Provider: MARINA Hurley MD  Evaluating Physician: Salo Wolff MD    Chief Complaint   Patient presents with    Nausea    Vomiting       Reason for Consultation:   Klebsiella oxytoca bacteremia    History of Present Illness:  Patient is a 73 y.o. male with a past medical history significant for breast cancer status post recent right mastectomy and initiation of therapy with tamoxifen who was admitted to [facility] on 6/11/2023 after presenting to ED with complaints of acute onset chills, vomiting, and left upper quadrant pain.   Patient states that the symptoms began quite suddenly and he came to the ER.  He was found to be febrile with a temperature of 100.6 °F.  White blood count 7.  Creatinine 1.7 though he does have stage III CKD.  Platelets low to 60.  Respiratory PCR panel is negative.  He has a prior history of MSSA infection after a C-spine surgery in 2017, treated by Dr. Root.  CT demonstrated acute pancreatitis and lipase was greater than 3000.  He was placed on piperacillin-tazobactam.  Blood cultures were obtained and have returned positive for gram-negative roselia, Klebsiella oxytoca by BC ID.  He feels improved today.  LFTs are elevated with a total bilirubin of 4.1.  Ultrasound was negative for biliary obstruction, cholecystitis, or cholelithiasis.  MRCP is pending.  He has never had issues with his gallbladder in the past.    I have been consulted to assist in workup and antimicrobial management of Klebsiella bacteremia.    Past Medical History:   Diagnosis Date    Abdominal pain 05/17/2023    RUQ; SEEN IN ED AT [facility]; DC'D HOME    Abscess of arm, right     Abscess of skin of neck     Acute sinusitis     ALT (SGPT) level raised     Arthritis     BPH  (benign prostatic hypertrophy)     Breast cancer 05/2023    right    Cirrhosis of liver 09/03/2021    JEROME    CKD (chronic kidney disease)     Colon polyp     Constipation     DDD (degenerative disc disease), cervical     Decreased platelet count     Depression     Resolved: 1/28/2015    Elevated AST (SGOT)     Erectile dysfunction     GERD (gastroesophageal reflux disease)     History of transfusion 2021    [facility]; 3 UNITS; NO REACTION    Hyperlipidemia     Hypertension     Hypothyroidism, adult 11/10/2020    Infection     MSSA lumbar surgical wound infection 3/2017    Jaw pain     Left hip pain     Low back pain     Surgery 30 yrs L4-5    Migraine     Hx of    Obesity (BMI 30.0-34.9) 10/07/2016    BMI 31.92    Obstructive sleep apnea     CPAP    Portal hypertensive gastropathy 09/03/2021    Shigellosis     Sleep apnea     PT TO BRING MASK AND TUBING DAY OF SURGERY    Symptomatic anemia 08/30/2021    Type 2 diabetes mellitus     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     Wears glasses     Wears hearing aid     BOTH EARS       Past Surgical History:   Procedure Laterality Date    ANTERIOR CERVICAL DISCECTOMY W/ FUSION N/A 12/14/2017    Procedure: CERVICAL DISCECTOMY ANTERIOR FUSION WITH INSTRUMENTATION C7/T1;  Surgeon: Gigi Perales MD;  Location:  [facility];  Service:     CERVICAL ARTHRODESIS      CERVICAL DISCECTOMY POSTERIOR FUSION WITH INSTRUMENTATION N/A 03/16/2017    Procedure:  INCISION AND DRAINAGE OF POSTERIOR CERVICAL WOUND;  Surgeon: Gigi Perales MD;  Location:  [facility];  Service:     CERVICAL LAMINECTOMY DECOMPRESSION POSTERIOR Left 02/28/2017    Procedure: Left C7-T1 CERVICAL FORAMINOTOMY POSTERIOR WITH METRIX;  Surgeon: Gigi Perales MD;  Location:  [facility];  Service:     COLONOSCOPY N/A 09/01/2021    Procedure: COLONOSCOPY;  Surgeon: Sharif García MD;  Location:  [facility];  Service: Gastroenterology;  Laterality: N/A;    CYST REMOVAL  04/2021    ENDOSCOPY  N/A 2021    Procedure: ESOPHAGOGASTRODUODENOSCOPY;  Surgeon: Brunner, Mark I, MD;  Location: [facility] ENDOSCOPY;  Service: Gastroenterology;  Laterality: N/A;    MASTECTOMY W/ SENTINEL NODE BIOPSY Right 2023    Procedure: BREAST MASTECTOMY WITH SENTINEL NODE BIOPSY RIGHT;  Surgeon: Joseph Ramirez MD;  Location:  [facility];  Service: General;  Laterality: Right;    VASECTOMY      WISDOM TOOTH EXTRACTION         Family History   Problem Relation Age of Onset    Diabetes Father     Heart disease Father         Cardiomyopathy    Hyperlipidemia Father     Stroke Father     Hypertension Father     Diabetes Paternal Grandmother     Hearing loss Mother     Arthritis Mother     Cancer Mother     Heart disease Mother     Thyroid disease Mother        Social History     Socioeconomic History    Marital status:    Tobacco Use    Smoking status: Former     Packs/day: 0.50     Years: 4.00     Pack years: 2.00     Types: Cigarettes     Quit date: 1976     Years since quittin.4    Smokeless tobacco: Never   Vaping Use    Vaping Use: Never used   Substance and Sexual Activity    Alcohol use: Yes     Comment: Socially    Drug use: Never    Sexual activity: Not Currently     Partners: Female     Birth control/protection: Vasectomy         Allergies:  Allergies   Allergen Reactions    Glucophage Xr [Metformin Hcl Er] Diarrhea and Other (See Comments)     Glucophage XR TB24: Adverse Reaction: Severe GI issues.    Metformin Nausea And Vomiting     Other reaction(s): SEVERE INTESTIONAL ISSUES  Other reaction(s): SEVERE GI ISSUES    Glucophage XR TB24  MetFORMIN HCl TABS    Tetracyclines & Related Nausea Only     Adverse Reaction    Chlorhexidine Rash       Home Medications:  No current facility-administered medications on file prior to encounter.     Current Outpatient Medications on File Prior to Encounter   Medication Sig Dispense Refill    Alpha-Lipoic Acid 600 MG tablet Take 1 tablet by mouth Daily.       B Complex Vitamins (VITAMIN B COMPLEX PO) Take 1 tablet by mouth Daily.      B-D UF III MINI PEN NEEDLES 31G X 5 MM misc USE THREE TIMES A  each 1    carvedilol (COREG) 6.25 MG tablet TAKE 1 TABLET EVERY 12 HOURS (Patient taking differently: Take 1 tablet by mouth 2 (Two) Times a Day With Meals.) 180 tablet 1    Coenzyme Q10 (Co Q 10) 100 MG capsule Take 300 mg by mouth Daily.      colestipol (COLESTID) 1 g tablet TAKE 2 TABLETS TWICE A DAY (Patient taking differently: Take 2 tablets by mouth 2 (Two) Times a Day.) 360 tablet 3    Continuous Blood Gluc  (FreeStyle [place] 2 [place]) device 1 Device Every 14 (Fourteen) Days. 1 each 0    Continuous Blood Gluc Sensor (FreeStyle [place] 2 Sensor) misc 1 each Every 14 (Fourteen) Days. 6 each 3    Dulaglutide (Trulicity) 4.5 MG/0.5ML solution pen-injector Inject 0.5 mL under the skin into the appropriate area as directed 1 (One) Time Per Week. 6 mL 1    ferrous sulfate (FeroSul) 325 (65 FE) MG tablet Take 1 tablet by mouth 2 (Two) Times a Day. 180 tablet 1    Flaxseed, Linseed, (Flaxseed Oil) 1400 MG capsule Take 1 capsule by mouth 2 (Two) Times a Day.      gabapentin (NEURONTIN) 800 MG tablet TAKE 1 TABLET FOUR TIMES A DAY (Patient taking differently: Take 1 tablet by mouth 4 (Four) Times a Day.) 360 tablet 1    gemfibrozil (LOPID) 600 MG tablet TAKE 1 TABLET TWICE A DAY (Patient taking differently: Take 1 tablet by mouth 2 (Two) Times a Day.) 180 tablet 3    Insulin Regular Human, Conc, (HumuLIN R U-500 KwikPen) 500 UNIT/ML solution pen-injector CONCENTRATED injection Inject sc 160 u ac breakfast and 180 u  at dinner and about 40 units at hs. (Patient taking differently: Inject  under the skin into the appropriate area as directed. Inject sc 160 u ac breakfast and 180 u  at dinner and about 40 units at hs.) 60 mL 0    ipratropium (ATROVENT) 0.06 % nasal spray USE 2 SPRAYS IN EACH NOSTRIL AS DIRECTED BY PROVIDER DAILY (Patient taking differently: 2 sprays into  the nostril(s) as directed by provider Daily.) 45 mL 1    levothyroxine (SYNTHROID, LEVOTHROID) 50 MCG tablet TAKE 1 TABLET DAILY (Patient taking differently: Take 1 tablet by mouth Daily.) 90 tablet 0    Multiple Vitamins-Minerals (MULTIVITAMIN PO) Take 1 tablet by mouth Daily.      omeprazole (priLOSEC) 40 MG capsule TAKE 1 CAPSULE DAILY 90 capsule 3    sertraline (ZOLOFT) 100 MG tablet Take 1 tablet by mouth Daily. 90 tablet 1    simvastatin (ZOCOR) 40 MG tablet TAKE 1 TABLET DAILY AT BEDTIME (Patient taking differently: Take 1 tablet by mouth Every Night.) 90 tablet 3    spironolactone (ALDACTONE) 25 MG tablet TAKE 1 TABLET DAILY (Patient taking differently: Take 1 tablet by mouth Daily.) 90 tablet 3    tamoxifen (NOLVADEX) 20 MG chemo tablet Take 1 tablet by mouth Daily. 90 tablet 3    temazepam (Restoril) 15 MG capsule Take 1 capsule by mouth At Night As Needed for Sleep. 30 capsule 2    tiZANidine (ZANAFLEX) 4 MG tablet Take 1 tablet by mouth 2 (Two) Times a Day As Needed for Muscle Spasms. 180 tablet 0    vitamin D (ERGOCALCIFEROL) 1.25 MG (86623 UT) capsule capsule Take 1 capsule by mouth Every 7 (Seven) Days. FRIDAY      [DISCONTINUED] exenatide er (BYDUREON) 2 MG/0.85ML auto-injector injection Inject 2 mg under the skin into the appropriate area as directed 1 (One) Time Per Week. SATURDAY      [DISCONTINUED] Semaglutide, 1 MG/DOSE, (Ozempic, 1 MG/DOSE,) 4 MG/3ML solution pen-injector Inject 1 mg under the skin into the appropriate area as directed 1 (One) Time Per Week. 9 mL 3       Current Hospital Medications:  Current Facility-Administered Medications   Medication Dose Route Frequency Provider Last Rate Last Admin    acetaminophen (TYLENOL) tablet 650 mg  650 mg Oral Q6H PRN Sonya Tinajero PA-C   650 mg at 06/12/23 0359    atorvastatin (LIPITOR) tablet 20 mg  20 mg Oral Daily Laverne Hurley MD   20 mg at 06/12/23 0817    sennosides-docusate (PERICOLACE) 8.6-50 MG per tablet 2 tablet  2  tablet Oral BID Brigid Sellers DO   2 tablet at 06/12/23 0817    And    polyethylene glycol (MIRALAX) packet 17 g  17 g Oral Daily PRN Brigid Sellers DO   17 g at 06/12/23 0816    And    bisacodyl (DULCOLAX) EC tablet 5 mg  5 mg Oral Daily PRN Brigid Sellers DO        And    bisacodyl (DULCOLAX) suppository 10 mg  10 mg Rectal Daily PRN Brigid Sellers DO        dextrose (D50W) (25 g/50 mL) IV injection 25 g  25 g Intravenous Q15 Min PRN Brigid Sellers DO        dextrose (GLUTOSE) oral gel 15 g  15 g Oral Q15 Min PRN Brigid Sellers DO        ferrous sulfate tablet 325 mg  325 mg Oral BID Laverne Hurley MD   325 mg at 06/12/23 0817    gabapentin (NEURONTIN) capsule 800 mg  800 mg Oral 4x Daily Laverne Hurley MD   800 mg at 06/12/23 1151    glucagon (GLUCAGEN) injection 1 mg  1 mg Intramuscular Q15 Min PRN Brigid Sellers DO        Insulin Lispro (humaLOG) injection 2-9 Units  2-9 Units Subcutaneous 4x Daily AC & at Bedtime Brigid Sellers DO   4 Units at 06/12/23 1151    lactated ringers infusion  150 mL/hr Intravenous Continuous Tay Garcia  mL/hr at 06/12/23 1449 150 mL/hr at 06/12/23 1449    levothyroxine (SYNTHROID, LEVOTHROID) tablet 50 mcg  50 mcg Oral Q AM Laverne Hurley MD   50 mcg at 06/12/23 0817    morphine injection 1 mg  1 mg Intravenous Q4H PRN Brigid Sellers DO        And    naloxone (NARCAN) injection 0.4 mg  0.4 mg Intravenous Q5 Min PRN Brigid Sellers DO        ondansetron (ZOFRAN) tablet 4 mg  4 mg Oral Q6H PRN Brigid Sellers DO        Or    ondansetron (ZOFRAN) injection 4 mg  4 mg Intravenous Q6H PRN Brigid Sellers DO        pantoprazole (PROTONIX) EC tablet 40 mg  40 mg Oral Daily Laverne Hurley MD   40 mg at 06/12/23 0817    piperacillin-tazobactam (ZOSYN) 3.375 g in iso-osmotic dextrose 50 ml  (premix)  3.375 g Intravenous Q8H Brigid Sellers DO   3.375 g at 06/12/23 0816    sertraline (ZOLOFT) tablet 100 mg  100 mg Oral Daily Laverne Hurley MD   100 mg at 06/12/23 0817    sodium chloride 0.9 % flush 10 mL  10 mL Intravenous PRN Kiel Vicente MD        sodium chloride 0.9 % flush 10 mL  10 mL Intravenous Q12H Brigid Sellers DO   10 mL at 06/12/23 0818    sodium chloride 0.9 % flush 10 mL  10 mL Intravenous PRN Brigid Sellers DO        sodium chloride 0.9 % infusion 40 mL  40 mL Intravenous PRN Brigid Sellers DO        temazepam (RESTORIL) capsule 7.5 mg  7.5 mg Oral Nightly PRN Maddy Estrada MD   7.5 mg at 06/12/23 0033    [START ON 6/16/2023] Vitamin D (Ergocalciferol) capsule 50,000 Units - Patient Supplied  50,000 Units Oral Weekly Rodrigo Crawford IV, PharmD           Antimicrobials:  Anti-Infectives (From admission, onward)      Ordered     Dose/Rate Route Frequency Start Stop    06/11/23 1607  piperacillin-tazobactam (ZOSYN) 3.375 g in iso-osmotic dextrose 50 ml (premix)        Ordering Provider: Brigid Sellers DO    3.375 g  over 4 Hours Intravenous Every 8 Hours 06/11/23 1600 06/14/23 1614    06/11/23 0713  vancomycin IVPB 2000 mg in 0.9% Sodium Chloride (premix) 500 mL        Ordering Provider: Kiel Vicente MD    20 mg/kg × 99.8 kg Intravenous Once 06/11/23 0729 06/11/23 1204    06/11/23 0713  piperacillin-tazobactam (ZOSYN) 3.375 g in iso-osmotic dextrose 50 ml (premix)        Ordering Provider: Kiel Vicente MD    3.375 g Intravenous Once 06/11/23 0729 06/11/23 1111            Review of Systems:   Constitutional: Positive fever/chills.  Positive fatigue.  HEENT:  No new vision, hearing or throat complaints.  No oral sores.  No headache, photophobia or neck stiffness.  CV:  No chest pain, palpitations, or syncope.  Respiratory:  No SOB, cough, or hemoptysis.  GI: Positive nausea vomiting.  No  "diarrhea.  Left upper quadrant pain.  As in the HPI.  :  No dysuria, hematuria, urgency, or flank pain.  Lymph:  No swollen lymph nodes in neck, axilla, or groin.   Hematologic:  No easy bruising or bleeding.  Endo: Diabetic  Musculoskeletal:  No new swelling or pain of joints.  No new back pain.  Neurologic:  No acute focal weakness or numbness.  No seizures.  Skin:  No rashes, ulcers, or lesions.       Vital Signs:  Temp (24hrs), Av.4 °F (36.9 °C), Min:96.4 °F (35.8 °C), Max:99.1 °F (37.3 °C)    /61 (BP Location: Left arm, Patient Position: Lying)   Pulse 71   Temp 98.6 °F (37 °C) (Oral)   Resp 18   Ht 172.7 cm (68\")   Wt 99.8 kg (220 lb)   SpO2 93%   BMI 33.45 kg/m²       Physical Examination:  GENERAL: Acutely ill-appearing male.  Awake and alert, in no acute distress.   HEENT: Normocephalic, atraumatic.  EOMI. No conjunctival injection or subconjunctival hemorrhage. No icterus. Oropharynx clear without evidence of thrush or exudate.  Mucous membranes moist.     NECK: Supple without nuchal rigidity.    CV: RRR. No murmur, rubs, gallops.  Normal S1S2.  LUNGS: Clear to auscultation bilaterally without wheezing, rales, rhonchi. Normal respiratory effort.  ABDOMEN: Positive bowel sounds.  Soft, left upper quadrant and epigastric tenderness, protuberant.  No rebound or guarding.    EXT:  No edema.  MSK: No joint effusions or inflammation noted.  SKIN: Warm and dry without rash or ulcer.   Right chest wall mastectomy incision closed, intact, and healing well.  No surrounding inflammation or drainage.  NEURO: A&Ox4. No focal deficits.  Face symmetric.  Speech fluent.  Moves all extremities well.   PSYCHIATRIC: Normal insight and judgement.  Cooperative.  Normal affect.      Laboratory Data:    Results from last 7 days   Lab Units 23  0359 23  0830   WBC 10*3/mm3 7.31 8.97   HEMOGLOBIN g/dL 9.2* 11.2*   HEMATOCRIT % 28.8* 34.6*   PLATELETS 10*3/mm3 60* 97*     Results from last 7 days "   Lab Units 06/12/23  0359   SODIUM mmol/L 136   POTASSIUM mmol/L 4.6   CHLORIDE mmol/L 104   CO2 mmol/L 20.0*   BUN mg/dL 29*   CREATININE mg/dL 1.66*   GLUCOSE mg/dL 162*   CALCIUM mg/dL 8.3*     Results from last 7 days   Lab Units 06/12/23  0359   ALK PHOS U/L 139*   BILIRUBIN mg/dL 4.1*   ALT (SGPT) U/L 91*   AST (SGOT) U/L 143*             Estimated Creatinine Clearance: 44.7 mL/min (A) (by C-G formula based on SCr of 1.66 mg/dL (H)).  Results from last 7 days   Lab Units 06/11/23  0830   LACTATE mmol/L 2.0                     Microbiology:  Microbiology Results (last 10 days)       Procedure Component Value - Date/Time    Respiratory Panel PCR w/COVID-19(SARS-CoV-2) [facility] In-House, NP Swab in UTM/VTM, 3-4 HR TAT - Swab, Nasopharynx [[id number]]  (Normal) Collected: 06/11/23 1053    Lab Status: Final result Specimen: Swab from Nasopharynx Updated: 06/11/23 1206     ADENOVIRUS, PCR Not Detected     Coronavirus 229E Not Detected     Coronavirus HKU1 Not Detected     Coronavirus NL63 Not Detected     Coronavirus OC43 Not Detected     COVID19 Not Detected     Human Metapneumovirus Not Detected     Human Rhinovirus/Enterovirus Not Detected     Influenza A PCR Not Detected     Influenza B PCR Not Detected     Parainfluenza Virus 1 Not Detected     Parainfluenza Virus 2 Not Detected     Parainfluenza Virus 3 Not Detected     Parainfluenza Virus 4 Not Detected     RSV, PCR Not Detected     Bordetella pertussis pcr Not Detected     Bordetella parapertussis PCR Not Detected     Chlamydophila pneumoniae PCR Not Detected     Mycoplasma pneumo by PCR Not Detected    Narrative:      In the setting of a positive respiratory panel with a viral infection PLUS a negative procalcitonin without other underlying concern for bacterial infection, consider observing off antibiotics or discontinuation of antibiotics and continue supportive care. If the respiratory panel is positive for atypical bacterial infection  (Bordetella pertussis, Chlamydophila pneumoniae, or Mycoplasma pneumoniae), consider antibiotic de-escalation to target atypical bacterial infection.    COVID PRE-OP / PRE-PROCEDURE SCREENING ORDER (NO ISOLATION) - Swab, Nasopharynx [[id number]]  (Normal) Collected: 06/11/23 0851    Lab Status: Final result Specimen: Swab from Nasopharynx Updated: 06/11/23 1052    Narrative:      The following orders were created for panel order COVID PRE-OP / PRE-PROCEDURE SCREENING ORDER (NO ISOLATION) - Swab, Nasopharynx.  Procedure                               Abnormality         Status                     ---------                               -----------         ------                     COVID-19 and FLU A/B PCR...[[id number]]  Normal              Final result                 Please view results for these tests on the individual orders.    COVID-19 and FLU A/B PCR - Swab, Nasopharynx [[id number]]  (Normal) Collected: 06/11/23 0851    Lab Status: Final result Specimen: Swab from Nasopharynx Updated: 06/11/23 1052     COVID19 Presumptive Negative     Influenza A PCR Not Detected     Influenza B PCR Not Detected    Narrative:      Fact sheet for providers: https://www.fda.gov/media/915195/download    Fact sheet for patients: https://www.fda.gov/media/870927/download    Test performed by PCR.    Blood Culture - Blood, Arm, Left [[id number]]  (Normal) Collected: 06/11/23 0850    Lab Status: Preliminary result Specimen: Blood from Arm, Left Updated: 06/12/23 1046     Blood Culture No growth at 24 hours    Blood Culture - Blood, Wrist, Left [[id number]]  (Abnormal) Collected: 06/11/23 0830    Lab Status: Preliminary result Specimen: Blood from Wrist, Left Updated: 06/12/23 0040     Blood Culture Abnormal Stain     Gram Stain Aerobic Bottle Gram negative bacilli    Blood Culture ID, PCR - Blood, Wrist, Left [[id number]]  (Abnormal) Collected: 06/11/23 0830    Lab Status: Final result Specimen: Blood from Wrist, Left Updated: 06/12/23  0140     BCID, PCR Klebsiella oxytoca. Identification by BCID2 PCR    Narrative:      No resistance genes detected.                  Radiology:  Imaging Results (Last 72 Hours)       Procedure Component Value Units Date/Time    MRI abdomen wo contrast mrcp [[id number]] Resulted: 06/12/23 1707     Updated: 06/12/23 1707    US Gallbladder [[id number]] Collected: 06/11/23 1146     Updated: 06/11/23 1155    Narrative:      US GALLBLADDER    Date of Exam: 6/11/2023 11:32 AM EDT    Indication: Elevated LFTs w/ acute pancreatitis and fever.    Comparison: No comparisons available.    Technique: Grayscale and color Doppler ultrasound evaluation of the right upper quadrant was performed.      Findings:  No peripancreatic fluid collection or pancreatic duct dilatation    Liver demonstrates coarsened echotexture. No focal lesion demonstrated. No intrahepatic biliary duct dilatation. Hepatopetal flow demonstrated in the main portal vein. Patent interrogated hepatic vein    Gallbladder normal in size and wall thickness. No pericholecystic fluid. No shadowing stones in the gallbladder lumen. Some low-level echoes in the gallbladder lumen, mostly in the neck, compatible with sludge. Common duct normal at 4 mm    Right kidney normal in echogenicity with no hydronephrosis      Impression:      Impression:    1. No evidence of cholelithiasis, cholecystitis, or biliary obstruction. There is sludge in the gallbladder lumen  2. Cirrhotic liver echotexture with hepatopetal portal venous flow  3. No abnormal peripancreatic fluid collection  4. Normal ultrasound appearance of the right kidney        Electronically Signed: Leopoldo Stafford    6/11/2023 11:52 AM EDT    Workstation ID: [id number]    CT Abdomen Pelvis With Contrast [[id number]] Collected: 06/11/23 0945     Updated: 06/11/23 0953    Narrative:      CT ABDOMEN PELVIS W CONTRAST    Date of Exam: 6/11/2023 9:32 AM EDT    Indication: Acute pancreatitis and elevated LFTs w/  fever/sepsis.    Comparison: 5/17/2023    Technique: Axial CT images were obtained of the abdomen and pelvis following the uneventful intravenous administration of 89 cc Isovue-300. Reconstructed coronal and sagittal images were also obtained. Automated exposure control and iterative   construction methods were used.      Findings:    Lower Chest: Granulomas in the lung bases    Organs: Peripancreatic inflammatory stranding with no walled off focal fluid collection. Pancreas enhances diffusely. No pancreatic duct dilatation. Patent splenic vein. Normal sized gallbladder with no convincing pericholecystic stranding. Bile ducts   normal in caliber. Nodular contour of the liver with no focal lesion or intrahepatic biliary duct dilatation. Stable mild splenomegaly. Kidneys, adrenal glands unremarkable    Gastrointestinal: No acute abnormality    Pelvis: No abnormal fluid collection. Urinary bladder unremarkable    Peritoneum/Retroperitoneum: No ascites or pneumoperitoneum. Normal caliber aorta    Bones/Soft Tissues: No acute bony abnormality      Impression:        1. Findings of acute pancreatitis, with no evidence of pancreatic necrosis, pseudocyst, or pancreatic duct obstruction  2. Cirrhosis with stable mild splenomegaly and no ascites          Electronically Signed: Leopoldo Stafford    6/11/2023 9:49 AM EDT    Workstation ID: [id number]    XR Chest 1 View [[id number]] Collected: 06/11/23 0753     Updated: 06/11/23 0758    Narrative:        XR CHEST 1 VW    Date of Exam: 6/11/2023 7:07 AM EDT    Indication: N/V and chills    Comparison: May 17, 2023, August 30, 2021    FINDINGS:  No definite focal or diffuse pulmonary infiltrate is identified.  No pneumothorax or significant pleural effusion.  Heart size and mediastinal contour appear unchanged. Fusion hardware is seen in the lower cervical and upper thoracic spine, as before.      Impression:      No radiographic findings of acute cardiopulmonary  abnormality.      Electronically Signed: Bob Morrisonthais    6/11/2023 7:55 AM EDT    Workstation ID: [id number]                IMPRESSION:  73 y.o. male with history of breast cancer status post recent right mastectomy and initiation of tamoxifen admitted with sepsis with acute pancreatitis and Klebsiella oxytoca bacteremia.   Elevated LFTs including bilirubin though no obstruction noted on CT or ultrasound imaging.  MRCP pending.    PROBLEM LIST:   -- Sepsis, with fever/chills, thrombocytopenia, elevated LFTs.  Due to bacteremia.  -- Gram-negative bacteremia, Klebsiella oxytoca by BC ID.  No mention of CTX-M gene on PCR, less likely ESBL.  Suspect GI/biliary tract source.  -- Elevated LFTs, worse.  No biliary obstruction noted on CT or ultrasound imaging.  MRCP pending.  -- Acute pancreatitis.  No gallstones on imaging.  -- Breast cancer, status post recent right mastectomy.  Site is healing well.  Recently started on tamoxifen.    PLAN:  -- Agree with piperacillin-tazobactam for now  -- Agree with MRCP and may need GI consultation and ERCP pending results.  -- Follow LFTs  -- Assess for adverse drug reactions from antimicrobials.  -- Follow vitals, exam, labs, and cultures.  -- Follow results of blood cultures and tailor antibiotics as appropriate.  -- Final plans pending clinical course.    Thank you for the consultation.  I will continue to follow along with you.  Plan discussed with patient and family.    Salo Wolff MD  6/12/2023  17:14 EDT

## 2023-06-12 NOTE — PLAN OF CARE
Goal Outcome Evaluation:  Plan of Care Reviewed With: patient, spouse           Outcome Evaluation: PT evaluation completed.  Pt demonstrated independence with bed mobility, transfers, and 350 feet of gait using AD - O2 stable throughout on RA.  Pt reports he feels at baseline re: mobility/safety and has no concerns at d/c.  Recommend home.  PT will sign off.

## 2023-06-13 ENCOUNTER — APPOINTMENT (OUTPATIENT)
Dept: GENERAL RADIOLOGY | Facility: HOSPITAL | Age: 73
End: 2023-06-13
Payer: MEDICARE

## 2023-06-13 LAB
ALBUMIN SERPL-MCNC: 3.4 G/DL (ref 3.5–5.2)
ALBUMIN/GLOB SERPL: 1 G/DL
ALP SERPL-CCNC: 140 U/L (ref 39–117)
ALT SERPL W P-5'-P-CCNC: 90 U/L (ref 1–41)
ANION GAP SERPL CALCULATED.3IONS-SCNC: 10 MMOL/L (ref 5–15)
AST SERPL-CCNC: 111 U/L (ref 1–40)
BASOPHILS # BLD AUTO: 0.01 10*3/MM3 (ref 0–0.2)
BASOPHILS NFR BLD AUTO: 0.2 % (ref 0–1.5)
BILIRUB SERPL-MCNC: 3.1 MG/DL (ref 0–1.2)
BUN SERPL-MCNC: 23 MG/DL (ref 8–23)
BUN/CREAT SERPL: 14.6 (ref 7–25)
CALCIUM SPEC-SCNC: 8.3 MG/DL (ref 8.6–10.5)
CHLORIDE SERPL-SCNC: 104 MMOL/L (ref 98–107)
CO2 SERPL-SCNC: 22 MMOL/L (ref 22–29)
CREAT SERPL-MCNC: 1.57 MG/DL (ref 0.76–1.27)
DEPRECATED RDW RBC AUTO: 51.9 FL (ref 37–54)
EGFRCR SERPLBLD CKD-EPI 2021: 46 ML/MIN/1.73
EOSINOPHIL # BLD AUTO: 0.3 10*3/MM3 (ref 0–0.4)
EOSINOPHIL NFR BLD AUTO: 6 % (ref 0.3–6.2)
ERYTHROCYTE [DISTWIDTH] IN BLOOD BY AUTOMATED COUNT: 14.3 % (ref 12.3–15.4)
GLOBULIN UR ELPH-MCNC: 3.3 GM/DL
GLUCOSE BLDC GLUCOMTR-MCNC: 137 MG/DL (ref 70–130)
GLUCOSE BLDC GLUCOMTR-MCNC: 182 MG/DL (ref 70–130)
GLUCOSE BLDC GLUCOMTR-MCNC: 198 MG/DL (ref 70–130)
GLUCOSE BLDC GLUCOMTR-MCNC: 236 MG/DL (ref 70–130)
GLUCOSE SERPL-MCNC: 168 MG/DL (ref 65–99)
HCT VFR BLD AUTO: 29.4 % (ref 37.5–51)
HGB BLD-MCNC: 9.1 G/DL (ref 13–17.7)
IMM GRANULOCYTES # BLD AUTO: 0.03 10*3/MM3 (ref 0–0.05)
IMM GRANULOCYTES NFR BLD AUTO: 0.6 % (ref 0–0.5)
LIPASE SERPL-CCNC: 439 U/L (ref 13–60)
LYMPHOCYTES # BLD AUTO: 0.34 10*3/MM3 (ref 0.7–3.1)
LYMPHOCYTES NFR BLD AUTO: 6.8 % (ref 19.6–45.3)
MCH RBC QN AUTO: 30.8 PG (ref 26.6–33)
MCHC RBC AUTO-ENTMCNC: 31 G/DL (ref 31.5–35.7)
MCV RBC AUTO: 99.7 FL (ref 79–97)
MONOCYTES # BLD AUTO: 0.46 10*3/MM3 (ref 0.1–0.9)
MONOCYTES NFR BLD AUTO: 9.3 % (ref 5–12)
NEUTROPHILS NFR BLD AUTO: 3.83 10*3/MM3 (ref 1.7–7)
NEUTROPHILS NFR BLD AUTO: 77.1 % (ref 42.7–76)
NRBC BLD AUTO-RTO: 0 /100 WBC (ref 0–0.2)
NT-PROBNP SERPL-MCNC: 2456 PG/ML (ref 0–900)
PLATELET # BLD AUTO: 61 10*3/MM3 (ref 140–450)
PMV BLD AUTO: 12.3 FL (ref 6–12)
POTASSIUM SERPL-SCNC: 4.4 MMOL/L (ref 3.5–5.2)
PROT SERPL-MCNC: 6.7 G/DL (ref 6–8.5)
RBC # BLD AUTO: 2.95 10*6/MM3 (ref 4.14–5.8)
SODIUM SERPL-SCNC: 136 MMOL/L (ref 136–145)
TROPONIN T SERPL HS-MCNC: 48 NG/L
WBC NRBC COR # BLD: 4.97 10*3/MM3 (ref 3.4–10.8)

## 2023-06-13 PROCEDURE — 71045 X-RAY EXAM CHEST 1 VIEW: CPT

## 2023-06-13 PROCEDURE — 25010000002 FUROSEMIDE PER 20 MG: Performed by: NURSE PRACTITIONER

## 2023-06-13 PROCEDURE — 80053 COMPREHEN METABOLIC PANEL: CPT | Performed by: INTERNAL MEDICINE

## 2023-06-13 PROCEDURE — 83880 ASSAY OF NATRIURETIC PEPTIDE: CPT | Performed by: NURSE PRACTITIONER

## 2023-06-13 PROCEDURE — 84484 ASSAY OF TROPONIN QUANT: CPT | Performed by: NURSE PRACTITIONER

## 2023-06-13 PROCEDURE — 85025 COMPLETE CBC W/AUTO DIFF WBC: CPT | Performed by: INTERNAL MEDICINE

## 2023-06-13 PROCEDURE — 83690 ASSAY OF LIPASE: CPT | Performed by: NURSE PRACTITIONER

## 2023-06-13 PROCEDURE — 82948 REAGENT STRIP/BLOOD GLUCOSE: CPT

## 2023-06-13 PROCEDURE — 94660 CPAP INITIATION&MGMT: CPT

## 2023-06-13 PROCEDURE — 63710000001 INSULIN LISPRO (HUMAN) PER 5 UNITS: Performed by: FAMILY MEDICINE

## 2023-06-13 PROCEDURE — 94799 UNLISTED PULMONARY SVC/PX: CPT

## 2023-06-13 PROCEDURE — 25010000002 PIPERACILLIN SOD-TAZOBACTAM PER 1 G: Performed by: FAMILY MEDICINE

## 2023-06-13 RX ORDER — FUROSEMIDE 10 MG/ML
40 INJECTION INTRAMUSCULAR; INTRAVENOUS ONCE
Status: COMPLETED | OUTPATIENT
Start: 2023-06-13 | End: 2023-06-13

## 2023-06-13 RX ORDER — SACCHAROMYCES BOULARDII 250 MG
250 CAPSULE ORAL 2 TIMES DAILY
Status: DISCONTINUED | OUTPATIENT
Start: 2023-06-13 | End: 2023-06-15 | Stop reason: HOSPADM

## 2023-06-13 RX ADMIN — TAZOBACTAM SODIUM AND PIPERACILLIN SODIUM 3.38 G: 375; 3 INJECTION, SOLUTION INTRAVENOUS at 23:10

## 2023-06-13 RX ADMIN — TAZOBACTAM SODIUM AND PIPERACILLIN SODIUM 3.38 G: 375; 3 INJECTION, SOLUTION INTRAVENOUS at 09:29

## 2023-06-13 RX ADMIN — FERROUS SULFATE TAB 325 MG (65 MG ELEMENTAL FE) 325 MG: 325 (65 FE) TAB at 22:57

## 2023-06-13 RX ADMIN — FUROSEMIDE 40 MG: 40 INJECTION, SOLUTION INTRAMUSCULAR; INTRAVENOUS at 23:10

## 2023-06-13 RX ADMIN — LEVOTHYROXINE SODIUM 50 MCG: 0.05 TABLET ORAL at 05:40

## 2023-06-13 RX ADMIN — INSULIN LISPRO 20 UNITS: 100 INJECTION, SOLUTION INTRAVENOUS; SUBCUTANEOUS at 13:14

## 2023-06-13 RX ADMIN — GABAPENTIN 800 MG: 400 CAPSULE ORAL at 22:57

## 2023-06-13 RX ADMIN — Medication 10 ML: at 22:58

## 2023-06-13 RX ADMIN — GABAPENTIN 800 MG: 400 CAPSULE ORAL at 13:13

## 2023-06-13 RX ADMIN — SODIUM CHLORIDE, POTASSIUM CHLORIDE, SODIUM LACTATE AND CALCIUM CHLORIDE 150 ML/HR: 600; 310; 30; 20 INJECTION, SOLUTION INTRAVENOUS at 05:40

## 2023-06-13 RX ADMIN — TAZOBACTAM SODIUM AND PIPERACILLIN SODIUM 3.38 G: 375; 3 INJECTION, SOLUTION INTRAVENOUS at 16:02

## 2023-06-13 RX ADMIN — GABAPENTIN 800 MG: 400 CAPSULE ORAL at 09:25

## 2023-06-13 RX ADMIN — Medication 250 MG: at 22:57

## 2023-06-13 RX ADMIN — INSULIN LISPRO 2 UNITS: 100 INJECTION, SOLUTION INTRAVENOUS; SUBCUTANEOUS at 22:59

## 2023-06-13 RX ADMIN — SERTRALINE HYDROCHLORIDE 100 MG: 100 TABLET ORAL at 09:25

## 2023-06-13 RX ADMIN — ATORVASTATIN CALCIUM 20 MG: 20 TABLET, FILM COATED ORAL at 09:25

## 2023-06-13 RX ADMIN — FERROUS SULFATE TAB 325 MG (65 MG ELEMENTAL FE) 325 MG: 325 (65 FE) TAB at 09:24

## 2023-06-13 RX ADMIN — SODIUM CHLORIDE, POTASSIUM CHLORIDE, SODIUM LACTATE AND CALCIUM CHLORIDE 150 ML/HR: 600; 310; 30; 20 INJECTION, SOLUTION INTRAVENOUS at 13:13

## 2023-06-13 RX ADMIN — Medication 10 ML: at 09:30

## 2023-06-13 RX ADMIN — INSULIN LISPRO 10 UNITS: 100 INJECTION, SOLUTION INTRAVENOUS; SUBCUTANEOUS at 09:34

## 2023-06-13 RX ADMIN — PANTOPRAZOLE SODIUM 40 MG: 40 TABLET, DELAYED RELEASE ORAL at 09:25

## 2023-06-13 RX ADMIN — SENNOSIDES AND DOCUSATE SODIUM 2 TABLET: 50; 8.6 TABLET ORAL at 09:25

## 2023-06-13 NOTE — PROGRESS NOTES
[facility] Medicine Services  PROGRESS NOTE    Patient Name: Elton Funez  : 1950  MRN: [id number]    Date of Admission: 2023  Primary Care Physician: Tc Ramirez MD    Subjective   Subjective     CC:  sepsis    HPI:  Patient resting in bed. No major issues overnight other than feeling some ongoing shortness of breath. Abdominal pain is improving but still present in epigastric area and RUQ.    ROS:  Gen- No fevers, chills  CV- No chest pain, palpitations  Resp- No cough, dyspnea  GI- No N/V/D, +abd pain    Objective   Objective     Vital Signs:   Temp:  [97.9 °F (36.6 °C)-98.8 °F (37.1 °C)] 98.4 °F (36.9 °C)  Heart Rate:  [69-71] 69  Resp:  [14-18] 14  BP: (120-152)/(61-86) 133/86     Physical Exam:  Constitutional: No acute distress, awake, alert  HENT: NCAT, mucous membranes moist  Respiratory: Clear to auscultation bilaterally, respiratory effort normal   Cardiovascular: RRR, no murmurs, rubs, or gallops  Gastrointestinal:  soft, tender in the mid epigastic area and RUQ, nondistended  Musculoskeletal: No bilateral ankle edema  Psychiatric: Appropriate affect, cooperative  Neurologic: Oriented x 3, speech clear, non-focal  Skin: No rashes      Results Reviewed:  LAB RESULTS:      Lab 23  0359 23  0830   WBC 4.97 7.31 8.97   HEMOGLOBIN 9.1* 9.2* 11.2*   HEMATOCRIT 29.4* 28.8* 34.6*   PLATELETS 61* 60* 97*   NEUTROS ABS 3.83 6.11 8.46*   IMMATURE GRANS (ABS) 0.03 0.05 0.03   LYMPHS ABS 0.34* 0.29* 0.18*   MONOS ABS 0.46 0.56 0.22   EOS ABS 0.30 0.28 0.07   MCV 99.7* 98.0* 96.4   PROCALCITONIN  --   --  2.03*   LACTATE  --   --  2.0         Lab 23  0329 23  0359 23  1756 23  0830   SODIUM 136 136  --  137   POTASSIUM 4.4 4.6  --  4.3   CHLORIDE 104 104  --  104   CO2 22.0 20.0*  --  21.0*   ANION GAP 10.0 12.0  --  12.0   BUN 23 29*  --  27*   CREATININE 1.57* 1.66*  --  1.72*   EGFR 46.0* 43.0*  --  41.2*    GLUCOSE 168* 162*  --  118*   CALCIUM 8.3* 8.3*  --  8.9   HEMOGLOBIN A1C  --   --  8.40*  --          Lab 06/13/23  0329 06/12/23  0359 06/11/23  0830   TOTAL PROTEIN 6.7 6.2 7.2   ALBUMIN 3.4* 3.2* 3.9   GLOBULIN 3.3 3.0 3.3   ALT (SGPT) 90* 91* 44*   AST (SGOT) 111* 143* 94*   BILIRUBIN 3.1* 4.1* 1.8*   ALK PHOS 140* 139* 204*   LIPASE  --   --  >3,000*                     Brief Urine Lab Results  (Last result in the past 365 days)        Color   Clarity   Blood   Leuk Est   Nitrite   Protein   CREAT   Urine HCG        06/11/23 0900 Yellow   Clear   Negative   Negative   Negative   >=300 mg/dL (3+)                   Microbiology Results Abnormal       Procedure Component Value - Date/Time    Blood Culture - Blood, Arm, Left [[id number]]  (Normal) Collected: 06/11/23 0850    Lab Status: Preliminary result Specimen: Blood from Arm, Left Updated: 06/13/23 1045     Blood Culture No growth at 2 days    Respiratory Panel PCR w/COVID-19(SARS-CoV-2) [facility] In-House, NP Swab in UTM/VTM, 3-4 HR TAT - Swab, Nasopharynx [[id number]]  (Normal) Collected: 06/11/23 1053    Lab Status: Final result Specimen: Swab from Nasopharynx Updated: 06/11/23 1206     ADENOVIRUS, PCR Not Detected     Coronavirus 229E Not Detected     Coronavirus HKU1 Not Detected     Coronavirus NL63 Not Detected     Coronavirus OC43 Not Detected     COVID19 Not Detected     Human Metapneumovirus Not Detected     Human Rhinovirus/Enterovirus Not Detected     Influenza A PCR Not Detected     Influenza B PCR Not Detected     Parainfluenza Virus 1 Not Detected     Parainfluenza Virus 2 Not Detected     Parainfluenza Virus 3 Not Detected     Parainfluenza Virus 4 Not Detected     RSV, PCR Not Detected     Bordetella pertussis pcr Not Detected     Bordetella parapertussis PCR Not Detected     Chlamydophila pneumoniae PCR Not Detected     Mycoplasma pneumo by PCR Not Detected    Narrative:      In the setting of a positive respiratory panel  with a viral infection PLUS a negative procalcitonin without other underlying concern for bacterial infection, consider observing off antibiotics or discontinuation of antibiotics and continue supportive care. If the respiratory panel is positive for atypical bacterial infection (Bordetella pertussis, Chlamydophila pneumoniae, or Mycoplasma pneumoniae), consider antibiotic de-escalation to target atypical bacterial infection.    COVID PRE-OP / PRE-PROCEDURE SCREENING ORDER (NO ISOLATION) - Swab, Nasopharynx [[id number]]  (Normal) Collected: 06/11/23 0851    Lab Status: Final result Specimen: Swab from Nasopharynx Updated: 06/11/23 1052    Narrative:      The following orders were created for panel order COVID PRE-OP / PRE-PROCEDURE SCREENING ORDER (NO ISOLATION) - Swab, Nasopharynx.  Procedure                               Abnormality         Status                     ---------                               -----------         ------                     COVID-19 and FLU A/B PCR...[[id number]]  Normal              Final result                 Please view results for these tests on the individual orders.    COVID-19 and FLU A/B PCR - Swab, Nasopharynx [[id number]]  (Normal) Collected: 06/11/23 0851    Lab Status: Final result Specimen: Swab from Nasopharynx Updated: 06/11/23 1052     COVID19 Presumptive Negative     Influenza A PCR Not Detected     Influenza B PCR Not Detected    Narrative:      Fact sheet for providers: https://www.fda.gov/media/930929/download    Fact sheet for patients: https://www.fda.gov/media/467814/download    Test performed by PCR.            US Gallbladder    Result Date: 6/11/2023  US GALLBLADDER Date of Exam: 6/11/2023 11:32 AM EDT Indication: Elevated LFTs w/ acute pancreatitis and fever. Comparison: No comparisons available. Technique: Grayscale and color Doppler ultrasound evaluation of the right upper quadrant was performed. Findings: No peripancreatic fluid collection or pancreatic duct  dilatation Liver demonstrates coarsened echotexture. No focal lesion demonstrated. No intrahepatic biliary duct dilatation. Hepatopetal flow demonstrated in the main portal vein. Patent interrogated hepatic vein Gallbladder normal in size and wall thickness. No pericholecystic fluid. No shadowing stones in the gallbladder lumen. Some low-level echoes in the gallbladder lumen, mostly in the neck, compatible with sludge. Common duct normal at 4 mm Right kidney normal in echogenicity with no hydronephrosis     Impression: Impression: 1. No evidence of cholelithiasis, cholecystitis, or biliary obstruction. There is sludge in the gallbladder lumen 2. Cirrhotic liver echotexture with hepatopetal portal venous flow 3. No abnormal peripancreatic fluid collection 4. Normal ultrasound appearance of the right kidney Electronically Signed: Leopoldo Stafford  6/11/2023 11:52 AM EDT  Workstation ID: [id number]    MRI abdomen wo contrast mrcp    Result Date: 6/13/2023  MRI ABDOMEN WO CONTRAST MRCP Date of Exam: 6/12/2023 4:40 PM EDT Indication: Pancreatitis, acute, initial episode . History of breast cancer  Comparison: CT abdomen and pelvis without contrast 6/11/2023 Technique:  Routine multiplanar/multisequence images of the abdomen were obtained with MRCP sequences without contrast administration. Findings: Visualized lower chest demonstrates normal heart size. No pericardial effusion or pleural effusion. Postsurgical changes at the inferior aspect of the right breast related to recent right mastectomy. The liver is nodular in contour consistent with cirrhotic morphology. No liver lesion within limits of a noncontrast exam. Gallbladder is distended with layering sludge/stones. Common bile duct measures 5 mm. No intrahepatic biliary ductal dilatation. There is a horizontal meniscus appearance of the distal CBD which could relate to sludge or stones near ampulla (17/60). The spleen is enlarged measuring 15 cm in craniocaudal length.  Edema surrounding the pancreas has improved in the prior study with a small amount of peripancreatic edema abutting the pancreatic tail (2/22). Main pancreatic duct at the upper limits of normal measuring 3 mm. No organized peripancreatic fluid collection. Kidneys symmetric in size. No hydronephrosis. No perinephric fluid collection. Abdominal aorta without aneurysm. Normal portal vein, splenic vein and superior mesenteric vein flow void. No dilated bowel loops in the upper abdomen. Trace perihepatic ascites.     Impression: Impression: 1. Common bile duct normal in caliber measuring 5 mm. Horizontal meniscus appearance of the distal CBD which may relate to stones or sludge near the ampulla, consider ERCP. 2. Acute interstitial pancreatitis improved from prior CT. 3. Cholelithiasis. 4. Cirrhotic liver with splenomegaly. 5. Postsurgical changes of right mastectomy partially imaged. Electronically Signed: Ed Castañeda  6/13/2023 8:45 AM EDT  Workstation ID: [id number]       Results for orders placed during the hospital encounter of 03/20/23    Adult Transthoracic Echo Complete W/ Cont if Necessary Per Protocol    Interpretation Summary    Left ventricular ejection fraction appears to be 61 - 65%.    Left ventricular diastolic function is consistent with (grade I) impaired relaxation.    Calculated right ventricular systolic pressure from tricuspid regurgitation is 16 mmHg.      Current medications:  Scheduled Meds:atorvastatin, 20 mg, Oral, Daily  ferrous sulfate, 325 mg, Oral, BID  gabapentin, 800 mg, Oral, 4x Daily  insulin lispro, 2-9 Units, Subcutaneous, 4x Daily AC & at Bedtime  levothyroxine, 50 mcg, Oral, Q AM  pantoprazole, 40 mg, Oral, Daily  piperacillin-tazobactam, 3.375 g, Intravenous, Q8H  senna-docusate sodium, 2 tablet, Oral, BID  sertraline, 100 mg, Oral, Daily  sodium chloride, 10 mL, Intravenous, Q12H  [START ON 6/16/2023] Vitamin D (Ergocalciferol), 50,000 Units, Oral, Weekly      Continuous  Infusions:lactated ringers, 150 mL/hr, Last Rate: 150 mL/hr (06/13/23 0540)      PRN Meds:.  acetaminophen    senna-docusate sodium **AND** polyethylene glycol **AND** bisacodyl **AND** bisacodyl    dextrose    dextrose    glucagon (human recombinant)    Morphine **AND** naloxone    ondansetron **OR** ondansetron    [COMPLETED] Insert Peripheral IV **AND** sodium chloride    sodium chloride    sodium chloride    temazepam    Assessment & Plan   Assessment & Plan     Active Hospital Problems    Diagnosis  POA    **Sepsis, due to unspecified organism, unspecified whether acute organ dysfunction present [A41.9]  Yes    Idiopathic acute pancreatitis [K85.00]  Unknown    Bacteremia due to Klebsiella pneumoniae [R78.81, B96.1]  Unknown    Malignant neoplasm of central portion of right breast in male, estrogen receptor positive [C50.121, Z17.0]  Not Applicable    Grade I diastolic dysfunction [I51.89]  Yes    Liver cirrhosis secondary to JEROME [K75.81, K74.60]  Yes    Anemia of chronic disease [D63.8]  Yes    Acquired hypothyroidism [E03.9]  Yes    Type 2 diabetes mellitus with diabetic polyneuropathy, with long-term current use of insulin [E11.42, Z79.4]  Not Applicable    FAVIO (obstructive sleep apnea) [G47.33]  Yes    Stage 3a chronic kidney disease [N18.31]  Yes    Gastroesophageal reflux disease without esophagitis [K21.9]  Yes      Resolved Hospital Problems   No resolved problems to display.        Brief Hospital Course to date:  Elton Funez is a 73 y.o. male with DM, HTN, HLD, FAVIO, CKD, JEROME cirrhosis and breast CA with recent mastectomy ( 2 weeks ago) who presented with n/v found to have acute pancreatitis. Patient's blood cultures are now growing Klebsiella.    Acute Pancreatitis   -CT shows acute pancreatitis with no necrosis nor duct obstruction  -gall bladder u/s shows no evidence of cholelithiasis cholecystitis or obstruction, there is sludge   -lipase >3000  -possibly due to recent initiation of  Trulicity?  -continue IVF  -clear liquids, advance as tolerated  -abnormal MRCP will have GI evaluate      Sepsis due to Klebsiella bacteremia  -- blood cultures with Klebsiella, concern for biliary source, ID following, appreciate input, continue IV abx  -- MRCP with meniscus appearance of the distal CBD which may relate to stones or sludge near the ampulla, will consult GI for possible ERCP     Elevated Creatinine on CKD  -close to baseline now around 1.5     FAVIO  -BIPAP     DM II  -A1c 8.6%  -SSI      Breast CA s/p mastectomy  -Dr MURILLO performed mastectomy  -follows with Dr Archer oncology  -holding Tamoxifen     JEROME Cirrhosis  Hyperbilirubinemia   -stable   -LFT slightly elevated, T bili now at 4.1.       HTN  HLD   -- hold Spironolactone and Coreg due to borderline BP  -- continue formulary equivalent of statin    TCP  -- likely related to cirrhosis, monitor     Expected Discharge Location and Transportation: home  Expected Discharge   Expected Discharge Date: 6/15/2023; Expected Discharge Time:      DVT prophylaxis:  Mechanical DVT prophylaxis orders are present.     AM-PAC 6 Clicks Score (PT): 24 (06/13/23 0800)    CODE STATUS:   Code Status and Medical Interventions:   Ordered at: 06/11/23 1136     Code Status (Patient has no pulse and is not breathing):    CPR (Attempt to Resuscitate)     Medical Interventions (Patient has pulse or is breathing):    Full Support       Radha Weeks,   06/13/23

## 2023-06-13 NOTE — CONSULTS
[facility] [place] [facility]    Referring Provider: No ref. provider found    Primary Care Provider: Tc Ramirez MD    Reason for Consultation: Abnormal MRCP, question need for ERCP    Chief complaint : Abdominal pain  History of present illness:  Elton Funez is a 73 y.o. male who is admitted with fever chills abdominal pain.  Patient states that he developed epigastric pain radiating to his right abdomen and into his back.  Has some nausea but no vomiting.  He was febrile.  Cultures are growing Klebsiella oxytoca.  MRCP showed sludge versus cholelithiasis with common bile duct of 5 mm.  There is a horizontal meniscus appearance of the distal common bile duct.  The patient does complain of shortness of breath and is wearing a CPAP machine.  He has had a recent mastectomy.    He is admitted in August 2021 with anemia.    Allergies:  Glucophage xr [metformin hcl er], Metformin, Tetracyclines & related, and Chlorhexidine    Scheduled Meds:  atorvastatin, 20 mg, Oral, Daily  ferrous sulfate, 325 mg, Oral, BID  gabapentin, 800 mg, Oral, 4x Daily  insulin lispro, 2-9 Units, Subcutaneous, 4x Daily AC & at Bedtime  levothyroxine, 50 mcg, Oral, Q AM  pantoprazole, 40 mg, Oral, Daily  piperacillin-tazobactam, 3.375 g, Intravenous, Q8H  senna-docusate sodium, 2 tablet, Oral, BID  sertraline, 100 mg, Oral, Daily  sodium chloride, 10 mL, Intravenous, Q12H  [START ON 6/16/2023] Vitamin D (Ergocalciferol), 50,000 Units, Oral, Weekly         Infusions:  lactated ringers, 150 mL/hr, Last Rate: 150 mL/hr (06/13/23 0540)        PRN Meds:    acetaminophen    senna-docusate sodium **AND** polyethylene glycol **AND** bisacodyl **AND** bisacodyl    dextrose    dextrose    glucagon (human recombinant)    Morphine **AND** naloxone    ondansetron **OR** ondansetron    [COMPLETED] Insert Peripheral IV **AND** sodium chloride    sodium chloride    sodium chloride    temazepam    Home Meds:  Medications Prior to Admission   Medication Sig  Dispense Refill Last Dose    Alpha-Lipoic Acid 600 MG tablet Take 1 tablet by mouth Daily.       B Complex Vitamins (VITAMIN B COMPLEX PO) Take 1 tablet by mouth Daily.       B-D UF III MINI PEN NEEDLES 31G X 5 MM misc USE THREE TIMES A  each 1     carvedilol (COREG) 6.25 MG tablet TAKE 1 TABLET EVERY 12 HOURS (Patient taking differently: Take 1 tablet by mouth 2 (Two) Times a Day With Meals.) 180 tablet 1     Coenzyme Q10 (Co Q 10) 100 MG capsule Take 300 mg by mouth Daily.       colestipol (COLESTID) 1 g tablet TAKE 2 TABLETS TWICE A DAY (Patient taking differently: Take 2 tablets by mouth 2 (Two) Times a Day.) 360 tablet 3     Continuous Blood Gluc  (FreeStyle [place] 2 [place]) device 1 Device Every 14 (Fourteen) Days. 1 each 0     Continuous Blood Gluc Sensor (FreeStyle Winston 2 Sensor) misc 1 each Every 14 (Fourteen) Days. 6 each 3     Dulaglutide (Trulicity) 4.5 MG/0.5ML solution pen-injector Inject 0.5 mL under the skin into the appropriate area as directed 1 (One) Time Per Week. 6 mL 1     ferrous sulfate (FeroSul) 325 (65 FE) MG tablet Take 1 tablet by mouth 2 (Two) Times a Day. 180 tablet 1     Flaxseed, Linseed, (Flaxseed Oil) 1400 MG capsule Take 1 capsule by mouth 2 (Two) Times a Day.       gabapentin (NEURONTIN) 800 MG tablet TAKE 1 TABLET FOUR TIMES A DAY (Patient taking differently: Take 1 tablet by mouth 4 (Four) Times a Day.) 360 tablet 1     gemfibrozil (LOPID) 600 MG tablet TAKE 1 TABLET TWICE A DAY (Patient taking differently: Take 1 tablet by mouth 2 (Two) Times a Day.) 180 tablet 3     Insulin Regular Human, Conc, (HumuLIN R U-500 KwikPen) 500 UNIT/ML solution pen-injector CONCENTRATED injection Inject sc 160 u ac breakfast and 180 u  at dinner and about 40 units at hs. (Patient taking differently: Inject  under the skin into the appropriate area as directed. Inject sc 160 u ac breakfast and 180 u  at dinner and about 40 units at hs.) 60 mL 0     ipratropium (ATROVENT) 0.06 % nasal  spray USE 2 SPRAYS IN EACH NOSTRIL AS DIRECTED BY PROVIDER DAILY (Patient taking differently: 2 sprays into the nostril(s) as directed by provider Daily.) 45 mL 1     levothyroxine (SYNTHROID, LEVOTHROID) 50 MCG tablet TAKE 1 TABLET DAILY (Patient taking differently: Take 1 tablet by mouth Daily.) 90 tablet 0     Multiple Vitamins-Minerals (MULTIVITAMIN PO) Take 1 tablet by mouth Daily.       omeprazole (priLOSEC) 40 MG capsule TAKE 1 CAPSULE DAILY 90 capsule 3     sertraline (ZOLOFT) 100 MG tablet Take 1 tablet by mouth Daily. 90 tablet 1     simvastatin (ZOCOR) 40 MG tablet TAKE 1 TABLET DAILY AT BEDTIME (Patient taking differently: Take 1 tablet by mouth Every Night.) 90 tablet 3     spironolactone (ALDACTONE) 25 MG tablet TAKE 1 TABLET DAILY (Patient taking differently: Take 1 tablet by mouth Daily.) 90 tablet 3     tamoxifen (NOLVADEX) 20 MG chemo tablet Take 1 tablet by mouth Daily. 90 tablet 3     temazepam (Restoril) 15 MG capsule Take 1 capsule by mouth At Night As Needed for Sleep. 30 capsule 2     tiZANidine (ZANAFLEX) 4 MG tablet Take 1 tablet by mouth 2 (Two) Times a Day As Needed for Muscle Spasms. 180 tablet 0     vitamin D (ERGOCALCIFEROL) 1.25 MG (90061 UT) capsule capsule Take 1 capsule by mouth Every 7 (Seven) Days. FRIDAY          ROS: Review of Systems   Constitutional:  Positive for fever. Negative for appetite change and unexpected weight change.   Respiratory:  Positive for shortness of breath.    Cardiovascular:  Negative for chest pain.   Gastrointestinal:  Positive for abdominal pain and nausea. Negative for constipation, diarrhea and vomiting.   All other systems reviewed and are negative.    PAST MED HX: Pt  has a past medical history of Abdominal pain (05/17/2023), Abscess of arm, right, Abscess of skin of neck, Acute sinusitis, ALT (SGPT) level raised, Arthritis, BPH (benign prostatic hypertrophy), Breast cancer (05/2023), Cirrhosis of liver (09/03/2021), CKD (chronic kidney disease),  "Colon polyp, Constipation, DDD (degenerative disc disease), cervical, Decreased platelet count, Depression, Elevated AST (SGOT), Erectile dysfunction, GERD (gastroesophageal reflux disease), History of transfusion (2021), Hyperlipidemia, Hypertension, Hypothyroidism, adult (11/10/2020), Infection, Jaw pain, Left hip pain, Low back pain, Migraine, Obesity (BMI 30.0-34.9) (10/07/2016), Obstructive sleep apnea, Portal hypertensive gastropathy (09/03/2021), Shigellosis, Sleep apnea, Symptomatic anemia (08/30/2021), Type 2 diabetes mellitus, Visual impairment, Wears glasses, and Wears hearing aid.  PAST SURG HX: Pt  has a past surgical history that includes Cervical Biopsy; Vasectomy; cervical laminectomy decompression posterior (Left, 02/28/2017); cervical discectomy posterior fusion with instrumentation (N/A, 03/16/2017); [place] tooth extraction; Anterior cervical discectomy w/ fusion (N/A, 12/14/2017); Cyst Removal (04/2021); Esophagogastroduodenoscopy (N/A, 08/31/2021); Colonoscopy (N/A, 09/01/2021); and Mastectomy w/ sentinel node biopsy (Right, 5/23/2023).  FAM HX: family history includes Arthritis in his mother; Cancer in his mother; Diabetes in his father and paternal grandmother; Hearing loss in his mother; Heart disease in his father and mother; Hyperlipidemia in his father; Hypertension in his father; Stroke in his father; Thyroid disease in his mother.  SOC HX: Pt  reports that he quit smoking about 47 years ago. His smoking use included cigarettes. He has a 2.00 pack-year smoking history. He has never used smokeless tobacco. He reports current alcohol use. He reports that he does not use drugs.    /87 (BP Location: Left arm, Patient Position: Sitting)   Pulse 75   Temp 98.4 °F (36.9 °C) (Oral)   Resp 14   Ht 172.7 cm (68\")   Wt 99.8 kg (220 lb)   SpO2 93%   BMI 33.45 kg/m²     Physical Exam  Wt Readings from Last 3 Encounters:   06/11/23 99.8 kg (220 lb)   06/05/23 101 kg (222 lb)   05/31/23 101 " kg (223 lb)   ,body mass index is 33.45 kg/m².    General Well developed; obese, and no acute distress.  Reclining in his chair  ENT Good dentition.  Oral mucosa pink & moist without thrush or lesions.    Neck Neck supple; trachea midline. No thyromegaly  Resp CTA; no rhonchi, rales, or wheezes.  Respiration effort normal  CV RRR; ; no M/R/G. No lower extremity edema  GI Abd soft, NT, obese, normal active bowel sounds.    Skin No rash; no lesions; no bruises.  Skin turgor normal  MSK No clubbing; no cyanosis.    Psych Oriented to time, place, and person.  Appropriate affect      Results Review:   I reviewed the patient's new clinical results.  I reviewed the patient's new imaging results and agree with the interpretation.    Lab Results   Component Value Date    WBC 4.97 06/13/2023    HGB 9.1 (L) 06/13/2023    HCT 29.4 (L) 06/13/2023    MCV 99.7 (H) 06/13/2023    PLT 61 (L) 06/13/2023       Lab Results   Component Value Date    GLUCOSE 168 (H) 06/13/2023    BUN 23 06/13/2023    CREATININE 1.57 (H) 06/13/2023    EGFRIFNONA 60 (L) 12/15/2021    EGFRIFAFRI 66 07/14/2015    BCR 14.6 06/13/2023    CO2 22.0 06/13/2023    CALCIUM 8.3 (L) 06/13/2023    PROTENTOTREF 7.7 07/14/2015    ALBUMIN 3.4 (L) 06/13/2023    LABIL2 1.5 07/14/2015     (H) 06/13/2023    ALT 90 (H) 06/13/2023     MRCP per radiologist report:  Narrative & Impression   MRI ABDOMEN WO CONTRAST MRCP     Date of Exam: 6/12/2023 4:40 PM EDT     Indication: Pancreatitis, acute, initial episode . History of breast cancer     Comparison: CT abdomen and pelvis without contrast 6/11/2023     Technique:  Routine multiplanar/multisequence images of the abdomen were obtained with MRCP sequences without contrast administration.     Findings:  Visualized lower chest demonstrates normal heart size. No pericardial effusion or pleural effusion. Postsurgical changes at the inferior aspect of the right breast related to recent right mastectomy.      The liver is nodular  in contour consistent with cirrhotic morphology. No liver lesion within limits of a noncontrast exam.      Gallbladder is distended with layering sludge/stones. Common bile duct measures 5 mm. No intrahepatic biliary ductal dilatation. There is a horizontal meniscus appearance of the distal CBD which could relate to sludge or stones near ampulla (17/60).     The spleen is enlarged measuring 15 cm in craniocaudal length. Edema surrounding the pancreas has improved in the prior study with a small amount of peripancreatic edema abutting the pancreatic tail (2/22). Main pancreatic duct at the upper limits of   normal measuring 3 mm. No organized peripancreatic fluid collection.     Kidneys symmetric in size. No hydronephrosis. No perinephric fluid collection. Abdominal aorta without aneurysm. Normal portal vein, splenic vein and superior mesenteric vein flow void. No dilated bowel loops in the upper abdomen. Trace perihepatic   ascites.     IMPRESSION:  Impression:  1. Common bile duct normal in caliber measuring 5 mm. Horizontal meniscus appearance of the distal CBD which may relate to stones or sludge near the ampulla, consider ERCP.  2. Acute interstitial pancreatitis improved from prior CT.  3. Cholelithiasis.  4. Cirrhotic liver with splenomegaly.  5. Postsurgical changes of right mastectomy partially imaged.        Electronically Signed: Ed Castañeda    6/13/2023 8:45 AM EDT    Workstation ID: [id number]       ASSESSMENTS/PLANS  1.  Klebsiella sepsis  2.  Elevated LFTs  3.  Question choledocholithiasis  4.  Cirrhosis splenomegaly-JEROME  5.  Obesity  6.  Diabetes type 2  7.  FAVIO  8.  Acute pancreatitis      Suspect gallstone pancreatitis with choledocholithiasis.  We will plan ERCP for tomorrow.  Increase diet today.    Risk and benefits explained including incomplete cannulation, 10%; Perforation, 1/ 500; Bleeding, 1/500:  Infection; Pancreatitis, 5 to 10% mild to moderate; and 5% severe with 1/1000 risk of  death    Recheck AFP  He will need outpatient monitoring for his cirrhosis.        I discussed the patient's findings and my recommendations with patient    Sharif García MD  06/13/23  13:09 EDT

## 2023-06-14 ENCOUNTER — ANESTHESIA (OUTPATIENT)
Dept: GASTROENTEROLOGY | Facility: HOSPITAL | Age: 73
End: 2023-06-14
Payer: MEDICARE

## 2023-06-14 ENCOUNTER — ANESTHESIA EVENT (OUTPATIENT)
Dept: GASTROENTEROLOGY | Facility: HOSPITAL | Age: 73
End: 2023-06-14
Payer: MEDICARE

## 2023-06-14 ENCOUNTER — APPOINTMENT (OUTPATIENT)
Dept: GENERAL RADIOLOGY | Facility: HOSPITAL | Age: 73
End: 2023-06-14
Payer: MEDICARE

## 2023-06-14 LAB
ALBUMIN SERPL-MCNC: 3.5 G/DL (ref 3.5–5.2)
ALBUMIN/GLOB SERPL: 1 G/DL
ALP SERPL-CCNC: 209 U/L (ref 39–117)
ALT SERPL W P-5'-P-CCNC: 83 U/L (ref 1–41)
ANION GAP SERPL CALCULATED.3IONS-SCNC: 15 MMOL/L (ref 5–15)
AST SERPL-CCNC: 83 U/L (ref 1–40)
BACTERIA SPEC AEROBE CULT: ABNORMAL
BASOPHILS # BLD AUTO: 0.01 10*3/MM3 (ref 0–0.2)
BASOPHILS NFR BLD AUTO: 0.2 % (ref 0–1.5)
BILIRUB SERPL-MCNC: 2.5 MG/DL (ref 0–1.2)
BUN SERPL-MCNC: 17 MG/DL (ref 8–23)
BUN/CREAT SERPL: 11.3 (ref 7–25)
CALCIUM SPEC-SCNC: 8.9 MG/DL (ref 8.6–10.5)
CHLORIDE SERPL-SCNC: 102 MMOL/L (ref 98–107)
CO2 SERPL-SCNC: 22 MMOL/L (ref 22–29)
CREAT SERPL-MCNC: 1.5 MG/DL (ref 0.76–1.27)
DEPRECATED RDW RBC AUTO: 52.5 FL (ref 37–54)
EGFRCR SERPLBLD CKD-EPI 2021: 48.6 ML/MIN/1.73
EOSINOPHIL # BLD AUTO: 0.18 10*3/MM3 (ref 0–0.4)
EOSINOPHIL NFR BLD AUTO: 3.3 % (ref 0.3–6.2)
ERYTHROCYTE [DISTWIDTH] IN BLOOD BY AUTOMATED COUNT: 14.2 % (ref 12.3–15.4)
GEN 5 2HR TROPONIN T REFLEX: 55 NG/L
GLOBULIN UR ELPH-MCNC: 3.6 GM/DL
GLUCOSE BLDC GLUCOMTR-MCNC: 170 MG/DL (ref 70–130)
GLUCOSE BLDC GLUCOMTR-MCNC: 207 MG/DL (ref 70–130)
GLUCOSE BLDC GLUCOMTR-MCNC: 299 MG/DL (ref 70–130)
GLUCOSE BLDC GLUCOMTR-MCNC: 366 MG/DL (ref 70–130)
GLUCOSE SERPL-MCNC: 172 MG/DL (ref 65–99)
GRAM STN SPEC: ABNORMAL
HCT VFR BLD AUTO: 31.5 % (ref 37.5–51)
HGB BLD-MCNC: 9.7 G/DL (ref 13–17.7)
IMM GRANULOCYTES # BLD AUTO: 0.04 10*3/MM3 (ref 0–0.05)
IMM GRANULOCYTES NFR BLD AUTO: 0.7 % (ref 0–0.5)
ISOLATED FROM: ABNORMAL
LIPASE SERPL-CCNC: 257 U/L (ref 13–60)
LYMPHOCYTES # BLD AUTO: 0.41 10*3/MM3 (ref 0.7–3.1)
LYMPHOCYTES NFR BLD AUTO: 7.6 % (ref 19.6–45.3)
MCH RBC QN AUTO: 31.5 PG (ref 26.6–33)
MCHC RBC AUTO-ENTMCNC: 30.8 G/DL (ref 31.5–35.7)
MCV RBC AUTO: 102.3 FL (ref 79–97)
MONOCYTES # BLD AUTO: 0.61 10*3/MM3 (ref 0.1–0.9)
MONOCYTES NFR BLD AUTO: 11.2 % (ref 5–12)
NEUTROPHILS NFR BLD AUTO: 4.18 10*3/MM3 (ref 1.7–7)
NEUTROPHILS NFR BLD AUTO: 77 % (ref 42.7–76)
NRBC BLD AUTO-RTO: 0 /100 WBC (ref 0–0.2)
PLATELET # BLD AUTO: 62 10*3/MM3 (ref 140–450)
PMV BLD AUTO: 11.6 FL (ref 6–12)
POTASSIUM SERPL-SCNC: 4.1 MMOL/L (ref 3.5–5.2)
PROCALCITONIN SERPL-MCNC: 4.13 NG/ML (ref 0–0.25)
PROT SERPL-MCNC: 7.1 G/DL (ref 6–8.5)
QT INTERVAL: 398 MS
QTC INTERVAL: 429 MS
RBC # BLD AUTO: 3.08 10*6/MM3 (ref 4.14–5.8)
SODIUM SERPL-SCNC: 139 MMOL/L (ref 136–145)
TROPONIN T DELTA: 7 NG/L
TROPONIN T SERPL HS-MCNC: 55 NG/L
WBC NRBC COR # BLD: 5.43 10*3/MM3 (ref 3.4–10.8)

## 2023-06-14 PROCEDURE — 84484 ASSAY OF TROPONIN QUANT: CPT | Performed by: NURSE PRACTITIONER

## 2023-06-14 PROCEDURE — 85025 COMPLETE CBC W/AUTO DIFF WBC: CPT | Performed by: INTERNAL MEDICINE

## 2023-06-14 PROCEDURE — 74330 X-RAY BILE/PANC ENDOSCOPY: CPT

## 2023-06-14 PROCEDURE — 25010000002 SUGAMMADEX 200 MG/2ML SOLUTION

## 2023-06-14 PROCEDURE — 84145 PROCALCITONIN (PCT): CPT | Performed by: NURSE PRACTITIONER

## 2023-06-14 PROCEDURE — 83690 ASSAY OF LIPASE: CPT | Performed by: NURSE PRACTITIONER

## 2023-06-14 PROCEDURE — 63710000001 INSULIN LISPRO (HUMAN) PER 5 UNITS: Performed by: FAMILY MEDICINE

## 2023-06-14 PROCEDURE — C1769 GUIDE WIRE: HCPCS | Performed by: INTERNAL MEDICINE

## 2023-06-14 PROCEDURE — 25010000002 DEXAMETHASONE PER 1 MG

## 2023-06-14 PROCEDURE — 94660 CPAP INITIATION&MGMT: CPT

## 2023-06-14 PROCEDURE — 25010000002 CEFTRIAXONE PER 250 MG: Performed by: INTERNAL MEDICINE

## 2023-06-14 PROCEDURE — 82948 REAGENT STRIP/BLOOD GLUCOSE: CPT

## 2023-06-14 PROCEDURE — 80053 COMPREHEN METABOLIC PANEL: CPT | Performed by: INTERNAL MEDICINE

## 2023-06-14 PROCEDURE — 94799 UNLISTED PULMONARY SVC/PX: CPT

## 2023-06-14 PROCEDURE — 25010000002 PROPOFOL 10 MG/ML EMULSION

## 2023-06-14 PROCEDURE — 25010000002 PIPERACILLIN SOD-TAZOBACTAM PER 1 G: Performed by: FAMILY MEDICINE

## 2023-06-14 PROCEDURE — 93005 ELECTROCARDIOGRAM TRACING: CPT | Performed by: NURSE PRACTITIONER

## 2023-06-14 PROCEDURE — 25010000002 ONDANSETRON PER 1 MG

## 2023-06-14 PROCEDURE — 25010000002 FUROSEMIDE PER 20 MG: Performed by: INTERNAL MEDICINE

## 2023-06-14 RX ORDER — FUROSEMIDE 10 MG/ML
40 INJECTION INTRAMUSCULAR; INTRAVENOUS ONCE
Status: COMPLETED | OUTPATIENT
Start: 2023-06-14 | End: 2023-06-14

## 2023-06-14 RX ORDER — PROPOFOL 10 MG/ML
VIAL (ML) INTRAVENOUS AS NEEDED
Status: DISCONTINUED | OUTPATIENT
Start: 2023-06-14 | End: 2023-06-14 | Stop reason: SURG

## 2023-06-14 RX ORDER — DEXAMETHASONE SODIUM PHOSPHATE 4 MG/ML
INJECTION, SOLUTION INTRA-ARTICULAR; INTRALESIONAL; INTRAMUSCULAR; INTRAVENOUS; SOFT TISSUE AS NEEDED
Status: DISCONTINUED | OUTPATIENT
Start: 2023-06-14 | End: 2023-06-14 | Stop reason: SURG

## 2023-06-14 RX ORDER — ESMOLOL HYDROCHLORIDE 10 MG/ML
INJECTION INTRAVENOUS AS NEEDED
Status: DISCONTINUED | OUTPATIENT
Start: 2023-06-14 | End: 2023-06-14 | Stop reason: SURG

## 2023-06-14 RX ORDER — METRONIDAZOLE 500 MG/1
500 TABLET ORAL EVERY 12 HOURS SCHEDULED
Status: DISCONTINUED | OUTPATIENT
Start: 2023-06-14 | End: 2023-06-15 | Stop reason: HOSPADM

## 2023-06-14 RX ORDER — SUCCINYLCHOLINE/SOD CL,ISO/PF 200MG/10ML
SYRINGE (ML) INTRAVENOUS AS NEEDED
Status: DISCONTINUED | OUTPATIENT
Start: 2023-06-14 | End: 2023-06-14 | Stop reason: SURG

## 2023-06-14 RX ORDER — IPRATROPIUM BROMIDE AND ALBUTEROL SULFATE 2.5; .5 MG/3ML; MG/3ML
3 SOLUTION RESPIRATORY (INHALATION) ONCE AS NEEDED
Status: COMPLETED | OUTPATIENT
Start: 2023-06-14 | End: 2023-06-14

## 2023-06-14 RX ORDER — LIDOCAINE HYDROCHLORIDE 10 MG/ML
INJECTION, SOLUTION EPIDURAL; INFILTRATION; INTRACAUDAL; PERINEURAL AS NEEDED
Status: DISCONTINUED | OUTPATIENT
Start: 2023-06-14 | End: 2023-06-14 | Stop reason: SURG

## 2023-06-14 RX ORDER — ROCURONIUM BROMIDE 10 MG/ML
INJECTION, SOLUTION INTRAVENOUS AS NEEDED
Status: DISCONTINUED | OUTPATIENT
Start: 2023-06-14 | End: 2023-06-14 | Stop reason: SURG

## 2023-06-14 RX ORDER — ONDANSETRON 2 MG/ML
4 INJECTION INTRAMUSCULAR; INTRAVENOUS ONCE AS NEEDED
Status: DISCONTINUED | OUTPATIENT
Start: 2023-06-14 | End: 2023-06-15 | Stop reason: HOSPADM

## 2023-06-14 RX ORDER — FENTANYL CITRATE 50 UG/ML
25 INJECTION, SOLUTION INTRAMUSCULAR; INTRAVENOUS
Status: DISCONTINUED | OUTPATIENT
Start: 2023-06-14 | End: 2023-06-15 | Stop reason: HOSPADM

## 2023-06-14 RX ORDER — ONDANSETRON 2 MG/ML
INJECTION INTRAMUSCULAR; INTRAVENOUS AS NEEDED
Status: DISCONTINUED | OUTPATIENT
Start: 2023-06-14 | End: 2023-06-14 | Stop reason: SURG

## 2023-06-14 RX ORDER — SODIUM CHLORIDE 9 MG/ML
INJECTION, SOLUTION INTRAVENOUS CONTINUOUS PRN
Status: DISCONTINUED | OUTPATIENT
Start: 2023-06-14 | End: 2023-06-14 | Stop reason: SURG

## 2023-06-14 RX ADMIN — FUROSEMIDE 40 MG: 40 INJECTION, SOLUTION INTRAMUSCULAR; INTRAVENOUS at 16:51

## 2023-06-14 RX ADMIN — Medication 10 ML: at 12:27

## 2023-06-14 RX ADMIN — GABAPENTIN 800 MG: 400 CAPSULE ORAL at 12:27

## 2023-06-14 RX ADMIN — INSULIN LISPRO 6 UNITS: 100 INJECTION, SOLUTION INTRAVENOUS; SUBCUTANEOUS at 16:50

## 2023-06-14 RX ADMIN — DEXAMETHASONE SODIUM PHOSPHATE 8 MG: 4 INJECTION, SOLUTION INTRAMUSCULAR; INTRAVENOUS at 09:52

## 2023-06-14 RX ADMIN — GABAPENTIN 800 MG: 400 CAPSULE ORAL at 20:46

## 2023-06-14 RX ADMIN — METRONIDAZOLE 500 MG: 500 TABLET ORAL at 20:46

## 2023-06-14 RX ADMIN — SERTRALINE HYDROCHLORIDE 100 MG: 100 TABLET ORAL at 12:26

## 2023-06-14 RX ADMIN — Medication 250 MG: at 20:46

## 2023-06-14 RX ADMIN — INSULIN LISPRO 7 UNITS: 100 INJECTION, SOLUTION INTRAVENOUS; SUBCUTANEOUS at 21:01

## 2023-06-14 RX ADMIN — ROCURONIUM BROMIDE 30 MG: 10 SOLUTION INTRAVENOUS at 09:42

## 2023-06-14 RX ADMIN — ESMOLOL HYDROCHLORIDE 5 MG: 10 INJECTION, SOLUTION INTRAVENOUS at 10:06

## 2023-06-14 RX ADMIN — SODIUM CHLORIDE 2 G: 900 INJECTION INTRAVENOUS at 16:50

## 2023-06-14 RX ADMIN — LIDOCAINE HYDROCHLORIDE 50 MG: 10 INJECTION, SOLUTION EPIDURAL; INFILTRATION; INTRACAUDAL; PERINEURAL at 09:39

## 2023-06-14 RX ADMIN — SUGAMMADEX 200 MG: 100 INJECTION, SOLUTION INTRAVENOUS at 10:02

## 2023-06-14 RX ADMIN — PROPOFOL 200 MG: 10 INJECTION, EMULSION INTRAVENOUS at 09:39

## 2023-06-14 RX ADMIN — Medication 250 MG: at 12:27

## 2023-06-14 RX ADMIN — INSULIN LISPRO 4 UNITS: 100 INJECTION, SOLUTION INTRAVENOUS; SUBCUTANEOUS at 12:25

## 2023-06-14 RX ADMIN — TAZOBACTAM SODIUM AND PIPERACILLIN SODIUM 3.38 G: 375; 3 INJECTION, SOLUTION INTRAVENOUS at 06:33

## 2023-06-14 RX ADMIN — GABAPENTIN 800 MG: 400 CAPSULE ORAL at 17:52

## 2023-06-14 RX ADMIN — IPRATROPIUM BROMIDE AND ALBUTEROL SULFATE 3 ML: .5; 3 SOLUTION RESPIRATORY (INHALATION) at 10:27

## 2023-06-14 RX ADMIN — Medication 160 MG: at 09:39

## 2023-06-14 RX ADMIN — ATORVASTATIN CALCIUM 20 MG: 20 TABLET, FILM COATED ORAL at 12:27

## 2023-06-14 RX ADMIN — FERROUS SULFATE TAB 325 MG (65 MG ELEMENTAL FE) 325 MG: 325 (65 FE) TAB at 20:46

## 2023-06-14 RX ADMIN — ONDANSETRON 4 MG: 2 INJECTION INTRAMUSCULAR; INTRAVENOUS at 10:02

## 2023-06-14 RX ADMIN — SODIUM CHLORIDE: 9 INJECTION, SOLUTION INTRAVENOUS at 09:34

## 2023-06-14 RX ADMIN — PANTOPRAZOLE SODIUM 40 MG: 40 TABLET, DELAYED RELEASE ORAL at 12:26

## 2023-06-14 RX ADMIN — SENNOSIDES AND DOCUSATE SODIUM 2 TABLET: 50; 8.6 TABLET ORAL at 12:26

## 2023-06-14 RX ADMIN — Medication 10 ML: at 20:47

## 2023-06-14 RX ADMIN — FERROUS SULFATE TAB 325 MG (65 MG ELEMENTAL FE) 325 MG: 325 (65 FE) TAB at 12:27

## 2023-06-14 NOTE — PROGRESS NOTES
"  INFECTIOUS DISEASES  INPATIENT PROGRESS NOTE  2023      PATIENT NAME: Elton Funez  :  1950  MRN:  [id number]  Date of Admission:  2023      Antimicrobials:  Day 3:  Pip/tazo    MAR reviewed.    Chief Complaint   Patient presents with    Nausea    Vomiting       Reason for consultation:  Klebsiella bacteremia    Interval history: Patient had ERCP today.  Appearance of recently passed stone.  Balloon sweep with pus but no stones.  Stable epigastric pain.  Low grade fever early this am.  +fatigue.  Diarrhea improved with probiotics.      ROS:  No fevers/chills overnight.  No new rashes.  Denies side effects from antimicrobials.      Objective:  Temp (24hrs), Av.9 °F (37.2 °C), Min:97 °F (36.1 °C), Max:100.4 °F (38 °C)    /62 (BP Location: Left arm, Patient Position: Sitting)   Pulse 66   Temp 98.2 °F (36.8 °C) (Oral)   Resp 14   Ht 172.7 cm (68\")   Wt 99.8 kg (220 lb)   SpO2 96%   BMI 33.45 kg/m²     Physical Examination:  GENERAL: Improved-appearing male.  Awake and alert, in no acute distress.   HEENT: Normocephalic, atraumatic.    NECK: Supple without nuchal rigidity.    CV: RRR. No murmur, rubs, gallops.  Normal S1S2.  LUNGS: Clear to auscultation bilaterally without wheezing, rales, rhonchi. Normal respiratory effort.  ABDOMEN: Positive bowel sounds.  Soft, stable left upper quadrant and epigastric tenderness, protuberant.  No rebound or guarding.    EXT:  No edema.  MSK: No joint effusions or inflammation noted.  SKIN: Warm and dry without rash or ulcer.   Right chest wall mastectomy incision closed, intact, and healing well.  No surrounding inflammation or drainage.  NEURO: A&Ox4. No focal deficits.  Face symmetric.  Speech fluent.  Moves all extremities well.   PSYCHIATRIC: Normal insight and judgement.  Cooperative.  Normal affect.    Laboratory Data:    Results from last 7 days   Lab Units 23  0630 23  0329 23  0359   WBC 10*3/mm3 5.43 4.97 7.31 "   HEMOGLOBIN g/dL 9.7* 9.1* 9.2*   HEMATOCRIT % 31.5* 29.4* 28.8*   PLATELETS 10*3/mm3 62* 61* 60*       Results from last 7 days   Lab Units 06/14/23  0630   SODIUM mmol/L 139   POTASSIUM mmol/L 4.1   CHLORIDE mmol/L 102   CO2 mmol/L 22.0   BUN mg/dL 17   CREATININE mg/dL 1.50*   GLUCOSE mg/dL 172*   CALCIUM mg/dL 8.9       Results from last 7 days   Lab Units 06/14/23  0630   ALK PHOS U/L 209*   BILIRUBIN mg/dL 2.5*   ALT (SGPT) U/L 83*   AST (SGOT) U/L 83*               Estimated Creatinine Clearance: 49.5 mL/min (A) (by C-G formula based on SCr of 1.5 mg/dL (H)).  Results from last 7 days   Lab Units 06/11/23  0830   LACTATE mmol/L 2.0                       Microbiology:    Microbiology Results (last 10 days)       Procedure Component Value - Date/Time    Respiratory Panel PCR w/COVID-19(SARS-CoV-2) [facility] In-House, NP Swab in UTM/VTM, 3-4 HR TAT - Swab, Nasopharynx [[id number]]  (Normal) Collected: 06/11/23 1053    Lab Status: Final result Specimen: Swab from Nasopharynx Updated: 06/11/23 1206     ADENOVIRUS, PCR Not Detected     Coronavirus 229E Not Detected     Coronavirus HKU1 Not Detected     Coronavirus NL63 Not Detected     Coronavirus OC43 Not Detected     COVID19 Not Detected     Human Metapneumovirus Not Detected     Human Rhinovirus/Enterovirus Not Detected     Influenza A PCR Not Detected     Influenza B PCR Not Detected     Parainfluenza Virus 1 Not Detected     Parainfluenza Virus 2 Not Detected     Parainfluenza Virus 3 Not Detected     Parainfluenza Virus 4 Not Detected     RSV, PCR Not Detected     Bordetella pertussis pcr Not Detected     Bordetella parapertussis PCR Not Detected     Chlamydophila pneumoniae PCR Not Detected     Mycoplasma pneumo by PCR Not Detected    Narrative:      In the setting of a positive respiratory panel with a viral infection PLUS a negative procalcitonin without other underlying concern for bacterial infection, consider observing off antibiotics  or discontinuation of antibiotics and continue supportive care. If the respiratory panel is positive for atypical bacterial infection (Bordetella pertussis, Chlamydophila pneumoniae, or Mycoplasma pneumoniae), consider antibiotic de-escalation to target atypical bacterial infection.    COVID PRE-OP / PRE-PROCEDURE SCREENING ORDER (NO ISOLATION) - Swab, Nasopharynx [[id number]]  (Normal) Collected: 06/11/23 0851    Lab Status: Final result Specimen: Swab from Nasopharynx Updated: 06/11/23 1052    Narrative:      The following orders were created for panel order COVID PRE-OP / PRE-PROCEDURE SCREENING ORDER (NO ISOLATION) - Swab, Nasopharynx.  Procedure                               Abnormality         Status                     ---------                               -----------         ------                     COVID-19 and FLU A/B PCR...[[id number]]  Normal              Final result                 Please view results for these tests on the individual orders.    COVID-19 and FLU A/B PCR - Swab, Nasopharynx [[id number]]  (Normal) Collected: 06/11/23 0851    Lab Status: Final result Specimen: Swab from Nasopharynx Updated: 06/11/23 1052     COVID19 Presumptive Negative     Influenza A PCR Not Detected     Influenza B PCR Not Detected    Narrative:      Fact sheet for providers: https://www.fda.gov/media/104288/download    Fact sheet for patients: https://www.fda.gov/media/020843/download    Test performed by PCR.    Blood Culture - Blood, Arm, Left [[id number]]  (Normal) Collected: 06/11/23 0850    Lab Status: Preliminary result Specimen: Blood from Arm, Left Updated: 06/14/23 1045     Blood Culture No growth at 3 days    Blood Culture - Blood, Wrist, Left [[id number]]  (Abnormal)  (Susceptibility) Collected: 06/11/23 0830    Lab Status: Final result Specimen: Blood from Wrist, Left Updated: 06/14/23 0634     Blood Culture Klebsiella oxytoca     Isolated from Aerobic Bottle     Gram Stain Aerobic Bottle Gram negative  bacilli    Susceptibility        Klebsiella oxytoca      CLYDE      Ampicillin Resistant      Ampicillin + Sulbactam Susceptible      Cefepime Susceptible      Ceftazidime Susceptible      Ceftriaxone Susceptible      Gentamicin Susceptible      Levofloxacin Susceptible      Piperacillin + Tazobactam Susceptible      Trimethoprim + Sulfamethoxazole Susceptible                       Susceptibility Comments       Klebsiella oxytoca    Cefazolin sensitivity will not be reported for Enterobacteriaceae in non-urine isolates. If cefazolin is preferred, please call the microbiology lab to request an E-test.  With the exception of urinary-sourced infections, aminoglycosides should not be used as monotherapy.               Blood Culture ID, PCR - Blood, Wrist, Left [[id number]]  (Abnormal) Collected: 06/11/23 0830    Lab Status: Final result Specimen: Blood from Wrist, Left Updated: 06/12/23 0140     BCID, PCR Klebsiella oxytoca. Identification by BCID2 PCR    Narrative:      No resistance genes detected.                Radiology:  FL ERCP pancreatic and biliary ducts    Result Date: 6/14/2023  Impression: Fluoroscopy provided during ERCP. Electronically Signed: Salvador Holley  6/14/2023 10:30 AM EDT  Workstation ID: [id number]    XR Chest 1 View    Result Date: 6/13/2023  1.Cardiomegaly. 2.Vascular congestion. 3.Small bilateral pleural effusions. 4.Multifocal patchy infiltrates bilaterally. 5.No pneumothorax. Electronically Signed: John Moyer  6/13/2023 10:03 PM EDT  Workstation ID: [id number]    MRI abdomen wo contrast mrcp    Result Date: 6/13/2023  Impression: 1. Common bile duct normal in caliber measuring 5 mm. Horizontal meniscus appearance of the distal CBD which may relate to stones or sludge near the ampulla, consider ERCP. 2. Acute interstitial pancreatitis improved from prior CT. 3. Cholelithiasis. 4. Cirrhotic liver with splenomegaly. 5. Postsurgical changes of right mastectomy partially imaged. Electronically Signed:  Ed Najerar  6/13/2023 8:45 AM EDT  Workstation ID: [id number]         DISCUSSION:  73 y.o. male with history of breast cancer status post recent right mastectomy and initiation of tamoxifen admitted with sepsis with acute pancreatitis and Klebsiella oxytoca bacteremia.   Elevated LFTs including bilirubin though no obstruction noted on CT or ultrasound imaging.  MRCP with cholelithiasis and appearance of the distal CBD which may relate to stones or sludge near the ampulla, consider ERCP.     PROBLEM LIST:   -- Sepsis, with fever/chills, thrombocytopenia, elevated LFTs.  Due to bacteremia.  -- Klebsiella oxytoca bacteremia, secondary to cholangitis.  -- Elevated LFTs, stable.  No biliary obstruction noted on CT or ultrasound imaging.  MRCP with cholelithiasis and appearance of the distal CBD which may relate to stones or sludge near the ampulla; ERCP with appearance of recently passed stone.  Balloon sweep with pus but no stones.   -- Acute pancreatitis.  Likely gallstone related.  -- Breast cancer, status post recent right mastectomy.  Site is healing well.  Recently started on tamoxifen.  -- Cirrhosis, JEROME.  Splenomegaly likely contributing to thrombocytopenia.  -- Diarrhea, new.  Likely antibiotic associated.  Improved with probiotics.    PLAN:  -- De-escalate piperacillin-tazobactam to ceftriaxone/metronidazole  -- Continue probiotic  -- Not good candidate for cholecystectomy per GI due to underlying cirrhosis  -- Maybe discharge soon with oral levofloxacin and metronidazole    Plan discussed with patient/family.    Salo Wolff MD  6/14/2023  15:55 EDT

## 2023-06-14 NOTE — PROGRESS NOTES
"  INFECTIOUS DISEASES  INPATIENT PROGRESS NOTE  2023      PATIENT NAME: Elton Funez  :  1950  MRN:  [id number]  Date of Admission:  2023      Antimicrobials:  Day 3:  Pip/tazo    MAR reviewed.    Chief Complaint   Patient presents with    Nausea    Vomiting       Reason for consultation:  Klebsiella bacteremia    Interval history: Patient abdominal pain is feeling better today.  No fevers.  He is having new diarrhea as of this afternoon.  He has been having some difficulty breathing.  He is currently on his home CPAP.  MRCP was performed and did demonstrate cholelithiasis with sludge and stones; common bile duct was normal in caliber though appearance of the distal CBD may be related to stones or sludge near the ampulla.  ERCP was recommended and plans for tomorrow per GI consultation.    ROS:  No fevers/chills overnight.  No new rashes.  Denies side effects from antimicrobials.      Objective:  Temp (24hrs), Av.4 °F (36.9 °C), Min:98 °F (36.7 °C), Max:98.8 °F (37.1 °C)    /75 (BP Location: Left arm, Patient Position: Sitting)   Pulse 68   Temp 98 °F (36.7 °C) (Oral)   Resp 14   Ht 172.7 cm (68\")   Wt 99.8 kg (220 lb)   SpO2 93%   BMI 33.45 kg/m²     Physical Examination:  GENERAL: Improved-appearing male.  Awake and alert, in no acute distress.   HEENT: Normocephalic, atraumatic.    NECK: Supple without nuchal rigidity.    CV: RRR. No murmur, rubs, gallops.  Normal S1S2.  LUNGS: Clear to auscultation bilaterally without wheezing, rales, rhonchi. Normal respiratory effort.  ABDOMEN: Positive bowel sounds.  Soft, improved left upper quadrant and epigastric tenderness, protuberant.  No rebound or guarding.    EXT:  No edema.  MSK: No joint effusions or inflammation noted.  SKIN: Warm and dry without rash or ulcer.   Right chest wall mastectomy incision closed, intact, and healing well.  No surrounding inflammation or drainage.  NEURO: A&Ox4. No focal deficits.  Face symmetric.  " Speech fluent.  Moves all extremities well.   PSYCHIATRIC: Normal insight and judgement.  Cooperative.  Normal affect.    Laboratory Data:    Results from last 7 days   Lab Units 06/13/23  0329 06/12/23  0359 06/11/23  0830   WBC 10*3/mm3 4.97 7.31 8.97   HEMOGLOBIN g/dL 9.1* 9.2* 11.2*   HEMATOCRIT % 29.4* 28.8* 34.6*   PLATELETS 10*3/mm3 61* 60* 97*     Results from last 7 days   Lab Units 06/13/23  0329   SODIUM mmol/L 136   POTASSIUM mmol/L 4.4   CHLORIDE mmol/L 104   CO2 mmol/L 22.0   BUN mg/dL 23   CREATININE mg/dL 1.57*   GLUCOSE mg/dL 168*   CALCIUM mg/dL 8.3*     Results from last 7 days   Lab Units 06/13/23  0329   ALK PHOS U/L 140*   BILIRUBIN mg/dL 3.1*   ALT (SGPT) U/L 90*   AST (SGOT) U/L 111*             Estimated Creatinine Clearance: 47.3 mL/min (A) (by C-G formula based on SCr of 1.57 mg/dL (H)).  Results from last 7 days   Lab Units 06/11/23  0830   LACTATE mmol/L 2.0                     Microbiology:    Microbiology Results (last 10 days)       Procedure Component Value - Date/Time    Respiratory Panel PCR w/COVID-19(SARS-CoV-2) [facility] In-House, NP Swab in UTM/VTM, 3-4 HR TAT - Swab, Nasopharynx [[id number]]  (Normal) Collected: 06/11/23 1053    Lab Status: Final result Specimen: Swab from Nasopharynx Updated: 06/11/23 1206     ADENOVIRUS, PCR Not Detected     Coronavirus 229E Not Detected     Coronavirus HKU1 Not Detected     Coronavirus NL63 Not Detected     Coronavirus OC43 Not Detected     COVID19 Not Detected     Human Metapneumovirus Not Detected     Human Rhinovirus/Enterovirus Not Detected     Influenza A PCR Not Detected     Influenza B PCR Not Detected     Parainfluenza Virus 1 Not Detected     Parainfluenza Virus 2 Not Detected     Parainfluenza Virus 3 Not Detected     Parainfluenza Virus 4 Not Detected     RSV, PCR Not Detected     Bordetella pertussis pcr Not Detected     Bordetella parapertussis PCR Not Detected     Chlamydophila pneumoniae PCR Not Detected      Mycoplasma pneumo by PCR Not Detected    Narrative:      In the setting of a positive respiratory panel with a viral infection PLUS a negative procalcitonin without other underlying concern for bacterial infection, consider observing off antibiotics or discontinuation of antibiotics and continue supportive care. If the respiratory panel is positive for atypical bacterial infection (Bordetella pertussis, Chlamydophila pneumoniae, or Mycoplasma pneumoniae), consider antibiotic de-escalation to target atypical bacterial infection.    COVID PRE-OP / PRE-PROCEDURE SCREENING ORDER (NO ISOLATION) - Swab, Nasopharynx [[id number]]  (Normal) Collected: 06/11/23 0851    Lab Status: Final result Specimen: Swab from Nasopharynx Updated: 06/11/23 1052    Narrative:      The following orders were created for panel order COVID PRE-OP / PRE-PROCEDURE SCREENING ORDER (NO ISOLATION) - Swab, Nasopharynx.  Procedure                               Abnormality         Status                     ---------                               -----------         ------                     COVID-19 and FLU A/B PCR...[[id number]]  Normal              Final result                 Please view results for these tests on the individual orders.    COVID-19 and FLU A/B PCR - Swab, Nasopharynx [[id number]]  (Normal) Collected: 06/11/23 0851    Lab Status: Final result Specimen: Swab from Nasopharynx Updated: 06/11/23 1052     COVID19 Presumptive Negative     Influenza A PCR Not Detected     Influenza B PCR Not Detected    Narrative:      Fact sheet for providers: https://www.fda.gov/media/025173/download    Fact sheet for patients: https://www.fda.gov/media/011936/download    Test performed by PCR.    Blood Culture - Blood, Arm, Left [[id number]]  (Normal) Collected: 06/11/23 0850    Lab Status: Preliminary result Specimen: Blood from Arm, Left Updated: 06/13/23 1045     Blood Culture No growth at 2 days    Blood Culture - Blood, Wrist, Left [[id number]]   (Abnormal) Collected: 06/11/23 0830    Lab Status: Preliminary result Specimen: Blood from Wrist, Left Updated: 06/13/23 0644     Blood Culture Gram Negative Bacilli     Isolated from Aerobic Bottle     Gram Stain Aerobic Bottle Gram negative bacilli    Blood Culture ID, PCR - Blood, Wrist, Left [[id number]]  (Abnormal) Collected: 06/11/23 0830    Lab Status: Final result Specimen: Blood from Wrist, Left Updated: 06/12/23 0140     BCID, PCR Klebsiella oxytoca. Identification by BCID2 PCR    Narrative:      No resistance genes detected.                Radiology:  MRI abdomen wo contrast mrcp    Result Date: 6/13/2023  Impression: 1. Common bile duct normal in caliber measuring 5 mm. Horizontal meniscus appearance of the distal CBD which may relate to stones or sludge near the ampulla, consider ERCP. 2. Acute interstitial pancreatitis improved from prior CT. 3. Cholelithiasis. 4. Cirrhotic liver with splenomegaly. 5. Postsurgical changes of right mastectomy partially imaged. Electronically Signed: Ed Castañeda  6/13/2023 8:45 AM EDT  Workstation ID: [id number]         DISCUSSION:  73 y.o. male with history of breast cancer status post recent right mastectomy and initiation of tamoxifen admitted with sepsis with acute pancreatitis and Klebsiella oxytoca bacteremia.   Elevated LFTs including bilirubin though no obstruction noted on CT or ultrasound imaging.  MRCP with cholelithiasis and appearance of the distal CBD which may relate to stones or sludge near the ampulla, consider ERCP.     PROBLEM LIST:   -- Sepsis, with fever/chills, thrombocytopenia, elevated LFTs.  Due to bacteremia.  -- Gram-negative bacteremia, Klebsiella oxytoca by BC ID.  No mention of CTX-M gene on PCR, less likely ESBL.  Suspect cholangitis.  -- Elevated LFTs, stable.  No biliary obstruction noted on CT or ultrasound imaging.  MRCP with cholelithiasis and appearance of the distal CBD which may relate to stones or sludge near the ampulla, pending  ERCP.   -- Acute pancreatitis.  Likely gallstone related.  -- Breast cancer, status post recent right mastectomy.  Site is healing well.  Recently started on tamoxifen.  -- Cirrhosis, JEROME.  Splenomegaly likely contributing to thrombocytopenia.  -- Diarrhea, new.  Likely antibiotic associated.    PLAN:  -- Continue with piperacillin-tazobactam for now, pending final ID/sens of GNR in blood culture  -- Add probiotic for antibiotics associated diarrhea.  If continues, may need to check for C. difficile and/or change antibiotics, pending culture data  -- Pending ERCP  -- Recommend evaluation for elective cholecystectomy, as likely gallstone related pancreatitis and cholangitis  -- Assess for adverse drug reactions from antimicrobials.  -- Follow vitals, exam, labs, and cultures.  -- Follow results of blood cultures and tailor antibiotics as appropriate.  -- Final plans pending clinical course.    Plan discussed with patient/family.    Salo Wolff MD  6/13/2023  20:15 EDT

## 2023-06-14 NOTE — PLAN OF CARE
Goal Outcome Evaluation:           Progress: improving  Outcome Evaluation: pt reported increased soa and swelling, chest x-ray done, trop, lipase, pbnp. bi-pap applied, lasix 40 mg ivp x1 given, pt voided 2500 ml of urine, reports improvement in soa. npo after mn for ercp

## 2023-06-14 NOTE — PROGRESS NOTES
Clinical Nutrition     Nutrition Support Assessment  Reason for Visit: Identified at risk by screening criteria      Patient Name: Elton Funez  YOB: 1950  MRN: [id number]  Date of Encounter: 06/13/23 22:00 EDT  Admission date: 6/11/2023    Comments: Pt rpt current wt is usual for him, acknowledges possible some loss since beginning of the year. Allows ate ok at home and doing ok now w food when on diet. Only mild wasting of clavicle and scapular areas noted. Does not meet criteria for malnutrition at this time.        Nutrition Assessment   Admission Diagnosis:  Sepsis, due to unspecified organism, unspecified whether acute organ dysfunction present [A41.9]      Problem List:    Sepsis, due to unspecified organism, unspecified whether acute organ dysfunction present    Stage 3a chronic kidney disease    Gastroesophageal reflux disease without esophagitis    FAVIO (obstructive sleep apnea)    Type 2 diabetes mellitus with diabetic polyneuropathy, with long-term current use of insulin    Acquired hypothyroidism    Anemia of chronic disease    Liver cirrhosis secondary to JEROME    Grade I diastolic dysfunction    Malignant neoplasm of central portion of right breast in male, estrogen receptor positive    Idiopathic acute pancreatitis    Bacteremia due to Klebsiella pneumoniae    TCP      PMH:   He  has a past medical history of Abdominal pain (05/17/2023), Abscess of arm, right, Abscess of skin of neck, Acute sinusitis, ALT (SGPT) level raised, Arthritis, BPH (benign prostatic hypertrophy), Breast cancer (05/2023), Cirrhosis of liver (09/03/2021), CKD (chronic kidney disease), Colon polyp, Constipation, DDD (degenerative disc disease), cervical, Decreased platelet count, Depression, Elevated AST (SGOT), Erectile dysfunction, GERD (gastroesophageal reflux disease), History of transfusion (2021), Hyperlipidemia, Hypertension, Hypothyroidism, adult (11/10/2020), Infection, Jaw pain, Left  hip pain, Low back pain, Migraine, Obesity (BMI 30.0-34.9) (10/07/2016), Obstructive sleep apnea, Portal hypertensive gastropathy (09/03/2021), Shigellosis, Sleep apnea, Symptomatic anemia (08/30/2021), Type 2 diabetes mellitus, Visual impairment, Wears glasses, and Wears hearing aid.    PSH:  He  has a past surgical history that includes Cervical Biopsy; Vasectomy; cervical laminectomy decompression posterior (Left, 02/28/2017); cervical discectomy posterior fusion with instrumentation (N/A, 03/16/2017); [place] tooth extraction; Anterior cervical discectomy w/ fusion (N/A, 12/14/2017); Cyst Removal (04/2021); Esophagogastroduodenoscopy (N/A, 08/31/2021); Colonoscopy (N/A, 09/01/2021); and Mastectomy w/ sentinel node biopsy (Right, 5/23/2023).      Applicable Nutrition Concerns:   Skin:  Oral:  GI:      Applicable Interval History:   6/11 CT showing pancreatitis  6/12 MRCP showing distended gallbladder w sludge/stones, cirrhotic liver, enlarged spleen, edema around pancreas improved  6/14 plan for ERCP      Reported/Observed/Food/Nutrition Related History:     Pt rpt current wt is usual for him, acknowledges possible some loss since beginning of the year. Allows ate ok at home and doing ok now w food when on diet.       Labs    Labs Reviewed: Yes     Results from last 7 days   Lab Units 06/13/23 0329 06/12/23  0359 06/11/23  0830   GLUCOSE mg/dL 168* 162* 118*   BUN mg/dL 23 29* 27*   CREATININE mg/dL 1.57* 1.66* 1.72*   SODIUM mmol/L 136 136 137   CHLORIDE mmol/L 104 104 104   POTASSIUM mmol/L 4.4 4.6 4.3   ALT (SGPT) U/L 90* 91* 44*       Results from last 7 days   Lab Units 06/13/23  0329 06/12/23  0359 06/11/23  0830   ALBUMIN g/dL 3.4* 3.2* 3.9       Results from last 7 days   Lab Units 06/13/23  2037 06/13/23  1601 06/13/23  1103 06/13/23  0655 06/12/23 1958 06/12/23  1106   GLUCOSE mg/dL 198* 137* 236* 182* 241* 206*     Lab Results   Lab Value Date/Time    HGBA1C 8.40 (H) 06/11/2023 1756    HGBA1C 7.8  "05/10/2023 1434    HGBA1C 7.4 02/07/2023 1546    HGBA1C 7.60 (H) 08/30/2021 0958                 Medications    Medications Reviewed: Yes  Pertinent: FeSO4, Insulin, Protonix, Abx, Probiotic, Pericolace, Vitamin D  Infusion: LR  PRN:       Intake/Ouptut 24 hrs (0701 - 0700)   I&O's Reviewed: Yes     Intake & Output (last day)       06/13 0701 06/14 0700    P.O. 240    Total Intake(mL/kg) 240 (2.4)    Urine (mL/kg/hr)     Stool     Total Output     Net +240             Stool x 1 6/12    Anthropometrics     Flowsheet Rows    Flowsheet Row First Filed Value   Admission Height 172.7 cm (68\") Documented at 06/11/2023 0646   Admission Weight 99.8 kg (220 lb) Documented at 06/11/2023 0646        Height: Height: 172.7 cm (68\")  Last Filed Weight: Weight: 99.8 kg (220 lb) (06/11/23 0646)  Method: Weight Method: Stated  BMI: BMI (Calculated): 33.5  BMI classification: Obese Class I: 30-34.9kg/m2    UBW: Per EMR wt of 234 lbs on 2/21/23 and standing scale wt of 224 lbs on 5/18/23   Weight change:  loss of 10 lbs 4% body wt over 3 mo.    Nutrition Focused Physical Exam     Date: 6/13    NFPE completed. Only mild wasting of clavicle and scapular areas noted.     Current Nutrition Prescription     PO: Diet: Regular/House Diet, Diabetic Diets, Cardiac Diets; Healthy Heart (2-3 Na+); Consistent Carbohydrate; Texture: Regular Texture (IDDSI 7); Fluid Consistency: Thin (IDDSI 0)  NPO Diet NPO Type: Strict NPO  Oral Nutrition Supplement: Boost Breeze on trays whenever on clr liq diet  Intake: 2 Days: 80% x 5 meals recorded      Nutrition Diagnosis   Date: 6/13 Updated:   Problem No nutrition diagnosis at this time pending course and further data   Etiology    Signs/Symptoms    Status:     Goal:   General: Nutrition to support treatment  PO: Maintain intake when on diet  EN/PN: N/A    Nutrition Intervention      Follow treatment progress, Care plan reviewed, Advise alternate selection, Menu provided        Monitoring/Evaluation:   Per " protocol, I&O, PO intake, Pertinent labs, Weight, GI status, Symptoms      Jo Velazquez RD  Time Spent: 30 min

## 2023-06-14 NOTE — ANESTHESIA POSTPROCEDURE EVALUATION
Patient: Elton Funez    Procedure Summary     Date: 06/14/23 Room / Location: [facility] ENDOSCOPY 2 /  [facility]    Anesthesia Start: 0934 Anesthesia Stop: 1026    Procedure: ENDOSCOPIC RETROGRADE CHOLANGIOPANCREATOGRAPHY Diagnosis:     Surgeons: Brunner, Mark I, MD Provider: Erick Olson MD    Anesthesia Type: general ASA Status: 3          Anesthesia Type: general    Vitals  Vitals Value Taken Time   /57 06/14/23 1022   Temp 97 °F (36.1 °C) 06/14/23 1012   Pulse 86 06/14/23 1026   Resp     SpO2 92 % 06/14/23 1026   Vitals shown include unvalidated device data.        Post Anesthesia Care and Evaluation    Patient location during evaluation: PACU  Patient participation: complete - patient participated  Level of consciousness: awake and alert  Pain management: adequate    Airway patency: patent  Anesthetic complications: No anesthetic complications  PONV Status: none  Cardiovascular status: hemodynamically stable and acceptable  Respiratory status: nonlabored ventilation, acceptable and nasal cannula  Hydration status: acceptable

## 2023-06-14 NOTE — PROGRESS NOTES
[facility] Medicine Services  PROGRESS NOTE    Patient Name: Elton Funez  : 1950  MRN: [id number]    Date of Admission: 2023  Primary Care Physician: Tc Ramirez MD    Subjective   Subjective     CC:  sepsis    HPI:  Patient resting in bed. Seen after ERCP. Pain is much improved. Shortness of breath better after lasix but still doesn't feel like his breathing is at baseline.    ROS:  Gen- No fevers, chills  CV- No chest pain, palpitations  Resp- No cough, dyspnea  GI- No N/V/D, no abd pain    Objective   Objective     Vital Signs:   Temp:  [97 °F (36.1 °C)-100.4 °F (38 °C)] 97 °F (36.1 °C)  Heart Rate:  [63-87] 75  Resp:  [14-20] 20  BP: (128-192)/(57-91) 160/79  Flow (L/min):  [6] 6     Physical Exam:  Constitutional: No acute distress, awake, alert  HENT: NCAT, mucous membranes moist  Respiratory: Clear to auscultation bilaterally, respiratory effort normal on O2  Cardiovascular: RRR, no murmurs, rubs, or gallops  Gastrointestinal:  soft, non-tender, nondistended  Musculoskeletal: No bilateral ankle edema  Psychiatric: Appropriate affect, cooperative  Neurologic: Oriented x 3, speech clear, non-focal  Skin: No rashes      Results Reviewed:  LAB RESULTS:      Lab 23  0630 23  0231 23  0329 23  0359 23  0830   WBC 5.43  --  4.97 7.31 8.97   HEMOGLOBIN 9.7*  --  9.1* 9.2* 11.2*   HEMATOCRIT 31.5*  --  29.4* 28.8* 34.6*   PLATELETS 62*  --  61* 60* 97*   NEUTROS ABS 4.18  --  3.83 6.11 8.46*   IMMATURE GRANS (ABS) 0.04  --  0.03 0.05 0.03   LYMPHS ABS 0.41*  --  0.34* 0.29* 0.18*   MONOS ABS 0.61  --  0.46 0.56 0.22   EOS ABS 0.18  --  0.30 0.28 0.07   .3*  --  99.7* 98.0* 96.4   PROCALCITONIN  --  4.13*  --   --  2.03*   LACTATE  --   --   --   --  2.0         Lab 23  0630 23  0329 23  0359 23  1756 23  0830   SODIUM 139 136 136  --  137   POTASSIUM 4.1 4.4 4.6  --  4.3   CHLORIDE 102 104 104  --   104   CO2 22.0 22.0 20.0*  --  21.0*   ANION GAP 15.0 10.0 12.0  --  12.0   BUN 17 23 29*  --  27*   CREATININE 1.50* 1.57* 1.66*  --  1.72*   EGFR 48.6* 46.0* 43.0*  --  41.2*   GLUCOSE 172* 168* 162*  --  118*   CALCIUM 8.9 8.3* 8.3*  --  8.9   HEMOGLOBIN A1C  --   --   --  8.40*  --          Lab 06/14/23  0630 06/13/23  2232 06/13/23  0329 06/12/23  0359 06/11/23  0830   TOTAL PROTEIN 7.1  --  6.7 6.2 7.2   ALBUMIN 3.5  --  3.4* 3.2* 3.9   GLOBULIN 3.6  --  3.3 3.0 3.3   ALT (SGPT) 83*  --  90* 91* 44*   AST (SGOT) 83*  --  111* 143* 94*   BILIRUBIN 2.5*  --  3.1* 4.1* 1.8*   ALK PHOS 209*  --  140* 139* 204*   LIPASE 257* 439*  --   --  >3,000*         Lab 06/14/23  0231 06/14/23  0042 06/13/23 2232   PROBNP  --   --  2,456.0*   HSTROP T 55* 55* 48*                 Brief Urine Lab Results  (Last result in the past 365 days)        Color   Clarity   Blood   Leuk Est   Nitrite   Protein   CREAT   Urine HCG        06/11/23 0900 Yellow   Clear   Negative   Negative   Negative   >=300 mg/dL (3+)                   Microbiology Results Abnormal       Procedure Component Value - Date/Time    Blood Culture - Blood, Arm, Left [[id number]]  (Normal) Collected: 06/11/23 0850    Lab Status: Preliminary result Specimen: Blood from Arm, Left Updated: 06/14/23 1045     Blood Culture No growth at 3 days    Respiratory Panel PCR w/COVID-19(SARS-CoV-2) [facility] In-House, NP Swab in UTM/VTM, 3-4 HR TAT - Swab, Nasopharynx [[id number]]  (Normal) Collected: 06/11/23 1053    Lab Status: Final result Specimen: Swab from Nasopharynx Updated: 06/11/23 1206     ADENOVIRUS, PCR Not Detected     Coronavirus 229E Not Detected     Coronavirus HKU1 Not Detected     Coronavirus NL63 Not Detected     Coronavirus OC43 Not Detected     COVID19 Not Detected     Human Metapneumovirus Not Detected     Human Rhinovirus/Enterovirus Not Detected     Influenza A PCR Not Detected     Influenza B PCR Not Detected     Parainfluenza Virus 1  Not Detected     Parainfluenza Virus 2 Not Detected     Parainfluenza Virus 3 Not Detected     Parainfluenza Virus 4 Not Detected     RSV, PCR Not Detected     Bordetella pertussis pcr Not Detected     Bordetella parapertussis PCR Not Detected     Chlamydophila pneumoniae PCR Not Detected     Mycoplasma pneumo by PCR Not Detected    Narrative:      In the setting of a positive respiratory panel with a viral infection PLUS a negative procalcitonin without other underlying concern for bacterial infection, consider observing off antibiotics or discontinuation of antibiotics and continue supportive care. If the respiratory panel is positive for atypical bacterial infection (Bordetella pertussis, Chlamydophila pneumoniae, or Mycoplasma pneumoniae), consider antibiotic de-escalation to target atypical bacterial infection.    COVID PRE-OP / PRE-PROCEDURE SCREENING ORDER (NO ISOLATION) - Swab, Nasopharynx [[id number]]  (Normal) Collected: 06/11/23 0851    Lab Status: Final result Specimen: Swab from Nasopharynx Updated: 06/11/23 1052    Narrative:      The following orders were created for panel order COVID PRE-OP / PRE-PROCEDURE SCREENING ORDER (NO ISOLATION) - Swab, Nasopharynx.  Procedure                               Abnormality         Status                     ---------                               -----------         ------                     COVID-19 and FLU A/B PCR...[[id number]]  Normal              Final result                 Please view results for these tests on the individual orders.    COVID-19 and FLU A/B PCR - Swab, Nasopharynx [[id number]]  (Normal) Collected: 06/11/23 0851    Lab Status: Final result Specimen: Swab from Nasopharynx Updated: 06/11/23 1052     COVID19 Presumptive Negative     Influenza A PCR Not Detected     Influenza B PCR Not Detected    Narrative:      Fact sheet for providers: https://www.fda.gov/media/774903/download    Fact sheet for patients:  https://www.fda.gov/media/637421/download    Test performed by [facility].            FL ERCP pancreatic and biliary ducts    Result Date: 6/14/2023  FL ERCP PANCREATIC AND BILIARY DUCTS Date of Exam: 6/14/2023 9:32 AM EDT Indication: ENDOSCOPIC RETROGRADE CHOLANGIOPANCREATOGRAPHY. Comparison: None available. Technique:  A series of radiographic digital spot films were obtained in conjunction with a endoscopic catheterization of the biliary and pancreatic ductal system, performed by the gastroenterologist. Fluoroscopic Time: 1 minute 31 seconds Number of Images: 2 Findings: Dictation is to record 1 minute 31 seconds of fluoroscopy time. 2 intraprocedural images obtained show endoscope and side cannula placement, contrast injection of the common duct and what appears to be balloon sweep. Final image shows no evidence of filling defect to suggest any common duct stone. Please see the procedure report for full details.     Impression: Impression: Fluoroscopy provided during ERCP. Electronically Signed: Salvador Holley  6/14/2023 10:30 AM EDT  Workstation ID: [id number]    XR Chest 1 View    Result Date: 6/13/2023  XR CHEST 1 VW Date of Exam: 6/13/2023 9:17 PM EDT Indication: shortness of breath Comparison: 6/11/2023 Findings: As below.     Impression: 1.Cardiomegaly. 2.Vascular congestion. 3.Small bilateral pleural effusions. 4.Multifocal patchy infiltrates bilaterally. 5.No pneumothorax. Electronically Signed: John Moyer  6/13/2023 10:03 PM EDT  Workstation ID: [id number]    MRI abdomen wo contrast mrcp    Result Date: 6/13/2023  MRI ABDOMEN WO CONTRAST MRCP Date of Exam: 6/12/2023 4:40 PM EDT Indication: Pancreatitis, acute, initial episode . History of breast cancer  Comparison: CT abdomen and pelvis without contrast 6/11/2023 Technique:  Routine multiplanar/multisequence images of the abdomen were obtained with MRCP sequences without contrast administration. Findings: Visualized lower chest demonstrates normal heart size. No  pericardial effusion or pleural effusion. Postsurgical changes at the inferior aspect of the right breast related to recent right mastectomy. The liver is nodular in contour consistent with cirrhotic morphology. No liver lesion within limits of a noncontrast exam. Gallbladder is distended with layering sludge/stones. Common bile duct measures 5 mm. No intrahepatic biliary ductal dilatation. There is a horizontal meniscus appearance of the distal CBD which could relate to sludge or stones near ampulla (17/60). The spleen is enlarged measuring 15 cm in craniocaudal length. Edema surrounding the pancreas has improved in the prior study with a small amount of peripancreatic edema abutting the pancreatic tail (2/22). Main pancreatic duct at the upper limits of normal measuring 3 mm. No organized peripancreatic fluid collection. Kidneys symmetric in size. No hydronephrosis. No perinephric fluid collection. Abdominal aorta without aneurysm. Normal portal vein, splenic vein and superior mesenteric vein flow void. No dilated bowel loops in the upper abdomen. Trace perihepatic ascites.     Impression: Impression: 1. Common bile duct normal in caliber measuring 5 mm. Horizontal meniscus appearance of the distal CBD which may relate to stones or sludge near the ampulla, consider ERCP. 2. Acute interstitial pancreatitis improved from prior CT. 3. Cholelithiasis. 4. Cirrhotic liver with splenomegaly. 5. Postsurgical changes of right mastectomy partially imaged. Electronically Signed: Ed Castañeda  6/13/2023 8:45 AM EDT  Workstation ID: [id number]       Results for orders placed during the hospital encounter of 03/20/23    Adult Transthoracic Echo Complete W/ Cont if Necessary Per Protocol    Interpretation Summary    Left ventricular ejection fraction appears to be 61 - 65%.    Left ventricular diastolic function is consistent with (grade I) impaired relaxation.    Calculated right ventricular systolic pressure from tricuspid  regurgitation is 16 mmHg.      Current medications:  Scheduled Meds:atorvastatin, 20 mg, Oral, Daily  ferrous sulfate, 325 mg, Oral, BID  gabapentin, 800 mg, Oral, 4x Daily  insulin lispro, 2-9 Units, Subcutaneous, 4x Daily AC & at Bedtime  levothyroxine, 50 mcg, Oral, Q AM  pantoprazole, 40 mg, Oral, Daily  piperacillin-tazobactam, 3.375 g, Intravenous, Q8H  saccharomyces boulardii, 250 mg, Oral, BID  senna-docusate sodium, 2 tablet, Oral, BID  sertraline, 100 mg, Oral, Daily  sodium chloride, 10 mL, Intravenous, Q12H  [START ON 6/16/2023] Vitamin D (Ergocalciferol), 50,000 Units, Oral, Weekly      Continuous Infusions:     PRN Meds:.  acetaminophen    senna-docusate sodium **AND** polyethylene glycol **AND** bisacodyl **AND** bisacodyl    dextrose    dextrose    fentanyl    glucagon (human recombinant)    Morphine **AND** naloxone    ondansetron **OR** ondansetron    ondansetron    [COMPLETED] Insert Peripheral IV **AND** sodium chloride    sodium chloride    sodium chloride    temazepam    Assessment & Plan   Assessment & Plan     Active Hospital Problems    Diagnosis  POA    **Sepsis, due to unspecified organism, unspecified whether acute organ dysfunction present [A41.9]  Yes    Idiopathic acute pancreatitis [K85.00]  Unknown    Bacteremia due to Klebsiella pneumoniae [R78.81, B96.1]  Unknown    Malignant neoplasm of central portion of right breast in male, estrogen receptor positive [C50.121, Z17.0]  Not Applicable    Grade I diastolic dysfunction [I51.89]  Yes    Liver cirrhosis secondary to JEROME [K75.81, K74.60]  Yes    Anemia of chronic disease [D63.8]  Yes    Acquired hypothyroidism [E03.9]  Yes    Type 2 diabetes mellitus with diabetic polyneuropathy, with long-term current use of insulin [E11.42, Z79.4]  Not Applicable    FAVIO (obstructive sleep apnea) [G47.33]  Yes    Stage 3a chronic kidney disease [N18.31]  Yes    Gastroesophageal reflux disease without esophagitis [K21.9]  Yes      Resolved Hospital  Problems   No resolved problems to display.        Brief Hospital Course to date:  Elton Funez is a 73 y.o. male with DM, HTN, HLD, FAVIO, CKD, JEROME cirrhosis and breast CA with recent mastectomy ( 2 weeks ago) who presented with n/v found to have acute pancreatitis. Patient's blood cultures are now growing Klebsiella.    Acute Gallstone Pancreatitis   Acute Cholangitis   Klebsiella Bacteremia  -abnormal MRCP, patient underwent ERCP which showed evidence for recently passed stone, balloon sweep with no stones, but revealed pus.   - ID following, continue abx for bacteremia  - GI recommends deferring cholecystectomy unless develops severe cholecystitis in setting of underlying cirrhosis  -continue supportive care, advance diet as tolerated  -PRN pain and nausea control     Hypoxia:  - lmproved with lasix overnight, will give additional dose today, likely some component of volume overload related to IVF      Elevated Creatinine on CKD  -close to baseline now around 1.5    JEROME Cirrhosis  Hyperbilirubinemia   - EGD with no evidence for varcies, mild portal gastropathy     FAVIO  -BIPAP     DM II  -A1c 8.6%  -SSI      Breast CA s/p mastectomy  -Dr MURILLO performed mastectomy  -follows with Dr Archer oncology  -holding Tamoxifen       HTN  HLD   -- resume home meds  -- continue formulary equivalent of statin    TCP  -- likely related to cirrhosis, monitor     Expected Discharge Location and Transportation: home  Expected Discharge   Expected Discharge Date: 6/15/2023; Expected Discharge Time:      DVT prophylaxis:  Mechanical DVT prophylaxis orders are present.     AM-PAC 6 Clicks Score (PT): 24 (06/13/23 0800)    CODE STATUS:   Code Status and Medical Interventions:   Ordered at: 06/11/23 1136     Code Status (Patient has no pulse and is not breathing):    CPR (Attempt to Resuscitate)     Medical Interventions (Patient has pulse or is breathing):    Full Support       Radha Weeks, DO  06/14/23

## 2023-06-14 NOTE — ANESTHESIA PREPROCEDURE EVALUATION
Anesthesia Evaluation     Patient summary reviewed and Nursing notes reviewed   NPO Solid Status: > 8 hours  NPO Liquid Status: > 2 hours           Airway   Mallampati: II  TM distance: >3 FB  Neck ROM: full  No difficulty expected  Dental      Pulmonary    (+) a smoker Former, shortness of breath (improving ), sleep apnea on CPAP,   (-) asthma  Cardiovascular     ECG reviewed    (+) hypertension, hyperlipidemia,   (-) past MI, dysrhythmias, angina, cardiac stents    ROS comment: ECG NSR isolate Q in V1     ECHO  EF >61 %.LVDD  (grade I) impaired relaxation.  RVSP 16 mmHg.      Neuro/Psych  (+) headaches, numbness, psychiatric history,    (-) seizures, CVA    ROS Comment: DM retinopathy   GI/Hepatic/Renal/Endo    (+)  GERD,  hepatitis, liver disease fatty liver disease cirrhosis, renal disease (creat 1.5   k normal ) CRI, diabetes mellitus (A1C >8) type 2 poorly controlled, thyroid problem hypothyroidism    Musculoskeletal     (+) back pain,   Abdominal    Substance History      OB/GYN          Other   arthritis,    history of cancer (R breast ca )    ROS/Med Hx Other: HCT 31  PLts 62K   Admitted c fever and acute pancreatitis   No current nausea       Phys Exam Other: Patel 3 grade 1 view -last month -[facility] 5/23               Anesthesia Plan    ASA 3     general   Rapid sequence  (RSI CP ETT [facility] )  intravenous induction     Anesthetic plan, risks, benefits, and alternatives have been provided, discussed and informed consent has been obtained with: patient.    Plan discussed with CRNA.        CODE STATUS:    Code Status (Patient has no pulse and is not breathing): CPR (Attempt to Resuscitate)  Medical Interventions (Patient has pulse or is breathing): Full Support

## 2023-06-14 NOTE — BRIEF OP NOTE
ENDOSCOPIC RETROGRADE CHOLANGIOPANCREATOGRAPHY  Progress Note    Elton Funez  6/14/2023    EGD performed due to prior history of small esophageal varices in 2021.  EGD reveals mild portal gastropathy and no varices.    ERCP shows a patulous ampulla, suggesting recently-passed stone.  Wire cannulation of the common bile duct was achieved without difficulty.  Cholangiogram revealed normal caliber common bile duct, and pruning of the intrahepatic branches (compatible with known history of cirrhosis).  12 mm balloon sweeps yielded pus, but no stones.  Bile flowed freely at conclusion of procedure.    >> Continue antibiotics for Klebsiella bacteremia.  >> Check INR for calculation of MELD.  >> Unless develops severe cholecystitis, would consider deferring cholecystectomy given history of cirrhosis.    Mark I. Brunner, MD     Date: 6/14/2023  Time: 10:14 EDT

## 2023-06-14 NOTE — ANESTHESIA PROCEDURE NOTES
Airway  Urgency: elective    Date/Time: 6/14/2023 9:40 AM  Airway not difficult    General Information and Staff    Patient location during procedure: OR  CRNA/CAA: Schirmer, Shelley P, CRNA    Indications and Patient Condition  Indications for airway management: airway protection    Preoxygenated: yes  MILS not maintained throughout  Mask difficulty assessment: 0 - not attempted    Final Airway Details  Final airway type: endotracheal airway      Successful airway: ETT  Cuffed: yes   Successful intubation technique: direct laryngoscopy  Endotracheal tube insertion site: oral  Blade: Amber  Blade size: 4  ETT size (mm): 8.0  Cormack-Lehane Classification: grade I - full view of glottis  Placement verified by: chest auscultation and capnometry   Measured from: lips  ETT/EBT  to lips (cm): 22  Number of attempts at approach: 1  Assessment: lips, teeth, and gum same as pre-op and atraumatic intubation    Additional Comments  Negative epigastric sounds, Breath sound equal bilaterally with symmetric chest rise and fall

## 2023-06-15 ENCOUNTER — READMISSION MANAGEMENT (OUTPATIENT)
Dept: CALL CENTER | Facility: HOSPITAL | Age: 73
End: 2023-06-15
Payer: MEDICARE

## 2023-06-15 VITALS
RESPIRATION RATE: 18 BRPM | HEART RATE: 59 BPM | HEIGHT: 68 IN | OXYGEN SATURATION: 96 % | DIASTOLIC BLOOD PRESSURE: 63 MMHG | SYSTOLIC BLOOD PRESSURE: 117 MMHG | TEMPERATURE: 98.7 F | BODY MASS INDEX: 33.34 KG/M2 | WEIGHT: 220 LBS

## 2023-06-15 LAB
GLUCOSE BLDC GLUCOMTR-MCNC: 238 MG/DL (ref 70–130)
GLUCOSE BLDC GLUCOMTR-MCNC: 276 MG/DL (ref 70–130)
GLUCOSE BLDC GLUCOMTR-MCNC: 283 MG/DL (ref 70–130)

## 2023-06-15 PROCEDURE — 94660 CPAP INITIATION&MGMT: CPT

## 2023-06-15 PROCEDURE — 94799 UNLISTED PULMONARY SVC/PX: CPT

## 2023-06-15 PROCEDURE — 63710000001 INSULIN DETEMIR PER 5 UNITS: Performed by: INTERNAL MEDICINE

## 2023-06-15 PROCEDURE — 63710000001 INSULIN LISPRO (HUMAN) PER 5 UNITS: Performed by: INTERNAL MEDICINE

## 2023-06-15 PROCEDURE — 82948 REAGENT STRIP/BLOOD GLUCOSE: CPT

## 2023-06-15 RX ORDER — SACCHAROMYCES BOULARDII 250 MG
250 CAPSULE ORAL 2 TIMES DAILY
Qty: 30 CAPSULE | Refills: 0 | Status: SHIPPED | OUTPATIENT
Start: 2023-06-15

## 2023-06-15 RX ORDER — SPIRONOLACTONE 25 MG/1
25 TABLET ORAL DAILY
Status: DISCONTINUED | OUTPATIENT
Start: 2023-06-15 | End: 2023-06-15 | Stop reason: HOSPADM

## 2023-06-15 RX ORDER — INSULIN LISPRO 100 [IU]/ML
4-24 INJECTION, SOLUTION INTRAVENOUS; SUBCUTANEOUS
Status: DISCONTINUED | OUTPATIENT
Start: 2023-06-15 | End: 2023-06-15 | Stop reason: HOSPADM

## 2023-06-15 RX ORDER — LEVOFLOXACIN 750 MG/1
750 TABLET ORAL DAILY
Qty: 8 TABLET | Refills: 0 | Status: SHIPPED | OUTPATIENT
Start: 2023-06-15 | End: 2023-06-23

## 2023-06-15 RX ORDER — METRONIDAZOLE 500 MG/1
500 TABLET ORAL 2 TIMES DAILY
Qty: 16 TABLET | Refills: 0 | Status: SHIPPED | OUTPATIENT
Start: 2023-06-15 | End: 2023-06-23

## 2023-06-15 RX ORDER — CARVEDILOL 6.25 MG/1
6.25 TABLET ORAL 2 TIMES DAILY WITH MEALS
Status: DISCONTINUED | OUTPATIENT
Start: 2023-06-15 | End: 2023-06-15 | Stop reason: HOSPADM

## 2023-06-15 RX ORDER — TEMAZEPAM 7.5 MG/1
7.5 CAPSULE ORAL NIGHTLY PRN
Start: 2023-06-15

## 2023-06-15 RX ADMIN — INSULIN LISPRO 12 UNITS: 100 INJECTION, SOLUTION INTRAVENOUS; SUBCUTANEOUS at 08:35

## 2023-06-15 RX ADMIN — CARVEDILOL 6.25 MG: 6.25 TABLET, FILM COATED ORAL at 12:04

## 2023-06-15 RX ADMIN — FERROUS SULFATE TAB 325 MG (65 MG ELEMENTAL FE) 325 MG: 325 (65 FE) TAB at 08:34

## 2023-06-15 RX ADMIN — ATORVASTATIN CALCIUM 20 MG: 20 TABLET, FILM COATED ORAL at 08:35

## 2023-06-15 RX ADMIN — LEVOTHYROXINE SODIUM 50 MCG: 0.05 TABLET ORAL at 06:49

## 2023-06-15 RX ADMIN — PANTOPRAZOLE SODIUM 40 MG: 40 TABLET, DELAYED RELEASE ORAL at 08:34

## 2023-06-15 RX ADMIN — INSULIN LISPRO 12 UNITS: 100 INJECTION, SOLUTION INTRAVENOUS; SUBCUTANEOUS at 12:04

## 2023-06-15 RX ADMIN — GABAPENTIN 800 MG: 400 CAPSULE ORAL at 17:15

## 2023-06-15 RX ADMIN — INSULIN DETEMIR 25 UNITS: 100 INJECTION, SOLUTION SUBCUTANEOUS at 08:35

## 2023-06-15 RX ADMIN — CARVEDILOL 6.25 MG: 6.25 TABLET, FILM COATED ORAL at 17:15

## 2023-06-15 RX ADMIN — SPIRONOLACTONE 25 MG: 25 TABLET ORAL at 12:04

## 2023-06-15 RX ADMIN — INSULIN LISPRO 8 UNITS: 100 INJECTION, SOLUTION INTRAVENOUS; SUBCUTANEOUS at 17:15

## 2023-06-15 RX ADMIN — GABAPENTIN 800 MG: 400 CAPSULE ORAL at 12:04

## 2023-06-15 RX ADMIN — Medication 250 MG: at 08:35

## 2023-06-15 RX ADMIN — SERTRALINE HYDROCHLORIDE 100 MG: 100 TABLET ORAL at 08:34

## 2023-06-15 RX ADMIN — Medication 10 ML: at 08:41

## 2023-06-15 RX ADMIN — METRONIDAZOLE 500 MG: 500 TABLET ORAL at 08:35

## 2023-06-15 RX ADMIN — GABAPENTIN 800 MG: 400 CAPSULE ORAL at 08:34

## 2023-06-15 NOTE — PLAN OF CARE
Problem: Adult Inpatient Plan of Care  Goal: Plan of Care Review  Outcome: Met  Goal: Patient-Specific Goal (Individualized)  Outcome: Met  Goal: Absence of Hospital-Acquired Illness or Injury  Outcome: Met  Intervention: Identify and Manage Fall Risk  Recent Flowsheet Documentation  Taken 6/15/2023 1600 by Trudi Chavez RN  Safety Promotion/Fall Prevention:   assistive device/personal items within reach   activity supervised   clutter free environment maintained   fall prevention program maintained   nonskid shoes/slippers when out of bed   room organization consistent   safety round/check completed  Taken 6/15/2023 1400 by Trudi Chavez RN  Safety Promotion/Fall Prevention:   activity supervised   assistive device/personal items within reach   clutter free environment maintained   fall prevention program maintained   nonskid shoes/slippers when out of bed   room organization consistent   safety round/check completed  Taken 6/15/2023 1200 by Trudi Chavez RN  Safety Promotion/Fall Prevention:   activity supervised   assistive device/personal items within reach   clutter free environment maintained   fall prevention program maintained   nonskid shoes/slippers when out of bed   safety round/check completed   room organization consistent  Taken 6/15/2023 1000 by Trudi Chavez RN  Safety Promotion/Fall Prevention:   activity supervised   assistive device/personal items within reach   clutter free environment maintained   fall prevention program maintained   nonskid shoes/slippers when out of bed   room organization consistent   safety round/check completed  Taken 6/15/2023 0800 by Trudi Chavez RN  Safety Promotion/Fall Prevention:   activity supervised   assistive device/personal items within reach   clutter free environment maintained   fall prevention program maintained   nonskid shoes/slippers when out of bed   safety round/check completed   room organization consistent  Intervention: Prevent Skin  Injury  Recent Flowsheet Documentation  Taken 6/15/2023 1600 by Trudi Chavez RN  Body Position: position changed independently  Skin Protection:   adhesive use limited   incontinence pads utilized   tubing/devices free from skin contact  Taken 6/15/2023 1400 by Trudi Chavez RN  Body Position: position changed independently  Skin Protection:   adhesive use limited   incontinence pads utilized   tubing/devices free from skin contact  Taken 6/15/2023 1200 by Trudi Chavez RN  Body Position: position changed independently  Skin Protection:   adhesive use limited   incontinence pads utilized   tubing/devices free from skin contact  Taken 6/15/2023 1000 by Trudi Chavez RN  Body Position: position changed independently  Skin Protection:   adhesive use limited   incontinence pads utilized   tubing/devices free from skin contact  Taken 6/15/2023 0800 by Trudi Chavez RN  Body Position: position changed independently  Skin Protection:   adhesive use limited   incontinence pads utilized   tubing/devices free from skin contact  Intervention: Prevent and Manage VTE (Venous Thromboembolism) Risk  Recent Flowsheet Documentation  Taken 6/15/2023 1600 by Trudi Chavez RN  Activity Management: up ad nusrat  Taken 6/15/2023 1400 by Trudi Chavez RN  Activity Management: up ad nusrat  Taken 6/15/2023 1200 by Trudi Chavez RN  Activity Management: up ad nusrat  Taken 6/15/2023 1000 by Trudi Chavez RN  Activity Management: up ad nusrat  Taken 6/15/2023 0800 by Trudi Chavez RN  Activity Management: up ad nusrat  Range of Motion: active ROM (range of motion) encouraged  Intervention: Prevent Infection  Recent Flowsheet Documentation  Taken 6/15/2023 1600 by Trudi Chavez RN  Infection Prevention:   environmental surveillance performed   equipment surfaces disinfected   hand hygiene promoted   single patient room provided   rest/sleep promoted  Taken 6/15/2023 1400 by Trudi Chavez RN  Infection Prevention:   environmental  surveillance performed   equipment surfaces disinfected   hand hygiene promoted   rest/sleep promoted   single patient room provided  Taken 6/15/2023 1200 by Trudi Chavez RN  Infection Prevention:   environmental surveillance performed   hand hygiene promoted   equipment surfaces disinfected   rest/sleep promoted   single patient room provided  Taken 6/15/2023 1000 by Trudi Chavez RN  Infection Prevention:   equipment surfaces disinfected   hand hygiene promoted   environmental surveillance performed   single patient room provided   rest/sleep promoted  Taken 6/15/2023 0800 by Trudi Chavez RN  Infection Prevention:   environmental surveillance performed   equipment surfaces disinfected   hand hygiene promoted   rest/sleep promoted   single patient room provided  Goal: Optimal Comfort and Wellbeing  Outcome: Met  Intervention: Provide Person-Centered Care  Recent Flowsheet Documentation  Taken 6/15/2023 0800 by Trudi Chavez RN  Trust Relationship/Rapport:   care explained   choices provided   empathic listening provided   questions answered   thoughts/feelings acknowledged  Goal: Readiness for Transition of Care  Outcome: Met     Problem: Nausea and Vomiting  Goal: Fluid and Electrolyte Balance  Outcome: Met  Intervention: Prevent and Manage Nausea and Vomiting  Recent Flowsheet Documentation  Taken 6/15/2023 1400 by Trudi Chavez RN  Environmental Support: calm environment promoted  Taken 6/15/2023 1200 by Trudi Chavez RN  Environmental Support: calm environment promoted  Taken 6/15/2023 1000 by Trudi Chavez RN  Environmental Support: calm environment promoted  Taken 6/15/2023 0800 by Trudi Chavez RN  Environmental Support: calm environment promoted     Problem: Adjustment to Illness (Sepsis/Septic Shock)  Goal: Optimal Coping  Outcome: Met  Intervention: Optimize Psychosocial Adjustment to Illness  Recent Flowsheet Documentation  Taken 6/15/2023 1400 by Trudi Chavez RN  Supportive  Measures:   active listening utilized   verbalization of feelings encouraged  Taken 6/15/2023 1200 by Trudi Chavez RN  Supportive Measures:   active listening utilized   verbalization of feelings encouraged  Taken 6/15/2023 1000 by Trudi Chavez RN  Supportive Measures:   active listening utilized   verbalization of feelings encouraged  Taken 6/15/2023 0800 by Trudi Chavez RN  Supportive Measures:   active listening utilized   verbalization of feelings encouraged  Family/Support System Care: self-care encouraged     Problem: Bleeding (Sepsis/Septic Shock)  Goal: Absence of Bleeding  Outcome: Met     Problem: Glycemic Control Impaired (Sepsis/Septic Shock)  Goal: Blood Glucose Level Within Desired Range  Outcome: Met     Problem: Infection Progression (Sepsis/Septic Shock)  Goal: Absence of Infection Signs and Symptoms  Outcome: Met  Intervention: Initiate Sepsis Management  Recent Flowsheet Documentation  Taken 6/15/2023 1600 by Trudi Chavez RN  Infection Prevention:   environmental surveillance performed   equipment surfaces disinfected   hand hygiene promoted   single patient room provided   rest/sleep promoted  Taken 6/15/2023 1400 by Trudi Chavez RN  Infection Prevention:   environmental surveillance performed   equipment surfaces disinfected   hand hygiene promoted   rest/sleep promoted   single patient room provided  Taken 6/15/2023 1200 by Trudi Chavez RN  Infection Prevention:   environmental surveillance performed   hand hygiene promoted   equipment surfaces disinfected   rest/sleep promoted   single patient room provided  Isolation Precautions: precautions maintained  Taken 6/15/2023 1000 by Trudi Chavez RN  Infection Prevention:   equipment surfaces disinfected   hand hygiene promoted   environmental surveillance performed   single patient room provided   rest/sleep promoted  Taken 6/15/2023 0800 by Trudi Chavez RN  Infection Prevention:   environmental surveillance performed    equipment surfaces disinfected   hand hygiene promoted   rest/sleep promoted   single patient room provided  Intervention: Promote Recovery  Recent Flowsheet Documentation  Taken 6/15/2023 1600 by Trudi Chavez RN  Activity Management: up ad nusrat  Sleep/Rest Enhancement:   awakenings minimized   relaxation techniques promoted  Taken 6/15/2023 1400 by Trudi Chavez RN  Activity Management: up ad nusrat  Sleep/Rest Enhancement:   awakenings minimized   relaxation techniques promoted  Taken 6/15/2023 1200 by Trudi Chavez RN  Activity Management: up ad nusrat  Sleep/Rest Enhancement:   awakenings minimized   relaxation techniques promoted  Taken 6/15/2023 1000 by Trudi Chavez RN  Activity Management: up ad nusrat  Sleep/Rest Enhancement:   awakenings minimized   relaxation techniques promoted  Taken 6/15/2023 0800 by Trudi Chavez RN  Activity Management: up ad nusrat  Sleep/Rest Enhancement: awakenings minimized     Problem: Nutrition Impaired (Sepsis/Septic Shock)  Goal: Optimal Nutrition Intake  Outcome: Met     Problem: Fall Injury Risk  Goal: Absence of Fall and Fall-Related Injury  Outcome: Met  Intervention: Identify and Manage Contributors  Recent Flowsheet Documentation  Taken 6/15/2023 1600 by Turdi Chavez RN  Medication Review/Management: medications reviewed  Self-Care Promotion: independence encouraged  Taken 6/15/2023 1400 by Trudi Chavez RN  Medication Review/Management: medications reviewed  Self-Care Promotion: independence encouraged  Taken 6/15/2023 1200 by Trudi Chavez RN  Medication Review/Management: medications reviewed  Self-Care Promotion: independence encouraged  Taken 6/15/2023 1000 by Trudi Chavez RN  Medication Review/Management: medications reviewed  Self-Care Promotion: independence encouraged  Taken 6/15/2023 0800 by Trudi Chavez RN  Medication Review/Management: medications reviewed  Self-Care Promotion: independence encouraged  Intervention: Promote Injury-Free  Environment  Recent Flowsheet Documentation  Taken 6/15/2023 1600 by Trudi Chavez RN  Safety Promotion/Fall Prevention:   assistive device/personal items within reach   activity supervised   clutter free environment maintained   fall prevention program maintained   nonskid shoes/slippers when out of bed   room organization consistent   safety round/check completed  Taken 6/15/2023 1400 by Trudi Chavez RN  Safety Promotion/Fall Prevention:   activity supervised   assistive device/personal items within reach   clutter free environment maintained   fall prevention program maintained   nonskid shoes/slippers when out of bed   room organization consistent   safety round/check completed  Taken 6/15/2023 1200 by Trudi Chavez RN  Safety Promotion/Fall Prevention:   activity supervised   assistive device/personal items within reach   clutter free environment maintained   fall prevention program maintained   nonskid shoes/slippers when out of bed   safety round/check completed   room organization consistent  Taken 6/15/2023 1000 by Trudi Chavez RN  Safety Promotion/Fall Prevention:   activity supervised   assistive device/personal items within reach   clutter free environment maintained   fall prevention program maintained   nonskid shoes/slippers when out of bed   room organization consistent   safety round/check completed  Taken 6/15/2023 0800 by Trudi Chavez RN  Safety Promotion/Fall Prevention:   activity supervised   assistive device/personal items within reach   clutter free environment maintained   fall prevention program maintained   nonskid shoes/slippers when out of bed   safety round/check completed   room organization consistent   Goal Outcome Evaluation:

## 2023-06-15 NOTE — OUTREACH NOTE
Prep Survey      Flowsheet Row Responses   [facility] facility patient discharged from? [facility]   Is LACE score < 7 ? No   Eligibility [facility]   Date of Admission 06/11/23   Date of Discharge 06/15/23   Discharge Disposition Home or Self Care   Discharge diagnosis Sepsis   Does the patient have one of the following disease processes/diagnoses(primary or secondary)? Sepsis   Does the patient have Home health ordered? No   Is there a DME ordered? No   Prep survey completed? Yes            RAY LAURENT - Registered Nurse

## 2023-06-15 NOTE — PLAN OF CARE
Problem: Adult Inpatient Plan of Care  Goal: Plan of Care Review  Outcome: Ongoing, Progressing  Goal: Patient-Specific Goal (Individualized)  Outcome: Ongoing, Progressing  Goal: Absence of Hospital-Acquired Illness or Injury  Outcome: Ongoing, Progressing  Intervention: Identify and Manage Fall Risk  Recent Flowsheet Documentation  Taken 6/15/2023 0200 by Tasneem De Guzman RN  Safety Promotion/Fall Prevention:   activity supervised   assistive device/personal items within reach   clutter free environment maintained   safety round/check completed  Taken 6/15/2023 0000 by Tasneem De Guzman RN  Safety Promotion/Fall Prevention:   activity supervised   assistive device/personal items within reach   clutter free environment maintained   safety round/check completed  Taken 6/14/2023 2200 by Tasneem De Guzman RN  Safety Promotion/Fall Prevention:   activity supervised   assistive device/personal items within reach   clutter free environment maintained   safety round/check completed  Taken 6/14/2023 2000 by Tasneem De Guzman RN  Safety Promotion/Fall Prevention:   activity supervised   assistive device/personal items within reach   clutter free environment maintained   safety round/check completed  Intervention: Prevent Skin Injury  Recent Flowsheet Documentation  Taken 6/15/2023 0200 by Tasneem De Guzman RN  Body Position: position changed independently  Taken 6/15/2023 0000 by Tasneem De Guzman RN  Body Position: position changed independently  Taken 6/14/2023 2200 by Tasneem De Guzman RN  Body Position: position changed independently  Taken 6/14/2023 2000 by Tasneem De Guzman RN  Body Position: position changed independently  Intervention: Prevent and Manage VTE (Venous Thromboembolism) Risk  Recent Flowsheet Documentation  Taken 6/15/2023 0200 by Tasneem De Guzman RN  Activity Management: up ad nusrat  Taken 6/15/2023 0000 by Tasneem De Guzman RN  Activity Management: up ad nusrat  Taken 6/14/2023 2200 by Tasneem De Guzman RN  Activity  Management: up ad nusrat  Taken 6/14/2023 2000 by Tasneem De Guzman RN  Activity Management: up ad nusrat  Intervention: Prevent Infection  Recent Flowsheet Documentation  Taken 6/14/2023 2200 by Tasneem De Guzman RN  Infection Prevention: environmental surveillance performed  Goal: Optimal Comfort and Wellbeing  Outcome: Ongoing, Progressing  Intervention: Provide Person-Centered Care  Recent Flowsheet Documentation  Taken 6/14/2023 2000 by Tasneem De Guzman RN  Trust Relationship/Rapport:   care explained   thoughts/feelings acknowledged  Goal: Readiness for Transition of Care  Outcome: Ongoing, Progressing     Problem: Nausea and Vomiting  Goal: Fluid and Electrolyte Balance  Outcome: Ongoing, Progressing     Problem: Adjustment to Illness (Sepsis/Septic Shock)  Goal: Optimal Coping  Outcome: Ongoing, Progressing     Problem: Bleeding (Sepsis/Septic Shock)  Goal: Absence of Bleeding  Outcome: Ongoing, Progressing     Problem: Glycemic Control Impaired (Sepsis/Septic Shock)  Goal: Blood Glucose Level Within Desired Range  Outcome: Ongoing, Progressing     Problem: Infection Progression (Sepsis/Septic Shock)  Goal: Absence of Infection Signs and Symptoms  Outcome: Ongoing, Progressing  Intervention: Initiate Sepsis Management  Recent Flowsheet Documentation  Taken 6/14/2023 2200 by Tasneem De Guzman RN  Infection Prevention: environmental surveillance performed  Intervention: Promote Recovery  Recent Flowsheet Documentation  Taken 6/15/2023 0200 by Tasneem De Guzman RN  Activity Management: up ad nusrat  Taken 6/15/2023 0000 by Tasneem De Guzman RN  Activity Management: up ad nusrat  Taken 6/14/2023 2200 by Tasneem De Guzman RN  Activity Management: up ad nusrat  Taken 6/14/2023 2000 by Tasneem De Guzman RN  Activity Management: up ad nusrat     Problem: Nutrition Impaired (Sepsis/Septic Shock)  Goal: Optimal Nutrition Intake  Outcome: Ongoing, Progressing     Problem: Fall Injury Risk  Goal: Absence of Fall and Fall-Related Injury  Outcome:  Ongoing, Progressing  Intervention: Identify and Manage Contributors  Recent Flowsheet Documentation  Taken 6/15/2023 0200 by Tasneem De Guzman, RN  Medication Review/Management: medications reviewed  Taken 6/15/2023 0000 by Tasneem De Guzman RN  Medication Review/Management: medications reviewed  Taken 6/14/2023 2200 by Tasneem De Guzman RN  Medication Review/Management: medications reviewed  Taken 6/14/2023 2000 by Tasneem De Guzman RN  Medication Review/Management: medications reviewed  Intervention: Promote Injury-Free Environment  Recent Flowsheet Documentation  Taken 6/15/2023 0200 by Tasneem De Guzman RN  Safety Promotion/Fall Prevention:   activity supervised   assistive device/personal items within reach   clutter free environment maintained   safety round/check completed  Taken 6/15/2023 0000 by Tasneem De Guzman RN  Safety Promotion/Fall Prevention:   activity supervised   assistive device/personal items within reach   clutter free environment maintained   safety round/check completed  Taken 6/14/2023 2200 by Tasneem De Guzman RN  Safety Promotion/Fall Prevention:   activity supervised   assistive device/personal items within reach   clutter free environment maintained   safety round/check completed  Taken 6/14/2023 2000 by Tasneem De Guzman RN  Safety Promotion/Fall Prevention:   activity supervised   assistive device/personal items within reach   clutter free environment maintained   safety round/check completed   Goal Outcome Evaluation:

## 2023-06-15 NOTE — PROGRESS NOTES
[facility] Medicine Services  PROGRESS NOTE    Patient Name: Elton Funez  : 1950  MRN: [id number]    Date of Admission: 2023  Primary Care Physician: Tc Ramirez MD    Subjective   Subjective     CC:  sepsis    HPI:  Patient sitting up in bedside chair. No issues overnight. Feeling much improved. No abdominal pain, tolerating diet.    ROS:  Gen- No fevers, chills  CV- No chest pain, palpitations  Resp- No cough, dyspnea  GI- No N/V/D, abd pain    Objective   Objective     Vital Signs:   Temp:  [96.7 °F (35.9 °C)-98.5 °F (36.9 °C)] 97.5 °F (36.4 °C)  Heart Rate:  [51-58] 56  Resp:  [14-16] 16  BP: (114-169)/(62-94) 114/62  Flow (L/min):  [2] 2     Physical Exam:  Constitutional: No acute distress, awake, alert  HENT: NCAT, mucous membranes moist  Respiratory: Clear to auscultation bilaterally, respiratory effort normal on O2  Cardiovascular: RRR, no murmurs, rubs, or gallops  Gastrointestinal:  soft, non-tender, nondistended  Musculoskeletal: No bilateral ankle edema  Psychiatric: Appropriate affect, cooperative  Neurologic: Oriented x 3, speech clear, non-focal  Skin: No rashes      Results Reviewed:  LAB RESULTS:      Lab 23  0630 23  0231 23  0329 23  0359 23  0830   WBC 5.43  --  4.97 7.31 8.97   HEMOGLOBIN 9.7*  --  9.1* 9.2* 11.2*   HEMATOCRIT 31.5*  --  29.4* 28.8* 34.6*   PLATELETS 62*  --  61* 60* 97*   NEUTROS ABS 4.18  --  3.83 6.11 8.46*   IMMATURE GRANS (ABS) 0.04  --  0.03 0.05 0.03   LYMPHS ABS 0.41*  --  0.34* 0.29* 0.18*   MONOS ABS 0.61  --  0.46 0.56 0.22   EOS ABS 0.18  --  0.30 0.28 0.07   .3*  --  99.7* 98.0* 96.4   PROCALCITONIN  --  4.13*  --   --  2.03*   LACTATE  --   --   --   --  2.0         Lab 23  0630 23  0329 23  0359 23  1756 23  0830   SODIUM 139 136 136  --  137   POTASSIUM 4.1 4.4 4.6  --  4.3   CHLORIDE 102 104 104  --  104   CO2 22.0 22.0 20.0*  --  21.0*    ANION GAP 15.0 10.0 12.0  --  12.0   BUN 17 23 29*  --  27*   CREATININE 1.50* 1.57* 1.66*  --  1.72*   EGFR 48.6* 46.0* 43.0*  --  41.2*   GLUCOSE 172* 168* 162*  --  118*   CALCIUM 8.9 8.3* 8.3*  --  8.9   HEMOGLOBIN A1C  --   --   --  8.40*  --          Lab 06/14/23  0630 06/13/23  2232 06/13/23  0329 06/12/23  0359 06/11/23  0830   TOTAL PROTEIN 7.1  --  6.7 6.2 7.2   ALBUMIN 3.5  --  3.4* 3.2* 3.9   GLOBULIN 3.6  --  3.3 3.0 3.3   ALT (SGPT) 83*  --  90* 91* 44*   AST (SGOT) 83*  --  111* 143* 94*   BILIRUBIN 2.5*  --  3.1* 4.1* 1.8*   ALK PHOS 209*  --  140* 139* 204*   LIPASE 257* 439*  --   --  >3,000*         Lab 06/14/23  0231 06/14/23  0042 06/13/23 2232   PROBNP  --   --  2,456.0*   HSTROP T 55* 55* 48*                 Brief Urine Lab Results  (Last result in the past 365 days)        Color   Clarity   Blood   Leuk Est   Nitrite   Protein   CREAT   Urine HCG        06/11/23 0900 Yellow   Clear   Negative   Negative   Negative   >=300 mg/dL (3+)                   Microbiology Results Abnormal       Procedure Component Value - Date/Time    Blood Culture - Blood, Arm, Left [[id number]]  (Normal) Collected: 06/11/23 0850    Lab Status: Preliminary result Specimen: Blood from Arm, Left Updated: 06/15/23 1045     Blood Culture No growth at 4 days    Respiratory Panel PCR w/COVID-19(SARS-CoV-2) [facility]V/PAD/[facility]/KATALINA In-House, NP Swab in [facility], 3-4 HR TAT - Swab, Nasopharynx [[id number]]  (Normal) Collected: 06/11/23 1053    Lab Status: Final result Specimen: Swab from Nasopharynx Updated: 06/11/23 1206     ADENOVIRUS, PCR Not Detected     Coronavirus 229E Not Detected     Coronavirus HKU1 Not Detected     Coronavirus NL63 Not Detected     Coronavirus OC43 Not Detected     COVID19 Not Detected     Human Metapneumovirus Not Detected     Human Rhinovirus/Enterovirus Not Detected     Influenza A PCR Not Detected     Influenza B PCR Not Detected     Parainfluenza Virus 1 Not Detected     Parainfluenza Virus  2 Not Detected     Parainfluenza Virus 3 Not Detected     Parainfluenza Virus 4 Not Detected     RSV, PCR Not Detected     Bordetella pertussis pcr Not Detected     Bordetella parapertussis PCR Not Detected     Chlamydophila pneumoniae PCR Not Detected     Mycoplasma pneumo by PCR Not Detected    Narrative:      In the setting of a positive respiratory panel with a viral infection PLUS a negative procalcitonin without other underlying concern for bacterial infection, consider observing off antibiotics or discontinuation of antibiotics and continue supportive care. If the respiratory panel is positive for atypical bacterial infection (Bordetella pertussis, Chlamydophila pneumoniae, or Mycoplasma pneumoniae), consider antibiotic de-escalation to target atypical bacterial infection.    COVID PRE-OP / PRE-PROCEDURE SCREENING ORDER (NO ISOLATION) - Swab, Nasopharynx [[id number]]  (Normal) Collected: 06/11/23 0851    Lab Status: Final result Specimen: Swab from Nasopharynx Updated: 06/11/23 1052    Narrative:      The following orders were created for panel order COVID PRE-OP / PRE-PROCEDURE SCREENING ORDER (NO ISOLATION) - Swab, Nasopharynx.  Procedure                               Abnormality         Status                     ---------                               -----------         ------                     COVID-19 and FLU A/B PCR...[[id number]]  Normal              Final result                 Please view results for these tests on the individual orders.    COVID-19 and FLU A/B PCR - Swab, Nasopharynx [[id number]]  (Normal) Collected: 06/11/23 0851    Lab Status: Final result Specimen: Swab from Nasopharynx Updated: 06/11/23 1052     COVID19 Presumptive Negative     Influenza A PCR Not Detected     Influenza B PCR Not Detected    Narrative:      Fact sheet for providers: https://www.fda.gov/media/657887/download    Fact sheet for patients: https://www.fda.gov/media/013510/download    Test performed by PCR.             FL ERCP pancreatic and biliary ducts    Result Date: 6/14/2023  FL ERCP PANCREATIC AND BILIARY DUCTS Date of Exam: 6/14/2023 9:32 AM EDT Indication: ENDOSCOPIC RETROGRADE CHOLANGIOPANCREATOGRAPHY. Comparison: None available. Technique:  A series of radiographic digital spot films were obtained in conjunction with a endoscopic catheterization of the biliary and pancreatic ductal system, performed by the gastroenterologist. Fluoroscopic Time: 1 minute 31 seconds Number of Images: 2 Findings: Dictation is to record 1 minute 31 seconds of fluoroscopy time. 2 intraprocedural images obtained show endoscope and side cannula placement, contrast injection of the common duct and what appears to be balloon sweep. Final image shows no evidence of filling defect to suggest any common duct stone. Please see the procedure report for full details.     Impression: Impression: Fluoroscopy provided during ERCP. Electronically Signed: Salvador Holley  6/14/2023 10:30 AM EDT  Workstation ID: [id number]    XR Chest 1 View    Result Date: 6/13/2023  XR CHEST 1 VW Date of Exam: 6/13/2023 9:17 PM EDT Indication: shortness of breath Comparison: 6/11/2023 Findings: As below.     Impression: 1.Cardiomegaly. 2.Vascular congestion. 3.Small bilateral pleural effusions. 4.Multifocal patchy infiltrates bilaterally. 5.No pneumothorax. Electronically Signed: John Moyer  6/13/2023 10:03 PM EDT  Workstation ID: [id number]       Results for orders placed during the hospital encounter of 03/20/23    Adult Transthoracic Echo Complete W/ Cont if Necessary Per Protocol    Interpretation Summary    Left ventricular ejection fraction appears to be 61 - 65%.    Left ventricular diastolic function is consistent with (grade I) impaired relaxation.    Calculated right ventricular systolic pressure from tricuspid regurgitation is 16 mmHg.      Current medications:  Scheduled Meds:atorvastatin, 20 mg, Oral, Daily  carvedilol, 6.25 mg, Oral, BID With  Meals  cefTRIAXone, 2 g, Intravenous, Q24H  ferrous sulfate, 325 mg, Oral, BID  gabapentin, 800 mg, Oral, 4x Daily  insulin detemir, 25 Units, Subcutaneous, Q12H  insulin lispro, 4-24 Units, Subcutaneous, 4x Daily AC & at Bedtime  levothyroxine, 50 mcg, Oral, Q AM  metroNIDAZOLE, 500 mg, Oral, Q12H  pantoprazole, 40 mg, Oral, Daily  saccharomyces boulardii, 250 mg, Oral, BID  senna-docusate sodium, 2 tablet, Oral, BID  sertraline, 100 mg, Oral, Daily  sodium chloride, 10 mL, Intravenous, Q12H  spironolactone, 25 mg, Oral, Daily  [START ON 6/16/2023] Vitamin D (Ergocalciferol), 50,000 Units, Oral, Weekly      Continuous Infusions:     PRN Meds:.  acetaminophen    senna-docusate sodium **AND** polyethylene glycol **AND** bisacodyl **AND** bisacodyl    dextrose    dextrose    fentanyl    glucagon (human recombinant)    Morphine **AND** naloxone    ondansetron **OR** ondansetron    ondansetron    [COMPLETED] Insert Peripheral IV **AND** sodium chloride    sodium chloride    sodium chloride    temazepam    Assessment & Plan   Assessment & Plan     Active Hospital Problems    Diagnosis  POA    **Sepsis, due to unspecified organism, unspecified whether acute organ dysfunction present [A41.9]  Yes    Idiopathic acute pancreatitis [K85.00]  Unknown    Bacteremia due to Klebsiella pneumoniae [R78.81, B96.1]  Unknown    Malignant neoplasm of central portion of right breast in male, estrogen receptor positive [C50.121, Z17.0]  Not Applicable    Grade I diastolic dysfunction [I51.89]  Yes    Liver cirrhosis secondary to JEROME [K75.81, K74.60]  Yes    Anemia of chronic disease [D63.8]  Yes    Acquired hypothyroidism [E03.9]  Yes    Type 2 diabetes mellitus with diabetic polyneuropathy, with long-term current use of insulin [E11.42, Z79.4]  Not Applicable    FAVIO (obstructive sleep apnea) [G47.33]  Yes    Stage 3a chronic kidney disease [N18.31]  Yes    Gastroesophageal reflux disease without esophagitis [K21.9]  Yes      Resolved  Hospital Problems   No resolved problems to display.        Brief Hospital Course to date:  Elton Funez is a 73 y.o. male with DM, HTN, HLD, FAVIO, CKD, JEROME cirrhosis and breast CA with recent mastectomy ( 2 weeks ago) who presented with n/v found to have acute pancreatitis. Patient's blood cultures are now growing Klebsiella.    Acute Gallstone Pancreatitis   Acute Cholangitis   Klebsiella Bacteremia  -abnormal MRCP, patient underwent ERCP which showed evidence for recently passed stone, balloon sweep with no stones, but revealed pus.   - ID following, continue abx for bacteremia, considering switching to PO abx in next 1-2 days  - GI recommends deferring cholecystectomy unless develops severe cholecystitis in setting of underlying cirrhosis  -continue supportive care, tolerating diet  -PRN pain and nausea control     Hypoxia:  - lmproved s/p lasix 40mg x2 doses, plan to prescribe PRN at discharge     Elevated Creatinine on CKD  -close to baseline now around 1.5    JEROME Cirrhosis  Hyperbilirubinemia   - EGD with no evidence for varcies, mild portal gastropathy     FAVIO  -BIPAP     DM II  -A1c 8.6%  -hyperclycemia, takes U500 at home, start levemir, may need to switch to U500 here if remains uncontrolled  -SSI      Breast CA s/p mastectomy  -Dr MURILLO performed mastectomy  -follows with Dr Archer oncology  -holding Tamoxifen    HTN  HLD   -- resume home meds  -- continue formulary equivalent of statin    TCP  -- likely related to cirrhosis, monitor     Expected Discharge Location and Transportation: home  Expected Discharge   Expected Discharge Date: 6/15/2023; Expected Discharge Time:      DVT prophylaxis:  Mechanical DVT prophylaxis orders are present.     AM-PAC 6 Clicks Score (PT): 24 (06/15/23 0800)    CODE STATUS:   Code Status and Medical Interventions:   Ordered at: 06/11/23 1136     Code Status (Patient has no pulse and is not breathing):    CPR (Attempt to Resuscitate)     Medical Interventions (Patient has  pulse or is breathing):    Full Support       Radha Weeks, DO  06/15/23

## 2023-06-15 NOTE — PROGRESS NOTES
"GI Daily Progress Note  Subjective:    Chief Complaint: Follow-up ascending cholangitis and Klebsiella bacteremia    Patient had drenching sweats last night and required changing his gown.  He has not had sweats, chills, or fever since that time.  He is eating well.  Denies nausea or vomiting.    Objective:    /59 (BP Location: Left arm, Patient Position: Lying)   Pulse 56   Temp 98.7 °F (37.1 °C) (Oral)   Resp 18   Ht 172.7 cm (68\")   Wt 99.8 kg (220 lb)   SpO2 96%   BMI 33.45 kg/m²     Physical Exam  Vitals and nursing note reviewed.   Constitutional:       General: He is not in acute distress.     Appearance: He is obese. He is ill-appearing. He is not toxic-appearing or diaphoretic.      Comments: Appears chronically ill.   HENT:      Nose: Nose normal. No congestion.      Mouth/Throat:      Mouth: Mucous membranes are moist.      Pharynx: No oropharyngeal exudate.   Eyes:      General: No scleral icterus.     Conjunctiva/sclera: Conjunctivae normal.   Cardiovascular:      Rate and Rhythm: Normal rate.      Pulses: Normal pulses.   Pulmonary:      Effort: Pulmonary effort is normal. No respiratory distress.      Breath sounds: Normal breath sounds. No stridor. No wheezing or rales.   Abdominal:      General: Bowel sounds are normal. There is no distension.      Palpations: Abdomen is soft.      Tenderness: There is no abdominal tenderness. There is no guarding.   Genitourinary:     Comments: Deferred  Musculoskeletal:      Cervical back: Normal range of motion.      Right lower leg: No edema.      Left lower leg: No edema.   Skin:     General: Skin is warm and dry.      Capillary Refill: Capillary refill takes less than 2 seconds.      Coloration: Skin is not jaundiced or pale.      Findings: No erythema or rash.   Neurological:      General: No focal deficit present.      Mental Status: He is alert and oriented to person, place, and time.   Psychiatric:         Mood and Affect: Mood normal.         " Behavior: Behavior normal.         Lab  Lab Results   Component Value Date    WBC 5.43 06/14/2023    HGB 9.7 (L) 06/14/2023    HGB 9.1 (L) 06/13/2023    HGB 9.2 (L) 06/12/2023    .3 (H) 06/14/2023    PLT 62 (L) 06/14/2023    INR 1.14 08/30/2021       Lab Results   Component Value Date    GLUCOSE 172 (H) 06/14/2023    BUN 17 06/14/2023    CREATININE 1.50 (H) 06/14/2023    EGFRIFNONA 60 (L) 12/15/2021    EGFRIFAFRI 66 07/14/2015    BCR 11.3 06/14/2023     06/14/2023    K 4.1 06/14/2023    CO2 22.0 06/14/2023    CALCIUM 8.9 06/14/2023    PROTENTOTREF 7.7 07/14/2015    ALBUMIN 3.5 06/14/2023    ALKPHOS 209 (H) 06/14/2023    BILITOT 2.5 (H) 06/14/2023    ALT 83 (H) 06/14/2023    AST 83 (H) 06/14/2023       Assessment:    1.  Choledocholithiasis with obstruction and ascending cholangitis.  Postop day #1 ERCP with sphincterotomy, and findings of pus in the biliary tree.  2.  Klebsiella bacteremia.  3.  JEROME cirrhosis, compensated.  Child score of 7 (class B).  Estimated 30% operative mortality for abdominal surgery.    Plan:    >> Given cirrhosis, risk-benefit would seem to favor deferring elective cholecystectomy.  If develops disabling biliary symptoms or life-threatening cholecystitis, cholecystectomy option could be revisited.  >> Stable for discharge from GI standpoint, on oral antibiotics or as per infectious disease consultant.    Plan follow-up with gastroenterology in 6 months.    Mark I. Brunner, MD  06/15/23  15:57 EDT

## 2023-06-15 NOTE — CASE MANAGEMENT/SOCIAL WORK
Continued Stay Note   [facility]     Patient Name: Elton Funez  MRN: [id number]  Today's Date: 6/15/2023    Admit Date: 6/11/2023    Plan: Home at DC   Discharge Plan     Row Name 06/15/23 0940       Plan    Plan Home at DC    Patient/Family in Agreement with Plan yes    Plan Comments I spoke with the pt. He states his plan is home with no DC needs at this time.    Final Discharge Disposition Code 01 - home or self-care               Discharge Codes    No documentation.               Expected Discharge Date and Time     Expected Discharge Date Expected Discharge Time    Ben 15, 2023             Pooja Coffman RN

## 2023-06-15 NOTE — PROGRESS NOTES
"  INFECTIOUS DISEASES  INPATIENT PROGRESS NOTE  6/15/2023      PATIENT NAME: Elton Funez  :  1950  MRN:  [id number]  Date of Admission:  2023      Antimicrobials:    Day 1:  Ceftriaxone  metronidazole    S/p 3 days:  Pip/tazo    MAR reviewed.    Chief Complaint   Patient presents with    Nausea    Vomiting       Reason for consultation:  Klebsiella bacteremia    Interval history: Patient feeling much better today.  No nausea.  Abdominal pain improved.  No fevers.  He would like to go home.    ROS:  No fevers/chills overnight.  No new rashes.  Denies side effects from antimicrobials.      Objective:  Temp (24hrs), Av.9 °F (36.6 °C), Min:96.7 °F (35.9 °C), Max:98.7 °F (37.1 °C)    /59 (BP Location: Left arm, Patient Position: Lying)   Pulse 56   Temp 98.7 °F (37.1 °C) (Oral)   Resp 18   Ht 172.7 cm (68\")   Wt 99.8 kg (220 lb)   SpO2 96%   BMI 33.45 kg/m²     Physical Examination:  GENERAL: Improved-appearing male.  Awake and alert, in no acute distress.   HEENT: Normocephalic, atraumatic.    NECK: Supple without nuchal rigidity.    CV: RRR. No murmur, rubs, gallops.  Normal S1S2.  LUNGS: Clear to auscultation bilaterally without wheezing, rales, rhonchi. Normal respiratory effort.  ABDOMEN: Positive bowel sounds.  Soft, decreased left upper quadrant and epigastric tenderness, protuberant.  No rebound or guarding.    EXT:  trace edema.  MSK: No joint effusions or inflammation noted.  SKIN: Warm and dry without rash or ulcer.   Right chest wall mastectomy incision closed, intact, and healing well.  No surrounding inflammation or drainage.  NEURO: A&Ox4. No focal deficits.  Face symmetric.  Speech fluent.  Moves all extremities well.   PSYCHIATRIC: Normal insight and judgement.  Cooperative.  Normal affect.    Laboratory Data:    Results from last 7 days   Lab Units 23  0630 23  0329 23  0359   WBC 10*3/mm3 5.43 4.97 7.31   HEMOGLOBIN g/dL 9.7* 9.1* 9.2*   HEMATOCRIT % " 31.5* 29.4* 28.8*   PLATELETS 10*3/mm3 62* 61* 60*       Results from last 7 days   Lab Units 06/14/23  0630   SODIUM mmol/L 139   POTASSIUM mmol/L 4.1   CHLORIDE mmol/L 102   CO2 mmol/L 22.0   BUN mg/dL 17   CREATININE mg/dL 1.50*   GLUCOSE mg/dL 172*   CALCIUM mg/dL 8.9       Results from last 7 days   Lab Units 06/14/23  0630   ALK PHOS U/L 209*   BILIRUBIN mg/dL 2.5*   ALT (SGPT) U/L 83*   AST (SGOT) U/L 83*               Estimated Creatinine Clearance: 49.5 mL/min (A) (by C-G formula based on SCr of 1.5 mg/dL (H)).  Results from last 7 days   Lab Units 06/11/23  0830   LACTATE mmol/L 2.0                       Microbiology:    Microbiology Results (last 10 days)       Procedure Component Value - Date/Time    Respiratory Panel PCR w/COVID-19(SARS-CoV-2) [facility] In-House, NP Swab in UTM/VTM, 3-4 HR TAT - Swab, Nasopharynx [[id number]]  (Normal) Collected: 06/11/23 1053    Lab Status: Final result Specimen: Swab from Nasopharynx Updated: 06/11/23 1206     ADENOVIRUS, PCR Not Detected     Coronavirus 229E Not Detected     Coronavirus HKU1 Not Detected     Coronavirus NL63 Not Detected     Coronavirus OC43 Not Detected     COVID19 Not Detected     Human Metapneumovirus Not Detected     Human Rhinovirus/Enterovirus Not Detected     Influenza A PCR Not Detected     Influenza B PCR Not Detected     Parainfluenza Virus 1 Not Detected     Parainfluenza Virus 2 Not Detected     Parainfluenza Virus 3 Not Detected     Parainfluenza Virus 4 Not Detected     RSV, PCR Not Detected     Bordetella pertussis pcr Not Detected     Bordetella parapertussis PCR Not Detected     Chlamydophila pneumoniae PCR Not Detected     Mycoplasma pneumo by PCR Not Detected    Narrative:      In the setting of a positive respiratory panel with a viral infection PLUS a negative procalcitonin without other underlying concern for bacterial infection, consider observing off antibiotics or discontinuation of antibiotics and continue  supportive care. If the respiratory panel is positive for atypical bacterial infection (Bordetella pertussis, Chlamydophila pneumoniae, or Mycoplasma pneumoniae), consider antibiotic de-escalation to target atypical bacterial infection.    COVID PRE-OP / PRE-PROCEDURE SCREENING ORDER (NO ISOLATION) - Swab, Nasopharynx [[id number]]  (Normal) Collected: 06/11/23 0851    Lab Status: Final result Specimen: Swab from Nasopharynx Updated: 06/11/23 1052    Narrative:      The following orders were created for panel order COVID PRE-OP / PRE-PROCEDURE SCREENING ORDER (NO ISOLATION) - Swab, Nasopharynx.  Procedure                               Abnormality         Status                     ---------                               -----------         ------                     COVID-19 and FLU A/B PCR...[[id number]]  Normal              Final result                 Please view results for these tests on the individual orders.    COVID-19 and FLU A/B PCR - Swab, Nasopharynx [[id number]]  (Normal) Collected: 06/11/23 0851    Lab Status: Final result Specimen: Swab from Nasopharynx Updated: 06/11/23 1052     COVID19 Presumptive Negative     Influenza A PCR Not Detected     Influenza B PCR Not Detected    Narrative:      Fact sheet for providers: https://www.fda.gov/media/723853/download    Fact sheet for patients: https://www.fda.gov/media/718856/download    Test performed by PCR.    Blood Culture - Blood, Arm, Left [[id number]]  (Normal) Collected: 06/11/23 0850    Lab Status: Preliminary result Specimen: Blood from Arm, Left Updated: 06/15/23 1045     Blood Culture No growth at 4 days    Blood Culture - Blood, Wrist, Left [[id number]]  (Abnormal)  (Susceptibility) Collected: 06/11/23 0830    Lab Status: Final result Specimen: Blood from Wrist, Left Updated: 06/14/23 0634     Blood Culture Klebsiella oxytoca     Isolated from Aerobic Bottle     Gram Stain Aerobic Bottle Gram negative bacilli    Susceptibility        Klebsiella oxytoca       CLYDE      Ampicillin Resistant      Ampicillin + Sulbactam Susceptible      Cefepime Susceptible      Ceftazidime Susceptible      Ceftriaxone Susceptible      Gentamicin Susceptible      Levofloxacin Susceptible      Piperacillin + Tazobactam Susceptible      Trimethoprim + Sulfamethoxazole Susceptible                       Susceptibility Comments       Klebsiella oxytoca    Cefazolin sensitivity will not be reported for Enterobacteriaceae in non-urine isolates. If cefazolin is preferred, please call the microbiology lab to request an E-test.  With the exception of urinary-sourced infections, aminoglycosides should not be used as monotherapy.               Blood Culture ID, PCR - Blood, Wrist, Left [[id number]]  (Abnormal) Collected: 06/11/23 0830    Lab Status: Final result Specimen: Blood from Wrist, Left Updated: 06/12/23 0140     BCID, PCR Klebsiella oxytoca. Identification by BCID2 PCR    Narrative:      No resistance genes detected.                Radiology:  FL ERCP pancreatic and biliary ducts    Result Date: 6/14/2023  Impression: Fluoroscopy provided during ERCP. Electronically Signed: Salvador Holley  6/14/2023 10:30 AM EDT  Workstation ID: [id number]    XR Chest 1 View    Result Date: 6/13/2023  1.Cardiomegaly. 2.Vascular congestion. 3.Small bilateral pleural effusions. 4.Multifocal patchy infiltrates bilaterally. 5.No pneumothorax. Electronically Signed: John Moyer  6/13/2023 10:03 PM EDT  Workstation ID: [id number]         DISCUSSION:  73 y.o. male with history of breast cancer status post recent right mastectomy and initiation of tamoxifen admitted with sepsis with acute pancreatitis and Klebsiella oxytoca bacteremia.   Elevated LFTs including bilirubin though no obstruction noted on CT or ultrasound imaging.  MRCP with cholelithiasis and appearance of the distal CBD which may relate to stones or sludge near the ampulla, consider ERCP.     PROBLEM LIST:   -- Sepsis, with fever/chills, thrombocytopenia,  elevated LFTs.  Due to bacteremia.  -- Klebsiella oxytoca bacteremia, secondary to cholangitis.  -- Elevated LFTs, stable.  No biliary obstruction noted on CT or ultrasound imaging.  MRCP with cholelithiasis and appearance of the distal CBD which may relate to stones or sludge near the ampulla; ERCP with appearance of recently passed stone.  Balloon sweep with pus but no stones.   -- Acute pancreatitis.  Likely gallstone related.  -- Breast cancer, status post recent right mastectomy.  Site is healing well.  Recently started on tamoxifen.  -- Cirrhosis, JEROME.  Splenomegaly likely contributing to thrombocytopenia.  -- Diarrhea, resolved.  Likely antibiotic associated.  Improved with probiotics.    PLAN:  -- Okay for discharge today from my perspective  -- Levofloxacin 750 mg p.o. daily and metronidazole 500 mg p.o. twice daily x8 days sent to his outpatient pharmacy.  -- No follow-up with me needed.  I gave him my office number to call if any issues.  -- Not good candidate for cholecystectomy per GI due to underlying cirrhosis    Plan discussed with patient/family.  Discussed with hospitalist.    UM = 10 minutes    Salo Wolff MD  6/15/2023  16:36 EDT

## 2023-06-16 ENCOUNTER — TRANSITIONAL CARE MANAGEMENT TELEPHONE ENCOUNTER (OUTPATIENT)
Dept: CALL CENTER | Facility: HOSPITAL | Age: 73
End: 2023-06-16
Payer: MEDICARE

## 2023-06-16 LAB — BACTERIA SPEC AEROBE CULT: NORMAL

## 2023-06-16 NOTE — OUTREACH NOTE
Call Center TCM Note      Flowsheet Row Responses   [facility] patient discharged from? [place]   Does the patient have one of the following disease processes/diagnoses(primary or secondary)? Sepsis   TCM attempt successful? Yes   Call start time 1030   Call end time 1035   Discharge diagnosis Sepsis, ERCP   Person spoke with today (if not patient) and relationship Patient   Meds reviewed with patient/caregiver? Yes  [New: levofloxacin, metronidazole, probiotic]   Does the patient have all medications related to this admission filled (includes all antibiotics, inhalers, nebulizers,steroids,etc.) Yes   Is the patient taking all medications as directed (includes completed medication regime)? Yes   Medication comments Denies any medication needs at this time   Comments PCP Dr Ramirez. Hospital follow up scheduled for 6/21/23  3pm with PCP.   Does the patient have an appointment with their PCP within 7 days of discharge? No   Nursing Interventions Assisted patient with making appointment per protocol   Has home health visited the patient within 72 hours of discharge? N/A   Psychosocial issues? No   Did the patient receive a copy of their discharge instructions? Yes   Nursing interventions Reviewed instructions with patient   What is the patient's perception of their health status since discharge? Improving  [patient reports feeling much better]   Is patient/caregiver able to teach back steps to recovery at home? Set small, achievable goals for return to baseline health   If the patient is a current smoker, are they able to teach back resources for cessation? Not a smoker   Is the patient/caregiver able to teach back the hierarchy of who to call/visit for symptoms/problems? PCP, Specialist, Home health nurse, Urgent Care, ED, 911 Yes   TCM call completed? Yes   Call end time 1035   Would this patient benefit from a Referral to Amb Social Work? No   Is the patient interested in additional calls from an ambulatory  ?  NOTE:  applies to high risk patients requiring additional follow-up. No            Floridalma Oneil RN    6/16/2023, 10:36 EDT

## 2023-06-16 NOTE — DISCHARGE SUMMARY
[facility] Medicine Services  DISCHARGE SUMMARY    Patient Name: Elton Funez  : 1950  MRN: [id number]    Date of Admission: 2023  6:51 AM  Date of Discharge:  2023  Primary Care Physician: Tc Ramirez MD    Consults       Date and Time Order Name Status Description    2023  9:01 AM Inpatient Gastroenterology Consult Completed     2023  4:42 AM Inpatient Infectious Diseases Consult Completed             Hospital Course     Presenting Problem: abdominal pain    Active Hospital Problems    Diagnosis  POA    **Sepsis, due to unspecified organism, unspecified whether acute organ dysfunction present [A41.9]  Yes    Idiopathic acute pancreatitis [K85.00]  Unknown    Bacteremia due to Klebsiella pneumoniae [R78.81, B96.1]  Unknown    Malignant neoplasm of central portion of right breast in male, estrogen receptor positive [C50.121, Z17.0]  Not Applicable    Grade I diastolic dysfunction [I51.89]  Yes    Liver cirrhosis secondary to JEROME [K75.81, K74.60]  Yes    Anemia of chronic disease [D63.8]  Yes    Acquired hypothyroidism [E03.9]  Yes    Type 2 diabetes mellitus with diabetic polyneuropathy, with long-term current use of insulin [E11.42, Z79.4]  Not Applicable    FAVIO (obstructive sleep apnea) [G47.33]  Yes    Stage 3a chronic kidney disease [N18.31]  Yes    Gastroesophageal reflux disease without esophagitis [K21.9]  Yes      Resolved Hospital Problems   No resolved problems to display.          Hospital Course:  Elton Funez is a 73 y.o. male with DM, HTN, HLD, FAVIO, CKD, JEROME cirrhosis and breast CA with recent mastectomy ( 2 weeks ago) who presented with n/v found to have acute pancreatitis. Patient's blood cultures are now growing Klebsiella.     Acute Gallstone Pancreatitis   Acute Cholangitis   Klebsiella Bacteremia  -abnormal MRCP, patient underwent ERCP which showed evidence for recently passed stone, balloon sweep with no stones, but revealed  pus.   - ID followed, transitioned IV abx to Levofloxacin 750 mg daily and metronidazole 500 mg BID x8 days   - GI recommends deferring cholecystectomy unless develops severe cholecystitis in setting of underlying cirrhosis, f/u with GI clinic in 6 months    Hypoxia:  - lmproved s/p lasix 40mg x2 doses     Elevated Creatinine on CKD  -close to baseline now around 1.5     JEROME Cirrhosis  Hyperbilirubinemia   - EGD with no evidence for varcies, mild portal gastropathy     FAVIO  -BIPAP     DM II  -A1c 8.6%  -resume home meds, on U500 at home.     Breast CA s/p mastectomy  -Dr MURILLO performed mastectomy  -follows with Dr Archer oncology     HTN  HLD      TCP  -- likely related to cirrhosis, monitor     Discharge Follow Up Recommendations for outpatient labs/diagnostics:  - f/u with GI in 6 months  - PCP in 1-2 weeks    Day of Discharge     HPI:   Patient sitting up in bedside chair. No issues overnight. Feeling much improved. No abdominal pain, tolerating diet.     Review of Systems  Gen- No fevers, chills  CV- No chest pain, palpitations  Resp- No cough, dyspnea  GI- No N/V/D, abd pain    Vital Signs:   Temp:  [98.7 °F (37.1 °C)] 98.7 °F (37.1 °C)  Heart Rate:  [56-59] 59  Resp:  [18] 18  BP: (107-117)/(59-63) 117/63      Physical Exam:  Constitutional: No acute distress, awake, alert  HENT: NCAT, mucous membranes moist  Respiratory: Clear to auscultation bilaterally, respiratory effort normal on O2  Cardiovascular: RRR, no murmurs, rubs, or gallops  Gastrointestinal:  soft, non-tender, nondistended  Musculoskeletal: No bilateral ankle edema  Psychiatric: Appropriate affect, cooperative  Neurologic: Oriented x 3, speech clear, non-focal  Skin: No rashes    Pertinent  and/or Most Recent Results     LAB RESULTS:      Lab 06/14/23  0630 06/14/23  0231 06/13/23  0329 06/12/23  0359 06/11/23  0830   WBC 5.43  --  4.97 7.31 8.97   HEMOGLOBIN 9.7*  --  9.1* 9.2* 11.2*   HEMATOCRIT 31.5*  --  29.4* 28.8* 34.6*   PLATELETS 62*  --   61* 60* 97*   NEUTROS ABS 4.18  --  3.83 6.11 8.46*   IMMATURE GRANS (ABS) 0.04  --  0.03 0.05 0.03   LYMPHS ABS 0.41*  --  0.34* 0.29* 0.18*   MONOS ABS 0.61  --  0.46 0.56 0.22   EOS ABS 0.18  --  0.30 0.28 0.07   .3*  --  99.7* 98.0* 96.4   PROCALCITONIN  --  4.13*  --   --  2.03*   LACTATE  --   --   --   --  2.0         Lab 06/14/23 0630 06/13/23 0329 06/12/23  0359 06/11/23  1756 06/11/23  0830   SODIUM 139 136 136  --  137   POTASSIUM 4.1 4.4 4.6  --  4.3   CHLORIDE 102 104 104  --  104   CO2 22.0 22.0 20.0*  --  21.0*   ANION GAP 15.0 10.0 12.0  --  12.0   BUN 17 23 29*  --  27*   CREATININE 1.50* 1.57* 1.66*  --  1.72*   EGFR 48.6* 46.0* 43.0*  --  41.2*   GLUCOSE 172* 168* 162*  --  118*   CALCIUM 8.9 8.3* 8.3*  --  8.9   HEMOGLOBIN A1C  --   --   --  8.40*  --          Lab 06/14/23 0630 06/13/23 2232 06/13/23 0329 06/12/23  0359 06/11/23  0830   TOTAL PROTEIN 7.1  --  6.7 6.2 7.2   ALBUMIN 3.5  --  3.4* 3.2* 3.9   GLOBULIN 3.6  --  3.3 3.0 3.3   ALT (SGPT) 83*  --  90* 91* 44*   AST (SGOT) 83*  --  111* 143* 94*   BILIRUBIN 2.5*  --  3.1* 4.1* 1.8*   ALK PHOS 209*  --  140* 139* 204*   LIPASE 257* 439*  --   --  >3,000*         Lab 06/14/23  0231 06/14/23  0042 06/13/23  2232   PROBNP  --   --  2,456.0*   HSTROP T 55* 55* 48*                 Brief Urine Lab Results  (Last result in the past 365 days)        Color   Clarity   Blood   Leuk Est   Nitrite   Protein   CREAT   Urine HCG        06/11/23 0900 Yellow   Clear   Negative   Negative   Negative   >=300 mg/dL (3+)                 Microbiology Results (last 10 days)       Procedure Component Value - Date/Time    Respiratory Panel PCR w/COVID-19(SARS-CoV-2) [facility] In-House, NP Swab in UTM/VTM, 3-4 HR TAT - Swab, Nasopharynx [[id number]]  (Normal) Collected: 06/11/23 1053    Lab Status: Final result Specimen: Swab from Nasopharynx Updated: 06/11/23 1206     ADENOVIRUS, PCR Not Detected     Coronavirus 229E Not Detected      Coronavirus HKU1 Not Detected     Coronavirus NL63 Not Detected     Coronavirus OC43 Not Detected     COVID19 Not Detected     Human Metapneumovirus Not Detected     Human Rhinovirus/Enterovirus Not Detected     Influenza A PCR Not Detected     Influenza B PCR Not Detected     Parainfluenza Virus 1 Not Detected     Parainfluenza Virus 2 Not Detected     Parainfluenza Virus 3 Not Detected     Parainfluenza Virus 4 Not Detected     RSV, PCR Not Detected     Bordetella pertussis pcr Not Detected     Bordetella parapertussis PCR Not Detected     Chlamydophila pneumoniae PCR Not Detected     Mycoplasma pneumo by PCR Not Detected    Narrative:      In the setting of a positive respiratory panel with a viral infection PLUS a negative procalcitonin without other underlying concern for bacterial infection, consider observing off antibiotics or discontinuation of antibiotics and continue supportive care. If the respiratory panel is positive for atypical bacterial infection (Bordetella pertussis, Chlamydophila pneumoniae, or Mycoplasma pneumoniae), consider antibiotic de-escalation to target atypical bacterial infection.    COVID PRE-OP / PRE-PROCEDURE SCREENING ORDER (NO ISOLATION) - Swab, Nasopharynx [[id number]]  (Normal) Collected: 06/11/23 0851    Lab Status: Final result Specimen: Swab from Nasopharynx Updated: 06/11/23 1052    Narrative:      The following orders were created for panel order COVID PRE-OP / PRE-PROCEDURE SCREENING ORDER (NO ISOLATION) - Swab, Nasopharynx.  Procedure                               Abnormality         Status                     ---------                               -----------         ------                     COVID-19 and FLU A/B PCR...[[id number]]  Normal              Final result                 Please view results for these tests on the individual orders.    COVID-19 and FLU A/B PCR - Swab, Nasopharynx [[id number]]  (Normal) Collected: 06/11/23 0851    Lab Status: Final result Specimen:  Swab from Nasopharynx Updated: 06/11/23 1052     COVID19 Presumptive Negative     Influenza A PCR Not Detected     Influenza B PCR Not Detected    Narrative:      Fact sheet for providers: https://www.fda.gov/media/945029/download    Fact sheet for patients: https://www.fda.gov/media/957112/download    Test performed by PCR.    Blood Culture - Blood, Arm, Left [[id number]]  (Normal) Collected: 06/11/23 0850    Lab Status: Final result Specimen: Blood from Arm, Left Updated: 06/16/23 1045     Blood Culture No growth at 5 days    Blood Culture - Blood, Wrist, Left [[id number]]  (Abnormal)  (Susceptibility) Collected: 06/11/23 0830    Lab Status: Final result Specimen: Blood from Wrist, Left Updated: 06/14/23 0634     Blood Culture Klebsiella oxytoca     Isolated from Aerobic Bottle     Gram Stain Aerobic Bottle Gram negative bacilli    Susceptibility        Klebsiella oxytoca      CLYDE      Ampicillin Resistant      Ampicillin + Sulbactam Susceptible      Cefepime Susceptible      Ceftazidime Susceptible      Ceftriaxone Susceptible      Gentamicin Susceptible      Levofloxacin Susceptible      Piperacillin + Tazobactam Susceptible      Trimethoprim + Sulfamethoxazole Susceptible                       Susceptibility Comments       Klebsiella oxytoca    Cefazolin sensitivity will not be reported for Enterobacteriaceae in non-urine isolates. If cefazolin is preferred, please call the microbiology lab to request an E-test.  With the exception of urinary-sourced infections, aminoglycosides should not be used as monotherapy.               Blood Culture ID, PCR - Blood, Wrist, Left [[id number]]  (Abnormal) Collected: 06/11/23 0830    Lab Status: Final result Specimen: Blood from Wrist, Left Updated: 06/12/23 0140     BCID, PCR Klebsiella oxytoca. Identification by BCID2 PCR    Narrative:      No resistance genes detected.            CT Abdomen Pelvis With Contrast    Result Date: 6/11/2023  CT ABDOMEN PELVIS W CONTRAST Date of  Exam: 6/11/2023 9:32 AM EDT Indication: Acute pancreatitis and elevated LFTs w/ fever/sepsis. Comparison: 5/17/2023 Technique: Axial CT images were obtained of the abdomen and pelvis following the uneventful intravenous administration of 89 cc Isovue-300. Reconstructed coronal and sagittal images were also obtained. Automated exposure control and iterative construction methods were used. Findings: Lower Chest: Granulomas in the lung bases Organs: Peripancreatic inflammatory stranding with no walled off focal fluid collection. Pancreas enhances diffusely. No pancreatic duct dilatation. Patent splenic vein. Normal sized gallbladder with no convincing pericholecystic stranding. Bile ducts normal in caliber. Nodular contour of the liver with no focal lesion or intrahepatic biliary duct dilatation. Stable mild splenomegaly. Kidneys, adrenal glands unremarkable Gastrointestinal: No acute abnormality Pelvis: No abnormal fluid collection. Urinary bladder unremarkable Peritoneum/Retroperitoneum: No ascites or pneumoperitoneum. Normal caliber aorta Bones/Soft Tissues: No acute bony abnormality     1. Findings of acute pancreatitis, with no evidence of pancreatic necrosis, pseudocyst, or pancreatic duct obstruction 2. Cirrhosis with stable mild splenomegaly and no ascites Electronically Signed: Leopoldo Stafford  6/11/2023 9:49 AM EDT  Workstation ID: [id number]    FL ERCP pancreatic and biliary ducts    Result Date: 6/14/2023  FL ERCP PANCREATIC AND BILIARY DUCTS Date of Exam: 6/14/2023 9:32 AM EDT Indication: ENDOSCOPIC RETROGRADE CHOLANGIOPANCREATOGRAPHY. Comparison: None available. Technique:  A series of radiographic digital spot films were obtained in conjunction with a endoscopic catheterization of the biliary and pancreatic ductal system, performed by the gastroenterologist. Fluoroscopic Time: 1 minute 31 seconds Number of Images: 2 Findings: Dictation is to record 1 minute 31 seconds of fluoroscopy time. 2 intraprocedural  images obtained show endoscope and side cannula placement, contrast injection of the common duct and what appears to be balloon sweep. Final image shows no evidence of filling defect to suggest any common duct stone. Please see the procedure report for full details.     Impression: Fluoroscopy provided during ERCP. Electronically Signed: Salvador Choid  6/14/2023 10:30 AM EDT  Workstation ID: [id number]    US Gallbladder    Result Date: 6/11/2023  US GALLBLADDER Date of Exam: 6/11/2023 11:32 AM EDT Indication: Elevated LFTs w/ acute pancreatitis and fever. Comparison: No comparisons available. Technique: Grayscale and color Doppler ultrasound evaluation of the right upper quadrant was performed. Findings: No peripancreatic fluid collection or pancreatic duct dilatation Liver demonstrates coarsened echotexture. No focal lesion demonstrated. No intrahepatic biliary duct dilatation. Hepatopetal flow demonstrated in the main portal vein. Patent interrogated hepatic vein Gallbladder normal in size and wall thickness. No pericholecystic fluid. No shadowing stones in the gallbladder lumen. Some low-level echoes in the gallbladder lumen, mostly in the neck, compatible with sludge. Common duct normal at 4 mm Right kidney normal in echogenicity with no hydronephrosis     Impression: 1. No evidence of cholelithiasis, cholecystitis, or biliary obstruction. There is sludge in the gallbladder lumen 2. Cirrhotic liver echotexture with hepatopetal portal venous flow 3. No abnormal peripancreatic fluid collection 4. Normal ultrasound appearance of the right kidney Electronically Signed: Leopoldo Stafford  6/11/2023 11:52 AM EDT  Workstation ID: [id number]    XR Chest 1 View    Result Date: 6/13/2023  XR CHEST 1 VW Date of Exam: 6/13/2023 9:17 PM EDT Indication: shortness of breath Comparison: 6/11/2023 Findings: As below.     1.Cardiomegaly. 2.Vascular congestion. 3.Small bilateral pleural effusions. 4.Multifocal patchy infiltrates bilaterally.  5.No pneumothorax. Electronically Signed: John Moyer  6/13/2023 10:03 PM EDT  Workstation ID: [id number]    XR Chest 1 View    Result Date: 6/11/2023  XR CHEST 1 VW Date of Exam: 6/11/2023 7:07 AM EDT Indication: N/V and chills Comparison: May 17, 2023, August 30, 2021 FINDINGS: No definite focal or diffuse pulmonary infiltrate is identified.  No pneumothorax or significant pleural effusion.  Heart size and mediastinal contour appear unchanged. Fusion hardware is seen in the lower cervical and upper thoracic spine, as before.     No radiographic findings of acute cardiopulmonary abnormality. Electronically Signed: Bob Morrisonthais  6/11/2023 7:55 AM EDT  Workstation ID: [id number]    MRI abdomen wo contrast mrcp    Result Date: 6/13/2023  MRI ABDOMEN WO CONTRAST MRCP Date of Exam: 6/12/2023 4:40 PM EDT Indication: Pancreatitis, acute, initial episode . History of breast cancer  Comparison: CT abdomen and pelvis without contrast 6/11/2023 Technique:  Routine multiplanar/multisequence images of the abdomen were obtained with MRCP sequences without contrast administration. Findings: Visualized lower chest demonstrates normal heart size. No pericardial effusion or pleural effusion. Postsurgical changes at the inferior aspect of the right breast related to recent right mastectomy. The liver is nodular in contour consistent with cirrhotic morphology. No liver lesion within limits of a noncontrast exam. Gallbladder is distended with layering sludge/stones. Common bile duct measures 5 mm. No intrahepatic biliary ductal dilatation. There is a horizontal meniscus appearance of the distal CBD which could relate to sludge or stones near ampulla (17/60). The spleen is enlarged measuring 15 cm in craniocaudal length. Edema surrounding the pancreas has improved in the prior study with a small amount of peripancreatic edema abutting the pancreatic tail (2/22). Main pancreatic duct at the upper limits of normal measuring 3 mm. No  organized peripancreatic fluid collection. Kidneys symmetric in size. No hydronephrosis. No perinephric fluid collection. Abdominal aorta without aneurysm. Normal portal vein, splenic vein and superior mesenteric vein flow void. No dilated bowel loops in the upper abdomen. Trace perihepatic ascites.     Impression: 1. Common bile duct normal in caliber measuring 5 mm. Horizontal meniscus appearance of the distal CBD which may relate to stones or sludge near the ampulla, consider ERCP. 2. Acute interstitial pancreatitis improved from prior CT. 3. Cholelithiasis. 4. Cirrhotic liver with splenomegaly. 5. Postsurgical changes of right mastectomy partially imaged. Electronically Signed: Ed Mouser  6/13/2023 8:45 AM EDT  Workstation ID: [id number]     Results for orders placed in visit on 02/25/20    SCANNED VASCULAR STUDIES      Results for orders placed in visit on 02/25/20    SCANNED VASCULAR STUDIES      Results for orders placed during the hospital encounter of 03/20/23    Adult Transthoracic Echo Complete W/ Cont if Necessary Per Protocol    Interpretation Summary    Left ventricular ejection fraction appears to be 61 - 65%.    Left ventricular diastolic function is consistent with (grade I) impaired relaxation.    Calculated right ventricular systolic pressure from tricuspid regurgitation is 16 mmHg.      Plan for Follow-up of Pending Labs/Results:     Discharge Details        Discharge Medications        New Medications        Instructions Start Date   levoFLOXacin 750 MG tablet  Commonly known as: Levaquin   750 mg, Oral, Daily      metroNIDAZOLE 500 MG tablet  Commonly known as: FLAGYL   500 mg, Oral, 2 Times Daily      saccharomyces boulardii 250 MG capsule  Commonly known as: FLORASTOR   250 mg, Oral, 2 Times Daily             Changes to Medications        Instructions Start Date   carvedilol 6.25 MG tablet  Commonly known as: COREG  What changed: when to take this   TAKE 1 TABLET EVERY 12 HOURS      William MEJIA  U-500 KwikPen 500 UNIT/ML solution pen-injector CONCENTRATED injection  Generic drug: Insulin Regular Human (Conc)  What changed: how to take this   Inject sc 160 u ac breakfast and 180 u  at dinner and about 40 units at hs.      ipratropium 0.06 % nasal spray  Commonly known as: ATROVENT  What changed: See the new instructions.   USE 2 SPRAYS IN EACH NOSTRIL AS DIRECTED BY PROVIDER DAILY      simvastatin 40 MG tablet  Commonly known as: ZOCOR  What changed: when to take this   TAKE 1 TABLET DAILY AT BEDTIME      temazepam 7.5 MG capsule  Commonly known as: Restoril  What changed:   medication strength  how much to take   7.5 mg, Oral, Nightly PRN             Continue These Medications        Instructions Start Date   Alpha-Lipoic Acid 600 MG tablet   600 mg, Oral, Daily      B-D UF III MINI PEN NEEDLES 31G X 5 MM misc  Generic drug: Insulin Pen Needle   USE THREE TIMES A DAY      Co Q 10 100 MG capsule   300 mg, Oral, Daily      colestipol 1 g tablet  Commonly known as: COLESTID   TAKE 2 TABLETS TWICE A DAY      ferrous sulfate 325 (65 FE) MG tablet  Commonly known as: FeroSul   325 mg, Oral, 2 Times Daily      Flaxseed Oil 1400 MG capsule   1 capsule, Oral, 2 Times Daily      FreeStyle Winston 2 [place] device   1 Device, Does not apply, Every 14 Days      FreeStyle Winston 2 Sensor misc   1 each, Does not apply, Every 14 Days      gabapentin 800 MG tablet  Commonly known as: NEURONTIN   TAKE 1 TABLET FOUR TIMES A DAY      gemfibrozil 600 MG tablet  Commonly known as: LOPID   TAKE 1 TABLET TWICE A DAY      levothyroxine 50 MCG tablet  Commonly known as: SYNTHROID, LEVOTHROID   TAKE 1 TABLET DAILY      multivitamin tablet tablet  Commonly known as: THERAGRAN   1 tablet, Oral, Daily      omeprazole 40 MG capsule  Commonly known as: priLOSEC   TAKE 1 CAPSULE DAILY      sertraline 100 MG tablet  Commonly known as: ZOLOFT   100 mg, Oral, Daily      spironolactone 25 MG tablet  Commonly known as: ALDACTONE   TAKE 1 TABLET  DAILY      tamoxifen 20 MG chemo tablet  Commonly known as: NOLVADEX   20 mg, Oral, Daily      tiZANidine 4 MG tablet  Commonly known as: ZANAFLEX   4 mg, Oral, 2 Times Daily PRN      Trulicity 4.5 MG/0.5ML solution pen-injector  Generic drug: Dulaglutide   4.5 mg, Subcutaneous, Weekly      VITAMIN B COMPLEX PO   1 tablet, Oral, Daily      vitamin D 1.25 MG (79052 UT) capsule capsule  Commonly known as: ERGOCALCIFEROL   50,000 Units, Oral, Every 7 Days, FRIDAY               Allergies   Allergen Reactions    Glucophage Xr [Metformin Hcl Er] Diarrhea and Other (See Comments)     Glucophage XR TB24: Adverse Reaction: Severe GI issues.    Metformin Nausea And Vomiting     Other reaction(s): SEVERE INTESTIONAL ISSUES  Other reaction(s): SEVERE GI ISSUES    Glucophage XR TB24  MetFORMIN HCl TABS    Tetracyclines & Related Nausea Only     Adverse Reaction    Chlorhexidine Rash         Discharge Disposition:  Home or Self Care    Diet:  Hospital:No active diet order      Activity:  Activity Instructions    Activity as tolerated           Restrictions or Other Recommendations:  - none       CODE STATUS:    Code Status and Medical Interventions:   Ordered at: 06/11/23 1136     Code Status (Patient has no pulse and is not breathing):    CPR (Attempt to Resuscitate)     Medical Interventions (Patient has pulse or is breathing):    Full Support       Future Appointments   Date Time Provider Department Center   6/21/2023  3:00 PM Tc Ramirez MD [facility]   8/1/2023  2:30 PM Pooaj Prieto APRN [facility]   9/5/2023  2:15 PM Pau Colin MD [facility]   9/11/2023 10:00 AM Tc Ramirez MD [facility]   12/5/2023  8:30 AM Elina Alarcon APRN [facility]       Additional Instructions for the Follow-ups that You Need to Schedule       Discharge Follow-up with PCP   As directed       Currently Documented PCP:    Tc Ramirez MD    PCP Phone Number:    [phone]     Follow Up  Details: One week         Discharge Follow-up with Specified Provider: [facility]   As directed      To: [facility]    Follow Up Details: 6 months         Discharge Follow-up with Specified Provider: Dr. Wolff with [facility]   As directed      To: Dr. Wolff with [facility]    Follow Up Details: as needed                       Radha Weeks DO  06/16/23      Time Spent on Discharge:  I spent  35 minutes on this discharge activity which included: face-to-face encounter with the patient, reviewing the data in the system, coordination of the care with the nursing staff as well as consultants, documentation, and entering orders.

## 2023-08-02 ENCOUNTER — OFFICE VISIT (OUTPATIENT)
Dept: ONCOLOGY | Facility: CLINIC | Age: 73
End: 2023-08-02
Payer: MEDICARE

## 2023-08-02 VITALS
TEMPERATURE: 97.6 F | BODY MASS INDEX: 31.37 KG/M2 | HEIGHT: 68 IN | OXYGEN SATURATION: 95 % | RESPIRATION RATE: 18 BRPM | DIASTOLIC BLOOD PRESSURE: 64 MMHG | HEART RATE: 104 BPM | WEIGHT: 207 LBS | SYSTOLIC BLOOD PRESSURE: 87 MMHG

## 2023-08-02 DIAGNOSIS — C50.121 MALIGNANT NEOPLASM OF CENTRAL PORTION OF RIGHT BREAST IN MALE, ESTROGEN RECEPTOR POSITIVE: Primary | ICD-10-CM

## 2023-08-02 DIAGNOSIS — Z17.0 MALIGNANT NEOPLASM OF CENTRAL PORTION OF RIGHT BREAST IN MALE, ESTROGEN RECEPTOR POSITIVE: Primary | ICD-10-CM

## 2023-08-08 DIAGNOSIS — Z17.0 MALIGNANT NEOPLASM OF CENTRAL PORTION OF RIGHT BREAST IN MALE, ESTROGEN RECEPTOR POSITIVE: Primary | ICD-10-CM

## 2023-08-08 DIAGNOSIS — C50.121 MALIGNANT NEOPLASM OF CENTRAL PORTION OF RIGHT BREAST IN MALE, ESTROGEN RECEPTOR POSITIVE: Primary | ICD-10-CM

## 2023-08-11 ENCOUNTER — TELEPHONE (OUTPATIENT)
Dept: ENDOCRINOLOGY | Facility: CLINIC | Age: 73
End: 2023-08-11

## 2023-08-11 NOTE — TELEPHONE ENCOUNTER
Caller: [facility]    Best call back number: [phone]    Patient is needing: JOSE M CALLED FROM [facility] STATING THAT THEY HAD RECEIVED THE CHART NOTES THAT WERE FAXED BUT THERE IS NO SIGNATURE ON IT

## 2023-09-05 ENCOUNTER — OFFICE VISIT (OUTPATIENT)
Dept: ENDOCRINOLOGY | Facility: CLINIC | Age: 73
End: 2023-09-05
Payer: MEDICARE

## 2023-09-05 VITALS
SYSTOLIC BLOOD PRESSURE: 120 MMHG | BODY MASS INDEX: 31.37 KG/M2 | WEIGHT: 207 LBS | DIASTOLIC BLOOD PRESSURE: 64 MMHG | HEIGHT: 68 IN | HEART RATE: 84 BPM

## 2023-09-05 DIAGNOSIS — E11.42 DIABETIC PERIPHERAL NEUROPATHY ASSOCIATED WITH TYPE 2 DIABETES MELLITUS: ICD-10-CM

## 2023-09-05 DIAGNOSIS — E03.9 ACQUIRED HYPOTHYROIDISM: ICD-10-CM

## 2023-09-05 DIAGNOSIS — E11.65 TYPE 2 DIABETES MELLITUS WITH HYPERGLYCEMIA, UNSPECIFIED WHETHER LONG TERM INSULIN USE: Primary | ICD-10-CM

## 2023-09-05 LAB
EXPIRATION DATE: ABNORMAL
EXPIRATION DATE: NORMAL
GLUCOSE BLDC GLUCOMTR-MCNC: 251 MG/DL (ref 70–130)
HBA1C MFR BLD: 7.3 %
Lab: ABNORMAL
Lab: NORMAL
T4 FREE SERPL-MCNC: 0.87 NG/DL (ref 0.93–1.7)
TSH SERPL DL<=0.05 MIU/L-ACNC: 1.95 UIU/ML (ref 0.27–4.2)

## 2023-09-05 PROCEDURE — 80053 COMPREHEN METABOLIC PANEL: CPT | Performed by: INTERNAL MEDICINE

## 2023-09-05 PROCEDURE — 84439 ASSAY OF FREE THYROXINE: CPT | Performed by: INTERNAL MEDICINE

## 2023-09-05 PROCEDURE — 84443 ASSAY THYROID STIM HORMONE: CPT | Performed by: INTERNAL MEDICINE

## 2023-09-05 RX ORDER — INSULIN DETEMIR 100 [IU]/ML
70 INJECTION, SOLUTION SUBCUTANEOUS NIGHTLY
Qty: 30 ML
Start: 2023-09-05

## 2023-09-05 RX ORDER — INSULIN LISPRO 100 [IU]/ML
30 INJECTION, SOLUTION INTRAVENOUS; SUBCUTANEOUS 3 TIMES DAILY
Qty: 30 ML | Refills: 6
Start: 2023-09-05

## 2023-09-05 NOTE — PROGRESS NOTES
Chief complaint  Diabetes (Patient is here for follow up on Diabetes. Freestyle winston device has been downloaded for review. He reports episodes of hyperglycemia and hypoglycemic. He has forms with him today for diabetic shoes. )    Subjective   Elton Funez is a 73 y.o. male is here today for follow-up    Diabetes mellitus type 2, uncontrolled dx in 2000  Diabetic complications: neuropathy, retinopathy.   Past meds: Metformin caused diarrhea in the past.   Eye care:no retinopathy.   Monitoring - freestyle winston reviewed.   Foot care and dental care: discussed.     Medications: Trulicity, doing well and glucose improved.   U-500 insulin BID-TID. He has an unusual schedule and stays up at night and sleeps in the afternoon. CGM was downloaded and analyzed.   Insulin dose requirement reduced significantly after the Trulicity start. He had some low glucose episodes and decreased insulin.     Hypothyroidism - on levothyroxine.        He is doing well, glucose is variable related to meals.     Medications    Current Outpatient Medications:     Alpha-Lipoic Acid 600 MG tablet, Take 1 tablet by mouth Daily., Disp: , Rfl:     B Complex Vitamins (VITAMIN B COMPLEX PO), Take 1 tablet by mouth Daily., Disp: , Rfl:     B-D UF III MINI PEN NEEDLES 31G X 5 MM misc, USE THREE TIMES A DAY, Disp: 270 each, Rfl: 1    carvedilol (COREG) 6.25 MG tablet, TAKE 1 TABLET EVERY 12 HOURS (Patient taking differently: Take 1 tablet by mouth 2 (Two) Times a Day With Meals.), Disp: 180 tablet, Rfl: 1    Coenzyme Q10 (Co Q 10) 100 MG capsule, Take 300 mg by mouth Daily., Disp: , Rfl:     colestipol (COLESTID) 1 g tablet, TAKE 2 TABLETS TWICE A DAY (Patient taking differently: Take 2 tablets by mouth 2 (Two) Times a Day.), Disp: 360 tablet, Rfl: 3    Continuous Blood Gluc  (FreeStyle Winston 2 [place]) device, 1 Device Every 14 (Fourteen) Days., Disp: 1 each, Rfl: 0    Continuous Blood Gluc Sensor (FreeStyle Winston 2 Sensor) misc, 1 each  Every 14 (Fourteen) Days., Disp: 6 each, Rfl: 3    Dulaglutide (Trulicity) 4.5 MG/0.5ML solution pen-injector, Inject 0.5 mL under the skin into the appropriate area as directed 1 (One) Time Per Week., Disp: 6 mL, Rfl: 1    ferrous sulfate (FeroSul) 325 (65 FE) MG tablet, Take 1 tablet by mouth 2 (Two) Times a Day., Disp: 180 tablet, Rfl: 1    Flaxseed, Linseed, (Flaxseed Oil) 1400 MG capsule, Take 1 capsule by mouth 2 (Two) Times a Day., Disp: , Rfl:     gabapentin (NEURONTIN) 800 MG tablet, Take 1 tablet by mouth 4 (Four) Times a Day., Disp: 360 tablet, Rfl: 1    gemfibrozil (LOPID) 600 MG tablet, TAKE 1 TABLET TWICE A DAY (Patient taking differently: Take 1 tablet by mouth 2 (Two) Times a Day.), Disp: 180 tablet, Rfl: 3    Insulin Regular Human, Conc, (HumuLIN R U-500 KwikPen) 500 UNIT/ML solution pen-injector CONCENTRATED injection, Inject sc 160 u ac breakfast and 180 u  at dinner and about 40 units at hs. (Patient taking differently: Inject  under the skin into the appropriate area as directed. Inject sc 160 u ac breakfast and 180 u  at dinner and about 40 units at hs.), Disp: 60 mL, Rfl: 0    ipratropium (ATROVENT) 0.06 % nasal spray, USE 2 SPRAYS IN EACH NOSTRIL AS DIRECTED BY PROVIDER DAILY (Patient taking differently: 2 sprays into the nostril(s) as directed by provider Daily.), Disp: 45 mL, Rfl: 1    levothyroxine (SYNTHROID, LEVOTHROID) 75 MCG tablet, Take 1 tablet by mouth Daily., Disp: 90 tablet, Rfl: 1    Multiple Vitamins-Minerals (MULTIVITAMIN PO), Take 1 tablet by mouth Daily., Disp: , Rfl:     omeprazole (priLOSEC) 40 MG capsule, TAKE 1 CAPSULE DAILY, Disp: 90 capsule, Rfl: 3    saccharomyces boulardii (FLORASTOR) 250 MG capsule, Take 1 capsule by mouth 2 (Two) Times a Day., Disp: 30 capsule, Rfl: 0    sertraline (ZOLOFT) 100 MG tablet, Take 1 tablet by mouth Daily., Disp: 90 tablet, Rfl: 1    simvastatin (ZOCOR) 40 MG tablet, TAKE 1 TABLET DAILY AT BEDTIME (Patient taking differently: Take 1  "tablet by mouth Every Night.), Disp: 90 tablet, Rfl: 3    spironolactone (ALDACTONE) 25 MG tablet, TAKE 1 TABLET DAILY (Patient taking differently: Take 1 tablet by mouth Daily.), Disp: 90 tablet, Rfl: 3    tamoxifen (NOLVADEX) 20 MG chemo tablet, Take 1 tablet by mouth Daily., Disp: 90 tablet, Rfl: 3    temazepam (Restoril) 7.5 MG capsule, Take 1 capsule by mouth At Night As Needed for Sleep., Disp: , Rfl:     tiZANidine (ZANAFLEX) 4 MG tablet, Take 1 tablet by mouth 2 (Two) Times a Day As Needed for Muscle Spasms., Disp: 180 tablet, Rfl: 0    vitamin D (ERGOCALCIFEROL) 1.25 MG (28103 UT) capsule capsule, Take 1 capsule by mouth Every 7 (Seven) Days. FRIDAY, Disp: , Rfl:     insulin detemir (Levemir FlexTouch) 100 UNIT/ML injection, Inject 70 Units under the skin into the appropriate area as directed Every Night., Disp: 30 mL, Rfl: ML    Insulin Lispro, 1 Unit Dial, (HumaLOG KwikPen) 100 UNIT/ML solution pen-injector, Inject 30 Units under the skin into the appropriate area as directed 3 (Three) Times a Day., Disp: 30 mL, Rfl: 6    Review of systems  Review of Systems   Constitutional:  Positive for fatigue.   Musculoskeletal:  Positive for arthralgias, back pain and neck pain.   Neurological:  Positive for numbness.   All other systems reviewed and are negative.    Physical exam  Objective   Blood pressure 120/64, pulse 84, height 172.7 cm (67.99\"), weight 93.9 kg (207 lb). Body mass index is 31.48 kg/m².  Physical Exam   Constitutional: He is oriented to person, place, and time. He appears well-developed and well-nourished.   HENT:   Head: Normocephalic and atraumatic.   Eyes: Conjunctivae are normal.   Neck: No thyromegaly present.   Cardiovascular: Normal rate, regular rhythm, normal heart sounds and normal pulses.   Pulmonary/Chest: Effort normal and breath sounds normal.   Neurological: He is alert and oriented to person, place, and time.   Psychiatric: Thought content normal.       LABS AND IMAGING  Office " Visit on 09/05/2023   Component Date Value Ref Range Status    Hemoglobin A1C 09/05/2023 7.3  % Final    Lot Number 09/05/2023 [id number]   Final    Expiration Date 09/05/2023 05/07/2025   Final    Glucose 09/05/2023 251 (A)  70 - 130 mg/dL Final    Lot Number 09/05/2023 [id number]   Final    Expiration Date 09/05/2023 1,282,024   Final       Assessment  Assessment & Plan   1. Type 2 diabetes mellitus with hyperglycemia, unspecified whether long term insulin use    2. Acquired hypothyroidism    3. Diabetic peripheral neuropathy associated with type 2 diabetes mellitus        Plan      CGM downloaded and analyzed. He has variable glucose levels with hyperglycemia after meals and hypoglycemia 4-6 hours after injections. New insulin instructions provided.        Patient Instructions       Continue Trulicity 4.5 weekly     Continue U-500 until current supply is over  Units at noon, 30-40 at dinner.       ----------------------------------------------------------------------------------    New Insulin dosing:  Basal insulin Levemir  or Lantus  70 Units nightly             Meal Insulin Humalog (U-100)  30 units before meals.   Correction insulin (add to meal insulin)   0 unit  if glucose  less than 150   2unit   if glucose  150 - 199   4 units if glucose 200 - 249   6 units if glucose 250 - 299   8 units if glucose 300 - 349   10  units if glucose Greater than 350             Nutritional Recommendations:    4-5 carb portions per meal for male (60-75 gm of carbs)    Physical Activity Goals:  Walk at least 15 min every day, ideally exercise 45-60 min most days (could be done in short bouts of 10-15 minutes.    Keep records of your glucose levels and insulin adjustments. We may ask you to keep records on the content of your meals with insulin doses and before/after meal glucose levels to evaluate your ratios.  Call for advice if you have unexplained or unexpected hypoglycemia  (glucose < 60) or persistent high glucose >  300.  Office: [phone]      Pau Colin MD           2.Cont levothyroxine 75 mcg, thyroid function ordered with next lab draw.       Follow-up in 2 months

## 2023-09-05 NOTE — PATIENT INSTRUCTIONS
Results for orders placed or performed in visit on 09/05/23   POC Glycosylated Hemoglobin (Hb A1C)    Specimen: Blood   Result Value Ref Range    Hemoglobin A1C 7.3 %    Lot Number [id number]     Expiration Date 05/07/2025    POC Glucose, Blood    Specimen: Blood   Result Value Ref Range    Glucose 251 (A) 70 - 130 mg/dL    Lot Number [id number]     Expiration Date 1,282,024      Continue Trulicity 4.5 weekly     Continue U-500 until current supply is over  Units at noon, 30-40 at dinner.       ----------------------------------------------------------------------------------    New Insulin dosing:  Basal insulin Levemir  or Lantus  70 Units nightly             Meal Insulin Humalog (U-100)  30 units before meals.   Correction insulin (add to meal insulin)   0 unit  if glucose  less than 150   2unit   if glucose  150 - 199   4 units if glucose 200 - 249   6 units if glucose 250 - 299   8 units if glucose 300 - 349   10  units if glucose Greater than 350             Nutritional Recommendations:    4-5 carb portions per meal for male (60-75 gm of carbs)    Physical Activity Goals:  Walk at least 15 min every day, ideally exercise 45-60 min most days (could be done in short bouts of 10-15 minutes.    Keep records of your glucose levels and insulin adjustments. We may ask you to keep records on the content of your meals with insulin doses and before/after meal glucose levels to evaluate your ratios.  Call for advice if you have unexplained or unexpected hypoglycemia  (glucose < 60) or persistent high glucose > 300.  Office: [phone]      Pau Colin MD

## 2023-09-06 LAB
ALBUMIN SERPL-MCNC: 4.6 G/DL (ref 3.5–5.2)
ALBUMIN/GLOB SERPL: 1.3 G/DL
ALP SERPL-CCNC: 77 U/L (ref 39–117)
ALT SERPL W P-5'-P-CCNC: 19 U/L (ref 1–41)
ANION GAP SERPL CALCULATED.3IONS-SCNC: 12 MMOL/L (ref 5–15)
AST SERPL-CCNC: 31 U/L (ref 1–40)
BILIRUB SERPL-MCNC: 0.5 MG/DL (ref 0–1.2)
BUN SERPL-MCNC: 30 MG/DL (ref 8–23)
BUN/CREAT SERPL: 18.8 (ref 7–25)
CALCIUM SPEC-SCNC: 9.5 MG/DL (ref 8.6–10.5)
CHLORIDE SERPL-SCNC: 104 MMOL/L (ref 98–107)
CO2 SERPL-SCNC: 22 MMOL/L (ref 22–29)
CREAT SERPL-MCNC: 1.6 MG/DL (ref 0.76–1.27)
EGFRCR SERPLBLD CKD-EPI 2021: 44.9 ML/MIN/1.73
GLOBULIN UR ELPH-MCNC: 3.5 GM/DL
GLUCOSE SERPL-MCNC: 217 MG/DL (ref 65–99)
POTASSIUM SERPL-SCNC: 4.4 MMOL/L (ref 3.5–5.2)
PROT SERPL-MCNC: 8.1 G/DL (ref 6–8.5)
SODIUM SERPL-SCNC: 138 MMOL/L (ref 136–145)

## 2023-09-11 ENCOUNTER — OFFICE VISIT (OUTPATIENT)
Dept: INTERNAL MEDICINE | Facility: CLINIC | Age: 73
End: 2023-09-11
Payer: MEDICARE

## 2023-09-11 VITALS
BODY MASS INDEX: 31.63 KG/M2 | WEIGHT: 208 LBS | SYSTOLIC BLOOD PRESSURE: 110 MMHG | RESPIRATION RATE: 18 BRPM | TEMPERATURE: 97.5 F | HEART RATE: 84 BPM | DIASTOLIC BLOOD PRESSURE: 66 MMHG

## 2023-09-11 DIAGNOSIS — K21.9 GASTROESOPHAGEAL REFLUX DISEASE WITHOUT ESOPHAGITIS: Chronic | ICD-10-CM

## 2023-09-11 DIAGNOSIS — K74.60 LIVER CIRRHOSIS SECONDARY TO NASH: Chronic | ICD-10-CM

## 2023-09-11 DIAGNOSIS — E78.5 DYSLIPIDEMIA, GOAL LDL BELOW 70: Primary | ICD-10-CM

## 2023-09-11 DIAGNOSIS — C50.121 MALIGNANT NEOPLASM OF CENTRAL PORTION OF RIGHT BREAST IN MALE, ESTROGEN RECEPTOR POSITIVE: ICD-10-CM

## 2023-09-11 DIAGNOSIS — N18.31 STAGE 3A CHRONIC KIDNEY DISEASE: ICD-10-CM

## 2023-09-11 DIAGNOSIS — I10 PRIMARY HYPERTENSION: ICD-10-CM

## 2023-09-11 DIAGNOSIS — K85.00 IDIOPATHIC ACUTE PANCREATITIS, UNSPECIFIED COMPLICATION STATUS: ICD-10-CM

## 2023-09-11 DIAGNOSIS — E03.9 ACQUIRED HYPOTHYROIDISM: ICD-10-CM

## 2023-09-11 DIAGNOSIS — D63.8 ANEMIA OF CHRONIC DISEASE: ICD-10-CM

## 2023-09-11 DIAGNOSIS — Z17.0 MALIGNANT NEOPLASM OF CENTRAL PORTION OF RIGHT BREAST IN MALE, ESTROGEN RECEPTOR POSITIVE: ICD-10-CM

## 2023-09-11 DIAGNOSIS — G47.33 OSA (OBSTRUCTIVE SLEEP APNEA): Chronic | ICD-10-CM

## 2023-09-11 DIAGNOSIS — Z79.4 TYPE 2 DIABETES MELLITUS WITH DIABETIC POLYNEUROPATHY, WITH LONG-TERM CURRENT USE OF INSULIN: ICD-10-CM

## 2023-09-11 DIAGNOSIS — E66.9 OBESITY, CLASS I, BMI 30-34.9: Chronic | ICD-10-CM

## 2023-09-11 DIAGNOSIS — K75.81 LIVER CIRRHOSIS SECONDARY TO NASH: Chronic | ICD-10-CM

## 2023-09-11 DIAGNOSIS — E11.42 TYPE 2 DIABETES MELLITUS WITH DIABETIC POLYNEUROPATHY, WITH LONG-TERM CURRENT USE OF INSULIN: ICD-10-CM

## 2023-09-11 NOTE — PROGRESS NOTES
Chief Complaint   Patient presents with    Follow-up     4 month follow up chronic medical problems       History of Present Illness  Elton Funez is a 73 y.o. male with DM, HTN, HLD, FAVIO, CKD, JEROME cirrhosis and breast CA with recent mastectomy ( 2 weeks ago) who presented with n/v found to have acute pancreatitis. Patient's blood cultures are now growing Klebsiella.      The patient presents for followup of T2DM. Since starting Trulicity, his A1c has improved from 8.4 to 7.3. He follows with endocrine for management of his diabetes. He is down to 100u of U500 insulin nightly and 40u of meal time insulin. He has had some hypoglycemic episodes including this morning at 54. He knows how to change his insulin dosing appropriately.     The patient presents for a follow-up related to pancreatitis. No recurring abdominal pain. He has had no fevers. No nausea or vomiting. He is able to eat a normal diet. Weight is stable. Bowel movements are normal.The patient has not had excessive weight loss or a history of sickle cell disease.    The patient presents for a follow-up related to Stage 3a Chronic Kidney Disease. He states that he does monitor his fluid intake. Able to urinate without difficulty. He is not sure how much sodium he eats in a day. He is taking his medications as prescribed.      The patient presents for a followup of GERD. No symptoms of acid reflux. Taking omeprazole daily. No chest pain. No vomiting. Feels that his symptoms are well controlled.     The patient presents for a followup of ER+ Breast cancer: He is on tamoxifen after mastectomy and tolerating very well.     The patient presents for a follow-up related to anemia. There are no reports of blood loss. The patient has fatigue but does not have sweats. The patient's energy level is decreased.    The patient presents for a follow-up related to hypothyroidism. He reports a good energy level. He reports no hair loss, heat intolerance, cold  intolerance, constipation or sweats. He is taking his medication as prescribed.    The patient presents for followup of depression. Mood is well controlled on sertraline.     Medications      Current Outpatient Medications:     Alpha-Lipoic Acid 600 MG tablet, Take 1 tablet by mouth Daily., Disp: , Rfl:     B Complex Vitamins (VITAMIN B COMPLEX PO), Take 1 tablet by mouth Daily., Disp: , Rfl:     B-D UF III MINI PEN NEEDLES 31G X 5 MM misc, USE THREE TIMES A DAY, Disp: 270 each, Rfl: 1    carvedilol (COREG) 6.25 MG tablet, TAKE 1 TABLET EVERY 12 HOURS (Patient taking differently: Take 1 tablet by mouth 2 (Two) Times a Day With Meals.), Disp: 180 tablet, Rfl: 1    Coenzyme Q10 (Co Q 10) 100 MG capsule, Take 300 mg by mouth Daily., Disp: , Rfl:     colestipol (COLESTID) 1 g tablet, TAKE 2 TABLETS TWICE A DAY (Patient taking differently: Take 2 tablets by mouth 2 (Two) Times a Day.), Disp: 360 tablet, Rfl: 3    Continuous Blood Gluc  (FreeStyle Winston 2 [place]) device, 1 Device Every 14 (Fourteen) Days., Disp: 1 each, Rfl: 0    Continuous Blood Gluc Sensor (FreeStyle Winston 2 Sensor) misc, 1 each Every 14 (Fourteen) Days., Disp: 6 each, Rfl: 3    Dulaglutide (Trulicity) 4.5 MG/0.5ML solution pen-injector, Inject 0.5 mL under the skin into the appropriate area as directed 1 (One) Time Per Week., Disp: 6 mL, Rfl: 1    ferrous sulfate (FeroSul) 325 (65 FE) MG tablet, Take 1 tablet by mouth 2 (Two) Times a Day., Disp: 180 tablet, Rfl: 1    Flaxseed, Linseed, (Flaxseed Oil) 1400 MG capsule, Take 1 capsule by mouth 2 (Two) Times a Day., Disp: , Rfl:     gabapentin (NEURONTIN) 800 MG tablet, Take 1 tablet by mouth 4 (Four) Times a Day., Disp: 360 tablet, Rfl: 1    gemfibrozil (LOPID) 600 MG tablet, TAKE 1 TABLET TWICE A DAY (Patient taking differently: Take 1 tablet by mouth 2 (Two) Times a Day.), Disp: 180 tablet, Rfl: 3    Insulin Regular Human, Conc, (HumuLIN R U-500 KwikPen) 500 UNIT/ML solution pen-injector  CONCENTRATED injection, Inject sc 160 u ac breakfast and 180 u  at dinner and about 40 units at hs. (Patient taking differently: Inject  under the skin into the appropriate area as directed. Inject sc 160 u ac breakfast and 180 u  at dinner and about 40 units at hs.), Disp: 60 mL, Rfl: 0    ipratropium (ATROVENT) 0.06 % nasal spray, USE 2 SPRAYS IN EACH NOSTRIL AS DIRECTED BY PROVIDER DAILY (Patient taking differently: 2 sprays into the nostril(s) as directed by provider Daily.), Disp: 45 mL, Rfl: 1    levothyroxine (SYNTHROID, LEVOTHROID) 75 MCG tablet, Take 1 tablet by mouth Daily., Disp: 90 tablet, Rfl: 1    Multiple Vitamins-Minerals (MULTIVITAMIN PO), Take 1 tablet by mouth Daily., Disp: , Rfl:     omeprazole (priLOSEC) 40 MG capsule, TAKE 1 CAPSULE DAILY, Disp: 90 capsule, Rfl: 3    saccharomyces boulardii (FLORASTOR) 250 MG capsule, Take 1 capsule by mouth 2 (Two) Times a Day., Disp: 30 capsule, Rfl: 0    sertraline (ZOLOFT) 100 MG tablet, Take 1 tablet by mouth Daily., Disp: 90 tablet, Rfl: 1    simvastatin (ZOCOR) 40 MG tablet, TAKE 1 TABLET DAILY AT BEDTIME (Patient taking differently: Take 1 tablet by mouth Every Night.), Disp: 90 tablet, Rfl: 3    spironolactone (ALDACTONE) 25 MG tablet, TAKE 1 TABLET DAILY (Patient taking differently: Take 1 tablet by mouth Daily.), Disp: 90 tablet, Rfl: 3    tamoxifen (NOLVADEX) 20 MG chemo tablet, Take 1 tablet by mouth Daily., Disp: 90 tablet, Rfl: 3    temazepam (Restoril) 7.5 MG capsule, Take 1 capsule by mouth At Night As Needed for Sleep., Disp: , Rfl:     tiZANidine (ZANAFLEX) 4 MG tablet, Take 1 tablet by mouth 2 (Two) Times a Day As Needed for Muscle Spasms., Disp: 180 tablet, Rfl: 0    vitamin D (ERGOCALCIFEROL) 1.25 MG (99014 UT) capsule capsule, Take 1 capsule by mouth Every 7 (Seven) Days. FRIDAY, Disp: , Rfl:     insulin detemir (Levemir FlexTouch) 100 UNIT/ML injection, Inject 70 Units under the skin into the appropriate area as directed Every Night.  (Patient not taking: Reported on 9/11/2023), Disp: 30 mL, Rfl: ML    Insulin Lispro, 1 Unit Dial, (HumaLOG KwikPen) 100 UNIT/ML solution pen-injector, Inject 30 Units under the skin into the appropriate area as directed 3 (Three) Times a Day. (Patient not taking: Reported on 9/11/2023), Disp: 30 mL, Rfl: 6   Current outpatient and discharge medications have been reconciled for the patient.  Reviewed by: Madison C Dressler, MD    Allergies    Allergies   Allergen Reactions    Glucophage Xr [Metformin Hcl Er] Diarrhea and Other (See Comments)     Glucophage XR TB24: Adverse Reaction: Severe GI issues.    Metformin Nausea And Vomiting     Other reaction(s): SEVERE INTESTIONAL ISSUES  Other reaction(s): SEVERE GI ISSUES    Glucophage XR TB24  MetFORMIN HCl TABS    Tetracyclines & Related Nausea Only     Adverse Reaction    Chlorhexidine Rash       Problem List    Patient Active Problem List   Diagnosis    Stage 3a chronic kidney disease    Gastroesophageal reflux disease without esophagitis    Dyslipidemia, goal LDL below 70    Primary hypertension    Obesity, Class I, BMI 30-34.9    FAVIO (obstructive sleep apnea)    Cervical radiculopathy    Type 2 diabetes mellitus with diabetic polyneuropathy, with long-term current use of insulin    Circadian rhythm sleep disorder, delayed sleep phase type    Mild nonproliferative diabetic retinopathy of left eye without macular edema associated with type 2 diabetes mellitus    Acquired hypothyroidism    Anemia of chronic disease    Liver cirrhosis secondary to JEROME    Portal hypertensive gastropathy    Grade I diastolic dysfunction    Combined forms of age-related cataract of both eyes    Major depressive disorder with single episode, in full remission    Malignant neoplasm of central portion of right breast in male, estrogen receptor positive    Malignant neoplasm of overlapping sites of right female breast    Sepsis, due to unspecified organism, unspecified whether acute organ  dysfunction present    Idiopathic acute pancreatitis    Bacteremia due to Klebsiella pneumoniae       Medications, Allergies, Problems List and Past History were reviewed and updated.    Physical Examination    /66 (BP Location: Left arm, Patient Position: Sitting, Cuff Size: Adult)   Pulse 84   Temp 97.5 °F (36.4 °C) (Infrared)   Resp 18   Wt 94.3 kg (208 lb)   BMI 31.63 kg/m²       HEENT: Head- Normocephalic Atraumatic. Facies- Within normal limits. Pinnas- Normal texture and shape bilaterally. Conjunctiva- Clear bilaterally. Sclera- Anicteric bilaterally. Oropharynx- Moist with no lesions. Tonsils- No enlargement, erythema or exudate.    Neck: Thyroid- non enlarged, symmetric and has no nodules. No bruits are detected. ROM- Normal Range of Motion with no rigidity.    Chest: Surgical removal of right breast, large scar, healing well, no surrounding erythema or tenderness.     Lungs: Auscultation- Clear to auscultation bilaterally. There are no retractions, clubbing or cyanosis. The Expiratory to Inspiratory ratio is equal.    Lymph Nodes: Cervical- no enlarged lymph nodes noted.    Cardiovascular: There are no carotid bruits. Heart- Normal Rate with Regular rhythm and no murmurs. There are no gallops. There are no rubs. In the lower extremities there is no edema. The upper extremities do not have edema.    Abdomen: Soft, benign, non-tender with no masses, hernias, organomegaly or scars.    Assessment and Plan   Diagnoses and all orders for this visit:    1. Dyslipidemia, goal LDL below 70 (Primary)    2. Primary hypertension    3. Obesity, Class I, BMI 30-34.9    4. Type 2 diabetes mellitus with diabetic polyneuropathy, with long-term current use of insulin    5. Acquired hypothyroidism    6. Gastroesophageal reflux disease without esophagitis    7. Liver cirrhosis secondary to JEROME    8. Idiopathic acute pancreatitis, unspecified complication status    9. Stage 3a chronic kidney disease    10. Anemia  of chronic disease    11. Malignant neoplasm of central portion of right breast in male, estrogen receptor positive    12. FVAIO (obstructive sleep apnea)        Summary: Elton Funez is a 72 y.o. male with IDDM2 with neuropathy/retinopathy/nephropathy, CKD IIIa, anemia of chronic disease, HTN, HLD, NAFLD, hypothyroidism, and recently diagnosed ER+ right-sided stage II breast cancer.    Disordered Circadian Rhythm:   Subacute, new. Patient with inversion of day and night sleep cycles. Not currently interested in therapy to change cycles, is coping well. Sleeping sufficiently during the day.   PLAN:   - Will continue to monitor  - Offered medication therapy when patient ready.      Newly diagnosed stage II ER+ right-sided breast cancer  - Had mastectomy performed in May of 2023. Tolerated well.   - Currently on Tamoxifen, following with oncology.      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 Last A1c 7.3%, goal <7.0%. However, this is improving since starting trulicity. Last saw endocrinology last week who were encouraged by his progress.   PLAN  - Continue gabapentin. Continue insulin per endocrinology management.     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 Stable. Controlled. Baseline Scr 1.6, has recently increased, Hgb 10-11.  - Followed with Nephrology, who evaluated and encouraged dietary modifications. He has another appointment with them in 6 months.   PLAN  - Encouraged control of diabetes.   - Continue oral iron.  - Avoid NSAIDs.     Obesity  BMI 31. Class I.  - Has struggled with weight loss and exercise related to neuropathy. However, has lost approx 12lbs since starting trulicity.   PLAN  - Weight loss counseling provided. Challenged him to start daily walk and to cut out refined sugars from diet. Advised on benefits to blood sugar and cancer recovery.  - Offered physical therapy,  however, patient declined.      Chronic, stable, controlled conditions  # Dyslipidemia: at goal LDL <70, continue simvastatin, gemfibrozil, colestipol, baby aspirin  # GERD: continue omeprazole  # Depression: continue sertraline  # Hypothyroidism: continue levothyroxine  # Essential HTN: at goal <120/80, continue carvedilol      Attending Note:   Patient was personally seen and examined by me.  I have obtained and corroborated the history and examination as documented above, and agree with the accuracy of the documented information.  All orders were reviewed and personally signed by me.   Tc Ramirez MD  11:04 EDT  09/11/23

## 2023-09-18 RX ORDER — SERTRALINE HYDROCHLORIDE 100 MG/1
TABLET, FILM COATED ORAL
Qty: 90 TABLET | Refills: 3 | Status: SHIPPED | OUTPATIENT
Start: 2023-09-18

## 2023-10-14 ENCOUNTER — CLINICAL SUPPORT (OUTPATIENT)
Dept: INTERNAL MEDICINE | Facility: CLINIC | Age: 73
End: 2023-10-14
Payer: MEDICARE

## 2023-10-14 DIAGNOSIS — Z23 NEED FOR INFLUENZA VACCINATION: Primary | ICD-10-CM

## 2023-10-14 PROCEDURE — G0008 ADMIN INFLUENZA VIRUS VAC: HCPCS | Performed by: INTERNAL MEDICINE

## 2023-10-14 PROCEDURE — 90662 IIV NO PRSV INCREASED AG IM: CPT | Performed by: INTERNAL MEDICINE

## 2023-10-23 RX ORDER — DULAGLUTIDE 4.5 MG/.5ML
INJECTION, SOLUTION SUBCUTANEOUS
Qty: 6 ML | Refills: 0 | Status: SHIPPED | OUTPATIENT
Start: 2023-10-23

## 2023-10-23 RX ORDER — MONTELUKAST SODIUM 4 MG/1
TABLET, CHEWABLE ORAL
Qty: 360 TABLET | Refills: 3 | Status: SHIPPED | OUTPATIENT
Start: 2023-10-23

## 2023-10-23 RX ORDER — FLURBIPROFEN SODIUM 0.3 MG/ML
SOLUTION/ DROPS OPHTHALMIC
Qty: 270 EACH | Refills: 3 | Status: SHIPPED | OUTPATIENT
Start: 2023-10-23

## 2023-10-23 RX ORDER — GEMFIBROZIL 600 MG/1
TABLET, FILM COATED ORAL
Qty: 180 TABLET | Refills: 3 | Status: SHIPPED | OUTPATIENT
Start: 2023-10-23

## 2023-10-31 ENCOUNTER — TELEPHONE (OUTPATIENT)
Dept: ENDOCRINOLOGY | Facility: CLINIC | Age: 73
End: 2023-10-31

## 2023-10-31 NOTE — TELEPHONE ENCOUNTER
Caller: ArminElton bull    Relationship: Self    Best call back number: [phone]    Requested Prescriptions:   [facility]     Pharmacy where request should be sent:  [facility] - [phone]    Last office visit with prescribing clinician: 9/5/2023   Last telemedicine visit with prescribing clinician: Visit date not found   Next office visit with prescribing clinician: 12/5/2023     Additional details provided by patient: PHARMACY NEEDS PROVIDER VERIFICATION BEFORE REFILLING. PT HAS APPRX 2 WKS  OF MEDICATION LEFT.     Does the patient have less than a 3 day supply:  [] Yes  [x] No    Would you like a call back once the refill request has been completed: [x] Yes [] No    If the office needs to give you a call back, can they leave a voicemail: [x] Yes [] No    Valencia Carney Rep   10/31/23 12:23 EDT

## 2023-11-02 RX ORDER — DULAGLUTIDE 4.5 MG/.5ML
4.5 INJECTION, SOLUTION SUBCUTANEOUS WEEKLY
Qty: 6 ML | Refills: 3 | Status: SHIPPED | OUTPATIENT
Start: 2023-11-02

## 2023-11-02 NOTE — TELEPHONE ENCOUNTER
PATIENT IS RETURNING OUR CALL, HE STATES HE MISSED A CALL FROM OUR OFFICE THIS MORNING. PHONE NUMBER [phone]. PATIENT ALSO WANTS TO START LANTUS AND INSULIN WHICH WAS DISCUSSED AT HIS APPOINTMENT. HE NEEDS IT CALLED INTO PHARMACY

## 2023-11-02 NOTE — TELEPHONE ENCOUNTER
Pt says he ready for new insulin Lantus and reg insulin short acting as discussed at prior office visit. [facility]. TZ

## 2023-11-22 ENCOUNTER — TELEPHONE (OUTPATIENT)
Dept: ENDOCRINOLOGY | Facility: CLINIC | Age: 73
End: 2023-11-22
Payer: MEDICARE

## 2023-11-22 ENCOUNTER — LAB (OUTPATIENT)
Dept: LAB | Facility: HOSPITAL | Age: 73
End: 2023-11-22
Payer: MEDICARE

## 2023-11-22 ENCOUNTER — TRANSCRIBE ORDERS (OUTPATIENT)
Dept: LAB | Facility: HOSPITAL | Age: 73
End: 2023-11-22
Payer: MEDICARE

## 2023-11-22 DIAGNOSIS — D64.9 ANEMIA, UNSPECIFIED TYPE: ICD-10-CM

## 2023-11-22 DIAGNOSIS — N18.31 CHRONIC KIDNEY DISEASE (CKD) STAGE G3A/A1, MODERATELY DECREASED GLOMERULAR FILTRATION RATE (GFR) BETWEEN 45-59 ML/MIN/1.73 SQUARE METER AND ALBUMINURIA CREATININE RATIO LESS THAN 30 MG/G (CMS/H*: ICD-10-CM

## 2023-11-22 DIAGNOSIS — I10 ESSENTIAL HYPERTENSION, BENIGN: ICD-10-CM

## 2023-11-22 DIAGNOSIS — E11.9 DIABETES MELLITUS WITHOUT COMPLICATION: Primary | ICD-10-CM

## 2023-11-22 DIAGNOSIS — E11.9 DIABETES MELLITUS WITHOUT COMPLICATION: ICD-10-CM

## 2023-11-22 LAB
ALBUMIN SERPL-MCNC: 4.8 G/DL (ref 3.5–5.2)
ANION GAP SERPL CALCULATED.3IONS-SCNC: 13 MMOL/L (ref 5–15)
BACTERIA UR QL AUTO: NORMAL /HPF
BASOPHILS # BLD AUTO: 0.04 10*3/MM3 (ref 0–0.2)
BASOPHILS NFR BLD AUTO: 0.6 % (ref 0–1.5)
BILIRUB UR QL STRIP: NEGATIVE
BUN SERPL-MCNC: 25 MG/DL (ref 8–23)
BUN/CREAT SERPL: 16.1 (ref 7–25)
CALCIUM SPEC-SCNC: 9.5 MG/DL (ref 8.6–10.5)
CHLORIDE SERPL-SCNC: 101 MMOL/L (ref 98–107)
CLARITY UR: CLEAR
CO2 SERPL-SCNC: 22 MMOL/L (ref 22–29)
COLOR UR: YELLOW
CREAT SERPL-MCNC: 1.55 MG/DL (ref 0.76–1.27)
CREAT UR-MCNC: 146.7 MG/DL
DEPRECATED RDW RBC AUTO: 45.1 FL (ref 37–54)
EGFRCR SERPLBLD CKD-EPI 2021: 46.7 ML/MIN/1.73
EOSINOPHIL # BLD AUTO: 0.18 10*3/MM3 (ref 0–0.4)
EOSINOPHIL NFR BLD AUTO: 2.5 % (ref 0.3–6.2)
ERYTHROCYTE [DISTWIDTH] IN BLOOD BY AUTOMATED COUNT: 13.2 % (ref 12.3–15.4)
GLUCOSE SERPL-MCNC: 167 MG/DL (ref 65–99)
GLUCOSE UR STRIP-MCNC: NEGATIVE MG/DL
HCT VFR BLD AUTO: 40.3 % (ref 37.5–51)
HGB BLD-MCNC: 13.9 G/DL (ref 13–17.7)
HGB UR QL STRIP.AUTO: NEGATIVE
HYALINE CASTS UR QL AUTO: NORMAL /LPF
IRON 24H UR-MRATE: 107 MCG/DL (ref 59–158)
IRON SATN MFR SERPL: 19 % (ref 20–50)
KETONES UR QL STRIP: NEGATIVE
LEUKOCYTE ESTERASE UR QL STRIP.AUTO: NEGATIVE
LYMPHOCYTES # BLD AUTO: 1.67 10*3/MM3 (ref 0.7–3.1)
LYMPHOCYTES NFR BLD AUTO: 23.7 % (ref 19.6–45.3)
MCH RBC QN AUTO: 32.4 PG (ref 26.6–33)
MCHC RBC AUTO-ENTMCNC: 34.5 G/DL (ref 31.5–35.7)
MCV RBC AUTO: 93.9 FL (ref 79–97)
MONOCYTES # BLD AUTO: 0.58 10*3/MM3 (ref 0.1–0.9)
MONOCYTES NFR BLD AUTO: 8.2 % (ref 5–12)
NEUTROPHILS NFR BLD AUTO: 4.57 10*3/MM3 (ref 1.7–7)
NEUTROPHILS NFR BLD AUTO: 64.7 % (ref 42.7–76)
NITRITE UR QL STRIP: NEGATIVE
PH UR STRIP.AUTO: 5.5 [PH] (ref 5–8)
PHOSPHATE SERPL-MCNC: 3.6 MG/DL (ref 2.5–4.5)
PLATELET # BLD AUTO: 117 10*3/MM3 (ref 140–450)
PMV BLD AUTO: 12.7 FL (ref 6–12)
POTASSIUM SERPL-SCNC: 4.9 MMOL/L (ref 3.5–5.2)
PROT ?TM UR-MCNC: 147.3 MG/DL
PROT UR QL STRIP: ABNORMAL
RBC # BLD AUTO: 4.29 10*6/MM3 (ref 4.14–5.8)
RBC # UR STRIP: NORMAL /HPF
REF LAB TEST METHOD: NORMAL
SODIUM SERPL-SCNC: 136 MMOL/L (ref 136–145)
SP GR UR STRIP: 1.02 (ref 1–1.03)
SQUAMOUS #/AREA URNS HPF: NORMAL /HPF
TIBC SERPL-MCNC: 557 MCG/DL (ref 298–536)
TRANSFERRIN SERPL-MCNC: 374 MG/DL (ref 200–360)
URATE SERPL-MCNC: 4.3 MG/DL (ref 3.4–7)
UROBILINOGEN UR QL STRIP: ABNORMAL
WBC # UR STRIP: NORMAL /HPF
WBC NRBC COR # BLD AUTO: 7.06 10*3/MM3 (ref 3.4–10.8)

## 2023-11-22 PROCEDURE — 36415 COLL VENOUS BLD VENIPUNCTURE: CPT

## 2023-11-22 PROCEDURE — 85025 COMPLETE CBC W/AUTO DIFF WBC: CPT

## 2023-11-22 PROCEDURE — 81001 URINALYSIS AUTO W/SCOPE: CPT

## 2023-11-22 PROCEDURE — 82306 VITAMIN D 25 HYDROXY: CPT

## 2023-11-22 PROCEDURE — 82728 ASSAY OF FERRITIN: CPT

## 2023-11-22 PROCEDURE — 82570 ASSAY OF URINE CREATININE: CPT

## 2023-11-22 PROCEDURE — 84550 ASSAY OF BLOOD/URIC ACID: CPT

## 2023-11-22 PROCEDURE — 84466 ASSAY OF TRANSFERRIN: CPT

## 2023-11-22 PROCEDURE — 83540 ASSAY OF IRON: CPT

## 2023-11-22 PROCEDURE — 84156 ASSAY OF PROTEIN URINE: CPT

## 2023-11-22 PROCEDURE — 80069 RENAL FUNCTION PANEL: CPT

## 2023-11-22 RX ORDER — INSULIN GLARGINE 100 [IU]/ML
70 INJECTION, SOLUTION SUBCUTANEOUS NIGHTLY
Qty: 75 ML | Refills: 3 | Status: SHIPPED | OUTPATIENT
Start: 2023-11-22

## 2023-11-22 NOTE — TELEPHONE ENCOUNTER
Caller: Elton Funez    Relationship: Self    Best call back number: [phone]    Which medication are you concerned about: LANTUS     Who prescribed you this medication: JORDON     When did you start taking this medication: NA     What are your concerns: PT IS LOW ON INSULIN AND JORDON HAD ADV TO CALL AND SHE WOULD SUBMIT AN RX FOR LANTUS TO [facility].     How long have you had these concerns: NA

## 2023-11-23 LAB
25(OH)D3 SERPL-MCNC: 23.1 NG/ML (ref 30–100)
FERRITIN SERPL-MCNC: 122 NG/ML (ref 30–400)

## 2023-11-27 ENCOUNTER — TELEPHONE (OUTPATIENT)
Dept: INTERNAL MEDICINE | Facility: CLINIC | Age: 73
End: 2023-11-27

## 2023-11-27 ENCOUNTER — OFFICE VISIT (OUTPATIENT)
Dept: INTERNAL MEDICINE | Facility: CLINIC | Age: 73
End: 2023-11-27
Payer: MEDICARE

## 2023-11-27 VITALS
HEART RATE: 64 BPM | BODY MASS INDEX: 31.67 KG/M2 | HEIGHT: 68 IN | DIASTOLIC BLOOD PRESSURE: 62 MMHG | TEMPERATURE: 97.5 F | SYSTOLIC BLOOD PRESSURE: 128 MMHG | WEIGHT: 209 LBS

## 2023-11-27 DIAGNOSIS — Z12.5 PROSTATE CANCER SCREENING: ICD-10-CM

## 2023-11-27 DIAGNOSIS — E78.5 DYSLIPIDEMIA, GOAL LDL BELOW 70: ICD-10-CM

## 2023-11-27 DIAGNOSIS — K21.9 GASTROESOPHAGEAL REFLUX DISEASE WITHOUT ESOPHAGITIS: ICD-10-CM

## 2023-11-27 DIAGNOSIS — Z00.00 MEDICARE ANNUAL WELLNESS VISIT, SUBSEQUENT: Primary | ICD-10-CM

## 2023-11-27 LAB
BILIRUB BLD-MCNC: NEGATIVE MG/DL
CLARITY, POC: CLEAR
COLOR UR: YELLOW
EXPIRATION DATE: ABNORMAL
GLUCOSE UR STRIP-MCNC: NEGATIVE MG/DL
KETONES UR QL: NEGATIVE
LEUKOCYTE EST, POC: NEGATIVE
Lab: ABNORMAL
NITRITE UR-MCNC: NEGATIVE MG/ML
PH UR: 5 [PH] (ref 5–8)
PROT UR STRIP-MCNC: ABNORMAL MG/DL
RBC # UR STRIP: NEGATIVE /UL
SP GR UR: 1.02 (ref 1–1.03)
UROBILINOGEN UR QL: NORMAL

## 2023-11-27 PROCEDURE — 80053 COMPREHEN METABOLIC PANEL: CPT | Performed by: INTERNAL MEDICINE

## 2023-11-27 PROCEDURE — G0103 PSA SCREENING: HCPCS | Performed by: INTERNAL MEDICINE

## 2023-11-27 PROCEDURE — 85025 COMPLETE CBC W/AUTO DIFF WBC: CPT | Performed by: INTERNAL MEDICINE

## 2023-11-27 PROCEDURE — 84443 ASSAY THYROID STIM HORMONE: CPT | Performed by: INTERNAL MEDICINE

## 2023-11-27 PROCEDURE — 80061 LIPID PANEL: CPT | Performed by: INTERNAL MEDICINE

## 2023-11-27 NOTE — TELEPHONE ENCOUNTER
"Tried to reach patient no answer left voicemail to return call    RELAY:  Tc Ramirez MD P alexandre [facility]  Please call the patient regarding his abnormal result.  \"Your Vitamin D levels are low.  Please start an over the counter Vitamin D3 supplement at a dosage of 2000-units daily for the next 6 weeks and then continue at a dosage of 800-units daily thereafter.\"  "

## 2023-11-27 NOTE — PROGRESS NOTES
The ABCs of the Annual Wellness Visit  Subsequent Medicare Wellness Visit    Subjective      Elton Funez is a 73 y.o. male who presents for a Subsequent Medicare Wellness Visit.    The following portions of the patient's history were reviewed and   updated as appropriate: allergies, current medications, past family history, past medical history, past social history, past surgical history, and problem list.    Compared to one year ago, the patient feels his physical   health is the same.    Compared to one year ago, the patient feels his mental   health is the same.    Recent Hospitalizations:  This patient has had a [facility] admission record on file within the last 365 days.    Current Medical Providers:  Patient Care Team:  Tc Ramirez MD as PCP - General  Pau Colin MD as Consulting Physician (Endocrinology)  Ari Yanes MD as Consulting Physician (Otolaryngology)  Gigi Perales MD as Consulting Physician (Neurosurgery)  Danika Archer MD as Consulting Physician (Hematology and Oncology)  Joseph Ramirez MD as Consulting Physician (General Surgery)  Porsha Hernández MD as Consulting Physician (Nephrology)    Outpatient Medications Prior to Visit   Medication Sig Dispense Refill    Alpha-Lipoic Acid 600 MG tablet Take 1 tablet by mouth Daily.      B Complex Vitamins (VITAMIN B COMPLEX PO) Take 1 tablet by mouth Daily.      carvedilol (COREG) 6.25 MG tablet TAKE 1 TABLET EVERY 12 HOURS (Patient taking differently: Take 1 tablet by mouth 2 (Two) Times a Day With Meals.) 180 tablet 1    Coenzyme Q10 (Co Q 10) 100 MG capsule Take 300 mg by mouth Daily.      colestipol (COLESTID) 1 g tablet TAKE 2 TABLETS TWICE A  tablet 3    Continuous Blood Gluc  (FreeStyle Winston 2 Haugen) device 1 Device Every 14 (Fourteen) Days. 1 each 0    Continuous Blood Gluc Sensor (FreeStyle Winston 2 Sensor) misc 1 each Every 14 (Fourteen) Days. 6 each 3    Dulaglutide  (Trulicity) 4.5 MG/0.5ML solution pen-injector Inject 0.5 mL under the skin into the appropriate area as directed 1 (One) Time Per Week. 6 mL 3    ferrous sulfate (FeroSul) 325 (65 FE) MG tablet Take 1 tablet by mouth 2 (Two) Times a Day. 180 tablet 1    Flaxseed, Linseed, (Flaxseed Oil) 1400 MG capsule Take 1 capsule by mouth 2 (Two) Times a Day.      gabapentin (NEURONTIN) 800 MG tablet Take 1 tablet by mouth 4 (Four) Times a Day. 360 tablet 1    gemfibrozil (LOPID) 600 MG tablet TAKE 1 TABLET TWICE A  tablet 3    insulin glargine (Lantus) 100 UNIT/ML injection Inject 70 Units under the skin into the appropriate area as directed Every Night. 75 mL 3    Insulin Lispro, 1 Unit Dial, (HumaLOG KwikPen) 100 UNIT/ML solution pen-injector Inject 30 Units under the skin into the appropriate area as directed 3 (Three) Times a Day. 30 mL 6    Insulin Pen Needle (B-D UF III MINI PEN NEEDLES) 31G X 5 MM misc USE THREE TIMES A  each 3    Insulin Regular Human, Conc, (HumuLIN R U-500 KwikPen) 500 UNIT/ML solution pen-injector CONCENTRATED injection Inject sc 160 u ac breakfast and 180 u  at dinner and about 40 units at hs. (Patient taking differently: Inject  under the skin into the appropriate area as directed. Inject sc 160 u ac breakfast and 180 u  at dinner and about 40 units at hs.) 60 mL 0    ipratropium (ATROVENT) 0.06 % nasal spray USE 2 SPRAYS IN EACH NOSTRIL AS DIRECTED BY PROVIDER DAILY (Patient taking differently: 2 sprays into the nostril(s) as directed by provider Daily.) 45 mL 1    levothyroxine (SYNTHROID, LEVOTHROID) 75 MCG tablet Take 1 tablet by mouth Daily. 90 tablet 1    Multiple Vitamins-Minerals (MULTIVITAMIN PO) Take 1 tablet by mouth Daily.      omeprazole (priLOSEC) 40 MG capsule TAKE 1 CAPSULE DAILY 90 capsule 3    saccharomyces boulardii (FLORASTOR) 250 MG capsule Take 1 capsule by mouth 2 (Two) Times a Day. 30 capsule 0    sertraline (ZOLOFT) 100 MG tablet TAKE 1 TABLET DAILY 90 tablet  3    simvastatin (ZOCOR) 40 MG tablet TAKE 1 TABLET DAILY AT BEDTIME (Patient taking differently: Take 1 tablet by mouth Every Night.) 90 tablet 3    spironolactone (ALDACTONE) 25 MG tablet TAKE 1 TABLET DAILY (Patient taking differently: Take 1 tablet by mouth Daily.) 90 tablet 3    tamoxifen (NOLVADEX) 20 MG chemo tablet Take 1 tablet by mouth Daily. 90 tablet 3    temazepam (Restoril) 7.5 MG capsule Take 1 capsule by mouth At Night As Needed for Sleep.      tiZANidine (ZANAFLEX) 4 MG tablet Take 1 tablet by mouth 2 (Two) Times a Day As Needed for Muscle Spasms. 180 tablet 0    vitamin D (ERGOCALCIFEROL) 1.25 MG (81986 UT) capsule capsule Take 1 capsule by mouth Every 7 (Seven) Days. FRIDAY       No facility-administered medications prior to visit.       No opioid medication identified on active medication list. I have reviewed chart for other potential  high risk medication/s and harmful drug interactions in the elderly.        Aspirin is not on active medication list.  Aspirin use is not indicated based on review of current medical condition/s. Risk of harm outweighs potential benefits.  .    Patient Active Problem List   Diagnosis    Stage 3a chronic kidney disease    Gastroesophageal reflux disease without esophagitis    Dyslipidemia, goal LDL below 70    Primary hypertension    Obesity, Class I, BMI 30-34.9    FAVIO (obstructive sleep apnea)    Cervical radiculopathy    Type 2 diabetes mellitus with diabetic polyneuropathy, with long-term current use of insulin    Circadian rhythm sleep disorder, delayed sleep phase type    Mild nonproliferative diabetic retinopathy of left eye without macular edema associated with type 2 diabetes mellitus    Acquired hypothyroidism    Anemia of chronic disease    Liver cirrhosis secondary to JEROME    Portal hypertensive gastropathy    Grade I diastolic dysfunction    Combined forms of age-related cataract of both eyes    Major depressive disorder with single episode, in full  "remission    Malignant neoplasm of central portion of right breast in male, estrogen receptor positive    Malignant neoplasm of overlapping sites of right female breast    Sepsis, due to unspecified organism, unspecified whether acute organ dysfunction present    Idiopathic acute pancreatitis    Bacteremia due to Klebsiella pneumoniae     Advance Care Planning   Advance Care Planning     Advance Directive is on file.  ACP discussion was held with the patient during this visit. Patient has an advance directive in EMR which is still valid.      Objective    Vitals:    23 1320   BP: 128/62   Pulse: 64   Temp: 97.5 °F (36.4 °C)   TempSrc: Infrared   Weight: 94.8 kg (209 lb)   Height: 172.7 cm (68\")     Estimated body mass index is 31.78 kg/m² as calculated from the following:    Height as of this encounter: 172.7 cm (68\").    Weight as of this encounter: 94.8 kg (209 lb).           Does the patient have evidence of cognitive impairment?   No    Lab Results   Component Value Date    HGBA1C 7.3 2023          HEALTH RISK ASSESSMENT    Smoking Status:  Social History     Tobacco Use   Smoking Status Former    Packs/day: 0.50    Years: 4.00    Additional pack years: 0.00    Total pack years: 2.00    Types: Cigarettes    Quit date: 1976    Years since quittin.9   Smokeless Tobacco Never     Alcohol Consumption:  Social History     Substance and Sexual Activity   Alcohol Use Yes    Comment: Socially     Fall Risk Screen:    ARTUROADI Fall Risk Assessment was completed, and patient is at HIGH risk for falls. Assessment completed on:2023    Depression Screenin/27/2023     1:28 PM   PHQ-2/PHQ-9 Depression Screening   Little Interest or Pleasure in Doing Things 0-->not at all   Feeling Down, Depressed or Hopeless 0-->not at all   PHQ-9: Brief Depression Severity Measure Score 0       Health Habits and Functional and Cognitive Screenin/27/2023     1:26 PM   Functional & Cognitive Status "   Do you have difficulty preparing food and eating? No   Do you have difficulty bathing yourself, getting dressed or grooming yourself? No   Do you have difficulty using the toilet? No   Do you have difficulty moving around from place to place? No   Do you have trouble with steps or getting out of a bed or a chair? No   Current Diet Limited Junk Food   Dental Exam Up to date   Eye Exam Up to date   Exercise (times per week) 0 times per week   Current Exercises Include No Regular Exercise   Do you need help using the phone?  No   Are you deaf or do you have serious difficulty hearing?  No   Do you need help to go to places out of walking distance? No   Do you need help shopping? No   Do you need help preparing meals?  No   Do you need help with housework?  No   Do you need help with laundry? No   Do you need help taking your medications? No   Do you need help managing money? No   Do you ever drive or ride in a car without wearing a seat belt? No   Have you felt unusual stress, anger or loneliness in the last month? No   Who do you live with? Spouse   If you need help, do you have trouble finding someone available to you? No   Have you been bothered in the last four weeks by sexual problems? No   Do you have difficulty concentrating, remembering or making decisions? No       Age-appropriate Screening Schedule:  Refer to the list below for future screening recommendations based on patient's age, sex and/or medical conditions. Orders for these recommended tests are listed in the plan section. The patient has been provided with a written plan.    Health Maintenance   Topic Date Due    Hepatitis B (1 of 3 - Risk 3-dose series) Never done    URINE MICROALBUMIN  05/13/2022    COVID-19 Vaccine (6 - 2023-24 season) 09/01/2023    TDAP/TD VACCINES (4 - Td or Tdap) 09/19/2023    ANNUAL WELLNESS VISIT  11/07/2023    DIABETIC FOOT EXAM  11/07/2023    LIPID PANEL  11/07/2023    HEMOGLOBIN A1C  03/05/2024    MAMMOGRAM  04/19/2024     BMI FOLLOWUP  05/08/2024    DIABETIC EYE EXAM  08/18/2024    COLORECTAL CANCER SCREENING  09/01/2031    HEPATITIS C SCREENING  Completed    INFLUENZA VACCINE  Completed    Pneumococcal Vaccine 65+  Completed    AAA SCREEN (ONE-TIME)  Completed    ZOSTER VACCINE  Completed                  CMS Preventative Services Quick Reference  Risk Factors Identified During Encounter:    Immunizations Discussed/Encouraged: Tdap and COVID19    The above risks/problems have been discussed with the patient.  Pertinent information has been shared with the patient in the After Visit Summary.    Diagnoses and all orders for this visit:    1. Medicare annual wellness visit, subsequent (Primary)    2. Dyslipidemia, goal LDL below 70  -     CBC & Differential; Future  -     Comprehensive Metabolic Panel; Future  -     Lipid Panel; Future  -     POC Urinalysis Dipstick, Automated; Future  -     TSH; Future    3. Prostate cancer screening  -     PSA Screen; Future    4. Gastroesophageal reflux disease without esophagitis          Follow Up:   Next Medicare Wellness visit to be scheduled in 1 year.      An After Visit Summary and PPPS were made available to the patient.

## 2023-11-27 NOTE — PROGRESS NOTES
Chief Complaint   Patient presents with    Medicare Wellness-subsequent       History of Present Illness      The patient presents for an established patient physical examination and health maintenance visit.    The patient presents for a follow-up related to hyperlipidemia. He is following a low fat diet. He reports that he is exercising. He is not taking medication for hyperlipidemia. He denies chest pain, shortness of breath, orthopnea, paroxysmal nocturnal dyspnea, dyspnea on exertion, edema, palpitations or syncope.    The patient presents for a follow-up related to GERD. The patient is on omeprazole for his gastroesophageal reflux. The medication is taken on a regular basis and gives complete relief of the symptoms. He reports no abdominal pain, belching, diarrhea, dysphagia, early satiety, heartburn, hoarseness, nausea, odynophagia, rectal bleeding, vomiting or weight loss. The GERD has no known aggravating factors.    Medications      Current Outpatient Medications:     Alpha-Lipoic Acid 600 MG tablet, Take 1 tablet by mouth Daily., Disp: , Rfl:     B Complex Vitamins (VITAMIN B COMPLEX PO), Take 1 tablet by mouth Daily., Disp: , Rfl:     carvedilol (COREG) 6.25 MG tablet, TAKE 1 TABLET EVERY 12 HOURS (Patient taking differently: Take 1 tablet by mouth 2 (Two) Times a Day With Meals.), Disp: 180 tablet, Rfl: 1    Coenzyme Q10 (Co Q 10) 100 MG capsule, Take 300 mg by mouth Daily., Disp: , Rfl:     colestipol (COLESTID) 1 g tablet, TAKE 2 TABLETS TWICE A DAY, Disp: 360 tablet, Rfl: 3    Continuous Blood Gluc  (FreeStyle Winston 2 Beason) device, 1 Device Every 14 (Fourteen) Days., Disp: 1 each, Rfl: 0    Continuous Blood Gluc Sensor (FreeStyle Winston 2 Sensor) misc, 1 each Every 14 (Fourteen) Days., Disp: 6 each, Rfl: 3    Dulaglutide (Trulicity) 4.5 MG/0.5ML solution pen-injector, Inject 0.5 mL under the skin into the appropriate area as directed 1 (One) Time Per Week., Disp: 6 mL, Rfl: 3    ferrous  sulfate (FeroSul) 325 (65 FE) MG tablet, Take 1 tablet by mouth 2 (Two) Times a Day., Disp: 180 tablet, Rfl: 1    Flaxseed, Linseed, (Flaxseed Oil) 1400 MG capsule, Take 1 capsule by mouth 2 (Two) Times a Day., Disp: , Rfl:     gabapentin (NEURONTIN) 800 MG tablet, Take 1 tablet by mouth 4 (Four) Times a Day., Disp: 360 tablet, Rfl: 1    gemfibrozil (LOPID) 600 MG tablet, TAKE 1 TABLET TWICE A DAY, Disp: 180 tablet, Rfl: 3    insulin glargine (Lantus) 100 UNIT/ML injection, Inject 70 Units under the skin into the appropriate area as directed Every Night., Disp: 75 mL, Rfl: 3    Insulin Lispro, 1 Unit Dial, (HumaLOG KwikPen) 100 UNIT/ML solution pen-injector, Inject 30 Units under the skin into the appropriate area as directed 3 (Three) Times a Day., Disp: 30 mL, Rfl: 6    Insulin Pen Needle (B-D UF III MINI PEN NEEDLES) 31G X 5 MM misc, USE THREE TIMES A DAY, Disp: 270 each, Rfl: 3    Insulin Regular Human, Conc, (HumuLIN R U-500 KwikPen) 500 UNIT/ML solution pen-injector CONCENTRATED injection, Inject sc 160 u ac breakfast and 180 u  at dinner and about 40 units at hs. (Patient taking differently: Inject  under the skin into the appropriate area as directed. Inject sc 160 u ac breakfast and 180 u  at dinner and about 40 units at hs.), Disp: 60 mL, Rfl: 0    ipratropium (ATROVENT) 0.06 % nasal spray, USE 2 SPRAYS IN EACH NOSTRIL AS DIRECTED BY PROVIDER DAILY (Patient taking differently: 2 sprays into the nostril(s) as directed by provider Daily.), Disp: 45 mL, Rfl: 1    levothyroxine (SYNTHROID, LEVOTHROID) 75 MCG tablet, Take 1 tablet by mouth Daily., Disp: 90 tablet, Rfl: 1    Multiple Vitamins-Minerals (MULTIVITAMIN PO), Take 1 tablet by mouth Daily., Disp: , Rfl:     omeprazole (priLOSEC) 40 MG capsule, TAKE 1 CAPSULE DAILY, Disp: 90 capsule, Rfl: 3    saccharomyces boulardii (FLORASTOR) 250 MG capsule, Take 1 capsule by mouth 2 (Two) Times a Day., Disp: 30 capsule, Rfl: 0    sertraline (ZOLOFT) 100 MG tablet,  TAKE 1 TABLET DAILY, Disp: 90 tablet, Rfl: 3    simvastatin (ZOCOR) 40 MG tablet, TAKE 1 TABLET DAILY AT BEDTIME (Patient taking differently: Take 1 tablet by mouth Every Night.), Disp: 90 tablet, Rfl: 3    spironolactone (ALDACTONE) 25 MG tablet, TAKE 1 TABLET DAILY (Patient taking differently: Take 1 tablet by mouth Daily.), Disp: 90 tablet, Rfl: 3    tamoxifen (NOLVADEX) 20 MG chemo tablet, Take 1 tablet by mouth Daily., Disp: 90 tablet, Rfl: 3    temazepam (Restoril) 7.5 MG capsule, Take 1 capsule by mouth At Night As Needed for Sleep., Disp: , Rfl:     tiZANidine (ZANAFLEX) 4 MG tablet, Take 1 tablet by mouth 2 (Two) Times a Day As Needed for Muscle Spasms., Disp: 180 tablet, Rfl: 0    vitamin D (ERGOCALCIFEROL) 1.25 MG (25890 UT) capsule capsule, Take 1 capsule by mouth Every 7 (Seven) Days. FRIDAY, Disp: , Rfl:      Allergies    Allergies   Allergen Reactions    Glucophage Xr [Metformin Hcl Er] Diarrhea and Other (See Comments)     Glucophage XR TB24: Adverse Reaction: Severe GI issues.    Metformin Nausea And Vomiting     Other reaction(s): SEVERE INTESTIONAL ISSUES  Other reaction(s): SEVERE GI ISSUES    Glucophage XR TB24  MetFORMIN HCl TABS    Tetracyclines & Related Nausea Only     Adverse Reaction    Chlorhexidine Rash       Problem List    Patient Active Problem List   Diagnosis    Stage 3a chronic kidney disease    Gastroesophageal reflux disease without esophagitis    Dyslipidemia, goal LDL below 70    Primary hypertension    Obesity, Class I, BMI 30-34.9    FAVIO (obstructive sleep apnea)    Cervical radiculopathy    Type 2 diabetes mellitus with diabetic polyneuropathy, with long-term current use of insulin    Circadian rhythm sleep disorder, delayed sleep phase type    Mild nonproliferative diabetic retinopathy of left eye without macular edema associated with type 2 diabetes mellitus    Acquired hypothyroidism    Anemia of chronic disease    Liver cirrhosis secondary to JEROME    Portal hypertensive  "gastropathy    Grade I diastolic dysfunction    Combined forms of age-related cataract of both eyes    Major depressive disorder with single episode, in full remission    Malignant neoplasm of central portion of right breast in male, estrogen receptor positive    Malignant neoplasm of overlapping sites of right female breast    Sepsis, due to unspecified organism, unspecified whether acute organ dysfunction present    Idiopathic acute pancreatitis    Bacteremia due to Klebsiella pneumoniae       Medications, Allergies, Problems List and Past History were reviewed and updated.    Physical Examination    /62   Pulse 64   Temp 97.5 °F (36.4 °C) (Infrared)   Ht 172.7 cm (68\")   Wt 94.8 kg (209 lb)   BMI 31.78 kg/m²       HEENT: Head- Normocephalic Atraumatic. Facies- Within normal limits. Pinnas- Normal texture and shape bilaterally. Canals- Normal bilaterally. TMs- Normal bilaterally. Nares- Patent bilaterally. Nasal Septum- is normal. There is no tenderness to palpation over the frontal or maxillary sinuses. Lids- Normal bilaterally. Conjunctiva- Clear bilaterally. Sclera- Anicteric bilaterally. Oropharynx- Moist with no lesions. Tonsils- No enlargement, erythema or exudate.    Neck: Thyroid- non enlarged, symmetric and has no nodules. No bruits are detected. ROM- Normal Range of Motion with no rigidity.    Lungs: Auscultation- Clear to auscultation bilaterally. There are no retractions, clubbing or cyanosis. The Expiratory to Inspiratory ratio is equal.    Lymph Nodes: Cervical- no enlarged lymph nodes noted. Clavicular- Deferred. Axillary- Deferred. Inguinal- Deferred.    Cardiovascular: There are no carotid bruits. Heart- Normal Rate with Regular rhythm and no murmurs. There are no gallops. There are no rubs. In the lower extremities there is no edema. The upper extremities do not have edema.    Abdomen: Soft, benign, non-tender with no masses, hernias, organomegaly or scars.    Male Genitourinary: The " penis is uncircumcised with testicles found in the scrotum bilaterally. Testicular Size is normal bilaterally. The penis has no anatomic abnormalities.    Rectal: reveals normal external sphincter tone with no external lesions.    Prostate: The prostate gland is symmetric and smooth with no nodularity.    Dermatologic: The patient has no worrisome or suspicious skin lesions noted.    Impression and Assessment    Normal Physical Examination.    Encounter for Screening for Malignant Neoplasm of the Prostate.    Hyperlipidemia.    Gastroesophageal Reflux Disease.    Plan    Gastroesophageal Reflux Disease Plan: The patient was instructed to continue the current medications.    Hyperlipidemia Plan: The patient was instructed to exercise daily, eat a low fat diet and continue his medications.    Counseling was provided regarding: Adequate Aerobic Exercise, Dental Visits, Flossing Teeth, Heart Healthy Diet and Seat Belt Utilization.    The following was ordered for screening and health maintenance: CBC w Automated Diff, CMP, Lipid Profile, PSA, TSH and U/A.       Immunizations Ordered and Administered: None.    Diagnoses and all orders for this visit:    1. Medicare annual wellness visit, subsequent (Primary)    2. Dyslipidemia, goal LDL below 70  -     CBC & Differential; Future  -     Comprehensive Metabolic Panel; Future  -     Lipid Panel; Future  -     POC Urinalysis Dipstick, Automated; Future  -     TSH; Future    3. Prostate cancer screening  -     PSA Screen; Future    4. Gastroesophageal reflux disease without esophagitis        Return to Office    The patient was instructed to return for follow-up in 1 year. The patient was instructed to return sooner if the condition changes, worsens, or does not resolve.

## 2023-11-27 NOTE — TELEPHONE ENCOUNTER
"----- Message from Tc Ramirez MD sent at 11/27/2023  7:13 AM EST -----  Please call the patient regarding his abnormal result.  \"Your Vitamin D levels are low.  Please start an over the counter Vitamin D3 supplement at a dosage of 2000-units daily for the next 6 weeks and then continue at a dosage of 800-units daily thereafter.\"      "

## 2023-11-28 LAB
ALBUMIN SERPL-MCNC: 4.9 G/DL (ref 3.5–5.2)
ALBUMIN/GLOB SERPL: 1.8 G/DL
ALP SERPL-CCNC: 72 U/L (ref 39–117)
ALT SERPL W P-5'-P-CCNC: 19 U/L (ref 1–41)
ANION GAP SERPL CALCULATED.3IONS-SCNC: 15 MMOL/L (ref 5–15)
AST SERPL-CCNC: 32 U/L (ref 1–40)
BASOPHILS # BLD AUTO: 0.02 10*3/MM3 (ref 0–0.2)
BASOPHILS NFR BLD AUTO: 0.3 % (ref 0–1.5)
BILIRUB SERPL-MCNC: 0.7 MG/DL (ref 0–1.2)
BUN SERPL-MCNC: 28 MG/DL (ref 8–23)
BUN/CREAT SERPL: 18.3 (ref 7–25)
CALCIUM SPEC-SCNC: 9.9 MG/DL (ref 8.6–10.5)
CHLORIDE SERPL-SCNC: 101 MMOL/L (ref 98–107)
CHOLEST SERPL-MCNC: 116 MG/DL (ref 0–200)
CO2 SERPL-SCNC: 22 MMOL/L (ref 22–29)
CREAT SERPL-MCNC: 1.53 MG/DL (ref 0.76–1.27)
DEPRECATED RDW RBC AUTO: 45.3 FL (ref 37–54)
EGFRCR SERPLBLD CKD-EPI 2021: 47.4 ML/MIN/1.73
EOSINOPHIL # BLD AUTO: 0.19 10*3/MM3 (ref 0–0.4)
EOSINOPHIL NFR BLD AUTO: 2.8 % (ref 0.3–6.2)
ERYTHROCYTE [DISTWIDTH] IN BLOOD BY AUTOMATED COUNT: 13.1 % (ref 12.3–15.4)
GLOBULIN UR ELPH-MCNC: 2.7 GM/DL
GLUCOSE SERPL-MCNC: 119 MG/DL (ref 65–99)
HCT VFR BLD AUTO: 37.3 % (ref 37.5–51)
HDLC SERPL-MCNC: 31 MG/DL (ref 40–60)
HGB BLD-MCNC: 12.8 G/DL (ref 13–17.7)
IMM GRANULOCYTES # BLD AUTO: 0.03 10*3/MM3 (ref 0–0.05)
IMM GRANULOCYTES NFR BLD AUTO: 0.4 % (ref 0–0.5)
LDLC SERPL CALC-MCNC: 57 MG/DL (ref 0–100)
LDLC/HDLC SERPL: 1.66 {RATIO}
LYMPHOCYTES # BLD AUTO: 1.26 10*3/MM3 (ref 0.7–3.1)
LYMPHOCYTES NFR BLD AUTO: 18.6 % (ref 19.6–45.3)
MCH RBC QN AUTO: 32.7 PG (ref 26.6–33)
MCHC RBC AUTO-ENTMCNC: 34.3 G/DL (ref 31.5–35.7)
MCV RBC AUTO: 95.2 FL (ref 79–97)
MONOCYTES # BLD AUTO: 0.62 10*3/MM3 (ref 0.1–0.9)
MONOCYTES NFR BLD AUTO: 9.2 % (ref 5–12)
NEUTROPHILS NFR BLD AUTO: 4.64 10*3/MM3 (ref 1.7–7)
NEUTROPHILS NFR BLD AUTO: 68.7 % (ref 42.7–76)
NRBC BLD AUTO-RTO: 0 /100 WBC (ref 0–0.2)
PLATELET # BLD AUTO: 108 10*3/MM3 (ref 140–450)
PMV BLD AUTO: 12.6 FL (ref 6–12)
POTASSIUM SERPL-SCNC: 5.3 MMOL/L (ref 3.5–5.2)
PROT SERPL-MCNC: 7.6 G/DL (ref 6–8.5)
PSA SERPL-MCNC: 2.52 NG/ML (ref 0–4)
RBC # BLD AUTO: 3.92 10*6/MM3 (ref 4.14–5.8)
SODIUM SERPL-SCNC: 138 MMOL/L (ref 136–145)
TRIGL SERPL-MCNC: 167 MG/DL (ref 0–150)
TSH SERPL DL<=0.05 MIU/L-ACNC: 2.06 UIU/ML (ref 0.27–4.2)
VLDLC SERPL-MCNC: 28 MG/DL (ref 5–40)
WBC NRBC COR # BLD AUTO: 6.76 10*3/MM3 (ref 3.4–10.8)

## 2023-11-28 NOTE — TELEPHONE ENCOUNTER
"Spoke with patient, informed that Dr Ramirez said to let him know \"Your Vitamin D levels are low.  Please start an over the counter Vitamin D3 supplement at a dosage of 2000-units daily for the next 6 weeks and then continue at a dosage of 800-units daily thereafter.\"  Verbalized good understanding and agreement.    "

## 2023-11-29 NOTE — TELEPHONE ENCOUNTER
RX refill request from [facility] for pen needles.  Rx E_ Scripted    Show Aperture Variable?: Yes Render Post-Care Instructions In Note?: no Consent: The patient's verbal consent was obtained including but not limited to risks of crusting, scabbing, blistering, scarring, darker or lighter pigmentary change, recurrence, incomplete removal and infection. Post-Care Instructions: I reviewed with the patient in detail post-care instructions. Patient is to wear sunprotection, and avoid picking at any of the treated lesions. Pt may apply Vaseline to crusted or scabbing areas. Detail Level: Detailed Duration Of Freeze Thaw-Cycle (Seconds): 0

## 2023-12-05 ENCOUNTER — OFFICE VISIT (OUTPATIENT)
Dept: ENDOCRINOLOGY | Facility: CLINIC | Age: 73
End: 2023-12-05
Payer: MEDICARE

## 2023-12-05 VITALS
SYSTOLIC BLOOD PRESSURE: 118 MMHG | HEART RATE: 58 BPM | WEIGHT: 210.2 LBS | OXYGEN SATURATION: 98 % | TEMPERATURE: 96.8 F | BODY MASS INDEX: 31.86 KG/M2 | HEIGHT: 68 IN | DIASTOLIC BLOOD PRESSURE: 78 MMHG

## 2023-12-05 DIAGNOSIS — E11.42 TYPE 2 DIABETES MELLITUS WITH DIABETIC POLYNEUROPATHY, WITH LONG-TERM CURRENT USE OF INSULIN: ICD-10-CM

## 2023-12-05 DIAGNOSIS — E03.9 HYPOTHYROIDISM, ADULT: ICD-10-CM

## 2023-12-05 DIAGNOSIS — Z79.4 TYPE 2 DIABETES MELLITUS WITH DIABETIC POLYNEUROPATHY, WITH LONG-TERM CURRENT USE OF INSULIN: ICD-10-CM

## 2023-12-05 DIAGNOSIS — E11.65 TYPE 2 DIABETES MELLITUS WITH HYPERGLYCEMIA, UNSPECIFIED WHETHER LONG TERM INSULIN USE: Primary | ICD-10-CM

## 2023-12-05 LAB
EXPIRATION DATE: ABNORMAL
GLUCOSE BLDC GLUCOMTR-MCNC: 120 MG/DL (ref 70–130)
HBA1C MFR BLD: 7 % (ref 4.5–5.7)
Lab: ABNORMAL

## 2023-12-05 RX ORDER — INSULIN LISPRO 100 [IU]/ML
30 INJECTION, SOLUTION INTRAVENOUS; SUBCUTANEOUS 3 TIMES DAILY
Qty: 81 ML | Refills: 3 | Status: SHIPPED | OUTPATIENT
Start: 2023-12-05

## 2023-12-05 RX ORDER — LEVOTHYROXINE SODIUM 0.07 MG/1
75 TABLET ORAL DAILY
Qty: 90 TABLET | Refills: 3 | Status: SHIPPED | OUTPATIENT
Start: 2023-12-05

## 2023-12-05 NOTE — PROGRESS NOTES
Chief complaint  Diabetes    Subjective   Elton Funez is a 73 y.o. male is here today for follow-up    Diabetes mellitus type 2, uncontrolled dx in 2000  Diabetic complications: neuropathy, retinopathy.   Past meds: Metformin caused diarrhea in the past.   Eye care:no retinopathy.   Monitoring - freestyle [place]ston reviewed.   Foot care and dental care: discussed.     Medications: Trulicity, doing well and glucose improved.   Insulin dose requirement reduced significantly after the Trulicity start. He had some low glucose episodes and decreased insulin.     Hypothyroidism - on levothyroxine.      He had pancreatitis (gallstone related) in 6/2023, continued Trulicity. Increase in the dose did not cause recurrence of the symptoms.     Medications    Current Outpatient Medications:     Alpha-Lipoic Acid 600 MG tablet, Take 1 tablet by mouth Daily., Disp: , Rfl:     B Complex Vitamins (VITAMIN B COMPLEX PO), Take 1 tablet by mouth Daily., Disp: , Rfl:     carvedilol (COREG) 6.25 MG tablet, TAKE 1 TABLET EVERY 12 HOURS (Patient taking differently: Take 1 tablet by mouth 2 (Two) Times a Day With Meals.), Disp: 180 tablet, Rfl: 1    Coenzyme Q10 (Co Q 10) 100 MG capsule, Take 300 mg by mouth Daily., Disp: , Rfl:     colestipol (COLESTID) 1 g tablet, TAKE 2 TABLETS TWICE A DAY, Disp: 360 tablet, Rfl: 3    Continuous Blood Gluc  (FreeStyle [place] 2 [place]) device, 1 Device Every 14 (Fourteen) Days., Disp: 1 each, Rfl: 0    Continuous Blood Gluc Sensor (FreeStyle [place] 2 Sensor) misc, 1 each Every 14 (Fourteen) Days., Disp: 6 each, Rfl: 3    Dulaglutide (Trulicity) 4.5 MG/0.5ML solution pen-injector, Inject 0.5 mL under the skin into the appropriate area as directed 1 (One) Time Per Week., Disp: 6 mL, Rfl: 3    ferrous sulfate (FeroSul) 325 (65 FE) MG tablet, Take 1 tablet by mouth 2 (Two) Times a Day., Disp: 180 tablet, Rfl: 1    Flaxseed, Linseed, (Flaxseed Oil) 1400 MG capsule, Take 1 capsule by mouth 2 (Two) Times  a Day., Disp: , Rfl:     gabapentin (NEURONTIN) 800 MG tablet, Take 1 tablet by mouth 4 (Four) Times a Day., Disp: 360 tablet, Rfl: 1    gemfibrozil (LOPID) 600 MG tablet, TAKE 1 TABLET TWICE A DAY, Disp: 180 tablet, Rfl: 3    Insulin Lispro, 1 Unit Dial, (HumaLOG KwikPen) 100 UNIT/ML solution pen-injector, Inject 30 Units under the skin into the appropriate area as directed 3 (Three) Times a Day., Disp: 81 mL, Rfl: 3    Insulin Pen Needle (B-D UF III MINI PEN NEEDLES) 31G X 5 MM misc, USE THREE TIMES A DAY, Disp: 270 each, Rfl: 3    ipratropium (ATROVENT) 0.06 % nasal spray, USE 2 SPRAYS IN EACH NOSTRIL AS DIRECTED BY PROVIDER DAILY (Patient taking differently: 2 sprays into the nostril(s) as directed by provider Daily.), Disp: 45 mL, Rfl: 1    levothyroxine (SYNTHROID, LEVOTHROID) 75 MCG tablet, Take 1 tablet by mouth Daily., Disp: 90 tablet, Rfl: 3    Multiple Vitamins-Minerals (MULTIVITAMIN PO), Take 1 tablet by mouth Daily., Disp: , Rfl:     omeprazole (priLOSEC) 40 MG capsule, TAKE 1 CAPSULE DAILY, Disp: 90 capsule, Rfl: 3    saccharomyces boulardii (FLORASTOR) 250 MG capsule, Take 1 capsule by mouth 2 (Two) Times a Day., Disp: 30 capsule, Rfl: 0    sertraline (ZOLOFT) 100 MG tablet, TAKE 1 TABLET DAILY, Disp: 90 tablet, Rfl: 3    simvastatin (ZOCOR) 40 MG tablet, TAKE 1 TABLET DAILY AT BEDTIME (Patient taking differently: Take 1 tablet by mouth Every Night.), Disp: 90 tablet, Rfl: 3    spironolactone (ALDACTONE) 25 MG tablet, TAKE 1 TABLET DAILY (Patient taking differently: Take 1 tablet by mouth Daily.), Disp: 90 tablet, Rfl: 3    tamoxifen (NOLVADEX) 20 MG chemo tablet, Take 1 tablet by mouth Daily., Disp: 90 tablet, Rfl: 3    temazepam (Restoril) 7.5 MG capsule, Take 1 capsule by mouth At Night As Needed for Sleep., Disp: , Rfl:     tiZANidine (ZANAFLEX) 4 MG tablet, Take 1 tablet by mouth 2 (Two) Times a Day As Needed for Muscle Spasms., Disp: 180 tablet, Rfl: 0    vitamin D (ERGOCALCIFEROL) 1.25 MG  "(46842 UT) capsule capsule, Take 1 capsule by mouth Every 7 (Seven) Days. FRIDAY, Disp: , Rfl:     Insulin Glargine (LANTUS SOLOSTAR) 100 UNIT/ML injection pen, Inject 70 Units under the skin into the appropriate area as directed Every Night., Disp: 81 mL, Rfl: 3    Review of systems  Review of Systems   Constitutional:  Positive for fatigue.   Musculoskeletal:  Positive for arthralgias, back pain and neck pain.   Neurological:  Positive for numbness.   All other systems reviewed and are negative.      Physical exam  Objective   Blood pressure 118/78, pulse 58, temperature 96.8 °F (36 °C), height 172.7 cm (68\"), weight 95.3 kg (210 lb 3.2 oz), SpO2 98%. Body mass index is 31.96 kg/m².  Physical Exam   Constitutional: He is oriented to person, place, and time. He appears well-developed and well-nourished.   HENT:   Head: Normocephalic and atraumatic.   Eyes: Conjunctivae are normal.   Neck: No thyromegaly present.   Cardiovascular: Normal rate, regular rhythm, normal heart sounds and normal pulses.   Pulmonary/Chest: Effort normal and breath sounds normal.   Neurological: He is alert and oriented to person, place, and time.   Psychiatric: Thought content normal.         LABS AND IMAGING  Office Visit on 12/05/2023   Component Date Value Ref Range Status    Hemoglobin A1C 12/05/2023 7.0 (A)  4.5 - 5.7 % Final    Lot Number 12/05/2023 [id number]   Final    Expiration Date 12/05/2023 9/18/25   Final    Glucose 12/05/2023 120  70 - 130 mg/dL Final   Office Visit on 11/27/2023   Component Date Value Ref Range Status    Glucose 11/27/2023 119 (H)  65 - 99 mg/dL Final    BUN 11/27/2023 28 (H)  8 - 23 mg/dL Final    Creatinine 11/27/2023 1.53 (H)  0.76 - 1.27 mg/dL Final    Sodium 11/27/2023 138  136 - 145 mmol/L Final    Potassium 11/27/2023 5.3 (H)  3.5 - 5.2 mmol/L Final    Chloride 11/27/2023 101  98 - 107 mmol/L Final    CO2 11/27/2023 22.0  22.0 - 29.0 mmol/L Final    Calcium 11/27/2023 9.9  8.6 - 10.5 mg/dL Final    " Total Protein 11/27/2023 7.6  6.0 - 8.5 g/dL Final    Albumin 11/27/2023 4.9  3.5 - 5.2 g/dL Final    ALT (SGPT) 11/27/2023 19  1 - 41 U/L Final    AST (SGOT) 11/27/2023 32  1 - 40 U/L Final    Alkaline Phosphatase 11/27/2023 72  39 - 117 U/L Final    Total Bilirubin 11/27/2023 0.7  0.0 - 1.2 mg/dL Final    Globulin 11/27/2023 2.7  gm/dL Final    A/G Ratio 11/27/2023 1.8  g/dL Final    BUN/Creatinine Ratio 11/27/2023 18.3  7.0 - 25.0 Final    Anion Gap 11/27/2023 15.0  5.0 - 15.0 mmol/L Final    eGFR 11/27/2023 47.4 (L)  >60.0 mL/min/1.73 Final    Total Cholesterol 11/27/2023 116  0 - 200 mg/dL Final    Triglycerides 11/27/2023 167 (H)  0 - 150 mg/dL Final    HDL Cholesterol 11/27/2023 31 (L)  40 - 60 mg/dL Final    LDL Cholesterol  11/27/2023 57  0 - 100 mg/dL Final    VLDL Cholesterol 11/27/2023 28  5 - 40 mg/dL Final    LDL/HDL Ratio 11/27/2023 1.66   Final    TSH 11/27/2023 2.060  0.270 - 4.200 uIU/mL Final    PSA 11/27/2023 2.520  0.000 - 4.000 ng/mL Final    WBC 11/27/2023 6.76  3.40 - 10.80 10*3/mm3 Final    RBC 11/27/2023 3.92 (L)  4.14 - 5.80 10*6/mm3 Final    Hemoglobin 11/27/2023 12.8 (L)  13.0 - 17.7 g/dL Final    Hematocrit 11/27/2023 37.3 (L)  37.5 - 51.0 % Final    MCV 11/27/2023 95.2  79.0 - 97.0 fL Final    MCH 11/27/2023 32.7  26.6 - 33.0 pg Final    MCHC 11/27/2023 34.3  31.5 - 35.7 g/dL Final    RDW 11/27/2023 13.1  12.3 - 15.4 % Final    RDW-SD 11/27/2023 45.3  37.0 - 54.0 fl Final    MPV 11/27/2023 12.6 (H)  6.0 - 12.0 fL Final    Platelets 11/27/2023 108 (L)  140 - 450 10*3/mm3 Final    Neutrophil % 11/27/2023 68.7  42.7 - 76.0 % Final    Lymphocyte % 11/27/2023 18.6 (L)  19.6 - 45.3 % Final    Monocyte % 11/27/2023 9.2  5.0 - 12.0 % Final    Eosinophil % 11/27/2023 2.8  0.3 - 6.2 % Final    Basophil % 11/27/2023 0.3  0.0 - 1.5 % Final    Immature Grans % 11/27/2023 0.4  0.0 - 0.5 % Final    Neutrophils, Absolute 11/27/2023 4.64  1.70 - 7.00 10*3/mm3 Final    Lymphocytes, Absolute 11/27/2023  1.26  0.70 - 3.10 10*3/mm3 Final    Monocytes, Absolute 11/27/2023 0.62  0.10 - 0.90 10*3/mm3 Final    Eosinophils, Absolute 11/27/2023 0.19  0.00 - 0.40 10*3/mm3 Final    Basophils, Absolute 11/27/2023 0.02  0.00 - 0.20 10*3/mm3 Final    Immature Grans, Absolute 11/27/2023 0.03  0.00 - 0.05 10*3/mm3 Final    nRBC 11/27/2023 0.0  0.0 - 0.2 /100 WBC Final    Color 11/27/2023 Yellow  Yellow, Straw, Dark Yellow, [place] Final    Clarity, UA 11/27/2023 Clear  Clear Final    Specific Gravity  11/27/2023 1.020  1.005 - 1.030 Final    pH, Urine 11/27/2023 5.0  5.0 - 8.0 Final    Leukocytes 11/27/2023 Negative  Negative Final    Nitrite, UA 11/27/2023 Negative  Negative Final    Protein, POC 11/27/2023 30 mg/dL (A)  Negative mg/dL Final    Glucose, UA 11/27/2023 Negative  Negative mg/dL Final    Ketones, UA 11/27/2023 Negative  Negative Final    Urobilinogen, UA 11/27/2023 Normal  Normal, 0.2 E.U./dL Final    Bilirubin 11/27/2023 Negative  Negative Final    Blood, UA 11/27/2023 Negative  Negative Final    Lot Number 11/27/2023 [id number]   Final    Expiration Date 11/27/2023 7/31/24   Final   Lab on 11/22/2023   Component Date Value Ref Range Status    Creatinine, Urine 11/22/2023 146.7  mg/dL Final    Total Protein, Urine 11/22/2023 147.3  mg/dL Final    Iron 11/22/2023 107  59 - 158 mcg/dL Final    Iron Saturation (TSAT) 11/22/2023 19 (L)  20 - 50 % Final    Transferrin 11/22/2023 374 (H)  200 - 360 mg/dL Final    TIBC 11/22/2023 557 (H)  298 - 536 mcg/dL Final    Ferritin 11/22/2023 122.00  30.00 - 400.00 ng/mL Final    Glucose 11/22/2023 167 (H)  65 - 99 mg/dL Final    BUN 11/22/2023 25 (H)  8 - 23 mg/dL Final    Creatinine 11/22/2023 1.55 (H)  0.76 - 1.27 mg/dL Final    Sodium 11/22/2023 136  136 - 145 mmol/L Final    Potassium 11/22/2023 4.9  3.5 - 5.2 mmol/L Final    Chloride 11/22/2023 101  98 - 107 mmol/L Final    CO2 11/22/2023 22.0  22.0 - 29.0 mmol/L Final    Calcium 11/22/2023 9.5  8.6 - 10.5 mg/dL Final     Albumin 11/22/2023 4.8  3.5 - 5.2 g/dL Final    Phosphorus 11/22/2023 3.6  2.5 - 4.5 mg/dL Final    Anion Gap 11/22/2023 13.0  5.0 - 15.0 mmol/L Final    BUN/Creatinine Ratio 11/22/2023 16.1  7.0 - 25.0 Final    eGFR 11/22/2023 46.7 (L)  >60.0 mL/min/1.73 Final    Uric Acid 11/22/2023 4.3  3.4 - 7.0 mg/dL Final    25 Hydroxy, Vitamin D 11/22/2023 23.1 (L)  30.0 - 100.0 ng/ml Final    WBC 11/22/2023 7.06  3.40 - 10.80 10*3/mm3 Final    RBC 11/22/2023 4.29  4.14 - 5.80 10*6/mm3 Final    Hemoglobin 11/22/2023 13.9  13.0 - 17.7 g/dL Final    Hematocrit 11/22/2023 40.3  37.5 - 51.0 % Final    MCV 11/22/2023 93.9  79.0 - 97.0 fL Final    MCH 11/22/2023 32.4  26.6 - 33.0 pg Final    MCHC 11/22/2023 34.5  31.5 - 35.7 g/dL Final    RDW 11/22/2023 13.2  12.3 - 15.4 % Final    RDW-SD 11/22/2023 45.1  37.0 - 54.0 fl Final    MPV 11/22/2023 12.7 (H)  6.0 - 12.0 fL Final    Platelets 11/22/2023 117 (L)  140 - 450 10*3/mm3 Final    Neutrophil % 11/22/2023 64.7  42.7 - 76.0 % Final    Lymphocyte % 11/22/2023 23.7  19.6 - 45.3 % Final    Monocyte % 11/22/2023 8.2  5.0 - 12.0 % Final    Eosinophil % 11/22/2023 2.5  0.3 - 6.2 % Final    Basophil % 11/22/2023 0.6  0.0 - 1.5 % Final    Neutrophils, Absolute 11/22/2023 4.57  1.70 - 7.00 10*3/mm3 Final    Lymphocytes, Absolute 11/22/2023 1.67  0.70 - 3.10 10*3/mm3 Final    Monocytes, Absolute 11/22/2023 0.58  0.10 - 0.90 10*3/mm3 Final    Eosinophils, Absolute 11/22/2023 0.18  0.00 - 0.40 10*3/mm3 Final    Basophils, Absolute 11/22/2023 0.04  0.00 - 0.20 10*3/mm3 Final    Color, UA 11/22/2023 Yellow  Yellow, Straw Final    Appearance, UA 11/22/2023 Clear  Clear Final    pH, UA 11/22/2023 5.5  5.0 - 8.0 Final    Specific [place], UA 11/22/2023 1.021  1.005 - 1.030 Final    Glucose, UA 11/22/2023 Negative  Negative Final    Ketones, UA 11/22/2023 Negative  Negative Final    Bilirubin, UA 11/22/2023 Negative  Negative Final    Blood, UA 11/22/2023 Negative  Negative Final    Protein, UA  11/22/2023 100 mg/dL (2+) (A)  Negative Final    Leuk Esterase, UA 11/22/2023 Negative  Negative Final    Nitrite, UA 11/22/2023 Negative  Negative Final    Urobilinogen, UA 11/22/2023 0.2 E.U./dL  0.2 - 1.0 E.U./dL Final    RBC, UA 11/22/2023 0-2  None Seen, 0-2 /HPF Final    WBC, UA 11/22/2023 0-2  None Seen, 0-2 /HPF Final    Bacteria, UA 11/22/2023 None Seen  None Seen /HPF Final    Squamous Epithelial Cells, UA 11/22/2023 0-2  None Seen, 0-2 /HPF Final    Hyaline Casts, UA 11/22/2023 0-2  None Seen /LPF Final    Methodology 11/22/2023 Automated Microscopy   Final       Assessment  Assessment & Plan   1. Type 2 diabetes mellitus with hyperglycemia, unspecified whether long term insulin use    2. Type 2 diabetes mellitus with diabetic polyneuropathy, with long-term current use of insulin    3. Hypothyroidism, adult        Plan      CGM downloaded and analyzed. He has variable glucose levels with hyperglycemia after evening meal and hypoglycemia 6 am occasionally. New insulin instructions provided.          Patient Instructions       Continue Trulicity 4.5 weekly . Insulin requirements decreased significantly     ----------------------------------------------------------------------------------    New Insulin dosing:  Basal insulin Levemir  or Lantus  68 Units nightly             Meal Insulin Humalog (U-100)  33 units before meals.   Correction insulin (add to meal insulin)   0 unit  if glucose  less than 150   2unit   if glucose  150 - 199   4 units if glucose 200 - 249   6 units if glucose 250 - 299   8 units if glucose 300 - 349   10  units if glucose Greater than 350       2.Cont levothyroxine 75 mcg, thyroid function ordered with next lab draw.   Recent labs reviewed and thyroid function is normal.     Follow-up in 3 months

## 2023-12-05 NOTE — PATIENT INSTRUCTIONS
Results for orders placed or performed in visit on 12/05/23   POC Glycosylated Hemoglobin (Hb A1C)    Specimen: Blood   Result Value Ref Range    Hemoglobin A1C 7.0 (A) 4.5 - 5.7 %    Lot Number [id number]     Expiration Date 9/18/25    POC Glucose, Blood    Specimen: Blood   Result Value Ref Range    Glucose 120 70 - 130 mg/dL     Continue Trulicity 4.5 weekly       New Insulin dosing:  Basal insulin Levemir  or Lantus  65-68 Units nightly             Meal Insulin Humalog (U-100)  33 units before large meal.   Correction insulin (add to meal insulin)   0 unit  if glucose  less than 150   2unit   if glucose  150 - 199   4 units if glucose 200 - 249   6 units if glucose 250 - 299   8 units if glucose 300 - 349   10  units if glucose Greater than 350             Nutritional Recommendations:    4-5 carb portions per meal for male (60-75 gm of carbs)    Physical Activity Goals:  Walk at least 15 min every day, ideally exercise 45-60 min most days (could be done in short bouts of 10-15 minutes.    Keep records of your glucose levels and insulin adjustments. We may ask you to keep records on the content of your meals with insulin doses and before/after meal glucose levels to evaluate your ratios.  Call for advice if you have unexplained or unexpected hypoglycemia  (glucose < 60) or persistent high glucose > 300.  Office: [phone]      Pau Colin MD

## 2023-12-15 RX ORDER — CARVEDILOL 6.25 MG/1
TABLET ORAL
Qty: 180 TABLET | Refills: 3 | Status: SHIPPED | OUTPATIENT
Start: 2023-12-15

## 2024-01-16 DIAGNOSIS — E11.42 DIABETIC PERIPHERAL NEUROPATHY ASSOCIATED WITH TYPE 2 DIABETES MELLITUS: ICD-10-CM

## 2024-01-16 RX ORDER — GABAPENTIN 800 MG/1
800 TABLET ORAL 4 TIMES DAILY
Qty: 360 TABLET | Refills: 0 | Status: SHIPPED | OUTPATIENT
Start: 2024-01-16

## 2024-01-16 NOTE — TELEPHONE ENCOUNTER
Rx Refill Note  Requested Prescriptions     Pending Prescriptions Disp Refills    gabapentin (NEURONTIN) 800 MG tablet [Pharmacy Med Name: GABAPENTIN TABS 800MG] 360 tablet 1     Sig: TAKE 1 TABLET FOUR TIMES A DAY        Last office visit with prescribing clinician: 12/5/2023      Next office visit with prescribing clinician: 3/6/2024       Bree Mace (Jodi)  01/16/24, 14:01 EST

## 2024-02-01 ENCOUNTER — TELEPHONE (OUTPATIENT)
Dept: INTERNAL MEDICINE | Facility: CLINIC | Age: 74
End: 2024-02-01
Payer: MEDICARE

## 2024-02-01 ENCOUNTER — OFFICE VISIT (OUTPATIENT)
Dept: INTERNAL MEDICINE | Facility: CLINIC | Age: 74
End: 2024-02-01
Payer: MEDICARE

## 2024-02-01 VITALS
HEART RATE: 88 BPM | WEIGHT: 205.6 LBS | DIASTOLIC BLOOD PRESSURE: 62 MMHG | TEMPERATURE: 97.3 F | RESPIRATION RATE: 18 BRPM | BODY MASS INDEX: 31.16 KG/M2 | SYSTOLIC BLOOD PRESSURE: 118 MMHG | HEIGHT: 68 IN

## 2024-02-01 DIAGNOSIS — R55 SYNCOPE, UNSPECIFIED SYNCOPE TYPE: Primary | ICD-10-CM

## 2024-02-01 DIAGNOSIS — E55.9 VITAMIN D DEFICIENCY: ICD-10-CM

## 2024-02-01 DIAGNOSIS — E11.42 TYPE 2 DIABETES MELLITUS WITH DIABETIC POLYNEUROPATHY, WITH LONG-TERM CURRENT USE OF INSULIN: ICD-10-CM

## 2024-02-01 DIAGNOSIS — N18.31 STAGE 3A CHRONIC KIDNEY DISEASE: ICD-10-CM

## 2024-02-01 DIAGNOSIS — Z79.4 TYPE 2 DIABETES MELLITUS WITH DIABETIC POLYNEUROPATHY, WITH LONG-TERM CURRENT USE OF INSULIN: ICD-10-CM

## 2024-02-01 LAB
ALBUMIN/CREATININE RATIO, URINE: NORMAL
BASOPHILS # BLD AUTO: 0.03 10*3/MM3 (ref 0–0.2)
BASOPHILS NFR BLD AUTO: 0.5 % (ref 0–1.5)
DEPRECATED RDW RBC AUTO: 46.2 FL (ref 37–54)
EOSINOPHIL # BLD AUTO: 0.18 10*3/MM3 (ref 0–0.4)
EOSINOPHIL NFR BLD AUTO: 2.7 % (ref 0.3–6.2)
ERYTHROCYTE [DISTWIDTH] IN BLOOD BY AUTOMATED COUNT: 13 % (ref 12.3–15.4)
EXPIRATION DATE: NORMAL
HCT VFR BLD AUTO: 41.2 % (ref 37.5–51)
HGB BLD-MCNC: 13.7 G/DL (ref 13–17.7)
IMM GRANULOCYTES # BLD AUTO: 0.02 10*3/MM3 (ref 0–0.05)
IMM GRANULOCYTES NFR BLD AUTO: 0.3 % (ref 0–0.5)
LYMPHOCYTES # BLD AUTO: 1.37 10*3/MM3 (ref 0.7–3.1)
LYMPHOCYTES NFR BLD AUTO: 20.6 % (ref 19.6–45.3)
Lab: NORMAL
MCH RBC QN AUTO: 32.4 PG (ref 26.6–33)
MCHC RBC AUTO-ENTMCNC: 33.3 G/DL (ref 31.5–35.7)
MCV RBC AUTO: 97.4 FL (ref 79–97)
MONOCYTES # BLD AUTO: 0.67 10*3/MM3 (ref 0.1–0.9)
MONOCYTES NFR BLD AUTO: 10.1 % (ref 5–12)
NEUTROPHILS NFR BLD AUTO: 4.39 10*3/MM3 (ref 1.7–7)
NEUTROPHILS NFR BLD AUTO: 65.8 % (ref 42.7–76)
NRBC BLD AUTO-RTO: 0 /100 WBC (ref 0–0.2)
PLATELET # BLD AUTO: 117 10*3/MM3 (ref 140–450)
PMV BLD AUTO: 13 FL (ref 6–12)
POC CREATININE URINE: 200
POC MICROALBUMIN URINE: 150
RBC # BLD AUTO: 4.23 10*6/MM3 (ref 4.14–5.8)
WBC NRBC COR # BLD AUTO: 6.66 10*3/MM3 (ref 3.4–10.8)

## 2024-02-01 PROCEDURE — 85025 COMPLETE CBC W/AUTO DIFF WBC: CPT | Performed by: PHYSICIAN ASSISTANT

## 2024-02-01 PROCEDURE — 82306 VITAMIN D 25 HYDROXY: CPT | Performed by: PHYSICIAN ASSISTANT

## 2024-02-01 PROCEDURE — 80053 COMPREHEN METABOLIC PANEL: CPT | Performed by: PHYSICIAN ASSISTANT

## 2024-02-01 RX ORDER — CARVEDILOL 3.12 MG/1
6.25 TABLET ORAL EVERY 12 HOURS
Qty: 60 TABLET | Refills: 0 | Status: SHIPPED | OUTPATIENT
Start: 2024-02-01

## 2024-02-01 NOTE — PROGRESS NOTES
Office Note     Name: Elton Funez    : 1950     MRN: [id number]     Chief Complaint  Loss of Consciousness    Subjective     History of Present Illness:  Elton Funez is a 73 y.o. male who presents today for loss  of consciousness. He is accompanied by an adult female.    Yesterday, he was at a doctor's office and fell onto the floor. He does not remember what happened,. He did not hit his head. He fell on his side and fell on his shoulder. His blood pressure was 90/64 mmHg after the fall.  . He had a similar event 3 weeks ago. He went to the refrigerator to get something to drink because his blood glucose was low. He felt instability in his knees then he fell onto the floor. He had mild dizziness at the time. His wife did not see him fall at that time.  He was not confused. He checked his blood glucose and it was 174 mg/dL. He did not take his medication that morning. He did not take his insulin without eating. He has been taking Trulicity and he lost weight.  He checks his blood glucose before he goes and gives himself an injection of insulin.  He is taking 68 units of insulin at night and 30 units before he eats. His average blood sugar last week was 169 mg/dL and this week it was 100 mg/dL.    He confirms the use of tizanidine.  He took 1 pill yesterday morning at 5:30 AM. He occasionally takes the medication at night. His vitamin D was low and he has been taking 5000 units twice a day for almost 1.5 months.    Review of Systems:   Review of Systems    Past Medical History:   Past Medical History:   Diagnosis Date    Abdominal pain 2023    RUQ; SEEN IN ED AT [facility]; DC'D HOME    Abscess of arm, right     Abscess of skin of neck     Acute sinusitis     ALT (SGPT) level raised     Arthritis     BPH (benign prostatic hypertrophy)     Breast cancer 2023    right    Cirrhosis of liver 2021    JEROME    CKD (chronic kidney disease)     Colon polyp     Constipation     DDD (degenerative  disc disease), cervical     Decreased platelet count     Depression     Resolved: 1/28/2015    Elevated AST (SGOT)     Erectile dysfunction     GERD (gastroesophageal reflux disease)     History of transfusion 2021    BHL; 3 UNITS; NO REACTION    HL (hearing loss)     Hyperlipidemia     Hypertension     Hypothyroidism, adult 11/10/2020    Infection     MSSA lumbar surgical wound infection 3/2017    Jaw pain     Left hip pain     Low back pain     Surgery 30 yrs L4-5    Migraine     Hx of    Obesity (BMI 30.0-34.9) 10/07/2016    BMI 31.92    Obstructive sleep apnea     CPAP    Pancreatitis 06/2023    Portal hypertensive gastropathy 09/03/2021    Renal insufficiency     Shigellosis     Sleep apnea     PT TO BRING MASK AND TUBING DAY OF SURGERY    Symptomatic anemia 08/30/2021    Type 2 diabetes mellitus     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     Vitamin D deficiency     Wears glasses     Wears hearing aid     BOTH EARS       Past Surgical History:   Past Surgical History:   Procedure Laterality Date    ANTERIOR CERVICAL DISCECTOMY W/ FUSION N/A 12/14/2017    Procedure: CERVICAL DISCECTOMY ANTERIOR FUSION WITH INSTRUMENTATION C7/T1;  Surgeon: Gigi Perales MD;  Location:  [facility];  Service:     CERVICAL ARTHRODESIS      CERVICAL DISCECTOMY POSTERIOR FUSION WITH INSTRUMENTATION N/A 03/16/2017    Procedure:  INCISION AND DRAINAGE OF POSTERIOR CERVICAL WOUND;  Surgeon: Gigi Perales MD;  Location:  [facility];  Service:     CERVICAL LAMINECTOMY DECOMPRESSION POSTERIOR Left 02/28/2017    Procedure: Left C7-T1 CERVICAL FORAMINOTOMY POSTERIOR WITH METRIX;  Surgeon: Gigi Perales MD;  Location:  [facility];  Service:     COLONOSCOPY N/A 09/01/2021    Procedure: COLONOSCOPY;  Surgeon: Sharif García MD;  Location:  [facility];  Service: Gastroenterology;  Laterality: N/A;    CYST REMOVAL  04/2021    ENDOSCOPY N/A 08/31/2021    Procedure: ESOPHAGOGASTRODUODENOSCOPY;  Surgeon: Brunner,  Sharif DENG MD;  Location:  [facility];  Service: Gastroenterology;  Laterality: N/A;    ERCP N/A 06/14/2023    Procedure: ENDOSCOPIC RETROGRADE CHOLANGIOPANCREATOGRAPHY;  Surgeon: Brunner, Mark I, MD;  Location:  [facility];  Service: Gastroenterology;  Laterality: N/A;  SPHINCTERTOMY MADE AT [facility]  AND BALLOON SWEEP OF CBD DONE WITH 9-12 BALLOON    LYMPH NODE BIOPSY  6/2023    Negative    MASTECTOMY W/ SENTINEL NODE BIOPSY Right 05/23/2023    Procedure: BREAST MASTECTOMY WITH SENTINEL NODE BIOPSY RIGHT;  Surgeon: Joseph Ramirez MD;  Location:  [facility];  Service: General;  Laterality: Right;    VASECTOMY      WISDOM TOOTH EXTRACTION         Immunizations:   Immunization History   Administered Date(s) Administered    COVID-19 (PFIZER) BIVALENT 12+YRS 11/07/2022    COVID-19 (PFIZER) Purple Cap Monovalent 01/26/2021, 02/19/2021, 11/15/2021, 06/14/2022    FLUAD TRI 65YR+ 11/07/2019    FluMist 2-49yrs 10/04/2013, 10/31/2014    Fluad Quad 65+ 10/21/2020    Fluzone High Dose =>65 Years ([employer] ONLY) 11/09/2016, 10/20/2017, 10/12/2018    Fluzone High-Dose 65+yrs 10/13/2021, 11/07/2022, 10/14/2023    Influenza Seasonal Injectable 11/09/2016    Influenza TIV (IM) 10/14/2015    Pneumococcal Conjugate 13-Valent (PCV13) 07/14/2015    Pneumococcal Polysaccharide (PPSV23) 11/09/2016    Shingrix 10/15/2019, 02/25/2020    TD Preservative Free (Tenivac) 03/14/1997    Td (TDVAX) 03/03/1997    Tdap 09/19/2013    Zostavax 10/15/2015        Medications:     Current Outpatient Medications:     Alpha-Lipoic Acid 600 MG tablet, Take 1 tablet by mouth Daily., Disp: , Rfl:     B Complex Vitamins (VITAMIN B COMPLEX PO), Take 1 tablet by mouth Daily., Disp: , Rfl:     carvedilol (COREG) 3.125 MG tablet, Take 2 tablets by mouth Every 12 (Twelve) Hours., Disp: 60 tablet, Rfl: 0    Coenzyme Q10 (Co Q 10) 100 MG capsule, Take 300 mg by mouth Daily., Disp: , Rfl:     colestipol (COLESTID) 1 g tablet, TAKE 2 TABLETS TWICE A DAY,  Disp: 360 tablet, Rfl: 3    Continuous Blood Gluc  (FreeStyle [place] 2 [place]) device, 1 Device Every 14 (Fourteen) Days., Disp: 1 each, Rfl: 0    Continuous Blood Gluc Sensor (FreeStyle Winston 2 Sensor) misc, 1 each Every 14 (Fourteen) Days., Disp: 6 each, Rfl: 3    Dulaglutide (Trulicity) 4.5 MG/0.5ML solution pen-injector, Inject 0.5 mL under the skin into the appropriate area as directed 1 (One) Time Per Week., Disp: 6 mL, Rfl: 3    ferrous sulfate (FeroSul) 325 (65 FE) MG tablet, Take 1 tablet by mouth 2 (Two) Times a Day., Disp: 180 tablet, Rfl: 1    Flaxseed, Linseed, (Flaxseed Oil) 1400 MG capsule, Take 1 capsule by mouth 2 (Two) Times a Day., Disp: , Rfl:     gabapentin (NEURONTIN) 800 MG tablet, TAKE 1 TABLET FOUR TIMES A DAY, Disp: 360 tablet, Rfl: 0    gemfibrozil (LOPID) 600 MG tablet, TAKE 1 TABLET TWICE A DAY, Disp: 180 tablet, Rfl: 3    Insulin Glargine (LANTUS SOLOSTAR) 100 UNIT/ML injection pen, Inject 70 Units under the skin into the appropriate area as directed Every Night., Disp: 81 mL, Rfl: 3    Insulin Lispro, 1 Unit Dial, (HumaLOG KwikPen) 100 UNIT/ML solution pen-injector, Inject 30 Units under the skin into the appropriate area as directed 3 (Three) Times a Day., Disp: 81 mL, Rfl: 3    Insulin Pen Needle (B-D UF III MINI PEN NEEDLES) 31G X 5 MM misc, USE THREE TIMES A DAY, Disp: 270 each, Rfl: 3    ipratropium (ATROVENT) 0.06 % nasal spray, USE 2 SPRAYS IN EACH NOSTRIL AS DIRECTED BY PROVIDER DAILY (Patient taking differently: 2 sprays into the nostril(s) as directed by provider Daily.), Disp: 45 mL, Rfl: 1    levothyroxine (SYNTHROID, LEVOTHROID) 75 MCG tablet, Take 1 tablet by mouth Daily., Disp: 90 tablet, Rfl: 3    Multiple Vitamins-Minerals (MULTIVITAMIN PO), Take 1 tablet by mouth Daily., Disp: , Rfl:     omeprazole (priLOSEC) 40 MG capsule, TAKE 1 CAPSULE DAILY, Disp: 90 capsule, Rfl: 3    saccharomyces boulardii (FLORASTOR) 250 MG capsule, Take 1 capsule by mouth 2 (Two) Times  a Day., Disp: 30 capsule, Rfl: 0    sertraline (ZOLOFT) 100 MG tablet, TAKE 1 TABLET DAILY, Disp: 90 tablet, Rfl: 3    spironolactone (ALDACTONE) 25 MG tablet, TAKE 1 TABLET DAILY (Patient taking differently: Take 1 tablet by mouth Daily.), Disp: 90 tablet, Rfl: 3    tamoxifen (NOLVADEX) 20 MG chemo tablet, Take 1 tablet by mouth Daily., Disp: 90 tablet, Rfl: 3    temazepam (Restoril) 7.5 MG capsule, Take 1 capsule by mouth At Night As Needed for Sleep., Disp: , Rfl:     vitamin D (ERGOCALCIFEROL) 1.25 MG (94768 UT) capsule capsule, Take 1 capsule by mouth Every 7 (Seven) Days. FRIDAY, Disp: , Rfl:     Allergies:   Allergies   Allergen Reactions    Glucophage Xr [Metformin Hcl Er] Diarrhea and Other (See Comments)     Glucophage XR TB24: Adverse Reaction: Severe GI issues.    Metformin Nausea And Vomiting     Other reaction(s): SEVERE INTESTIONAL ISSUES  Other reaction(s): SEVERE GI ISSUES    Glucophage XR TB24  MetFORMIN HCl TABS    Tetracyclines & Related Nausea Only     Adverse Reaction    Chlorhexidine Rash       Family History:   Family History   Problem Relation Age of Onset    Hearing loss Mother     Arthritis Mother     Cancer Mother     Heart disease Mother     Thyroid disease Mother     Diabetes Father     Heart disease Father         Cardiomyopathy    Hyperlipidemia Father     Stroke Father     Hypertension Father     Diabetes Paternal Grandmother        Social History:   Social History     Socioeconomic History    Marital status:    Tobacco Use    Smoking status: Former     Packs/day: 0.50     Years: 4.00     Additional pack years: 0.00     Total pack years: 2.00     Types: Cigarettes     Quit date: 1976     Years since quittin.1    Smokeless tobacco: Never   Vaping Use    Vaping Use: Never used   Substance and Sexual Activity    Alcohol use: Yes     Comment: Socially    Drug use: Never    Sexual activity: Not Currently     Partners: Female     Birth control/protection: Vasectomy  "        Objective     Vital Signs  /62 (BP Location: Right arm, Patient Position: Sitting, Cuff Size: Adult)   Pulse 88   Temp 97.3 °F (36.3 °C) (Infrared)   Resp 18   Ht 172.7 cm (68\")   Wt 93.3 kg (205 lb 9.6 oz)   BMI 31.26 kg/m²   Estimated body mass index is 31.26 kg/m² as calculated from the following:    Height as of this encounter: 172.7 cm (68\").    Weight as of this encounter: 93.3 kg (205 lb 9.6 oz).            Physical Exam  Vitals and nursing note reviewed.   Constitutional:       Appearance: Normal appearance.   Cardiovascular:      Rate and Rhythm: Normal rate and regular rhythm.      Pulses: Normal pulses.      Heart sounds: Normal heart sounds.   Pulmonary:      Effort: Pulmonary effort is normal.      Breath sounds: Normal breath sounds.   Musculoskeletal:         General: Normal range of motion.   Skin:     General: Skin is warm and dry.   Neurological:      General: No focal deficit present.      Mental Status: He is alert and oriented to person, place, and time. Mental status is at baseline.      Cranial Nerves: No cranial nerve deficit.      Sensory: No sensory deficit.      Motor: No weakness.      Coordination: Coordination normal.      Gait: Gait normal.      Deep Tendon Reflexes: Reflexes normal.   Psychiatric:         Mood and Affect: Mood normal.         Behavior: Behavior normal.         Thought Content: Thought content normal.         Judgment: Judgment normal.          Assessment and Plan     1. Syncope, unspecified syncope type    - Comprehensive Metabolic Panel; Future  - CBC & Differential; Future  - carvedilol (COREG) 3.125 MG tablet; Take 2 tablets by mouth Every 12 (Twelve) Hours.  Dispense: 60 tablet; Refill: 0      2. Vitamin D deficiency    - Vitamin D 25 hydroxy; Future    3. Stage 3a chronic kidney disease    - POC Microalbumin; Future    4. Type 2 diabetes mellitus with diabetic polyneuropathy, with long-term current use of insulin    - POC Microalbumin; " Future     Reviewed medications, potential side effects and signs and symptoms to report. Discussed risk versus benefits of treatment plan with patient and/or family-including medications, labs and radiology that may be ordered. Addressed questions and concerns during visit. Patient and/or family verbalized understanding and agree with plan. Instructed to call the office with any questions and report to ER with any life-threatening symptoms.       Follow Up  No follow-ups on file.    JASPAL Carr [facility] INTERNAL MEDICINE & P[facility]ATRICS  [address] [address]  [phone]  Transcribed from ambient dictation for Dorina eDleon PA-C by Venita Acuna.  02/01/24   12:50 EST    Patient or patient representative verbalized consent to the visit recording.  I have personally performed the services described in this document as transcribed by the above individual, and it is both accurate and complete.

## 2024-02-02 LAB
25(OH)D3 SERPL-MCNC: 44.8 NG/ML (ref 30–100)
ALBUMIN SERPL-MCNC: 4.5 G/DL (ref 3.5–5.2)
ALBUMIN/GLOB SERPL: 1.3 G/DL
ALP SERPL-CCNC: 97 U/L (ref 39–117)
ALT SERPL W P-5'-P-CCNC: 12 U/L (ref 1–41)
ANION GAP SERPL CALCULATED.3IONS-SCNC: 14 MMOL/L (ref 5–15)
AST SERPL-CCNC: 22 U/L (ref 1–40)
BILIRUB SERPL-MCNC: 0.5 MG/DL (ref 0–1.2)
BUN SERPL-MCNC: 32 MG/DL (ref 8–23)
BUN/CREAT SERPL: 16.9 (ref 7–25)
CALCIUM SPEC-SCNC: 10 MG/DL (ref 8.6–10.5)
CHLORIDE SERPL-SCNC: 101 MMOL/L (ref 98–107)
CO2 SERPL-SCNC: 24 MMOL/L (ref 22–29)
CREAT SERPL-MCNC: 1.89 MG/DL (ref 0.76–1.27)
EGFRCR SERPLBLD CKD-EPI 2021: 36.8 ML/MIN/1.73
GLOBULIN UR ELPH-MCNC: 3.5 GM/DL
GLUCOSE SERPL-MCNC: 256 MG/DL (ref 65–99)
POTASSIUM SERPL-SCNC: 6 MMOL/L (ref 3.5–5.2)
PROT SERPL-MCNC: 8 G/DL (ref 6–8.5)
SODIUM SERPL-SCNC: 139 MMOL/L (ref 136–145)

## 2024-02-08 ENCOUNTER — OFFICE VISIT (OUTPATIENT)
Dept: ONCOLOGY | Facility: CLINIC | Age: 74
End: 2024-02-08
Payer: MEDICARE

## 2024-02-08 VITALS
BODY MASS INDEX: 31.07 KG/M2 | TEMPERATURE: 96.7 F | HEART RATE: 72 BPM | RESPIRATION RATE: 16 BRPM | HEIGHT: 68 IN | SYSTOLIC BLOOD PRESSURE: 99 MMHG | OXYGEN SATURATION: 95 % | DIASTOLIC BLOOD PRESSURE: 65 MMHG | WEIGHT: 205 LBS

## 2024-02-08 DIAGNOSIS — Z17.0 MALIGNANT NEOPLASM OF RIGHT BREAST IN MALE, ESTROGEN RECEPTOR POSITIVE, UNSPECIFIED SITE OF BREAST: Primary | ICD-10-CM

## 2024-02-08 DIAGNOSIS — C50.921 MALIGNANT NEOPLASM OF RIGHT BREAST IN MALE, ESTROGEN RECEPTOR POSITIVE, UNSPECIFIED SITE OF BREAST: Primary | ICD-10-CM

## 2024-02-08 PROCEDURE — 1126F AMNT PAIN NOTED NONE PRSNT: CPT | Performed by: INTERNAL MEDICINE

## 2024-02-08 PROCEDURE — 3074F SYST BP LT 130 MM HG: CPT | Performed by: INTERNAL MEDICINE

## 2024-02-08 PROCEDURE — 3078F DIAST BP <80 MM HG: CPT | Performed by: INTERNAL MEDICINE

## 2024-02-08 PROCEDURE — 99213 OFFICE O/P EST LOW 20 MIN: CPT | Performed by: INTERNAL MEDICINE

## 2024-02-08 RX ORDER — TIZANIDINE 4 MG/1
TABLET ORAL EVERY 8 HOURS SCHEDULED
COMMUNITY

## 2024-02-08 RX ORDER — INSULIN HUMAN 500 [IU]/ML
INJECTION, SOLUTION SUBCUTANEOUS
COMMUNITY

## 2024-02-08 RX ORDER — SIMVASTATIN 40 MG
TABLET ORAL EVERY 24 HOURS
COMMUNITY

## 2024-02-08 NOTE — PROGRESS NOTES
"      PROBLEM LIST:  1. [id number] ER+ (90%, 3+), AZ+ (66%, 3+), Her2 negative (1+) invasive ductal carcinoma of the right breast.    A) right mastectomy on 5/23/23 showed a 2.2 cm intermediate grade IDC.  0/2 SLN involved.  Genetic testing negative.  B) tamoxifen started June 2023  2. DM2  3. Hypertension  4. Hypothyroidism  5. FAVIO  6. HL  7. Anxiety/depression  8. GERD  9. JEROME cirrhosis  10. CKD    Subjective     CHIEF COMPLAINT: Breast cancer    HISTORY OF PRESENT ILLNESS:   Elton Funez returns for follow-up.   He started tamoxifen early June 2023.  He is continuing to tolerate this well.       Last week he had a fall, likely due to orthostasis.  His blood pressure medication dose was reduced.    He has severe peripheral neuropathy which has been more bothersome recently.      Objective      BP 99/65   Pulse 72   Temp 96.7 °F (35.9 °C) (Temporal)   Resp 16   Ht 172.7 cm (67.99\")   Wt 93 kg (205 lb)   SpO2 95%   BMI 31.18 kg/m²   Vitals:    02/08/24 1342   PainSc: 0-No pain                             General: well appearing male in no acute distress  Neuro: alert and oriented  HEENT: sclera anicteric, oropharynx clear  Lymphatics: no cervical, supraclavicular, or axillary adenopathy  Chest: Status post right mastectomy.  No palpable mass bilaterally  Cardiovascular: regular rate and rhythm, no murmurs  Lungs: clear to auscultation bilaterally  Abdomen: soft, nontender, nondistended.  No palpable organomegaly  Extremeties: no lower extremity edema  Skin: no rashes, lesions, bruising, or petechiae  Psych: mood and affect appropriate    I have reexamined the patient and the results are consistent with the previously documented exam. Danika Archer MD        RECENT LABS:  Lab Results   Component Value Date    WBC 6.66 02/01/2024    HGB 13.7 02/01/2024    HCT 41.2 02/01/2024    MCV 97.4 (H) 02/01/2024     (L) 02/01/2024       Lab Results   Component Value Date    GLUCOSE 256 (H) 02/01/2024    BUN 32 " (H) 02/01/2024    CREATININE 1.89 (H) 02/01/2024    EGFRIFNONA 60 (L) 12/15/2021    EGFRIFAFRI 66 07/14/2015    BCR 16.9 02/01/2024    K 6.0 (H) 02/01/2024    CO2 24.0 02/01/2024    CALCIUM 10.0 02/01/2024    PROTENTOTREF 7.7 07/14/2015    ALBUMIN 4.5 02/01/2024    LABIL2 1.5 07/14/2015    AST 22 02/01/2024    ALT 12 02/01/2024                   ASSESSMENT AND PLAN:     Elton Funez is a 73 y.o. male with a T2N0 ER+ Her2 negative IDC of the right breast.     He started tamoxifen in early June 2023.  He is tolerating it relatively well.  We will continue tamoxifen for minimum of 5 years.    Follow-up in 6 months.                  Danika Archer MD  [facility] Hematology and Oncology    2/8/2024          CC:

## 2024-02-09 DIAGNOSIS — E87.5 HYPERKALEMIA: ICD-10-CM

## 2024-02-09 DIAGNOSIS — N18.31 STAGE 3A CHRONIC KIDNEY DISEASE: Primary | ICD-10-CM

## 2024-02-12 ENCOUNTER — LAB (OUTPATIENT)
Dept: INTERNAL MEDICINE | Facility: CLINIC | Age: 74
End: 2024-02-12
Payer: MEDICARE

## 2024-02-12 DIAGNOSIS — N18.31 STAGE 3A CHRONIC KIDNEY DISEASE: ICD-10-CM

## 2024-02-12 DIAGNOSIS — E87.5 HYPERKALEMIA: ICD-10-CM

## 2024-02-12 LAB
ALBUMIN SERPL-MCNC: 4.5 G/DL (ref 3.5–5.2)
ALBUMIN/GLOB SERPL: 1.3 G/DL
ALP SERPL-CCNC: 87 U/L (ref 39–117)
ALT SERPL W P-5'-P-CCNC: 13 U/L (ref 1–41)
ANION GAP SERPL CALCULATED.3IONS-SCNC: 12.6 MMOL/L (ref 5–15)
AST SERPL-CCNC: 22 U/L (ref 1–40)
BILIRUB SERPL-MCNC: 0.5 MG/DL (ref 0–1.2)
BUN SERPL-MCNC: 30 MG/DL (ref 8–23)
BUN/CREAT SERPL: 15.2 (ref 7–25)
CALCIUM SPEC-SCNC: 9.9 MG/DL (ref 8.6–10.5)
CHLORIDE SERPL-SCNC: 104 MMOL/L (ref 98–107)
CO2 SERPL-SCNC: 25.4 MMOL/L (ref 22–29)
CREAT SERPL-MCNC: 1.98 MG/DL (ref 0.76–1.27)
EGFRCR SERPLBLD CKD-EPI 2021: 34.8 ML/MIN/1.73
GLOBULIN UR ELPH-MCNC: 3.5 GM/DL
GLUCOSE SERPL-MCNC: 95 MG/DL (ref 65–99)
POTASSIUM SERPL-SCNC: 5.2 MMOL/L (ref 3.5–5.2)
PROT SERPL-MCNC: 8 G/DL (ref 6–8.5)
SODIUM SERPL-SCNC: 142 MMOL/L (ref 136–145)

## 2024-02-12 PROCEDURE — 80053 COMPREHEN METABOLIC PANEL: CPT | Performed by: PHYSICIAN ASSISTANT

## 2024-02-21 DIAGNOSIS — D50.9 IRON DEFICIENCY ANEMIA, UNSPECIFIED IRON DEFICIENCY ANEMIA TYPE: ICD-10-CM

## 2024-02-21 RX ORDER — FERROUS SULFATE 325(65) MG
1 TABLET ORAL 2 TIMES DAILY
Qty: 180 TABLET | Refills: 1 | Status: SHIPPED | OUTPATIENT
Start: 2024-02-21

## 2024-02-21 NOTE — TELEPHONE ENCOUNTER
Caller: ArminElton bull    Relationship: Self    Best call back number: [phone]     Requested Prescriptions:   Requested Prescriptions     Pending Prescriptions Disp Refills    ferrous sulfate (FeroSul) 325 (65 FE) MG tablet 180 tablet 1     Sig: Take 1 tablet by mouth 2 (Two) Times a Day.      [id number] [place] [address] [address]  AT [facility] [phone] [facility] [phone]      Last office visit with prescribing clinician: 11/27/2023   Last telemedicine visit with prescribing clinician: Visit date not found   Next office visit with prescribing clinician: 3/27/2024     Additional details provided by patient: THE PATIENT IS OUT OF THIS MEDICATION    Does the patient have less than a 3 day supply:  [x] Yes  [] No    Would you like a call back once the refill request has been completed: [] Yes [x] No    If the office needs to give you a call back, can they leave a voicemail: [] Yes [x] No    Valencia Sidhu Rep   02/21/24 13:58 EST

## 2024-02-26 DIAGNOSIS — N18.31 STAGE 3A CHRONIC KIDNEY DISEASE: Primary | ICD-10-CM

## 2024-02-27 PROCEDURE — 80053 COMPREHEN METABOLIC PANEL: CPT | Performed by: PHYSICIAN ASSISTANT

## 2024-03-02 DIAGNOSIS — R55 SYNCOPE, UNSPECIFIED SYNCOPE TYPE: ICD-10-CM

## 2024-03-04 ENCOUNTER — TELEPHONE (OUTPATIENT)
Dept: INTERNAL MEDICINE | Facility: CLINIC | Age: 74
End: 2024-03-04
Payer: MEDICARE

## 2024-03-04 DIAGNOSIS — R55 SYNCOPE, UNSPECIFIED SYNCOPE TYPE: ICD-10-CM

## 2024-03-04 RX ORDER — CARVEDILOL 3.12 MG/1
TABLET ORAL
Qty: 60 TABLET | Refills: 23 | Status: SHIPPED | OUTPATIENT
Start: 2024-03-04 | End: 2024-03-04

## 2024-03-04 RX ORDER — CARVEDILOL 3.12 MG/1
TABLET ORAL
Qty: 60 TABLET | Refills: 23 | Status: CANCELLED | OUTPATIENT
Start: 2024-03-04

## 2024-03-04 RX ORDER — CARVEDILOL 3.12 MG/1
3.12 TABLET ORAL 2 TIMES DAILY WITH MEALS
Qty: 90 TABLET | Refills: 15 | Status: SHIPPED | OUTPATIENT
Start: 2024-03-04 | End: 2024-03-11

## 2024-03-04 NOTE — TELEPHONE ENCOUNTER
----- Message from Dorina Deleon PA-C sent at 3/4/2024  9:39 AM EST -----  Please inform patient kidney function continues to decline slightly, it appears the nephrology referral was closed is this because the patient is scheduled with nephrology?  
I have called and left a message with return phone number    Relay:  Please inform patient kidney function continues to decline slightly, it appears the nephrology referral was closed is this because the patient is scheduled with nephrology?   
Name: Elton Funez      Relationship: Self      Best Callback Number: [phone]       HUB PROVIDED THE RELAY MESSAGE FROM THE OFFICE      PATIENT: VOICED UNDERSTANDING AND HAS NO FURTHER QUESTIONS AT THIS TIME    ADDITIONAL INFORMATION: PATIENT CALLED BACK AND STATES HE HAS SCHEDULED WITH NEPHROLOGY.   
Yes

## 2024-03-04 NOTE — TELEPHONE ENCOUNTER
Caller: Elton Funez    Relationship: Self    Best call back number: [phone]     Requested Prescriptions:   Requested Prescriptions     Pending Prescriptions Disp Refills    carvedilol (COREG) 3.125 MG tablet 60 tablet 23        Pharmacy where request should be sent: [facility] [address] [phone] [facility] [phone]      Last office visit with prescribing clinician: 11/27/2023   Last telemedicine visit with prescribing clinician: Visit date not found   Next office visit with prescribing clinician: 3/27/2024     Additional details provided by patient: PATIENT WOULD LIKE A 90 DAY SUPPLY.     Does the patient have less than a 3 day supply:  [] Yes  [x] No    Would you like a call back once the refill request has been completed: [] Yes [x] No    If the office needs to give you a call back, can they leave a voicemail: [] Yes [x] No    Cadance Dunaway, RegSched Rep   03/04/24 14:30 EST

## 2024-03-06 ENCOUNTER — OFFICE VISIT (OUTPATIENT)
Dept: ENDOCRINOLOGY | Facility: CLINIC | Age: 74
End: 2024-03-06
Payer: MEDICARE

## 2024-03-06 VITALS
HEART RATE: 79 BPM | SYSTOLIC BLOOD PRESSURE: 122 MMHG | HEIGHT: 68 IN | DIASTOLIC BLOOD PRESSURE: 60 MMHG | BODY MASS INDEX: 30.16 KG/M2 | WEIGHT: 199 LBS

## 2024-03-06 DIAGNOSIS — E11.42 TYPE 2 DIABETES MELLITUS WITH DIABETIC POLYNEUROPATHY, WITH LONG-TERM CURRENT USE OF INSULIN: Primary | ICD-10-CM

## 2024-03-06 DIAGNOSIS — E03.9 ACQUIRED HYPOTHYROIDISM: ICD-10-CM

## 2024-03-06 DIAGNOSIS — N18.31 STAGE 3A CHRONIC KIDNEY DISEASE: ICD-10-CM

## 2024-03-06 DIAGNOSIS — Z79.4 TYPE 2 DIABETES MELLITUS WITH DIABETIC POLYNEUROPATHY, WITH LONG-TERM CURRENT USE OF INSULIN: Primary | ICD-10-CM

## 2024-03-06 LAB
EXPIRATION DATE: ABNORMAL
EXPIRATION DATE: ABNORMAL
GLUCOSE BLDC GLUCOMTR-MCNC: 186 MG/DL (ref 70–130)
HBA1C MFR BLD: 7 % (ref 4.5–5.7)
Lab: ABNORMAL
Lab: ABNORMAL

## 2024-03-06 NOTE — PATIENT INSTRUCTIONS
Results for orders placed or performed in visit on 03/06/24   POC Glucose, Blood    Specimen: Blood   Result Value Ref Range    Glucose 186 (A) 70 - 130 mg/dL    Lot Number [id number]     Expiration Date 10/05/2024    POC Glycosylated Hemoglobin (Hb A1C)    Specimen: Blood   Result Value Ref Range    Hemoglobin A1C 7.0 (A) 4.5 - 5.7 %    Lot Number [id number]     Expiration Date 10/23/2023      Continue Trulicity 4.5 weekly       New Insulin dosing:  Basal insulin Levemir  or Lantus  68 Units nightly             Meal Insulin Humalog (U-100)  26 units before large meal.   Correction insulin (add to meal insulin)   0 unit  if glucose  less than 150   2unit   if glucose  150 - 199   4 units if glucose 200 - 249   6 units if glucose 250 - 299   8 units if glucose 300 - 349   10  units if glucose Greater than 350     Nutritional Recommendations:    4-5 carb portions per meal for male (60-75 gm of carbs)    Physical Activity Goals:  Walk at least 15 min every day, ideally exercise 45-60 min most days (could be done in short bouts of 10-15 minutes.    Keep records of your glucose levels and insulin adjustments. We may ask you to keep records on the content of your meals with insulin doses and before/after meal glucose levels to evaluate your ratios.  Call for advice if you have unexplained or unexpected hypoglycemia  (glucose < 60) or persistent high glucose > 300.  Office: [phone]      Pau Colin MD

## 2024-03-06 NOTE — PROGRESS NOTES
Chief complaint  Diabetes    Subjective   Elton Funez is a 73 y.o. male is here today for follow-up    Diabetes mellitus type 2, uncontrolled dx in 2000  Diabetic complications: neuropathy, retinopathy.   Past meds: Metformin caused diarrhea in the past.   Eye care:no retinopathy.   Monitoring - freestyle winston reviewed.   Foot care and dental care: discussed.     Medications: Trulicity, doing well and glucose improved.   Insulin dose requirement reduced significantly after the Trulicity start. He had some low glucose episodes and decreased insulin.     Hypothyroidism - on levothyroxine.      He had pancreatitis (gallstone related) in 6/2023, continued Trulicity. Increase in the dose did not cause recurrence of the symptoms.     He reported frequent hypoglycemia after prandial insulin. Last labs showed declining kidney function. He also noticed lethargy- sleeps during the day often and still feels tired.     Medications    Current Outpatient Medications:     Alpha-Lipoic Acid 600 MG tablet, Take 1 tablet by mouth Daily., Disp: , Rfl:     B Complex Vitamins (VITAMIN B COMPLEX PO), Take 1 tablet by mouth Daily., Disp: , Rfl:     carvedilol (COREG) 3.125 MG tablet, Take 1 tablet by mouth 2 (Two) Times a Day With Meals., Disp: 90 tablet, Rfl: 15    Coenzyme Q10 (Co Q 10) 100 MG capsule, Take 300 mg by mouth Daily., Disp: , Rfl:     colestipol (COLESTID) 1 g tablet, TAKE 2 TABLETS TWICE A DAY, Disp: 360 tablet, Rfl: 3    Continuous Blood Gluc  (FreeStyle Winston 2 Eastman) device, 1 Device Every 14 (Fourteen) Days., Disp: 1 each, Rfl: 0    Continuous Blood Gluc Sensor (FreeStyle Winston 2 Sensor) misc, 1 each Every 14 (Fourteen) Days., Disp: 6 each, Rfl: 3    Dulaglutide (Trulicity) 4.5 MG/0.5ML solution pen-injector, Inject 0.5 mL under the skin into the appropriate area as directed 1 (One) Time Per Week., Disp: 6 mL, Rfl: 3    ferrous sulfate (FeroSul) 325 (65 FE) MG tablet, Take 1 tablet by mouth 2 (Two) Times  a Day., Disp: 180 tablet, Rfl: 1    Flaxseed, Linseed, (Flaxseed Oil) 1400 MG capsule, Take 1 capsule by mouth 2 (Two) Times a Day., Disp: , Rfl:     gabapentin (NEURONTIN) 800 MG tablet, TAKE 1 TABLET FOUR TIMES A DAY, Disp: 360 tablet, Rfl: 0    gemfibrozil (LOPID) 600 MG tablet, TAKE 1 TABLET TWICE A DAY, Disp: 180 tablet, Rfl: 3    Insulin Glargine (LANTUS SOLOSTAR) 100 UNIT/ML injection pen, Inject 70 Units under the skin into the appropriate area as directed Every Night., Disp: 81 mL, Rfl: 3    Insulin Lispro, 1 Unit Dial, (HumaLOG KwikPen) 100 UNIT/ML solution pen-injector, Inject 30 Units under the skin into the appropriate area as directed 3 (Three) Times a Day., Disp: 81 mL, Rfl: 3    Insulin Pen Needle (B-D UF III MINI PEN NEEDLES) 31G X 5 MM misc, USE THREE TIMES A DAY, Disp: 270 each, Rfl: 3    ipratropium (ATROVENT) 0.06 % nasal spray, USE 2 SPRAYS IN EACH NOSTRIL AS DIRECTED BY PROVIDER DAILY (Patient taking differently: 2 sprays into the nostril(s) as directed by provider Daily.), Disp: 45 mL, Rfl: 1    levothyroxine (SYNTHROID, LEVOTHROID) 75 MCG tablet, Take 1 tablet by mouth Daily., Disp: 90 tablet, Rfl: 3    Multiple Vitamins-Minerals (MULTIVITAMIN PO), Take 1 tablet by mouth Daily., Disp: , Rfl:     omeprazole (priLOSEC) 40 MG capsule, TAKE 1 CAPSULE DAILY, Disp: 90 capsule, Rfl: 3    saccharomyces boulardii (FLORASTOR) 250 MG capsule, Take 1 capsule by mouth 2 (Two) Times a Day., Disp: 30 capsule, Rfl: 0    sertraline (ZOLOFT) 100 MG tablet, TAKE 1 TABLET DAILY, Disp: 90 tablet, Rfl: 3    simvastatin (ZOCOR) 40 MG tablet, Daily., Disp: , Rfl:     spironolactone (ALDACTONE) 25 MG tablet, TAKE 1 TABLET DAILY (Patient taking differently: Take 1 tablet by mouth Daily.), Disp: 90 tablet, Rfl: 3    tamoxifen (NOLVADEX) 20 MG chemo tablet, Take 1 tablet by mouth Daily., Disp: 90 tablet, Rfl: 3    temazepam (Restoril) 7.5 MG capsule, Take 1 capsule by mouth At Night As Needed for Sleep., Disp: , Rfl:  "    tiZANidine (ZANAFLEX) 4 MG tablet, Every 8 (Eight) Hours., Disp: , Rfl:     vitamin D (ERGOCALCIFEROL) 1.25 MG (28109 UT) capsule capsule, Take 1 capsule by mouth Every 7 (Seven) Days. FRIDAY, Disp: , Rfl:     Review of systems  Review of Systems   Constitutional:  Positive for fatigue.   Musculoskeletal:  Positive for arthralgias, back pain and neck pain.   Neurological:  Positive for numbness.   All other systems reviewed and are negative.      Physical exam  Objective   Blood pressure 122/60, pulse 79, height 172.7 cm (67.99\"), weight 90.3 kg (199 lb). Body mass index is 30.26 kg/m².  Physical Exam   Constitutional: He is oriented to person, place, and time. He appears well-developed and well-nourished.   HENT:   Head: Normocephalic and atraumatic.   Eyes: Conjunctivae are normal.   Neck: No thyromegaly present.   Cardiovascular: Normal rate, regular rhythm, normal heart sounds and normal pulses.   Pulmonary/Chest: Effort normal and breath sounds normal.   Neurological: He is alert and oriented to person, place, and time.   Psychiatric: Thought content normal.         LABS AND IMAGING  Office Visit on 03/06/2024   Component Date Value Ref Range Status    Glucose 03/06/2024 186 (A)  70 - 130 mg/dL Final    Lot Number 03/06/2024 [id number]   Final    Expiration Date 03/06/2024 10/05/2024   Final    Hemoglobin A1C 03/06/2024 7.0 (A)  4.5 - 5.7 % Final    Lot Number 03/06/2024 [id number]   Final    Expiration Date 03/06/2024 10/23/2023   Final   Lab on 02/27/2024   Component Date Value Ref Range Status    Glucose 02/27/2024 126 (H)  65 - 99 mg/dL Final    BUN 02/27/2024 36 (H)  8 - 23 mg/dL Final    Creatinine 02/27/2024 2.21 (H)  0.76 - 1.27 mg/dL Final    Sodium 02/27/2024 140  136 - 145 mmol/L Final    Potassium 02/27/2024 5.5 (H)  3.5 - 5.2 mmol/L Final    Chloride 02/27/2024 103  98 - 107 mmol/L Final    CO2 02/27/2024 22.4  22.0 - 29.0 mmol/L Final    Calcium 02/27/2024 9.6  8.6 - 10.5 mg/dL Final    Total " Protein 02/27/2024 7.9  6.0 - 8.5 g/dL Final    Albumin 02/27/2024 4.3  3.5 - 5.2 g/dL Final    ALT (SGPT) 02/27/2024 13  1 - 41 U/L Final    AST (SGOT) 02/27/2024 22  1 - 40 U/L Final    Alkaline Phosphatase 02/27/2024 96  39 - 117 U/L Final    Total Bilirubin 02/27/2024 0.5  0.0 - 1.2 mg/dL Final    Globulin 02/27/2024 3.6  gm/dL Final    A/G Ratio 02/27/2024 1.2  g/dL Final    BUN/Creatinine Ratio 02/27/2024 16.3  7.0 - 25.0 Final    Anion Gap 02/27/2024 14.6  5.0 - 15.0 mmol/L Final    eGFR 02/27/2024 30.5 (L)  >60.0 mL/min/1.73 Final   Lab on 02/12/2024   Component Date Value Ref Range Status    Glucose 02/12/2024 95  65 - 99 mg/dL Final    BUN 02/12/2024 30 (H)  8 - 23 mg/dL Final    Creatinine 02/12/2024 1.98 (H)  0.76 - 1.27 mg/dL Final    Sodium 02/12/2024 142  136 - 145 mmol/L Final    Potassium 02/12/2024 5.2  3.5 - 5.2 mmol/L Final    Chloride 02/12/2024 104  98 - 107 mmol/L Final    CO2 02/12/2024 25.4  22.0 - 29.0 mmol/L Final    Calcium 02/12/2024 9.9  8.6 - 10.5 mg/dL Final    Total Protein 02/12/2024 8.0  6.0 - 8.5 g/dL Final    Albumin 02/12/2024 4.5  3.5 - 5.2 g/dL Final    ALT (SGPT) 02/12/2024 13  1 - 41 U/L Final    AST (SGOT) 02/12/2024 22  1 - 40 U/L Final    Alkaline Phosphatase 02/12/2024 87  39 - 117 U/L Final    Total Bilirubin 02/12/2024 0.5  0.0 - 1.2 mg/dL Final    Globulin 02/12/2024 3.5  gm/dL Final    A/G Ratio 02/12/2024 1.3  g/dL Final    BUN/Creatinine Ratio 02/12/2024 15.2  7.0 - 25.0 Final    Anion Gap 02/12/2024 12.6  5.0 - 15.0 mmol/L Final    eGFR 02/12/2024 34.8 (L)  >60.0 mL/min/1.73 Final       Assessment  Assessment & Plan   1. Type 2 diabetes mellitus with diabetic polyneuropathy, with long-term current use of insulin    2. Acquired hypothyroidism    3. Stage 3a chronic kidney disease        Plan      CGM downloaded and analyzed- 78% at goal. New insulin instructions with lower prandial insulin dose provided.          Patient Instructions       Continue Trulicity 4.5  weekly . Insulin requirements decreased significantly     ----------------------------------------------------------------------------------    New Insulin dosing:  Basal insulin Levemir  or Lantus  68 Units nightly             Meal Insulin Humalog (U-100)  26 units before meals.   Correction insulin (add to meal insulin)   0 unit  if glucose  less than 150   2unit   if glucose  150 - 199   4 units if glucose 200 - 249   6 units if glucose 250 - 299   8 units if glucose 300 - 349   10  units if glucose Greater than 350       2.Cont levothyroxine 75 mcg, thyroid function ordered with next lab draw.       Follow-up in 3 months

## 2024-03-11 ENCOUNTER — TELEPHONE (OUTPATIENT)
Dept: INTERNAL MEDICINE | Facility: CLINIC | Age: 74
End: 2024-03-11
Payer: MEDICARE

## 2024-03-11 DIAGNOSIS — R55 SYNCOPE, UNSPECIFIED SYNCOPE TYPE: ICD-10-CM

## 2024-03-11 RX ORDER — CARVEDILOL 3.12 MG/1
3.12 TABLET ORAL 2 TIMES DAILY
Qty: 90 TABLET | Refills: 15 | Status: ON HOLD | OUTPATIENT
Start: 2024-03-11

## 2024-03-11 NOTE — TELEPHONE ENCOUNTER
Called [facility] and clarified prescription.  Sig: TAKE 2 TABLETS EVERY 12 HOURS          Resent prescription with correct SIG.

## 2024-03-11 NOTE — TELEPHONE ENCOUNTER
Pharmacy Name:  [facility]    Reference Number (if applicable): [id number]    Pharmacy representative name: MUNIRA    Pharmacy representative phone number: [phone]    What medication are you calling in regards to: CARVEDILOL    What question does the pharmacy have: CLARIFICATION OF DIRECTIONS    Who is the provider that prescribed the medication: DR ANTOINE

## 2024-03-14 NOTE — PLAN OF CARE
"    Endocrinology Inpatient Consult Progress Note     PATIENT NAME: Bing Holliday  MRN: [id number]  DATE: 3/14/2024    CONSULTING PHYSICIAN: Dr. LINA Moreno  REASON FOR CONSULT: Secondary AI. Hypothermia. Hypoglycemia.      Interval Events     Seen this morning.  No complaints.  No palpitations.  No chest pain.  She explicitly denies any lightheadedness when standing from a seated position.  She states that she has been tolerating all of her meals.  No nausea or vomiting.  Denies feeling cold.      Physical Examination     /88 (BP Location: Left arm, Patient Position: Lying)   Pulse (!) 46   Temp 35.1 °C (95.2 °F) (Temporal)   Resp 20   Ht 1.676 m (5' 6\")   Wt 96.5 kg (212 lb 11.2 oz)   LMP  (LMP Unknown)   SpO2 96%   BMI 34.33 kg/m²   No acute distress.  Appropriate affect.  Frontal alopecia  Regular rate and rhythm.  Nonlabored respiration.  Soft nontender nondistended abdomen.  No pedal edema bilaterally.      Medications     Reviewed MAR       Data     Recent Labs and Imaging Reviewed      Assessment / Plan        # Hypoglycemia  From initial consult note.  Has not occurred in the last 4 days.  As per my initial note, let us confirm hypoglycemia using a chemistry before treatment.  If she does have low sugars, please obtain a beta hydroxybutyrate as well as C-peptide.     # Uncontrolled Type 2 Diabetes Mellitus  Home Regimen: None.  Hemoglobin A1c (7/23): 6.9%  Nutrtion: Regular Diet.     Patient is asked to become hyperglycemic, likely due to glucocorticoids.  She is on insulin sliding scale.     # Adrenal Insufficiency  Discussed at length with Dr. Moreno yesterday on the phone.  For now we will continue her prednisone 20 mg daily.  She was bradycardic for essentially the last 24 hours.  Her temperature has been hovering at the 95 degree julia.  Dr. Moreno and I discussed that this patient at least has secondary adrenal insufficiency given how long she has been on glucocorticoids.  She apparently " Problem: Perioperative Period (Adult)  Goal: Signs and Symptoms of Listed Potential Problems Will be Absent or Manageable (Perioperative Period)  Outcome: Ongoing (interventions implemented as appropriate)       does respond quite nicely as far as her blood pressure and hypothermia to glucocorticoids.  The etiology of that is unclear.  Previous MRIs have not shown any hypothalamic lesions or lesions in temperature controlling areas of the brain.     # Osteoporosis  In the setting of chronic glucocorticoid use.  This will be further managed as an outpatient.  She probably would benefit from a bisphosphonate, these agents are best studied with steroid-induced adynamic bone disease.         Avi Sloan, DO  Endocrinology, Diabetes, and Metabolism    Available via [employer] Messenger    Please excuse any typographical or unwanted errors within this documentation as voice recognition software was used to dictate this note.

## 2024-03-17 ENCOUNTER — HOSPITAL ENCOUNTER (INPATIENT)
Facility: HOSPITAL | Age: 74
LOS: 3 days | Discharge: HOME | End: 2024-03-20
Attending: EMERGENCY MEDICINE | Admitting: STUDENT IN AN ORGANIZED HEALTH CARE EDUCATION/TRAINING PROGRAM
Payer: MEDICARE

## 2024-03-17 ENCOUNTER — APPOINTMENT (OUTPATIENT)
Dept: CT IMAGING | Facility: HOSPITAL | Age: 74
End: 2024-03-17
Payer: MEDICARE

## 2024-03-17 ENCOUNTER — APPOINTMENT (OUTPATIENT)
Dept: ULTRASOUND IMAGING | Facility: HOSPITAL | Age: 74
End: 2024-03-17
Payer: MEDICARE

## 2024-03-17 DIAGNOSIS — K81.0 ACUTE CHOLECYSTITIS: ICD-10-CM

## 2024-03-17 DIAGNOSIS — K75.81 LIVER CIRRHOSIS SECONDARY TO NASH: Chronic | ICD-10-CM

## 2024-03-17 DIAGNOSIS — K85.00 IDIOPATHIC ACUTE PANCREATITIS WITHOUT INFECTION OR NECROSIS: ICD-10-CM

## 2024-03-17 DIAGNOSIS — K85.10 GALLSTONE PANCREATITIS: Primary | ICD-10-CM

## 2024-03-17 DIAGNOSIS — K74.60 LIVER CIRRHOSIS SECONDARY TO NASH: Chronic | ICD-10-CM

## 2024-03-17 PROBLEM — E11.9 T2DM (TYPE 2 DIABETES MELLITUS): Status: ACTIVE | Noted: 2024-03-17

## 2024-03-17 PROBLEM — R79.89 ELEVATED TROPONIN: Status: ACTIVE | Noted: 2024-03-17

## 2024-03-17 PROBLEM — R78.81 BACTEREMIA DUE TO KLEBSIELLA PNEUMONIAE: Status: RESOLVED | Noted: 2023-06-12 | Resolved: 2024-03-17

## 2024-03-17 PROBLEM — E78.5 HLD (HYPERLIPIDEMIA): Status: ACTIVE | Noted: 2024-03-17

## 2024-03-17 PROBLEM — K85.90 PANCREATITIS: Status: ACTIVE | Noted: 2024-03-17

## 2024-03-17 PROBLEM — Z85.3 HISTORY OF BREAST CANCER: Status: ACTIVE | Noted: 2024-03-17

## 2024-03-17 PROBLEM — B96.1 BACTEREMIA DUE TO KLEBSIELLA PNEUMONIAE: Status: RESOLVED | Noted: 2023-06-12 | Resolved: 2024-03-17

## 2024-03-17 LAB
ALBUMIN SERPL-MCNC: 3.8 G/DL (ref 3.5–5.2)
ALBUMIN/GLOB SERPL: 1.3 G/DL
ALP SERPL-CCNC: 81 U/L (ref 39–117)
ALT SERPL W P-5'-P-CCNC: 9 U/L (ref 1–41)
ANION GAP SERPL CALCULATED.3IONS-SCNC: 13 MMOL/L (ref 5–15)
AST SERPL-CCNC: 18 U/L (ref 1–40)
BACTERIA UR QL AUTO: NORMAL /HPF
BASOPHILS # BLD AUTO: 0.02 10*3/MM3 (ref 0–0.2)
BASOPHILS NFR BLD AUTO: 0.3 % (ref 0–1.5)
BILIRUB SERPL-MCNC: 0.5 MG/DL (ref 0–1.2)
BILIRUB UR QL STRIP: NEGATIVE
BUN SERPL-MCNC: 26 MG/DL (ref 8–23)
BUN/CREAT SERPL: 17.7 (ref 7–25)
CALCIUM SPEC-SCNC: 8.6 MG/DL (ref 8.6–10.5)
CHLORIDE SERPL-SCNC: 102 MMOL/L (ref 98–107)
CHOLEST SERPL-MCNC: 125 MG/DL (ref 0–200)
CLARITY UR: CLEAR
CO2 SERPL-SCNC: 20 MMOL/L (ref 22–29)
COLOR UR: YELLOW
CREAT SERPL-MCNC: 1.47 MG/DL (ref 0.76–1.27)
D-LACTATE SERPL-SCNC: 1.3 MMOL/L (ref 0.5–2)
DEPRECATED RDW RBC AUTO: 46.6 FL (ref 37–54)
EGFRCR SERPLBLD CKD-EPI 2021: 49.7 ML/MIN/1.73
EOSINOPHIL # BLD AUTO: 0.08 10*3/MM3 (ref 0–0.4)
EOSINOPHIL NFR BLD AUTO: 1.3 % (ref 0.3–6.2)
ERYTHROCYTE [DISTWIDTH] IN BLOOD BY AUTOMATED COUNT: 13.2 % (ref 12.3–15.4)
GLOBULIN UR ELPH-MCNC: 2.9 GM/DL
GLUCOSE BLDC GLUCOMTR-MCNC: 112 MG/DL (ref 70–130)
GLUCOSE BLDC GLUCOMTR-MCNC: 122 MG/DL (ref 70–130)
GLUCOSE BLDC GLUCOMTR-MCNC: 171 MG/DL (ref 70–130)
GLUCOSE BLDC GLUCOMTR-MCNC: 179 MG/DL (ref 70–130)
GLUCOSE SERPL-MCNC: 246 MG/DL (ref 65–99)
GLUCOSE UR STRIP-MCNC: ABNORMAL MG/DL
HBA1C MFR BLD: 7.1 % (ref 4.8–5.6)
HCT VFR BLD AUTO: 37.7 % (ref 37.5–51)
HDLC SERPL-MCNC: 29 MG/DL (ref 40–60)
HGB BLD-MCNC: 12.5 G/DL (ref 13–17.7)
HGB UR QL STRIP.AUTO: NEGATIVE
HOLD SPECIMEN: NORMAL
HYALINE CASTS UR QL AUTO: NORMAL /LPF
IMM GRANULOCYTES # BLD AUTO: 0.02 10*3/MM3 (ref 0–0.05)
IMM GRANULOCYTES NFR BLD AUTO: 0.3 % (ref 0–0.5)
INR PPP: 1.14 (ref 0.89–1.12)
KETONES UR QL STRIP: NEGATIVE
LDLC SERPL CALC-MCNC: 69 MG/DL (ref 0–100)
LDLC/HDLC SERPL: 2.23 {RATIO}
LEUKOCYTE ESTERASE UR QL STRIP.AUTO: NEGATIVE
LIPASE SERPL-CCNC: 350 U/L (ref 13–60)
LYMPHOCYTES # BLD AUTO: 0.68 10*3/MM3 (ref 0.7–3.1)
LYMPHOCYTES NFR BLD AUTO: 11.1 % (ref 19.6–45.3)
MAGNESIUM SERPL-MCNC: 1.5 MG/DL (ref 1.6–2.4)
MCH RBC QN AUTO: 31.9 PG (ref 26.6–33)
MCHC RBC AUTO-ENTMCNC: 33.2 G/DL (ref 31.5–35.7)
MCV RBC AUTO: 96.2 FL (ref 79–97)
MONOCYTES # BLD AUTO: 0.53 10*3/MM3 (ref 0.1–0.9)
MONOCYTES NFR BLD AUTO: 8.6 % (ref 5–12)
NEUTROPHILS NFR BLD AUTO: 4.81 10*3/MM3 (ref 1.7–7)
NEUTROPHILS NFR BLD AUTO: 78.4 % (ref 42.7–76)
NITRITE UR QL STRIP: NEGATIVE
NRBC BLD AUTO-RTO: 0 /100 WBC (ref 0–0.2)
PH UR STRIP.AUTO: 5.5 [PH] (ref 5–8)
PLATELET # BLD AUTO: 79 10*3/MM3 (ref 140–450)
PMV BLD AUTO: 12.5 FL (ref 6–12)
POTASSIUM SERPL-SCNC: 4.3 MMOL/L (ref 3.5–5.2)
PROT SERPL-MCNC: 6.7 G/DL (ref 6–8.5)
PROT UR QL STRIP: ABNORMAL
PROTHROMBIN TIME: 14.7 SECONDS (ref 12.2–14.5)
QT INTERVAL: 406 MS
QTC INTERVAL: 398 MS
RBC # BLD AUTO: 3.92 10*6/MM3 (ref 4.14–5.8)
RBC # UR STRIP: NORMAL /HPF
REF LAB TEST METHOD: NORMAL
SODIUM SERPL-SCNC: 135 MMOL/L (ref 136–145)
SP GR UR STRIP: 1.02 (ref 1–1.03)
SQUAMOUS #/AREA URNS HPF: NORMAL /HPF
TRIGL SERPL-MCNC: 156 MG/DL (ref 0–150)
TROPONIN T SERPL HS-MCNC: 33 NG/L
TROPONIN T SERPL HS-MCNC: 35 NG/L
UROBILINOGEN UR QL STRIP: ABNORMAL
VLDLC SERPL-MCNC: 27 MG/DL (ref 5–40)
WBC # UR STRIP: NORMAL /HPF
WBC NRBC COR # BLD AUTO: 6.14 10*3/MM3 (ref 3.4–10.8)
WHOLE BLOOD HOLD COAG: NORMAL
WHOLE BLOOD HOLD SPECIMEN: NORMAL

## 2024-03-17 PROCEDURE — 25010000002 METRONIDAZOLE 500 MG/100ML SOLUTION: Performed by: NURSE PRACTITIONER

## 2024-03-17 PROCEDURE — 36415 COLL VENOUS BLD VENIPUNCTURE: CPT

## 2024-03-17 PROCEDURE — 25010000002 ONDANSETRON PER 1 MG: Performed by: EMERGENCY MEDICINE

## 2024-03-17 PROCEDURE — 25810000003 SODIUM CHLORIDE 0.9 % SOLUTION: Performed by: NURSE PRACTITIONER

## 2024-03-17 PROCEDURE — 84484 ASSAY OF TROPONIN QUANT: CPT | Performed by: NURSE PRACTITIONER

## 2024-03-17 PROCEDURE — 84484 ASSAY OF TROPONIN QUANT: CPT

## 2024-03-17 PROCEDURE — 85610 PROTHROMBIN TIME: CPT | Performed by: INTERNAL MEDICINE

## 2024-03-17 PROCEDURE — 25010000002 MORPHINE PER 10 MG: Performed by: EMERGENCY MEDICINE

## 2024-03-17 PROCEDURE — 80053 COMPREHEN METABOLIC PANEL: CPT

## 2024-03-17 PROCEDURE — 93005 ELECTROCARDIOGRAM TRACING: CPT

## 2024-03-17 PROCEDURE — 99285 EMERGENCY DEPT VISIT HI MDM: CPT

## 2024-03-17 PROCEDURE — 83605 ASSAY OF LACTIC ACID: CPT | Performed by: NURSE PRACTITIONER

## 2024-03-17 PROCEDURE — 85025 COMPLETE CBC W/AUTO DIFF WBC: CPT

## 2024-03-17 PROCEDURE — 83690 ASSAY OF LIPASE: CPT

## 2024-03-17 PROCEDURE — 82948 REAGENT STRIP/BLOOD GLUCOSE: CPT

## 2024-03-17 PROCEDURE — 76705 ECHO EXAM OF ABDOMEN: CPT

## 2024-03-17 PROCEDURE — 25010000002 METRONIDAZOLE 500 MG/100ML SOLUTION: Performed by: EMERGENCY MEDICINE

## 2024-03-17 PROCEDURE — 25810000003 LACTATED RINGERS SOLUTION: Performed by: EMERGENCY MEDICINE

## 2024-03-17 PROCEDURE — 74176 CT ABD & PELVIS W/O CONTRAST: CPT

## 2024-03-17 PROCEDURE — 81001 URINALYSIS AUTO W/SCOPE: CPT

## 2024-03-17 PROCEDURE — 80061 LIPID PANEL: CPT | Performed by: STUDENT IN AN ORGANIZED HEALTH CARE EDUCATION/TRAINING PROGRAM

## 2024-03-17 PROCEDURE — 25010000002 MORPHINE PER 10 MG: Performed by: INTERNAL MEDICINE

## 2024-03-17 PROCEDURE — 99222 1ST HOSP IP/OBS MODERATE 55: CPT | Performed by: NURSE PRACTITIONER

## 2024-03-17 PROCEDURE — 25010000002 CEFTRIAXONE PER 250 MG: Performed by: EMERGENCY MEDICINE

## 2024-03-17 PROCEDURE — 83735 ASSAY OF MAGNESIUM: CPT | Performed by: INTERNAL MEDICINE

## 2024-03-17 PROCEDURE — 25010000002 MAGNESIUM SULFATE 4 GM/100ML SOLUTION: Performed by: INTERNAL MEDICINE

## 2024-03-17 PROCEDURE — 83036 HEMOGLOBIN GLYCOSYLATED A1C: CPT | Performed by: INTERNAL MEDICINE

## 2024-03-17 RX ORDER — IPRATROPIUM BROMIDE 42 UG/1
2 SPRAY, METERED NASAL DAILY
Status: DISCONTINUED | OUTPATIENT
Start: 2024-03-17 | End: 2024-03-20 | Stop reason: HOSPADM

## 2024-03-17 RX ORDER — AMOXICILLIN 250 MG
2 CAPSULE ORAL 2 TIMES DAILY PRN
Status: DISCONTINUED | OUTPATIENT
Start: 2024-03-17 | End: 2024-03-20 | Stop reason: HOSPADM

## 2024-03-17 RX ORDER — SODIUM CHLORIDE, SODIUM LACTATE, POTASSIUM CHLORIDE, CALCIUM CHLORIDE 600; 310; 30; 20 MG/100ML; MG/100ML; MG/100ML; MG/100ML
100 INJECTION, SOLUTION INTRAVENOUS CONTINUOUS
Status: DISCONTINUED | OUTPATIENT
Start: 2024-03-17 | End: 2024-03-17

## 2024-03-17 RX ORDER — TAMOXIFEN CITRATE 10 MG/1
20 TABLET ORAL DAILY
Status: DISCONTINUED | OUTPATIENT
Start: 2024-03-17 | End: 2024-03-20 | Stop reason: HOSPADM

## 2024-03-17 RX ORDER — NICOTINE POLACRILEX 4 MG
15 LOZENGE BUCCAL
Status: DISCONTINUED | OUTPATIENT
Start: 2024-03-17 | End: 2024-03-20 | Stop reason: HOSPADM

## 2024-03-17 RX ORDER — MAGNESIUM SULFATE HEPTAHYDRATE 40 MG/ML
4 INJECTION, SOLUTION INTRAVENOUS ONCE
Status: COMPLETED | OUTPATIENT
Start: 2024-03-17 | End: 2024-03-17

## 2024-03-17 RX ORDER — SODIUM CHLORIDE 0.9 % (FLUSH) 0.9 %
10 SYRINGE (ML) INJECTION AS NEEDED
Status: DISCONTINUED | OUTPATIENT
Start: 2024-03-17 | End: 2024-03-20 | Stop reason: HOSPADM

## 2024-03-17 RX ORDER — ONDANSETRON 2 MG/ML
4 INJECTION INTRAMUSCULAR; INTRAVENOUS ONCE
Status: COMPLETED | OUTPATIENT
Start: 2024-03-17 | End: 2024-03-17

## 2024-03-17 RX ORDER — SODIUM CHLORIDE 9 MG/ML
100 INJECTION, SOLUTION INTRAVENOUS CONTINUOUS
Status: DISCONTINUED | OUTPATIENT
Start: 2024-03-17 | End: 2024-03-18

## 2024-03-17 RX ORDER — METRONIDAZOLE 500 MG/100ML
500 INJECTION, SOLUTION INTRAVENOUS EVERY 8 HOURS
Qty: 900 ML | Refills: 0 | Status: COMPLETED | OUTPATIENT
Start: 2024-03-17 | End: 2024-03-20

## 2024-03-17 RX ORDER — FERROUS SULFATE 325(65) MG
325 TABLET ORAL 2 TIMES DAILY
Status: DISCONTINUED | OUTPATIENT
Start: 2024-03-17 | End: 2024-03-20 | Stop reason: HOSPADM

## 2024-03-17 RX ORDER — IBUPROFEN 600 MG/1
1 TABLET ORAL
Status: DISCONTINUED | OUTPATIENT
Start: 2024-03-17 | End: 2024-03-20 | Stop reason: HOSPADM

## 2024-03-17 RX ORDER — SERTRALINE HYDROCHLORIDE 100 MG/1
100 TABLET, FILM COATED ORAL DAILY
Status: DISCONTINUED | OUTPATIENT
Start: 2024-03-17 | End: 2024-03-20 | Stop reason: HOSPADM

## 2024-03-17 RX ORDER — DEXTROSE MONOHYDRATE 25 G/50ML
25 INJECTION, SOLUTION INTRAVENOUS
Status: DISCONTINUED | OUTPATIENT
Start: 2024-03-17 | End: 2024-03-20 | Stop reason: HOSPADM

## 2024-03-17 RX ORDER — SACCHAROMYCES BOULARDII 250 MG
250 CAPSULE ORAL 2 TIMES DAILY
Status: DISCONTINUED | OUTPATIENT
Start: 2024-03-17 | End: 2024-03-20 | Stop reason: HOSPADM

## 2024-03-17 RX ORDER — HYDROCODONE BITARTRATE AND ACETAMINOPHEN 5; 325 MG/1; MG/1
1 TABLET ORAL EVERY 6 HOURS PRN
Status: DISCONTINUED | OUTPATIENT
Start: 2024-03-17 | End: 2024-03-20 | Stop reason: HOSPADM

## 2024-03-17 RX ORDER — IPRATROPIUM BROMIDE AND ALBUTEROL SULFATE 2.5; .5 MG/3ML; MG/3ML
3 SOLUTION RESPIRATORY (INHALATION) EVERY 4 HOURS PRN
Status: DISCONTINUED | OUTPATIENT
Start: 2024-03-17 | End: 2024-03-20 | Stop reason: HOSPADM

## 2024-03-17 RX ORDER — SODIUM CHLORIDE 0.9 % (FLUSH) 0.9 %
10 SYRINGE (ML) INJECTION EVERY 12 HOURS SCHEDULED
Status: DISCONTINUED | OUTPATIENT
Start: 2024-03-17 | End: 2024-03-20 | Stop reason: HOSPADM

## 2024-03-17 RX ORDER — BISACODYL 10 MG
10 SUPPOSITORY, RECTAL RECTAL DAILY PRN
Status: DISCONTINUED | OUTPATIENT
Start: 2024-03-17 | End: 2024-03-20 | Stop reason: HOSPADM

## 2024-03-17 RX ORDER — SODIUM CHLORIDE 9 MG/ML
40 INJECTION, SOLUTION INTRAVENOUS AS NEEDED
Status: DISCONTINUED | OUTPATIENT
Start: 2024-03-17 | End: 2024-03-20 | Stop reason: HOSPADM

## 2024-03-17 RX ORDER — BISACODYL 5 MG/1
5 TABLET, DELAYED RELEASE ORAL DAILY PRN
Status: DISCONTINUED | OUTPATIENT
Start: 2024-03-17 | End: 2024-03-20 | Stop reason: HOSPADM

## 2024-03-17 RX ORDER — MORPHINE SULFATE 4 MG/ML
4 INJECTION, SOLUTION INTRAMUSCULAR; INTRAVENOUS
Status: DISCONTINUED | OUTPATIENT
Start: 2024-03-17 | End: 2024-03-17

## 2024-03-17 RX ORDER — MORPHINE SULFATE 4 MG/ML
4 INJECTION, SOLUTION INTRAMUSCULAR; INTRAVENOUS ONCE
Status: COMPLETED | OUTPATIENT
Start: 2024-03-17 | End: 2024-03-17

## 2024-03-17 RX ORDER — ONDANSETRON 4 MG/1
4 TABLET, ORALLY DISINTEGRATING ORAL EVERY 6 HOURS PRN
Status: DISCONTINUED | OUTPATIENT
Start: 2024-03-17 | End: 2024-03-18 | Stop reason: SDUPTHER

## 2024-03-17 RX ORDER — PANTOPRAZOLE SODIUM 40 MG/1
40 TABLET, DELAYED RELEASE ORAL
Status: DISCONTINUED | OUTPATIENT
Start: 2024-03-17 | End: 2024-03-20 | Stop reason: HOSPADM

## 2024-03-17 RX ORDER — ATORVASTATIN CALCIUM 20 MG/1
20 TABLET, FILM COATED ORAL DAILY
Status: DISCONTINUED | OUTPATIENT
Start: 2024-03-17 | End: 2024-03-20 | Stop reason: HOSPADM

## 2024-03-17 RX ORDER — ONDANSETRON 2 MG/ML
4 INJECTION INTRAMUSCULAR; INTRAVENOUS EVERY 6 HOURS PRN
Status: DISCONTINUED | OUTPATIENT
Start: 2024-03-17 | End: 2024-03-18 | Stop reason: SDUPTHER

## 2024-03-17 RX ORDER — MORPHINE SULFATE 2 MG/ML
2 INJECTION, SOLUTION INTRAMUSCULAR; INTRAVENOUS EVERY 4 HOURS PRN
Status: DISCONTINUED | OUTPATIENT
Start: 2024-03-17 | End: 2024-03-18 | Stop reason: SDUPTHER

## 2024-03-17 RX ORDER — LEVOTHYROXINE SODIUM 0.07 MG/1
75 TABLET ORAL
Status: DISCONTINUED | OUTPATIENT
Start: 2024-03-17 | End: 2024-03-20 | Stop reason: HOSPADM

## 2024-03-17 RX ORDER — CARVEDILOL 3.12 MG/1
3.12 TABLET ORAL 2 TIMES DAILY
Status: DISCONTINUED | OUTPATIENT
Start: 2024-03-17 | End: 2024-03-20 | Stop reason: HOSPADM

## 2024-03-17 RX ORDER — METRONIDAZOLE 500 MG/100ML
500 INJECTION, SOLUTION INTRAVENOUS EVERY 8 HOURS
Qty: 900 ML | Refills: 0 | Status: DISCONTINUED | OUTPATIENT
Start: 2024-03-17 | End: 2024-03-17 | Stop reason: SDUPTHER

## 2024-03-17 RX ORDER — POLYETHYLENE GLYCOL 3350 17 G/17G
17 POWDER, FOR SOLUTION ORAL DAILY PRN
Status: DISCONTINUED | OUTPATIENT
Start: 2024-03-17 | End: 2024-03-20 | Stop reason: HOSPADM

## 2024-03-17 RX ADMIN — MORPHINE SULFATE 2 MG: 2 INJECTION, SOLUTION INTRAMUSCULAR; INTRAVENOUS at 09:07

## 2024-03-17 RX ADMIN — MORPHINE SULFATE 4 MG: 4 INJECTION, SOLUTION INTRAMUSCULAR; INTRAVENOUS at 03:02

## 2024-03-17 RX ADMIN — Medication 250 MG: at 19:59

## 2024-03-17 RX ADMIN — FERROUS SULFATE TAB 325 MG (65 MG ELEMENTAL FE) 325 MG: 325 (65 FE) TAB at 19:58

## 2024-03-17 RX ADMIN — Medication 10 ML: at 20:07

## 2024-03-17 RX ADMIN — MAGNESIUM SULFATE HEPTAHYDRATE 4 G: 40 INJECTION, SOLUTION INTRAVENOUS at 13:59

## 2024-03-17 RX ADMIN — PANTOPRAZOLE SODIUM 40 MG: 40 TABLET, DELAYED RELEASE ORAL at 09:09

## 2024-03-17 RX ADMIN — IPRATROPIUM BROMIDE 2 SPRAY: 42 SPRAY, METERED NASAL at 10:12

## 2024-03-17 RX ADMIN — CARVEDILOL 3.12 MG: 3.12 TABLET, FILM COATED ORAL at 09:09

## 2024-03-17 RX ADMIN — Medication 10 ML: at 19:59

## 2024-03-17 RX ADMIN — SODIUM CHLORIDE 100 ML/HR: 9 INJECTION, SOLUTION INTRAVENOUS at 09:12

## 2024-03-17 RX ADMIN — METRONIDAZOLE 500 MG: 500 INJECTION, SOLUTION INTRAVENOUS at 05:59

## 2024-03-17 RX ADMIN — SERTRALINE HYDROCHLORIDE 100 MG: 100 TABLET ORAL at 09:04

## 2024-03-17 RX ADMIN — MORPHINE SULFATE 2 MG: 2 INJECTION, SOLUTION INTRAMUSCULAR; INTRAVENOUS at 20:06

## 2024-03-17 RX ADMIN — SODIUM CHLORIDE 100 ML/HR: 9 INJECTION, SOLUTION INTRAVENOUS at 13:22

## 2024-03-17 RX ADMIN — ONDANSETRON 4 MG: 2 INJECTION INTRAMUSCULAR; INTRAVENOUS at 03:02

## 2024-03-17 RX ADMIN — METRONIDAZOLE 500 MG: 500 INJECTION, SOLUTION INTRAVENOUS at 13:21

## 2024-03-17 RX ADMIN — TAMOXIFEN CITRATE 20 MG: 10 TABLET, FILM COATED ORAL at 10:12

## 2024-03-17 RX ADMIN — METRONIDAZOLE 500 MG: 500 INJECTION, SOLUTION INTRAVENOUS at 19:59

## 2024-03-17 RX ADMIN — Medication 10 ML: at 09:10

## 2024-03-17 RX ADMIN — FERROUS SULFATE TAB 325 MG (65 MG ELEMENTAL FE) 325 MG: 325 (65 FE) TAB at 09:09

## 2024-03-17 RX ADMIN — CEFTRIAXONE 2000 MG: 2 INJECTION, POWDER, FOR SOLUTION INTRAMUSCULAR; INTRAVENOUS at 05:19

## 2024-03-17 RX ADMIN — SODIUM CHLORIDE, POTASSIUM CHLORIDE, SODIUM LACTATE AND CALCIUM CHLORIDE 1000 ML: 600; 310; 30; 20 INJECTION, SOLUTION INTRAVENOUS at 03:01

## 2024-03-17 RX ADMIN — HYDROCODONE BITARTRATE AND ACETAMINOPHEN 1 TABLET: 5; 325 TABLET ORAL at 23:20

## 2024-03-17 RX ADMIN — LEVOTHYROXINE SODIUM 75 MCG: 0.07 TABLET ORAL at 09:09

## 2024-03-17 RX ADMIN — Medication 250 MG: at 09:09

## 2024-03-17 RX ADMIN — CARVEDILOL 3.12 MG: 3.12 TABLET, FILM COATED ORAL at 19:58

## 2024-03-17 RX ADMIN — WHITE PETROLATUM 57.7 %-MINERAL OIL 31.9 % EYE OINTMENT: at 23:20

## 2024-03-17 NOTE — PLAN OF CARE
Goal Outcome Evaluation:           Problem: Adult Inpatient Plan of Care  Goal: Plan of Care Review  3/17/2024 1102 by Ashley Nance, RN  Outcome: Ongoing, Progressing  3/17/2024 1028 by Ashley Nance, ZEHRA  Outcome: Ongoing, Progressing  Goal: Patient-Specific Goal (Individualized)  3/17/2024 1102 by Ashley Nance, ZEHRA  Outcome: Ongoing, Progressing  3/17/2024 1028 by Ashley Nance, RN  Outcome: Ongoing, Progressing  Goal: Absence of Hospital-Acquired Illness or Injury  3/17/2024 1102 by Ashley Nance, ZEHRA  Outcome: Ongoing, Progressing  3/17/2024 1028 by Ashley Nance, RN  Outcome: Ongoing, Progressing  Goal: Optimal Comfort and Wellbeing  3/17/2024 1102 by Ashley Nance, ZEHRA  Outcome: Ongoing, Progressing  3/17/2024 1028 by Ashley Nance, ZEHRA  Outcome: Ongoing, Progressing  Goal: Readiness for Transition of Care  3/17/2024 1102 by Ashley Nance, ZEHRA  Outcome: Ongoing, Progressing  3/17/2024 1028 by Ashley Nance, RN  Outcome: Ongoing, Progressing  Intervention: Mutually Develop Transition Plan  Recent Flowsheet Documentation  Taken 3/17/2024 0900 by Ashley Nance, RN  Equipment Currently Used at Home:   cpap   cane, straight  Transportation Anticipated: family or friend will provide  Patient/Family Anticipated Services at Transition: none  Patient/Family Anticipates Transition to: home with family

## 2024-03-17 NOTE — ED NOTES
.. Elton Funez    Nursing Report ED to Floor:  Mental status: a&o x4  Ambulatory status: SB with cane  Oxygen Therapy:  RA  Cardiac Rhythm: NSR  Admitted from: home  Safety Concerns:  fall risk (uses cane, neuropathy in feet)  Social Issues: none  ED Room #:  08    ED Nurse Phone Extension - [phone] or may call [phone].      HPI:   Chief Complaint   Patient presents with    Abdominal Pain       Past Medical History:  Past Medical History:   Diagnosis Date    Abdominal pain 05/17/2023    RUQ; SEEN IN ED AT [facility]; DC'D HOME    Abscess of arm, right     Abscess of skin of neck     Acute sinusitis     ALT (SGPT) level raised     Arthritis     Bacteremia due to Klebsiella pneumoniae 06/12/2023    BPH (benign prostatic hypertrophy)     Breast cancer 05/2023    right    Cirrhosis of liver 09/03/2021    [facility]    CKD (chronic kidney disease)     Colon polyp     Constipation     DDD (degenerative disc disease), cervical     Decreased platelet count     Depression     Resolved: 1/28/2015    Elevated AST (SGOT)     Erectile dysfunction     GERD (gastroesophageal reflux disease)     History of transfusion 2021    [facility]; 3 UNITS; NO REACTION    HL (hearing loss)     Hyperlipidemia     Hypertension     Hypothyroidism, adult 11/10/2020    Infection     MSSA lumbar surgical wound infection 3/2017    Jaw pain     Left hip pain     Low back pain     Surgery 30 yrs L4-5    Migraine     Hx of    Obesity (BMI 30.0-34.9) 10/07/2016    BMI 31.92    Obstructive sleep apnea     CPAP    Pancreatitis 06/2023    Portal hypertensive gastropathy 09/03/2021    Renal insufficiency     Shigellosis     Sleep apnea     PT TO BRING MASK AND TUBING DAY OF SURGERY    Symptomatic anemia 08/30/2021    Type 2 diabetes mellitus     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     Vitamin D deficiency     Wears glasses     Wears hearing aid     BOTH EARS        Past Surgical History:  Past Surgical History:   Procedure Laterality Date    ANTERIOR CERVICAL DISCECTOMY W/  FUSION N/A 12/14/2017    Procedure: CERVICAL DISCECTOMY ANTERIOR FUSION WITH INSTRUMENTATION C7/T1;  Surgeon: Gigi Perales MD;  Location:  [facility];  Service:     CERVICAL ARTHRODESIS      CERVICAL DISCECTOMY POSTERIOR FUSION WITH INSTRUMENTATION N/A 03/16/2017    Procedure:  INCISION AND DRAINAGE OF POSTERIOR CERVICAL WOUND;  Surgeon: Gigi Perales MD;  Location:  [facility];  Service:     CERVICAL LAMINECTOMY DECOMPRESSION POSTERIOR Left 02/28/2017    Procedure: Left C7-T1 CERVICAL FORAMINOTOMY POSTERIOR WITH METRIX;  Surgeon: Gigi Perales MD;  Location:  [facility];  Service:     COLONOSCOPY N/A 09/01/2021    Procedure: COLONOSCOPY;  Surgeon: Sharif García MD;  Location:  [facility];  Service: Gastroenterology;  Laterality: N/A;    CYST REMOVAL  04/2021    ENDOSCOPY N/A 08/31/2021    Procedure: ESOPHAGOGASTRODUODENOSCOPY;  Surgeon: Brunner, Mark I, MD;  Location:  [facility];  Service: Gastroenterology;  Laterality: N/A;    ERCP N/A 06/14/2023    Procedure: ENDOSCOPIC RETROGRADE CHOLANGIOPANCREATOGRAPHY;  Surgeon: Brunner, Mark I, MD;  Location:  [facility];  Service: Gastroenterology;  Laterality: N/A;  SPHINCTERTOMY MADE AT [place]A  AND BALLOON SWEEP OF CBD DONE WITH 9-12 BALLOON    LYMPH NODE BIOPSY  6/2023    Negative    MASTECTOMY W/ SENTINEL NODE BIOPSY Right 05/23/2023    Procedure: BREAST MASTECTOMY WITH SENTINEL NODE BIOPSY RIGHT;  Surgeon: Joseph Ramirez MD;  Location:  [facility];  Service: General;  Laterality: Right;    VASECTOMY      WISDOM TOOTH EXTRACTION          Admitting Doctor:   Alysia Moreno MD    Consulting Provider(s):  Consults       No orders found from 2/17/2024 to 3/18/2024.             Admitting Diagnosis:   The primary encounter diagnosis was Idiopathic acute pancreatitis without infection or necrosis. A diagnosis of Acute cholecystitis was also pertinent to this visit.    Most Recent Vitals:   Vitals:    03/17/24 0430 03/17/24  0500 03/17/24 0530 03/17/24 0600   BP: 146/94 160/87 165/87 155/84   BP Location:       Patient Position:       Pulse: 63 66 67 60   Resp:       Temp:       TempSrc:       SpO2: 97% 96% 97% 94%   Weight:       Height:           Active LDAs/IV Access:   Lines, Drains & Airways       Active LDAs       Name Placement date Placement time Site Days    Peripheral IV 03/17/24 0301 Left;Posterior Forearm 03/17/24  0301  Forearm  less than 1                    Labs (abnormal labs have a star):   Labs Reviewed   COMPREHENSIVE METABOLIC PANEL - Abnormal; Notable for the following components:       Result Value    Glucose 246 (*)     BUN 26 (*)     Creatinine 1.47 (*)     Sodium 135 (*)     CO2 20.0 (*)     eGFR 49.7 (*)     All other components within normal limits    Narrative:     GFR Normal >60  Chronic Kidney Disease <60  Kidney Failure <15    The GFR formula is only valid for adults with stable renal function between ages 18 and 70.   LIPASE - Abnormal; Notable for the following components:    Lipase 350 (*)     All other components within normal limits   SINGLE HSTROPONIN T - Abnormal; Notable for the following components:    HS Troponin T 35 (*)     All other components within normal limits    Narrative:     High Sensitive Troponin T Reference Range:  <14.0 ng/L- Negative Female for AMI  <22.0 ng/L- Negative Male for AMI  >=14 - Abnormal Female indicating possible myocardial injury.  >=22 - Abnormal Male indicating possible myocardial injury.   Clinicians would have to utilize clinical acumen, EKG, Troponin, and serial changes to determine if it is an Acute Myocardial Infarction or myocardial injury due to an underlying chronic condition.        URINALYSIS W/ MICROSCOPIC IF INDICATED (NO CULTURE) - Abnormal; Notable for the following components:    Glucose, UA >=1000 mg/dL (3+) (*)     Protein, UA 30 mg/dL (1+) (*)     All other components within normal limits   CBC WITH AUTO DIFFERENTIAL - Abnormal; Notable for the  following components:    RBC 3.92 (*)     Hemoglobin 12.5 (*)     MPV 12.5 (*)     Platelets 79 (*)     Neutrophil % 78.4 (*)     Lymphocyte % 11.1 (*)     Lymphocytes, Absolute 0.68 (*)     All other components within normal limits   LIPID PANEL - Abnormal; Notable for the following components:    Triglycerides 156 (*)     HDL Cholesterol 29 (*)     All other components within normal limits    Narrative:     Cholesterol Reference Ranges  (U.S. Department of Health and Human Services ATP III Classifications)    Desirable          <200 mg/dL  Borderline High    200-239 mg/dL  High Risk          >240 mg/dL      Triglyceride Reference Ranges  (U.S. Department of Health and Human Services ATP III Classifications)    Normal           <150 mg/dL  Borderline High  150-199 mg/dL  High             200-499 mg/dL  Very High        >500 mg/dL    HDL Reference Ranges  (U.S. Department of Health and Human Services ATP III Classifications)    Low     <40 mg/dl (major risk factor for CHD)  High    >60 mg/dl ('negative' risk factor for CHD)        LDL Reference Ranges  (U.S. Department of Health and Human Services ATP III Classifications)    Optimal          <100 mg/dL  Near Optimal     100-129 mg/dL  Borderline High  130-159 mg/dL  High             160-189 mg/dL  Very High        >189 mg/dL   URINALYSIS, MICROSCOPIC ONLY   RAINBOW DRAW    Narrative:     The following orders were created for panel order [place] Draw.  Procedure                               Abnormality         Status                     ---------                               -----------         ------                     Green Top (Gel)[[id number]]                                  Final result               Lavender Top[[id number]]                                     Final result               Gold Top - [facility][[id number]]                                   Final result               Gray Top[[id number]]                                         In process                  Light Blue Top[[id number]]                                   Final result                 Please view results for these tests on the individual orders.   LACTIC ACID, PLASMA   TROPONIN   CBC AND DIFFERENTIAL    Narrative:     The following orders were created for panel order CBC & Differential.  Procedure                               Abnormality         Status                     ---------                               -----------         ------                     CBC Auto Differential[[id number]]        Abnormal            Final result                 Please view results for these tests on the individual orders.   GREEN TOP   LAVENDER TOP   GOLD TOP - SST   LIGHT BLUE TOP   GRAY TOP       Meds Given in ED:   Medications   sodium chloride 0.9 % flush 10 mL (has no administration in time range)   metroNIDAZOLE (FLAGYL) IVPB 500 mg (500 mg Intravenous New Bag 3/17/24 0559)   Morphine sulfate (PF) injection 4 mg (has no administration in time range)   lactated ringers bolus 1,000 mL (0 mL Intravenous Stopped 3/17/24 0401)   Morphine sulfate (PF) injection 4 mg (4 mg Intravenous Given 3/17/24 0302)   ondansetron (ZOFRAN) injection 4 mg (4 mg Intravenous Given 3/17/24 0302)   cefTRIAXone (ROCEPHIN) 2,000 mg in sodium chloride 0.9 % 100 mL MBP (0 mg Intravenous Stopped 3/17/24 0559)           Last NIH score:                                                          Dysphagia screening results:        [place]obb Coma Scale:  No data recorded     CIWA:        Restraint Type:            Isolation Status:  No active isolations

## 2024-03-17 NOTE — CONSULTS
Clinical Nutrition   Nutrition Assessment  Reason for Visit: Consult, Unintentional weight loss, Reduced oral intake    Patient Name: Elton Funez  YOB: 1950  MRN: [id number]  Date of Encounter: 03/17/24 15:01 EDT  Admission date: 3/17/2024    Tolerating full liquids. Education for pancreatitis provided. Will send protein supplements while on liquids.     No significant reduction oral intakes, non-significant intentional weight loss over past year 2/2 Trulicity.     Nutrition Assessment   Admission Diagnosis:  Pancreatitis [K85.90]    Problem List:    Pancreatitis    Stage 3a chronic kidney disease    Gastroesophageal reflux disease without esophagitis    Primary hypertension    Acquired hypothyroidism    Liver cirrhosis secondary to JEROME    Grade I diastolic dysfunction    Acute cholecystitis    HLD (hyperlipidemia)    History of breast cancer    Elevated troponin    T2DM (type 2 diabetes mellitus)      PMH:   He  has a past medical history of Abdominal pain (05/17/2023), Abscess of arm, right, Abscess of skin of neck, Acute sinusitis, ALT (SGPT) level raised, Arthritis, Bacteremia due to Klebsiella pneumoniae (06/12/2023), BPH (benign prostatic hypertrophy), Breast cancer (05/2023), Cirrhosis of liver (09/03/2021), CKD (chronic kidney disease), Colon polyp, Constipation, DDD (degenerative disc disease), cervical, Decreased platelet count, Depression, Elevated AST (SGOT), Erectile dysfunction, GERD (gastroesophageal reflux disease), History of transfusion (2021), HL (hearing loss), Hyperlipidemia, Hypertension, Hypothyroidism, adult (11/10/2020), Infection, Jaw pain, Left hip pain, Low back pain, Migraine, Obesity (BMI 30.0-34.9) (10/07/2016), Obstructive sleep apnea, Pancreatitis (06/2023), Portal hypertensive gastropathy (09/03/2021), Renal insufficiency, Shigellosis, Sleep apnea, Symptomatic anemia (08/30/2021), Type 2 diabetes mellitus, Visual impairment, Vitamin D  "deficiency, Wears glasses, and Wears hearing aid.    PSH:  He  has a past surgical history that includes Cervical Biopsy; Vasectomy; cervical laminectomy decompression posterior (Left, 02/28/2017); cervical discectomy posterior fusion with instrumentation (N/A, 03/16/2017); [place] tooth extraction; Anterior cervical discectomy w/ fusion (N/A, 12/14/2017); Cyst Removal (04/2021); Esophagogastroduodenoscopy (N/A, 08/31/2021); Colonoscopy (N/A, 09/01/2021); Mastectomy w/ sentinel node biopsy (Right, 05/23/2023); ERCP (N/A, 06/14/2023); and Lymph node biopsy (6/2023).    Applicable Nutrition Concerns:   Skin:  Oral:  GI:    Applicable Interval History:   (3/17) CT A/P - Impression:  1.Cholelithiasis with subtle fat stranding along the inferior margin of the gallbladder. This may indicate mild acute cholecystitis.  2.Cirrhotic morphology of the liver.    (3/17) US GB - Impression:  1. Mild sludge in the gallbladder. No findings to indicate superimposed cholecystitis.  2. No biliary dilatation.  3. Cirrhosis.       Reported/Observed/Food/Nutrition Related History:     Visited with pt at bedside. Reports no recent significant reduction oral intakes until the past day, no recent weight loss. Has pain but no N/V. Agreeable to supplement while on full liquid. RD explained need for pancreatic rest and nutritional care plan for pancreatitis, slow diet advancement to low fat. Pt voiced understanding. States noticed symptoms worse with intake fatty foods. Tolerating full liquids without issue and able to eat most of trays. Pt also reports intentional weight loss with Trulicity ~30 lb over the past year, reports eating 2 moderate meals/day and snacks. Denies further dietary needs/preferences or nutritional questions/concerns, NKFA.     Anthropometrics     Height: Height: 172.7 cm (68\")  Last Filed Weight: Weight: 91.2 kg (201 lb 1.6 oz) (03/17/24 0810)  Method: Weight Method: Bed scale  BMI: BMI (Calculated): 30.6  BMI " classification: Obese Class I: 30-34.9kg/m2  IBW:   150 lb    UBW:     Weight       Weight (kg) Weight (lbs) Weight Method Visit Report   3/28/2023 103.93 kg  229 lb 2 oz   --    5/18/2023 101.8 kg  224 lb 6.9 oz  Standing scale     5/23/2023 101.606 kg  224 lb      5/31/2023 101.152 kg  223 lb      6/21/2023 97.16 kg  214 lb 3.2 oz   --    7/13/2023 94.257 kg  207 lb 12.8 oz   --    8/2/2023 93.895 kg  207 lb   --    9/5/2023 93.895 kg  207 lb   --    9/11/2023 94.348 kg  208 lb   --    11/27/2023 94.802 kg  209 lb   --    12/5/2023 95.346 kg  210 lb 3.2 oz   --    2/1/2024 93.26 kg  205 lb 9.6 oz   --    2/8/2024 92.987 kg  205 lb   --    3/6/2024 90.266 kg  199 lb   --    3/17/2024 91.218 kg  201 lb 1.6 oz  Bed scale         Weight change: weight loss of 30 lbs (12.2%) over the past 1 year(s)    Intentional?  Yes    Nutrition Focused Physical Exam     Date:  3/17       Pt does not meet criteria for malnutrition diagnosis, at this time.      Current Nutrition Prescription   PO: Diet: Liquid; Full Liquid; Fluid Consistency: Thin (IDDSI 0)  NPO Diet NPO Type: Strict NPO  NPO Diet NPO Type: Sips with Meds  Oral Nutrition Supplement:   Intake: 1 Day: 100% X 1 meal documented    Nutrition Diagnosis   Date:  3/17            Updated:    Problem Altered GI function    Etiology Acute Pancreatitis, possible cholelithiasis    Signs/Symptoms Abd. pain, lipase 350, CT A/P   Status: New    Goal:   General: Nutrition to support treatment  PO: Tolerate PO, Advance diet as medically feasible/appropriate  EN/PN: N/A    Nutrition Intervention      Follow treatment progress, Care plan reviewed, Advise alternate selection, Advised available snacks, Interview for preferences, Menu provided, Encourage intake, Supplement provided, Education provided for pancreatitis    Premier Protein B/D    Monitoring/Evaluation:   Per protocol, I&O, PO intake, Supplement intake, Pertinent labs, Weight, GI status, Symptoms, POC/GOC      Dara  JAMEEL White  Time Spent: 30m

## 2024-03-17 NOTE — H&P
"    [facility] Medicine Services  HISTORY AND PHYSICAL    Patient Name: Eltno Funez  : 1950  MRN: [id number]  Primary Care Physician: Tc Ramirez MD  Date of admission: 3/17/2024    Subjective   Subjective     Chief Complaint:  Abdominal pain     HPI:  Elton Funez is a 73 y.o. male w/ a hx of JEROME cirrhosis, HTN, HLD, diastolic CHF, hypothyroidism, FAVIO, T2DM, CKD Stage III, GERD, breast cancer (s/p mastectomy 2023, on Tamoxifen), hx of gallstone pancreatitis/acute cholangitis (2023), hx of klebsiella bacteremia () who presented to the ED w/ c/o abdominal pain.   Pt developed abdominal pain around noon on Saturday. Pain located upper abdomin w/ radiation around to his back. Pt c/o associated nausea, no vomiting. Pt denies known fever, dyspnea, chest pain.   Pt evaluated in the ED. CT abd/pel \"Cholelithiasis with subtle fat stranding along the inferior margin of the gallbladder. This may indicate mild acute cholecystitis\". Lipase elevated at 350. Pt admitted to the hospital medicine service for further evaluation.     Review of Systems   Constitutional: Negative.  Negative for chills, diaphoresis, fatigue, fever and unexpected weight change.   HENT: Negative.  Negative for congestion, postnasal drip, rhinorrhea, sinus pressure, sinus pain, sneezing, sore throat and trouble swallowing.    Eyes: Negative.  Negative for visual disturbance.   Respiratory: Negative.  Negative for cough, chest tightness, shortness of breath and wheezing.    Cardiovascular: Negative.  Negative for chest pain, palpitations and leg swelling.   Gastrointestinal:  Positive for abdominal pain and nausea. Negative for abdominal distention, blood in stool, constipation, diarrhea and vomiting.   Endocrine: Negative.    Genitourinary: Negative.  Negative for decreased urine volume, difficulty urinating, dysuria, frequency, hematuria and urgency.   Musculoskeletal:  Positive for back pain. " Negative for arthralgias, myalgias, neck pain and neck stiffness.   Skin: Negative.  Negative for wound.   Allergic/Immunologic: Negative.  Negative for immunocompromised state.   Neurological: Negative.  Negative for dizziness, tremors, seizures, syncope, facial asymmetry, speech difficulty, weakness, light-headedness, numbness and headaches.   Hematological: Negative.  Does not bruise/bleed easily.   Psychiatric/Behavioral: Negative.  Negative for confusion.    All other systems reviewed and are negative.     Personal History     Past Medical History:   Diagnosis Date    Abdominal pain 05/17/2023    RUQ; SEEN IN ED AT [facility]; DC'D HOME    Abscess of arm, right     Abscess of skin of neck     Acute sinusitis     ALT (SGPT) level raised     Arthritis     Bacteremia due to Klebsiella pneumoniae 06/12/2023    BPH (benign prostatic hypertrophy)     Breast cancer 05/2023    right    Cirrhosis of liver 09/03/2021    JEROME    CKD (chronic kidney disease)     Colon polyp     Constipation     DDD (degenerative disc disease), cervical     Decreased platelet count     Depression     Resolved: 1/28/2015    Elevated AST (SGOT)     Erectile dysfunction     GERD (gastroesophageal reflux disease)     History of transfusion 2021    formerly [facility]; 3 UNITS; NO REACTION    HL (hearing loss)     Hyperlipidemia     Hypertension     Hypothyroidism, adult 11/10/2020    Infection     MSSA lumbar surgical wound infection 3/2017    Jaw pain     Left hip pain     Low back pain     Surgery 30 yrs L4-5    Migraine     Hx of    Obesity (BMI 30.0-34.9) 10/07/2016    BMI 31.92    Obstructive sleep apnea     CPAP    Pancreatitis 06/2023    Portal hypertensive gastropathy 09/03/2021    Renal insufficiency     Shigellosis     Sleep apnea     PT TO BRING MASK AND TUBING DAY OF SURGERY    Symptomatic anemia 08/30/2021    Type 2 diabetes mellitus     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     Vitamin D deficiency     Wears glasses     Wears hearing aid     BOTH  EARS       Oncology Problem List:  Malignant neoplasm of overlapping sites of right female breast (05/23/ 2023; Status: Active)  Malignant neoplasm of right breast in male, estrogen receptor   positive (05/08/2023; Status: Active)      Past Surgical History:   Procedure Laterality Date    ANTERIOR CERVICAL DISCECTOMY W/ FUSION N/A 12/14/2017    Procedure: CERVICAL DISCECTOMY ANTERIOR FUSION WITH INSTRUMENTATION C7/T1;  Surgeon: Gigi Perales MD;  Location:  [facility];  Service:     CERVICAL ARTHRODESIS      CERVICAL DISCECTOMY POSTERIOR FUSION WITH INSTRUMENTATION N/A 03/16/2017    Procedure:  INCISION AND DRAINAGE OF POSTERIOR CERVICAL WOUND;  Surgeon: Gigi Perales MD;  Location:  [facility];  Service:     CERVICAL LAMINECTOMY DECOMPRESSION POSTERIOR Left 02/28/2017    Procedure: Left C7-T1 CERVICAL FORAMINOTOMY POSTERIOR WITH METRIX;  Surgeon: Gigi Perales MD;  Location:  [facility];  Service:     COLONOSCOPY N/A 09/01/2021    Procedure: COLONOSCOPY;  Surgeon: Sharif García MD;  Location:  [facility];  Service: Gastroenterology;  Laterality: N/A;    CYST REMOVAL  04/2021    ENDOSCOPY N/A 08/31/2021    Procedure: ESOPHAGOGASTRODUODENOSCOPY;  Surgeon: Brunner, Mark I, MD;  Location:  [facility];  Service: Gastroenterology;  Laterality: N/A;    ERCP N/A 06/14/2023    Procedure: ENDOSCOPIC RETROGRADE CHOLANGIOPANCREATOGRAPHY;  Surgeon: Brunner, Mark I, MD;  Location:  [facility];  Service: Gastroenterology;  Laterality: N/A;  SPHINCTERTOMY MADE AT [place]  AND BALLOON SWEEP OF CBD DONE WITH 9-12 BALLOON    LYMPH NODE BIOPSY  6/2023    Negative    MASTECTOMY W/ SENTINEL NODE BIOPSY Right 05/23/2023    Procedure: BREAST MASTECTOMY WITH SENTINEL NODE BIOPSY RIGHT;  Surgeon: Joseph Ramirez MD;  Location:  [facility];  Service: General;  Laterality: Right;    VASECTOMY      WISDOM TOOTH EXTRACTION       Family History: family history includes Arthritis in his mother;  Cancer in his mother; Diabetes in his father and paternal grandmother; Hearing loss in his mother; Heart disease in his father and mother; Hyperlipidemia in his father; Hypertension in his father; Stroke in his father; Thyroid disease in his mother.     Social History:  reports that he quit smoking about 48 years ago. His smoking use included cigarettes. He started smoking about 52 years ago. He has a 2 pack-year smoking history. He has never used smokeless tobacco. He reports current alcohol use. He reports that he does not use drugs.  Social History     Social History Narrative    Not on file     Medications:  Alpha-Lipoic Acid, B Complex Vitamins, Co Q 10, Dulaglutide, Flaxseed Oil, FreeStyle [place] 2 [place], FreeStyle [place] 2 Sensor, Insulin Glargine, Insulin Lispro (1 Unit Dial), Insulin Pen Needle, carvedilol, colestipol, ferrous sulfate, gabapentin, gemfibrozil, ipratropium, levothyroxine, multivitamin, omeprazole, saccharomyces boulardii, sertraline, simvastatin, spironolactone, tamoxifen, temazepam, tiZANidine, and vitamin D    Allergies   Allergen Reactions    Glucophage Xr [Metformin Hcl Er] Diarrhea and Other (See Comments)     Glucophage XR TB24: Adverse Reaction: Severe GI issues.    Metformin Nausea And Vomiting     Other reaction(s): SEVERE INTESTIONAL ISSUES  Other reaction(s): SEVERE GI ISSUES    Glucophage XR TB24  MetFORMIN HCl TABS    Tetracyclines & Related Nausea Only     Adverse Reaction    Chlorhexidine Rash     Objective   Objective     Vital Signs:   Temp:  [97.7 °F (36.5 °C)] 97.7 °F (36.5 °C)  Heart Rate:  [62-66] 66  Resp:  [16] 16  BP: (113-160)/(66-94) 160/87    Physical Exam     Constitutional: Awake, alert; non-toxic appearing   Eyes: PERRLA, sclerae anicteric, no conjunctival injection  HENT: NCAT, mucous membranes moist  Neck: Supple, no thyromegaly, no lymphadenopathy, trachea midline  Respiratory: Clear to auscultation bilaterally, nonlabored respirations   Cardiovascular: RRR,  no murmurs, rubs, or gallops, no peripheral edema   Gastrointestinal: Positive bowel sounds, soft, non-distended; upper abdomin- TTP   Musculoskeletal: Normal ROM bilaterally   Psychiatric: Appropriate affect, cooperative  Neurologic: Oriented x 3, strength symmetric in all extremities, Cranial Nerves grossly intact to confrontation, speech clear  Skin: No rashes, lesions or wounds     Result Review:  I have personally reviewed the results from the time of this admission to 3/17/2024 05:27 EDT and agree with these findings:  [x]  Laboratory list / accordion  []  Microbiology  [x]  Radiology  [x]  EKG/Telemetry   []  Cardiology/Vascular   []  Pathology  [x]  Old records    LAB RESULTS:      Lab 03/17/24  0303   WBC 6.14   HEMOGLOBIN 12.5*   HEMATOCRIT 37.7   PLATELETS 79*   NEUTROS ABS 4.81   IMMATURE GRANS (ABS) 0.02   LYMPHS ABS 0.68*   MONOS ABS 0.53   EOS ABS 0.08   MCV 96.2         Lab 03/17/24  0303   SODIUM 135*   POTASSIUM 4.3   CHLORIDE 102   CO2 20.0*   ANION GAP 13.0   BUN 26*   CREATININE 1.47*   EGFR 49.7*   GLUCOSE 246*   CALCIUM 8.6         Lab 03/17/24  0303   TOTAL PROTEIN 6.7   ALBUMIN 3.8   GLOBULIN 2.9   ALT (SGPT) 9   AST (SGOT) 18   BILIRUBIN 0.5   ALK PHOS 81   LIPASE 350*         Lab 03/17/24  0303   HSTROP T 35*         Lab 03/17/24  0303   CHOLESTEROL 125   LDL CHOL 69   HDL CHOL 29*   TRIGLYCERIDES 156*             Brief Urine Lab Results  (Last result in the past 365 days)        Color   Clarity   Blood   Leuk Est   Nitrite   Protein   CREAT   Urine HCG        03/17/24 0405 Yellow   Clear   Negative   Negative   Negative   30 mg/dL (1+)                 Microbiology Results (last 10 days)       ** No results found for the last 240 hours. **          CT Abdomen Pelvis Without Contrast    Result Date: 3/17/2024  CT ABDOMEN PELVIS WO CONTRAST Date of Exam: 3/17/2024 2:31 AM EDT Indication: Abdominal pain, acute, nonlocalized. Comparison: CT abdomen pelvis dated 6/11/2023 Technique: Axial CT  images were obtained of the abdomen and pelvis without the administration of contrast. Reconstructed coronal and sagittal images were also obtained. Automated exposure control and iterative construction methods were used. Findings: There is a stable benign perifissural nodule on the right within the right middle lobe. This is about 4 mm and the stability suggests benignity. There is moderate to severe coronary artery calcific atherosclerosis. The heart is of normal size. There are attenuation changes in the neck of the gallbladder consistent with small stones. There is mild subtle fat stranding along the inferior margin of the gallbladder. This may indicate mild acute cholecystitis. There is no biliary ductal dilatation. There is cirrhotic morphology of the liver. The unenhanced bilateral adrenal glands, bilateral kidneys, pancreas and spleen appear normal. There is no acute inflammatory change of the large or small bowel. There is calcific change of the abdominal aorta. There are degenerative changes of the spine.     Impression: Impression: 1.Cholelithiasis with subtle fat stranding along the inferior margin of the gallbladder. This may indicate mild acute cholecystitis. 2.Cirrhotic morphology of the liver. Electronically Signed: Raji Gentile MD  3/17/2024 2:48 AM EDT  Workstation ID: [id number]     Results for orders placed during the hospital encounter of 03/20/23    Adult Transthoracic Echo Complete W/ Cont if Necessary Per Protocol    Interpretation Summary    Left ventricular ejection fraction appears to be 61 - 65%.    Left ventricular diastolic function is consistent with (grade I) impaired relaxation.    Calculated right ventricular systolic pressure from tricuspid regurgitation is 16 mmHg.    Assessment & Plan   Assessment & Plan       Pancreatitis    Stage 3a chronic kidney disease    Gastroesophageal reflux disease without esophagitis    Primary hypertension    Acquired hypothyroidism    Liver cirrhosis  "secondary to JEROME    Grade I diastolic dysfunction    Acute cholecystitis    HLD (hyperlipidemia)    History of breast cancer    Elevated troponin    T2DM (type 2 diabetes mellitus)    Elton Funez is a 73 y.o. male w/ a hx of JEROME cirrhosis, HTN, HLD, diastolic CHF, hypothyroidism, FAVIO, T2DM, CKD Stage III, GERD, breast cancer (s/p right mastectomy 5/2023, on Tamoxifen), hx of gallstone pancreatitis/acute cholangitis (6/2023), hx of klebsiella bacteremia (6/23) who presented to the ED w/ c/o abdominal pain.     **Acute cholecystitis  **Pancreatitis    -previous admission in 6/2023 w/ acute gallstone pancreatitis, acute cholangitis, bacteremia; pt underwent ERCP which showed \"evidence of recently passed stone, balloon sweep with no stones, but revealed pus\"; at the time GI recommended deferring cholecystectomy unless develops severe cholecystitis in setting of underlying cirrhosis   -pt developed pain at noon on Saturday 3/16  -CT abd/pel concerning for acute, mild cholecystitis   -lipase 350 (repeat in am)  -lactic acid pending   -lipid panel pending   -s/p IV Rocephin and Flagyl given in ED; continue   -s/p 1 liter LR bolus given in ED  -NS @ 100ml/hour   -NPO pending General Surgery recommendations  -symptom mgt  -General Surgery consult in am     **Elevated troponin   **HTN, HLD  **Diastolic CHF; compensated   -troponin 35; repeat pending; pt denies chest pain   -EKG c/w sinus bradycardia   -pt currently on room air  -last ECHO 3/2023: EF 61-65%, grade I LVDF   -routine Coreg, low dose statin- continued   -aldactone held for now   -monitor I/Os  -monitor for volume overload w/ continuous IVF    **JEROME cirrhosis   -CT abd/pel: Cirrhotic morphology of the liver.   -labs stable   -monitor     **T2DM  -hem A1c pending  -holding routine Trulicity, lantus (listed as taking 70 units/night), humalog   -FSBG q 6 hours w/ MDSS while NPO; will need long acting restarted when diet restarted     **CKD Stage III  -previous " creatinine 2.21 on 2/27, 1.98 on 2/12, 1.89 on 2/1 (~1.5 in 2023)  -currently 1.47 w/ eGFR 49.7  -monitor     **Hypothyroidism   -continue synthroid     **Hx of breast cancer  -s/p right mastectomy 5/2023  -Tamoxifen continued     **GERD  -continue PPI    DVT prophylaxis:  Mechanical     CODE STATUS:    Code Status (Patient has no pulse and is not breathing): CPR (Attempt to Resuscitate)  Medical Interventions (Patient has pulse or is breathing): Full Support    Expected Discharge  Expected Discharge Date: 3/19/2024; Expected Discharge Time:     Signature: Electronically signed by MELISSA Goldberg, 03/17/24, 5:27 AM EDT.    Time spent: 55 minutes

## 2024-03-17 NOTE — CONSULTS
General Surgery Consultation Note    Date of Service: 3/17/2024  Elton Funez  [id number]  1950      Referring Provider: Alysia Moreno MD    Location of Consult: Inpatient     Reason for Consultation: Gallstone pancreatitis       History of Present Illness:  I am seeing, Elton Funez, in consultation at request of Alysia Moreno MD regarding gallstone pancreatitis.  He is a 73-year-old gentleman with history of Lopez cirrhosis, hypertension, hyperlipidemia, heart failure, diabetes, and chronic kidney disease who was admitted to [facility] with epigastric pain and pancreatitis.  He was admitted to our facility in June 2023 for gallstone pancreatitis and choledocholithiasis.  He underwent ERCP with Dr. Cardoso with clearance of his duct.  He improved following this.  Given his Simon score 7 class B Lopez cirrhosis, it was felt that elective cholecystectomy should be deferred at that time.  He recovered well following this.  He has had no abdominal symptoms until yesterday when he developed pain in his epigastrium.  He describes this as a tight band across his epigastrium that radiates into the back and is similar to the pain that he had in June.  He has had some nausea but no vomiting.  He has not had fevers or chills.  Pain has not improved since it started and is persistent.  He has had no prior abdominal surgeries.  He does not take anticoagulants.  He does not regularly follow with a gastroenterologist    Problems Addressed this Visit          Gastrointestinal Abdominal     Idiopathic acute pancreatitis - Primary    Acute cholecystitis     Diagnoses         Codes Comments    Idiopathic acute pancreatitis without infection or necrosis    -  Primary ICD-10-CM: K85.00  ICD-9-CM: 577.0     Acute cholecystitis     ICD-10-CM: K81.0  ICD-9-CM: 575.0             PMHx:  Past Medical History:   Diagnosis Date    Abdominal pain 05/17/2023    RUQ; SEEN IN ED AT [facility]; DC'D HOME    Abscess of arm, right      Abscess of skin of neck     Acute sinusitis     ALT (SGPT) level raised     Arthritis     Bacteremia due to Klebsiella pneumoniae 06/12/2023    BPH (benign prostatic hypertrophy)     Breast cancer 05/2023    right    Cirrhosis of liver 09/03/2021    [facility]    CKD (chronic kidney disease)     Colon polyp     Constipation     DDD (degenerative disc disease), cervical     Decreased platelet count     Depression     Resolved: 1/28/2015    Elevated AST (SGOT)     Erectile dysfunction     GERD (gastroesophageal reflux disease)     History of transfusion 2021    BHL; 3 UNITS; NO REACTION    HL (hearing loss)     Hyperlipidemia     Hypertension     Hypothyroidism, adult 11/10/2020    Infection     MSSA lumbar surgical wound infection 3/2017    Jaw pain     Left hip pain     Low back pain     Surgery 30 yrs L4-5    Migraine     Hx of    Obesity (BMI 30.0-34.9) 10/07/2016    BMI 31.92    Obstructive sleep apnea     CPAP    Pancreatitis 06/2023    Portal hypertensive gastropathy 09/03/2021    Renal insufficiency     Shigellosis     Sleep apnea     PT TO BRING MASK AND TUBING DAY OF SURGERY    Symptomatic anemia 08/30/2021    Type 2 diabetes mellitus     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     Vitamin D deficiency     Wears glasses     Wears hearing aid     BOTH EARS       Past Surgical History:  Past Surgical History:    ANTERIOR CERVICAL DISCECTOMY W/ FUSION    Procedure: CERVICAL DISCECTOMY ANTERIOR FUSION WITH INSTRUMENTATION C7/T1;  Surgeon: Gigi Perales MD;  Location:  [facility];  Service:     CERVICAL ARTHRODESIS    CERVICAL DISCECTOMY POSTERIOR FUSION WITH INSTRUMENTATION    Procedure:  INCISION AND DRAINAGE OF POSTERIOR CERVICAL WOUND;  Surgeon: Gigi Perales MD;  Location:  [facility];  Service:     CERVICAL LAMINECTOMY DECOMPRESSION POSTERIOR    Procedure: Left C7-T1 CERVICAL FORAMINOTOMY POSTERIOR WITH METRIX;  Surgeon: Gigi Perales MD;  Location:  [facility];  Service:      COLONOSCOPY    Procedure: COLONOSCOPY;  Surgeon: Sharif García MD;  Location:  [facility];  Service: Gastroenterology;  Laterality: N/A;    CYST REMOVAL    ENDOSCOPY    Procedure: ESOPHAGOGASTRODUODENOSCOPY;  Surgeon: Brunner, Mark I, MD;  Location:  [facility];  Service: Gastroenterology;  Laterality: N/A;    ERCP    Procedure: ENDOSCOPIC RETROGRADE CHOLANGIOPANCREATOGRAPHY;  Surgeon: Brunner, Mark I, MD;  Location:  [facility];  Service: Gastroenterology;  Laterality: N/A;  SPHINCTERTOMY MADE AT [facility]  AND BALLOON SWEEP OF CBD DONE WITH 9-12 BALLOON    LYMPH NODE BIOPSY    Negative    MASTECTOMY W/ SENTINEL NODE BIOPSY    Procedure: BREAST MASTECTOMY WITH SENTINEL NODE BIOPSY RIGHT;  Surgeon: Joseph Ramirez MD;  Location:  [facility];  Service: General;  Laterality: Right;    VASECTOMY    WISDOM TOOTH EXTRACTION       Allergies:  Allergies   Allergen Reactions    Glucophage Xr [Metformin Hcl Er] Diarrhea and Other (See Comments)     Glucophage XR TB24: Adverse Reaction: Severe GI issues.    Metformin Nausea And Vomiting     Other reaction(s): SEVERE INTESTIONAL ISSUES  Other reaction(s): SEVERE GI ISSUES    Glucophage XR TB24  MetFORMIN HCl TABS    Tetracyclines & Related Nausea Only     Adverse Reaction    Chlorhexidine Rash       Medications:  No current facility-administered medications on file prior to encounter.     Current Outpatient Medications on File Prior to Encounter   Medication Sig Dispense Refill    Alpha-Lipoic Acid 600 MG tablet Take 1 tablet by mouth Daily. Ran out, last took on Wednesday 3/13.      B Complex Vitamins (VITAMIN B COMPLEX PO) Take 1 tablet by mouth every night at bedtime.      carvedilol (COREG) 3.125 MG tablet Take 1 tablet by mouth 2 (Two) Times a Day. TAKE 2 TABLETS EVERY 12 HOURS (Patient taking differently: Take 1 tablet by mouth Every 12 (Twelve) Hours.) 90 tablet 15    Coenzyme Q10 (Co Q 10) 100 MG capsule Take 300 mg by mouth Daily.      colestipol  (COLESTID) 1 g tablet TAKE 2 TABLETS TWICE A  tablet 3    Continuous Blood Gluc  (FreeStyle Winston 2 Heaters) device 1 Device Every 14 (Fourteen) Days. 1 each 0    Continuous Blood Gluc Sensor (FreeStyle Winston 2 Sensor) misc 1 each Every 14 (Fourteen) Days. 6 each 3    ferrous sulfate (FeroSul) 325 (65 FE) MG tablet Take 1 tablet by mouth 2 (Two) Times a Day. 180 tablet 1    Flaxseed, Linseed, (Flaxseed Oil) 1400 MG capsule Take 1 capsule by mouth 2 (Two) Times a Day.      gabapentin (NEURONTIN) 800 MG tablet TAKE 1 TABLET FOUR TIMES A DAY (Patient taking differently: Take 2 tablets by mouth 2 (Two) Times a Day. 800mg x2 tabs BID) 360 tablet 0    gemfibrozil (LOPID) 600 MG tablet TAKE 1 TABLET TWICE A  tablet 3    Insulin Glargine (LANTUS SOLOSTAR) 100 UNIT/ML injection pen Inject 70 Units under the skin into the appropriate area as directed Every Night. (Patient taking differently: Inject 68 Units under the skin into the appropriate area as directed Every Night.) 81 mL 3    Insulin Lispro, 1 Unit Dial, (HumaLOG KwikPen) 100 UNIT/ML solution pen-injector Inject 30 Units under the skin into the appropriate area as directed 3 (Three) Times a Day. (Patient taking differently: Inject 26 Units under the skin into the appropriate area as directed 3 (Three) Times a Day Around Food.) 81 mL 3    ipratropium (ATROVENT) 0.06 % nasal spray USE 2 SPRAYS IN EACH NOSTRIL AS DIRECTED BY PROVIDER DAILY (Patient taking differently: 2 sprays into the nostril(s) as directed by provider Daily.) 45 mL 1    levothyroxine (SYNTHROID, LEVOTHROID) 75 MCG tablet Take 1 tablet by mouth Daily. 90 tablet 3    Multiple Vitamins-Minerals (MULTIVITAMIN PO) Take 1 tablet by mouth Daily.      saccharomyces boulardii (FLORASTOR) 250 MG capsule Take 1 capsule by mouth 2 (Two) Times a Day. (Patient taking differently: Take 1 capsule by mouth Daily.) 30 capsule 0    sertraline (ZOLOFT) 100 MG tablet TAKE 1 TABLET DAILY 90 tablet 3     spironolactone (ALDACTONE) 25 MG tablet TAKE 1 TABLET DAILY (Patient taking differently: Take 1 tablet by mouth Daily.) 90 tablet 3    tamoxifen (NOLVADEX) 20 MG chemo tablet Take 1 tablet by mouth Daily. 90 tablet 3    temazepam (Restoril) 7.5 MG capsule Take 1 capsule by mouth At Night As Needed for Sleep.      tiZANidine (ZANAFLEX) 4 MG tablet Take 1 tablet by mouth Every 8 (Eight) Hours As Needed for Muscle Spasms.      vitamin D (ERGOCALCIFEROL) 1.25 MG (87930 UT) capsule capsule Take 5,000 Units by mouth 2 (Two) Times a Day.      Dulaglutide (Trulicity) 4.5 MG/0.5ML solution pen-injector Inject 0.5 mL under the skin into the appropriate area as directed 1 (One) Time Per Week. 6 mL 3    Insulin Pen Needle (B-D UF III MINI PEN NEEDLES) 31G X 5 MM misc USE THREE TIMES A  each 3    omeprazole (priLOSEC) 40 MG capsule TAKE 1 CAPSULE DAILY 90 capsule 3    simvastatin (ZOCOR) 40 MG tablet Daily.           Current Facility-Administered Medications:     atorvastatin (LIPITOR) tablet 20 mg, 20 mg, Oral, Daily, Meghann Browning APRN    sennosides-docusate (PERICOLACE) 8.6-50 MG per tablet 2 tablet, 2 tablet, Oral, BID PRN **AND** polyethylene glycol (MIRALAX) packet 17 g, 17 g, Oral, Daily PRN **AND** bisacodyl (DULCOLAX) EC tablet 5 mg, 5 mg, Oral, Daily PRN **AND** bisacodyl (DULCOLAX) suppository 10 mg, 10 mg, Rectal, Daily PRN, Meghann Browning APRN    carvedilol (COREG) tablet 3.125 mg, 3.125 mg, Oral, BID, Meghann Browning APRN, 3.125 mg at 03/17/24 0909    [START ON 3/18/2024] cefTRIAXone (ROCEPHIN) 1,000 mg in sodium chloride 0.9 % 100 mL MBP, 1,000 mg, Intravenous, Q24H, Meghann Browning APRN    dextrose (D50W) (25 g/50 mL) IV injection 25 g, 25 g, Intravenous, Q15 Min PRN, Meghann Browning APRN    dextrose (GLUTOSE) oral gel 15 g, 15 g, Oral, Q15 Min PRN, Meghann Browning APRN    ferrous sulfate tablet 325 mg, 325 mg, Oral, BID, Meghann Browning APRN, 325 mg at 03/17/24 0909    glucagon (GLUCAGEN) injection 1  mg, 1 mg, Intramuscular, Q15 Min PRN, Meghann Browning APRN    HYDROcodone-acetaminophen (NORCO) 5-325 MG per tablet 1 tablet, 1 tablet, Oral, Q6H PRN, Alysia Moreno MD    insulin regular (humuLIN R,novoLIN R) injection 3-14 Units, 3-14 Units, Subcutaneous, Q6H, Meghann Browning APRN    ipratropium (ATROVENT) nasal spray 0.06%, 2 spray, Each Nare, Daily, Meghann Browning APRN, 2 spray at 03/17/24 1012    ipratropium-albuterol (DUO-NEB) nebulizer solution 3 mL, 3 mL, Nebulization, Q4H PRN, Meghann Browning APRN    levothyroxine (SYNTHROID, LEVOTHROID) tablet 75 mcg, 75 mcg, Oral, Q AM, Meghann Browning APRN, 75 mcg at 03/17/24 0909    metroNIDAZOLE (FLAGYL) IVPB 500 mg, 500 mg, Intravenous, Q8H, Meghann Browning APRN    morphine injection 2 mg, 2 mg, Intravenous, Q4H PRN, Alysia Moreno MD, 2 mg at 03/17/24 0907    ondansetron ODT (ZOFRAN-ODT) disintegrating tablet 4 mg, 4 mg, Oral, Q6H PRN **OR** ondansetron (ZOFRAN) injection 4 mg, 4 mg, Intravenous, Q6H PRN, Meghann Browning APRN    pantoprazole (PROTONIX) EC tablet 40 mg, 40 mg, Oral, Q AM, Meghann Browning APRN, 40 mg at 03/17/24 0909    saccharomyces boulardii (FLORASTOR) capsule 250 mg, 250 mg, Oral, BID, Meghann Browning APRN, 250 mg at 03/17/24 0909    sertraline (ZOLOFT) tablet 100 mg, 100 mg, Oral, Daily, Meghann Browning APRN, 100 mg at 03/17/24 0904    sodium chloride 0.9 % flush 10 mL, 10 mL, Intravenous, PRN, Emergency, Triage Protocol, MD    sodium chloride 0.9 % flush 10 mL, 10 mL, Intravenous, Q12H, Meghann Browning APRN, 10 mL at 03/17/24 0910    sodium chloride 0.9 % flush 10 mL, 10 mL, Intravenous, PRN, Meghann Browning APRN    sodium chloride 0.9 % infusion 40 mL, 40 mL, Intravenous, PRN, Meghann Browning, APRDELMY    sodium chloride 0.9 % infusion, 100 mL/hr, Intravenous, Continuous, Meghann Browning APRN, Last Rate: 100 mL/hr at 03/17/24 0912, 100 mL/hr at 03/17/24 0912    tamoxifen (NOLVADEX) tablet 20 mg, 20 mg, Oral, Daily, Meghann Browning APRN, 20 mg at  "03/17/24 1012      Family History:  Family History   Problem Relation Age of Onset    Hearing loss Mother     Arthritis Mother     Cancer Mother     Heart disease Mother     Thyroid disease Mother     Diabetes Father     Heart disease Father         Cardiomyopathy    Hyperlipidemia Father     Stroke Father     Hypertension Father     Diabetes Paternal Grandmother        Social History: Pt lives in [facility].    Tobacco use: Denies     EtOH use : Denies    Illicit drug use: Denies       Review of Systems:   Constitutional: No fevers, chills or malaise   Eyes: Denies visual changes    Cardiovascular: Denies chest pain, palpitations   Pulmonary: Denies cough or shortness of breath   Abdominal/ GI: See HPI    Genitourinary: Denies dysuria or hematuria   Musculoskeletal: Denies any but chronic joint aches, pains or deformities   Psychiatric: No recent mood changes   Neurologic: No paresthesias or loss of function    /75 (BP Location: Left arm, Patient Position: Lying)   Pulse 50   Temp 98.3 °F (36.8 °C) (Oral)   Resp 16   Ht 172.7 cm (68\")   Wt 91.2 kg (201 lb 1.6 oz)   SpO2 97%   BMI 30.58 kg/m²   Body mass index is 30.58 kg/m².    Gen: Awake, alert, sitting up in bed, no obvious distress but appears somewhat uncomfortable  Head: Normocephalic, atraumatic.   Eyes: Pupils equal, round, react to light and accommodation.   Mouth: Oral mucosa without lesions,   Neck: No masses, lymphadenopathy or carotid bruits bilaterally   CV: Rhythm and rate regular, no murmurs, rubs or gallops  Lungs: Clear to auscultation bilaterally, not labored on room air   Abdomen: Soft, not obviously distended, diffusely tender to deep palpation across the epigastrium, no focal tenderness in the right upper quadrant, no rebound or guarding, no obvious scars, liver edge is not obviously palpable  Groin : No obvious hernias bilaterally   Extremities:  No cyanosis, clubbing or edema bilaterally  Lymphatics: No abnormal " lymphadenopathy appreciated   Neurologic: No gross deficits         CBC  Results from last 7 days   Lab Units 03/17/24  0303   WBC 10*3/mm3 6.14   HEMOGLOBIN g/dL 12.5*   HEMATOCRIT % 37.7   PLATELETS 10*3/mm3 79*       CMP  Results from last 7 days   Lab Units 03/17/24  0303   SODIUM mmol/L 135*   POTASSIUM mmol/L 4.3   CHLORIDE mmol/L 102   CO2 mmol/L 20.0*   BUN mg/dL 26*   CREATININE mg/dL 1.47*   CALCIUM mg/dL 8.6   BILIRUBIN mg/dL 0.5   ALK PHOS U/L 81   ALT (SGPT) U/L 9   AST (SGOT) U/L 18   GLUCOSE mg/dL 246*       Radiology  Imaging Results (Last 72 Hours)       Procedure Component Value Units Date/Time    CT Abdomen Pelvis Without Contrast [[id number]] Collected: 03/17/24 0240     Updated: 03/17/24 0251    Narrative:      CT ABDOMEN PELVIS WO CONTRAST    Date of Exam: 3/17/2024 2:31 AM EDT    Indication: Abdominal pain, acute, nonlocalized.    Comparison: CT abdomen pelvis dated 6/11/2023    Technique: Axial CT images were obtained of the abdomen and pelvis without the administration of contrast. Reconstructed coronal and sagittal images were also obtained. Automated exposure control and iterative construction methods were used.      Findings:  There is a stable benign perifissural nodule on the right within the right middle lobe. This is about 4 mm and the stability suggests benignity.    There is moderate to severe coronary artery calcific atherosclerosis. The heart is of normal size.    There are attenuation changes in the neck of the gallbladder consistent with small stones. There is mild subtle fat stranding along the inferior margin of the gallbladder. This may indicate mild acute cholecystitis. There is no biliary ductal dilatation.    There is cirrhotic morphology of the liver. The unenhanced bilateral adrenal glands, bilateral kidneys, pancreas and spleen appear normal.    There is no acute inflammatory change of the large or small bowel. There is calcific change of the abdominal aorta. There are  degenerative changes of the spine.      Impression:      Impression:  1.Cholelithiasis with subtle fat stranding along the inferior margin of the gallbladder. This may indicate mild acute cholecystitis.  2.Cirrhotic morphology of the liver.        Electronically Signed: Raji Gentile MD    3/17/2024 2:48 AM EDT    Workstation ID: [id number]                Results Review: I have personally reviewed all of the recent lab and imaging results available at this time.     He is afebrile and vital signs are stable    Blood cell count is not elevated at 6.  Hemoglobin is 12.  INR is 1.1.  Platelets are low at 79.  Lipase is elevated at 350.  Liver function tests are not elevated    I have personally reviewed a CT scan of the abdomen and pelvis.  This demonstrates some mild to moderate gallbladder distention.  I do not appreciate any obvious pericholecystic stranding, questionable only very mild stranding surrounding the infundibulum.  There are no obvious changes of pancreatitis.  The liver morphology does appear somewhat cirrhotic.  There is no obvious ascites fluid    Assessment:  Mr. Montano is a 73-year-old gentleman with history of Lopez cirrhosis, hypertension, hyperlipidemia, heart failure, diabetes, and chronic kidney disease with gallstone pancreatitis.  His clinical history to me is more consistent with recurrent gallstone pancreatitis.  I do not appreciate definite evidence of cholecystitis on his imaging.  I have recommended to obtain a right upper quadrant ultrasound and will follow this up.  He does have significant risk factors for cholecystectomy given his history of cirrhosis, although it appears that he is compensated and his liver function tests are not elevated he has significant thrombocytopenia with a platelet count of 79.  At this time I recommend supportive management of his pancreatitis.  We may consider cholecystectomy this admission depending on his clinical progress versus outpatient follow-up and  deferring cholecystectomy due to his medical comorbidities     Plan:  -Continue supportive management of pancreatitis  -Obtain right upper quadrant ultrasound  -Repeat labs tomorrow  -N.p.o. after midnight in case of any procedures      I discussed the patient's findings and my recommendations with the patient and/or family, as well as the primary team     Emerson Spear MD  03/17/24  10:36 EDT      Part of this note may be an electronic transcription/translation of spoken language to printed text using the Dragon Dictation System.

## 2024-03-17 NOTE — ED PROVIDER NOTES
Subjective   History of Present Illness  Patient presents for evaluation of bilateral upper abdominal pain that radiates to the back that started yesterday.  He has had nausea without vomiting.  No fevers or chills.  Patient's symptoms he reports are similar to an episode of gallstone pancreatitis that he had last year.        Review of Systems    Past Medical History:   Diagnosis Date    Abdominal pain 05/17/2023    RUQ; SEEN IN ED AT [facility]; DC'D HOME    Abscess of arm, right     Abscess of skin of neck     Acute sinusitis     ALT (SGPT) level raised     Arthritis     BPH (benign prostatic hypertrophy)     Breast cancer 05/2023    right    Cirrhosis of liver 09/03/2021    JEROME    CKD (chronic kidney disease)     Colon polyp     Constipation     DDD (degenerative disc disease), cervical     Decreased platelet count     Depression     Resolved: 1/28/2015    Elevated AST (SGOT)     Erectile dysfunction     GERD (gastroesophageal reflux disease)     History of transfusion 2021    [facility]; 3 UNITS; NO REACTION    HL (hearing loss)     Hyperlipidemia     Hypertension     Hypothyroidism, adult 11/10/2020    Infection     MSSA lumbar surgical wound infection 3/2017    Jaw pain     Left hip pain     Low back pain     Surgery 30 yrs L4-5    Migraine     Hx of    Obesity (BMI 30.0-34.9) 10/07/2016    BMI 31.92    Obstructive sleep apnea     CPAP    Pancreatitis 06/2023    Portal hypertensive gastropathy 09/03/2021    Renal insufficiency     Shigellosis     Sleep apnea     PT TO BRING MASK AND TUBING DAY OF SURGERY    Symptomatic anemia 08/30/2021    Type 2 diabetes mellitus     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    Metformin Nausea And Vomiting     Other reaction(s): SEVERE  INTESTIONAL ISSUES  Other reaction(s): SEVERE GI ISSUES    Glucophage XR TB24  MetFORMIN HCl TABS    Tetracyclines & Related Nausea Only     Adverse Reaction    Chlorhexidine Rash       Past Surgical History:   Procedure Laterality Date    ANTERIOR CERVICAL DISCECTOMY W/ FUSION N/A 12/14/2017    Procedure: CERVICAL DISCECTOMY ANTERIOR FUSION WITH INSTRUMENTATION C7/T1;  Surgeon: Gigi Perales MD;  Location:  [facility];  Service:     CERVICAL ARTHRODESIS      CERVICAL DISCECTOMY POSTERIOR FUSION WITH INSTRUMENTATION N/A 03/16/2017    Procedure:  INCISION AND DRAINAGE OF POSTERIOR CERVICAL WOUND;  Surgeon: Gigi Perales MD;  Location:  [facility];  Service:     CERVICAL LAMINECTOMY DECOMPRESSION POSTERIOR Left 02/28/2017    Procedure: Left C7-T1 CERVICAL FORAMINOTOMY POSTERIOR WITH METRIX;  Surgeon: Gigi Perales MD;  Location:  [facility];  Service:     COLONOSCOPY N/A 09/01/2021    Procedure: COLONOSCOPY;  Surgeon: Sharif García MD;  Location:  [facility];  Service: Gastroenterology;  Laterality: N/A;    CYST REMOVAL  04/2021    ENDOSCOPY N/A 08/31/2021    Procedure: ESOPHAGOGASTRODUODENOSCOPY;  Surgeon: Brunner, Mark I, MD;  Location:  [facility];  Service: Gastroenterology;  Laterality: N/A;    ERCP N/A 06/14/2023    Procedure: ENDOSCOPIC RETROGRADE CHOLANGIOPANCREATOGRAPHY;  Surgeon: Brunner, Mark I, MD;  Location:  [facility];  Service: Gastroenterology;  Laterality: N/A;  SPHINCTERTOMY MADE AT [place]  AND BALLOON SWEEP OF CBD DONE WITH 9-12 BALLOON    LYMPH NODE BIOPSY  6/2023    Negative    MASTECTOMY W/ SENTINEL NODE BIOPSY Right 05/23/2023    Procedure: BREAST MASTECTOMY WITH SENTINEL NODE BIOPSY RIGHT;  Surgeon: Joseph Ramirez MD;  Location:  [facility];  Service: General;  Laterality: Right;    VASECTOMY      WISDOM TOOTH EXTRACTION         Family History   Problem Relation Age of Onset    Hearing loss Mother     Arthritis Mother     Cancer Mother      Heart disease Mother     Thyroid disease Mother     Diabetes Father     Heart disease Father         Cardiomyopathy    Hyperlipidemia Father     Stroke Father     Hypertension Father     Diabetes Paternal Grandmother        Social History     Socioeconomic History    Marital status:    Tobacco Use    Smoking status: Former     Current packs/day: 0.00     Average packs/day: 0.5 packs/day for 4.0 years (2.0 ttl pk-yrs)     Types: Cigarettes     Start date: 1972     Quit date: 1976     Years since quittin.2    Smokeless tobacco: Never   Vaping Use    Vaping status: Never Used   Substance and Sexual Activity    Alcohol use: Yes     Comment: Socially    Drug use: Never    Sexual activity: Not Currently     Partners: Female     Birth control/protection: Vasectomy           Objective   Physical Exam  Constitutional:       General: He is not in acute distress.  HENT:      Head: Normocephalic and atraumatic.   Eyes:      Conjunctiva/sclera: Conjunctivae normal.      Pupils: Pupils are equal, round, and reactive to light.   Cardiovascular:      Rate and Rhythm: Normal rate and regular rhythm.      Pulses: Normal pulses.      Heart sounds: No murmur heard.     No gallop.   Pulmonary:      Effort: Pulmonary effort is normal. No respiratory distress.   Abdominal:      General: Abdomen is flat. There is no distension.      Tenderness: There is abdominal tenderness.      Comments: Tender in the bilateral upper abdomen.  No guarding or rebound   Musculoskeletal:         General: No swelling or deformity. Normal range of motion.   Skin:     General: Skin is warm and dry.      Capillary Refill: Capillary refill takes less than 2 seconds.   Neurological:      General: No focal deficit present.      Mental Status: He is alert and oriented to person, place, and time.   Psychiatric:         Mood and Affect: Mood normal.         Behavior: Behavior normal.         Procedures           ED Course  ED Course as of 24  0425   Sun Mar 17, 2024   0149 Twelve-lead ECG independently interpreted by myself demonstrates sinus bradycardia with a rate of 58, first-degree AV block with a VT interval of 222, no ST segment elevation or depression. [KB]   0332 CT scan of the abdomen and pelvis independently interpreted by myself demonstrates cirrhotic changes to the liver, cholelithiasis with findings suggestive of early cholecystitis [KB]   0424 Laboratory workup independently interpreted by myself demonstrates elevated pancreatic enzymes consistent with acute pancreatitis, no leukocytosis, chronic kidney disease [KB]      ED Course User Index  [KB] Deniz Mccormick MD                                             Medical Decision Making  Differential diagnosis includes pancreatitis, gastritis or peptic ulcer, acute cholecystitis.  Lab and imaging studies were conducted.    On reassessment patient is feeling improved.    I believe he would benefit from hospital admission for further treatment of pancreatitis and due to concern for only cholecystitis.  Patient was started on ceftriaxone and Flagyl for antibiotic coverage    Problems Addressed:  Acute cholecystitis: complicated acute illness or injury  Idiopathic acute pancreatitis without infection or necrosis: complicated acute illness or injury    Amount and/or Complexity of Data Reviewed  Independent Historian:      Details: Some details of HPI given by the patient's daughter at the bedside.  External Data Reviewed: notes.     Details: 6/15/2023 reviewed discharge summary from hospitalization for acute pancreatitis  Labs: ordered. Decision-making details documented in ED Course.  Radiology: ordered and independent interpretation performed. Decision-making details documented in ED Course.  Discussion of management or test interpretation with external provider(s): Hospital medicine consulted for admission    Risk  Prescription drug management.  Decision regarding hospitalization.        Final  diagnoses:   Idiopathic acute pancreatitis without infection or necrosis   Acute cholecystitis       ED Disposition  ED Disposition       ED Disposition   Decision to Admit    Condition   --    Comment   --             Recent Results (from the past 24 hour(s))   ECG 12 Lead ED Triage Standing Order; Abdominal Pain (Upper)    Collection Time: 03/17/24  1:44 AM   Result Value Ref Range    QT Interval 406 ms    QTC Interval 398 ms   Comprehensive Metabolic Panel    Collection Time: 03/17/24  3:03 AM    Specimen: Blood   Result Value Ref Range    Glucose 246 (H) 65 - 99 mg/dL    BUN 26 (H) 8 - 23 mg/dL    Creatinine 1.47 (H) 0.76 - 1.27 mg/dL    Sodium 135 (L) 136 - 145 mmol/L    Potassium 4.3 3.5 - 5.2 mmol/L    Chloride 102 98 - 107 mmol/L    CO2 20.0 (L) 22.0 - 29.0 mmol/L    Calcium 8.6 8.6 - 10.5 mg/dL    Total Protein 6.7 6.0 - 8.5 g/dL    Albumin 3.8 3.5 - 5.2 g/dL    ALT (SGPT) 9 1 - 41 U/L    AST (SGOT) 18 1 - 40 U/L    Alkaline Phosphatase 81 39 - 117 U/L    Total Bilirubin 0.5 0.0 - 1.2 mg/dL    Globulin 2.9 gm/dL    A/G Ratio 1.3 g/dL    BUN/Creatinine Ratio 17.7 7.0 - 25.0    Anion Gap 13.0 5.0 - 15.0 mmol/L    eGFR 49.7 (L) >60.0 mL/min/1.73   Lipase    Collection Time: 03/17/24  3:03 AM    Specimen: Blood   Result Value Ref Range    Lipase 350 (H) 13 - 60 U/L   Single High Sensitivity Troponin T    Collection Time: 03/17/24  3:03 AM    Specimen: Blood   Result Value Ref Range    HS Troponin T 35 (H) <22 ng/L   Green Top (Gel)    Collection Time: 03/17/24  3:03 AM   Result Value Ref Range    Extra Tube Hold for add-ons.    Lavender Top    Collection Time: 03/17/24  3:03 AM   Result Value Ref Range    Extra Tube hold for add-on    Gold Top - SST    Collection Time: 03/17/24  3:03 AM   Result Value Ref Range    Extra Tube Hold for add-ons.    Light Blue Top    Collection Time: 03/17/24  3:03 AM   Result Value Ref Range    Extra Tube Hold for add-ons.    CBC Auto Differential    Collection Time: 03/17/24   3:03 AM    Specimen: Blood   Result Value Ref Range    WBC 6.14 3.40 - 10.80 10*3/mm3    RBC 3.92 (L) 4.14 - 5.80 10*6/mm3    Hemoglobin 12.5 (L) 13.0 - 17.7 g/dL    Hematocrit 37.7 37.5 - 51.0 %    MCV 96.2 79.0 - 97.0 fL    MCH 31.9 26.6 - 33.0 pg    MCHC 33.2 31.5 - 35.7 g/dL    RDW 13.2 12.3 - 15.4 %    RDW-SD 46.6 37.0 - 54.0 fl    MPV 12.5 (H) 6.0 - 12.0 fL    Platelets 79 (L) 140 - 450 10*3/mm3    Neutrophil % 78.4 (H) 42.7 - 76.0 %    Lymphocyte % 11.1 (L) 19.6 - 45.3 %    Monocyte % 8.6 5.0 - 12.0 %    Eosinophil % 1.3 0.3 - 6.2 %    Basophil % 0.3 0.0 - 1.5 %    Immature Grans % 0.3 0.0 - 0.5 %    Neutrophils, Absolute 4.81 1.70 - 7.00 10*3/mm3    Lymphocytes, Absolute 0.68 (L) 0.70 - 3.10 10*3/mm3    Monocytes, Absolute 0.53 0.10 - 0.90 10*3/mm3    Eosinophils, Absolute 0.08 0.00 - 0.40 10*3/mm3    Basophils, Absolute 0.02 0.00 - 0.20 10*3/mm3    Immature Grans, Absolute 0.02 0.00 - 0.05 10*3/mm3    nRBC 0.0 0.0 - 0.2 /100 WBC   Urinalysis With Microscopic If Indicated (No Culture) - Urine, Clean Catch    Collection Time: 03/17/24  4:05 AM    Specimen: Urine, Clean Catch   Result Value Ref Range    Color, UA Yellow Yellow, Straw    Appearance, UA Clear Clear    pH, UA 5.5 5.0 - 8.0    Specific Gravity, UA 1.019 1.001 - 1.030    Glucose, UA >=1000 mg/dL (3+) (A) Negative    Ketones, UA Negative Negative    Bilirubin, UA Negative Negative    Blood, UA Negative Negative    Protein, UA 30 mg/dL (1+) (A) Negative    Leuk Esterase, UA Negative Negative    Nitrite, UA Negative Negative    Urobilinogen, UA 0.2 E.U./dL 0.2 - 1.0 E.U./dL   Urinalysis, Microscopic Only - Urine, Clean Catch    Collection Time: 03/17/24  4:05 AM    Specimen: Urine, Clean Catch   Result Value Ref Range    RBC, UA 0-2 None Seen, 0-2 /HPF    WBC, UA 0-2 None Seen, 0-2 /HPF    Bacteria, UA None Seen None Seen, Trace /HPF    Squamous Epithelial Cells, UA None Seen None Seen, 0-2 /HPF    Hyaline Casts, UA None Seen 0 - 6 /LPF    Methodology  "Automated Microscopy      Note: In addition to lab results from this visit, the labs listed above may include labs taken at another facility or during a different encounter within the last 24 hours. Please correlate lab times with ED admission and discharge times for further clarification of the services performed during this visit.    CT Abdomen Pelvis Without Contrast   Final Result   Impression:   1.Cholelithiasis with subtle fat stranding along the inferior margin of the gallbladder. This may indicate mild acute cholecystitis.   2.Cirrhotic morphology of the liver.            Electronically Signed: Raji Gentile MD     3/17/2024 2:48 AM EDT     Workstation ID: [id number]        Vitals:    03/17/24 0130 03/17/24 0308 03/17/24 0330   BP: 113/66 146/90 156/83   BP Location: Left arm     Patient Position: Sitting     Pulse: 62 63 65   Resp: 16     Temp: 97.7 °F (36.5 °C)     TempSrc: Oral     SpO2: 97% 95% 97%   Weight: 87.1 kg (192 lb)     Height: 172.7 cm (68\")       Medications   sodium chloride 0.9 % flush 10 mL (has no administration in time range)   cefTRIAXone (ROCEPHIN) 2,000 mg in sodium chloride 0.9 % 100 mL MBP (has no administration in time range)   metroNIDAZOLE (FLAGYL) IVPB 500 mg (has no administration in time range)   lactated ringers bolus 1,000 mL (0 mL Intravenous Stopped 3/17/24 0401)   Morphine sulfate (PF) injection 4 mg (4 mg Intravenous Given 3/17/24 0302)   ondansetron (ZOFRAN) injection 4 mg (4 mg Intravenous Given 3/17/24 0302)     ECG/EMG Results (last 24 hours)       Procedure Component Value Units Date/Time    ECG 12 Lead ED Triage Standing Order; Abdominal Pain (Upper) [[id number]] Collected: 03/17/24 0144     Updated: 03/17/24 0149     QT Interval 406 ms      QTC Interval 398 ms     Narrative:      Test Reason : ED Triage Standing Order~  Blood Pressure :   */*   mmHG  Vent. Rate :  58 BPM     Atrial Rate :  58 BPM     P-R Int : 222 ms          QRS Dur :  86 ms      QT Int : 406 ms       " P-R-T Axes :  45 -18  35 degrees     QTc Int : 398 ms    Sinus bradycardia with 1st degree AV block  Possible Anterolateral infarct (cited on or before 29-AUG-2021)  Abnormal ECG  When compared with ECG of 14-JUN-2023 01:38,  No significant change was found  Confirmed by MD AZAR KYLE ([id number]) on 3/17/2024 1:49:32 AM    Referred By:            Confirmed By: CHINA AZAR MD          ECG 12 Lead ED Triage Standing Order; Abdominal Pain (Upper)   Final Result   Test Reason : ED Triage Standing Order~   Blood Pressure :   */*   mmHG   Vent. Rate :  58 BPM     Atrial Rate :  58 BPM      P-R Int : 222 ms          QRS Dur :  86 ms       QT Int : 406 ms       P-R-T Axes :  45 -18  35 degrees      QTc Int : 398 ms      Sinus bradycardia with 1st degree AV block   Possible Anterolateral infarct (cited on or before 29-AUG-2021)   Abnormal ECG   When compared with ECG of 14-JUN-2023 01:38,   No significant change was found   Confirmed by MD AZAR KYLE ([id number]) on 3/17/2024 1:49:32 AM      Referred By:            Confirmed By: CHINA AZAR MD              No follow-up provider specified.       Medication List      No changes were made to your prescriptions during this visit.            China Azar MD  03/17/24 0425

## 2024-03-17 NOTE — PROGRESS NOTES
[facility] Medicine Services  ADMISSION FOLLOW-UP NOTE          Patient admitted after midnight, H&P by my partner performed earlier on today's date reviewed.  Interim findings, labs, and charting also reviewed.        The [facility] Hospital Problem List has been managed and updated to include any new diagnoses:  Active Hospital Problems    Diagnosis  POA    **Pancreatitis [K85.90]  Yes    Acute cholecystitis [K81.0]  Yes    HLD (hyperlipidemia) [E78.5]  Yes    History of breast cancer [Z85.3]  Not Applicable    Elevated troponin [R79.89]  Yes    T2DM (type 2 diabetes mellitus) [E11.9]  Yes    Grade I diastolic dysfunction [I51.89]  Yes    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     In summary, this is a 74 yo male w h/o cholangitis/gallstone pancreatitis 2023, JEROME cirrhosis wo decompensation who presents again with similar epigastric/back pain. Found to have elevated lipase and gallbladder w cholelithiasis  On chart review, appears last admission patient with very elev lipase + findings c/w gallstone pancreatitis but cholecystectomy was deferred 2/2 cirrhosis diagnosis.  Patient w normal INR, no s/s of prior decompensation, not established w GI after that admission - dx appears compensated JEROME cirrhosis    Gallstone pancreatitis  Possible mild cholecystitis  -h/o gallstone pancreatitis/cholangitis s/p ERCP 2023 wo [facility]  -epigastric +back pain with elev lipase diagnostic of pancreatitis again this admission  -IVF, pain control  -given 2nd episode w gallstone etiology, surgery consult for cholecystectomy timing. NPO pending surgery but suspect will wait another day for inflammation to subside  -given imaging findings c/w  mild cholecystitis, on rocephin + flagyl from overnight team. OK to continue for now pending surgery but sx appear to be much more pancreas related    DMII, insulin dept w home high doses  -home dosing 68 units lantus qhs + 26 units lispro w meals. Patient reports some lows w meal-time if eating little  -Glc q6 while NPO + SSI to obtain trend, will start basal/bolus pending surg planning    JEROME cirrhosis - as above, no s/s of decompensation, appropriately on coreg. OP GI referral at [place]  Significant weight loss - 30 lbs down w intentionality + trulicity, congratulated patient    Expected Discharge 3/9 (Discharge date is tentative pending patient's medical condition and is subject to change)  Expected Discharge Date: 3/19/2024; Expected Discharge Time:      Alysia Moreno MD  03/17/24

## 2024-03-17 NOTE — PLAN OF CARE
Goal Outcome Evaluation:              Problem: Adult Inpatient Plan of Care  Goal: Plan of Care Review  Outcome: Ongoing, Progressing  Goal: Patient-Specific Goal (Individualized)  Outcome: Ongoing, Progressing  Goal: Absence of Hospital-Acquired Illness or Injury  Outcome: Ongoing, Progressing  Goal: Optimal Comfort and Wellbeing  Outcome: Ongoing, Progressing  Goal: Readiness for Transition of Care  Outcome: Ongoing, Progressing  Intervention: Mutually Develop Transition Plan  Recent Flowsheet Documentation  Taken 3/17/2024 0900 by Ashley Nance, RN  Transportation Anticipated: family or friend will provide  Patient/Family Anticipated Services at Transition: none  Patient/Family Anticipates Transition to: home with family

## 2024-03-17 NOTE — Clinical Note
Level of Care: Telemetry [5]   Diagnosis: Pancreatitis [202663]   Admitting Physician: MIA DAVIS [[id number]]   Attending Physician: MIA DAVIS [[id number]]

## 2024-03-18 ENCOUNTER — ANESTHESIA EVENT (OUTPATIENT)
Dept: PERIOP | Facility: HOSPITAL | Age: 74
End: 2024-03-18
Payer: MEDICARE

## 2024-03-18 ENCOUNTER — ANESTHESIA (OUTPATIENT)
Dept: PERIOP | Facility: HOSPITAL | Age: 74
End: 2024-03-18
Payer: MEDICARE

## 2024-03-18 LAB
ABO GROUP BLD: NORMAL
ALBUMIN SERPL-MCNC: 3.9 G/DL (ref 3.5–5.2)
ALBUMIN/GLOB SERPL: 1.4 G/DL
ALP SERPL-CCNC: 70 U/L (ref 39–117)
ALT SERPL W P-5'-P-CCNC: 9 U/L (ref 1–41)
ANION GAP SERPL CALCULATED.3IONS-SCNC: 14 MMOL/L (ref 5–15)
AST SERPL-CCNC: 18 U/L (ref 1–40)
BASOPHILS # BLD AUTO: 0.02 10*3/MM3 (ref 0–0.2)
BASOPHILS NFR BLD AUTO: 0.4 % (ref 0–1.5)
BILIRUB SERPL-MCNC: 0.5 MG/DL (ref 0–1.2)
BLD GP AB SCN SERPL QL: NEGATIVE
BUN SERPL-MCNC: 18 MG/DL (ref 8–23)
BUN/CREAT SERPL: 14.1 (ref 7–25)
CALCIUM SPEC-SCNC: 8.1 MG/DL (ref 8.6–10.5)
CHLORIDE SERPL-SCNC: 102 MMOL/L (ref 98–107)
CO2 SERPL-SCNC: 19 MMOL/L (ref 22–29)
CREAT SERPL-MCNC: 1.28 MG/DL (ref 0.76–1.27)
DEPRECATED RDW RBC AUTO: 49 FL (ref 37–54)
EGFRCR SERPLBLD CKD-EPI 2021: 58.7 ML/MIN/1.73
EOSINOPHIL # BLD AUTO: 0.13 10*3/MM3 (ref 0–0.4)
EOSINOPHIL NFR BLD AUTO: 2.4 % (ref 0.3–6.2)
ERYTHROCYTE [DISTWIDTH] IN BLOOD BY AUTOMATED COUNT: 13.4 % (ref 12.3–15.4)
GLOBULIN UR ELPH-MCNC: 2.7 GM/DL
GLUCOSE BLDC GLUCOMTR-MCNC: 175 MG/DL (ref 70–130)
GLUCOSE BLDC GLUCOMTR-MCNC: 185 MG/DL (ref 70–130)
GLUCOSE BLDC GLUCOMTR-MCNC: 197 MG/DL (ref 70–130)
GLUCOSE BLDC GLUCOMTR-MCNC: 212 MG/DL (ref 70–130)
GLUCOSE BLDC GLUCOMTR-MCNC: 269 MG/DL (ref 70–130)
GLUCOSE SERPL-MCNC: 182 MG/DL (ref 65–99)
HCT VFR BLD AUTO: 39.6 % (ref 37.5–51)
HGB BLD-MCNC: 12.7 G/DL (ref 13–17.7)
IMM GRANULOCYTES # BLD AUTO: 0.02 10*3/MM3 (ref 0–0.05)
IMM GRANULOCYTES NFR BLD AUTO: 0.4 % (ref 0–0.5)
INR PPP: 1.16 (ref 0.89–1.12)
LYMPHOCYTES # BLD AUTO: 0.95 10*3/MM3 (ref 0.7–3.1)
LYMPHOCYTES NFR BLD AUTO: 17.3 % (ref 19.6–45.3)
MCH RBC QN AUTO: 32.2 PG (ref 26.6–33)
MCHC RBC AUTO-ENTMCNC: 32.1 G/DL (ref 31.5–35.7)
MCV RBC AUTO: 100.5 FL (ref 79–97)
MONOCYTES # BLD AUTO: 0.59 10*3/MM3 (ref 0.1–0.9)
MONOCYTES NFR BLD AUTO: 10.8 % (ref 5–12)
NEUTROPHILS NFR BLD AUTO: 3.77 10*3/MM3 (ref 1.7–7)
NEUTROPHILS NFR BLD AUTO: 68.7 % (ref 42.7–76)
NRBC BLD AUTO-RTO: 0 /100 WBC (ref 0–0.2)
PLATELET # BLD AUTO: 77 10*3/MM3 (ref 140–450)
PMV BLD AUTO: 12.4 FL (ref 6–12)
POTASSIUM SERPL-SCNC: 4.6 MMOL/L (ref 3.5–5.2)
PROT SERPL-MCNC: 6.6 G/DL (ref 6–8.5)
PROTHROMBIN TIME: 14.9 SECONDS (ref 12.2–14.5)
RBC # BLD AUTO: 3.94 10*6/MM3 (ref 4.14–5.8)
RH BLD: NEGATIVE
SODIUM SERPL-SCNC: 135 MMOL/L (ref 136–145)
T&S EXPIRATION DATE: NORMAL
WBC NRBC COR # BLD AUTO: 5.48 10*3/MM3 (ref 3.4–10.8)

## 2024-03-18 PROCEDURE — 82948 REAGENT STRIP/BLOOD GLUCOSE: CPT

## 2024-03-18 PROCEDURE — 25010000002 MORPHINE PER 10 MG: Performed by: SURGERY

## 2024-03-18 PROCEDURE — 85025 COMPLETE CBC W/AUTO DIFF WBC: CPT | Performed by: NURSE PRACTITIONER

## 2024-03-18 PROCEDURE — 88313 SPECIAL STAINS GROUP 2: CPT | Performed by: SURGERY

## 2024-03-18 PROCEDURE — 25010000002 METRONIDAZOLE 500 MG/100ML SOLUTION: Performed by: NURSE PRACTITIONER

## 2024-03-18 PROCEDURE — 88304 TISSUE EXAM BY PATHOLOGIST: CPT | Performed by: SURGERY

## 2024-03-18 PROCEDURE — 25010000002 FENTANYL CITRATE (PF) 50 MCG/ML SOLUTION

## 2024-03-18 PROCEDURE — 25810000003 LACTATED RINGERS PER 1000 ML: Performed by: ANESTHESIOLOGY

## 2024-03-18 PROCEDURE — 25810000003 LACTATED RINGERS PER 1000 ML: Performed by: SURGERY

## 2024-03-18 PROCEDURE — 88307 TISSUE EXAM BY PATHOLOGIST: CPT | Performed by: SURGERY

## 2024-03-18 PROCEDURE — 25010000002 PROPOFOL 10 MG/ML EMULSION: Performed by: NURSE ANESTHETIST, CERTIFIED REGISTERED

## 2024-03-18 PROCEDURE — 25010000002 SUGAMMADEX 200 MG/2ML SOLUTION: Performed by: NURSE ANESTHETIST, CERTIFIED REGISTERED

## 2024-03-18 PROCEDURE — 99232 SBSQ HOSP IP/OBS MODERATE 35: CPT | Performed by: INTERNAL MEDICINE

## 2024-03-18 PROCEDURE — 0FB14ZX EXCISION OF RIGHT LOBE LIVER, PERCUTANEOUS ENDOSCOPIC APPROACH, DIAGNOSTIC: ICD-10-PCS | Performed by: SURGERY

## 2024-03-18 PROCEDURE — 25010000002 METRONIDAZOLE 500 MG/100ML SOLUTION: Performed by: SURGERY

## 2024-03-18 PROCEDURE — 86900 BLOOD TYPING SEROLOGIC ABO: CPT | Performed by: ANESTHESIOLOGY

## 2024-03-18 PROCEDURE — 25010000002 MORPHINE SULFATE (PF) 4 MG/ML SOLUTION: Performed by: NURSE ANESTHETIST, CERTIFIED REGISTERED

## 2024-03-18 PROCEDURE — 25810000003 SODIUM CHLORIDE PER 500 ML: Performed by: SURGERY

## 2024-03-18 PROCEDURE — 86850 RBC ANTIBODY SCREEN: CPT | Performed by: ANESTHESIOLOGY

## 2024-03-18 PROCEDURE — 25810000003 SODIUM CHLORIDE 0.9 % SOLUTION: Performed by: NURSE PRACTITIONER

## 2024-03-18 PROCEDURE — 63710000001 INSULIN DETEMIR PER 5 UNITS: Performed by: SURGERY

## 2024-03-18 PROCEDURE — 0FT44ZZ RESECTION OF GALLBLADDER, PERCUTANEOUS ENDOSCOPIC APPROACH: ICD-10-PCS | Performed by: SURGERY

## 2024-03-18 PROCEDURE — 25010000002 CEFTRIAXONE PER 250 MG: Performed by: NURSE PRACTITIONER

## 2024-03-18 PROCEDURE — 63710000001 INSULIN LISPRO (HUMAN) PER 5 UNITS: Performed by: INTERNAL MEDICINE

## 2024-03-18 PROCEDURE — 63710000001 INSULIN REGULAR HUMAN PER 5 UNITS: Performed by: SURGERY

## 2024-03-18 PROCEDURE — 25010000002 DEXAMETHASONE PER 1 MG: Performed by: NURSE ANESTHETIST, CERTIFIED REGISTERED

## 2024-03-18 PROCEDURE — 63710000001 ONDANSETRON ODT 4 MG TABLET DISPERSIBLE: Performed by: SURGERY

## 2024-03-18 PROCEDURE — 86901 BLOOD TYPING SEROLOGIC RH(D): CPT | Performed by: ANESTHESIOLOGY

## 2024-03-18 PROCEDURE — 25010000002 DROPERIDOL PER 5 MG

## 2024-03-18 PROCEDURE — 80053 COMPREHEN METABOLIC PANEL: CPT | Performed by: SURGERY

## 2024-03-18 PROCEDURE — 85610 PROTHROMBIN TIME: CPT | Performed by: SURGERY

## 2024-03-18 DEVICE — FLOSEAL WITH RECOTHROM - 10ML.
Type: IMPLANTABLE DEVICE | Site: ABDOMEN | Status: FUNCTIONAL
Brand: FLOSEAL HEMOSTATIC MATRIX

## 2024-03-18 DEVICE — LIGAMAX 5 MM ENDOSCOPIC MULTIPLE CLIP APPLIER
Type: IMPLANTABLE DEVICE | Site: ABDOMEN | Status: FUNCTIONAL
Brand: LIGAMAX

## 2024-03-18 RX ORDER — HYDROCODONE BITARTRATE AND ACETAMINOPHEN 5; 325 MG/1; MG/1
1 TABLET ORAL ONCE AS NEEDED
Status: DISCONTINUED | OUTPATIENT
Start: 2024-03-18 | End: 2024-03-18 | Stop reason: HOSPADM

## 2024-03-18 RX ORDER — DROPERIDOL 2.5 MG/ML
0.62 INJECTION, SOLUTION INTRAMUSCULAR; INTRAVENOUS
Status: DISCONTINUED | OUTPATIENT
Start: 2024-03-18 | End: 2024-03-18 | Stop reason: HOSPADM

## 2024-03-18 RX ORDER — LABETALOL HYDROCHLORIDE 5 MG/ML
5 INJECTION, SOLUTION INTRAVENOUS
Status: DISCONTINUED | OUTPATIENT
Start: 2024-03-18 | End: 2024-03-18 | Stop reason: HOSPADM

## 2024-03-18 RX ORDER — ROCURONIUM BROMIDE 10 MG/ML
INJECTION, SOLUTION INTRAVENOUS AS NEEDED
Status: DISCONTINUED | OUTPATIENT
Start: 2024-03-18 | End: 2024-03-18 | Stop reason: SURG

## 2024-03-18 RX ORDER — ONDANSETRON 4 MG/1
4 TABLET, ORALLY DISINTEGRATING ORAL EVERY 6 HOURS PRN
Status: DISCONTINUED | OUTPATIENT
Start: 2024-03-18 | End: 2024-03-20 | Stop reason: HOSPADM

## 2024-03-18 RX ORDER — IPRATROPIUM BROMIDE AND ALBUTEROL SULFATE 2.5; .5 MG/3ML; MG/3ML
3 SOLUTION RESPIRATORY (INHALATION) ONCE AS NEEDED
Status: DISCONTINUED | OUTPATIENT
Start: 2024-03-18 | End: 2024-03-18 | Stop reason: HOSPADM

## 2024-03-18 RX ORDER — SODIUM CHLORIDE 0.9 % (FLUSH) 0.9 %
3-10 SYRINGE (ML) INJECTION AS NEEDED
Status: DISCONTINUED | OUTPATIENT
Start: 2024-03-18 | End: 2024-03-18 | Stop reason: HOSPADM

## 2024-03-18 RX ORDER — DOCUSATE SODIUM 100 MG/1
100 CAPSULE, LIQUID FILLED ORAL 2 TIMES DAILY PRN
Status: DISCONTINUED | OUTPATIENT
Start: 2024-03-18 | End: 2024-03-20 | Stop reason: HOSPADM

## 2024-03-18 RX ORDER — NALOXONE HCL 0.4 MG/ML
0.1 VIAL (ML) INJECTION
Status: DISCONTINUED | OUTPATIENT
Start: 2024-03-18 | End: 2024-03-18 | Stop reason: SDUPTHER

## 2024-03-18 RX ORDER — HYDROMORPHONE HYDROCHLORIDE 1 MG/ML
0.5 INJECTION, SOLUTION INTRAMUSCULAR; INTRAVENOUS; SUBCUTANEOUS
Status: DISCONTINUED | OUTPATIENT
Start: 2024-03-18 | End: 2024-03-18 | Stop reason: HOSPADM

## 2024-03-18 RX ORDER — SODIUM CHLORIDE 0.9 % (FLUSH) 0.9 %
10 SYRINGE (ML) INJECTION EVERY 12 HOURS SCHEDULED
Status: DISCONTINUED | OUTPATIENT
Start: 2024-03-18 | End: 2024-03-18 | Stop reason: HOSPADM

## 2024-03-18 RX ORDER — MORPHINE SULFATE 4 MG/ML
INJECTION, SOLUTION INTRAMUSCULAR; INTRAVENOUS AS NEEDED
Status: DISCONTINUED | OUTPATIENT
Start: 2024-03-18 | End: 2024-03-18 | Stop reason: SURG

## 2024-03-18 RX ORDER — ONDANSETRON 2 MG/ML
4 INJECTION INTRAMUSCULAR; INTRAVENOUS EVERY 6 HOURS PRN
Status: DISCONTINUED | OUTPATIENT
Start: 2024-03-18 | End: 2024-03-20 | Stop reason: HOSPADM

## 2024-03-18 RX ORDER — LIDOCAINE HYDROCHLORIDE 10 MG/ML
INJECTION, SOLUTION EPIDURAL; INFILTRATION; INTRACAUDAL; PERINEURAL AS NEEDED
Status: DISCONTINUED | OUTPATIENT
Start: 2024-03-18 | End: 2024-03-18 | Stop reason: SURG

## 2024-03-18 RX ORDER — MAGNESIUM HYDROXIDE 1200 MG/15ML
LIQUID ORAL AS NEEDED
Status: DISCONTINUED | OUTPATIENT
Start: 2024-03-18 | End: 2024-03-18 | Stop reason: HOSPADM

## 2024-03-18 RX ORDER — FENTANYL CITRATE 50 UG/ML
INJECTION, SOLUTION INTRAMUSCULAR; INTRAVENOUS
Status: COMPLETED
Start: 2024-03-18 | End: 2024-03-18

## 2024-03-18 RX ORDER — SODIUM CHLORIDE 0.9 % (FLUSH) 0.9 %
3 SYRINGE (ML) INJECTION EVERY 12 HOURS SCHEDULED
Status: DISCONTINUED | OUTPATIENT
Start: 2024-03-18 | End: 2024-03-18 | Stop reason: HOSPADM

## 2024-03-18 RX ORDER — ONDANSETRON 2 MG/ML
4 INJECTION INTRAMUSCULAR; INTRAVENOUS ONCE AS NEEDED
Status: DISCONTINUED | OUTPATIENT
Start: 2024-03-18 | End: 2024-03-18 | Stop reason: HOSPADM

## 2024-03-18 RX ORDER — PROMETHAZINE HYDROCHLORIDE 25 MG/1
25 TABLET ORAL ONCE AS NEEDED
Status: DISCONTINUED | OUTPATIENT
Start: 2024-03-18 | End: 2024-03-18 | Stop reason: HOSPADM

## 2024-03-18 RX ORDER — NALOXONE HCL 0.4 MG/ML
0.4 VIAL (ML) INJECTION AS NEEDED
Status: DISCONTINUED | OUTPATIENT
Start: 2024-03-18 | End: 2024-03-18 | Stop reason: HOSPADM

## 2024-03-18 RX ORDER — PROMETHAZINE HYDROCHLORIDE 25 MG/1
25 SUPPOSITORY RECTAL ONCE AS NEEDED
Status: DISCONTINUED | OUTPATIENT
Start: 2024-03-18 | End: 2024-03-18 | Stop reason: HOSPADM

## 2024-03-18 RX ORDER — HYDRALAZINE HYDROCHLORIDE 20 MG/ML
5 INJECTION INTRAMUSCULAR; INTRAVENOUS
Status: DISCONTINUED | OUTPATIENT
Start: 2024-03-18 | End: 2024-03-18 | Stop reason: HOSPADM

## 2024-03-18 RX ORDER — SODIUM CHLORIDE 9 MG/ML
40 INJECTION, SOLUTION INTRAVENOUS AS NEEDED
Status: DISCONTINUED | OUTPATIENT
Start: 2024-03-18 | End: 2024-03-18 | Stop reason: HOSPADM

## 2024-03-18 RX ORDER — MORPHINE SULFATE 2 MG/ML
2 INJECTION, SOLUTION INTRAMUSCULAR; INTRAVENOUS
Status: DISCONTINUED | OUTPATIENT
Start: 2024-03-18 | End: 2024-03-20 | Stop reason: HOSPADM

## 2024-03-18 RX ORDER — DEXTROSE MONOHYDRATE 25 G/50ML
25 INJECTION, SOLUTION INTRAVENOUS
Status: DISCONTINUED | OUTPATIENT
Start: 2024-03-18 | End: 2024-03-18 | Stop reason: SDUPTHER

## 2024-03-18 RX ORDER — SODIUM CHLORIDE, SODIUM LACTATE, POTASSIUM CHLORIDE, CALCIUM CHLORIDE 600; 310; 30; 20 MG/100ML; MG/100ML; MG/100ML; MG/100ML
125 INJECTION, SOLUTION INTRAVENOUS CONTINUOUS
Status: DISCONTINUED | OUTPATIENT
Start: 2024-03-18 | End: 2024-03-19

## 2024-03-18 RX ORDER — FENTANYL CITRATE 50 UG/ML
50 INJECTION, SOLUTION INTRAMUSCULAR; INTRAVENOUS
Status: DISCONTINUED | OUTPATIENT
Start: 2024-03-18 | End: 2024-03-18 | Stop reason: HOSPADM

## 2024-03-18 RX ORDER — DEXAMETHASONE SODIUM PHOSPHATE 4 MG/ML
INJECTION, SOLUTION INTRA-ARTICULAR; INTRALESIONAL; INTRAMUSCULAR; INTRAVENOUS; SOFT TISSUE AS NEEDED
Status: DISCONTINUED | OUTPATIENT
Start: 2024-03-18 | End: 2024-03-18 | Stop reason: SURG

## 2024-03-18 RX ORDER — DROPERIDOL 2.5 MG/ML
INJECTION, SOLUTION INTRAMUSCULAR; INTRAVENOUS
Status: COMPLETED
Start: 2024-03-18 | End: 2024-03-18

## 2024-03-18 RX ORDER — BUPIVACAINE HYDROCHLORIDE AND EPINEPHRINE 2.5; 5 MG/ML; UG/ML
INJECTION, SOLUTION EPIDURAL; INFILTRATION; INTRACAUDAL; PERINEURAL AS NEEDED
Status: DISCONTINUED | OUTPATIENT
Start: 2024-03-18 | End: 2024-03-18 | Stop reason: HOSPADM

## 2024-03-18 RX ORDER — PROPOFOL 10 MG/ML
VIAL (ML) INTRAVENOUS AS NEEDED
Status: DISCONTINUED | OUTPATIENT
Start: 2024-03-18 | End: 2024-03-18 | Stop reason: SURG

## 2024-03-18 RX ORDER — PHENYLEPHRINE HCL IN 0.9% NACL 1 MG/10 ML
SYRINGE (ML) INTRAVENOUS AS NEEDED
Status: DISCONTINUED | OUTPATIENT
Start: 2024-03-18 | End: 2024-03-18 | Stop reason: SURG

## 2024-03-18 RX ORDER — HYDROMORPHONE HYDROCHLORIDE 1 MG/ML
0.5 INJECTION, SOLUTION INTRAMUSCULAR; INTRAVENOUS; SUBCUTANEOUS
Status: DISCONTINUED | OUTPATIENT
Start: 2024-03-18 | End: 2024-03-19

## 2024-03-18 RX ORDER — SODIUM CHLORIDE 9 MG/ML
INJECTION, SOLUTION INTRAVENOUS AS NEEDED
Status: DISCONTINUED | OUTPATIENT
Start: 2024-03-18 | End: 2024-03-18 | Stop reason: HOSPADM

## 2024-03-18 RX ORDER — OXYCODONE HYDROCHLORIDE AND ACETAMINOPHEN 5; 325 MG/1; MG/1
2 TABLET ORAL EVERY 4 HOURS PRN
Status: DISCONTINUED | OUTPATIENT
Start: 2024-03-18 | End: 2024-03-19

## 2024-03-18 RX ORDER — INSULIN LISPRO 100 [IU]/ML
3-14 INJECTION, SOLUTION INTRAVENOUS; SUBCUTANEOUS
Status: DISCONTINUED | OUTPATIENT
Start: 2024-03-18 | End: 2024-03-20 | Stop reason: HOSPADM

## 2024-03-18 RX ORDER — DROPERIDOL 2.5 MG/ML
0.62 INJECTION, SOLUTION INTRAMUSCULAR; INTRAVENOUS ONCE AS NEEDED
Status: DISCONTINUED | OUTPATIENT
Start: 2024-03-18 | End: 2024-03-18 | Stop reason: HOSPADM

## 2024-03-18 RX ORDER — NALOXONE HCL 0.4 MG/ML
0.4 VIAL (ML) INJECTION
Status: DISCONTINUED | OUTPATIENT
Start: 2024-03-18 | End: 2024-03-20 | Stop reason: HOSPADM

## 2024-03-18 RX ORDER — SODIUM CHLORIDE, SODIUM LACTATE, POTASSIUM CHLORIDE, CALCIUM CHLORIDE 600; 310; 30; 20 MG/100ML; MG/100ML; MG/100ML; MG/100ML
9 INJECTION, SOLUTION INTRAVENOUS CONTINUOUS PRN
Status: DISCONTINUED | OUTPATIENT
Start: 2024-03-18 | End: 2024-03-20 | Stop reason: HOSPADM

## 2024-03-18 RX ORDER — SODIUM CHLORIDE 0.9 % (FLUSH) 0.9 %
10 SYRINGE (ML) INJECTION AS NEEDED
Status: DISCONTINUED | OUTPATIENT
Start: 2024-03-18 | End: 2024-03-18 | Stop reason: HOSPADM

## 2024-03-18 RX ADMIN — MORPHINE SULFATE 1 MG: 4 INJECTION, SOLUTION INTRAMUSCULAR; INTRAVENOUS at 12:06

## 2024-03-18 RX ADMIN — Medication 10 ML: at 20:35

## 2024-03-18 RX ADMIN — DROPERIDOL 0.62 MG: 2.5 INJECTION, SOLUTION INTRAMUSCULAR; INTRAVENOUS at 14:04

## 2024-03-18 RX ADMIN — METRONIDAZOLE 500 MG: 500 INJECTION, SOLUTION INTRAVENOUS at 05:43

## 2024-03-18 RX ADMIN — CARVEDILOL 3.12 MG: 3.12 TABLET, FILM COATED ORAL at 20:31

## 2024-03-18 RX ADMIN — SUGAMMADEX 200 MG: 100 INJECTION, SOLUTION INTRAVENOUS at 13:33

## 2024-03-18 RX ADMIN — DEXAMETHASONE SODIUM PHOSPHATE 8 MG: 4 INJECTION INTRA-ARTICULAR; INTRALESIONAL; INTRAMUSCULAR; INTRAVENOUS; SOFT TISSUE at 11:59

## 2024-03-18 RX ADMIN — ROCURONIUM BROMIDE 100 MG: 10 INJECTION INTRAVENOUS at 11:54

## 2024-03-18 RX ADMIN — Medication 100 MCG: at 12:13

## 2024-03-18 RX ADMIN — METRONIDAZOLE 500 MG: 500 INJECTION, SOLUTION INTRAVENOUS at 20:36

## 2024-03-18 RX ADMIN — FENTANYL CITRATE 50 MCG: 50 INJECTION, SOLUTION INTRAMUSCULAR; INTRAVENOUS at 14:19

## 2024-03-18 RX ADMIN — TAMOXIFEN CITRATE 20 MG: 10 TABLET, FILM COATED ORAL at 07:51

## 2024-03-18 RX ADMIN — Medication 10 ML: at 07:46

## 2024-03-18 RX ADMIN — IPRATROPIUM BROMIDE 2 SPRAY: 42 SPRAY, METERED NASAL at 07:48

## 2024-03-18 RX ADMIN — LIDOCAINE HYDROCHLORIDE 50 MG: 10 INJECTION, SOLUTION EPIDURAL; INFILTRATION; INTRACAUDAL; PERINEURAL at 11:54

## 2024-03-18 RX ADMIN — LEVOTHYROXINE SODIUM 75 MCG: 0.07 TABLET ORAL at 05:42

## 2024-03-18 RX ADMIN — CARVEDILOL 3.12 MG: 3.12 TABLET, FILM COATED ORAL at 07:46

## 2024-03-18 RX ADMIN — Medication 100 MCG: at 12:14

## 2024-03-18 RX ADMIN — SODIUM CHLORIDE, POTASSIUM CHLORIDE, SODIUM LACTATE AND CALCIUM CHLORIDE 9 ML/HR: 600; 310; 30; 20 INJECTION, SOLUTION INTRAVENOUS at 09:41

## 2024-03-18 RX ADMIN — SODIUM CHLORIDE 100 ML/HR: 9 INJECTION, SOLUTION INTRAVENOUS at 05:43

## 2024-03-18 RX ADMIN — FERROUS SULFATE TAB 325 MG (65 MG ELEMENTAL FE) 325 MG: 325 (65 FE) TAB at 20:30

## 2024-03-18 RX ADMIN — PANTOPRAZOLE SODIUM 40 MG: 40 TABLET, DELAYED RELEASE ORAL at 05:42

## 2024-03-18 RX ADMIN — ONDANSETRON 4 MG: 4 TABLET, ORALLY DISINTEGRATING ORAL at 20:31

## 2024-03-18 RX ADMIN — WHITE PETROLATUM 57.7 %-MINERAL OIL 31.9 % EYE OINTMENT: at 05:42

## 2024-03-18 RX ADMIN — Medication 100 MCG: at 12:17

## 2024-03-18 RX ADMIN — MORPHINE SULFATE 2 MG: 4 INJECTION, SOLUTION INTRAMUSCULAR; INTRAVENOUS at 12:23

## 2024-03-18 RX ADMIN — SODIUM CHLORIDE, POTASSIUM CHLORIDE, SODIUM LACTATE AND CALCIUM CHLORIDE 125 ML/HR: 600; 310; 30; 20 INJECTION, SOLUTION INTRAVENOUS at 15:16

## 2024-03-18 RX ADMIN — CEFTRIAXONE 2000 MG: 2 INJECTION, POWDER, FOR SOLUTION INTRAMUSCULAR; INTRAVENOUS at 05:42

## 2024-03-18 RX ADMIN — INSULIN HUMAN 5 UNITS: 100 INJECTION, SOLUTION PARENTERAL at 17:06

## 2024-03-18 RX ADMIN — MORPHINE SULFATE 2 MG: 2 INJECTION, SOLUTION INTRAMUSCULAR; INTRAVENOUS at 20:31

## 2024-03-18 RX ADMIN — METRONIDAZOLE 500 MG: 500 INJECTION, SOLUTION INTRAVENOUS at 15:10

## 2024-03-18 RX ADMIN — Medication 250 MG: at 20:31

## 2024-03-18 RX ADMIN — INSULIN LISPRO 8 UNITS: 100 INJECTION, SOLUTION INTRAVENOUS; SUBCUTANEOUS at 20:32

## 2024-03-18 RX ADMIN — Medication 100 MCG: at 12:15

## 2024-03-18 RX ADMIN — MORPHINE SULFATE 1 MG: 4 INJECTION, SOLUTION INTRAMUSCULAR; INTRAVENOUS at 12:00

## 2024-03-18 RX ADMIN — PROPOFOL 200 MG: 10 INJECTION, EMULSION INTRAVENOUS at 11:54

## 2024-03-18 RX ADMIN — INSULIN DETEMIR 15 UNITS: 100 INJECTION, SOLUTION SUBCUTANEOUS at 20:31

## 2024-03-18 NOTE — ANESTHESIA PREPROCEDURE EVALUATION
Anesthesia Evaluation     Patient summary reviewed and Nursing notes reviewed   no history of anesthetic complications:   NPO Solid Status: > 8 hours  NPO Liquid Status: > 8 hours           Airway   Mallampati: II  TM distance: >3 FB  Neck ROM: full  No difficulty expected  Dental      Pulmonary - negative pulmonary ROS and normal exam   (+) ,sleep apnea  Cardiovascular - normal exam    (+) hypertension, hyperlipidemia      Neuro/Psych  (+) headaches, numbness, psychiatric history  GI/Hepatic/Renal/Endo    (+) GERD, hepatitis, liver disease, renal disease-, diabetes mellitus, thyroid problem     Musculoskeletal     Abdominal    Substance History      OB/GYN          Other                    Anesthesia Plan    ASA 3     general     intravenous induction     Anesthetic plan, risks, benefits, and alternatives have been provided, discussed and informed consent has been obtained with: patient.    Plan discussed with CRNA.    CODE STATUS:    Code Status (Patient has no pulse and is not breathing): CPR (Attempt to Resuscitate)  Medical Interventions (Patient has pulse or is breathing): Full Support

## 2024-03-18 NOTE — CONSULTS
Order noted for diabetes education. Currently in OR. Will follow and see when education is appropriate.

## 2024-03-18 NOTE — CASE MANAGEMENT/SOCIAL WORK
Discharge Planning Assessment  [facility]     Patient Name: Elton Funez  MRN: [id number]  Today's Date: 3/18/2024    Admit Date: 3/17/2024    Plan: IDP   Discharge Needs Assessment       Row Name 03/18/24 1629       Living Environment    People in Home spouse    Name(s) of People in Home Pooja Funez (Spouse)  [phone] (Mobile)    Current Living Arrangements home    Potentially Unsafe Housing Conditions none    In the past 12 months has the electric, gas, oil, or water company threatened to shut off services in your home? No    Primary Care Provided by self    Provides Primary Care For no one, unable/limited ability to care for self    Family Caregiver if Needed spouse    Family Caregiver Names Pooja Funez (Spouse)  [phone] (Mobile)    Quality of Family Relationships supportive;involved;helpful    Able to Return to Prior Arrangements yes       Resource/Environmental Concerns    Resource/Environmental Concerns none    Transportation Concerns none       Transportation Needs    In the past 12 months, has lack of transportation kept you from medical appointments or from getting medications? no    In the past 12 months, has lack of transportation kept you from meetings, work, or from getting things needed for daily living? No       Food Insecurity    Within the past 12 months, you worried that your food would run out before you got the money to buy more. Never true    Within the past 12 months, the food you bought just didn't last and you didn't have money to get more. Never true       Transition Planning    Patient/Family Anticipates Transition to home with help/services    Patient/Family Anticipated Services at Transition home health care;    Transportation Anticipated family or friend will provide       Discharge Needs Assessment    Readmission Within the Last 30 Days no previous admission in last 30 days    Equipment Currently Used at Home cpap;cane, straight    Concerns to be Addressed  discharge planning    Anticipated Changes Related to Illness none    Equipment Needed After Discharge none    Outpatient/Agency/Support Group Needs homecare agency    Discharge Facility/Level of Care Needs home with home health    Current Discharge Risk chronically ill                   Discharge Plan       Row Name 03/18/24 1631       Plan    Plan IDP    Patient/Family in Agreement with Plan yes    Plan Comments I spoke with the patient's wife at the bedside. Patient lives in [place] in a house with his wife. He uses a cane to ambulate and CPAP at night. Patient was not current with home or outpatient services prior to admission. Wife confirmed insurance is [employer] and PCP is Tc Ramirez. Wife anticipating the patient to return home at discharge. She is thinking about the patient's need for home health. She will let CM know if home health is needed. CM to follow and assist as needed.    Final Discharge Disposition Code 30 - still a patient                  Continued Care and Services - Admitted Since 3/17/2024    No active coordination exists for this encounter.       Expected Discharge Date and Time       Expected Discharge Date Expected Discharge Time    Mar 19, 2024            Demographic Summary    No documentation.                  Functional Status       Row Name 03/18/24 1622       Functional Status    Usual Activity Tolerance moderate    Current Activity Tolerance other (see comments)  see therapy notes       Physical Activity    On average, how many days per week do you engage in moderate to strenuous exercise (like a brisk walk)? 0 days    On average, how many minutes do you engage in exercise at this level? 0 min    Number of minutes of exercise per week 0       Assessment of Health Literacy    How often do you have someone help you read hospital materials? Occasionally    How often do you have problems learning about your medical condition because of difficulty understanding  written information? Occasionally    How often do you have a problem understanding what is told to you about your medical condition? Occasionally    How confident are you filling out medical forms by yourself? Somewhat    Health Literacy Moderate       Functional Status, IADL    Medications independent    Meal Preparation assistive equipment and person    Housekeeping assistive equipment and person    Laundry assistive equipment and person    Shopping assistive person       Mental Status Summary    Recent Changes in Mental Status/Cognitive Functioning unable to assess       Employment/    Employment Status retired                   Psychosocial    No documentation.                  Abuse/Neglect    No documentation.                  Legal    No documentation.                  Substance Abuse    No documentation.                  Patient Forms    No documentation.                     Yasemin Ennis RN

## 2024-03-18 NOTE — PROGRESS NOTES
[facility] Medicine Services  PROGRESS NOTE    Patient Name: Elton Funez  : 1950  MRN: [id number]    Date of Admission: 3/17/2024  Primary Care Physician: Tc Ramirez MD    Subjective   Subjective     CC:  Gallstone pancreatitis    HPI:  Continue to have some pain overnight but controlled.  No nausea.  Cholecystectomy today      Objective   Objective     Vital Signs:   Temp:  [97 °F (36.1 °C)-100 °F (37.8 °C)] 98.1 °F (36.7 °C)  Heart Rate:  [60-70] 62  Resp:  [16-18] 16  BP: (133-149)/(70-81) 146/80     Physical Exam:  Constitutional: No acute distress, awake, alert  Respiratory: Clear to auscultation bilaterally, respiratory effort normal   Cardiovascular: RRR,  Gastrointestinal: Stented and tender  Musculoskeletal: No bilateral ankle edema  Psychiatric: Appropriate affect, cooperative  Neurologic: Oriented x 3, no gross focal neurological deficits  Skin: No rashes      Results Reviewed:  LAB RESULTS:      Lab 24  0624  0724  0303   WBC 5.48  --  6.14   HEMOGLOBIN 12.7*  --  12.5*   HEMATOCRIT 39.6  --  37.7   PLATELETS 77*  --  79*   NEUTROS ABS 3.77  --  4.81   IMMATURE GRANS (ABS) 0.02  --  0.02   LYMPHS ABS 0.95  --  0.68*   MONOS ABS 0.59  --  0.53   EOS ABS 0.13  --  0.08   .5*  --  96.2   LACTATE  --  1.3  --    PROTIME 14.9*  --  14.7*         Lab 24  0624  0726 24  0303   SODIUM 135*  --  135*   POTASSIUM 4.6  --  4.3   CHLORIDE 102  --  102   CO2 19.0*  --  20.0*   ANION GAP 14.0  --  13.0   BUN 18  --  26*   CREATININE 1.28*  --  1.47*   EGFR 58.7*  --  49.7*   GLUCOSE 182*  --  246*   CALCIUM 8.1*  --  8.6   MAGNESIUM  --  1.5*  --    HEMOGLOBIN A1C  --   --  7.10*         Lab 24  0623 24  0303   TOTAL PROTEIN 6.6 6.7   ALBUMIN 3.9 3.8   GLOBULIN 2.7 2.9   ALT (SGPT) 9 9   AST (SGOT) 18 18   BILIRUBIN 0.5 0.5   ALK PHOS 70 81   LIPASE  --  350*         Lab 24  0623 24  0728  03/17/24  0303   HSTROP T  --  33* 35*   PROTIME 14.9*  --  14.7*   INR 1.16*  --  1.14*         Lab 03/17/24  0303   CHOLESTEROL 125   LDL CHOL 69   HDL CHOL 29*   TRIGLYCERIDES 156*             Brief Urine Lab Results  (Last result in the past 365 days)        Color   Clarity   Blood   Leuk Est   Nitrite   Protein   CREAT   Urine HCG        03/17/24 0405 Yellow   Clear   Negative   Negative   Negative   30 mg/dL (1+)                   Microbiology Results Abnormal       None            US Gallbladder    Result Date: 3/17/2024  US GALLBLADDER Date of Exam: 3/17/2024 10:48 AM EDT Indication: eval for cholecystitis or biliary dilation. Comparison: CT abdomen and pelvis 3/17/2024 Technique: Grayscale and color Doppler ultrasound evaluation of the right upper quadrant was performed. Findings: Pancreas is normal in echogenicity. Liver has a coarsened echotexture with nodular contour consistent with cirrhotic morphology. No perihepatic ascites. No intrahepatic biliary ductal dilatation. Hepatopetal flow in the portal vein. Visualized hepatic veins are patent. Gallbladder present. Small amount of sludge in the gallbladder. Negative for gallbladder wall thickening. No pericholecystic fluid. Common bile duct measures 3 mm. The right kidney measures 10.3 x 5.9 cm. No right-sided hydronephrosis.     Impression: Impression: 1. Mild sludge in the gallbladder. No findings to indicate superimposed cholecystitis. 2. No biliary dilatation. 3. Cirrhosis. Electronically Signed: Ed Castañeda MD  3/17/2024 11:14 AM EDT  Workstation ID: [id number]    CT Abdomen Pelvis Without Contrast    Result Date: 3/17/2024  CT ABDOMEN PELVIS WO CONTRAST Date of Exam: 3/17/2024 2:31 AM EDT Indication: Abdominal pain, acute, nonlocalized. Comparison: CT abdomen pelvis dated 6/11/2023 Technique: Axial CT images were obtained of the abdomen and pelvis without the administration of contrast. Reconstructed coronal and sagittal images were also obtained.  Automated exposure control and iterative construction methods were used. Findings: There is a stable benign perifissural nodule on the right within the right middle lobe. This is about 4 mm and the stability suggests benignity. There is moderate to severe coronary artery calcific atherosclerosis. The heart is of normal size. There are attenuation changes in the neck of the gallbladder consistent with small stones. There is mild subtle fat stranding along the inferior margin of the gallbladder. This may indicate mild acute cholecystitis. There is no biliary ductal dilatation. There is cirrhotic morphology of the liver. The unenhanced bilateral adrenal glands, bilateral kidneys, pancreas and spleen appear normal. There is no acute inflammatory change of the large or small bowel. There is calcific change of the abdominal aorta. There are degenerative changes of the spine.     Impression: Impression: 1.Cholelithiasis with subtle fat stranding along the inferior margin of the gallbladder. This may indicate mild acute cholecystitis. 2.Cirrhotic morphology of the liver. Electronically Signed: Raji Gentile MD  3/17/2024 2:48 AM EDT  Workstation ID: [id number]     Results for orders placed during the hospital encounter of 03/20/23    Adult Transthoracic Echo Complete W/ Cont if Necessary Per Protocol    Interpretation Summary    Left ventricular ejection fraction appears to be 61 - 65%.    Left ventricular diastolic function is consistent with (grade I) impaired relaxation.    Calculated right ventricular systolic pressure from tricuspid regurgitation is 16 mmHg.      Current medications:  Scheduled Meds:[Transfer Hold] atorvastatin, 20 mg, Oral, Daily  carvedilol, 3.125 mg, Oral, BID  cefTRIAXone, 2,000 mg, Intravenous, Q24H  [Transfer Hold] ferrous sulfate, 325 mg, Oral, BID  [Transfer Hold] insulin regular, 3-14 Units, Subcutaneous, Q6H  [Transfer Hold] ipratropium, 2 spray, Each Nare, Daily  [Transfer Hold] levothyroxine,  75 mcg, Oral, Q AM  metroNIDAZOLE, 500 mg, Intravenous, Q8H  [Transfer Hold] pantoprazole, 40 mg, Oral, Q AM  [Transfer Hold] saccharomyces boulardii, 250 mg, Oral, BID  [Transfer Hold] sertraline, 100 mg, Oral, Daily  [Transfer Hold] sodium chloride, 10 mL, Intravenous, Q12H  [Transfer Hold] tamoxifen, 20 mg, Oral, Daily      Continuous Infusions:sodium chloride, 100 mL/hr, Last Rate: 100 mL/hr (03/18/24 0543)      PRN Meds:.  [Transfer Hold] artificial tears    [Transfer Hold] senna-docusate sodium **AND** [Transfer Hold] polyethylene glycol **AND** [Transfer Hold] bisacodyl **AND** [Transfer Hold] bisacodyl    [Transfer Hold] dextrose    [Transfer Hold] dextrose    [Transfer Hold] glucagon (human recombinant)    [Transfer Hold] HYDROcodone-acetaminophen    [Transfer Hold] ipratropium-albuterol    [Transfer Hold] Morphine    [Transfer Hold] ondansetron ODT **OR** [Transfer Hold] ondansetron    [Transfer Hold] sodium chloride    [Transfer Hold] sodium chloride    [Transfer Hold] sodium chloride    Assessment & Plan   Assessment & Plan     Active Hospital Problems    Diagnosis  POA    **Pancreatitis [K85.90]  Yes    Acute cholecystitis [K81.0]  Yes    HLD (hyperlipidemia) [E78.5]  Yes    History of breast cancer [Z85.3]  Not Applicable    Elevated troponin [R79.89]  Yes    T2DM (type 2 diabetes mellitus) [E11.9]  Yes    Grade I diastolic dysfunction [I51.89]  Yes    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        Brief Hospital Course to date:  74 yo male w h/o cholangitis/gallstone pancreatitis 2023, JEROME cirrhosis wo decompensation who presents again with similar epigastric/back pain. Found to have elevated lipase and gallbladder w  cholelithiasis  During last admission patient had very elev lipase + findings c/w gallstone pancreatitis but cholecystectomy was deferred 2/2 cirrhosis diagnosis.      Gallstone pancreatitis  Possible mild cholecystitis  -h/o gallstone pancreatitis/cholangitis s/p ERCP 2023 wo [facility]  -epigastric +back pain with elev lipase diagnostic of pancreatitis again this admission  -Continue Rocephin and Flagyl  -General surgery consulted.  Cholecystectomy today per Dr. Spear.   -Advance diet as tolerated per surgery  -Continue IV fluids, Norco, IV antiemetics, IV morphine  -PT/OT following surgery    DMII, insulin dept w home high doses  -home dosing 68 units lantus qhs + 26 units lispro w meals. Sometimes has lows with meal times if eating little  -Start basal bolus with Levemir 15 units nightly, consider additional lispro following surgery if tolerating PO.   -Change SSI to qac/hs if able to eat    CKD  - Baseline from 8818-7266 appears to be around 1.5-1.9     JEROME cirrhosis - as above, no s/s of decompensation, appropriately on coreg. OP GI referral at [facility]    Significant weight loss - 30 lbs down w intentionality + trulicity    Expected Discharge Location and Transportation: home  Expected Discharge   Expected Discharge Date: 3/19/2024; Expected Discharge Time:      DVT prophylaxis:  Mechanical DVT prophylaxis orders are present.         AM-PAC 6 Clicks Score (PT): 24 (03/17/24 2000)    CODE STATUS:   Code Status and Medical Interventions:   Ordered at: 03/17/24 0500     Code Status (Patient has no pulse and is not breathing):    CPR (Attempt to Resuscitate)     Medical Interventions (Patient has pulse or is breathing):    Full Support     This patient's problems and plans were partially entered by my partner and updated as appropriate by me 03/18/24. Today is my first day evaluating this patient's active medical problems. I Personally reviewed chart and adjusted note to reflect daily changes in management/clinical  condition. Copied text in this note has been reviewed and is accurate as of  03/18/24      Concepcion Nevarez MD  03/18/24

## 2024-03-18 NOTE — OP NOTE
Operative Report    Patient Name:  Elton Funez  YOB: 1950  [id number]  3/18/2024      PREOPERATIVE DIAGNOSIS: Acute cholecystitis, gallstone pancreatitis, cirrhosis, thrombocytopenia      POSTOPERATIVE DIAGNOSIS: Same        PROCEDURE PERFORMED:     Laparoscopic cholecystectomy   2.  Laparoscopic liver biopsy      SURGEON: Emerson Spear MD      ASSISTANT: None        SPECIMENS: Gallbladder and contents, liver biopsy      ESTIMATED BLOOD LOSS: 100 mL       ANESTHESIA: General.        FINDINGS:     1.  The liver morphology was severely cirrhotic.  There was a small amount of ascites fluid in the right upper quadrant.  The gallbladder was dense with significant wall thickening and surrounding pericholecystic inflammation consistent with chronic cholecystitis.  Critical view of safety established prior to ligation of cystic structures   2.  3 separate passes with an 18-gauge core needle biopsy device were used to perform a liver biopsy in the right lobe of the liver       INDICATIONS:      The patient is a 73 y.o. male with a history of abdominal pain and a clinical diagnosis consistent with acute cholecystitis and gallstone pancreatitis.  The patient has a notable history of prior ERCP and cirrhosis.  This is his second admission for sequelae of this.. Pre-operative imaging including U/S CT scan confirmed the diagnosis. The risks, benefits and alternatives of laparoscopic cholecystectomy and liver biopsy were discussed with the patient and their family preoperatively and they agreed to proceed.  Particular time was spent discussing the increased risks related to his thrombocytopenia and liver disease.       DESCRIPTION OF PROCEDURE:     The patient was taken to the operating room and positioned supine on the operating room table. General anesthesia was initiated. The patient was prepped and draped in the usual sterile fashion. Antibiotics were given preoperatively. SCDs were properly placed on  the patient and turned on. An orogastric tube was placed by the anesthesiologist with return of gastric content prior to start of the case.     Local anesthetic was injected prior to all skin incisions. A periumbilical skin incision was then created superior to the umbilicus utilizing an 11 blade scalpel. Blunt dissection was carried down to the level of the anterior abdominal wall fascia and the base of the umbilicus. The base of the umbilicus was then grasped and elevated with a Kocher clamp. A vertical incision was then made in the anterior abdominal wall fascia under direct visualization sharply. Following this, the peritoneal cavity was then entered under direct visualization. Stay sutures of 0 Vicryl were placed around the defect, and a 12 mm Melonie trocar was then advanced without difficulty into the abdominal cavity. Pneumoperitoneum was established at 15 mmHg. The abdomen was then surveyed with a 10mm 30 degree laparoscope, with special attention to the viscera underlying the insertion site which was found to be free of injury.  Next, under direct laparoscopic visualization three additional 5 mm trocars were placed in the right upper quadrant. The patient was then positioned in the head-up position with the table tilted to the left.     The liver was noted to have a severely cirrhotic morphology.  There was a small amount of ascites fluid in the right upper quadrant.  The liver was stiff and difficult to manipulate.  The gallbladder was visualized and had some omental adhesions.  The gallbladder wall was significantly thickened which made retraction more difficult.  The gallbladder was incised at the dome, and there was drainage of mucoid material.  This was suctioned free.  The gallbladder was then able to be grasped.  The fundus of the gallbladder was retracted cephalad.  There were some adhesions to the gallbladder which were taken down proceeding inferiorly towards the infundibulum using the [place]  LigaSure device as well as blunt dissection.  The infundibulum of the gallbladder was able to be isolated. The fundus of the gallbladder was retracted cephalad while the infundibulum of the gallbladder was retracted laterally. Using a combination of hook cautery as well as a [place] dissector, the peritoneum surrounding the cystic pedicle was stripped. Cystic structures were dissected.  This was tedious as there was some oozing related to the patient's thrombocytopenia as well as significant gallbladder wall thickening and inflammation.  A critical view of safety was established, meanin and only 2 structures were visualized entering the gallbladder, all fibrous and fatty tissue between the cystic artery and cystic duct were cleared, and the posterior one-third of the gallbladder was removed from the cystic plate. Next 2 clips were placed on the distal cystic duct, 1 clip was placed on the proximal cystic duct, and the duct was transected with laparoscopic scissors high up on the infundibulum of the gallbladder.  2 separate PDS Endoloops were applied to the cystic duct stump below the clips.. Next 2 clips were placed on the proximal cystic artery, 1 clip was placed on the distal cystic artery and this was transected with laparoscopic scissors. Next the gallbladder was removed from the cystic plate using hook electrocautery, this was quite tedious as the posterior wall of the gallbladder was densely adherent to the cystic plate and the gallbladder was disrupted in the course of this dissection with portions of the posterior wall of the gallbladder left on the cystic plate.     A 5 mm 30 degree laparoscope was then inserted through the subxiphoid trocar site to visualize the placement of the gallbladder within an Endo Catch bag and then extraction of the gallbladder through the periumbilical trocar site utilizing the Endo Catch bag. Instruments were returned to their native positions. Hemostasis of the cystic  plate was confirmed using electrocautery.  It was some diffuse oozing from the cystic plate and surrounding her planes of dissection likely related to the patient's thrombocytopenia, but no surgical bleeding.  Any portions of the posterior wall of the gallbladder that were remaining on the cystic plate were fulgurated.  The clipped cystic structures were carefully examined and there was no sign of bilious or bloody drainage.  Floseal topical hemostatic was applied to the cystic plate, and hemostasis was confirmed.     Next an 18-gauge Vicente-Cut core needle biopsy device was introduced into the abdominal cavity through one of the right upper quadrant trocars under direct laparoscopic visualization.  3 separate passes were used of the biopsy needle on the right lobe of the liver, with an adequate core specimen obtained.  The specimens were passed off the field as liver biopsy.  Hemostasis of the biopsy sites was confirmed with electrocautery.     We again turned our attention to the cystic plate.  There was a small amount of oozing at this site but no surgical bleeding.  A 10 flat HOLLEY drain was brought into the abdomen and exited out of the right most lateral trocar site.  The drain was left in place within the cystic plate and gallbladder fossa.  The drain was secured to the abdominal wall using a 2-0 nylon suture.    The table was then leveled and limited irrigation of the right upper quadrant was performed with normal saline and hemostasis again confirmed. Next, the 5 mm trocars were removed under direct laparoscopic visualization. The 12 mm trocar was then withdrawn and pneumoperitoneum was released.     The fascia of the periumbilical trocar site was then closed in a figure-of-eight fashion with an 0 Vicryl suture. All wound sites were irrigated and hemostasis confirmed. The skin incisions were then closed using a 4-0 Monocryl suture in the subcuticular layer. Mastisol and Steri-Strips were then applied to each  incision site with a clean and dry dressing over this.    After the procedure, the patient was awakened, extubated, and taken to the postoperative anesthesia care unit for recovery. All needle, instrument, and lap counts were correct. I was personally present and performed all portions of the procedure. There were no immediate complications         Emerson Spear MD  3/18/2024  13:32 EDT

## 2024-03-18 NOTE — PLAN OF CARE
Goal Outcome Evaluation:  Plan of Care Reviewed With: patient           Problem: Adult Inpatient Plan of Care  Goal: Plan of Care Review  Outcome: Ongoing, Progressing  Goal: Patient-Specific Goal (Individualized)  Outcome: Ongoing, Progressing  Goal: Absence of Hospital-Acquired Illness or Injury  Outcome: Ongoing, Progressing  Intervention: Identify and Manage Fall Risk  Recent Flowsheet Documentation  Taken 3/18/2024 1400 by Ashley Nance RN  Safety Promotion/Fall Prevention: patient off unit  Taken 3/18/2024 1200 by Ashley Nance RN  Safety Promotion/Fall Prevention: patient off unit  Taken 3/18/2024 1000 by Ashley Nance RN  Safety Promotion/Fall Prevention: patient off unit  Taken 3/18/2024 0850 by Ashley Nance RN  Safety Promotion/Fall Prevention: (Pt transported to OR via stretcher.) patient off unit  Taken 3/18/2024 0800 by Ashley Nance RN  Safety Promotion/Fall Prevention:   activity supervised   assistive device/personal items within reach   clutter free environment maintained   muscle strengthening facilitated   nonskid shoes/slippers when out of bed   room organization consistent   safety round/check completed  Intervention: Prevent Skin Injury  Recent Flowsheet Documentation  Taken 3/18/2024 0800 by Ashley Nance RN  Body Position: (sitting in chair) position changed independently  Skin Protection:   adhesive use limited   tubing/devices free from skin contact  Intervention: Prevent and Manage VTE (Venous Thromboembolism) Risk  Recent Flowsheet Documentation  Taken 3/18/2024 0800 by Ashley Nance RN  Activity Management:   activity encouraged   up ad nusrat  Intervention: Prevent Infection  Recent Flowsheet Documentation  Taken 3/18/2024 0800 by Ashley Nance RN  Infection Prevention:   environmental surveillance performed   hand hygiene promoted   personal protective equipment utilized   rest/sleep promoted   single patient room provided  Goal: Optimal Comfort and  Wellbeing  Outcome: Ongoing, Progressing  Intervention: Provide Person-Centered Care  Recent Flowsheet Documentation  Taken 3/18/2024 0800 by Ashley Nance, RN  Trust Relationship/Rapport:   care explained   choices provided   emotional support provided   empathic listening provided   questions answered   questions encouraged   reassurance provided   thoughts/feelings acknowledged  Goal: Readiness for Transition of Care  Outcome: Ongoing, Progressing     Problem: Pain Acute  Goal: Acceptable Pain Control and Functional Ability  Outcome: Ongoing, Progressing  Intervention: Prevent or Manage Pain  Recent Flowsheet Documentation  Taken 3/18/2024 0800 by Ashley Nance, RN  Medication Review/Management: medications reviewed  Intervention: Optimize Psychosocial Wellbeing  Recent Flowsheet Documentation  Taken 3/18/2024 0800 by Ashley Nance, RN  Supportive Measures: active listening utilized  Diversional Activities: television

## 2024-03-18 NOTE — ANESTHESIA POSTPROCEDURE EVALUATION
Patient: Elton Funez    Procedure Summary       Date: 03/18/24 Room / Location:  [facility] / [facility]    Anesthesia Start: 1149 Anesthesia Stop: 1353    Procedure: CHOLECYSTECTOMY LAPAROSCOPIC AND LARAROSCOPIC LIVER BIOPSY (Abdomen) Diagnosis:     Surgeons: Emerson Spear MD Provider: Leopoldo Amaya MD    Anesthesia Type: general ASA Status: 3            Anesthesia Type: general    Vitals  No vitals data found for the desired time range.          Post Anesthesia Care and Evaluation    Patient location during evaluation: PACU  Patient participation: complete - patient participated  Level of consciousness: sleepy but conscious  Pain management: adequate    Airway patency: patent  Anesthetic complications: No anesthetic complications  PONV Status: none  Cardiovascular status: hemodynamically stable and acceptable  Respiratory status: nonlabored ventilation, acceptable and nasal cannula  Hydration status: acceptable

## 2024-03-18 NOTE — PROGRESS NOTES
"Patient Name:  Elton Funez  YOB: 1950  [id number]    Surgery Post - Operative Note    Date of visit: 3/18/2024      Subjective: Resting in bed.  On CPAP.  Pain controlled.        Objective:    /84 (BP Location: Left arm, Patient Position: Lying)   Pulse 64   Temp 97.4 °F (36.3 °C) (Oral)   Resp 12   Ht 172.7 cm (67.99\")   Wt 91.2 kg (201 lb 1 oz)   SpO2 98%   BMI 30.58 kg/m²     CV:  Regular rate and rhythm  L:  Clear to auscultation bilaterally. Not labored on CPAP  ABD:  Soft, appropriately tender. Dressings clean, dry and intact.  HOLLEY drain with a small amount of sanguinous output  EXT:  No cyanosis, clubbing or edema        Assessment/ Plan:     Recovering as expected following cholecystectomy. Continue Pulmonary toilet        Emerson Spear MD  3/18/2024  15:20 EDT    "

## 2024-03-18 NOTE — ANESTHESIA PROCEDURE NOTES
Airway  Urgency: elective    Date/Time: 3/18/2024 11:56 AM  Airway not difficult    General Information and Staff    Patient location during procedure: OR  CRNA/CAA: Patrick Wilson CRNA    Indications and Patient Condition  Indications for airway management: airway protection    Preoxygenated: yes  MILS not maintained throughout  Mask difficulty assessment: 1 - vent by mask    Final Airway Details  Final airway type: endotracheal airway      Successful airway: ETT  Cuffed: yes   Successful intubation technique: direct laryngoscopy  Endotracheal tube insertion site: oral  Blade: Cornelius  Blade size: 2  ETT size (mm): 7.0  Cormack-Lehane Classification: grade I - full view of glottis  Placement verified by: chest auscultation and capnometry   Measured from: lips  ETT/EBT  to lips (cm): 20  Number of attempts at approach: 1  Assessment: lips, teeth, and gum same as pre-op and atraumatic intubation    Additional Comments  Negative epigastric sounds, Breath sound equal bilaterally with symmetric chest rise and fall

## 2024-03-18 NOTE — PROGRESS NOTES
"Patient Name:  Elton Funez  YOB: 1950  [id number]    Surgery Progress Note    Date of visit: 3/18/2024      Subjective: No acute events overnight.  Overall feels much better and feels that the pain has significantly improved and is essentially gone at this point.  Denies nausea or vomiting.  Tolerated diet yesterday.  Had a Tmax of 100          Objective:     /80 (BP Location: Left arm, Patient Position: Sitting)   Pulse 62   Temp 98.1 °F (36.7 °C) (Oral)   Resp 16   Ht 172.7 cm (68\")   Wt 91.2 kg (201 lb 1.6 oz)   SpO2 96%   BMI 30.58 kg/m²     Intake/Output Summary (Last 24 hours) at 3/18/2024 0826  Last data filed at 3/18/2024 0622  Gross per 24 hour   Intake 720 ml   Output 1625 ml   Net -905 ml       GEN:   Awake, alert, in no acute distress, resting comfortably in bed   CV:   Regular rate and rhythm  L:  Symmetric expansion, not labored on room air  Abd:  Soft, no significant tenderness to light or deep palpation throughout the abdomen, not distended  Ext:  No cyanosis, clubbing, or edema    Recent labs that are back at this time have been reviewed.           Assessment/ Plan:    Mr. Montano is a 73-year-old gentleman with history of Lopez cirrhosis, hypertension, hyperlipidemia, heart failure, diabetes, and chronic kidney disease with gallstone pancreatitis     # Gallstone pancreatitis, cholelithiasis, cirrhosis  -Vitals stable.  Tmax 100.0  -Labs without acute findings.  LFTs are not elevated.  White blood cell count is not elevated.  Some continued thrombocytopenia but this is stable at 77  -Clinically symptoms have significantly improved  -I had a long discussion with the patient regarding treatment options.  He understands his elevated risk factors for surgery related to his history of cirrhosis, however he appears compensated at this point.  I discussed with him the risks versus benefits of surgery versus the potential risks of recurrent pancreatitis.  He understands and " wishes to proceed forward with cholecystectomy.  Will plan to proceed forward today    I had a long discussion with the patient regarding the risks, benefits, and alternatives to the procedure including specifically discussing the risks of bleeding, infection, damage to adjacent structures, possible need for further operations, possible need for conversion to an open operation, risks of common bile duct injury and risk of bile leak after the procedure. They wish to proceed.        Emerson Spear MD  3/18/2024  08:26 EDT

## 2024-03-19 LAB
ALBUMIN SERPL-MCNC: 3.5 G/DL (ref 3.5–5.2)
ALBUMIN/GLOB SERPL: 1.3 G/DL
ALP SERPL-CCNC: 56 U/L (ref 39–117)
ALT SERPL W P-5'-P-CCNC: 13 U/L (ref 1–41)
ANION GAP SERPL CALCULATED.3IONS-SCNC: 15 MMOL/L (ref 5–15)
AST SERPL-CCNC: 28 U/L (ref 1–40)
BASOPHILS # BLD AUTO: 0.01 10*3/MM3 (ref 0–0.2)
BASOPHILS NFR BLD AUTO: 0.1 % (ref 0–1.5)
BILIRUB SERPL-MCNC: 0.4 MG/DL (ref 0–1.2)
BUN SERPL-MCNC: 17 MG/DL (ref 8–23)
BUN/CREAT SERPL: 12 (ref 7–25)
CALCIUM SPEC-SCNC: 8 MG/DL (ref 8.6–10.5)
CHLORIDE SERPL-SCNC: 103 MMOL/L (ref 98–107)
CO2 SERPL-SCNC: 20 MMOL/L (ref 22–29)
CREAT SERPL-MCNC: 1.42 MG/DL (ref 0.76–1.27)
CYTO UR: NORMAL
DEPRECATED RDW RBC AUTO: 46.2 FL (ref 37–54)
EGFRCR SERPLBLD CKD-EPI 2021: 51.9 ML/MIN/1.73
EOSINOPHIL # BLD AUTO: 0.1 10*3/MM3 (ref 0–0.4)
EOSINOPHIL NFR BLD AUTO: 1.4 % (ref 0.3–6.2)
ERYTHROCYTE [DISTWIDTH] IN BLOOD BY AUTOMATED COUNT: 13.1 % (ref 12.3–15.4)
GLOBULIN UR ELPH-MCNC: 2.8 GM/DL
GLUCOSE BLDC GLUCOMTR-MCNC: 148 MG/DL (ref 70–130)
GLUCOSE BLDC GLUCOMTR-MCNC: 160 MG/DL (ref 70–130)
GLUCOSE BLDC GLUCOMTR-MCNC: 214 MG/DL (ref 70–130)
GLUCOSE BLDC GLUCOMTR-MCNC: 220 MG/DL (ref 70–130)
GLUCOSE SERPL-MCNC: 153 MG/DL (ref 65–99)
HCT VFR BLD AUTO: 34.4 % (ref 37.5–51)
HGB BLD-MCNC: 11.4 G/DL (ref 13–17.7)
IMM GRANULOCYTES # BLD AUTO: 0.01 10*3/MM3 (ref 0–0.05)
IMM GRANULOCYTES NFR BLD AUTO: 0.1 % (ref 0–0.5)
LAB AP CASE REPORT: NORMAL
LAB AP CLINICAL INFORMATION: NORMAL
LAB AP DIAGNOSIS COMMENT: NORMAL
LYMPHOCYTES # BLD AUTO: 0.89 10*3/MM3 (ref 0.7–3.1)
LYMPHOCYTES NFR BLD AUTO: 12.7 % (ref 19.6–45.3)
MCH RBC QN AUTO: 32.1 PG (ref 26.6–33)
MCHC RBC AUTO-ENTMCNC: 33.1 G/DL (ref 31.5–35.7)
MCV RBC AUTO: 96.9 FL (ref 79–97)
MONOCYTES # BLD AUTO: 0.68 10*3/MM3 (ref 0.1–0.9)
MONOCYTES NFR BLD AUTO: 9.7 % (ref 5–12)
NEUTROPHILS NFR BLD AUTO: 5.3 10*3/MM3 (ref 1.7–7)
NEUTROPHILS NFR BLD AUTO: 76 % (ref 42.7–76)
NRBC BLD AUTO-RTO: 0 /100 WBC (ref 0–0.2)
PATH REPORT.FINAL DX SPEC: NORMAL
PATH REPORT.GROSS SPEC: NORMAL
PLATELET # BLD AUTO: 74 10*3/MM3 (ref 140–450)
PMV BLD AUTO: 12.7 FL (ref 6–12)
POTASSIUM SERPL-SCNC: 4.3 MMOL/L (ref 3.5–5.2)
PROT SERPL-MCNC: 6.3 G/DL (ref 6–8.5)
RBC # BLD AUTO: 3.55 10*6/MM3 (ref 4.14–5.8)
SODIUM SERPL-SCNC: 138 MMOL/L (ref 136–145)
WBC NRBC COR # BLD AUTO: 6.99 10*3/MM3 (ref 3.4–10.8)

## 2024-03-19 PROCEDURE — 63710000001 INSULIN DETEMIR PER 5 UNITS: Performed by: SURGERY

## 2024-03-19 PROCEDURE — 97530 THERAPEUTIC ACTIVITIES: CPT

## 2024-03-19 PROCEDURE — 85025 COMPLETE CBC W/AUTO DIFF WBC: CPT | Performed by: SURGERY

## 2024-03-19 PROCEDURE — 63710000001 INSULIN LISPRO (HUMAN) PER 5 UNITS: Performed by: INTERNAL MEDICINE

## 2024-03-19 PROCEDURE — 99232 SBSQ HOSP IP/OBS MODERATE 35: CPT | Performed by: STUDENT IN AN ORGANIZED HEALTH CARE EDUCATION/TRAINING PROGRAM

## 2024-03-19 PROCEDURE — 25010000002 METRONIDAZOLE 500 MG/100ML SOLUTION: Performed by: SURGERY

## 2024-03-19 PROCEDURE — 80053 COMPREHEN METABOLIC PANEL: CPT | Performed by: SURGERY

## 2024-03-19 PROCEDURE — 82948 REAGENT STRIP/BLOOD GLUCOSE: CPT

## 2024-03-19 PROCEDURE — 63710000001 ONDANSETRON ODT 4 MG TABLET DISPERSIBLE: Performed by: SURGERY

## 2024-03-19 PROCEDURE — 25010000002 MORPHINE PER 10 MG: Performed by: SURGERY

## 2024-03-19 PROCEDURE — 25010000002 CEFTRIAXONE PER 250 MG: Performed by: NURSE PRACTITIONER

## 2024-03-19 PROCEDURE — 97165 OT EVAL LOW COMPLEX 30 MIN: CPT

## 2024-03-19 PROCEDURE — 97161 PT EVAL LOW COMPLEX 20 MIN: CPT

## 2024-03-19 RX ORDER — OXYCODONE HYDROCHLORIDE AND ACETAMINOPHEN 5; 325 MG/1; MG/1
2 TABLET ORAL EVERY 4 HOURS PRN
Status: DISCONTINUED | OUTPATIENT
Start: 2024-03-19 | End: 2024-03-19

## 2024-03-19 RX ORDER — DOCUSATE SODIUM 100 MG/1
100 CAPSULE, LIQUID FILLED ORAL 2 TIMES DAILY
Status: DISCONTINUED | OUTPATIENT
Start: 2024-03-19 | End: 2024-03-20 | Stop reason: HOSPADM

## 2024-03-19 RX ORDER — SENNOSIDES A AND B 8.6 MG/1
1 TABLET, FILM COATED ORAL NIGHTLY
Status: DISCONTINUED | OUTPATIENT
Start: 2024-03-19 | End: 2024-03-20 | Stop reason: HOSPADM

## 2024-03-19 RX ADMIN — LEVOTHYROXINE SODIUM 75 MCG: 0.07 TABLET ORAL at 05:06

## 2024-03-19 RX ADMIN — TAMOXIFEN CITRATE 20 MG: 10 TABLET, FILM COATED ORAL at 08:27

## 2024-03-19 RX ADMIN — ONDANSETRON 4 MG: 4 TABLET, ORALLY DISINTEGRATING ORAL at 20:51

## 2024-03-19 RX ADMIN — SERTRALINE HYDROCHLORIDE 100 MG: 100 TABLET ORAL at 08:26

## 2024-03-19 RX ADMIN — HYDROCODONE BITARTRATE AND ACETAMINOPHEN 1 TABLET: 5; 325 TABLET ORAL at 00:05

## 2024-03-19 RX ADMIN — METRONIDAZOLE 500 MG: 500 INJECTION, SOLUTION INTRAVENOUS at 20:54

## 2024-03-19 RX ADMIN — METRONIDAZOLE 500 MG: 500 INJECTION, SOLUTION INTRAVENOUS at 05:05

## 2024-03-19 RX ADMIN — ATORVASTATIN CALCIUM 20 MG: 20 TABLET, FILM COATED ORAL at 08:26

## 2024-03-19 RX ADMIN — DOCUSATE SODIUM 100 MG: 100 CAPSULE, LIQUID FILLED ORAL at 20:51

## 2024-03-19 RX ADMIN — INSULIN DETEMIR 15 UNITS: 100 INJECTION, SOLUTION SUBCUTANEOUS at 20:51

## 2024-03-19 RX ADMIN — Medication 10 ML: at 20:52

## 2024-03-19 RX ADMIN — DOCUSATE SODIUM 100 MG: 100 CAPSULE, LIQUID FILLED ORAL at 08:26

## 2024-03-19 RX ADMIN — MORPHINE SULFATE 2 MG: 2 INJECTION, SOLUTION INTRAMUSCULAR; INTRAVENOUS at 05:06

## 2024-03-19 RX ADMIN — Medication 250 MG: at 20:51

## 2024-03-19 RX ADMIN — INSULIN LISPRO 5 UNITS: 100 INJECTION, SOLUTION INTRAVENOUS; SUBCUTANEOUS at 12:04

## 2024-03-19 RX ADMIN — CEFTRIAXONE 2000 MG: 2 INJECTION, POWDER, FOR SOLUTION INTRAMUSCULAR; INTRAVENOUS at 05:05

## 2024-03-19 RX ADMIN — BISACODYL 5 MG: 5 TABLET, COATED ORAL at 20:51

## 2024-03-19 RX ADMIN — Medication 10 ML: at 08:29

## 2024-03-19 RX ADMIN — METRONIDAZOLE 500 MG: 500 INJECTION, SOLUTION INTRAVENOUS at 14:26

## 2024-03-19 RX ADMIN — PANTOPRAZOLE SODIUM 40 MG: 40 TABLET, DELAYED RELEASE ORAL at 05:05

## 2024-03-19 RX ADMIN — CARVEDILOL 3.12 MG: 3.12 TABLET, FILM COATED ORAL at 20:51

## 2024-03-19 RX ADMIN — SENNOSIDES 1 TABLET: 8.6 TABLET, FILM COATED ORAL at 20:51

## 2024-03-19 RX ADMIN — CARVEDILOL 3.12 MG: 3.12 TABLET, FILM COATED ORAL at 08:27

## 2024-03-19 RX ADMIN — FERROUS SULFATE TAB 325 MG (65 MG ELEMENTAL FE) 325 MG: 325 (65 FE) TAB at 20:51

## 2024-03-19 RX ADMIN — FERROUS SULFATE TAB 325 MG (65 MG ELEMENTAL FE) 325 MG: 325 (65 FE) TAB at 08:27

## 2024-03-19 RX ADMIN — MORPHINE SULFATE 2 MG: 2 INJECTION, SOLUTION INTRAMUSCULAR; INTRAVENOUS at 11:11

## 2024-03-19 RX ADMIN — Medication 250 MG: at 08:26

## 2024-03-19 RX ADMIN — INSULIN LISPRO 3 UNITS: 100 INJECTION, SOLUTION INTRAVENOUS; SUBCUTANEOUS at 17:14

## 2024-03-19 RX ADMIN — INSULIN LISPRO 5 UNITS: 100 INJECTION, SOLUTION INTRAVENOUS; SUBCUTANEOUS at 20:51

## 2024-03-19 RX ADMIN — HYDROCODONE BITARTRATE AND ACETAMINOPHEN 1 TABLET: 5; 325 TABLET ORAL at 17:27

## 2024-03-19 RX ADMIN — MORPHINE SULFATE 2 MG: 2 INJECTION, SOLUTION INTRAMUSCULAR; INTRAVENOUS at 20:50

## 2024-03-19 NOTE — PROGRESS NOTES
[facility] Medicine Services  PROGRESS NOTE    Patient Name: Elton Funez  : 1950  MRN: [id number]    Date of Admission: 3/17/2024  Primary Care Physician: Tc Ramirez MD    Subjective   Subjective     CC:  Gallstone pancreatitis    HPI:  Evaluated patient this morning. Patient was laying in bed, reported generalized abdominal soreness. Wife present in room.      Objective   Objective     Vital Signs:   Temp:  [97.6 °F (36.4 °C)-98.3 °F (36.8 °C)] 97.8 °F (36.6 °C)  Heart Rate:  [59-62] 61  Resp:  [18-20] 18  BP: (126-135)/(66-81) 134/75     Physical Exam:  Constitutional: Awake, alert, resting comfortably  Respiratory: Clear to auscultation bilaterally, respiratory effort normal   Cardiovascular: Regular rate and rhythm  Gastrointestinal: Soft, nondistended, mildly tender to palpation, bandage in place, drain in place  Musculoskeletal: No bilateral ankle edema  Psychiatric: Appropriate affect, cooperative  Neurologic: Alert and oriented x 3, no focal deficits, speech clear  Skin: No rashes      Results Reviewed:  LAB RESULTS:      Lab 24  0354 24  0623 24  0726 24  0303   WBC 6.99 5.48  --  6.14   HEMOGLOBIN 11.4* 12.7*  --  12.5*   HEMATOCRIT 34.4* 39.6  --  37.7   PLATELETS 74* 77*  --  79*   NEUTROS ABS 5.30 3.77  --  4.81   IMMATURE GRANS (ABS) 0.01 0.02  --  0.02   LYMPHS ABS 0.89 0.95  --  0.68*   MONOS ABS 0.68 0.59  --  0.53   EOS ABS 0.10 0.13  --  0.08   MCV 96.9 100.5*  --  96.2   LACTATE  --   --  1.3  --    PROTIME  --  14.9*  --  14.7*         Lab 24  0354 24  0623 24  0726 24  0303   SODIUM 138 135*  --  135*   POTASSIUM 4.3 4.6  --  4.3   CHLORIDE 103 102  --  102   CO2 20.0* 19.0*  --  20.0*   ANION GAP 15.0 14.0  --  13.0   BUN 17 18  --  26*   CREATININE 1.42* 1.28*  --  1.47*   EGFR 51.9* 58.7*  --  49.7*   GLUCOSE 153* 182*  --  246*   CALCIUM 8.0* 8.1*  --  8.6   MAGNESIUM  --   --  1.5*  --     HEMOGLOBIN A1C  --   --   --  7.10*         Lab 03/19/24  0354 03/18/24  0623 03/17/24  0303   TOTAL PROTEIN 6.3 6.6 6.7   ALBUMIN 3.5 3.9 3.8   GLOBULIN 2.8 2.7 2.9   ALT (SGPT) 13 9 9   AST (SGOT) 28 18 18   BILIRUBIN 0.4 0.5 0.5   ALK PHOS 56 70 81   LIPASE  --   --  350*         Lab 03/18/24  0623 03/17/24  0726 03/17/24  0303   HSTROP T  --  33* 35*   PROTIME 14.9*  --  14.7*   INR 1.16*  --  1.14*         Lab 03/17/24  0303   CHOLESTEROL 125   LDL CHOL 69   HDL CHOL 29*   TRIGLYCERIDES 156*         Lab 03/18/24  0929   ABO TYPING O   RH TYPING Negative   ANTIBODY SCREEN Negative         Brief Urine Lab Results  (Last result in the past 365 days)        Color   Clarity   Blood   Leuk Est   Nitrite   Protein   CREAT   Urine HCG        03/17/24 0405 Yellow   Clear   Negative   Negative   Negative   30 mg/dL (1+)                   Microbiology Results Abnormal       None            No radiology results from the last 24 hrs    Results for orders placed during the hospital encounter of 03/20/23    Adult Transthoracic Echo Complete W/ Cont if Necessary Per Protocol    Interpretation Summary    Left ventricular ejection fraction appears to be 61 - 65%.    Left ventricular diastolic function is consistent with (grade I) impaired relaxation.    Calculated right ventricular systolic pressure from tricuspid regurgitation is 16 mmHg.      Current medications:  Scheduled Meds:atorvastatin, 20 mg, Oral, Daily  carvedilol, 3.125 mg, Oral, BID  cefTRIAXone, 2,000 mg, Intravenous, Q24H  docusate sodium, 100 mg, Oral, BID  ferrous sulfate, 325 mg, Oral, BID  insulin detemir, 15 Units, Subcutaneous, Nightly  insulin lispro, 3-14 Units, Subcutaneous, 4x Daily AC & at Bedtime  ipratropium, 2 spray, Each Nare, Daily  levothyroxine, 75 mcg, Oral, Q AM  metroNIDAZOLE, 500 mg, Intravenous, Q8H  pantoprazole, 40 mg, Oral, Q AM  saccharomyces boulardii, 250 mg, Oral, BID  senna, 1 tablet, Oral, Nightly  sertraline, 100 mg, Oral,  Daily  sodium chloride, 10 mL, Intravenous, Q12H  tamoxifen, 20 mg, Oral, Daily      Continuous Infusions:lactated ringers, 9 mL/hr, Last Rate: 9 mL/hr (03/18/24 0941)      PRN Meds:.  artificial tears    senna-docusate sodium **AND** polyethylene glycol **AND** bisacodyl **AND** bisacodyl    dextrose    dextrose    docusate sodium    glucagon (human recombinant)    HYDROcodone-acetaminophen    ipratropium-albuterol    lactated ringers    Morphine **AND** naloxone    ondansetron ODT **OR** ondansetron    sodium chloride    sodium chloride    sodium chloride    Assessment & Plan   Assessment & Plan     Active Hospital Problems    Diagnosis  POA    **Pancreatitis [K85.90]  Yes    Acute cholecystitis [K81.0]  Yes    HLD (hyperlipidemia) [E78.5]  Yes    History of breast cancer [Z85.3]  Not Applicable    Elevated troponin [R79.89]  Yes    T2DM (type 2 diabetes mellitus) [E11.9]  Yes    Grade I diastolic dysfunction [I51.89]  Yes    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        Brief Hospital Course to date:  72 yo male with PMHx of cholangitis/gallstone pancreatitis in 2023, JEROME cirrhosis who presented again with similar epigastric/back pain. Found to have elevated lipase and gallbladder with cholelithiasis. During last admission patient had very elevated lipase + findings c/w gallstone pancreatitis but cholecystectomy was deferred 2/2 cirrhosis diagnosis.     This patient's problems and plans were partially entered by my partner and updated as appropriate by me 03/19/24.     Gallstone pancreatitis  Possible mild cholecystitis  -Hx of gallstone pancreatitis/cholangitis s/p ERCP 2023 without CCY  -Epigastric +back pain with elevated lipase 350  diagnostic of pancreatitis   -s/p laparoscopic cholecystectomy with liver biopsy and drain placement on 3/18  -General surgery following. Continue empiric IV ceftriaxone and Flagyl. Advance diet as tolerated per surgery. Bowel regimen. PRN Norco and morphine for pain. PRN Zofran for nausea. PT/OT following. CBC and CMP in AM.    IDDM2  -A1c 7.1% on admission  -Home dose: 68 units lantus qhs + 26 units lispro with meals. Sometimes has lows with meal times if eating little.  -Continue basal bolus with Levemir 15 units nightly, consider additional lispro following surgery if tolerating PO. Continue SSI with meals. Monitor glucose closely and adjust insulin as needed.    CKD  -Baseline Cr from 2757-7152 appears to be around 1.5-1.9     JEROME cirrhosis   -as above, no s/s of decompensation, appropriately on coreg.   -OP GI referral at discharge.    FAVIO  -Continue CPAP nightly.     Significant weight loss   -30 lbs down with intentionality + trulicity.    Hx invasive ductal carcinoma of the right breast  -Follows with Dr. Archer (Oncology)  -s/p right mastectomy on 5/23/23 showed a 2.2 cm intermediate grade IDC. 0/2 SLN involved. Genetic testing negative.   -Tamoxifen started in 6/2023.  -Continue home tamoxifen.     Anxiety/depression  -Continue Zoloft.     Hypothyroidism  -Continue levothyroxine.    GERD  -Continue PPI.    Hyperlipidemia  -Continue atorvastatin.    Expected Discharge Location and Transportation: Home  Expected Discharge   Expected Discharge Date: 3/20/2024; Expected Discharge Time:      DVT prophylaxis:  Mechanical DVT prophylaxis orders are present.         AM-PAC 6 Clicks Score (PT): 19 (03/19/24 1013)    CODE STATUS:   Code Status and Medical Interventions:   Ordered at: 03/17/24 0500     Code Status (Patient has no pulse and is not breathing):    CPR (Attempt to Resuscitate)     Medical Interventions (Patient has pulse or is breathing):    Full Support     This patient's problems and plans were  partially entered by my partner and updated as appropriate by me 03/19/24. Today is my first day evaluating this patient's active medical problems. I Personally reviewed chart and adjusted note to reflect daily changes in management/clinical condition. Copied text in this note has been reviewed and is accurate as of  03/19/24      Priyanka Roy, DO  03/19/24

## 2024-03-19 NOTE — THERAPY DISCHARGE NOTE
Patient Name: Elton Funez  : 1950    MRN: [id number]                              Today's Date: 3/19/2024       Admit Date: 3/17/2024    Visit Dx:     ICD-10-CM ICD-9-CM   1. Idiopathic acute pancreatitis without infection or necrosis  K85.00 577.0   2. Acute cholecystitis  K81.0 575.0   3. Gallstone pancreatitis  K85.10 577.0     574.20     Patient Active Problem List   Diagnosis    Stage 3a chronic kidney disease    Gastroesophageal reflux disease without esophagitis    Dyslipidemia, goal LDL below 70    Primary hypertension    Obesity, Class I, BMI 30-34.9    FAVIO (obstructive sleep apnea)    Cervical radiculopathy    Type 2 diabetes mellitus with diabetic polyneuropathy, with long-term current use of insulin    Circadian rhythm sleep disorder, delayed sleep phase type    Mild nonproliferative diabetic retinopathy of left eye without macular edema associated with type 2 diabetes mellitus    Acquired hypothyroidism    Anemia of chronic disease    Liver cirrhosis secondary to JEROME    Portal hypertensive gastropathy    Grade I diastolic dysfunction    Combined forms of age-related cataract of both eyes    Major depressive disorder with single episode, in full remission    Malignant neoplasm of right breast in male, estrogen receptor positive    Malignant neoplasm of overlapping sites of right female breast    Sepsis, due to unspecified organism, unspecified whether acute organ dysfunction present    Idiopathic acute pancreatitis    Pancreatitis    Acute cholecystitis    HLD (hyperlipidemia)    History of breast cancer    Elevated troponin    T2DM (type 2 diabetes mellitus)     Past Medical History:   Diagnosis Date    Abdominal pain 2023    RUQ; SEEN IN ED AT [facility]; DC'D HOME    Abscess of arm, right     Abscess of skin of neck     Acute sinusitis     ALT (SGPT) level raised     Arthritis     Bacteremia due to Klebsiella pneumoniae 2023    BPH (benign prostatic hypertrophy)     Breast cancer  05/2023    right    Cirrhosis of liver 09/03/2021    JEROME    CKD (chronic kidney disease)     Colon polyp     Constipation     DDD (degenerative disc disease), cervical     Decreased platelet count     Depression     Resolved: 1/28/2015    Elevated AST (SGOT)     Erectile dysfunction     GERD (gastroesophageal reflux disease)     History of transfusion 2021    BHL; 3 UNITS; NO REACTION    HL (hearing loss)     Hyperlipidemia     Hypertension     Hypothyroidism, adult 11/10/2020    Infection     MSSA lumbar surgical wound infection 3/2017    Jaw pain     Left hip pain     Low back pain     Surgery 30 yrs L4-5    Migraine     Hx of    Obesity (BMI 30.0-34.9) 10/07/2016    BMI 31.92    Obstructive sleep apnea     CPAP    Pancreatitis 06/2023    Portal hypertensive gastropathy 09/03/2021    Renal insufficiency     Shigellosis     Sleep apnea     PT TO BRING MASK AND TUBING DAY OF SURGERY    Symptomatic anemia 08/30/2021    Type 2 diabetes mellitus     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     Vitamin D deficiency     Wears glasses     Wears hearing aid     BOTH EARS     Past Surgical History:   Procedure Laterality Date    ANTERIOR CERVICAL DISCECTOMY W/ FUSION N/A 12/14/2017    Procedure: CERVICAL DISCECTOMY ANTERIOR FUSION WITH INSTRUMENTATION C7/T1;  Surgeon: Gigi Perales MD;  Location:  [facility];  Service:     CERVICAL ARTHRODESIS      CERVICAL DISCECTOMY POSTERIOR FUSION WITH INSTRUMENTATION N/A 03/16/2017    Procedure:  INCISION AND DRAINAGE OF POSTERIOR CERVICAL WOUND;  Surgeon: Gigi Perales MD;  Location:  [facility];  Service:     CERVICAL LAMINECTOMY DECOMPRESSION POSTERIOR Left 02/28/2017    Procedure: Left C7-T1 CERVICAL FORAMINOTOMY POSTERIOR WITH METRIX;  Surgeon: Gigi Perales MD;  Location:  [facility];  Service:     CHOLECYSTECTOMY WITH INTRAOPERATIVE CHOLANGIOGRAM N/A 3/18/2024    Procedure: CHOLECYSTECTOMY LAPAROSCOPIC AND LARAROSCOPIC LIVER BIOPSY;  Surgeon: Rainer  Emerson MACK MD;  Location:  [facility];  Service: General;  Laterality: N/A;    COLONOSCOPY N/A 09/01/2021    Procedure: COLONOSCOPY;  Surgeon: Sharif García MD;  Location:  [facility];  Service: Gastroenterology;  Laterality: N/A;    CYST REMOVAL  04/2021    ENDOSCOPY N/A 08/31/2021    Procedure: ESOPHAGOGASTRODUODENOSCOPY;  Surgeon: Brunner, Mark I, MD;  Location:  [facility];  Service: Gastroenterology;  Laterality: N/A;    ERCP N/A 06/14/2023    Procedure: ENDOSCOPIC RETROGRADE CHOLANGIOPANCREATOGRAPHY;  Surgeon: Brunner, Mark I, MD;  Location:  [facility];  Service: Gastroenterology;  Laterality: N/A;  SPHINCTERTOMY MADE AT [facility]A  AND BALLOON SWEEP OF CBD DONE WITH 9-12 BALLOON    LYMPH NODE BIOPSY  6/2023    Negative    MASTECTOMY W/ SENTINEL NODE BIOPSY Right 05/23/2023    Procedure: BREAST MASTECTOMY WITH SENTINEL NODE BIOPSY RIGHT;  Surgeon: Joseph Ramirez MD;  Location:  [facility];  Service: General;  Laterality: Right;    VASECTOMY      WISDOM TOOTH EXTRACTION        General Information       Row Name 03/19/24 1111          OT Time and Intention    Document Type discharge evaluation/summary  -KL     Mode of Treatment occupational therapy  -       Row Name 03/19/24 1111          General Information    Patient Profile Reviewed yes  -KL     Prior Level of Function independent:;all household mobility;community mobility;gait;transfer;bed mobility;ADL's  SPC; elevated TS  -KL     Existing Precautions/Restrictions --  abd incision; HOLLEY drain  -KL     Barriers to Rehab none identified  -       Row Name 03/19/24 1111          Living Environment    People in Home spouse  -KL       Row Name 03/19/24 1111          Home Main Entrance    Number of Stairs, Main Entrance --  ramp  -KL       Row Name 03/19/24 1111          Cognition    Orientation Status (Cognition) oriented x 4  -KL       Row Name 03/19/24 1111          Safety Issues, Functional Mobility    Safety Issues Affecting Function  (Mobility) safety precaution awareness  -     Impairments Affecting Function (Mobility) pain;endurance/activity tolerance  -               User Key  (r) = Recorded By, (t) = Taken By, (c) = Cosigned By      Initials Name Provider Type    Loretta Koenig OT Occupational Therapist                     Mobility/ADL's       Row Name 03/19/24 [facility]          Bed Mobility    Bed Mobility sidelying-sit;sit-sidelying  -     Sidelying-Sit Faulk (Bed Mobility) verbal cues;supervision  -     Sit-Sidelying Faulk (Bed Mobility) verbal cues;supervision  -     Assistive Device (Bed Mobility) bed rails;head of bed elevated  -     Comment, (Bed Mobility) Pt able to demo log roll technique w/ VC SPV  -       Row Name 03/19/24 1117          Transfers    Transfers sit-stand transfer;stand-sit transfer;toilet transfer  -KL       Row Name 03/19/24 [facility]          Sit-Stand Transfer    Sit-Stand Faulk (Transfers) supervision  -     Assistive Device (Sit-Stand Transfers) cane, straight  -KL       Row Name 03/19/24 [facility]          Stand-Sit Transfer    Stand-Sit Faulk (Transfers) supervision  -     Assistive Device (Stand-Sit Transfers) cane, straight  -KL       Row Name 03/19/24 [facility]          Toilet Transfer    Type (Toilet Transfer) sit-stand;stand-sit  -     Faulk Level (Toilet Transfer) supervision  -     Assistive Device (Toilet Transfer) grab bars/safety frame  -[facility] Name 03/19/24 [facility]          Functional Mobility    Functional Mobility- Ind. Level supervision required  -     Functional Mobility- Device cane, straight  [facility]     Functional Mobility-Distance (Feet) --  HH distances  -[facility] Name 03/19/24 [facility]          Activities of Daily Living    BADL Assessment/Intervention lower body dressing  -KL       Row Name 03/19/24 [facility]          Lower Body Dressing Assessment/Training    Faulk Level (Lower Body Dressing) doff;don;socks;standby assist;verbal cues  -      Position (Lower Body Dressing) edge of bed sitting  -               User Key  (r) = Recorded By, (t) = Taken By, (c) = Cosigned By      Initials Name Provider Type    Loretta Koenig OT Occupational Therapist                   Obj/Interventions       Row Name 03/19/24 1119          Sensory Assessment (Somatosensory)    Sensory Assessment (Somatosensory) UE sensation intact  -       Row Name 03/19/24 1119          Range of Motion Comprehensive    General Range of Motion bilateral upper extremity ROM WFL  -[facility] Name 03/19/24 1119          Strength Comprehensive (MMT)    Comment, General Manual Muscle Testing (MMT) Assessment BUE MMT grossly 4+/5  -KL       Row Name 03/19/24 1119          Balance    Balance Assessment sitting static balance;sitting dynamic balance;sit to stand dynamic balance;standing static balance;standing dynamic balance  -     Static Sitting Balance independent  -     Dynamic Sitting Balance independent  -     Position, Sitting Balance unsupported;sitting edge of bed  -     Static Standing Balance supervision  -     Dynamic Standing Balance supervision  -     Position/Device Used, Standing Balance supported;cane, straight  -     Balance Interventions sitting;standing;sit to stand;supported;static;dynamic;occupation based/functional task  -               User Key  (r) = Recorded By, (t) = Taken By, (c) = Cosigned By      Initials Name Provider Type    Loretta Koenig OT Occupational Therapist                   Goals/Plan    No documentation.                  Clinical Impression       [facility] Name 03/19/24 1122          Pain Assessment    Pretreatment Pain Rating 7/10  -KL     Posttreatment Pain Rating 7/10  -KL     Pain Location - Side/Orientation Bilateral  -KL     Pain Location incisional  -     Pain Location - abdomen  -KL     Pain Intervention(s) Repositioned;Nursing Notified;Ambulation/increased activity  -       Row Name 03/19/24 1122          Plan of Care Review     Plan of Care Reviewed With patient  -     Progress no change  -     Outcome Evaluation Pt presents to OT evaluation SPV for all evaluation tasks. pt does not qualify for IPOT. Please reconsult if there is a change in status. d/c rec is home w/ assist as needed.  -       Row Name 03/19/24 1122          Therapy Assessment/Plan (OT)    Criteria for Skilled Therapeutic Interventions Met (OT) no problems identified which require skilled intervention  -       Row Name 03/19/24 1122          Therapy Plan Review/Discharge Plan (OT)    Anticipated Discharge Disposition (OT) home with assist  -       Row Name 03/19/24 1122          Vital Signs    Pre Systolic BP Rehab 128  -KL     Pre Treatment Diastolic BP 72  -KL     Pre SpO2 (%) 97  -KL     O2 Delivery Pre Treatment room air  -KL     Post SpO2 (%) 100  -KL     O2 Delivery Post Treatment room air  -KL     Pre Patient Position Supine  -KL     Intra Patient Position Standing  -KL     Post Patient Position Supine  -       Row Name 03/19/24 1122          Positioning and Restraints    Pre-Treatment Position in bed  -KL     Post Treatment Position bed  -KL     In Bed notified nsg;supine;call light within reach;encouraged to call for assist;exit alarm on  -               User Key  (r) = Recorded By, (t) = Taken By, (c) = Cosigned By      Initials Name Provider Type    Loretta Koenig, MARCELO Occupational Therapist                   Outcome Measures       Row Name 03/19/24 1125          How much help from another is currently needed...    Putting on and taking off regular lower body clothing? 4  -KL     Bathing (including washing, rinsing, and drying) 4  -KL     Toileting (which includes using toilet bed pan or urinal) 4  -KL     Putting on and taking off regular upper body clothing 4  -KL     Taking care of personal grooming (such as brushing teeth) 4  -KL     Eating meals 4  -KL     AM-PAC 6 Clicks Score (OT) 24  -       Row Name 03/19/24 1013          How much help  from another person do you currently need...    Turning from your back to your side while in flat bed without using bedrails? 4  -AE     Moving from lying on back to sitting on the side of a flat bed without bedrails? 3  -AE     Moving to and from a bed to a chair (including a wheelchair)? 3  -AE     Standing up from a chair using your arms (e.g., wheelchair, bedside chair)? 3  -AE     Climbing 3-5 steps with a railing? 3  -AE     To walk in hospital room? 3  -AE     AM-PAC 6 Clicks Score (PT) 19  -AE     Highest Level of Mobility Goal 6 --> Walk 10 steps or more  -AE       Row Name 03/19/24 1125 03/19/24 1013       Functional Assessment    Outcome Measure Options AM-PAC 6 Clicks Daily Activity (OT)  -KL AM-PAC 6 Clicks Basic Mobility (PT)  -AE              User Key  (r) = Recorded By, (t) = Taken By, (c) = Cosigned By      Initials Name Provider Type    AE Te Lizarraga, PT Physical Therapist    Loretta Koenig, OT Occupational Therapist                    Occupational Therapy Education       Title: PT OT SLP Therapies (In Progress)       Topic: Occupational Therapy (In Progress)       Point: ADL training (Done)       Description:   Instruct learner(s) on proper safety adaptation and remediation techniques during self care or transfers.   Instruct in proper use of assistive devices.                  Learning Progress Summary             Patient Acceptance, E, JONY BORREGO by SHIRA at 3/19/2024 1125                         Point: Home exercise program (Not Started)       Description:   Instruct learner(s) on appropriate technique for monitoring, assisting and/or progressing therapeutic exercises/activities.                  Learner Progress:  Not documented in this visit.              Point: Precautions (Done)       Description:   Instruct learner(s) on prescribed precautions during self-care and functional transfers.                  Learning Progress Summary             Patient Acceptance, E, SALIMA,JONY by SHIRA at 3/19/2024 1125                          Point: Body mechanics (Done)       Description:   Instruct learner(s) on proper positioning and spine alignment during self-care, functional mobility activities and/or exercises.                  Learning Progress Summary             Patient Acceptance, SALIMA SLATERDU by  at 3/19/2024 1125                                         User Key       Initials Effective Dates Name Provider Type Discipline     02/05/24 -  Loretta Hays OT Occupational Therapist OT                  OT Recommendation and Plan     Plan of Care Review  Plan of Care Reviewed With: patient  Progress: no change  Outcome Evaluation: Pt presents to OT evaluation SPV for all evaluation tasks. pt does not qualify for IPOT. Please reconsult if there is a change in status. d/c rec is home w/ assist as needed.     Time Calculation:   Evaluation Complexity (OT)  Review Occupational Profile/Medical/Therapy History Complexity: brief/low complexity  Assessment, Occupational Performance/Identification of Deficit Complexity: 1-3 performance deficits  Clinical Decision Making Complexity (OT): problem focused assessment/low complexity  Overall Complexity of Evaluation (OT): low complexity     Time Calculation- OT       Row Name 03/19/24 1126             Time Calculation- OT    OT Start Time 1040  -KL      OT Received On 03/19/24  -         Untimed Charges    OT Eval/Re-eval Minutes 47  -KL         Total Minutes    Untimed Charges Total Minutes 47  -KL       Total Minutes 47  -KL                User Key  (r) = Recorded By, (t) = Taken By, (c) = Cosigned By      Initials Name Provider Type    Loretta Koenig OT Occupational Therapist                  Therapy Charges for Today       Code Description Service Date Service Provider Modifiers Qty    [id number] HC OT EVAL LOW COMPLEXITY 4 3/19/2024 Loretta Hays OT GO 1                 Loretta Hays OT  3/19/2024

## 2024-03-19 NOTE — PLAN OF CARE
Goal Outcome Evaluation:  Plan of Care Reviewed With: patient, spouse        Progress: no change  Outcome Evaluation: Pt presents with incisional pain, decreased balance, and decreased functional independence. Pt ambulated 325ft with CGA and SPC for support. Recommend continued skilled IP PT interventions. Recommend D/C home with assist and OP PT services for balance deficits.      Anticipated Discharge Disposition (PT): home with assist, home with outpatient therapy services

## 2024-03-19 NOTE — CONSULTS
Discussed and taught patient about type 2 diabetes self-management, risk factors, and importance of blood glucose control to reduce complications. Target blood glucose readings and A1c goals per ADA were reviewed. Reviewed with patient current A1c 7.1 and discussed its significance. Signs, symptoms, and treatment of hyperglycemia and hypoglycemia were discussed. Lifestyle changes such as physical activity with MD approval and healthy eating were encouraged. Stressed the importance of strict blood sugar control after surgery to prevent complications such as infection and to promote healing of incision. Encouraged pt to monitor blood sugar at home 4+  times per day and to call PCP if blood sugar is trending high. Uses the anibal 2 for monitoring, questions answered about differences between anibal 2 and 3. Encouraged to keep record of blood glucose readings to take to follow up appointment with PCP. 30 minutes in the care and education of this patient.

## 2024-03-19 NOTE — THERAPY EVALUATION
Patient Name: Elton Funez  : 1950    MRN: [id number]                              Today's Date: 3/19/2024       Admit Date: 3/17/2024    Visit Dx:     ICD-10-CM ICD-9-CM   1. Idiopathic acute pancreatitis without infection or necrosis  K85.00 577.0   2. Acute cholecystitis  K81.0 575.0   3. Gallstone pancreatitis  K85.10 577.0     574.20     Patient Active Problem List   Diagnosis    Stage 3a chronic kidney disease    Gastroesophageal reflux disease without esophagitis    Dyslipidemia, goal LDL below 70    Primary hypertension    Obesity, Class I, BMI 30-34.9    FAVIO (obstructive sleep apnea)    Cervical radiculopathy    Type 2 diabetes mellitus with diabetic polyneuropathy, with long-term current use of insulin    Circadian rhythm sleep disorder, delayed sleep phase type    Mild nonproliferative diabetic retinopathy of left eye without macular edema associated with type 2 diabetes mellitus    Acquired hypothyroidism    Anemia of chronic disease    Liver cirrhosis secondary to JEROME    Portal hypertensive gastropathy    Grade I diastolic dysfunction    Combined forms of age-related cataract of both eyes    Major depressive disorder with single episode, in full remission    Malignant neoplasm of right breast in male, estrogen receptor positive    Malignant neoplasm of overlapping sites of right female breast    Sepsis, due to unspecified organism, unspecified whether acute organ dysfunction present    Idiopathic acute pancreatitis    Pancreatitis    Acute cholecystitis    HLD (hyperlipidemia)    History of breast cancer    Elevated troponin    T2DM (type 2 diabetes mellitus)     Past Medical History:   Diagnosis Date    Abdominal pain 2023    RUQ; SEEN IN ED AT [facility]; DC'D HOME    Abscess of arm, right     Abscess of skin of neck     Acute sinusitis     ALT (SGPT) level raised     Arthritis     Bacteremia due to Klebsiella pneumoniae 2023    BPH (benign prostatic hypertrophy)     Breast cancer  05/2023    right    Cirrhosis of liver 09/03/2021    JEROME    CKD (chronic kidney disease)     Colon polyp     Constipation     DDD (degenerative disc disease), cervical     Decreased platelet count     Depression     Resolved: 1/28/2015    Elevated AST (SGOT)     Erectile dysfunction     GERD (gastroesophageal reflux disease)     History of transfusion 2021    BHL; 3 UNITS; NO REACTION    HL (hearing loss)     Hyperlipidemia     Hypertension     Hypothyroidism, adult 11/10/2020    Infection     MSSA lumbar surgical wound infection 3/2017    Jaw pain     Left hip pain     Low back pain     Surgery 30 yrs L4-5    Migraine     Hx of    Obesity (BMI 30.0-34.9) 10/07/2016    BMI 31.92    Obstructive sleep apnea     CPAP    Pancreatitis 06/2023    Portal hypertensive gastropathy 09/03/2021    Renal insufficiency     Shigellosis     Sleep apnea     PT TO BRING MASK AND TUBING DAY OF SURGERY    Symptomatic anemia 08/30/2021    Type 2 diabetes mellitus     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     Vitamin D deficiency     Wears glasses     Wears hearing aid     BOTH EARS     Past Surgical History:   Procedure Laterality Date    ANTERIOR CERVICAL DISCECTOMY W/ FUSION N/A 12/14/2017    Procedure: CERVICAL DISCECTOMY ANTERIOR FUSION WITH INSTRUMENTATION C7/T1;  Surgeon: Gigi Perales MD;  Location:  [facility];  Service:     CERVICAL ARTHRODESIS      CERVICAL DISCECTOMY POSTERIOR FUSION WITH INSTRUMENTATION N/A 03/16/2017    Procedure:  INCISION AND DRAINAGE OF POSTERIOR CERVICAL WOUND;  Surgeon: Gigi Perales MD;  Location:  [facility];  Service:     CERVICAL LAMINECTOMY DECOMPRESSION POSTERIOR Left 02/28/2017    Procedure: Left C7-T1 CERVICAL FORAMINOTOMY POSTERIOR WITH METRIX;  Surgeon: Gigi Perales MD;  Location:  [facility];  Service:     CHOLECYSTECTOMY WITH INTRAOPERATIVE CHOLANGIOGRAM N/A 3/18/2024    Procedure: CHOLECYSTECTOMY LAPAROSCOPIC AND LARAROSCOPIC LIVER BIOPSY;  Surgeon: Rainer  Emerson MACK MD;  Location:  [facility];  Service: General;  Laterality: N/A;    COLONOSCOPY N/A 09/01/2021    Procedure: COLONOSCOPY;  Surgeon: Sharif García MD;  Location:  [facility];  Service: Gastroenterology;  Laterality: N/A;    CYST REMOVAL  04/2021    ENDOSCOPY N/A 08/31/2021    Procedure: ESOPHAGOGASTRODUODENOSCOPY;  Surgeon: Brunner, Mark I, MD;  Location:  [facility];  Service: Gastroenterology;  Laterality: N/A;    ERCP N/A 06/14/2023    Procedure: ENDOSCOPIC RETROGRADE CHOLANGIOPANCREATOGRAPHY;  Surgeon: Brunner, Mark I, MD;  Location:  [facility];  Service: Gastroenterology;  Laterality: N/A;  SPHINCTERTOMY MADE AT [place]  AND BALLOON SWEEP OF CBD DONE WITH 9-12 BALLOON    LYMPH NODE BIOPSY  6/2023    Negative    MASTECTOMY W/ SENTINEL NODE BIOPSY Right 05/23/2023    Procedure: BREAST MASTECTOMY WITH SENTINEL NODE BIOPSY RIGHT;  Surgeon: Joseph Ramirez MD;  Location:  [facility];  Service: General;  Laterality: Right;    VASECTOMY      WISDOM TOOTH EXTRACTION        General Information       Row Name 03/19/24 1001          Physical Therapy Time and Intention    Document Type evaluation  -AE     Mode of Treatment physical therapy  -AE       Row Name 03/19/24 1001          General Information    Patient Profile Reviewed yes  -AE     Prior Level of Function independent:;all household mobility;gait;transfer;bed mobility;ADL's;dressing;bathing  Lives in handicap accessible home. Uses SPC for mobility d/t neuropathy and recent falls.  -AE     Existing Precautions/Restrictions fall;other (see comments)  abdominal incision; HOLLEY drain  -AE     Barriers to Rehab previous functional deficit  -AE       Row Name 03/19/24 1001          Living Environment    People in Home spouse  -AE       Row Name 03/19/24 1001          Home Main Entrance    Number of Stairs, Main Entrance other (see comments)  ramp to enter  -AE       Row Name 03/19/24 1001          Stairs Within Home, Primary    Number of  Stairs, Within Home, Primary none  -AE     Stair Railings, Within Home, Primary none  -AE       Row Name 03/19/24 1001          Cognition    Orientation Status (Cognition) oriented x 3  -AE       Row Name 03/19/24 1001          Safety Issues, Functional Mobility    Safety Issues Affecting Function (Mobility) awareness of need for assistance;insight into deficits/self-awareness;safety precaution awareness  -AE     Impairments Affecting Function (Mobility) balance;sensation/sensory awareness;strength;pain  -AE               User Key  (r) = Recorded By, (t) = Taken By, (c) = Cosigned By      Initials Name Provider Type    AE Te Lizarraga PT Physical Therapist                   Mobility       Row Name 03/19/24 1007          Bed Mobility    Bed Mobility supine-sit;sit-supine  -AE     Supine-Sit [place] (Bed Mobility) contact guard;1 person assist  -AE     Sit-Supine [place] (Bed Mobility) contact guard;1 person assist  -AE     Assistive Device (Bed Mobility) bed rails;head of bed elevated  -AE     Comment, (Bed Mobility) VCs for hand placement and log rolling technique to reduce abdominal pain.  -AE       Row Name 03/19/24 1007          Transfers    Comment, (Transfers) VCs for hand placement and sequencing. Mild unsteadiness d/t neuropathy.  -AE       Row Name 03/19/24 1007          Sit-Stand Transfer    Sit-Stand [place] (Transfers) contact guard;1 person assist  -AE     Assistive Device (Sit-Stand Transfers) cane, straight  -AE       Row Name 03/19/24 1007          Gait/Stairs (Locomotion)    [place] Level (Gait) contact guard;1 person assist;verbal cues  -AE     Assistive Device (Gait) cane, straight  -AE     Distance in Feet (Gait) 325  -AE     Pattern (Gait) step-through  -AE     Deviations/Abnormal Patterns (Gait) base of support, wide;gait speed decreased;slava decreased;weight shifting decreased  -AE     Bilateral Gait Deviations heel strike decreased  -AE     Gait  Assessment/Intervention Pt demo wide ROSY and slow gait speed while ambulating. Pt reports near baseline gait speed d/t neuropathy and caution d/t increased falls. Further distance limited by fatigue.  -AE               User Key  (r) = Recorded By, (t) = Taken By, (c) = Cosigned By      Initials Name Provider Type    AE Te Lizarraga PT Physical Therapist                   Obj/Interventions       Row Name 03/19/24 1010          Range of Motion Comprehensive    General Range of Motion bilateral lower extremity ROM WFL  -AE       Row Name 03/19/24 1010          Strength Comprehensive (MMT)    General Manual Muscle Testing (MMT) Assessment lower extremity strength deficits identified  -AE     Comment, General Manual Muscle Testing (MMT) Assessment BLE grossly 4/5  -AE       Row Name 03/19/24 1010          Balance    Balance Assessment sitting static balance;sitting dynamic balance;sit to stand dynamic balance;standing static balance;standing dynamic balance  -AE     Static Sitting Balance standby assist  -AE     Dynamic Sitting Balance standby assist  -AE     Position, Sitting Balance unsupported;sitting edge of bed  -AE     Sit to Stand Dynamic Balance contact guard;1-person assist;verbal cues  -AE     Static Standing Balance contact guard  -AE     Dynamic Standing Balance contact guard  -AE     Position/Device Used, Standing Balance supported;cane, straight  -AE               User Key  (r) = Recorded By, (t) = Taken By, (c) = Cosigned By      Initials Name Provider Type    AE Te Lizarraga PT Physical Therapist                   Goals/Plan       Row Name 03/19/24 1013          Bed Mobility Goal 1 (PT)    Activity/Assistive Device (Bed Mobility Goal 1, PT) sit to supine/supine to sit  -AE     [place] Level/Cues Needed (Bed Mobility Goal 1, PT) modified independence  -AE     Time Frame (Bed Mobility Goal 1, PT) long term goal (LTG);10 days  -AE     Progress/Outcomes (Bed Mobility Goal 1, PT) new goal  -AE        Row Name 03/19/24 1013          Transfer Goal 1 (PT)    Activity/Assistive Device (Transfer Goal 1, PT) sit-to-stand/stand-to-sit;bed-to-chair/chair-to-bed  -AE     [place] Level/Cues Needed (Transfer Goal 1, PT) modified independence  -AE     Time Frame (Transfer Goal 1, PT) long term goal (LTG);10 days  -AE     Progress/Outcome (Transfer Goal 1, PT) new goal  -AE       Row Name 03/19/24 1013          Gait Training Goal 1 (PT)    Activity/Assistive Device (Gait Training Goal 1, PT) gait (walking locomotion);assistive device use  -AE     [place] Level (Gait Training Goal 1, PT) modified independence  -AE     Distance (Gait Training Goal 1, PT) 500ft  -AE     Time Frame (Gait Training Goal 1, PT) long term goal (LTG);10 days  -AE     Progress/Outcome (Gait Training Goal 1, PT) new goal  -AE       Row Name 03/19/24 1013          Therapy Assessment/Plan (PT)    Planned Therapy Interventions (PT) balance training;bed mobility training;gait training;home exercise program;patient/family education;postural re-education;ROM (range of motion);strengthening;transfer training  -AE               User Key  (r) = Recorded By, (t) = Taken By, (c) = Cosigned By      Initials Name Provider Type    AE Te Lizarraga, PT Physical Therapist                   Clinical Impression       Row Name 03/19/24 1011          Pain    Pretreatment Pain Rating 5/10  -AE     Posttreatment Pain Rating 6/10  -AE     Pain Location incisional  -AE     Pain Location - abdomen  -AE     Pre/Posttreatment Pain Comment tolerated  -AE     Pain Intervention(s) Repositioned;Ambulation/increased activity  -AE       Row Name 03/19/24 1011          Plan of Care Review    Plan of Care Reviewed With patient;spouse  -AE     Progress no change  -AE     Outcome Evaluation Pt presents with incisional pain, decreased balance, and decreased functional independence. Pt ambulated 325ft with CGA and SPC for support. Recommend continued skilled IP PT  interventions. Recommend D/C home with assist and OP PT services for balance deficits.  -AE       Row Name 03/19/24 1011          Therapy Assessment/Plan (PT)    Rehab Potential (PT) good, to achieve stated therapy goals  -AE     Criteria for Skilled Interventions Met (PT) yes  -AE     Therapy Frequency (PT) daily  -AE       Row Name 03/19/24 1011          Vital Signs    Pre Systolic BP Rehab 135  -AE     Pre Treatment Diastolic BP 76  -AE     Pre SpO2 (%) 94  -AE     O2 Delivery Pre Treatment room air  -AE     O2 Delivery Intra Treatment room air  -AE     Post SpO2 (%) 97  -AE     O2 Delivery Post Treatment room air  -AE     Pre Patient Position Supine  -AE     Intra Patient Position Standing  -AE     Post Patient Position Supine  -AE       Row Name 03/19/24 1011          Positioning and Restraints    Pre-Treatment Position in bed  -AE     Post Treatment Position bed  -AE     In Bed notified nsg;supine;call light within reach;encouraged to call for assist;with family/caregiver;side rails up x2  -AE               User Key  (r) = Recorded By, (t) = Taken By, (c) = Cosigned By      Initials Name Provider Type    AE Te Lizarraga, PT Physical Therapist                   Outcome Measures       Row Name 03/19/24 1013          How much help from another person do you currently need...    Turning from your back to your side while in flat bed without using bedrails? 4  -AE     Moving from lying on back to sitting on the side of a flat bed without bedrails? 3  -AE     Moving to and from a bed to a chair (including a wheelchair)? 3  -AE     Standing up from a chair using your arms (e.g., wheelchair, bedside chair)? 3  -AE     Climbing 3-5 steps with a railing? 3  -AE     To walk in hospital room? 3  -AE     AM-PAC 6 Clicks Score (PT) 19  -AE     Highest Level of Mobility Goal 6 --> Walk 10 steps or more  -AE       Row Name 03/19/24 1013          Functional Assessment    Outcome Measure Options AM-PAC 6 Clicks Basic  Mobility (PT)  -AE               User Key  (r) = Recorded By, (t) = Taken By, (c) = Cosigned By      Initials Name Provider Type    AE Te Lizarraga PT Physical Therapist                                 Physical Therapy Education       Title: PT OT SLP Therapies (In Progress)       Topic: Physical Therapy (In Progress)       Point: Mobility training (Done)       Learning Progress Summary             Patient Acceptance, E, VU by AE at 3/19/2024 0825                         Point: Home exercise program (Not Started)       Learner Progress:  Not documented in this visit.              Point: Body mechanics (Done)       Learning Progress Summary             Patient Acceptance, E, VU by AE at 3/19/2024 0825                         Point: Precautions (Done)       Learning Progress Summary             Patient Acceptance, E, VU by AE at 3/19/2024 0825                                         User Key       Initials Effective Dates Name Provider Type Discipline    AE 09/21/21 -  Te Lizarraga PT Physical Therapist PT                  PT Recommendation and Plan  Planned Therapy Interventions (PT): balance training, bed mobility training, gait training, home exercise program, patient/family education, postural re-education, ROM (range of motion), strengthening, transfer training  Plan of Care Reviewed With: patient, spouse  Progress: no change  Outcome Evaluation: Pt presents with incisional pain, decreased balance, and decreased functional independence. Pt ambulated 325ft with CGA and SPC for support. Recommend continued skilled IP PT interventions. Recommend D/C home with assist and OP PT services for balance deficits.     Time Calculation:   PT Evaluation Complexity  History, PT Evaluation Complexity: 1-2 personal factors and/or comorbidities  Examination of Body Systems (PT Eval Complexity): total of 3 or more elements  Clinical Presentation (PT Evaluation Complexity): stable  Clinical Decision Making (PT Evaluation  Complexity): low complexity  Overall Complexity (PT Evaluation Complexity): low complexity     PT Charges       Row Name 03/19/24 1014             Time Calculation    Start Time 0825  -AE      PT Received On 03/19/24  -AE      PT Goal Re-Cert Due Date 03/29/24  -AE         Timed Charges    11856 - PT Therapeutic Activity Minutes 9  -AE         Untimed Charges    PT Eval/Re-eval Minutes 49  -AE         Total Minutes    Timed Charges Total Minutes 9  -AE      Untimed Charges Total Minutes 49  -AE       Total Minutes 58  -AE                User Key  (r) = Recorded By, (t) = Taken By, (c) = Cosigned By      Initials Name Provider Type    AE Te Lizarraga, PT Physical Therapist                  Therapy Charges for Today       Code Description Service Date Service Provider Modifiers Qty    56534394574 HC PT THERAPEUTIC ACT EA 15 MIN 3/19/2024 Te Lizarraga, PT GP 1    70771162284 HC PT EVAL LOW COMPLEXITY 4 3/19/2024 Te Lizarraga, PT GP 1            PT G-Codes  Outcome Measure Options: AM-PAC 6 Clicks Basic Mobility (PT)  AM-PAC 6 Clicks Score (PT): 19  PT Discharge Summary  Anticipated Discharge Disposition (PT): home with assist, home with outpatient therapy services    Te Lizarraga PT  3/19/2024

## 2024-03-19 NOTE — PLAN OF CARE
Goal Outcome Evaluation:  Plan of Care Reviewed With: patient        Progress: no change  Outcome Evaluation: Pt presents to OT evaluation SPV for all evaluation tasks. pt does not qualify for IPOT. Please reconsult if there is a change in status. d/c rec is home w/ assist as needed.      Anticipated Discharge Disposition (OT): home with assist

## 2024-03-19 NOTE — PROGRESS NOTES
"Patient Name:  Elton Funez  YOB: 1950  [id number]    Surgery Progress Note    Date of visit: 3/19/2024      Subjective: No acute events overnight.  Tells me he feels not much different this morning.  Has some pain on the right side.  Has passed flatus since surgery.  Some mild nausea but no vomiting.  Has tolerated clear liquids well.          Objective:     /76 (BP Location: Left arm, Patient Position: Lying)   Pulse 62   Temp 97.7 °F (36.5 °C) (Oral)   Resp 18   Ht 172.7 cm (67.99\")   Wt 91.2 kg (201 lb 1 oz)   SpO2 95%   BMI 30.58 kg/m²     Intake/Output Summary (Last 24 hours) at 3/19/2024 0818  Last data filed at 3/19/2024 0700  Gross per 24 hour   Intake 1360 ml   Output 1935 ml   Net -575 ml       GEN:   Awake, alert, in no acute distress, resting comfortably in bed   CV:   Regular rate and rhythm  L:  Symmetric expansion, not labored on oxygen by nasal cannula  Abd:  Soft, not obviously distended, appropriately tender palpation along incisions, incisions are clean dry and intact, HOLLEY drain in the right upper quadrant with sanguinous output that is not bilious  Ext:  No cyanosis, clubbing, or edema    Recent labs that are back at this time have been reviewed.           Assessment/ Plan:    Mr. Montano is a 73-year-old gentleman with history of Lopez cirrhosis, hypertension, hyperlipidemia, heart failure, diabetes, and chronic kidney disease with gallstone pancreatitis      # Gallstone pancreatitis, cholelithiasis, cirrhosis  -Postoperative day 1 following laparoscopic cholecystectomy and liver biopsy  -Vitals are stable  -Labs are appropriate.  Liver function tests are not elevated  -Advance to regular diet today  -Can discontinue antibiotics  -Continue oral pain meds  -Would like to keep in-house for another 24 hours to monitor his drain output and ensure that he tolerates a diet and recheck his LFTs tomorrow      Emerson Spear MD  3/19/2024  08:18 EDT      "

## 2024-03-20 ENCOUNTER — READMISSION MANAGEMENT (OUTPATIENT)
Dept: CALL CENTER | Facility: HOSPITAL | Age: 74
End: 2024-03-20
Payer: MEDICARE

## 2024-03-20 VITALS
TEMPERATURE: 97.6 F | OXYGEN SATURATION: 96 % | DIASTOLIC BLOOD PRESSURE: 68 MMHG | RESPIRATION RATE: 18 BRPM | SYSTOLIC BLOOD PRESSURE: 153 MMHG | WEIGHT: 201.06 LBS | BODY MASS INDEX: 30.47 KG/M2 | HEART RATE: 67 BPM | HEIGHT: 68 IN

## 2024-03-20 LAB
ALBUMIN SERPL-MCNC: 3.2 G/DL (ref 3.5–5.2)
ALBUMIN/GLOB SERPL: 1.1 G/DL
ALP SERPL-CCNC: 63 U/L (ref 39–117)
ALT SERPL W P-5'-P-CCNC: 15 U/L (ref 1–41)
ANION GAP SERPL CALCULATED.3IONS-SCNC: 12 MMOL/L (ref 5–15)
AST SERPL-CCNC: 26 U/L (ref 1–40)
BILIRUB SERPL-MCNC: 0.4 MG/DL (ref 0–1.2)
BUN SERPL-MCNC: 19 MG/DL (ref 8–23)
BUN/CREAT SERPL: 14.3 (ref 7–25)
CALCIUM SPEC-SCNC: 8 MG/DL (ref 8.6–10.5)
CHLORIDE SERPL-SCNC: 101 MMOL/L (ref 98–107)
CO2 SERPL-SCNC: 21 MMOL/L (ref 22–29)
CREAT SERPL-MCNC: 1.33 MG/DL (ref 0.76–1.27)
DEPRECATED RDW RBC AUTO: 47.2 FL (ref 37–54)
EGFRCR SERPLBLD CKD-EPI 2021: 56.1 ML/MIN/1.73
ERYTHROCYTE [DISTWIDTH] IN BLOOD BY AUTOMATED COUNT: 13.2 % (ref 12.3–15.4)
GLOBULIN UR ELPH-MCNC: 3 GM/DL
GLUCOSE BLDC GLUCOMTR-MCNC: 185 MG/DL (ref 70–130)
GLUCOSE BLDC GLUCOMTR-MCNC: 239 MG/DL (ref 70–130)
GLUCOSE SERPL-MCNC: 158 MG/DL (ref 65–99)
HCT VFR BLD AUTO: 35.7 % (ref 37.5–51)
HGB BLD-MCNC: 11.6 G/DL (ref 13–17.7)
MAGNESIUM SERPL-MCNC: 1.8 MG/DL (ref 1.6–2.4)
MCH RBC QN AUTO: 31.4 PG (ref 26.6–33)
MCHC RBC AUTO-ENTMCNC: 32.5 G/DL (ref 31.5–35.7)
MCV RBC AUTO: 96.5 FL (ref 79–97)
PHOSPHATE SERPL-MCNC: 2.3 MG/DL (ref 2.5–4.5)
PLATELET # BLD AUTO: 72 10*3/MM3 (ref 140–450)
PMV BLD AUTO: 12.7 FL (ref 6–12)
POTASSIUM SERPL-SCNC: 4.1 MMOL/L (ref 3.5–5.2)
PROT SERPL-MCNC: 6.2 G/DL (ref 6–8.5)
RBC # BLD AUTO: 3.7 10*6/MM3 (ref 4.14–5.8)
SODIUM SERPL-SCNC: 134 MMOL/L (ref 136–145)
WBC NRBC COR # BLD AUTO: 5.98 10*3/MM3 (ref 3.4–10.8)

## 2024-03-20 PROCEDURE — 84100 ASSAY OF PHOSPHORUS: CPT | Performed by: SURGERY

## 2024-03-20 PROCEDURE — 63710000001 INSULIN LISPRO (HUMAN) PER 5 UNITS: Performed by: INTERNAL MEDICINE

## 2024-03-20 PROCEDURE — 99239 HOSP IP/OBS DSCHRG MGMT >30: CPT | Performed by: STUDENT IN AN ORGANIZED HEALTH CARE EDUCATION/TRAINING PROGRAM

## 2024-03-20 PROCEDURE — 25010000002 CEFTRIAXONE PER 250 MG: Performed by: NURSE PRACTITIONER

## 2024-03-20 PROCEDURE — 85027 COMPLETE CBC AUTOMATED: CPT | Performed by: SURGERY

## 2024-03-20 PROCEDURE — 25010000002 MORPHINE PER 10 MG: Performed by: SURGERY

## 2024-03-20 PROCEDURE — 25010000002 METRONIDAZOLE 500 MG/100ML SOLUTION: Performed by: SURGERY

## 2024-03-20 PROCEDURE — 82948 REAGENT STRIP/BLOOD GLUCOSE: CPT

## 2024-03-20 PROCEDURE — 80053 COMPREHEN METABOLIC PANEL: CPT | Performed by: SURGERY

## 2024-03-20 PROCEDURE — 83735 ASSAY OF MAGNESIUM: CPT | Performed by: SURGERY

## 2024-03-20 RX ORDER — INSULIN LISPRO 100 [IU]/ML
5 INJECTION, SOLUTION INTRAVENOUS; SUBCUTANEOUS 3 TIMES DAILY
Start: 2024-03-20

## 2024-03-20 RX ORDER — POLYETHYLENE GLYCOL 3350 17 G/17G
17 POWDER, FOR SOLUTION ORAL DAILY PRN
Qty: 30 PACKET | Refills: 0 | Status: SHIPPED | OUTPATIENT
Start: 2024-03-20 | End: 2024-03-27

## 2024-03-20 RX ORDER — AMOXICILLIN 250 MG
2 CAPSULE ORAL 2 TIMES DAILY PRN
Qty: 30 TABLET | Refills: 0 | Status: SHIPPED | OUTPATIENT
Start: 2024-03-20 | End: 2024-03-27

## 2024-03-20 RX ORDER — HYDROCODONE BITARTRATE AND ACETAMINOPHEN 5; 325 MG/1; MG/1
1 TABLET ORAL EVERY 8 HOURS PRN
Qty: 9 TABLET | Refills: 0 | Status: SHIPPED | OUTPATIENT
Start: 2024-03-20 | End: 2024-03-23

## 2024-03-20 RX ADMIN — METRONIDAZOLE 500 MG: 500 INJECTION, SOLUTION INTRAVENOUS at 05:12

## 2024-03-20 RX ADMIN — INSULIN LISPRO 5 UNITS: 100 INJECTION, SOLUTION INTRAVENOUS; SUBCUTANEOUS at 13:06

## 2024-03-20 RX ADMIN — ATORVASTATIN CALCIUM 20 MG: 20 TABLET, FILM COATED ORAL at 09:47

## 2024-03-20 RX ADMIN — DOCUSATE SODIUM 100 MG: 100 CAPSULE, LIQUID FILLED ORAL at 09:46

## 2024-03-20 RX ADMIN — Medication 250 MG: at 09:45

## 2024-03-20 RX ADMIN — PANTOPRAZOLE SODIUM 40 MG: 40 TABLET, DELAYED RELEASE ORAL at 05:17

## 2024-03-20 RX ADMIN — CEFTRIAXONE 2000 MG: 2 INJECTION, POWDER, FOR SOLUTION INTRAMUSCULAR; INTRAVENOUS at 05:12

## 2024-03-20 RX ADMIN — SERTRALINE HYDROCHLORIDE 100 MG: 100 TABLET ORAL at 09:46

## 2024-03-20 RX ADMIN — INSULIN LISPRO 3 UNITS: 100 INJECTION, SOLUTION INTRAVENOUS; SUBCUTANEOUS at 09:47

## 2024-03-20 RX ADMIN — CARVEDILOL 3.12 MG: 3.12 TABLET, FILM COATED ORAL at 09:47

## 2024-03-20 RX ADMIN — HYDROCODONE BITARTRATE AND ACETAMINOPHEN 1 TABLET: 5; 325 TABLET ORAL at 05:12

## 2024-03-20 RX ADMIN — TAMOXIFEN CITRATE 20 MG: 10 TABLET, FILM COATED ORAL at 09:49

## 2024-03-20 RX ADMIN — HYDROCODONE BITARTRATE AND ACETAMINOPHEN 1 TABLET: 5; 325 TABLET ORAL at 13:06

## 2024-03-20 RX ADMIN — FERROUS SULFATE TAB 325 MG (65 MG ELEMENTAL FE) 325 MG: 325 (65 FE) TAB at 09:47

## 2024-03-20 RX ADMIN — LEVOTHYROXINE SODIUM 75 MCG: 0.07 TABLET ORAL at 05:12

## 2024-03-20 RX ADMIN — MORPHINE SULFATE 2 MG: 2 INJECTION, SOLUTION INTRAMUSCULAR; INTRAVENOUS at 01:58

## 2024-03-20 NOTE — OUTREACH NOTE
Prep Survey      Flowsheet Row Responses   [facility] patient discharged from? [facility]   Is LACE score < 7 ? No   Eligibility [facility]   Date of Admission 03/17/24   Date of Discharge 03/20/24   Discharge Disposition Home or Self Care   Discharge diagnosis Pancreatitis, CHOLECYSTECTOMY LAPAROSCOPIC AND LARAROSCOPIC LIVER BIOPSY   Does the patient have one of the following disease processes/diagnoses(primary or secondary)? General Surgery   Does the patient have Home health ordered? No   Is there a DME ordered? No   Prep survey completed? Yes            Jimena NICOLE - Registered Nurse

## 2024-03-20 NOTE — DISCHARGE SUMMARY
[facility] Medicine Services  DISCHARGE SUMMARY    Patient Name: Elton Funez  : 1950  MRN: [id number]    Date of Admission: 3/17/2024  1:34 AM  Date of Discharge:  3/20/24  Primary Care Physician: Tc Ramirez MD    Consults       Date and Time Order Name Status Description    3/17/2024  8:30 AM Inpatient General Surgery Consult Completed             Hospital Course     Presenting Problem:     Active Hospital Problems    Diagnosis  POA    **Pancreatitis [K85.90]  Yes    Acute cholecystitis [K81.0]  Yes    HLD (hyperlipidemia) [E78.5]  Yes    History of breast cancer [Z85.3]  Not Applicable    Elevated troponin [R79.89]  Yes    T2DM (type 2 diabetes mellitus) [E11.9]  Yes    Grade I diastolic dysfunction [I51.89]  Yes    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          Hospital Course:  Elton Funez is a 73 y.o. male with PMHx of cholangitis/gallstone pancreatitis in , JEROME cirrhosis who presented again with similar epigastric/back pain. Found to have elevated lipase and gallbladder with cholelithiasis consistent with gallstone pancreatitis. Patient underwent laparoscopic cholecystectomy with liver biopsy and drain placement on 3/18. Drain was removed prior to discharge. Patient was discharged home with plan for close follow up with PCP and Dr. Spear.     Gallstone pancreatitis  Possible mild cholecystitis  -Hx of gallstone pancreatitis/cholangitis s/p ERCP  without CCY  -Epigastric +back pain with elevated lipase 350 diagnostic of pancreatitis   -s/p laparoscopic cholecystectomy with liver biopsy and drain placement on 3/18  -Follow up with General Surgery in 2 weeks. PRN Boody for  breakthrough pain on discharge.      IDDM2  -A1c 7.1% on admission  -Home dose: 68 units lantus qhs + 26 units lispro with meals. Sometimes has lows with meal times if eating little.  -Discussed with patient - decrease Lantus to 34u nightly and lispro 5u with meals until appetite improves after surgery. Check glucose 4x daily. Close follow up with PCP in 1 week for further adjustment of medications.      CKD  -Baseline Cr from 8879-6420 appears to be around 1.5-1.9     JEROME cirrhosis   -no s/s of decompensation, appropriately on coreg.   -OP GI referral at discharge.     FAVIO  -Continue CPAP nightly.      Significant weight loss   -30 lbs down with intentionality + trulicity.     Hx invasive ductal carcinoma of the right breast  -Follows with Dr. Archer (Oncology)  -s/p right mastectomy on 5/23/23 showed a 2.2 cm intermediate grade IDC. 0/2 SLN involved. Genetic testing negative.   -Tamoxifen started in 6/2023.  -Continue home tamoxifen.      Anxiety/depression  -Continue Zoloft.      Hypothyroidism  -Continue levothyroxine.     GERD  -Continue PPI.     Hyperlipidemia  -Continue atorvastatin.      Discharge Follow Up Recommendations for outpatient labs/diagnostics:  -Follow up with PCP in 1 week  -Follow up with Dr. Spear in 2 weeks  -Placed ambulatory referral to GI on discharge for JEROME cirrhosis    Day of Discharge     HPI:   Evaluated patient this AM. Was sore at drain site. Drain site still with some leakage of fluid. Felt ready for discharge later today.       Vital Signs:   Temp:  [97.6 °F (36.4 °C)-97.9 °F (36.6 °C)] 97.6 °F (36.4 °C)  Heart Rate:  [59-78] 67  Resp:  [18-20] 18  BP: (144-159)/(68-82) 153/68      Physical Exam:  Constitutional: Awake, alert, resting comfortably  Respiratory: Clear to auscultation bilaterally, respiratory effort normal   Cardiovascular: Regular rate and rhythm  Gastrointestinal: Soft, nondistended, mildly tender to palpation, bandage in place, drain in place  Musculoskeletal:  No bilateral ankle edema  Psychiatric: Appropriate affect, cooperative  Neurologic: Alert and oriented x 3, no focal deficits, speech clear  Skin: No rashes    Pertinent  and/or Most Recent Results     LAB RESULTS:      Lab 03/20/24 0331 03/19/24 0354 03/18/24 0623 03/17/24 0726 03/17/24  0303   WBC 5.98 6.99 5.48  --  6.14   HEMOGLOBIN 11.6* 11.4* 12.7*  --  12.5*   HEMATOCRIT 35.7* 34.4* 39.6  --  37.7   PLATELETS 72* 74* 77*  --  79*   NEUTROS ABS  --  5.30 3.77  --  4.81   IMMATURE GRANS (ABS)  --  0.01 0.02  --  0.02   LYMPHS ABS  --  0.89 0.95  --  0.68*   MONOS ABS  --  0.68 0.59  --  0.53   EOS ABS  --  0.10 0.13  --  0.08   MCV 96.5 96.9 100.5*  --  96.2   LACTATE  --   --   --  1.3  --    PROTIME  --   --  14.9*  --  14.7*         Lab 03/20/24 0331 03/19/24 0354 03/18/24 0623 03/17/24 0726 03/17/24  0303   SODIUM 134* 138 135*  --  135*   POTASSIUM 4.1 4.3 4.6  --  4.3   CHLORIDE 101 103 102  --  102   CO2 21.0* 20.0* 19.0*  --  20.0*   ANION GAP 12.0 15.0 14.0  --  13.0   BUN 19 17 18  --  26*   CREATININE 1.33* 1.42* 1.28*  --  1.47*   EGFR 56.1* 51.9* 58.7*  --  49.7*   GLUCOSE 158* 153* 182*  --  246*   CALCIUM 8.0* 8.0* 8.1*  --  8.6   MAGNESIUM 1.8  --   --  1.5*  --    PHOSPHORUS 2.3*  --   --   --   --    HEMOGLOBIN A1C  --   --   --   --  7.10*         Lab 03/20/24  0331 03/19/24 0354 03/18/24 0623 03/17/24  0303   TOTAL PROTEIN 6.2 6.3 6.6 6.7   ALBUMIN 3.2* 3.5 3.9 3.8   GLOBULIN 3.0 2.8 2.7 2.9   ALT (SGPT) 15 13 9 9   AST (SGOT) 26 28 18 18   BILIRUBIN 0.4 0.4 0.5 0.5   ALK PHOS 63 56 70 81   LIPASE  --   --   --  350*         Lab 03/18/24  0623 03/17/24  0726 03/17/24  0303   HSTROP T  --  33* 35*   PROTIME 14.9*  --  14.7*   INR 1.16*  --  1.14*         Lab 03/17/24  0303   CHOLESTEROL 125   LDL CHOL 69   HDL CHOL 29*   TRIGLYCERIDES 156*         Lab 03/18/24  0929   ABO TYPING O   RH TYPING Negative   ANTIBODY SCREEN Negative         Brief Urine Lab Results  (Last result in the  past 365 days)        Color   Clarity   Blood   Leuk Est   Nitrite   Protein   CREAT   Urine HCG        03/17/24 0405 Yellow   Clear   Negative   Negative   Negative   30 mg/dL (1+)                 Microbiology Results (last 10 days)       ** No results found for the last 240 hours. **            US Gallbladder    Result Date: 3/17/2024  US GALLBLADDER Date of Exam: 3/17/2024 10:48 AM EDT Indication: eval for cholecystitis or biliary dilation. Comparison: CT abdomen and pelvis 3/17/2024 Technique: Grayscale and color Doppler ultrasound evaluation of the right upper quadrant was performed. Findings: Pancreas is normal in echogenicity. Liver has a coarsened echotexture with nodular contour consistent with cirrhotic morphology. No perihepatic ascites. No intrahepatic biliary ductal dilatation. Hepatopetal flow in the portal vein. Visualized hepatic veins are patent. Gallbladder present. Small amount of sludge in the gallbladder. Negative for gallbladder wall thickening. No pericholecystic fluid. Common bile duct measures 3 mm. The right kidney measures 10.3 x 5.9 cm. No right-sided hydronephrosis.     Impression: 1. Mild sludge in the gallbladder. No findings to indicate superimposed cholecystitis. 2. No biliary dilatation. 3. Cirrhosis. Electronically Signed: Ed Castañeda MD  3/17/2024 11:14 AM EDT  Workstation ID: [id number]    CT Abdomen Pelvis Without Contrast    Result Date: 3/17/2024  CT ABDOMEN PELVIS WO CONTRAST Date of Exam: 3/17/2024 2:31 AM EDT Indication: Abdominal pain, acute, nonlocalized. Comparison: CT abdomen pelvis dated 6/11/2023 Technique: Axial CT images were obtained of the abdomen and pelvis without the administration of contrast. Reconstructed coronal and sagittal images were also obtained. Automated exposure control and iterative construction methods were used. Findings: There is a stable benign perifissural nodule on the right within the right middle lobe. This is about 4 mm and the stability  suggests benignity. There is moderate to severe coronary artery calcific atherosclerosis. The heart is of normal size. There are attenuation changes in the neck of the gallbladder consistent with small stones. There is mild subtle fat stranding along the inferior margin of the gallbladder. This may indicate mild acute cholecystitis. There is no biliary ductal dilatation. There is cirrhotic morphology of the liver. The unenhanced bilateral adrenal glands, bilateral kidneys, pancreas and spleen appear normal. There is no acute inflammatory change of the large or small bowel. There is calcific change of the abdominal aorta. There are degenerative changes of the spine.     Impression: 1.Cholelithiasis with subtle fat stranding along the inferior margin of the gallbladder. This may indicate mild acute cholecystitis. 2.Cirrhotic morphology of the liver. Electronically Signed: Raji Gentile MD  3/17/2024 2:48 AM EDT  Workstation ID: [id number]     Results for orders placed in visit on 02/25/20    SCANNED VASCULAR STUDIES      Results for orders placed in visit on 02/25/20    SCANNED VASCULAR STUDIES      Results for orders placed during the hospital encounter of 03/20/23    Adult Transthoracic Echo Complete W/ Cont if Necessary Per Protocol    Interpretation Summary    Left ventricular ejection fraction appears to be 61 - 65%.    Left ventricular diastolic function is consistent with (grade I) impaired relaxation.    Calculated right ventricular systolic pressure from tricuspid regurgitation is 16 mmHg.      Plan for Follow-up of Pending Labs/Results: N/A    Discharge Details        Discharge Medications        New Medications        Instructions Start Date   HYDROcodone-acetaminophen 5-325 MG per tablet  Commonly known as: NORCO   1 tablet, Oral, Every 8 Hours PRN      polyethylene glycol 17 g packet  Commonly known as: MIRALAX   17 g, Oral, Daily PRN      sennosides-docusate 8.6-50 MG per tablet  Commonly known as:  PERICOLACE   2 tablets, Oral, 2 Times Daily PRN             Changes to Medications        Instructions Start Date   Insulin Glargine 100 UNIT/ML injection pen  Commonly known as: LANTUS SOLOSTAR  What changed: how much to take   34 Units, Subcutaneous, Nightly      Insulin Lispro (1 Unit Dial) 100 UNIT/ML solution pen-injector  Commonly known as: HumaLOG KwikPen  What changed: how much to take   5 Units, Subcutaneous, 3 Times Daily      ipratropium 0.06 % nasal spray  Commonly known as: ATROVENT  What changed: See the new instructions.   USE 2 SPRAYS IN EACH NOSTRIL AS DIRECTED BY PROVIDER DAILY             Continue These Medications        Instructions Start Date   Alpha-Lipoic Acid 600 MG tablet   600 mg, Oral, Daily, Ran out, last took on Wednesday 3/13.      B-D UF III MINI PEN NEEDLES 31G X 5 MM misc  Generic drug: Insulin Pen Needle   USE THREE TIMES A DAY      carvedilol 3.125 MG tablet  Commonly known as: COREG   3.125 mg, Oral, 2 Times Daily, TAKE 2 TABLETS EVERY 12 HOURS      Co Q 10 100 MG capsule   300 mg, Oral, Daily      colestipol 1 g tablet  Commonly known as: COLESTID   TAKE 2 TABLETS TWICE A DAY      ferrous sulfate 325 (65 FE) MG tablet  Commonly known as: FeroSul   325 mg, Oral, 2 Times Daily      Flaxseed Oil 1400 MG capsule   1 capsule, Oral, 2 Times Daily      FreeStyle [place] 2 [place] device   1 Device, Does not apply, Every 14 Days      FreeStyle [place] 2 Sensor misc   1 each, Does not apply, Every 14 Days      gabapentin 800 MG tablet  Commonly known as: NEURONTIN   800 mg, Oral, 4 Times Daily      gemfibrozil 600 MG tablet  Commonly known as: LOPID   TAKE 1 TABLET TWICE A DAY      levothyroxine 75 MCG tablet  Commonly known as: SYNTHROID, LEVOTHROID   75 mcg, Oral, Daily      multivitamin tablet tablet  Commonly known as: THERAGRAN   1 tablet, Oral, Daily      omeprazole 40 MG capsule  Commonly known as: priLOSEC   TAKE 1 CAPSULE DAILY      saccharomyces boulardii 250 MG capsule  Commonly  known as: FLORASTOR   250 mg, Oral, 2 Times Daily      sertraline 100 MG tablet  Commonly known as: ZOLOFT   TAKE 1 TABLET DAILY      simvastatin 40 MG tablet  Commonly known as: ZOCOR   Every 24 Hours      spironolactone 25 MG tablet  Commonly known as: ALDACTONE   TAKE 1 TABLET DAILY      tamoxifen 20 MG chemo tablet  Commonly known as: NOLVADEX   20 mg, Oral, Daily      temazepam 7.5 MG capsule  Commonly known as: Restoril   7.5 mg, Oral, Nightly PRN      tiZANidine 4 MG tablet  Commonly known as: ZANAFLEX   4 mg, Oral, Every 8 Hours PRN      Trulicity 4.5 MG/0.5ML solution pen-injector  Generic drug: Dulaglutide   4.5 mg, Subcutaneous, Weekly      VITAMIN B COMPLEX PO   1 tablet, Oral, Every Night at Bedtime      vitamin D 1.25 MG (57777 UT) capsule capsule  Commonly known as: ERGOCALCIFEROL   5,000 Units, Oral, 2 Times Daily               Allergies   Allergen Reactions    Glucophage Xr [Metformin Hcl Er] Diarrhea and Other (See Comments)     Glucophage XR TB24: Adverse Reaction: Severe GI issues.    Metformin Nausea And Vomiting     Other reaction(s): SEVERE INTESTIONAL ISSUES  Other reaction(s): SEVERE GI ISSUES    Glucophage XR TB24  MetFORMIN HCl TABS    Tetracyclines & Related Nausea Only     Adverse Reaction    Chlorhexidine Rash         Discharge Disposition:  Home or Self Care    Diet:  Hospital:No active diet order        Activity:      Restrictions or Other Recommendations:  N/A       CODE STATUS:    Code Status and Medical Interventions:   Ordered at: 03/17/24 0500     Code Status (Patient has no pulse and is not breathing):    CPR (Attempt to Resuscitate)     Medical Interventions (Patient has pulse or is breathing):    Full Support       Future Appointments   Date Time Provider Department Center   3/27/2024 11:00 AM Tc Ramirez MD [facility]   6/14/2024  3:15 PM Pau Colin MD [facility]   8/6/2024  2:00 PM Pooja Prieto APRN [facility]       Additional Instructions  for the Follow-ups that You Need to Schedule       Discharge Follow-up with PCP   As directed       Currently Documented PCP:    Tc Ramirez MD    PCP Phone Number:    [phone]     Follow Up Details: in 1 week        Discharge Follow-up with Specified Provider: Dr. David Spear (General Surgery); 2 Weeks   As directed      To: Dr. David Spear (General Surgery)   Follow Up: 2 Weeks                      Priyanka Roy DO  03/20/24      Time Spent on Discharge:  I spent  45  minutes on this discharge activity which included: face-to-face encounter with the patient, reviewing the data in the system, coordination of the care with the nursing staff as well as consultants, documentation, and entering orders.

## 2024-03-20 NOTE — PROGRESS NOTES
"Patient Name:  Elton Funez  YOB: 1950  [id number]    Surgery Progress Note    Date of visit: 3/20/2024      Subjective: No acute events overnight.  Passing flatus.  No bowel movement.  Tolerated regular diet without difficulty.  Reports abdominal pain is improving          Objective:     /82 (BP Location: Left arm, Patient Position: Lying)   Pulse 59   Temp 97.6 °F (36.4 °C) (Oral)   Resp 20   Ht 172.7 cm (67.99\")   Wt 91.2 kg (201 lb 1 oz)   SpO2 92%   BMI 30.58 kg/m²     Intake/Output Summary (Last 24 hours) at 3/20/2024 0706  Last data filed at 3/20/2024 0200  Gross per 24 hour   Intake --   Output 255 ml   Net -255 ml       GEN:   Awake, alert, in no acute distress, resting comfortably in bed   CV:   Regular rate and rhythm  L:  Symmetric expansion, not labored on room air  Abd:  Soft, not obviously distended, incisions are clean dry and intact, HOLLEY drain in the right upper quadrant is serosanguineous and not bilious  Ext:  No cyanosis, clubbing, or edema    Recent labs that are back at this time have been reviewed.           Assessment/ Plan:    Mr. Montano is a 73-year-old gentleman with history of Lopez cirrhosis, hypertension, hyperlipidemia, heart failure, diabetes, and chronic kidney disease with gallstone pancreatitis      # Gallstone pancreatitis, cholelithiasis, cirrhosis  -Postoperative day 2 following laparoscopic cholecystectomy and liver biopsy  -Vitals and labs acceptable  -Tolerating regular diet.  Passing flatus.  HOLLEY output is serosanguineous  -No further need for antibiotics from my standpoint  -Okay for discharge from my standpoint.  Should follow-up with me in 2 weeks.  HOLLEY drain can be removed prior to discharge         Emerson Spear MD  3/20/2024  07:06 EDT      "

## 2024-03-20 NOTE — CASE MANAGEMENT/SOCIAL WORK
Case Management Discharge Note      Final Note: I spoke with the patient and his wife at the bedside. Patient plans to discharge home today. Patient and wife denied any needs from . Wife plans to transport the patient home.         Selected Continued Care - Admitted Since 3/17/2024       Destination    No services have been selected for the patient.                Durable Medical Equipment    No services have been selected for the patient.                Dialysis/Infusion    No services have been selected for the patient.                Home Medical Care    No services have been selected for the patient.                Therapy    No services have been selected for the patient.                Community Resources    No services have been selected for the patient.                Community & DME    No services have been selected for the patient.                         Final Discharge Disposition Code: 01 - home or self-care

## 2024-03-21 ENCOUNTER — TRANSITIONAL CARE MANAGEMENT TELEPHONE ENCOUNTER (OUTPATIENT)
Dept: CALL CENTER | Facility: HOSPITAL | Age: 74
End: 2024-03-21
Payer: MEDICARE

## 2024-03-21 ENCOUNTER — TELEPHONE (OUTPATIENT)
Dept: INTERNAL MEDICINE | Facility: CLINIC | Age: 74
End: 2024-03-21
Payer: MEDICARE

## 2024-03-21 DIAGNOSIS — R55 SYNCOPE, UNSPECIFIED SYNCOPE TYPE: ICD-10-CM

## 2024-03-21 NOTE — OUTREACH NOTE
Call Center TCM Note      Flowsheet Row Responses   [facility] patient discharged from? [place]   Does the patient have one of the following disease processes/diagnoses(primary or secondary)? General Surgery   TCM attempt successful? No   Unsuccessful attempts Attempt 2            Jamal Gu RN    3/21/2024, 10:42 EDT

## 2024-03-21 NOTE — OUTREACH NOTE
Call Center TCM Note      Flowsheet Row Responses   [facility] patient discharged from? [place]   Does the patient have one of the following disease processes/diagnoses(primary or secondary)? General Surgery   TCM attempt successful? No   Unsuccessful attempts Attempt 1   Call Status Left message            Jamal Gu RN    3/21/2024, 09:59 EDT

## 2024-03-21 NOTE — TELEPHONE ENCOUNTER
Caller: [facility] - [place], [place] - [address] within [facility] - [phone] [facility] [phone] FX    Relationship: Pharmacy    Best call back number: [phone] REF#[id number]    Which medication are you concerned about: CARVEDILOL 3.125MG     Who prescribed you this medication: DR ANTOINE    What are your concerns: PHARMACY NEEDS CLARIFICATION ON DIRECTIONS FOR THIS MEDICATION. PLEASE ADVISE

## 2024-03-22 ENCOUNTER — TRANSITIONAL CARE MANAGEMENT TELEPHONE ENCOUNTER (OUTPATIENT)
Dept: CALL CENTER | Facility: HOSPITAL | Age: 74
End: 2024-03-22
Payer: MEDICARE

## 2024-03-22 NOTE — TELEPHONE ENCOUNTER
I have called and left a message with return phone number    Relay:  CARVEDILOL 3.125MG - PHARMACY NEEDS CLARIFICATION ON DIRECTIONS FOR THIS MEDICATION.   Please verify with patient exactly how he is taking this and send in to pharmacy.  3 month supply with refill x 2.  Tc Ramirez MD  07:46 EDT  03/22/24

## 2024-03-22 NOTE — OUTREACH NOTE
Call Center TCM Note      Flowsheet Row Responses   [facility] patient discharged from? [place]   Does the patient have one of the following disease processes/diagnoses(primary or secondary)? General Surgery   TCM attempt successful? Yes   Call start time 0856   Call end time 0904   Discharge diagnosis Pancreatitis, CHOLECYSTECTOMY LAPAROSCOPIC AND LARAROSCOPIC LIVER BIOPSY   Is patient permission given to speak with other caregiver? Yes   Person spoke with today (if not patient) and relationship Wife, Pooja Dove reviewed with patient/caregiver? Yes   Does the patient have all medications related to this admission filled (includes all antibiotics, pain medications, etc.) Yes  [wife reports did not fill the miralax, already had at home]   Is the patient taking all medications as directed (includes completed medication regime)? Yes   Comments PCP DR Ramirez. Hospital follow up in place for 3/27/24  11am   Does the patient have an appointment with their PCP within 7-14 days of discharge? Yes   Has home health visited the patient within 72 hours of discharge? N/A   Psychosocial issues? No   Did the patient receive a copy of their discharge instructions? Yes   Nursing interventions Reviewed instructions with patient   What is the patient's perception of their health status since discharge? Improving   Is the patient /caregiver able to teach back basic post-op care? Practice 'cough and deep breath', Keep incision areas clean,dry and protected   Is the patient/caregiver able to teach back signs and symptoms of incisional infection? Increased redness, swelling or pain at the incisonal site, Increased drainage or bleeding, Pus or odor from incision, Fever   Is the patient/caregiver able to teach back steps to recovery at home? Set small, achievable goals for return to baseline health, Rest and rebuild strength, gradually increase activity, Eat a well-balance diet   If the patient is a current smoker, are they able  to teach back resources for cessation? Not a smoker   Is the patient/caregiver able to teach back the hierarchy of who to call/visit for symptoms/problems? PCP, Specialist, Home health nurse, Urgent Care, ED, 911 Yes   TCM call completed? Yes   Call end time 0904   Would this patient benefit from a Referral to [facility] Social Work? No   Is the patient interested in additional calls from an ambulatory ? No            Floridalma Oneil RN    3/22/2024, 09:06 EDT

## 2024-03-22 NOTE — TELEPHONE ENCOUNTER
Please verify with patient exactly how he is taking this and send in to pharmacy.  3 month supply with refill x 2.  Tc Ramirez MD  07:46 EDT  03/22/24

## 2024-03-25 NOTE — TELEPHONE ENCOUNTER
Left message voicemail for patient to please return call.  Ofc # given.     RELAY:  We need to verify the strength and directions of his Carvedilol so we can send in rx to his pharmacy.    Document information

## 2024-03-26 RX ORDER — CARVEDILOL 3.12 MG/1
3.12 TABLET ORAL EVERY 12 HOURS SCHEDULED
Qty: 180 TABLET | Refills: 2 | Status: SHIPPED | OUTPATIENT
Start: 2024-03-26

## 2024-03-26 NOTE — TELEPHONE ENCOUNTER
Name: Elton Funez      Relationship: Self      Best Callback Number: [phone]      HUB PROVIDED THE RELAY MESSAGE FROM THE OFFICE      PATIENT: HAS FURTHER QUESTIONS AND WOULD LIKE A CALL BACK AT THE FOLLOWING PHONE NUMBER     ADDITIONAL INFORMATION: CARVEDILOL 3.125MG ONE PILL, TWICE DAILY

## 2024-03-27 ENCOUNTER — OFFICE VISIT (OUTPATIENT)
Dept: INTERNAL MEDICINE | Facility: CLINIC | Age: 74
End: 2024-03-27
Payer: MEDICARE

## 2024-03-27 VITALS
SYSTOLIC BLOOD PRESSURE: 100 MMHG | RESPIRATION RATE: 18 BRPM | TEMPERATURE: 97.5 F | BODY MASS INDEX: 30.42 KG/M2 | WEIGHT: 200 LBS | DIASTOLIC BLOOD PRESSURE: 62 MMHG | HEART RATE: 64 BPM

## 2024-03-27 DIAGNOSIS — K80.00 CALCULUS OF GALLBLADDER WITH ACUTE CHOLECYSTITIS WITHOUT OBSTRUCTION: Primary | ICD-10-CM

## 2024-03-27 LAB
ALBUMIN SERPL-MCNC: 4.1 G/DL (ref 3.5–5.2)
ALBUMIN/GLOB SERPL: 1.3 G/DL
ALP SERPL-CCNC: 103 U/L (ref 39–117)
ALT SERPL W P-5'-P-CCNC: 16 U/L (ref 1–41)
ANION GAP SERPL CALCULATED.3IONS-SCNC: 11 MMOL/L (ref 5–15)
AST SERPL-CCNC: 23 U/L (ref 1–40)
BASOPHILS # BLD AUTO: 0.05 10*3/MM3 (ref 0–0.2)
BASOPHILS NFR BLD AUTO: 0.7 % (ref 0–1.5)
BILIRUB SERPL-MCNC: 0.6 MG/DL (ref 0–1.2)
BUN SERPL-MCNC: 22 MG/DL (ref 8–23)
BUN/CREAT SERPL: 13.3 (ref 7–25)
CALCIUM SPEC-SCNC: 9.2 MG/DL (ref 8.6–10.5)
CHLORIDE SERPL-SCNC: 105 MMOL/L (ref 98–107)
CO2 SERPL-SCNC: 25 MMOL/L (ref 22–29)
CREAT SERPL-MCNC: 1.66 MG/DL (ref 0.76–1.27)
DEPRECATED RDW RBC AUTO: 45.1 FL (ref 37–54)
EGFRCR SERPLBLD CKD-EPI 2021: 43 ML/MIN/1.73
EOSINOPHIL # BLD AUTO: 0.38 10*3/MM3 (ref 0–0.4)
EOSINOPHIL NFR BLD AUTO: 5.2 % (ref 0.3–6.2)
ERYTHROCYTE [DISTWIDTH] IN BLOOD BY AUTOMATED COUNT: 12.8 % (ref 12.3–15.4)
GLOBULIN UR ELPH-MCNC: 3.2 GM/DL
GLUCOSE SERPL-MCNC: 148 MG/DL (ref 65–99)
HCT VFR BLD AUTO: 39.9 % (ref 37.5–51)
HGB BLD-MCNC: 13.2 G/DL (ref 13–17.7)
IMM GRANULOCYTES # BLD AUTO: 0.08 10*3/MM3 (ref 0–0.05)
IMM GRANULOCYTES NFR BLD AUTO: 1.1 % (ref 0–0.5)
LYMPHOCYTES # BLD AUTO: 1.53 10*3/MM3 (ref 0.7–3.1)
LYMPHOCYTES NFR BLD AUTO: 21.1 % (ref 19.6–45.3)
MCH RBC QN AUTO: 31.8 PG (ref 26.6–33)
MCHC RBC AUTO-ENTMCNC: 33.1 G/DL (ref 31.5–35.7)
MCV RBC AUTO: 96.1 FL (ref 79–97)
MONOCYTES # BLD AUTO: 0.65 10*3/MM3 (ref 0.1–0.9)
MONOCYTES NFR BLD AUTO: 9 % (ref 5–12)
NEUTROPHILS NFR BLD AUTO: 4.56 10*3/MM3 (ref 1.7–7)
NEUTROPHILS NFR BLD AUTO: 62.9 % (ref 42.7–76)
NRBC BLD AUTO-RTO: 0 /100 WBC (ref 0–0.2)
PLATELET # BLD AUTO: 150 10*3/MM3 (ref 140–450)
PMV BLD AUTO: 11.9 FL (ref 6–12)
POTASSIUM SERPL-SCNC: 4.8 MMOL/L (ref 3.5–5.2)
PROT SERPL-MCNC: 7.3 G/DL (ref 6–8.5)
RBC # BLD AUTO: 4.15 10*6/MM3 (ref 4.14–5.8)
SODIUM SERPL-SCNC: 141 MMOL/L (ref 136–145)
WBC NRBC COR # BLD AUTO: 7.25 10*3/MM3 (ref 3.4–10.8)

## 2024-03-27 PROCEDURE — 85025 COMPLETE CBC W/AUTO DIFF WBC: CPT | Performed by: INTERNAL MEDICINE

## 2024-03-27 PROCEDURE — 80053 COMPREHEN METABOLIC PANEL: CPT | Performed by: INTERNAL MEDICINE

## 2024-03-27 NOTE — PROGRESS NOTES
Chief Complaint   Patient presents with    Hospital Follow Up Visit     [facility] 3/17 -3/20 pancreatitis, choleycystectomy       History of Present Illness      The patient presents to the office for a follow-up visit from a recent hospitalization. The patient was hospitalized from 17-Mar-2024 through 20-Mar-2024 with pancreatitis and cholelithiasis. The records have been received and reviewed. The medication list has been reconciled. The hospital stay was uncomplicated. His in hospital treatment consisted of a biopsy, bowel rest and surgery.  The patient underwent a laparoscopic cholecystectomy   liver biopsy.       Since leaving the hospital he reports that he is back to normal. He denies abdominal pain, itchy watery eyes, bone pain, chills, congestion, a dry cough, a wet cough, decreased appetite, diarrhea, dysuria, ear drainage, ear pain, eye drainage, facial pain, fever, a headache, joint pain, myalgias, nasal discharge, nausea, neck stiffness, night sweats, a rash, sneezing, a sore throat, vomiting or wheezing. He also reports that he does not have chest pain, dyspnea, edema, syncope, blurred vision or palpitations.    The patient presents for a follow-up related to pancreatitis. The symptoms are not associated with fever. The patient has not had excessive weight loss or a history of sickle cell disease.    Medications      Current Outpatient Medications:     Alpha-Lipoic Acid 600 MG tablet, Take 1 tablet by mouth Daily. Ran out, last took on Wednesday 3/13., Disp: , Rfl:     B Complex Vitamins (VITAMIN B COMPLEX PO), Take 1 tablet by mouth every night at bedtime., Disp: , Rfl:     carvedilol (COREG) 3.125 MG tablet, Take 1 tablet by mouth Every 12 (Twelve) Hours., Disp: 180 tablet, Rfl: 2    Coenzyme Q10 (Co Q 10) 100 MG capsule, Take 300 mg by mouth Daily., Disp: , Rfl:     colestipol (COLESTID) 1 g tablet, TAKE 2 TABLETS TWICE A DAY, Disp: 360 tablet, Rfl: 3    Continuous Blood Gluc  (FreeStyle [place] 2  [place]) device, 1 Device Every 14 (Fourteen) Days., Disp: 1 each, Rfl: 0    Continuous Blood Gluc Sensor (FreeStyle [place] 2 Sensor) misc, 1 each Every 14 (Fourteen) Days., Disp: 6 each, Rfl: 3    Dulaglutide (Trulicity) 4.5 MG/0.5ML solution pen-injector, Inject 0.5 mL under the skin into the appropriate area as directed 1 (One) Time Per Week., Disp: 6 mL, Rfl: 3    ferrous sulfate (FeroSul) 325 (65 FE) MG tablet, Take 1 tablet by mouth 2 (Two) Times a Day., Disp: 180 tablet, Rfl: 1    Flaxseed, Linseed, (Flaxseed Oil) 1400 MG capsule, Take 1 capsule by mouth 2 (Two) Times a Day., Disp: , Rfl:     gabapentin (NEURONTIN) 800 MG tablet, TAKE 1 TABLET FOUR TIMES A DAY (Patient taking differently: Take 2 tablets by mouth 2 (Two) Times a Day. 800mg x2 tabs BID), Disp: 360 tablet, Rfl: 0    gemfibrozil (LOPID) 600 MG tablet, TAKE 1 TABLET TWICE A DAY, Disp: 180 tablet, Rfl: 3    Insulin Glargine (LANTUS SOLOSTAR) 100 UNIT/ML injection pen, Inject 34 Units under the skin into the appropriate area as directed Every Night. (Patient taking differently: Inject 68 Units under the skin into the appropriate area as directed Every Night.), Disp: , Rfl:     Insulin Lispro, 1 Unit Dial, (HumaLOG KwikPen) 100 UNIT/ML solution pen-injector, Inject 5 Units under the skin into the appropriate area as directed 3 (Three) Times a Day. (Patient taking differently: Inject 26 Units under the skin into the appropriate area as directed 3 (Three) Times a Day.), Disp: , Rfl:     Insulin Pen Needle (B-D UF III MINI PEN NEEDLES) 31G X 5 MM misc, USE THREE TIMES A DAY, Disp: 270 each, Rfl: 3    ipratropium (ATROVENT) 0.06 % nasal spray, USE 2 SPRAYS IN EACH NOSTRIL AS DIRECTED BY PROVIDER DAILY (Patient taking differently: 2 sprays into the nostril(s) as directed by provider Daily As Needed.), Disp: 45 mL, Rfl: 1    levothyroxine (SYNTHROID, LEVOTHROID) 75 MCG tablet, Take 1 tablet by mouth Daily., Disp: 90 tablet, Rfl: 3    Multiple  Vitamins-Minerals (MULTIVITAMIN PO), Take 1 tablet by mouth Daily., Disp: , Rfl:     omeprazole (priLOSEC) 40 MG capsule, TAKE 1 CAPSULE DAILY, Disp: 90 capsule, Rfl: 3    saccharomyces boulardii (FLORASTOR) 250 MG capsule, Take 1 capsule by mouth 2 (Two) Times a Day. (Patient taking differently: Take 1 capsule by mouth Daily.), Disp: 30 capsule, Rfl: 0    sertraline (ZOLOFT) 100 MG tablet, TAKE 1 TABLET DAILY, Disp: 90 tablet, Rfl: 3    spironolactone (ALDACTONE) 25 MG tablet, TAKE 1 TABLET DAILY (Patient taking differently: Take 1 tablet by mouth Daily.), Disp: 90 tablet, Rfl: 3    tamoxifen (NOLVADEX) 20 MG chemo tablet, Take 1 tablet by mouth Daily., Disp: 90 tablet, Rfl: 3    temazepam (Restoril) 7.5 MG capsule, Take 1 capsule by mouth At Night As Needed for Sleep., Disp: , Rfl:     tiZANidine (ZANAFLEX) 4 MG tablet, Take 1 tablet by mouth Every 8 (Eight) Hours As Needed for Muscle Spasms., Disp: , Rfl:     vitamin D3 125 MCG (5000 UT) capsule capsule, Take 1 capsule by mouth 2 (Two) Times a Day., Disp: , Rfl:      Allergies    Allergies   Allergen Reactions    Glucophage Xr [Metformin Hcl Er] Diarrhea and Other (See Comments)     Glucophage XR TB24: Adverse Reaction: Severe GI issues.    Metformin Nausea And Vomiting     Other reaction(s): SEVERE INTESTIONAL ISSUES  Other reaction(s): SEVERE GI ISSUES    Glucophage XR TB24  MetFORMIN HCl TABS    Tetracyclines & Related Nausea Only     Adverse Reaction    Chlorhexidine Rash       Problem List    Patient Active Problem List   Diagnosis    Stage 3a chronic kidney disease    Gastroesophageal reflux disease without esophagitis    Dyslipidemia, goal LDL below 70    Primary hypertension    Obesity, Class I, BMI 30-34.9    FAVIO (obstructive sleep apnea)    Cervical radiculopathy    Type 2 diabetes mellitus with diabetic polyneuropathy, with long-term current use of insulin    Circadian rhythm sleep disorder, delayed sleep phase type    Mild nonproliferative diabetic  retinopathy of left eye without macular edema associated with type 2 diabetes mellitus    Acquired hypothyroidism    Anemia of chronic disease    Liver cirrhosis secondary to JEROME    Portal hypertensive gastropathy    Grade I diastolic dysfunction    Combined forms of age-related cataract of both eyes    Major depressive disorder with single episode, in full remission    Malignant neoplasm of right breast in male, estrogen receptor positive    Malignant neoplasm of overlapping sites of right female breast    Sepsis, due to unspecified organism, unspecified whether acute organ dysfunction present    Idiopathic acute pancreatitis    Pancreatitis    Acute cholecystitis    HLD (hyperlipidemia)    History of breast cancer    Elevated troponin    T2DM (type 2 diabetes mellitus)       Medications, Allergies, Problems List and Past History were reviewed and updated.    Physical Examination    /62 (BP Location: Left arm, Patient Position: Sitting, Cuff Size: Adult)   Pulse 64   Temp 97.5 °F (36.4 °C) (Infrared)   Resp 18   Wt 90.7 kg (200 lb)   BMI 30.42 kg/m²       HEENT: Head- Normocephalic Atraumatic. Facies- Within normal limits. Pinnas- Normal texture and shape bilaterally. Canals- Normal bilaterally. TMs- Normal bilaterally. Nares- Patent bilaterally. Nasal Septum- is normal. There is no tenderness to palpation over the frontal or maxillary sinuses. Lids- Normal bilaterally. Conjunctiva- Clear bilaterally. Sclera- Anicteric bilaterally. Oropharynx- Moist with no lesions. Tonsils- No enlargement, erythema or exudate.    Neck: Thyroid- non enlarged, symmetric and has no nodules. No bruits are detected. ROM- Normal Range of Motion with no rigidity.    Lungs: Auscultation- Clear to auscultation bilaterally. There are no retractions, clubbing or cyanosis. The Expiratory to Inspiratory ratio is equal.    Lymph Nodes: Cervical- no enlarged lymph nodes noted.    Cardiovascular: There are no carotid bruits. Heart-  Normal Rate with Regular rhythm and no murmurs. There are no gallops. There are no rubs. In the lower extremities there is no edema. The upper extremities do not have edema.    Abdomen: Noted to have scarring but is not noted to have rigidity, a mass, a hernia, an enlarged liver, an enlarged spleen, an enlarged right kidney, an enlarged left kidney, tenderness or pulsatile mass. Well healed lap osmany incision sites. CDI.    Impression and Assessment    Cholelithiasis. Gallstone Pancreatitis.    Plan    Cholelithiasis Plan: Further plans will be made after the tests are reviewed.    Diagnoses and all orders for this visit:    1. Calculus of gallbladder with acute cholecystitis without obstruction (Primary)  -     CBC & Differential; Future  -     Comprehensive Metabolic Panel; Future  -     CBC & Differential  -     Comprehensive Metabolic Panel        Return to Office    The patient was instructed to return for follow-up in 4 months. The patient was instructed to return sooner if the condition changes, worsens, or does not resolve.

## 2024-03-29 ENCOUNTER — TELEPHONE (OUTPATIENT)
Dept: ENDOCRINOLOGY | Facility: CLINIC | Age: 74
End: 2024-03-29
Payer: MEDICARE

## 2024-03-29 NOTE — TELEPHONE ENCOUNTER
Since Trulicity can affect pancreas I would prefer to not restart it at this time. Pancreatitis most likely happened because of the gallstones, but it is still better to give some rest to the pancreas. We will consider it in the future. Please increase insulin dose by 3-5 units (both meal and basal) if you have high glucose levels.

## 2024-03-29 NOTE — TELEPHONE ENCOUNTER
PATIENT STATES THAT HE WAS TREATED FOR PANCREATITIS ON 3/17/2024 AND WAS ADMITTED TO [facility] WHERE HIS GALL BLADDER WAS REMOVED. PATIENT STATES THAT DR RUVALCABA ADVISED HIM TO STOP THE TRULICITY IF HE HAD ANOTHER PANCREATIC ATTACK. PATIENT HAS NOT HAD THIS MEDICATION SINCE 3/17/2024 AND WOULD LIKE TO BE ADVISED IF HE SHOULD RESUME IT AT THIS TIME.    CALL BACK [phone] PATIENT ASKED FOR RETURN CALL TO BE MADE TO SPOUSE, BETITO.

## 2024-04-20 DIAGNOSIS — E11.42 DIABETIC PERIPHERAL NEUROPATHY ASSOCIATED WITH TYPE 2 DIABETES MELLITUS: ICD-10-CM

## 2024-04-22 NOTE — TELEPHONE ENCOUNTER
Rx Refill Note  Requested Prescriptions     Pending Prescriptions Disp Refills    gabapentin (NEURONTIN) 800 MG tablet [Pharmacy Med Name: GABAPENTIN TABS 800MG] 360 tablet 0     Sig: TAKE 1 TABLET FOUR TIMES A DAY      Last office visit with prescribing clinician: 3/6/2024   Last telemedicine visit with prescribing clinician: Visit date not found   Next office visit with prescribing clinician: 6/14/2024                         Would you like a call back once the refill request has been completed: [] Yes [] No    If the office needs to give you a call back, can they leave a voicemail: [] Yes [] No    Belia Cruz MA  04/22/24, 08:57 EDT

## 2024-04-23 RX ORDER — GABAPENTIN 800 MG/1
800 TABLET ORAL 4 TIMES DAILY
Qty: 360 TABLET | Refills: 0 | Status: SHIPPED | OUTPATIENT
Start: 2024-04-23

## 2024-05-27 DIAGNOSIS — Z17.0 MALIGNANT NEOPLASM OF RIGHT BREAST IN MALE, ESTROGEN RECEPTOR POSITIVE, UNSPECIFIED SITE OF BREAST: Primary | ICD-10-CM

## 2024-05-27 DIAGNOSIS — C50.921 MALIGNANT NEOPLASM OF RIGHT BREAST IN MALE, ESTROGEN RECEPTOR POSITIVE, UNSPECIFIED SITE OF BREAST: Primary | ICD-10-CM

## 2024-05-28 RX ORDER — TIZANIDINE 4 MG/1
4 TABLET ORAL 2 TIMES DAILY PRN
Qty: 180 TABLET | Refills: 3 | Status: SHIPPED | OUTPATIENT
Start: 2024-05-28

## 2024-05-28 RX ORDER — TAMOXIFEN CITRATE 20 MG/1
20 TABLET ORAL DAILY
Qty: 90 TABLET | Refills: 3 | Status: SHIPPED | OUTPATIENT
Start: 2024-05-28

## 2024-06-03 ENCOUNTER — LAB (OUTPATIENT)
Dept: LAB | Facility: HOSPITAL | Age: 74
End: 2024-06-03
Payer: MEDICARE

## 2024-06-03 ENCOUNTER — OFFICE VISIT (OUTPATIENT)
Dept: GASTROENTEROLOGY | Facility: CLINIC | Age: 74
End: 2024-06-03
Payer: MEDICARE

## 2024-06-03 VITALS
HEART RATE: 67 BPM | DIASTOLIC BLOOD PRESSURE: 80 MMHG | SYSTOLIC BLOOD PRESSURE: 138 MMHG | OXYGEN SATURATION: 96 % | WEIGHT: 204 LBS | HEIGHT: 68 IN | BODY MASS INDEX: 30.92 KG/M2 | TEMPERATURE: 97.3 F

## 2024-06-03 DIAGNOSIS — K74.60 LIVER CIRRHOSIS SECONDARY TO NASH: ICD-10-CM

## 2024-06-03 DIAGNOSIS — K80.80 BILIARY CALCULUS OF OTHER SITE WITHOUT OBSTRUCTION: ICD-10-CM

## 2024-06-03 DIAGNOSIS — Z90.49 HISTORY OF CHOLECYSTECTOMY: ICD-10-CM

## 2024-06-03 DIAGNOSIS — Z11.59 ENCOUNTER FOR SCREENING FOR OTHER VIRAL DISEASES: ICD-10-CM

## 2024-06-03 DIAGNOSIS — I85.00 ESOPHAGEAL VARICES WITHOUT BLEEDING, UNSPECIFIED ESOPHAGEAL VARICES TYPE: ICD-10-CM

## 2024-06-03 DIAGNOSIS — K76.6 PORTAL HYPERTENSIVE GASTROPATHY: ICD-10-CM

## 2024-06-03 DIAGNOSIS — K75.81 LIVER CIRRHOSIS SECONDARY TO NASH: ICD-10-CM

## 2024-06-03 DIAGNOSIS — K31.89 PORTAL HYPERTENSIVE GASTROPATHY: ICD-10-CM

## 2024-06-03 DIAGNOSIS — E03.9 ACQUIRED HYPOTHYROIDISM: ICD-10-CM

## 2024-06-03 DIAGNOSIS — K21.9 GASTROESOPHAGEAL REFLUX DISEASE, UNSPECIFIED WHETHER ESOPHAGITIS PRESENT: Primary | ICD-10-CM

## 2024-06-03 LAB
ALBUMIN SERPL-MCNC: 4.7 G/DL (ref 3.5–5.2)
ALBUMIN/GLOB SERPL: 1.5 G/DL
ALP SERPL-CCNC: 95 U/L (ref 39–117)
ALT SERPL W P-5'-P-CCNC: 17 U/L (ref 1–41)
ANION GAP SERPL CALCULATED.3IONS-SCNC: 9 MMOL/L (ref 5–15)
AST SERPL-CCNC: 18 U/L (ref 1–40)
BASOPHILS # BLD AUTO: 0.03 10*3/MM3 (ref 0–0.2)
BASOPHILS NFR BLD AUTO: 0.4 % (ref 0–1.5)
BILIRUB SERPL-MCNC: 0.7 MG/DL (ref 0–1.2)
BUN SERPL-MCNC: 31 MG/DL (ref 8–23)
BUN/CREAT SERPL: 17.8 (ref 7–25)
CALCIUM SPEC-SCNC: 9.7 MG/DL (ref 8.6–10.5)
CHLORIDE SERPL-SCNC: 106 MMOL/L (ref 98–107)
CO2 SERPL-SCNC: 24 MMOL/L (ref 22–29)
CREAT SERPL-MCNC: 1.74 MG/DL (ref 0.76–1.27)
DEPRECATED RDW RBC AUTO: 45.3 FL (ref 37–54)
EGFRCR SERPLBLD CKD-EPI 2021: 40.4 ML/MIN/1.73
EOSINOPHIL # BLD AUTO: 0.18 10*3/MM3 (ref 0–0.4)
EOSINOPHIL NFR BLD AUTO: 2.6 % (ref 0.3–6.2)
ERYTHROCYTE [DISTWIDTH] IN BLOOD BY AUTOMATED COUNT: 13 % (ref 12.3–15.4)
GLOBULIN UR ELPH-MCNC: 3.2 GM/DL
GLUCOSE SERPL-MCNC: 143 MG/DL (ref 65–99)
HBV SURFACE AG SERPL QL IA: NORMAL
HCT VFR BLD AUTO: 39.9 % (ref 37.5–51)
HGB BLD-MCNC: 13.3 G/DL (ref 13–17.7)
IMM GRANULOCYTES # BLD AUTO: 0.03 10*3/MM3 (ref 0–0.05)
IMM GRANULOCYTES NFR BLD AUTO: 0.4 % (ref 0–0.5)
INR PPP: 1.07 (ref 0.89–1.12)
IRON 24H UR-MRATE: 179 MCG/DL (ref 59–158)
IRON SATN MFR SERPL: 31 % (ref 20–50)
LYMPHOCYTES # BLD AUTO: 1.53 10*3/MM3 (ref 0.7–3.1)
LYMPHOCYTES NFR BLD AUTO: 22.2 % (ref 19.6–45.3)
MCH RBC QN AUTO: 32.2 PG (ref 26.6–33)
MCHC RBC AUTO-ENTMCNC: 33.3 G/DL (ref 31.5–35.7)
MCV RBC AUTO: 96.6 FL (ref 79–97)
MONOCYTES # BLD AUTO: 0.81 10*3/MM3 (ref 0.1–0.9)
MONOCYTES NFR BLD AUTO: 11.8 % (ref 5–12)
NEUTROPHILS NFR BLD AUTO: 4.3 10*3/MM3 (ref 1.7–7)
NEUTROPHILS NFR BLD AUTO: 62.6 % (ref 42.7–76)
NRBC BLD AUTO-RTO: 0 /100 WBC (ref 0–0.2)
PLATELET # BLD AUTO: 125 10*3/MM3 (ref 140–450)
PMV BLD AUTO: 12.7 FL (ref 6–12)
POTASSIUM SERPL-SCNC: 4.6 MMOL/L (ref 3.5–5.2)
PROT SERPL-MCNC: 7.9 G/DL (ref 6–8.5)
PROTHROMBIN TIME: 14.1 SECONDS (ref 12.2–14.5)
RBC # BLD AUTO: 4.13 10*6/MM3 (ref 4.14–5.8)
SODIUM SERPL-SCNC: 139 MMOL/L (ref 136–145)
T4 FREE SERPL-MCNC: 0.9 NG/DL (ref 0.92–1.68)
TIBC SERPL-MCNC: 571 MCG/DL (ref 298–536)
TRANSFERRIN SERPL-MCNC: 383 MG/DL (ref 200–360)
TSH SERPL DL<=0.05 MIU/L-ACNC: 1.87 UIU/ML (ref 0.27–4.2)
WBC NRBC COR # BLD AUTO: 6.88 10*3/MM3 (ref 3.4–10.8)

## 2024-06-03 PROCEDURE — 87340 HEPATITIS B SURFACE AG IA: CPT

## 2024-06-03 PROCEDURE — 84466 ASSAY OF TRANSFERRIN: CPT

## 2024-06-03 PROCEDURE — 80053 COMPREHEN METABOLIC PANEL: CPT | Performed by: PHYSICIAN ASSISTANT

## 2024-06-03 PROCEDURE — 83540 ASSAY OF IRON: CPT

## 2024-06-03 PROCEDURE — G2211 COMPLEX E/M VISIT ADD ON: HCPCS | Performed by: PHYSICIAN ASSISTANT

## 2024-06-03 PROCEDURE — 82306 VITAMIN D 25 HYDROXY: CPT | Performed by: PHYSICIAN ASSISTANT

## 2024-06-03 PROCEDURE — 36415 COLL VENOUS BLD VENIPUNCTURE: CPT | Performed by: PHYSICIAN ASSISTANT

## 2024-06-03 PROCEDURE — 86708 HEPATITIS A ANTIBODY: CPT

## 2024-06-03 PROCEDURE — 85025 COMPLETE CBC W/AUTO DIFF WBC: CPT

## 2024-06-03 PROCEDURE — 1159F MED LIST DOCD IN RCRD: CPT | Performed by: PHYSICIAN ASSISTANT

## 2024-06-03 PROCEDURE — 99215 OFFICE O/P EST HI 40 MIN: CPT | Performed by: PHYSICIAN ASSISTANT

## 2024-06-03 PROCEDURE — 1160F RVW MEDS BY RX/DR IN RCRD: CPT | Performed by: PHYSICIAN ASSISTANT

## 2024-06-03 PROCEDURE — 3079F DIAST BP 80-89 MM HG: CPT | Performed by: PHYSICIAN ASSISTANT

## 2024-06-03 PROCEDURE — 84439 ASSAY OF FREE THYROXINE: CPT

## 2024-06-03 PROCEDURE — 85610 PROTHROMBIN TIME: CPT | Performed by: PHYSICIAN ASSISTANT

## 2024-06-03 PROCEDURE — 84443 ASSAY THYROID STIM HORMONE: CPT

## 2024-06-03 PROCEDURE — 86706 HEP B SURFACE ANTIBODY: CPT

## 2024-06-03 PROCEDURE — 3075F SYST BP GE 130 - 139MM HG: CPT | Performed by: PHYSICIAN ASSISTANT

## 2024-06-03 NOTE — PROGRESS NOTES
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       History of Present Illness: Elton Funez is a 74 y.o. male, PMH includes HTN, HL, T2DM, GERD, FAVIO, depression who is here today for follow up on JEROME cirrhosis. Wife is with patient for visit today.     Pt underwent CCY 3/2024 with Dr. Spear. Liver bx at that time confirmed cirrhosis. Pt denies GI complaints. Patient denies associated fever, chills, abdominal pain, indigestion, nausea, vomiting, diarrhea, constipation, hematemesis, dysphagia, hematochezia, melena, bloating, weight loss or gain, dysuria, jaundice or bruising. He generally has one complete, formed BM daily. He has been T2DM since approximately .     Patient denies personal or FHx of PUD, H Pylori, colitis, Celiac disease, UC, Crohn's disease, IBS, colon or gastric cancers. Pt denies tobacco, illicit substance or NSAID use. Very rare EtOH use.     Gallbladder US 3/2024: Mild sludge in gallbladder. No superimposed cholecystitis. No biliary dilatation. Cirrhosis.     EGD 2021 with Dr. Brunner. Nonbleeding grade II esophageal varices. Portal hypertensive gastropathy. Normal stomach. Recommend repeat EGD in 1 year.     CSY 2021 with Dr. García. Excellebt bowel prep conditions. Hemorrhoids on perianal exam. Nonbleeding internal hemorrhoids. Normal appearing colon. Recommend repeat CSY in 10 years.     ERCP 2023 with Dr. Brunner. Major papilla appears to have gaping orifice, suggesting recently passed stone. Pus in major papilla. Biliary tree was swept and pus was found without stone. Mild portal gastropathy. Normal esophagus without varices. Attenuated intrahepatic bile ducts consistent with known cirrhosis.     Subjective      Review of Systems:   Review of Systems   Constitutional:  Negative for appetite change, chills,  diaphoresis, fatigue, fever, unexpected weight gain and unexpected weight loss.   HENT:  Negative for drooling, facial swelling, mouth sores, nosebleeds, rhinorrhea, sore throat, swollen glands, tinnitus and trouble swallowing.    Eyes: Negative.    Respiratory:  Negative for choking, chest tightness and shortness of breath.    Gastrointestinal:  Negative for abdominal pain, anal bleeding, blood in stool, constipation, diarrhea, nausea, vomiting, GERD and indigestion.   Endocrine: Negative.    Genitourinary:  Negative for dysuria, flank pain and hematuria.   Musculoskeletal:  Negative for arthralgias, back pain, myalgias and neck pain.   Skin:  Negative for color change, dry skin, pallor and bruise.   Neurological:  Negative for dizziness, tremors, syncope, weakness and numbness.   Psychiatric/Behavioral:  Negative for decreased concentration, hallucinations and self-injury. The patient is not nervous/anxious.    All other systems reviewed and are negative.      Medications:     Current Outpatient Medications:     Alpha-Lipoic Acid 600 MG tablet, Take 1 tablet by mouth Daily. Ran out, last took on Wednesday 3/13., Disp: , Rfl:     B Complex Vitamins (VITAMIN B COMPLEX PO), Take 1 tablet by mouth every night at bedtime., Disp: , Rfl:     carvedilol (COREG) 3.125 MG tablet, Take 1 tablet by mouth Every 12 (Twelve) Hours., Disp: 180 tablet, Rfl: 2    Coenzyme Q10 (Co Q 10) 100 MG capsule, Take 300 mg by mouth Daily., Disp: , Rfl:     colestipol (COLESTID) 1 g tablet, TAKE 2 TABLETS TWICE A DAY, Disp: 360 tablet, Rfl: 3    Continuous Blood Gluc  (FreeStyle Winston 2 Varney) device, 1 Device Every 14 (Fourteen) Days., Disp: 1 each, Rfl: 0    Continuous Blood Gluc Sensor (FreeStyle Winston 2 Sensor) misc, 1 each Every 14 (Fourteen) Days., Disp: 6 each, Rfl: 3    Dulaglutide (Trulicity) 4.5 MG/0.5ML solution pen-injector, Inject 0.5 mL under the skin into the appropriate area as directed 1 (One) Time Per Week., Disp: 6  mL, Rfl: 3    ferrous sulfate (FeroSul) 325 (65 FE) MG tablet, Take 1 tablet by mouth 2 (Two) Times a Day., Disp: 180 tablet, Rfl: 1    Flaxseed, Linseed, (Flaxseed Oil) 1400 MG capsule, Take 1 capsule by mouth 2 (Two) Times a Day., Disp: , Rfl:     gabapentin (NEURONTIN) 800 MG tablet, Take 1 tablet by mouth 4 (Four) Times a Day., Disp: 360 tablet, Rfl: 0    gemfibrozil (LOPID) 600 MG tablet, TAKE 1 TABLET TWICE A DAY, Disp: 180 tablet, Rfl: 3    Insulin Glargine (LANTUS SOLOSTAR) 100 UNIT/ML injection pen, Inject 34 Units under the skin into the appropriate area as directed Every Night. (Patient taking differently: Inject 68 Units under the skin into the appropriate area as directed Every Night.), Disp: , Rfl:     Insulin Lispro, 1 Unit Dial, (HumaLOG KwikPen) 100 UNIT/ML solution pen-injector, Inject 5 Units under the skin into the appropriate area as directed 3 (Three) Times a Day. (Patient taking differently: Inject 26 Units under the skin into the appropriate area as directed 3 (Three) Times a Day.), Disp: , Rfl:     Insulin Pen Needle (B-D UF III MINI PEN NEEDLES) 31G X 5 MM misc, USE THREE TIMES A DAY, Disp: 270 each, Rfl: 3    ipratropium (ATROVENT) 0.06 % nasal spray, USE 2 SPRAYS IN EACH NOSTRIL AS DIRECTED BY PROVIDER DAILY (Patient taking differently: 2 sprays into the nostril(s) as directed by provider Daily As Needed.), Disp: 45 mL, Rfl: 1    levothyroxine (SYNTHROID, LEVOTHROID) 75 MCG tablet, Take 1 tablet by mouth Daily., Disp: 90 tablet, Rfl: 3    Multiple Vitamins-Minerals (MULTIVITAMIN PO), Take 1 tablet by mouth Daily., Disp: , Rfl:     omeprazole (priLOSEC) 40 MG capsule, TAKE 1 CAPSULE DAILY, Disp: 90 capsule, Rfl: 3    saccharomyces boulardii (FLORASTOR) 250 MG capsule, Take 1 capsule by mouth 2 (Two) Times a Day. (Patient taking differently: Take 1 capsule by mouth Daily.), Disp: 30 capsule, Rfl: 0    sertraline (ZOLOFT) 100 MG tablet, TAKE 1 TABLET DAILY, Disp: 90 tablet, Rfl: 3     spironolactone (ALDACTONE) 25 MG tablet, TAKE 1 TABLET DAILY (Patient taking differently: Take 1 tablet by mouth Daily.), Disp: 90 tablet, Rfl: 3    tamoxifen (NOLVADEX) 20 MG chemo tablet, TAKE 1 TABLET DAILY, Disp: 90 tablet, Rfl: 3    temazepam (Restoril) 7.5 MG capsule, Take 1 capsule by mouth At Night As Needed for Sleep., Disp: , Rfl:     tiZANidine (ZANAFLEX) 4 MG tablet, TAKE 1 TABLET TWICE A DAY AS NEEDED FOR MUSCLE SPASMS, Disp: 180 tablet, Rfl: 3    vitamin D3 125 MCG (5000 UT) capsule capsule, Take 1 capsule by mouth 2 (Two) Times a Day., Disp: , Rfl:     Allergies:   Allergies   Allergen Reactions    Glucophage Xr [Metformin Hcl Er] Diarrhea and Other (See Comments)     Glucophage XR TB24: Adverse Reaction: Severe GI issues.    Metformin Nausea And Vomiting     Other reaction(s): SEVERE INTESTIONAL ISSUES  Other reaction(s): SEVERE GI ISSUES    Glucophage XR TB24  MetFORMIN HCl TABS    Tetracyclines & Related Nausea Only     Adverse Reaction    Chlorhexidine Rash       Social History:   Social History     Socioeconomic History    Marital status:    Tobacco Use    Smoking status: Former     Current packs/day: 0.00     Average packs/day: 0.5 packs/day for 4.0 years (2.0 ttl pk-yrs)     Types: Cigarettes     Start date: 1972     Quit date: 1976     Years since quittin.4     Passive exposure: Past    Smokeless tobacco: Never   Vaping Use    Vaping status: Never Used   Substance and Sexual Activity    Alcohol use: Yes     Comment: Socially    Drug use: Never    Sexual activity: Not Currently     Partners: Female     Birth control/protection: Vasectomy        Surgical History:   Past Surgical History:   Procedure Laterality Date    ANTERIOR CERVICAL DISCECTOMY W/ FUSION N/A 2017    Procedure: CERVICAL DISCECTOMY ANTERIOR FUSION WITH INSTRUMENTATION C7/T1;  Surgeon: Gigi Perales MD;  Location: [facility];  Service:     CERVICAL ARTHRODESIS      CERVICAL DISCECTOMY POSTERIOR  FUSION WITH INSTRUMENTATION N/A 03/16/2017    Procedure:  INCISION AND DRAINAGE OF POSTERIOR CERVICAL WOUND;  Surgeon: Gigi Perales MD;  Location:  [facility];  Service:     CERVICAL LAMINECTOMY DECOMPRESSION POSTERIOR Left 02/28/2017    Procedure: Left C7-T1 CERVICAL FORAMINOTOMY POSTERIOR WITH METRIX;  Surgeon: Gigi Perales MD;  Location:  [facility];  Service:     CHOLECYSTECTOMY WITH INTRAOPERATIVE CHOLANGIOGRAM N/A 3/18/2024    Procedure: CHOLECYSTECTOMY LAPAROSCOPIC AND LARAROSCOPIC LIVER BIOPSY;  Surgeon: Emerson Spear MD;  Location:  [facility];  Service: General;  Laterality: N/A;    COLONOSCOPY N/A 09/01/2021    Procedure: COLONOSCOPY;  Surgeon: Sharif García MD;  Location:  [facility];  Service: Gastroenterology;  Laterality: N/A;    CYST REMOVAL  04/2021    ENDOSCOPY N/A 08/31/2021    Procedure: ESOPHAGOGASTRODUODENOSCOPY;  Surgeon: Brunner, Mark I, MD;  Location:  [facility];  Service: Gastroenterology;  Laterality: N/A;    ERCP N/A 06/14/2023    Procedure: ENDOSCOPIC RETROGRADE CHOLANGIOPANCREATOGRAPHY;  Surgeon: Brunner, Mark I, MD;  Location:  [facility];  Service: Gastroenterology;  Laterality: N/A;  SPHINCTERTOMY MADE AT [facility]  AND BALLOON SWEEP OF CBD DONE WITH 9-12 BALLOON    LYMPH NODE BIOPSY  6/2023    Negative    MASTECTOMY W/ SENTINEL NODE BIOPSY Right 05/23/2023    Procedure: BREAST MASTECTOMY WITH SENTINEL NODE BIOPSY RIGHT;  Surgeon: Joseph Ramirez MD;  Location:  [facility];  Service: General;  Laterality: Right;    VASECTOMY      WISDOM TOOTH EXTRACTION          Medical History:   Past Medical History:   Diagnosis Date    Abdominal pain 05/17/2023    RUQ; SEEN IN ED AT [facility]; DC'D HOME    Abscess of arm, right     Abscess of skin of neck     Acute sinusitis     ALT (SGPT) level raised     Arthritis     Bacteremia due to Klebsiella pneumoniae 06/12/2023    BPH (benign prostatic hypertrophy)     Breast cancer 05/2023    right    Cirrhosis of  liver 09/03/2021    JEROME    CKD (chronic kidney disease)     Colon polyp     Constipation     DDD (degenerative disc disease), cervical     Decreased platelet count     Depression     Resolved: 1/28/2015    Elevated AST (SGOT)     Erectile dysfunction     GERD (gastroesophageal reflux disease)     History of transfusion 2021    [facility]; 3 UNITS; NO REACTION    HL (hearing loss)     Hyperlipidemia     Hypertension     Hypothyroidism, adult 11/10/2020    Infection     MSSA lumbar surgical wound infection 3/2017    Jaw pain     Left hip pain     Low back pain     Surgery 30 yrs L4-5    Migraine     Hx of    Obesity (BMI 30.0-34.9) 10/07/2016    BMI 31.92    Obstructive sleep apnea     CPAP    Pancreatitis 06/2023    Portal hypertensive gastropathy 09/03/2021    Renal insufficiency     Shigellosis     Sleep apnea     PT TO BRING MASK AND TUBING DAY OF SURGERY    Symptomatic anemia 08/30/2021    Type 2 diabetes mellitus     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  There is no height or weight on file to calculate BMI.     Physical Exam  Vitals and nursing note reviewed.   Constitutional:       General: He is not in acute distress.     Appearance: Normal appearance. He is not ill-appearing or diaphoretic.      Comments: BMI 31.02. Pleasantly conversant. Ambulates with cane.    HENT:      Head: Normocephalic and atraumatic.      Right Ear: External ear normal.      Left Ear: External ear normal.      Nose: Nose normal.      Mouth/Throat:      Mouth: Mucous membranes are moist.      Pharynx: Oropharynx is clear.   Eyes:      Conjunctiva/sclera: Conjunctivae normal.      Pupils: Pupils are equal, round, and reactive to light.   Cardiovascular:      Rate and Rhythm: Normal rate and regular rhythm.      Pulses: Normal pulses.      Heart sounds: Normal heart  sounds.   Pulmonary:      Effort: Pulmonary effort is normal.      Breath sounds: Normal breath sounds.   Abdominal:      General: Abdomen is flat. There is no distension.      Tenderness: There is no abdominal tenderness. There is no guarding or rebound.   Musculoskeletal:         General: Normal range of motion.      Cervical back: Normal range of motion.      Right lower leg: No edema.      Left lower leg: No edema.   Skin:     General: Skin is warm and dry.      Coloration: Skin is not jaundiced or pale.   Neurological:      General: No focal deficit present.      Mental Status: He is alert and oriented to person, place, and time. Mental status is at baseline.      Comments: No asterixis   Psychiatric:         Mood and Affect: Mood normal.         Assessment / Plan      Assessment/Plan:   There are no diagnoses linked to this encounter.     JEROME cirrhosis  Portal hypertensive gastropathy  Esophageal varices via EGD 2021  H/o cholelithiasis and gallstone pancreatitis  S/p CCY  GERD   - continue all medications as prescribed   - MELD-Na / Child Hernandez Score pending labs today    - Last Para: N/A   - Ascites Management: aldactone 25mg daily   - HCC Surveillance: gallbladder US 3/2024   - EV Surveillance: ERCP / EGD 6/2023, no EV   - HE Management: N/A   - pt given cirrhosis lifestyle instructions: avoid hepatotoxins, limit APAP < 2g per day, avoid ASA and NSAIDs, diet of 35-40kcal/day, protein 1.2-1.5g/kg/day, 3-5 cups black coffee daily   - will check Hep A/B immunity, vaccinate if needed via PCP   - previous office notes, hospital records, labs, imaging, endoscopy and pathology reports reviewed with patient    - all recent labs, imaging, endoscopy and hospital records reviewed   - obtain CBC, CMP, iron profile, vit D, PT/INR   - follow up in clinic in 6mo, or after completion of above studies   - call clinic at any time for questions or new / worsened sx    Follow Up:   Return in about 6 months (around  12/3/2024).    Plan of care reviewed with the patient at the conclusion of today's visit.  Education was provided regarding diagnosis, management, and any prescribed or recommended OTC medications.  Patient verbalized understanding of and agreement with management plan.     NOTE TO PATIENT: The 21st Century Cures Act makes medical notes like these available to patients in the interest of transparency. However, be advised this is a medical document. It is intended as peer to peer communication. It is written in medical language and may contain abbreviations or verbiage that are unfamiliar. It may appear blunt or direct. Medical documents are intended to carry relevant information, facts as evident, and the clinical opinion of the practitioner.     Time Statement:   Discussed plan of care in detail with patient today. Patient verbally understands and agrees. I have spent 30 minutes reviewing available diagnostics, obtaining history, examining the patient, developing a treatment plan, and educating the patient on disease process and plan of care.     Tracy Cornelius PA-C   [facility]

## 2024-06-04 LAB
25(OH)D3 SERPL-MCNC: 41.2 NG/ML (ref 30–100)
HBV SURFACE AB SER RIA-ACNC: NORMAL

## 2024-06-04 NOTE — PROGRESS NOTES
Please let Elton know that his recent labs yield a MELD-Na of 12, which is likely higher than previous due to elevated kidney function. Vit D within normal limits. Follow up as scheduled.

## 2024-06-05 LAB — HAV AB SER QL IA: POSITIVE

## 2024-06-05 NOTE — PROGRESS NOTES
Please let Elton know that he has immunity to Hep A, but not Hep B. Recommend obtaining this vaccine series via PCP.

## 2024-06-07 ENCOUNTER — LAB (OUTPATIENT)
Dept: LAB | Facility: HOSPITAL | Age: 74
End: 2024-06-07
Payer: MEDICARE

## 2024-06-07 ENCOUNTER — TRANSCRIBE ORDERS (OUTPATIENT)
Dept: LAB | Facility: HOSPITAL | Age: 74
End: 2024-06-07
Payer: MEDICARE

## 2024-06-07 DIAGNOSIS — D64.9 ANEMIA, UNSPECIFIED TYPE: ICD-10-CM

## 2024-06-07 DIAGNOSIS — E11.69 TYPE 2 DIABETES MELLITUS WITH OTHER SPECIFIED COMPLICATION, UNSPECIFIED WHETHER LONG TERM INSULIN USE: ICD-10-CM

## 2024-06-07 DIAGNOSIS — E55.9 VITAMIN D DEFICIENCY: ICD-10-CM

## 2024-06-07 DIAGNOSIS — N18.31 CHRONIC KIDNEY DISEASE (CKD) STAGE G3A/A1, MODERATELY DECREASED GLOMERULAR FILTRATION RATE (GFR) BETWEEN 45-59 ML/MIN/1.73 SQUARE METER AND ALBUMINURIA CREATININE RATIO LESS THAN 30 MG/G (CMS/H*: ICD-10-CM

## 2024-06-07 DIAGNOSIS — N18.31 CHRONIC KIDNEY DISEASE (CKD) STAGE G3A/A1, MODERATELY DECREASED GLOMERULAR FILTRATION RATE (GFR) BETWEEN 45-59 ML/MIN/1.73 SQUARE METER AND ALBUMINURIA CREATININE RATIO LESS THAN 30 MG/G (CMS/H*: Primary | ICD-10-CM

## 2024-06-07 LAB
BASOPHILS # BLD AUTO: 0.03 10*3/MM3 (ref 0–0.2)
BASOPHILS NFR BLD AUTO: 0.4 % (ref 0–1.5)
CREAT UR-MCNC: 104.3 MG/DL
DEPRECATED RDW RBC AUTO: 46.4 FL (ref 37–54)
EOSINOPHIL # BLD AUTO: 0.24 10*3/MM3 (ref 0–0.4)
EOSINOPHIL NFR BLD AUTO: 3.1 % (ref 0.3–6.2)
ERYTHROCYTE [DISTWIDTH] IN BLOOD BY AUTOMATED COUNT: 13.1 % (ref 12.3–15.4)
FERRITIN SERPL-MCNC: 209.4 NG/ML (ref 30–400)
HCT VFR BLD AUTO: 40.9 % (ref 37.5–51)
HGB BLD-MCNC: 13.6 G/DL (ref 13–17.7)
IMM GRANULOCYTES # BLD AUTO: 0.06 10*3/MM3 (ref 0–0.05)
IMM GRANULOCYTES NFR BLD AUTO: 0.8 % (ref 0–0.5)
IRON 24H UR-MRATE: 104 MCG/DL (ref 59–158)
IRON SATN MFR SERPL: 19 % (ref 20–50)
LYMPHOCYTES # BLD AUTO: 1.81 10*3/MM3 (ref 0.7–3.1)
LYMPHOCYTES NFR BLD AUTO: 23.2 % (ref 19.6–45.3)
MCH RBC QN AUTO: 32.2 PG (ref 26.6–33)
MCHC RBC AUTO-ENTMCNC: 33.3 G/DL (ref 31.5–35.7)
MCV RBC AUTO: 96.9 FL (ref 79–97)
MONOCYTES # BLD AUTO: 0.68 10*3/MM3 (ref 0.1–0.9)
MONOCYTES NFR BLD AUTO: 8.7 % (ref 5–12)
NEUTROPHILS NFR BLD AUTO: 4.99 10*3/MM3 (ref 1.7–7)
NEUTROPHILS NFR BLD AUTO: 63.8 % (ref 42.7–76)
PLATELET # BLD AUTO: 124 10*3/MM3 (ref 140–450)
PMV BLD AUTO: 13 FL (ref 6–12)
PROT ?TM UR-MCNC: 86 MG/DL
PROT/CREAT UR: 824.5 MG/G CREA (ref 0–200)
RBC # BLD AUTO: 4.22 10*6/MM3 (ref 4.14–5.8)
TIBC SERPL-MCNC: 559 MCG/DL (ref 298–536)
TRANSFERRIN SERPL-MCNC: 375 MG/DL (ref 200–360)
URATE SERPL-MCNC: 4.8 MG/DL (ref 3.4–7)
WBC NRBC COR # BLD AUTO: 7.81 10*3/MM3 (ref 3.4–10.8)

## 2024-06-07 PROCEDURE — 84156 ASSAY OF PROTEIN URINE: CPT

## 2024-06-07 PROCEDURE — 84550 ASSAY OF BLOOD/URIC ACID: CPT

## 2024-06-07 PROCEDURE — 36415 COLL VENOUS BLD VENIPUNCTURE: CPT

## 2024-06-07 PROCEDURE — 83970 ASSAY OF PARATHORMONE: CPT

## 2024-06-07 PROCEDURE — 80069 RENAL FUNCTION PANEL: CPT

## 2024-06-07 PROCEDURE — 84466 ASSAY OF TRANSFERRIN: CPT

## 2024-06-07 PROCEDURE — 83036 HEMOGLOBIN GLYCOSYLATED A1C: CPT

## 2024-06-07 PROCEDURE — 83540 ASSAY OF IRON: CPT

## 2024-06-07 PROCEDURE — 82570 ASSAY OF URINE CREATININE: CPT

## 2024-06-07 PROCEDURE — 85025 COMPLETE CBC W/AUTO DIFF WBC: CPT

## 2024-06-07 PROCEDURE — 82306 VITAMIN D 25 HYDROXY: CPT

## 2024-06-07 PROCEDURE — 82728 ASSAY OF FERRITIN: CPT | Performed by: PHYSICIAN ASSISTANT

## 2024-06-08 LAB
25(OH)D3 SERPL-MCNC: 42.7 NG/ML (ref 30–100)
ALBUMIN SERPL-MCNC: 4.6 G/DL (ref 3.5–5.2)
ANION GAP SERPL CALCULATED.3IONS-SCNC: 14.3 MMOL/L (ref 5–15)
BUN SERPL-MCNC: 28 MG/DL (ref 8–23)
BUN/CREAT SERPL: 17.9 (ref 7–25)
CALCIUM SPEC-SCNC: 10 MG/DL (ref 8.6–10.5)
CHLORIDE SERPL-SCNC: 100 MMOL/L (ref 98–107)
CO2 SERPL-SCNC: 22.7 MMOL/L (ref 22–29)
CREAT SERPL-MCNC: 1.56 MG/DL (ref 0.76–1.27)
EGFRCR SERPLBLD CKD-EPI 2021: 46 ML/MIN/1.73
GLUCOSE SERPL-MCNC: 189 MG/DL (ref 65–99)
HBA1C MFR BLD: 8.5 % (ref 4.8–5.6)
PHOSPHATE SERPL-MCNC: 3.6 MG/DL (ref 2.5–4.5)
POTASSIUM SERPL-SCNC: 4.2 MMOL/L (ref 3.5–5.2)
PTH-INTACT SERPL-MCNC: 60.2 PG/ML (ref 15–65)
SODIUM SERPL-SCNC: 137 MMOL/L (ref 136–145)

## 2024-06-12 ENCOUNTER — PATIENT MESSAGE (OUTPATIENT)
Dept: GASTROENTEROLOGY | Facility: CLINIC | Age: 74
End: 2024-06-12
Payer: MEDICARE

## 2024-06-12 NOTE — TELEPHONE ENCOUNTER
[email or web address] MESSAGE HAS BEEN SENT TO THE PATIENT FOR PATIENT ROUNDING WITH [facility]

## 2024-06-14 ENCOUNTER — OFFICE VISIT (OUTPATIENT)
Dept: ENDOCRINOLOGY | Facility: CLINIC | Age: 74
End: 2024-06-14
Payer: MEDICARE

## 2024-06-14 VITALS
SYSTOLIC BLOOD PRESSURE: 132 MMHG | WEIGHT: 204 LBS | HEART RATE: 78 BPM | DIASTOLIC BLOOD PRESSURE: 68 MMHG | BODY MASS INDEX: 30.92 KG/M2 | HEIGHT: 68 IN

## 2024-06-14 DIAGNOSIS — E11.65 TYPE 2 DIABETES MELLITUS WITH HYPERGLYCEMIA, WITH LONG-TERM CURRENT USE OF INSULIN: Primary | ICD-10-CM

## 2024-06-14 DIAGNOSIS — E03.9 ACQUIRED HYPOTHYROIDISM: ICD-10-CM

## 2024-06-14 DIAGNOSIS — E11.42 TYPE 2 DIABETES MELLITUS WITH DIABETIC POLYNEUROPATHY, WITH LONG-TERM CURRENT USE OF INSULIN: ICD-10-CM

## 2024-06-14 DIAGNOSIS — Z79.4 TYPE 2 DIABETES MELLITUS WITH HYPERGLYCEMIA, WITH LONG-TERM CURRENT USE OF INSULIN: Primary | ICD-10-CM

## 2024-06-14 DIAGNOSIS — Z79.4 TYPE 2 DIABETES MELLITUS WITH DIABETIC POLYNEUROPATHY, WITH LONG-TERM CURRENT USE OF INSULIN: ICD-10-CM

## 2024-06-14 PROCEDURE — 95251 CONT GLUC MNTR ANALYSIS I&R: CPT | Performed by: INTERNAL MEDICINE

## 2024-06-14 PROCEDURE — 3075F SYST BP GE 130 - 139MM HG: CPT | Performed by: INTERNAL MEDICINE

## 2024-06-14 PROCEDURE — 3052F HG A1C>EQUAL 8.0%<EQUAL 9.0%: CPT | Performed by: INTERNAL MEDICINE

## 2024-06-14 PROCEDURE — 3078F DIAST BP <80 MM HG: CPT | Performed by: INTERNAL MEDICINE

## 2024-06-14 PROCEDURE — 99214 OFFICE O/P EST MOD 30 MIN: CPT | Performed by: INTERNAL MEDICINE

## 2024-06-14 RX ORDER — DULAGLUTIDE 1.5 MG/.5ML
1.5 INJECTION, SOLUTION SUBCUTANEOUS WEEKLY
Qty: 6 ML | Refills: 0 | Status: SHIPPED | OUTPATIENT
Start: 2024-06-14

## 2024-06-14 RX ORDER — DULAGLUTIDE 0.75 MG/.5ML
0.75 INJECTION, SOLUTION SUBCUTANEOUS WEEKLY
Qty: 2 ML | Refills: 0 | Status: SHIPPED | OUTPATIENT
Start: 2024-06-14

## 2024-06-14 NOTE — PROGRESS NOTES
Chief complaint  Diabetes    Subjective   Elton Funez is a 74 y.o. male is here today for follow-up    Diabetes mellitus type 2, uncontrolled dx in 2000  Diabetic complications: neuropathy, retinopathy.   Past meds: Metformin caused diarrhea in the past.   Eye care:no retinopathy.   Monitoring - freestyle winston reviewed.   Foot care and dental care: discussed.     Medications: Insulin dose requirement reduced significantly after the Trulicity start. He had some low glucose episodes and decreased insulin.     Hypothyroidism - on levothyroxine.      He has recurrent pancreatitis (gallstone related) in 6/2023 and 3 /2024.Trulicity was discontinued at that time. Now his glucose is sina despite increased dose of insulin. He is asymptomatic now for a few weeks, would like to restart TRulicity      Medications    Current Outpatient Medications:     Alpha-Lipoic Acid 600 MG tablet, Take 1 tablet by mouth Daily. Ran out, last took on Wednesday 3/13., Disp: , Rfl:     B Complex Vitamins (VITAMIN B COMPLEX PO), Take 1 tablet by mouth every night at bedtime., Disp: , Rfl:     carvedilol (COREG) 3.125 MG tablet, Take 1 tablet by mouth Every 12 (Twelve) Hours., Disp: 180 tablet, Rfl: 2    Coenzyme Q10 (Co Q 10) 100 MG capsule, Take 300 mg by mouth Daily., Disp: , Rfl:     colestipol (COLESTID) 1 g tablet, TAKE 2 TABLETS TWICE A DAY, Disp: 360 tablet, Rfl: 3    Continuous Blood Gluc  (FreeStyle Winston 2 [place]) device, 1 Device Every 14 (Fourteen) Days., Disp: 1 each, Rfl: 0    Continuous Blood Gluc Sensor (FreeStyle Winston 2 Sensor) misc, 1 each Every 14 (Fourteen) Days., Disp: 6 each, Rfl: 3    ferrous sulfate (FeroSul) 325 (65 FE) MG tablet, Take 1 tablet by mouth 2 (Two) Times a Day., Disp: 180 tablet, Rfl: 1    Flaxseed, Linseed, (Flaxseed Oil) 1400 MG capsule, Take 1 capsule by mouth 2 (Two) Times a Day., Disp: , Rfl:     gabapentin (NEURONTIN) 800 MG tablet, Take 1 tablet by mouth 4 (Four) Times a Day., Disp: 360  tablet, Rfl: 0    gemfibrozil (LOPID) 600 MG tablet, TAKE 1 TABLET TWICE A DAY, Disp: 180 tablet, Rfl: 3    Insulin Glargine (LANTUS SOLOSTAR) 100 UNIT/ML injection pen, Inject 34 Units under the skin into the appropriate area as directed Every Night. (Patient taking differently: Inject 68 Units under the skin into the appropriate area as directed Every Night.), Disp: , Rfl:     Insulin Lispro, 1 Unit Dial, (HumaLOG KwikPen) 100 UNIT/ML solution pen-injector, Inject 5 Units under the skin into the appropriate area as directed 3 (Three) Times a Day. (Patient taking differently: Inject 26 Units under the skin into the appropriate area as directed 3 (Three) Times a Day.), Disp: , Rfl:     Insulin Pen Needle (B-D UF III MINI PEN NEEDLES) 31G X 5 MM misc, USE THREE TIMES A DAY, Disp: 270 each, Rfl: 3    ipratropium (ATROVENT) 0.06 % nasal spray, USE 2 SPRAYS IN EACH NOSTRIL AS DIRECTED BY PROVIDER DAILY (Patient taking differently: 2 sprays into the nostril(s) as directed by provider Daily As Needed.), Disp: 45 mL, Rfl: 1    levothyroxine (SYNTHROID, LEVOTHROID) 75 MCG tablet, Take 1 tablet by mouth Daily., Disp: 90 tablet, Rfl: 3    Multiple Vitamins-Minerals (MULTIVITAMIN PO), Take 1 tablet by mouth Daily., Disp: , Rfl:     omeprazole (priLOSEC) 40 MG capsule, TAKE 1 CAPSULE DAILY, Disp: 90 capsule, Rfl: 3    saccharomyces boulardii (FLORASTOR) 250 MG capsule, Take 1 capsule by mouth 2 (Two) Times a Day. (Patient taking differently: Take 1 capsule by mouth Daily.), Disp: 30 capsule, Rfl: 0    sertraline (ZOLOFT) 100 MG tablet, TAKE 1 TABLET DAILY, Disp: 90 tablet, Rfl: 3    spironolactone (ALDACTONE) 25 MG tablet, TAKE 1 TABLET DAILY (Patient taking differently: Take 1 tablet by mouth Daily.), Disp: 90 tablet, Rfl: 3    tamoxifen (NOLVADEX) 20 MG chemo tablet, TAKE 1 TABLET DAILY, Disp: 90 tablet, Rfl: 3    temazepam (Restoril) 7.5 MG capsule, Take 1 capsule by mouth At Night As Needed for Sleep., Disp: , Rfl:      "tiZANidine (ZANAFLEX) 4 MG tablet, TAKE 1 TABLET TWICE A DAY AS NEEDED FOR MUSCLE SPASMS, Disp: 180 tablet, Rfl: 3    vitamin D3 125 MCG (5000 UT) capsule capsule, Take 1 capsule by mouth 2 (Two) Times a Day., Disp: , Rfl:     Dulaglutide (Trulicity) 0.75 MG/0.5ML solution pen-injector, Inject 0.75 mg under the skin into the appropriate area as directed 1 (One) Time Per Week., Disp: 2 mL, Rfl: 0    Dulaglutide (Trulicity) 1.5 MG/0.5ML solution pen-injector, Inject 1.5 mg under the skin into the appropriate area as directed 1 (One) Time Per Week., Disp: 6 mL, Rfl: 0    Review of systems  Review of Systems   Constitutional:  Positive for fatigue.   Musculoskeletal:  Positive for arthralgias, back pain and neck pain.   Neurological:  Positive for numbness.   All other systems reviewed and are negative.      Physical exam  Objective   Blood pressure 132/68, pulse 78, height 172.7 cm (67.99\"), weight 92.5 kg (204 lb). Body mass index is 31.03 kg/m².  Physical Exam   Constitutional: He is oriented to person, place, and time. He appears well-developed and well-nourished.   HENT:   Head: Normocephalic and atraumatic.   Eyes: Conjunctivae are normal.   Neck: No thyromegaly present.   Cardiovascular: Normal rate, regular rhythm, normal heart sounds and normal pulses.   Pulmonary/Chest: Effort normal and breath sounds normal.   Neurological: He is alert and oriented to person, place, and time.   Psychiatric: Thought content normal.         LABS AND IMAGING  Transcribe Orders on 06/07/2024   Component Date Value Ref Range Status    Ferritin 06/07/2024 209.40  30.00 - 400.00 ng/mL Final   Lab on 06/07/2024   Component Date Value Ref Range Status    PTH, Intact 06/07/2024 60.2  15.0 - 65.0 pg/mL Final    Glucose 06/07/2024 189 (H)  65 - 99 mg/dL Final    BUN 06/07/2024 28 (H)  8 - 23 mg/dL Final    Creatinine 06/07/2024 1.56 (H)  0.76 - 1.27 mg/dL Final    Sodium 06/07/2024 137  136 - 145 mmol/L Final    Potassium 06/07/2024 4.2  " 3.5 - 5.2 mmol/L Final    Chloride 06/07/2024 100  98 - 107 mmol/L Final    CO2 06/07/2024 22.7  22.0 - 29.0 mmol/L Final    Calcium 06/07/2024 10.0  8.6 - 10.5 mg/dL Final    Albumin 06/07/2024 4.6  3.5 - 5.2 g/dL Final    Phosphorus 06/07/2024 3.6  2.5 - 4.5 mg/dL Final    Anion Gap 06/07/2024 14.3  5.0 - 15.0 mmol/L Final    BUN/Creatinine Ratio 06/07/2024 17.9  7.0 - 25.0 Final    eGFR 06/07/2024 46.0 (L)  >60.0 mL/min/1.73 Final    Hemoglobin A1C 06/07/2024 8.50 (H)  4.80 - 5.60 % Final    25 Hydroxy, Vitamin D 06/07/2024 42.7  30.0 - 100.0 ng/ml Final    Uric Acid 06/07/2024 4.8  3.4 - 7.0 mg/dL Final    Protein/Creatinine Ratio, Urine 06/07/2024 824.5 (H)  0.0 - 200.0 mg/G Crea Final    Creatinine, Urine 06/07/2024 104.3  mg/dL Final    Total Protein, Urine 06/07/2024 86.0  mg/dL Final    Iron 06/07/2024 104  59 - 158 mcg/dL Final    Iron Saturation (TSAT) 06/07/2024 19 (L)  20 - 50 % Final    Transferrin 06/07/2024 375 (H)  200 - 360 mg/dL Final    TIBC 06/07/2024 559 (H)  298 - 536 mcg/dL Final    WBC 06/07/2024 7.81  3.40 - 10.80 10*3/mm3 Final    RBC 06/07/2024 4.22  4.14 - 5.80 10*6/mm3 Final    Hemoglobin 06/07/2024 13.6  13.0 - 17.7 g/dL Final    Hematocrit 06/07/2024 40.9  37.5 - 51.0 % Final    MCV 06/07/2024 96.9  79.0 - 97.0 fL Final    MCH 06/07/2024 32.2  26.6 - 33.0 pg Final    MCHC 06/07/2024 33.3  31.5 - 35.7 g/dL Final    RDW 06/07/2024 13.1  12.3 - 15.4 % Final    RDW-SD 06/07/2024 46.4  37.0 - 54.0 fl Final    MPV 06/07/2024 13.0 (H)  6.0 - 12.0 fL Final    Platelets 06/07/2024 124 (L)  140 - 450 10*3/mm3 Final    Neutrophil % 06/07/2024 63.8  42.7 - 76.0 % Final    Lymphocyte % 06/07/2024 23.2  19.6 - 45.3 % Final    Monocyte % 06/07/2024 8.7  5.0 - 12.0 % Final    Eosinophil % 06/07/2024 3.1  0.3 - 6.2 % Final    Basophil % 06/07/2024 0.4  0.0 - 1.5 % Final    Immature Grans % 06/07/2024 0.8 (H)  0.0 - 0.5 % Final    Neutrophils, Absolute 06/07/2024 4.99  1.70 - 7.00 10*3/mm3 Final     Lymphocytes, Absolute 06/07/2024 1.81  0.70 - 3.10 10*3/mm3 Final    Monocytes, Absolute 06/07/2024 0.68  0.10 - 0.90 10*3/mm3 Final    Eosinophils, Absolute 06/07/2024 0.24  0.00 - 0.40 10*3/mm3 Final    Basophils, Absolute 06/07/2024 0.03  0.00 - 0.20 10*3/mm3 Final    Immature Grans, Absolute 06/07/2024 0.06 (H)  0.00 - 0.05 10*3/mm3 Final   Lab on 06/03/2024   Component Date Value Ref Range Status    TSH 06/03/2024 1.870  0.270 - 4.200 uIU/mL Final    Free T4 06/03/2024 0.90 (L)  0.92 - 1.68 ng/dL Final    Iron 06/03/2024 179 (H)  59 - 158 mcg/dL Final    Iron Saturation (TSAT) 06/03/2024 31  20 - 50 % Final    Transferrin 06/03/2024 383 (H)  200 - 360 mg/dL Final    TIBC 06/03/2024 571 (H)  298 - 536 mcg/dL Final    Hep A Total Ab 06/03/2024 Positive (A)  Negative Final    Comment: The HAV total antibody assay detects both IgG and IgM  but does not differentiate between them. A negative result  suggests susceptibility to infection. A positive result could  be due to vaccination, previously resolved infection or active  infection. Testing for HAV IgM should be performed if active  HAV infection is suspected. [facility] offers profiles that will  automatically reflex positive HAV total antibody results to  IgM (e.g., panel #[id number] HAV Antibody w/ Rfx).    Hep B S Ab 06/03/2024 Non-Reactive   Final    Hepatitis B Surface Ag 06/03/2024 Non-Reactive  Non-Reactive Final    WBC 06/03/2024 6.88  3.40 - 10.80 10*3/mm3 Final    RBC 06/03/2024 4.13 (L)  4.14 - 5.80 10*6/mm3 Final    Hemoglobin 06/03/2024 13.3  13.0 - 17.7 g/dL Final    Hematocrit 06/03/2024 39.9  37.5 - 51.0 % Final    MCV 06/03/2024 96.6  79.0 - 97.0 fL Final    MCH 06/03/2024 32.2  26.6 - 33.0 pg Final    MCHC 06/03/2024 33.3  31.5 - 35.7 g/dL Final    RDW 06/03/2024 13.0  12.3 - 15.4 % Final    RDW-SD 06/03/2024 45.3  37.0 - 54.0 fl Final    MPV 06/03/2024 12.7 (H)  6.0 - 12.0 fL Final    Platelets 06/03/2024 125 (L)  140 - 450 10*3/mm3 Final     Neutrophil % 06/03/2024 62.6  42.7 - 76.0 % Final    Lymphocyte % 06/03/2024 22.2  19.6 - 45.3 % Final    Monocyte % 06/03/2024 11.8  5.0 - 12.0 % Final    Eosinophil % 06/03/2024 2.6  0.3 - 6.2 % Final    Basophil % 06/03/2024 0.4  0.0 - 1.5 % Final    Immature Grans % 06/03/2024 0.4  0.0 - 0.5 % Final    Neutrophils, Absolute 06/03/2024 4.30  1.70 - 7.00 10*3/mm3 Final    Lymphocytes, Absolute 06/03/2024 1.53  0.70 - 3.10 10*3/mm3 Final    Monocytes, Absolute 06/03/2024 0.81  0.10 - 0.90 10*3/mm3 Final    Eosinophils, Absolute 06/03/2024 0.18  0.00 - 0.40 10*3/mm3 Final    Basophils, Absolute 06/03/2024 0.03  0.00 - 0.20 10*3/mm3 Final    Immature Grans, Absolute 06/03/2024 0.03  0.00 - 0.05 10*3/mm3 Final    nRBC 06/03/2024 0.0  0.0 - 0.2 /100 WBC Final   Office Visit on 06/03/2024   Component Date Value Ref Range Status    Glucose 06/03/2024 143 (H)  65 - 99 mg/dL Final    BUN 06/03/2024 31 (H)  8 - 23 mg/dL Final    Creatinine 06/03/2024 1.74 (H)  0.76 - 1.27 mg/dL Final    Sodium 06/03/2024 139  136 - 145 mmol/L Final    Potassium 06/03/2024 4.6  3.5 - 5.2 mmol/L Final    Chloride 06/03/2024 106  98 - 107 mmol/L Final    CO2 06/03/2024 24.0  22.0 - 29.0 mmol/L Final    Calcium 06/03/2024 9.7  8.6 - 10.5 mg/dL Final    Total Protein 06/03/2024 7.9  6.0 - 8.5 g/dL Final    Albumin 06/03/2024 4.7  3.5 - 5.2 g/dL Final    ALT (SGPT) 06/03/2024 17  1 - 41 U/L Final    AST (SGOT) 06/03/2024 18  1 - 40 U/L Final    Alkaline Phosphatase 06/03/2024 95  39 - 117 U/L Final    Total Bilirubin 06/03/2024 0.7  0.0 - 1.2 mg/dL Final    Globulin 06/03/2024 3.2  gm/dL Final    A/G Ratio 06/03/2024 1.5  g/dL Final    BUN/Creatinine Ratio 06/03/2024 17.8  7.0 - 25.0 Final    Anion Gap 06/03/2024 9.0  5.0 - 15.0 mmol/L Final    eGFR 06/03/2024 40.4 (L)  >60.0 mL/min/1.73 Final    Protime 06/03/2024 14.1  12.2 - 14.5 Seconds Final    INR 06/03/2024 1.07  0.89 - 1.12 Final    25 Hydroxy, Vitamin D 06/03/2024 41.2  30.0 - 100.0  ng/ml Final       Assessment  Assessment & Plan   1. Type 2 diabetes mellitus with hyperglycemia, with long-term current use of insulin    2. Type 2 diabetes mellitus with diabetic polyneuropathy, with long-term current use of insulin    3. Acquired hypothyroidism          Plan      CGM downloaded and analyzed 34% in range. Postprandial hyperglycemia is significant.    Restart Trulicity at lower dose of 0.75 mg weekly for a month, then increase to 1.5 mg weekly. We would be careful with increase in the dose, monitor for GI symptoms and discontinue if those occur.     2.Cont levothyroxine 75 mcg, thyroid function was normal on recent labs .       Follow-up in 3 months

## 2024-07-01 ENCOUNTER — TELEPHONE (OUTPATIENT)
Dept: INTERNAL MEDICINE | Facility: CLINIC | Age: 74
End: 2024-07-01
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
## 2024-07-02 ENCOUNTER — CLINICAL SUPPORT (OUTPATIENT)
Dept: INTERNAL MEDICINE | Facility: CLINIC | Age: 74
End: 2024-07-02
Payer: MEDICARE

## 2024-07-02 DIAGNOSIS — S90.512A ABRASION, LEFT ANKLE, INITIAL ENCOUNTER: ICD-10-CM

## 2024-07-02 DIAGNOSIS — T14.90XA INJURY: Primary | ICD-10-CM

## 2024-07-02 DIAGNOSIS — T14.90XA INJURY: ICD-10-CM

## 2024-07-02 PROCEDURE — 90714 TD VACC NO PRESV 7 YRS+ IM: CPT | Performed by: INTERNAL MEDICINE

## 2024-07-02 PROCEDURE — 90471 IMMUNIZATION ADMIN: CPT | Performed by: INTERNAL MEDICINE

## 2024-07-24 DIAGNOSIS — E11.42 DIABETIC PERIPHERAL NEUROPATHY ASSOCIATED WITH TYPE 2 DIABETES MELLITUS: ICD-10-CM

## 2024-07-25 RX ORDER — SPIRONOLACTONE 25 MG/1
TABLET ORAL
Qty: 90 TABLET | Refills: 3 | Status: SHIPPED | OUTPATIENT
Start: 2024-07-25

## 2024-07-25 NOTE — TELEPHONE ENCOUNTER
Rx Refill Note  Requested Prescriptions     Pending Prescriptions Disp Refills    gabapentin (NEURONTIN) 800 MG tablet [Pharmacy Med Name: GABAPENTIN TABS 800MG] 360 tablet 0     Sig: TAKE 1 TABLET FOUR TIMES A DAY      Last office visit with prescribing clinician: 6/14/2024      Next office visit with prescribing clinician: 9/17/2024                  Get Goss MA  07/25/24, 08:34 EDT

## 2024-07-26 RX ORDER — GABAPENTIN 800 MG/1
800 TABLET ORAL 4 TIMES DAILY
Qty: 360 TABLET | Refills: 0 | Status: SHIPPED | OUTPATIENT
Start: 2024-07-26

## 2024-07-30 ENCOUNTER — OFFICE VISIT (OUTPATIENT)
Dept: INTERNAL MEDICINE | Facility: CLINIC | Age: 74
End: 2024-07-30
Payer: MEDICARE

## 2024-07-30 VITALS
HEART RATE: 76 BPM | SYSTOLIC BLOOD PRESSURE: 128 MMHG | DIASTOLIC BLOOD PRESSURE: 74 MMHG | BODY MASS INDEX: 30.87 KG/M2 | TEMPERATURE: 98.2 F | RESPIRATION RATE: 18 BRPM | WEIGHT: 203 LBS

## 2024-07-30 DIAGNOSIS — E78.5 HYPERLIPIDEMIA, UNSPECIFIED HYPERLIPIDEMIA TYPE: ICD-10-CM

## 2024-07-30 DIAGNOSIS — I10 PRIMARY HYPERTENSION: ICD-10-CM

## 2024-07-30 DIAGNOSIS — K21.9 GASTROESOPHAGEAL REFLUX DISEASE WITHOUT ESOPHAGITIS: Primary | ICD-10-CM

## 2024-07-30 PROCEDURE — 3052F HG A1C>EQUAL 8.0%<EQUAL 9.0%: CPT | Performed by: INTERNAL MEDICINE

## 2024-07-30 PROCEDURE — G2211 COMPLEX E/M VISIT ADD ON: HCPCS | Performed by: INTERNAL MEDICINE

## 2024-07-30 PROCEDURE — 99214 OFFICE O/P EST MOD 30 MIN: CPT | Performed by: INTERNAL MEDICINE

## 2024-07-30 PROCEDURE — 1126F AMNT PAIN NOTED NONE PRSNT: CPT | Performed by: INTERNAL MEDICINE

## 2024-07-30 PROCEDURE — 3074F SYST BP LT 130 MM HG: CPT | Performed by: INTERNAL MEDICINE

## 2024-07-30 PROCEDURE — 1159F MED LIST DOCD IN RCRD: CPT | Performed by: INTERNAL MEDICINE

## 2024-07-30 PROCEDURE — 3078F DIAST BP <80 MM HG: CPT | Performed by: INTERNAL MEDICINE

## 2024-07-30 PROCEDURE — 1160F RVW MEDS BY RX/DR IN RCRD: CPT | Performed by: INTERNAL MEDICINE

## 2024-07-30 NOTE — PROGRESS NOTES
Chief Complaint   Patient presents with    Follow-up     4 month follow up chronic medical problems       History of Present Illness      The patient presents for a follow-up related to hyperlipidemia. He is following a low fat diet. He reports that he is exercising. He is taking gemfibrozil. The patient is taking his medication as prescribed. He reports no medication side effects, including muscle cramps, abdominal pain, headaches or weakness. He denies chest pain, shortness of breath, orthopnea, paroxysmal nocturnal dyspnea, dyspnea on exertion, edema, palpitations or syncope.    The patient presents for a follow-up related to hypertension. The patient reports that he has had no headaches or blurred vision. He states that he is taking his medication as prescribed. He is not having medication side effects.    The patient presents for a follow-up related to GERD. The patient is on omeprazole for his gastroesophageal reflux. The medication is taken on a regular basis and gives complete relief of the symptoms. He reports no abdominal pain, belching, diarrhea, dysphagia, early satiety, heartburn, hoarseness, nausea, odynophagia, rectal bleeding, vomiting or weight loss. The GERD has no known aggravating factors.    Medications      Current Outpatient Medications:     Alpha-Lipoic Acid 600 MG tablet, Take 1 tablet by mouth Daily. Ran out, last took on Wednesday 3/13., Disp: , Rfl:     B Complex Vitamins (VITAMIN B COMPLEX PO), Take 1 tablet by mouth every night at bedtime., Disp: , Rfl:     carvedilol (COREG) 3.125 MG tablet, Take 1 tablet by mouth Every 12 (Twelve) Hours., Disp: 180 tablet, Rfl: 2    Coenzyme Q10 (Co Q 10) 100 MG capsule, Take 300 mg by mouth Daily., Disp: , Rfl:     colestipol (COLESTID) 1 g tablet, TAKE 2 TABLETS TWICE A DAY, Disp: 360 tablet, Rfl: 3    Continuous Blood Gluc  (FreeStyle Winston 2 [place]) device, 1 Device Every 14 (Fourteen) Days., Disp: 1 each, Rfl: 0    Continuous Blood Gluc  Sensor (FreeStyle Winston 2 Sensor) misc, 1 each Every 14 (Fourteen) Days., Disp: 6 each, Rfl: 3    Dulaglutide (Trulicity) 1.5 MG/0.5ML solution pen-injector, Inject 1.5 mg under the skin into the appropriate area as directed 1 (One) Time Per Week., Disp: 6 mL, Rfl: 0    ferrous sulfate (FeroSul) 325 (65 FE) MG tablet, Take 1 tablet by mouth 2 (Two) Times a Day., Disp: 180 tablet, Rfl: 1    Flaxseed, Linseed, (Flaxseed Oil) 1400 MG capsule, Take 1 capsule by mouth 2 (Two) Times a Day., Disp: , Rfl:     gabapentin (NEURONTIN) 800 MG tablet, TAKE 1 TABLET FOUR TIMES A DAY, Disp: 360 tablet, Rfl: 0    gemfibrozil (LOPID) 600 MG tablet, TAKE 1 TABLET TWICE A DAY, Disp: 180 tablet, Rfl: 3    Insulin Glargine (LANTUS SOLOSTAR) 100 UNIT/ML injection pen, Inject 34 Units under the skin into the appropriate area as directed Every Night. (Patient taking differently: Inject 70 Units under the skin into the appropriate area as directed Every Night.), Disp: , Rfl:     Insulin Lispro, 1 Unit Dial, (HumaLOG KwikPen) 100 UNIT/ML solution pen-injector, Inject 5 Units under the skin into the appropriate area as directed 3 (Three) Times a Day. (Patient taking differently: Inject 45 Units under the skin into the appropriate area as directed 2 (Two) Times a Day.), Disp: , Rfl:     Insulin Pen Needle (B-D UF III MINI PEN NEEDLES) 31G X 5 MM misc, USE THREE TIMES A DAY, Disp: 270 each, Rfl: 3    ipratropium (ATROVENT) 0.06 % nasal spray, USE 2 SPRAYS IN EACH NOSTRIL AS DIRECTED BY PROVIDER DAILY (Patient taking differently: 2 sprays into the nostril(s) as directed by provider Daily As Needed.), Disp: 45 mL, Rfl: 1    levothyroxine (SYNTHROID, LEVOTHROID) 75 MCG tablet, Take 1 tablet by mouth Daily., Disp: 90 tablet, Rfl: 3    Multiple Vitamins-Minerals (MULTIVITAMIN PO), Take 1 tablet by mouth Daily., Disp: , Rfl:     omeprazole (priLOSEC) 40 MG capsule, TAKE 1 CAPSULE DAILY, Disp: 90 capsule, Rfl: 3    saccharomyces boulardii (FLORASTOR)  250 MG capsule, Take 1 capsule by mouth 2 (Two) Times a Day. (Patient taking differently: Take 1 capsule by mouth Daily.), Disp: 30 capsule, Rfl: 0    sertraline (ZOLOFT) 100 MG tablet, TAKE 1 TABLET DAILY, Disp: 90 tablet, Rfl: 3    spironolactone (ALDACTONE) 25 MG tablet, TAKE 1 TABLET DAILY, Disp: 90 tablet, Rfl: 3    tamoxifen (NOLVADEX) 20 MG chemo tablet, TAKE 1 TABLET DAILY, Disp: 90 tablet, Rfl: 3    temazepam (Restoril) 7.5 MG capsule, Take 1 capsule by mouth At Night As Needed for Sleep., Disp: , Rfl:     tiZANidine (ZANAFLEX) 4 MG tablet, TAKE 1 TABLET TWICE A DAY AS NEEDED FOR MUSCLE SPASMS, Disp: 180 tablet, Rfl: 3    vitamin D3 125 MCG (5000 UT) capsule capsule, Take 1 capsule by mouth 2 (Two) Times a Day., Disp: , Rfl:      Allergies    Allergies   Allergen Reactions    Glucophage Xr [Metformin Hcl Er] Diarrhea and Other (See Comments)     Glucophage XR TB24: Adverse Reaction: Severe GI issues.    Metformin Nausea And Vomiting     Other reaction(s): SEVERE INTESTIONAL ISSUES  Other reaction(s): SEVERE GI ISSUES    Glucophage XR TB24  MetFORMIN HCl TABS    Tetracyclines & Related Nausea Only     Adverse Reaction    Chlorhexidine Rash       Problem List    Patient Active Problem List   Diagnosis    Stage 3a chronic kidney disease    Gastroesophageal reflux disease without esophagitis    Dyslipidemia, goal LDL below 70    Primary hypertension    Obesity, Class I, BMI 30-34.9    FAVIO (obstructive sleep apnea)    Cervical radiculopathy    Type 2 diabetes mellitus with diabetic polyneuropathy, with long-term current use of insulin    Circadian rhythm sleep disorder, delayed sleep phase type    Mild nonproliferative diabetic retinopathy of left eye without macular edema associated with type 2 diabetes mellitus    Acquired hypothyroidism    Anemia of chronic disease    Liver cirrhosis secondary to JEROME    Portal hypertensive gastropathy    Grade I diastolic dysfunction    Combined forms of age-related cataract  of both eyes    Major depressive disorder with single episode, in full remission    Malignant neoplasm of right breast in male, estrogen receptor positive    Malignant neoplasm of overlapping sites of right female breast    Sepsis, due to unspecified organism, unspecified whether acute organ dysfunction present    Idiopathic acute pancreatitis    Pancreatitis    Acute cholecystitis    HLD (hyperlipidemia)    History of breast cancer    Elevated troponin    T2DM (type 2 diabetes mellitus)       Medications, Allergies, Problems List and Past History were reviewed and updated.    Physical Examination    /74 (BP Location: Left arm, Patient Position: Sitting, Cuff Size: Adult)   Pulse 76   Temp 98.2 °F (36.8 °C) (Infrared)   Resp 18   Wt 92.1 kg (203 lb)   BMI 30.87 kg/m²       HEENT: Head- Normocephalic Atraumatic. Facies- Within normal limits. Pinnas- Normal texture and shape bilaterally. Canals- Normal bilaterally. TMs- Normal bilaterally. Nares- Patent bilaterally. Nasal Septum- is normal. There is no tenderness to palpation over the frontal or maxillary sinuses. Lids- Normal bilaterally. Conjunctiva- Clear bilaterally. Sclera- Anicteric bilaterally. Oropharynx- Moist with no lesions. Tonsils- No enlargement, erythema or exudate.    Neck: Thyroid- non enlarged, symmetric and has no nodules. No bruits are detected. ROM- Normal Range of Motion with no rigidity.    Lungs: Auscultation- Clear to auscultation bilaterally. There are no retractions, clubbing or cyanosis. The Expiratory to Inspiratory ratio is equal.    Cardiovascular: There are no carotid bruits. Heart- Normal Rate with Regular rhythm and no murmurs. There are no gallops. There are no rubs. In the lower extremities there is no edema. The upper extremities do not have edema.    Abdomen: Soft, benign, non-tender with no masses, hernias, organomegaly or scars.    Impression and Assessment    Hyperlipidemia.    Essential  Hypertension.    Gastroesophageal Reflux Disease.    Plan    Gastroesophageal Reflux Disease Plan: The patient was instructed to continue the current medications.    Hyperlipidemia Plan: He was instructed to eat a low fat diet. He was encouraged to exercise daily. Weight loss was encouraged. The patient was instructed to continue the current medications.    Essential Hypertension Plan: The patient was instructed to continue the current medications.    Diagnoses and all orders for this visit:    1. Gastroesophageal reflux disease without esophagitis (Primary)    2. Primary hypertension    3. Hyperlipidemia, unspecified hyperlipidemia type        Return to Office    The patient was instructed to return for follow-up in 4 months. The patient was instructed to return sooner if the condition changes, worsens, or does not resolve.

## 2024-08-06 ENCOUNTER — OFFICE VISIT (OUTPATIENT)
Dept: ONCOLOGY | Facility: CLINIC | Age: 74
End: 2024-08-06
Payer: MEDICARE

## 2024-08-06 VITALS
SYSTOLIC BLOOD PRESSURE: 115 MMHG | RESPIRATION RATE: 16 BRPM | DIASTOLIC BLOOD PRESSURE: 79 MMHG | OXYGEN SATURATION: 97 % | BODY MASS INDEX: 31.02 KG/M2 | WEIGHT: 204.7 LBS | HEIGHT: 68 IN | TEMPERATURE: 97.5 F | HEART RATE: 85 BPM

## 2024-08-06 DIAGNOSIS — E13.40 NEUROPATHY DUE TO SECONDARY DIABETES MELLITUS: ICD-10-CM

## 2024-08-06 DIAGNOSIS — C50.921 MALIGNANT NEOPLASM OF RIGHT BREAST IN MALE, ESTROGEN RECEPTOR POSITIVE, UNSPECIFIED SITE OF BREAST: Primary | ICD-10-CM

## 2024-08-06 DIAGNOSIS — Z17.0 MALIGNANT NEOPLASM OF RIGHT BREAST IN MALE, ESTROGEN RECEPTOR POSITIVE, UNSPECIFIED SITE OF BREAST: Primary | ICD-10-CM

## 2024-08-06 PROCEDURE — 99213 OFFICE O/P EST LOW 20 MIN: CPT | Performed by: NURSE PRACTITIONER

## 2024-08-06 PROCEDURE — 1126F AMNT PAIN NOTED NONE PRSNT: CPT | Performed by: NURSE PRACTITIONER

## 2024-08-06 PROCEDURE — 3074F SYST BP LT 130 MM HG: CPT | Performed by: NURSE PRACTITIONER

## 2024-08-06 PROCEDURE — 3078F DIAST BP <80 MM HG: CPT | Performed by: NURSE PRACTITIONER

## 2024-08-06 PROCEDURE — 1159F MED LIST DOCD IN RCRD: CPT | Performed by: NURSE PRACTITIONER

## 2024-08-06 PROCEDURE — 1160F RVW MEDS BY RX/DR IN RCRD: CPT | Performed by: NURSE PRACTITIONER

## 2024-08-06 NOTE — PROGRESS NOTES
"      PROBLEM LIST:  1. [id number] ER+ (90%, 3+), UT+ (66%, 3+), Her2 negative (1+) invasive ductal carcinoma of the right breast.    A) right mastectomy on 5/23/23 showed a 2.2 cm intermediate grade IDC.  0/2 SLN involved.  Genetic testing negative.  B) tamoxifen started June 2023  2. DM2  3. Hypertension  4. Hypothyroidism  5. FAVIO  6. HL  7. Anxiety/depression  8. GERD  9. JEROME cirrhosis  10. CKD stage 3b    Subjective     CHIEF COMPLAINT: Breast cancer    HISTORY OF PRESENT ILLNESS:   Elton Funez returns for follow-up.   He started tamoxifen early June 2023.  He is continuing to tolerate this well.       He continues to have severe peripheral neuropathy bilateral feet.  He is having more difficulty controlling pain despite taking gabapentin 800 mg 4 times a day.    He has had a couple of falls since his last visit due to the neuropathy.    He had a laparoscopic cholecystectomy 3/18/2024.      Objective      /79   Pulse 85   Temp 97.5 °F (36.4 °C) (Temporal)   Resp 16   Ht 172.7 cm (67.99\")   Wt 92.9 kg (204 lb 11.2 oz)   SpO2 97%   BMI 31.13 kg/m²   Vitals:    08/06/24 1347   PainSc: 0-No pain                 ECOG score: 1             General: well appearing male in no acute distress  Neuro: alert and oriented  HEENT: sclera anicteric, oropharynx clear  Lymphatics: no cervical, supraclavicular, or axillary adenopathy  Chest: Status post right mastectomy.  No palpable mass bilaterally  Cardiovascular: regular rate and rhythm, no murmurs  Lungs: clear to auscultation bilaterally  Abdomen: soft, nontender, nondistended.  No palpable organomegaly  Extremeties: no lower extremity edema  Skin: no rashes, lesions, bruising, or petechiae  Psych: mood and affect appropriate    I have reexamined the patient and the results are consistent with the previously documented exam. MELISSA Stone        RECENT LABS:  Lab Results   Component Value Date    WBC 7.81 06/07/2024    HGB 13.6 06/07/2024    HCT 40.9 " 06/07/2024    MCV 96.9 06/07/2024     (L) 06/07/2024       Lab Results   Component Value Date    GLUCOSE 189 (H) 06/07/2024    BUN 28 (H) 06/07/2024    CREATININE 1.56 (H) 06/07/2024    EGFRIFNONA 60 (L) 12/15/2021    EGFRIFAFRI 66 07/14/2015    BCR 17.9 06/07/2024    K 4.2 06/07/2024    CO2 22.7 06/07/2024    CALCIUM 10.0 06/07/2024    PROTENTOTREF 7.7 07/14/2015    ALBUMIN 4.6 06/07/2024    LABIL2 1.5 07/14/2015    AST 18 06/03/2024    ALT 17 06/03/2024                   ASSESSMENT AND PLAN:     Elton Funez is a 74 y.o. male with a T2N0 ER+ Her2 negative IDC of the right breast.     He started tamoxifen in early June 2023.  He is tolerating it relatively well.  We will continue tamoxifen for minimum of 5 years.    Peripheral neuropathy: significant peripheral neuropathy worsening. Patient request a neurology referral.     Follow-up in 6 months.                  Pooja Prieto APRN  [facility]    8/6/2024          CC:

## 2024-09-03 RX ORDER — IPRATROPIUM BROMIDE 42 UG/1
SPRAY, METERED NASAL
Qty: 45 ML | Refills: 1 | Status: SHIPPED | OUTPATIENT
Start: 2024-09-03

## 2024-09-03 RX ORDER — OMEPRAZOLE 40 MG/1
CAPSULE, DELAYED RELEASE ORAL
Qty: 90 CAPSULE | Refills: 1 | Status: SHIPPED | OUTPATIENT
Start: 2024-09-03

## 2024-09-04 ENCOUNTER — PATIENT ROUNDING (BHMG ONLY) (OUTPATIENT)
Dept: GASTROENTEROLOGY | Facility: CLINIC | Age: 74
End: 2024-09-04
Payer: MEDICARE

## 2024-09-05 ENCOUNTER — TELEPHONE (OUTPATIENT)
Dept: ENDOCRINOLOGY | Facility: CLINIC | Age: 74
End: 2024-09-05
Payer: MEDICARE

## 2024-09-05 NOTE — TELEPHONE ENCOUNTER
Called the pt and told him [employer] had sent a form it was filled out, signed and faxed back on 8-28-24.    He verbalized understanding.

## 2024-09-05 NOTE — TELEPHONE ENCOUNTER
Hub staff attempted to follow warm transfer process and was unsuccessful     Caller: Elton Funez    Relationship to patient: Self    Best call back number: [phone]    Patient is needing: RECEIVED LETTER FROM [employer] STATING 9/1 [facility] WILL REQUIRE PA'S IN ORDER FOR PT TO GET SUPPLIES.

## 2024-09-17 ENCOUNTER — OFFICE VISIT (OUTPATIENT)
Dept: ENDOCRINOLOGY | Facility: CLINIC | Age: 74
End: 2024-09-17
Payer: MEDICARE

## 2024-09-17 VITALS
WEIGHT: 202 LBS | BODY MASS INDEX: 30.62 KG/M2 | DIASTOLIC BLOOD PRESSURE: 78 MMHG | SYSTOLIC BLOOD PRESSURE: 122 MMHG | HEIGHT: 68 IN | HEART RATE: 72 BPM

## 2024-09-17 DIAGNOSIS — E11.42 TYPE 2 DIABETES MELLITUS WITH DIABETIC POLYNEUROPATHY, WITH LONG-TERM CURRENT USE OF INSULIN: ICD-10-CM

## 2024-09-17 DIAGNOSIS — E11.3292 MILD NONPROLIFERATIVE DIABETIC RETINOPATHY OF LEFT EYE WITHOUT MACULAR EDEMA ASSOCIATED WITH TYPE 2 DIABETES MELLITUS: ICD-10-CM

## 2024-09-17 DIAGNOSIS — E11.65 TYPE 2 DIABETES MELLITUS WITH HYPERGLYCEMIA, WITH LONG-TERM CURRENT USE OF INSULIN: Primary | ICD-10-CM

## 2024-09-17 DIAGNOSIS — E03.9 ACQUIRED HYPOTHYROIDISM: ICD-10-CM

## 2024-09-17 DIAGNOSIS — Z79.4 TYPE 2 DIABETES MELLITUS WITH DIABETIC POLYNEUROPATHY, WITH LONG-TERM CURRENT USE OF INSULIN: ICD-10-CM

## 2024-09-17 DIAGNOSIS — Z79.4 TYPE 2 DIABETES MELLITUS WITH HYPERGLYCEMIA, WITH LONG-TERM CURRENT USE OF INSULIN: Primary | ICD-10-CM

## 2024-09-17 LAB
EXPIRATION DATE: ABNORMAL
EXPIRATION DATE: ABNORMAL
GLUCOSE BLDC GLUCOMTR-MCNC: 172 MG/DL (ref 70–130)
HBA1C MFR BLD: 7.8 % (ref 4.5–5.7)
Lab: ABNORMAL
Lab: ABNORMAL

## 2024-09-17 PROCEDURE — 3051F HG A1C>EQUAL 7.0%<8.0%: CPT | Performed by: INTERNAL MEDICINE

## 2024-09-17 PROCEDURE — 83036 HEMOGLOBIN GLYCOSYLATED A1C: CPT | Performed by: INTERNAL MEDICINE

## 2024-09-17 PROCEDURE — 95251 CONT GLUC MNTR ANALYSIS I&R: CPT | Performed by: INTERNAL MEDICINE

## 2024-09-17 PROCEDURE — 99214 OFFICE O/P EST MOD 30 MIN: CPT | Performed by: INTERNAL MEDICINE

## 2024-09-17 PROCEDURE — 3074F SYST BP LT 130 MM HG: CPT | Performed by: INTERNAL MEDICINE

## 2024-09-17 PROCEDURE — 3078F DIAST BP <80 MM HG: CPT | Performed by: INTERNAL MEDICINE

## 2024-09-17 RX ORDER — DULAGLUTIDE 3 MG/.5ML
3 INJECTION, SOLUTION SUBCUTANEOUS WEEKLY
Qty: 6 ML | Refills: 3 | Status: SHIPPED | OUTPATIENT
Start: 2024-09-17

## 2024-09-17 RX ORDER — INSULIN LISPRO 100 [IU]/ML
30 INJECTION, SOLUTION INTRAVENOUS; SUBCUTANEOUS 2 TIMES DAILY
Qty: 90 ML | Refills: 3 | Status: SHIPPED | OUTPATIENT
Start: 2024-09-17

## 2024-10-02 ENCOUNTER — TELEPHONE (OUTPATIENT)
Dept: ENDOCRINOLOGY | Facility: CLINIC | Age: 74
End: 2024-10-02
Payer: MEDICARE

## 2024-10-02 RX ORDER — DULAGLUTIDE 4.5 MG/.5ML
4.5 INJECTION, SOLUTION SUBCUTANEOUS WEEKLY
Qty: 6 ML | Refills: 0 | Status: SHIPPED | OUTPATIENT
Start: 2024-10-02

## 2024-10-02 NOTE — TELEPHONE ENCOUNTER
Caller: Elton Funez    Relationship to patient: Self    Best call back number:     [phone]       Patient is needing: PT CALLED IN STATING THAT HIS MEDICATION SHOULD BE 4.5MG INSTEAD OF 3MG. PLEASE ADVISE AND CALL BACK.       Dulaglutide (Trulicity) 3 MG/0.5ML solution pen-injector       [facility] - [place], [place] - [address] [place] [address] - [phone]  - [phone] [address] [address]   Phone: [phone] Fax: [phone]   Hours: Not open 24 hours

## 2024-10-02 NOTE — TELEPHONE ENCOUNTER
Rx Refill Note    Requested Prescriptions     Pending Prescriptions Disp Refills    Dulaglutide (Trulicity) 4.5 MG/0.5ML solution pen-injector 2 mL 2     Sig: Inject 0.5 mL under the skin into the appropriate area as directed 1 (One) Time Per Week.        Last office visit with prescribing clinician: 9/17/2024       Next office visit with prescribing clinician: 1/31/2025     {    Pauline Vásquez MA  10/02/24, 10:03 EDT

## 2024-10-18 DIAGNOSIS — D50.9 IRON DEFICIENCY ANEMIA, UNSPECIFIED IRON DEFICIENCY ANEMIA TYPE: ICD-10-CM

## 2024-10-18 RX ORDER — FERROUS SULFATE 325(65) MG
1 TABLET ORAL 2 TIMES DAILY
Qty: 180 TABLET | Refills: 1 | Status: SHIPPED | OUTPATIENT
Start: 2024-10-18

## 2024-10-18 NOTE — TELEPHONE ENCOUNTER
"Medical screening examination initiated.  I have conducted a focused provider triage encounter, findings are as follows:    Brief history of present illness:  Patient complains of chest pain for 1 week    Vitals:    06/11/24 1442   BP: (!) 186/90   BP Location: Right arm   Patient Position: Sitting   Pulse: 75   Resp: 17   Temp: 98 °F (36.7 °C)   TempSrc: Oral   SpO2: 97%   Weight: 105.4 kg (232 lb 5.8 oz)   Height: 5' 2" (1.575 m)       Pertinent physical exam:  NAD    Brief workup plan:  Cardiac workup    Preliminary workup initiated; this workup will be continued and followed by the physician or advanced practice provider that is assigned to the patient when roomed.  " Patient is asking for refill.

## 2024-10-20 ENCOUNTER — TELEPHONE (OUTPATIENT)
Dept: INTERNAL MEDICINE | Facility: CLINIC | Age: 74
End: 2024-10-20
Payer: MEDICARE

## 2024-10-20 NOTE — TELEPHONE ENCOUNTER
On call 10/20/2024 at 1300    Spouse called because patient had been developing mild congestion and cough.  Mr. Montano's wife has recently tested positive for COVID-19.    His symptoms include mild congestion and minimal cough, no fever, no chills, but no documented evidence of COVID-19.  Symptoms started a few hours ago.    Recommendations: Instructed both patient and spouse that there needs to be a confirmed documentation of illness related to COVID-19 before  treatment given.  If symptoms get worse patient should proceed to the urgent treatment center.  Both patient and spouse verbalized understanding continue to monitor

## 2024-10-21 ENCOUNTER — OFFICE VISIT (OUTPATIENT)
Dept: INTERNAL MEDICINE | Facility: CLINIC | Age: 74
End: 2024-10-21
Payer: MEDICARE

## 2024-10-21 VITALS
TEMPERATURE: 97.5 F | WEIGHT: 204.13 LBS | DIASTOLIC BLOOD PRESSURE: 70 MMHG | RESPIRATION RATE: 18 BRPM | BODY MASS INDEX: 31.04 KG/M2 | SYSTOLIC BLOOD PRESSURE: 124 MMHG | OXYGEN SATURATION: 96 % | HEART RATE: 83 BPM

## 2024-10-21 DIAGNOSIS — Z20.822 CLOSE EXPOSURE TO COVID-19 VIRUS: Primary | ICD-10-CM

## 2024-10-21 DIAGNOSIS — U07.1 COVID-19 VIRUS INFECTION: ICD-10-CM

## 2024-10-21 LAB
EXPIRATION DATE: ABNORMAL
FLUAV AG UPPER RESP QL IA.RAPID: NOT DETECTED
FLUBV AG UPPER RESP QL IA.RAPID: NOT DETECTED
INTERNAL CONTROL: ABNORMAL
Lab: ABNORMAL
SARS-COV-2 AG UPPER RESP QL IA.RAPID: DETECTED

## 2024-10-21 PROCEDURE — 1159F MED LIST DOCD IN RCRD: CPT | Performed by: STUDENT IN AN ORGANIZED HEALTH CARE EDUCATION/TRAINING PROGRAM

## 2024-10-21 PROCEDURE — 3078F DIAST BP <80 MM HG: CPT | Performed by: STUDENT IN AN ORGANIZED HEALTH CARE EDUCATION/TRAINING PROGRAM

## 2024-10-21 PROCEDURE — 87428 SARSCOV & INF VIR A&B AG IA: CPT | Performed by: STUDENT IN AN ORGANIZED HEALTH CARE EDUCATION/TRAINING PROGRAM

## 2024-10-21 PROCEDURE — 3074F SYST BP LT 130 MM HG: CPT | Performed by: STUDENT IN AN ORGANIZED HEALTH CARE EDUCATION/TRAINING PROGRAM

## 2024-10-21 PROCEDURE — 99214 OFFICE O/P EST MOD 30 MIN: CPT | Performed by: STUDENT IN AN ORGANIZED HEALTH CARE EDUCATION/TRAINING PROGRAM

## 2024-10-21 PROCEDURE — 3051F HG A1C>EQUAL 7.0%<8.0%: CPT | Performed by: STUDENT IN AN ORGANIZED HEALTH CARE EDUCATION/TRAINING PROGRAM

## 2024-10-21 PROCEDURE — 1126F AMNT PAIN NOTED NONE PRSNT: CPT | Performed by: STUDENT IN AN ORGANIZED HEALTH CARE EDUCATION/TRAINING PROGRAM

## 2024-10-21 PROCEDURE — 1160F RVW MEDS BY RX/DR IN RCRD: CPT | Performed by: STUDENT IN AN ORGANIZED HEALTH CARE EDUCATION/TRAINING PROGRAM

## 2024-10-21 RX ORDER — NIRMATRELVIR AND RITONAVIR 150-100 MG
2 KIT ORAL 2 TIMES DAILY
Qty: 20 TABLET | Refills: 0 | Status: SHIPPED | OUTPATIENT
Start: 2024-10-21 | End: 2024-10-26

## 2024-10-21 NOTE — PROGRESS NOTES
Follow Up Office Visit      Date: 10/21/2024   Patient Name: Elton Funez  : 1950   MRN: [id number]     Chief Complaint:    Chief Complaint   Patient presents with    Exposure To Known Illness     Covid        History of Present Illness: Elton Funez is a 74 y.o. male with complex past medical history including HLD, HTN, Hypothyroidism, T2DM, Cirrhosis, CKD3a, breast cancer, MDD, OSAwho is here today for URI symptoms, exposure to COVID.    HPI  History of Present Illness  The patient presents for evaluation of cough.    He began experiencing a cough on Saturday, which worsened by . Accompanying the cough is a sore throat, particularly noticeable when coughing. He also reports drainage of yellowish-green mucus. He has experienced chills three times, with two instances occurring yesterday. Over-the-counter medications such as mucolytic's have been used to manage these symptoms.  His wife tested positive for COVID this weekend    IMMUNIZATIONS  He had influenza and RSV vaccine last year.        Subjective      Review of Systems:   Review of Systems   Respiratory:  Negative for shortness of breath, wheezing and stridor.    Cardiovascular:  Negative for chest pain.   Gastrointestinal:  Negative for diarrhea and vomiting.       I have reviewed the patients family history, social history, past medical history, past surgical history and have updated it as appropriate.     Medications:     Current Outpatient Medications:     Alpha-Lipoic Acid 600 MG tablet, Take 1 tablet by mouth Daily. Ran out, last took on Wednesday 3/13., Disp: , Rfl:     B Complex Vitamins (VITAMIN B COMPLEX PO), Take 1 tablet by mouth every night at bedtime., Disp: , Rfl:     carvedilol (COREG) 3.125 MG tablet, Take 1 tablet by mouth Every 12 (Twelve) Hours., Disp: 180 tablet, Rfl: 2    Coenzyme Q10 (Co Q 10) 100 MG capsule, Take 300 mg by mouth Daily., Disp: , Rfl:     colestipol (COLESTID) 1 g tablet, TAKE 2 TABLETS TWICE A  DAY, Disp: 360 tablet, Rfl: 3    Continuous Blood Gluc  (FreeStyle [place] 2 [place]) device, 1 Device Every 14 (Fourteen) Days., Disp: 1 each, Rfl: 0    Continuous Blood Gluc Sensor (FreeStyle Winston 2 Sensor) misc, 1 each Every 14 (Fourteen) Days., Disp: 6 each, Rfl: 3    Dulaglutide (Trulicity) 4.5 MG/0.5ML solution pen-injector, Inject 0.5 mL under the skin into the appropriate area as directed 1 (One) Time Per Week., Disp: 6 mL, Rfl: 0    ferrous sulfate (FeroSul) 325 (65 FE) MG tablet, Take 1 tablet by mouth 2 (Two) Times a Day., Disp: 180 tablet, Rfl: 1    Flaxseed, Linseed, (Flaxseed Oil) 1400 MG capsule, Take 1 capsule by mouth 2 (Two) Times a Day., Disp: , Rfl:     gabapentin (NEURONTIN) 800 MG tablet, TAKE 1 TABLET FOUR TIMES A DAY, Disp: 360 tablet, Rfl: 0    gemfibrozil (LOPID) 600 MG tablet, TAKE 1 TABLET TWICE A DAY, Disp: 180 tablet, Rfl: 3    Insulin Glargine (LANTUS SOLOSTAR) 100 UNIT/ML injection pen, Inject 70 Units under the skin into the appropriate area as directed Every Night., Disp: 90 mL, Rfl: 3    Insulin Lispro, 1 Unit Dial, (HumaLOG KwikPen) 100 UNIT/ML solution pen-injector, Inject 30 Units under the skin into the appropriate area as directed 2 (Two) Times a Day., Disp: 90 mL, Rfl: 3    Insulin Pen Needle (B-D UF III MINI PEN NEEDLES) 31G X 5 MM misc, USE THREE TIMES A DAY, Disp: 270 each, Rfl: 3    ipratropium (ATROVENT) 0.06 % nasal spray, USE 2 SPRAYS IN EACH NOSTRIL AS DIRECTED BY PROVIDER DAILY, Disp: 45 mL, Rfl: 1    levothyroxine (SYNTHROID, LEVOTHROID) 75 MCG tablet, Take 1 tablet by mouth Daily., Disp: 90 tablet, Rfl: 3    Multiple Vitamins-Minerals (MULTIVITAMIN PO), Take 1 tablet by mouth Daily., Disp: , Rfl:     omeprazole (priLOSEC) 40 MG capsule, TAKE 1 CAPSULE DAILY, Disp: 90 capsule, Rfl: 1    saccharomyces boulardii (FLORASTOR) 250 MG capsule, Take 1 capsule by mouth 2 (Two) Times a Day. (Patient taking differently: Take 1 capsule by mouth Daily.), Disp: 30 capsule,  Rfl: 0    sertraline (ZOLOFT) 100 MG tablet, TAKE 1 TABLET DAILY, Disp: 90 tablet, Rfl: 3    spironolactone (ALDACTONE) 25 MG tablet, TAKE 1 TABLET DAILY, Disp: 90 tablet, Rfl: 3    tamoxifen (NOLVADEX) 20 MG chemo tablet, TAKE 1 TABLET DAILY, Disp: 90 tablet, Rfl: 3    temazepam (Restoril) 7.5 MG capsule, Take 1 capsule by mouth At Night As Needed for Sleep., Disp: , Rfl:     tiZANidine (ZANAFLEX) 4 MG tablet, TAKE 1 TABLET TWICE A DAY AS NEEDED FOR MUSCLE SPASMS, Disp: 180 tablet, Rfl: 3    vitamin D3 125 MCG (5000 UT) capsule capsule, Take 1 capsule by mouth 2 (Two) Times a Day., Disp: , Rfl:     Nirmatrelvir&Ritonavir 150/100 (Paxlovid, 150/100,) 10 x 150 MG & 10 x 100MG tablet therapy pack tablet (for renal adjustment), Take 2 tablets by mouth 2 (Two) Times a Day for 5 days., Disp: 20 tablet, Rfl: 0    Allergies:   Allergies   Allergen Reactions    Glucophage Xr [Metformin Hcl Er] Diarrhea and Other (See Comments)     Glucophage XR TB24: Adverse Reaction: Severe GI issues.    Metformin Nausea And Vomiting     Other reaction(s): SEVERE INTESTIONAL ISSUES  Other reaction(s): SEVERE GI ISSUES    Glucophage XR TB24  MetFORMIN HCl TABS    Tetracyclines & Related Nausea Only     Adverse Reaction    Chlorhexidine Rash       Objective     Physical Exam: Please see above  Vital Signs:   Vitals:    10/21/24 1543   BP: 124/70   BP Location: Left arm   Patient Position: Sitting   Cuff Size: Adult   Pulse: 83   Resp: 18   Temp: 97.5 °F (36.4 °C)   TempSrc: Temporal   SpO2: 96%   Weight: 92.6 kg (204 lb 2 oz)   PainSc: 0-No pain     Body mass index is 31.04 kg/m².    Physical Exam  Vitals reviewed.   Constitutional:       General: He is not in acute distress.     Appearance: Normal appearance. He is obese. He is not ill-appearing or toxic-appearing.   HENT:      Head: Normocephalic and atraumatic.      Right Ear: External ear normal.      Left Ear: External ear normal.      Nose: Nose normal. Congestion and rhinorrhea  present.      Mouth/Throat:      Mouth: Mucous membranes are moist.      Pharynx: Posterior oropharyngeal erythema present. No oropharyngeal exudate.   Eyes:      General: No scleral icterus.        Right eye: No discharge.         Left eye: No discharge.      Extraocular Movements: Extraocular movements intact.   Cardiovascular:      Rate and Rhythm: Normal rate and regular rhythm.      Pulses: Normal pulses.      Heart sounds: Normal heart sounds.   Pulmonary:      Effort: Pulmonary effort is normal. No respiratory distress.      Breath sounds: Normal breath sounds. No stridor. No wheezing, rhonchi or rales.   Abdominal:      General: Abdomen is flat. There is no distension.   Musculoskeletal:      Cervical back: Normal range of motion. No rigidity or tenderness.   Lymphadenopathy:      Cervical: Cervical adenopathy present.   Skin:     General: Skin is warm and dry.      Capillary Refill: Capillary refill takes less than 2 seconds.   Neurological:      General: No focal deficit present.      Mental Status: He is alert and oriented to person, place, and time. Mental status is at baseline.   Psychiatric:         Mood and Affect: Mood normal.         Behavior: Behavior normal.         Judgment: Judgment normal.       Physical Exam        Procedures    Results:   Labs:   Hemoglobin A1C   Date Value Ref Range Status   09/17/2024 7.8 (A) 4.5 - 5.7 % Final   06/07/2024 8.50 (H) 4.80 - 5.60 % Final     TSH   Date Value Ref Range Status   06/03/2024 1.870 0.270 - 4.200 uIU/mL Final      Results  Laboratory Studies  Covid test positive.  Flu testing negative.    I personally reviewed renal function panel dated 6/7/2024 creatinine 1.56, GFR 46.        Imaging:   No valid procedures specified.     Assessment / Plan      Assessment/Plan:   Problem List Items Addressed This Visit    None  Visit Diagnoses       Close exposure to COVID-19 virus    -  Primary    Relevant Orders    POCT SARS-CoV-2 Antigen ELISE + Flu (Completed)     COVID-19 virus infection        Relevant Medications    Nirmatrelvir&Ritonavir 150/100 (Paxlovid, 150/100,) 10 x 150 MG & 10 x 100MG tablet therapy pack tablet (for renal adjustment)            Assessment & Plan  1. COVID-19.  He tested positive for COVID-19. Symptoms include sore throat, fatigue, cough, and yellowish-green nasal drainage. Vital signs, including oxygen level and blood pressure, are within normal range. He experienced chills three times yesterday. A prescription for Paxlovid, 2 tablets twice daily for 5 days, was provided and will be sent to [facility].  This has been dose reduced due to his chronic kidney disease which complicates his care.  He was advised to continue his current medications alongside Paxlovid. Warm salt water gargles were recommended for throat discomfort. He was advised to change CPAP hoses and face mask after 10 days. Isolation guidelines were discussed, recommending mask use for 10 days and avoiding contact with others until symptom improvement.       Health Maintenance.  Recommendations were made for him to receive the COVID-19 booster and influenza vaccine after a period of 4 weeks. RSV vaccine is not needed again.         Follow Up:   Return if symptoms worsen or fail to improve.        Gume Eisenberg MD  [facility] Shay Rivas    Patient or patient representative verbalized consent for the use of Ambient Listening during the visit with  Gume Eisenberg MD for chart documentation. 10/21/2024  17:08 EDT

## 2024-10-21 NOTE — PATIENT INSTRUCTIONS
COVID Home Instructions  COVID Treatment Options  You may qualify for directed medical treatment of your COVID infection.  Call [phone] (option 2) to perform a tele-health appointment to determine if you meet criteria for treatment.  This treatment is mostly for high risk patients and criteria is based on your other health conditions.    Managing Coronavirus (Covid-19) Symptoms at home  Signs and Symptoms of Covid-19 may range from a mild case of sniffles to severe disease requiring hospitalization. The majority of patients will only experience a mild illness and recover well at home with some care. Treatment is aimed at symptom control - rest, fluid intake, fever reduction, cough suppression and pain relief.   Stay home and isolate yourself from family members. Use delivery services to have groceries and medications delivered or ask friends to drop-off groceries at your doorstep. You may want to have friends or family take your pets for a few days.   Monitor yourself: Take your temperature twice a day. If you own a pulse oximeter, make sure you know how to use it properly. Your pulse oximeter should read more than 92%.  Get plenty of rest, stay hydrated and use over the counter medications as necessary. Ask your doctor about the doses for these medications.     Hydration   Good hydration can alleviate many of the symptoms. Drink plenty of water or sports drinks, if you have a dry cough, a teaspoon of honey in hot water can help soothe your throat. If you have congestion, a warm, non-caffeinated beverage or warm shower can help loosen mucus.    WHO Oral Rehydration Solution:  3/8 tsp salt (sodium chloride)  1/4 tsp [place]ua Salt Substitute (potassium chloride)  1/2 tsp baking soda (sodium bicarbonate)  2 tablespoon + 2 teaspoon sugar  Add tap water to make 1 liter   Optional: May add NutraSweet or Splenda for flavor, Crystal light or sugar free Jello also works.   Do not use red or purple colored Jello or Crystal  light.     Over the counter medications:   Acetaminophen (Tylenol): reduces fever and pain - body aches, sore throat etc.  Guaifenesin (Mucinex) - expectorant, reduces chest congestion, helps bring up mucous.   Dextromethorphan (Mucinex-DM or Delsym) - cough suppressant  Afrin - nasal decongestant. Helps open up a stuffy nose. Be careful not to use this medication more than twice a day for no more than three days in a row, or your symptoms may get worse.     Prescription medications:   Sometimes you will need prescription medications to manage your symptoms. Here are some common ones that are used.   Ondansetron (Zofran) - used to prevent nausea or vomiting. It comes in a pill and an under the tongue formulation.   Promethazine (Phenergan) and Prochlorperazine (Compazine) - also used to treat nausea and vomiting. Both of these are available as tablets and suppositories  Benzonatate (Tessalon) - cough suppressant. It is important that you take this capsule whole and not crush or chew it.     How to avoid infecting other members of your household:  Avoid contact with members of your household, including pets.  Stay home from work, school and public areas unless it's to get medical care.  Avoid using public transportation, ride-sharing services or taxis.  Stay isolated in one room, away from your family and other people, as much as possible. This includes eating in your room. Open windows to keep air circulating. Use a separate bathroom, if possible.  Avoid shared spaces in your home as much as possible. When using shared spaces, limit your movements and wear a mask. Keep your kitchen and other shared spaces well ventilated. Stay at least 6 feet (2 meters) away from your family members.  Clean often-touched surfaces in your separate room and bathroom, such as doorknobs, light switches, electronics and counters, every day.  Avoid sharing personal household items such as dishes, towels, bedding and electronics.  Wear a  face mask when near others. Change the face mask each day.  If wearing a face mask isn't possible, cover your mouth and nose with a tissue or elbow when coughing or sneezing. Afterward, throw away the tissue or wash the handkerchief.  Frequently wash your hands with soap and water for at least 20 seconds, or use an alcohol-based hand  that contains at least 60% alcohol.    Signs that should warrant an evaluation at a higher level of care such as a local emergency room:  Trouble breathing when you are resting or walking short distances (such as from your bedroom to bathroom).  Your pulse oximeter reading is below 92% and does not improve with slow deep breathing and lying on your stomach.  A fever of 102 F(38.9 C) or higher that lasts for 24 hours and does not get better after you take acetaminophen (Tylenol).  Persistent chest pain or pressure.  Blood in your sputum.  A very bad headache that will not improve or go away with Tylenol.  New confusion.  Bluish lips or face.  Inability to stay awake.  Pale gray or blue-colored skin, lips or nail beds (depends on skin tone).    Isolation period: Isolation period depends on your symptoms, please ask your physician/physician assistant/nurse practitioner.      Note:   If you are usually on oxygen or have baseline pulse oximeter readings below 95%, please ask your physician/physician assistant/nurse practitioner what levels should be cause for alarm.     If you are immunosuppressed due to cancer, chemotherapy or use of certain medications, please discuss specific guidelines with your physician/physician assistant/nurse practitioner.      If you have heart or kidney failure or similar conditions please discuss your fluid intake with your physician/physician assistant/nurse practitioner.    If you are pregnant please discuss specific guidelines with your Family Physician or Obstetrician.

## 2024-10-31 ENCOUNTER — OFFICE VISIT (OUTPATIENT)
Dept: NEUROLOGY | Facility: CLINIC | Age: 74
End: 2024-10-31
Payer: MEDICARE

## 2024-10-31 VITALS
WEIGHT: 202 LBS | DIASTOLIC BLOOD PRESSURE: 78 MMHG | BODY MASS INDEX: 30.62 KG/M2 | HEART RATE: 80 BPM | SYSTOLIC BLOOD PRESSURE: 104 MMHG | HEIGHT: 68 IN | OXYGEN SATURATION: 95 %

## 2024-10-31 DIAGNOSIS — E11.40 NEUROPATHY DUE TO TYPE 2 DIABETES MELLITUS: Primary | ICD-10-CM

## 2024-10-31 PROCEDURE — 3074F SYST BP LT 130 MM HG: CPT | Performed by: NURSE PRACTITIONER

## 2024-10-31 PROCEDURE — 3078F DIAST BP <80 MM HG: CPT | Performed by: NURSE PRACTITIONER

## 2024-10-31 PROCEDURE — 1160F RVW MEDS BY RX/DR IN RCRD: CPT | Performed by: NURSE PRACTITIONER

## 2024-10-31 PROCEDURE — 1159F MED LIST DOCD IN RCRD: CPT | Performed by: NURSE PRACTITIONER

## 2024-10-31 PROCEDURE — 99214 OFFICE O/P EST MOD 30 MIN: CPT | Performed by: NURSE PRACTITIONER

## 2024-10-31 RX ORDER — PREGABALIN 50 MG/1
50 CAPSULE ORAL 3 TIMES DAILY
Qty: 90 CAPSULE | Refills: 3 | Status: SHIPPED | OUTPATIENT
Start: 2024-10-31 | End: 2025-10-31

## 2024-10-31 NOTE — PROGRESS NOTES
Neuro Office Visit      Encounter Date: 10/31/2024   Patient Name: Elton Funez  : 1950   MRN: [id number]   PCP:  Tc Ramirez MD     Chief Complaint:    Chief Complaint   Patient presents with    Peripheral Neuropathy     BLE Feet       History of Present Illness: Elton Funez is a 74 y.o. male who is here today in Neurology for neuropathy.  History of Present Illness  The patient presents for evaluation of neuropathy. He is accompanied by an adult female.    He reports experiencing significant neuropathy, primarily in his feet, which began some time ago. The condition affects both feet equally and is particularly bothersome at night, causing a burning sensation. Occasionally, he experiences spasms. He is currently on gabapentin 800 mg, taken four times daily, and also takes alpha lipoic acid and vitamin B complex. He reports that the gabapentin causes him to sleep excessively. He is considering trying Lyrica in addition to gabapentin.    His neuropathy predates his chemotherapy treatment. He has type 2 diabetes, with a recent A1c level of 7.8. He also experiences numbness in two fingers, which started when he was having issues with his shoulder and neck. Despite undergoing two surgeries, the numbness persists. He had an EMG nerve conduction study years ago due to cubital tunnel syndrome.    He has experienced falls and uses a cane for mobility. He has undergone physical therapy for balance, but it did not provide relief for his other symptoms. He has stopped driving recently and finds walking difficult due to foot pain. He uses a cane for support and has a walker at home, but it is not currently usable due to a handle issue.    SOCIAL HISTORY  He quit smoking in . He drinks alcohol socially. He denies drug use or vaping.    FAMILY HISTORY  His father had a stroke. He denies any family history of Alzheimer's, Parkinson's disease, or seizures.    ALLERGIES  He is allergic to METFORMIN,  TETRACYCLINES, and CHLORHEXIDINE.      Subjective      Past Medical History:   Past Medical History:   Diagnosis Date    Abdominal pain 05/17/2023    RUQ; SEEN IN ED AT formerly [facility]; DC'D HOME    Abscess of arm, right     Abscess of skin of neck     Acute sinusitis     ALT (SGPT) level raised     Arthritis     Bacteremia due to Klebsiella pneumoniae 06/12/2023    BPH (benign prostatic hypertrophy)     Breast cancer 05/2023    right    Cirrhosis of liver 09/03/2021    [facility]    CKD (chronic kidney disease)     Colon polyp     Constipation     COVID     DDD (degenerative disc disease), cervical     Decreased platelet count     Depression     Resolved: 1/28/2015    Difficulty walking     Elevated AST (SGOT)     Erectile dysfunction     GERD (gastroesophageal reflux disease)     History of transfusion 2021    formerly [facility]; 3 UNITS; NO REACTION    HL (hearing loss)     Hyperlipidemia     Hypertension     Hypothyroidism, adult 11/10/2020    Infection     MSSA lumbar surgical wound infection 3/2017    Jaw pain     Left hip pain     Low back pain     Surgery 30 yrs L4-5    Migraine     Hx of    Neuropathy in diabetes     Feet    Obesity (BMI 30.0-34.9) 10/07/2016    BMI 31.92    Obstructive sleep apnea     CPAP    Pancreatitis 06/2023    Peripheral neuropathy     Portal hypertensive gastropathy 09/03/2021    Renal insufficiency     Shigellosis     Sleep apnea     PT TO BRING MASK AND TUBING DAY OF SURGERY    Symptomatic anemia 08/30/2021    Type 2 diabetes mellitus     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     Vitamin D deficiency     Wears glasses     Wears hearing aid     BOTH EARS       Past Surgical History:   Past Surgical History:   Procedure Laterality Date    ANTERIOR CERVICAL DISCECTOMY W/ FUSION N/A 12/14/2017    Procedure: CERVICAL DISCECTOMY ANTERIOR FUSION WITH INSTRUMENTATION C7/T1;  Surgeon: Gigi Perales MD;  Location: [facility];  Service:     CERVICAL ARTHRODESIS      CERVICAL DISCECTOMY POSTERIOR FUSION WITH  INSTRUMENTATION N/A 03/16/2017    Procedure:  INCISION AND DRAINAGE OF POSTERIOR CERVICAL WOUND;  Surgeon: Gigi Perales MD;  Location:  [facility];  Service:     CERVICAL LAMINECTOMY DECOMPRESSION POSTERIOR Left 02/28/2017    Procedure: Left C7-T1 CERVICAL FORAMINOTOMY POSTERIOR WITH METRIX;  Surgeon: Gigi Perales MD;  Location:  [facility];  Service:     CHOLECYSTECTOMY WITH INTRAOPERATIVE CHOLANGIOGRAM N/A 3/18/2024    Procedure: CHOLECYSTECTOMY LAPAROSCOPIC AND LARAROSCOPIC LIVER BIOPSY;  Surgeon: Emerson Spear MD;  Location:  [facility];  Service: General;  Laterality: N/A;    COLONOSCOPY N/A 09/01/2021    Procedure: COLONOSCOPY;  Surgeon: Sharif García MD;  Location:  [facility];  Service: Gastroenterology;  Laterality: N/A;    CYST REMOVAL  04/2021    ENDOSCOPY N/A 08/31/2021    Procedure: ESOPHAGOGASTRODUODENOSCOPY;  Surgeon: Brunner, Mark I, MD;  Location:  [facility];  Service: Gastroenterology;  Laterality: N/A;    ERCP N/A 06/14/2023    Procedure: ENDOSCOPIC RETROGRADE CHOLANGIOPANCREATOGRAPHY;  Surgeon: Brunner, Mark I, MD;  Location:  [facility];  Service: Gastroenterology;  Laterality: N/A;  SPHINCTERTOMY MADE AT [facility]  AND BALLOON SWEEP OF CBD DONE WITH 9-12 BALLOON    LYMPH NODE BIOPSY  6/2023    Negative    MASTECTOMY W/ SENTINEL NODE BIOPSY Right 05/23/2023    Procedure: BREAST MASTECTOMY WITH SENTINEL NODE BIOPSY RIGHT;  Surgeon: Joseph Ramirez MD;  Location:  [facility];  Service: General;  Laterality: Right;    VASECTOMY      WISDOM TOOTH EXTRACTION         Family History:   Family History   Problem Relation Age of Onset    Hearing loss Mother     Arthritis Mother     Cancer Mother     Heart disease Mother     Thyroid disease Mother     Diabetes Father     Heart disease Father         Cardiomyopathy    Hyperlipidemia Father     Stroke Father     Hypertension Father     Diabetes Paternal Grandmother     Colon polyps Other     Colon cancer Neg Hx      Esophageal cancer Neg Hx        Social History:   Social History     Socioeconomic History    Marital status:    Tobacco Use    Smoking status: Former     Current packs/day: 0.00     Average packs/day: 0.5 packs/day for 4.0 years (2.0 ttl pk-yrs)     Types: Cigarettes     Start date: 1972     Quit date: 1976     Years since quittin.8     Passive exposure: Past    Smokeless tobacco: Never   Vaping Use    Vaping status: Never Used   Substance and Sexual Activity    Alcohol use: Yes     Comment: Socially    Drug use: Never    Sexual activity: Not Currently     Partners: Female     Birth control/protection: Vasectomy       Medications:     Current Outpatient Medications:     Alpha-Lipoic Acid 600 MG tablet, Take 1 tablet by mouth Daily. Ran out, last took on Wednesday 3/13., Disp: , Rfl:     B Complex Vitamins (VITAMIN B COMPLEX PO), Take 1 tablet by mouth every night at bedtime., Disp: , Rfl:     carvedilol (COREG) 3.125 MG tablet, Take 1 tablet by mouth Every 12 (Twelve) Hours., Disp: 180 tablet, Rfl: 2    Coenzyme Q10 (Co Q 10) 100 MG capsule, Take 300 mg by mouth Daily., Disp: , Rfl:     colestipol (COLESTID) 1 g tablet, TAKE 2 TABLETS TWICE A DAY, Disp: 360 tablet, Rfl: 3    Continuous Blood Gluc  (FreeStyle [place] 2 [place]) device, 1 Device Every 14 (Fourteen) Days., Disp: 1 each, Rfl: 0    Continuous Blood Gluc Sensor (FreeStyle Winston 2 Sensor) misc, 1 each Every 14 (Fourteen) Days., Disp: 6 each, Rfl: 3    Dulaglutide (Trulicity) 4.5 MG/0.5ML solution pen-injector, Inject 0.5 mL under the skin into the appropriate area as directed 1 (One) Time Per Week., Disp: 6 mL, Rfl: 0    ferrous sulfate (FeroSul) 325 (65 FE) MG tablet, Take 1 tablet by mouth 2 (Two) Times a Day., Disp: 180 tablet, Rfl: 1    Flaxseed, Linseed, (Flaxseed Oil) 1400 MG capsule, Take 1 capsule by mouth 2 (Two) Times a Day., Disp: , Rfl:     gabapentin (NEURONTIN) 800 MG tablet, TAKE 1 TABLET FOUR TIMES A DAY, Disp: 360  tablet, Rfl: 0    gemfibrozil (LOPID) 600 MG tablet, TAKE 1 TABLET TWICE A DAY, Disp: 180 tablet, Rfl: 3    Insulin Glargine (LANTUS SOLOSTAR) 100 UNIT/ML injection pen, Inject 70 Units under the skin into the appropriate area as directed Every Night., Disp: 90 mL, Rfl: 3    Insulin Lispro, 1 Unit Dial, (HumaLOG KwikPen) 100 UNIT/ML solution pen-injector, Inject 30 Units under the skin into the appropriate area as directed 2 (Two) Times a Day., Disp: 90 mL, Rfl: 3    Insulin Pen Needle (B-D UF III MINI PEN NEEDLES) 31G X 5 MM misc, USE THREE TIMES A DAY, Disp: 270 each, Rfl: 3    ipratropium (ATROVENT) 0.06 % nasal spray, USE 2 SPRAYS IN EACH NOSTRIL AS DIRECTED BY PROVIDER DAILY, Disp: 45 mL, Rfl: 1    levothyroxine (SYNTHROID, LEVOTHROID) 75 MCG tablet, Take 1 tablet by mouth Daily., Disp: 90 tablet, Rfl: 3    Multiple Vitamins-Minerals (MULTIVITAMIN PO), Take 1 tablet by mouth Daily., Disp: , Rfl:     omeprazole (priLOSEC) 40 MG capsule, TAKE 1 CAPSULE DAILY, Disp: 90 capsule, Rfl: 1    saccharomyces boulardii (FLORASTOR) 250 MG capsule, Take 1 capsule by mouth 2 (Two) Times a Day. (Patient taking differently: Take 1 capsule by mouth Daily.), Disp: 30 capsule, Rfl: 0    sertraline (ZOLOFT) 100 MG tablet, TAKE 1 TABLET DAILY, Disp: 90 tablet, Rfl: 3    spironolactone (ALDACTONE) 25 MG tablet, TAKE 1 TABLET DAILY, Disp: 90 tablet, Rfl: 3    tamoxifen (NOLVADEX) 20 MG chemo tablet, TAKE 1 TABLET DAILY, Disp: 90 tablet, Rfl: 3    temazepam (Restoril) 7.5 MG capsule, Take 1 capsule by mouth At Night As Needed for Sleep., Disp: , Rfl:     tiZANidine (ZANAFLEX) 4 MG tablet, TAKE 1 TABLET TWICE A DAY AS NEEDED FOR MUSCLE SPASMS, Disp: 180 tablet, Rfl: 3    vitamin D3 125 MCG (5000 UT) capsule capsule, Take 1 capsule by mouth 2 (Two) Times a Day., Disp: , Rfl:     pregabalin (Lyrica) 50 MG capsule, Take 1 capsule by mouth 3 (Three) Times a Day., Disp: 90 capsule, Rfl: 3    Allergies:   Allergies   Allergen Reactions     Glucophage Xr [Metformin Hcl Er] Diarrhea and Other (See Comments)     Glucophage XR TB24: Adverse Reaction: Severe GI issues.    Metformin Nausea And Vomiting     Other reaction(s): SEVERE INTESTIONAL ISSUES  Other reaction(s): SEVERE GI ISSUES    Glucophage XR TB24  MetFORMIN HCl TABS    Tetracyclines & Related Nausea Only     Adverse Reaction    Chlorhexidine Rash       PHQ-9 Total Score:     MORGAN Fall Risk Assessment was completed, and patient is at HIGH risk for falls. Assessment completed on:10/31/2024    Objective     Physical Exam:     Neurological Exam  Mental Status  Awake, alert and oriented to person, place and time. Recent and remote memory are intact. Speech is normal. Language is fluent with no aphasia. Attention and concentration are normal. Fund of knowledge is appropriate for level of education.    Cranial Nerves  CN II: Visual acuity is normal.  CN III, IV, VI: Extraocular movements intact bilaterally. Pupils equal round and reactive to light bilaterally.  CN V: Facial sensation is normal.  CN VII: Full and symmetric facial movement.  CN IX, X: Palate elevates symmetrically  CN XI: Shoulder shrug strength is normal.  CN XII: Tongue midline without atrophy or fasciculations.    Motor  Normal muscle bulk throughout. No fasciculations present. Normal muscle tone. Strength is 5/5 throughout all four extremities.    Sensory  Light touch abnormality: Pinprick abnormality: Proprioception is normal in upper and lower extremities.   Loss of light touch and inability to distinguish sharp versus dull and toes and ball of feet bilaterally.    Reflexes                                            Right                      Left  Brachioradialis                    2+                         2+  Biceps                                 2+                         2+  Triceps                                2+                         2+  Finger flex                           2+                         2+  Hamstring    "                         2+                         2+  Patellar                                2+                         2+  Achilles                                2+                         2+    Coordination    Finger-to-nose, rapid alternating movements and heel-to-shin normal bilaterally without dysmetria.    Gait  Casual gait: Hesitant gait.        Vital Signs:   Vitals:    10/31/24 1357   BP: 104/78   Pulse: 80   SpO2: 95%   Weight: 91.6 kg (202 lb)   Height: 172.7 cm (67.99\")     Body mass index is 30.72 kg/m².     Results:   Results  Laboratory Studies  A1c is 7.4.     Imaging:   No Images in the past 120 days found..     Labs:   No results found for: \"CMP\", \"PROTEIN\", \"ANTIMOGAB\", \"[id number]\", \"JCVRESULT\", \"QUANTTBGOLD\", \"CBCDIF\", \"IGGALBSER\"     Assessment / Plan      Assessment/Plan:   Diagnoses and all orders for this visit:    1. Neuropathy due to type 2 diabetes mellitus (Primary)  -     pregabalin (Lyrica) 50 MG capsule; Take 1 capsule by mouth 3 (Three) Times a Day.  Dispense: 90 capsule; Refill: 3         Assessment & Plan  1. Neuropathy.  The patient reports significant neuropathy primarily in his feet, with symptoms including burning and occasional spasms. He is currently on gabapentin 800 mg four times a day, which provides some relief. He also takes alpha lipoic acid and vitamin B complex. The neuropathy started before his chemotherapy and is likely related to his diabetes, with a recent A1c of 7.4. Physical examination reveals decreased sensation in the toes and feet. Lyrica will be added to his current gabapentin regimen to assess its effectiveness. If there is improvement, the gabapentin dosage may be adjusted. The use of a wheelchair for longer distances was suggested. If there is no improvement, a referral to pain management for potential spinal nerve stimulation will be considered.    Follow-up  Return in 3 months for follow up.    Patient Education:     Reviewed medications, " potential side effects and signs and symptoms to report. Discussed risk versus benefits of treatment plan with patient and/or family-including medications, labs and radiology that may be ordered. Addressed questions and concerns during visit. Patient and/or family verbalized understanding and agree with plan. Instructed to call the office with any questions and report to ER with any life-threatening symptoms.     Follow Up:   Return in about 3 months (around 1/31/2025).    I spent 30 minutes caring for Elton on this date of service. This time includes time spent by me in the following activities: preparing for the visit, reviewing tests, performing a medically appropriate examination and/or evaluation, counseling and educating the patient/family/caregiver, and documenting information in the medical record.        During this visit the following were done:  Labs Reviewed []    Labs Ordered []    Radiology Reports Reviewed []    Radiology Ordered []    PCP Records Reviewed []    Referring Provider Records Reviewed []    ER Records Reviewed []    Hospital Records Reviewed []    History Obtained From Family []    Radiology Images Reviewed []    Other Reviewed []    Records Requested []      Patient or patient representative verbalized consent for the use of Ambient Listening during the visit with  MELISSA Fong for chart documentation. 10/31/2024  16:04 EDT    MELISSA Fong   [facility] NEUROLOGY  [address]  [facility] [address] [phone]

## 2024-11-02 DIAGNOSIS — D50.9 IRON DEFICIENCY ANEMIA, UNSPECIFIED IRON DEFICIENCY ANEMIA TYPE: ICD-10-CM

## 2024-11-04 RX ORDER — FERROUS SULFATE 325(65) MG
1 TABLET ORAL 2 TIMES DAILY
Qty: 180 TABLET | Refills: 1 | Status: SHIPPED | OUTPATIENT
Start: 2024-11-04

## 2024-11-12 DIAGNOSIS — D50.9 IRON DEFICIENCY ANEMIA, UNSPECIFIED IRON DEFICIENCY ANEMIA TYPE: ICD-10-CM

## 2024-11-12 RX ORDER — FERROUS SULFATE 325(65) MG
1 TABLET ORAL 2 TIMES DAILY
Qty: 180 TABLET | Refills: 1 | Status: SHIPPED | OUTPATIENT
Start: 2024-11-12

## 2024-11-12 NOTE — TELEPHONE ENCOUNTER
Caller: ArminElton bull    Relationship: Self    Best call back number: [phone]     Requested Prescriptions:   Requested Prescriptions     Pending Prescriptions Disp Refills    ferrous sulfate (FeroSul) 325 (65 FE) MG tablet 180 tablet 1     Sig: Take 1 tablet by mouth 2 (Two) Times a Day.        Pharmacy where request should be sent: [id number] [place] [address] [address]  AT [facility] [phone] [facility] [phone] FX     Last office visit with prescribing clinician: 7/30/2024   Last telemedicine visit with prescribing clinician: Visit date not found   Next office visit with prescribing clinician: 12/2/2024     Additional details provided by patient: PATIENT STATES THAT [facility] WILL NOT COVER AND HE IS COMPLETELY OUT. PLEASE FILL AT THIS PHARMACY.    Does the patient have less than a 3 day supply:  [x] Yes  [] No    Would you like a call back once the refill request has been completed: [] Yes [x] No    If the office needs to give you a call back, can they leave a voicemail: [] Yes [x] No    Valencia Zuniga Rep   11/12/24 09:15 EST

## 2024-11-18 RX ORDER — SERTRALINE HYDROCHLORIDE 100 MG/1
TABLET, FILM COATED ORAL
Qty: 90 TABLET | Refills: 3 | Status: SHIPPED | OUTPATIENT
Start: 2024-11-18

## 2024-12-02 ENCOUNTER — OFFICE VISIT (OUTPATIENT)
Dept: INTERNAL MEDICINE | Facility: CLINIC | Age: 74
End: 2024-12-02
Payer: MEDICARE

## 2024-12-02 VITALS
WEIGHT: 199 LBS | HEART RATE: 68 BPM | SYSTOLIC BLOOD PRESSURE: 122 MMHG | BODY MASS INDEX: 30.27 KG/M2 | DIASTOLIC BLOOD PRESSURE: 76 MMHG | RESPIRATION RATE: 18 BRPM | TEMPERATURE: 98.2 F

## 2024-12-02 DIAGNOSIS — D50.9 IRON DEFICIENCY ANEMIA, UNSPECIFIED IRON DEFICIENCY ANEMIA TYPE: ICD-10-CM

## 2024-12-02 DIAGNOSIS — E78.5 HYPERLIPIDEMIA, UNSPECIFIED HYPERLIPIDEMIA TYPE: ICD-10-CM

## 2024-12-02 DIAGNOSIS — Z23 IMMUNIZATION DUE: ICD-10-CM

## 2024-12-02 DIAGNOSIS — K21.9 GASTROESOPHAGEAL REFLUX DISEASE WITHOUT ESOPHAGITIS: ICD-10-CM

## 2024-12-02 DIAGNOSIS — I10 PRIMARY HYPERTENSION: Primary | ICD-10-CM

## 2024-12-02 LAB
ALBUMIN SERPL-MCNC: 4.1 G/DL (ref 3.5–5.2)
ALBUMIN/GLOB SERPL: 1.2 G/DL
ALP SERPL-CCNC: 83 U/L (ref 39–117)
ALT SERPL W P-5'-P-CCNC: 13 U/L (ref 1–41)
ANION GAP SERPL CALCULATED.3IONS-SCNC: 14 MMOL/L (ref 5–15)
AST SERPL-CCNC: 22 U/L (ref 1–40)
BASOPHILS # BLD AUTO: 0.03 10*3/MM3 (ref 0–0.2)
BASOPHILS NFR BLD AUTO: 0.5 % (ref 0–1.5)
BILIRUB SERPL-MCNC: 0.6 MG/DL (ref 0–1.2)
BUN SERPL-MCNC: 26 MG/DL (ref 8–23)
BUN/CREAT SERPL: 15.6 (ref 7–25)
CALCIUM SPEC-SCNC: 9.7 MG/DL (ref 8.6–10.5)
CHLORIDE SERPL-SCNC: 103 MMOL/L (ref 98–107)
CHOLEST SERPL-MCNC: 150 MG/DL (ref 0–200)
CO2 SERPL-SCNC: 20 MMOL/L (ref 22–29)
CREAT SERPL-MCNC: 1.67 MG/DL (ref 0.76–1.27)
DEPRECATED RDW RBC AUTO: 45.9 FL (ref 37–54)
EGFRCR SERPLBLD CKD-EPI 2021: 42.4 ML/MIN/1.73
EOSINOPHIL # BLD AUTO: 0.2 10*3/MM3 (ref 0–0.4)
EOSINOPHIL NFR BLD AUTO: 3.2 % (ref 0.3–6.2)
ERYTHROCYTE [DISTWIDTH] IN BLOOD BY AUTOMATED COUNT: 13 % (ref 12.3–15.4)
FERRITIN SERPL-MCNC: 254 NG/ML (ref 30–400)
GLOBULIN UR ELPH-MCNC: 3.4 GM/DL
GLUCOSE SERPL-MCNC: 199 MG/DL (ref 65–99)
HCT VFR BLD AUTO: 39 % (ref 37.5–51)
HDLC SERPL-MCNC: 28 MG/DL (ref 40–60)
HGB BLD-MCNC: 12.8 G/DL (ref 13–17.7)
IMM GRANULOCYTES # BLD AUTO: 0.05 10*3/MM3 (ref 0–0.05)
IMM GRANULOCYTES NFR BLD AUTO: 0.8 % (ref 0–0.5)
IRON 24H UR-MRATE: 126 MCG/DL (ref 59–158)
IRON SATN MFR SERPL: 24 % (ref 20–50)
LDLC SERPL CALC-MCNC: 91 MG/DL (ref 0–100)
LDLC/HDLC SERPL: 3.09 {RATIO}
LYMPHOCYTES # BLD AUTO: 1.68 10*3/MM3 (ref 0.7–3.1)
LYMPHOCYTES NFR BLD AUTO: 26.8 % (ref 19.6–45.3)
MCH RBC QN AUTO: 32.2 PG (ref 26.6–33)
MCHC RBC AUTO-ENTMCNC: 32.8 G/DL (ref 31.5–35.7)
MCV RBC AUTO: 98 FL (ref 79–97)
MONOCYTES # BLD AUTO: 0.59 10*3/MM3 (ref 0.1–0.9)
MONOCYTES NFR BLD AUTO: 9.4 % (ref 5–12)
NEUTROPHILS NFR BLD AUTO: 3.72 10*3/MM3 (ref 1.7–7)
NEUTROPHILS NFR BLD AUTO: 59.3 % (ref 42.7–76)
NRBC BLD AUTO-RTO: 0 /100 WBC (ref 0–0.2)
PLATELET # BLD AUTO: 131 10*3/MM3 (ref 140–450)
PMV BLD AUTO: 12.9 FL (ref 6–12)
POTASSIUM SERPL-SCNC: 4.9 MMOL/L (ref 3.5–5.2)
PROT SERPL-MCNC: 7.5 G/DL (ref 6–8.5)
RBC # BLD AUTO: 3.98 10*6/MM3 (ref 4.14–5.8)
SODIUM SERPL-SCNC: 137 MMOL/L (ref 136–145)
TIBC SERPL-MCNC: 517 MCG/DL (ref 298–536)
TRANSFERRIN SERPL-MCNC: 347 MG/DL (ref 200–360)
TRIGL SERPL-MCNC: 178 MG/DL (ref 0–150)
VLDLC SERPL-MCNC: 31 MG/DL (ref 5–40)
WBC NRBC COR # BLD AUTO: 6.27 10*3/MM3 (ref 3.4–10.8)

## 2024-12-02 PROCEDURE — 83540 ASSAY OF IRON: CPT | Performed by: INTERNAL MEDICINE

## 2024-12-02 PROCEDURE — 80053 COMPREHEN METABOLIC PANEL: CPT | Performed by: INTERNAL MEDICINE

## 2024-12-02 PROCEDURE — 3051F HG A1C>EQUAL 7.0%<8.0%: CPT | Performed by: INTERNAL MEDICINE

## 2024-12-02 PROCEDURE — 82728 ASSAY OF FERRITIN: CPT | Performed by: INTERNAL MEDICINE

## 2024-12-02 PROCEDURE — 85025 COMPLETE CBC W/AUTO DIFF WBC: CPT | Performed by: INTERNAL MEDICINE

## 2024-12-02 PROCEDURE — 36415 COLL VENOUS BLD VENIPUNCTURE: CPT | Performed by: INTERNAL MEDICINE

## 2024-12-02 PROCEDURE — G0008 ADMIN INFLUENZA VIRUS VAC: HCPCS | Performed by: INTERNAL MEDICINE

## 2024-12-02 PROCEDURE — 3074F SYST BP LT 130 MM HG: CPT | Performed by: INTERNAL MEDICINE

## 2024-12-02 PROCEDURE — 90662 IIV NO PRSV INCREASED AG IM: CPT | Performed by: INTERNAL MEDICINE

## 2024-12-02 PROCEDURE — 84466 ASSAY OF TRANSFERRIN: CPT | Performed by: INTERNAL MEDICINE

## 2024-12-02 PROCEDURE — 3078F DIAST BP <80 MM HG: CPT | Performed by: INTERNAL MEDICINE

## 2024-12-02 PROCEDURE — 1126F AMNT PAIN NOTED NONE PRSNT: CPT | Performed by: INTERNAL MEDICINE

## 2024-12-02 PROCEDURE — 80061 LIPID PANEL: CPT | Performed by: INTERNAL MEDICINE

## 2024-12-02 PROCEDURE — 99214 OFFICE O/P EST MOD 30 MIN: CPT | Performed by: INTERNAL MEDICINE

## 2024-12-02 NOTE — PROGRESS NOTES
Chief Complaint   Patient presents with    Follow-up     4 month follow up chronic medical problems       History of Present Illness      The patient presents for a follow-up related to GERD. The patient is on omeprazole for his gastroesophageal reflux. The medication is taken on a regular basis and gives complete relief of the symptoms. He reports no abdominal pain, belching, chest pain, diarrhea, dysphagia, early satiety, heartburn, hoarseness, nausea, odynophagia, rectal bleeding, vomiting or weight loss. The GERD has no known aggravating factors.    The patient presents for a follow-up related to hypertension. The patient reports that he has had no headaches, dyspnea, edema, syncope, blurred vision or palpitations. He states that he is taking his medication as prescribed. He is not having medication side effects.    The patient presents for a follow-up related to iron deficiency anemia. There are no reports of blood loss. The patient has no symptoms of a dry cough, a wet cough, wheezing, fever, myalgias, sweats, decreased appetite, chills, fatigue or paresthesias. The patient's energy level is normal.    The patient presents for a follow-up related to hyperlipidemia. He is following a low fat diet. He reports that he is exercising. He is taking gemfibrozil. The patient is taking his medication as prescribed. He reports no medication side effects, including muscle cramps, abdominal pain, headaches or weakness. He denies orthopnea, paroxysmal nocturnal dyspnea or dyspnea on exertion.    Medications      Current Outpatient Medications:     Alpha-Lipoic Acid 600 MG tablet, Take 1 tablet by mouth Daily. Ran out, last took on Wednesday 3/13., Disp: , Rfl:     B Complex Vitamins (VITAMIN B COMPLEX PO), Take 1 tablet by mouth every night at bedtime., Disp: , Rfl:     carvedilol (COREG) 3.125 MG tablet, Take 1 tablet by mouth Every 12 (Twelve) Hours., Disp: 180 tablet, Rfl: 2    Coenzyme Q10 (Co Q 10) 100 MG capsule,  Take 300 mg by mouth Daily., Disp: , Rfl:     colestipol (COLESTID) 1 g tablet, TAKE 2 TABLETS TWICE A DAY, Disp: 360 tablet, Rfl: 3    Continuous Blood Gluc  (FreeStyle [place]) device, 1 Device Every 14 (Fourteen) Days., Disp: 1 each, Rfl: 0    Continuous Blood Gluc Sensor (FreeStyle [place] 2 Sensor) misc, 1 each Every 14 (Fourteen) Days., Disp: 6 each, Rfl: 3    Dulaglutide (Trulicity) 4.5 MG/0.5ML solution pen-injector, Inject 0.5 mL under the skin into the appropriate area as directed 1 (One) Time Per Week., Disp: 6 mL, Rfl: 0    ferrous sulfate (FeroSul) 325 (65 FE) MG tablet, Take 1 tablet by mouth 2 (Two) Times a Day., Disp: 180 tablet, Rfl: 1    Flaxseed, Linseed, (Flaxseed Oil) 1400 MG capsule, Take 1 capsule by mouth 2 (Two) Times a Day., Disp: , Rfl:     gabapentin (NEURONTIN) 800 MG tablet, TAKE 1 TABLET FOUR TIMES A DAY, Disp: 360 tablet, Rfl: 0    gemfibrozil (LOPID) 600 MG tablet, TAKE 1 TABLET TWICE A DAY, Disp: 180 tablet, Rfl: 3    Insulin Glargine (LANTUS SOLOSTAR) 100 UNIT/ML injection pen, Inject 70 Units under the skin into the appropriate area as directed Every Night., Disp: 90 mL, Rfl: 3    Insulin Lispro, 1 Unit Dial, (HumaLOG KwikPen) 100 UNIT/ML solution pen-injector, Inject 30 Units under the skin into the appropriate area as directed 2 (Two) Times a Day. (Patient taking differently: Inject 40 Units under the skin into the appropriate area as directed 2 (Two) Times a Day.), Disp: 90 mL, Rfl: 3    Insulin Pen Needle (B-D UF III MINI PEN NEEDLES) 31G X 5 MM misc, USE THREE TIMES A DAY, Disp: 270 each, Rfl: 3    ipratropium (ATROVENT) 0.06 % nasal spray, USE 2 SPRAYS IN EACH NOSTRIL AS DIRECTED BY PROVIDER DAILY, Disp: 45 mL, Rfl: 1    levothyroxine (SYNTHROID, LEVOTHROID) 75 MCG tablet, Take 1 tablet by mouth Daily., Disp: 90 tablet, Rfl: 3    Multiple Vitamins-Minerals (MULTIVITAMIN PO), Take 1 tablet by mouth Daily., Disp: , Rfl:     omeprazole (priLOSEC) 40 MG capsule,  TAKE 1 CAPSULE DAILY, Disp: 90 capsule, Rfl: 1    pregabalin (Lyrica) 50 MG capsule, Take 1 capsule by mouth 3 (Three) Times a Day., Disp: 90 capsule, Rfl: 3    saccharomyces boulardii (FLORASTOR) 250 MG capsule, Take 1 capsule by mouth 2 (Two) Times a Day. (Patient taking differently: Take 1 capsule by mouth Daily.), Disp: 30 capsule, Rfl: 0    sertraline (ZOLOFT) 100 MG tablet, TAKE 1 TABLET DAILY, Disp: 90 tablet, Rfl: 3    spironolactone (ALDACTONE) 25 MG tablet, TAKE 1 TABLET DAILY, Disp: 90 tablet, Rfl: 3    tamoxifen (NOLVADEX) 20 MG chemo tablet, TAKE 1 TABLET DAILY, Disp: 90 tablet, Rfl: 3    temazepam (Restoril) 7.5 MG capsule, Take 1 capsule by mouth At Night As Needed for Sleep., Disp: , Rfl:     tiZANidine (ZANAFLEX) 4 MG tablet, TAKE 1 TABLET TWICE A DAY AS NEEDED FOR MUSCLE SPASMS, Disp: 180 tablet, Rfl: 3    vitamin D3 125 MCG (5000 UT) capsule capsule, Take 1 capsule by mouth 2 (Two) Times a Day., Disp: , Rfl:      Allergies    Allergies   Allergen Reactions    Glucophage Xr [Metformin Hcl Er] Diarrhea and Other (See Comments)     Glucophage XR TB24: Adverse Reaction: Severe GI issues.    Metformin Nausea And Vomiting     Other reaction(s): SEVERE INTESTIONAL ISSUES  Other reaction(s): SEVERE GI ISSUES    Glucophage XR TB24  MetFORMIN HCl TABS    Tetracyclines & Related Nausea Only     Adverse Reaction    Chlorhexidine Rash       Problem List    Patient Active Problem List   Diagnosis    Stage 3a chronic kidney disease    Gastroesophageal reflux disease without esophagitis    Dyslipidemia, goal LDL below 70    Primary hypertension    Obesity, Class I, BMI 30-34.9    FAVIO (obstructive sleep apnea)    Cervical radiculopathy    Type 2 diabetes mellitus with diabetic polyneuropathy, with long-term current use of insulin    Circadian rhythm sleep disorder, delayed sleep phase type    Mild nonproliferative diabetic retinopathy of left eye without macular edema associated with type 2 diabetes mellitus     Acquired hypothyroidism    Anemia of chronic disease    Liver cirrhosis secondary to JEROME    Portal hypertensive gastropathy    Grade I diastolic dysfunction    Combined forms of age-related cataract of both eyes    Major depressive disorder with single episode, in full remission    Malignant neoplasm of right breast in male, estrogen receptor positive    Sepsis, due to unspecified organism, unspecified whether acute organ dysfunction present    Idiopathic acute pancreatitis    Pancreatitis    Acute cholecystitis    HLD (hyperlipidemia)    History of breast cancer    Elevated troponin    T2DM (type 2 diabetes mellitus)       Medications, Allergies, Problems List and Past History were reviewed and updated.    Physical Examination    /76 (BP Location: Left arm, Patient Position: Sitting, Cuff Size: Adult)   Pulse 68   Temp 98.2 °F (36.8 °C) (Infrared)   Resp 18   Wt 90.3 kg (199 lb)   BMI 30.27 kg/m²       HEENT: Facies- Within normal limits. Lids- Normal bilaterally. Conjunctiva- Clear bilaterally. Sclera- Anicteric bilaterally.    Neck: Thyroid- non enlarged, symmetric and has no nodules. No bruits are detected. ROM- Normal Range of Motion with no rigidity.    Lungs: Auscultation- Clear to auscultation bilaterally. There are no retractions, clubbing or cyanosis. The Expiratory to Inspiratory ratio is equal.    Lymph Nodes: Cervical- no enlarged lymph nodes noted.    Cardiovascular: There are no carotid bruits. Heart- Normal Rate with Regular rhythm and no murmurs. There are no gallops. There are no rubs. In the lower extremities there is no edema. The upper extremities do not have edema.      Impression and Assessment    Encounter for Immunization Administration.    Gastroesophageal Reflux Disease.    Essential Hypertension.    Iron Deficiency Anemia.    Hyperlipidemia.    Plan    Gastroesophageal Reflux Disease Plan: The patient was instructed to continue the current medications.    Essential  Hypertension Plan: The patient was instructed to continue the current medications.    Hyperlipidemia Plan: The patient was instructed to exercise daily, eat a low fat diet and continue his medications.    Iron Deficiency Anemia Plan: Further plans will be made after results of testing are available.    Counseled regarding immunizations and applicable VIS given.    Immunizations Ordered and Administered: Fluzone High Dose 65+ years.    Diagnoses and all orders for this visit:    1. Primary hypertension (Primary)    2. Gastroesophageal reflux disease without esophagitis    3. Iron deficiency anemia, unspecified iron deficiency anemia type  -     CBC & Differential; Future  -     Ferritin; Future  -     Iron Profile; Future    4. Hyperlipidemia, unspecified hyperlipidemia type  -     Comprehensive Metabolic Panel; Future  -     Lipid Panel; Future    5. Immunization due  -     Fluzone High-Dose 65+yrs            Return to Office    The patient was instructed to return for follow-up in 6 months. The patient was instructed to return sooner if the condition changes, worsens, or does not resolve.    BMI is >= 30 and <35. (Class 1 Obesity). The following options were offered after discussion;: weight loss educational material (shared in after visit summary), exercise counseling/recommendations, nutrition counseling/recommendations, and Information on healthy weight added to patient's after visit summary.

## 2024-12-05 DIAGNOSIS — E11.42 DIABETIC PERIPHERAL NEUROPATHY ASSOCIATED WITH TYPE 2 DIABETES MELLITUS: ICD-10-CM

## 2024-12-06 RX ORDER — GABAPENTIN 800 MG/1
800 TABLET ORAL 4 TIMES DAILY
Qty: 360 TABLET | Refills: 0 | Status: SHIPPED | OUTPATIENT
Start: 2024-12-06

## 2024-12-06 RX ORDER — GEMFIBROZIL 600 MG/1
TABLET, FILM COATED ORAL
Qty: 180 TABLET | Refills: 3 | Status: SHIPPED | OUTPATIENT
Start: 2024-12-06

## 2024-12-06 RX ORDER — MONTELUKAST SODIUM 4 MG/1
TABLET, CHEWABLE ORAL
Qty: 360 TABLET | Refills: 3 | Status: SHIPPED | OUTPATIENT
Start: 2024-12-06

## 2024-12-12 ENCOUNTER — TRANSCRIBE ORDERS (OUTPATIENT)
Dept: LAB | Facility: HOSPITAL | Age: 74
End: 2024-12-12
Payer: MEDICARE

## 2024-12-12 ENCOUNTER — LAB (OUTPATIENT)
Dept: LAB | Facility: HOSPITAL | Age: 74
End: 2024-12-12
Payer: MEDICARE

## 2024-12-12 DIAGNOSIS — N18.32 CHRONIC KIDNEY DISEASE (CKD) STAGE G3B/A1, MODERATELY DECREASED GLOMERULAR FILTRATION RATE (GFR) BETWEEN 30-44 ML/MIN/1.73 SQUARE METER AND ALBUMINURIA CREATININE RATIO LESS THAN 30 MG/G (CMS/H*: ICD-10-CM

## 2024-12-12 DIAGNOSIS — N18.32 CHRONIC KIDNEY DISEASE (CKD) STAGE G3B/A1, MODERATELY DECREASED GLOMERULAR FILTRATION RATE (GFR) BETWEEN 30-44 ML/MIN/1.73 SQUARE METER AND ALBUMINURIA CREATININE RATIO LESS THAN 30 MG/G (CMS/H*: Primary | ICD-10-CM

## 2024-12-12 LAB
HCT VFR BLD AUTO: 37.4 % (ref 37.5–51)
HGB BLD-MCNC: 12.8 G/DL (ref 13–17.7)
PTH-INTACT SERPL-MCNC: 61.7 PG/ML (ref 15–65)

## 2024-12-12 PROCEDURE — 85014 HEMATOCRIT: CPT

## 2024-12-12 PROCEDURE — 81003 URINALYSIS AUTO W/O SCOPE: CPT

## 2024-12-12 PROCEDURE — 82306 VITAMIN D 25 HYDROXY: CPT

## 2024-12-12 PROCEDURE — 83970 ASSAY OF PARATHORMONE: CPT

## 2024-12-12 PROCEDURE — 83880 ASSAY OF NATRIURETIC PEPTIDE: CPT

## 2024-12-12 PROCEDURE — 84156 ASSAY OF PROTEIN URINE: CPT

## 2024-12-12 PROCEDURE — 82570 ASSAY OF URINE CREATININE: CPT

## 2024-12-12 PROCEDURE — 84466 ASSAY OF TRANSFERRIN: CPT

## 2024-12-12 PROCEDURE — 36415 COLL VENOUS BLD VENIPUNCTURE: CPT

## 2024-12-12 PROCEDURE — 84550 ASSAY OF BLOOD/URIC ACID: CPT

## 2024-12-12 PROCEDURE — 83540 ASSAY OF IRON: CPT

## 2024-12-12 PROCEDURE — 80069 RENAL FUNCTION PANEL: CPT

## 2024-12-12 PROCEDURE — 83735 ASSAY OF MAGNESIUM: CPT

## 2024-12-12 PROCEDURE — 85018 HEMOGLOBIN: CPT

## 2024-12-13 LAB
25(OH)D3 SERPL-MCNC: 52.2 NG/ML (ref 30–100)
ALBUMIN SERPL-MCNC: 3.8 G/DL (ref 3.5–5.2)
ANION GAP SERPL CALCULATED.3IONS-SCNC: 11.2 MMOL/L (ref 5–15)
BILIRUB UR QL STRIP: NEGATIVE
BUN SERPL-MCNC: 22 MG/DL (ref 8–23)
BUN/CREAT SERPL: 14.1 (ref 7–25)
CALCIUM SPEC-SCNC: 9.5 MG/DL (ref 8.6–10.5)
CHLORIDE SERPL-SCNC: 104 MMOL/L (ref 98–107)
CLARITY UR: CLEAR
CO2 SERPL-SCNC: 21.8 MMOL/L (ref 22–29)
COLOR UR: YELLOW
CREAT SERPL-MCNC: 1.56 MG/DL (ref 0.76–1.27)
CREAT UR-MCNC: 76.6 MG/DL
EGFRCR SERPLBLD CKD-EPI 2021: 46 ML/MIN/1.73
GLUCOSE SERPL-MCNC: 204 MG/DL (ref 65–99)
GLUCOSE UR STRIP-MCNC: ABNORMAL MG/DL
HGB UR QL STRIP.AUTO: NEGATIVE
IRON 24H UR-MRATE: 103 MCG/DL (ref 59–158)
IRON SATN MFR SERPL: 20 % (ref 20–50)
KETONES UR QL STRIP: NEGATIVE
LEUKOCYTE ESTERASE UR QL STRIP.AUTO: NEGATIVE
MAGNESIUM SERPL-MCNC: 1.8 MG/DL (ref 1.6–2.4)
NITRITE UR QL STRIP: NEGATIVE
NT-PROBNP SERPL-MCNC: 74.1 PG/ML (ref 0–1800)
PH UR STRIP.AUTO: 6 [PH] (ref 5–8)
PHOSPHATE SERPL-MCNC: 3.2 MG/DL (ref 2.5–4.5)
POTASSIUM SERPL-SCNC: 4.8 MMOL/L (ref 3.5–5.2)
PROT ?TM UR-MCNC: 62.8 MG/DL
PROT UR QL STRIP: ABNORMAL
SODIUM SERPL-SCNC: 137 MMOL/L (ref 136–145)
SP GR UR STRIP: 1.02 (ref 1–1.03)
TIBC SERPL-MCNC: 514 MCG/DL (ref 298–536)
TRANSFERRIN SERPL-MCNC: 345 MG/DL (ref 200–360)
URATE SERPL-MCNC: 4.7 MG/DL (ref 3.4–7)
UROBILINOGEN UR QL STRIP: ABNORMAL

## 2024-12-16 ENCOUNTER — OFFICE VISIT (OUTPATIENT)
Dept: ORTHOPEDIC SURGERY | Facility: CLINIC | Age: 74
End: 2024-12-16
Payer: MEDICARE

## 2024-12-16 VITALS
HEIGHT: 68 IN | BODY MASS INDEX: 30.62 KG/M2 | SYSTOLIC BLOOD PRESSURE: 140 MMHG | WEIGHT: 202 LBS | DIASTOLIC BLOOD PRESSURE: 90 MMHG

## 2024-12-16 DIAGNOSIS — M79.642 LEFT HAND PAIN: ICD-10-CM

## 2024-12-16 DIAGNOSIS — M65.342 TRIGGER RING FINGER OF LEFT HAND: Primary | ICD-10-CM

## 2024-12-16 PROCEDURE — 1159F MED LIST DOCD IN RCRD: CPT | Performed by: STUDENT IN AN ORGANIZED HEALTH CARE EDUCATION/TRAINING PROGRAM

## 2024-12-16 PROCEDURE — 1160F RVW MEDS BY RX/DR IN RCRD: CPT | Performed by: STUDENT IN AN ORGANIZED HEALTH CARE EDUCATION/TRAINING PROGRAM

## 2024-12-16 PROCEDURE — 99203 OFFICE O/P NEW LOW 30 MIN: CPT | Performed by: STUDENT IN AN ORGANIZED HEALTH CARE EDUCATION/TRAINING PROGRAM

## 2024-12-16 PROCEDURE — 3077F SYST BP >= 140 MM HG: CPT | Performed by: STUDENT IN AN ORGANIZED HEALTH CARE EDUCATION/TRAINING PROGRAM

## 2024-12-16 PROCEDURE — 20550 NJX 1 TENDON SHEATH/LIGAMENT: CPT | Performed by: STUDENT IN AN ORGANIZED HEALTH CARE EDUCATION/TRAINING PROGRAM

## 2024-12-16 PROCEDURE — 3080F DIAST BP >= 90 MM HG: CPT | Performed by: STUDENT IN AN ORGANIZED HEALTH CARE EDUCATION/TRAINING PROGRAM

## 2024-12-16 RX ORDER — TRIAMCINOLONE ACETONIDE 40 MG/ML
20 INJECTION, SUSPENSION INTRA-ARTICULAR; INTRAMUSCULAR
Status: COMPLETED | OUTPATIENT
Start: 2024-12-16 | End: 2024-12-16

## 2024-12-16 RX ORDER — SILVER SULFADIAZINE 10 G/1000G
CREAM TOPICAL
COMMUNITY
Start: 2024-12-11

## 2024-12-16 RX ORDER — LIDOCAINE HYDROCHLORIDE 10 MG/ML
0.5 INJECTION, SOLUTION EPIDURAL; INFILTRATION; INTRACAUDAL; PERINEURAL
Status: COMPLETED | OUTPATIENT
Start: 2024-12-16 | End: 2024-12-16

## 2024-12-16 RX ADMIN — TRIAMCINOLONE ACETONIDE 20 MG: 40 INJECTION, SUSPENSION INTRA-ARTICULAR; INTRAMUSCULAR at 15:29

## 2024-12-16 RX ADMIN — LIDOCAINE HYDROCHLORIDE 0.5 ML: 10 INJECTION, SOLUTION EPIDURAL; INFILTRATION; INTRACAUDAL; PERINEURAL at 15:29

## 2024-12-16 NOTE — PROGRESS NOTES
Procedure   - Hand/Upper Extremity Injection: L ring A1 for trigger finger on 12/16/2024 3:29 PM  Indications: pain  Details: 25 G needle  Medications: 0.5 mL lidocaine PF 1% 1 %; 20 mg triamcinolone acetonide 40 MG/ML  Outcome: tolerated well, no immediate complications  Procedure, treatment alternatives, risks and benefits explained, specific risks discussed. Consent was given by the patient. Immediately prior to procedure a time out was called to verify the correct patient, procedure, equipment, support staff and site/side marked as required. Patient was prepped and draped in the usual sterile fashion.

## 2024-12-16 NOTE — PROGRESS NOTES
"                                                                 [facility]     Office Visit       Date: 12/16/2024   Patient Name: Elton Funez  MRN: [id number]  YOB: 1950    Referring Physician: Referring, Self     Chief Complaint:   Chief Complaint   Patient presents with    Left Hand - Pain, Initial Evaluation     Ring finger/Trigger finger     History of Present Illness:   Elton Funez is a 74 y.o. male left-hand-dominant presented clinic as new patient with complaints of left ring finger pain, catching and locking.  Symptoms are present for 1 year.  These are progressively worsening with time.  He states that the digit gets caught down requiring manual straightening.  This is painful and occurs.  He denies any numbness and tingling in the median nerve distribution.  He does report chronic numbness and tingling in the small and ring fingers since a cervical spine surgery.  No other complaints or concerns.    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  Last hemoglobin A1c was 7.8.    Subjective   Review of Systems:   Review of Systems   Pertinent review of systems per HPI    I reviewed the patient's chief complaint, history of present illness, review of systems, past medical history, surgical history, family history, social history, medications and allergy list in the EMR on 12/16/2024 and agree with the findings above.    Objective    Vital Signs:   Vitals:    12/16/24 1504   BP: 140/90   Weight: 91.6 kg (202 lb)   Height: 172.7 cm (67.99\")     General: No acute distress. Alert and oriented.   Cardiovascular: Palpable radial pulse.   Respiratory: Breathing is nonlabored.   Ortho Exam:  Examination of the left upper extremity demonstrates no deformity.  No skin lesions or abrasions.  No atrophy.  He is tender along the ring finger A1 pulley.  There is catching and locking of the ring finger during exam.  " The remainder of the hand wrist and digits are nontender.  Negative Tinel, Durkan's, Phalen's.  Negative Tinel's at the elbow.  Negative Tinel's over Guyon's.  Negative elbow flexion compression test.  Sensation is decreased throughout the the ulnar nerve distribution intact light touch throughout the median nerve distribution.    Imaging / Studies:    Imaging Results (Last 24 Hours)       Procedure Component Value Units Date/Time    XR Hand 3+ View Left [[id number]] Resulted: 12/16/24 1535     Updated: 12/16/24 1535    Narrative:      Left Hand X-Ray    Indication: Pain    Views:  PA, Lateral, and Oblique     Comparison:  None    Findings:  No fractures or dislocation. No bony lesions. Normal soft tissues.  Most   notable at the DIP and thumb IP joint.  Atherosclerotic disease along the   radial artery and ulnar arteries at the level of the wrist.                 Procedure Note:  After reviewing the risks, benefits and alternatives to a steroid injection, which include but are not limited to; hypopigmentation, fat necrosis/atrophy, pain, swelling, bleeding, bruising, damage to nearby nerves/vessels, allergic reaction , transient elevation in blood glucose levels and infection a verbal consent was obtained. A time-out was then performed and the affected hand was prepped with chlorhexadine soap and ethyl chloride was used to numb the skin. The left ring finger flexor tendon sheath was injected with 0.5cc: 0.5cc mixture of Kenalog - 40 mg/ml and Lidocaine - 1% / 2 ml. The injection was well tolerated and a sterile dressing was applied. There were no complications. I advised the patient that they might experience some local discomfort for the next couple days and can apply ice to the site as needed.    Assessment / Plan    Assessment/Plan:   Elton Funez is a 74 y.o. male with left ring trigger finger.    I discussed with the patient their clinical findings demonstrate a trigger finger.  We had a lengthy discussion  regarding the pathophysiology of inflammation of the tendon-sheath unit, using the analogy of a subway tunnel.  Both conservative and surgical options were discussed.  Conservative treatments in the form of: observation, gentle stretching exercises, nighttime coban wrapping, and injection were presented. Discussed that approximately 70% of patients have complete symptoms resolution after 1 steroid injection, and should they fail 1 injection, they may still be considered for a second steroid injection.  Also discussed that the patient may not see any improvement from the steroid injection for sometimes 2 weeks. Operative treatments in the form of A1 pulley release was also discussed.  After expressing understanding of all options, the patient elects to proceed with corticosteroid injection today, nighttime Coban wrapping, and gentle stretching.  I will see him back in 3 months for reevaluation.  They were agreeable with the plan.  All questions and concerns were addressed.       ICD-10-CM ICD-9-CM   1. Trigger ring finger of left hand  M65.342 727.03   2. Left hand pain  M79.642 729.5     Follow Up:   Return in about 3 months (around 3/16/2025) for Follow Up.      Marta García MD  [facility] [place] [facility]

## 2025-01-17 ENCOUNTER — TELEPHONE (OUTPATIENT)
Dept: ENDOCRINOLOGY | Facility: CLINIC | Age: 75
End: 2025-01-17
Payer: MEDICARE

## 2025-01-17 RX ORDER — DULAGLUTIDE 4.5 MG/.5ML
4.5 INJECTION, SOLUTION SUBCUTANEOUS WEEKLY
Qty: 6 ML | Refills: 0 | Status: SHIPPED | OUTPATIENT
Start: 2025-01-17

## 2025-01-17 NOTE — TELEPHONE ENCOUNTER
Caller: Elton Funez    Relationship: Self    Best call back number:   Telephone Information:   Mobile [phone]       What is the best time to reach you: ANYTIME    Who are you requesting to speak with (clinical staff, provider,  specific staff member): CLINICAL STAFF / PROVIDER     What was the call regarding: PT CALLED NEEDING A NEW PRESCRIPTION SENT TO [facility] FOR PT MEDICATION TRULICITY 4.5 MG. PLEASE ADVISE.

## 2025-01-17 NOTE — TELEPHONE ENCOUNTER
Last office visit with prescribing clinician: 9/17/2024       Next office visit with prescribing clinician: 1/31/2025     {

## 2025-01-20 DIAGNOSIS — E03.9 HYPOTHYROIDISM, ADULT: ICD-10-CM

## 2025-01-20 RX ORDER — INSULIN GLARGINE 100 [IU]/ML
INJECTION, SOLUTION SUBCUTANEOUS
Qty: 63 ML | Refills: 0 | Status: SHIPPED | OUTPATIENT
Start: 2025-01-20

## 2025-01-20 RX ORDER — LEVOTHYROXINE SODIUM 75 UG/1
75 TABLET ORAL DAILY
Qty: 90 TABLET | Refills: 0 | Status: SHIPPED | OUTPATIENT
Start: 2025-01-20

## 2025-01-20 NOTE — TELEPHONE ENCOUNTER
Rx Refill Note  Requested Prescriptions     Pending Prescriptions Disp Refills    Lantus SoloStar 100 UNIT/ML injection pen [Pharmacy Med Name: LANTUS SOLOSTAR PEN 3ML 5'S 100U/ML] 75 mL 2     Sig: INJECT 70 UNITS UNDER THE SKIN INTO THE APPROPRIATE AREA AS DIRECTED EVERY NIGHT    levothyroxine (SYNTHROID, LEVOTHROID) 75 MCG tablet [Pharmacy Med Name: L-THYROXINE (SYNTHROID) TABS 75MCG] 90 tablet 3     Sig: TAKE 1 TABLET DAILY      Last office visit with prescribing clinician: 9/17/2024   Last telemedicine visit with prescribing clinician: Visit date not found   Next office visit with prescribing clinician: 1/31/2025                         Would you like a call back once the refill request has been completed: [] Yes [] No    If the office needs to give you a call back, can they leave a voicemail: [] Yes [] No    Belia Cruz MA  01/20/25, 11:09 EST

## 2025-01-31 ENCOUNTER — OFFICE VISIT (OUTPATIENT)
Dept: ENDOCRINOLOGY | Facility: CLINIC | Age: 75
End: 2025-01-31
Payer: MEDICARE

## 2025-01-31 VITALS
DIASTOLIC BLOOD PRESSURE: 88 MMHG | WEIGHT: 197 LBS | SYSTOLIC BLOOD PRESSURE: 138 MMHG | HEART RATE: 63 BPM | BODY MASS INDEX: 29.86 KG/M2 | HEIGHT: 68 IN | OXYGEN SATURATION: 94 %

## 2025-01-31 DIAGNOSIS — Z79.4 TYPE 2 DIABETES MELLITUS WITH HYPERGLYCEMIA, WITH LONG-TERM CURRENT USE OF INSULIN: Primary | ICD-10-CM

## 2025-01-31 DIAGNOSIS — E11.65 TYPE 2 DIABETES MELLITUS WITH HYPERGLYCEMIA, WITH LONG-TERM CURRENT USE OF INSULIN: Primary | ICD-10-CM

## 2025-01-31 LAB
EXPIRATION DATE: ABNORMAL
EXPIRATION DATE: ABNORMAL
GLUCOSE BLDC GLUCOMTR-MCNC: 167 MG/DL (ref 70–130)
HBA1C MFR BLD: 7.8 % (ref 4.5–5.7)
Lab: ABNORMAL
Lab: ABNORMAL

## 2025-01-31 NOTE — PATIENT INSTRUCTIONS
Results for orders placed or performed in visit on 01/31/25   POC Glucose, Blood    Collection Time: 01/31/25 11:55 AM    Specimen: Blood   Result Value Ref Range    Glucose 167 (A) 70 - 130 mg/dL    Lot Number [id number]     Expiration Date 8/09/2025    POC Glycosylated Hemoglobin (Hb A1C)    Collection Time: 01/31/25 11:59 AM    Specimen: Blood   Result Value Ref Range    Hemoglobin A1C 7.8 (A) 4.5 - 5.7 %    Lot Number [id number]     Expiration Date 11/08/2026      *Note: Due to a large number of results and/or encounters for the requested time period, some results have not been displayed. A complete set of results can be found in Results Review.     Continue Trulicity 4.5 weekly     New Insulin dosing:  Basal insulin Lantus  65 Units nightly.             Meal Insulin Humalog (U-100)  30 units before large meal.   Correction insulin (add to meal insulin)   0 unit  if glucose  less than 150   2unit   if glucose  150 - 199   4 units if glucose 200 - 249   6 units if glucose 250 - 299   8 units if glucose 300 - 349   10  units if glucose Greater than 350     Nutritional Recommendations:    4-5 carb portions per meal for male (60-75 gm of carbs)    Physical Activity Goals:  Walk at least 15 min every day, ideally exercise 45-60 min most days (could be done in short bouts of 10-15 minutes.    Keep records of your glucose levels and insulin adjustments. We may ask you to keep records on the content of your meals with insulin doses and before/after meal glucose levels to evaluate your ratios.  Call for advice if you have unexplained or unexpected hypoglycemia  (glucose < 60) or persistent high glucose > 300.  Office: [phone]      Pau Colin MD

## 2025-01-31 NOTE — PROGRESS NOTES
Chief complaint  No chief complaint on file.    Subjective   Elton Funez is a 74 y.o. male is here today for follow-up    Diabetes mellitus type 2, uncontrolled dx in 2000  Diabetic complications: neuropathy, retinopathy.   Past meds: Metformin caused diarrhea in the past.   Eye care:no retinopathy.   Monitoring - freestyle winston reviewed.   Foot care and dental care: discussed.     Medications: Insulin dose requirement reduced significantly after the Trulicity restart. He had some low glucose episodes and decreased insulin Humalog     Hypothyroidism - on levothyroxine.      He had recurrent pancreatitis (gallstone related) in 6/2023 and 3 /2024.Trulicity was discontinued at that time.   Tolerating 1.5 mg dose well, no complaints.   CGM downloaded and analyzed.     Medications    Current Outpatient Medications:     Alpha-Lipoic Acid 600 MG tablet, Take 1 tablet by mouth Daily. Ran out, last took on Wednesday 3/13., Disp: , Rfl:     B Complex Vitamins (VITAMIN B COMPLEX PO), Take 1 tablet by mouth every night at bedtime., Disp: , Rfl:     carvedilol (COREG) 3.125 MG tablet, Take 1 tablet by mouth Every 12 (Twelve) Hours., Disp: 180 tablet, Rfl: 2    Coenzyme Q10 (Co Q 10) 100 MG capsule, Take 300 mg by mouth Daily., Disp: , Rfl:     colestipol (COLESTID) 1 g tablet, TAKE 2 TABLETS TWICE A DAY, Disp: 360 tablet, Rfl: 3    Continuous Blood Gluc  (FreeStyle [place] 2 [place]) device, 1 Device Every 14 (Fourteen) Days., Disp: 1 each, Rfl: 0    Continuous Blood Gluc Sensor (FreeStyle Winston 2 Sensor) misc, 1 each Every 14 (Fourteen) Days., Disp: 6 each, Rfl: 3    Dulaglutide (Trulicity) 4.5 MG/0.5ML solution auto-injector, Inject 4.5 mg under the skin into the appropriate area as directed 1 (One) Time Per Week., Disp: 6 mL, Rfl: 0    ferrous sulfate (FeroSul) 325 (65 FE) MG tablet, Take 1 tablet by mouth 2 (Two) Times a Day., Disp: 180 tablet, Rfl: 1    Flaxseed, Linseed, (Flaxseed Oil) 1400 MG capsule, Take 1  capsule by mouth 2 (Two) Times a Day., Disp: , Rfl:     gabapentin (NEURONTIN) 800 MG tablet, TAKE 1 TABLET FOUR TIMES A DAY, Disp: 360 tablet, Rfl: 0    gemfibrozil (LOPID) 600 MG tablet, TAKE 1 TABLET TWICE A DAY, Disp: 180 tablet, Rfl: 3    Insulin Glargine (Lantus SoloStar) 100 UNIT/ML injection pen, INJECT 70 UNITS UNDER THE SKIN INTO THE APPROPRIATE AREA AS DIRECTED EVERY NIGHT, Disp: 63 mL, Rfl: 0    Insulin Lispro, 1 Unit Dial, (HumaLOG KwikPen) 100 UNIT/ML solution pen-injector, Inject 30 Units under the skin into the appropriate area as directed 2 (Two) Times a Day. (Patient taking differently: Inject 40 Units under the skin into the appropriate area as directed 2 (Two) Times a Day.), Disp: 90 mL, Rfl: 3    Insulin Pen Needle (B-D UF III MINI PEN NEEDLES) 31G X 5 MM misc, USE THREE TIMES A DAY, Disp: 270 each, Rfl: 3    ipratropium (ATROVENT) 0.06 % nasal spray, USE 2 SPRAYS IN EACH NOSTRIL AS DIRECTED BY PROVIDER DAILY, Disp: 45 mL, Rfl: 1    levothyroxine (SYNTHROID, LEVOTHROID) 75 MCG tablet, TAKE 1 TABLET DAILY, Disp: 90 tablet, Rfl: 0    Multiple Vitamins-Minerals (MULTIVITAMIN PO), Take 1 tablet by mouth Daily., Disp: , Rfl:     omeprazole (priLOSEC) 40 MG capsule, TAKE 1 CAPSULE DAILY, Disp: 90 capsule, Rfl: 1    pregabalin (Lyrica) 50 MG capsule, Take 1 capsule by mouth 3 (Three) Times a Day., Disp: 90 capsule, Rfl: 3    saccharomyces boulardii (FLORASTOR) 250 MG capsule, Take 1 capsule by mouth 2 (Two) Times a Day. (Patient taking differently: Take 1 capsule by mouth Daily.), Disp: 30 capsule, Rfl: 0    sertraline (ZOLOFT) 100 MG tablet, TAKE 1 TABLET DAILY, Disp: 90 tablet, Rfl: 3    spironolactone (ALDACTONE) 25 MG tablet, TAKE 1 TABLET DAILY, Disp: 90 tablet, Rfl: 3    SSD 1 % cream, APPLY TOPICALLY TO THE AFFECTED AREA DAILY UNTIL HEALED AS DIRECTED, Disp: , Rfl:     tamoxifen (NOLVADEX) 20 MG chemo tablet, TAKE 1 TABLET DAILY, Disp: 90 tablet, Rfl: 3    temazepam (Restoril) 7.5 MG capsule,  "Take 1 capsule by mouth At Night As Needed for Sleep., Disp: , Rfl:     tiZANidine (ZANAFLEX) 4 MG tablet, TAKE 1 TABLET TWICE A DAY AS NEEDED FOR MUSCLE SPASMS, Disp: 180 tablet, Rfl: 3    vitamin D3 125 MCG (5000 UT) capsule capsule, Take 1 capsule by mouth 2 (Two) Times a Day., Disp: , Rfl:     Dulaglutide (Trulicity) 4.5 MG/0.5ML solution pen-injector, Inject 0.5 mL under the skin into the appropriate area as directed 1 (One) Time Per Week., Disp: 6 mL, Rfl: 0    Review of systems  Review of Systems   Constitutional:  Positive for fatigue.   Musculoskeletal:  Positive for arthralgias, back pain and neck pain.   Neurological:  Positive for numbness.   All other systems reviewed and are negative.      Physical exam  Objective   Blood pressure 138/88, pulse 63, height 172.7 cm (68\"), weight 89.4 kg (197 lb), SpO2 94%. Body mass index is 29.95 kg/m².  Physical Exam   Constitutional: He is oriented to person, place, and time. He appears well-developed and well-nourished.   HENT:   Head: Normocephalic and atraumatic.   Eyes: Conjunctivae are normal.   Neck: No thyromegaly present.   Cardiovascular: Normal rate, regular rhythm, normal heart sounds and normal pulses.   Pulmonary/Chest: Effort normal and breath sounds normal.   Neurological: He is alert and oriented to person, place, and time.   Psychiatric: Thought content normal.         LABS AND IMAGING  Office Visit on 01/31/2025   Component Date Value Ref Range Status    Hemoglobin A1C 01/31/2025 7.8 (A)  4.5 - 5.7 % Final    Lot Number 01/31/2025 [id number]   Final    Expiration Date 01/31/2025 11/08/2026   Final    Glucose 01/31/2025 167 (A)  70 - 130 mg/dL Final    Lot Number 01/31/2025 [id number]   Final    Expiration Date 01/31/2025 8/09/2025   Final       Assessment  Assessment & Plan   1. Type 2 diabetes mellitus with hyperglycemia, with long-term current use of insulin          Plan      CGM downloaded and analyzed 70% in range. Postprandial hyperglycemia is " frequent and is at 28 %. Glucose improved   Trulicity 4.5 mg weekly.  Insulin adjusted based on CGM data.     2.Cont levothyroxine 75 mcg, thyroid function was normal .       Follow-up in 3 months

## 2025-02-04 ENCOUNTER — OFFICE VISIT (OUTPATIENT)
Dept: NEUROLOGY | Facility: CLINIC | Age: 75
End: 2025-02-04
Payer: MEDICARE

## 2025-02-04 VITALS
OXYGEN SATURATION: 97 % | SYSTOLIC BLOOD PRESSURE: 118 MMHG | BODY MASS INDEX: 29.7 KG/M2 | WEIGHT: 196 LBS | HEART RATE: 87 BPM | HEIGHT: 68 IN | DIASTOLIC BLOOD PRESSURE: 78 MMHG

## 2025-02-04 DIAGNOSIS — E11.40 NEUROPATHY DUE TO TYPE 2 DIABETES MELLITUS: Primary | ICD-10-CM

## 2025-02-04 PROCEDURE — 99214 OFFICE O/P EST MOD 30 MIN: CPT | Performed by: NURSE PRACTITIONER

## 2025-02-04 PROCEDURE — 3078F DIAST BP <80 MM HG: CPT | Performed by: NURSE PRACTITIONER

## 2025-02-04 PROCEDURE — 3074F SYST BP LT 130 MM HG: CPT | Performed by: NURSE PRACTITIONER

## 2025-02-04 PROCEDURE — 1160F RVW MEDS BY RX/DR IN RCRD: CPT | Performed by: NURSE PRACTITIONER

## 2025-02-04 PROCEDURE — 1159F MED LIST DOCD IN RCRD: CPT | Performed by: NURSE PRACTITIONER

## 2025-02-04 RX ORDER — PREGABALIN 100 MG/1
100 CAPSULE ORAL 3 TIMES DAILY
Qty: 270 CAPSULE | Refills: 1 | Status: SHIPPED | OUTPATIENT
Start: 2025-02-04 | End: 2026-02-04

## 2025-02-04 RX ORDER — GABAPENTIN 100 MG/1
100 CAPSULE ORAL DAILY
Qty: 7 CAPSULE | Refills: 0 | Status: SHIPPED | OUTPATIENT
Start: 2025-02-04 | End: 2025-02-11

## 2025-02-04 NOTE — PROGRESS NOTES
Neuro Office Visit      Encounter Date: 2025   Patient Name: Elton Funez  : 1950   MRN: [id number]   PCP:  Tc Ramirez MD     Chief Complaint:    Chief Complaint   Patient presents with    Neuropathy due to type 2 diabetes mellitus       History of Present Illness: Elton Funez is a 74 y.o. male who is here today in Neurology for neuropathy.    Last visit with me on 10/31/2024, started Lyrica.  History of Present Illness  He reports a positive response to Lyrica, which has improved his sleep quality.     He experiences occasional twitching in his legs during the day, which is alleviated by Lyrica. Legs feel better on Lyrica than Gabapentin.    He has been on a long-term regimen of gabapentin, administered 4 times daily, with 2 doses in the morning and 2 at night.     Gabapentin causes him to sleep excessively and he wants to try and switch to Lyrica.     Supplemental Information  He reports no new allergies, diagnoses, or surgeries.    ALLERGIES  The patient has no new allergies.    MEDICATIONS  Current: Gabapentin, Lyrica      Subjective      Past Medical History:   Past Medical History:   Diagnosis Date    Abdominal pain 2023    RUQ; SEEN IN ED AT [facility]; DC'D HOME    Abscess of arm, right     Abscess of skin of neck     Acute sinusitis     ALT (SGPT) level raised     Arthritis     Arthritis of neck     Bacteremia due to Klebsiella pneumoniae 2023    BPH (benign prostatic hypertrophy)     Breast cancer 2023    right    Cirrhosis of liver 2021    [facility]    CKD (chronic kidney disease)     Colon polyp     Constipation     COVID     DDD (degenerative disc disease), cervical     Decreased platelet count     Depression     Resolved: 2015    Difficulty walking     Elevated AST (SGOT)     Erectile dysfunction     GERD (gastroesophageal reflux disease)     History of transfusion     [facility]; 3 UNITS; NO REACTION    HL (hearing loss)     Hyperlipidemia     Hypertension      Hypothyroidism, adult 11/10/2020    Infection     MSSA lumbar surgical wound infection 3/2017    Jaw pain     Left hip pain     Low back pain     Surgery 30 yrs L4-5    Lumbosacral disc disease     Migraine     Hx of    Neuropathy in diabetes     Feet    Obesity (BMI 30.0-34.9) 10/07/2016    BMI 31.92    Obstructive sleep apnea     CPAP    Pancreatitis 06/2023    Peripheral neuropathy     Portal hypertensive gastropathy 09/03/2021    Renal insufficiency     Shigellosis     Sleep apnea     PT TO BRING MASK AND TUBING DAY OF SURGERY    Symptomatic anemia 08/30/2021    Type 2 diabetes mellitus     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     Vitamin D deficiency     Wears glasses     Wears hearing aid     BOTH EARS       Past Surgical History:   Past Surgical History:   Procedure Laterality Date    ANTERIOR CERVICAL DISCECTOMY W/ FUSION N/A 12/14/2017    Procedure: CERVICAL DISCECTOMY ANTERIOR FUSION WITH INSTRUMENTATION C7/T1;  Surgeon: Gigi Perales MD;  Location:  [facility];  Service:     BACK SURGERY      L4-L5    CERVICAL ARTHRODESIS      CERVICAL DISCECTOMY POSTERIOR FUSION WITH INSTRUMENTATION N/A 03/16/2017    Procedure:  INCISION AND DRAINAGE OF POSTERIOR CERVICAL WOUND;  Surgeon: Gigi Perales MD;  Location:  [facility];  Service:     CERVICAL LAMINECTOMY DECOMPRESSION POSTERIOR Left 02/28/2017    Procedure: Left C7-T1 CERVICAL FORAMINOTOMY POSTERIOR WITH METRIX;  Surgeon: Gigi Perales MD;  Location:  [facility];  Service:     CHOLECYSTECTOMY WITH INTRAOPERATIVE CHOLANGIOGRAM N/A 03/18/2024    Procedure: CHOLECYSTECTOMY LAPAROSCOPIC AND LARAROSCOPIC LIVER BIOPSY;  Surgeon: Emerson Spear MD;  Location:  [facility];  Service: General;  Laterality: N/A;    COLONOSCOPY N/A 09/01/2021    Procedure: COLONOSCOPY;  Surgeon: Sharif García MD;  Location:  [facility];  Service: Gastroenterology;  Laterality: N/A;    CYST REMOVAL  04/2021    ENDOSCOPY N/A 08/31/2021     Procedure: ESOPHAGOGASTRODUODENOSCOPY;  Surgeon: Brunner, Mark I, MD;  Location:  [facility];  Service: Gastroenterology;  Laterality: N/A;    ERCP N/A 2023    Procedure: ENDOSCOPIC RETROGRADE CHOLANGIOPANCREATOGRAPHY;  Surgeon: Brunner, Mark I, MD;  Location:  [facility];  Service: Gastroenterology;  Laterality: N/A;  SPHINCTERTOMY MADE AT [facility]  AND BALLOON SWEEP OF CBD DONE WITH 9-12 BALLOON    LYMPH NODE BIOPSY  2023    Negative    MASTECTOMY W/ SENTINEL NODE BIOPSY Right 2023    Procedure: BREAST MASTECTOMY WITH SENTINEL NODE BIOPSY RIGHT;  Surgeon: Joseph Ramirez MD;  Location:  [facility];  Service: General;  Laterality: Right;    NECK SURGERY      C6-C7, C7-T1 fused    VASECTOMY      WISDOM TOOTH EXTRACTION         Family History:   Family History   Problem Relation Age of Onset    Hearing loss Mother     Arthritis Mother     Cancer Mother     Heart disease Mother     Thyroid disease Mother     Rheumatologic disease Mother     Migraines Mother     Diabetes Father     Heart disease Father         Cardiomyopathy    Hyperlipidemia Father     Stroke Father     Hypertension Father     Diabetes Paternal Grandmother     Colon polyps Other     Colon cancer Neg Hx     Esophageal cancer Neg Hx        Social History:   Social History     Socioeconomic History    Marital status:    Tobacco Use    Smoking status: Former     Current packs/day: 0.00     Average packs/day: 0.5 packs/day for 4.0 years (2.0 ttl pk-yrs)     Types: Cigarettes     Start date: 1972     Quit date: 1976     Years since quittin.1     Passive exposure: Past    Smokeless tobacco: Never   Vaping Use    Vaping status: Never Used   Substance and Sexual Activity    Alcohol use: Yes     Comment: Socially    Drug use: Never    Sexual activity: Not Currently     Partners: Female     Birth control/protection: Vasectomy       Medications:     Current Outpatient Medications:     Alpha-Lipoic Acid 600 MG tablet,  Take 1 tablet by mouth Daily. Ran out, last took on Wednesday 3/13., Disp: , Rfl:     B Complex Vitamins (VITAMIN B COMPLEX PO), Take 1 tablet by mouth every night at bedtime., Disp: , Rfl:     carvedilol (COREG) 3.125 MG tablet, Take 1 tablet by mouth Every 12 (Twelve) Hours., Disp: 180 tablet, Rfl: 2    Coenzyme Q10 (Co Q 10) 100 MG capsule, Take 300 mg by mouth Daily., Disp: , Rfl:     colestipol (COLESTID) 1 g tablet, TAKE 2 TABLETS TWICE A DAY, Disp: 360 tablet, Rfl: 3    Continuous Blood Gluc  (FreeStyle Winston 2 [place]) device, 1 Device Every 14 (Fourteen) Days., Disp: 1 each, Rfl: 0    Continuous Blood Gluc Sensor (FreeStyle Winston 2 Sensor) misc, 1 each Every 14 (Fourteen) Days., Disp: 6 each, Rfl: 3    Dulaglutide (Trulicity) 4.5 MG/0.5ML solution auto-injector, Inject 4.5 mg under the skin into the appropriate area as directed 1 (One) Time Per Week., Disp: 6 mL, Rfl: 0    ferrous sulfate (FeroSul) 325 (65 FE) MG tablet, Take 1 tablet by mouth 2 (Two) Times a Day., Disp: 180 tablet, Rfl: 1    Flaxseed, Linseed, (Flaxseed Oil) 1400 MG capsule, Take 1 capsule by mouth 2 (Two) Times a Day., Disp: , Rfl:     gemfibrozil (LOPID) 600 MG tablet, TAKE 1 TABLET TWICE A DAY, Disp: 180 tablet, Rfl: 3    Insulin Glargine (Lantus SoloStar) 100 UNIT/ML injection pen, INJECT 70 UNITS UNDER THE SKIN INTO THE APPROPRIATE AREA AS DIRECTED EVERY NIGHT, Disp: 63 mL, Rfl: 0    Insulin Lispro, 1 Unit Dial, (HumaLOG KwikPen) 100 UNIT/ML solution pen-injector, Inject 30 Units under the skin into the appropriate area as directed 2 (Two) Times a Day. (Patient taking differently: Inject 40 Units under the skin into the appropriate area as directed 2 (Two) Times a Day.), Disp: 90 mL, Rfl: 3    Insulin Pen Needle (B-D UF III MINI PEN NEEDLES) 31G X 5 MM misc, USE THREE TIMES A DAY, Disp: 270 each, Rfl: 3    ipratropium (ATROVENT) 0.06 % nasal spray, USE 2 SPRAYS IN EACH NOSTRIL AS DIRECTED BY PROVIDER DAILY, Disp: 45 mL, Rfl:  1    levothyroxine (SYNTHROID, LEVOTHROID) 75 MCG tablet, TAKE 1 TABLET DAILY, Disp: 90 tablet, Rfl: 0    Multiple Vitamins-Minerals (MULTIVITAMIN PO), Take 1 tablet by mouth Daily., Disp: , Rfl:     omeprazole (priLOSEC) 40 MG capsule, TAKE 1 CAPSULE DAILY, Disp: 90 capsule, Rfl: 1    saccharomyces boulardii (FLORASTOR) 250 MG capsule, Take 1 capsule by mouth 2 (Two) Times a Day. (Patient taking differently: Take 1 capsule by mouth Daily.), Disp: 30 capsule, Rfl: 0    sertraline (ZOLOFT) 100 MG tablet, TAKE 1 TABLET DAILY, Disp: 90 tablet, Rfl: 3    spironolactone (ALDACTONE) 25 MG tablet, TAKE 1 TABLET DAILY, Disp: 90 tablet, Rfl: 3    SSD 1 % cream, APPLY TOPICALLY TO THE AFFECTED AREA DAILY UNTIL HEALED AS DIRECTED, Disp: , Rfl:     tamoxifen (NOLVADEX) 20 MG chemo tablet, TAKE 1 TABLET DAILY, Disp: 90 tablet, Rfl: 3    temazepam (Restoril) 7.5 MG capsule, Take 1 capsule by mouth At Night As Needed for Sleep., Disp: , Rfl:     tiZANidine (ZANAFLEX) 4 MG tablet, TAKE 1 TABLET TWICE A DAY AS NEEDED FOR MUSCLE SPASMS, Disp: 180 tablet, Rfl: 3    vitamin D3 125 MCG (5000 UT) capsule capsule, Take 1 capsule by mouth 2 (Two) Times a Day., Disp: , Rfl:     gabapentin (NEURONTIN) 100 MG capsule, Take 1 capsule by mouth Daily for 7 days., Disp: 7 capsule, Rfl: 0    pregabalin (Lyrica) 100 MG capsule, Take 1 capsule by mouth 3 (Three) Times a Day., Disp: 270 capsule, Rfl: 1    Allergies:   Allergies   Allergen Reactions    Glucophage Xr [Metformin Hcl Er] Diarrhea and Other (See Comments)     Glucophage XR TB24: Adverse Reaction: Severe GI issues.    Metformin Nausea And Vomiting     Other reaction(s): SEVERE INTESTIONAL ISSUES  Other reaction(s): SEVERE GI ISSUES    Glucophage XR TB24  MetFORMIN HCl TABS    Tetracyclines & Related Nausea Only     Adverse Reaction    Chlorhexidine Rash       PHQ-9 Total Score:     MORGAN Fall Risk Assessment was completed, and patient is at HIGH risk for falls. Assessment completed  "on:2/4/2025    Objective     Physical Exam:     Neurological Exam  Mental Status  Awake, alert and oriented to person, place and time. Recent and remote memory are intact. Speech is normal. Language is fluent with no aphasia. Attention and concentration are normal. Fund of knowledge is appropriate for level of education.    Cranial Nerves  CN II: Visual acuity is normal.  CN III, IV, VI: Extraocular movements intact bilaterally. Pupils equal round and reactive to light bilaterally.  CN V: Facial sensation is normal.  CN VII: Full and symmetric facial movement.  CN IX, X: Palate elevates symmetrically  CN XI: Shoulder shrug strength is normal.  CN XII: Tongue midline without atrophy or fasciculations.    Motor  Normal muscle bulk throughout. No fasciculations present. Normal muscle tone. Strength is 5/5 throughout all four extremities.    Sensory  Sensation is intact to light touch, pinprick, vibration and proprioception in all four extremities.    Reflexes                                            Right                      Left  Brachioradialis                    2+                         2+  Biceps                                 2+                         2+  Triceps                                2+                         2+  Finger flex                           2+                         2+  Hamstring                            2+                         2+  Patellar                                2+                         2+  Achilles                                2+                         2+    Coordination    Finger-to-nose, rapid alternating movements and heel-to-shin normal bilaterally without dysmetria.    Gait  Casual gait: Narrow stance. Reduced stride length. Hesitant gait.        Vital Signs:   Vitals:    02/04/25 1403   BP: 118/78   Pulse: 87   SpO2: 97%   Weight: 88.9 kg (196 lb)   Height: 172.7 cm (68\")     Body mass index is 29.8 kg/m².     Results:   Results       Imaging:   No Images in " "the past 120 days found..     Labs:   No results found for: \"CMP\", \"PROTEIN\", \"ANTIMOGAB\", \"ZURGMU2GJCS\", \"JCVRESULT\", \"QUANTTBGOLD\", \"CBCDIF\", \"IGGALBSER\"     Assessment / Plan      Assessment/Plan:   Diagnoses and all orders for this visit:    1. Neuropathy due to type 2 diabetes mellitus (Primary)  -     pregabalin (Lyrica) 100 MG capsule; Take 1 capsule by mouth 3 (Three) Times a Day.  Dispense: 270 capsule; Refill: 1  -     gabapentin (NEURONTIN) 100 MG capsule; Take 1 capsule by mouth Daily for 7 days.  Dispense: 7 capsule; Refill: 0         Assessment & Plan  1. Pain management.  The patient has been on gabapentin for a long time. The potential side effects of both medications, including drowsiness and fatigue, were discussed. A gradual reduction in gabapentin dosage will be implemented over a period of 40 days. The patient will start by taking half a tablet (400 mg) 4 times a day for 10 days, then half a tablet twice a day for 10 days, followed by half a tablet once a day for 10 days, and finally 200 mg once a day for the last 10 days. Concurrently, the Lyrica dosage will be increased to 100 mg 3 times a day. The patient is advised to contact the office if any issues arise during this process. A prescription for Lyrica 100 mg 3 times a day for 90 days has been sent to [facility].    Follow-up  The patient will follow up in 3 months.    Patient Education:     Reviewed medications, potential side effects and signs and symptoms to report. Discussed risk versus benefits of treatment plan with patient and/or family-including medications, labs and radiology that may be ordered. Addressed questions and concerns during visit. Patient and/or family verbalized understanding and agree with plan. Instructed to call the office with any questions and report to ER with any life-threatening symptoms.     Follow Up:   Return in about 3 months (around 5/4/2025).    I spent 30 minutes caring for Elton on this date of " service. This time includes time spent by me in the following activities: preparing for the visit, performing a medically appropriate examination and/or evaluation, counseling and educating the patient/family/caregiver, and documenting information in the medical record.        During this visit the following were done:  Labs Reviewed []    Labs Ordered []    Radiology Reports Reviewed []    Radiology Ordered []    PCP Records Reviewed []    Referring Provider Records Reviewed []    ER Records Reviewed []    Hospital Records Reviewed []    History Obtained From Family []    Radiology Images Reviewed []    Other Reviewed []    Records Requested []      Patient or patient representative verbalized consent for the use of Ambient Listening during the visit with  MELISSA Fong for chart documentation. 2/4/2025  16:46 EST    MELISSA Fong   [facility]  [address] [place] [id number] [phone]

## 2025-02-05 ENCOUNTER — OFFICE VISIT (OUTPATIENT)
Dept: GASTROENTEROLOGY | Facility: CLINIC | Age: 75
End: 2025-02-05
Payer: MEDICARE

## 2025-02-05 ENCOUNTER — LAB (OUTPATIENT)
Dept: LAB | Facility: HOSPITAL | Age: 75
End: 2025-02-05
Payer: MEDICARE

## 2025-02-05 VITALS
DIASTOLIC BLOOD PRESSURE: 76 MMHG | TEMPERATURE: 97.5 F | OXYGEN SATURATION: 98 % | HEIGHT: 68 IN | BODY MASS INDEX: 30.16 KG/M2 | WEIGHT: 199 LBS | HEART RATE: 92 BPM | SYSTOLIC BLOOD PRESSURE: 128 MMHG

## 2025-02-05 DIAGNOSIS — K76.6 PORTAL HYPERTENSIVE GASTROPATHY: ICD-10-CM

## 2025-02-05 DIAGNOSIS — K74.60 LIVER CIRRHOSIS SECONDARY TO NASH: ICD-10-CM

## 2025-02-05 DIAGNOSIS — K31.89 PORTAL HYPERTENSIVE GASTROPATHY: ICD-10-CM

## 2025-02-05 DIAGNOSIS — K74.60 LIVER CIRRHOSIS SECONDARY TO NASH: Primary | ICD-10-CM

## 2025-02-05 DIAGNOSIS — K75.81 LIVER CIRRHOSIS SECONDARY TO NASH: ICD-10-CM

## 2025-02-05 DIAGNOSIS — K21.9 GASTROESOPHAGEAL REFLUX DISEASE, UNSPECIFIED WHETHER ESOPHAGITIS PRESENT: ICD-10-CM

## 2025-02-05 DIAGNOSIS — K75.81 LIVER CIRRHOSIS SECONDARY TO NASH: Primary | ICD-10-CM

## 2025-02-05 LAB
ALBUMIN SERPL-MCNC: 4.3 G/DL (ref 3.5–5.2)
ALBUMIN/GLOB SERPL: 1.3 G/DL
ALP SERPL-CCNC: 118 U/L (ref 39–117)
ALT SERPL W P-5'-P-CCNC: 10 U/L (ref 1–41)
ANION GAP SERPL CALCULATED.3IONS-SCNC: 14 MMOL/L (ref 5–15)
AST SERPL-CCNC: 20 U/L (ref 1–40)
BASOPHILS # BLD AUTO: 0.03 10*3/MM3 (ref 0–0.2)
BASOPHILS NFR BLD AUTO: 0.4 % (ref 0–1.5)
BILIRUB SERPL-MCNC: 0.5 MG/DL (ref 0–1.2)
BUN SERPL-MCNC: 32 MG/DL (ref 8–23)
BUN/CREAT SERPL: 17.2 (ref 7–25)
CALCIUM SPEC-SCNC: 9.7 MG/DL (ref 8.6–10.5)
CHLORIDE SERPL-SCNC: 103 MMOL/L (ref 98–107)
CO2 SERPL-SCNC: 23 MMOL/L (ref 22–29)
CREAT SERPL-MCNC: 1.86 MG/DL (ref 0.76–1.27)
DEPRECATED RDW RBC AUTO: 42 FL (ref 37–54)
EGFRCR SERPLBLD CKD-EPI 2021: 37.3 ML/MIN/1.73
EOSINOPHIL # BLD AUTO: 0.16 10*3/MM3 (ref 0–0.4)
EOSINOPHIL NFR BLD AUTO: 2.1 % (ref 0.3–6.2)
ERYTHROCYTE [DISTWIDTH] IN BLOOD BY AUTOMATED COUNT: 12.3 % (ref 12.3–15.4)
GLOBULIN UR ELPH-MCNC: 3.4 GM/DL
GLUCOSE SERPL-MCNC: 166 MG/DL (ref 65–99)
HCT VFR BLD AUTO: 40.7 % (ref 37.5–51)
HGB BLD-MCNC: 13.8 G/DL (ref 13–17.7)
IMM GRANULOCYTES # BLD AUTO: 0.04 10*3/MM3 (ref 0–0.05)
IMM GRANULOCYTES NFR BLD AUTO: 0.5 % (ref 0–0.5)
INR PPP: 1.05 (ref 0.89–1.12)
LYMPHOCYTES # BLD AUTO: 1.44 10*3/MM3 (ref 0.7–3.1)
LYMPHOCYTES NFR BLD AUTO: 18.9 % (ref 19.6–45.3)
MCH RBC QN AUTO: 32.2 PG (ref 26.6–33)
MCHC RBC AUTO-ENTMCNC: 33.9 G/DL (ref 31.5–35.7)
MCV RBC AUTO: 95.1 FL (ref 79–97)
MONOCYTES # BLD AUTO: 0.87 10*3/MM3 (ref 0.1–0.9)
MONOCYTES NFR BLD AUTO: 11.4 % (ref 5–12)
NEUTROPHILS NFR BLD AUTO: 5.08 10*3/MM3 (ref 1.7–7)
NEUTROPHILS NFR BLD AUTO: 66.7 % (ref 42.7–76)
NRBC BLD AUTO-RTO: 0 /100 WBC (ref 0–0.2)
PLATELET # BLD AUTO: 112 10*3/MM3 (ref 140–450)
PMV BLD AUTO: 13 FL (ref 6–12)
POTASSIUM SERPL-SCNC: 4.7 MMOL/L (ref 3.5–5.2)
PROT SERPL-MCNC: 7.7 G/DL (ref 6–8.5)
PROTHROMBIN TIME: 13.8 SECONDS (ref 12.2–14.5)
RBC # BLD AUTO: 4.28 10*6/MM3 (ref 4.14–5.8)
SODIUM SERPL-SCNC: 140 MMOL/L (ref 136–145)
WBC NRBC COR # BLD AUTO: 7.62 10*3/MM3 (ref 3.4–10.8)

## 2025-02-05 PROCEDURE — 80053 COMPREHEN METABOLIC PANEL: CPT | Performed by: PHYSICIAN ASSISTANT

## 2025-02-05 PROCEDURE — 85610 PROTHROMBIN TIME: CPT | Performed by: PHYSICIAN ASSISTANT

## 2025-02-05 PROCEDURE — 85025 COMPLETE CBC W/AUTO DIFF WBC: CPT

## 2025-02-05 PROCEDURE — 36415 COLL VENOUS BLD VENIPUNCTURE: CPT | Performed by: PHYSICIAN ASSISTANT

## 2025-02-05 NOTE — PROGRESS NOTES
Follow Up      Patient Name: Elton Funez  : 1950   MRN: [id number]     Chief Complaint:    Chief Complaint   Patient presents with    6 month f/u    Heartburn     GERD - improved       History of Present Illness: Elton Funez is a 74 y.o. male who is here today for follow up on JEROME cirrhosis. Wife is with patient for visit today.     Pt continues to do well from a GI standpoint without complaints. GERD is well controlled with omeprazole 40mg daily. Patient denies associated fever, chills, abdominal pain, indigestion, nausea, vomiting, diarrhea, constipation, hematemesis, dysphagia, hematochezia, melena, bloating, weight loss or gain, dysuria, jaundice or bruising.    Gallbladder US 3/2024: Mild sludge in gallbladder. No superimposed cholecystitis. No biliary dilatation. Cirrhosis.      EGD 2021 with Dr. Brunner. Nonbleeding grade II esophageal varices. Portal hypertensive gastropathy. Normal stomach. Recommend repeat EGD in 1 year.      CSY 2021 with Dr. aGrcía. Excellebt bowel prep conditions. Hemorrhoids on perianal exam. Nonbleeding internal hemorrhoids. Normal appearing colon. Recommend repeat CSY in 10 years.      ERCP 2023 with Dr. Brunner. Major papilla appears to have gaping orifice, suggesting recently passed stone. Pus in major papilla. Biliary tree was swept and pus was found without stone. Mild portal gastropathy. Normal esophagus without varices. Attenuated intrahepatic bile ducts consistent with known cirrhosis.     Subjective      Review of Systems:   Review of Systems   Constitutional:  Negative for appetite change, chills, diaphoresis, fatigue, fever, unexpected weight gain and unexpected weight loss.   HENT:  Negative for drooling, facial swelling, mouth sores, nosebleeds, rhinorrhea, sore throat, swollen glands, tinnitus and trouble swallowing.    Eyes: Negative.    Respiratory:  Negative for choking, chest tightness and shortness of breath.    Gastrointestinal:   Positive for GERD (well controlled). Negative for abdominal pain, anal bleeding, blood in stool, constipation, diarrhea, nausea, vomiting and indigestion.   Endocrine: Negative.    Skin:  Negative for color change, dry skin, pallor and bruise.   Psychiatric/Behavioral:  Negative for decreased concentration, hallucinations, self-injury and sleep disturbance. The patient is not nervous/anxious.    All other systems reviewed and are negative.      Medications:     Current Outpatient Medications:     Alpha-Lipoic Acid 600 MG tablet, Take 1 tablet by mouth Daily. Ran out, last took on Wednesday 3/13., Disp: , Rfl:     B Complex Vitamins (VITAMIN B COMPLEX PO), Take 1 tablet by mouth every night at bedtime., Disp: , Rfl:     carvedilol (COREG) 3.125 MG tablet, Take 1 tablet by mouth Every 12 (Twelve) Hours., Disp: 180 tablet, Rfl: 2    Coenzyme Q10 (Co Q 10) 100 MG capsule, Take 300 mg by mouth Daily., Disp: , Rfl:     colestipol (COLESTID) 1 g tablet, TAKE 2 TABLETS TWICE A DAY, Disp: 360 tablet, Rfl: 3    Continuous Blood Gluc  (FreeStyle Winston 2 [place]) device, 1 Device Every 14 (Fourteen) Days., Disp: 1 each, Rfl: 0    Continuous Blood Gluc Sensor (FreeStyle Winston 2 Sensor) misc, 1 each Every 14 (Fourteen) Days., Disp: 6 each, Rfl: 3    Dulaglutide (Trulicity) 4.5 MG/0.5ML solution auto-injector, Inject 4.5 mg under the skin into the appropriate area as directed 1 (One) Time Per Week., Disp: 6 mL, Rfl: 0    ferrous sulfate (FeroSul) 325 (65 FE) MG tablet, Take 1 tablet by mouth 2 (Two) Times a Day., Disp: 180 tablet, Rfl: 1    Flaxseed, Linseed, (Flaxseed Oil) 1400 MG capsule, Take 1 capsule by mouth 2 (Two) Times a Day., Disp: , Rfl:     gabapentin (NEURONTIN) 100 MG capsule, Take 1 capsule by mouth Daily for 7 days., Disp: 7 capsule, Rfl: 0    gemfibrozil (LOPID) 600 MG tablet, TAKE 1 TABLET TWICE A DAY, Disp: 180 tablet, Rfl: 3    Insulin Glargine (Lantus SoloStar) 100 UNIT/ML injection pen, INJECT 70 UNITS  UNDER THE SKIN INTO THE APPROPRIATE AREA AS DIRECTED EVERY NIGHT, Disp: 63 mL, Rfl: 0    Insulin Lispro, 1 Unit Dial, (HumaLOG KwikPen) 100 UNIT/ML solution pen-injector, Inject 30 Units under the skin into the appropriate area as directed 2 (Two) Times a Day. (Patient taking differently: Inject 40 Units under the skin into the appropriate area as directed 2 (Two) Times a Day.), Disp: 90 mL, Rfl: 3    Insulin Pen Needle (B-D UF III MINI PEN NEEDLES) 31G X 5 MM misc, USE THREE TIMES A DAY, Disp: 270 each, Rfl: 3    ipratropium (ATROVENT) 0.06 % nasal spray, USE 2 SPRAYS IN EACH NOSTRIL AS DIRECTED BY PROVIDER DAILY, Disp: 45 mL, Rfl: 1    levothyroxine (SYNTHROID, LEVOTHROID) 75 MCG tablet, TAKE 1 TABLET DAILY, Disp: 90 tablet, Rfl: 0    Multiple Vitamins-Minerals (MULTIVITAMIN PO), Take 1 tablet by mouth Daily., Disp: , Rfl:     omeprazole (priLOSEC) 40 MG capsule, TAKE 1 CAPSULE DAILY, Disp: 90 capsule, Rfl: 1    pregabalin (Lyrica) 100 MG capsule, Take 1 capsule by mouth 3 (Three) Times a Day., Disp: 270 capsule, Rfl: 1    saccharomyces boulardii (FLORASTOR) 250 MG capsule, Take 1 capsule by mouth 2 (Two) Times a Day. (Patient taking differently: Take 1 capsule by mouth Daily.), Disp: 30 capsule, Rfl: 0    sertraline (ZOLOFT) 100 MG tablet, TAKE 1 TABLET DAILY, Disp: 90 tablet, Rfl: 3    spironolactone (ALDACTONE) 25 MG tablet, TAKE 1 TABLET DAILY, Disp: 90 tablet, Rfl: 3    SSD 1 % cream, APPLY TOPICALLY TO THE AFFECTED AREA DAILY UNTIL HEALED AS DIRECTED, Disp: , Rfl:     tamoxifen (NOLVADEX) 20 MG chemo tablet, TAKE 1 TABLET DAILY, Disp: 90 tablet, Rfl: 3    temazepam (Restoril) 7.5 MG capsule, Take 1 capsule by mouth At Night As Needed for Sleep., Disp: , Rfl:     tiZANidine (ZANAFLEX) 4 MG tablet, TAKE 1 TABLET TWICE A DAY AS NEEDED FOR MUSCLE SPASMS, Disp: 180 tablet, Rfl: 3    vitamin D3 125 MCG (5000 UT) capsule capsule, Take 1 capsule by mouth 2 (Two) Times a Day., Disp: , Rfl:     Allergies:   Allergies    Allergen Reactions    Glucophage Xr [Metformin Hcl Er] Diarrhea and Other (See Comments)     Glucophage XR TB24: Adverse Reaction: Severe GI issues.    Metformin Nausea And Vomiting     Other reaction(s): SEVERE INTESTIONAL ISSUES  Other reaction(s): SEVERE GI ISSUES    Glucophage XR TB24  MetFORMIN HCl TABS    Tetracyclines & Related Nausea Only     Adverse Reaction    Chlorhexidine Rash       Social History:   Social History     Socioeconomic History    Marital status:    Tobacco Use    Smoking status: Former     Current packs/day: 0.00     Average packs/day: 0.5 packs/day for 4.0 years (2.0 ttl pk-yrs)     Types: Cigarettes     Start date: 1972     Quit date: 1976     Years since quittin.1     Passive exposure: Past    Smokeless tobacco: Never   Vaping Use    Vaping status: Never Used   Substance and Sexual Activity    Alcohol use: Yes     Comment: Socially    Drug use: Never    Sexual activity: Not Currently     Partners: Female     Birth control/protection: Vasectomy        Surgical History:   Past Surgical History:   Procedure Laterality Date    ANTERIOR CERVICAL DISCECTOMY W/ FUSION N/A 2017    Procedure: CERVICAL DISCECTOMY ANTERIOR FUSION WITH INSTRUMENTATION C7/T1;  Surgeon: Gigi Perales MD;  Location:  [facility];  Service:     BACK SURGERY      L4-L5    CERVICAL ARTHRODESIS      CERVICAL DISCECTOMY POSTERIOR FUSION WITH INSTRUMENTATION N/A 2017    Procedure:  INCISION AND DRAINAGE OF POSTERIOR CERVICAL WOUND;  Surgeon: Gigi Perales MD;  Location:  [facility];  Service:     CERVICAL LAMINECTOMY DECOMPRESSION POSTERIOR Left 2017    Procedure: Left C7-T1 CERVICAL FORAMINOTOMY POSTERIOR WITH METRIX;  Surgeon: Gigi Perales MD;  Location:  [facility];  Service:     CHOLECYSTECTOMY WITH INTRAOPERATIVE CHOLANGIOGRAM N/A 2024    Procedure: CHOLECYSTECTOMY LAPAROSCOPIC AND LARAROSCOPIC LIVER BIOPSY;  Surgeon: Emerson Spear MD;   Location:  [facility];  Service: General;  Laterality: N/A;    COLONOSCOPY N/A 09/01/2021    Procedure: COLONOSCOPY;  Surgeon: Sharif García MD;  Location:  [facility];  Service: Gastroenterology;  Laterality: N/A;    CYST REMOVAL  04/2021    ENDOSCOPY N/A 08/31/2021    Procedure: ESOPHAGOGASTRODUODENOSCOPY;  Surgeon: Brunner, Mark I, MD;  Location:  [facility];  Service: Gastroenterology;  Laterality: N/A;    ERCP N/A 06/14/2023    Procedure: ENDOSCOPIC RETROGRADE CHOLANGIOPANCREATOGRAPHY;  Surgeon: Brunner, Mark I, MD;  Location:  [facility];  Service: Gastroenterology;  Laterality: N/A;  SPHINCTERTOMY MADE AT [facility]  AND BALLOON SWEEP OF CBD DONE WITH 9-12 BALLOON    LYMPH NODE BIOPSY  6/2023    Negative    MASTECTOMY W/ SENTINEL NODE BIOPSY Right 05/23/2023    Procedure: BREAST MASTECTOMY WITH SENTINEL NODE BIOPSY RIGHT;  Surgeon: Joseph Ramirez MD;  Location:  [facility];  Service: General;  Laterality: Right;    NECK SURGERY      C6-C7, C7-T1 fused    VASECTOMY      WISDOM TOOTH EXTRACTION          Medical History:   Past Medical History:   Diagnosis Date    Abdominal pain 05/17/2023    RUQ; SEEN IN ED AT [facility]; DC'D HOME    Abscess of arm, right     Abscess of skin of neck     Acute sinusitis     ALT (SGPT) level raised     Arthritis     Arthritis of neck     Bacteremia due to Klebsiella pneumoniae 06/12/2023    BPH (benign prostatic hypertrophy)     Breast cancer 05/2023    right    Cirrhosis of liver 09/03/2021    JEROME    CKD (chronic kidney disease)     Colon polyp     Constipation     COVID     DDD (degenerative disc disease), cervical     Decreased platelet count     Depression     Resolved: 1/28/2015    Difficulty walking     Elevated AST (SGOT)     Erectile dysfunction     GERD (gastroesophageal reflux disease)     History of transfusion 2021    [facility]; 3 UNITS; NO REACTION    HL (hearing loss)     Hyperlipidemia     Hypertension     Hypothyroidism, adult 11/10/2020    Infection      "MSSA lumbar surgical wound infection 3/2017    Jaw pain     Left hip pain     Low back pain     Surgery 30 yrs L4-5    Lumbosacral disc disease     Migraine     Hx of    Neuropathy in diabetes     Feet    Obesity (BMI 30.0-34.9) 10/07/2016    BMI 31.92    Obstructive sleep apnea     CPAP    Pancreatitis 06/2023    Peripheral neuropathy     Portal hypertensive gastropathy 09/03/2021    Renal insufficiency     Shigellosis     Sleep apnea     PT TO BRING MASK AND TUBING DAY OF SURGERY    Symptomatic anemia 08/30/2021    Type 2 diabetes mellitus     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     Physical Exam  Vitals and nursing note reviewed.   Constitutional:       General: He is not in acute distress.     Appearance: Normal appearance. He is not ill-appearing or diaphoretic.      Comments: BMI 30.26. Ambulates with a cane.    HENT:      Head: Normocephalic and atraumatic.      Right Ear: External ear normal.      Left Ear: External ear normal.      Nose: Nose normal.      Mouth/Throat:      Mouth: Mucous membranes are moist.      Pharynx: Oropharynx is clear.   Eyes:      General: No scleral icterus.     Conjunctiva/sclera: Conjunctivae normal.      Pupils: Pupils are equal, round, and reactive to light.   Cardiovascular:      Rate and Rhythm: Normal rate and regular rhythm.      Pulses: Normal pulses.      Heart sounds: Normal heart sounds.   Pulmonary:      Effort: Pulmonary effort is normal.      Breath sounds: Normal breath sounds.   Abdominal:      General: Abdomen is flat. There is no distension.      " Tenderness: There is no abdominal tenderness. There is no guarding or rebound.   Musculoskeletal:         General: Normal range of motion.      Cervical back: Normal range of motion.      Right lower leg: No edema.      Left lower leg: No edema.   Skin:     General: Skin is warm and dry.      Coloration: Skin is not jaundiced or pale.   Neurological:      General: No focal deficit present.      Mental Status: He is alert and oriented to person, place, and time. Mental status is at baseline.      Comments: No asterixis   Psychiatric:         Mood and Affect: Mood normal.         Assessment / Plan      Assessment/Plan:   There are no diagnoses linked to this encounter.      JEROME cirrhosis  Portal hypertensive gastropathy  Esophageal varices via EGD 2021  GERD   - continue all medications as prescribed   - MELD-Na 12 / Child Hernandez Score 5, Class A (6/2024)   - Last Para: N/A   - Ascites Management: aldactone 25mg daily   - HCC Surveillance: gallbladder US 3/2024, liver US ordered today    - EV Surveillance: ERCP / EGD 6/2023, no EV   - HE Management: N/A   - pt given cirrhosis lifestyle instructions: avoid hepatotoxins, limit APAP < 2g per day, avoid ASA and NSAIDs, diet of 35-40kcal/day, protein 1.2-1.5g/kg/day, 3-5 cups black coffee daily   - previous office notes, hospital records, labs, imaging, endoscopy and pathology reports reviewed with patient    - obtain CBC, CMP, PT/INR   - follow up in clinic in 6mo, or after completion of above studies   - call clinic at any time for questions or new / worsened sx    Follow Up:   Return in about 6 months (around 8/5/2025).    Plan of care reviewed with the patient at the conclusion of today's visit.  Education was provided regarding diagnosis, management, and any prescribed or recommended OTC medications.  Patient verbalized understanding of and agreement with management plan.     NOTE TO PATIENT: The 21st Century Cures Act makes medical notes like these available to  patients in the interest of transparency. However, be advised this is a medical document. It is intended as peer to peer communication. It is written in medical language and may contain abbreviations or verbiage that are unfamiliar. It may appear blunt or direct. Medical documents are intended to carry relevant information, facts as evident, and the clinical opinion of the practitioner.     Time Statement:   Discussed plan of care in detail with patient today. Patient verbally understands and agrees. I have spent 30 minutes reviewing available diagnostics, obtaining history, examining the patient, developing a treatment plan, and educating the patient on disease process and plan of care.     Tracy Cornelius PA-C   [facility]

## 2025-02-06 ENCOUNTER — OFFICE VISIT (OUTPATIENT)
Dept: ONCOLOGY | Facility: CLINIC | Age: 75
End: 2025-02-06
Payer: MEDICARE

## 2025-02-06 VITALS
OXYGEN SATURATION: 96 % | HEIGHT: 68 IN | TEMPERATURE: 97.1 F | BODY MASS INDEX: 30.16 KG/M2 | SYSTOLIC BLOOD PRESSURE: 127 MMHG | HEART RATE: 67 BPM | RESPIRATION RATE: 16 BRPM | WEIGHT: 199 LBS | DIASTOLIC BLOOD PRESSURE: 86 MMHG

## 2025-02-06 DIAGNOSIS — C50.921 MALIGNANT NEOPLASM OF RIGHT BREAST IN MALE, ESTROGEN RECEPTOR POSITIVE, UNSPECIFIED SITE OF BREAST: ICD-10-CM

## 2025-02-06 DIAGNOSIS — Z17.0 MALIGNANT NEOPLASM OF RIGHT BREAST IN MALE, ESTROGEN RECEPTOR POSITIVE, UNSPECIFIED SITE OF BREAST: ICD-10-CM

## 2025-02-06 PROCEDURE — 1126F AMNT PAIN NOTED NONE PRSNT: CPT | Performed by: INTERNAL MEDICINE

## 2025-02-06 PROCEDURE — 3079F DIAST BP 80-89 MM HG: CPT | Performed by: INTERNAL MEDICINE

## 2025-02-06 PROCEDURE — 3074F SYST BP LT 130 MM HG: CPT | Performed by: INTERNAL MEDICINE

## 2025-02-06 PROCEDURE — 99214 OFFICE O/P EST MOD 30 MIN: CPT | Performed by: INTERNAL MEDICINE

## 2025-02-06 RX ORDER — TAMOXIFEN CITRATE 20 MG/1
20 TABLET ORAL DAILY
Qty: 90 TABLET | Refills: 3 | Status: SHIPPED | OUTPATIENT
Start: 2025-02-06

## 2025-02-06 NOTE — PROGRESS NOTES
"      PROBLEM LIST:  1. [id number] ER+ (90%, 3+), TN+ (66%, 3+), Her2 negative (1+) invasive ductal carcinoma of the right breast.    A) right mastectomy on 5/23/23 showed a 2.2 cm intermediate grade IDC.  0/2 SLN involved.  Genetic testing negative.  B) tamoxifen started June 2023  2. DM2  3. Hypertension  4. Hypothyroidism  5. FAVIO  6. HL  7. Anxiety/depression  8. GERD  9. JEROME cirrhosis  10. CKD stage 3b    Subjective     CHIEF COMPLAINT: Breast cancer    HISTORY OF PRESENT ILLNESS:   Elton Funez returns for follow-up.   He started tamoxifen early June 2023.  He is continuing to tolerate this well.   He has started on Lyrica for his neuropathy.  He says he otherwise has no new changes or updates with his health.        Objective      /86   Pulse 67   Temp 97.1 °F (36.2 °C) (Temporal)   Resp 16   Ht 172.7 cm (67.99\")   Wt 90.3 kg (199 lb)   SpO2 96%   BMI 30.26 kg/m²   Vitals:    02/06/25 1312   PainSc: 0-No pain                 ECOG score: 0             General: well appearing male in no acute distress  Neuro: alert and oriented  HEENT: sclera anicteric, oropharynx clear  Lymphatics: no cervical, supraclavicular, or axillary adenopathy  Chest: Status post right mastectomy.  No palpable mass bilaterally  Abdomen: soft, nontender, nondistended.  No palpable organomegaly  Extremeties: no lower extremity edema  Skin: no rashes, lesions, bruising, or petechiae  Psych: mood and affect appropriate    I have reexamined the patient and the results are consistent with the previously documented exam. Danika Archer MD        RECENT LABS:  Lab Results   Component Value Date    WBC 7.62 02/05/2025    HGB 13.8 02/05/2025    HCT 40.7 02/05/2025    MCV 95.1 02/05/2025     (L) 02/05/2025       Lab Results   Component Value Date    GLUCOSE 166 (H) 02/05/2025    BUN 32 (H) 02/05/2025    CREATININE 1.86 (H) 02/05/2025    EGFRIFNONA 60 (L) 12/15/2021    EGFRIFAFRI 66 07/14/2015    BCR 17.2 02/05/2025    K 4.7 " 02/05/2025    CO2 23.0 02/05/2025    CALCIUM 9.7 02/05/2025    PROTENTOTREF 7.7 07/14/2015    ALBUMIN 4.3 02/05/2025    LABIL2 1.5 07/14/2015    AST 20 02/05/2025    ALT 10 02/05/2025                   ASSESSMENT AND PLAN:     Elton Funez is a 74 y.o. male with a T2N0 ER+ Her2 negative IDC of the right breast.     He started tamoxifen in early June 2023.  He is tolerating it relatively well.  We will continue tamoxifen for minimum of 5 years.    We discussed that there are no clear guidelines regarding the use of contralateral mammogram in men with breast cancer.  In general it is mainly recommended for men who are known to have a genetic mutation, but the risk of a contralateral cancer in his case is quite low.  We will continue to monitor with physical exam.    Peripheral neuropathy: He is now followed by neurology.  He is taking Lyrica.    Follow-up in 6 months.                  Danika Archer MD  [facility]    2/6/2025          CC:

## 2025-02-06 NOTE — PROGRESS NOTES
Please let Elton know that his labs reveal chronically elevated kidney function, Cr 1.86. His MELD-Na is 13, which is stable. Follow up in 6mo as planned.

## 2025-02-18 ENCOUNTER — OFFICE VISIT (OUTPATIENT)
Dept: INTERNAL MEDICINE | Facility: CLINIC | Age: 75
End: 2025-02-18
Payer: MEDICARE

## 2025-02-18 VITALS
BODY MASS INDEX: 30.28 KG/M2 | DIASTOLIC BLOOD PRESSURE: 68 MMHG | TEMPERATURE: 97.8 F | SYSTOLIC BLOOD PRESSURE: 118 MMHG | OXYGEN SATURATION: 97 % | RESPIRATION RATE: 20 BRPM | HEART RATE: 61 BPM | WEIGHT: 199.13 LBS

## 2025-02-18 DIAGNOSIS — N48.1 BALANITIS: Primary | ICD-10-CM

## 2025-02-18 PROCEDURE — 1159F MED LIST DOCD IN RCRD: CPT | Performed by: STUDENT IN AN ORGANIZED HEALTH CARE EDUCATION/TRAINING PROGRAM

## 2025-02-18 PROCEDURE — 1160F RVW MEDS BY RX/DR IN RCRD: CPT | Performed by: STUDENT IN AN ORGANIZED HEALTH CARE EDUCATION/TRAINING PROGRAM

## 2025-02-18 PROCEDURE — 3078F DIAST BP <80 MM HG: CPT | Performed by: STUDENT IN AN ORGANIZED HEALTH CARE EDUCATION/TRAINING PROGRAM

## 2025-02-18 PROCEDURE — 99213 OFFICE O/P EST LOW 20 MIN: CPT | Performed by: STUDENT IN AN ORGANIZED HEALTH CARE EDUCATION/TRAINING PROGRAM

## 2025-02-18 PROCEDURE — 3051F HG A1C>EQUAL 7.0%<8.0%: CPT | Performed by: STUDENT IN AN ORGANIZED HEALTH CARE EDUCATION/TRAINING PROGRAM

## 2025-02-18 PROCEDURE — 3074F SYST BP LT 130 MM HG: CPT | Performed by: STUDENT IN AN ORGANIZED HEALTH CARE EDUCATION/TRAINING PROGRAM

## 2025-02-18 PROCEDURE — 1125F AMNT PAIN NOTED PAIN PRSNT: CPT | Performed by: STUDENT IN AN ORGANIZED HEALTH CARE EDUCATION/TRAINING PROGRAM

## 2025-02-18 RX ORDER — MICONAZOLE NITRATE 20 MG/G
1 CREAM TOPICAL 2 TIMES DAILY
Qty: 56.7 G | Refills: 0 | Status: SHIPPED | OUTPATIENT
Start: 2025-02-18 | End: 2025-03-04

## 2025-02-18 NOTE — PROGRESS NOTES
Follow Up Office Visit      Date: 2025   Patient Name: Elton Funez  : 1950   MRN: [id number]     Chief Complaint:    Chief Complaint   Patient presents with    Penis Pain     Discomfort from tip        History of Present Illness: Elton Funez is a 74 y.o. male with complex past medical history including HLD, HTN, Hypothyroidism, T2DM, Cirrhosis, CKD3a, breast cancer, MDD, OS  who is here today for penile irritation.    HPI  History of Present Illness  The patient presents for evaluation of balanitis.    He reports a penile discharge, which he describes as non-odorous and liquid in consistency. He first noticed this symptom yesterday, even after maintaining personal hygiene through showering. The discharge is accompanied by mild discomfort and erythema. He has no history of penile trauma or injury. 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 He has not experienced any systemic symptoms such as fever or dysuria. This is his first encounter with such a condition. He has been applying triamcinolone acetonide to the affected area. He also reports a sensation of tingling in the area of the adhesion.          Subjective      Review of Systems:   Review of Systems    I have reviewed the patients family history, social history, past medical history, past surgical history and have updated it as appropriate.     Medications:     Current Outpatient Medications:     Alpha-Lipoic Acid 600 MG tablet, Take 1 tablet by mouth Daily. Ran out, last took on Wednesday 3/13., Disp: , Rfl:     B Complex Vitamins (VITAMIN B COMPLEX PO), Take 1 tablet by mouth every night at bedtime., Disp: , Rfl:     carvedilol (COREG) 3.125 MG tablet, Take 1 tablet by mouth Every 12 (Twelve) Hours., Disp: 180 tablet, Rfl: 2    Coenzyme Q10 (Co Q 10) 100 MG capsule, Take 300 mg by mouth Daily., Disp: , Rfl:     colestipol (COLESTID) 1 g tablet, TAKE 2 TABLETS  TWICE A DAY, Disp: 360 tablet, Rfl: 3    Continuous Blood Gluc  (FreeStyle Winston 2 [place]) device, 1 Device Every 14 (Fourteen) Days., Disp: 1 each, Rfl: 0    Continuous Blood Gluc Sensor (FreeStyle Winston 2 Sensor) misc, 1 each Every 14 (Fourteen) Days., Disp: 6 each, Rfl: 3    Dulaglutide (Trulicity) 4.5 MG/0.5ML solution auto-injector, Inject 4.5 mg under the skin into the appropriate area as directed 1 (One) Time Per Week., Disp: 6 mL, Rfl: 0    ferrous sulfate (FeroSul) 325 (65 FE) MG tablet, Take 1 tablet by mouth 2 (Two) Times a Day., Disp: 180 tablet, Rfl: 1    Flaxseed, Linseed, (Flaxseed Oil) 1400 MG capsule, Take 1 capsule by mouth 2 (Two) Times a Day., Disp: , Rfl:     gemfibrozil (LOPID) 600 MG tablet, TAKE 1 TABLET TWICE A DAY, Disp: 180 tablet, Rfl: 3    Insulin Glargine (Lantus SoloStar) 100 UNIT/ML injection pen, INJECT 70 UNITS UNDER THE SKIN INTO THE APPROPRIATE AREA AS DIRECTED EVERY NIGHT, Disp: 63 mL, Rfl: 0    Insulin Lispro, 1 Unit Dial, (HumaLOG KwikPen) 100 UNIT/ML solution pen-injector, Inject 30 Units under the skin into the appropriate area as directed 2 (Two) Times a Day. (Patient taking differently: Inject 40 Units under the skin into the appropriate area as directed 2 (Two) Times a Day.), Disp: 90 mL, Rfl: 3    Insulin Pen Needle (B-D UF III MINI PEN NEEDLES) 31G X 5 MM misc, USE THREE TIMES A DAY, Disp: 270 each, Rfl: 3    ipratropium (ATROVENT) 0.06 % nasal spray, USE 2 SPRAYS IN EACH NOSTRIL AS DIRECTED BY PROVIDER DAILY, Disp: 45 mL, Rfl: 1    levothyroxine (SYNTHROID, LEVOTHROID) 75 MCG tablet, TAKE 1 TABLET DAILY, Disp: 90 tablet, Rfl: 0    Multiple Vitamins-Minerals (MULTIVITAMIN PO), Take 1 tablet by mouth Daily., Disp: , Rfl:     omeprazole (priLOSEC) 40 MG capsule, TAKE 1 CAPSULE DAILY, Disp: 90 capsule, Rfl: 1    pregabalin (Lyrica) 100 MG capsule, Take 1 capsule by mouth 3 (Three) Times a Day., Disp: 270 capsule, Rfl: 1    saccharomyces boulardii (FLORASTOR) 250 MG  capsule, Take 1 capsule by mouth 2 (Two) Times a Day. (Patient taking differently: Take 1 capsule by mouth Daily.), Disp: 30 capsule, Rfl: 0    sertraline (ZOLOFT) 100 MG tablet, TAKE 1 TABLET DAILY, Disp: 90 tablet, Rfl: 3    spironolactone (ALDACTONE) 25 MG tablet, TAKE 1 TABLET DAILY, Disp: 90 tablet, Rfl: 3    SSD 1 % cream, APPLY TOPICALLY TO THE AFFECTED AREA DAILY UNTIL HEALED AS DIRECTED, Disp: , Rfl:     tamoxifen (NOLVADEX) 20 MG chemo tablet, Take 1 tablet by mouth Daily., Disp: 90 tablet, Rfl: 3    temazepam (Restoril) 7.5 MG capsule, Take 1 capsule by mouth At Night As Needed for Sleep., Disp: , Rfl:     tiZANidine (ZANAFLEX) 4 MG tablet, TAKE 1 TABLET TWICE A DAY AS NEEDED FOR MUSCLE SPASMS, Disp: 180 tablet, Rfl: 3    vitamin D3 125 MCG (5000 UT) capsule capsule, Take 1 capsule by mouth 2 (Two) Times a Day., Disp: , Rfl:     gabapentin (NEURONTIN) 100 MG capsule, Take 1 capsule by mouth Daily for 7 days., Disp: 7 capsule, Rfl: 0    Allergies:   Allergies   Allergen Reactions    Glucophage Xr [Metformin Hcl Er] Diarrhea and Other (See Comments)     Glucophage XR TB24: Adverse Reaction: Severe GI issues.    Metformin Nausea And Vomiting and Unknown - Low Severity     Other reaction(s): SEVERE INTESTIONAL ISSUES    Other reaction(s): SEVERE GI ISSUES    Glucophage XR TB24    MetFORMIN HCl TABS    Glucophage    metformin hydrochloride    Tetracyclines & Related Nausea Only     Adverse Reaction    Tetracycline Unknown - Low Severity     tetracycline hydrochloride    Chlorhexidine Rash       Objective     Physical Exam: Please see above  Vital Signs:   Vitals:    02/18/25 1053   BP: 118/68   BP Location: Left arm   Patient Position: Sitting   Cuff Size: Adult   Pulse: 61   Resp: 20   Temp: 97.8 °F (36.6 °C)   TempSrc: Temporal   SpO2: 97%   Weight: 90.3 kg (199 lb 2 oz)   PainSc: 2    PainLoc: Penis     Body mass index is 30.28 kg/m².    Physical Exam  Exam conducted with a chaperone present (wife present  for exam).   Constitutional:       General: He is not in acute distress.     Appearance: Normal appearance. He is obese. He is not ill-appearing or toxic-appearing.   HENT:      Head: Normocephalic and atraumatic.      Nose: Nose normal.      Mouth/Throat:      Mouth: Mucous membranes are moist.   Eyes:      Extraocular Movements: Extraocular movements intact.   Cardiovascular:      Rate and Rhythm: Normal rate.   Pulmonary:      Effort: Pulmonary effort is normal. No respiratory distress.   Genitourinary:     Testes: Normal.      Comments: No inguinal lymphadneopathy. Mild irritation around rim of glans. No discharge noted. Small adhesion at 7-8 o'clock position.   Neurological:      General: No focal deficit present.      Mental Status: He is alert.   Psychiatric:         Mood and Affect: Mood normal.         Behavior: Behavior normal.       Physical Exam        Procedures    Results:   Labs:   Hemoglobin A1C   Date Value Ref Range Status   01/31/2025 7.8 (A) 4.5 - 5.7 % Final   06/07/2024 8.50 (H) 4.80 - 5.60 % Final     TSH   Date Value Ref Range Status   06/03/2024 1.870 0.270 - 4.200 uIU/mL Final      Results        Imaging:   No valid procedures specified.     Assessment / Plan      Assessment/Plan:   Problem List Items Addressed This Visit    None  Visit Diagnoses       Balanitis    -  Primary    Relevant Medications    miconazole (MICOTIN) 2 % cream            Assessment & Plan  1. Balanitis.  The clinical presentation suggests a diagnosis of balanitis, likely secondary to a yeast infection. There is a mild adhesion present, which could contribute to build up of smegma.  A prescription for a topical antifungal medication has been issued to [facility], to be applied for a duration of 14 days. He has been advised to maintain good personal hygiene, particularly ensuring thorough cleaning of the affected area. The use of triamcinolone acetonide is not recommended unless there is severe  inflammation or irritation.       Follow Up:   Return if symptoms worsen or fail to improve.      Gume Eiesnberg MD  [facility] R[facility]    Patient or patient representative verbalized consent for the use of Ambient Listening during the visit with  Gume Eisenberg MD for chart documentation. 2/18/2025  11:09 EST

## 2025-03-12 ENCOUNTER — HOSPITAL ENCOUNTER (OUTPATIENT)
Dept: ULTRASOUND IMAGING | Facility: HOSPITAL | Age: 75
Discharge: HOME | End: 2025-03-12
Admitting: PHYSICIAN ASSISTANT
Payer: MEDICARE

## 2025-03-12 DIAGNOSIS — K31.89 PORTAL HYPERTENSIVE GASTROPATHY: ICD-10-CM

## 2025-03-12 DIAGNOSIS — K75.81 LIVER CIRRHOSIS SECONDARY TO NASH: ICD-10-CM

## 2025-03-12 DIAGNOSIS — K74.60 LIVER CIRRHOSIS SECONDARY TO NASH: ICD-10-CM

## 2025-03-12 DIAGNOSIS — K76.6 PORTAL HYPERTENSIVE GASTROPATHY: ICD-10-CM

## 2025-03-12 PROCEDURE — 76705 ECHO EXAM OF ABDOMEN: CPT

## 2025-03-20 ENCOUNTER — OFFICE VISIT (OUTPATIENT)
Dept: ORTHOPEDIC SURGERY | Facility: CLINIC | Age: 75
End: 2025-03-20
Payer: MEDICARE

## 2025-03-20 VITALS
HEIGHT: 68 IN | WEIGHT: 199 LBS | SYSTOLIC BLOOD PRESSURE: 142 MMHG | BODY MASS INDEX: 30.16 KG/M2 | DIASTOLIC BLOOD PRESSURE: 90 MMHG

## 2025-03-20 DIAGNOSIS — M65.342 TRIGGER RING FINGER OF LEFT HAND: Primary | ICD-10-CM

## 2025-03-20 RX ORDER — TRIAMCINOLONE ACETONIDE 40 MG/ML
20 INJECTION, SUSPENSION INTRA-ARTICULAR; INTRAMUSCULAR
Status: COMPLETED | OUTPATIENT
Start: 2025-03-20 | End: 2025-03-20

## 2025-03-20 RX ORDER — LIDOCAINE HYDROCHLORIDE 10 MG/ML
0.5 INJECTION, SOLUTION EPIDURAL; INFILTRATION; INTRACAUDAL; PERINEURAL
Status: COMPLETED | OUTPATIENT
Start: 2025-03-20 | End: 2025-03-20

## 2025-03-20 RX ADMIN — TRIAMCINOLONE ACETONIDE 20 MG: 40 INJECTION, SUSPENSION INTRA-ARTICULAR; INTRAMUSCULAR at 13:26

## 2025-03-20 RX ADMIN — LIDOCAINE HYDROCHLORIDE 0.5 ML: 10 INJECTION, SOLUTION EPIDURAL; INFILTRATION; INTRACAUDAL; PERINEURAL at 13:26

## 2025-03-20 NOTE — PROGRESS NOTES
Procedure   - Hand/Upper Extremity Injection: L ring A1 for trigger finger on 3/20/2025 1:26 PM  Indications: pain  Details: 27 G needle, volar approach  Medications: 0.5 mL lidocaine PF 1% 1 %; 20 mg triamcinolone acetonide 40 MG/ML  Outcome: tolerated well, no immediate complications  Procedure, treatment alternatives, risks and benefits explained, specific risks discussed. Consent was given by the patient. Immediately prior to procedure a time out was called to verify the correct patient, procedure, equipment, support staff and site/side marked as required. Patient was prepped and draped in the usual sterile fashion.

## 2025-03-20 NOTE — PROGRESS NOTES
[facility]     Office Visit       Date: 03/20/2025   Patient Name: Elton Funez  MRN: [id number]  YOB: 1950    Referring Physician: No ref. provider found     Chief Complaint:   Chief Complaint   Patient presents with    Follow-up     3 month follow up -Trigger ring finger of left hand     History of Present Illness:   Elton Funez is a 74 y.o. male left-hand-dominant presented to clinic for follow-up of left ring trigger finger.  At the patient's last clinic visit he received corticosteroid injection.  He reports doing well after his last injection.  This improved his pain, catching and locking, however is symptoms recurred proximally 2 weeks ago.  This is overall improved from the initial symptoms but he is interested in repeat injection today.    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  Last hemoglobin A1c was 7.8.     Subjective   Review of Systems:   Review of Systems   Constitutional:  Negative for chills, fever, unexpected weight gain and unexpected weight loss.   HENT:  Negative for congestion, postnasal drip and rhinorrhea.    Eyes:  Negative for blurred vision.   Respiratory:  Negative for shortness of breath.    Cardiovascular:  Negative for leg swelling.   Gastrointestinal:  Negative for abdominal pain, nausea and vomiting.   Genitourinary:  Negative for difficulty urinating.   Musculoskeletal:  Positive for arthralgias. Negative for gait problem, joint swelling and myalgias.   Skin:  Negative for skin lesions and wound.   Neurological:  Negative for dizziness, weakness, light-headedness and numbness.   Hematological:  Does not bruise/bleed easily.   Psychiatric/Behavioral:  Negative for depressed mood.       Pertinent review of systems per HPI    I reviewed the patient's chief complaint, history of present illness, review of  "systems, past medical history, surgical history, family history, social history, medications and allergy list in the EMR on 03/20/2025 and agree with the findings above.    Objective    Vital Signs:   Vitals:    03/20/25 1323   BP: 142/90   Weight: 90.3 kg (199 lb)   Height: 172.7 cm (68\")     General: No acute distress. Alert and oriented.   Cardiovascular: Palpable radial pulse.   Respiratory: Breathing is nonlabored.   Ortho Exam:  Examination of the left upper extremity demonstrates no deformity.  No skin lesions or abrasions.  He is tender over the ring finger A1 pulley.  There is catching locking during examination.  The remainder the hand wrist and digits are nontender.  Sensations gross intact light touch of the hand.  Warm well-perfused distally.    Imaging / Studies:    Imaging Results (Last 24 Hours)       ** No results found for the last 24 hours. **          Procedure Note:  After reviewing the risks, benefits and alternatives to a steroid injection, which include but are not limited to; hypopigmentation, fat necrosis/atrophy, pain, swelling, bleeding, bruising, damage to nearby nerves/vessels, allergic reaction , transient elevation in blood glucose levels and infection a verbal consent was obtained. A time-out was then performed and the affected hand was prepped with chlorhexadine soap and ethyl chloride was used to numb the skin. The left ring finger flexor tendon sheath was injected with 0.5cc: 0.5cc mixture of Kenalog - 40 mg/ml and Lidocaine - 1% / 2 ml. The injection was well tolerated and a sterile dressing was applied. There were no complications. I advised the patient that they might experience some local discomfort for the next couple days and can apply ice to the site as needed.    Assessment / Plan    Assessment/Plan:   Elton Funez is a 74 y.o. male with left ring trigger finger.     Patient had temporary improvements with a prior corticosteroid injection.  Unfortunately, he has had " recurrence of his symptoms.  I discussed her options moving forward including repeat injection versus surgical release.  He was most interested in a repeat injection today.  This provided tolerated well.  He perform nighttime PIP joint splinting for the next 2 weeks with Coban.  I will see him back in 3 months for reevaluation.  All question concerns were addressed.  He is agreeable.      ICD-10-CM ICD-9-CM   1. Trigger ring finger of left hand  M65.342 727.03     Follow Up:   Return in about 3 months (around 6/20/2025) for Follow Up.      Marta García MD  [facility]

## 2025-03-21 DIAGNOSIS — D50.9 IRON DEFICIENCY ANEMIA, UNSPECIFIED IRON DEFICIENCY ANEMIA TYPE: ICD-10-CM

## 2025-03-21 NOTE — TELEPHONE ENCOUNTER
Caller: Elton Funez    Relationship: Self    Best call back number:      Requested Prescriptions:   Requested Prescriptions     Pending Prescriptions Disp Refills    ferrous sulfate (FeroSul) 325 (65 FE) MG tablet 180 tablet 1     Sig: Take 1 tablet by mouth 2 (Two) Times a Day.        Pharmacy where request should be sent: [facility] [id number] [address] - [phone]  - [phone]     Last office visit with prescribing clinician: 12/2/2024   Last telemedicine visit with prescribing clinician: Visit date not found   Next office visit with prescribing clinician: 3/27/2025     Additional details provided by patient: PATIENT HAS 3 DAYS LEFT    Does the patient have less than a 3 day supply:  [x] Yes  [] No    \    Valencia Cobos Rep   03/21/25 15:44 EDT

## 2025-03-24 RX ORDER — FLURBIPROFEN SODIUM 0.3 MG/ML
SOLUTION/ DROPS OPHTHALMIC 3 TIMES DAILY
Qty: 270 EACH | Refills: 3 | Status: SHIPPED | OUTPATIENT
Start: 2025-03-24

## 2025-03-24 RX ORDER — FERROUS SULFATE 325(65) MG
1 TABLET ORAL 2 TIMES DAILY
Qty: 180 TABLET | Refills: 1 | Status: SHIPPED | OUTPATIENT
Start: 2025-03-24 | End: 2025-03-27 | Stop reason: SDUPTHER

## 2025-03-24 NOTE — TELEPHONE ENCOUNTER
Rx Refill Note  Requested Prescriptions     Pending Prescriptions Disp Refills    B-D UF III MINI PEN NEEDLES 31G X 5 MM misc [Pharmacy Med Name: BD PEN PALMA UF MINI 5MM 31G3/16] 270 each 3     Sig: USE THREE TIMES A DAY      Last office visit with prescribing clinician: 1/31/2025  Last telemedicine visit with prescribing clinician: Visit date not found   Next office visit with prescribing clinician: 5/28/2025                        Would you like a call back once the refill request has been completed: [] Yes [] No    If the office needs to give you a call back, can they leave a voicemail: [] Yes [] No    Shelia Vargas MA  03/24/25, 10:07 EDT

## 2025-03-27 ENCOUNTER — OFFICE VISIT (OUTPATIENT)
Dept: INTERNAL MEDICINE | Facility: CLINIC | Age: 75
End: 2025-03-27
Payer: MEDICARE

## 2025-03-27 VITALS
WEIGHT: 196 LBS | DIASTOLIC BLOOD PRESSURE: 74 MMHG | BODY MASS INDEX: 29.7 KG/M2 | RESPIRATION RATE: 18 BRPM | TEMPERATURE: 97.7 F | SYSTOLIC BLOOD PRESSURE: 130 MMHG | HEIGHT: 68 IN | HEART RATE: 72 BPM

## 2025-03-27 DIAGNOSIS — K75.81 LIVER CIRRHOSIS SECONDARY TO NASH: ICD-10-CM

## 2025-03-27 DIAGNOSIS — I10 PRIMARY HYPERTENSION: ICD-10-CM

## 2025-03-27 DIAGNOSIS — K21.9 GASTROESOPHAGEAL REFLUX DISEASE WITHOUT ESOPHAGITIS: ICD-10-CM

## 2025-03-27 DIAGNOSIS — K74.60 LIVER CIRRHOSIS SECONDARY TO NASH: ICD-10-CM

## 2025-03-27 DIAGNOSIS — Z79.4 TYPE 2 DIABETES MELLITUS WITH DIABETIC POLYNEUROPATHY, WITH LONG-TERM CURRENT USE OF INSULIN: ICD-10-CM

## 2025-03-27 DIAGNOSIS — E55.9 VITAMIN D DEFICIENCY: ICD-10-CM

## 2025-03-27 DIAGNOSIS — E11.42 TYPE 2 DIABETES MELLITUS WITH DIABETIC POLYNEUROPATHY, WITH LONG-TERM CURRENT USE OF INSULIN: ICD-10-CM

## 2025-03-27 DIAGNOSIS — Z12.5 SCREENING FOR PROSTATE CANCER: ICD-10-CM

## 2025-03-27 DIAGNOSIS — E78.5 HYPERLIPIDEMIA, UNSPECIFIED HYPERLIPIDEMIA TYPE: ICD-10-CM

## 2025-03-27 DIAGNOSIS — Z00.00 MEDICARE ANNUAL WELLNESS VISIT, SUBSEQUENT: Primary | ICD-10-CM

## 2025-03-27 DIAGNOSIS — E03.9 HYPOTHYROIDISM, ADULT: ICD-10-CM

## 2025-03-27 DIAGNOSIS — Z00.00 PHYSICAL EXAM: ICD-10-CM

## 2025-03-27 DIAGNOSIS — D50.9 IRON DEFICIENCY ANEMIA, UNSPECIFIED IRON DEFICIENCY ANEMIA TYPE: ICD-10-CM

## 2025-03-27 DIAGNOSIS — Z12.5 PROSTATE CANCER SCREENING: ICD-10-CM

## 2025-03-27 LAB
25(OH)D3 SERPL-MCNC: 60.4 NG/ML (ref 30–100)
ALBUMIN SERPL-MCNC: 4.3 G/DL (ref 3.5–5.2)
ALBUMIN/GLOB SERPL: 1.2 G/DL
ALP SERPL-CCNC: 120 U/L (ref 39–117)
ALT SERPL W P-5'-P-CCNC: 9 U/L (ref 1–41)
ANION GAP SERPL CALCULATED.3IONS-SCNC: 14.9 MMOL/L (ref 5–15)
AST SERPL-CCNC: 19 U/L (ref 1–40)
BASOPHILS # BLD AUTO: 0.03 10*3/MM3 (ref 0–0.2)
BASOPHILS NFR BLD AUTO: 0.4 % (ref 0–1.5)
BILIRUB BLD-MCNC: NEGATIVE MG/DL
BILIRUB SERPL-MCNC: 0.5 MG/DL (ref 0–1.2)
BUN SERPL-MCNC: 35 MG/DL (ref 8–23)
BUN/CREAT SERPL: 14.5 (ref 7–25)
CALCIUM SPEC-SCNC: 9.7 MG/DL (ref 8.6–10.5)
CHLORIDE SERPL-SCNC: 102 MMOL/L (ref 98–107)
CHOLEST SERPL-MCNC: 134 MG/DL (ref 0–200)
CLARITY, POC: CLEAR
CO2 SERPL-SCNC: 24.1 MMOL/L (ref 22–29)
COLOR UR: YELLOW
CREAT SERPL-MCNC: 2.41 MG/DL (ref 0.76–1.27)
DEPRECATED RDW RBC AUTO: 43.4 FL (ref 37–54)
EGFRCR SERPLBLD CKD-EPI 2021: 27.3 ML/MIN/1.73
EOSINOPHIL # BLD AUTO: 0.17 10*3/MM3 (ref 0–0.4)
EOSINOPHIL NFR BLD AUTO: 2.1 % (ref 0.3–6.2)
ERYTHROCYTE [DISTWIDTH] IN BLOOD BY AUTOMATED COUNT: 12.3 % (ref 12.3–15.4)
EXPIRATION DATE: ABNORMAL
EXPIRATION DATE: NORMAL
FERRITIN SERPL-MCNC: 217 NG/ML (ref 30–400)
GLOBULIN UR ELPH-MCNC: 3.5 GM/DL
GLUCOSE SERPL-MCNC: 217 MG/DL (ref 65–99)
GLUCOSE UR STRIP-MCNC: NEGATIVE MG/DL
HBA1C MFR BLD: 7.3 % (ref 4.8–5.6)
HCT VFR BLD AUTO: 42.6 % (ref 37.5–51)
HDLC SERPL-MCNC: 34 MG/DL (ref 40–60)
HGB BLD-MCNC: 14.4 G/DL (ref 13–17.7)
IMM GRANULOCYTES # BLD AUTO: 0.06 10*3/MM3 (ref 0–0.05)
IMM GRANULOCYTES NFR BLD AUTO: 0.7 % (ref 0–0.5)
IRON 24H UR-MRATE: 110 MCG/DL (ref 59–158)
IRON SATN MFR SERPL: 20 % (ref 20–50)
KETONES UR QL: NEGATIVE
LDLC SERPL CALC-MCNC: 72 MG/DL (ref 0–100)
LDLC/HDLC SERPL: 1.98 {RATIO}
LEUKOCYTE EST, POC: NEGATIVE
LYMPHOCYTES # BLD AUTO: 1.8 10*3/MM3 (ref 0.7–3.1)
LYMPHOCYTES NFR BLD AUTO: 21.8 % (ref 19.6–45.3)
Lab: ABNORMAL
Lab: NORMAL
MAGNESIUM SERPL-MCNC: 1.9 MG/DL (ref 1.6–2.4)
MCH RBC QN AUTO: 32.4 PG (ref 26.6–33)
MCHC RBC AUTO-ENTMCNC: 33.8 G/DL (ref 31.5–35.7)
MCV RBC AUTO: 95.7 FL (ref 79–97)
MONOCYTES # BLD AUTO: 0.72 10*3/MM3 (ref 0.1–0.9)
MONOCYTES NFR BLD AUTO: 8.7 % (ref 5–12)
NEUTROPHILS NFR BLD AUTO: 5.47 10*3/MM3 (ref 1.7–7)
NEUTROPHILS NFR BLD AUTO: 66.3 % (ref 42.7–76)
NITRITE UR-MCNC: NEGATIVE MG/ML
PH UR: 5 [PH] (ref 5–8)
PLATELET # BLD AUTO: 130 10*3/MM3 (ref 140–450)
PMV BLD AUTO: 12.8 FL (ref 6–12)
POC CREATININE URINE: 200
POC MICROALBUMIN URINE: 150
POC URINE ALB/CREA RATIO: NORMAL
POTASSIUM SERPL-SCNC: 4.6 MMOL/L (ref 3.5–5.2)
PROT SERPL-MCNC: 7.8 G/DL (ref 6–8.5)
PROT UR STRIP-MCNC: ABNORMAL MG/DL
PSA SERPL-MCNC: 4.01 NG/ML (ref 0–4)
RBC # BLD AUTO: 4.45 10*6/MM3 (ref 4.14–5.8)
RBC # UR STRIP: NEGATIVE /UL
SODIUM SERPL-SCNC: 141 MMOL/L (ref 136–145)
SP GR UR: 1.02 (ref 1–1.03)
T4 FREE SERPL-MCNC: 0.79 NG/DL (ref 0.92–1.68)
TIBC SERPL-MCNC: 544 MCG/DL (ref 298–536)
TRANSFERRIN SERPL-MCNC: 365 MG/DL (ref 200–360)
TRIGL SERPL-MCNC: 164 MG/DL (ref 0–150)
TSH SERPL DL<=0.05 MIU/L-ACNC: 3.74 UIU/ML (ref 0.27–4.2)
UROBILINOGEN UR QL: NORMAL
VLDLC SERPL-MCNC: 28 MG/DL (ref 5–40)
WBC NRBC COR # BLD AUTO: 8.25 10*3/MM3 (ref 3.4–10.8)

## 2025-03-27 PROCEDURE — 82728 ASSAY OF FERRITIN: CPT | Performed by: INTERNAL MEDICINE

## 2025-03-27 PROCEDURE — 85025 COMPLETE CBC W/AUTO DIFF WBC: CPT | Performed by: INTERNAL MEDICINE

## 2025-03-27 PROCEDURE — 84466 ASSAY OF TRANSFERRIN: CPT | Performed by: INTERNAL MEDICINE

## 2025-03-27 PROCEDURE — 80053 COMPREHEN METABOLIC PANEL: CPT | Performed by: INTERNAL MEDICINE

## 2025-03-27 PROCEDURE — 83735 ASSAY OF MAGNESIUM: CPT | Performed by: INTERNAL MEDICINE

## 2025-03-27 PROCEDURE — 83540 ASSAY OF IRON: CPT | Performed by: INTERNAL MEDICINE

## 2025-03-27 PROCEDURE — 80061 LIPID PANEL: CPT | Performed by: INTERNAL MEDICINE

## 2025-03-27 PROCEDURE — 84439 ASSAY OF FREE THYROXINE: CPT | Performed by: INTERNAL MEDICINE

## 2025-03-27 PROCEDURE — 84443 ASSAY THYROID STIM HORMONE: CPT | Performed by: INTERNAL MEDICINE

## 2025-03-27 PROCEDURE — 82306 VITAMIN D 25 HYDROXY: CPT | Performed by: INTERNAL MEDICINE

## 2025-03-27 PROCEDURE — 83036 HEMOGLOBIN GLYCOSYLATED A1C: CPT | Performed by: INTERNAL MEDICINE

## 2025-03-27 PROCEDURE — G0103 PSA SCREENING: HCPCS | Performed by: INTERNAL MEDICINE

## 2025-03-27 RX ORDER — FERROUS SULFATE 325(65) MG
1 TABLET ORAL 2 TIMES DAILY
Qty: 180 TABLET | Refills: 1 | Status: SHIPPED | OUTPATIENT
Start: 2025-03-27

## 2025-03-27 NOTE — PROGRESS NOTES
Subjective   The ABCs of the Annual Wellness Visit  Medicare Wellness Visit      Elton Funez is a 74 y.o. patient who presents for a Medicare Wellness Visit.    The following portions of the patient's history were reviewed and   updated as appropriate: allergies, current medications, past family history, past medical history, past social history, past surgical history, and problem list.    Compared to one year ago, the patient's physical   health is the same.  Compared to one year ago, the patient's mental   health is the same.    Recent Hospitalizations:  He was not admitted to the hospital during the last year.     Current Medical Providers:  Patient Care Team:  Tc Ramirez MD as PCP - General  Pau Colin MD as Consulting Physician (Endocrinology)  Ari Yanes MD as Consulting Physician (Otolaryngology)  Gigi Perales MD as Consulting Physician (Neurosurgery)  Danika Archer MD as Consulting Physician (Hematology and Oncology)  Joseph Ramirez MD as Consulting Physician (General Surgery)  Porsha Hernández MD as Consulting Physician (Nephrology)  Tracy Cornelius PA-C as Physician Assistant (Gastroenterology)    Outpatient Medications Prior to Visit   Medication Sig Dispense Refill    Alpha-Lipoic Acid 600 MG tablet Take 1 tablet by mouth Daily. Ran out, last took on Wednesday 3/13.      B Complex Vitamins (VITAMIN B COMPLEX PO) Take 1 tablet by mouth every night at bedtime.      B-D UF III MINI PEN NEEDLES 31G X 5 MM misc USE THREE TIMES A  each 3    carvedilol (COREG) 3.125 MG tablet Take 1 tablet by mouth Every 12 (Twelve) Hours. 180 tablet 2    Coenzyme Q10 (Co Q 10) 100 MG capsule Take 300 mg by mouth Daily.      colestipol (COLESTID) 1 g tablet TAKE 2 TABLETS TWICE A  tablet 3    Continuous Blood Gluc  (FreeStyle Winston 2 [place]) device 1 Device Every 14 (Fourteen) Days. 1 each 0    Continuous Blood Gluc Sensor (FreeStyle Winston 2  Sensor) misc 1 each Every 14 (Fourteen) Days. 6 each 3    Dulaglutide (Trulicity) 4.5 MG/0.5ML solution auto-injector Inject 4.5 mg under the skin into the appropriate area as directed 1 (One) Time Per Week. 6 mL 0    ferrous sulfate (FeroSul) 325 (65 FE) MG tablet Take 1 tablet by mouth 2 (Two) Times a Day. 180 tablet 1    Flaxseed, Linseed, (Flaxseed Oil) 1400 MG capsule Take 1 capsule by mouth 2 (Two) Times a Day.      gabapentin (NEURONTIN) 100 MG capsule Take 1 capsule by mouth Daily for 7 days. 7 capsule 0    gemfibrozil (LOPID) 600 MG tablet TAKE 1 TABLET TWICE A  tablet 3    Insulin Glargine (Lantus SoloStar) 100 UNIT/ML injection pen INJECT 70 UNITS UNDER THE SKIN INTO THE APPROPRIATE AREA AS DIRECTED EVERY NIGHT 63 mL 0    Insulin Lispro, 1 Unit Dial, (HumaLOG KwikPen) 100 UNIT/ML solution pen-injector Inject 30 Units under the skin into the appropriate area as directed 2 (Two) Times a Day. 90 mL 3    ipratropium (ATROVENT) 0.06 % nasal spray USE 2 SPRAYS IN EACH NOSTRIL AS DIRECTED BY PROVIDER DAILY 45 mL 1    levothyroxine (SYNTHROID, LEVOTHROID) 75 MCG tablet TAKE 1 TABLET DAILY 90 tablet 0    Multiple Vitamins-Minerals (MULTIVITAMIN PO) Take 1 tablet by mouth Daily.      omeprazole (priLOSEC) 40 MG capsule TAKE 1 CAPSULE DAILY 90 capsule 1    pregabalin (Lyrica) 100 MG capsule Take 1 capsule by mouth 3 (Three) Times a Day. 270 capsule 1    saccharomyces boulardii (FLORASTOR) 250 MG capsule Take 1 capsule by mouth 2 (Two) Times a Day. (Patient taking differently: Take 1 capsule by mouth Daily.) 30 capsule 0    sertraline (ZOLOFT) 100 MG tablet TAKE 1 TABLET DAILY 90 tablet 3    spironolactone (ALDACTONE) 25 MG tablet TAKE 1 TABLET DAILY 90 tablet 3    tamoxifen (NOLVADEX) 20 MG chemo tablet Take 1 tablet by mouth Daily. 90 tablet 3    temazepam (Restoril) 7.5 MG capsule Take 1 capsule by mouth At Night As Needed for Sleep.      tiZANidine (ZANAFLEX) 4 MG tablet TAKE 1 TABLET TWICE A DAY AS NEEDED  FOR MUSCLE SPASMS 180 tablet 3    vitamin D3 125 MCG (5000 UT) capsule capsule Take 1 capsule by mouth 2 (Two) Times a Day.      SSD 1 % cream APPLY TOPICALLY TO THE AFFECTED AREA DAILY UNTIL HEALED AS DIRECTED       No facility-administered medications prior to visit.     No opioid medication identified on active medication list. I have reviewed chart for other potential  high risk medication/s and harmful drug interactions in the elderly.      Aspirin is not on active medication list.  Aspirin use is not indicated based on review of current medical condition/s. Risk of harm outweighs potential benefits.  .    Patient Active Problem List   Diagnosis    Stage 3a chronic kidney disease    Gastroesophageal reflux disease without esophagitis    Dyslipidemia, goal LDL below 70    Primary hypertension    Obesity, Class I, BMI 30-34.9    FAVIO (obstructive sleep apnea)    Cervical radiculopathy    Type 2 diabetes mellitus with diabetic polyneuropathy, with long-term current use of insulin    Circadian rhythm sleep disorder, delayed sleep phase type    Mild nonproliferative diabetic retinopathy of left eye without macular edema associated with type 2 diabetes mellitus    Acquired hypothyroidism    Anemia of chronic disease    Liver cirrhosis secondary to JEROME    Portal hypertensive gastropathy    Grade I diastolic dysfunction    Combined forms of age-related cataract of both eyes    Major depressive disorder with single episode, in full remission    Malignant neoplasm of right breast in male, estrogen receptor positive    Sepsis, due to unspecified organism, unspecified whether acute organ dysfunction present    Idiopathic acute pancreatitis    Pancreatitis    Acute cholecystitis    HLD (hyperlipidemia)    History of breast cancer    Elevated troponin    T2DM (type 2 diabetes mellitus)    Trigger ring finger of left hand     Advance Care Planning Advance Directive is on file.  ACP discussion was held with the patient during  "this visit. Patient has an advance directive in EMR which is still valid.             Objective   Vitals:    25 1029   BP: 148/82   BP Location: Left arm   Patient Position: Sitting   Cuff Size: Adult   Pulse: 72   Resp: 18   Temp: 97.7 °F (36.5 °C)   TempSrc: Infrared   Weight: 88.9 kg (196 lb)   Height: 172.1 cm (67.75\")   PainSc: 0-No pain       Estimated body mass index is 30.02 kg/m² as calculated from the following:    Height as of this encounter: 172.1 cm (67.75\").    Weight as of this encounter: 88.9 kg (196 lb).     Finger Rub Hearing Test (right ear):passed  Finger Rub Hearing Test (left ear):failed             Does the patient have evidence of cognitive impairment? No  Lab Results   Component Value Date    HGBA1C 7.8 (A) 2025                                                                                               Health  Risk Assessment    Smoking Status:  Social History     Tobacco Use   Smoking Status Former    Current packs/day: 0.00    Average packs/day: 0.5 packs/day for 4.0 years (2.0 ttl pk-yrs)    Types: Cigarettes    Start date: 1972    Quit date: 1976    Years since quittin.2    Passive exposure: Past   Smokeless Tobacco Never     Alcohol Consumption:  Social History     Substance and Sexual Activity   Alcohol Use Yes    Comment: Socially       Fall Risk Screen  STEADI Fall Risk Assessment was completed, and patient is at HIGH risk for falls. Assessment completed on:3/27/2025    Depression Screening   Little interest or pleasure in doing things? Not at all   Feeling down, depressed, or hopeless? Not at all   PHQ-2 Total Score 0      Health Habits and Functional and Cognitive Screening:      3/26/2025     8:18 PM   Functional & Cognitive Status   Do you have difficulty preparing food and eating? No   Do you have difficulty bathing yourself, getting dressed or grooming yourself? No   Do you have difficulty using the toilet? No   Do you have difficulty moving around " from place to place? Yes   Do you have trouble with steps or getting out of a bed or a chair? Yes   Current Diet Other   Dental Exam Up to date   Eye Exam Up to date   Exercise (times per week) 0 times per week   Current Exercises Include No Regular Exercise   Do you need help using the phone?  No   Are you deaf or do you have serious difficulty hearing?  No   Do you need help to go to places out of walking distance? No   Do you need help shopping? No   Do you need help preparing meals?  No   Do you need help with housework?  No   Do you need help with laundry? No   Do you need help taking your medications? No   Do you need help managing money? No   Do you ever drive or ride in a car without wearing a seat belt? No   Have you felt unusual stress, anger or loneliness in the last month? No   Who do you live with? Spouse   If you need help, do you have trouble finding someone available to you? No   Have you been bothered in the last four weeks by sexual problems? No   Do you have difficulty concentrating, remembering or making decisions? No           Age-appropriate Screening Schedule:  Refer to the list below for future screening recommendations based on patient's age, sex and/or medical conditions. Orders for these recommended tests are listed in the plan section. The patient has been provided with a written plan.    Health Maintenance List  Health Maintenance   Topic Date Due    Hepatitis B (1 of 3 - Risk 3-dose series) Never done    URINE MICROALBUMIN-CREATININE RATIO (uACR)  07/12/2020    COVID-19 Vaccine (6 - 2024-25 season) 09/01/2024    ANNUAL WELLNESS VISIT  11/27/2024    HEMOGLOBIN A1C  07/31/2025    DIABETIC EYE EXAM  08/19/2025    LIPID PANEL  12/02/2025    COLORECTAL CANCER SCREENING  09/01/2031    TDAP/TD VACCINES (5 - Td or Tdap) 07/02/2034    HEPATITIS C SCREENING  Completed    RSV Vaccine - Adults  Completed    INFLUENZA VACCINE  Completed    Pneumococcal Vaccine 50+  Completed    AAA SCREEN ONCE   Completed    ZOSTER VACCINE  Completed    MAMMOGRAM  Discontinued                                                                                                                                                CMS Preventative Services Quick Reference  Risk Factors Identified During Encounter  Immunizations Discussed/Encouraged: COVID19    The above risks/problems have been discussed with the patient.  Pertinent information has been shared with the patient in the After Visit Summary.  An After Visit Summary and PPPS were made available to the patient.    Diagnoses and all orders for this visit:    1. Medicare annual wellness visit, subsequent (Primary)    2. Iron deficiency anemia, unspecified iron deficiency anemia type  -     CBC & Differential; Future  -     Ferritin; Future  -     Iron Profile; Future  -     CBC & Differential  -     Ferritin  -     Iron Profile  -     ferrous sulfate (FeroSul) 325 (65 FE) MG tablet; Take 1 tablet by mouth 2 (Two) Times a Day.  Dispense: 180 tablet; Refill: 1    3. Primary hypertension  -     Magnesium; Future  -     POC Urinalysis Dipstick, Automated; Future  -     POC Urinalysis Dipstick, Automated  -     Magnesium    4. Gastroesophageal reflux disease without esophagitis    5. Hyperlipidemia, unspecified hyperlipidemia type  -     Comprehensive Metabolic Panel; Future  -     Lipid Panel; Future  -     Comprehensive Metabolic Panel  -     Lipid Panel    6. Vitamin D deficiency  -     Vitamin D,25-Hydroxy; Future  -     Vitamin D,25-Hydroxy    7. Type 2 diabetes mellitus with diabetic polyneuropathy, with long-term current use of insulin  -     Comprehensive Metabolic Panel; Future  -     Hemoglobin A1c; Future  -     POC Microalbumin; Future  -     POC Microalbumin  -     Comprehensive Metabolic Panel  -     Hemoglobin A1c    8. Liver cirrhosis secondary to JEROME  -     Comprehensive Metabolic Panel; Future  -     Comprehensive Metabolic Panel    9. Prostate cancer  screening    10. Hypothyroidism, adult  -     TSH; Future  -     T4, Free; Future  -     TSH  -     T4, Free    11. Screening for prostate cancer  -     PSA Screen; Future  -     PSA Screen

## 2025-03-27 NOTE — PATIENT INSTRUCTIONS
Medicare Wellness  Personal Prevention Plan of Service     Date of Office Visit:    Encounter Provider:  Tc Ramirez MD  Place of Service:  [facility]  Patient Name: Elton Funez  :  1950    As part of the Medicare Wellness portion of your visit today, we are providing you with this personalized preventive plan of services (PPPS). This plan is based upon recommendations of the [place] Preventive Services Task Force (USPSTF) and the Advisory Committee on Immunization Practices (ACIP).    This lists the preventive care services that should be considered, and provides dates of when you are due. Items listed as completed are up-to-date and do not require any further intervention.    Health Maintenance   Topic Date Due    Hepatitis B (1 of 3 - Risk 3-dose series) Never done    URINE MICROALBUMIN-CREATININE RATIO (uACR)  2020    COVID-19 Vaccine ( season) 2024    ANNUAL WELLNESS VISIT  2024    HEMOGLOBIN A1C  2025    DIABETIC EYE EXAM  2025    LIPID PANEL  2025    COLORECTAL CANCER SCREENING  2031    TDAP/TD VACCINES (5 - Td or Tdap) 2034    HEPATITIS C SCREENING  Completed    RSV Vaccine - Adults  Completed    INFLUENZA VACCINE  Completed    Pneumococcal Vaccine 50+  Completed    AAA SCREEN ONCE  Completed    ZOSTER VACCINE  Completed    MAMMOGRAM  Discontinued       Orders Placed This Encounter   Procedures    Comprehensive Metabolic Panel     Standing Status:   Future     Expected Date:   2025     Expiration Date:   3/28/2026     Release to patient:   Routine Release [[id number]]    Ferritin     Standing Status:   Future     Expected Date:   2025     Expiration Date:   3/28/2026     Release to patient:   Routine Release [[id number]]    Lipid Panel     Standing Status:   Future     Expected Date:   2025     Expiration Date:   3/28/2026     Release to patient:   Routine  Release [[id number]]    Iron Profile     Standing Status:   Future     Expected Date:   4/27/2025     Expiration Date:   3/28/2026     Release to patient:   Routine Release [[id number]]    Magnesium     Standing Status:   Future     Expected Date:   4/27/2025     Expiration Date:   3/28/2026     Release to patient:   Routine Release [[id number]]    Hemoglobin A1c     Standing Status:   Future     Expected Date:   4/27/2025     Expiration Date:   3/28/2026     Release to patient:   Routine Release [[id number]]    TSH     Standing Status:   Future     Expected Date:   4/27/2025     Expiration Date:   3/28/2026     Release to patient:   Routine Release [[id number]]    T4, Free     Standing Status:   Future     Expected Date:   4/27/2025     Expiration Date:   3/28/2026     Release to patient:   Routine Release [[id number]]    PSA Screen     Standing Status:   Future     Expected Date:   4/27/2025     Expiration Date:   3/28/2026     Release to patient:   Routine Release [[id number]]    Vitamin D,25-Hydroxy     Standing Status:   Future     Expected Date:   4/27/2025     Expiration Date:   3/28/2026     Release to patient:   Routine Release [[id number]]    POC Urinalysis Dipstick, Automated     Standing Status:   Future     Expected Date:   3/27/2025     Release to patient:   Routine Release [[id number]]    POC Microalbumin     Standing Status:   Future     Expected Date:   3/27/2025     Release to patient:   Routine Release [[id number]]    CBC & Differential     Standing Status:   Future     Expected Date:   4/27/2025     Expiration Date:   3/28/2026     Manual Differential:   No     Release to patient:   Routine Release [[id number]]       Return in about 4 months (around 7/27/2025) for Follow-up.          Advance Care Planning and Advance Directives     You make decisions on a daily basis - decisions about where you want to live, your career, your home, your life. Perhaps one of the most important decisions you face  is your choice for future medical care. Take time to talk with your family and your healthcare team and start planning today.  Advance Care Planning is a process that can help you:  Understand possible future healthcare decisions in light of your own experiences  Reflect on those decision in light of your goals and values  Discuss your decisions with those closest to you and the healthcare professionals that care for you  Make a plan by creating a document that reflects your wishes    Surrogate Decision Maker  In the event of a medical emergency, which has left you unable to communicate or to make your own decisions, you would need someone to make decisions for you.  It is important to discuss your preferences for medical treatment with this person while you are in good health.     Qualities of a surrogate decision maker:  Willing to take on this role and responsibility  Knows what you want for future medical care  Willing to follow your wishes even if they don't agree with them  Able to make difficult medical decisions under stressful circumstances    Advance Directives  These are legal documents you can create that will guide your healthcare team and decision maker(s) when needed. These documents can be stored in the electronic medical record.    Living Will - a legal document to guide your care if you have a terminal condition or a serious illness and are unable to communicate. States vary by statute in document names/types, but most forms may include one or more of the following:        -  Directions regarding life-prolonging treatments        -  Directions regarding artificially provided nutrition/hydration        -  Choosing a healthcare decision maker        -  Direction regarding organ/tissue donation    Durable Power of  for Healthcare - this document names an -in-fact to make medical decisions for you, but it may also allow this person to make personal and financial decisions for you.  Please seek the advice of an  if you need this type of document.    **Advance Directives are not required and no one may discriminate against you if you do not sign one.    Medical Orders  Many states allow specific forms/orders signed by your physician to record your wishes for medical treatment in your current state of health. This form, signed in personal communication with your physician, addresses resuscitation and other medical interventions that you may or may not want.      For more information or to schedule a time with a [facility] Advance Care Planning Facilitator contact: [facility]/ACP or call [phone] and someone will contact you directly.

## 2025-03-27 NOTE — PROGRESS NOTES
Chief Complaint   Patient presents with    Medicare Wellness-subsequent       History of Present Illness      The patient presents for an established patient physical examination and health maintenance visit.    The patient presents for a follow-up related to iron deficiency anemia. There are no reports of blood loss. The patient has no symptoms of a dry cough, a wet cough, wheezing, fever, a headache, nausea, vomiting, diarrhea, myalgias, abdominal pain, sweats, weight loss, decreased appetite, chills, fatigue or paresthesias. The patient's energy level is normal. There are no reports of chest pain, shortness of breath, palpitations or syncope.    The patient presents for a follow-up related to hypertension. The patient reports that he has had no edema or blurred vision. He states that he is taking his medication as prescribed. He is not having medication side effects.    The patient presents for a follow-up related to GERD. The patient is on omeprazole for his gastroesophageal reflux. The medication is taken on a regular basis and gives complete relief of the symptoms. He reports no belching, dysphagia, early satiety, heartburn, hoarseness, odynophagia or rectal bleeding. The GERD has no known aggravating factors.    The patient presents for a follow-up related to hyperlipidemia. He is following a low fat diet. He reports that he is exercising. He is taking gemfibrozil. The patient is taking his medication as prescribed. He reports no medication side effects, including muscle cramps, abdominal pain, headaches or weakness. He denies orthopnea, paroxysmal nocturnal dyspnea or dyspnea on exertion.    The patient presents for a follow-up related to Type 2 Diabetes Mellitus. He checks his blood sugars at home. His sugars are checked daily. The average sugars are in the 150-200 range. He denies hypoglycemic symptoms. The patient denies polyuria, polydypsia or polyphagia. The patient takes his medication as prescribed.  He is taking insulin. His injection sites are rotated appropriately. The patient does check his feet daily at home.    Medications      Current Outpatient Medications:     Alpha-Lipoic Acid 600 MG tablet, Take 1 tablet by mouth Daily. Ran out, last took on Wednesday 3/13., Disp: , Rfl:     B Complex Vitamins (VITAMIN B COMPLEX PO), Take 1 tablet by mouth every night at bedtime., Disp: , Rfl:     B-D UF III MINI PEN NEEDLES 31G X 5 MM misc, USE THREE TIMES A DAY, Disp: 270 each, Rfl: 3    carvedilol (COREG) 3.125 MG tablet, Take 1 tablet by mouth Every 12 (Twelve) Hours., Disp: 180 tablet, Rfl: 2    Coenzyme Q10 (Co Q 10) 100 MG capsule, Take 300 mg by mouth Daily., Disp: , Rfl:     colestipol (COLESTID) 1 g tablet, TAKE 2 TABLETS TWICE A DAY, Disp: 360 tablet, Rfl: 3    Continuous Blood Gluc  (FreeStyle [place] 2 [place]) device, 1 Device Every 14 (Fourteen) Days., Disp: 1 each, Rfl: 0    Continuous Blood Gluc Sensor (FreeStyle Winston 2 Sensor) misc, 1 each Every 14 (Fourteen) Days., Disp: 6 each, Rfl: 3    Dulaglutide (Trulicity) 4.5 MG/0.5ML solution auto-injector, Inject 4.5 mg under the skin into the appropriate area as directed 1 (One) Time Per Week., Disp: 6 mL, Rfl: 0    ferrous sulfate (FeroSul) 325 (65 FE) MG tablet, Take 1 tablet by mouth 2 (Two) Times a Day., Disp: 180 tablet, Rfl: 1    Flaxseed, Linseed, (Flaxseed Oil) 1400 MG capsule, Take 1 capsule by mouth 2 (Two) Times a Day., Disp: , Rfl:     gabapentin (NEURONTIN) 100 MG capsule, Take 1 capsule by mouth Daily for 7 days., Disp: 7 capsule, Rfl: 0    gemfibrozil (LOPID) 600 MG tablet, TAKE 1 TABLET TWICE A DAY, Disp: 180 tablet, Rfl: 3    Insulin Glargine (Lantus SoloStar) 100 UNIT/ML injection pen, INJECT 70 UNITS UNDER THE SKIN INTO THE APPROPRIATE AREA AS DIRECTED EVERY NIGHT, Disp: 63 mL, Rfl: 0    Insulin Lispro, 1 Unit Dial, (HumaLOG KwikPen) 100 UNIT/ML solution pen-injector, Inject 30 Units under the skin into the appropriate area as  directed 2 (Two) Times a Day., Disp: 90 mL, Rfl: 3    ipratropium (ATROVENT) 0.06 % nasal spray, USE 2 SPRAYS IN EACH NOSTRIL AS DIRECTED BY PROVIDER DAILY, Disp: 45 mL, Rfl: 1    levothyroxine (SYNTHROID, LEVOTHROID) 75 MCG tablet, TAKE 1 TABLET DAILY, Disp: 90 tablet, Rfl: 0    Multiple Vitamins-Minerals (MULTIVITAMIN PO), Take 1 tablet by mouth Daily., Disp: , Rfl:     omeprazole (priLOSEC) 40 MG capsule, TAKE 1 CAPSULE DAILY, Disp: 90 capsule, Rfl: 1    pregabalin (Lyrica) 100 MG capsule, Take 1 capsule by mouth 3 (Three) Times a Day., Disp: 270 capsule, Rfl: 1    saccharomyces boulardii (FLORASTOR) 250 MG capsule, Take 1 capsule by mouth 2 (Two) Times a Day. (Patient taking differently: Take 1 capsule by mouth Daily.), Disp: 30 capsule, Rfl: 0    sertraline (ZOLOFT) 100 MG tablet, TAKE 1 TABLET DAILY, Disp: 90 tablet, Rfl: 3    spironolactone (ALDACTONE) 25 MG tablet, TAKE 1 TABLET DAILY, Disp: 90 tablet, Rfl: 3    tamoxifen (NOLVADEX) 20 MG chemo tablet, Take 1 tablet by mouth Daily., Disp: 90 tablet, Rfl: 3    temazepam (Restoril) 7.5 MG capsule, Take 1 capsule by mouth At Night As Needed for Sleep., Disp: , Rfl:     tiZANidine (ZANAFLEX) 4 MG tablet, TAKE 1 TABLET TWICE A DAY AS NEEDED FOR MUSCLE SPASMS, Disp: 180 tablet, Rfl: 3    vitamin D3 125 MCG (5000 UT) capsule capsule, Take 1 capsule by mouth 2 (Two) Times a Day., Disp: , Rfl:      Allergies    Allergies   Allergen Reactions    Glucophage Xr [Metformin Hcl Er] Diarrhea and Other (See Comments)     Glucophage XR TB24: Adverse Reaction: Severe GI issues.    Metformin Nausea And Vomiting and Unknown - Low Severity     Other reaction(s): SEVERE INTESTIONAL ISSUES    Other reaction(s): SEVERE GI ISSUES    Glucophage XR TB24    MetFORMIN HCl TABS    Glucophage    metformin hydrochloride    Tetracyclines & Related Nausea Only     Adverse Reaction    Tetracycline Unknown - Low Severity     tetracycline hydrochloride    Chlorhexidine Rash       Problem  "List    Patient Active Problem List   Diagnosis    Stage 3a chronic kidney disease    Gastroesophageal reflux disease without esophagitis    Dyslipidemia, goal LDL below 70    Primary hypertension    Obesity, Class I, BMI 30-34.9    FAVIO (obstructive sleep apnea)    Cervical radiculopathy    Type 2 diabetes mellitus with diabetic polyneuropathy, with long-term current use of insulin    Circadian rhythm sleep disorder, delayed sleep phase type    Mild nonproliferative diabetic retinopathy of left eye without macular edema associated with type 2 diabetes mellitus    Acquired hypothyroidism    Anemia of chronic disease    Liver cirrhosis secondary to JEROME    Portal hypertensive gastropathy    Grade I diastolic dysfunction    Combined forms of age-related cataract of both eyes    Major depressive disorder with single episode, in full remission    Malignant neoplasm of right breast in male, estrogen receptor positive    Sepsis, due to unspecified organism, unspecified whether acute organ dysfunction present    Idiopathic acute pancreatitis    Pancreatitis    Acute cholecystitis    HLD (hyperlipidemia)    History of breast cancer    Elevated troponin    T2DM (type 2 diabetes mellitus)    Trigger ring finger of left hand       Medications, Allergies, Problems List and Past History were reviewed and updated.    Physical Examination    /74   Pulse 72   Temp 97.7 °F (36.5 °C) (Infrared)   Resp 18   Ht 172.1 cm (67.75\")   Wt 88.9 kg (196 lb)   BMI 30.02 kg/m²       HEENT: Head- Normocephalic Atraumatic. Facies- Within normal limits. Pinnas- Normal texture and shape bilaterally. Canals- Normal bilaterally. TMs- Normal bilaterally. Nares- Patent bilaterally. Nasal Septum- is normal. There is no tenderness to palpation over the frontal or maxillary sinuses. Lids- Normal bilaterally. Conjunctiva- Clear bilaterally. Sclera- Anicteric bilaterally. Oropharynx- Moist with no lesions. Tonsils- No enlargement, erythema or " exudate.    Neck: Thyroid- non enlarged, symmetric and has no nodules. No bruits are detected. ROM- Normal Range of Motion with no rigidity.    Lungs: Auscultation- Clear to auscultation bilaterally. There are no retractions, clubbing or cyanosis. The Expiratory to Inspiratory ratio is equal.    Lymph Nodes: Cervical- no enlarged lymph nodes noted. Clavicular- no enlarged supraclavicular lymph nodes noted. Axillary- no enlarged axillary lymph nodes noted. Inguinal- no enlarged inguinal lymph nodes noted.    Cardiovascular: There are no carotid bruits. Heart- Normal Rate with Regular rhythm and no murmurs. There are no gallops. There are no rubs. In the lower extremities there is no edema. The upper extremities do not have edema.      Abdomen: Soft, benign, non-tender with no masses, hernias, organomegaly or scars.    Male Genitourinary: The penis is uncircumcised with testicles found in the scrotum bilaterally. Testicular Size is normal bilaterally. The penis has no anatomic abnormalities.    Rectal: reveals normal external sphincter tone with no external lesions.    Prostate: The prostate gland is symmetric and smooth with no nodularity.    Feet: The feet are symmetric with normal wendy landmarks. There is no tenderness to palpation bilaterally. The feet have normal posterior tibial and dorsalis pedis pulses and normal capillary refill bilaterally. The monofilament examination is normal bilaterally.       The arches are normal bilaterally. There are no skin or nail lesions present. There are no ingrown nails. There are no bunions noted.    Dermatologic: The patient has no worrisome or suspicious skin lesions noted.    Impression and Assessment    Normal Physical Examination.    Encounter for Screening for Malignant Neoplasm of the Prostate.    Iron Deficiency Anemia.    Essential Hypertension.    Gastroesophageal Reflux Disease.    Hyperlipidemia.    Type 2 Diabetes Mellitus without complication with insulin  usage.    Plan    Gastroesophageal Reflux Disease Plan: The patient was instructed to continue the current medications.    Essential Hypertension Plan: The patient was instructed to continue the current medications.    Hyperlipidemia Plan: The patient was instructed to exercise daily, eat a low fat diet and continue his medications.    Type 2 Diabetes Mellitus without complication with insulin usage Plan: The patient was instructed to continue the current medications.    Iron Deficiency Anemia Plan: The patient was instructed to continue the current medications.    Counseling was provided regarding: Adequate Aerobic Exercise, Dental Visits, Flossing Teeth and Heart Healthy Diet.    The following was ordered for screening and health maintenance: PSA.       Immunizations Ordered and Administered: None.      Diagnoses and all orders for this visit:    1. Medicare annual wellness visit, subsequent (Primary)    2. Physical exam    3. Iron deficiency anemia, unspecified iron deficiency anemia type  -     CBC & Differential; Future  -     Ferritin; Future  -     Iron Profile; Future  -     CBC & Differential  -     Ferritin  -     Iron Profile  -     ferrous sulfate (FeroSul) 325 (65 FE) MG tablet; Take 1 tablet by mouth 2 (Two) Times a Day.  Dispense: 180 tablet; Refill: 1    4. Primary hypertension  -     Magnesium; Future  -     POC Urinalysis Dipstick, Automated; Future  -     POC Urinalysis Dipstick, Automated  -     Magnesium    5. Gastroesophageal reflux disease without esophagitis    6. Hyperlipidemia, unspecified hyperlipidemia type  -     Comprehensive Metabolic Panel; Future  -     Lipid Panel; Future  -     Comprehensive Metabolic Panel  -     Lipid Panel    7. Vitamin D deficiency  -     Vitamin D,25-Hydroxy; Future  -     Vitamin D,25-Hydroxy    8. Type 2 diabetes mellitus with diabetic polyneuropathy, with long-term current use of insulin  -     Comprehensive Metabolic Panel; Future  -     Hemoglobin A1c;  Future  -     POC Microalbumin; Future  -     POC Microalbumin  -     Comprehensive Metabolic Panel  -     Hemoglobin A1c    9. Liver cirrhosis secondary to JEROME  -     Comprehensive Metabolic Panel; Future  -     Comprehensive Metabolic Panel    10. Prostate cancer screening    11. Hypothyroidism, adult  -     TSH; Future  -     T4, Free; Future  -     TSH  -     T4, Free    12. Screening for prostate cancer  -     PSA Screen; Future  -     PSA Screen          Return to Office    The patient was instructed to return for follow-up in 4 months. The patient was instructed to return sooner if the condition changes, worsens, or does not resolve.

## 2025-04-21 RX ORDER — OMEPRAZOLE 40 MG/1
40 CAPSULE, DELAYED RELEASE ORAL DAILY
Qty: 90 CAPSULE | Refills: 3 | Status: SHIPPED | OUTPATIENT
Start: 2025-04-21

## 2025-04-29 DIAGNOSIS — R55 SYNCOPE, UNSPECIFIED SYNCOPE TYPE: ICD-10-CM

## 2025-04-29 RX ORDER — CARVEDILOL 3.12 MG/1
3.12 TABLET ORAL EVERY 12 HOURS SCHEDULED
Qty: 180 TABLET | Refills: 2 | Status: SHIPPED | OUTPATIENT
Start: 2025-04-29

## 2025-04-30 ENCOUNTER — OFFICE VISIT (OUTPATIENT)
Dept: UROLOGY | Facility: CLINIC | Age: 75
End: 2025-04-30
Payer: MEDICARE

## 2025-04-30 ENCOUNTER — TELEPHONE (OUTPATIENT)
Dept: INTERNAL MEDICINE | Facility: CLINIC | Age: 75
End: 2025-04-30
Payer: MEDICARE

## 2025-04-30 VITALS — OXYGEN SATURATION: 98 % | DIASTOLIC BLOOD PRESSURE: 86 MMHG | SYSTOLIC BLOOD PRESSURE: 131 MMHG | HEART RATE: 77 BPM

## 2025-04-30 DIAGNOSIS — R97.20 ELEVATED PROSTATE SPECIFIC ANTIGEN (PSA): Primary | ICD-10-CM

## 2025-04-30 PROCEDURE — 1159F MED LIST DOCD IN RCRD: CPT | Performed by: STUDENT IN AN ORGANIZED HEALTH CARE EDUCATION/TRAINING PROGRAM

## 2025-04-30 PROCEDURE — 3079F DIAST BP 80-89 MM HG: CPT | Performed by: STUDENT IN AN ORGANIZED HEALTH CARE EDUCATION/TRAINING PROGRAM

## 2025-04-30 PROCEDURE — 99204 OFFICE O/P NEW MOD 45 MIN: CPT | Performed by: STUDENT IN AN ORGANIZED HEALTH CARE EDUCATION/TRAINING PROGRAM

## 2025-04-30 PROCEDURE — 3075F SYST BP GE 130 - 139MM HG: CPT | Performed by: STUDENT IN AN ORGANIZED HEALTH CARE EDUCATION/TRAINING PROGRAM

## 2025-04-30 PROCEDURE — 1160F RVW MEDS BY RX/DR IN RCRD: CPT | Performed by: STUDENT IN AN ORGANIZED HEALTH CARE EDUCATION/TRAINING PROGRAM

## 2025-04-30 NOTE — PROGRESS NOTES
Elevated PSA Office Visit      Patient Name: Elton Funez  : 1950   MRN: [id number]     Chief Complaint:  Elevated PSA.    Chief Complaint   Patient presents with    Elevated PSA       Referring Provider: Tc Ramirez MD    History of Present Illness: Elton Funez is a 74 y.o. male who presents today with history of elevated PSA. He has a history of breast cancer but states he is not BRCA positive. He reports a remote smoking history. Has a history of liver cirrhosis secondary to JEROME. He has severe CKD. Cr is 2.41, GFR is 27. He follows with Nephrology. Has a history of HTN.     Lab Results   Component Value Date    PSA 4.010 (H) 2025    PSA 2.520 2023    PSA 1.660 2022    PSA 1.650 2019    PSA 1.060 2018    PSA 0.80 2015     He reports chronic weak stream concerns but denies medication therapy.     He is not on alpha blockers.     Reports slow stream for the last few years. IPSS 6. Denies FH of PCa.     Subjective      Review of System: Review of Systems   Genitourinary: Negative.       I have reviewed the ROS documented by my clinical staff, I have updated appropriately and I agree. Alhaji Ann MD    Past Medical History:   Past Medical History:   Diagnosis Date    Abdominal pain 2023    RUQ; SEEN IN ED AT [facility]; DC'D HOME    Abscess of arm, right     Abscess of skin of neck     Acute sinusitis     ALT (SGPT) level raised     Arthritis     Arthritis of neck     Bacteremia due to Klebsiella pneumoniae 2023    BPH (benign prostatic hypertrophy)     Breast cancer 2023    right    Cholelithiasis     Cirrhosis of liver 2021    JEROME    CKD (chronic kidney disease)     Colon polyp     Constipation     COVID     DDD (degenerative disc disease), cervical     Decreased platelet count     Depression     Resolved: 2015    Difficulty walking     Elevated AST (SGOT)     Erectile dysfunction     GERD (gastroesophageal reflux disease)      History of transfusion 2021    BHL; 3 UNITS; NO REACTION    HL (hearing loss)     Hyperlipidemia     Hypertension     Hypothyroidism, adult 11/10/2020    Infection     MSSA lumbar surgical wound infection 3/2017    Jaw pain     Left hip pain     Low back pain     Surgery 30 yrs L4-5    Lumbosacral disc disease     Migraine     Hx of    MRSA (methicillin resistant Staphylococcus aureus)     Neuropathy in diabetes     Feet    Obesity     Obesity (BMI 30.0-34.9) 10/07/2016    BMI 31.92    Obstructive sleep apnea     CPAP    Pancreatitis 06/2023    Peripheral neuropathy     Portal hypertensive gastropathy 09/03/2021    Renal insufficiency     Shigellosis     Sleep apnea     PT TO BRING MASK AND TUBING DAY OF SURGERY    Symptomatic anemia 08/30/2021    Type 2 diabetes mellitus     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     Vitamin D deficiency     Wears glasses     Wears hearing aid     BOTH EARS       Past Surgical History:   Past Surgical History:   Procedure Laterality Date    ANTERIOR CERVICAL DISCECTOMY W/ FUSION N/A 12/14/2017    Procedure: CERVICAL DISCECTOMY ANTERIOR FUSION WITH INSTRUMENTATION C7/T1;  Surgeon: Gigi Perales MD;  Location:  [facility];  Service:     BACK SURGERY      L4-L5    CERVICAL ARTHRODESIS      CERVICAL DISCECTOMY POSTERIOR FUSION WITH INSTRUMENTATION N/A 03/16/2017    Procedure:  INCISION AND DRAINAGE OF POSTERIOR CERVICAL WOUND;  Surgeon: Gigi Perales MD;  Location:  [facility];  Service:     CERVICAL LAMINECTOMY DECOMPRESSION POSTERIOR Left 02/28/2017    Procedure: Left C7-T1 CERVICAL FORAMINOTOMY POSTERIOR WITH METRIX;  Surgeon: Gigi Perales MD;  Location:  [facility];  Service:     CHOLECYSTECTOMY  3/18/2024    CHOLECYSTECTOMY WITH INTRAOPERATIVE CHOLANGIOGRAM N/A 03/18/2024    Procedure: CHOLECYSTECTOMY LAPAROSCOPIC AND LARAROSCOPIC LIVER BIOPSY;  Surgeon: Emerson Spear MD;  Location:  [facility];  Service: General;  Laterality: N/A;     COLONOSCOPY N/A 2021    Procedure: COLONOSCOPY;  Surgeon: Sharif García MD;  Location:  [facility];  Service: Gastroenterology;  Laterality: N/A;    CYST REMOVAL  2021    ENDOSCOPY N/A 2021    Procedure: ESOPHAGOGASTRODUODENOSCOPY;  Surgeon: Brunner, Mark I, MD;  Location:  [facility];  Service: Gastroenterology;  Laterality: N/A;    ERCP N/A 2023    Procedure: ENDOSCOPIC RETROGRADE CHOLANGIOPANCREATOGRAPHY;  Surgeon: Brunner, Mark I, MD;  Location:  [facility];  Service: Gastroenterology;  Laterality: N/A;  SPHINCTERTOMY MADE AT [facility]A  AND BALLOON SWEEP OF CBD DONE WITH 9-12 BALLOON    LIVER BIOPSY      LYMPH NODE BIOPSY  2023    Negative    MASTECTOMY W/ SENTINEL NODE BIOPSY Right 2023    Procedure: BREAST MASTECTOMY WITH SENTINEL NODE BIOPSY RIGHT;  Surgeon: Joseph Ramirez MD;  Location:  [facility];  Service: General;  Laterality: Right;    NECK SURGERY      C6-C7, C7-T1 fused    SPINAL FUSION      UPPER GASTROINTESTINAL ENDOSCOPY      VASECTOMY      WISDOM TOOTH EXTRACTION         Family History:   Family History   Problem Relation Age of Onset    Hearing loss Mother     Arthritis Mother     Cancer Mother     Heart disease Mother     Thyroid disease Mother     Rheumatologic disease Mother     Migraines Mother     Diabetes Father     Heart disease Father         Cardiomyopathy    Hyperlipidemia Father     Stroke Father     Hypertension Father     Heart failure Father     Diabetes Paternal Grandmother     Vision loss Paternal Grandmother     Colon polyps Other     Colon cancer Neg Hx     Esophageal cancer Neg Hx        Social History:   Social History     Socioeconomic History    Marital status:    Tobacco Use    Smoking status: Former     Current packs/day: 0.00     Average packs/day: 0.5 packs/day for 4.0 years (2.0 ttl pk-yrs)     Types: Cigarettes     Start date: 1972     Quit date: 1976     Years since quittin.3     Passive exposure:  Past    Smokeless tobacco: Never   Vaping Use    Vaping status: Never Used   Substance and Sexual Activity    Alcohol use: Yes     Comment: Socially    Drug use: Never    Sexual activity: Not Currently     Partners: Female     Birth control/protection: Vasectomy       Medications:     Current Outpatient Medications:     Alpha-Lipoic Acid 600 MG tablet, Take 1 tablet by mouth Daily. Ran out, last took on Wednesday 3/13., Disp: , Rfl:     B Complex Vitamins (VITAMIN B COMPLEX PO), Take 1 tablet by mouth every night at bedtime., Disp: , Rfl:     B-D UF III MINI PEN NEEDLES 31G X 5 MM misc, USE THREE TIMES A DAY, Disp: 270 each, Rfl: 3    carvedilol (COREG) 3.125 MG tablet, Take 1 tablet by mouth Every 12 (Twelve) Hours., Disp: 180 tablet, Rfl: 2    Coenzyme Q10 (Co Q 10) 100 MG capsule, Take 300 mg by mouth Daily., Disp: , Rfl:     colestipol (COLESTID) 1 g tablet, TAKE 2 TABLETS TWICE A DAY, Disp: 360 tablet, Rfl: 3    Continuous Blood Gluc  (FreeStyle Winston 2 Josephine) device, 1 Device Every 14 (Fourteen) Days., Disp: 1 each, Rfl: 0    Continuous Blood Gluc Sensor (FreeStyle Winston 2 Sensor) misc, 1 each Every 14 (Fourteen) Days., Disp: 6 each, Rfl: 3    Dulaglutide (Trulicity) 4.5 MG/0.5ML solution auto-injector, Inject 4.5 mg under the skin into the appropriate area as directed 1 (One) Time Per Week., Disp: 6 mL, Rfl: 0    ferrous sulfate (FeroSul) 325 (65 FE) MG tablet, Take 1 tablet by mouth 2 (Two) Times a Day., Disp: 180 tablet, Rfl: 1    Flaxseed, Linseed, (Flaxseed Oil) 1400 MG capsule, Take 1 capsule by mouth 2 (Two) Times a Day., Disp: , Rfl:     gemfibrozil (LOPID) 600 MG tablet, TAKE 1 TABLET TWICE A DAY, Disp: 180 tablet, Rfl: 3    Insulin Glargine (Lantus SoloStar) 100 UNIT/ML injection pen, INJECT 70 UNITS UNDER THE SKIN INTO THE APPROPRIATE AREA AS DIRECTED EVERY NIGHT, Disp: 63 mL, Rfl: 0    Insulin Lispro, 1 Unit Dial, (HumaLOG KwikPen) 100 UNIT/ML solution pen-injector, Inject 30 Units under  the skin into the appropriate area as directed 2 (Two) Times a Day., Disp: 90 mL, Rfl: 3    ipratropium (ATROVENT) 0.06 % nasal spray, USE 2 SPRAYS IN EACH NOSTRIL AS DIRECTED BY PROVIDER DAILY, Disp: 45 mL, Rfl: 1    levothyroxine (SYNTHROID, LEVOTHROID) 75 MCG tablet, TAKE 1 TABLET DAILY, Disp: 90 tablet, Rfl: 0    Multiple Vitamins-Minerals (MULTIVITAMIN PO), Take 1 tablet by mouth Daily., Disp: , Rfl:     omeprazole (priLOSEC) 40 MG capsule, TAKE 1 CAPSULE DAILY, Disp: 90 capsule, Rfl: 3    pregabalin (Lyrica) 100 MG capsule, Take 1 capsule by mouth 3 (Three) Times a Day., Disp: 270 capsule, Rfl: 1    saccharomyces boulardii (FLORASTOR) 250 MG capsule, Take 1 capsule by mouth 2 (Two) Times a Day. (Patient taking differently: Take 1 capsule by mouth Daily.), Disp: 30 capsule, Rfl: 0    sertraline (ZOLOFT) 100 MG tablet, TAKE 1 TABLET DAILY, Disp: 90 tablet, Rfl: 3    spironolactone (ALDACTONE) 25 MG tablet, TAKE 1 TABLET DAILY, Disp: 90 tablet, Rfl: 3    tamoxifen (NOLVADEX) 20 MG chemo tablet, Take 1 tablet by mouth Daily., Disp: 90 tablet, Rfl: 3    temazepam (Restoril) 7.5 MG capsule, Take 1 capsule by mouth At Night As Needed for Sleep., Disp: , Rfl:     tiZANidine (ZANAFLEX) 4 MG tablet, TAKE 1 TABLET TWICE A DAY AS NEEDED FOR MUSCLE SPASMS, Disp: 180 tablet, Rfl: 3    vitamin D3 125 MCG (5000 UT) capsule capsule, Take 1 capsule by mouth 2 (Two) Times a Day., Disp: , Rfl:     gabapentin (NEURONTIN) 100 MG capsule, Take 1 capsule by mouth Daily for 7 days. (Patient not taking: Reported on 4/30/2025), Disp: 7 capsule, Rfl: 0    Allergies:   Allergies   Allergen Reactions    Glucophage Xr [Metformin Hcl Er] Diarrhea and Other (See Comments)     Glucophage XR TB24: Adverse Reaction: Severe GI issues.    Metformin Nausea And Vomiting and Unknown - Low Severity     Other reaction(s): SEVERE INTESTIONAL ISSUES    Other reaction(s): SEVERE GI ISSUES    Glucophage XR TB24    MetFORMIN HCl  TABS    Glucophage    metformin hydrochloride    Tetracyclines & Related Nausea Only     Adverse Reaction    Tetracycline Unknown - Low Severity     tetracycline hydrochloride    Chlorhexidine Rash       IPSS Questionnaire (AUA-7):  Over the past month…    1)  Incomplete Emptying:       How often have you had a sensation of not emptying you had the sensation of not emptying your bladder completely after you finished urinating?  0 - Not at all   2)  Frequency:       How often have you had the urinate again less than two hours after you finished urinating?  0 - Not at all   3)  Intermittency:       How often have you found you stopped and started again several times when you urinated?   1 - Less than 1 time in 5   4) Urgency:      How often have you found it difficult to postpone urination?  1 - Less than 1 time in 5   5) Weak Stream:      How often have you had a weak urinary stream?  3 - About half the time   6) Straining:       How often have you had to push or strain to begin urination?  0 - Not at all   7) Nocturia:      How many times did you most typically get up to urinate from the time you went to bed at night until the time you got up in the morning?  1 - 1 time   Total Score:  6   The International Prostate Symptom Score (IPSS) is used to screen, diagnose, track symptoms of benign prostatic hyperplasia (BPH).   0-7 (Mild Symptoms) 8-19 (Moderate) 20-35 (Severe)   Quality of Life (QoL):  If you were to spend the rest of your life with your urinary condition just the way it is now, how would you feel about that? 1-Pleased   Urine Leakage (Incontinence) 0-No Leakage            Objective     Physical Exam:   Vital Signs:   Vitals:    04/30/25 0846   BP: 131/86   Pulse: 77   SpO2: 98%     There is no height or weight on file to calculate BMI.     Physical Exam  Vitals and nursing note reviewed.   Constitutional:       Appearance: Normal appearance.   HENT:      Head: Normocephalic and atraumatic.       Mouth/Throat:      Mouth: Mucous membranes are moist.      Pharynx: Oropharynx is clear.   Eyes:      Extraocular Movements: Extraocular movements intact.      Conjunctiva/sclera: Conjunctivae normal.   Pulmonary:      Effort: Pulmonary effort is normal. No respiratory distress.   Abdominal:      Palpations: Abdomen is soft.      Tenderness: There is no abdominal tenderness. There is no right CVA tenderness or left CVA tenderness.   Genitourinary:     Comments:  Circumcised phallus, orthotopic meatus, bilaterally descended testicles without masses, or lesions.     ANTONIA: Normal rectal tone, no blood, estimated 40 gram prostate without nodularity or firmness.   Musculoskeletal:         General: Normal range of motion.      Cervical back: Normal range of motion.   Skin:     General: Skin is warm and dry.   Neurological:      General: No focal deficit present.      Mental Status: He is alert and oriented to person, place, and time.   Psychiatric:         Mood and Affect: Mood normal.         Behavior: Behavior normal.         Labs:   Hematocrit (%)   Date Value   03/27/2025 42.6   02/05/2025 40.7   12/12/2024 37.4 (L)   12/02/2024 39.0   06/07/2024 40.9   06/03/2024 39.9       Lab Results   Component Value Date    PSA 4.010 (H) 03/27/2025    PSA 2.520 11/27/2023    PSA 1.660 11/07/2022       Images:   US Liver  Result Date: 3/15/2025  Impression: Coarsened heterogeneous hepatic echotexture compatible with history of cirrhosis. No focal liver lesion. Electronically Signed: Moe Aaron  3/15/2025 12:03 PM EDT  Workstation ID: [id number]      Measures:   Tobacco:   Elton Funez  reports that he quit smoking about 49 years ago. His smoking use included cigarettes. He started smoking about 53 years ago. He has a 2 pack-year smoking history. He has been exposed to tobacco smoke. He has never used smokeless tobacco.     Assessment / Plan      Assessment/Plan:   Mr. Funez is a 74 y.o. male with elevated PSA. This is a new  "diagnosis of uncertain prognosis.     I had a detailed discussion with the patient today regarding prostate-specific antigen (PSA) and prostate cancer screening.  We discussed that PSA is an imperfect screening test and that it can be elevated for a variety of reasons including infection, inflammation (prostatitis), as a function of increased prostate volume (BPH), and due to malignancy.  We discussed how prostate cancer is one of the most commonly diagnosed malignancy among men and becomes more prevalent later in life.  We discussed how patients with a family history of prostate cancer are at an increased risk of developing prostate cancer over the general population.      I counseled the patient that the American Urological Association guidelines recommend PSA screening in men aged 55 through 70, or in some instances at an earlier age if positive family history for prostate cancer.  I discussed how screening men older than 70-76 yo is typically not recommended, unless a patient has a greater than 10-year life expectancy, is in good health, and is personally motivated to continue PCa screening. This is due to the fact that most men older than 70 and in poor health, (and in those men with less than 10-year life expectancy) will likely die of causes unrelated to prostate cancer.      We talked about how prostate cancer is typically a slow-growing malignancy, but identifying intermediate or high risk prostate cancers that risk metastases/fatal progression over time is the ultimate goal of PSA and prostate cancer screening.      I discussed with this patient that there is no \"normal\" PSA range despite the fact that most labs indicate a \"normal\" PSA range from 0 to 4 ng/dL.  There are suggested age-adjusted PSA ranges that are also taken into account in the setting of slightly elevated PSA in the older male.  Besides age-adjusted ranges, calculating a patient's prostate volume to determine PSA density (<0.15 has a lower " risk of harboring malignancy), calculating the patient's PSA velocity or doubling time, and evaluating free PSA versus total PSA values can help guide our decision making for workup/biopsy.      I also discussed the possibility of performing adjunctive tests for better risk characterization, my preference is to perform ExoDx urine biomarker testing, which can provide a percentage risk of harboring intermediate or high risk prostate cancers that may need treatment, based on expressed prostate cell genetics which can be analyzed via urine sample. This is beneficial in assisting with further imaging workup and possible prostate biopsy decision making.    I also discussed my goal is to work-up the appropriate patient for elevated PSA as prostate biopsy and work-up for prostate cancer has inherent risks and potential harms.  The risk of prostate biopsy related sepsis is 2-4% via transrectal approach. I now perform most prostate biopsies under sedation via transperineal approach, with sepsis risk being 0%.     In short, I discussed that PSA screening and work-up for prostate cancer should only ever occur after shared decision making conversation between the physician and patient.  Patient reports understanding.    Discussion Summary  Repeat PSA in 2-3 wks, f/u 4 wks with PSA review  If remains elevated will proceed with prostate MRI     Diagnoses and all orders for this visit:    1. Elevated prostate specific antigen (PSA) (Primary)  -     PSA Total+% Free (Serial); Future              Follow Up:   Return in about 4 weeks (around 5/28/2025) for Follow Up after Labs.    I spent approximately 45 minutes providing clinical care for this patient; including review of patient's chart and provider documentation, face to face time spent with patient in examination room (obtaining history, performing physical exam, discussing diagnosis and management options), placing orders, and completing patient documentation.     Alhaji SEQUEIRA  MD Seth  [facility] [place]

## 2025-04-30 NOTE — TELEPHONE ENCOUNTER
Caller: [facility] - [facility]    Relationship: Provider    Best call back number: [phone]    What form or medical record are you requesting: LAST OFFICE VISIT AND COPY OF LAB RESULTS    Who is requesting this form or medical record from you: [facility]    How would you like to receive the form or medical records (pick-up, mail, fax): FAX  If fax, what is the fax number: [phone]      Timeframe paperwork needed: PATIENT WAS REFERRED TO [facility], BUT THEY CALLED ADVISING THEY CANNOT SCHEDULE THE FIRST APPOINTMENT UNTIL THEY HAVE A COPY OF THE PATIENTS LAST OFFICE VISIT AND LAB RESULTS. PLEASE FAX TO THE NUMBER LISTED ABOVE, AND CALL IF THERE ARE ANY QUESTIONS.

## 2025-05-05 ENCOUNTER — LAB (OUTPATIENT)
Dept: LAB | Facility: HOSPITAL | Age: 75
End: 2025-05-05
Payer: MEDICARE

## 2025-05-05 ENCOUNTER — TRANSCRIBE ORDERS (OUTPATIENT)
Dept: LAB | Facility: HOSPITAL | Age: 75
End: 2025-05-05
Payer: MEDICARE

## 2025-05-05 DIAGNOSIS — N18.31 CHRONIC KIDNEY DISEASE (CKD) STAGE G3A/A1, MODERATELY DECREASED GLOMERULAR FILTRATION RATE (GFR) BETWEEN 45-59 ML/MIN/1.73 SQUARE METER AND ALBUMINURIA CREATININE RATIO LESS THAN 30 MG/G (CMS/H*: Primary | ICD-10-CM

## 2025-05-05 DIAGNOSIS — R97.20 ELEVATED PROSTATE SPECIFIC ANTIGEN (PSA): ICD-10-CM

## 2025-05-05 DIAGNOSIS — N18.31 CHRONIC KIDNEY DISEASE (CKD) STAGE G3A/A1, MODERATELY DECREASED GLOMERULAR FILTRATION RATE (GFR) BETWEEN 45-59 ML/MIN/1.73 SQUARE METER AND ALBUMINURIA CREATININE RATIO LESS THAN 30 MG/G (CMS/H*: ICD-10-CM

## 2025-05-05 LAB
ALBUMIN SERPL-MCNC: 4.4 G/DL (ref 3.5–5.2)
ALBUMIN UR-MCNC: 49.3 MG/DL
ANION GAP SERPL CALCULATED.3IONS-SCNC: 14.2 MMOL/L (ref 5–15)
BACTERIA UR QL AUTO: ABNORMAL /HPF
BILIRUB UR QL STRIP: NEGATIVE
BUN SERPL-MCNC: 22 MG/DL (ref 8–23)
BUN/CREAT SERPL: 12.3 (ref 7–25)
CALCIUM SPEC-SCNC: 9.7 MG/DL (ref 8.6–10.5)
CHLORIDE SERPL-SCNC: 104 MMOL/L (ref 98–107)
CLARITY UR: CLEAR
CO2 SERPL-SCNC: 20.8 MMOL/L (ref 22–29)
COLOR UR: YELLOW
CREAT SERPL-MCNC: 1.79 MG/DL (ref 0.76–1.27)
CREAT UR-MCNC: 99.7 MG/DL
EGFRCR SERPLBLD CKD-EPI 2021: 39 ML/MIN/1.73
GLUCOSE SERPL-MCNC: 151 MG/DL (ref 65–99)
GLUCOSE UR STRIP-MCNC: NEGATIVE MG/DL
HGB BLD-MCNC: 14.7 G/DL (ref 13–17.7)
HGB UR QL STRIP.AUTO: NEGATIVE
HYALINE CASTS UR QL AUTO: ABNORMAL /LPF
KETONES UR QL STRIP: NEGATIVE
LEUKOCYTE ESTERASE UR QL STRIP.AUTO: ABNORMAL
MICROALBUMIN/CREAT UR: 494.5 MG/G (ref 0–29)
NITRITE UR QL STRIP: NEGATIVE
PH UR STRIP.AUTO: 6 [PH] (ref 5–8)
PHOSPHATE SERPL-MCNC: 2.6 MG/DL (ref 2.5–4.5)
POTASSIUM SERPL-SCNC: 4.1 MMOL/L (ref 3.5–5.2)
PROT UR QL STRIP: ABNORMAL
RBC # UR STRIP: ABNORMAL /HPF
REF LAB TEST METHOD: ABNORMAL
SODIUM SERPL-SCNC: 139 MMOL/L (ref 136–145)
SP GR UR STRIP: 1.02 (ref 1–1.03)
SQUAMOUS #/AREA URNS HPF: ABNORMAL /HPF
UROBILINOGEN UR QL STRIP: ABNORMAL
WBC # UR STRIP: ABNORMAL /HPF

## 2025-05-05 PROCEDURE — 82043 UR ALBUMIN QUANTITATIVE: CPT

## 2025-05-05 PROCEDURE — 85018 HEMOGLOBIN: CPT

## 2025-05-05 PROCEDURE — 80069 RENAL FUNCTION PANEL: CPT

## 2025-05-05 PROCEDURE — 36415 COLL VENOUS BLD VENIPUNCTURE: CPT

## 2025-05-05 PROCEDURE — 84153 ASSAY OF PSA TOTAL: CPT

## 2025-05-05 PROCEDURE — 84154 ASSAY OF PSA FREE: CPT

## 2025-05-05 PROCEDURE — 82570 ASSAY OF URINE CREATININE: CPT

## 2025-05-05 PROCEDURE — 81001 URINALYSIS AUTO W/SCOPE: CPT

## 2025-05-06 ENCOUNTER — OFFICE VISIT (OUTPATIENT)
Dept: NEUROLOGY | Facility: CLINIC | Age: 75
End: 2025-05-06
Payer: MEDICARE

## 2025-05-06 VITALS
HEART RATE: 77 BPM | BODY MASS INDEX: 29.86 KG/M2 | HEIGHT: 68 IN | OXYGEN SATURATION: 95 % | DIASTOLIC BLOOD PRESSURE: 84 MMHG | WEIGHT: 197 LBS | SYSTOLIC BLOOD PRESSURE: 128 MMHG

## 2025-05-06 DIAGNOSIS — E11.40 NEUROPATHY DUE TO TYPE 2 DIABETES MELLITUS: Primary | ICD-10-CM

## 2025-05-06 LAB
PSA FREE MFR SERPL: 51.4 %
PSA FREE SERPL-MCNC: 2.16 NG/ML
PSA SERPL-MCNC: 4.2 NG/ML (ref 0–4)

## 2025-05-06 PROCEDURE — 1160F RVW MEDS BY RX/DR IN RCRD: CPT | Performed by: NURSE PRACTITIONER

## 2025-05-06 PROCEDURE — 99214 OFFICE O/P EST MOD 30 MIN: CPT | Performed by: NURSE PRACTITIONER

## 2025-05-06 PROCEDURE — 1159F MED LIST DOCD IN RCRD: CPT | Performed by: NURSE PRACTITIONER

## 2025-05-06 PROCEDURE — 3079F DIAST BP 80-89 MM HG: CPT | Performed by: NURSE PRACTITIONER

## 2025-05-06 PROCEDURE — 3074F SYST BP LT 130 MM HG: CPT | Performed by: NURSE PRACTITIONER

## 2025-05-06 RX ORDER — PREGABALIN 100 MG/1
CAPSULE ORAL
Qty: 90 CAPSULE | Refills: 6 | Status: SHIPPED | OUTPATIENT
Start: 2025-05-06 | End: 2026-05-06

## 2025-05-06 NOTE — PROGRESS NOTES
Neuro Office Visit      Encounter Date: 2025   Patient Name: Elton Funez  : 1950   MRN: [id number]   PCP:  Tc Ramirez MD     Chief Complaint:    Chief Complaint   Patient presents with    Peripheral Neuropathy       History of Present Illness: Elton Funez is a 74 y.o. male who is here today in Neurology for neuropathy.    Last visit with me on 2025, related to gabapentin, started Lyrica.  History of Present Illness  \He reports that his condition has not deteriorated since the discontinuation of gabapentin and the initiation of Lyrica.     Sensitivity on the lateral aspect of his left foot during nighttime is exacerbated by contact with a sock or blanket.     He perceives a delay in the onset of Lyrica's effect compared to gabapentin, but notes that a thrice-daily regimen of Lyrica provides some relief.     He has not experienced any recent falls, although he acknowledges three near-fall incidents.     He utilizes a cane for mobility, particularly when venturing outdoors.    SOCIAL HISTORY  Marital Status:   Hobbies: Reading books  Sleep: Sensitive on the side of the foot at night, which is aggravated by a sock or blanket    MEDICATIONS  CURRENT MEDS:  Lyrica Three times daily  PREVIOUS MEDS:  Gabapentin  Reason for Discontinuation: Switched to Lyrica      Subjective      Past Medical History:   Past Medical History:   Diagnosis Date    Abdominal pain 2023    RUQ; SEEN IN ED AT [facility]; DC'D HOME    Abscess of arm, right     Abscess of skin of neck     Acute sinusitis     ALT (SGPT) level raised     Arthritis     Arthritis of neck     Bacteremia due to Klebsiella pneumoniae 2023    BPH (benign prostatic hypertrophy)     Breast cancer 2023    right    Cholelithiasis     Cirrhosis of liver 2021    JEROME    CKD (chronic kidney disease)     Colon polyp     Constipation     COVID     DDD (degenerative disc disease), cervical     Decreased platelet count      Depression     Resolved: 1/28/2015    Difficulty walking     Elevated AST (SGOT)     Erectile dysfunction     GERD (gastroesophageal reflux disease)     History of transfusion 2021    BHL; 3 UNITS; NO REACTION    HL (hearing loss)     Hyperlipidemia     Hypertension     Hypothyroidism, adult 11/10/2020    Infection     MSSA lumbar surgical wound infection 3/2017    Jaw pain     Left hip pain     Low back pain     Surgery 30 yrs L4-5    Lumbosacral disc disease     Migraine     Hx of    MRSA (methicillin resistant Staphylococcus aureus)     Neuropathy in diabetes     Feet    Obesity     Obesity (BMI 30.0-34.9) 10/07/2016    BMI 31.92    Obstructive sleep apnea     CPAP    Pancreatitis 06/2023    Peripheral neuropathy     Portal hypertensive gastropathy 09/03/2021    Renal insufficiency     Shigellosis     Sleep apnea     PT TO BRING MASK AND TUBING DAY OF SURGERY    Symptomatic anemia 08/30/2021    Type 2 diabetes mellitus     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     Vitamin D deficiency     Wears glasses     Wears hearing aid     BOTH EARS       Past Surgical History:   Past Surgical History:   Procedure Laterality Date    ANTERIOR CERVICAL DISCECTOMY W/ FUSION N/A 12/14/2017    Procedure: CERVICAL DISCECTOMY ANTERIOR FUSION WITH INSTRUMENTATION C7/T1;  Surgeon: Gigi Perales MD;  Location:  [facility];  Service:     BACK SURGERY      L4-L5    CERVICAL ARTHRODESIS      CERVICAL DISCECTOMY POSTERIOR FUSION WITH INSTRUMENTATION N/A 03/16/2017    Procedure:  INCISION AND DRAINAGE OF POSTERIOR CERVICAL WOUND;  Surgeon: Gigi Perales MD;  Location:  [facility];  Service:     CERVICAL LAMINECTOMY DECOMPRESSION POSTERIOR Left 02/28/2017    Procedure: Left C7-T1 CERVICAL FORAMINOTOMY POSTERIOR WITH METRIX;  Surgeon: Gigi Perales MD;  Location:  [facility];  Service:     CHOLECYSTECTOMY  3/18/2024    CHOLECYSTECTOMY WITH INTRAOPERATIVE CHOLANGIOGRAM N/A 03/18/2024    Procedure:  CHOLECYSTECTOMY LAPAROSCOPIC AND LARAROSCOPIC LIVER BIOPSY;  Surgeon: Emerson Spear MD;  Location:  [facility];  Service: General;  Laterality: N/A;    COLONOSCOPY N/A 09/01/2021    Procedure: COLONOSCOPY;  Surgeon: Sharif García MD;  Location:  [facility];  Service: Gastroenterology;  Laterality: N/A;    CYST REMOVAL  04/2021    ENDOSCOPY N/A 08/31/2021    Procedure: ESOPHAGOGASTRODUODENOSCOPY;  Surgeon: Brunner, Mark I, MD;  Location:  [facility];  Service: Gastroenterology;  Laterality: N/A;    ERCP N/A 06/14/2023    Procedure: ENDOSCOPIC RETROGRADE CHOLANGIOPANCREATOGRAPHY;  Surgeon: Brunner, Mark I, MD;  Location:  [facility];  Service: Gastroenterology;  Laterality: N/A;  SPHINCTERTOMY MADE AT [facility]  AND BALLOON SWEEP OF CBD DONE WITH 9-12 BALLOON    LIVER BIOPSY      LYMPH NODE BIOPSY  6/2023    Negative    MASTECTOMY W/ SENTINEL NODE BIOPSY Right 05/23/2023    Procedure: BREAST MASTECTOMY WITH SENTINEL NODE BIOPSY RIGHT;  Surgeon: Joseph Ramirez MD;  Location:  [facility];  Service: General;  Laterality: Right;    NECK SURGERY      C6-C7, C7-T1 fused    SPINAL FUSION      UPPER GASTROINTESTINAL ENDOSCOPY      VASECTOMY      WISDOM TOOTH EXTRACTION         Family History:   Family History   Problem Relation Age of Onset    Hearing loss Mother     Arthritis Mother     Cancer Mother     Heart disease Mother     Thyroid disease Mother     Rheumatologic disease Mother     Migraines Mother     Diabetes Father     Heart disease Father         Cardiomyopathy    Hyperlipidemia Father     Stroke Father     Hypertension Father     Heart failure Father     Diabetes Paternal Grandmother     Vision loss Paternal Grandmother     Colon polyps Other     Colon cancer Neg Hx     Esophageal cancer Neg Hx        Social History:   Social History     Socioeconomic History    Marital status:    Tobacco Use    Smoking status: Former     Current packs/day: 0.00     Average packs/day: 0.5 packs/day  for 4.0 years (2.0 ttl pk-yrs)     Types: Cigarettes     Start date: 1972     Quit date: 1976     Years since quittin.3     Passive exposure: Past    Smokeless tobacco: Never   Vaping Use    Vaping status: Never Used   Substance and Sexual Activity    Alcohol use: Yes     Comment: Socially    Drug use: Never    Sexual activity: Not Currently     Partners: Female     Birth control/protection: Vasectomy       Medications:     Current Outpatient Medications:     Alpha-Lipoic Acid 600 MG tablet, Take 1 tablet by mouth Daily. Ran out, last took on Wednesday 3/13., Disp: , Rfl:     B Complex Vitamins (VITAMIN B COMPLEX PO), Take 1 tablet by mouth every night at bedtime., Disp: , Rfl:     B-D UF III MINI PEN NEEDLES 31G X 5 MM misc, USE THREE TIMES A DAY, Disp: 270 each, Rfl: 3    carvedilol (COREG) 3.125 MG tablet, Take 1 tablet by mouth Every 12 (Twelve) Hours., Disp: 180 tablet, Rfl: 2    Coenzyme Q10 (Co Q 10) 100 MG capsule, Take 300 mg by mouth Daily., Disp: , Rfl:     colestipol (COLESTID) 1 g tablet, TAKE 2 TABLETS TWICE A DAY, Disp: 360 tablet, Rfl: 3    Continuous Blood Gluc  (FreeStyle Winston 2 Camargo) device, 1 Device Every 14 (Fourteen) Days., Disp: 1 each, Rfl: 0    Continuous Blood Gluc Sensor (FreeStyle Winston 2 Sensor) misc, 1 each Every 14 (Fourteen) Days., Disp: 6 each, Rfl: 3    Dulaglutide (Trulicity) 4.5 MG/0.5ML solution auto-injector, Inject 4.5 mg under the skin into the appropriate area as directed 1 (One) Time Per Week., Disp: 6 mL, Rfl: 0    ferrous sulfate (FeroSul) 325 (65 FE) MG tablet, Take 1 tablet by mouth 2 (Two) Times a Day., Disp: 180 tablet, Rfl: 1    Flaxseed, Linseed, (Flaxseed Oil) 1400 MG capsule, Take 1 capsule by mouth 2 (Two) Times a Day., Disp: , Rfl:     gemfibrozil (LOPID) 600 MG tablet, TAKE 1 TABLET TWICE A DAY, Disp: 180 tablet, Rfl: 3    Insulin Glargine (Lantus SoloStar) 100 UNIT/ML injection pen, INJECT 70 UNITS UNDER THE SKIN INTO THE APPROPRIATE  AREA AS DIRECTED EVERY NIGHT, Disp: 63 mL, Rfl: 0    Insulin Lispro, 1 Unit Dial, (HumaLOG KwikPen) 100 UNIT/ML solution pen-injector, Inject 30 Units under the skin into the appropriate area as directed 2 (Two) Times a Day., Disp: 90 mL, Rfl: 3    ipratropium (ATROVENT) 0.06 % nasal spray, USE 2 SPRAYS IN EACH NOSTRIL AS DIRECTED BY PROVIDER DAILY, Disp: 45 mL, Rfl: 1    levothyroxine (SYNTHROID, LEVOTHROID) 75 MCG tablet, TAKE 1 TABLET DAILY, Disp: 90 tablet, Rfl: 0    Multiple Vitamins-Minerals (MULTIVITAMIN PO), Take 1 tablet by mouth Daily., Disp: , Rfl:     omeprazole (priLOSEC) 40 MG capsule, TAKE 1 CAPSULE DAILY, Disp: 90 capsule, Rfl: 3    saccharomyces boulardii (FLORASTOR) 250 MG capsule, Take 1 capsule by mouth 2 (Two) Times a Day. (Patient taking differently: Take 1 capsule by mouth Daily.), Disp: 30 capsule, Rfl: 0    sertraline (ZOLOFT) 100 MG tablet, TAKE 1 TABLET DAILY, Disp: 90 tablet, Rfl: 3    spironolactone (ALDACTONE) 25 MG tablet, TAKE 1 TABLET DAILY, Disp: 90 tablet, Rfl: 3    tamoxifen (NOLVADEX) 20 MG chemo tablet, Take 1 tablet by mouth Daily., Disp: 90 tablet, Rfl: 3    temazepam (Restoril) 7.5 MG capsule, Take 1 capsule by mouth At Night As Needed for Sleep., Disp: , Rfl:     tiZANidine (ZANAFLEX) 4 MG tablet, TAKE 1 TABLET TWICE A DAY AS NEEDED FOR MUSCLE SPASMS, Disp: 180 tablet, Rfl: 3    vitamin D3 125 MCG (5000 UT) capsule capsule, Take 1 capsule by mouth 2 (Two) Times a Day., Disp: , Rfl:     pregabalin (Lyrica) 100 MG capsule, Take 1 capsule by mouth 2 (Two) Times a Day AND 2 capsules Every Night., Disp: 90 capsule, Rfl: 6    Allergies:   Allergies   Allergen Reactions    Glucophage Xr [Metformin Hcl Er] Diarrhea and Other (See Comments)     Glucophage XR TB24: Adverse Reaction: Severe GI issues.    Metformin Nausea And Vomiting and Unknown - Low Severity     Other reaction(s): SEVERE INTESTIONAL ISSUES    Other reaction(s): SEVERE GI ISSUES    Glucophage XR TB24    MetFORMIN HCl  TABS    Glucophage    metformin hydrochloride    Tetracyclines & Related Nausea Only     Adverse Reaction    Tetracycline Unknown - Low Severity     tetracycline hydrochloride    Chlorhexidine Rash       PHQ-9 Total Score:     MORGAN Fall Risk Assessment was completed, and patient is at HIGH risk for falls. Assessment completed on:5/6/2025    Objective     Physical Exam:     Neurological Exam  Mental Status  Awake, alert and oriented to person, place and time. Recent and remote memory are intact. Speech is normal. Language is fluent with no aphasia. Attention and concentration are normal. Fund of knowledge is appropriate for level of education.    Cranial Nerves  CN II: Visual acuity is normal.  CN III, IV, VI: Extraocular movements intact bilaterally. Pupils equal round and reactive to light bilaterally.  CN V: Facial sensation is normal.  CN VII: Full and symmetric facial movement.  CN IX, X: Palate elevates symmetrically  CN XI: Shoulder shrug strength is normal.  CN XII: Tongue midline without atrophy or fasciculations.    Motor  Normal muscle bulk throughout. No fasciculations present. Normal muscle tone. Strength is 5/5 throughout all four extremities.    Sensory  Sensation is intact to light touch, pinprick, vibration and proprioception in all four extremities.    Reflexes                                            Right                      Left  Brachioradialis                    2+                         2+  Biceps                                 2+                         2+  Triceps                                2+                         2+  Finger flex                           2+                         2+  Hamstring                            2+                         2+  Patellar                                2+                         2+  Achilles                                2+                         2+    Coordination    Finger-to-nose, rapid alternating movements and heel-to-shin normal  "bilaterally without dysmetria.    Gait  Normal casual, toe, heel and tandem gait.        Vital Signs:   Vitals:    05/06/25 1304   BP: 128/84   Pulse: 77   SpO2: 95%   Weight: 89.4 kg (197 lb)   Height: 172.1 cm (67.75\")     Body mass index is 30.18 kg/m².     Results:   Results       Imaging:   US Liver  Result Date: 3/15/2025  Impression: Coarsened heterogeneous hepatic echotexture compatible with history of cirrhosis. No focal liver lesion. Electronically Signed: Moe Aaron  3/15/2025 12:03 PM EDT  Workstation ID: [id number]       Labs:   No results found for: \"CMP\", \"PROTEIN\", \"ANTIMOGAB\", \"[id number]\", \"JCVRESULT\", \"QUANTTBGOLD\", \"CBCDIF\", \"IGGALBSER\"     Assessment / Plan      Assessment/Plan:   Diagnoses and all orders for this visit:    1. Neuropathy due to type 2 diabetes mellitus (Primary)  Comments:  Continue Lyrica  Orders:  -     pregabalin (Lyrica) 100 MG capsule; Take 1 capsule by mouth 2 (Two) Times a Day AND 2 capsules Every Night.  Dispense: 90 capsule; Refill: 6         Assessment & Plan  1. Foot pain.  He reports sensitivity on the side of his foot at night, which is aggravated by contact with a sock or blanket. He has been taking Lyrica three times a day, which helps but takes time to kick in. He has not had any recent falls but has had near-falls. He uses a cane primarily when outside the house. The dosage of Lyrica will be increased to 1 tablet in the morning, 1 tablet in the afternoon, and 2 tablets at night to see if it provides more comfort without causing excessive tiredness. He is advised to continue using [facility] for his medication. He is instructed to send a message to report if the increased dosage helps.    Follow-up  The patient will follow up in 6 months.    Patient Education:     Reviewed medications, potential side effects and signs and symptoms to report. Discussed risk versus benefits of treatment plan with patient and/or family-including medications, labs and " radiology that may be ordered. Addressed questions and concerns during visit. Patient and/or family verbalized understanding and agree with plan. Instructed to call the office with any questions and report to ER with any life-threatening symptoms.     Follow Up:   Return in about 6 months (around 11/6/2025).    I spent 30 minutes caring for Elton on this date of service. This time includes time spent by me in the following activities: preparing for the visit, performing a medically appropriate examination and/or evaluation, counseling and educating the patient/family/caregiver, and documenting information in the medical record.        During this visit the following were done:  Labs Reviewed []    Labs Ordered []    Radiology Reports Reviewed []    Radiology Ordered []    PCP Records Reviewed []    Referring Provider Records Reviewed []    ER Records Reviewed []    Hospital Records Reviewed []    History Obtained From Family []    Radiology Images Reviewed []    Other Reviewed []    Records Requested []      Patient or patient representative verbalized consent for the use of Ambient Listening during the visit with  MELISSA Fong for chart documentation. 5/6/2025  13:06 EDT    MELISSA Fong   [facility] NEUROLOGY  [address] [facility] [address]  [facility] [id number] [phone]

## 2025-05-12 DIAGNOSIS — E11.40 NEUROPATHY DUE TO TYPE 2 DIABETES MELLITUS: ICD-10-CM

## 2025-05-12 RX ORDER — PREGABALIN 100 MG/1
CAPSULE ORAL
Qty: 90 CAPSULE | Refills: 6 | Status: CANCELLED | OUTPATIENT
Start: 2025-05-12 | End: 2026-05-12

## 2025-05-13 ENCOUNTER — TELEPHONE (OUTPATIENT)
Dept: NEUROLOGY | Facility: CLINIC | Age: 75
End: 2025-05-13

## 2025-05-13 DIAGNOSIS — E11.40 NEUROPATHY DUE TO TYPE 2 DIABETES MELLITUS: ICD-10-CM

## 2025-05-13 RX ORDER — PREGABALIN 100 MG/1
CAPSULE ORAL
Qty: 90 CAPSULE | Refills: 4 | Status: SHIPPED | OUTPATIENT
Start: 2025-05-13 | End: 2026-05-13

## 2025-05-13 RX ORDER — PREGABALIN 100 MG/1
CAPSULE ORAL
Qty: 120 CAPSULE | Refills: 3 | Status: CANCELLED | OUTPATIENT
Start: 2025-05-13 | End: 2026-05-13

## 2025-05-13 NOTE — TELEPHONE ENCOUNTER
Caller: Elton Funez    Relationship: Self    Best call back number: [phone]    What was the call regarding: THE PT WAS CONTACTED BY [facility] ABOUT THE NEW LYRICA RX. THAT THEY WILL NOT FILL IT UNTIL THE OFFICE SPEAKS TO THEM ABOUT THE NEW SCRIPT. PT ALSO ASKS TO MAKE SURE ITS FOR A 90 DAY SUPPLY    PT SUPPLIED PH # [phone]    PLEASE ADVISE  THANK YOU

## 2025-05-22 ENCOUNTER — HOSPITAL ENCOUNTER (EMERGENCY)
Facility: HOSPITAL | Age: 75
Discharge: HOME | End: 2025-05-22
Attending: EMERGENCY MEDICINE
Payer: MEDICARE

## 2025-05-22 ENCOUNTER — APPOINTMENT (OUTPATIENT)
Dept: GENERAL RADIOLOGY | Facility: HOSPITAL | Age: 75
End: 2025-05-22
Payer: MEDICARE

## 2025-05-22 VITALS
WEIGHT: 197.09 LBS | TEMPERATURE: 97.8 F | SYSTOLIC BLOOD PRESSURE: 162 MMHG | OXYGEN SATURATION: 98 % | HEIGHT: 68 IN | RESPIRATION RATE: 18 BRPM | DIASTOLIC BLOOD PRESSURE: 82 MMHG | BODY MASS INDEX: 29.87 KG/M2 | HEART RATE: 61 BPM

## 2025-05-22 DIAGNOSIS — Z86.79 HISTORY OF HYPERTENSION: ICD-10-CM

## 2025-05-22 DIAGNOSIS — N18.9 CHRONIC KIDNEY DISEASE, UNSPECIFIED CKD STAGE: ICD-10-CM

## 2025-05-22 DIAGNOSIS — Z86.39 HISTORY OF DIABETES MELLITUS: ICD-10-CM

## 2025-05-22 DIAGNOSIS — R07.9 ACUTE CHEST PAIN: Primary | ICD-10-CM

## 2025-05-22 LAB
ALBUMIN SERPL-MCNC: 3.8 G/DL (ref 3.5–5.2)
ALBUMIN/GLOB SERPL: 1.3 G/DL
ALP SERPL-CCNC: 82 U/L (ref 39–117)
ALT SERPL W P-5'-P-CCNC: 9 U/L (ref 1–41)
ANION GAP SERPL CALCULATED.3IONS-SCNC: 12 MMOL/L (ref 5–15)
AST SERPL-CCNC: 20 U/L (ref 1–40)
BASOPHILS # BLD AUTO: 0.03 10*3/MM3 (ref 0–0.2)
BASOPHILS NFR BLD AUTO: 0.7 % (ref 0–1.5)
BILIRUB SERPL-MCNC: 0.4 MG/DL (ref 0–1.2)
BUN SERPL-MCNC: 32 MG/DL (ref 8–23)
BUN/CREAT SERPL: 19.6 (ref 7–25)
CALCIUM SPEC-SCNC: 9.1 MG/DL (ref 8.6–10.5)
CHLORIDE SERPL-SCNC: 101 MMOL/L (ref 98–107)
CO2 SERPL-SCNC: 25 MMOL/L (ref 22–29)
CREAT SERPL-MCNC: 1.63 MG/DL (ref 0.76–1.27)
DEPRECATED RDW RBC AUTO: 48.9 FL (ref 37–54)
EGFRCR SERPLBLD CKD-EPI 2021: 43.7 ML/MIN/1.73
EOSINOPHIL # BLD AUTO: 0.15 10*3/MM3 (ref 0–0.4)
EOSINOPHIL NFR BLD AUTO: 3.3 % (ref 0.3–6.2)
ERYTHROCYTE [DISTWIDTH] IN BLOOD BY AUTOMATED COUNT: 13.8 % (ref 12.3–15.4)
GEN 5 1HR TROPONIN T REFLEX: 35 NG/L
GLOBULIN UR ELPH-MCNC: 2.9 GM/DL
GLUCOSE SERPL-MCNC: 199 MG/DL (ref 65–99)
HCT VFR BLD AUTO: 40.6 % (ref 37.5–51)
HGB BLD-MCNC: 13.2 G/DL (ref 13–17.7)
IMM GRANULOCYTES # BLD AUTO: 0.04 10*3/MM3 (ref 0–0.05)
IMM GRANULOCYTES NFR BLD AUTO: 0.9 % (ref 0–0.5)
LYMPHOCYTES # BLD AUTO: 0.99 10*3/MM3 (ref 0.7–3.1)
LYMPHOCYTES NFR BLD AUTO: 21.7 % (ref 19.6–45.3)
MCH RBC QN AUTO: 31.9 PG (ref 26.6–33)
MCHC RBC AUTO-ENTMCNC: 32.5 G/DL (ref 31.5–35.7)
MCV RBC AUTO: 98.1 FL (ref 79–97)
MONOCYTES # BLD AUTO: 0.37 10*3/MM3 (ref 0.1–0.9)
MONOCYTES NFR BLD AUTO: 8.1 % (ref 5–12)
NEUTROPHILS NFR BLD AUTO: 2.99 10*3/MM3 (ref 1.7–7)
NEUTROPHILS NFR BLD AUTO: 65.3 % (ref 42.7–76)
NRBC BLD AUTO-RTO: 0 /100 WBC (ref 0–0.2)
NT-PROBNP SERPL-MCNC: <36 PG/ML (ref 0–1800)
PLATELET # BLD AUTO: 72 10*3/MM3 (ref 140–450)
PMV BLD AUTO: 12.5 FL (ref 6–12)
POTASSIUM SERPL-SCNC: 5.1 MMOL/L (ref 3.5–5.2)
PROT SERPL-MCNC: 6.7 G/DL (ref 6–8.5)
RBC # BLD AUTO: 4.14 10*6/MM3 (ref 4.14–5.8)
SODIUM SERPL-SCNC: 138 MMOL/L (ref 136–145)
TROPONIN T % DELTA: 0
TROPONIN T NUMERIC DELTA: 0 NG/L
TROPONIN T SERPL HS-MCNC: 35 NG/L
WBC NRBC COR # BLD AUTO: 4.57 10*3/MM3 (ref 3.4–10.8)

## 2025-05-22 PROCEDURE — 93005 ELECTROCARDIOGRAM TRACING: CPT | Performed by: EMERGENCY MEDICINE

## 2025-05-22 PROCEDURE — 83880 ASSAY OF NATRIURETIC PEPTIDE: CPT | Performed by: EMERGENCY MEDICINE

## 2025-05-22 PROCEDURE — 71045 X-RAY EXAM CHEST 1 VIEW: CPT

## 2025-05-22 PROCEDURE — 85025 COMPLETE CBC W/AUTO DIFF WBC: CPT | Performed by: EMERGENCY MEDICINE

## 2025-05-22 PROCEDURE — 36415 COLL VENOUS BLD VENIPUNCTURE: CPT

## 2025-05-22 PROCEDURE — 80053 COMPREHEN METABOLIC PANEL: CPT | Performed by: EMERGENCY MEDICINE

## 2025-05-22 PROCEDURE — 84484 ASSAY OF TROPONIN QUANT: CPT | Performed by: EMERGENCY MEDICINE

## 2025-05-22 PROCEDURE — 99284 EMERGENCY DEPT VISIT MOD MDM: CPT

## 2025-05-22 NOTE — ED PROVIDER NOTES
[place]    EMERGENCY DEPARTMENT ENCOUNTER      Pt Name: Elton Funez  MRN: [id number]  YOB: 1950  Date of evaluation: 5/22/2025  Provider: Bryon Gregg MD    CHIEF COMPLAINT       Chief Complaint   Patient presents with    Heartburn         HISTORY OF PRESENT ILLNESS   Elton Funez is a 74 y.o. male who presents to the emergency department ***       Nursing notes were reviewed.    REVIEW OF SYSTEMS     ROS:  A chief complaint appropriate review of systems was completed and is negative except as noted in the HPI.      PAST MEDICAL HISTORY     Past Medical History:   Diagnosis Date    Abdominal pain 05/17/2023    RUQ; SEEN IN ED AT [facility]; DC'D HOME    Abscess of arm, right     Abscess of skin of neck     Acute sinusitis     ALT (SGPT) level raised     Arthritis     Arthritis of neck     Bacteremia due to Klebsiella pneumoniae 06/12/2023    BPH (benign prostatic hypertrophy)     Breast cancer 05/2023    right    Cholelithiasis     Cirrhosis of liver 09/03/2021    [facility]    CKD (chronic kidney disease)     Colon polyp     Constipation     COVID     DDD (degenerative disc disease), cervical     Decreased platelet count     Depression     Resolved: 1/28/2015    Difficulty walking     Elevated AST (SGOT)     Erectile dysfunction     GERD (gastroesophageal reflux disease)     History of transfusion 2021    [facility]; 3 UNITS; NO REACTION    HL (hearing loss)     Hyperlipidemia     Hypertension     Hypothyroidism, adult 11/10/2020    Infection     MSSA lumbar surgical wound infection 3/2017    Jaw pain     Left hip pain     Low back pain     Surgery 30 yrs L4-5    Lumbosacral disc disease     Migraine     Hx of    MRSA (methicillin resistant Staphylococcus aureus)     Neuropathy in diabetes     Feet    Obesity     Obesity (BMI 30.0-34.9) 10/07/2016    BMI 31.92    Obstructive sleep apnea     CPAP    Pancreatitis 06/2023    Peripheral neuropathy     Portal hypertensive gastropathy 09/03/2021    Renal  insufficiency     Shigellosis     Sleep apnea     PT TO BRING MASK AND TUBING DAY OF SURGERY    Symptomatic anemia 08/30/2021    Type 2 diabetes mellitus     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     Vitamin D deficiency     Wears glasses     Wears hearing aid     BOTH EARS         SURGICAL HISTORY       Past Surgical History:   Procedure Laterality Date    ANTERIOR CERVICAL DISCECTOMY W/ FUSION N/A 12/14/2017    Procedure: CERVICAL DISCECTOMY ANTERIOR FUSION WITH INSTRUMENTATION C7/T1;  Surgeon: Gigi Perales MD;  Location:  [facility];  Service:     BACK SURGERY      L4-L5    CERVICAL ARTHRODESIS      CERVICAL DISCECTOMY POSTERIOR FUSION WITH INSTRUMENTATION N/A 03/16/2017    Procedure:  INCISION AND DRAINAGE OF POSTERIOR CERVICAL WOUND;  Surgeon: Gigi Perales MD;  Location:  [facility];  Service:     CERVICAL LAMINECTOMY DECOMPRESSION POSTERIOR Left 02/28/2017    Procedure: Left C7-T1 CERVICAL FORAMINOTOMY POSTERIOR WITH METRIX;  Surgeon: Gigi Perales MD;  Location:  [facility];  Service:     CHOLECYSTECTOMY  3/18/2024    CHOLECYSTECTOMY WITH INTRAOPERATIVE CHOLANGIOGRAM N/A 03/18/2024    Procedure: CHOLECYSTECTOMY LAPAROSCOPIC AND LARAROSCOPIC LIVER BIOPSY;  Surgeon: Emerson Spear MD;  Location:  [facility];  Service: General;  Laterality: N/A;    COLONOSCOPY N/A 09/01/2021    Procedure: COLONOSCOPY;  Surgeon: Sharif García MD;  Location:  [facility];  Service: Gastroenterology;  Laterality: N/A;    CYST REMOVAL  04/2021    ENDOSCOPY N/A 08/31/2021    Procedure: ESOPHAGOGASTRODUODENOSCOPY;  Surgeon: Brunner, Mark I, MD;  Location:  [facility];  Service: Gastroenterology;  Laterality: N/A;    ERCP N/A 06/14/2023    Procedure: ENDOSCOPIC RETROGRADE CHOLANGIOPANCREATOGRAPHY;  Surgeon: Brunner, Mark I, MD;  Location:  [facility];  Service: Gastroenterology;  Laterality: N/A;  SPHINCTERTOMY MADE AT [facility]  AND BALLOON SWEEP OF CBD DONE WITH 9-12 BALLOON    LIVER  BIOPSY      LYMPH NODE BIOPSY  6/2023    Negative    MASTECTOMY W/ SENTINEL NODE BIOPSY Right 05/23/2023    Procedure: BREAST MASTECTOMY WITH SENTINEL NODE BIOPSY RIGHT;  Surgeon: Joseph Ramirez MD;  Location: [facility];  Service: General;  Laterality: Right;    NECK SURGERY      C6-C7, C7-T1 fused    SPINAL FUSION      UPPER GASTROINTESTINAL ENDOSCOPY      VASECTOMY      WISDOM TOOTH EXTRACTION           CURRENT MEDICATIONS     No current facility-administered medications for this encounter.    Current Outpatient Medications:     Alpha-Lipoic Acid 600 MG tablet, Take 1 tablet by mouth Daily. Ran out, last took on Wednesday 3/13., Disp: , Rfl:     B Complex Vitamins (VITAMIN B COMPLEX PO), Take 1 tablet by mouth every night at bedtime., Disp: , Rfl:     B-D UF III MINI PEN NEEDLES 31G X 5 MM misc, USE THREE TIMES A DAY, Disp: 270 each, Rfl: 3    carvedilol (COREG) 3.125 MG tablet, Take 1 tablet by mouth Every 12 (Twelve) Hours., Disp: 180 tablet, Rfl: 2    Coenzyme Q10 (Co Q 10) 100 MG capsule, Take 300 mg by mouth Daily., Disp: , Rfl:     colestipol (COLESTID) 1 g tablet, TAKE 2 TABLETS TWICE A DAY, Disp: 360 tablet, Rfl: 3    Continuous Blood Gluc  (FreeStyle Winston 2 Empire) device, 1 Device Every 14 (Fourteen) Days., Disp: 1 each, Rfl: 0    Continuous Blood Gluc Sensor (FreeStyle Winston 2 Sensor) misc, 1 each Every 14 (Fourteen) Days., Disp: 6 each, Rfl: 3    Dulaglutide (Trulicity) 4.5 MG/0.5ML solution auto-injector, Inject 4.5 mg under the skin into the appropriate area as directed 1 (One) Time Per Week., Disp: 6 mL, Rfl: 0    ferrous sulfate (FeroSul) 325 (65 FE) MG tablet, Take 1 tablet by mouth 2 (Two) Times a Day., Disp: 180 tablet, Rfl: 1    Flaxseed, Linseed, (Flaxseed Oil) 1400 MG capsule, Take 1 capsule by mouth 2 (Two) Times a Day., Disp: , Rfl:     gemfibrozil (LOPID) 600 MG tablet, TAKE 1 TABLET TWICE A DAY, Disp: 180 tablet, Rfl: 3    Insulin Glargine (Lantus SoloStar) 100 UNIT/ML  injection pen, INJECT 70 UNITS UNDER THE SKIN INTO THE APPROPRIATE AREA AS DIRECTED EVERY NIGHT, Disp: 63 mL, Rfl: 0    Insulin Lispro, 1 Unit Dial, (HumaLOG KwikPen) 100 UNIT/ML solution pen-injector, Inject 30 Units under the skin into the appropriate area as directed 2 (Two) Times a Day., Disp: 90 mL, Rfl: 3    ipratropium (ATROVENT) 0.06 % nasal spray, USE 2 SPRAYS IN EACH NOSTRIL AS DIRECTED BY PROVIDER DAILY, Disp: 45 mL, Rfl: 1    levothyroxine (SYNTHROID, LEVOTHROID) 75 MCG tablet, TAKE 1 TABLET DAILY, Disp: 90 tablet, Rfl: 0    Multiple Vitamins-Minerals (MULTIVITAMIN PO), Take 1 tablet by mouth Daily., Disp: , Rfl:     omeprazole (priLOSEC) 40 MG capsule, TAKE 1 CAPSULE DAILY, Disp: 90 capsule, Rfl: 3    pregabalin (Lyrica) 100 MG capsule, Take 1 capsule by mouth Daily AND 2 capsules Every Night., Disp: 90 capsule, Rfl: 4    saccharomyces boulardii (FLORASTOR) 250 MG capsule, Take 1 capsule by mouth 2 (Two) Times a Day. (Patient taking differently: Take 1 capsule by mouth Daily.), Disp: 30 capsule, Rfl: 0    sertraline (ZOLOFT) 100 MG tablet, TAKE 1 TABLET DAILY, Disp: 90 tablet, Rfl: 3    spironolactone (ALDACTONE) 25 MG tablet, TAKE 1 TABLET DAILY, Disp: 90 tablet, Rfl: 3    tamoxifen (NOLVADEX) 20 MG chemo tablet, Take 1 tablet by mouth Daily., Disp: 90 tablet, Rfl: 3    temazepam (Restoril) 7.5 MG capsule, Take 1 capsule by mouth At Night As Needed for Sleep., Disp: , Rfl:     tiZANidine (ZANAFLEX) 4 MG tablet, TAKE 1 TABLET TWICE A DAY AS NEEDED FOR MUSCLE SPASMS, Disp: 180 tablet, Rfl: 3    vitamin D3 125 MCG (5000 UT) capsule capsule, Take 1 capsule by mouth 2 (Two) Times a Day., Disp: , Rfl:     ALLERGIES     Glucophage xr [metformin hcl er], Metformin, Tetracyclines & related, Tetracycline, and Chlorhexidine    FAMILY HISTORY       Family History   Problem Relation Age of Onset    Hearing loss Mother     Arthritis Mother     Cancer Mother     Heart disease Mother     Thyroid disease Mother      Rheumatologic disease Mother     Migraines Mother     Diabetes Father     Heart disease Father         Cardiomyopathy    Hyperlipidemia Father     Stroke Father     Hypertension Father     Heart failure Father     Diabetes Paternal Grandmother     Vision loss Paternal Grandmother     Colon polyps Other     Colon cancer Neg Hx     Esophageal cancer Neg Hx           SOCIAL HISTORY       Social History     Socioeconomic History    Marital status:    Tobacco Use    Smoking status: Former     Current packs/day: 0.00     Average packs/day: 0.5 packs/day for 4.0 years (2.0 ttl pk-yrs)     Types: Cigarettes     Start date: 1972     Quit date: 1976     Years since quittin.4     Passive exposure: Past    Smokeless tobacco: Never   Vaping Use    Vaping status: Never Used   Substance and Sexual Activity    Alcohol use: Yes     Comment: Socially    Drug use: Never    Sexual activity: Not Currently     Partners: Female     Birth control/protection: Vasectomy         PHYSICAL EXAM    (up to 7 for level 4, 8 or more for level 5)   There were no vitals filed for this visit.    ***      DIAGNOSTIC RESULTS     EKG: All EKGs are interpreted by the Emergency Department Physician who either signs or Co-signs this chart in the absence of a cardiologist.    No orders to display         RADIOLOGY:   [x] Radiologist's Report Reviewed:  No orders to display       I ordered and independently reviewed the above noted radiographic studies.        LABS:    I have reviewed and interpreted all of the currently available lab results from this visit (if applicable):  Results for orders placed or performed in visit on 25   PSA Total+% Free (Serial)    Collection Time: 25  9:36 AM    Specimen: Blood   Result Value Ref Range    PSA 4.2 (H) 0.0 - 4.0 ng/mL    PSA, Free 2.16 N/A ng/mL    PSA, Free % 51.4 %   Renal Function Panel    Collection Time: 25  9:36 AM    Specimen: Blood   Result Value Ref Range    Glucose 151  (H) 65 - 99 mg/dL    BUN 22 8 - 23 mg/dL    Creatinine 1.79 (H) 0.76 - 1.27 mg/dL    Sodium 139 136 - 145 mmol/L    Potassium 4.1 3.5 - 5.2 mmol/L    Chloride 104 98 - 107 mmol/L    CO2 20.8 (L) 22.0 - 29.0 mmol/L    Calcium 9.7 8.6 - 10.5 mg/dL    Albumin 4.4 3.5 - 5.2 g/dL    Phosphorus 2.6 2.5 - 4.5 mg/dL    Anion Gap 14.2 5.0 - 15.0 mmol/L    BUN/Creatinine Ratio 12.3 7.0 - 25.0    eGFR 39.0 (L) >60.0 mL/min/1.73   Hemoglobin    Collection Time: 05/05/25  9:36 AM    Specimen: Blood   Result Value Ref Range    Hemoglobin 14.7 13.0 - 17.7 g/dL   Microalbumin / Creatinine Urine Ratio - Urine, Clean Catch    Collection Time: 05/05/25  9:36 AM    Specimen: Urine, Clean Catch   Result Value Ref Range    Microalbumin/Creatinine Ratio 494.5 (H) 0.0 - 29.0 mg/g    Creatinine, Urine 99.7 mg/dL    Microalbumin, Urine 49.3 mg/dL   Urinalysis without microscopic (no culture) - Urine, Clean Catch    Collection Time: 05/05/25  9:36 AM    Specimen: Urine, Clean Catch   Result Value Ref Range    Color, UA Yellow Yellow, Straw    Appearance, UA Clear Clear    pH, UA 6.0 5.0 - 8.0    Specific Gravity, UA 1.019 1.005 - 1.030    Glucose, UA Negative Negative    Ketones, UA Negative Negative    Bilirubin, UA Negative Negative    Blood, UA Negative Negative    Protein,  mg/dL (2+) (A) Negative    Leuk Esterase, UA Trace (A) Negative    Nitrite, UA Negative Negative    Urobilinogen, UA 0.2 E.U./dL 0.2 - 1.0 E.U./dL   Urinalysis, Microscopic Only - Urine, Clean Catch    Collection Time: 05/05/25  9:36 AM    Specimen: Urine, Clean Catch   Result Value Ref Range    RBC, UA 0-2 None Seen, 0-2 /HPF    WBC, UA 3-5 (A) None Seen, 0-2 /HPF    Bacteria, UA None Seen None Seen /HPF    Squamous Epithelial Cells, UA 0-2 None Seen, 0-2 /HPF    Hyaline Casts, UA 0-2 None Seen /LPF    Methodology Automated Microscopy      *Note: Due to a large number of results and/or encounters for the requested time period, some results have not been  displayed. A complete set of results can be found in Results Review.        If labs were ordered, I independently reviewed the results and considered them in treating the patient.      EMERGENCY DEPARTMENT COURSE and DIFFERENTIAL DIAGNOSIS/MDM:   Vitals:  AS OF 12:58 EDT    BP -    HR -    TEMP -    O2 SATS -          Discussion below represents my analysis of pertinent findings related to patient's condition, differential diagnosis, treatment plan and final disposition.      Differential diagnosis:  The differential diagnosis associated with the patient's presentation includes: ***      Independent interpretations (ECG/rhythm strip/X-ray/US/CT scan): ***      Additional sources:  Discussed/obtained information from independent historians:   [] Spouse:   [] Parent:   [] Friend:   [] EMS:   [] Other:  External (non-ED) record review:   [] Inpatient record:   [] Office record:   [] Outpatient record:   [] Prior Outpatient labs:   [] Prior Outpatient radiology:   [] Primary Care record:   [] Outside ED record:   [] Other:       Patient's care impacted by:   [] Diabetes   [] Hypertension   [] Coronary Artery Disease   [] Cancer   [] Other:     Care significantly affected by Social Determinants of Health (housing and economic circumstances, unemployment)    [] Yes     [] No   If yes, Patient's care significantly limited by  Social Determinants of Health including:    [] Inadequate housing    [] Low income    [] Alcoholism and drug addiction in family    [] Problems related to primary support group    [] Unemployment    [] Problems related to employment    [] Other Social Determinants of Health:       Consideration of admission/observation vs discharge: ***      I considered prescription management with: ***   [] Pain medication:   [] Antiviral:   [] Antibiotic:   [] Other:    Additional orders considered but not ordered:  The following testing was considered but ultimately not selected after discussion with  patient/family: ***    ED Course:           ***    I had a discussion with the patient/family regarding diagnosis, diagnostic results, treatment plan, and medications.  The patient/family indicated understanding of these instructions.  I spent adequate time at the bedside preceding discharge necessary to personally discuss the aftercare instructions, giving patient education, providing explanations of the results of our evaluations/findings, and my decision making to assure that the patient/family understand the plan of care.  Time was allotted to answer questions at that time and throughout the ED course.  Emphasis was placed on timely follow-up after discharge.  I also discussed the potential for the development of an acute emergent condition requiring further evaluation, admission, or even surgical intervention. I discussed that we found nothing during the visit today indicating the need for further workup, admission, or the presence of an unstable medical condition.  I encouraged the patient to return to the emergency department immediately for ANY concerns, worsening, new complaints, or if symptoms persist and unable to seek follow-up in a timely fashion.  The patient/family expressed understanding and agreement with this plan.  The patient will follow-up with their PCP in 1-2 days for reevaluation.           PROCEDURES:  Procedures    CRITICAL CARE TIME        FINAL IMPRESSION    No diagnosis found.      DISPOSITION/PLAN     ED Disposition       None              Comment: Please note this report has been produced using speech recognition software.      Bryon Gregg MD  Attending Emergency Physician           monitor - no pulm infiltrate and the patient is in NSR      Additional sources:  Discussed/obtained information from independent historians:   [] Spouse:   [] Parent:   [] Friend:   [x] EMS: Report was taken from EMS - [facility] during transport   [] Other:  External (non-ED) record review:   [] Inpatient record:   [] Office record:   [] Outpatient record:   [] Prior Outpatient labs:   [] Prior Outpatient radiology:   [] Primary Care record:   [] Outside ED record:   [] Other:       Patient's care impacted by:   [x] Diabetes   [x] Hypertension   [] Coronary Artery Disease   [] Cancer   [x] Other: HLD    Care significantly affected by Social Determinants of Health (housing and economic circumstances, unemployment)    [] Yes     [x] No   If yes, Patient's care significantly limited by  Social Determinants of Health including:    [] Inadequate housing    [] Low income    [] Alcoholism and drug addiction in family    [] Problems related to primary support group    [] Unemployment    [] Problems related to employment    [] Other Social Determinants of Health:       Consideration of admission/observation vs discharge: Considered obs however sx atypical, workup negative, stable for outpatient follow up.        ED Course:    ED Course as of 05/29/25 1042   Thu May 22, 2025   1542 On reexamination, the patient is completely asymptomatic.  He is resting comfortably in bed and maintains normal vital signs.  From a cardiac standpoint, workup was unrevealing.  ECG without any acute ischemic changes, appears similar to previous ECGs within our system.  Patient has some mild chronic troponin elevation based on review of prior labs and troponin is at about that level today and flat on repeat testing.  He has stable chronic renal dysfunction.  Chest x-ray is negative for pneumonia or pneumothorax.  Had burning substernal pain but no ripping or tearing pain, severe pain, or radiation of pain to suggest dissection.  Pain is currently  resolved and he has not had any dyspnea, tachycardia, or hypoxia to suggest pulmonary embolism.  Given his age and risk factors, I did offer admission for further evaluation and discussed his specific risk level, however he declines at this time in favor of following up as an outpatient.  I have encouraged him to return to the emergency department immediately if he has any recurrent chest pain, difficulty breathing, unexplained sweating, or any other concerning symptoms. [NS]      ED Course User Index  [NS] Bryon Gregg MD           I had a discussion with the patient/family regarding diagnosis, diagnostic results, treatment plan, and medications.  The patient/family indicated understanding of these instructions.  I spent adequate time at the bedside preceding discharge necessary to personally discuss the aftercare instructions, giving patient education, providing explanations of the results of our evaluations/findings, and my decision making to assure that the patient/family understand the plan of care.  Time was allotted to answer questions at that time and throughout the ED course.  Emphasis was placed on timely follow-up after discharge.  I also discussed the potential for the development of an acute emergent condition requiring further evaluation, admission, or even surgical intervention. I discussed that we found nothing during the visit today indicating the need for further workup, admission, or the presence of an unstable medical condition.  I encouraged the patient to return to the emergency department immediately for ANY concerns, worsening, new complaints, or if symptoms persist and unable to seek follow-up in a timely fashion.  The patient/family expressed understanding and agreement with this plan.  The patient will follow-up with their PCP in 1-2 days for reevaluation.           PROCEDURES:  Procedures    CRITICAL CARE TIME        FINAL IMPRESSION      1. Acute chest pain    2. History of  diabetes mellitus    3. History of hypertension    4. Chronic kidney disease, unspecified CKD stage          DISPOSITION/PLAN     ED Disposition       ED Disposition   Discharge    Condition   Stable    Comment   --                 Comment: Please note this report has been produced using speech recognition software.      Bryon Gregg MD  Attending Emergency Physician             Bryon Gregg MD  05/29/25 1971

## 2025-05-25 ENCOUNTER — APPOINTMENT (OUTPATIENT)
Dept: CT IMAGING | Facility: HOSPITAL | Age: 75
End: 2025-05-25
Payer: MEDICARE

## 2025-05-25 LAB
BASOPHILS # BLD AUTO: 0.02 10*3/MM3 (ref 0–0.2)
BASOPHILS NFR BLD AUTO: 0.3 % (ref 0–1.5)
DEPRECATED RDW RBC AUTO: 50.1 FL (ref 37–54)
EOSINOPHIL # BLD AUTO: 0.16 10*3/MM3 (ref 0–0.4)
EOSINOPHIL NFR BLD AUTO: 2.3 % (ref 0.3–6.2)
ERYTHROCYTE [DISTWIDTH] IN BLOOD BY AUTOMATED COUNT: 13.8 % (ref 12.3–15.4)
HCT VFR BLD AUTO: 44.8 % (ref 37.5–51)
HGB BLD-MCNC: 14.5 G/DL (ref 13–17.7)
IMM GRANULOCYTES # BLD AUTO: 0.03 10*3/MM3 (ref 0–0.05)
IMM GRANULOCYTES NFR BLD AUTO: 0.4 % (ref 0–0.5)
LYMPHOCYTES # BLD AUTO: 1.2 10*3/MM3 (ref 0.7–3.1)
LYMPHOCYTES NFR BLD AUTO: 17 % (ref 19.6–45.3)
MCH RBC QN AUTO: 31.8 PG (ref 26.6–33)
MCHC RBC AUTO-ENTMCNC: 32.4 G/DL (ref 31.5–35.7)
MCV RBC AUTO: 98.2 FL (ref 79–97)
MONOCYTES # BLD AUTO: 0.61 10*3/MM3 (ref 0.1–0.9)
MONOCYTES NFR BLD AUTO: 8.6 % (ref 5–12)
NEUTROPHILS NFR BLD AUTO: 5.05 10*3/MM3 (ref 1.7–7)
NEUTROPHILS NFR BLD AUTO: 71.4 % (ref 42.7–76)
NRBC BLD AUTO-RTO: 0 /100 WBC (ref 0–0.2)
PLATELET # BLD AUTO: 72 10*3/MM3 (ref 140–450)
PMV BLD AUTO: 12.8 FL (ref 6–12)
RBC # BLD AUTO: 4.56 10*6/MM3 (ref 4.14–5.8)
WBC NRBC COR # BLD AUTO: 7.07 10*3/MM3 (ref 3.4–10.8)
WHOLE BLOOD HOLD COAG: NORMAL
WHOLE BLOOD HOLD SPECIMEN: NORMAL

## 2025-05-25 PROCEDURE — 99285 EMERGENCY DEPT VISIT HI MDM: CPT

## 2025-05-25 PROCEDURE — 85025 COMPLETE CBC W/AUTO DIFF WBC: CPT | Performed by: EMERGENCY MEDICINE

## 2025-05-25 PROCEDURE — 36415 COLL VENOUS BLD VENIPUNCTURE: CPT

## 2025-05-25 PROCEDURE — 74177 CT ABD & PELVIS W/CONTRAST: CPT

## 2025-05-25 PROCEDURE — 93005 ELECTROCARDIOGRAM TRACING: CPT | Performed by: EMERGENCY MEDICINE

## 2025-05-25 PROCEDURE — 25510000001 IOPAMIDOL 61 % SOLUTION: Performed by: EMERGENCY MEDICINE

## 2025-05-25 PROCEDURE — 93005 ELECTROCARDIOGRAM TRACING: CPT

## 2025-05-25 RX ORDER — SODIUM CHLORIDE 0.9 % (FLUSH) 0.9 %
10 SYRINGE (ML) INJECTION AS NEEDED
Status: DISCONTINUED | OUTPATIENT
Start: 2025-05-25 | End: 2025-05-29 | Stop reason: HOSPADM

## 2025-05-25 RX ORDER — IOPAMIDOL 612 MG/ML
85 INJECTION, SOLUTION INTRAVASCULAR
Status: COMPLETED | OUTPATIENT
Start: 2025-05-25 | End: 2025-05-25

## 2025-05-25 RX ORDER — MORPHINE SULFATE 2 MG/ML
2 INJECTION, SOLUTION INTRAMUSCULAR; INTRAVENOUS ONCE
Status: COMPLETED | OUTPATIENT
Start: 2025-05-26 | End: 2025-05-26

## 2025-05-25 RX ORDER — ONDANSETRON 2 MG/ML
4 INJECTION INTRAMUSCULAR; INTRAVENOUS ONCE
Status: COMPLETED | OUTPATIENT
Start: 2025-05-25 | End: 2025-05-26

## 2025-05-25 RX ADMIN — IOPAMIDOL 85 ML: 612 INJECTION, SOLUTION INTRAVENOUS at 22:49

## 2025-05-26 ENCOUNTER — HOSPITAL ENCOUNTER (INPATIENT)
Facility: HOSPITAL | Age: 75
LOS: 3 days | Discharge: HOME | End: 2025-05-29
Attending: EMERGENCY MEDICINE | Admitting: INTERNAL MEDICINE
Payer: MEDICARE

## 2025-05-26 DIAGNOSIS — K85.90 ACUTE PANCREATITIS, UNSPECIFIED COMPLICATION STATUS, UNSPECIFIED PANCREATITIS TYPE: Primary | ICD-10-CM

## 2025-05-26 LAB
ALBUMIN SERPL-MCNC: 3.9 G/DL (ref 3.5–5.2)
ALBUMIN/GLOB SERPL: 1.1 G/DL
ALP SERPL-CCNC: 67 U/L (ref 39–117)
ALT SERPL W P-5'-P-CCNC: 7 U/L (ref 1–41)
ANION GAP SERPL CALCULATED.3IONS-SCNC: 13 MMOL/L (ref 5–15)
ANION GAP SERPL CALCULATED.3IONS-SCNC: 9 MMOL/L (ref 5–15)
AST SERPL-CCNC: 16 U/L (ref 1–40)
BACTERIA UR QL AUTO: ABNORMAL /HPF
BASOPHILS # BLD AUTO: 0.02 10*3/MM3 (ref 0–0.2)
BASOPHILS NFR BLD AUTO: 0.4 % (ref 0–1.5)
BILIRUB SERPL-MCNC: 0.5 MG/DL (ref 0–1.2)
BILIRUB UR QL STRIP: NEGATIVE
BUN SERPL-MCNC: 22 MG/DL (ref 8–23)
BUN SERPL-MCNC: 23 MG/DL (ref 8–23)
BUN/CREAT SERPL: 14.8 (ref 7–25)
BUN/CREAT SERPL: 15.6 (ref 7–25)
CALCIUM SPEC-SCNC: 8.6 MG/DL (ref 8.6–10.5)
CALCIUM SPEC-SCNC: 8.9 MG/DL (ref 8.6–10.5)
CHLORIDE SERPL-SCNC: 105 MMOL/L (ref 98–107)
CHLORIDE SERPL-SCNC: 105 MMOL/L (ref 98–107)
CLARITY UR: CLEAR
CO2 SERPL-SCNC: 21 MMOL/L (ref 22–29)
CO2 SERPL-SCNC: 23 MMOL/L (ref 22–29)
COLOR UR: YELLOW
CREAT SERPL-MCNC: 1.47 MG/DL (ref 0.76–1.27)
CREAT SERPL-MCNC: 1.49 MG/DL (ref 0.76–1.27)
DEPRECATED RDW RBC AUTO: 50 FL (ref 37–54)
EGFRCR SERPLBLD CKD-EPI 2021: 48.6 ML/MIN/1.73
EGFRCR SERPLBLD CKD-EPI 2021: 49.4 ML/MIN/1.73
EOSINOPHIL # BLD AUTO: 0.14 10*3/MM3 (ref 0–0.4)
EOSINOPHIL NFR BLD AUTO: 2.9 % (ref 0.3–6.2)
ERYTHROCYTE [DISTWIDTH] IN BLOOD BY AUTOMATED COUNT: 13.7 % (ref 12.3–15.4)
GEN 5 1HR TROPONIN T REFLEX: 41 NG/L
GLOBULIN UR ELPH-MCNC: 3.4 GM/DL
GLUCOSE BLDC GLUCOMTR-MCNC: 135 MG/DL (ref 70–130)
GLUCOSE BLDC GLUCOMTR-MCNC: 188 MG/DL (ref 70–130)
GLUCOSE BLDC GLUCOMTR-MCNC: 202 MG/DL (ref 70–130)
GLUCOSE BLDC GLUCOMTR-MCNC: 223 MG/DL (ref 70–130)
GLUCOSE SERPL-MCNC: 131 MG/DL (ref 65–99)
GLUCOSE SERPL-MCNC: 89 MG/DL (ref 65–99)
GLUCOSE UR STRIP-MCNC: NEGATIVE MG/DL
HBA1C MFR BLD: 7 % (ref 4.8–5.6)
HCT VFR BLD AUTO: 40 % (ref 37.5–51)
HGB BLD-MCNC: 12.9 G/DL (ref 13–17.7)
HGB UR QL STRIP.AUTO: NEGATIVE
HOLD SPECIMEN: NORMAL
HYALINE CASTS UR QL AUTO: ABNORMAL /LPF
IMM GRANULOCYTES # BLD AUTO: 0.02 10*3/MM3 (ref 0–0.05)
IMM GRANULOCYTES NFR BLD AUTO: 0.4 % (ref 0–0.5)
KETONES UR QL STRIP: NEGATIVE
LEUKOCYTE ESTERASE UR QL STRIP.AUTO: NEGATIVE
LIPASE SERPL-CCNC: 1902 U/L (ref 13–60)
LYMPHOCYTES # BLD AUTO: 1.02 10*3/MM3 (ref 0.7–3.1)
LYMPHOCYTES NFR BLD AUTO: 20.9 % (ref 19.6–45.3)
MCH RBC QN AUTO: 31.9 PG (ref 26.6–33)
MCHC RBC AUTO-ENTMCNC: 32.3 G/DL (ref 31.5–35.7)
MCV RBC AUTO: 99 FL (ref 79–97)
MONOCYTES # BLD AUTO: 0.48 10*3/MM3 (ref 0.1–0.9)
MONOCYTES NFR BLD AUTO: 9.9 % (ref 5–12)
NEUTROPHILS NFR BLD AUTO: 3.19 10*3/MM3 (ref 1.7–7)
NEUTROPHILS NFR BLD AUTO: 65.5 % (ref 42.7–76)
NITRITE UR QL STRIP: NEGATIVE
NRBC BLD AUTO-RTO: 0 /100 WBC (ref 0–0.2)
PH UR STRIP.AUTO: 5.5 [PH] (ref 5–8)
PLATELET # BLD AUTO: 72 10*3/MM3 (ref 140–450)
PMV BLD AUTO: 12.9 FL (ref 6–12)
POTASSIUM SERPL-SCNC: 4 MMOL/L (ref 3.5–5.2)
POTASSIUM SERPL-SCNC: 5.2 MMOL/L (ref 3.5–5.2)
PROT SERPL-MCNC: 7.3 G/DL (ref 6–8.5)
PROT UR QL STRIP: ABNORMAL
QT INTERVAL: 392 MS
QTC INTERVAL: 394 MS
RBC # BLD AUTO: 4.04 10*6/MM3 (ref 4.14–5.8)
RBC # UR STRIP: ABNORMAL /HPF
REF LAB TEST METHOD: ABNORMAL
SODIUM SERPL-SCNC: 137 MMOL/L (ref 136–145)
SODIUM SERPL-SCNC: 139 MMOL/L (ref 136–145)
SP GR UR STRIP: 1.02 (ref 1–1.03)
SQUAMOUS #/AREA URNS HPF: ABNORMAL /HPF
TROPONIN T % DELTA: -5
TROPONIN T NUMERIC DELTA: -2 NG/L
TROPONIN T SERPL HS-MCNC: 43 NG/L
UROBILINOGEN UR QL STRIP: ABNORMAL
WBC # UR STRIP: ABNORMAL /HPF
WBC NRBC COR # BLD AUTO: 4.87 10*3/MM3 (ref 3.4–10.8)

## 2025-05-26 PROCEDURE — 63710000001 INSULIN LISPRO (HUMAN) PER 5 UNITS: Performed by: NURSE PRACTITIONER

## 2025-05-26 PROCEDURE — 81001 URINALYSIS AUTO W/SCOPE: CPT | Performed by: EMERGENCY MEDICINE

## 2025-05-26 PROCEDURE — 85025 COMPLETE CBC W/AUTO DIFF WBC: CPT | Performed by: NURSE PRACTITIONER

## 2025-05-26 PROCEDURE — 84484 ASSAY OF TROPONIN QUANT: CPT | Performed by: EMERGENCY MEDICINE

## 2025-05-26 PROCEDURE — 80053 COMPREHEN METABOLIC PANEL: CPT | Performed by: EMERGENCY MEDICINE

## 2025-05-26 PROCEDURE — 99223 1ST HOSP IP/OBS HIGH 75: CPT | Performed by: NURSE PRACTITIONER

## 2025-05-26 PROCEDURE — 83036 HEMOGLOBIN GLYCOSYLATED A1C: CPT | Performed by: NURSE PRACTITIONER

## 2025-05-26 PROCEDURE — 25010000002 MORPHINE PER 10 MG: Performed by: NURSE PRACTITIONER

## 2025-05-26 PROCEDURE — 82948 REAGENT STRIP/BLOOD GLUCOSE: CPT

## 2025-05-26 PROCEDURE — 63710000001 INSULIN GLARGINE PER 5 UNITS: Performed by: NURSE PRACTITIONER

## 2025-05-26 PROCEDURE — 25010000002 ONDANSETRON PER 1 MG: Performed by: EMERGENCY MEDICINE

## 2025-05-26 PROCEDURE — 97161 PT EVAL LOW COMPLEX 20 MIN: CPT

## 2025-05-26 PROCEDURE — 83690 ASSAY OF LIPASE: CPT | Performed by: EMERGENCY MEDICINE

## 2025-05-26 PROCEDURE — 25810000003 SODIUM CHLORIDE 0.9 % SOLUTION: Performed by: FAMILY MEDICINE

## 2025-05-26 PROCEDURE — 25810000003 SODIUM CHLORIDE 0.9 % SOLUTION: Performed by: EMERGENCY MEDICINE

## 2025-05-26 PROCEDURE — 25010000002 MORPHINE PER 10 MG: Performed by: EMERGENCY MEDICINE

## 2025-05-26 RX ORDER — SODIUM CHLORIDE 9 MG/ML
150 INJECTION, SOLUTION INTRAVENOUS CONTINUOUS
Status: ACTIVE | OUTPATIENT
Start: 2025-05-26 | End: 2025-05-27

## 2025-05-26 RX ORDER — NALOXONE HCL 0.4 MG/ML
0.4 VIAL (ML) INJECTION
Status: DISCONTINUED | OUTPATIENT
Start: 2025-05-26 | End: 2025-05-29 | Stop reason: HOSPADM

## 2025-05-26 RX ORDER — PREGABALIN 100 MG/1
100 CAPSULE ORAL DAILY
Status: DISCONTINUED | OUTPATIENT
Start: 2025-05-26 | End: 2025-05-29 | Stop reason: HOSPADM

## 2025-05-26 RX ORDER — POLYETHYLENE GLYCOL 3350 17 G/17G
17 POWDER, FOR SOLUTION ORAL DAILY PRN
Status: DISCONTINUED | OUTPATIENT
Start: 2025-05-26 | End: 2025-05-29 | Stop reason: HOSPADM

## 2025-05-26 RX ORDER — SODIUM CHLORIDE 0.9 % (FLUSH) 0.9 %
10 SYRINGE (ML) INJECTION AS NEEDED
Status: DISCONTINUED | OUTPATIENT
Start: 2025-05-26 | End: 2025-05-29 | Stop reason: HOSPADM

## 2025-05-26 RX ORDER — SERTRALINE HYDROCHLORIDE 100 MG/1
100 TABLET, FILM COATED ORAL DAILY
Status: DISCONTINUED | OUTPATIENT
Start: 2025-05-26 | End: 2025-05-29 | Stop reason: HOSPADM

## 2025-05-26 RX ORDER — BISACODYL 5 MG/1
5 TABLET, DELAYED RELEASE ORAL DAILY PRN
Status: DISCONTINUED | OUTPATIENT
Start: 2025-05-26 | End: 2025-05-29 | Stop reason: HOSPADM

## 2025-05-26 RX ORDER — HEPARIN SODIUM 5000 [USP'U]/ML
5000 INJECTION, SOLUTION INTRAVENOUS; SUBCUTANEOUS EVERY 12 HOURS SCHEDULED
Status: DISCONTINUED | OUTPATIENT
Start: 2025-05-26 | End: 2025-05-26

## 2025-05-26 RX ORDER — NITROGLYCERIN 0.4 MG/1
0.4 TABLET SUBLINGUAL
Status: DISCONTINUED | OUTPATIENT
Start: 2025-05-26 | End: 2025-05-29 | Stop reason: HOSPADM

## 2025-05-26 RX ORDER — PANTOPRAZOLE SODIUM 40 MG/1
40 TABLET, DELAYED RELEASE ORAL
Status: DISCONTINUED | OUTPATIENT
Start: 2025-05-26 | End: 2025-05-27

## 2025-05-26 RX ORDER — INSULIN LISPRO 100 [IU]/ML
2-7 INJECTION, SOLUTION INTRAVENOUS; SUBCUTANEOUS
Status: DISCONTINUED | OUTPATIENT
Start: 2025-05-26 | End: 2025-05-29 | Stop reason: HOSPADM

## 2025-05-26 RX ORDER — FERROUS SULFATE 325(65) MG
325 TABLET ORAL 2 TIMES DAILY
Status: DISCONTINUED | OUTPATIENT
Start: 2025-05-26 | End: 2025-05-29 | Stop reason: HOSPADM

## 2025-05-26 RX ORDER — CARVEDILOL 3.12 MG/1
3.12 TABLET ORAL EVERY 12 HOURS SCHEDULED
Status: DISCONTINUED | OUTPATIENT
Start: 2025-05-26 | End: 2025-05-29 | Stop reason: HOSPADM

## 2025-05-26 RX ORDER — TAMOXIFEN CITRATE 10 MG/1
20 TABLET ORAL DAILY
Status: DISCONTINUED | OUTPATIENT
Start: 2025-05-26 | End: 2025-05-29 | Stop reason: HOSPADM

## 2025-05-26 RX ORDER — SODIUM CHLORIDE 9 MG/ML
150 INJECTION, SOLUTION INTRAVENOUS CONTINUOUS
Status: ACTIVE | OUTPATIENT
Start: 2025-05-26 | End: 2025-05-26

## 2025-05-26 RX ORDER — PREGABALIN 100 MG/1
200 CAPSULE ORAL NIGHTLY
Status: DISCONTINUED | OUTPATIENT
Start: 2025-05-26 | End: 2025-05-29 | Stop reason: HOSPADM

## 2025-05-26 RX ORDER — SODIUM CHLORIDE 9 MG/ML
40 INJECTION, SOLUTION INTRAVENOUS AS NEEDED
Status: DISCONTINUED | OUTPATIENT
Start: 2025-05-26 | End: 2025-05-29 | Stop reason: HOSPADM

## 2025-05-26 RX ORDER — NICOTINE POLACRILEX 4 MG
15 LOZENGE BUCCAL
Status: DISCONTINUED | OUTPATIENT
Start: 2025-05-26 | End: 2025-05-29 | Stop reason: HOSPADM

## 2025-05-26 RX ORDER — SACCHAROMYCES BOULARDII 250 MG
250 CAPSULE ORAL DAILY
Status: DISCONTINUED | OUTPATIENT
Start: 2025-05-26 | End: 2025-05-29 | Stop reason: HOSPADM

## 2025-05-26 RX ORDER — IBUPROFEN 600 MG/1
1 TABLET ORAL
Status: DISCONTINUED | OUTPATIENT
Start: 2025-05-26 | End: 2025-05-29 | Stop reason: HOSPADM

## 2025-05-26 RX ORDER — SPIRONOLACTONE 25 MG/1
25 TABLET ORAL DAILY
Status: DISCONTINUED | OUTPATIENT
Start: 2025-05-26 | End: 2025-05-29 | Stop reason: HOSPADM

## 2025-05-26 RX ORDER — MORPHINE SULFATE 2 MG/ML
2 INJECTION, SOLUTION INTRAMUSCULAR; INTRAVENOUS EVERY 4 HOURS PRN
Status: DISCONTINUED | OUTPATIENT
Start: 2025-05-26 | End: 2025-05-29 | Stop reason: HOSPADM

## 2025-05-26 RX ORDER — BISACODYL 10 MG
10 SUPPOSITORY, RECTAL RECTAL DAILY PRN
Status: DISCONTINUED | OUTPATIENT
Start: 2025-05-26 | End: 2025-05-29 | Stop reason: HOSPADM

## 2025-05-26 RX ORDER — SODIUM CHLORIDE 0.9 % (FLUSH) 0.9 %
10 SYRINGE (ML) INJECTION EVERY 12 HOURS SCHEDULED
Status: DISCONTINUED | OUTPATIENT
Start: 2025-05-26 | End: 2025-05-29 | Stop reason: HOSPADM

## 2025-05-26 RX ORDER — LEVOTHYROXINE SODIUM 75 UG/1
75 TABLET ORAL DAILY
Status: DISCONTINUED | OUTPATIENT
Start: 2025-05-26 | End: 2025-05-29 | Stop reason: HOSPADM

## 2025-05-26 RX ORDER — AMOXICILLIN 250 MG
2 CAPSULE ORAL 2 TIMES DAILY PRN
Status: DISCONTINUED | OUTPATIENT
Start: 2025-05-26 | End: 2025-05-29 | Stop reason: HOSPADM

## 2025-05-26 RX ORDER — TEMAZEPAM 7.5 MG/1
7.5 CAPSULE ORAL NIGHTLY PRN
Status: DISCONTINUED | OUTPATIENT
Start: 2025-05-26 | End: 2025-05-29 | Stop reason: HOSPADM

## 2025-05-26 RX ORDER — DEXTROSE MONOHYDRATE 25 G/50ML
25 INJECTION, SOLUTION INTRAVENOUS
Status: DISCONTINUED | OUTPATIENT
Start: 2025-05-26 | End: 2025-05-29 | Stop reason: HOSPADM

## 2025-05-26 RX ADMIN — INSULIN GLARGINE 10 UNITS: 100 INJECTION, SOLUTION SUBCUTANEOUS at 20:13

## 2025-05-26 RX ADMIN — Medication 10 ML: at 09:35

## 2025-05-26 RX ADMIN — MORPHINE SULFATE 2 MG: 2 INJECTION, SOLUTION INTRAMUSCULAR; INTRAVENOUS at 00:46

## 2025-05-26 RX ADMIN — SODIUM CHLORIDE 1000 ML: 9 INJECTION, SOLUTION INTRAVENOUS at 00:46

## 2025-05-26 RX ADMIN — INSULIN LISPRO 3 UNITS: 100 INJECTION, SOLUTION INTRAVENOUS; SUBCUTANEOUS at 17:05

## 2025-05-26 RX ADMIN — PREGABALIN 200 MG: 100 CAPSULE ORAL at 20:12

## 2025-05-26 RX ADMIN — INSULIN LISPRO 2 UNITS: 100 INJECTION, SOLUTION INTRAVENOUS; SUBCUTANEOUS at 12:09

## 2025-05-26 RX ADMIN — SPIRONOLACTONE 25 MG: 25 TABLET ORAL at 09:35

## 2025-05-26 RX ADMIN — SODIUM CHLORIDE 150 ML/HR: 9 INJECTION, SOLUTION INTRAVENOUS at 12:48

## 2025-05-26 RX ADMIN — INSULIN LISPRO 3 UNITS: 100 INJECTION, SOLUTION INTRAVENOUS; SUBCUTANEOUS at 20:13

## 2025-05-26 RX ADMIN — MORPHINE SULFATE 2 MG: 2 INJECTION, SOLUTION INTRAMUSCULAR; INTRAVENOUS at 05:36

## 2025-05-26 RX ADMIN — SODIUM CHLORIDE 150 ML/HR: 9 INJECTION, SOLUTION INTRAVENOUS at 07:19

## 2025-05-26 RX ADMIN — LEVOTHYROXINE SODIUM 75 MCG: 0.07 TABLET ORAL at 05:32

## 2025-05-26 RX ADMIN — PANTOPRAZOLE SODIUM 40 MG: 40 TABLET, DELAYED RELEASE ORAL at 05:32

## 2025-05-26 RX ADMIN — PREGABALIN 100 MG: 100 CAPSULE ORAL at 09:34

## 2025-05-26 RX ADMIN — TAMOXIFEN CITRATE 20 MG: 10 TABLET, FILM COATED ORAL at 09:35

## 2025-05-26 RX ADMIN — MORPHINE SULFATE 2 MG: 2 INJECTION, SOLUTION INTRAMUSCULAR; INTRAVENOUS at 10:32

## 2025-05-26 RX ADMIN — Medication 250 MG: at 09:34

## 2025-05-26 RX ADMIN — CARVEDILOL 3.12 MG: 3.12 TABLET, FILM COATED ORAL at 09:35

## 2025-05-26 RX ADMIN — CARVEDILOL 3.12 MG: 3.12 TABLET, FILM COATED ORAL at 20:13

## 2025-05-26 RX ADMIN — Medication 5000 UNITS: at 20:13

## 2025-05-26 RX ADMIN — FERROUS SULFATE TAB 325 MG (65 MG ELEMENTAL FE) 325 MG: 325 (65 FE) TAB at 17:05

## 2025-05-26 RX ADMIN — ONDANSETRON 4 MG: 2 INJECTION INTRAMUSCULAR; INTRAVENOUS at 00:46

## 2025-05-26 RX ADMIN — MORPHINE SULFATE 2 MG: 2 INJECTION, SOLUTION INTRAMUSCULAR; INTRAVENOUS at 17:15

## 2025-05-26 RX ADMIN — Medication 5000 UNITS: at 09:35

## 2025-05-26 RX ADMIN — SERTRALINE 100 MG: 100 TABLET, FILM COATED ORAL at 09:34

## 2025-05-26 RX ADMIN — FERROUS SULFATE TAB 325 MG (65 MG ELEMENTAL FE) 325 MG: 325 (65 FE) TAB at 09:34

## 2025-05-26 NOTE — H&P
[facility] Medicine Services  HISTORY AND PHYSICAL    Patient Name: Elton Funez  : 1950  MRN: [id number]  Primary Care Physician: Tc Ramirez MD  Date of admission: 2025    Subjective   Subjective     Chief Complaint:  Abdominal pain    HPI:  Elton Funez is a 74 y.o. male with a history of breast cancer, HTN, HLD, JEROME cirrhosis, FAVIO, anxiety, depression, hypothyroidism, presents the ED with complaints of abdominal pain.  Patient reports that he has been experiencing epigastric abdominal pain for the past 4 days.  He was seen in the ED on Friday with complaints of heartburn, negative workup and discharged home.  Patient states that he has been experiencing some constipation, but denies any fevers, chills, nausea, vomiting, or any other issues at this time.  He has had pancreatitis in the past and states that his symptoms are similar to that episode.    CT abdomen pelvis shows findings consistent with acute uncomplicated pancreatitis.  Patient was given a liter of saline in the ED, and is being admitted to the hospitalist for further evaluation management.    Review of Systems   Constitutional: Negative.    HENT: Negative.     Eyes: Negative.    Respiratory: Negative.     Cardiovascular: Negative.    Gastrointestinal:  Positive for abdominal pain and constipation. Negative for abdominal distention, diarrhea, nausea and vomiting.   Endocrine: Negative.    Genitourinary: Negative.    Musculoskeletal: Negative.    Skin: Negative.    Allergic/Immunologic: Negative.    Neurological: Negative.    Hematological: Negative.    Psychiatric/Behavioral: Negative.                  Personal History     Past Medical History:   Diagnosis Date    Abdominal pain 2023    RUQ; SEEN IN ED AT [facility]; DC'D HOME    Abscess of arm, right     Abscess of skin of neck     Acute sinusitis     ALT (SGPT) level raised     Arthritis     Arthritis of neck     Bacteremia due to Klebsiella  pneumoniae 06/12/2023    BPH (benign prostatic hypertrophy)     Breast cancer 05/2023    right    Cholelithiasis     Cirrhosis of liver 09/03/2021    JEROME    CKD (chronic kidney disease)     Colon polyp     Constipation     COVID     DDD (degenerative disc disease), cervical     Decreased platelet count     Depression     Resolved: 1/28/2015    Difficulty walking     Elevated AST (SGOT)     Erectile dysfunction     GERD (gastroesophageal reflux disease)     History of transfusion 2021    BHL; 3 UNITS; NO REACTION    HL (hearing loss)     Hyperlipidemia     Hypertension     Hypothyroidism, adult 11/10/2020    Infection     MSSA lumbar surgical wound infection 3/2017    Jaw pain     Left hip pain     Low back pain     Surgery 30 yrs L4-5    Lumbosacral disc disease     Migraine     Hx of    MRSA (methicillin resistant Staphylococcus aureus)     Neuropathy in diabetes     Feet    Obesity     Obesity (BMI 30.0-34.9) 10/07/2016    BMI 31.92    Obstructive sleep apnea     CPAP    Pancreatitis 06/2023    Peripheral neuropathy     Portal hypertensive gastropathy 09/03/2021    Renal insufficiency     Shigellosis     Sleep apnea     PT TO BRING MASK AND TUBING DAY OF SURGERY    Symptomatic anemia 08/30/2021    Type 2 diabetes mellitus     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     Vitamin D deficiency     Wears glasses     Wears hearing aid     BOTH EARS         Oncology Problem List:  Malignant neoplasm of right breast in male, estrogen receptor   positive (05/08/2023; Status: Active)        Past Surgical History:   Procedure Laterality Date    ANTERIOR CERVICAL DISCECTOMY W/ FUSION N/A 12/14/2017    Procedure: CERVICAL DISCECTOMY ANTERIOR FUSION WITH INSTRUMENTATION C7/T1;  Surgeon: Gigi Perales MD;  Location: [facility];  Service:     BACK SURGERY      L4-L5    CERVICAL ARTHRODESIS      CERVICAL DISCECTOMY POSTERIOR FUSION WITH INSTRUMENTATION N/A 03/16/2017    Procedure:  INCISION AND DRAINAGE OF POSTERIOR  CERVICAL WOUND;  Surgeon: Gigi Perales MD;  Location:  [facility];  Service:     CERVICAL LAMINECTOMY DECOMPRESSION POSTERIOR Left 02/28/2017    Procedure: Left C7-T1 CERVICAL FORAMINOTOMY POSTERIOR WITH METRIX;  Surgeon: Gigi Perales MD;  Location:  [facility];  Service:     CHOLECYSTECTOMY  3/18/2024    CHOLECYSTECTOMY WITH INTRAOPERATIVE CHOLANGIOGRAM N/A 03/18/2024    Procedure: CHOLECYSTECTOMY LAPAROSCOPIC AND LARAROSCOPIC LIVER BIOPSY;  Surgeon: Emerson Spear MD;  Location:  [facility];  Service: General;  Laterality: N/A;    COLONOSCOPY N/A 09/01/2021    Procedure: COLONOSCOPY;  Surgeon: Sharif García MD;  Location:  [facility];  Service: Gastroenterology;  Laterality: N/A;    CYST REMOVAL  04/2021    ENDOSCOPY N/A 08/31/2021    Procedure: ESOPHAGOGASTRODUODENOSCOPY;  Surgeon: Brunner, Mark I, MD;  Location:  [facility];  Service: Gastroenterology;  Laterality: N/A;    ERCP N/A 06/14/2023    Procedure: ENDOSCOPIC RETROGRADE CHOLANGIOPANCREATOGRAPHY;  Surgeon: Brunner, Mark I, MD;  Location:  [facility];  Service: Gastroenterology;  Laterality: N/A;  SPHINCTERTOMY MADE AT [facility]  AND BALLOON SWEEP OF CBD DONE WITH 9-12 BALLOON    LIVER BIOPSY      LYMPH NODE BIOPSY  6/2023    Negative    MASTECTOMY W/ SENTINEL NODE BIOPSY Right 05/23/2023    Procedure: BREAST MASTECTOMY WITH SENTINEL NODE BIOPSY RIGHT;  Surgeon: Joseph Ramirez MD;  Location:  [facility];  Service: General;  Laterality: Right;    NECK SURGERY      C6-C7, C7-T1 fused    SPINAL FUSION      UPPER GASTROINTESTINAL ENDOSCOPY      VASECTOMY      WISDOM TOOTH EXTRACTION         Family History:  family history includes Arthritis in his mother; Cancer in his mother; Colon polyps in an other family member; Diabetes in his father and paternal grandmother; Hearing loss in his mother; Heart disease in his father and mother; Heart failure in his father; Hyperlipidemia in his father; Hypertension in his father;  Migraines in his mother; Rheumatologic disease in his mother; Stroke in his father; Thyroid disease in his mother; Vision loss in his paternal grandmother.     Social History:  reports that he quit smoking about 49 years ago. His smoking use included cigarettes. He started smoking about 53 years ago. He has a 2 pack-year smoking history. He has been exposed to tobacco smoke. He has never used smokeless tobacco. He reports current alcohol use. He reports that he does not use drugs.  Social History     Social History Narrative    Not on file       Medications:  Alpha-Lipoic Acid, B Complex Vitamins, Co Q 10, Dulaglutide, Flaxseed Oil, FreeStyle [place] 2 [place], FreeStyle [place] 2 Sensor, Insulin Glargine, Insulin Lispro (1 Unit Dial), Insulin Pen Needle, carvedilol, colestipol, ferrous sulfate, gemfibrozil, ipratropium, levothyroxine, multivitamin, omeprazole, pregabalin, saccharomyces boulardii, sertraline, spironolactone, tamoxifen, temazepam, tiZANidine, and vitamin D3    Allergies   Allergen Reactions    Glucophage Xr [Metformin Hcl Er] Diarrhea and Other (See Comments)     Glucophage XR TB24: Adverse Reaction: Severe GI issues.    Metformin Nausea And Vomiting and Unknown - Low Severity     Other reaction(s): SEVERE INTESTIONAL ISSUES    Other reaction(s): SEVERE GI ISSUES    Glucophage XR TB24    MetFORMIN HCl TABS    Glucophage    metformin hydrochloride    Tetracyclines & Related Nausea Only     Adverse Reaction    Tetracycline Unknown - Low Severity     tetracycline hydrochloride    Chlorhexidine Rash       Objective   Objective     Vital Signs:   Temp:  [97.9 °F (36.6 °C)] 97.9 °F (36.6 °C)  Heart Rate:  [58-77] 58  Resp:  [18] 18  BP: (131-194)/(70-98) 144/75    Physical Exam  Constitutional:       General: He is not in acute distress.  HENT:      Head: Normocephalic.      Nose: Nose normal.      Mouth/Throat:      Mouth: Mucous membranes are moist.   Eyes:      Pupils: Pupils are equal, round, and reactive  to light.   Cardiovascular:      Rate and Rhythm: Normal rate and regular rhythm.      Pulses: Normal pulses.      Heart sounds: Normal heart sounds. No murmur heard.     No friction rub. No gallop.   Pulmonary:      Effort: Pulmonary effort is normal. No respiratory distress.      Breath sounds: Normal breath sounds. No wheezing, rhonchi or rales.   Abdominal:      General: Bowel sounds are normal. There is no distension.      Palpations: Abdomen is soft.      Tenderness: There is abdominal tenderness (RUQ and LUQ).   Musculoskeletal:         General: No swelling or tenderness. Normal range of motion.      Cervical back: Normal range of motion.   Skin:     General: Skin is warm and dry.      Coloration: Skin is not jaundiced.      Findings: No bruising, erythema, lesion or rash.   Neurological:      Mental Status: He is alert and oriented to person, place, and time.      Cranial Nerves: No cranial nerve deficit.      Sensory: No sensory deficit.   Psychiatric:         Mood and Affect: Mood normal.         Behavior: Behavior normal.         Thought Content: Thought content normal.         Judgment: Judgment normal.            Result Review:  I have personally reviewed the results from the time of this admission to 5/26/2025 04:26 EDT and agree with these findings:  [x]  Laboratory list / accordion  []  Microbiology  [x]  Radiology  [x]  EKG/Telemetry   []  Cardiology/Vascular   []  Pathology  [x]  Old records  []  Other:  Most notable findings include:     LAB RESULTS:      Lab 05/25/25  2132 05/22/25  1327   WBC 7.07 4.57   HEMOGLOBIN 14.5 13.2   HEMATOCRIT 44.8 40.6   PLATELETS 72* 72*   NEUTROS ABS 5.05 2.99   IMMATURE GRANS (ABS) 0.03 0.04   LYMPHS ABS 1.20 0.99   MONOS ABS 0.61 0.37   EOS ABS 0.16 0.15   MCV 98.2* 98.1*         Lab 05/26/25  0241 05/22/25  1327   SODIUM 139 138   POTASSIUM 4.0 5.1   CHLORIDE 105 101   CO2 21.0* 25.0   ANION GAP 13.0 12.0   BUN 23 32*   CREATININE 1.47* 1.63*   EGFR 49.4* 43.7*    GLUCOSE 89 199*   CALCIUM 8.6 9.1         Lab 05/26/25  0241 05/22/25  1327   TOTAL PROTEIN 7.3 6.7   ALBUMIN 3.9 3.8   GLOBULIN 3.4 2.9   ALT (SGPT) 7 9   AST (SGOT) 16 20   BILIRUBIN 0.5 0.4   ALK PHOS 67 82   LIPASE 1,902*  --          Lab 05/26/25  0241 05/22/25  1438 05/22/25  1327   PROBNP  --   --  <36.0   HSTROP T 43* 35* 35*                 Brief Urine Lab Results  (Last result in the past 365 days)        Color   Clarity   Blood   Leuk Est   Nitrite   Protein   CREAT   Urine HCG        05/26/25 0251 Yellow   Clear   Negative   Negative   Negative   100 mg/dL (2+)                 Microbiology Results (last 10 days)       ** No results found for the last 240 hours. **            CT Abdomen Pelvis With Contrast  Result Date: 5/25/2025  CT ABDOMEN PELVIS W CONTRAST Date of Exam: 5/25/2025 10:38 PM EDT Indication: epigastric pain. Comparison: 3/17/2024. Technique: Axial CT images were obtained of the abdomen and pelvis following the uneventful intravenous administration of intravenous contrast. Reconstructed coronal and sagittal images were also obtained. Automated exposure control and iterative construction methods were used. Findings: Lung Bases:   The visualized lung bases and lower mediastinal structures are unremarkable. Liver: The liver appears diffusely hypodense consistent with steatosis.. No focal lesions. Biliary/Gallbladder:  The gallbladder has been resected.. The biliary tree is nondilated. Spleen: Spleen is normal in size and CT density. Pancreas:  Mild inflammatory changes are seen surrounding the pancreatic body and tail. Trace amount of free fluid is present. No evidence of focal collection. No evidence of pancreatic ductal dilatation. No focal mass identified. Kidneys:  Kidneys are normal in size. There are no stones or hydronephrosis. Adrenals:  Adrenal glands are unremarkable. Retroperitoneal/Lymph Nodes/Vasculature:  No retroperitoneal adenopathy is identified. Gastrointestinal/Mesentery:   The bowel loops are non-dilated without wall thickening or mass. The appendix appears within normal limits. No evidence of obstruction. No free air. No mesenteric fluid collections identified. Moderate stool burden present. Mesenteric vasculature appears  unremarkable. No evidence of hernia. Bladder:  The bladder is normal. Genital:   Unremarkable       Bony Structures:   Visualized bony structures are consistent with the patient's age.     Impression: Impression: 1.Findings consistent with acute uncomplicated pancreatitis. 2.Hepatic steatosis. 3.Ancillary findings as described above. Electronically Signed: Courtney Rizo MD  5/25/2025 11:12 PM EDT  Workstation ID: [id number]      Results for orders placed during the hospital encounter of 03/20/23    Adult Transthoracic Echo Complete W/ Cont if Necessary Per Protocol    Interpretation Summary    Left ventricular ejection fraction appears to be 61 - 65%.    Left ventricular diastolic function is consistent with (grade I) impaired relaxation.    Calculated right ventricular systolic pressure from tricuspid regurgitation is 16 mmHg.      Assessment & Plan   Assessment & Plan       Pancreatitis    Stage 3a chronic kidney disease    Gastroesophageal reflux disease without esophagitis    Dyslipidemia, goal LDL below 70    Primary hypertension    FAVIO (obstructive sleep apnea)    Type 2 diabetes mellitus with diabetic polyneuropathy, with long-term current use of insulin    Acquired hypothyroidism    Liver cirrhosis secondary to JREOME    Malignant neoplasm of right breast in male, estrogen receptor positive    Elevated troponin    Elton Funez is a 74 y.o. male with a history of breast cancer, HTN, HLD, JEROME cirrhosis, FAVIO, anxiety, depression, hypothyroidism, presents the ED with complaints of abdominal pain.      Assessment and Plan:    Acute pancreatitis  -- CT abdomen pelvis shows findings consistent with acute uncomplicated pancreatitis.  -- lipase 1,902  -- IV  fluids  -- clear liquid diet  -- pain control  -- labs    T2DM  -- A1c  -- FSBG with SSI  -- Lantus 10 units QHS    HTN  HLD  -- coreg    Elevated troponin  -- troponin 43  -- pt denies chest pain  -- trend troponin and EKG    Neuropathy  -- follows with [facility], neurology  -- Lyrica    Breast cancer  -- s/p right mastectomy  -- follows with Dr. Archer  -- continue Tamoxifen    FAVIO  -- CPAP    JEROME cirrhosis  GERD  Portal hypertensive gastropathy  Esophageal varices  --CT abdomen pelvis shows hepatic steatosis  -- follows with GI  -- continue aldactone  -- PPI    CKD   -- creatinine 1.47  -- baseline creatinine ~ 1.2-1.5  -- monitor    Hypothyroidism  -- synthroid    Anxiety/Depression  -- zoloft      DVT prophylaxis:  Mechanical    CODE STATUS:    Code Status (Patient has no pulse and is not breathing): CPR (Attempt to Resuscitate)  Medical Interventions (Patient has pulse or is breathing): Full Support  Level Of Support Discussed With: Patient      Expected Discharge  TBD  Expected discharge date/ time has not been documented.      This note has been completed as part of a split-shared workflow.     Signature: Electronically signed by MELISSA Rodrigez, 05/26/25, 4:26 AM EDT.

## 2025-05-26 NOTE — THERAPY EVALUATION
Patient Name: Elton Funez  : 1950    MRN: [id number]                              Today's Date: 2025       Admit Date: 2025    Visit Dx:     ICD-10-CM ICD-9-CM   1. 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 without macular edema associated with type 2 diabetes mellitus    Acquired hypothyroidism    Anemia of chronic disease    Liver cirrhosis secondary to JEROME    Portal hypertensive gastropathy    Grade I diastolic dysfunction    Combined forms of age-related cataract of both eyes    Major depressive disorder with single episode, in full remission    Malignant neoplasm of right breast in male, estrogen receptor positive    Sepsis, due to unspecified organism, unspecified whether acute organ dysfunction present    Idiopathic acute pancreatitis    Pancreatitis    Acute cholecystitis    HLD (hyperlipidemia)    History of breast cancer    Elevated troponin    T2DM (type 2 diabetes mellitus)    Trigger ring finger of left hand     Past Medical History:   Diagnosis Date    Abdominal pain 2023    RUQ; SEEN IN ED AT [facility]; DC'D HOME    Abscess of arm, right     Abscess of skin of neck     Acute sinusitis     ALT (SGPT) level raised     Arthritis     Arthritis of neck     Bacteremia due to Klebsiella pneumoniae 2023    BPH (benign prostatic hypertrophy)     Breast cancer 2023    right    Cholelithiasis     Cirrhosis of liver 2021    JEROME     CKD (chronic kidney disease)     Colon polyp     Constipation     COVID     DDD (degenerative disc disease), cervical     Decreased platelet count     Depression     Resolved: 1/28/2015    Difficulty walking     Elevated AST (SGOT)     Erectile dysfunction     GERD (gastroesophageal reflux disease)     History of transfusion 2021    BHL; 3 UNITS; NO REACTION    HL (hearing loss)     Hyperlipidemia     Hypertension     Hypothyroidism, adult 11/10/2020    Infection     MSSA lumbar surgical wound infection 3/2017    Jaw pain     Left hip pain     Low back pain     Surgery 30 yrs L4-5    Lumbosacral disc disease     Migraine     Hx of    MRSA (methicillin resistant Staphylococcus aureus)     Neuropathy in diabetes     Feet    Obesity     Obesity (BMI 30.0-34.9) 10/07/2016    BMI 31.92    Obstructive sleep apnea     CPAP    Pancreatitis 06/2023    Peripheral neuropathy     Portal hypertensive gastropathy 09/03/2021    Renal insufficiency     Shigellosis     Sleep apnea     PT TO BRING MASK AND TUBING DAY OF SURGERY    Symptomatic anemia 08/30/2021    Type 2 diabetes mellitus     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     Vitamin D deficiency     Wears glasses     Wears hearing aid     BOTH EARS     Past Surgical History:   Procedure Laterality Date    ANTERIOR CERVICAL DISCECTOMY W/ FUSION N/A 12/14/2017    Procedure: CERVICAL DISCECTOMY ANTERIOR FUSION WITH INSTRUMENTATION C7/T1;  Surgeon: Gigi Perales MD;  Location:  [facility];  Service:     BACK SURGERY      L4-L5    CERVICAL ARTHRODESIS      CERVICAL DISCECTOMY POSTERIOR FUSION WITH INSTRUMENTATION N/A 03/16/2017    Procedure:  INCISION AND DRAINAGE OF POSTERIOR CERVICAL WOUND;  Surgeon: Gigi Perales MD;  Location:  [facility];  Service:     CERVICAL LAMINECTOMY DECOMPRESSION POSTERIOR Left 02/28/2017    Procedure: Left C7-T1 CERVICAL FORAMINOTOMY POSTERIOR WITH METRIX;  Surgeon: Gigi Perales MD;  Location:  [facility];  Service:      CHOLECYSTECTOMY  3/18/2024    CHOLECYSTECTOMY WITH INTRAOPERATIVE CHOLANGIOGRAM N/A 03/18/2024    Procedure: CHOLECYSTECTOMY LAPAROSCOPIC AND LARAROSCOPIC LIVER BIOPSY;  Surgeon: Emerson Spear MD;  Location:  [facility];  Service: General;  Laterality: N/A;    COLONOSCOPY N/A 09/01/2021    Procedure: COLONOSCOPY;  Surgeon: Sharif García MD;  Location:  [facility];  Service: Gastroenterology;  Laterality: N/A;    CYST REMOVAL  04/2021    ENDOSCOPY N/A 08/31/2021    Procedure: ESOPHAGOGASTRODUODENOSCOPY;  Surgeon: Brunner, Mark I, MD;  Location:  [facility];  Service: Gastroenterology;  Laterality: N/A;    ERCP N/A 06/14/2023    Procedure: ENDOSCOPIC RETROGRADE CHOLANGIOPANCREATOGRAPHY;  Surgeon: Brunner, Mark I, MD;  Location:  [facility];  Service: Gastroenterology;  Laterality: N/A;  SPHINCTERTOMY MADE AT [place]  AND BALLOON SWEEP OF CBD DONE WITH 9-12 BALLOON    LIVER BIOPSY      LYMPH NODE BIOPSY  6/2023    Negative    MASTECTOMY W/ SENTINEL NODE BIOPSY Right 05/23/2023    Procedure: BREAST MASTECTOMY WITH SENTINEL NODE BIOPSY RIGHT;  Surgeon: Joseph Ramirez MD;  Location:  [facility];  Service: General;  Laterality: Right;    NECK SURGERY      C6-C7, C7-T1 fused    SPINAL FUSION      UPPER GASTROINTESTINAL ENDOSCOPY      VASECTOMY      WISDOM TOOTH EXTRACTION        General Information       Row Name 05/26/25 1432          Physical Therapy Time and Intention    Document Type evaluation  -AE     Mode of Treatment physical therapy  -AE       Row Name 05/26/25 1432          General Information    Patient Profile Reviewed yes  -AE     Prior Level of Function independent:;all household mobility;gait;transfer;bed mobility;ADL's;dressing;bathing  Pt reports using SPC for mobility to/from car and rollator with community ambulation.  -AE     Existing Precautions/Restrictions fall;other (see comments)  BLE neuropathy with associated balance deficits  -AE     Barriers to Rehab previous functional  deficit  -AE       Row Name 05/26/25 1432          Living Environment    Current Living Arrangements home  -AE     People in Home spouse  -AE       Row Name 05/26/25 1432          Home Main Entrance    Number of Stairs, Main Entrance none  ramp to enter  -AE       Row Name 05/26/25 1432          Stairs Within Home, Primary    Number of Stairs, Within Home, Primary none  -AE     Stair Railings, Within Home, Primary none  -AE       Row Name 05/26/25 1432          Cognition    Orientation Status (Cognition) oriented x 4  -AE       Row Name 05/26/25 1432          Safety Issues/Impairments Affecting Functional Mobility    Safety Issues Affecting Function (Mobility) sequencing abilities  -AE     Impairments Affecting Function (Mobility) balance;coordination;strength;pain  -AE               User Key  (r) = Recorded By, (t) = Taken By, (c) = Cosigned By      Initials Name Provider Type    AE Te Lizarraga, PT Physical Therapist                   Mobility       Row Name 05/26/25 1441          Bed Mobility    Bed Mobility supine-sit;sit-supine  -AE     Supine-Sit [place] (Bed Mobility) contact guard  -AE     Sit-Supine [place] (Bed Mobility) contact guard  -AE     Assistive Device (Bed Mobility) bed rails;head of bed elevated  -AE     Comment, (Bed Mobility) VCs for hand placement and sequencing. Increased time required for bed mobility d/t abdominal pain.  -AE       Row Name 05/26/25 1441          Transfers    Comment, (Transfers) VCs for hand placement and sequencing. No overt LOB noted with mobility.  -AE       Row Name 05/26/25 1441          Sit-Stand Transfer    Sit-Stand [place] (Transfers) contact guard;1 person assist  -AE     Assistive Device (Sit-Stand Transfers) cane, straight  -AE       Row Name 05/26/25 1441          Gait/Stairs (Locomotion)    [place] Level (Gait) contact guard;verbal cues  -AE     Assistive Device (Gait) cane, straight  -AE     Distance in Feet (Gait) 300  -AE      Deviations/Abnormal Patterns (Gait) bilateral deviations;slava decreased;gait speed decreased;stride length decreased;base of support, narrow  -AE     Bilateral Gait Deviations forward flexed posture  -AE     Comment, (Gait/Stairs) Pt demo step through gait pattern with slowed slava and decreased gait speed. Pt required use of SPC to improve stability while ambulating. Pt did demo 2 LOB while ambulating, requiring min A to correct with gait belt. BLE neuropathy causes balance deficits.  -AE               User Key  (r) = Recorded By, (t) = Taken By, (c) = Cosigned By      Initials Name Provider Type    AE Te Lizarraga PT Physical Therapist                   Obj/Interventions       Row Name 05/26/25 1521          Range of Motion Comprehensive    General Range of Motion bilateral lower extremity ROM WFL  -AE       Row Name 05/26/25 1521          Strength Comprehensive (MMT)    General Manual Muscle Testing (MMT) Assessment lower extremity strength deficits identified  -AE     Comment, General Manual Muscle Testing (MMT) Assessment BLE grossly 4/5  -AE       Row Name 05/26/25 1521          Balance    Balance Assessment sitting static balance;sitting dynamic balance;standing static balance;standing dynamic balance  -AE     Static Sitting Balance standby assist  -AE     Dynamic Sitting Balance contact guard  -AE     Position, Sitting Balance unsupported;sitting edge of bed  -AE     Static Standing Balance contact guard  -AE     Dynamic Standing Balance contact guard  -AE     Position/Device Used, Standing Balance supported;cane, straight  -AE       Row Name 05/26/25 1521          Sensory Assessment (Somatosensory)    Sensory Assessment (Somatosensory) other (see comments)  bilat feet neuropathy  -AE               User Key  (r) = Recorded By, (t) = Taken By, (c) = Cosigned By      Initials Name Provider Type    AE Te Lizarraga PT Physical Therapist                   Goals/Plan       Row Name 05/26/25 1530           Bed Mobility Goal 1 (PT)    Activity/Assistive Device (Bed Mobility Goal 1, PT) sit to supine/supine to sit  -AE     [place] Level/Cues Needed (Bed Mobility Goal 1, PT) independent  -AE     Time Frame (Bed Mobility Goal 1, PT) short term goal (STG);5 days  -AE     Progress/Outcomes (Bed Mobility Goal 1, PT) new goal  -AE       Row Name 05/26/25 1533          Transfer Goal 1 (PT)    Activity/Assistive Device (Transfer Goal 1, PT) sit-to-stand/stand-to-sit;bed-to-chair/chair-to-bed  -AE     [place] Level/Cues Needed (Transfer Goal 1, PT) modified independence  -AE     Time Frame (Transfer Goal 1, PT) long term goal (LTG);10 days  -AE     Progress/Outcome (Transfer Goal 1, PT) new goal  -AE       Row Name 05/26/25 1533          Gait Training Goal 1 (PT)    Activity/Assistive Device (Gait Training Goal 1, PT) gait (walking locomotion);assistive device use  -AE     [place] Level (Gait Training Goal 1, PT) modified independence  -AE     Distance (Gait Training Goal 1, PT) 500ft  -AE     Time Frame (Gait Training Goal 1, PT) long term goal (LTG);10 days  -AE     Progress/Outcome (Gait Training Goal 1, PT) new goal  -AE       Row Name 05/26/25 1537          Therapy Assessment/Plan (PT)    Planned Therapy Interventions (PT) balance training;bed mobility training;gait training;home exercise program;patient/family education;postural re-education;ROM (range of motion);strengthening;transfer training  -AE               User Key  (r) = Recorded By, (t) = Taken By, (c) = Cosigned By      Initials Name Provider Type    AE Te Lizarraga, PT Physical Therapist                   Clinical Impression       Row Name 05/26/25 1529          Pain    Pretreatment Pain Rating 3/10  -AE     Posttreatment Pain Rating 3/10  -AE     Pain Location abdomen  -AE     Pain Side/Orientation generalized  -AE     Pain Management Interventions activity modification encouraged;exercise or physical activity utilized;positioning  techniques utilized  -AE     Response to Pain Interventions activity participation with tolerable pain  -AE       Row Name 05/26/25 1529          Plan of Care Review    Plan of Care Reviewed With patient  -AE     Progress no change  -AE     Outcome Evaluation Pt presents with decreased functional mobility and decreased activity tolerance compared to baseline. Pt ambulated 300ft with CGA and SPC for support. Recommend continued skilled IP PT interventions. Recommend D/C home with assist when medically appropriate.  -AE       Row Name 05/26/25 1529          Therapy Assessment/Plan (PT)    Patient/Family Therapy Goals Statement (PT) Get better  -AE     Rehab Potential (PT) good  -AE     Criteria for Skilled Interventions Met (PT) yes  -AE     Therapy Frequency (PT) daily  -AE     Predicted Duration of Therapy Intervention (PT) 1 week  -AE       Row Name 05/26/25 1529          Vital Signs    Pre Systolic BP Rehab 160  -AE     Pre Treatment Diastolic BP 90  -AE     Pretreatment Heart Rate (beats/min) 60  -AE     Posttreatment Heart Rate (beats/min) 65  -AE     O2 Delivery Pre Treatment room air  -AE     O2 Delivery Intra Treatment room air  -AE     Post SpO2 (%) 98  -AE     O2 Delivery Post Treatment room air  -AE     Pre Patient Position Supine  -AE     Intra Patient Position Standing  -AE     Post Patient Position Supine  -AE       Row Name 05/26/25 1529          Positioning and Restraints    Pre-Treatment Position in bed  -AE     Post Treatment Position bed  -AE     In Bed notified nsg;fowlers;call light within reach;encouraged to call for assist;side rails up x2  -AE               User Key  (r) = Recorded By, (t) = Taken By, (c) = Cosigned By      Initials Name Provider Type    AE Te Lizarraga, PT Physical Therapist                   Outcome Measures       Row Name 05/26/25 1535 05/26/25 1345       How much help from another person do you currently need...    Turning from your back to your side while in flat bed  without using bedrails? 4  -AE 4  -AM    Moving from lying on back to sitting on the side of a flat bed without bedrails? 3  -AE 4  -AM    Moving to and from a bed to a chair (including a wheelchair)? 3  -AE 3  -AM    Standing up from a chair using your arms (e.g., wheelchair, bedside chair)? 3  -AE 3  -AM    Climbing 3-5 steps with a railing? 3  -AE 3  -AM    To walk in hospital room? 3  -AE 3  -AM    AM-PAC 6 Clicks Score (PT) 19  -AE 20  -AM    Highest Level of Mobility Goal Walk 10 Steps or More-6  -AE Walk 10 Steps or More-6  -AM      Row Name 05/26/25 1210 05/26/25 0924       How much help from another person do you currently need...    Turning from your back to your side while in flat bed without using bedrails? 4  -AM 4  -AM    Moving from lying on back to sitting on the side of a flat bed without bedrails? 4  -AM 4  -AM    Moving to and from a bed to a chair (including a wheelchair)? 3  -AM 3  -AM    Standing up from a chair using your arms (e.g., wheelchair, bedside chair)? 3  -AM 3  -AM    Climbing 3-5 steps with a railing? 3  -AM 3  -AM    To walk in hospital room? 3  -AM 3  -AM    AM-PAC 6 Clicks Score (PT) 20  -AM 20  -AM    Highest Level of Mobility Goal Walk 10 Steps or More-6  -AM Walk 10 Steps or More-6  -AM      Row Name 05/26/25 0815 05/26/25 0600       How much help from another person do you currently need...    Turning from your back to your side while in flat bed without using bedrails? 4  -AM 4  -AC    Moving from lying on back to sitting on the side of a flat bed without bedrails? 4  -AM 4  -AC    Moving to and from a bed to a chair (including a wheelchair)? 3  -AM 3  -AC    Standing up from a chair using your arms (e.g., wheelchair, bedside chair)? 3  -AM 3  -AC    Climbing 3-5 steps with a railing? 3  -AM 3  -AC    To walk in hospital room? 3  -AM 4  -AC    AM-PAC 6 Clicks Score (PT) 20  -AM 21  -AC    Highest Level of Mobility Goal Walk 10 Steps or More-6  -AM Walk 10 Steps or More-6   -      Row Name 05/26/25 1535          Functional Assessment    Outcome Measure Options AM-PAC 6 Clicks Basic Mobility (PT)  -AE               User Key  (r) = Recorded By, (t) = Taken By, (c) = Cosigned By      Initials Name Provider Type    AM Gerardo Rossi, RN Registered Nurse    Te Atkinson, PT Physical Therapist    Pau Kim, RN Registered Nurse                                 Physical Therapy Education       Title: PT OT SLP Therapies (In Progress)       Topic: Physical Therapy (In Progress)       Point: Mobility training (In Progress)       Learning Progress Summary            Patient Acceptance, E, NR by AE at 5/26/2025 1336                      Point: Home exercise program (Not Started)       Learner Progress:  Not documented in this visit.              Point: Body mechanics (In Progress)       Learning Progress Summary            Patient Acceptance, E, NR by AE at 5/26/2025 1336                      Point: Precautions (In Progress)       Learning Progress Summary            Patient Acceptance, E, NR by AE at 5/26/2025 1336                                      User Key       Initials Effective Dates Name Provider Type Discipline    AE 09/21/21 -  Te Lizarraga, PT Physical Therapist PT                  PT Recommendation and Plan  Planned Therapy Interventions (PT): balance training, bed mobility training, gait training, home exercise program, patient/family education, postural re-education, ROM (range of motion), strengthening, transfer training  Progress: no change  Outcome Evaluation: Pt presents with decreased functional mobility and decreased activity tolerance compared to baseline. Pt ambulated 300ft with CGA and SPC for support. Recommend continued skilled IP PT interventions. Recommend D/C home with assist when medically appropriate.     Time Calculation:   PT Evaluation Complexity  History, PT Evaluation Complexity: 1-2 personal factors and/or comorbidities  Examination of Body  Systems (PT Eval Complexity): total of 3 or more elements  Clinical Presentation (PT Evaluation Complexity): stable  Clinical Decision Making (PT Evaluation Complexity): low complexity  Overall Complexity (PT Evaluation Complexity): low complexity     PT Charges       Row Name 05/26/25 1536             Time Calculation    Start Time 1336  -AE      PT Received On 05/26/25  -AE      PT Goal Re-Cert Due Date 06/05/25  -AE         Untimed Charges    PT Eval/Re-eval Minutes 46  -AE         Total Minutes    Untimed Charges Total Minutes 46  -AE       Total Minutes 46  -AE                User Key  (r) = Recorded By, (t) = Taken By, (c) = Cosigned By      Initials Name Provider Type    AE Te Lizarraga, PT Physical Therapist                  Therapy Charges for Today       Code Description Service Date Service Provider Modifiers Qty    16276517323 HC PT EVAL LOW COMPLEXITY 4 5/26/2025 Te Lizarraga, PT GP 1            PT G-Codes  Outcome Measure Options: AM-PAC 6 Clicks Basic Mobility (PT)  AM-PAC 6 Clicks Score (PT): 19  PT Discharge Summary  Anticipated Discharge Disposition (PT): home with assist    Te Lizarraga PT  5/26/2025

## 2025-05-26 NOTE — ED PROVIDER NOTES
Subjective  History of Present Illness:    Chief Complaint: Abdominal pain  History of Present Illness: 74-year-old male with abdominal pain, he states that his mid upper abdomen and right upper quadrant.  Previous history of gallbladder removal about a year ago.  He also has history of pancreatitis from his gallbladder disease, he states it feels similar.  The pain started suddenly after eating about an hour ago, he had chicken pot pie.  He states the pain radiates into his back  Onset: Sudden  Duration: Approximately 1 hour prior to arrival  Exacerbating / Alleviating factors: Pain started after eating  Associated symptoms: Radiates to the back      Nurses Notes reviewed and agree, including vitals, allergies, social history and prior medical history.     REVIEW OF SYSTEMS: All systems reviewed and not pertinent unless noted.    Review of Systems   Gastrointestinal:  Positive for abdominal pain and nausea.   All other systems reviewed and are negative.      Past Medical History:   Diagnosis Date    Abdominal pain 05/17/2023    RUQ; SEEN IN ED AT [facility]; DC'D HOME    Abscess of arm, right     Abscess of skin of neck     Acute sinusitis     ALT (SGPT) level raised     Arthritis     Arthritis of neck     Bacteremia due to Klebsiella pneumoniae 06/12/2023    BPH (benign prostatic hypertrophy)     Breast cancer 05/2023    right    Cholelithiasis     Cirrhosis of liver 09/03/2021    [facility]    CKD (chronic kidney disease)     Colon polyp     Constipation     COVID     DDD (degenerative disc disease), cervical     Decreased platelet count     Depression     Resolved: 1/28/2015    Difficulty walking     Elevated AST (SGOT)     Erectile dysfunction     GERD (gastroesophageal reflux disease)     History of transfusion 2021    [facility]; 3 UNITS; NO REACTION    HL (hearing loss)     Hyperlipidemia     Hypertension     Hypothyroidism, adult 11/10/2020    Infection     MSSA lumbar surgical wound infection 3/2017    Jaw pain     Left hip  pain     Low back pain     Surgery 30 yrs L4-5    Lumbosacral disc disease     Migraine     Hx of    MRSA (methicillin resistant Staphylococcus aureus)     Neuropathy in diabetes     Feet    Obesity     Obesity (BMI 30.0-34.9) 10/07/2016    BMI 31.92    Obstructive sleep apnea     CPAP    Pancreatitis 06/2023    Peripheral neuropathy     Portal hypertensive gastropathy 09/03/2021    Renal insufficiency     Shigellosis     Sleep apnea     PT TO BRING MASK AND TUBING DAY OF SURGERY    Symptomatic anemia 08/30/2021    Type 2 diabetes mellitus     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     Vitamin D deficiency     Wears glasses     Wears hearing aid     BOTH EARS       Allergies:    Glucophage xr [metformin hcl er], Metformin, Tetracyclines & related, Tetracycline, and Chlorhexidine      Past Surgical History:   Procedure Laterality Date    ANTERIOR CERVICAL DISCECTOMY W/ FUSION N/A 12/14/2017    Procedure: CERVICAL DISCECTOMY ANTERIOR FUSION WITH INSTRUMENTATION C7/T1;  Surgeon: Gigi Perales MD;  Location:  [facility];  Service:     BACK SURGERY      L4-L5    CERVICAL ARTHRODESIS      CERVICAL DISCECTOMY POSTERIOR FUSION WITH INSTRUMENTATION N/A 03/16/2017    Procedure:  INCISION AND DRAINAGE OF POSTERIOR CERVICAL WOUND;  Surgeon: Gigi Perales MD;  Location:  [facility];  Service:     CERVICAL LAMINECTOMY DECOMPRESSION POSTERIOR Left 02/28/2017    Procedure: Left C7-T1 CERVICAL FORAMINOTOMY POSTERIOR WITH METRIX;  Surgeon: Gigi Perales MD;  Location:  [facility];  Service:     CHOLECYSTECTOMY  3/18/2024    CHOLECYSTECTOMY WITH INTRAOPERATIVE CHOLANGIOGRAM N/A 03/18/2024    Procedure: CHOLECYSTECTOMY LAPAROSCOPIC AND LARAROSCOPIC LIVER BIOPSY;  Surgeon: Emerson Spear MD;  Location:  [facility];  Service: General;  Laterality: N/A;    COLONOSCOPY N/A 09/01/2021    Procedure: COLONOSCOPY;  Surgeon: Sharif García MD;  Location:  [facility];  Service: Gastroenterology;   Laterality: N/A;    CYST REMOVAL  2021    ENDOSCOPY N/A 2021    Procedure: ESOPHAGOGASTRODUODENOSCOPY;  Surgeon: Brunner, Mark I, MD;  Location:  [facility];  Service: Gastroenterology;  Laterality: N/A;    ERCP N/A 2023    Procedure: ENDOSCOPIC RETROGRADE CHOLANGIOPANCREATOGRAPHY;  Surgeon: Brunner, Mark I, MD;  Location:  [facility];  Service: Gastroenterology;  Laterality: N/A;  SPHINCTERTOMY MADE AT [facility]  AND BALLOON SWEEP OF CBD DONE WITH 9-12 BALLOON    LIVER BIOPSY      LYMPH NODE BIOPSY  2023    Negative    MASTECTOMY W/ SENTINEL NODE BIOPSY Right 2023    Procedure: BREAST MASTECTOMY WITH SENTINEL NODE BIOPSY RIGHT;  Surgeon: Joseph Ramirez MD;  Location:  [facility];  Service: General;  Laterality: Right;    NECK SURGERY      C6-C7, C7-T1 fused    SPINAL FUSION      UPPER GASTROINTESTINAL ENDOSCOPY      VASECTOMY      WISDOM TOOTH EXTRACTION           Social History     Socioeconomic History    Marital status:    Tobacco Use    Smoking status: Former     Current packs/day: 0.00     Average packs/day: 0.5 packs/day for 4.0 years (2.0 ttl pk-yrs)     Types: Cigarettes     Start date: 1972     Quit date: 1976     Years since quittin.4     Passive exposure: Past    Smokeless tobacco: Never   Vaping Use    Vaping status: Never Used   Substance and Sexual Activity    Alcohol use: Yes     Comment: Socially    Drug use: Never    Sexual activity: Not Currently     Partners: Female     Birth control/protection: Vasectomy         Family History   Problem Relation Age of Onset    Hearing loss Mother     Arthritis Mother     Cancer Mother     Heart disease Mother     Thyroid disease Mother     Rheumatologic disease Mother     Migraines Mother     Diabetes Father     Heart disease Father         Cardiomyopathy    Hyperlipidemia Father     Stroke Father     Hypertension Father     Heart failure Father     Diabetes Paternal Grandmother     Vision loss Paternal  "Grandmother     Colon polyps Other     Colon cancer Neg Hx     Esophageal cancer Neg Hx        Objective  Physical Exam:  /75   Pulse 58   Temp 97.9 °F (36.6 °C)   Resp 18   Ht 172.7 cm (68\")   Wt 86.2 kg (190 lb)   SpO2 97%   BMI 28.89 kg/m²      Physical Exam  Vitals and nursing note reviewed.   Constitutional:       Appearance: He is well-developed.   HENT:      Head: Normocephalic and atraumatic.      Mouth/Throat:      Mouth: Mucous membranes are moist.   Eyes:      Extraocular Movements: Extraocular movements intact.   Cardiovascular:      Rate and Rhythm: Normal rate and regular rhythm.   Pulmonary:      Effort: Pulmonary effort is normal.      Breath sounds: Normal breath sounds.   Abdominal:      Palpations: Abdomen is soft.      Tenderness: There is abdominal tenderness in the epigastric area.   Musculoskeletal:         General: Normal range of motion.      Cervical back: Normal range of motion.   Skin:     General: Skin is warm and dry.   Neurological:      Mental Status: He is alert and oriented to person, place, and time.   Psychiatric:         Behavior: Behavior normal.         Thought Content: Thought content normal.         Judgment: Judgment normal.           Procedures    ED Course:    ED Course as of 05/26/25 0413   Sun May 25, 2025   2055 Review of previous  non ED visits, prior labs, prior imaging, available notes from prior evaluations or visits with specialists, medication list, allergies, past medical history, past surgical history  Review of office visit on 5/6/2025 for neuropathy [CS]   2330 WBC: 7.07 [LD]   2330 Hemoglobin: 14.5 [LD]   2330 Platelets(!): 72  Same as 3 days ago [LD]   Mon May 26, 2025   0341 Message sent to the hospitalist for admission [CS]      ED Course User Index  [CS] Gigi Smith Jr., PA-C  [LD] Marcia Chirinos MD       Lab Results (last 24 hours)       Procedure Component Value Units Date/Time    CBC & Differential [[id number]]  (Abnormal) " Collected: 05/25/25 2132    Specimen: Blood Updated: 05/25/25 2146    Narrative:      The following orders were created for panel order CBC & Differential.  Procedure                               Abnormality         Status                     ---------                               -----------         ------                     CBC Auto Differential[[id number]]        Abnormal            Final result                 Please view results for these tests on the individual orders.    CBC Auto Differential [[id number]]  (Abnormal) Collected: 05/25/25 2132    Specimen: Blood Updated: 05/25/25 2146     WBC 7.07 10*3/mm3      RBC 4.56 10*6/mm3      Hemoglobin 14.5 g/dL      Hematocrit 44.8 %      MCV 98.2 fL      MCH 31.8 pg      MCHC 32.4 g/dL      RDW 13.8 %      RDW-SD 50.1 fl      MPV 12.8 fL      Platelets 72 10*3/mm3      Neutrophil % 71.4 %      Lymphocyte % 17.0 %      Monocyte % 8.6 %      Eosinophil % 2.3 %      Basophil % 0.3 %      Immature Grans % 0.4 %      Neutrophils, Absolute 5.05 10*3/mm3      Lymphocytes, Absolute 1.20 10*3/mm3      Monocytes, Absolute 0.61 10*3/mm3      Eosinophils, Absolute 0.16 10*3/mm3      Basophils, Absolute 0.02 10*3/mm3      Immature Grans, Absolute 0.03 10*3/mm3      nRBC 0.0 /100 WBC     Comprehensive Metabolic Panel [[id number]]  (Abnormal) Collected: 05/26/25 0241    Specimen: Blood from Arm, Left Updated: 05/26/25 0315     Glucose 89 mg/dL      BUN 23 mg/dL      Creatinine 1.47 mg/dL      Sodium 139 mmol/L      Potassium 4.0 mmol/L      Chloride 105 mmol/L      CO2 21.0 mmol/L      Calcium 8.6 mg/dL      Total Protein 7.3 g/dL      Albumin 3.9 g/dL      ALT (SGPT) 7 U/L      AST (SGOT) 16 U/L      Alkaline Phosphatase 67 U/L      Total Bilirubin 0.5 mg/dL      Globulin 3.4 gm/dL      Comment: Calculated Result        A/G Ratio 1.1 g/dL      BUN/Creatinine Ratio 15.6     Anion Gap 13.0 mmol/L      eGFR 49.4 mL/min/1.73     Narrative:      GFR Categories in Chronic Kidney Disease  (CKD)              GFR Category          GFR (mL/min/1.73)    Interpretation  G1                    90 or greater        Normal or high (1)  G2                    60-89                Mild decrease (1)  G3a                   45-59                Mild to moderate decrease  G3b                   30-44                Moderate to severe decrease  G4                    15-29                Severe decrease  G5                    14 or less           Kidney failure    (1)In the absence of evidence of kidney disease, neither GFR category G1 or G2 fulfill the criteria for CKD.    eGFR calculation 2021 CKD-EPI creatinine equation, which does not include race as a factor    Lipase [[id number]]  (Abnormal) Collected: 05/26/25 0241    Specimen: Blood from Arm, Left Updated: 05/26/25 0322     Lipase 1,902 U/L     High Sensitivity Troponin T [[id number]]  (Abnormal) Collected: 05/26/25 0241    Specimen: Blood from Arm, Left Updated: 05/26/25 0315     HS Troponin T 43 ng/L     Narrative:      High Sensitive Troponin T Reference Range:  <14.0 ng/L- Negative Female for AMI  <22.0 ng/L- Negative Male for AMI  >=14 - Abnormal Female indicating possible myocardial injury.  >=22 - Abnormal Male indicating possible myocardial injury.   Clinicians would have to utilize clinical acumen, EKG, Troponin, and serial changes to determine if it is an Acute Myocardial Infarction or myocardial injury due to an underlying chronic condition.         Urinalysis With Microscopic If Indicated (No Culture) - Urine, Clean Catch [[id number]]  (Abnormal) Collected: 05/26/25 0251    Specimen: Urine, Clean Catch Updated: 05/26/25 0313     Color, UA Yellow     Appearance, UA Clear     pH, UA 5.5     Specific Gravity, UA 1.024     Glucose, UA Negative     Ketones, UA Negative     Bilirubin, UA Negative     Blood, UA Negative     Protein,  mg/dL (2+)     Leuk Esterase, UA Negative     Nitrite, UA Negative     Urobilinogen, UA 0.2 E.U./dL    Urinalysis,  Microscopic Only - Urine, Clean Catch [[id number]]  (Abnormal) Collected: 05/26/25 0251    Specimen: Urine, Clean Catch Updated: 05/26/25 0313     RBC, UA 0-2 /HPF      WBC, UA 3-5 /HPF      Bacteria, UA None Seen /HPF      Squamous Epithelial Cells, UA 0-2 /HPF      Hyaline Casts, UA None Seen /LPF      Methodology Automated Microscopy             CT Abdomen Pelvis With Contrast  Result Date: 5/25/2025  CT ABDOMEN PELVIS W CONTRAST Date of Exam: 5/25/2025 10:38 PM EDT Indication: epigastric pain. Comparison: 3/17/2024. Technique: Axial CT images were obtained of the abdomen and pelvis following the uneventful intravenous administration of intravenous contrast. Reconstructed coronal and sagittal images were also obtained. Automated exposure control and iterative construction methods were used. Findings: Lung Bases:   The visualized lung bases and lower mediastinal structures are unremarkable. Liver: The liver appears diffusely hypodense consistent with steatosis.. No focal lesions. Biliary/Gallbladder:  The gallbladder has been resected.. The biliary tree is nondilated. Spleen: Spleen is normal in size and CT density. Pancreas:  Mild inflammatory changes are seen surrounding the pancreatic body and tail. Trace amount of free fluid is present. No evidence of focal collection. No evidence of pancreatic ductal dilatation. No focal mass identified. Kidneys:  Kidneys are normal in size. There are no stones or hydronephrosis. Adrenals:  Adrenal glands are unremarkable. Retroperitoneal/Lymph Nodes/Vasculature:  No retroperitoneal adenopathy is identified. Gastrointestinal/Mesentery:  The bowel loops are non-dilated without wall thickening or mass. The appendix appears within normal limits. No evidence of obstruction. No free air. No mesenteric fluid collections identified. Moderate stool burden present. Mesenteric vasculature appears  unremarkable. No evidence of hernia. Bladder:  The bladder is normal. Genital:    Unremarkable       Bony Structures:   Visualized bony structures are consistent with the patient's age.     Impression: Impression: 1.Findings consistent with acute uncomplicated pancreatitis. 2.Hepatic steatosis. 3.Ancillary findings as described above. Electronically Signed: Courtney Rizo MD  5/25/2025 11:12 PM EDT  Workstation ID: [id number]                                                                    Medical Decision Making  Patient Presentation 74-year-old male presenting with epigastric pain, right upper quadrant pain after eating    DDX pancreatitis, enteritis, colitis, kidney stone, GERD, peptic ulcer,    Data Review/ Non ED Records /Analysis/Ordering unique tests  Review of previous  non ED visits, prior labs, prior imaging, available notes from prior evaluations or visits with specialists, medication list, allergies, past medical history, past surgical history        Independent Review Studies  I Personally reviewed all laboratory studies performed in the emergency department     Intervention/Re-evaluation intervention included fluids, IV pain medicine and antiemetics    Independent Clinician discussed with the on-call hospitalist    Risk Stratification tools/clinical decision rules patient presented with epigastric pain, pain radiating to his back, the pain came on after eating.  Was concerned about enteritis or colitis, based on his previous history I also was concerned about pancreatitis, GERD or atypical chest pain.  Labs and CT scan were consistent with acute pancreatitis, pain was controlled with IV antiemetics and pain medication, patient mated for further care    Shared Decision Making discussed this with patient and family they were agreeable    Disposition patient stable for admission    Problems Addressed:  Acute pancreatitis, unspecified complication status, unspecified pancreatitis type: complicated acute illness or injury    Amount and/or Complexity of Data Reviewed  External Data  Reviewed: labs, radiology and notes.  Labs: ordered. Decision-making details documented in ED Course.  Radiology: ordered.  ECG/medicine tests: ordered.    Risk  Prescription drug management.  Decision regarding hospitalization.          Final diagnoses:   Acute pancreatitis, unspecified complication status, unspecified pancreatitis type           Disposition admission       Gigi Smith Jr., PA-THOM  05/26/25 0413

## 2025-05-26 NOTE — ED NOTES
Elton Funez    Nursing Report ED to Floor:  Mental status: A&Ox4  Ambulatory status: stand by  Oxygen Therapy:  RA  Cardiac Rhythm: NS  Admitted from: Home  Safety Concerns:  n/a  Precautions: n/a  Social Issues: n/a  ED Room #:  09    ED Nurse Phone Extension - [phone] or may call [phone].      HPI:   Chief Complaint   Patient presents with    Abdominal Pain       Past Medical History:  Past Medical History:   Diagnosis Date    Abdominal pain 05/17/2023    RUQ; SEEN IN ED AT [facility]; DC'D HOME    Abscess of arm, right     Abscess of skin of neck     Acute sinusitis     ALT (SGPT) level raised     Arthritis     Arthritis of neck     Bacteremia due to Klebsiella pneumoniae 06/12/2023    BPH (benign prostatic hypertrophy)     Breast cancer 05/2023    right    Cholelithiasis     Cirrhosis of liver 09/03/2021    JEROME    CKD (chronic kidney disease)     Colon polyp     Constipation     COVID     DDD (degenerative disc disease), cervical     Decreased platelet count     Depression     Resolved: 1/28/2015    Difficulty walking     Elevated AST (SGOT)     Erectile dysfunction     GERD (gastroesophageal reflux disease)     History of transfusion 2021    [facility]; 3 UNITS; NO REACTION    HL (hearing loss)     Hyperlipidemia     Hypertension     Hypothyroidism, adult 11/10/2020    Infection     MSSA lumbar surgical wound infection 3/2017    Jaw pain     Left hip pain     Low back pain     Surgery 30 yrs L4-5    Lumbosacral disc disease     Migraine     Hx of    MRSA (methicillin resistant Staphylococcus aureus)     Neuropathy in diabetes     Feet    Obesity     Obesity (BMI 30.0-34.9) 10/07/2016    BMI 31.92    Obstructive sleep apnea     CPAP    Pancreatitis 06/2023    Peripheral neuropathy     Portal hypertensive gastropathy 09/03/2021    Renal insufficiency     Shigellosis     Sleep apnea     PT TO BRING MASK AND TUBING DAY OF SURGERY    Symptomatic anemia 08/30/2021    Type 2 diabetes mellitus     Visual impairment     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     Vitamin D deficiency     Wears glasses     Wears hearing aid     BOTH EARS        Past Surgical History:  Past Surgical History:   Procedure Laterality Date    ANTERIOR CERVICAL DISCECTOMY W/ FUSION N/A 12/14/2017    Procedure: CERVICAL DISCECTOMY ANTERIOR FUSION WITH INSTRUMENTATION C7/T1;  Surgeon: Gigi Perales MD;  Location:  [facility];  Service:     BACK SURGERY      L4-L5    CERVICAL ARTHRODESIS      CERVICAL DISCECTOMY POSTERIOR FUSION WITH INSTRUMENTATION N/A 03/16/2017    Procedure:  INCISION AND DRAINAGE OF POSTERIOR CERVICAL WOUND;  Surgeon: Gigi Perales MD;  Location:  [facility];  Service:     CERVICAL LAMINECTOMY DECOMPRESSION POSTERIOR Left 02/28/2017    Procedure: Left C7-T1 CERVICAL FORAMINOTOMY POSTERIOR WITH METRIX;  Surgeon: Gigi Perales MD;  Location:  [facility];  Service:     CHOLECYSTECTOMY  3/18/2024    CHOLECYSTECTOMY WITH INTRAOPERATIVE CHOLANGIOGRAM N/A 03/18/2024    Procedure: CHOLECYSTECTOMY LAPAROSCOPIC AND LARAROSCOPIC LIVER BIOPSY;  Surgeon: Emerson Spear MD;  Location:  [facility];  Service: General;  Laterality: N/A;    COLONOSCOPY N/A 09/01/2021    Procedure: COLONOSCOPY;  Surgeon: Sharif García MD;  Location:  [facility];  Service: Gastroenterology;  Laterality: N/A;    CYST REMOVAL  04/2021    ENDOSCOPY N/A 08/31/2021    Procedure: ESOPHAGOGASTRODUODENOSCOPY;  Surgeon: Brunner, Mark I, MD;  Location:  [facility];  Service: Gastroenterology;  Laterality: N/A;    ERCP N/A 06/14/2023    Procedure: ENDOSCOPIC RETROGRADE CHOLANGIOPANCREATOGRAPHY;  Surgeon: Brunner, Mark I, MD;  Location:  [facility];  Service: Gastroenterology;  Laterality: N/A;  SPHINCTERTOMY MADE AT [place]  AND BALLOON SWEEP OF CBD DONE WITH 9-12 BALLOON    LIVER BIOPSY      LYMPH NODE BIOPSY  6/2023    Negative    MASTECTOMY W/ SENTINEL NODE BIOPSY Right 05/23/2023    Procedure: BREAST MASTECTOMY WITH SENTINEL NODE BIOPSY RIGHT;  Surgeon: James  Joseph LAURENT MD;  Location: [facility];  Service: General;  Laterality: Right;    NECK SURGERY      C6-C7, C7-T1 fused    SPINAL FUSION      UPPER GASTROINTESTINAL ENDOSCOPY      VASECTOMY      WISDOM TOOTH EXTRACTION          Admitting Doctor:   Hernandez Faye MD    Consulting Provider(s):  Consults       No orders found from 4/27/2025 to 5/27/2025.             Admitting Diagnosis:   The encounter diagnosis was Acute pancreatitis, unspecified complication status, unspecified pancreatitis type.    Most Recent Vitals:   Vitals:    05/26/25 0254 05/26/25 0259 05/26/25 0300 05/26/25 0330   BP:   145/73 162/80   Pulse: 62 61 60 60   Resp:       Temp:       SpO2: 99% 99% 98% 95%   Weight:       Height:           Active LDAs/IV Access:   Lines, Drains & Airways       Active LDAs       Name Placement date Placement time Site Days    Peripheral IV 05/25/25 2133 20 G Left;Posterior Forearm 05/25/25 2133  Forearm  less than 1                    Labs (abnormal labs have a star):   Labs Reviewed   COMPREHENSIVE METABOLIC PANEL - Abnormal; Notable for the following components:       Result Value    Creatinine 1.47 (*)     CO2 21.0 (*)     eGFR 49.4 (*)     All other components within normal limits    Narrative:     GFR Categories in Chronic Kidney Disease (CKD)              GFR Category          GFR (mL/min/1.73)    Interpretation  G1                    90 or greater        Normal or high (1)  G2                    60-89                Mild decrease (1)  G3a                   45-59                Mild to moderate decrease  G3b                   30-44                Moderate to severe decrease  G4                    15-29                Severe decrease  G5                    14 or less           Kidney failure    (1)In the absence of evidence of kidney disease, neither GFR category G1 or G2 fulfill the criteria for CKD.    eGFR calculation 2021 CKD-EPI creatinine equation, which does not include race as a factor   LIPASE - Abnormal;  Notable for the following components:    Lipase 1,902 (*)     All other components within normal limits   TROPONIN - Abnormal; Notable for the following components:    HS Troponin T 43 (*)     All other components within normal limits    Narrative:     High Sensitive Troponin T Reference Range:  <14.0 ng/L- Negative Female for AMI  <22.0 ng/L- Negative Male for AMI  >=14 - Abnormal Female indicating possible myocardial injury.  >=22 - Abnormal Male indicating possible myocardial injury.   Clinicians would have to utilize clinical acumen, EKG, Troponin, and serial changes to determine if it is an Acute Myocardial Infarction or myocardial injury due to an underlying chronic condition.        URINALYSIS W/ MICROSCOPIC IF INDICATED (NO CULTURE) - Abnormal; Notable for the following components:    Protein,  mg/dL (2+) (*)     All other components within normal limits   CBC WITH AUTO DIFFERENTIAL - Abnormal; Notable for the following components:    MCV 98.2 (*)     MPV 12.8 (*)     Platelets 72 (*)     Lymphocyte % 17.0 (*)     All other components within normal limits   URINALYSIS, MICROSCOPIC ONLY - Abnormal; Notable for the following components:    WBC, UA 3-5 (*)     All other components within normal limits   RAINBOW DRAW    Narrative:     The following orders were created for panel order [place] Draw.  Procedure                               Abnormality         Status                     ---------                               -----------         ------                     Green Top (Gel)[[id number]]                                  Final result               Lavender Top[[id number]]                                     Final result               Gold Top - SST[[id number]]                                   Final result               Daigle Top[[id number]]                                         Final result               Light Blue Top[[id number]]                                   Final result                 Please  view results for these tests on the individual orders.   HIGH SENSITIVITIY TROPONIN T 1HR   CBC AND DIFFERENTIAL    Narrative:     The following orders were created for panel order CBC & Differential.  Procedure                               Abnormality         Status                     ---------                               -----------         ------                     CBC Auto Differential[[id number]]        Abnormal            Final result                 Please view results for these tests on the individual orders.   GREEN TOP   LAVENDER TOP   GOLD TOP - SST   GRAY TOP   LIGHT BLUE TOP       Meds Given in ED:   Medications   sodium chloride 0.9 % flush 10 mL (has no administration in time range)   ondansetron (ZOFRAN) injection 4 mg (4 mg Intravenous Given 5/26/25 0046)   morphine injection 2 mg (2 mg Intravenous Given 5/26/25 0046)   sodium chloride 0.9 % bolus 1,000 mL (0 mL Intravenous Stopped 5/26/25 0251)   iopamidol (ISOVUE-300) 61 % injection 85 mL (85 mL Intravenous Given 5/25/25 2249)           Last NIH score:                                                          Dysphagia screening results:        [place] Coma Scale:  No data recorded     CIWA:        Restraint Type:            Isolation Status:  No active isolations

## 2025-05-26 NOTE — PLAN OF CARE
Goal Outcome Evaluation:  Plan of Care Reviewed With: patient        Progress: no change  Outcome Evaluation: Pt presents with decreased functional mobility and decreased activity tolerance compared to baseline. Pt ambulated 300ft with CGA and SPC for support. Recommend continued skilled IP PT interventions. Recommend D/C home with assist when medically appropriate.    Anticipated Discharge Disposition (PT): home with assist

## 2025-05-26 NOTE — CASE MANAGEMENT/SOCIAL WORK
Discharge Planning Assessment  [facility]     Patient Name: Elton Funez  MRN: [id number]  Today's Date: 5/26/2025    Admit Date: 5/26/2025    Plan: Home   Discharge Needs Assessment       Row Name 05/26/25 1104       Living Environment    People in Home spouse    Name(s) of People in Home Wife - Pooja Funez    Current Living Arrangements home    Potentially Unsafe Housing Conditions none    In the past 12 months has the electric, gas, oil, or water company threatened to shut off services in your home? No    Primary Care Provided by self    Able to Return to Prior Arrangements yes       Resource/Environmental Concerns    Resource/Environmental Concerns none    Transportation Concerns none       Transportation Needs    In the past 12 months, has lack of transportation kept you from medical appointments or from getting medications? no    In the past 12 months, has lack of transportation kept you from meetings, work, or from getting things needed for daily living? No       Food Insecurity    Within the past 12 months, you worried that your food would run out before you got the money to buy more. Never true    Within the past 12 months, the food you bought just didn't last and you didn't have money to get more. Never true       Transition Planning    Patient/Family Anticipates Transition to home with family    Patient/Family Anticipated Services at Transition none    Transportation Anticipated family or friend will provide       Discharge Needs Assessment    Equipment Currently Used at Home cpap;cane, straight;rollator    Concerns to be Addressed no discharge needs identified;denies needs/concerns at this time    Do you want help finding or keeping work or a job? I do not need or want help    Do you want help with school or training? For example, starting or completing job training or getting a high school diploma, GED or equivalent No                   Discharge Plan       Row Name 05/26/25 3524       Plan     Plan Home    Patient/Family in Agreement with Plan yes    Plan Comments CM spoke with patient and wife at bedside. Patient lives with wife, Pooja, in [place]. Patient states he is independent with all ADLs. DME: Cane, rollator, CPAP. HH: NA. PCP: Dr. Tc Ramirez. Pharmacy: [facility]. Patient would like Meds to Beds this hospital stay. CM updated meds to beds and pharmacy tabs in [employer]. Wife can transport patient when medically ready for discharge. Verified [employer] Re[employer] as primary insurance. Patient and wife deny any environmental, resource, financial, or transport needs at this time. CM following.    Final Discharge Disposition Code 01 - home or self-care                       Demographic Summary       Row Name 05/26/25 1104       General Information    Admission Type inpatient    Arrived From home    Preferred Language English                   Functional Status       Row Name 05/26/25 1104       Functional Status    Usual Activity Tolerance moderate    Current Activity Tolerance moderate       Physical Activity    On average, how many days per week do you engage in moderate to strenuous exercise (like a brisk walk)? 0 days    On average, how many minutes do you engage in exercise at this level? 0 min    Number of minutes of exercise per week 0       Functional Status, IADL    Medications independent    Meal Preparation independent    Housekeeping independent    Laundry independent    Shopping independent    If for any reason you need help with day-to-day activities such as bathing, preparing meals, shopping, managing finances, etc., do you get the help you need? I get all the help I need       Mental Status    General Appearance WDL WDL                   Psychosocial    No documentation.                  Abuse/Neglect    No documentation.                  Legal    No documentation.                  Substance Abuse    No documentation.                   Patient Forms    No documentation.                     Heather Luciano RN

## 2025-05-26 NOTE — PROGRESS NOTES
[facility] Medicine Services  ADMISSION FOLLOW-UP NOTE          Patient admitted after midnight, H&P by my partner performed earlier on today's date reviewed.  Interim findings, labs, and charting also reviewed.        [facility] Hospital Problem List has been managed and updated to include any new diagnoses:  Active Hospital Problems    Diagnosis  POA    **Pancreatitis [K85.90]  Yes    Elevated troponin [R79.89]  Yes    Malignant neoplasm of right breast in male, estrogen receptor positive [C50.921, Z17.0]  Not Applicable    Liver cirrhosis secondary to JEROME [K75.81, K74.60]  Yes    Acquired hypothyroidism [E03.9]  Yes    Type 2 diabetes mellitus with diabetic polyneuropathy, with long-term current use of insulin [E11.42, Z79.4]  Not Applicable    FAVIO (obstructive sleep apnea) [G47.33]  Yes    Stage 3a chronic kidney disease [N18.31]  Yes    Gastroesophageal reflux disease without esophagitis [K21.9]  Yes    Dyslipidemia, goal LDL below 70 [E78.5]  Yes    Primary hypertension [I10]  Yes      Resolved Hospital Problems   No resolved problems to display.         ADDITIONAL PLAN:  - detailed assessment and plan from admission reviewed  - Elton Funez is a 73 y/o m with a history of breast cancer, HTN, HLD, JEROME cirrhosis, FAVIO, anxiety, depression, hypothyroidism, presents the ED with complaints of abdominal pain.  Admitted for acute pancreatitis     Assessment and Plan:     Acute pancreatitis  -- CT abdomen pelvis shows findings consistent with acute uncomplicated pancreatitis.  -- cont CLD today, still w abd discomfort but no n/v  --supportive care, cont IV fluids while PO intake still poor     T2DM  -- A1c is 7  -- FSBG with SSI  -- Lantus 10 units QHS     HTN  HLD  -- coreg     Elevated troponin  -- troponin 43  -- pt denies chest pain  -- trend troponin and EKG     Neuropathy  -- follows with [facility], neurology  -- Lyrica     Breast cancer  -- s/p right mastectomy  -- follows  with Dr. Archer  -- continue Tamoxifen     FAVIO  -- CPAP     JEROME cirrhosis  GERD  Portal hypertensive gastropathy  Esophageal varices  --CT abdomen pelvis shows hepatic steatosis  -- follows with GI  -- continue aldactone  -- PPI     CKD   -- creatinine 1.49  -- baseline creatinine ~ 1.2-1.5  -- monitor     Hypothyroidism  -- synthroid     Anxiety/Depression  -- zoloft       Expected Discharge   Expected Discharge Date: 5/29/2025; Expected Discharge Time:      Sasha Haynes MD  05/26/25

## 2025-05-27 ENCOUNTER — TELEPHONE (OUTPATIENT)
Dept: UROLOGY | Facility: CLINIC | Age: 75
End: 2025-05-27

## 2025-05-27 LAB
ALBUMIN SERPL-MCNC: 3.3 G/DL (ref 3.5–5.2)
ALP SERPL-CCNC: 57 U/L (ref 39–117)
ALT SERPL W P-5'-P-CCNC: 7 U/L (ref 1–41)
ANION GAP SERPL CALCULATED.3IONS-SCNC: 10 MMOL/L (ref 5–15)
AST SERPL-CCNC: 15 U/L (ref 1–40)
BASOPHILS # BLD AUTO: 0.02 10*3/MM3 (ref 0–0.2)
BASOPHILS NFR BLD AUTO: 0.5 % (ref 0–1.5)
BILIRUB CONJ SERPL-MCNC: 0.2 MG/DL (ref 0–0.3)
BILIRUB INDIRECT SERPL-MCNC: 0.3 MG/DL
BILIRUB SERPL-MCNC: 0.5 MG/DL (ref 0–1.2)
BUN SERPL-MCNC: 18 MG/DL (ref 8–23)
BUN/CREAT SERPL: 13.5 (ref 7–25)
CALCIUM SPEC-SCNC: 8.4 MG/DL (ref 8.6–10.5)
CHLORIDE SERPL-SCNC: 109 MMOL/L (ref 98–107)
CHOLEST SERPL-MCNC: 102 MG/DL (ref 0–200)
CO2 SERPL-SCNC: 21 MMOL/L (ref 22–29)
CREAT SERPL-MCNC: 1.33 MG/DL (ref 0.76–1.27)
DEPRECATED RDW RBC AUTO: 48.3 FL (ref 37–54)
EGFRCR SERPLBLD CKD-EPI 2021: 55.7 ML/MIN/1.73
EOSINOPHIL # BLD AUTO: 0.22 10*3/MM3 (ref 0–0.4)
EOSINOPHIL NFR BLD AUTO: 5.9 % (ref 0.3–6.2)
ERYTHROCYTE [DISTWIDTH] IN BLOOD BY AUTOMATED COUNT: 13.5 % (ref 12.3–15.4)
GLUCOSE BLDC GLUCOMTR-MCNC: 101 MG/DL (ref 70–130)
GLUCOSE BLDC GLUCOMTR-MCNC: 171 MG/DL (ref 70–130)
GLUCOSE BLDC GLUCOMTR-MCNC: 186 MG/DL (ref 70–130)
GLUCOSE BLDC GLUCOMTR-MCNC: 221 MG/DL (ref 70–130)
GLUCOSE SERPL-MCNC: 113 MG/DL (ref 65–99)
HCT VFR BLD AUTO: 36.2 % (ref 37.5–51)
HDLC SERPL-MCNC: 23 MG/DL (ref 40–60)
HGB BLD-MCNC: 11.8 G/DL (ref 13–17.7)
IMM GRANULOCYTES # BLD AUTO: 0 10*3/MM3 (ref 0–0.05)
IMM GRANULOCYTES NFR BLD AUTO: 0 % (ref 0–0.5)
LDLC SERPL CALC-MCNC: 56 MG/DL (ref 0–100)
LDLC/HDLC SERPL: 2.3 {RATIO}
LIPASE SERPL-CCNC: 649 U/L (ref 13–60)
LYMPHOCYTES # BLD AUTO: 1.13 10*3/MM3 (ref 0.7–3.1)
LYMPHOCYTES NFR BLD AUTO: 30.3 % (ref 19.6–45.3)
MAGNESIUM SERPL-MCNC: 1.7 MG/DL (ref 1.6–2.4)
MCH RBC QN AUTO: 31.9 PG (ref 26.6–33)
MCHC RBC AUTO-ENTMCNC: 32.6 G/DL (ref 31.5–35.7)
MCV RBC AUTO: 97.8 FL (ref 79–97)
MONOCYTES # BLD AUTO: 0.34 10*3/MM3 (ref 0.1–0.9)
MONOCYTES NFR BLD AUTO: 9.1 % (ref 5–12)
NEUTROPHILS NFR BLD AUTO: 2.02 10*3/MM3 (ref 1.7–7)
NEUTROPHILS NFR BLD AUTO: 54.2 % (ref 42.7–76)
NRBC BLD AUTO-RTO: 0 /100 WBC (ref 0–0.2)
PLATELET # BLD AUTO: 58 10*3/MM3 (ref 140–450)
PMV BLD AUTO: 12.5 FL (ref 6–12)
POTASSIUM SERPL-SCNC: 4.3 MMOL/L (ref 3.5–5.2)
PROT SERPL-MCNC: 6.4 G/DL (ref 6–8.5)
RBC # BLD AUTO: 3.7 10*6/MM3 (ref 4.14–5.8)
SODIUM SERPL-SCNC: 140 MMOL/L (ref 136–145)
TRIGL SERPL-MCNC: 130 MG/DL (ref 0–150)
VLDLC SERPL-MCNC: 23 MG/DL (ref 5–40)
WBC NRBC COR # BLD AUTO: 3.73 10*3/MM3 (ref 3.4–10.8)

## 2025-05-27 PROCEDURE — 25010000002 MORPHINE PER 10 MG: Performed by: NURSE PRACTITIONER

## 2025-05-27 PROCEDURE — 63710000001 INSULIN GLARGINE PER 5 UNITS: Performed by: NURSE PRACTITIONER

## 2025-05-27 PROCEDURE — 63710000001 INSULIN LISPRO (HUMAN) PER 5 UNITS: Performed by: NURSE PRACTITIONER

## 2025-05-27 PROCEDURE — 80076 HEPATIC FUNCTION PANEL: CPT | Performed by: INTERNAL MEDICINE

## 2025-05-27 PROCEDURE — 80048 BASIC METABOLIC PNL TOTAL CA: CPT | Performed by: STUDENT IN AN ORGANIZED HEALTH CARE EDUCATION/TRAINING PROGRAM

## 2025-05-27 PROCEDURE — 83735 ASSAY OF MAGNESIUM: CPT | Performed by: INTERNAL MEDICINE

## 2025-05-27 PROCEDURE — 25810000003 SODIUM CHLORIDE 0.9 % SOLUTION: Performed by: INTERNAL MEDICINE

## 2025-05-27 PROCEDURE — 99232 SBSQ HOSP IP/OBS MODERATE 35: CPT | Performed by: INTERNAL MEDICINE

## 2025-05-27 PROCEDURE — 82948 REAGENT STRIP/BLOOD GLUCOSE: CPT

## 2025-05-27 PROCEDURE — 85025 COMPLETE CBC W/AUTO DIFF WBC: CPT | Performed by: STUDENT IN AN ORGANIZED HEALTH CARE EDUCATION/TRAINING PROGRAM

## 2025-05-27 PROCEDURE — 80061 LIPID PANEL: CPT | Performed by: INTERNAL MEDICINE

## 2025-05-27 PROCEDURE — 83690 ASSAY OF LIPASE: CPT | Performed by: INTERNAL MEDICINE

## 2025-05-27 RX ORDER — PANTOPRAZOLE SODIUM 40 MG/10ML
40 INJECTION, POWDER, LYOPHILIZED, FOR SOLUTION INTRAVENOUS EVERY 12 HOURS SCHEDULED
Status: DISCONTINUED | OUTPATIENT
Start: 2025-05-27 | End: 2025-05-29 | Stop reason: HOSPADM

## 2025-05-27 RX ORDER — SODIUM CHLORIDE 9 MG/ML
100 INJECTION, SOLUTION INTRAVENOUS CONTINUOUS
Status: DISCONTINUED | OUTPATIENT
Start: 2025-05-27 | End: 2025-05-29 | Stop reason: HOSPADM

## 2025-05-27 RX ADMIN — LEVOTHYROXINE SODIUM 75 MCG: 0.07 TABLET ORAL at 05:45

## 2025-05-27 RX ADMIN — Medication 10 ML: at 20:57

## 2025-05-27 RX ADMIN — Medication 10 ML: at 08:54

## 2025-05-27 RX ADMIN — INSULIN GLARGINE 10 UNITS: 100 INJECTION, SOLUTION SUBCUTANEOUS at 20:57

## 2025-05-27 RX ADMIN — TAMOXIFEN CITRATE 20 MG: 10 TABLET, FILM COATED ORAL at 08:52

## 2025-05-27 RX ADMIN — SODIUM CHLORIDE 100 ML/HR: 9 INJECTION, SOLUTION INTRAVENOUS at 17:15

## 2025-05-27 RX ADMIN — SPIRONOLACTONE 25 MG: 25 TABLET ORAL at 08:52

## 2025-05-27 RX ADMIN — Medication 5000 UNITS: at 20:56

## 2025-05-27 RX ADMIN — FERROUS SULFATE TAB 325 MG (65 MG ELEMENTAL FE) 325 MG: 325 (65 FE) TAB at 17:12

## 2025-05-27 RX ADMIN — PREGABALIN 200 MG: 100 CAPSULE ORAL at 20:56

## 2025-05-27 RX ADMIN — SENNOSIDES, DOCUSATE SODIUM 2 TABLET: 50; 8.6 TABLET, FILM COATED ORAL at 17:28

## 2025-05-27 RX ADMIN — PANTOPRAZOLE SODIUM 40 MG: 40 INJECTION, POWDER, FOR SOLUTION INTRAVENOUS at 20:56

## 2025-05-27 RX ADMIN — PREGABALIN 100 MG: 100 CAPSULE ORAL at 08:52

## 2025-05-27 RX ADMIN — FERROUS SULFATE TAB 325 MG (65 MG ELEMENTAL FE) 325 MG: 325 (65 FE) TAB at 08:52

## 2025-05-27 RX ADMIN — CARVEDILOL 3.12 MG: 3.12 TABLET, FILM COATED ORAL at 20:57

## 2025-05-27 RX ADMIN — PANTOPRAZOLE SODIUM 40 MG: 40 INJECTION, POWDER, FOR SOLUTION INTRAVENOUS at 08:51

## 2025-05-27 RX ADMIN — INSULIN LISPRO 2 UNITS: 100 INJECTION, SOLUTION INTRAVENOUS; SUBCUTANEOUS at 11:46

## 2025-05-27 RX ADMIN — CARVEDILOL 3.12 MG: 3.12 TABLET, FILM COATED ORAL at 08:52

## 2025-05-27 RX ADMIN — INSULIN LISPRO 3 UNITS: 100 INJECTION, SOLUTION INTRAVENOUS; SUBCUTANEOUS at 20:57

## 2025-05-27 RX ADMIN — Medication 250 MG: at 08:52

## 2025-05-27 RX ADMIN — PANTOPRAZOLE SODIUM 40 MG: 40 TABLET, DELAYED RELEASE ORAL at 05:45

## 2025-05-27 RX ADMIN — MORPHINE SULFATE 2 MG: 2 INJECTION, SOLUTION INTRAMUSCULAR; INTRAVENOUS at 05:47

## 2025-05-27 RX ADMIN — INSULIN LISPRO 2 UNITS: 100 INJECTION, SOLUTION INTRAVENOUS; SUBCUTANEOUS at 16:34

## 2025-05-27 RX ADMIN — Medication 5000 UNITS: at 08:52

## 2025-05-27 RX ADMIN — SERTRALINE 100 MG: 100 TABLET, FILM COATED ORAL at 08:52

## 2025-05-27 NOTE — PLAN OF CARE
Problem: Adult Inpatient Plan of Care  Goal: Plan of Care Review  Outcome: Progressing  Goal: Patient-Specific Goal (Individualized)  Outcome: Progressing  Goal: Absence of Hospital-Acquired Illness or Injury  Outcome: Progressing  Intervention: Identify and Manage Fall Risk  Recent Flowsheet Documentation  Taken 5/26/2025 1930 by Ashely Baig RN  Safety Promotion/Fall Prevention:   assistive device/personal items within reach   activity supervised   clutter free environment maintained   safety round/check completed  Intervention: Prevent Skin Injury  Recent Flowsheet Documentation  Taken 5/26/2025 1930 by Ashely Baig RN  Body Position:   position changed independently   sitting up in bed   supine  Skin Protection:   drying agents applied   incontinence pads utilized  Goal: Optimal Comfort and Wellbeing  Outcome: Progressing  Intervention: Provide Person-Centered Care  Recent Flowsheet Documentation  Taken 5/26/2025 1930 by Ashely Baig RN  Trust Relationship/Rapport:   choices provided   questions encouraged   questions answered   empathic listening provided   emotional support provided   care explained   reassurance provided   thoughts/feelings acknowledged  Goal: Readiness for Transition of Care  Outcome: Progressing   Goal Outcome Evaluation:

## 2025-05-27 NOTE — TELEPHONE ENCOUNTER
Caller: Pooja Funez     Relationship: [unfilled] SPOUSE    Best call back number: [phone]    What is your medical concern? PT HAD AN APPT ON 05.29.2025 HE  IS CURRENTLY IN THE HOSPITAL. PLEASE CALL WITH ANY CONCERNS. THANK YOU

## 2025-05-27 NOTE — PROGRESS NOTES
[facility] Medicine Services  PROGRESS NOTE    Patient Name: Elton Funez  : 1950  MRN: [id number]    Date of Admission: 2025  Primary Care Physician: Tc Ramirez MD    Subjective   Subjective     CC:  pancreatitis    HPI:  Feeling better, abd pain improved, nausea improved. Tolerating clears.       Objective   Objective     Vital Signs:   Temp:  [97.5 °F (36.4 °C)-98.3 °F (36.8 °C)] 97.5 °F (36.4 °C)  Heart Rate:  [50-69] 63  Resp:  [16-18] 17  BP: (130-170)/(74-96) 130/96     Physical Exam:  Constitutional:Alert, oriented x 3, nontoxic appearing  Psych:Normal/appropriate affect  HEENT:NCAT, oropharynx clear  Neck: neck supple, full range of motion  Neuro: Face symmetric, speech clear, equal , moves all extremities  Cardiac: RRR; No pretibial pitting edema  Resp: CTAB, normal effort  GI: abd soft, nontender, obese, mildly tender (no rebound or guarding)  Skin: No extremity rash  Musculoskeletal/extremities: no cyanosis of extremities; no significant ankle edema          Results Reviewed:  LAB RESULTS:      Lab 25  0432 25  0650 25  0415 25  0241 25  2132 25  1438 25  1327   WBC 3.73 4.87  --   --  7.07  --  4.57   HEMOGLOBIN 11.8* 12.9*  --   --  14.5  --  13.2   HEMATOCRIT 36.2* 40.0  --   --  44.8  --  40.6   PLATELETS 58* 72*  --   --  72*  --  72*   NEUTROS ABS 2.02 3.19  --   --  5.05  --  2.99   IMMATURE GRANS (ABS) 0.00 0.02  --   --  0.03  --  0.04   LYMPHS ABS 1.13 1.02  --   --  1.20  --  0.99   MONOS ABS 0.34 0.48  --   --  0.61  --  0.37   EOS ABS 0.22 0.14  --   --  0.16  --  0.15   MCV 97.8* 99.0*  --   --  98.2*  --  98.1*   HSTROP T  --   --  41* 43*  --  35* 35*         Lab 25  0432 25  0650 25  0241 25  1327   SODIUM 140 137 139 138   POTASSIUM 4.3 5.2 4.0 5.1   CHLORIDE 109* 105 105 101   CO2 21.0* 23.0 21.0* 25.0   ANION GAP 10.0 9.0 13.0 12.0   BUN 18 22 23 32*   CREATININE  1.33* 1.49* 1.47* 1.63*   EGFR 55.7* 48.6* 49.4* 43.7*   GLUCOSE 113* 131* 89 199*   CALCIUM 8.4* 8.9 8.6 9.1   MAGNESIUM 1.7  --   --   --    HEMOGLOBIN A1C  --  7.00*  --   --          Lab 05/27/25  0432 05/26/25  0241 05/22/25  1327   TOTAL PROTEIN 6.4 7.3 6.7   ALBUMIN 3.3* 3.9 3.8   GLOBULIN  --  3.4 2.9   ALT (SGPT) 7 7 9   AST (SGOT) 15 16 20   BILIRUBIN 0.5 0.5 0.4   INDIRECT BILIRUBIN 0.3  --   --    BILIRUBIN DIRECT 0.2  --   --    ALK PHOS 57 67 82   LIPASE 649* 1,902*  --          Lab 05/26/25  0415 05/26/25  0241 05/22/25  1438 05/22/25  1327   PROBNP  --   --   --  <36.0   HSTROP T 41* 43* 35* 35*         Lab 05/27/25  0432   CHOLESTEROL 102   LDL CHOL 56   HDL CHOL 23*   TRIGLYCERIDES 130             Brief Urine Lab Results  (Last result in the past 365 days)        Color   Clarity   Blood   Leuk Est   Nitrite   Protein   CREAT   Urine HCG        05/26/25 0251 Yellow   Clear   Negative   Negative   Negative   100 mg/dL (2+)                   Microbiology Results Abnormal       None            CT Abdomen Pelvis With Contrast  Result Date: 5/25/2025  CT ABDOMEN PELVIS W CONTRAST Date of Exam: 5/25/2025 10:38 PM EDT Indication: epigastric pain. Comparison: 3/17/2024. Technique: Axial CT images were obtained of the abdomen and pelvis following the uneventful intravenous administration of intravenous contrast. Reconstructed coronal and sagittal images were also obtained. Automated exposure control and iterative construction methods were used. Findings: Lung Bases:   The visualized lung bases and lower mediastinal structures are unremarkable. Liver: The liver appears diffusely hypodense consistent with steatosis.. No focal lesions. Biliary/Gallbladder:  The gallbladder has been resected.. The biliary tree is nondilated. Spleen: Spleen is normal in size and CT density. Pancreas:  Mild inflammatory changes are seen surrounding the pancreatic body and tail. Trace amount of free fluid is present. No evidence of  focal collection. No evidence of pancreatic ductal dilatation. No focal mass identified. Kidneys:  Kidneys are normal in size. There are no stones or hydronephrosis. Adrenals:  Adrenal glands are unremarkable. Retroperitoneal/Lymph Nodes/Vasculature:  No retroperitoneal adenopathy is identified. Gastrointestinal/Mesentery:  The bowel loops are non-dilated without wall thickening or mass. The appendix appears within normal limits. No evidence of obstruction. No free air. No mesenteric fluid collections identified. Moderate stool burden present. Mesenteric vasculature appears  unremarkable. No evidence of hernia. Bladder:  The bladder is normal. Genital:   Unremarkable       Bony Structures:   Visualized bony structures are consistent with the patient's age.     Impression: Impression: 1.Findings consistent with acute uncomplicated pancreatitis. 2.Hepatic steatosis. 3.Ancillary findings as described above. Electronically Signed: Courtney Rizo MD  5/25/2025 11:12 PM EDT  Workstation ID: [id number]      Results for orders placed during the hospital encounter of 03/20/23    Adult Transthoracic Echo Complete W/ Cont if Necessary Per Protocol    Interpretation Summary    Left ventricular ejection fraction appears to be 61 - 65%.    Left ventricular diastolic function is consistent with (grade I) impaired relaxation.    Calculated right ventricular systolic pressure from tricuspid regurgitation is 16 mmHg.      Current medications:  Scheduled Meds:carvedilol, 3.125 mg, Oral, Q12H  ferrous sulfate, 325 mg, Oral, BID  insulin glargine, 10 Units, Subcutaneous, Nightly  insulin lispro, 2-7 Units, Subcutaneous, 4x Daily AC & at Bedtime  levothyroxine, 75 mcg, Oral, Daily  pantoprazole, 40 mg, Intravenous, Q12H  pregabalin, 100 mg, Oral, Daily   And  pregabalin, 200 mg, Oral, Nightly  saccharomyces boulardii, 250 mg, Oral, Daily  sertraline, 100 mg, Oral, Daily  sodium chloride, 10 mL, Intravenous, Q12H  spironolactone, 25 mg, Oral,  Daily  tamoxifen, 20 mg, Oral, Daily  vitamin D3, 5,000 Units, Oral, BID      Continuous Infusions:sodium chloride, 100 mL/hr      PRN Meds:.  senna-docusate sodium **AND** polyethylene glycol **AND** bisacodyl **AND** bisacodyl    Calcium Replacement - Follow Nurse / BPA Driven Protocol    dextrose    dextrose    glucagon (human recombinant)    Magnesium Standard Dose Replacement - Follow Nurse / BPA Driven Protocol    Morphine **AND** naloxone    nitroglycerin    Phosphorus Replacement - Follow Nurse / BPA Driven Protocol    Potassium Replacement - Follow Nurse / BPA Driven Protocol    sodium chloride    sodium chloride    sodium chloride    temazepam    tiZANidine    Assessment & Plan   Assessment & Plan     Active Hospital Problems    Diagnosis  POA    **Pancreatitis [K85.90]  Yes    Elevated troponin [R79.89]  Yes    Malignant neoplasm of right breast in male, estrogen receptor positive [C50.921, Z17.0]  Not Applicable    Liver cirrhosis secondary to CHANDRA [K75.81, K74.60]  Yes    Acquired hypothyroidism [E03.9]  Yes    Type 2 diabetes mellitus with diabetic polyneuropathy, with long-term current use of insulin [E11.42, Z79.4]  Not Applicable    FAVIO (obstructive sleep apnea) [G47.33]  Yes    Stage 3a chronic kidney disease [N18.31]  Yes    Gastroesophageal reflux disease without esophagitis [K21.9]  Yes    Dyslipidemia, goal LDL below 70 [E78.5]  Yes    Primary hypertension [I10]  Yes      Resolved Hospital Problems   No resolved problems to display.        Brief Hospital Course to date:  Elton Funez is a 74 y.o. male w/ hx of insulin dep DM, ckd 3 (baseline cr ~1.5-1.8), breast cancer (s/p right mastectomy 5/23/23, follows w/ Dr. Archer of oncology), chandra cirrhosis, htn, hl, favio, anxiety/depression, hypothyroidism. Previous admission 3/2024 w/ gallstone pancreatitis, underwent s/p lap ccy on 3/18/24 by Dr. Spear.    Patient presented w/ abd pain x 4 days. Ct a/p suggested pancreatitis (previous ccy noted  and biliary tree looked normal), lft's wnl. Denies etoh use or new medications. FLP suggested normal triglyceride level. Initial lipase 1,902. Initiated on iv fluids and admitted to hospitalist service.    Acute pancreatitis  -previous ccy 3/2024 Dr. Spear  -- CT abdomen pelvis shows findings consistent with acute uncomplicated pancreatitis. Biliary tree wnl  -LFT's & bili ok  -denies etoh use  -FLP w/ normal triglycerides  -unclear etiology, perhaps meds? Pharmacist reviewed medicines on 5/27/25, trulicity most likely to cause pancreatitis but this is not new med (prescribed since June 2024)  -continue supportive care w/ iv fluids, bi ppi, prn pain meds & antiemetics; feeling better today. Lipase improved     T2DM  -- A1c is 7.0 5/26/25  -- FSBG with SSI  -- Lantus 10 units QHS, SSI, titrate prn     HTN  -- coreg       Neuropathy  -- follows with SU[facility] TORRES, neurology  -- Lyrica     Breast cancer  -- s/p right mastectomy;  follows with Dr. Archer  -- continue Tamoxifen     FAVIO  -- CPAP     JEROME cirrhosis  GERD  Portal hypertensive gastropathy  Esophageal varices  --CT abdomen pelvis shows hepatic steatosis  -- follows with GI  -- continue aldactone  -- PPI     CKD 3 (baseline cr ~1.5-1.8)  -stable, trend     Hypothyroidism  -- synthroid     Anxiety/Depression  -- zoloft    Am labs: cbc,cmp,mag, lipase         Expected Discharge Location and Transportation: home  Expected Discharge   Expected Discharge Date: 5/29/2025; Expected Discharge Time:      VTE Prophylaxis:  Mechanical VTE prophylaxis orders are present.         AM-PAC 6 Clicks Score (PT): 19 (05/26/25 1930)    CODE STATUS:   Code Status and Medical Interventions: CPR (Attempt to Resuscitate); Full Support   Ordered at: 05/26/25 4719     Code Status (Patient has no pulse and is not breathing):    CPR (Attempt to Resuscitate)     Medical Interventions (Patient has pulse or is breathing):    Full Support     Level Of Support Discussed With:    Patient        Tay Garcia MD  05/27/25

## 2025-05-28 LAB
ALBUMIN SERPL-MCNC: 3.2 G/DL (ref 3.5–5.2)
ALBUMIN/GLOB SERPL: 1.1 G/DL
ALP SERPL-CCNC: 57 U/L (ref 39–117)
ALT SERPL W P-5'-P-CCNC: 7 U/L (ref 1–41)
ANION GAP SERPL CALCULATED.3IONS-SCNC: 9 MMOL/L (ref 5–15)
AST SERPL-CCNC: 16 U/L (ref 1–40)
BILIRUB SERPL-MCNC: 0.4 MG/DL (ref 0–1.2)
BUN SERPL-MCNC: 14.4 MG/DL (ref 8–23)
BUN/CREAT SERPL: 11.8 (ref 7–25)
CALCIUM SPEC-SCNC: 8 MG/DL (ref 8.6–10.5)
CHLORIDE SERPL-SCNC: 108 MMOL/L (ref 98–107)
CO2 SERPL-SCNC: 21 MMOL/L (ref 22–29)
CREAT SERPL-MCNC: 1.22 MG/DL (ref 0.76–1.27)
DEPRECATED RDW RBC AUTO: 47.8 FL (ref 37–54)
EGFRCR SERPLBLD CKD-EPI 2021: 61.8 ML/MIN/1.73
ERYTHROCYTE [DISTWIDTH] IN BLOOD BY AUTOMATED COUNT: 13.4 % (ref 12.3–15.4)
GLOBULIN UR ELPH-MCNC: 2.8 GM/DL
GLUCOSE BLDC GLUCOMTR-MCNC: 105 MG/DL (ref 70–130)
GLUCOSE BLDC GLUCOMTR-MCNC: 198 MG/DL (ref 70–130)
GLUCOSE BLDC GLUCOMTR-MCNC: 224 MG/DL (ref 70–130)
GLUCOSE BLDC GLUCOMTR-MCNC: 227 MG/DL (ref 70–130)
GLUCOSE SERPL-MCNC: 103 MG/DL (ref 65–99)
HCT VFR BLD AUTO: 34.4 % (ref 37.5–51)
HGB BLD-MCNC: 11.6 G/DL (ref 13–17.7)
LIPASE SERPL-CCNC: 387 U/L (ref 13–60)
MAGNESIUM SERPL-MCNC: 1.6 MG/DL (ref 1.6–2.4)
MCH RBC QN AUTO: 32.6 PG (ref 26.6–33)
MCHC RBC AUTO-ENTMCNC: 33.7 G/DL (ref 31.5–35.7)
MCV RBC AUTO: 96.6 FL (ref 79–97)
PLATELET # BLD AUTO: 65 10*3/MM3 (ref 140–450)
PMV BLD AUTO: 12.6 FL (ref 6–12)
POTASSIUM SERPL-SCNC: 4.2 MMOL/L (ref 3.5–5.2)
PROT SERPL-MCNC: 6 G/DL (ref 6–8.5)
RBC # BLD AUTO: 3.56 10*6/MM3 (ref 4.14–5.8)
SODIUM SERPL-SCNC: 138 MMOL/L (ref 136–145)
WBC NRBC COR # BLD AUTO: 4.19 10*3/MM3 (ref 3.4–10.8)

## 2025-05-28 PROCEDURE — 85027 COMPLETE CBC AUTOMATED: CPT | Performed by: INTERNAL MEDICINE

## 2025-05-28 PROCEDURE — 82948 REAGENT STRIP/BLOOD GLUCOSE: CPT

## 2025-05-28 PROCEDURE — 25010000002 ENOXAPARIN PER 10 MG: Performed by: INTERNAL MEDICINE

## 2025-05-28 PROCEDURE — 83735 ASSAY OF MAGNESIUM: CPT | Performed by: INTERNAL MEDICINE

## 2025-05-28 PROCEDURE — 63710000001 INSULIN GLARGINE PER 5 UNITS: Performed by: NURSE PRACTITIONER

## 2025-05-28 PROCEDURE — 25810000003 SODIUM CHLORIDE 0.9 % SOLUTION: Performed by: INTERNAL MEDICINE

## 2025-05-28 PROCEDURE — 83690 ASSAY OF LIPASE: CPT | Performed by: INTERNAL MEDICINE

## 2025-05-28 PROCEDURE — 25010000002 MORPHINE PER 10 MG: Performed by: INTERNAL MEDICINE

## 2025-05-28 PROCEDURE — 80053 COMPREHEN METABOLIC PANEL: CPT | Performed by: INTERNAL MEDICINE

## 2025-05-28 PROCEDURE — 63710000001 INSULIN LISPRO (HUMAN) PER 5 UNITS: Performed by: NURSE PRACTITIONER

## 2025-05-28 PROCEDURE — 99232 SBSQ HOSP IP/OBS MODERATE 35: CPT | Performed by: INTERNAL MEDICINE

## 2025-05-28 RX ORDER — ENOXAPARIN SODIUM 100 MG/ML
40 INJECTION SUBCUTANEOUS EVERY 24 HOURS
Status: DISCONTINUED | OUTPATIENT
Start: 2025-05-28 | End: 2025-05-28

## 2025-05-28 RX ADMIN — SPIRONOLACTONE 25 MG: 25 TABLET ORAL at 09:02

## 2025-05-28 RX ADMIN — INSULIN LISPRO 3 UNITS: 100 INJECTION, SOLUTION INTRAVENOUS; SUBCUTANEOUS at 22:15

## 2025-05-28 RX ADMIN — INSULIN GLARGINE 10 UNITS: 100 INJECTION, SOLUTION SUBCUTANEOUS at 22:15

## 2025-05-28 RX ADMIN — SODIUM CHLORIDE 100 ML/HR: 9 INJECTION, SOLUTION INTRAVENOUS at 03:41

## 2025-05-28 RX ADMIN — PANTOPRAZOLE SODIUM 40 MG: 40 INJECTION, POWDER, FOR SOLUTION INTRAVENOUS at 22:16

## 2025-05-28 RX ADMIN — SENNOSIDES, DOCUSATE SODIUM 2 TABLET: 50; 8.6 TABLET, FILM COATED ORAL at 22:32

## 2025-05-28 RX ADMIN — LEVOTHYROXINE SODIUM 75 MCG: 0.07 TABLET ORAL at 06:18

## 2025-05-28 RX ADMIN — CARVEDILOL 3.12 MG: 3.12 TABLET, FILM COATED ORAL at 09:02

## 2025-05-28 RX ADMIN — PREGABALIN 200 MG: 100 CAPSULE ORAL at 22:16

## 2025-05-28 RX ADMIN — MORPHINE SULFATE 2 MG: 2 INJECTION, SOLUTION INTRAMUSCULAR; INTRAVENOUS at 09:03

## 2025-05-28 RX ADMIN — BISACODYL 5 MG: 5 TABLET, COATED ORAL at 09:24

## 2025-05-28 RX ADMIN — PREGABALIN 100 MG: 100 CAPSULE ORAL at 09:03

## 2025-05-28 RX ADMIN — Medication 5000 UNITS: at 09:02

## 2025-05-28 RX ADMIN — Medication 250 MG: at 09:02

## 2025-05-28 RX ADMIN — Medication 10 ML: at 09:03

## 2025-05-28 RX ADMIN — PANTOPRAZOLE SODIUM 40 MG: 40 INJECTION, POWDER, FOR SOLUTION INTRAVENOUS at 09:03

## 2025-05-28 RX ADMIN — INSULIN LISPRO 2 UNITS: 100 INJECTION, SOLUTION INTRAVENOUS; SUBCUTANEOUS at 11:47

## 2025-05-28 RX ADMIN — TAMOXIFEN CITRATE 20 MG: 10 TABLET, FILM COATED ORAL at 09:03

## 2025-05-28 RX ADMIN — Medication 5000 UNITS: at 22:16

## 2025-05-28 RX ADMIN — SODIUM CHLORIDE 100 ML/HR: 9 INJECTION, SOLUTION INTRAVENOUS at 13:18

## 2025-05-28 RX ADMIN — CARVEDILOL 3.12 MG: 3.12 TABLET, FILM COATED ORAL at 22:21

## 2025-05-28 RX ADMIN — Medication 10 ML: at 22:17

## 2025-05-28 RX ADMIN — SERTRALINE 100 MG: 100 TABLET, FILM COATED ORAL at 09:03

## 2025-05-28 RX ADMIN — FERROUS SULFATE TAB 325 MG (65 MG ELEMENTAL FE) 325 MG: 325 (65 FE) TAB at 17:02

## 2025-05-28 RX ADMIN — FERROUS SULFATE TAB 325 MG (65 MG ELEMENTAL FE) 325 MG: 325 (65 FE) TAB at 09:02

## 2025-05-28 RX ADMIN — ENOXAPARIN SODIUM 40 MG: 100 INJECTION SUBCUTANEOUS at 11:47

## 2025-05-28 RX ADMIN — INSULIN LISPRO 3 UNITS: 100 INJECTION, SOLUTION INTRAVENOUS; SUBCUTANEOUS at 17:02

## 2025-05-28 NOTE — PROGRESS NOTES
[facility] Medicine Services  PROGRESS NOTE    Patient Name: Elton Funez  : 1950  MRN: [id number]    Date of Admission: 2025  Primary Care Physician: Tc Ramirez MD    Subjective   Subjective     CC:  pancreatitis    HPI:  Overall feels better, did have some epigastric pain today w/ gi soft diet. Currently feeling better, no current nausea and minimal pain. He does not feel quite ready for discharge, nor does he wish to backtrack to a full liquid diet. Hopes to continue his current soft diet and monitor overnight as he feels that overall he is improving    No dyspnea      Objective   Objective     Vital Signs:   Temp:  [98 °F (36.7 °C)-98.1 °F (36.7 °C)] 98.1 °F (36.7 °C)  Heart Rate:  [52-72] 59  Resp:  [17-18] 17  BP: (115-177)/(66-88) 115/66     Physical Exam:  Constitutional:Alert, oriented x 3, nontoxic appearing  Psych:Normal/appropriate affect  HEENT:NCAT, oropharynx clear  Neck: neck supple, full range of motion  Neuro: Face symmetric, speech clear, equal , moves all extremities  Cardiac: rrr  Resp: CTAB, normal effort  GI: abd soft, nontender, obese, mildly tender, no rebound or guarding  Skin: No extremity rash  Musculoskeletal/extremities: no cyanosis of extremities; no significant ankle edema          Results Reviewed:  LAB RESULTS:      Lab 25  0343 25  0432 25  0650 25  0415 25  0241 25  2132 25  1438 25  1327   WBC 4.19 3.73 4.87  --   --  7.07  --  4.57   HEMOGLOBIN 11.6* 11.8* 12.9*  --   --  14.5  --  13.2   HEMATOCRIT 34.4* 36.2* 40.0  --   --  44.8  --  40.6   PLATELETS 65* 58* 72*  --   --  72*  --  72*   NEUTROS ABS  --  2.02 3.19  --   --  5.05  --  2.99   IMMATURE GRANS (ABS)  --  0.00 0.02  --   --  0.03  --  0.04   LYMPHS ABS  --  1.13 1.02  --   --  1.20  --  0.99   MONOS ABS  --  0.34 0.48  --   --  0.61  --  0.37   EOS ABS  --  0.22 0.14  --   --  0.16  --  0.15   MCV 96.6 97.8*  99.0*  --   --  98.2*  --  98.1*   HSTROP T  --   --   --  41* 43*  --  35* 35*         Lab 05/28/25 0343 05/27/25  0432 05/26/25  0650 05/26/25  0241 05/22/25  1327   SODIUM 138 140 137 139 138   POTASSIUM 4.2 4.3 5.2 4.0 5.1   CHLORIDE 108* 109* 105 105 101   CO2 21.0* 21.0* 23.0 21.0* 25.0   ANION GAP 9.0 10.0 9.0 13.0 12.0   BUN 14.4 18 22 23 32*   CREATININE 1.22 1.33* 1.49* 1.47* 1.63*   EGFR 61.8 55.7* 48.6* 49.4* 43.7*   GLUCOSE 103* 113* 131* 89 199*   CALCIUM 8.0* 8.4* 8.9 8.6 9.1   MAGNESIUM 1.6 1.7  --   --   --    HEMOGLOBIN A1C  --   --  7.00*  --   --          Lab 05/28/25 0343 05/27/25 0432 05/26/25  0241 05/22/25  1327   TOTAL PROTEIN 6.0 6.4 7.3 6.7   ALBUMIN 3.2* 3.3* 3.9 3.8   GLOBULIN 2.8  --  3.4 2.9   ALT (SGPT) 7 7 7 9   AST (SGOT) 16 15 16 20   BILIRUBIN 0.4 0.5 0.5 0.4   INDIRECT BILIRUBIN  --  0.3  --   --    BILIRUBIN DIRECT  --  0.2  --   --    ALK PHOS 57 57 67 82   LIPASE 387* 649* 1,902*  --          Lab 05/26/25  0415 05/26/25  0241 05/22/25  1438 05/22/25  1327   PROBNP  --   --   --  <36.0   HSTROP T 41* 43* 35* 35*         Lab 05/27/25  0432   CHOLESTEROL 102   LDL CHOL 56   HDL CHOL 23*   TRIGLYCERIDES 130             Brief Urine Lab Results  (Last result in the past 365 days)        Color   Clarity   Blood   Leuk Est   Nitrite   Protein   CREAT   Urine HCG        05/26/25 0251 Yellow   Clear   Negative   Negative   Negative   100 mg/dL (2+)                   Microbiology Results Abnormal       None            No radiology results from the last 24 hrs      Results for orders placed during the hospital encounter of 03/20/23    Adult Transthoracic Echo Complete W/ Cont if Necessary Per Protocol    Interpretation Summary    Left ventricular ejection fraction appears to be 61 - 65%.    Left ventricular diastolic function is consistent with (grade I) impaired relaxation.    Calculated right ventricular systolic pressure from tricuspid regurgitation is 16 mmHg.      Current  medications:  Scheduled Meds:carvedilol, 3.125 mg, Oral, Q12H  enoxaparin sodium, 40 mg, Subcutaneous, Q24H  ferrous sulfate, 325 mg, Oral, BID  insulin glargine, 10 Units, Subcutaneous, Nightly  insulin lispro, 2-7 Units, Subcutaneous, 4x Daily AC & at Bedtime  levothyroxine, 75 mcg, Oral, Daily  pantoprazole, 40 mg, Intravenous, Q12H  pregabalin, 100 mg, Oral, Daily   And  pregabalin, 200 mg, Oral, Nightly  saccharomyces boulardii, 250 mg, Oral, Daily  sertraline, 100 mg, Oral, Daily  sodium chloride, 10 mL, Intravenous, Q12H  spironolactone, 25 mg, Oral, Daily  tamoxifen, 20 mg, Oral, Daily  vitamin D3, 5,000 Units, Oral, BID      Continuous Infusions:sodium chloride, 100 mL/hr, Last Rate: 100 mL/hr (05/28/25 1318)      PRN Meds:.  senna-docusate sodium **AND** polyethylene glycol **AND** bisacodyl **AND** bisacodyl    Calcium Replacement - Follow Nurse / BPA Driven Protocol    dextrose    dextrose    glucagon (human recombinant)    Magnesium Standard Dose Replacement - Follow Nurse / BPA Driven Protocol    Morphine **AND** naloxone    nitroglycerin    Phosphorus Replacement - Follow Nurse / BPA Driven Protocol    Potassium Replacement - Follow Nurse / BPA Driven Protocol    sodium chloride    sodium chloride    sodium chloride    temazepam    tiZANidine    Assessment & Plan   Assessment & Plan     Active Hospital Problems    Diagnosis  POA    **Pancreatitis [K85.90]  Yes    Elevated troponin [R79.89]  Yes    Malignant neoplasm of right breast in male, estrogen receptor positive [C50.921, Z17.0]  Not Applicable    Liver cirrhosis secondary to JEROME [K75.81, K74.60]  Yes    Acquired hypothyroidism [E03.9]  Yes    Type 2 diabetes mellitus with diabetic polyneuropathy, with long-term current use of insulin [E11.42, Z79.4]  Not Applicable    FAVIO (obstructive sleep apnea) [G47.33]  Yes    Stage 3a chronic kidney disease [N18.31]  Yes    Gastroesophageal reflux disease without esophagitis [K21.9]  Yes    Dyslipidemia,  goal LDL below 70 [E78.5]  Yes    Primary hypertension [I10]  Yes      Resolved Hospital Problems   No resolved problems to display.        Brief Hospital Course to date:  Elton Funez is a 74 y.o. male w/ hx of insulin dep DM, ckd 3 (baseline cr ~1.5-1.8), breast cancer (s/p right mastectomy 5/23/23, follows w/ Dr. Arcehr of oncology), chandra cirrhosis, htn, hl, ivet, anxiety/depression, hypothyroidism. Previous admission 3/2024 w/ gallstone pancreatitis, underwent s/p lap ccy on 3/18/24 by Dr. Spear.    Patient presented w/ abd pain x 4 days. Ct a/p suggested pancreatitis (previous ccy noted and biliary tree looked normal), lft's wnl. Denies etoh use or new medications. FLP suggested normal triglyceride level. Initial lipase 1,902. Initiated on iv fluids and admitted to hospitalist service.    Acute pancreatitis  -previous ccy 3/2024 Dr. Spear  -- CT abdomen pelvis shows findings consistent with acute uncomplicated pancreatitis. Biliary tree wnl  -LFT's & bili ok  -denies etoh use  -FLP w/ normal triglycerides  -unclear etiology, perhaps meds? Pharmacist reviewed medicines on 5/27/25, of his medicines trulicity is the most likely to cause pancreatitis but this is not new med (prescribed since June 2024) so this is unclear etiology  -lipase downtrending  -continue supportive care w/ IV fluids, bid ppi, prn analgesics & antiemetics; overall improving; having some abd discomfort today, wishes to continue the gi soft diet overnight. Possibly d/c home tomorrow if continues to improve     T2DM  -- A1c is 7.0 5/26/25  -- FSBG with SSI  -- Lantus 10 units QHS, SSI, titrate prn     HTN  -- coreg       Neuropathy  -- follows with SU[facility] TORRES, neurology  -- Lyrica     Breast cancer  -- s/p right mastectomy;  follows with Dr. Archer  -- continue Tamoxifen     IVET  -- CPAP     CHANDRA cirrhosis  GERD  Portal hypertensive gastropathy  Esophageal varices  --CT abdomen pelvis shows hepatic steatosis  -- follows with GI  --  continue aldactone  -- PPI    Chronic thrombocytopenia  -typically ranges 70,000-110,000  -currently 50-60,000's (likely due to chandra cirrhosis)     CKD 3 (baseline cr ~1.5-1.8)  -stable, trend     Hypothyroidism  -- synthroid     Anxiety/Depression  -- zoloft    Am labs: cbc,cmp         Expected Discharge Location and Transportation: home  Expected Discharge   Expected Discharge Date: 5/29/2025; Expected Discharge Time:      VTE Prophylaxis:  Pharmacologic & mechanical VTE prophylaxis orders are present.         AM-PAC 6 Clicks Score (PT): 19 (05/27/25 2000)    CODE STATUS:   Code Status and Medical Interventions: CPR (Attempt to Resuscitate); Full Support   Ordered at: 05/26/25 0426     Code Status (Patient has no pulse and is not breathing):    CPR (Attempt to Resuscitate)     Medical Interventions (Patient has pulse or is breathing):    Full Support     Level Of Support Discussed With:    Patient       Tay Garcia MD  05/28/25

## 2025-05-28 NOTE — CASE MANAGEMENT/SOCIAL WORK
Continued Stay Note  [facility]     Patient Name: Elton Funez  MRN: [id number]  Today's Date: 5/28/2025    Admit Date: 5/26/2025    Plan: Home   Discharge Plan       Row Name 05/28/25 1155       Plan    Plan Home    Patient/Family in Agreement with Plan yes    Plan Comments CM spoke with patient and wife at bedside. Patient states he feels like he is improving. Patient currently eating lunch. Patient and wife deny any discharge needs at this time. CM following    Final Discharge Disposition Code 01 - home or self-care                   Discharge Codes    No documentation.                 Expected Discharge Date and Time       Expected Discharge Date Expected Discharge Time    May 29, 2025               Heather Luciano RN

## 2025-05-29 ENCOUNTER — READMISSION MANAGEMENT (OUTPATIENT)
Dept: CALL CENTER | Facility: HOSPITAL | Age: 75
End: 2025-05-29
Payer: MEDICARE

## 2025-05-29 VITALS
SYSTOLIC BLOOD PRESSURE: 126 MMHG | BODY MASS INDEX: 28.79 KG/M2 | DIASTOLIC BLOOD PRESSURE: 105 MMHG | HEIGHT: 68 IN | RESPIRATION RATE: 18 BRPM | HEART RATE: 57 BPM | TEMPERATURE: 97.9 F | WEIGHT: 190 LBS | OXYGEN SATURATION: 97 %

## 2025-05-29 LAB
ALBUMIN SERPL-MCNC: 3 G/DL (ref 3.5–5.2)
ALBUMIN/GLOB SERPL: 1.1 G/DL
ALP SERPL-CCNC: 59 U/L (ref 39–117)
ALT SERPL W P-5'-P-CCNC: 9 U/L (ref 1–41)
ANION GAP SERPL CALCULATED.3IONS-SCNC: 11 MMOL/L (ref 5–15)
AST SERPL-CCNC: 16 U/L (ref 1–40)
BILIRUB SERPL-MCNC: 0.3 MG/DL (ref 0–1.2)
BUN SERPL-MCNC: 18.1 MG/DL (ref 8–23)
BUN/CREAT SERPL: 13.6 (ref 7–25)
CALCIUM SPEC-SCNC: 7.8 MG/DL (ref 8.6–10.5)
CHLORIDE SERPL-SCNC: 110 MMOL/L (ref 98–107)
CO2 SERPL-SCNC: 19 MMOL/L (ref 22–29)
CREAT SERPL-MCNC: 1.33 MG/DL (ref 0.76–1.27)
DEPRECATED RDW RBC AUTO: 47.6 FL (ref 37–54)
EGFRCR SERPLBLD CKD-EPI 2021: 55.7 ML/MIN/1.73
ERYTHROCYTE [DISTWIDTH] IN BLOOD BY AUTOMATED COUNT: 13.3 % (ref 12.3–15.4)
GLOBULIN UR ELPH-MCNC: 2.7 GM/DL
GLUCOSE BLDC GLUCOMTR-MCNC: 127 MG/DL (ref 70–130)
GLUCOSE SERPL-MCNC: 131 MG/DL (ref 65–99)
HCT VFR BLD AUTO: 33.1 % (ref 37.5–51)
HGB BLD-MCNC: 10.8 G/DL (ref 13–17.7)
MCH RBC QN AUTO: 32 PG (ref 26.6–33)
MCHC RBC AUTO-ENTMCNC: 32.6 G/DL (ref 31.5–35.7)
MCV RBC AUTO: 97.9 FL (ref 79–97)
PLATELET # BLD AUTO: 62 10*3/MM3 (ref 140–450)
PMV BLD AUTO: 12.8 FL (ref 6–12)
POTASSIUM SERPL-SCNC: 4.4 MMOL/L (ref 3.5–5.2)
PROT SERPL-MCNC: 5.7 G/DL (ref 6–8.5)
QT INTERVAL: 342 MS
QT INTERVAL: 380 MS
QTC INTERVAL: 379 MS
QTC INTERVAL: 412 MS
RBC # BLD AUTO: 3.38 10*6/MM3 (ref 4.14–5.8)
SODIUM SERPL-SCNC: 140 MMOL/L (ref 136–145)
WBC NRBC COR # BLD AUTO: 3.38 10*3/MM3 (ref 3.4–10.8)

## 2025-05-29 PROCEDURE — 80053 COMPREHEN METABOLIC PANEL: CPT | Performed by: INTERNAL MEDICINE

## 2025-05-29 PROCEDURE — 99239 HOSP IP/OBS DSCHRG MGMT >30: CPT | Performed by: INTERNAL MEDICINE

## 2025-05-29 PROCEDURE — 85027 COMPLETE CBC AUTOMATED: CPT | Performed by: INTERNAL MEDICINE

## 2025-05-29 PROCEDURE — 82948 REAGENT STRIP/BLOOD GLUCOSE: CPT

## 2025-05-29 RX ORDER — ONDANSETRON 4 MG/1
4 TABLET, FILM COATED ORAL EVERY 8 HOURS PRN
Qty: 15 TABLET | Refills: 0 | Status: SHIPPED | OUTPATIENT
Start: 2025-05-29

## 2025-05-29 RX ADMIN — Medication 5000 UNITS: at 09:13

## 2025-05-29 RX ADMIN — CARVEDILOL 3.12 MG: 3.12 TABLET, FILM COATED ORAL at 09:13

## 2025-05-29 RX ADMIN — SPIRONOLACTONE 25 MG: 25 TABLET ORAL at 09:13

## 2025-05-29 RX ADMIN — TAMOXIFEN CITRATE 20 MG: 10 TABLET, FILM COATED ORAL at 09:13

## 2025-05-29 RX ADMIN — Medication 10 ML: at 09:15

## 2025-05-29 RX ADMIN — SERTRALINE 100 MG: 100 TABLET, FILM COATED ORAL at 09:14

## 2025-05-29 RX ADMIN — FERROUS SULFATE TAB 325 MG (65 MG ELEMENTAL FE) 325 MG: 325 (65 FE) TAB at 09:14

## 2025-05-29 RX ADMIN — PREGABALIN 100 MG: 100 CAPSULE ORAL at 09:13

## 2025-05-29 RX ADMIN — LEVOTHYROXINE SODIUM 75 MCG: 0.07 TABLET ORAL at 05:37

## 2025-05-29 RX ADMIN — Medication 250 MG: at 09:14

## 2025-05-29 RX ADMIN — PANTOPRAZOLE SODIUM 40 MG: 40 INJECTION, POWDER, FOR SOLUTION INTRAVENOUS at 09:14

## 2025-05-29 NOTE — DISCHARGE SUMMARY
[facility] Medicine Services  DISCHARGE SUMMARY    Patient Name: Elton Funez  : 1950  MRN: [id number]    Date of Admission: 2025 12:25 AM  Date of Discharge:  2025  Primary Care Physician: Tc Ramirez MD    Consults       No orders found from 2025 to 2025.            Hospital Course     Presenting Problem:     Active Hospital Problems    Diagnosis  POA    **Pancreatitis [K85.90]  Yes    Elevated troponin [R79.89]  Yes    Malignant neoplasm of right breast in male, estrogen receptor positive [C50.921, Z17.0]  Not Applicable    Liver cirrhosis secondary to LOPEZ [K75.81, K74.60]  Yes    Acquired hypothyroidism [E03.9]  Yes    Type 2 diabetes mellitus with diabetic polyneuropathy, with long-term current use of insulin [E11.42, Z79.4]  Not Applicable    FAVIO (obstructive sleep apnea) [G47.33]  Yes    Stage 3a chronic kidney disease [N18.31]  Yes    Gastroesophageal reflux disease without esophagitis [K21.9]  Yes    Dyslipidemia, goal LDL below 70 [E78.5]  Yes    Primary hypertension [I10]  Yes      Resolved Hospital Problems   No resolved problems to display.      -------------final diagnoses----------------  Acute pancreatitis, unclear etiology  -previous ccy  -ct biliary tree normal appearing, normal lft's & bili  -triglycerides ok  -denies etoh use  -reviewed meds w/ pharmacist, no obvious culprits, but of the medicines trulicity most likely to cause pancreatitis  -treated supportively w/ fluids, bowel rest, analgesics & antiemetics; now doing well clinically. Plan to d/c home. If has recurrent pancreatitis without overt etiology, recommend consider stopping trulicity  DM2  HTN  Diabetic neuropathy  Breast cancer (s/ right mastectomy, on tamoxifen, follows w/ Dr. Archer)  Hx Lopez cirrhosis/esophageal varices/portal htn  Chronic thrombocytopenia  CKD 3 (baseline cr 1.5-1.8)  Hypothyroidism  Depression  --------------------------------------    Hospital  Course:  Elton Funez is a 74 y.o. male w/ insulin dep dm, ckd 3, breast ca (prior mastectomy on tamoxifen), chandra cirrhosis, anxiety/depression, hypothyroidism. Previous admisison w/ gallstone pancreatitis march 2024, underwent ccy at that time. Patient presented with abd pain, n/v. Workup revealed pancreatitis (marked elevated lipase and ct a/p as well) with normal appearing  biliary tree, lft's & bili ok. Denies etoh use. Triglycerides ok. Reviewed meds w/ pharmacist. None of them are new per patient. Of the medicines he is on there are no obvious culprits causing pancreatitis, but of the medicines he takes apparently trulicity would be the most likely culprit. Has done well w/ supportive care and now feels well and ready to go home. If has recurrent idiopathic pancreatitis would consider stopping trulicity.      Discharge Follow Up Recommendations for outpatient labs/diagnostics:   Pcp 1 week    Day of Discharge     HPI:   No pain, no nausea. Tolerating po. Wants to go home    Review of Systems  No f/c    Vital Signs:   Temp:  [98 °F (36.7 °C)-98.3 °F (36.8 °C)] 98 °F (36.7 °C)  Heart Rate:  [45-70] 45  Resp:  [16-17] 16  BP: (115-142)/(66-78) 142/72      Physical Exam:  Constitutional:Alert, oriented x 3, nontoxic appearing  Psych:Normal/appropriate affect  HEENT:NCAT, oropharynx clear  Neck: neck supple, full range of motion  Neuro: Face symmetric, speech clear, equal , moves all extremities  Cardiac: RRR; No pretibial pitting edema  Resp: CTAB, normal effort  GI: abd soft, nontender, obese  Skin: No extremity rash  Musculoskeletal/extremities: no cyanosis of extremities; no significant ankle edema          Pertinent  and/or Most Recent Results     LAB RESULTS:      Lab 05/29/25  0353 05/28/25  0343 05/27/25  0432 05/26/25  0650 05/25/25  2132 05/22/25  1327   WBC 3.38* 4.19 3.73 4.87 7.07 4.57   HEMOGLOBIN 10.8* 11.6* 11.8* 12.9* 14.5 13.2   HEMATOCRIT 33.1* 34.4* 36.2* 40.0 44.8 40.6   PLATELETS 62*  65* 58* 72* 72* 72*   NEUTROS ABS  --   --  2.02 3.19 5.05 2.99   IMMATURE GRANS (ABS)  --   --  0.00 0.02 0.03 0.04   LYMPHS ABS  --   --  1.13 1.02 1.20 0.99   MONOS ABS  --   --  0.34 0.48 0.61 0.37   EOS ABS  --   --  0.22 0.14 0.16 0.15   MCV 97.9* 96.6 97.8* 99.0* 98.2* 98.1*         Lab 05/29/25 0353 05/28/25 0343 05/27/25 0432 05/26/25  0650 05/26/25  0241   SODIUM 140 138 140 137 139   POTASSIUM 4.4 4.2 4.3 5.2 4.0   CHLORIDE 110* 108* 109* 105 105   CO2 19.0* 21.0* 21.0* 23.0 21.0*   ANION GAP 11.0 9.0 10.0 9.0 13.0   BUN 18.1 14.4 18 22 23   CREATININE 1.33* 1.22 1.33* 1.49* 1.47*   EGFR 55.7* 61.8 55.7* 48.6* 49.4*   GLUCOSE 131* 103* 113* 131* 89   CALCIUM 7.8* 8.0* 8.4* 8.9 8.6   MAGNESIUM  --  1.6 1.7  --   --    HEMOGLOBIN A1C  --   --   --  7.00*  --          Lab 05/29/25 0353 05/28/25 0343 05/27/25 0432 05/26/25  0241 05/22/25  1327   TOTAL PROTEIN 5.7* 6.0 6.4 7.3 6.7   ALBUMIN 3.0* 3.2* 3.3* 3.9 3.8   GLOBULIN 2.7 2.8  --  3.4 2.9   ALT (SGPT) 9 7 7 7 9   AST (SGOT) 16 16 15 16 20   BILIRUBIN 0.3 0.4 0.5 0.5 0.4   INDIRECT BILIRUBIN  --   --  0.3  --   --    BILIRUBIN DIRECT  --   --  0.2  --   --    ALK PHOS 59 57 57 67 82   LIPASE  --  387* 649* 1,902*  --          Lab 05/26/25  0415 05/26/25  0241 05/22/25  1438 05/22/25  1327   PROBNP  --   --   --  <36.0   HSTROP T 41* 43* 35* 35*         Lab 05/27/25  0432   CHOLESTEROL 102   LDL CHOL 56   HDL CHOL 23*   TRIGLYCERIDES 130             Brief Urine Lab Results  (Last result in the past 365 days)        Color   Clarity   Blood   Leuk Est   Nitrite   Protein   CREAT   Urine HCG        05/26/25 0251 Yellow   Clear   Negative   Negative   Negative   100 mg/dL (2+)                 Microbiology Results (last 10 days)       ** No results found for the last 240 hours. **            CT Abdomen Pelvis With Contrast  Result Date: 5/25/2025  CT ABDOMEN PELVIS W CONTRAST Date of Exam: 5/25/2025 10:38 PM EDT Indication: epigastric pain. Comparison:  3/17/2024. Technique: Axial CT images were obtained of the abdomen and pelvis following the uneventful intravenous administration of intravenous contrast. Reconstructed coronal and sagittal images were also obtained. Automated exposure control and iterative construction methods were used. Findings: Lung Bases:   The visualized lung bases and lower mediastinal structures are unremarkable. Liver: The liver appears diffusely hypodense consistent with steatosis.. No focal lesions. Biliary/Gallbladder:  The gallbladder has been resected.. The biliary tree is nondilated. Spleen: Spleen is normal in size and CT density. Pancreas:  Mild inflammatory changes are seen surrounding the pancreatic body and tail. Trace amount of free fluid is present. No evidence of focal collection. No evidence of pancreatic ductal dilatation. No focal mass identified. Kidneys:  Kidneys are normal in size. There are no stones or hydronephrosis. Adrenals:  Adrenal glands are unremarkable. Retroperitoneal/Lymph Nodes/Vasculature:  No retroperitoneal adenopathy is identified. Gastrointestinal/Mesentery:  The bowel loops are non-dilated without wall thickening or mass. The appendix appears within normal limits. No evidence of obstruction. No free air. No mesenteric fluid collections identified. Moderate stool burden present. Mesenteric vasculature appears  unremarkable. No evidence of hernia. Bladder:  The bladder is normal. Genital:   Unremarkable       Bony Structures:   Visualized bony structures are consistent with the patient's age.     Impression: 1.Findings consistent with acute uncomplicated pancreatitis. 2.Hepatic steatosis. 3.Ancillary findings as described above. Electronically Signed: Courtney Rizo MD  5/25/2025 11:12 PM EDT  Workstation ID: [id number]    XR Chest 1 View  Result Date: 5/22/2025  XR CHEST 1 VW Date of Exam: 5/22/2025 1:56 PM EDT Indication: chest pain Comparison: 6/13/2023, 6/11/2023 and 5/17/2023 Findings: The lungs are  clear. Cardiac, hilar, and mediastinal silhouettes are radiographically stable. No pneumothorax or pleural effusions. The trachea is midline. Pulmonary vascularity is normal. Visualized bony structures are intact. Hardware is seen in the lower cervical and upper thoracic spine.     Impression: No active cardiopulmonary disease Electronically Signed: Salo Villalobos DO  5/22/2025 2:23 PM EDT  Workstation ID: [id number]      Results for orders placed in visit on 02/25/20    SCANNED VASCULAR STUDIES      Results for orders placed in visit on 02/25/20    SCANNED VASCULAR STUDIES      Results for orders placed during the hospital encounter of 03/20/23    Adult Transthoracic Echo Complete W/ Cont if Necessary Per Protocol    Interpretation Summary    Left ventricular ejection fraction appears to be 61 - 65%.    Left ventricular diastolic function is consistent with (grade I) impaired relaxation.    Calculated right ventricular systolic pressure from tricuspid regurgitation is 16 mmHg.      Plan for Follow-up of Pending Labs/Results:     Discharge Details        Discharge Medications        New Medications        Instructions Start Date   ondansetron 4 MG tablet  Commonly known as: Zofran   4 mg, Oral, Every 8 Hours PRN             Continue These Medications        Instructions Start Date   B-D UF III MINI PEN NEEDLES 31G X 5 MM misc  Generic drug: Insulin Pen Needle   3 Times Daily      carvedilol 3.125 MG tablet  Commonly known as: COREG   3.125 mg, Oral, Every 12 Hours Scheduled      colestipol 1 g tablet  Commonly known as: COLESTID   TAKE 2 TABLETS TWICE A DAY      ferrous sulfate 325 (65 FE) MG tablet  Commonly known as: FeroSul   325 mg, Oral, 2 Times Daily      FreeStyle Winston 2 [place] device   1 Device, Not Applicable, Every 14 Days      FreeStyle Winston 2 Sensor misc   1 each, Not Applicable, Every 14 Days      gemfibrozil 600 MG tablet  Commonly known as: LOPID   TAKE 1 TABLET TWICE A DAY      Insulin Lispro (1 Unit  Dial) 100 UNIT/ML solution pen-injector  Commonly known as: HumaLOG KwikPen   30 Units, Subcutaneous, 2 Times Daily      ipratropium 0.06 % nasal spray  Commonly known as: ATROVENT   USE 2 SPRAYS IN EACH NOSTRIL AS DIRECTED BY PROVIDER DAILY      Lantus SoloStar 100 UNIT/ML injection pen  Generic drug: Insulin Glargine   INJECT 70 UNITS UNDER THE SKIN INTO THE APPROPRIATE AREA AS DIRECTED EVERY NIGHT      levothyroxine 75 MCG tablet  Commonly known as: SYNTHROID, LEVOTHROID   75 mcg, Oral, Daily      multivitamin tablet tablet  Commonly known as: THERAGRAN   1 tablet, Daily      omeprazole 40 MG capsule  Commonly known as: priLOSEC   40 mg, Oral, Daily      pregabalin 100 MG capsule  Commonly known as: Lyrica   Take 1 capsule by mouth Daily AND 2 capsules Every Night.      sertraline 100 MG tablet  Commonly known as: ZOLOFT   TAKE 1 TABLET DAILY      spironolactone 25 MG tablet  Commonly known as: ALDACTONE   TAKE 1 TABLET DAILY      tamoxifen 20 MG chemo tablet  Commonly known as: NOLVADEX   20 mg, Oral, Daily      temazepam 7.5 MG capsule  Commonly known as: Restoril   7.5 mg, Oral, Nightly PRN      tiZANidine 4 MG tablet  Commonly known as: ZANAFLEX   4 mg, Oral, 2 Times Daily PRN      Trulicity 4.5 MG/0.5ML solution auto-injector  Generic drug: Dulaglutide   4.5 mg, Subcutaneous, Weekly      vitamin D3 125 MCG (5000 UT) capsule capsule   5,000 Units, 2 Times Daily             Stop These Medications      Alpha-Lipoic Acid 600 MG tablet     Co Q 10 100 MG capsule     Flaxseed Oil 1400 MG capsule     saccharomyces boulardii 250 MG capsule  Commonly known as: FLORASTOR     VITAMIN B COMPLEX PO              Allergies   Allergen Reactions    Glucophage Xr [Metformin Hcl Er] Diarrhea and Other (See Comments)     Glucophage XR TB24: Adverse Reaction: Severe GI issues.    Metformin Nausea And Vomiting and Unknown - Low Severity     Other reaction(s): SEVERE INTESTIONAL ISSUES    Other reaction(s): SEVERE GI  ISSUES    Glucophage XR TB24    MetFORMIN HCl TABS    Glucophage    metformin hydrochloride    Tetracyclines & Related Nausea Only     Adverse Reaction    Tetracycline Unknown - Low Severity     tetracycline hydrochloride    Chlorhexidine Rash         Discharge Disposition:  Home or Self Care    Diet:  Hospital:  Diet Order   Procedures    Diet: Gastrointestinal, Diabetic; Consistent Carbohydrate; Fiber-Restricted; Texture: Soft to Chew (NDD 3); Soft to Chew: Whole Meat; Fluid Consistency: Thin (IDDSI 0)            Activity:           CODE STATUS:    Code Status and Medical Interventions: CPR (Attempt to Resuscitate); Full Support   Ordered at: 05/26/25 0426     Code Status (Patient has no pulse and is not breathing):    CPR (Attempt to Resuscitate)     Medical Interventions (Patient has pulse or is breathing):    Full Support     Level Of Support Discussed With:    Patient       Future Appointments   Date Time Provider Department Center   6/17/2025  2:30 PM Pau Colin MD [facility]   6/19/2025 11:20 AM Alhaji Ann MD [facility]   6/26/2025  1:00 PM Marta García MD [facility]   7/29/2025 11:00 AM Tc Ramirez MD [facility]   8/5/2025 12:00 PM Tracy Cornelius PA-C [facility]   8/12/2025  2:00 PM Pooja Prieto APRN [facility]C [facility]   11/6/2025  1:00 PM Katia Matamoros APRN [facility]       Additional Instructions for the Follow-ups that You Need to Schedule       Discharge Follow-up with PCP   As directed       Currently Documented PCP:    Tc Ramirez MD    PCP Phone Number:    [phone]     Follow Up Details: 1 week                      Tay Garcia MD  05/29/25      Time Spent on Discharge:  I spent  35  minutes on this discharge activity which included: face-to-face encounter with the patient, reviewing the data in the system, coordination of the care with the nursing staff as well as consultants, documentation, and entering  orders.

## 2025-05-29 NOTE — CASE MANAGEMENT/SOCIAL WORK
Case Management Discharge Note      Final Note: CM spoke with patient at bedside. Plans to discharge home today. Patient denies any discharge needs. CM delivered IMM to patient at bedside, documented. Patients wife, Pooja, will transport when patient is ready for discharge. No needs identified.         Selected Continued Care - Admitted Since 5/26/2025       Destination    No services have been selected for the patient.                Durable Medical Equipment    No services have been selected for the patient.                Dialysis/Infusion    No services have been selected for the patient.                Home Medical Care    No services have been selected for the patient.                Therapy    No services have been selected for the patient.                Community Resources    No services have been selected for the patient.                Community & DME    No services have been selected for the patient.                    Transportation Services  Private: Car    Final Discharge Disposition Code: 01 - home or self-care

## 2025-05-29 NOTE — OUTREACH NOTE
Prep Survey      Flowsheet Row Responses   [facility] patient discharged from? [place]   Is LACE score < 7 ? No   Eligibility [facility]   Date of Admission 05/26/25   Date of Discharge 05/29/25   Discharge Disposition Home or Self Care   Discharge diagnosis Acute pancreatitis, unclear etiology   Does the patient have one of the following disease processes/diagnoses(primary or secondary)? Other   Does the patient have Home health ordered? No   Is there a DME ordered? No   Prep survey completed? Yes            Jimena NICOLE - Registered Nurse

## 2025-05-30 ENCOUNTER — TRANSITIONAL CARE MANAGEMENT TELEPHONE ENCOUNTER (OUTPATIENT)
Dept: CALL CENTER | Facility: HOSPITAL | Age: 75
End: 2025-05-30
Payer: MEDICARE

## 2025-05-30 NOTE — OUTREACH NOTE
Call Center TCM Note      Flowsheet Row Responses   [facility] patient discharged from? [place]   Does the patient have one of the following disease processes/diagnoses(primary or secondary)? Other   TCM attempt successful? Yes   Call start time 0854   Call end time 0900   Discharge diagnosis Acute pancreatitis, unclear etiology   Person spoke with today (if not patient) and relationship wife Pooja Dove reviewed with patient/caregiver? Yes   Is the patient having any side effects they believe may be caused by any medication additions or changes? No   Does the patient have all medications ordered at discharge? Yes   Is the patient taking all medications as directed (includes completed medication regime)? Yes   Comments Patienthas a hospital followup scheduled with Dr Deng on 6/12   Does the patient have an appointment with their PCP within 7-14 days of discharge? Yes   Psychosocial issues? No   Did the patient receive a copy of their discharge instructions? Yes   Nursing interventions Reviewed instructions with patient   What is the patient's perception of their health status since discharge? Improving   Is the patient/caregiver able to teach back signs and symptoms related to disease process for when to call PCP? Yes   Is the patient/caregiver able to teach back signs and symptoms related to disease process for when to call 911? Yes   Is the patient/caregiver able to teach back the hierarchy of who to call/visit for symptoms/problems? PCP, Specialist, Home health nurse, Urgent Care, ED, 911 Yes   If the patient is a current smoker, are they able to teach back resources for cessation? Not a smoker   TCM call completed? Yes   Wrap up additional comments Wife reports he is doing well.   Call end time 0900   Would this patient benefit from a Referral to Amb Social Work? No   Is the patient interested in additional calls from an ambulatory ? No            Jamal WARNER - Registered Nurse    5/30/2025,  09:03 EDT

## 2025-06-09 ENCOUNTER — READMISSION MANAGEMENT (OUTPATIENT)
Dept: CALL CENTER | Facility: HOSPITAL | Age: 75
End: 2025-06-09
Payer: MEDICARE

## 2025-06-09 NOTE — OUTREACH NOTE
Medical Week 2 Survey      Flowsheet Row Responses   [facility] patient discharged from? [place]   Does the patient have one of the following disease processes/diagnoses(primary or secondary)? Other   Week 2 attempt successful? No   Unsuccessful attempts Attempt 1   oke Tanya Madrigal Registered Nurse

## 2025-06-12 ENCOUNTER — OFFICE VISIT (OUTPATIENT)
Dept: INTERNAL MEDICINE | Facility: CLINIC | Age: 75
End: 2025-06-12
Payer: MEDICARE

## 2025-06-12 VITALS
HEART RATE: 60 BPM | WEIGHT: 196.4 LBS | TEMPERATURE: 96.8 F | BODY MASS INDEX: 29.86 KG/M2 | DIASTOLIC BLOOD PRESSURE: 68 MMHG | SYSTOLIC BLOOD PRESSURE: 120 MMHG | RESPIRATION RATE: 18 BRPM

## 2025-06-12 DIAGNOSIS — R53.81 PHYSICAL DECONDITIONING: ICD-10-CM

## 2025-06-12 DIAGNOSIS — K85.90 ACUTE PANCREATITIS WITHOUT INFECTION OR NECROSIS, UNSPECIFIED PANCREATITIS TYPE: Primary | ICD-10-CM

## 2025-06-12 PROCEDURE — 80053 COMPREHEN METABOLIC PANEL: CPT | Performed by: INTERNAL MEDICINE

## 2025-06-12 PROCEDURE — 3074F SYST BP LT 130 MM HG: CPT | Performed by: INTERNAL MEDICINE

## 2025-06-12 PROCEDURE — 1126F AMNT PAIN NOTED NONE PRSNT: CPT | Performed by: INTERNAL MEDICINE

## 2025-06-12 PROCEDURE — 99214 OFFICE O/P EST MOD 30 MIN: CPT | Performed by: INTERNAL MEDICINE

## 2025-06-12 PROCEDURE — 3078F DIAST BP <80 MM HG: CPT | Performed by: INTERNAL MEDICINE

## 2025-06-12 PROCEDURE — 85025 COMPLETE CBC W/AUTO DIFF WBC: CPT | Performed by: INTERNAL MEDICINE

## 2025-06-12 PROCEDURE — 83690 ASSAY OF LIPASE: CPT | Performed by: INTERNAL MEDICINE

## 2025-06-12 PROCEDURE — 36415 COLL VENOUS BLD VENIPUNCTURE: CPT | Performed by: INTERNAL MEDICINE

## 2025-06-12 NOTE — PROGRESS NOTES
Chief Complaint   Patient presents with    Hospital Follow Up Visit     Admitted: 05/26/2025  Discharged: 05/29/2025  Hospital: [facility]  Reason for admittance: Pancreatitis       History of Present Illness     The patient presents as a hospital follow up from 5/26-5/29 for Acute pancreatitis. CT biliary tree normal appearing, normal lft's & bili, triglycerides ok. He denies etoh use. Patient has a Pmhx of DM and uses trulicity, concerned it is the most likely cause of pancreatitis. He was treated supportively w/ fluids, bowel rest, analgesics & antiemetics. Hospital discharge recommended if has recurrent pancreatitis without overt etiology, recommend consider stopping trulicity.     At this time the patient presents doing well. He states his blood sugars are under control. He denies abdominal pain radiating to his back. He denies fever, chills, nausea, and weight loss.     The patient presents for a follow-up related to Type 2 Diabetes Mellitus. He does check his blood sugars at home, fasting BGL <200. He denies hypoglycemic symptoms. The patient denies polyuria, polydypsia or polyphagia. He reports no symptoms of neuropathy. The patient takes his medication as prescribed. He is on insulin, trulicity, and lantus. The patient does check his feet daily at home. He denies orthopnea, paroxysmal nocturnal dyspnea or dyspnea on exertion.     Medications      Current Outpatient Medications:     B-D UF III MINI PEN NEEDLES 31G X 5 MM misc, USE THREE TIMES A DAY, Disp: 270 each, Rfl: 3    carvedilol (COREG) 3.125 MG tablet, Take 1 tablet by mouth Every 12 (Twelve) Hours., Disp: 180 tablet, Rfl: 2    colestipol (COLESTID) 1 g tablet, TAKE 2 TABLETS TWICE A DAY, Disp: 360 tablet, Rfl: 3    Continuous Blood Gluc  (FreeStyle Winston 2 [place]) device, 1 Device Every 14 (Fourteen) Days., Disp: 1 each, Rfl: 0    Continuous Blood Gluc Sensor (FreeStyle Winston 2 Sensor) misc, 1 each Every 14 (Fourteen) Days., Disp: 6  each, Rfl: 3    Dulaglutide (Trulicity) 4.5 MG/0.5ML solution auto-injector, Inject 4.5 mg under the skin into the appropriate area as directed 1 (One) Time Per Week., Disp: 6 mL, Rfl: 0    ferrous sulfate (FeroSul) 325 (65 FE) MG tablet, Take 1 tablet by mouth 2 (Two) Times a Day., Disp: 180 tablet, Rfl: 1    gemfibrozil (LOPID) 600 MG tablet, TAKE 1 TABLET TWICE A DAY, Disp: 180 tablet, Rfl: 3    Insulin Glargine (Lantus SoloStar) 100 UNIT/ML injection pen, INJECT 70 UNITS UNDER THE SKIN INTO THE APPROPRIATE AREA AS DIRECTED EVERY NIGHT, Disp: 63 mL, Rfl: 0    Insulin Lispro, 1 Unit Dial, (HumaLOG KwikPen) 100 UNIT/ML solution pen-injector, Inject 30 Units under the skin into the appropriate area as directed 2 (Two) Times a Day., Disp: 90 mL, Rfl: 3    ipratropium (ATROVENT) 0.06 % nasal spray, USE 2 SPRAYS IN EACH NOSTRIL AS DIRECTED BY PROVIDER DAILY, Disp: 45 mL, Rfl: 1    levothyroxine (SYNTHROID, LEVOTHROID) 75 MCG tablet, TAKE 1 TABLET DAILY, Disp: 90 tablet, Rfl: 0    Multiple Vitamins-Minerals (MULTIVITAMIN PO), Take 1 tablet by mouth Daily., Disp: , Rfl:     omeprazole (priLOSEC) 40 MG capsule, TAKE 1 CAPSULE DAILY, Disp: 90 capsule, Rfl: 3    ondansetron (Zofran) 4 MG tablet, Take 1 tablet by mouth Every 8 (Eight) Hours As Needed for Nausea or Vomiting., Disp: 15 tablet, Rfl: 0    pregabalin (Lyrica) 100 MG capsule, Take 1 capsule by mouth Daily AND 2 capsules Every Night., Disp: 90 capsule, Rfl: 4    sertraline (ZOLOFT) 100 MG tablet, TAKE 1 TABLET DAILY, Disp: 90 tablet, Rfl: 3    spironolactone (ALDACTONE) 25 MG tablet, TAKE 1 TABLET DAILY, Disp: 90 tablet, Rfl: 3    tamoxifen (NOLVADEX) 20 MG chemo tablet, Take 1 tablet by mouth Daily., Disp: 90 tablet, Rfl: 3    temazepam (Restoril) 7.5 MG capsule, Take 1 capsule by mouth At Night As Needed for Sleep., Disp: , Rfl:     tiZANidine (ZANAFLEX) 4 MG tablet, TAKE 1 TABLET TWICE A DAY AS NEEDED FOR MUSCLE SPASMS, Disp: 180 tablet, Rfl: 3    vitamin D3 125  MCG (5000 UT) capsule capsule, Take 1 capsule by mouth 2 (Two) Times a Day., Disp: , Rfl:      Allergies    Allergies   Allergen Reactions    Glucophage Xr [Metformin Hcl Er] Diarrhea and Other (See Comments)     Glucophage XR TB24: Adverse Reaction: Severe GI issues.    Metformin Nausea And Vomiting and Unknown - Low Severity     Other reaction(s): SEVERE INTESTIONAL ISSUES    Other reaction(s): SEVERE GI ISSUES    Glucophage XR TB24    MetFORMIN HCl TABS    Glucophage    metformin hydrochloride    Tetracyclines & Related Nausea Only     Adverse Reaction    Tetracycline Unknown - Low Severity     tetracycline hydrochloride    Chlorhexidine Rash       Problem List    Patient Active Problem List   Diagnosis    Stage 3a chronic kidney disease    Gastroesophageal reflux disease without esophagitis    Dyslipidemia, goal LDL below 70    Primary hypertension    Obesity, Class I, BMI 30-34.9    FAVIO (obstructive sleep apnea)    Cervical radiculopathy    Type 2 diabetes mellitus with diabetic polyneuropathy, with long-term current use of insulin    Circadian rhythm sleep disorder, delayed sleep phase type    Mild nonproliferative diabetic retinopathy of left eye without macular edema associated with type 2 diabetes mellitus    Acquired hypothyroidism    Anemia of chronic disease    Liver cirrhosis secondary to JEROME    Portal hypertensive gastropathy    Grade I diastolic dysfunction    Combined forms of age-related cataract of both eyes    Major depressive disorder with single episode, in full remission    Malignant neoplasm of right breast in male, estrogen receptor positive    Sepsis, due to unspecified organism, unspecified whether acute organ dysfunction present    Idiopathic acute pancreatitis    Pancreatitis    Acute cholecystitis    HLD (hyperlipidemia)    History of breast cancer    Elevated troponin    T2DM (type 2 diabetes mellitus)    Trigger ring finger of left hand       Medications, Allergies, Problems List and  Past History were reviewed and updated.    Physical Examination    /68 (BP Location: Right arm, Patient Position: Sitting, Cuff Size: Adult)   Pulse 60   Temp 96.8 °F (36 °C) (Infrared)   Resp 18   Wt 89.1 kg (196 lb 6.4 oz)   BMI 29.86 kg/m²      HEENT: Head- Normocephalic Atraumatic. Facies- Within normal limits. Lids- Normal bilaterally. Conjunctiva- Clear bilaterally. Sclera- Anicteric bilaterally. Oropharynx- Moist with no lesions. Tonsils- No enlargement, erythema or exudate.    Neck: Thyroid- non enlarged, symmetric and has no nodules. No bruits are detected. ROM- Normal Range of Motion with no rigidity.    Lungs: Auscultation- Clear to auscultation bilaterally. There are no retractions, clubbing or cyanosis. The Expiratory to Inspiratory ratio is equal.    Lymph Nodes: Cervical- no enlarged lymph nodes noted.    Cardiovascular: There are no carotid bruits. Heart- Normal Rate with Regular rhythm and no murmurs. There are no gallops. There are no rubs. In the lower extremities there is no edema. The upper extremities do not have edema.      Abdomen: Soft, benign, non-tender with no masses, hernias, organomegaly or scars.        Impression and Assessment    Type 2 Diabetes Mellitus without complication without insulin usage.    Acute Pancreatitis.    Plan    Acute Pancreatitis Plan: Further plans will be made after the tests are reviewed.    Type 2 Diabetes Mellitus without complication without insulin usage Plan: The patient was instructed to continue the current medications.    Diagnoses and all orders for this visit:    1. Acute pancreatitis without infection or necrosis, unspecified pancreatitis type (Primary)  -     CBC & Differential; Future  -     Comprehensive Metabolic Panel; Future  -     Lipase; Future  -     CBC & Differential  -     Comprehensive Metabolic Panel  -     Lipase  -     Cancel: Manual Differential    2. Physical deconditioning  -     Ambulatory Referral to Physical Therapy  for Evaluation & Treatment            Return to Office    The patient was instructed to return for follow-up in 1 month. The patient was instructed to return sooner if the condition changes, worsens, or does not resolve.

## 2025-06-13 LAB
ALBUMIN SERPL-MCNC: 4.3 G/DL (ref 3.5–5.2)
ALBUMIN/GLOB SERPL: 1.1 G/DL
ALP SERPL-CCNC: 87 U/L (ref 39–117)
ALT SERPL W P-5'-P-CCNC: 12 U/L (ref 1–41)
ANION GAP SERPL CALCULATED.3IONS-SCNC: 14 MMOL/L (ref 5–15)
AST SERPL-CCNC: 28 U/L (ref 1–40)
BASOPHILS # BLD AUTO: 0.04 10*3/MM3 (ref 0–0.2)
BASOPHILS NFR BLD AUTO: 0.5 % (ref 0–1.5)
BILIRUB SERPL-MCNC: 1.1 MG/DL (ref 0–1.2)
BUN SERPL-MCNC: 28 MG/DL (ref 8–23)
BUN/CREAT SERPL: 15.8 (ref 7–25)
CALCIUM SPEC-SCNC: 10 MG/DL (ref 8.6–10.5)
CHLORIDE SERPL-SCNC: 104 MMOL/L (ref 98–107)
CO2 SERPL-SCNC: 20 MMOL/L (ref 22–29)
CREAT SERPL-MCNC: 1.77 MG/DL (ref 0.76–1.27)
DEPRECATED RDW RBC AUTO: 47.5 FL (ref 37–54)
EGFRCR SERPLBLD CKD-EPI 2021: 39.3 ML/MIN/1.73
EOSINOPHIL # BLD AUTO: 0.36 10*3/MM3 (ref 0–0.4)
EOSINOPHIL NFR BLD AUTO: 4.5 % (ref 0.3–6.2)
ERYTHROCYTE [DISTWIDTH] IN BLOOD BY AUTOMATED COUNT: 13.3 % (ref 12.3–15.4)
GLOBULIN UR ELPH-MCNC: 3.9 GM/DL
GLUCOSE SERPL-MCNC: 135 MG/DL (ref 65–99)
HCT VFR BLD AUTO: 41.8 % (ref 37.5–51)
HGB BLD-MCNC: 14.1 G/DL (ref 13–17.7)
IMM GRANULOCYTES # BLD AUTO: 0.06 10*3/MM3 (ref 0–0.05)
IMM GRANULOCYTES NFR BLD AUTO: 0.8 % (ref 0–0.5)
LIPASE SERPL-CCNC: 523 U/L (ref 13–60)
LYMPHOCYTES # BLD AUTO: 1.76 10*3/MM3 (ref 0.7–3.1)
LYMPHOCYTES NFR BLD AUTO: 22.2 % (ref 19.6–45.3)
MCH RBC QN AUTO: 32.9 PG (ref 26.6–33)
MCHC RBC AUTO-ENTMCNC: 33.7 G/DL (ref 31.5–35.7)
MCV RBC AUTO: 97.4 FL (ref 79–97)
MONOCYTES # BLD AUTO: 0.78 10*3/MM3 (ref 0.1–0.9)
MONOCYTES NFR BLD AUTO: 9.8 % (ref 5–12)
NEUTROPHILS NFR BLD AUTO: 4.94 10*3/MM3 (ref 1.7–7)
NEUTROPHILS NFR BLD AUTO: 62.2 % (ref 42.7–76)
PLATELET # BLD AUTO: 126 10*3/MM3 (ref 140–450)
PMV BLD AUTO: 13.5 FL (ref 6–12)
POTASSIUM SERPL-SCNC: 4.9 MMOL/L (ref 3.5–5.2)
PROT SERPL-MCNC: 8.2 G/DL (ref 6–8.5)
RBC # BLD AUTO: 4.29 10*6/MM3 (ref 4.14–5.8)
SODIUM SERPL-SCNC: 138 MMOL/L (ref 136–145)
WBC NRBC COR # BLD AUTO: 7.94 10*3/MM3 (ref 3.4–10.8)

## 2025-06-17 ENCOUNTER — OFFICE VISIT (OUTPATIENT)
Dept: ENDOCRINOLOGY | Facility: CLINIC | Age: 75
End: 2025-06-17
Payer: MEDICARE

## 2025-06-17 ENCOUNTER — TELEPHONE (OUTPATIENT)
Dept: UROLOGY | Facility: CLINIC | Age: 75
End: 2025-06-17
Payer: MEDICARE

## 2025-06-17 VITALS
SYSTOLIC BLOOD PRESSURE: 118 MMHG | WEIGHT: 198.4 LBS | DIASTOLIC BLOOD PRESSURE: 70 MMHG | HEART RATE: 70 BPM | BODY MASS INDEX: 30.07 KG/M2 | OXYGEN SATURATION: 98 % | HEIGHT: 68 IN

## 2025-06-17 DIAGNOSIS — Z79.4 TYPE 2 DIABETES MELLITUS WITH HYPERGLYCEMIA, WITH LONG-TERM CURRENT USE OF INSULIN: Primary | ICD-10-CM

## 2025-06-17 DIAGNOSIS — R97.20 ELEVATED PROSTATE SPECIFIC ANTIGEN (PSA): Primary | ICD-10-CM

## 2025-06-17 DIAGNOSIS — E03.9 HYPOTHYROIDISM, ADULT: ICD-10-CM

## 2025-06-17 DIAGNOSIS — E11.65 TYPE 2 DIABETES MELLITUS WITH HYPERGLYCEMIA, WITH LONG-TERM CURRENT USE OF INSULIN: Primary | ICD-10-CM

## 2025-06-17 LAB
EXPIRATION DATE: ABNORMAL
GLUCOSE BLDC GLUCOMTR-MCNC: 179 MG/DL (ref 70–130)
Lab: ABNORMAL

## 2025-06-17 RX ORDER — LEVOTHYROXINE SODIUM 75 UG/1
75 TABLET ORAL DAILY
Qty: 90 TABLET | Refills: 3 | Status: SHIPPED | OUTPATIENT
Start: 2025-06-17

## 2025-06-17 NOTE — TELEPHONE ENCOUNTER
Caller: BETITO GLOVER, WIFE    Relationship: [facility]    Best call back number: [phone]    What orders are you requesting (i.e. lab or imaging): PSA    In what timeframe would the patient need to come in: PT HAS AN APPT WITH DR. FRENCH ON 06/19/25    Where will you receive your lab/imaging services: [facility]    Additional notes: PT NEEDS REPEAT PSA PER DR. FRENCH'S LAST NOTE.

## 2025-06-17 NOTE — PATIENT INSTRUCTIONS
Results for orders placed or performed in visit on 06/17/25   POC Glucose, Blood    Collection Time: 06/17/25  2:38 PM    Specimen: Blood   Result Value Ref Range    Glucose 179 (A) 70 - 130 mg/dL    Lot Number [id number]     Expiration Date 12/27/25      *Note: Due to a large number of results and/or encounters for the requested time period, some results have not been displayed. A complete set of results can be found in Results Review.     New Insulin dosing:  Basal insulin Lantus  70 Units nightly.             Meal Insulin Humalog (U-100)  36-40 units before large meal.   Correction insulin (add to meal insulin)   0 unit  if glucose  less than 150   2unit   if glucose  150 - 199   4 units if glucose 200 - 249   6 units if glucose 250 - 299   8 units if glucose 300 - 349   10  units if glucose Greater than 350     Nutritional Recommendations:  4-5 carb portions per meal for male (60-75 gm of carbs)    Physical Activity Goals:  Walk at least 15 min every day, ideally exercise 45-60 min most days (could be done in short bouts of 10-15 minutes.    Keep records of your glucose levels and insulin adjustments. We may ask you to keep records on the content of your meals with insulin doses and before/after meal glucose levels to evaluate your ratios.  Call for advice if you have unexplained or unexpected hypoglycemia  (glucose < 60) or persistent high glucose > 300.  Office: [phone]      Pau Colin MD

## 2025-06-17 NOTE — PROGRESS NOTES
Chief complaint  Diabetes (Follow up)    Subjective   Elton Funez is a 75 y.o. male is here today for follow-up    Diabetes mellitus type 2, uncontrolled dx in 2000  Diabetic complications: neuropathy, retinopathy.   Past meds: Metformin caused diarrhea in the past. Trulicity stopped due to recurrent pancreatitis  Eye care: no retinopathy.   Monitoring - freestyle winston reviewed.   Foot care and dental care: discussed.     Medications:Insulin lantus and humalog     Hypothyroidism - on levothyroxine.      He had recurrent pancreatitis (gallstone related) in 6/2023, 3 /2024. Recently hospitalized with another pancreatitis attack. Trulicity was discontinued    Medications    Current Outpatient Medications:     B-D UF III MINI PEN NEEDLES 31G X 5 MM misc, USE THREE TIMES A DAY, Disp: 270 each, Rfl: 3    carvedilol (COREG) 3.125 MG tablet, Take 1 tablet by mouth Every 12 (Twelve) Hours., Disp: 180 tablet, Rfl: 2    colestipol (COLESTID) 1 g tablet, TAKE 2 TABLETS TWICE A DAY, Disp: 360 tablet, Rfl: 3    Continuous Blood Gluc  (FreeStyle Winston 2 [place]) device, 1 Device Every 14 (Fourteen) Days., Disp: 1 each, Rfl: 0    Continuous Blood Gluc Sensor (FreeStyle Winston 2 Sensor) misc, 1 each Every 14 (Fourteen) Days., Disp: 6 each, Rfl: 3    ferrous sulfate (FeroSul) 325 (65 FE) MG tablet, Take 1 tablet by mouth 2 (Two) Times a Day., Disp: 180 tablet, Rfl: 1    gemfibrozil (LOPID) 600 MG tablet, TAKE 1 TABLET TWICE A DAY, Disp: 180 tablet, Rfl: 3    Insulin Glargine (Lantus SoloStar) 100 UNIT/ML injection pen, INJECT 70 UNITS UNDER THE SKIN INTO THE APPROPRIATE AREA AS DIRECTED EVERY NIGHT, Disp: 63 mL, Rfl: 0    Insulin Lispro, 1 Unit Dial, (HumaLOG KwikPen) 100 UNIT/ML solution pen-injector, Inject 30 Units under the skin into the appropriate area as directed 2 (Two) Times a Day., Disp: 90 mL, Rfl: 3    ipratropium (ATROVENT) 0.06 % nasal spray, USE 2 SPRAYS IN EACH NOSTRIL AS DIRECTED BY PROVIDER DAILY, Disp:  "45 mL, Rfl: 1    levothyroxine (SYNTHROID, LEVOTHROID) 75 MCG tablet, Take 1 tablet by mouth Daily., Disp: 90 tablet, Rfl: 3    Multiple Vitamins-Minerals (MULTIVITAMIN PO), Take 1 tablet by mouth Daily., Disp: , Rfl:     omeprazole (priLOSEC) 40 MG capsule, TAKE 1 CAPSULE DAILY, Disp: 90 capsule, Rfl: 3    ondansetron (Zofran) 4 MG tablet, Take 1 tablet by mouth Every 8 (Eight) Hours As Needed for Nausea or Vomiting., Disp: 15 tablet, Rfl: 0    pregabalin (Lyrica) 100 MG capsule, Take 1 capsule by mouth Daily AND 2 capsules Every Night., Disp: 90 capsule, Rfl: 4    sertraline (ZOLOFT) 100 MG tablet, TAKE 1 TABLET DAILY, Disp: 90 tablet, Rfl: 3    spironolactone (ALDACTONE) 25 MG tablet, TAKE 1 TABLET DAILY, Disp: 90 tablet, Rfl: 3    tamoxifen (NOLVADEX) 20 MG chemo tablet, Take 1 tablet by mouth Daily., Disp: 90 tablet, Rfl: 3    temazepam (Restoril) 7.5 MG capsule, Take 1 capsule by mouth At Night As Needed for Sleep., Disp: , Rfl:     tiZANidine (ZANAFLEX) 4 MG tablet, TAKE 1 TABLET TWICE A DAY AS NEEDED FOR MUSCLE SPASMS, Disp: 180 tablet, Rfl: 3    vitamin D3 125 MCG (5000 UT) capsule capsule, Take 1 capsule by mouth 2 (Two) Times a Day., Disp: , Rfl:     Review of systems  Review of Systems   Constitutional:  Positive for fatigue.   Musculoskeletal:  Positive for arthralgias, back pain and neck pain.   Neurological:  Positive for numbness.   All other systems reviewed and are negative.      Physical exam  Objective   Blood pressure 118/70, pulse 70, height 172.7 cm (68\"), weight 90 kg (198 lb 6.4 oz), SpO2 98%. Body mass index is 30.17 kg/m².  Physical Exam  Vitals reviewed.   Constitutional:       Appearance: Normal appearance.   Cardiovascular:      Rate and Rhythm: Normal rate and regular rhythm.      Pulses: Normal pulses.      Heart sounds: Normal heart sounds.   Pulmonary:      Effort: Pulmonary effort is normal.      Breath sounds: Normal breath sounds.   Musculoskeletal:         General: No swelling. "   Neurological:      Mental Status: He is alert and oriented to person, place, and time.   Psychiatric:         Mood and Affect: Mood normal.         Thought Content: Thought content normal.             LABS AND IMAGING  Office Visit on 06/17/2025   Component Date Value Ref Range Status    Glucose 06/17/2025 179 (A)  70 - 130 mg/dL Final    Lot Number 06/17/2025 [id number]   Final    Expiration Date 06/17/2025 12/27/25   Final   Office Visit on 06/12/2025   Component Date Value Ref Range Status    Glucose 06/12/2025 135 (H)  65 - 99 mg/dL Final    BUN 06/12/2025 28.0 (H)  8.0 - 23.0 mg/dL Final    Creatinine 06/12/2025 1.77 (H)  0.76 - 1.27 mg/dL Final    Sodium 06/12/2025 138  136 - 145 mmol/L Final    Potassium 06/12/2025 4.9  3.5 - 5.2 mmol/L Final    Slight hemolysis detected by analyzer. Result may be falsely elevated.    Chloride 06/12/2025 104  98 - 107 mmol/L Final    CO2 06/12/2025 20.0 (L)  22.0 - 29.0 mmol/L Final    Calcium 06/12/2025 10.0  8.6 - 10.5 mg/dL Final    Total Protein 06/12/2025 8.2  6.0 - 8.5 g/dL Final    Albumin 06/12/2025 4.3  3.5 - 5.2 g/dL Final    ALT (SGPT) 06/12/2025 12  1 - 41 U/L Final    AST (SGOT) 06/12/2025 28  1 - 40 U/L Final    Alkaline Phosphatase 06/12/2025 87  39 - 117 U/L Final    Total Bilirubin 06/12/2025 1.1  0.0 - 1.2 mg/dL Final    Globulin 06/12/2025 3.9  gm/dL Final    A/G Ratio 06/12/2025 1.1  g/dL Final    BUN/Creatinine Ratio 06/12/2025 15.8  7.0 - 25.0 Final    Anion Gap 06/12/2025 14.0  5.0 - 15.0 mmol/L Final    eGFR 06/12/2025 39.3 (L)  >60.0 mL/min/1.73 Final    Lipase 06/12/2025 523 (H)  13 - 60 U/L Final    WBC 06/12/2025 7.94  3.40 - 10.80 10*3/mm3 Final    RBC 06/12/2025 4.29  4.14 - 5.80 10*6/mm3 Final    Hemoglobin 06/12/2025 14.1  13.0 - 17.7 g/dL Final    Hematocrit 06/12/2025 41.8  37.5 - 51.0 % Final    MCV 06/12/2025 97.4 (H)  79.0 - 97.0 fL Final    MCH 06/12/2025 32.9  26.6 - 33.0 pg Final    MCHC 06/12/2025 33.7  31.5 - 35.7 g/dL Final    RDW  06/12/2025 13.3  12.3 - 15.4 % Final    RDW-SD 06/12/2025 47.5  37.0 - 54.0 fl Final    MPV 06/12/2025 13.5 (H)  6.0 - 12.0 fL Final    Platelets 06/12/2025 126 (L)  140 - 450 10*3/mm3 Final    Neutrophil % 06/12/2025 62.2  42.7 - 76.0 % Final    Lymphocyte % 06/12/2025 22.2  19.6 - 45.3 % Final    Monocyte % 06/12/2025 9.8  5.0 - 12.0 % Final    Eosinophil % 06/12/2025 4.5  0.3 - 6.2 % Final    Basophil % 06/12/2025 0.5  0.0 - 1.5 % Final    Immature Grans % 06/12/2025 0.8 (H)  0.0 - 0.5 % Final    Neutrophils, Absolute 06/12/2025 4.94  1.70 - 7.00 10*3/mm3 Final    Lymphocytes, Absolute 06/12/2025 1.76  0.70 - 3.10 10*3/mm3 Final    Monocytes, Absolute 06/12/2025 0.78  0.10 - 0.90 10*3/mm3 Final    Eosinophils, Absolute 06/12/2025 0.36  0.00 - 0.40 10*3/mm3 Final    Basophils, Absolute 06/12/2025 0.04  0.00 - 0.20 10*3/mm3 Final    Immature Grans, Absolute 06/12/2025 0.06 (H)  0.00 - 0.05 10*3/mm3 Final   No results displayed because visit has over 200 results.      Admission on 05/22/2025, Discharged on 05/22/2025   Component Date Value Ref Range Status    Glucose 05/22/2025 199 (H)  65 - 99 mg/dL Final    BUN 05/22/2025 32 (H)  8 - 23 mg/dL Final    Creatinine 05/22/2025 1.63 (H)  0.76 - 1.27 mg/dL Final    Sodium 05/22/2025 138  136 - 145 mmol/L Final    Potassium 05/22/2025 5.1  3.5 - 5.2 mmol/L Final    Chloride 05/22/2025 101  98 - 107 mmol/L Final    CO2 05/22/2025 25.0  22.0 - 29.0 mmol/L Final    Calcium 05/22/2025 9.1  8.6 - 10.5 mg/dL Final    Total Protein 05/22/2025 6.7  6.0 - 8.5 g/dL Final    Albumin 05/22/2025 3.8  3.5 - 5.2 g/dL Final    ALT (SGPT) 05/22/2025 9  1 - 41 U/L Final    AST (SGOT) 05/22/2025 20  1 - 40 U/L Final    Alkaline Phosphatase 05/22/2025 82  39 - 117 U/L Final    Total Bilirubin 05/22/2025 0.4  0.0 - 1.2 mg/dL Final    Globulin 05/22/2025 2.9  gm/dL Final    Calculated Result    A/G Ratio 05/22/2025 1.3  g/dL Final    BUN/Creatinine Ratio 05/22/2025 19.6  7.0 - 25.0 Final     Anion Gap 05/22/2025 12.0  5.0 - 15.0 mmol/L Final    eGFR 05/22/2025 43.7 (L)  >60.0 mL/min/1.73 Final    HS Troponin T 05/22/2025 35 (H)  <22 ng/L Final    proBNP 05/22/2025 <36.0  0.0 - 1,800.0 pg/mL Final    QT Interval 05/22/2025 392  ms Final    QTC Interval 05/22/2025 394  ms Final    WBC 05/22/2025 4.57  3.40 - 10.80 10*3/mm3 Final    RBC 05/22/2025 4.14  4.14 - 5.80 10*6/mm3 Final    Hemoglobin 05/22/2025 13.2  13.0 - 17.7 g/dL Final    Hematocrit 05/22/2025 40.6  37.5 - 51.0 % Final    MCV 05/22/2025 98.1 (H)  79.0 - 97.0 fL Final    MCH 05/22/2025 31.9  26.6 - 33.0 pg Final    MCHC 05/22/2025 32.5  31.5 - 35.7 g/dL Final    RDW 05/22/2025 13.8  12.3 - 15.4 % Final    RDW-SD 05/22/2025 48.9  37.0 - 54.0 fl Final    MPV 05/22/2025 12.5 (H)  6.0 - 12.0 fL Final    Platelets 05/22/2025 72 (L)  140 - 450 10*3/mm3 Final    Verified by repeat analysis.      Neutrophil % 05/22/2025 65.3  42.7 - 76.0 % Final    Lymphocyte % 05/22/2025 21.7  19.6 - 45.3 % Final    Monocyte % 05/22/2025 8.1  5.0 - 12.0 % Final    Eosinophil % 05/22/2025 3.3  0.3 - 6.2 % Final    Basophil % 05/22/2025 0.7  0.0 - 1.5 % Final    Immature Grans % 05/22/2025 0.9 (H)  0.0 - 0.5 % Final    Neutrophils, Absolute 05/22/2025 2.99  1.70 - 7.00 10*3/mm3 Final    Lymphocytes, Absolute 05/22/2025 0.99  0.70 - 3.10 10*3/mm3 Final    Monocytes, Absolute 05/22/2025 0.37  0.10 - 0.90 10*3/mm3 Final    Eosinophils, Absolute 05/22/2025 0.15  0.00 - 0.40 10*3/mm3 Final    Basophils, Absolute 05/22/2025 0.03  0.00 - 0.20 10*3/mm3 Final    Immature Grans, Absolute 05/22/2025 0.04  0.00 - 0.05 10*3/mm3 Final    nRBC 05/22/2025 0.0  0.0 - 0.2 /100 WBC Final    HS Troponin T 05/22/2025 35 (H)  <22 ng/L Final    Troponin T Numeric Delta 05/22/2025 0  ng/L Final    Troponin T % Delta 05/22/2025 0  Abnormal if >/= 20% Final       Assessment  Assessment & Plan   1. Type 2 diabetes mellitus with hyperglycemia, with long-term current use of insulin    2.  Hypothyroidism, adult          Plan      CGM downloaded and analyzed 35% in range. Postprandial hyperglycemia is 65 %. Glucose is high during the night when he is eating. Increase Humalog dose to 40 units before dinner.     2.Cont levothyroxine 75 mcg, thyroid function was normal .       Follow-up in 3 months

## 2025-06-18 ENCOUNTER — LAB (OUTPATIENT)
Dept: LAB | Facility: HOSPITAL | Age: 75
End: 2025-06-18
Payer: MEDICARE

## 2025-06-18 DIAGNOSIS — R97.20 ELEVATED PROSTATE SPECIFIC ANTIGEN (PSA): ICD-10-CM

## 2025-06-18 PROCEDURE — 84154 ASSAY OF PSA FREE: CPT

## 2025-06-18 PROCEDURE — 36415 COLL VENOUS BLD VENIPUNCTURE: CPT

## 2025-06-18 PROCEDURE — 84153 ASSAY OF PSA TOTAL: CPT

## 2025-06-19 ENCOUNTER — OFFICE VISIT (OUTPATIENT)
Dept: UROLOGY | Facility: CLINIC | Age: 75
End: 2025-06-19
Payer: MEDICARE

## 2025-06-19 DIAGNOSIS — R39.9 LOWER URINARY TRACT SYMPTOMS (LUTS): ICD-10-CM

## 2025-06-19 DIAGNOSIS — R97.20 ELEVATED PROSTATE SPECIFIC ANTIGEN (PSA): Primary | ICD-10-CM

## 2025-06-19 LAB
PSA FREE MFR SERPL: 53.3 %
PSA FREE SERPL-MCNC: 2.13 NG/ML
PSA SERPL-MCNC: 4 NG/ML (ref 0–4)

## 2025-06-19 PROCEDURE — 1159F MED LIST DOCD IN RCRD: CPT | Performed by: STUDENT IN AN ORGANIZED HEALTH CARE EDUCATION/TRAINING PROGRAM

## 2025-06-19 PROCEDURE — 99213 OFFICE O/P EST LOW 20 MIN: CPT | Performed by: STUDENT IN AN ORGANIZED HEALTH CARE EDUCATION/TRAINING PROGRAM

## 2025-06-19 PROCEDURE — 1160F RVW MEDS BY RX/DR IN RCRD: CPT | Performed by: STUDENT IN AN ORGANIZED HEALTH CARE EDUCATION/TRAINING PROGRAM

## 2025-06-19 NOTE — PROGRESS NOTES
Follow Up Office Visit      Patient Name: Elton Funez  : 1950   MRN: [id number]     Chief Complaint:    Chief Complaint   Patient presents with    Elevated PSA       Referring Provider: No ref. provider found    History of Present Illness: Elton Funez is a 75 y.o. male who presents today for follow up of elevated PSA.  History of breast cancer but is BRCA negative.  PSA jenise 1.5 points in 2 years.      Repeat PSA performed recently is currently 4.0 drawn at [facility].    Lab Results   Component Value Date    PSA 4.2 (H) 2025    PSA 4.010 (H) 2025    PSA 2.520 2023    PSA 1.660 2022    PSA 1.650 2019    PSA 1.060 2018       Subjective      Review of System: Review of Systems   Genitourinary:  Positive for difficulty urinating. Negative for decreased urine volume, dysuria, enuresis, flank pain, frequency, hematuria and urgency.      I have reviewed the ROS documented by my clinical staff, I have updated appropriately and I agree. Alhaji Ann MD    I have reviewed and the following portions of the patient's history were updated as appropriate: past family history, past medical history, past social history, past surgical history and problem list.    Medications:     Current Outpatient Medications:     B-D UF III MINI PEN NEEDLES 31G X 5 MM misc, USE THREE TIMES A DAY, Disp: 270 each, Rfl: 3    carvedilol (COREG) 3.125 MG tablet, Take 1 tablet by mouth Every 12 (Twelve) Hours., Disp: 180 tablet, Rfl: 2    colestipol (COLESTID) 1 g tablet, TAKE 2 TABLETS TWICE A DAY, Disp: 360 tablet, Rfl: 3    Continuous Blood Gluc  (FreeStyle Winston 2 [place]) device, 1 Device Every 14 (Fourteen) Days., Disp: 1 each, Rfl: 0    Continuous Blood Gluc Sensor (FreeStyle [place] 2 Sensor) misc, 1 each Every 14 (Fourteen) Days., Disp: 6 each, Rfl: 3    ferrous sulfate (FeroSul) 325 (65 FE) MG tablet, Take 1 tablet by mouth 2 (Two) Times a Day., Disp: 180 tablet, Rfl: 1     gemfibrozil (LOPID) 600 MG tablet, TAKE 1 TABLET TWICE A DAY, Disp: 180 tablet, Rfl: 3    Insulin Glargine (Lantus SoloStar) 100 UNIT/ML injection pen, INJECT 70 UNITS UNDER THE SKIN INTO THE APPROPRIATE AREA AS DIRECTED EVERY NIGHT, Disp: 63 mL, Rfl: 0    Insulin Lispro, 1 Unit Dial, (HumaLOG KwikPen) 100 UNIT/ML solution pen-injector, Inject 30 Units under the skin into the appropriate area as directed 2 (Two) Times a Day., Disp: 90 mL, Rfl: 3    ipratropium (ATROVENT) 0.06 % nasal spray, USE 2 SPRAYS IN EACH NOSTRIL AS DIRECTED BY PROVIDER DAILY, Disp: 45 mL, Rfl: 1    levothyroxine (SYNTHROID, LEVOTHROID) 75 MCG tablet, Take 1 tablet by mouth Daily., Disp: 90 tablet, Rfl: 3    Multiple Vitamins-Minerals (MULTIVITAMIN PO), Take 1 tablet by mouth Daily., Disp: , Rfl:     omeprazole (priLOSEC) 40 MG capsule, TAKE 1 CAPSULE DAILY, Disp: 90 capsule, Rfl: 3    ondansetron (Zofran) 4 MG tablet, Take 1 tablet by mouth Every 8 (Eight) Hours As Needed for Nausea or Vomiting., Disp: 15 tablet, Rfl: 0    pregabalin (Lyrica) 100 MG capsule, Take 1 capsule by mouth Daily AND 2 capsules Every Night., Disp: 90 capsule, Rfl: 4    sertraline (ZOLOFT) 100 MG tablet, TAKE 1 TABLET DAILY, Disp: 90 tablet, Rfl: 3    spironolactone (ALDACTONE) 25 MG tablet, TAKE 1 TABLET DAILY, Disp: 90 tablet, Rfl: 3    tamoxifen (NOLVADEX) 20 MG chemo tablet, Take 1 tablet by mouth Daily., Disp: 90 tablet, Rfl: 3    temazepam (Restoril) 7.5 MG capsule, Take 1 capsule by mouth At Night As Needed for Sleep., Disp: , Rfl:     tiZANidine (ZANAFLEX) 4 MG tablet, TAKE 1 TABLET TWICE A DAY AS NEEDED FOR MUSCLE SPASMS, Disp: 180 tablet, Rfl: 3    vitamin D3 125 MCG (5000 UT) capsule capsule, Take 1 capsule by mouth 2 (Two) Times a Day., Disp: , Rfl:     Allergies:   Allergies   Allergen Reactions    Glucophage Xr [Metformin Hcl Er] Diarrhea and Other (See Comments)     Glucophage XR TB24: Adverse Reaction: Severe GI issues.    Metformin Nausea And Vomiting  and Unknown - Low Severity     Other reaction(s): SEVERE INTESTIONAL ISSUES    Other reaction(s): SEVERE GI ISSUES    Glucophage XR TB24    MetFORMIN HCl TABS    Glucophage    metformin hydrochloride    Tetracyclines & Related Nausea Only     Adverse Reaction    Tetracycline Unknown - Low Severity     tetracycline hydrochloride    Chlorhexidine Rash       IPSS Questionnaire (AUA-7):  Over the past month…    1)  Incomplete Emptying:       How often have you had a sensation of not emptying you had the sensation of not emptying your bladder completely after you finished urinating?  0 - Not at all   2)  Frequency:       How often have you had the urinate again less than two hours after you finished urinating?  0 - Not at all   3)  Intermittency:       How often have you found you stopped and started again several times when you urinated?   0 - Not at all   4) Urgency:      How often have you found it difficult to postpone urination?  0 - Not at all   5) Weak Stream:      How often have you had a weak urinary stream?  3 - About half the time   6) Straining:       How often have you had to push or strain to begin urination?  0 - Not at all   7) Nocturia:      How many times did you most typically get up to urinate from the time you went to bed at night until the time you got up in the morning?  1 - 1 time   Total Score:  4   The International Prostate Symptom Score (IPSS) is used to screen, diagnose, track symptoms of benign prostatic hyperplasia (BPH).   0-7 (Mild Symptoms) 8-19 (Moderate) 20-35 (Severe)   Quality of Life (QoL):  If you were to spend the rest of your life with your urinary condition just the way it is now, how would you feel about that? 1-Pleased   Urine Leakage (Incontinence) 0-No Leakage         Objective     Physical Exam:   Vital Signs: There were no vitals filed for this visit.  There is no height or weight on file to calculate BMI.     Physical Exam  Constitutional:       Appearance: Normal  appearance.   HENT:      Head: Normocephalic and atraumatic.      Nose: Nose normal.   Eyes:      Extraocular Movements: Extraocular movements intact.      Conjunctiva/sclera: Conjunctivae normal.      Pupils: Pupils are equal, round, and reactive to light.   Musculoskeletal:         General: Normal range of motion.      Cervical back: Normal range of motion and neck supple.   Skin:     General: Skin is warm and dry.      Findings: No lesion or rash.   Neurological:      General: No focal deficit present.      Mental Status: He is alert and oriented to person, place, and time. Mental status is at baseline.   Psychiatric:         Mood and Affect: Mood normal.         Behavior: Behavior normal.         Labs:   Brief Urine Lab Results  (Last result in the past 365 days)        Color   Clarity   Blood   Leuk Est   Nitrite   Protein   CREAT   Urine HCG        05/26/25 0251 Yellow   Clear   Negative   Negative   Negative   100 mg/dL (2+)                        Lab Results   Component Value Date    GLUCOSE 135 (H) 06/12/2025    CALCIUM 10.0 06/12/2025     06/12/2025    K 4.9 06/12/2025    CO2 20.0 (L) 06/12/2025     06/12/2025    BUN 28.0 (H) 06/12/2025    CREATININE 1.77 (H) 06/12/2025    EGFRIFAFRI 66 07/14/2015    EGFRIFNONA 60 (L) 12/15/2021    BCR 15.8 06/12/2025    ANIONGAP 14.0 06/12/2025       Lab Results   Component Value Date    WBC 7.94 06/12/2025    HGB 14.1 06/12/2025    HCT 41.8 06/12/2025    MCV 97.4 (H) 06/12/2025     (L) 06/12/2025       Images:   CT Abdomen Pelvis With Contrast  Result Date: 5/25/2025  Impression: 1.Findings consistent with acute uncomplicated pancreatitis. 2.Hepatic steatosis. 3.Ancillary findings as described above. Electronically Signed: Courtney Rizo MD  5/25/2025 11:12 PM EDT  Workstation ID: [id number]    XR Chest 1 View  Result Date: 5/22/2025  Impression: No active cardiopulmonary disease Electronically Signed: Salo Villalobos DO  5/22/2025 2:23 PM EDT  Workstation  ID: [id number]    US Liver  Result Date: 3/15/2025  Impression: Coarsened heterogeneous hepatic echotexture compatible with history of cirrhosis. No focal liver lesion. Electronically Signed: Moe Aaron  3/15/2025 12:03 PM EDT  Workstation ID: [id number]      Measures:   Tobacco:   Elton Funez  reports that he quit smoking about 49 years ago. His smoking use included cigarettes. He started smoking about 53 years ago. He has a 2 pack-year smoking history. He has been exposed to tobacco smoke. He has never used smokeless tobacco.       Assessment / Plan      Assessment/Plan:   75 y.o. male who presented today for follow up of mildly elevated PSA.  This could be a normal value for his age or prostate volume.  Prior to considering prostate MRI I would like to perform [employer] urinary biomarker testing to assess intrinsic risk of harboring prostate cancer that may warrant treatment.  He is amenable to additional testing.  I will contact him with results.  If elevated risk we will proceed with MRI.  If low risk we will continue prostate cancer screening with PSAs every 6 to 12 months.    Diagnoses and all orders for this visit:    1. Elevated prostate specific antigen (PSA) (Primary)  -     PSA Total+% Free (Serial); Future    2. Lower urinary tract symptoms (LUTS)           Follow Up:   Return in about 6 months (around 12/19/2025) for Follow Up after Labs.    I spent approximately 20 minutes providing clinical care for this patient; including review of patient's chart and provider documentation, face to face time spent with patient in examination room (obtaining history, performing physical exam, discussing diagnosis and management options), placing orders, and completing patient documentation.     Alhaji Ann MD  [facility] [place] [facility] [place]

## 2025-06-25 DIAGNOSIS — R97.20 ELEVATED PROSTATE SPECIFIC ANTIGEN (PSA): Primary | ICD-10-CM

## 2025-07-02 ENCOUNTER — HOSPITAL ENCOUNTER (OUTPATIENT)
Dept: MRI IMAGING | Facility: HOSPITAL | Age: 75
Discharge: HOME | End: 2025-07-02
Admitting: STUDENT IN AN ORGANIZED HEALTH CARE EDUCATION/TRAINING PROGRAM
Payer: MEDICARE

## 2025-07-02 DIAGNOSIS — R97.20 ELEVATED PROSTATE SPECIFIC ANTIGEN (PSA): ICD-10-CM

## 2025-07-02 PROCEDURE — 25510000002 GADOBENATE DIMEGLUMINE 529 MG/ML SOLUTION: Performed by: STUDENT IN AN ORGANIZED HEALTH CARE EDUCATION/TRAINING PROGRAM

## 2025-07-02 PROCEDURE — 72197 MRI PELVIS W/O & W/DYE: CPT

## 2025-07-02 PROCEDURE — A9577 INJ MULTIHANCE: HCPCS | Performed by: STUDENT IN AN ORGANIZED HEALTH CARE EDUCATION/TRAINING PROGRAM

## 2025-07-02 RX ADMIN — GADOBENATE DIMEGLUMINE 18 ML: 529 INJECTION, SOLUTION INTRAVENOUS at 18:19

## 2025-07-14 ENCOUNTER — LAB (OUTPATIENT)
Dept: LAB | Facility: HOSPITAL | Age: 75
End: 2025-07-14
Payer: MEDICARE

## 2025-07-14 DIAGNOSIS — N18.32 STAGE 3B CHRONIC KIDNEY DISEASE: Primary | ICD-10-CM

## 2025-07-14 DIAGNOSIS — R97.20 ELEVATED PROSTATE SPECIFIC ANTIGEN (PSA): ICD-10-CM

## 2025-07-14 LAB
ALBUMIN SERPL-MCNC: 4.4 G/DL (ref 3.5–5.2)
ANION GAP SERPL CALCULATED.3IONS-SCNC: 12.5 MMOL/L (ref 5–15)
BACTERIA UR QL AUTO: NORMAL /HPF
BILIRUB UR QL STRIP: NEGATIVE
BUN SERPL-MCNC: 30 MG/DL (ref 8–23)
BUN/CREAT SERPL: 15.5 (ref 7–25)
CALCIUM SPEC-SCNC: 9.8 MG/DL (ref 8.6–10.5)
CHLORIDE SERPL-SCNC: 104 MMOL/L (ref 98–107)
CLARITY UR: CLEAR
CO2 SERPL-SCNC: 20.5 MMOL/L (ref 22–29)
COLOR UR: YELLOW
CREAT SERPL-MCNC: 1.94 MG/DL (ref 0.76–1.27)
EGFRCR SERPLBLD CKD-EPI 2021: 35.2 ML/MIN/1.73
GLUCOSE SERPL-MCNC: 123 MG/DL (ref 65–99)
GLUCOSE UR STRIP-MCNC: ABNORMAL MG/DL
HGB BLD-MCNC: 13 G/DL (ref 13–17.7)
HGB UR QL STRIP.AUTO: NEGATIVE
HYALINE CASTS UR QL AUTO: NORMAL /LPF
KETONES UR QL STRIP: NEGATIVE
LEUKOCYTE ESTERASE UR QL STRIP.AUTO: NEGATIVE
NITRITE UR QL STRIP: NEGATIVE
PH UR STRIP.AUTO: 5.5 [PH] (ref 5–8)
PHOSPHATE SERPL-MCNC: 4.1 MG/DL (ref 2.5–4.5)
POTASSIUM SERPL-SCNC: 4.1 MMOL/L (ref 3.5–5.2)
PROT UR QL STRIP: ABNORMAL
RBC # UR STRIP: NORMAL /HPF
REF LAB TEST METHOD: NORMAL
SODIUM SERPL-SCNC: 137 MMOL/L (ref 136–145)
SP GR UR STRIP: 1.02 (ref 1–1.03)
SQUAMOUS #/AREA URNS HPF: NORMAL /HPF
UROBILINOGEN UR QL STRIP: ABNORMAL
WBC # UR STRIP: NORMAL /HPF

## 2025-07-14 PROCEDURE — 82570 ASSAY OF URINE CREATININE: CPT

## 2025-07-14 PROCEDURE — 84153 ASSAY OF PSA TOTAL: CPT

## 2025-07-14 PROCEDURE — 84154 ASSAY OF PSA FREE: CPT

## 2025-07-14 PROCEDURE — 80069 RENAL FUNCTION PANEL: CPT

## 2025-07-14 PROCEDURE — 81001 URINALYSIS AUTO W/SCOPE: CPT

## 2025-07-14 PROCEDURE — 85018 HEMOGLOBIN: CPT

## 2025-07-14 PROCEDURE — 36415 COLL VENOUS BLD VENIPUNCTURE: CPT

## 2025-07-14 PROCEDURE — 82043 UR ALBUMIN QUANTITATIVE: CPT

## 2025-07-15 LAB
ALBUMIN UR-MCNC: 30.1 MG/DL
CREAT UR-MCNC: 101.2 MG/DL
MICROALBUMIN/CREAT UR: 297.4 MG/G (ref 0–29)
PSA FREE MFR SERPL: 54.9 %
PSA FREE SERPL-MCNC: 2.03 NG/ML
PSA SERPL-MCNC: 3.7 NG/ML (ref 0–4)

## 2025-07-24 ENCOUNTER — OFFICE VISIT (OUTPATIENT)
Dept: UROLOGY | Facility: CLINIC | Age: 75
End: 2025-07-24
Payer: MEDICARE

## 2025-07-24 DIAGNOSIS — N40.0 BPH WITHOUT OBSTRUCTION/LOWER URINARY TRACT SYMPTOMS: ICD-10-CM

## 2025-07-24 DIAGNOSIS — R97.20 ELEVATED PROSTATE SPECIFIC ANTIGEN (PSA): Primary | ICD-10-CM

## 2025-07-24 PROCEDURE — 1159F MED LIST DOCD IN RCRD: CPT | Performed by: STUDENT IN AN ORGANIZED HEALTH CARE EDUCATION/TRAINING PROGRAM

## 2025-07-24 PROCEDURE — 99213 OFFICE O/P EST LOW 20 MIN: CPT | Performed by: STUDENT IN AN ORGANIZED HEALTH CARE EDUCATION/TRAINING PROGRAM

## 2025-07-24 PROCEDURE — 1160F RVW MEDS BY RX/DR IN RCRD: CPT | Performed by: STUDENT IN AN ORGANIZED HEALTH CARE EDUCATION/TRAINING PROGRAM

## 2025-07-24 NOTE — PROGRESS NOTES
Follow Up Office Visit      Patient Name: Elton Funez  : 1950   MRN: [id number]     Chief Complaint:    Chief Complaint   Patient presents with    Elevated PSA       Referring Provider: No ref. provider found    History of Present Illness: Elton Funez is a 75 y.o. male who presents today for follow up of mildly elevated PSA.  Most recent PSA is returned to normal.  He completed an MRI prostate returns today to review results.  MRI prostate demonstrates a 55 g gland, PSA density is not concerning.  The prostate MRI demonstrates no concerning lesions.  This considered a PI-RADS 2 exam.  He has no family history of prostate cancer and denies urinary symptoms.  He does have signs of BPH with transitional zone nodularity and a mild intravesical protrusion however he has no significant urinary symptoms.  He is not on alpha blockers.    Prostate size: 5.2 x 4.2 x 4.9 cm, volume 55 cc. PSA 4. PSA density 0.07     Peripheral Zone: Minimal linear bandlike areas of decreased T2 signal suggesting sequelae of prior prostatitis based on morphology (PI-RADS 2).     Transition Zone: Enlarged and heterogeneous with circumscribed nodules consistent with benign prostatic hyperplasia     Seminal vesicles: None.     Lymph nodes: No pelvic lymphadenopathy.     Osseous structures: No aggressive osseous lesion.     Additional findings: Slightly thickened trabeculated urinary bladder suggesting sequela of chronic urinary bladder outlet obstruction. Tiny fat-containing inguinal hernias. Heterogeneous slightly oval-shaped circumscribed intramuscular lesion in the   right thigh adductors measuring 3 x 2 cm AP and transverse dimension image 43 series 11 for example. There is heterogeneity both on T2 and T1-weighted imaging containing intrinsic T1 hyperintense signal. Questionable peripheral enhancement. Lesion has   been present since at least  noncontrast CT comparison. Imaging characteristics and chronicity favors  sequelae of prior trauma, likely chronic intramuscular hematoma.     IMPRESSION:  Impression:  1. No suspicious PI-RADS 3 or greater lesion.  2. Mild prostamegaly and BPH change.  3. No suspicious pelvic adenopathy or bone lesions.     PI-RADS 2 exam    Subjective      Review of System: Review of Systems   Genitourinary: Negative.  Negative for decreased urine volume, difficulty urinating, dysuria, enuresis, flank pain, frequency, hematuria and urgency.      I have reviewed the ROS documented by my clinical staff, I have updated appropriately and I agree. Alhaji Ann MD    I have reviewed and the following portions of the patient's history were updated as appropriate: past family history, past medical history, past social history, past surgical history and problem list.    Medications:     Current Outpatient Medications:     B-D UF III MINI PEN NEEDLES 31G X 5 MM misc, USE THREE TIMES A DAY, Disp: 270 each, Rfl: 3    carvedilol (COREG) 3.125 MG tablet, Take 1 tablet by mouth Every 12 (Twelve) Hours., Disp: 180 tablet, Rfl: 2    colestipol (COLESTID) 1 g tablet, TAKE 2 TABLETS TWICE A DAY, Disp: 360 tablet, Rfl: 3    Continuous Blood Gluc  (FreeStyle Winston 2 [place]) device, 1 Device Every 14 (Fourteen) Days., Disp: 1 each, Rfl: 0    Continuous Blood Gluc Sensor (FreeStyle Winston 2 Sensor) misc, 1 each Every 14 (Fourteen) Days., Disp: 6 each, Rfl: 3    ferrous sulfate (FeroSul) 325 (65 FE) MG tablet, Take 1 tablet by mouth 2 (Two) Times a Day., Disp: 180 tablet, Rfl: 1    gemfibrozil (LOPID) 600 MG tablet, TAKE 1 TABLET TWICE A DAY, Disp: 180 tablet, Rfl: 3    Insulin Glargine (Lantus SoloStar) 100 UNIT/ML injection pen, INJECT 70 UNITS UNDER THE SKIN INTO THE APPROPRIATE AREA AS DIRECTED EVERY NIGHT, Disp: 63 mL, Rfl: 0    Insulin Lispro, 1 Unit Dial, (HumaLOG KwikPen) 100 UNIT/ML solution pen-injector, Inject 30 Units under the skin into the appropriate area as directed 2 (Two) Times a Day., Disp: 90  mL, Rfl: 3    ipratropium (ATROVENT) 0.06 % nasal spray, USE 2 SPRAYS IN EACH NOSTRIL AS DIRECTED BY PROVIDER DAILY, Disp: 45 mL, Rfl: 1    levothyroxine (SYNTHROID, LEVOTHROID) 75 MCG tablet, Take 1 tablet by mouth Daily., Disp: 90 tablet, Rfl: 3    Multiple Vitamins-Minerals (MULTIVITAMIN PO), Take 1 tablet by mouth Daily., Disp: , Rfl:     omeprazole (priLOSEC) 40 MG capsule, TAKE 1 CAPSULE DAILY, Disp: 90 capsule, Rfl: 3    ondansetron (Zofran) 4 MG tablet, Take 1 tablet by mouth Every 8 (Eight) Hours As Needed for Nausea or Vomiting., Disp: 15 tablet, Rfl: 0    pregabalin (Lyrica) 100 MG capsule, Take 1 capsule by mouth Daily AND 2 capsules Every Night., Disp: 90 capsule, Rfl: 4    sertraline (ZOLOFT) 100 MG tablet, TAKE 1 TABLET DAILY, Disp: 90 tablet, Rfl: 3    spironolactone (ALDACTONE) 25 MG tablet, TAKE 1 TABLET DAILY, Disp: 90 tablet, Rfl: 3    tamoxifen (NOLVADEX) 20 MG chemo tablet, Take 1 tablet by mouth Daily., Disp: 90 tablet, Rfl: 3    temazepam (Restoril) 7.5 MG capsule, Take 1 capsule by mouth At Night As Needed for Sleep., Disp: , Rfl:     tiZANidine (ZANAFLEX) 4 MG tablet, TAKE 1 TABLET TWICE A DAY AS NEEDED FOR MUSCLE SPASMS, Disp: 180 tablet, Rfl: 3    vitamin D3 125 MCG (5000 UT) capsule capsule, Take 1 capsule by mouth 2 (Two) Times a Day., Disp: , Rfl:     Allergies:   Allergies   Allergen Reactions    Glucophage Xr [Metformin Hcl Er] Diarrhea and Other (See Comments)     Glucophage XR TB24: Adverse Reaction: Severe GI issues.    Metformin Nausea And Vomiting and Unknown - Low Severity     Other reaction(s): SEVERE INTESTIONAL ISSUES    Other reaction(s): SEVERE GI ISSUES    Glucophage XR TB24    MetFORMIN HCl TABS    Glucophage    metformin hydrochloride    Tetracyclines & Related Nausea Only     Adverse Reaction    Tetracycline Unknown - Low Severity     tetracycline hydrochloride    Chlorhexidine Rash               Objective     Physical Exam:   Vital Signs: There were no vitals filed  for this visit.  There is no height or weight on file to calculate BMI.     Physical Exam  Constitutional:       Appearance: Normal appearance.   HENT:      Head: Normocephalic and atraumatic.      Nose: Nose normal.   Eyes:      Extraocular Movements: Extraocular movements intact.      Conjunctiva/sclera: Conjunctivae normal.      Pupils: Pupils are equal, round, and reactive to light.   Musculoskeletal:         General: Normal range of motion.      Cervical back: Normal range of motion and neck supple.   Skin:     General: Skin is warm and dry.      Findings: No lesion or rash.   Neurological:      General: No focal deficit present.      Mental Status: He is alert and oriented to person, place, and time. Mental status is at baseline.   Psychiatric:         Mood and Affect: Mood normal.         Behavior: Behavior normal.         Labs:   Brief Urine Lab Results  (Last result in the past 365 days)        Color   Clarity   Blood   Leuk Est   Nitrite   Protein   CREAT   Urine HCG        07/14/25 0931             101.2         07/14/25 0931 Yellow   Clear   Negative   Negative   Negative   100 mg/dL (2+)                        Lab Results   Component Value Date    GLUCOSE 123 (H) 07/14/2025    CALCIUM 9.8 07/14/2025     07/14/2025    K 4.1 07/14/2025    CO2 20.5 (L) 07/14/2025     07/14/2025    BUN 30.0 (H) 07/14/2025    CREATININE 1.94 (H) 07/14/2025    EGFRIFAFRI 66 07/14/2015    EGFRIFNONA 60 (L) 12/15/2021    BCR 15.5 07/14/2025    ANIONGAP 12.5 07/14/2025       Lab Results   Component Value Date    WBC 7.94 06/12/2025    HGB 13.0 07/14/2025    HCT 41.8 06/12/2025    MCV 97.4 (H) 06/12/2025     (L) 06/12/2025       Images:   MRI Prostate With & Without Contrast  Result Date: 7/7/2025  Impression: 1. No suspicious PI-RADS 3 or greater lesion. 2. Mild prostamegaly and BPH change. 3. No suspicious pelvic adenopathy or bone lesions. PI-RADS 2 exam Electronically Signed: Parminder Johnson MD  7/7/2025  10:02 AM EDT  Workstation ID: [id number]    CT Abdomen Pelvis With Contrast  Result Date: 5/25/2025  Impression: 1.Findings consistent with acute uncomplicated pancreatitis. 2.Hepatic steatosis. 3.Ancillary findings as described above. Electronically Signed: Courtney Rizo MD  5/25/2025 11:12 PM EDT  Workstation ID: [id number]    XR Chest 1 View  Result Date: 5/22/2025  Impression: No active cardiopulmonary disease Electronically Signed: Salo Villalobos DO  5/22/2025 2:23 PM EDT  Workstation ID: [id number]      Measures:   Tobacco:   Elton Funez  reports that he quit smoking about 49 years ago. His smoking use included cigarettes. He started smoking about 53 years ago. He has a 2 pack-year smoking history. He has been exposed to tobacco smoke. He has never used smokeless tobacco.     Assessment / Plan      Assessment/Plan:   75 y.o. male who presented today for follow up of mildly elevated PSA.  Free PSA percentage is greater than 50 indicating a very small chance of prostate cancer.  MRI is completely clean.  There are no peripheral zone lesions of concern.  I do not recommend a prostate biopsy.  I will see him back in 6 months with repeat PSA.  He has no urinary symptoms despite signs of BPH on his MRI.    Diagnoses and all orders for this visit:    1. Elevated prostate specific antigen (PSA) (Primary)  -     PSA Diagnostic; Future    2. BPH without obstruction/lower urinary tract symptoms  -     PSA Diagnostic; Future           Follow Up:   Return in about 6 months (around 1/24/2026) for Follow Up after Labs.    I spent approximately 20 minutes providing clinical care for this patient; including review of patient's chart and provider documentation, face to face time spent with patient in examination room (obtaining history, performing physical exam, discussing diagnosis and management options), placing orders, and completing patient documentation.     Alhaji Ann MD  [facility] [place]

## 2025-07-29 ENCOUNTER — OFFICE VISIT (OUTPATIENT)
Dept: INTERNAL MEDICINE | Facility: CLINIC | Age: 75
End: 2025-07-29
Payer: MEDICARE

## 2025-07-29 VITALS
BODY MASS INDEX: 30.71 KG/M2 | SYSTOLIC BLOOD PRESSURE: 122 MMHG | HEART RATE: 74 BPM | RESPIRATION RATE: 18 BRPM | DIASTOLIC BLOOD PRESSURE: 80 MMHG | HEIGHT: 68 IN | OXYGEN SATURATION: 98 % | TEMPERATURE: 96.8 F | WEIGHT: 202.6 LBS

## 2025-07-29 DIAGNOSIS — K21.9 GASTROESOPHAGEAL REFLUX DISEASE WITHOUT ESOPHAGITIS: ICD-10-CM

## 2025-07-29 DIAGNOSIS — E55.9 VITAMIN D DEFICIENCY: ICD-10-CM

## 2025-07-29 DIAGNOSIS — D50.9 IRON DEFICIENCY ANEMIA, UNSPECIFIED IRON DEFICIENCY ANEMIA TYPE: Primary | ICD-10-CM

## 2025-07-29 DIAGNOSIS — E78.5 HYPERLIPIDEMIA, UNSPECIFIED HYPERLIPIDEMIA TYPE: ICD-10-CM

## 2025-07-29 LAB
25(OH)D3 SERPL-MCNC: 51 NG/ML (ref 30–100)
ALBUMIN SERPL-MCNC: 4.1 G/DL (ref 3.5–5.2)
ALBUMIN/GLOB SERPL: 1.2 G/DL
ALP SERPL-CCNC: 86 U/L (ref 39–117)
ALT SERPL W P-5'-P-CCNC: 13 U/L (ref 1–41)
ANION GAP SERPL CALCULATED.3IONS-SCNC: 12 MMOL/L (ref 5–15)
AST SERPL-CCNC: 27 U/L (ref 1–40)
BASOPHILS # BLD AUTO: 0.02 10*3/MM3 (ref 0–0.2)
BASOPHILS NFR BLD AUTO: 0.4 % (ref 0–1.5)
BILIRUB BLD-MCNC: NEGATIVE MG/DL
BILIRUB SERPL-MCNC: 0.5 MG/DL (ref 0–1.2)
BUN SERPL-MCNC: 29 MG/DL (ref 8–23)
BUN/CREAT SERPL: 16.9 (ref 7–25)
CALCIUM SPEC-SCNC: 9.3 MG/DL (ref 8.6–10.5)
CHLORIDE SERPL-SCNC: 109 MMOL/L (ref 98–107)
CHOLEST SERPL-MCNC: 155 MG/DL (ref 0–200)
CLARITY, POC: CLEAR
CO2 SERPL-SCNC: 17 MMOL/L (ref 22–29)
COLOR UR: YELLOW
CREAT SERPL-MCNC: 1.72 MG/DL (ref 0.76–1.27)
DEPRECATED RDW RBC AUTO: 46.1 FL (ref 37–54)
EGFRCR SERPLBLD CKD-EPI 2021: 40.7 ML/MIN/1.73
EOSINOPHIL # BLD AUTO: 0.23 10*3/MM3 (ref 0–0.4)
EOSINOPHIL NFR BLD AUTO: 4.3 % (ref 0.3–6.2)
ERYTHROCYTE [DISTWIDTH] IN BLOOD BY AUTOMATED COUNT: 13.1 % (ref 12.3–15.4)
EXPIRATION DATE: ABNORMAL
FERRITIN SERPL-MCNC: 322 NG/ML (ref 30–400)
GLOBULIN UR ELPH-MCNC: 3.4 GM/DL
GLUCOSE SERPL-MCNC: 198 MG/DL (ref 65–99)
GLUCOSE UR STRIP-MCNC: NEGATIVE MG/DL
HCT VFR BLD AUTO: 37.3 % (ref 37.5–51)
HDLC SERPL-MCNC: 34 MG/DL (ref 40–60)
HGB BLD-MCNC: 12.6 G/DL (ref 13–17.7)
IMM GRANULOCYTES # BLD AUTO: 0.03 10*3/MM3 (ref 0–0.05)
IMM GRANULOCYTES NFR BLD AUTO: 0.6 % (ref 0–0.5)
IRON 24H UR-MRATE: 112 MCG/DL (ref 59–158)
IRON SATN MFR SERPL: 21 % (ref 20–50)
KETONES UR QL: NEGATIVE
LDLC SERPL CALC-MCNC: 93 MG/DL (ref 0–100)
LDLC/HDLC SERPL: 2.63 {RATIO}
LEUKOCYTE EST, POC: NEGATIVE
LYMPHOCYTES # BLD AUTO: 1.18 10*3/MM3 (ref 0.7–3.1)
LYMPHOCYTES NFR BLD AUTO: 22.1 % (ref 19.6–45.3)
Lab: ABNORMAL
MCH RBC QN AUTO: 32.7 PG (ref 26.6–33)
MCHC RBC AUTO-ENTMCNC: 33.8 G/DL (ref 31.5–35.7)
MCV RBC AUTO: 96.9 FL (ref 79–97)
MONOCYTES # BLD AUTO: 0.43 10*3/MM3 (ref 0.1–0.9)
MONOCYTES NFR BLD AUTO: 8.1 % (ref 5–12)
NEUTROPHILS NFR BLD AUTO: 3.44 10*3/MM3 (ref 1.7–7)
NEUTROPHILS NFR BLD AUTO: 64.5 % (ref 42.7–76)
NITRITE UR-MCNC: NEGATIVE MG/ML
PH UR: 5 [PH] (ref 5–8)
PLATELET # BLD AUTO: 81 10*3/MM3 (ref 140–450)
PMV BLD AUTO: 13 FL (ref 6–12)
POTASSIUM SERPL-SCNC: 4.9 MMOL/L (ref 3.5–5.2)
PROT SERPL-MCNC: 7.5 G/DL (ref 6–8.5)
PROT UR STRIP-MCNC: ABNORMAL MG/DL
RBC # BLD AUTO: 3.85 10*6/MM3 (ref 4.14–5.8)
RBC # UR STRIP: NEGATIVE /UL
SODIUM SERPL-SCNC: 138 MMOL/L (ref 136–145)
SP GR UR: 1.01 (ref 1–1.03)
TIBC SERPL-MCNC: 527 MCG/DL (ref 298–536)
TRANSFERRIN SERPL-MCNC: 354 MG/DL (ref 200–360)
TRIGL SERPL-MCNC: 158 MG/DL (ref 0–150)
UROBILINOGEN UR QL: NORMAL
VLDLC SERPL-MCNC: 28 MG/DL (ref 5–40)
WBC NRBC COR # BLD AUTO: 5.33 10*3/MM3 (ref 3.4–10.8)

## 2025-07-29 PROCEDURE — 99214 OFFICE O/P EST MOD 30 MIN: CPT | Performed by: INTERNAL MEDICINE

## 2025-07-29 PROCEDURE — 3074F SYST BP LT 130 MM HG: CPT | Performed by: INTERNAL MEDICINE

## 2025-07-29 PROCEDURE — 80053 COMPREHEN METABOLIC PANEL: CPT | Performed by: INTERNAL MEDICINE

## 2025-07-29 PROCEDURE — 3079F DIAST BP 80-89 MM HG: CPT | Performed by: INTERNAL MEDICINE

## 2025-07-29 PROCEDURE — 36415 COLL VENOUS BLD VENIPUNCTURE: CPT | Performed by: INTERNAL MEDICINE

## 2025-07-29 PROCEDURE — 85025 COMPLETE CBC W/AUTO DIFF WBC: CPT | Performed by: INTERNAL MEDICINE

## 2025-07-29 PROCEDURE — 82306 VITAMIN D 25 HYDROXY: CPT | Performed by: INTERNAL MEDICINE

## 2025-07-29 PROCEDURE — 83540 ASSAY OF IRON: CPT | Performed by: INTERNAL MEDICINE

## 2025-07-29 PROCEDURE — 80061 LIPID PANEL: CPT | Performed by: INTERNAL MEDICINE

## 2025-07-29 PROCEDURE — 82728 ASSAY OF FERRITIN: CPT | Performed by: INTERNAL MEDICINE

## 2025-07-29 PROCEDURE — 1126F AMNT PAIN NOTED NONE PRSNT: CPT | Performed by: INTERNAL MEDICINE

## 2025-07-29 PROCEDURE — 84466 ASSAY OF TRANSFERRIN: CPT | Performed by: INTERNAL MEDICINE

## 2025-07-29 PROCEDURE — 81003 URINALYSIS AUTO W/O SCOPE: CPT | Performed by: INTERNAL MEDICINE

## 2025-07-29 NOTE — PROGRESS NOTES
Chief Complaint   Patient presents with    Acute pancreatitis without infection or necrosis, unspecifi     Follow up         History of Present Illness      The patient presents for a follow-up related to GERD. The patient is on omeprazole for his gastroesophageal reflux. The medication is taken on a regular basis and gives complete relief of the symptoms. He reports no abdominal pain, belching, chest pain, diarrhea, dysphagia, early satiety, heartburn, hoarseness, nausea, odynophagia, rectal bleeding, vomiting or weight loss. The GERD has no known aggravating factors.    The patient presents for a follow-up related to hyperlipidemia. He is following a low fat diet. He reports that he is not exercising. He is not taking medication for hyperlipidemia. He denies shortness of breath, orthopnea, paroxysmal nocturnal dyspnea, dyspnea on exertion, edema, palpitations or syncope.    The patient presents for a follow-up related to iron deficiency anemia. There are no reports of blood loss. The patient has no symptoms of a dry cough, a wet cough, wheezing, fever, a headache, myalgias, sweats, decreased appetite, chills, fatigue or paresthesias. The patient's energy level is normal.    Medications      Current Outpatient Medications:     B-D UF III MINI PEN NEEDLES 31G X 5 MM misc, USE THREE TIMES A DAY, Disp: 270 each, Rfl: 3    carvedilol (COREG) 3.125 MG tablet, Take 1 tablet by mouth Every 12 (Twelve) Hours., Disp: 180 tablet, Rfl: 2    colestipol (COLESTID) 1 g tablet, TAKE 2 TABLETS TWICE A DAY, Disp: 360 tablet, Rfl: 3    Continuous Blood Gluc  (FreeStyle [place] 2 [place]) device, 1 Device Every 14 (Fourteen) Days., Disp: 1 each, Rfl: 0    Continuous Blood Gluc Sensor (FreeStyle [place] 2 Sensor) misc, 1 each Every 14 (Fourteen) Days., Disp: 6 each, Rfl: 3    ferrous sulfate (FeroSul) 325 (65 FE) MG tablet, Take 1 tablet by mouth 2 (Two) Times a Day., Disp: 180 tablet, Rfl: 1    gemfibrozil (LOPID) 600 MG tablet,  TAKE 1 TABLET TWICE A DAY, Disp: 180 tablet, Rfl: 3    Insulin Glargine (Lantus SoloStar) 100 UNIT/ML injection pen, INJECT 70 UNITS UNDER THE SKIN INTO THE APPROPRIATE AREA AS DIRECTED EVERY NIGHT, Disp: 63 mL, Rfl: 0    Insulin Lispro, 1 Unit Dial, (HumaLOG KwikPen) 100 UNIT/ML solution pen-injector, Inject 30 Units under the skin into the appropriate area as directed 2 (Two) Times a Day., Disp: 90 mL, Rfl: 3    ipratropium (ATROVENT) 0.06 % nasal spray, USE 2 SPRAYS IN EACH NOSTRIL AS DIRECTED BY PROVIDER DAILY, Disp: 45 mL, Rfl: 1    levothyroxine (SYNTHROID, LEVOTHROID) 75 MCG tablet, Take 1 tablet by mouth Daily., Disp: 90 tablet, Rfl: 3    Multiple Vitamins-Minerals (MULTIVITAMIN PO), Take 1 tablet by mouth Daily., Disp: , Rfl:     omeprazole (priLOSEC) 40 MG capsule, TAKE 1 CAPSULE DAILY, Disp: 90 capsule, Rfl: 3    ondansetron (Zofran) 4 MG tablet, Take 1 tablet by mouth Every 8 (Eight) Hours As Needed for Nausea or Vomiting., Disp: 15 tablet, Rfl: 0    pregabalin (Lyrica) 100 MG capsule, Take 1 capsule by mouth Daily AND 2 capsules Every Night., Disp: 90 capsule, Rfl: 4    sertraline (ZOLOFT) 100 MG tablet, TAKE 1 TABLET DAILY, Disp: 90 tablet, Rfl: 3    spironolactone (ALDACTONE) 25 MG tablet, TAKE 1 TABLET DAILY, Disp: 90 tablet, Rfl: 3    tamoxifen (NOLVADEX) 20 MG chemo tablet, Take 1 tablet by mouth Daily., Disp: 90 tablet, Rfl: 3    temazepam (Restoril) 7.5 MG capsule, Take 1 capsule by mouth At Night As Needed for Sleep., Disp: , Rfl:     tiZANidine (ZANAFLEX) 4 MG tablet, TAKE 1 TABLET TWICE A DAY AS NEEDED FOR MUSCLE SPASMS, Disp: 180 tablet, Rfl: 3    vitamin D3 125 MCG (5000 UT) capsule capsule, Take 1 capsule by mouth 2 (Two) Times a Day., Disp: , Rfl:      Allergies    Allergies   Allergen Reactions    Glucophage Xr [Metformin Hcl Er] Diarrhea and Other (See Comments)     Glucophage XR TB24: Adverse Reaction: Severe GI issues.    Metformin Nausea And Vomiting and Unknown - Low Severity      "Other reaction(s): SEVERE INTESTIONAL ISSUES    Other reaction(s): SEVERE GI ISSUES    Glucophage XR TB24    MetFORMIN HCl TABS    Glucophage    metformin hydrochloride    Tetracyclines & Related Nausea Only     Adverse Reaction    Tetracycline Unknown - Low Severity     tetracycline hydrochloride    Chlorhexidine Rash       Problem List    Patient Active Problem List   Diagnosis    Stage 3a chronic kidney disease    Gastroesophageal reflux disease without esophagitis    Dyslipidemia, goal LDL below 70    Primary hypertension    Obesity, Class I, BMI 30-34.9    FAVIO (obstructive sleep apnea)    Cervical radiculopathy    Type 2 diabetes mellitus with diabetic polyneuropathy, with long-term current use of insulin    Circadian rhythm sleep disorder, delayed sleep phase type    Mild nonproliferative diabetic retinopathy of left eye without macular edema associated with type 2 diabetes mellitus    Acquired hypothyroidism    Anemia of chronic disease    Liver cirrhosis secondary to JEROME    Portal hypertensive gastropathy    Grade I diastolic dysfunction    Combined forms of age-related cataract of both eyes    Major depressive disorder with single episode, in full remission    Malignant neoplasm of right breast in male, estrogen receptor positive    Sepsis, due to unspecified organism, unspecified whether acute organ dysfunction present    Idiopathic acute pancreatitis    Pancreatitis    Acute cholecystitis    HLD (hyperlipidemia)    History of breast cancer    Elevated troponin    T2DM (type 2 diabetes mellitus)    Trigger ring finger of left hand       Medications, Allergies, Problems List and Past History were reviewed and updated.    Physical Examination    /80 (BP Location: Left arm, Patient Position: Sitting, Cuff Size: Adult)   Pulse 74   Temp 96.8 °F (36 °C) (Infrared)   Resp 18   Ht 172.7 cm (68\")   Wt 91.9 kg (202 lb 9.6 oz)   SpO2 98%   BMI 30.81 kg/m²       HEENT: Head- Normocephalic Atraumatic. " Facies- Within normal limits. Pinnas- Normal texture and shape bilaterally. Canals- Normal bilaterally. TMs- Normal bilaterally. Nares- Patent bilaterally. Nasal Septum- is normal. There is no tenderness to palpation over the frontal or maxillary sinuses. Lids- Normal bilaterally. Conjunctiva- Clear bilaterally. Sclera- Anicteric bilaterally. Oropharynx- Moist with no lesions. Tonsils- No enlargement, erythema or exudate.    Neck: Thyroid- non enlarged, symmetric and has no nodules. No bruits are detected. ROM- Normal Range of Motion with no rigidity.    Lungs: Auscultation- Clear to auscultation bilaterally. There are no retractions, clubbing or cyanosis. The Expiratory to Inspiratory ratio is equal.    Lymph Nodes: Cervical- no enlarged lymph nodes noted.    Cardiovascular: There are no carotid bruits. Heart- Normal Rate with Regular rhythm and no murmurs. There are no gallops. There are no rubs. In the lower extremities there is no edema. The upper extremities do not have edema.      Abdomen: Soft, benign, non-tender with no masses, hernias, organomegaly or scars.    Impression and Assessment    Gastroesophageal Reflux Disease.    Hyperlipidemia.    Iron Deficiency Anemia.    Plan    Gastroesophageal Reflux Disease Plan: The patient was instructed to continue the current medications.    Hyperlipidemia Plan: The patient was instructed to exercise daily, eat a low fat diet and continue his medications.    Iron Deficiency Anemia Plan: The patient was instructed to continue the current medications.    Diagnoses and all orders for this visit:    1. Iron deficiency anemia, unspecified iron deficiency anemia type (Primary)  -     CBC & Differential; Future  -     Ferritin; Future  -     Iron Profile w/o Ferritin; Future  -     POC Urinalysis Dipstick, Automated; Future    2. Gastroesophageal reflux disease without esophagitis    3. Hyperlipidemia, unspecified hyperlipidemia type  -     Comprehensive Metabolic Panel;  Future  -     Lipid Panel; Future    4. Vitamin D deficiency  -     Vitamin D,25-Hydroxy; Future            Return to Office    The patient was instructed to return for follow-up in 6 months. The patient was instructed to return sooner if the condition changes, worsens, or does not resolve.

## 2025-07-30 RX ORDER — ROSUVASTATIN CALCIUM 20 MG/1
20 TABLET, COATED ORAL DAILY
Qty: 90 TABLET | Refills: 1 | Status: SHIPPED | OUTPATIENT
Start: 2025-07-30

## 2025-08-12 ENCOUNTER — OFFICE VISIT (OUTPATIENT)
Dept: ONCOLOGY | Facility: CLINIC | Age: 75
End: 2025-08-12
Payer: MEDICARE

## 2025-08-12 VITALS
SYSTOLIC BLOOD PRESSURE: 102 MMHG | DIASTOLIC BLOOD PRESSURE: 66 MMHG | WEIGHT: 203 LBS | RESPIRATION RATE: 18 BRPM | BODY MASS INDEX: 30.77 KG/M2 | TEMPERATURE: 97.4 F | HEIGHT: 68 IN | OXYGEN SATURATION: 96 % | HEART RATE: 81 BPM

## 2025-08-12 DIAGNOSIS — Z17.0 MALIGNANT NEOPLASM OF RIGHT BREAST IN MALE, ESTROGEN RECEPTOR POSITIVE, UNSPECIFIED SITE OF BREAST: Primary | ICD-10-CM

## 2025-08-12 DIAGNOSIS — C50.921 MALIGNANT NEOPLASM OF RIGHT BREAST IN MALE, ESTROGEN RECEPTOR POSITIVE, UNSPECIFIED SITE OF BREAST: Primary | ICD-10-CM

## 2025-08-12 PROCEDURE — 1159F MED LIST DOCD IN RCRD: CPT | Performed by: NURSE PRACTITIONER

## 2025-08-12 PROCEDURE — 1126F AMNT PAIN NOTED NONE PRSNT: CPT | Performed by: NURSE PRACTITIONER

## 2025-08-12 PROCEDURE — 3074F SYST BP LT 130 MM HG: CPT | Performed by: NURSE PRACTITIONER

## 2025-08-12 PROCEDURE — 3078F DIAST BP <80 MM HG: CPT | Performed by: NURSE PRACTITIONER

## 2025-08-12 PROCEDURE — 99213 OFFICE O/P EST LOW 20 MIN: CPT | Performed by: NURSE PRACTITIONER

## 2025-08-12 PROCEDURE — 1160F RVW MEDS BY RX/DR IN RCRD: CPT | Performed by: NURSE PRACTITIONER

## (undated) DEVICE — SPNG GZ WOVN 4X4IN 12PLY 10/BX STRL

## (undated) DEVICE — FIRST STEP BEDSIDE ADD WATER KIT - RESEALABLE STAND-UP POUCH, ENDOSCOPIC CLEANING PAD - 1 POUCH: Brand: FIRST STEP BEDSIDE ADD WATER KIT - RESEALABLE STAND-UP POUCH, ENDOSCOPIC CLEANIN

## (undated) DEVICE — CONTN GRAD MEAS TRIANG 32OZ BLK

## (undated) DEVICE — SOLIDIFIER LIQ PREMISORB 1500CC

## (undated) DEVICE — PATIENT RETURN ELECTRODE, SINGLE-USE, CONTACT QUALITY MONITORING, ADULT, WITH 9FT CORD, FOR PATIENTS WEIGING OVER 33LBS. (15KG): Brand: MEGADYNE

## (undated) DEVICE — 3M™ STERI-DRAPE™ INSTRUMENT POUCH 1018: Brand: STERI-DRAPE™

## (undated) DEVICE — TBG PENCL TELESCP MEGADYNE SMOKE EVAC 10FT

## (undated) DEVICE — TUBING, SUCTION, 1/4" X 10', STRAIGHT: Brand: MEDLINE

## (undated) DEVICE — CATH CHOLANG 4.5F18IN BRGNDY

## (undated) DEVICE — ANTIBACTERIAL UNDYED BRAIDED (POLYGLACTIN 910), SYNTHETIC ABSORBABLE SUTURE: Brand: COATED VICRYL

## (undated) DEVICE — TB SXN FRAZIER 12F STRL

## (undated) DEVICE — TOOL 14MH30 LEGEND 14CM 3MM: Brand: MIDAS REX ™

## (undated) DEVICE — KT ORCA ORCAPOD DISP STRL

## (undated) DEVICE — DRSNG TELFA ILND ADH 4X6IN

## (undated) DEVICE — CANNULA,ADULT,SOFT-TOUCH,7TUBE,SC: Brand: MEDLINE

## (undated) DEVICE — KITTNER SPONGE: Brand: DEROYAL

## (undated) DEVICE — PK LAP LASR CHOLE 10

## (undated) DEVICE — GLV SURG SENSICARE PI MIC PF SZ8.5 LF STRL

## (undated) DEVICE — SUT MNCRYL PLS ANTIB UD 4/0 PS2 18IN

## (undated) DEVICE — MEDI-VAC NON-CONDUCTIVE SUCTION TUBING: Brand: CARDINAL HEALTH

## (undated) DEVICE — GLV SURG BIOGEL LTX PF 8

## (undated) DEVICE — JP PERF DRN SIL FLT 10MM FULL: Brand: CARDINAL HEALTH

## (undated) DEVICE — CVR HNDL LIGHT RIGID

## (undated) DEVICE — CANNULA,OXY,ADULT,SUPERSOFT,W/7'TUB,UC: Brand: MEDLINE

## (undated) DEVICE — BIPOLAR SEALER 23-112-1 AQM 6.0: Brand: AQUAMANTYS™

## (undated) DEVICE — 3M™ WARMING BLANKET, LOWER BODY, 10 PER CASE, 42568: Brand: BAIR HUGGER™

## (undated) DEVICE — SUT SILK 2/0 PS 18IN 1588H

## (undated) DEVICE — ENDOPOUCH RETRIEVER SPECIMEN RETRIEVAL BAGS: Brand: ENDOPOUCH RETRIEVER

## (undated) DEVICE — BOWL PLSTC MD 16OZ BLU STRL

## (undated) DEVICE — ENDOCUT SCISSOR TIP, DISPOSABLE: Brand: RENEW

## (undated) DEVICE — DEV LK WIREGUIDE FUSN OLYMP SCP

## (undated) DEVICE — DISPOSABLE BIPOLAR FORCEPS 7 3/4" (19.7CM) SCOVILLE BAYONET, INSULATED, 1.5MM TIP AND 12 FT. (3.6M) CABLE: Brand: KIRWAN

## (undated) DEVICE — SPHINCTEROTOME: Brand: DREAMTOME™ RX 44

## (undated) DEVICE — ENDOPATH XCEL BLADELESS TROCARS WITH STABILITY SLEEVES: Brand: ENDOPATH XCEL

## (undated) DEVICE — PK NEURO DISC 10

## (undated) DEVICE — SYR LUERLOK 20CC BX/50

## (undated) DEVICE — TAPE MICROFM 2IN LF

## (undated) DEVICE — SUT PROLN 6/0 C1 CARDIO 18IN 8718H

## (undated) DEVICE — ELECTRD NDL EDGE/STD EXT 1P 6.5IN

## (undated) DEVICE — SINGLE-USE BIOPSY FORCEPS: Brand: RADIAL JAW 4

## (undated) DEVICE — JP PERF DRN SIL FLT 7MM FULL: Brand: CARDINAL HEALTH

## (undated) DEVICE — ENCORE® LATEX MICRO SIZE 8.5, STERILE LATEX POWDER-FREE SURGICAL GLOVE: Brand: ENCORE

## (undated) DEVICE — GOWN,NON-REINFORCED,SIRUS,SET IN SLV,XL: Brand: MEDLINE

## (undated) DEVICE — SYR LUERLOK 50ML

## (undated) DEVICE — ELECTRD BLD EDGE/INSUL1P 2.4X5.1MM STRL

## (undated) DEVICE — ELECTRD BLD EZ CLN MOD XLNG 2.75IN

## (undated) DEVICE — DISH PETRI 3.5IN MD STRL LF

## (undated) DEVICE — MARYLAND JAW LAPAROSCOPIC SEALER/DIVIDER COATED: Brand: LIGASURE

## (undated) DEVICE — HYBRID CO2 TUBING/CAP SET FOR OLYMPUS® SCOPES & CO2 SOURCE: Brand: ERBE

## (undated) DEVICE — SOL LR 1000ML

## (undated) DEVICE — MAYFIELD® DISPOSABLE ADULT SKULL PIN (PLASTIC BASE): Brand: MAYFIELD®

## (undated) DEVICE — SUT VICYL 2/0 CR8 18IN DYED J726D

## (undated) DEVICE — SUT SILK 2/0 TIES 18IN A185H

## (undated) DEVICE — SNAP KOVER: Brand: UNBRANDED

## (undated) DEVICE — RETRIEVAL BALLOON CATHETER: Brand: EXTRACTOR™ PRO RX

## (undated) DEVICE — ENCORE® LATEX MICRO SIZE 6.5, STERILE LATEX POWDER-FREE SURGICAL GLOVE: Brand: ENCORE

## (undated) DEVICE — LUBE GEL ENDOGLIDE 1.1OZ

## (undated) DEVICE — ENDOPATH XCEL UNIVERSAL TROCAR STABLILITY SLEEVES: Brand: ENDOPATH XCEL

## (undated) DEVICE — AIRWY SZ11

## (undated) DEVICE — LIMB HOLDERS: Brand: DEROYAL

## (undated) DEVICE — THE BITE BLOCK MAXI, LATEX FREE STRAP IS USED TO PROTECT THE ENDOSCOPE INSERTION TUBE FROM BEING BITTEN BY THE PATIENT.

## (undated) DEVICE — GOWN SURG ORBIS LVL3 2XL STRL

## (undated) DEVICE — NEURO SPONGES: Brand: DEROYAL

## (undated) DEVICE — SPNG ENDO BEDSIDE TUB ENZYM

## (undated) DEVICE — ST EXT IV TBG W SECUR LK 20IN

## (undated) DEVICE — ELECTRD BLD EZ CLN STD 2.5IN

## (undated) DEVICE — APPL CHLORAPREP W/TINT 26ML BLU

## (undated) DEVICE — MAGNETIC DRAPE: Brand: DEVON

## (undated) DEVICE — SUT VIC 3/0 SH CR8 18IN J864D

## (undated) DEVICE — DRSNG PAD ABD 8X10IN STRL

## (undated) DEVICE — SOL IRR H2O BTL 1000ML STRL

## (undated) DEVICE — LAPAROSCOPIC SMOKE FILTRATION SYSTEM: Brand: PALL LAPAROSHIELD® PLUS LAPAROSCOPIC SMOKE FILTRATION SYSTEM

## (undated) DEVICE — SYS SKIN CLS DERMABOND PRINEO W/22CM MESH TP

## (undated) DEVICE — ELECTRD NDL EZ CLN MOD 2.75IN

## (undated) DEVICE — GLV SURG SENSICARE PI ORTHO SZ7.5 LF STRL

## (undated) DEVICE — SYR LUERLOK 30CC

## (undated) DEVICE — JACKSON-PRATT 100CC BULB RESERVOIR: Brand: CARDINAL HEALTH

## (undated) DEVICE — SAFELINER SUCTION CANISTER 1000CC: Brand: DEROYAL

## (undated) DEVICE — STPCK 4WY ON/OFF VLV M/COLAR FIT 45PSI STRL

## (undated) DEVICE — SUT SILK 3/0 TIES 18IN A184H

## (undated) DEVICE — ULTRACLEAN ACCESSORY ELECTRODE 4" (10.16 CM) COATED BLADE: Brand: ULTRACLEAN

## (undated) DEVICE — INTRO ACCSR BLNT TP

## (undated) DEVICE — LARYNG GLIDESCOPE COBALT/RANGER GVL4ST

## (undated) DEVICE — TRAP FLD MINIVAC MEGADYNE 100ML

## (undated) DEVICE — FEEDING TUBE: Brand: ARGYLE

## (undated) DEVICE — [HIGH FLOW INSUFFLATOR,  DO NOT USE IF PACKAGE IS DAMAGED,  KEEP DRY,  KEEP AWAY FROM SUNLIGHT,  PROTECT FROM HEAT AND RADIOACTIVE SOURCES.]: Brand: PNEUMOSURE

## (undated) DEVICE — APPL HEMOS FOR DELIVERY FLOSEAL

## (undated) DEVICE — AIRWY 90MM NO9

## (undated) DEVICE — MEDI-VAC YANKAUER SUCTION HANDLE W/BULBOUS TIP: Brand: CARDINAL HEALTH

## (undated) DEVICE — ENCORE® LATEX MICRO SIZE 7, STERILE LATEX POWDER-FREE SURGICAL GLOVE: Brand: ENCORE

## (undated) DEVICE — BANDAGE,GAUZE,BULKEE II,4.5"X4.1YD,STRL: Brand: MEDLINE

## (undated) DEVICE — SUT VIC 0 UR6 27IN VCP603H

## (undated) DEVICE — 2963 MEDIPORE SOFT CLOTH TAPE 3 IN X 10 YD 12 RLS/CS: Brand: 3M™ MEDIPORE™

## (undated) DEVICE — CATH CHOLANG 7.5F18IN BLU

## (undated) DEVICE — SHEATH GUIDE SCOUT SURG TPR 8.3TO3.2CM 264CM STRL

## (undated) DEVICE — DRSNG TRANSPARENT 2IN X 3IN

## (undated) DEVICE — PDS II VLT 0 107CM AG ST3: Brand: ENDOLOOP

## (undated) DEVICE — BNDG ELAS ELITE V/CLOSE 6IN 5YD LF STRL

## (undated) DEVICE — LEX GENERAL BREAST: Brand: MEDLINE INDUSTRIES, INC.

## (undated) DEVICE — SPNG VERSALON 4X4 4PLY NONSTRL LF BG/200

## (undated) DEVICE — NDL HYPO ECLPS SFTY 25G 1 1/2IN

## (undated) DEVICE — MAX-CORE® DISPOSABLE CORE BIOPSY INSTRUMENT, 18G X 25CM: Brand: MAX-CORE

## (undated) DEVICE — LAPAROVUE VISIBILITY SYSTEM LAPAROSCOPIC SOLUTIONS: Brand: LAPAROVUE

## (undated) DEVICE — PIN DISTRACT 14MM SS STRL PK/2

## (undated) DEVICE — NDL SPINE 20G 3 1/2 YEL STRL 1P/U

## (undated) DEVICE — SYR LL TP 10ML STRL

## (undated) DEVICE — SUCTION CANISTER, 1000CC,SAFELINER: Brand: DEROYAL

## (undated) DEVICE — GLV SURG SENSICARE PI MIC PF SZ8 LF STRL

## (undated) DEVICE — LUBE JELLY FOIL PACK 1.4 OZ: Brand: MEDLINE INDUSTRIES, INC.

## (undated) DEVICE — BLANKT WARM UPPR/BDY ARM/OUT 57X196CM